# Patient Record
Sex: MALE | Race: WHITE | NOT HISPANIC OR LATINO | Employment: OTHER | URBAN - METROPOLITAN AREA
[De-identification: names, ages, dates, MRNs, and addresses within clinical notes are randomized per-mention and may not be internally consistent; named-entity substitution may affect disease eponyms.]

---

## 2023-06-15 ENCOUNTER — MEDICAL CORRESPONDENCE (OUTPATIENT)
Dept: HEALTH INFORMATION MANAGEMENT | Facility: CLINIC | Age: 53
End: 2023-06-15

## 2023-06-15 ENCOUNTER — TRANSFERRED RECORDS (OUTPATIENT)
Dept: HEALTH INFORMATION MANAGEMENT | Facility: CLINIC | Age: 53
End: 2023-06-15

## 2023-06-19 ENCOUNTER — TRANSCRIBE ORDERS (OUTPATIENT)
Dept: OTHER | Age: 53
End: 2023-06-19

## 2023-06-19 DIAGNOSIS — I42.8 NON-ISCHEMIC CARDIOMYOPATHY (H): Primary | ICD-10-CM

## 2023-06-29 ENCOUNTER — CARE COORDINATION (OUTPATIENT)
Dept: CARDIOLOGY | Facility: CLINIC | Age: 53
End: 2023-06-29
Payer: COMMERCIAL

## 2023-06-29 NOTE — PROGRESS NOTES
New Heart Failure Referral     Situation:   Referral sent via epic by Referred by: Rhianna Pratt MD - [facility] Ph: [phone] Fx: [phone]. Patient lives in [place].     Background   Referral originally sent to transplant and they forwarded it to us. Per transplant referring team sounded nervous about this patient. Would like patient to be seen urgently.     Assessment/Recommendation:   Sending to Heart Failure RN to Review.    Plan:   Under review. Double check Ins

## 2023-07-03 NOTE — PROGRESS NOTES
Pt is referred by Rhianna Partt NP at [facility] for nonischemic cardiomyopathy with EF 13%. Has had echo, cMRI, coronary angiogram and RHC.  He lives in [facility] which is almost 7 hours from the [place].  LVM for him to call so we can discuss possible of video appt if provider is agreeable and pt has video capability.    7/5/23 - Spoke with patient.  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 He reported he needs to come to town in about 2 weeks so he will schedule around an appointment with us at the [facility].  Offered appt in 2 weeks. Records available in care everywhere. Will have  obtain images.

## 2023-07-05 PROBLEM — I50.23 ACUTE ON CHRONIC SYSTOLIC CHF (CONGESTIVE HEART FAILURE) (H): Status: ACTIVE | Noted: 2023-06-12

## 2023-07-05 PROBLEM — I42.8 NON-ISCHEMIC CARDIOMYOPATHY (H): Status: ACTIVE | Noted: 2018-06-21

## 2023-07-05 PROBLEM — R91.8 MULTIPLE LUNG NODULES ON CT: Status: ACTIVE | Noted: 2018-06-21

## 2023-07-05 PROBLEM — Z95.810 ICD (IMPLANTABLE CARDIOVERTER-DEFIBRILLATOR) IN PLACE: Status: ACTIVE | Noted: 2023-06-21

## 2023-07-05 PROBLEM — R07.9 CHEST PAIN: Status: ACTIVE | Noted: 2023-06-12

## 2023-07-05 PROBLEM — E78.00 PURE HYPERCHOLESTEROLEMIA: Status: ACTIVE | Noted: 2019-04-22

## 2023-07-05 PROBLEM — J45.20 RAD (REACTIVE AIRWAY DISEASE) WITH WHEEZING, MILD INTERMITTENT, UNCOMPLICATED: Status: ACTIVE | Noted: 2023-06-21

## 2023-07-05 RX ORDER — FUROSEMIDE 20 MG
TABLET ORAL
Status: ON HOLD | COMMUNITY
Start: 2022-10-28 | End: 2023-08-27

## 2023-07-05 RX ORDER — CARVEDILOL 25 MG/1
25 TABLET ORAL 2 TIMES DAILY WITH MEALS
COMMUNITY
Start: 2022-08-01 | End: 2023-07-19

## 2023-07-05 RX ORDER — NITROGLYCERIN 0.4 MG/1
0.4 TABLET SUBLINGUAL EVERY 5 MIN PRN
Status: ON HOLD | COMMUNITY
Start: 2023-06-16 | End: 2024-07-04

## 2023-07-05 RX ORDER — SACUBITRIL AND VALSARTAN 97; 103 MG/1; MG/1
0.5 TABLET, FILM COATED ORAL 2 TIMES DAILY
Status: ON HOLD | COMMUNITY
Start: 2022-08-01 | End: 2023-08-28

## 2023-07-05 RX ORDER — SPIRONOLACTONE 25 MG/1
25 TABLET ORAL DAILY
Status: ON HOLD | COMMUNITY
Start: 2022-08-01 | End: 2023-11-20

## 2023-07-18 DIAGNOSIS — I50.23 ACUTE ON CHRONIC SYSTOLIC CHF (CONGESTIVE HEART FAILURE) (H): Primary | ICD-10-CM

## 2023-07-19 ENCOUNTER — LAB (OUTPATIENT)
Dept: LAB | Facility: CLINIC | Age: 53
End: 2023-07-19
Payer: COMMERCIAL

## 2023-07-19 ENCOUNTER — OFFICE VISIT (OUTPATIENT)
Dept: CARDIOLOGY | Facility: CLINIC | Age: 53
End: 2023-07-19
Attending: STUDENT IN AN ORGANIZED HEALTH CARE EDUCATION/TRAINING PROGRAM
Payer: COMMERCIAL

## 2023-07-19 VITALS
HEART RATE: 63 BPM | BODY MASS INDEX: 27.98 KG/M2 | DIASTOLIC BLOOD PRESSURE: 69 MMHG | WEIGHT: 206.6 LBS | OXYGEN SATURATION: 94 % | HEIGHT: 72 IN | SYSTOLIC BLOOD PRESSURE: 101 MMHG

## 2023-07-19 DIAGNOSIS — I50.23 ACUTE ON CHRONIC SYSTOLIC CHF (CONGESTIVE HEART FAILURE) (H): ICD-10-CM

## 2023-07-19 DIAGNOSIS — Q24.5 MYOCARDIAL BRIDGE: ICD-10-CM

## 2023-07-19 DIAGNOSIS — I42.8 NONISCHEMIC CARDIOMYOPATHY (H): ICD-10-CM

## 2023-07-19 DIAGNOSIS — I50.23 ACUTE ON CHRONIC SYSTOLIC CHF (CONGESTIVE HEART FAILURE) (H): Primary | ICD-10-CM

## 2023-07-19 DIAGNOSIS — E78.5 HYPERLIPIDEMIA LDL GOAL <100: ICD-10-CM

## 2023-07-19 LAB
ALBUMIN SERPL BCG-MCNC: 4.2 G/DL (ref 3.5–5.2)
ALP SERPL-CCNC: 89 U/L (ref 40–129)
ALT SERPL W P-5'-P-CCNC: 29 U/L (ref 0–70)
ANION GAP SERPL CALCULATED.3IONS-SCNC: 7 MMOL/L (ref 7–15)
AST SERPL W P-5'-P-CCNC: 22 U/L (ref 0–45)
BILIRUB SERPL-MCNC: 0.5 MG/DL
BUN SERPL-MCNC: 16.3 MG/DL (ref 6–20)
CALCIUM SERPL-MCNC: 9.5 MG/DL (ref 8.6–10)
CHLORIDE SERPL-SCNC: 104 MMOL/L (ref 98–107)
CREAT SERPL-MCNC: 1.29 MG/DL (ref 0.67–1.17)
DEPRECATED HCO3 PLAS-SCNC: 26 MMOL/L (ref 22–29)
GFR SERPL CREATININE-BSD FRML MDRD: 67 ML/MIN/1.73M2
GLUCOSE SERPL-MCNC: 98 MG/DL (ref 70–99)
POTASSIUM SERPL-SCNC: 4.8 MMOL/L (ref 3.4–5.3)
PROT SERPL-MCNC: 6.7 G/DL (ref 6.4–8.3)
SODIUM SERPL-SCNC: 137 MMOL/L (ref 136–145)

## 2023-07-19 PROCEDURE — 80053 COMPREHEN METABOLIC PANEL: CPT | Performed by: PATHOLOGY

## 2023-07-19 PROCEDURE — 99205 OFFICE O/P NEW HI 60 MIN: CPT | Performed by: STUDENT IN AN ORGANIZED HEALTH CARE EDUCATION/TRAINING PROGRAM

## 2023-07-19 PROCEDURE — 99213 OFFICE O/P EST LOW 20 MIN: CPT | Performed by: STUDENT IN AN ORGANIZED HEALTH CARE EDUCATION/TRAINING PROGRAM

## 2023-07-19 PROCEDURE — 36415 COLL VENOUS BLD VENIPUNCTURE: CPT | Performed by: PATHOLOGY

## 2023-07-19 RX ORDER — CARVEDILOL 12.5 MG/1
12.5 TABLET ORAL 2 TIMES DAILY WITH MEALS
Qty: 180 TABLET | Refills: 1 | Status: ON HOLD | OUTPATIENT
Start: 2023-07-19 | End: 2023-08-27

## 2023-07-19 ASSESSMENT — PAIN SCALES - GENERAL: PAINLEVEL: NO PAIN (0)

## 2023-07-19 NOTE — NURSING NOTE
Chief Complaint   Patient presents with     New Patient     NEW HF: 52 year old male presents with NICM, Ef 13% to establish care with labs prior       Vitals were taken, medications reconciled.    Dana Alarcon, EMT   1:42 PM

## 2023-07-19 NOTE — PATIENT INSTRUCTIONS
Cardiology Providers you saw during your visit:  Dr. Snowden     Medication changes:  1- DECREASE coreg to 12.5mg two times daily        Follow up:  1- Referral for transplant and LVAD evaluation  2 - Follow up with Dr Snowden after evaluation completed       Please call if you have:  1. Weight gain of more than 2 pounds in a day or 5 pounds in a week  2. Increased shortness of breath, swelling or bloating  3. Dizziness, lightheadedness   4. Any questions or concerns.      Heart Failure Support Group  Virtual meetings will continue in 2023 Please reach out if you would like to attend and we can get you the information you need to log in.     2023 dates for Support Group    Monday, August 7th, 1-2pm  Monday, September 11th, 1-2pm  Monday, October 2nd, 1-2pm  Monday, November 6th, 1-2pm  Monday, December 4th, 1-2pm    Follow the American Heart Association Diet and Lifestyle recommendations:  Limit saturated fat, trans fat, sodium, red meat, sweets and sugar-sweetened beverages. If you choose to eat red meat, compare labels and select the leanest cuts available.  Aim for at least 150 minutes of moderate physical activity or 75 minutes of vigorous physical activity - or an equal combination of both - each week.     During business hours: [phone], press option # 1 to schedule an appointment or send a message to your care team     After hours, weekends or holidays: On Call Cardiologist- [phone]   option #4 and ask to speak to the on-call Cardiologist. Inform them you are a CORE/heart failure patient at the [place].     Shannon Dixon, RN BSN CHFN  Cardiology Nurse Care Coordinator (Heart Failure / C.O.R.E.)

## 2023-07-19 NOTE — NURSING NOTE
Diet: Patient instructed regarding a heart failure healthy diet, including discussion of reduced fat and 2000 mg daily sodium restriction, daily weights, medication purpose and compliance, fluid restrictions and resources for patient and family to access for assistance with heart failure management.       Labs: Patient was given results of the laboratory testing obtained today and patient was instructed about when to return for the next laboratory testing.     Med Reconcile: Reviewed and verified all current medications with the patient. The updated medication list was printed and given to the patient. DECREASE coreg to 12.5mg BID    Return Appointment: Patient given instructions regarding scheduling next clinic visit. Start advanced therapy eval. Follow up with dr morris after eval    Patient stated he understood all health information given and agreed to call with further questions or concerns.     Shannon Dixon RN

## 2023-07-19 NOTE — PROGRESS NOTES
Advanced Heart Failure/Transplant Clinic Note    HPI  Dear colleagues,     I had the pleasure of seeing Mr. Navin Lutz in the Cardiology clinic. As you know, Mr. Navin Lutz is a pleasant 52 year old male with a past medical history of chronic HFrEF 2/2 NICM s/p ICD, HLD, and CKD Stage II who presents as new referral for HFrEF.    Patient reports he was first having symptoms of CHF in 2017 and was diagnosed with HFrEF shortly after that. He has been on various GDMT and overall did well with no hospital admissions until June '23. Patient began having progressive SALEH and chest pain. Angiogram showed myocardial bridge of distal LAD but otherwise no CAD. RHC showed normal filling pressures with CI 2.1 by Teressa and 1.0 by TD. cMRI showed LVEF 13% and normal RV. 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 Patient reports compliance with all his medications.    ROS:  A complete 12-point ROS was negative except as above.    PAST MEDICAL HISTORY:  Patient Active Problem List   Diagnosis     Acute on chronic systolic CHF (congestive heart failure) (H)     Chest pain     ICD (implantable cardioverter-defibrillator) in place     Inguinal hernia     Multiple lung nodules on CT     Non-ischemic cardiomyopathy (H)     Pure hypercholesterolemia     RAD (reactive airway disease) with wheezing, mild intermittent, uncomplicated   Chronic biventricular systolic and LV diastolic dysfunction (HFrEF)  ACC/AHA stage C, NYHA class III  EF 18% (cardiac MRI) on 6/14/2023  EF 13% with grade I diastolic dysfunction on 6/13/2023  Reduced RVSF  EF 20-25% on 9/22/2020  EF 25-30% on 9/20/2019  EF 20% on 3/26/2019  EF 20-25% on 1/22/2019  EF 15-20% on 9/24/2018  EF 15-20% with grade 5 diastolic dysfunction on 12/11/2017   Nonischemic dilated cardiomyopathy  LIVDd 7.2 cm  Viral mediated?  Evaluation at [facility] in 2019  Cardiac MRI on 6/14/2023 = severe left ventricular  dilation with severe, diffuse hypokinesis and mid wall late gadolinium enhancement stripe throughout the septum consistent with nonischemic dilated cardiomyopathy; mild patchy myocardial edema along the left ventricular anterior, anterolateral and inferolateral walls in the basal and mid cavity segments could represent an element of myocarditis; trace aortic regurgitation, trace mitral valve regurgitation, mild tricuspid valve regurgitation  LHC and RHC on 6/13/2023 = intramyocardial bridging of the dLAD otherwise normal coronary arteries; LVEDP 3 mmHg; normal pressures with no pulmonary hypertension; RA 4 mmHg, RV 22/4 mmHg, PA 23/11 mmHg with mean PAP of 16 mmHg, PCWP 7 mmHg, CO 4.32 L/min and CI 2.1 L/min*m2 by Teressa, CO 2.05 L/min and CI 1.0 L/min*m2 by thermodilution  LHC and RHC on 1/9/2018 = arterial pressure 82/59 (67); normal pulmonary capillary wedge, and mean pulmonary artery pressures; cardiac output by thermodilution was 3.07 L/min, index at 1.5; no angiographic evidence of coronary artery disease present  History of NSVT  Moderate to severe eccentric LVH  Valvular heart disease  Mild MR  Dyslipidemia  Possible obstructive sleep apnea       FAMILY HISTORY:  No known family history of heart disease except possible his father had an enlarged heart, but no formal diagnosis    SOCIAL HISTORY:  , owns a [employer] company. No prior tobacco, ETOH, or illicit substance use.  Social History     Tobacco Use     Smoking status: Never     Smokeless tobacco: Never        ALLERGIES:  No Known Allergies    CURRENT MEDICATIONS:  Current Outpatient Medications   Medication Sig Dispense Refill     carvedilol (COREG) 25 MG tablet Take 25 mg by mouth 2 times daily (with meals)       empagliflozin (JARDIANCE) 10 MG TABS tablet Take 10 mg by mouth daily       furosemide (LASIX) 20 MG tablet TAKE 1/2 TABLET BY MOUTH ONCE DAILY AS NEEDED FOR LEG SWELLING       nitroGLYcerin (NITROSTAT) 0.4 MG sublingual tablet Place 0.4  "mg under the tongue every 5 minutes as needed (Patient not taking: Reported on 7/19/2023)       sacubitril-valsartan (ENTRESTO)  MG per tablet Take 0.5 tablets by mouth 2 times daily       spironolactone (ALDACTONE) 25 MG tablet Take 25 mg by mouth daily         EXAM:  /69 (BP Location: Right arm, Patient Position: Sitting, Cuff Size: Adult Regular)   Pulse 63   Ht 1.84 m (6' 0.44\")   Wt 93.7 kg (206 lb 9.6 oz)   SpO2 94%   BMI 27.68 kg/m      General: appears comfortable, alert and interactive, in no acute distress  Head: normocephalic, atraumatic  Eyes: anicteric sclera, EOMI  Mouth: MMM  Neck: supple, no cervical adenopathy  CV: regular rate and rhythm, no murmur, gallop, rub, estimated JVP ~6 cm  Resp: clear, no rales or wheezing  GI: soft, nontender, nondistended  Extremities: warm, no peripheral edema, 2+ bilateral radial pulses  Neurological: alert and oriented, no focal deficits  Psych: normal mood and affect  Derm: no rashes on exposed surfaces    Weight  Wt Readings from Last 10 Encounters:   07/19/23 93.7 kg (206 lb 9.6 oz)       I personally reviewed recent labs and data as below and discussed the results with the patient in clinic today.  Labs:  CBC RESULTS:  No results found for: WBC, RBC, HGB, HCT, MCV, MCH, MCHC, RDW, PLT    CMP RESULTS:  Lab Results   Component Value Date     07/19/2023    POTASSIUM 4.8 07/19/2023    CHLORIDE 104 07/19/2023    CO2 26 07/19/2023    ANIONGAP 7 07/19/2023    GLC 98 07/19/2023    BUN 16.3 07/19/2023    CR 1.29 (H) 07/19/2023    GFRESTIMATED 67 07/19/2023    NAM 9.5 07/19/2023    BILITOTAL 0.5 07/19/2023    ALBUMIN 4.2 07/19/2023    ALKPHOS 89 07/19/2023    ALT 29 07/19/2023    AST 22 07/19/2023        No results for input(s): CHOL, HDL, LDL, TRIG, CHOLHDLRATIO in the last [id number] hours.     Testing/Procedures:  I personally visualized and interpreted:  Echocardiogram 6/13/23  Narrative       Left Ventricle: The left ventricle is severely dilated. " There is   moderate-to-severe eccentric left ventricular hypertrophy. Severely   reduced left ventricular systolic function. The ejection fraction,   measured by biplane, is 13%. Severe hypokinesis of apex, remainder wall   segments are mostly akinetic. Grade I diastolic dysfunction.         Left Ventricle   The left ventricle is severely dilated. There is moderate-to-severe eccentric left ventricular hypertrophy. Severely reduced left ventricular systolic function. The ejection fraction, measured by biplane, is 13%. Severe hypokinesis of apex, remainder wall segments are mostly akinetic. Grade I diastolic dysfunction.     Right Ventricle   The right ventricle appears normal in size. Systolic function is reduced. Normal TAPSE, measuring 1.8 cm. Normal systolic excursion velocity by TDI. S' measures 9.5 cm/sec. Wall thickness is normal. The RVSP measures 13 mmHg. There is no evidence of pulmonary hypertension.     Left Atrium   The left atrium is normal in size. The pulmonary veins have normal venous flow.     Right Atrium   The right atrium is normal in size.     IVC/SVC   Normal IVC size with normal respirophasic changes.Normal hepatic vein blood flow.     Mitral Valve   Mitral valve structure is normal. There is trace regurgitation. There is no evidence of mitral valve stenosis.     Tricuspid Valve   Tricuspid valve structure is normal. There is mild regurgitation. There is no evidence of tricuspid valve stenosis.     Aortic Valve   The aortic valve is tricuspid. There is trace regurgitation. There is no evidence of aortic valve stenosis.     Pulmonic Valve   The pulmonic valve was not well visualized. There is trace regurgitation. There is no evidence of pulmonic valve stenosis.     Pericardium   There is no pericardial effusion.     Aorta   The sinus of Valsalva is normal. The ascending aorta is normal.     Interatrial Septum   No evidence of an atrial shunt by color Doppler.     Coronary Angiogram/RHC  6/13/23  Conclusions:   Coronary angiogram: Intramyocardial bridging of the distal LAD, otherwise   normal coronaries.   Left heart catheterization: No significant gradient across aortic valve.     LVEDP 3 mmHg.   Right heart catheterization: Normal pressures.  No pulmonary hypertension.    Low cardiac output.  No shunt.   -RA 4 mmHg   -RV 22/4 mmHg   -PA 23/11 mmHg, Mean PAP 16 mmHg   -PCWP 7 mmHg   -Teressa: CO 4.32 L/min, CI 2.1 L/min*m2   -Thermodilution: CO 2.05 L/min, CI 1.0 L/min*m2     cMRI 6/14/23  FINDINGS:   AORTA: The ascending aorta and aortic root are normal caliber. The sinotubular junction is maintained. Normal aortic wall thickness.     ARCH VESSELS: Arch vessels are grossly patent and partially visualized.     RIGHT ATRIUM: Normal size.     TRICUSPID VALVE: Unrestricted leaflet excursion without stenosis. Mild regurgitation.     RIGHT VENTRICLE: Normal morphology, size, and wall thickness. No wall motion abnormalities identified.     PULMONIC VALVE: Unrestricted leaflet excursion without stenosis or regurgitation.     PULMONARY VEINS: Widely patent.     LEFT ATRIUM: Mild dilation. No left atrial appendage thrombus.     MITRAL VALVE: Unrestrictive leaflet excursion without stenosis. Trace regurgitation.     LEFT VENTRICLE: Normal morphology and thickness with marked dilation. End-diastolic basal septal thickness of 9 mm.     Severe, diffuse hypokinesis. Normal first pass perfusion.     Mid wall late gadolinium enhancement throughout the septum. Patchy myocardial edema along the anterior, anterolateral and inferolateral walls in the basal and mid cavity segments.       EKG 6/13/23 shows sinus rhythm, nonspecific t wave abnormalities    Outside results of note:  Outside records from [facility] were obtained, and relevant results/notes have been incorporated into HPI.    Assessment and Plan:     In summary, 52 year old male with a past medical history of chronic HFrEF 2/2 NICM s/p ICD, HLD, and CKD  Stage II who presents as new referral for HFrEF.    Chronic systolic heart failure/HFrEF (EF 13%) secondary to nonischemic cardiomyopathy  NYHA Symptom Class IV  Stage D  Primary Cardiologist: Beatriz Wu APRN-CNP  ACE-I/ARB/ARNi: Continue Entresto 49-51 mg BID  BB: Decrease coreg to 12.5 mg BID  Aldosterone antagonist: Continue beck 25 mg daily  SGLT2i: Continue empagliflozin 10 mg daily  SCD prophylaxis: s/p ICD  %BiV pacing: N/A  Fluid status: euvoelmic on lasix 10-20 mg daily PRN  Cardiac Rehab: previously referred  - Given patient has recently had symptoms at rest with low cardiac index/output recently, will start advanced therapy evaluation. Discussed overview of what the outpatient evaluation process entails and if patient were to decompensate before this is completed, could be converted to more urgent inpatient evaluation  - Discussed importance of daily standing weights, 2L fluid, and 2g Na restriction    Myocardial Bridge Over Coronary Artery  HLD  Myocardial bridge over distal LAD.  - Continue to monitor    CKD Stage II  Cr ~1.2-1.3.  - Will continue to monitor    To Do:  - Decrease coreg to 12.5 mg BID  - Will start outpatient advanced therapy evaluation with follow-up once this is completed    The patient states understanding and is agreeable with plan.   Feel free to contact myself regarding questions or concerns. It was a pleasure to see this patient today.    I spent 60 minutes in care of the patient today including obtaining recent medical history, personally reviewing recent cardiac testing and/or lab results, today's examination, discussion of testing results and care recommendations with patient.    Micaela Silvestre MD   of Medicine, [facility]  Advanced Heart Failure and Transplant Cardiology     GALO GATES NP

## 2023-07-19 NOTE — LETTER
7/19/2023      RE: Navin Lutz  [address] [place] [address]       Dear Colleague,    Thank you for the opportunity to participate in the care of your patient, Navin Lutz, at the [facility] HEART CLINIC [place] at [facility]. Please see a copy of my visit note below.    Advanced Heart Failure/Transplant Clinic Note    HPI  Dear colleagues,     I had the pleasure of seeing Mr. Navin Lutz in the Cardiology clinic. As you know, Mr. Navin Lutz is a pleasant 52 year old male with a past medical history of chronic HFrEF 2/2 NICM s/p ICD, HLD, and CKD Stage II who presents as new referral for HFrEF.    Patient reports he was first having symptoms of CHF in 2017 and was diagnosed with HFrEF shortly after that. He has been on various GDMT and overall did well with no hospital admissions until June '23. Patient began having progressive SALEH and chest pain. Angiogram showed myocardial bridge of distal LAD but otherwise no CAD. RHC showed normal filling pressures with CI 2.1 by Teressa and 1.0 by TD. cMRI showed LVEF 13% and normal RV. 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 Patient reports compliance with all his medications.    ROS:  A complete 12-point ROS was negative except as above.    PAST MEDICAL HISTORY:  Patient Active Problem List   Diagnosis    Acute on chronic systolic CHF (congestive heart failure) (H)    Chest pain    ICD (implantable cardioverter-defibrillator) in place    Inguinal hernia    Multiple lung nodules on CT    Non-ischemic cardiomyopathy (H)    Pure hypercholesterolemia    RAD (reactive airway disease) with wheezing, mild intermittent, uncomplicated   Chronic biventricular systolic and LV diastolic dysfunction (HFrEF)  ACC/AHA stage C, NYHA class III  EF 18% (cardiac MRI) on 6/14/2023  EF 13% with grade I diastolic dysfunction on 6/13/2023  Reduced RVSF  EF 20-25%  on 9/22/2020  EF 25-30% on 9/20/2019  EF 20% on 3/26/2019  EF 20-25% on 1/22/2019  EF 15-20% on 9/24/2018  EF 15-20% with grade 5 diastolic dysfunction on 12/11/2017   Nonischemic dilated cardiomyopathy  LIVDd 7.2 cm  Viral mediated?  Evaluation at [facility] in 2019  Cardiac MRI on 6/14/2023 = severe left ventricular dilation with severe, diffuse hypokinesis and mid wall late gadolinium enhancement stripe throughout the septum consistent with nonischemic dilated cardiomyopathy; mild patchy myocardial edema along the left ventricular anterior, anterolateral and inferolateral walls in the basal and mid cavity segments could represent an element of myocarditis; trace aortic regurgitation, trace mitral valve regurgitation, mild tricuspid valve regurgitation  LHC and RHC on 6/13/2023 = intramyocardial bridging of the dLAD otherwise normal coronary arteries; LVEDP 3 mmHg; normal pressures with no pulmonary hypertension; RA 4 mmHg, RV 22/4 mmHg, PA 23/11 mmHg with mean PAP of 16 mmHg, PCWP 7 mmHg, CO 4.32 L/min and CI 2.1 L/min*m2 by Teressa, CO 2.05 L/min and CI 1.0 L/min*m2 by thermodilution  LHC and RHC on 1/9/2018 = arterial pressure 82/59 (67); normal pulmonary capillary wedge, and mean pulmonary artery pressures; cardiac output by thermodilution was 3.07 L/min, index at 1.5; no angiographic evidence of coronary artery disease present  History of NSVT  Moderate to severe eccentric LVH  Valvular heart disease  Mild MR  Dyslipidemia  Possible obstructive sleep apnea       FAMILY HISTORY:  No known family history of heart disease except possible his father had an enlarged heart, but no formal diagnosis    SOCIAL HISTORY:  , owns a [employer] company. No prior tobacco, ETOH, or illicit substance use.  Social History     Tobacco Use    Smoking status: Never    Smokeless tobacco: Never        ALLERGIES:  No Known Allergies    CURRENT MEDICATIONS:  Current Outpatient Medications   Medication Sig Dispense Refill     "carvedilol (COREG) 25 MG tablet Take 25 mg by mouth 2 times daily (with meals)      empagliflozin (JARDIANCE) 10 MG TABS tablet Take 10 mg by mouth daily      furosemide (LASIX) 20 MG tablet TAKE 1/2 TABLET BY MOUTH ONCE DAILY AS NEEDED FOR LEG SWELLING      nitroGLYcerin (NITROSTAT) 0.4 MG sublingual tablet Place 0.4 mg under the tongue every 5 minutes as needed (Patient not taking: Reported on 7/19/2023)      sacubitril-valsartan (ENTRESTO)  MG per tablet Take 0.5 tablets by mouth 2 times daily      spironolactone (ALDACTONE) 25 MG tablet Take 25 mg by mouth daily         EXAM:  /69 (BP Location: Right arm, Patient Position: Sitting, Cuff Size: Adult Regular)   Pulse 63   Ht 1.84 m (6' 0.44\")   Wt 93.7 kg (206 lb 9.6 oz)   SpO2 94%   BMI 27.68 kg/m      General: appears comfortable, alert and interactive, in no acute distress  Head: normocephalic, atraumatic  Eyes: anicteric sclera, EOMI  Mouth: MMM  Neck: supple, no cervical adenopathy  CV: regular rate and rhythm, no murmur, gallop, rub, estimated JVP ~6 cm  Resp: clear, no rales or wheezing  GI: soft, nontender, nondistended  Extremities: warm, no peripheral edema, 2+ bilateral radial pulses  Neurological: alert and oriented, no focal deficits  Psych: normal mood and affect  Derm: no rashes on exposed surfaces    Weight  Wt Readings from Last 10 Encounters:   07/19/23 93.7 kg (206 lb 9.6 oz)       I personally reviewed recent labs and data as below and discussed the results with the patient in clinic today.  Labs:  CBC RESULTS:  No results found for: WBC, RBC, HGB, HCT, MCV, MCH, MCHC, RDW, PLT    CMP RESULTS:  Lab Results   Component Value Date     07/19/2023    POTASSIUM 4.8 07/19/2023    CHLORIDE 104 07/19/2023    CO2 26 07/19/2023    ANIONGAP 7 07/19/2023    GLC 98 07/19/2023    BUN 16.3 07/19/2023    CR 1.29 (H) 07/19/2023    GFRESTIMATED 67 07/19/2023    NAM 9.5 07/19/2023    BILITOTAL 0.5 07/19/2023    ALBUMIN 4.2 07/19/2023    " ALKPHOS 89 07/19/2023    ALT 29 07/19/2023    AST 22 07/19/2023        No results for input(s): CHOL, HDL, LDL, TRIG, CHOLHDLRATIO in the last 46400 hours.     Testing/Procedures:  I personally visualized and interpreted:  Echocardiogram 6/13/23  Narrative      Left Ventricle: The left ventricle is severely dilated. There is   moderate-to-severe eccentric left ventricular hypertrophy. Severely   reduced left ventricular systolic function. The ejection fraction,   measured by biplane, is 13%. Severe hypokinesis of apex, remainder wall   segments are mostly akinetic. Grade I diastolic dysfunction.         Left Ventricle   The left ventricle is severely dilated. There is moderate-to-severe eccentric left ventricular hypertrophy. Severely reduced left ventricular systolic function. The ejection fraction, measured by biplane, is 13%. Severe hypokinesis of apex, remainder wall segments are mostly akinetic. Grade I diastolic dysfunction.     Right Ventricle   The right ventricle appears normal in size. Systolic function is reduced. Normal TAPSE, measuring 1.8 cm. Normal systolic excursion velocity by TDI. S' measures 9.5 cm/sec. Wall thickness is normal. The RVSP measures 13 mmHg. There is no evidence of pulmonary hypertension.     Left Atrium   The left atrium is normal in size. The pulmonary veins have normal venous flow.     Right Atrium   The right atrium is normal in size.     IVC/SVC   Normal IVC size with normal respirophasic changes.Normal hepatic vein blood flow.     Mitral Valve   Mitral valve structure is normal. There is trace regurgitation. There is no evidence of mitral valve stenosis.     Tricuspid Valve   Tricuspid valve structure is normal. There is mild regurgitation. There is no evidence of tricuspid valve stenosis.     Aortic Valve   The aortic valve is tricuspid. There is trace regurgitation. There is no evidence of aortic valve stenosis.     Pulmonic Valve   The pulmonic valve was not well visualized.  There is trace regurgitation. There is no evidence of pulmonic valve stenosis.     Pericardium   There is no pericardial effusion.     Aorta   The sinus of Valsalva is normal. The ascending aorta is normal.     Interatrial Septum   No evidence of an atrial shunt by color Doppler.     Coronary Angiogram/RHC 6/13/23  Conclusions:   Coronary angiogram: Intramyocardial bridging of the distal LAD, otherwise   normal coronaries.   Left heart catheterization: No significant gradient across aortic valve.     LVEDP 3 mmHg.   Right heart catheterization: Normal pressures.  No pulmonary hypertension.    Low cardiac output.  No shunt.   -RA 4 mmHg   -RV 22/4 mmHg   -PA 23/11 mmHg, Mean PAP 16 mmHg   -PCWP 7 mmHg   -Teressa: CO 4.32 L/min, CI 2.1 L/min*m2   -Thermodilution: CO 2.05 L/min, CI 1.0 L/min*m2     cMRI 6/14/23  FINDINGS:   AORTA: The ascending aorta and aortic root are normal caliber. The sinotubular junction is maintained. Normal aortic wall thickness.     ARCH VESSELS: Arch vessels are grossly patent and partially visualized.     RIGHT ATRIUM: Normal size.     TRICUSPID VALVE: Unrestricted leaflet excursion without stenosis. Mild regurgitation.     RIGHT VENTRICLE: Normal morphology, size, and wall thickness. No wall motion abnormalities identified.     PULMONIC VALVE: Unrestricted leaflet excursion without stenosis or regurgitation.     PULMONARY VEINS: Widely patent.     LEFT ATRIUM: Mild dilation. No left atrial appendage thrombus.     MITRAL VALVE: Unrestrictive leaflet excursion without stenosis. Trace regurgitation.     LEFT VENTRICLE: Normal morphology and thickness with marked dilation. End-diastolic basal septal thickness of 9 mm.     Severe, diffuse hypokinesis. Normal first pass perfusion.     Mid wall late gadolinium enhancement throughout the septum. Patchy myocardial edema along the anterior, anterolateral and inferolateral walls in the basal and mid cavity segments.       EKG 6/13/23 shows sinus rhythm,  nonspecific t wave abnormalities    Outside results of note:  Outside records from [facility] were obtained, and relevant results/notes have been incorporated into HPI.    Assessment and Plan:     In summary, 52 year old male with a past medical history of chronic HFrEF 2/2 NICM s/p ICD, HLD, and CKD Stage II who presents as new referral for HFrEF.    Chronic systolic heart failure/HFrEF (EF 13%) secondary to nonischemic cardiomyopathy  NYHA Symptom Class IV  Stage D  Primary Cardiologist: Beatriz Wu APRN-CNP  ACE-I/ARB/ARNi: Continue Entresto 49-51 mg BID  BB: Decrease coreg to 12.5 mg BID  Aldosterone antagonist: Continue beck 25 mg daily  SGLT2i: Continue empagliflozin 10 mg daily  SCD prophylaxis: s/p ICD  %BiV pacing: N/A  Fluid status: euvoelmic on lasix 10-20 mg daily PRN  Cardiac Rehab: previously referred  - Given patient has recently had symptoms at rest with low cardiac index/output recently, will start advanced therapy evaluation. Discussed overview of what the outpatient evaluation process entails and if patient were to decompensate before this is completed, could be converted to more urgent inpatient evaluation  - Discussed importance of daily standing weights, 2L fluid, and 2g Na restriction    Myocardial Bridge Over Coronary Artery  HLD  Myocardial bridge over distal LAD.  - Continue to monitor    CKD Stage II  Cr ~1.2-1.3.  - Will continue to monitor    To Do:  - Decrease coreg to 12.5 mg BID  - Will start outpatient advanced therapy evaluation with follow-up once this is completed    The patient states understanding and is agreeable with plan.   Feel free to contact myself regarding questions or concerns. It was a pleasure to see this patient today.    I spent 60 minutes in care of the patient today including obtaining recent medical history, personally reviewing recent cardiac testing and/or lab results, today's examination, discussion of testing results and care recommendations with  patient.    Micaela Silvestre MD   of Medicine, [facility]  Advanced Heart Failure and Transplant Cardiology     CC  GALO HERNANDEZ NP

## 2023-07-21 ENCOUNTER — REFERRAL (OUTPATIENT)
Dept: TRANSPLANT | Facility: CLINIC | Age: 53
End: 2023-07-21
Payer: COMMERCIAL

## 2023-07-21 DIAGNOSIS — I50.23 ACUTE ON CHRONIC SYSTOLIC CHF (CONGESTIVE HEART FAILURE) (H): Primary | ICD-10-CM

## 2023-07-21 DIAGNOSIS — I42.8 NON-ISCHEMIC CARDIOMYOPATHY (H): ICD-10-CM

## 2023-07-21 NOTE — LETTER
Navin Lutz  [address] [place] [address]                2023    Re:  Navin Lutz  : 1970  ICD10:  I50    Attn:  Libertad Briceno NP  Subject: Vaccination Update Request      To Whom It May Concern:    Navin Lutz is a potential heart transplant recipient currently being followed by the [facility].  We would like to request your assistance in obtaining a vaccine update in the care of our mutual patient. We recommend all transplant candidates have updated non-live vaccines, as listed below. Currently, the only vaccine required for heart transplant is the COVID vaccine. If the patient is not already updated on these vaccines, please assist in administering the following:    Inactivated Influenza Vaccine (IIV)-yearly    Tetanus, diphtheria, Pertussis (Dtap) if none in 10 years and Tdap booster every 5-10 years    Hepatitis B vaccine series (if HBsAb negative, HBcAB and ABsAg negative)-see results below    Pneumococcal vaccine:  -if naive to any pneumonia vaccine:  PCV-13, PPSV-23 vaccine > or = 8 weeks later, subsequent PPSV-23 vaccine > or = 5 years from the prior PPSV-23    -if with prior PPSV-23 x1 in the past:  PCV-13 > or = 1 year from prior PPSV-23  PPSV -23 > or = 5 years from the prior PPSV-23 and > or = 8 weeks from the prior PCV-13    -if with prior PPSV-23 x2 in the past:  PCV-13 > or = 1 year from the last PPSV-23    Shinrix    Please fax results as soon as they are available to:   Thoracic Transplant Department  Fax Number:  [phone]    Thank you  for your continued support and the opportunity to collaborate in the care of this patient.  If you have any questions, please call Bernadine Cote RN, Transplant Coordinator,  at [phone] (option 2) or [phone].           Micaela Silvestre MD   of Medicine  [facility], Cardiovascular Division    Bernadine Cote RN, BSN  Heart Transplant Coordinator  [facility]  Health    CC:  Navin Lutz

## 2023-07-21 NOTE — TELEPHONE ENCOUNTER
Heart Transplant Referral  Date:  7/21/2023    Type of Evaluation:  Hybrid Eval   Lead Coordinator for Evaluation:  Bernadine Cote    Patient referred for Advanced Therapies by Dr. Silvestre , as patient meets criteria for heart transplant evaluation.  Cardiac Diagnosis: Dilated myopathy: Nonischemic   Age: 52 year old  ABO: Unknown     Chart reviewed and the following barriers for transplant were noted in the chart:   BMI: 27   Substance Use History: None identified  Other Known Barriers to Transplant: None identified  COVID Vaccination Status: Unknown    Plan: Pending hybrid evaluation financial clearance will initiate transplant evaluation and coordinate testing including further education on heart transplant.

## 2023-07-25 RX ORDER — LIDOCAINE 40 MG/G
CREAM TOPICAL
Status: CANCELLED | OUTPATIENT
Start: 2023-07-25

## 2023-07-25 NOTE — TELEPHONE ENCOUNTER
Heart Transplant Intake Call  Date:  7/25/2023    Pt referred for Heart Transplant evaluation. Called patient and introduced the Heart Transplant Program.  Spoke with patient to discuss the evaluation process for transplant and to make plan for further follow up and education.     Discussed need for caregiver support during evaluation process, and post transplant and/or VAD implant. Pt's 30-day caregiver will be wife Corie. (VAD ONLY) Pt's long term caregiver support will be Wife Corie     Discussed importance of routine health maintenance while on the transplant list.   Last Dental Visit: Over a year, pt will make appt Discussed importance of seeing dentist at least once every 12 months in order to maintain transplant eligibility. Pt encouraged to ask their dental clinic to send a letter of dental clearance to the transplant office.   Last Colonoscopy: Not done. Records to be obtained from: NA  Last PSA:  Unknown  Discussed importance of vaccines prior to transplant and will send letter to pt's PCP regarding vaccine recommendations. Pt is aware that the COVID-19 vaccine is currently a requirement for transplant listing      Discussed importance of avoiding nicotine, illegal drugs - including cannabis -, and using alcohol only minimally. Discussed age and BMI requirements for transplant listing.    Heart Transplant Packet and information for [facility] was sent to the patient via email. Patient was encouraged to review and sign this information further prior to the Heart Transplant Education Class.  They were encouraged to bring a caregiver to this class.    Plan: Patient verbalized understanding and in agreement with the plan for evaluation. Was engaged and forthcoming with information. Asked appropriate questions in regard to this process. Patient denied any further transplant related questions and/or concerned.  Patient given the contact information to the transplant office and understands to contact  coordinator with any further questions or concerns.

## 2023-08-01 ENCOUNTER — CARE COORDINATION (OUTPATIENT)
Dept: CARDIOLOGY | Facility: CLINIC | Age: 53
End: 2023-08-01
Payer: COMMERCIAL

## 2023-08-13 ENCOUNTER — HEALTH MAINTENANCE LETTER (OUTPATIENT)
Age: 53
End: 2023-08-13

## 2023-08-17 ENCOUNTER — CARE COORDINATION (OUTPATIENT)
Dept: TRANSPLANT | Facility: CLINIC | Age: 53
End: 2023-08-17
Payer: COMMERCIAL

## 2023-08-17 NOTE — PROGRESS NOTES
Pre-procedure instructions - Right heart catheterization  Patient Education    Your arrival time is 7am on Tuesday 8/22.  Location is [address] Waiting Room  Please plan on being at the hospital all day.  At any time, emergencies and/or urgent cases may come up which could delay the start of your procedure.    Pre-procedure instructions - Right heart catheterization  No solid food for 8 hours prior  Nothing to drink 2 hours prior to arrival time  You can take your morning medications with sips of water  We recommend you arrange for a ride to drop you off and pick you up, in the instance, you are unable to drive home, however you should be able to function as you normally would after the procedure    Diabetic Medication Instructions  Typical instructions for insulin diabetic medication holding are below. However, please reach out to your Primary Care Provider or Endocrinologist for specific instructions  DO NOT take any oral diabetic medication, short-acting diabetes medications/insulin, humalog or regular insulin the morning of your test  Take   dose of long-acting insulin (Lantus, Levemir) the day of your test  Remember to  bring your glucometer and insulin with you to take after your test if needed  DO NOT take injectable GLP-1 agonists semaglutide (Ozempic, Wegovy), dulaglutide (Trulicity), exenatide ER (Bydureon), tirzepatide (Mounjaro), or oral semaglutide (Rybelsus) for 7 days prior your procedure  Hold once daily injectable GLP-1 agonists exenatide (Byetta), liraglutide (Saxenda, Victoza) the day before and day of your procedure                Anticoagulation Medication Instructions   NA

## 2023-08-22 ENCOUNTER — APPOINTMENT (OUTPATIENT)
Dept: CT IMAGING | Facility: CLINIC | Age: 53
End: 2023-08-22
Attending: STUDENT IN AN ORGANIZED HEALTH CARE EDUCATION/TRAINING PROGRAM
Payer: COMMERCIAL

## 2023-08-22 ENCOUNTER — APPOINTMENT (OUTPATIENT)
Dept: ULTRASOUND IMAGING | Facility: CLINIC | Age: 53
End: 2023-08-22
Attending: STUDENT IN AN ORGANIZED HEALTH CARE EDUCATION/TRAINING PROGRAM
Payer: COMMERCIAL

## 2023-08-22 ENCOUNTER — TELEPHONE (OUTPATIENT)
Dept: PALLIATIVE CARE | Facility: CLINIC | Age: 53
End: 2023-08-22

## 2023-08-22 ENCOUNTER — LAB (OUTPATIENT)
Dept: LAB | Facility: CLINIC | Age: 53
End: 2023-08-22
Attending: INTERNAL MEDICINE
Payer: COMMERCIAL

## 2023-08-22 ENCOUNTER — APPOINTMENT (OUTPATIENT)
Dept: GENERAL RADIOLOGY | Facility: CLINIC | Age: 53
End: 2023-08-22
Attending: STUDENT IN AN ORGANIZED HEALTH CARE EDUCATION/TRAINING PROGRAM
Payer: COMMERCIAL

## 2023-08-22 ENCOUNTER — APPOINTMENT (OUTPATIENT)
Dept: MEDSURG UNIT | Facility: CLINIC | Age: 53
End: 2023-08-22
Attending: STUDENT IN AN ORGANIZED HEALTH CARE EDUCATION/TRAINING PROGRAM
Payer: COMMERCIAL

## 2023-08-22 ENCOUNTER — HOSPITAL ENCOUNTER (INPATIENT)
Facility: CLINIC | Age: 53
LOS: 6 days | Discharge: HOME HEALTH | End: 2023-08-28
Attending: INTERNAL MEDICINE | Admitting: STUDENT IN AN ORGANIZED HEALTH CARE EDUCATION/TRAINING PROGRAM
Payer: COMMERCIAL

## 2023-08-22 DIAGNOSIS — I82.622 ACUTE DEEP VEIN THROMBOSIS (DVT) OF OTHER VEIN OF LEFT UPPER EXTREMITY (H): ICD-10-CM

## 2023-08-22 DIAGNOSIS — I49.3 PVC'S (PREMATURE VENTRICULAR CONTRACTIONS): ICD-10-CM

## 2023-08-22 DIAGNOSIS — E78.00 PURE HYPERCHOLESTEROLEMIA: ICD-10-CM

## 2023-08-22 DIAGNOSIS — I42.8 NON-ISCHEMIC CARDIOMYOPATHY (H): Primary | ICD-10-CM

## 2023-08-22 DIAGNOSIS — I50.23 ACUTE ON CHRONIC SYSTOLIC CHF (CONGESTIVE HEART FAILURE) (H): ICD-10-CM

## 2023-08-22 LAB
ALBUMIN SERPL BCG-MCNC: 4.2 G/DL (ref 3.5–5.2)
ALBUMIN UR-MCNC: NEGATIVE MG/DL
ALP SERPL-CCNC: 90 U/L (ref 40–129)
ALT SERPL W P-5'-P-CCNC: 38 U/L (ref 0–70)
ANION GAP SERPL CALCULATED.3IONS-SCNC: 9 MMOL/L (ref 7–15)
APPEARANCE UR: CLEAR
AST SERPL W P-5'-P-CCNC: 29 U/L (ref 0–45)
BASE EXCESS BLDV CALC-SCNC: 1.1 MMOL/L (ref -7.7–1.9)
BASE EXCESS BLDV CALC-SCNC: 1.6 MMOL/L (ref -7.7–1.9)
BASOPHILS # BLD AUTO: 0 10E3/UL (ref 0–0.2)
BASOPHILS # BLD AUTO: 0 10E3/UL (ref 0–0.2)
BASOPHILS NFR BLD AUTO: 0 %
BASOPHILS NFR BLD AUTO: 1 %
BILIRUB SERPL-MCNC: 0.6 MG/DL
BILIRUB UR QL STRIP: NEGATIVE
BUN SERPL-MCNC: 19 MG/DL (ref 6–20)
CALCIUM SERPL-MCNC: 9.8 MG/DL (ref 8.6–10)
CHLORIDE SERPL-SCNC: 104 MMOL/L (ref 98–107)
CHOLEST SERPL-MCNC: 245 MG/DL
COLOR UR AUTO: ABNORMAL
CREAT SERPL-MCNC: 1.25 MG/DL (ref 0.67–1.17)
CYSTATIN C (ROCHE): 0.9 MG/L (ref 0.6–1)
DEPRECATED HCO3 PLAS-SCNC: 26 MMOL/L (ref 22–29)
EOSINOPHIL # BLD AUTO: 0.1 10E3/UL (ref 0–0.7)
EOSINOPHIL # BLD AUTO: 0.1 10E3/UL (ref 0–0.7)
EOSINOPHIL NFR BLD AUTO: 2 %
EOSINOPHIL NFR BLD AUTO: 2 %
ERYTHROCYTE [DISTWIDTH] IN BLOOD BY AUTOMATED COUNT: 13.3 % (ref 10–15)
ERYTHROCYTE [DISTWIDTH] IN BLOOD BY AUTOMATED COUNT: 13.5 % (ref 10–15)
GFR SERPL CREATININE-BSD FRML MDRD: 69 ML/MIN/1.73M2
GFR SERPL CREATININE-BSD FRML MDRD: >90 ML/MIN/1.73M2
GLUCOSE SERPL-MCNC: 122 MG/DL (ref 70–99)
GLUCOSE UR STRIP-MCNC: >=1000 MG/DL
HCO3 BLDV-SCNC: 26 MMOL/L (ref 21–28)
HCO3 BLDV-SCNC: 28 MMOL/L (ref 21–28)
HCT VFR BLD AUTO: 54.4 % (ref 40–53)
HCT VFR BLD AUTO: 56.3 % (ref 40–53)
HDLC SERPL-MCNC: 42 MG/DL
HGB BLD-MCNC: 18.4 G/DL (ref 13.3–17.7)
HGB BLD-MCNC: 18.8 G/DL (ref 13.3–17.7)
HGB BLD-MCNC: 19 G/DL (ref 13.3–17.7)
HGB UR QL STRIP: NEGATIVE
HOLD SPECIMEN: NORMAL
IMM GRANULOCYTES # BLD: 0 10E3/UL
IMM GRANULOCYTES # BLD: 0 10E3/UL
IMM GRANULOCYTES NFR BLD: 0 %
IMM GRANULOCYTES NFR BLD: 0 %
INR PPP: 1.02 (ref 0.85–1.15)
INR PPP: 1.05 (ref 0.85–1.15)
KETONES UR STRIP-MCNC: NEGATIVE MG/DL
LDLC SERPL CALC-MCNC: 180 MG/DL
LEUKOCYTE ESTERASE UR QL STRIP: NEGATIVE
LYMPHOCYTES # BLD AUTO: 1.9 10E3/UL (ref 0.8–5.3)
LYMPHOCYTES # BLD AUTO: 2.3 10E3/UL (ref 0.8–5.3)
LYMPHOCYTES NFR BLD AUTO: 31 %
LYMPHOCYTES NFR BLD AUTO: 34 %
MAGNESIUM SERPL-MCNC: 2.3 MG/DL (ref 1.7–2.3)
MCH RBC QN AUTO: 30.4 PG (ref 26.5–33)
MCH RBC QN AUTO: 31 PG (ref 26.5–33)
MCHC RBC AUTO-ENTMCNC: 33.7 G/DL (ref 31.5–36.5)
MCHC RBC AUTO-ENTMCNC: 33.8 G/DL (ref 31.5–36.5)
MCV RBC AUTO: 90 FL (ref 78–100)
MCV RBC AUTO: 92 FL (ref 78–100)
MONOCYTES # BLD AUTO: 0.5 10E3/UL (ref 0–1.3)
MONOCYTES # BLD AUTO: 0.7 10E3/UL (ref 0–1.3)
MONOCYTES NFR BLD AUTO: 8 %
MONOCYTES NFR BLD AUTO: 9 %
NEUTROPHILS # BLD AUTO: 3.1 10E3/UL (ref 1.6–8.3)
NEUTROPHILS # BLD AUTO: 4.3 10E3/UL (ref 1.6–8.3)
NEUTROPHILS NFR BLD AUTO: 55 %
NEUTROPHILS NFR BLD AUTO: 58 %
NITRATE UR QL: NEGATIVE
NONHDLC SERPL-MCNC: 203 MG/DL
NRBC # BLD AUTO: 0 10E3/UL
NRBC # BLD AUTO: 0 10E3/UL
NRBC BLD AUTO-RTO: 0 /100
NRBC BLD AUTO-RTO: 0 /100
NT-PROBNP SERPL-MCNC: 345 PG/ML (ref 0–900)
O2/TOTAL GAS SETTING VFR VENT: 22 %
O2/TOTAL GAS SETTING VFR VENT: 97 %
OXYHGB MFR BLDV: 67 % (ref 92–100)
OXYHGB MFR BLDV: 69 % (ref 70–75)
OXYHGB MFR BLDV: 97 % (ref 70–75)
PCO2 BLDV: 41 MM HG (ref 40–50)
PCO2 BLDV: 46 MM HG (ref 40–50)
PH BLDV: 7.39 [PH] (ref 7.32–7.43)
PH BLDV: 7.41 [PH] (ref 7.32–7.43)
PH UR STRIP: 5.5 [PH] (ref 5–7)
PLATELET # BLD AUTO: 215 10E3/UL (ref 150–450)
PLATELET # BLD AUTO: 220 10E3/UL (ref 150–450)
PO2 BLDV: 37 MM HG (ref 25–47)
PO2 BLDV: 65 MM HG (ref 25–47)
POTASSIUM SERPL-SCNC: 5 MMOL/L (ref 3.4–5.3)
PROT SERPL-MCNC: 7 G/DL (ref 6.4–8.3)
RBC # BLD AUTO: 6.05 10E6/UL (ref 4.4–5.9)
RBC # BLD AUTO: 6.12 10E6/UL (ref 4.4–5.9)
RBC URINE: 0 /HPF
SARS-COV-2 RNA RESP QL NAA+PROBE: NEGATIVE
SODIUM SERPL-SCNC: 139 MMOL/L (ref 136–145)
SP GR UR STRIP: 1.03 (ref 1–1.03)
TRIGL SERPL-MCNC: 114 MG/DL
TSH SERPL DL<=0.005 MIU/L-ACNC: 2.46 UIU/ML (ref 0.3–4.2)
UROBILINOGEN UR STRIP-MCNC: NORMAL MG/DL
WBC # BLD AUTO: 5.9 10E3/UL (ref 4–11)
WBC # BLD AUTO: 7.5 10E3/UL (ref 4–11)
WBC URINE: <1 /HPF

## 2023-08-22 PROCEDURE — 999N000065 XR CHEST PORT 1 VIEW

## 2023-08-22 PROCEDURE — 81001 URINALYSIS AUTO W/SCOPE: CPT | Performed by: STUDENT IN AN ORGANIZED HEALTH CARE EDUCATION/TRAINING PROGRAM

## 2023-08-22 PROCEDURE — 272N000001 HC OR GENERAL SUPPLY STERILE: Performed by: INTERNAL MEDICINE

## 2023-08-22 PROCEDURE — 93451 RIGHT HEART CATH: CPT | Performed by: INTERNAL MEDICINE

## 2023-08-22 PROCEDURE — 83550 IRON BINDING TEST: CPT | Performed by: STUDENT IN AN ORGANIZED HEALTH CARE EDUCATION/TRAINING PROGRAM

## 2023-08-22 PROCEDURE — 71250 CT THORAX DX C-: CPT

## 2023-08-22 PROCEDURE — 86825 HLA X-MATH NON-CYTOTOXIC: CPT | Performed by: STUDENT IN AN ORGANIZED HEALTH CARE EDUCATION/TRAINING PROGRAM

## 2023-08-22 PROCEDURE — 250N000009 HC RX 250: Performed by: INTERNAL MEDICINE

## 2023-08-22 PROCEDURE — 70486 CT MAXILLOFACIAL W/O DYE: CPT | Mod: 26 | Performed by: RADIOLOGY

## 2023-08-22 PROCEDURE — 99222 1ST HOSP IP/OBS MODERATE 55: CPT | Mod: 25 | Performed by: STUDENT IN AN ORGANIZED HEALTH CARE EDUCATION/TRAINING PROGRAM

## 2023-08-22 PROCEDURE — 83735 ASSAY OF MAGNESIUM: CPT | Performed by: STUDENT IN AN ORGANIZED HEALTH CARE EDUCATION/TRAINING PROGRAM

## 2023-08-22 PROCEDURE — 93930 UPPER EXTREMITY STUDY: CPT | Mod: 26 | Performed by: RADIOLOGY

## 2023-08-22 PROCEDURE — 81379 HLA I TYPING COMPLETE HR: CPT | Performed by: STUDENT IN AN ORGANIZED HEALTH CARE EDUCATION/TRAINING PROGRAM

## 2023-08-22 PROCEDURE — 93010 ELECTROCARDIOGRAM REPORT: CPT | Performed by: INTERNAL MEDICINE

## 2023-08-22 PROCEDURE — 93930 UPPER EXTREMITY STUDY: CPT

## 2023-08-22 PROCEDURE — 80061 LIPID PANEL: CPT | Performed by: STUDENT IN AN ORGANIZED HEALTH CARE EDUCATION/TRAINING PROGRAM

## 2023-08-22 PROCEDURE — 36415 COLL VENOUS BLD VENIPUNCTURE: CPT | Performed by: STUDENT IN AN ORGANIZED HEALTH CARE EDUCATION/TRAINING PROGRAM

## 2023-08-22 PROCEDURE — 80053 COMPREHEN METABOLIC PANEL: CPT | Performed by: STUDENT IN AN ORGANIZED HEALTH CARE EDUCATION/TRAINING PROGRAM

## 2023-08-22 PROCEDURE — 86850 RBC ANTIBODY SCREEN: CPT | Performed by: STUDENT IN AN ORGANIZED HEALTH CARE EDUCATION/TRAINING PROGRAM

## 2023-08-22 PROCEDURE — 82810 BLOOD GASES O2 SAT ONLY: CPT

## 2023-08-22 PROCEDURE — 250N000011 HC RX IP 250 OP 636: Mod: JZ | Performed by: STUDENT IN AN ORGANIZED HEALTH CARE EDUCATION/TRAINING PROGRAM

## 2023-08-22 PROCEDURE — 70486 CT MAXILLOFACIAL W/O DYE: CPT

## 2023-08-22 PROCEDURE — 93970 EXTREMITY STUDY: CPT | Mod: 26 | Performed by: RADIOLOGY

## 2023-08-22 PROCEDURE — 82610 CYSTATIN C: CPT | Performed by: STUDENT IN AN ORGANIZED HEALTH CARE EDUCATION/TRAINING PROGRAM

## 2023-08-22 PROCEDURE — 120N000003 HC R&B IMCU UMMC

## 2023-08-22 PROCEDURE — 76700 US EXAM ABDOM COMPLETE: CPT

## 2023-08-22 PROCEDURE — 87635 SARS-COV-2 COVID-19 AMP PRB: CPT | Performed by: STUDENT IN AN ORGANIZED HEALTH CARE EDUCATION/TRAINING PROGRAM

## 2023-08-22 PROCEDURE — 76700 US EXAM ABDOM COMPLETE: CPT | Mod: 26 | Performed by: RADIOLOGY

## 2023-08-22 PROCEDURE — 93970 EXTREMITY STUDY: CPT

## 2023-08-22 PROCEDURE — 250N000009 HC RX 250: Performed by: STUDENT IN AN ORGANIZED HEALTH CARE EDUCATION/TRAINING PROGRAM

## 2023-08-22 PROCEDURE — 86832 HLA CLASS I HIGH DEFIN QUAL: CPT | Performed by: STUDENT IN AN ORGANIZED HEALTH CARE EDUCATION/TRAINING PROGRAM

## 2023-08-22 PROCEDURE — 999N000044 HC STATISTIC CVC DRESSING CHANGE

## 2023-08-22 PROCEDURE — 71250 CT THORAX DX C-: CPT | Mod: 26 | Performed by: RADIOLOGY

## 2023-08-22 PROCEDURE — 71045 X-RAY EXAM CHEST 1 VIEW: CPT | Mod: 26 | Performed by: RADIOLOGY

## 2023-08-22 PROCEDURE — 85610 PROTHROMBIN TIME: CPT | Performed by: STUDENT IN AN ORGANIZED HEALTH CARE EDUCATION/TRAINING PROGRAM

## 2023-08-22 PROCEDURE — 93925 LOWER EXTREMITY STUDY: CPT

## 2023-08-22 PROCEDURE — 85018 HEMOGLOBIN: CPT | Performed by: STUDENT IN AN ORGANIZED HEALTH CARE EDUCATION/TRAINING PROGRAM

## 2023-08-22 PROCEDURE — 70450 CT HEAD/BRAIN W/O DYE: CPT | Mod: 26 | Performed by: RADIOLOGY

## 2023-08-22 PROCEDURE — 85018 HEMOGLOBIN: CPT

## 2023-08-22 PROCEDURE — 83036 HEMOGLOBIN GLYCOSYLATED A1C: CPT | Performed by: STUDENT IN AN ORGANIZED HEALTH CARE EDUCATION/TRAINING PROGRAM

## 2023-08-22 PROCEDURE — 93005 ELECTROCARDIOGRAM TRACING: CPT

## 2023-08-22 PROCEDURE — 86901 BLOOD TYPING SEROLOGIC RH(D): CPT | Performed by: STUDENT IN AN ORGANIZED HEALTH CARE EDUCATION/TRAINING PROGRAM

## 2023-08-22 PROCEDURE — 74176 CT ABD & PELVIS W/O CONTRAST: CPT | Mod: 26 | Performed by: RADIOLOGY

## 2023-08-22 PROCEDURE — 84443 ASSAY THYROID STIM HORMONE: CPT | Performed by: STUDENT IN AN ORGANIZED HEALTH CARE EDUCATION/TRAINING PROGRAM

## 2023-08-22 PROCEDURE — 4A023N6 MEASUREMENT OF CARDIAC SAMPLING AND PRESSURE, RIGHT HEART, PERCUTANEOUS APPROACH: ICD-10-PCS | Performed by: INTERNAL MEDICINE

## 2023-08-22 PROCEDURE — 93925 LOWER EXTREMITY STUDY: CPT | Mod: 26 | Performed by: RADIOLOGY

## 2023-08-22 PROCEDURE — C1751 CATH, INF, PER/CENT/MIDLINE: HCPCS | Performed by: INTERNAL MEDICINE

## 2023-08-22 PROCEDURE — 999N000142 HC STATISTIC PROCEDURE PREP ONLY

## 2023-08-22 PROCEDURE — 70450 CT HEAD/BRAIN W/O DYE: CPT

## 2023-08-22 PROCEDURE — 250N000011 HC RX IP 250 OP 636: Mod: JZ

## 2023-08-22 PROCEDURE — 86481 TB AG RESPONSE T-CELL SUSP: CPT | Performed by: STUDENT IN AN ORGANIZED HEALTH CARE EDUCATION/TRAINING PROGRAM

## 2023-08-22 PROCEDURE — 999N000132 HC STATISTIC PP CARE STAGE 1

## 2023-08-22 PROCEDURE — 250N000013 HC RX MED GY IP 250 OP 250 PS 637: Performed by: STUDENT IN AN ORGANIZED HEALTH CARE EDUCATION/TRAINING PROGRAM

## 2023-08-22 PROCEDURE — 81378 HLA I & II TYPING HR: CPT | Performed by: STUDENT IN AN ORGANIZED HEALTH CARE EDUCATION/TRAINING PROGRAM

## 2023-08-22 PROCEDURE — 250N000011 HC RX IP 250 OP 636: Performed by: STUDENT IN AN ORGANIZED HEALTH CARE EDUCATION/TRAINING PROGRAM

## 2023-08-22 PROCEDURE — 36569 INSJ PICC 5 YR+ W/O IMAGING: CPT

## 2023-08-22 PROCEDURE — 272N000451 HC KIT SHRLOCK 5FR POWER PICC DOUBLE LUMEN

## 2023-08-22 PROCEDURE — 82805 BLOOD GASES W/O2 SATURATION: CPT | Performed by: STUDENT IN AN ORGANIZED HEALTH CARE EDUCATION/TRAINING PROGRAM

## 2023-08-22 PROCEDURE — 86833 HLA CLASS II HIGH DEFIN QUAL: CPT | Performed by: STUDENT IN AN ORGANIZED HEALTH CARE EDUCATION/TRAINING PROGRAM

## 2023-08-22 PROCEDURE — 93451 RIGHT HEART CATH: CPT | Mod: 26 | Performed by: INTERNAL MEDICINE

## 2023-08-22 PROCEDURE — 71046 X-RAY EXAM CHEST 2 VIEWS: CPT

## 2023-08-22 PROCEDURE — 83880 ASSAY OF NATRIURETIC PEPTIDE: CPT | Performed by: STUDENT IN AN ORGANIZED HEALTH CARE EDUCATION/TRAINING PROGRAM

## 2023-08-22 PROCEDURE — 71046 X-RAY EXAM CHEST 2 VIEWS: CPT | Mod: 26 | Performed by: RADIOLOGY

## 2023-08-22 RX ORDER — ENOXAPARIN SODIUM 100 MG/ML
40 INJECTION SUBCUTANEOUS EVERY 24 HOURS
Status: COMPLETED | OUTPATIENT
Start: 2023-08-22 | End: 2023-08-23

## 2023-08-22 RX ORDER — LIDOCAINE 40 MG/G
CREAM TOPICAL
Status: COMPLETED | OUTPATIENT
Start: 2023-08-22 | End: 2023-08-22

## 2023-08-22 RX ORDER — LIDOCAINE 40 MG/G
CREAM TOPICAL
Status: ACTIVE | OUTPATIENT
Start: 2023-08-22 | End: 2023-08-25

## 2023-08-22 RX ORDER — HEPARIN SODIUM,PORCINE 10 UNIT/ML
5-20 VIAL (ML) INTRAVENOUS
Status: DISCONTINUED | OUTPATIENT
Start: 2023-08-22 | End: 2023-08-28 | Stop reason: HOSPADM

## 2023-08-22 RX ORDER — SPIRONOLACTONE 25 MG/1
25 TABLET ORAL DAILY
Status: DISCONTINUED | OUTPATIENT
Start: 2023-08-23 | End: 2023-08-28 | Stop reason: HOSPADM

## 2023-08-22 RX ORDER — HEPARIN SODIUM,PORCINE 10 UNIT/ML
5-20 VIAL (ML) INTRAVENOUS EVERY 24 HOURS
Status: DISCONTINUED | OUTPATIENT
Start: 2023-08-22 | End: 2023-08-28 | Stop reason: HOSPADM

## 2023-08-22 RX ORDER — ATORVASTATIN CALCIUM 20 MG/1
20 TABLET, FILM COATED ORAL EVERY EVENING
Status: DISCONTINUED | OUTPATIENT
Start: 2023-08-22 | End: 2023-08-28 | Stop reason: HOSPADM

## 2023-08-22 RX ORDER — DOBUTAMINE HYDROCHLORIDE 200 MG/100ML
5 INJECTION INTRAVENOUS CONTINUOUS
Status: DISCONTINUED | OUTPATIENT
Start: 2023-08-22 | End: 2023-08-25

## 2023-08-22 RX ADMIN — LIDOCAINE: 40 CREAM TOPICAL at 08:01

## 2023-08-22 RX ADMIN — ALTEPLASE 2 MG: 2.2 INJECTION, POWDER, LYOPHILIZED, FOR SOLUTION INTRAVENOUS at 21:43

## 2023-08-22 RX ADMIN — LIDOCAINE HYDROCHLORIDE 3 ML: 10 INJECTION, SOLUTION EPIDURAL; INFILTRATION; INTRACAUDAL; PERINEURAL at 18:10

## 2023-08-22 RX ADMIN — DOBUTAMINE IN DEXTROSE 2.5 MCG/KG/MIN: 200 INJECTION, SOLUTION INTRAVENOUS at 18:41

## 2023-08-22 RX ADMIN — ATORVASTATIN CALCIUM 20 MG: 20 TABLET, FILM COATED ORAL at 20:32

## 2023-08-22 RX ADMIN — SACUBITRIL AND VALSARTAN 1 TABLET: 49; 51 TABLET, FILM COATED ORAL at 20:32

## 2023-08-22 RX ADMIN — ENOXAPARIN SODIUM 40 MG: 40 INJECTION SUBCUTANEOUS at 16:07

## 2023-08-22 ASSESSMENT — ACTIVITIES OF DAILY LIVING (ADL)
ADLS_ACUITY_SCORE: 18
WEAR_GLASSES_OR_BLIND: NO
ADLS_ACUITY_SCORE: 18
DRESSING/BATHING_DIFFICULTY: NO
ADLS_ACUITY_SCORE: 35
DIFFICULTY_EATING/SWALLOWING: NO
CONCENTRATING,_REMEMBERING_OR_MAKING_DECISIONS_DIFFICULTY: NO
DOING_ERRANDS_INDEPENDENTLY_DIFFICULTY: NO
ADLS_ACUITY_SCORE: 18
FALL_HISTORY_WITHIN_LAST_SIX_MONTHS: NO
ADLS_ACUITY_SCORE: 35
ADLS_ACUITY_SCORE: 18
WALKING_OR_CLIMBING_STAIRS_DIFFICULTY: NO
ADLS_ACUITY_SCORE: 35
ADLS_ACUITY_SCORE: 35
ADLS_ACUITY_SCORE: 18
DEPENDENT_IADLS:: INDEPENDENT
TOILETING_ISSUES: NO
CHANGE_IN_FUNCTIONAL_STATUS_SINCE_ONSET_OF_CURRENT_ILLNESS/INJURY: NO

## 2023-08-22 NOTE — LETTER
Inga Rosenberg RN  [facility] Clinical Coordinator  [phone]    [facility] Care Referral  Home Care RN for IV Dobutamine/ PICC Line Cares & Labs  Potential discharge this weekend or Monday

## 2023-08-22 NOTE — PROGRESS NOTES
Vascular Access Services Notes:    Pt was unavailable for PICC placement. Pt having a 2 hour procedure in the room per RN.        CONSTANTINO Cordero, RN [facility]

## 2023-08-22 NOTE — PROCEDURES
[facility]    Double Lumen PICC Placement    Date/Time: 8/22/2023 6:07 PM    Performed by: Stephy Turner RN  Authorized by: Vanessa Santamaria MD  Indications: vascular access      UNIVERSAL PROTOCOL   Site Marked: Yes  Prior Images Obtained and Reviewed:  Yes  Required items: Required blood products, implants, devices and special equipment available    Patient identity confirmed:  Verbally with patient, arm band and anonymous protocol, patient vented/unresponsive  NA - No sedation, light sedation, or local anesthesia (lidocaine was given-local anesthesia)  Confirmation Checklist:  Patient's identity using two indicators, relevant allergies, procedure was appropriate and matched the consent or emergent situation and correct equipment/implants were available  Time out: Immediately prior to the procedure a time out was called    Universal Protocol: the Joint Commission Universal Protocol was followed    Preparation: Patient was prepped and draped in usual sterile fashion       ANESTHESIA    Anesthesia:  Local infiltration  Local Anesthetic:  Lidocaine 1% without epinephrine  Anesthetic Total (mL):  3      SEDATION    Patient Sedated: No        Preparation: skin prepped with ChloraPrep  Skin prep agent: skin prep agent completely dried prior to procedure  Sterile barriers: maximum sterile barriers were used: cap, mask, sterile gown, sterile gloves, and large sterile sheet  Hand hygiene: hand hygiene performed prior to central venous catheter insertion  Type of line used: PICC  Catheter type: double lumen  Lumen type: non-valved and power PICC  Lumen Identification: Purple and Red  Catheter size: 5 Fr  Brand: Bard  Lot number: [id number]  Placement method: venipuncture, MST, ultrasound and tip navigation system  Number of attempts: 1  Difficulty threading catheter: no  Successful placement: yes  Orientation: right    Location: brachial vein (medial)  Tip Location:  SVC  Arm circumference: adults 10 cm  Extremity circumference: 34  Visible catheter length: 2  Total catheter length: 45  Dressing and securement: adhesive securement device, alcohol impregnated caps, blood cleaned with CHG, chlorhexidine disc applied, dressing applied, glue, site cleansed, statlock, sterile dressing applied and transparent dressing  Post procedure assessment: blood return through all ports, free fluid flow and placement verified by x-ray  PROCEDURE   Patient Tolerance:  Patient tolerated the procedure well with no immediate complicationsDescribe Procedure: PICC placement verified by Chest Xray. PICC okay to use.   Disposal: sharps and needle count correct at the end of procedure, needles and guidewire disposed in sharps container         (2) well flexed

## 2023-08-22 NOTE — PROGRESS NOTES
Admission          8/22/2023  6:54 AM  -----------------------------------------------------------  Reason for admission:  Primary team notified of pt arrival.  Admitted from:  for RHC procedure   Via: stretcher  Accompanied by: family  Belongings: Placed in closet; valuables sent home with family  Admission Profile: in progress  Teaching: orientation to unit and call light- call light within reach, call don't fall, use of console, meal times, when to call for the RN, and enforced importance of safety   Access: PIV  Telemetry:Placed on pt  Med Rec completed: yes  Suction/Ambu bag/Flowmeter at bedside: yes    Pt status:      Temp:  [97.5  F (36.4  C)-98  F (36.7  C)] 97.5  F (36.4  C)  Pulse:  [65-77] 65  Resp:  [16-20] 20  BP: ()/(65-80) 108/80  SpO2:  [96 %-98 %] 98 %

## 2023-08-22 NOTE — LETTER
Navin Lutz  [address] [place] [address]                August 25, 2023    Navin Quiros-    It was so nice meeting you during your transplant evaluation.  Please review any follow up items required to complete your evaluation.  In addition, please review the guidelines for patients on the heart transplant list.         GUIDELINES FOR BEING ON THE HEART TRANSPLANT LIST    Review your transplant information monthly ([facility] Website, Heart Transplant Booklet, Transplant Handbook).  Become familiar with the medications you will be taking after your transplant.  Share this information with your support system so they also are familiar with the transplant process.      Pre-transplant patients are encouraged to have regular visits with their Primary Care Providers (PCP). We will collaborate with your Primary Care Provider for assistance with testing, labs, vaccinations and health care maintenance that is needed prior to transplant.    The provider that we have on file for you is:  Libertad Briceno              If your provider has changed or you need assistance in finding a primary care provider, please contact your transplant coordinator.     The patient is responsible to coordinate follow-up appointments with their provider on the following items:  Vaccinations (as recommended by the transplant team)  Female Patients:  PAP smear and Mammogram screenings according to the LATRICE (American Cancer Association)  Male Patients:  Prostate Exams according to the LATRICE (American Cancer Association)  Patient's over the age of 50 years old (or as recommended by your Primary Care Provider):  Colonoscopy screenings  For Diabetic Patients:  Ophthalmology screenings as recommended by your Primary Care Provider     Here is a list of recommended vaccinations for Pre Transplant patients.  The transplant team will review your vaccination history and collaborate with your PCP to assist in updating the vaccinations that are  recommended by your transplant team.      COVID-19 Vaccination Series (REQUIRED prior to listing for transplant)    Inactivated Influenza Vaccine (IIV)-yearly    Tetanus, diphtheria, Pertussis (Dtap) if none in 10 years and Tdap booster every 5-10 years    Hepatitis B vaccine series     Pneumococcal vaccine    Shinrix      A letter of dental clearance is required prior to listing on the Heart Transplant Waitlist.  In addition, patients are required to have a dental exam and cleaning every 6-12 months while on the transplant list.        The provider that we have on file for you is:  Likely will complete as inpatient   If your provider has changed or you need assistance in finding a dental provider, please contact your transplant coordinator.        Please fax a letter of dental clearance to the transplant office at [phone] if this has not already been done prior to your evaluation    Patients must refrain from the use of all Nicotine products (cigarettes, electronic cigarettes, cigars, pipes, chewing tobacco, snuff, nicotine patches and nicotine gum).  You must also refrain from any type of vaping or illegal drug use.  Periodic screening may occur to assure your compliance with this.    Changes in weight that result in BMI greater than 35 or less than 18 may impact your status on the waitlist or extend the time of your evaluation.    Your current weight:    Wt Readings from Last 2 Encounters:   08/25/23 90.2 kg (198 lb 13.7 oz)   07/19/23 93.7 kg (206 lb 9.6 oz)      Your current BMI:  Body mass index is 26.64 kg/m .   Goal weight (if BMI is >35) NA  Referral to Transplant Dietician if BMI >35  Referral to Weight Management Clinic if continued support is needed after meeting with Transplant Dietician    Patients are required to have a support person who can be with you at the time of transplant and for the first thirty days after transplant (24 hours per day).  Caregiver(s) are expected to attend classes with  the patient prior to hospital discharge.      Patients are encouraged to attend the Heart Support Group (online instructions provided when your evaluation was scheduled.  Please inform your coordinator if you need further information).     Patients are encouraged to create an account at [facility] and utilize this online resource for ongoing education for pre-transplant and post-transplant information.  Website:  [email or web address]     Patients are encouraged to sign up for a [facility] account to communicate with their transplant team.  Please notify your coordinator if you have further questions about [employer].    10.  Pre-transplant patients are required to see a transplant provider (Cardiologist or Advanced Practice Provider) in clinic every 3 months.  Call your coordinator for        assistance in scheduling an appointment and the appropriate tests for that visit.  We will schedule any follow up needed once discharge plans are finalized.      It was a pleasure to see you during your evaluation. Please do not hesitate to call me if you have any questions or concerns!      Thank You,    Bernadine Cote, RN, BSN  Heart Transplant Coordinator  [facility]    Contact Info:  Phone:    [phone] (transplant office main number)

## 2023-08-22 NOTE — LETTER
Inga Rosenberg RN  [facility] Clinical Coordinator  [phone]    New  Referral  HC RN- IV Dobutamine, PICC Line Care & labs  Anticipate discharge Monday 8/28

## 2023-08-22 NOTE — PROGRESS NOTES
Neuro: A&Ox4.   Cardiac: SR. VSS.   Respiratory: Sating > 93% on RA.  GI/: Adequate urine output.   Diet/appetite: Tolerating 2g Na diet and 2L FR. Eating well.  Activity:   up independently to chair and in halls.  Pain: At acceptable level on current regimen.   Skin: No new deficits noted.  LDA's: left piv saline locked    Plan: Continue with POC. Notify primary team with changes.

## 2023-08-22 NOTE — H&P
Cardiology Inpatient H&P  August 22, 2023    Assessment and Plan:       53yo M with PMH of chronic HFrEF 2/2 NICM s/p ICD, HLD, CKD stage II. Here for low output heart failure after scheduled RHC showed low cardiac index of 1.5. We are evaluating for advanced therapy vs. Heart transplant eval.     Today:  - Will receive PICC line, check VBG venous oxyhemoglobin levels, then start dobutamine. 1 hour post-infusion, check oxyHgb again. Titrate dobutamine as needed.   - Start PTA entresto, spirinolactone  - Hold PTA lasix, carvedilol, jardiance  - Start atorvastatin 20mg  - started enoxaparin for DVT prophylaxis  As part of transplant checklist:   - Ordered Abdominal U/S, extremity U/S, TTE, CT   - Ordered CVTS, social work, nutrition, neuropsych, PT/OT, dental consults        #Low output acute on chronic systolic heart failure/HFrEF (EF 13%) 2/2 NICM  NYHA Symptom Class IV, Stage D. Rosario HF Class III. RHC (8/22/23) notable for IESHA 3.2/1.5, decreased CI from last IESHA on 6/2023 of 4.32/2.1. PVR 1.25, SBP 88/65 on admission. 7cm JVD. Given patient has recently had symptoms at rest with low cardiac index, will start advanced therapy and heart transplant evaluations.   - ACE-I/ARB/ARNi: Continue Entresto 49-51 mg BID  - BB: hold PTA Carvedilol 25mg   - Aldosterone antagonist: Continue PTA beck 25 mg daily. Hold PTA lasix due to low output HF.   - SGLT2i: Hold PTA empagliflozin 10 mg daily due to possibility of procedure (infection risk).  - Ionotropes: start dobutamine 2mg/kg/min. Titrating up pending venous oxyhemoglobin from PICC line.   - SCD prophylaxis: s/p ICD  - %BiV pacing: N/A  - Fluid status: mildly hypovolemic on physical exam. Hold PTA lasix 10-20 mg daily PRN - pt was taking daily post-ICD placement in June.   - Daily standing weights, 2L fluid, and 2g Na restriction    LVAD/Transplant Evaluation Checklist  [] Labs (CBC, CMP, PT/INR, cystatin C, prealbumin, UA + micro)  [] Infectious (Hep A/B/C,  HIV, treponema, HSV 1/2 IgG, CMV IgG, Toxo IgG, EBV IgG, varicella IgG, Quant gold, COVID vaccine/PCR)  [] Utox/nicotine and cotinine/PeTH   [] Immunocompatibility (last transfusion, ABO, HLA tissue typing, PRA)  [] ECG, Echo  [] CPX - not indicated at this time.   [] 6MWT   [x] RHC  [x] CVTS consult  [] Social work consult  [] Palliative care consult  [] Neuropsych consult  [] Nutrition consult  [] CT Dental   [x] Dental consult  [x] Abd US + doppler  [x] Extremity US and ABIs  [x] Carotid US (if DM or ICM or >49yo)  [] PFTs  [] Dexa  [] CT head non-contrast  [] CT CAP non-contrast  [] colonoscopy (>49 yo)  [] PSA        #Inferior and possible anterior infarct, unspecified timing   #Myocardial Bridge Over Coronary Artery  Myocardial bridge over distal LAD.  - Continue to monitor     #CKD Stage II  Cr ~1.2-1.3.  - Will continue to monitor    #HLD  - start atorvastatin 20mg daily     FEN: 2g sodium diet. 2L fluid restriction. Repeat electrolytes as needed.   DVT PPx: enoxaparin   Code: FULL    Disposition: Inpatient admission. Expected discharge in >2 days      Staffed by Dr. Micaela Craig MD  [facility]  Department of Internal Medicine   Resident Physician  PGY-1  Pager: [phone]     Vanessa Santamaria MD  Cardiology Fellow      CC:   Cardiogenic shock      HPI:   53yo M with PMH of chronic HFrEF 2/2 NICM s/p ICD, HLD, CKD stage II. Here for low output heart failure after scheduled RHC showed low cardiac index of 1.5. We are evaluating for advanced therapy vs. Heart transplant eval.     He was last seen in June by Dr. Micaela Silvestre for evaluation of the HFrEF 2/2 NICM s/p ICD placement in 6/2023, for which he was referred to by an OSH. Since then, he has been feeling well. Has been taking lasix 10mg (half pill) PRN daily to keep the LE edema down. He was scheduled for RHC for advanced therapy eval today, which he underwent, and was found to have IESHA 3.2/1.5; TD 2.9/1.36,  prompting admission to inpatient. Patient endorses feeling well; denies SOB, chest pain, palpitations, lower extremity swelling.     Review of Systems:    Complete review of systems was performed and negative except per HPI.        Past Medical History:   History reviewed. No pertinent past medical history.  Acute on chronic systolic CHF (congestive heart failure) (H)     Chest pain    ICD (implantable cardioverter-defibrillator) in place    Inguinal hernia    Multiple lung nodules on CT    Non-ischemic cardiomyopathy (H)    Pure hypercholesterolemia    RAD (reactive airway disease) with wheezing, mild intermittent, uncomplicated   Chronic biventricular systolic and LV diastolic dysfunction (HFrEF)  ACC/AHA stage C, NYHA class III  EF 18% (cardiac MRI) on 6/14/2023  EF 13% with grade I diastolic dysfunction on 6/13/2023  Reduced RVSF  EF 20-25% on 9/22/2020  EF 25-30% on 9/20/2019  EF 20% on 3/26/2019  EF 20-25% on 1/22/2019  EF 15-20% on 9/24/2018  EF 15-20% with grade 5 diastolic dysfunction on 12/11/2017   Nonischemic dilated cardiomyopathy  LIVDd 7.2 cm  Viral mediated?  Evaluation at [facility] in 2019  Cardiac MRI on 6/14/2023 = severe left ventricular dilation with severe, diffuse hypokinesis and mid wall late gadolinium enhancement stripe throughout the septum consistent with nonischemic dilated cardiomyopathy; mild patchy myocardial edema along the left ventricular anterior, anterolateral and inferolateral walls in the basal and mid cavity segments could represent an element of myocarditis; trace aortic regurgitation, trace mitral valve regurgitation, mild tricuspid valve regurgitation  LHC and RHC on 6/13/2023 = intramyocardial bridging of the dLAD otherwise normal coronary arteries; LVEDP 3 mmHg; normal pressures with no pulmonary hypertension; RA 4 mmHg, RV 22/4 mmHg, PA 23/11 mmHg with mean PAP of 16 mmHg, PCWP 7 mmHg, CO 4.32 L/min and CI 2.1 L/min*m2 by Teressa, CO 2.05 L/min and CI 1.0 L/min*m2 by  thermodilution  LHC and RHC on 1/9/2018 = arterial pressure 82/59 (67); normal pulmonary capillary wedge, and mean pulmonary artery pressures; cardiac output by thermodilution was 3.07 L/min, index at 1.5; no angiographic evidence of coronary artery disease present  History of NSVT  Moderate to severe eccentric LVH  Valvular heart disease  Mild MR  Dyslipidemia  Possible obstructive sleep apnea         Past Surgical History:     Past Surgical History:   Procedure Laterality Date    CARDIAC SURGERY          Social History:     Social History     Tobacco Use    Smoking status: Never    Smokeless tobacco: Never   Substance Use Topics    Alcohol use: Never         Family History:   History reviewed. No pertinent family history.     Medications:     Medications Prior to Admission   Medication Sig Dispense Refill Last Dose    carvedilol (COREG) 12.5 MG tablet Take 1 tablet (12.5 mg) by mouth 2 times daily (with meals) 180 tablet 1 8/22/2023    empagliflozin (JARDIANCE) 10 MG TABS tablet Take 10 mg by mouth daily   8/22/2023    furosemide (LASIX) 20 MG tablet TAKE 1/2 TABLET BY MOUTH ONCE DAILY AS NEEDED FOR LEG SWELLING   8/22/2023    sacubitril-valsartan (ENTRESTO)  MG per tablet Take 0.5 tablets by mouth 2 times daily   8/22/2023    spironolactone (ALDACTONE) 25 MG tablet Take 25 mg by mouth daily   8/22/2023    nitroGLYcerin (NITROSTAT) 0.4 MG sublingual tablet Place 0.4 mg under the tongue every 5 minutes as needed (Patient not taking: Reported on 7/19/2023)             Allergies:   No Known Allergies       Physical Exam:   Temp:  [98  F (36.7  C)] 98  F (36.7  C)  Pulse:  [66-77] 66  Resp:  [16-20] 20  BP: ()/(65-77) 113/77  SpO2:  [96 %-98 %] 98 %  GEN: No acute distress, lying comfortably in hospital bed.   HEENT: EOMI, no icterus, moist mucous membranes   CV: RRR, normal s1/s2, no murmurs. JVP 7cm.   CHEST: Normal work of breathing. Lungs CTAB, no wheezes or crackles.   ABD: Soft, NT/ND   EXT: Warm and  well-perfused, no LE edema  NEURO: Awake, alert, answering questions appropriately, motor grossly nonfocal  SKIN: No worrisome lesions  PSYCH: cooperative, affect appropriate, cheerful.      Data:   CBC  Recent Labs   Lab 08/22/23  1037 08/22/23  0859   WBC 5.9  --    RBC 6.12*  --    HGB 19.0* 18.8*   HCT 56.3*  --    MCV 92  --    MCH 31.0  --    MCHC 33.7  --    RDW 13.3  --      --      CMPNo lab results found in last 7 days.  TroponinsNo results found for: TROPI, TROPONIN, TROPR, TROPN  INR  Recent Labs   Lab 08/22/23  0716   INR 1.02     8/22/2023 EKG:  Sinus rhythm with occasional Premature ventricular complexes   Left axis deviation   Inferior infarct , age undetermined   Possible Anterior infarct , age undetermined   Abnormal ECG   No previous ECGs available     08/22/2023 RHC  RA --/--/8  RV 20/8  PA 20/12/16  PCWP --/--/12  IESHA 3.2/1.5  TD 2.93/1.36  MAP 96  PVR 1.25 (IESHA)  SVR 2200 (IESHA)   Right sided filling pressures are normal. Left sided filling pressures are mildly elevated. Normal PA pressures. Reduced cardiac output level. Hemodynamic data has been modified in Epic per physician review.    06/14/2023 Cardiac MRI  Left Ventricle   EF: 18 %   EDV: 289 mL   ESV: 237 mL   SV: 53 mL   Myocardial mass: 151 g   Myocardial mass/BSA: 70 g/m2     Right Ventricle   EF: 30 %   EDV: 92 mL   ESV: 64 mL   SV: 28 mL   SV/BSA: 13 ml/m2     IMPRESSION:   1. Severe left ventricular dilation with severe, diffuse hypokinesis and mid wall late gadolinium enhancement stripe throughout the septum consistent with nonischemic dilated cardiomyopathy.   2. Mild patchy myocardial edema along the left ventricular anterior, anterolateral and inferolateral walls in the basal and mid cavity segments could represent an element of myocarditis.   3. Trace aortic regurgitation, trace mitral valve regurgitation, mild tricuspid valve regurgitation.     06/14/2023 RHC and coronary angiogram  Conclusions:   Coronary angiogram:  Intramyocardial bridging of the distal LAD, otherwise   normal coronaries.   Left heart catheterization: No significant gradient across aortic valve.    LVEDP 3 mmHg.   Right heart catheterization: Normal pressures.  No pulmonary hypertension.    Low cardiac output.  No shunt.   -RA 4 mmHg   -RV 22/4 mmHg   -PA 23/11 mmHg, Mean PAP 16 mmHg   -PCWP 7 mmHg   -Teressa: CO 4.32 L/min, CI 2.1 L/min*m2   -Thermodilution: CO 2.05 L/min, CI 1.0 L/min*m2     06/13/2023 TTE  Left Ventricle: The left ventricle is severely dilated. There is   moderate-to-severe eccentric left ventricular hypertrophy. Severely   reduced left ventricular systolic function. The ejection fraction,   measured by biplane, is 13%. Severe hypokinesis of apex, remainder wall   segments are mostly akinetic. Grade I diastolic dysfunction.

## 2023-08-22 NOTE — PROGRESS NOTES
Pt transferred to , room 16. Report given to Jyotsna SMITH. Wife at bedside. [facility] site c/d/I with no bleeding or hematoma.

## 2023-08-22 NOTE — Clinical Note
dry, intact and no bleeding. 7fr sheath pulled from RIJ, venous. Hemostatic with manual pressure. Band aid applied.

## 2023-08-22 NOTE — PROGRESS NOTES
Bedside PFT Results:      Patient YOB: 1970    Patient age: 52    Patient gender: Male    Patient height: 184 cm          Predicted values:     FEV1: 4.40    FVC: 5.58     PEF: 79.2     FEV1/FVC:    Patient values:    FEV1: 2.88    FVC: 4.49    PEF: 3.77    FEV1/FVC: 78.0            Patient effort:  good effort     Miguel Chavez, RRT

## 2023-08-22 NOTE — TELEPHONE ENCOUNTER
Patient needs to be rescheduled for their virtual visit due to Reason for Reschedule: In-Patient    Appointment mode: Video  Provider: Janice Abraham

## 2023-08-22 NOTE — PROGRESS NOTES
2a Prep for RHC. Pt alert and oriented. Hypotension 88/65 MAP 70. Pt reports light headedness through out the day most days. ICD. Lidocaine to right neck. Spouse Deepa at bedside.

## 2023-08-22 NOTE — CONSULTS
Patient of Dr. Silvestre seen inpatient for psychosocial assessment.   90 minutes spent with patient. 100% of visit consisted of counseling related to issues surrounding heart transplant and LVAD.    Psychosocial Assessment   Name: Navin Lutz     MRN:  [id number]        Patient Name / Age / Race: Navin Lutz 52 year old    Source of Information: Patient and wife, Corie   : Eloise St[facility]   Interview Date: August 22, 2023   Reason for Referral: Heart Transplant and Mechanical Circulatory Support     Current Living Situation   Location (City/State): [address] [place] [address]   With Whom: Living arrangements - the patient lives with their spouse.   Type of Home: single family-3 stairs into home, all needs met on first floor   Working Phone? Yes    Three Pronged Outlet? Yes    Distance from Hospital: 475 miles/7hrs    Need to Relocate Post Surgery? Yes    Discussed? Yes -discussed [address] and local hotels as options for temporary relocation. No financial concerns with temporary relocation. Advised wife to contact [facility] to see if they can get reimbursed for relocation costs.      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    Education: High School    [place]? Yes: [employer]  Pt has meeting with [facility] on August 28th to assess whether he qualifies for VA benefits   Income: salary/wages and spouse's income   Insurance: Private Insurance   Name of Private Insurance: [facility]   Medication Coverage: Yes     In current situation, pt. can afford medication and supply costs:  Yes    Other Financial Concerns/Issues: Pt and wife express no financial concerns.      Family/Social Support   Marital Status: -this is pt's third marriage  "   Partner Name: Corie   Length of Time : 12yrs   Partner s Employment: Wife works for pt's company    Relationship: stable/supportive   Children: Blended Family-4 adult dtrs age range 18-26   Parents: Mother is alive, father     Siblings: 3 Brothers    Other Family or Friends Close by: Friends    Support System: available, helpful   Primary Support Person: Pt''s wife will be the primary caregiver    Issues: Provided caregiver education including need to temporary relocate with pt and need to attend transplant and LVAD education prior to pt discharging from hospital post-transplant or LVAD. Wife has no concerns with caregiver duties.      Activities/Functional Ability   Current Level: ambulatory   Daily Activities: Working 10hrs/wk in the office. Able to perform daily self-care and household tasks. Pt is feeling \"slowed down\" by his heart failure symptoms. Wife reports she has observed pt's exercise tolerance and endurance has declined over the past two years.     Frailty Score: 4   Transportation: Pt and wife drive.      Medical Status   Patient s understanding of need for surgical intervention: Good Understanding    Advanced Directive? No    Details: Provided education and pt interested in completing. Writer will provide copy of HCD.    Adherence History: Good    Ability to Adhere to Complex Medical Regime: Good      Behavioral   Chemical Dependency Issues: No: Pt reports \"rare\" alcohol use about \"1 drink every couple of months\". Pt denies any hx of drug use.   Pt has no legal consequences due to drugs or alcohol. Pt has never been evaluated for CD concerns nor been to CD treatment. Drug Screen and PEth pending.    Smoker? No: Pt denies hx of smoking or vaping.    Psychiatric impairment: No: Pt denies hx of mental health diagnoses, inpatient mental health treatment or suicide attempts.    Coping Style/Strategies: Has a new grandson, he is motivated to live for   Uses humor    Recent Legal History: " No   Teaching Completed During Assessment Related To Transplant and Mechanical Circulatory Support: Housing and relocation needs post surgery.  Caregiver needs post surgery.  Financial issues related to surgery.  Risks of alcohol use post surgery.  Common psychosocial stressors pre/post surgery.  Support group availability.   Psychosocial Risks Reviewed Related To Transplant and Mechanical Circulatory Support: Increased stress related to your emotional, family, social, employment, or financial situation.  Affect on work and/or disability benefits.  Affect on future health and life insurance.  Outcome expectations may not be met.  Mental Health risks: anxiety, depression, PTSD, guilt, grief and chronic fatigue.     Observed Behavior   Well informed? Yes  Angry? No   Process info well? Yes  Hostile? No   Evasive? No Oriented X3? Yes    Cautious/Suspicious? No Motivated? Yes    Appropriate Behavior? Yes  Depressed? No   Appropriate Affect? Yes  Anxious? Yes    Interview Observations: Pt and wife pleasant and engaged in assessment. They asked good questions.      Selection Criteria Met   Plan for Support Yes    Chemical Dependence Yes    Smoking Yes    Mental Health Yes    Adequate Finances Yes      Risk Issues:    No risk issues identified.     Final Evaluation/Assessment:     Social Work evaluation as part of dual heart transplant and LVAD evaluation. Pt lives in [place], [place], with his wife, Corie. 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 Due to ongoing heart failure symptoms has been only able to spend 10hrs/wk doing office tasks, since June. Pt has been independent w/ ADLs, IADLs, ambulation and driving. Pt has no community supports at home.     Pt has a confirmed caregiver plan that will be his wife. She has no concerns being able to temporarily relocate with pt. Wife appears very supportive and involved. Pt and wife indicate a lot of support at home in helping them  manage home tasks and their business.     Social Work has no concerns in areas of chemical dependency, smoking, mental health or finances/insurance.     From a psychosocial perspective, pt is an excellent candidate for heart transplant or LVAD.     Patient understands risks and benefits of Heart Transplant and Mechanical Circulatory Support: Yes     Recommendation:    excellent    JESSICA Goetz, [facility]   Advanced Heart Failure   Heart Transplant/LVAD  Phone: [phone]  Pager: [phone]              Interview Date: August 22, 2023

## 2023-08-22 NOTE — PHARMACY-ADMISSION MEDICATION HISTORY
Pharmacist Admission Medication History    Admission medication history is complete. The information provided in this note is only as accurate as the sources available at the time of the update.    Medication reconciliation/reorder completed by provider prior to medication history? No    Information Source(s): Patient, Hospital records, and CareEverywhere/SureScripts via in-person    Pertinent Information: Pt has no outstanding medication questions    Changes made to PTA medication list:  Added: None  Deleted: None  Changed: None    Medication Affordability:  Not including over the counter (OTC) medications, was there a time in the past 3 months when you did not take your medications as prescribed because of cost?: No    Allergies reviewed with patient and updates made in EHR: yes    Medication History Completed By: Megn Baxter [facility] 8/22/2023 1:41 PM    Prior to Admission medications    Medication Sig Last Dose Taking? Auth Provider Long Term End Date   carvedilol (COREG) 12.5 MG tablet Take 1 tablet (12.5 mg) by mouth 2 times daily (with meals) 8/22/2023 at AM Yes Micaela Silvestre MD Yes    empagliflozin (JARDIANCE) 10 MG TABS tablet Take 10 mg by mouth daily 8/22/2023 at AM Yes Reported, Patient     furosemide (LASIX) 20 MG tablet TAKE 1/2 TABLET BY MOUTH ONCE DAILY AS NEEDED FOR LEG SWELLING 8/22/2023 at AM Yes Reported, Patient Yes    nitroGLYcerin (NITROSTAT) 0.4 MG sublingual tablet Place 0.4 mg under the tongue every 5 minutes as needed  Yes Reported, Patient Yes 6/20/24   sacubitril-valsartan (ENTRESTO)  MG per tablet Take 0.5 tablets by mouth 2 times daily 8/22/2023 at AM Yes Reported, Patient     spironolactone (ALDACTONE) 25 MG tablet Take 25 mg by mouth daily 8/22/2023 at AM Yes Reported, Patient Yes

## 2023-08-22 NOTE — CONSULTS
CARDIOTHORACIC SURGERY CONSULT NOTE  8/22/2023      Reason for Consult: Heart transplant evaluation      ASSESSMENT/PLAN: Patient is a 52 year old male with a history of chronic HFrEF 2/2 NICM s/p ICD, HLD, and CKD stage 2 presented with low output heart failure after a scheduled RHC on 08/22/2023 revealed a cardiac index of 1.5. CVTS was consulted as part of the heart transplant evaluation.     - Continue with heart transplant work-up, will discuss case at weekly transplant conference  - Other cares per primary team  - Thank you for the opportunity to participate in the care of this patient.    Patient and plan discussed with attending, Dr. Rausch.      Galilea Chaudhry PA-C   Cardiothoracic Surgery  p: [phone]      ________________________________________________________________________________________________    HPI: Patient is a 52 year old male with a history of chronic systolic HFrEF (13%) 2/2 NICM (NYHA Class IV) s/p ICD, HLD, and CKD Stage 2. Patient first had symptoms of CHF in 2017, and was diagnosed with HFrEF shortly after. Beginning in June 2023 patient had first hospitalization for progressive SALEH and chest pain. cMRI 13% and normal RV at this time, so patient underwent ICD during this hospitalization. Patient presented for RHC 08/22/23, which showed a CI of 1.5. Patient admitted for evaluation of advanced therapy vs heart transplant. Patient currently admitted under the heart failure service and CVTS was consulted for consideration of heart transplant.        PMH:  History reviewed. No pertinent past medical history.    PSH:  Past Surgical History:   Procedure Laterality Date    CARDIAC SURGERY      CV RIGHT HEART CATH MEASUREMENTS RECORDED N/A 8/22/2023    Procedure: Heart Cath Right Heart Cath;  Surgeon: Elfego Cruz MD;  Location:  HEART CARDIAC CATH LAB       FH:  History reviewed. No pertinent family history.    SH:  Social History     Socioeconomic History    Marital  status:      Spouse name: None    Number of children: None    Years of education: None    Highest education level: None   Tobacco Use    Smoking status: Never    Smokeless tobacco: Never   Substance and Sexual Activity    Alcohol use: Never    Drug use: Never    Sexual activity: Yes     Partners: Female       Home Meds:  Medications Prior to Admission   Medication Sig Dispense Refill Last Dose    carvedilol (COREG) 12.5 MG tablet Take 1 tablet (12.5 mg) by mouth 2 times daily (with meals) 180 tablet 1 8/22/2023 at AM    empagliflozin (JARDIANCE) 10 MG TABS tablet Take 10 mg by mouth daily   8/22/2023 at AM    furosemide (LASIX) 20 MG tablet TAKE 1/2 TABLET BY MOUTH ONCE DAILY AS NEEDED FOR LEG SWELLING   8/22/2023 at AM    nitroGLYcerin (NITROSTAT) 0.4 MG sublingual tablet Place 0.4 mg under the tongue every 5 minutes as needed       sacubitril-valsartan (ENTRESTO)  MG per tablet Take 0.5 tablets by mouth 2 times daily   8/22/2023 at AM    spironolactone (ALDACTONE) 25 MG tablet Take 25 mg by mouth daily   8/22/2023 at AM     Allergies:  No Known Allergies  ROS:  ROS: 10 point ROS neg other than the symptoms noted above in the HPI.    Physical Exam:  Temp:  [97.5  F (36.4  C)-98  F (36.7  C)] 97.5  F (36.4  C)  Pulse:  [65-77] 65  Resp:  [16-20] 20  BP: ()/(65-80) 108/80  SpO2:  [96 %-98 %] 98 %  Gen: NAD, resting comfortably in bed, conversational  HEENT: normocephalic, atraumatic cranium, EOMI, sclerae anicteric. Oral mucosa pink and moist, no tonsillar edema or erythema, midline trachea, nonpalpable thyroid  Lungs: CTA in all fields, no wheezing or rhonchi  CV: RRR, S1S2 normal, no murmur. Radial pulses and DP pulses symmetric. No dependent edema.   Abd: Positive normal pitched bowel sounds, overall soft and non distended, nontender, no hepatosplenomegaly, no masses/guarding/rebound tenderness.   Musculoskeletal: grossly intact, strength 5/5 upper and lower extremities  Neuro: AOx3, CN II-VII  grossly intact, sensation/motor intact in upper and lower extremities  Mental: normal mood and affect, regular rate of speech    Labs:  ABG No lab results found in last 7 days.  CBC  Recent Labs   Lab 08/22/23  1037 08/22/23  0859   WBC 5.9  --    HGB 19.0* 18.8*     --      BMP  Recent Labs   Lab 08/22/23  1037      POTASSIUM 5.0   CHLORIDE 104   CO2 26   BUN 19.0   CR 1.25*   *     LFT  Recent Labs   Lab 08/22/23  1229 08/22/23  1037 08/22/23  0716   AST  --  29  --    ALT  --  38  --    ALKPHOS  --  90  --    BILITOTAL  --  0.6  --    ALBUMIN  --  4.2  --    INR 1.05  --  1.02     PancreasNo lab results found in last 7 days.    Imaging:  Recent Results (from the past 24 hour(s))   Cardiac Catheterization    Narrative      Right sided filling pressures are normal.    Left sided filling pressures are normal.    Normal PA pressures.    Reduced cardiac output level.    Hemodynamic data has been modified in Epic per physician review.

## 2023-08-22 NOTE — LETTER
Transition Communication Hand-off for Care Transitions to Next Level of Care Provider    Name: Navin Lutz  : 1970  MRN #: [id number]  Primary Care Provider: Libertad Briceno     Primary Clinic: [address] [facility] [address]     Reason for Hospitalization:  Acute on chronic systolic CHF (congestive heart failure) (H) [I50.23]  Admit Date/Time: 2023  6:54 AM  Discharge Date: 2023  Payor Source: Payor: BCBS / Plan: BCBS OUT OF STATE / Product Type: Indemnity /     Reason for Communication Hand-off Referral: Other care transition    Discharge Plan: Home with outpatient infusion      Concern for non-adherence with plan of care:   no  Discharge Needs Assessment:  Needs      Flowsheet Row Most Recent Value   Equipment Currently Used at Home none            Already enrolled in Tele-monitoring program and name of program:    Follow-up specialty is recommended: Yes    Follow-up plan:    Future Appointments   Date Time Provider Department Center   2023  1:15 PM  LAB [facility]   2023  1:45 PM Micaela Silvestre MD [facility]       Any outstanding tests or procedures:        Referrals       Future Labs/Procedures    Home Infusion Referral               Key Recommendations:   Unable to secure home care for patient due to patient not being home bound. Referral sent out to Outpatient infusion center [facility]   Cancer treatment center  [address] [phone]  ( declined referral wanting a direct order from [facility] and [facility] [phone] (still pending) . Patient is able self administer the med but will need weekly PICC line dressing change. Please follow up with patient .    Triny Ruggiero RN    AVS/Discharge Summary is the source of truth; this is a helpful guide for improved communication of patient story

## 2023-08-22 NOTE — CONSULTS
"Dental Hygiene Consult Service      Navin Lutz MRN# [id number]   YOB: 1970 Age: 52 year old     Date of Admission: 8/22/2023  PCP is Libertad Briceno  Date of Service: 8/22/2023           Reason for Consult:   Referring MD & Reason for Visit: I was asked by Micaela Silvestre MD, to see Navin Lutz for heart transplant and/or VAD evaluation.           History of Present Illness:   This patient is a 52 year old male with a history of chronic HFrEF 2/2 NICM s/p ICD, HLD, and CKD Stage III,  inguinal hernia and possible NATHALIE. Seen last by Dr. Micaela Rios on 7/19/2023.  Scheduled RHC today showed low CI of 1.5, leading to hospitalization for advanced therapy vs. Heart transplant eval.  Dental and oropharyngeal history is pertinent for established dental home, last dental cleaning was \"a couple of days ago\", pt also has restorative dental appointment scheduled for 9/5/23 for \"a few fillings\".  The patient reports no pain or dental concerns at this time.         General Observations   Level of support Patient is fully independent   Patient currently in pain No   Patient wears dentures No   Current oral care routine Pt typically    Patient has oral care products Patient does not have oral care products   Extraoral assessment   General appearance Symmetrical and Normal TMJ function   Lymph nodes Symmetrical, no abnormalities, non-tender   Oral Health Assessment Tool (OHAT)   Lips 0=Healthy: smooth, pink, moist   Tongue 0=Healthy: normal, moist, roughness, pink   Gums and Tissues 0=Healthy: pink, moist, smooth, no bleeding   Saliva 0=Healthy: moist tissues, watery and free flowing saliva   Natural Teeth Yes/No 1=Changes: (ie. 1-3 decayed or broken teeth/roots or very worn down teeth) - clinically visible caries noted on right maxillary molar (#2-B), left mandibular premolar tooth (#21-OB) pt unsure where third tooth is that is planned for dental filling   Dentures Yes/No Patient does not wear " dentures   Oral Cleanliness 0=Healthy: clean and no food particles or tartar in mouth or dentures   Dental Pain 0=Healthy: no behavioural, verbal, or physical signs of dental pain   OHAT Total Score (0-16) 1   Other Dental Concerns None   Care provided during assessment Oral Assessment, Connect with other provider, and Patient education   Follow up  assessments required? No   Connect with [facility] or [place] care? Yes: Dental fellow will review dental CT and DH note to determine dental clearance status   For Dental Team Service Requesting Consult: CARDS 2 - HF  Clearance/Reason: heart txp/VAD eval  Dental CT status: completed 8/22  Upcoming Sx date(s), if known: unknown at this time  Expected discharge date, if known: unknown at this time, depends on results of current workup   Ability to transfer to [facility]: yes, currently  Pain reported, by pt: none  Swelling present: none  Current dental Rx regimen: none   Oral Care Plan Discussed importance of maintaining good oral health through diligent oral cares, including toothbrushing using a soft-bristled manual toothbrush ideally BID, interdental cleaning (floss, softpiks, etc.). Informed pt how oral health can impact systemic (and particularly cardiovascular health).     Encouraged pt to continue dental care with established provider to optimize oral and systemic health. Explained that depending on planned cardio treatment, there may be a waiting period before pt is able to resume routine dental care.            Assessment and Plan:   Assessment:  This patient is a 52 year old male with a history of chronic HFrEF 2/2 NICM s/p ICD, HLD, and CKD Stage III,  inguinal hernia and possible NATHALIE. Seen last by Dr. Micaela Rios on 7/19/2023.  Scheduled RHC today showed low CI of 1.5, leading to hospitalization for advanced therapy vs. Heart transplant eval, oral exam concerning for clinically visible caries noted on right maxillary molar (#2-B), left mandibular premolar tooth (#21-OB);  otherwise no dental pain reported by pt, no swelling, adequate biofilm control, dentition, and salivary function.     Plan:    Dr. Sole Alva DMD will review DH note and dental CT results to determine patient's dental needs and dental clearance status.    Pt to follow-up with established dental home to complete proposed treatment plan, as long as medically cleared to do so. Pt can also be seen in the [facility] dental clinic if not discharged before surgical procedure.    Implement oral care plan as described above. Pt is currently independent in oral cares.    MANUELITO Horowitz  Pager: [phone]      Past Medical History   I have reviewed this patient's medical history and updated it with pertinent information if needed.  History reviewed. No pertinent past medical history.    Past Surgical History   I have reviewed this patient's surgical history and updated it with pertinent information if needed.  Past Surgical History:   Procedure Laterality Date     CARDIAC SURGERY       CV RIGHT HEART CATH MEASUREMENTS RECORDED N/A 8/22/2023    Procedure: Heart Cath Right Heart Cath;  Surgeon: Elfego Cruz MD;  Location:  HEART CARDIAC CATH LAB       Social History   I have reviewed this patient's social history and updated it with pertinent information if needed.  Social History     Tobacco Use     Smoking status: Never     Smokeless tobacco: Never   Substance Use Topics     Alcohol use: Never     Drug use: Never     Prior to Admission Medications   Prior to Admission Medications   Prescriptions Last Dose Informant Patient Reported? Taking?   carvedilol (COREG) 12.5 MG tablet 8/22/2023 at AM  No Yes   Sig: Take 1 tablet (12.5 mg) by mouth 2 times daily (with meals)   empagliflozin (JARDIANCE) 10 MG TABS tablet 8/22/2023 at AM  Yes Yes   Sig: Take 10 mg by mouth daily   furosemide (LASIX) 20 MG tablet 8/22/2023 at AM  Yes Yes   Sig: TAKE 1/2 TABLET BY MOUTH ONCE DAILY AS NEEDED FOR LEG SWELLING    nitroGLYcerin (NITROSTAT) 0.4 MG sublingual tablet   Yes Yes   Sig: Place 0.4 mg under the tongue every 5 minutes as needed   sacubitril-valsartan (ENTRESTO)  MG per tablet 8/22/2023 at AM  Yes Yes   Sig: Take 0.5 tablets by mouth 2 times daily   spironolactone (ALDACTONE) 25 MG tablet 8/22/2023 at AM  Yes Yes   Sig: Take 25 mg by mouth daily      Facility-Administered Medications: None     Allergies   No Known Allergies  Physical Exam

## 2023-08-22 NOTE — LETTER
[facility] [address] [address]  Phone: [phone]    August 28, 2023        Navin Lutz  [address] [place] [id number]          To whom it may concern:    RE: Navin JESSICA Dowdaimee    Mr. Lutz was seen at the [facility]. He was treated for his medical condition and requires intravenous medication for his heart therapy, which he will need to continue taking indefinitely. Please allow him to have the medication during this flight.     Please contact me for questions or concerns: [phone]      Sincerely,        Eddie Craig MD

## 2023-08-22 NOTE — PROGRESS NOTES
S/p RHC. Right internal jugular site intact. Denies pain. VSS. Pt requires admission for further treatment.

## 2023-08-23 ENCOUNTER — APPOINTMENT (OUTPATIENT)
Dept: OCCUPATIONAL THERAPY | Facility: CLINIC | Age: 53
End: 2023-08-23
Attending: STUDENT IN AN ORGANIZED HEALTH CARE EDUCATION/TRAINING PROGRAM
Payer: COMMERCIAL

## 2023-08-23 ENCOUNTER — APPOINTMENT (OUTPATIENT)
Dept: ULTRASOUND IMAGING | Facility: CLINIC | Age: 53
End: 2023-08-23
Attending: INTERNAL MEDICINE
Payer: COMMERCIAL

## 2023-08-23 ENCOUNTER — APPOINTMENT (OUTPATIENT)
Dept: ULTRASOUND IMAGING | Facility: CLINIC | Age: 53
End: 2023-08-23
Attending: STUDENT IN AN ORGANIZED HEALTH CARE EDUCATION/TRAINING PROGRAM
Payer: COMMERCIAL

## 2023-08-23 ENCOUNTER — APPOINTMENT (OUTPATIENT)
Dept: CARDIOLOGY | Facility: CLINIC | Age: 53
End: 2023-08-23
Attending: STUDENT IN AN ORGANIZED HEALTH CARE EDUCATION/TRAINING PROGRAM
Payer: COMMERCIAL

## 2023-08-23 ENCOUNTER — TELEPHONE (OUTPATIENT)
Dept: TRANSPLANT | Facility: CLINIC | Age: 53
End: 2023-08-23

## 2023-08-23 LAB
ABO/RH(D): NORMAL
ABO/RH(D): NORMAL
ALBUMIN SERPL BCG-MCNC: 3.8 G/DL (ref 3.5–5.2)
ALP SERPL-CCNC: 82 U/L (ref 40–129)
ALT SERPL W P-5'-P-CCNC: 33 U/L (ref 0–70)
ANION GAP SERPL CALCULATED.3IONS-SCNC: 12 MMOL/L (ref 7–15)
ANTIBODY SCREEN: NEGATIVE
ANTIBODY SCREEN: NEGATIVE
AST SERPL W P-5'-P-CCNC: 38 U/L (ref 0–45)
ATRIAL RATE - MUSE: 67 BPM
BASE EXCESS BLDV CALC-SCNC: 1.2 MMOL/L (ref -7.7–1.9)
BASE EXCESS BLDV CALC-SCNC: 2.9 MMOL/L (ref -7.7–1.9)
BASE EXCESS BLDV CALC-SCNC: 3.1 MMOL/L (ref -7.7–1.9)
BILIRUB SERPL-MCNC: 0.7 MG/DL
BUN SERPL-MCNC: 18.8 MG/DL (ref 6–20)
CALCIUM SERPL-MCNC: 9.4 MG/DL (ref 8.6–10)
CHLORIDE SERPL-SCNC: 105 MMOL/L (ref 98–107)
CMV IGG SERPL IA-ACNC: <0.2 U/ML
CMV IGG SERPL IA-ACNC: NORMAL
CREAT SERPL-MCNC: 1.14 MG/DL (ref 0.67–1.17)
DEPRECATED HCO3 PLAS-SCNC: 20 MMOL/L (ref 22–29)
DIASTOLIC BLOOD PRESSURE - MUSE: NORMAL MMHG
EBV VCA IGG SER IA-ACNC: 33 U/ML
EBV VCA IGG SER IA-ACNC: POSITIVE
FERRITIN SERPL-MCNC: 439 NG/ML (ref 31–409)
GFR SERPL CREATININE-BSD FRML MDRD: 77 ML/MIN/1.73M2
GLUCOSE SERPL-MCNC: 97 MG/DL (ref 70–99)
HBA1C MFR BLD: 5.6 %
HBV CORE AB SERPL QL IA: NONREACTIVE
HBV SURFACE AB SERPL IA-ACNC: 1.01 M[IU]/ML
HBV SURFACE AB SERPL IA-ACNC: NONREACTIVE M[IU]/ML
HBV SURFACE AG SERPL QL IA: NONREACTIVE
HCO3 BLDV-SCNC: 26 MMOL/L (ref 21–28)
HCO3 BLDV-SCNC: 28 MMOL/L (ref 21–28)
HCO3 BLDV-SCNC: 29 MMOL/L (ref 21–28)
HCV AB SERPL QL IA: NONREACTIVE
HGB FREE PLAS-MCNC: 80 MG/DL
HIV 1+2 AB+HIV1 P24 AG SERPL QL IA: NONREACTIVE
HSV1 IGG SERPL QL IA: 43.4 INDEX
HSV1 IGG SERPL QL IA: ABNORMAL
HSV2 IGG SERPL QL IA: 0.52 INDEX
HSV2 IGG SERPL QL IA: ABNORMAL
INTERPRETATION ECG - MUSE: NORMAL
IRON BINDING CAPACITY (ROCHE): 283 UG/DL (ref 240–430)
IRON SATN MFR SERPL: 46 % (ref 15–46)
IRON SERPL-MCNC: 130 UG/DL (ref 61–157)
LVEF ECHO: NORMAL
MAGNESIUM SERPL-MCNC: 2.3 MG/DL (ref 1.7–2.3)
O2/TOTAL GAS SETTING VFR VENT: 0 %
O2/TOTAL GAS SETTING VFR VENT: 21 %
O2/TOTAL GAS SETTING VFR VENT: 23 %
OXYHGB MFR BLDV: 62 % (ref 70–75)
OXYHGB MFR BLDV: 71 % (ref 70–75)
OXYHGB MFR BLDV: 88 % (ref 70–75)
P AXIS - MUSE: 66 DEGREES
PCO2 BLDV: 40 MM HG (ref 40–50)
PCO2 BLDV: 45 MM HG (ref 40–50)
PCO2 BLDV: 47 MM HG (ref 40–50)
PH BLDV: 7.4 [PH] (ref 7.32–7.43)
PH BLDV: 7.41 [PH] (ref 7.32–7.43)
PH BLDV: 7.42 [PH] (ref 7.32–7.43)
PHOSPHATE SERPL-MCNC: 2.7 MG/DL (ref 2.5–4.5)
PO2 BLDV: 33 MM HG (ref 25–47)
PO2 BLDV: 38 MM HG (ref 25–47)
PO2 BLDV: 54 MM HG (ref 25–47)
POTASSIUM SERPL-SCNC: 4.7 MMOL/L (ref 3.4–5.3)
PR INTERVAL - MUSE: 174 MS
PREALB SERPL IA-MCNC: 30 MG/DL (ref 15–45)
PROT SERPL-MCNC: 6.5 G/DL (ref 6.4–8.3)
PSA SERPL DL<=0.01 NG/ML-MCNC: 2.64 NG/ML (ref 0–3.5)
QRS DURATION - MUSE: 122 MS
QT - MUSE: 452 MS
QTC - MUSE: 477 MS
R AXIS - MUSE: -55 DEGREES
RADIOLOGIST FLAGS: ABNORMAL
SODIUM SERPL-SCNC: 137 MMOL/L (ref 136–145)
SPECIMEN EXPIRATION DATE: NORMAL
SPECIMEN EXPIRATION DATE: NORMAL
SYSTOLIC BLOOD PRESSURE - MUSE: NORMAL MMHG
T AXIS - MUSE: 44 DEGREES
T GONDII IGG SER QL: <3 IU/ML (ref 0–7.1)
T PALLIDUM AB SER QL: NONREACTIVE
TRANSFERRIN SERPL-MCNC: 236 MG/DL (ref 200–360)
VENTRICULAR RATE- MUSE: 67 BPM
VZV IGG SER QL IA: 579.3 INDEX
VZV IGG SER QL IA: POSITIVE

## 2023-08-23 PROCEDURE — 86696 HERPES SIMPLEX TYPE 2 TEST: CPT | Performed by: STUDENT IN AN ORGANIZED HEALTH CARE EDUCATION/TRAINING PROGRAM

## 2023-08-23 PROCEDURE — 94618 PULMONARY STRESS TESTING: CPT | Performed by: OCCUPATIONAL THERAPIST

## 2023-08-23 PROCEDURE — 85390 FIBRINOLYSINS SCREEN I&R: CPT | Mod: 26 | Performed by: PATHOLOGY

## 2023-08-23 PROCEDURE — 86665 EPSTEIN-BARR CAPSID VCA: CPT | Performed by: STUDENT IN AN ORGANIZED HEALTH CARE EDUCATION/TRAINING PROGRAM

## 2023-08-23 PROCEDURE — 84100 ASSAY OF PHOSPHORUS: CPT | Performed by: STUDENT IN AN ORGANIZED HEALTH CARE EDUCATION/TRAINING PROGRAM

## 2023-08-23 PROCEDURE — 86787 VARICELLA-ZOSTER ANTIBODY: CPT | Performed by: STUDENT IN AN ORGANIZED HEALTH CARE EDUCATION/TRAINING PROGRAM

## 2023-08-23 PROCEDURE — 85730 THROMBOPLASTIN TIME PARTIAL: CPT | Performed by: STUDENT IN AN ORGANIZED HEALTH CARE EDUCATION/TRAINING PROGRAM

## 2023-08-23 PROCEDURE — 99221 1ST HOSP IP/OBS SF/LOW 40: CPT | Mod: FS | Performed by: INTERNAL MEDICINE

## 2023-08-23 PROCEDURE — 255N000002 HC RX 255 OP 636: Performed by: STUDENT IN AN ORGANIZED HEALTH CARE EDUCATION/TRAINING PROGRAM

## 2023-08-23 PROCEDURE — 120N000003 HC R&B IMCU UMMC

## 2023-08-23 PROCEDURE — 250N000013 HC RX MED GY IP 250 OP 250 PS 637: Performed by: STUDENT IN AN ORGANIZED HEALTH CARE EDUCATION/TRAINING PROGRAM

## 2023-08-23 PROCEDURE — G0103 PSA SCREENING: HCPCS | Performed by: STUDENT IN AN ORGANIZED HEALTH CARE EDUCATION/TRAINING PROGRAM

## 2023-08-23 PROCEDURE — 86803 HEPATITIS C AB TEST: CPT | Performed by: STUDENT IN AN ORGANIZED HEALTH CARE EDUCATION/TRAINING PROGRAM

## 2023-08-23 PROCEDURE — 82728 ASSAY OF FERRITIN: CPT | Performed by: STUDENT IN AN ORGANIZED HEALTH CARE EDUCATION/TRAINING PROGRAM

## 2023-08-23 PROCEDURE — 93971 EXTREMITY STUDY: CPT | Mod: 26 | Performed by: RADIOLOGY

## 2023-08-23 PROCEDURE — 80307 DRUG TEST PRSMV CHEM ANLYZR: CPT | Performed by: STUDENT IN AN ORGANIZED HEALTH CARE EDUCATION/TRAINING PROGRAM

## 2023-08-23 PROCEDURE — 80053 COMPREHEN METABOLIC PANEL: CPT | Performed by: STUDENT IN AN ORGANIZED HEALTH CARE EDUCATION/TRAINING PROGRAM

## 2023-08-23 PROCEDURE — 84134 ASSAY OF PREALBUMIN: CPT | Performed by: STUDENT IN AN ORGANIZED HEALTH CARE EDUCATION/TRAINING PROGRAM

## 2023-08-23 PROCEDURE — 36592 COLLECT BLOOD FROM PICC: CPT | Performed by: STUDENT IN AN ORGANIZED HEALTH CARE EDUCATION/TRAINING PROGRAM

## 2023-08-23 PROCEDURE — 93880 EXTRACRANIAL BILAT STUDY: CPT

## 2023-08-23 PROCEDURE — 93922 UPR/L XTREMITY ART 2 LEVELS: CPT | Mod: 26 | Performed by: RADIOLOGY

## 2023-08-23 PROCEDURE — 96130 PSYCL TST EVAL PHYS/QHP 1ST: CPT | Mod: 95 | Performed by: CLINICAL NEUROPSYCHOLOGIST

## 2023-08-23 PROCEDURE — 93306 TTE W/DOPPLER COMPLETE: CPT | Mod: 26 | Performed by: INTERNAL MEDICINE

## 2023-08-23 PROCEDURE — 36415 COLL VENOUS BLD VENIPUNCTURE: CPT | Performed by: STUDENT IN AN ORGANIZED HEALTH CARE EDUCATION/TRAINING PROGRAM

## 2023-08-23 PROCEDURE — 99252 IP/OBS CONSLTJ NEW/EST SF 35: CPT | Mod: 25 | Performed by: NURSE PRACTITIONER

## 2023-08-23 PROCEDURE — 86708 HEPATITIS A ANTIBODY: CPT | Performed by: STUDENT IN AN ORGANIZED HEALTH CARE EDUCATION/TRAINING PROGRAM

## 2023-08-23 PROCEDURE — 250N000011 HC RX IP 250 OP 636: Performed by: STUDENT IN AN ORGANIZED HEALTH CARE EDUCATION/TRAINING PROGRAM

## 2023-08-23 PROCEDURE — 82805 BLOOD GASES W/O2 SATURATION: CPT | Performed by: STUDENT IN AN ORGANIZED HEALTH CARE EDUCATION/TRAINING PROGRAM

## 2023-08-23 PROCEDURE — 80323 ALKALOIDS NOS: CPT | Performed by: STUDENT IN AN ORGANIZED HEALTH CARE EDUCATION/TRAINING PROGRAM

## 2023-08-23 PROCEDURE — 93922 UPR/L XTREMITY ART 2 LEVELS: CPT

## 2023-08-23 PROCEDURE — 86644 CMV ANTIBODY: CPT | Performed by: STUDENT IN AN ORGANIZED HEALTH CARE EDUCATION/TRAINING PROGRAM

## 2023-08-23 PROCEDURE — 83735 ASSAY OF MAGNESIUM: CPT | Performed by: STUDENT IN AN ORGANIZED HEALTH CARE EDUCATION/TRAINING PROGRAM

## 2023-08-23 PROCEDURE — 86777 TOXOPLASMA ANTIBODY: CPT | Performed by: STUDENT IN AN ORGANIZED HEALTH CARE EDUCATION/TRAINING PROGRAM

## 2023-08-23 PROCEDURE — 80321 ALCOHOLS BIOMARKERS 1OR 2: CPT | Performed by: STUDENT IN AN ORGANIZED HEALTH CARE EDUCATION/TRAINING PROGRAM

## 2023-08-23 PROCEDURE — 250N000011 HC RX IP 250 OP 636: Mod: JZ | Performed by: STUDENT IN AN ORGANIZED HEALTH CARE EDUCATION/TRAINING PROGRAM

## 2023-08-23 PROCEDURE — 999N000208 ECHOCARDIOGRAM COMPLETE

## 2023-08-23 PROCEDURE — 82947 ASSAY GLUCOSE BLOOD QUANT: CPT | Performed by: STUDENT IN AN ORGANIZED HEALTH CARE EDUCATION/TRAINING PROGRAM

## 2023-08-23 PROCEDURE — 86706 HEP B SURFACE ANTIBODY: CPT | Performed by: STUDENT IN AN ORGANIZED HEALTH CARE EDUCATION/TRAINING PROGRAM

## 2023-08-23 PROCEDURE — 99498 ADVNCD CARE PLAN ADDL 30 MIN: CPT | Performed by: NURSE PRACTITIONER

## 2023-08-23 PROCEDURE — 90791 PSYCH DIAGNOSTIC EVALUATION: CPT | Mod: 95 | Performed by: CLINICAL NEUROPSYCHOLOGIST

## 2023-08-23 PROCEDURE — 93971 EXTREMITY STUDY: CPT

## 2023-08-23 PROCEDURE — 86704 HEP B CORE ANTIBODY TOTAL: CPT | Performed by: STUDENT IN AN ORGANIZED HEALTH CARE EDUCATION/TRAINING PROGRAM

## 2023-08-23 PROCEDURE — 86780 TREPONEMA PALLIDUM: CPT | Performed by: STUDENT IN AN ORGANIZED HEALTH CARE EDUCATION/TRAINING PROGRAM

## 2023-08-23 PROCEDURE — 99497 ADVNCD CARE PLAN 30 MIN: CPT | Performed by: NURSE PRACTITIONER

## 2023-08-23 PROCEDURE — 84466 ASSAY OF TRANSFERRIN: CPT | Performed by: STUDENT IN AN ORGANIZED HEALTH CARE EDUCATION/TRAINING PROGRAM

## 2023-08-23 PROCEDURE — 99232 SBSQ HOSP IP/OBS MODERATE 35: CPT | Mod: 25 | Performed by: STUDENT IN AN ORGANIZED HEALTH CARE EDUCATION/TRAINING PROGRAM

## 2023-08-23 PROCEDURE — 83051 HEMOGLOBIN PLASMA: CPT | Performed by: STUDENT IN AN ORGANIZED HEALTH CARE EDUCATION/TRAINING PROGRAM

## 2023-08-23 PROCEDURE — 93880 EXTRACRANIAL BILAT STUDY: CPT | Mod: 26 | Performed by: RADIOLOGY

## 2023-08-23 PROCEDURE — 84155 ASSAY OF PROTEIN SERUM: CPT | Performed by: STUDENT IN AN ORGANIZED HEALTH CARE EDUCATION/TRAINING PROGRAM

## 2023-08-23 PROCEDURE — 87340 HEPATITIS B SURFACE AG IA: CPT | Performed by: STUDENT IN AN ORGANIZED HEALTH CARE EDUCATION/TRAINING PROGRAM

## 2023-08-23 RX ADMIN — ATORVASTATIN CALCIUM 20 MG: 20 TABLET, FILM COATED ORAL at 19:46

## 2023-08-23 RX ADMIN — HUMAN ALBUMIN MICROSPHERES AND PERFLUTREN 6 ML: 10; .22 INJECTION, SOLUTION INTRAVENOUS at 09:30

## 2023-08-23 RX ADMIN — ENOXAPARIN SODIUM 40 MG: 40 INJECTION SUBCUTANEOUS at 17:00

## 2023-08-23 RX ADMIN — SACUBITRIL AND VALSARTAN 1 TABLET: 49; 51 TABLET, FILM COATED ORAL at 19:46

## 2023-08-23 RX ADMIN — SODIUM CHLORIDE, PRESERVATIVE FREE 5 ML: 5 INJECTION INTRAVENOUS at 18:07

## 2023-08-23 RX ADMIN — SACUBITRIL AND VALSARTAN 1 TABLET: 49; 51 TABLET, FILM COATED ORAL at 07:58

## 2023-08-23 RX ADMIN — SPIRONOLACTONE 25 MG: 25 TABLET ORAL at 07:58

## 2023-08-23 ASSESSMENT — ACTIVITIES OF DAILY LIVING (ADL)
ADLS_ACUITY_SCORE: 18

## 2023-08-23 NOTE — CONSULTS
Palliative Care Consultation Note  [facility]      Patient: Navin Lutz  Date of Admission:  8/22/2023    Requesting Clinician / Team: Vanessa Santamaria MD / Cardiology  Reason for consult: Heart Transplant / LVAD Preparedness        Recommendations & Counseling   Navin Lutz and spouse Corie appear to understand the risks and benefits of transplant and LVAD therapy, Navin has a strong support system, and is engaged in the process.  In addition, I believe he has the family support and emotional capability to cope with complications, if they occur.   See Heart Transplant Preparedness plan below.    GOALS OF CARE:   Restorative without limits     ADVANCE CARE PLANNING:  Advanced Care Planning Facilitation Documentation started earlier today. Patient has Advance Directive to complete.  Patient requests Spouse Deepa Lutz be named as Health Care Agent.  There is no POLST form on file,  recommend continued medical treatment and FULL CODE   Code status: Full Code    MEDICAL MANAGEMENT:   We are not actively managing symptoms at this time.    PSYCHOSOCIAL/SPIRITUAL SUPPORT:  String support from spouse Corie, adult children, youngest just started college.  Navin declines spiritual support at this time.     Palliative Care will sign off. Thank you for the consult and allowing us to aid in the care of Navin Lutz.    ANNE Freitas CNP  Securely message with [employer] (more info)  Text page via [employer] Paging/Directory       Assessment      Navin Lutz is a 52 year old male with a past medical history of chronic HFrEF 2/2 NICM s/p ICD, HLD, CKD stage II  who presented on 8/22 with low output heart failure and evaluation for advanced therapy vs. Heart transplant.      Today, the patient was seen for:  Heart Transplan and LVAD preparedness.    Palliative Care Summary:   I met with Navin and his spouse Corie today to introduce the Palliative Care team and services  "we provide; such as symptom management, assistance navigating chronic illness and goals for treatment, counseling, psychosocial and spiritual support. I explained that at this time our role was to assist with Heart transplant and LVAD workup, and encouraged them to reach out if other needs arose in the future.     Heart transplantation preparedness plan    Patient s legally designated health care agent: planning to be spouse Corie.    Patient s description of how they make decisions (by themselves, in consultation with certain close loved ones, based on advice from medical professionals, etc):  Nathalie is independent in decisions.      Patient s personal goals/hopes for receiving the transplant: Nathalie has a natural affinity to keeping active.  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      Patient s worries/concerns when considering receiving the transplant: going through the process only to find a larger burden of chronic illness or health care needs.  How does the patient envision or anticipate his/her future with the transplant? He is rewalistic there will still be a lot to manage, but he is hopeful to improve activity level and endurance.  Navin and Deepa are looking forward to shelter.  Patient s thoughts about what constitutes a  good  quality of life: Keeping active, working on his trucks, he knows he has \"overdone it\" in the past and needs to take better care of him self with sleep and rest but remaining active brings him geri.    Patient s thoughts about what health conditions would be unacceptable (situations the patient would want her/his doctors and loved ones to know that she/he would not want life prolonged)? Navin does not want to be kept alive in a vegetative state, if he cannot think or interact with his surroundings.  He thinks he would be able to tolerate a high level burden of care even if his quality of life was impacted " and he had to live with some disability.      Heart transplantation carries a small risk of perioperative stroke/brain injury, which however can be devastating. After a stroke, what sort of functional outcomes would be acceptable vs unacceptable to the patient? Dinesh would accept a high level of disability, he would accept needing a feeding tube, needing assistance with basic cares and difficulty communicating.      Chronic mechanical ventilation via a tracheostomy tube is a risk. What are the circumstances the patient would want her/his physicians and family to keep them alive with long-term mechanical ventilation vs allow them to die?  Dinesh worries we might give up to soon, he would tolerate a high burden of care and and would even consider long term life support dependance.     Nathalie is understanding that he would need to relocate if he ended up with LVAD therapy and required dialysis.  He understands he may then be considered for a kidney transplant as well.     Prognosis, Goals, & Planning:    Functional Status just prior to this current hospitalization:  ECOG1 (Restricted in physically strenuous activity but ambulatory and able to carry out work of a light or sedentary nature)    Prognosis, Goals, and/or Advance Care Planning:  See above    Code Status was addressed today:   No .    Patient's decision making preferences: independently        Patient has decision-making capacity today for complex decisions:Intact            Coping, Meaning, & Spirituality:   Mood, coping, and/or meaning in the context of serious illness were addressed today: Yes  Nathalie and modest[place] are able to find the humor even in this diffiuclt situation.  In general Dinesh has been able to recover quickly and has strong lance in his body.    Social:   Living situation:lives with significant other/spouse  Important relationships/caregivers:spouse Corie  Occupation: owns [employer] business  Areas of fulfillment/geri: work, remaining  active fishing    Medications:  I have reviewed this patient's medication profile and medications from this hospitalization. Notable medications:none         Physical Exam   Vital Signs with Ranges  Temp:  [97.8  F (36.6  C)-98.1  F (36.7  C)] 98  F (36.7  C)  Pulse:  [70-77] 76  Resp:  [16-18] 16  BP: (100-122)/(68-79) 102/68  SpO2:  [94 %-97 %] 95 %  203 lbs .7 oz    PHYSICAL EXAM:  GEN:  Alert, oriented x 3, appears comfortable, NAD.  HEENT:  Normocephalic/atraumatic, no scleral icterus, no nasal discharge, mouth moist.  LUNGS:  non labored breathing  Symmetric chest rise on inhalation noted.  EXT:  No edema or cyanosis.    SKIN:  Dry to touch, no exanthems noted in the visualized areas.      Data reviewed:  none    Advance Care Planning Discussion 8/23/2023. ITeresita APRN CNP met with Patient and their spouse today at the hospital to discuss Advance Care Planning. Navin Lutz does have decisional capacity and was present for this discussion.  Those present were informed of the voluntary nature of this discussion and wished to proceed.  The discussion included:  see above . This discussion began at 325 and ended at 415 for a total of 50 minutes.

## 2023-08-23 NOTE — PLAN OF CARE
Goal Outcome Evaluation:      Plan of Care Reviewed With: patient, spouse    Overall Patient Progress:  (initial assessment)Overall Patient Progress:  (initial assessment)    Outcome Evaluation: Continue current diet as ordered. If oral intake decreases, then consider liberalizing diet to a 3 g sodium diet. Encourage intake of oral supplements.

## 2023-08-23 NOTE — PLAN OF CARE
D: Admitted 8/22 work up for advanced heart therapy vs heart transplant  I/A: Pt is A/Ox4. Slept through the night. Afebrile. SR f/pvc/bigem. HR 60-70. BP normal. LS clear. On room air. No BM this shift. Good urine output. R PICC infusing dobutamine at 2.5mcg/kg/min in purple lumen. Red lumen draws back but very slowly. Pain at PICC insertion site with some swelling - Vascular troubleshoot with no concerns. Heat applied to PICC. Pt should get US of UE as part of work up today. 2g Na+; 2L FR. Up SBA.   P: Continue work up.      Goal Outcome Evaluation:      Plan of Care Reviewed With: patient    Overall Patient Progress: no changeOverall Patient Progress: no change

## 2023-08-23 NOTE — PROGRESS NOTES
/70   Pulse 77   Temp 97.8  F (36.6  C) (Oral)   Resp 18   Wt 92.1 kg (203 lb 0.7 oz)   SpO2 98%   BMI 27.20 kg/m    Neuro: A&Ox4.   Cardiac: Afebrile, VSS.   Respiratory: RA   GI/: Voiding spontaneously. No BM this shift.   Diet/appetite: Tolerating diet. 2Gm, 2L FR.Denies nausea   Activity:independent  Pain: Denies   Skin: No new deficits noted.  Lines:PICC x2, PIV x1 Dobutamine gtt started tonight.  Drains:None    No new complaints.Will continue to monitor and follow plan of care.

## 2023-08-23 NOTE — PROGRESS NOTES
CLINICAL NUTRITION SERVICES - ASSESSMENT NOTE     Nutrition Prescription    Current BMI: Body mass index is 27.2 kg/m .   BMI is within the recommendation of <35 for potential heart transplant    RECOMMENDATIONS FOR MDs/PROVIDERS TO ORDER:  Order a multivitamin with minerals to ensure micronutrient needs are met with heart failure.    Malnutrition Status:    Patient does not meet two of the established criteria necessary for diagnosing malnutrition but is at risk for malnutrition    Recommendations already ordered by Registered Dietitian (RD):  Oral supplements    Future/Additional Recommendations:  1. Continue current diet as ordered. If oral intake decreases, then consider liberalizing diet to a 3 g sodium diet. Encourage intake of oral supplements.  2. Continue fluid restriction as per team.   3. Consider checking folic acid, methylmalonic acid.  4. Consider checking iron studies and potential need for supplementation, if appropriate.   5. Monitor need for bowel regimen.   6. Consider biotene if dry mouth.  7. Monitor BG control. BG of 122 on 8/22.   8. If pt has an LVAD placed or a receives a Tx and if pt needs TFs, would rec place feeding tube (FT) in radiology, order XR Feeding Tube Placement, and initiate TFs, Osmolite 1.5 (concentrated TF formula, or the equivalent) and order Prosource TF 20 modulars, 1 pkt daily. Initiate TFs at 15 mL/hr, advancing rate by 10 mL Q 8 hrs (or per provider discretion, pending hemodynamic stability) to goal rate of 70 mL/hr. Osmolite 1.5 Dre (or equivalent) @ goal of 70ml/hr  (1680ml/day) + Prosource TF 20 modular, 1 pkt daily, provides: 2600 kcals, 125 g PRO, 1280 ml free H20, 342 g CHO, and 0 g fiber daily.                           If begin tube feeds:                         - Flush FT with 30 mL water Q 4 hrs for patency. Rec provider adjust free water flushes as needed, pending fluid status.                        - Ensure K+/Mg++/Phos labs are ordered daily until TFs  "advance to goal infusion to evaluate for sx of refeeding with nutrition received. If lytes trend low, aggressively replace lytes.                            - If not already ordered, order a multivitamin/mineral (certavite 15 mL/day via FT) to help ensure micronutrient needs being met with suspected hypermetabolic demands and potential interruptions to TF infusions.                        - Monitor TF and possible need to adjust nutrition support regimen if necessary, pending medical course and nutrition status.         REASON FOR ASSESSMENT  Navin Lutz is a/an 52 year old male assessed by the dietitian for Provider Order - \"Assess/Educate potential heart transplant recipient. Nutrition Education. Frailty assessment for transplant patients who are being evaluated for Ventricular Assist Device (VAD) Placements.\" Per nutrition risk screen on admit, \"Have you recently lost weight without trying?: no. Have you been eating poorly because of a decreased appetite?: no.\"    NUTRITION/ADDITIONAL HISTORY  Pt not known to this service PTA.   Per H & P, \"PMH of chronic HFrEF 2/2 NICM s/p ICD, HLD, CKD stage II. Here for low output heart failure after scheduled RHC showed low cardiac index of 1.5. We are evaluating for advanced therapy vs. Heart transplant eval.\" Patient lives with their spouse. Pt was ordered to take, noting, jardiance and lasix PTA.   Per discussion with pt/wife Corie, his appetite has been decreased since his ICD was placed in June 2023. States he has been eating less due to taste changes. Admits he has been forcing himself to eat. Pt's wife estimates he is eating 75% of his usual oral intake. They are aware of rec to limit sodium. Eating less canned foods. Per Corie, [place] so they do most of the cooking themselves. 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     CURRENT NUTRITION ORDERS  Diet: 2 g Sodium and 2000 mL Fluid Restriction  Intake/Tolerance: Tolerating " "diet. No nursing flowsheets (% intake) data available so far. Pt was just recently admitted. Pt's wife mentioned he has been eating less while in this hospital this admission due to NPO often for tests/procedures. Eating well per RN 8/22.    LABS  Labs reviewed    MEDICATIONS  Medications reviewed    ANTHROPOMETRICS  Height: 6' 0.5\" per pt/wife (184 cm)  Most Recent Weight: 92.1 kg (203 lb 0.7 oz)    IBW: 82.3 kg   BMI: Overweight BMI 25-29.9  Weight History: 93.9 kg (1/2/2022), 92.5 kg (8/1/2022), 91.6 kg (6/12/23), 93.7 kg (7/19/23), 92.1 kg (8/22/23, admit) - Wt stable. Per H & P, \"mildly hypovolemic on physical exam.\"  Dosing Weight: 92 kg (based on lowest wt so far this admission of 92.1 kg on 8/22)    ASSESSED NUTRITION NEEDS (for inpatient hospital stay)  Estimated Energy Needs: 4146-4898 kcals/day (25 - 30 kcals/kg)  Justification: Maintenance needs, although rec high end of this range. Estimated needs of 30-35 kcals/kg if/when post-op Tx  Estimated Protein Needs: 101-129 grams protein/day (1.1 - 1.4 grams of pro/kg)  Justification: Increased needs with cardiac status. Estimated needs would increase to 1.5-2 g protein/kg if/when LVAD is placed or receives Tx   Estimated Fluid Needs: 2000 mL/day    Justification: On a fluid restriction      PHYSICAL FINDINGS/OTHER FINDINGS  See malnutrition section below.  Regarding frailty, see malnutrition section below.   Resp: No O2  GI: Having zero stools daily on average. Last Bowel Movement: 08/21/23 (08/22/23 1211)  WOC: Not following at this time    MALNUTRITION  % Intake: < 75% for > 7 days (moderate)  % Weight Loss: None noted, although difficult to assess with fluid status changes.   Subcutaneous Fat Loss: None observed  Muscle Loss: None observed  Fluid Accumulation/Edema: None noted  Malnutrition Diagnosis: Patient does not meet two of the established criteria necessary for diagnosing malnutrition but is at risk for malnutrition    NUTRITION " DIAGNOSIS  Inadequate oral intake related to taste changes as evidenced by pt eating 75% of his usual oral intake over the past approximate two months.       INTERVENTIONS  Implementation  Nutrition education for nutrition relationship to health/disease (pt/wife): Discussed nutrition education for potential upcoming surgery (pt in the LVAD/Tx workup process). Mentioned potential need for feeding tube and TFs for nutrition support post-op. Discussed the importance of adequate oral intake post-op. Emphasized protein intake. Discussed and encouraged oral supplements as needed. Rec continue low-sodium diet and fluid restriction at this time. Briefly explained post-op nutrition transplant education (need for food safety and explained basic elements). Gave Guide to your Diet after Transplant handout. Pt with no questions on current diet order. Pt/wife seemed agreeable to nutrition plan if LVAD is placed or if receives Tx in the future.    Medical food supplement therapy: Discussed oral supplements. Ordered strawberry Ensure Enlive at 10:00 and 14:00.   Modify composition of meals/snacks: Gave room service sodium menu. Explained fluid restriction.     Goals  Patient to consume % of nutritionally adequate meal trays TID, or the equivalent with supplements/snacks.     Monitoring/Evaluation  Progress toward goals will be monitored and evaluated per protocol.       Nutrition will continue to follow.      Teri Watson, MS, RD, LD, [facility]   6[facility] (beds [id number] and [id number]) RD pgr: [phone]  Saturday/Sunday/Holiday RD pgr: [phone]

## 2023-08-23 NOTE — PLAN OF CARE
SIX MINUTE WALK TEST  Cardiac Rehab  8/23/2023    Navin Lutz MRN# [id number]   YOB: 1970 Age: 52 year old     Height: 6 ft, 0 in Weight (lbs): 203 lbs .7 oz Weight (kg): 92.1 kg (actual weight)    Supplemental oxygen during the test: No,     Oxygen Appliance: None    Oximetry: Finger Probe    Gait Aid: None     Pre-test Post-test   Time 4:28pm 4:34pm   Blood Pressure (mm Hg) 110/67 103/78   Heart rate (bpm) 101 100   Rated Perceived Dyspnea (Nikko Scale) 0 -- Nothing at all  1 -- Very mild shortness of breath    Rated Perceived Exertion (Nikko Scale) 1 -- Very weak  2 -- Weak (light)    SpO2 (%) 96% 95%     Total distance walked in 6 minutes: 1230 feet, 375 meters    Paused during test?No    Stopped test before 6 minutes? No    Did the patient experience any pain or discomfort during the test? No    Oxygen Titration Required: No    Performing Staff: Zander Garcia, OT

## 2023-08-23 NOTE — PROGRESS NOTES
"Met with patient and wifeto present the HM3 as a possible treatment option.     Discussed patient and caregiver responsibilities before and after VAD implant. Clarified that, r/t COVID-19 visitor restrictions, only 2 caregivers may be trained. Clarified the need for a caregiver to be present for education and to assist patient for at least first 30 days after a patient returns home. Patient's designated caregiver is wife Deepa.     Discussed basic overview of VAD equipment, on going management, need for anticoagulation, regular dressing change, grounded three-pronged outlet and functioning telephone. Also discussed frequency of follow-up clinic appointments and the need to stay locally for at least 2-4 weeks.  Reviewed restrictions of having a VAD such as no swimming, bathing, boats, MRI's, or arc welding.     Provided and discussed the VAD educational brochure, information regarding the VAD and transplant support groups, and \"VAD What You Should Know\" with additional details. Patient and wife signed \"VAD What You Should Know\" document (or agreed to have VAD Coordinator sign on their behalf). VAD coordinator contact information provided.  Encouraged patient and wife to call with questions. Learners verbalized understanding of the above information.    "

## 2023-08-23 NOTE — CONSULTS
Gastroenterology Consultation  GI Luminal Service    Date of Admission:  8/22/2023  Reason for Admission: HFrEF  Date of Consult  8/23/2023   Requesting Physician:  Micaela Silvestre, *           ASSESSMENT AND RECOMMENDATIONS:   Assessment:  Navin Lutz is a 52 year old male with a history of chronic HFrEF 2/2 NICM s/p ICD, hyperlipidemia, CKD stage II who presented to the ED 8/22/2023 for low output heart failure after scheduled RHC showing low cardiac index of 1.5. The GI Luminal Service was consulted regarding screening colonoscopy as part of heart transplant work-up.     # Colon Cancer Screening  No previous colonoscopy. No family history of GI cancers. Previous abdominal surgical history includes history of appendectomy in ~1977 (age 7 years old) and history of inguinal hernia repair. Patient is amenable to proceeding with colonoscopy as part of advanced heart therapy work-up.  - Hemodynamically stable.   - CT Chest/Abdomen/Pelvis without contrast 8/22/2023 with normal caliber large and small bowel, no abnormal bowel wall thickening or enhancement, mild colonic diverticulosis without evidence of acute diverticulitis.      Recommendations:  -- Appreciate continued cardiopulmonary optimization by primary team.   -- Hold Lovenox doses 8/24 and 8/25/2023.   -- Plan for Colonoscopy Friday (Date: 8/25/2023).   To prepare please ensure the following:   - TOMORROW 8/24/2023:  - Clear liquid diet (no red, purple or blue) today until midnight.  - At 16:00: 4 L Golytely, drink 12 ounces every 15 minutes until 4 L is gone.  - FRIDAY 8/25/2023:   - At 00:15 NPO other than additional prep and small sip of water with morning medications.  -- STAT AM Labs at 0400: CBC with Platelets with Diff, CMP, Magnesium, Phosphorus, INR.   - At 04:00, another 2 L Golytely: drink 12 ounces every 15 minutes until 2 L is gone (finished by 0600).  - If stools not clear (mountain dew, tea or broth in color), please page GI Luminal  service.   - Please ensure multiple antiemetic options are available during prep.  - Anticipate fluid and electrolyte shifts during this process, monitor closely.  -- Maintain 2 large bore IVs, 18G or larger.  -- Continue Supportive Cares  - Adequate volume resuscitation with IVF, pRBCs.  - Monitor Hemoglobin closely. Transfuse to keep Hgb > 7 g/dL from GI standpoint.   - Monitor Platelets closely. Keep PLT > 50 10e3/uL from GI standpoint.  - Maintain hemodynamics: MAP >65 mmHg and HR <100.  - Monitor and optimize electrolytes.  - Reposition every 30 minutes while awake.  - Encourage Ambulation as able: 4-6 walks per day.   -- Continue to hold heparin/anticoagulation/antiplatelets.    - Monitor INR. Goal INR ~ 1.5 for GI endoscopy.  -- Avoid NSAIDs.  -- Analgesics/Antiemetics per primary team.  -- If the patient experiences overt GI bleeding with hemodynamic instability, please page the GI Luminal Service (listed on [facility]).     Discussed with Dr. Anthony Duque - Cards 2.     COVID status: negative 8/22/2023.     Thank you for involving us in this patient's care. Please do not hesitate to contact the GI service with any questions or concerns.     The patient was discussed and plan agreed upon with Dr. Jaswant Rodriguez, GI Luminal Service staff physician.    Overall time spent on the date of this encounter preparing to see the patient (including chart review of available notes, clinical status events, imaging and labs); obtaining and/or reviewing separately obtained history from patient's wife Corie present in the room; discussing, ordering, coordinating recommended medications, tests or procedures; communicating with other health care professionals; and documenting the above clinical information in the electronic medical record was 50 minutes.    Lorena Hernandez PA-C  Inpatient Gastroenterology Service  [facility]  Text Page         History of Present Illness:   Patient seen and examined at  11:50. History is obtained from patient and chart review.    Navin Lutz is a 52 year old male with a history of chronic HFrEF 2/2 NICM s/p ICD, hyperlipidemia, CKD stage II who presented to the ED 8/22/2023 for low output heart failure after scheduled RHC showing low cardiac index of 1.5. The GI Luminal Service was consulted regarding screening colonoscopy as part of heart transplant work-up.     Denies nausea, vomiting, acid reflux, heartburn, abdominal pain.    Reports daily soft, brown bowel movements. Denies hematochezia, melena, BRBPR.     Reports negative Cologuard 3-4 months ago.     Previous Procedures:  -- No previous GI Endoscopy.             Past Medical History:   Reviewed and edited as appropriate  History reviewed. No pertinent past medical history.         Past Surgical History:   Reviewed and edited as appropriate   Past Surgical History:   Procedure Laterality Date    CARDIAC SURGERY      CV RIGHT HEART CATH MEASUREMENTS RECORDED N/A 08/22/2023    Procedure: Heart Cath Right Heart Cath;  Surgeon: Elfego Cruz MD;  Location:  HEART CARDIAC CATH LAB    PICC DOUBLE LUMEN PLACEMENT Right 08/22/2023    Brachial Vein Medial 5F DL 45 cm, 2 cm out              Social History:   The patient lives in [place], [place].     Alcohol: Denies.  Tobacco: Denies.  Illicit drugs: Denies.            Family History:   Patient's family history is reviewed today and is non-contributory  - Denies family history of GI cancers, IBD and celiac disease.  History reviewed. No pertinent family history.          Allergies:   Reviewed and edited as appropriate   No Known Allergies         Medications:     Current Facility-Administered Medications   Medication    alteplase (CATHFLO ACTIVASE) injection 2 mg    atorvastatin (LIPITOR) tablet 20 mg    Continuing ACE inhibitor/ARB/ARNI from home medication list OR ACE inhibitor/ARB/ARNI order already placed during this visit    DOBUTamine (DOBUTREX) 500 mg in D5W 250 mL  infusion (adult std conc)    enoxaparin ANTICOAGULANT (LOVENOX) injection 40 mg    heparin lock flush 10 UNIT/ML injection 5-20 mL    heparin lock flush 10 UNIT/ML injection 5-20 mL    lidocaine (LMX4) cream    lidocaine 1 % 0.1-5 mL    Reason beta blocker not prescribed    sacubitril-valsartan (ENTRESTO) 49-51 MG per tablet 1 tablet    sodium chloride (PF) 0.9% PF flush 10-20 mL    sodium chloride (PF) 0.9% PF flush 10-20 mL    sodium chloride (PF) 0.9% PF flush 10-40 mL    spironolactone (ALDACTONE) tablet 25 mg             Review of Systems:     A complete review of systems was performed and is negative except as noted in the HPI           Physical Exam:   Temp: 98  F (36.7  C) Temp src: Oral BP: 102/68 Pulse: 76   Resp: 16 SpO2: 95 % O2 Device: None (Room air)    Wt:   Wt Readings from Last 2 Encounters:   08/22/23 92.1 kg (203 lb 0.7 oz)   07/19/23 93.7 kg (206 lb 9.6 oz)        General: 52 year male lying in bed in NAD. Appears comfortable.  Answers appropriately.    HEENT: Head is AT/NC. Sclera anicteric.   Lungs: No increased work of breathing, speaking in full sentences, equal chest rise. On Room Air.   Heart: Regular rate. Peripheral perfusion intact.  Abdomen: Soft, non-tender, non-distended. No peritoneal signs. +RLQ well-healed vertical incisional scar.   Extremities: WWP.  Musculoskeletal: No gross deformity.  Skin: No jaundice or rash on exposed.   Neurologic: Grossly non-focal.  CN 2-12 grossly intact.   Mental status/Psych: A&O. Asks/answers questions appropriately. Pleasant, interactive.             Data:   LAB WORK:    BMP  Recent Labs   Lab 08/23/23  0702 08/22/23  1037    139   POTASSIUM 4.7 5.0   CHLORIDE 105 104   NAM 9.4 9.8   CO2 20* 26   BUN 18.8 19.0   CR 1.14 1.25*   GLC 97 122*     CBC  Recent Labs   Lab 08/22/23  2200 08/22/23  1037   WBC 7.5 5.9   RBC 6.05* 6.12*   HGB 18.4* 19.0*   HCT 54.4* 56.3*   MCV 90 92   MCH 30.4 31.0   MCHC 33.8 33.7   RDW 13.5 13.3    215      INR  Recent Labs   Lab 08/22/23  1229 08/22/23  0716   INR 1.05 1.02     LFTs  Recent Labs   Lab 08/23/23  0702 08/22/23  1037   ALKPHOS 82 90   AST 38 29   ALT 33 38   BILITOTAL 0.7 0.6   PROTTOTAL 6.5 7.0   ALBUMIN 3.8 4.2      PANCNo lab results found in last 7 days.    IMAGING:    XR CHEST PORT 1 VIEW 8/22/2023 5:48 PM   FINDINGS:   Frontal view of the chest. Left costophrenic angle is collimated out  of view. Right arm PICC tip projects over mid SVC. Left chest wall  implantable cardiac defibrillator.     Trachea is midline. Stable cardiac silhouette. No focal consolidation,  right pleural effusion or pneumothorax.                                                                      IMPRESSION: Right arm PICC tip projects over mid SVC.       CT DENTAL WO CONTRAST, CT HEAD W/O CONTRAST, 8/22/2023 4:12 PM   Findings:   CT head: There is no intracranial hemorrhage, mass effect, or midline  shift. Gray/white matter differentiation in both cerebral hemispheres  is preserved. Ventricles are proportionate to the cerebral sulci. The  basal cisterns are clear.     The bony calvaria and the bones of the skull base are normal. Mucosal  thickening of the maxillary sinuses. Mastoid air cells are clear.      CT dental:  Radiolucency at the lateral aspect of left mandibular canine (series 4  image 47), concerning for caries.     Radiolucency surrounding the right second mandibular premolar, left  first and second mandibular molar.     Questionable lucency surrounding the right first mandibular molar.     No significant soft tissue swelling or mass. Normal facial bone  alignment. No bony erosion.                                                                    Impression:  1.  No acute intracranial pathology.   2.  Periapical abscess and left mandibular canine caries.      CT CHEST ABDOMEN PELVIS W/O CONTRAST, 8/22/2023 4:12 PM   FINDINGS:  CHEST:  Left chest wall ICD with single lead terminating in the  right  ventricle.     LUNGS: Central tracheobronchial tree is patent. No pneumothorax or  pleural effusion. Mild bibasilar atelectasis. No focal consolidation.  There is a solid pulmonary nodules in the right lower lobe measuring  1.6 x 1.0 cm with associated central calcification (series 3, image  256). Tiny scattered calcified granulomas in the lungs.      MEDIASTINUM: Heart size is within normal limits. The left ventricle is  enlarged. No pericardial effusion. Ascending aorta and main pulmonary  artery diameters are within normal limits. Normal appearance and  configuration of the great vessels off of the aortic arch. No  suspicious mediastinal, hilar, or axillary lymph nodes.      Visualized thyroid is unremarkable. Mild thickening of the distal  esophagus, as can be seen with chronic reflux.     ABDOMEN/PELVIS:  LIVER: No suspicious focal hepatic lesion.     BILIARY: No calcified intraluminal gallstone. No intrahepatic or  extrahepatic biliary ductal dilation.     PANCREAS: No focal pancreatic lesion. Small amount interdigitating fat  demonstrated in the pancreatic tail and pancreatic head. The main  pancreatic duct is not dilated.     SPLEEN: Within normal limits.     ADRENAL GLANDS: No focal adrenal nodule.     URINARY TRACT: No suspicious renal lesion. No hydronephrosis or  hydroureter. No renal stone. Urinary bladder is unremarkable.     REPRODUCTIVE ORGANS: Mild prostatomegaly.     STOMACH: Within normal limits.     BOWEL: Normal caliber large and small bowel. No abnormal bowel wall  thickening or enhancement. Mild colonic diverticulosis without  evidence of acute diverticulitis. The appendix is not definitely  visualized, however no secondary findings to suggest acute  appendicitis.      PERITONEUM/FLUID: No ascites or pelvic free fluid.     VESSELS: No aneurysmal dilatation of the abdominal aorta.     LYMPH NODES: No lymphadenopathy.     BONES/SOFT TISSUES: No aggressive osseous lesions.  Multilevel  degenerative changes throughout the visualized spine. Tiny  fat-containing umbilical hernia.                                                                      IMPRESSION:   1. No acute pathology in the chest, abdomen, or pelvis.  2. Solid pulmonary nodule in the right lower lobe measuring 1.6 cm  with central calcifications may be suggestive of chronic granuloma.  However this has not previously been visualized. Consider follow-up in  3 months to assess for stability.  3. Mild colonic diverticulosis without evidence of acute  diverticulitis.  4. Prostatomegaly.  =======================================================================

## 2023-08-23 NOTE — PROGRESS NOTES
Cardiology Inpatient Progress Note   August 23, 2023    Assessment and Plan:       53yo M with PMH of chronic HFrEF 2/2 NICM s/p ICD, HLD, CKD stage II. Here for low output heart failure after scheduled RHC showed low cardiac index of 1.5. We are evaluating for advanced therapy vs. heart transplant eval.     Today:  - Continue dobutamine 2.5 mcg/kg/min  - GI consulted for colonoscopy  - Heart transplant evaluation on-going       #Low output acute on chronic systolic heart failure/HFrEF (EF 13%) 2/2 NICM  NYHA Symptom Class IV, Stage D. Rosario HF Class III. RHC (8/22/23) notable for IESHA 3.2/1.5, decreased CI from last IESHA on 6/2023 of 4.32/2.1. PVR 1.25, SBP 88/65 on admission. 7cm JVD. Given patient has recently had symptoms at rest with low cardiac index, will start advanced therapy and heart transplant evaluations.   - ACE-I/ARB/ARNi: Continue Entresto 49-51 mg BID  - BB: hold PTA Carvedilol 25mg   - Aldosterone antagonist: Continue PTA beck 25 mg daily. Hold PTA lasix due to low output HF.   - SGLT2i: Hold PTA empagliflozin 10 mg daily due to possibility of procedure (infection risk).  - Ionotropes: start dobutamine 2mg/kg/min. Titrating up pending venous oxyhemoglobin from PICC line.   - SCD prophylaxis: s/p ICD  - %BiV pacing: N/A  - Fluid status: mildly hypovolemic on physical exam. Hold PTA lasix 10-20 mg daily PRN - pt was taking daily post-ICD placement in June.   - Daily standing weights, 2L fluid, and 2g Na restriction    LVAD/Transplant Evaluation Checklist  [x] Labs (CBC, CMP, PT/INR, cystatin C, prealbumin, UA + micro)  [x] Infectious (Hep A/B/C, HIV, treponema, HSV 1/2 IgG, CMV IgG, Toxo IgG, EBV IgG, varicella IgG, Quant gold, COVID vaccine/PCR)  [] Utox/nicotine and cotinine/PeTH   [] Immunocompatibility (last transfusion, ABO, HLA tissue typing, PRA)  [x] ECG, Echo  [] CPX - not indicated at this time.   [] 6MWT   [x] RHC  [x] CVTS consult  [] Social work consult  [] Palliative care  consult  [] Neuropsych consult  [x] Nutrition consult  [x] CT Dental - periapical abscess and left mandibular canine caries.   [] Dental consult  [x] Abd US + doppler  [x] Extremity US and ABIs  [x] Carotid US (if DM or ICM or >49yo)  [] PFTs  [] Dexa  [x] CT head non-contrast  [x] CT CAP non-contrast -    There is a solid pulmonary nodules in the right lower lobe measuring 1.6 x 1.0 cm with associated central calcification (series 3, image 256). Tiny scattered calcified granulomas in the lungs., consider follow-up.   [] colonoscopy (>49 yo)  [] PSA    #Inferior and possible anterior infarct, unspecified timing   #Myocardial Bridge Over Coronary Artery  Myocardial bridge over distal LAD.  - Continue to monitor     #CKD Stage II  -CTM     #HLD  - Atorvastatin 20mg daily     FEN: 2g sodium diet. 2L fluid restriction. Repeat electrolytes as needed.   DVT PPx: enoxaparin   Code: FULL    Disposition: Inpatient admission. Expected discharge in >2 days      Staffed by Dr. Micaela Duque MD  Internal Medicine-Pediatrics, PGY-3   ___________________________________________________________________________________  Interval Events  No acute events overnight. Discussed transplant evaluation. Questions answered   ____________________________________________________________________________________  Physical Exam  /68 (BP Location: Left arm)   Pulse 76   Temp 98  F (36.7  C) (Oral)   Resp 16   Wt 92.1 kg (203 lb 0.7 oz)   SpO2 95%   BMI 27.20 kg/m    GEN: NAD   HEENT: EOMI, no icterus, moist mucous membranes   CV: RRR, normal S1/S2, no murmur appreciated   CHEST: Normal work of breathing. Lungs CTAB, no wheezes or crackles.   ABD: Soft, NT/ND   EXT: Warm and well-perfused, no LE edema  NEURO: Awake, alert, answering questions appropriately, motor grossly nonfocal  SKIN: No rash visualized   PSYCH: cooperative, affect appropriate, cheerful.      Data:   Reviewed

## 2023-08-23 NOTE — TELEPHONE ENCOUNTER
----- Message from Bernadine Cote RN sent at 8/23/2023  9:52 AM CDT -----  Regarding: RE: Heart Eval  You can cancel all OP heart transplant eval appointments so visits on 8/24 and 8/25.  ----- Message -----  From: Haley Wooten  Sent: 8/23/2023   8:57 AM CDT  To: Bernadine Cote RN  Subject: RE: Heart Eval                                   So just to clarify you want ALL appointments cancelled and orders cancelled for this WHOLE week that are not listed as IP orders correct?      Haley    ----- Message -----  From: Bernadine Cote RN  Sent: 8/23/2023   8:39 AM CDT  To: [facility]  Subject: RE: Heart Eval                                   Hi Schedulers,  His eval will be completed while he's admitted so all outpatient eval appts can be cancelled.  ThanksBernadine  ----- Message -----  From: Haley Wooten  Sent: 8/23/2023   8:31 AM CDT  To: Bernadine Cote RN  Subject: Heart Eval                                       Morning Bernadine,   Patient is currently inpatient and it looks like his US were cancelled but they did do the echo, do you know if they will complete these US when he is inpatient or do we need to reschedule these?    Thank you,  Haley

## 2023-08-24 LAB
ALBUMIN SERPL BCG-MCNC: 3.7 G/DL (ref 3.5–5.2)
ALP SERPL-CCNC: 82 U/L (ref 40–129)
ALT SERPL W P-5'-P-CCNC: 29 U/L (ref 0–70)
AMPHETAMINES SERPL QL SCN: NEGATIVE NG/ML
ANION GAP SERPL CALCULATED.3IONS-SCNC: 9 MMOL/L (ref 7–15)
ANNOTATION COMMENT IMP: NORMAL
AST SERPL W P-5'-P-CCNC: 22 U/L (ref 0–45)
BARBITURATES SERPL QL SCN: NEGATIVE NG/ML
BASE EXCESS BLDV CALC-SCNC: 3 MMOL/L (ref -7.7–1.9)
BASOPHILS # BLD AUTO: 0 10E3/UL (ref 0–0.2)
BASOPHILS NFR BLD AUTO: 0 %
BENZODIAZ SERPL QL SCN: NEGATIVE NG/ML
BILIRUB SERPL-MCNC: 0.7 MG/DL
BUN SERPL-MCNC: 16.2 MG/DL (ref 6–20)
BUPRENORPHINE SERPL-MCNC: NEGATIVE NG/ML
CALCIUM SERPL-MCNC: 9.1 MG/DL (ref 8.6–10)
CANNABINOIDS SERPL QL SCN: NEGATIVE NG/ML
CELL TYPE AUTO: NORMAL
CHANNELSHIFTAUTOB1: -48
CHANNELSHIFTAUTOT1: -35
CHLORIDE SERPL-SCNC: 103 MMOL/L (ref 98–107)
COCAINE SERPL QL SCN: NEGATIVE NG/ML
CREAT SERPL-MCNC: 1.11 MG/DL (ref 0.67–1.17)
CROSSMATCHDATEAUTO: NORMAL
DEPRECATED HCO3 PLAS-SCNC: 24 MMOL/L (ref 22–29)
DONOR AUTO: NORMAL
DONORCELLDATE AUTO: NORMAL
DRVVT SCREEN RATIO: 0.86
EOSINOPHIL # BLD AUTO: 0.1 10E3/UL (ref 0–0.7)
EOSINOPHIL NFR BLD AUTO: 2 %
ERYTHROCYTE [DISTWIDTH] IN BLOOD BY AUTOMATED COUNT: 13.2 % (ref 10–15)
FOLATE SERPL-MCNC: 9.1 NG/ML (ref 4.6–34.8)
GAMMA INTERFERON BACKGROUND BLD IA-ACNC: 0.03 IU/ML
GFR SERPL CREATININE-BSD FRML MDRD: 80 ML/MIN/1.73M2
GLUCOSE SERPL-MCNC: 103 MG/DL (ref 70–99)
HAV IGG SER QL IA: REACTIVE
HCO3 BLDV-SCNC: 29 MMOL/L (ref 21–28)
HCT VFR BLD AUTO: 51.4 % (ref 40–53)
HGB BLD-MCNC: 17.3 G/DL (ref 13.3–17.7)
IMM GRANULOCYTES # BLD: 0 10E3/UL
IMM GRANULOCYTES NFR BLD: 0 %
INR PPP: 1.02 (ref 0.85–1.15)
LA PPP-IMP: NEGATIVE
LUPUS INTERPRETATION: ABNORMAL
LYMPHOCYTES # BLD AUTO: 2.1 10E3/UL (ref 0.8–5.3)
LYMPHOCYTES NFR BLD AUTO: 28 %
M TB IFN-G BLD-IMP: NEGATIVE
M TB IFN-G CD4+ BCKGRND COR BLD-ACNC: 9.97 IU/ML
MAGNESIUM SERPL-MCNC: 2.2 MG/DL (ref 1.7–2.3)
MCH RBC QN AUTO: 30.8 PG (ref 26.5–33)
MCHC RBC AUTO-ENTMCNC: 33.7 G/DL (ref 31.5–36.5)
MCV RBC AUTO: 92 FL (ref 78–100)
METHADONE SERPL QL SCN: NEGATIVE NG/ML
METHAMPHET SERPL QL: NEGATIVE NG/ML
MITOGEN IGNF BCKGRD COR BLD-ACNC: 0.01 IU/ML
MITOGEN IGNF BCKGRD COR BLD-ACNC: 0.07 IU/ML
MONOCYTES # BLD AUTO: 0.7 10E3/UL (ref 0–1.3)
MONOCYTES NFR BLD AUTO: 10 %
NEUTROPHILS # BLD AUTO: 4.5 10E3/UL (ref 1.6–8.3)
NEUTROPHILS NFR BLD AUTO: 60 %
NRBC # BLD AUTO: 0 10E3/UL
NRBC BLD AUTO-RTO: 0 /100
O2/TOTAL GAS SETTING VFR VENT: 21 %
OPIATES SERPL QL SCN: NEGATIVE NG/ML
OXYCODONE SERPL QL: NEGATIVE NG/ML
OXYHGB MFR BLDV: 70 % (ref 70–75)
PCO2 BLDV: 47 MM HG (ref 40–50)
PCP SERPL QL SCN: NEGATIVE NG/ML
PH BLDV: 7.4 [PH] (ref 7.32–7.43)
PLATELET # BLD AUTO: 195 10E3/UL (ref 150–450)
PO2 BLDV: 38 MM HG (ref 25–47)
POS CUT OFF AUTO B: >69
POS CUT OFF AUTO T: >53
POTASSIUM SERPL-SCNC: 4.1 MMOL/L (ref 3.4–5.3)
PROT SERPL-MCNC: 6.2 G/DL (ref 6.4–8.3)
PTT RATIO: 1.08
QUANTIFERON MITOGEN: 10 IU/ML
QUANTIFERON NIL TUBE: 0.03 IU/ML
QUANTIFERON TB1 TUBE: 0.1 IU/ML
QUANTIFERON TB2 TUBE: 0.04
RBC # BLD AUTO: 5.61 10E6/UL (ref 4.4–5.9)
RESULT AUTO B1: NORMAL
RESULT AUTO T1: NORMAL
SA 1 CELL: NORMAL
SA 1 TEST METHOD: NORMAL
SA 2 CELL: NORMAL
SA 2 TEST METHOD: NORMAL
SA1 HI RISK ABY: NORMAL
SA1 MOD RISK ABY: NORMAL
SA2 HI RISK ABY: NORMAL
SA2 MOD RISK ABY: NORMAL
SERUM DATE AUTO B1: NORMAL
SERUM DATE AUTO T1: NORMAL
SODIUM SERPL-SCNC: 136 MMOL/L (ref 136–145)
TESTMETHODAUTO: NORMAL
THROMBIN TIME: 19.2 SECONDS (ref 13–19)
TREATMENT AUTO B1: NORMAL
TREATMENT AUTO T1: NORMAL
UNACCEPTABLE ANTIGENS: NORMAL
UNOS CPRA: 0
WBC # BLD AUTO: 7.5 10E3/UL (ref 4–11)
ZZZSA 1  COMMENTS: NORMAL
ZZZSA 2 COMMENTS: NORMAL

## 2023-08-24 PROCEDURE — 82746 ASSAY OF FOLIC ACID SERUM: CPT | Performed by: STUDENT IN AN ORGANIZED HEALTH CARE EDUCATION/TRAINING PROGRAM

## 2023-08-24 PROCEDURE — 83921 ORGANIC ACID SINGLE QUANT: CPT | Performed by: STUDENT IN AN ORGANIZED HEALTH CARE EDUCATION/TRAINING PROGRAM

## 2023-08-24 PROCEDURE — 999N000007 HC SITE CHECK

## 2023-08-24 PROCEDURE — 36592 COLLECT BLOOD FROM PICC: CPT | Performed by: STUDENT IN AN ORGANIZED HEALTH CARE EDUCATION/TRAINING PROGRAM

## 2023-08-24 PROCEDURE — 250N000013 HC RX MED GY IP 250 OP 250 PS 637: Performed by: STUDENT IN AN ORGANIZED HEALTH CARE EDUCATION/TRAINING PROGRAM

## 2023-08-24 PROCEDURE — 250N000011 HC RX IP 250 OP 636: Performed by: STUDENT IN AN ORGANIZED HEALTH CARE EDUCATION/TRAINING PROGRAM

## 2023-08-24 PROCEDURE — 85025 COMPLETE CBC W/AUTO DIFF WBC: CPT | Performed by: STUDENT IN AN ORGANIZED HEALTH CARE EDUCATION/TRAINING PROGRAM

## 2023-08-24 PROCEDURE — 120N000003 HC R&B IMCU UMMC

## 2023-08-24 PROCEDURE — 250N000011 HC RX IP 250 OP 636: Mod: JZ

## 2023-08-24 PROCEDURE — 99232 SBSQ HOSP IP/OBS MODERATE 35: CPT | Mod: GC | Performed by: STUDENT IN AN ORGANIZED HEALTH CARE EDUCATION/TRAINING PROGRAM

## 2023-08-24 PROCEDURE — 83735 ASSAY OF MAGNESIUM: CPT | Performed by: STUDENT IN AN ORGANIZED HEALTH CARE EDUCATION/TRAINING PROGRAM

## 2023-08-24 PROCEDURE — 250N000013 HC RX MED GY IP 250 OP 250 PS 637

## 2023-08-24 PROCEDURE — 99253 IP/OBS CNSLTJ NEW/EST LOW 45: CPT | Mod: GC | Performed by: INTERNAL MEDICINE

## 2023-08-24 PROCEDURE — 82805 BLOOD GASES W/O2 SATURATION: CPT | Performed by: STUDENT IN AN ORGANIZED HEALTH CARE EDUCATION/TRAINING PROGRAM

## 2023-08-24 PROCEDURE — 80053 COMPREHEN METABOLIC PANEL: CPT | Performed by: STUDENT IN AN ORGANIZED HEALTH CARE EDUCATION/TRAINING PROGRAM

## 2023-08-24 RX ADMIN — SPIRONOLACTONE 25 MG: 25 TABLET ORAL at 08:26

## 2023-08-24 RX ADMIN — SODIUM CHLORIDE, PRESERVATIVE FREE 5 ML: 5 INJECTION INTRAVENOUS at 17:12

## 2023-08-24 RX ADMIN — ATORVASTATIN CALCIUM 20 MG: 20 TABLET, FILM COATED ORAL at 19:22

## 2023-08-24 RX ADMIN — DOBUTAMINE IN DEXTROSE 5 MCG/KG/MIN: 200 INJECTION, SOLUTION INTRAVENOUS at 18:19

## 2023-08-24 RX ADMIN — POLYETHYLENE GLYCOL 3350, SODIUM SULFATE ANHYDROUS, SODIUM BICARBONATE, SODIUM CHLORIDE, POTASSIUM CHLORIDE 4000 ML: 236; 22.74; 6.74; 5.86; 2.97 POWDER, FOR SOLUTION ORAL at 15:48

## 2023-08-24 RX ADMIN — Medication 5 ML: at 21:07

## 2023-08-24 RX ADMIN — ALTEPLASE 2 MG: 2.2 INJECTION, POWDER, LYOPHILIZED, FOR SOLUTION INTRAVENOUS at 05:36

## 2023-08-24 RX ADMIN — SACUBITRIL AND VALSARTAN 1 TABLET: 49; 51 TABLET, FILM COATED ORAL at 19:23

## 2023-08-24 RX ADMIN — SACUBITRIL AND VALSARTAN 1 TABLET: 49; 51 TABLET, FILM COATED ORAL at 08:26

## 2023-08-24 RX ADMIN — DOBUTAMINE IN DEXTROSE 5 MCG/KG/MIN: 200 INJECTION, SOLUTION INTRAVENOUS at 01:40

## 2023-08-24 ASSESSMENT — ACTIVITIES OF DAILY LIVING (ADL)
ADLS_ACUITY_SCORE: 18

## 2023-08-24 NOTE — PROGRESS NOTES
Cardiology Inpatient Progress Note   August 23, 2023    Assessment and Plan:       53yo M with PMH of chronic HFrEF 2/2 NICM s/p ICD, HLD, CKD stage II. Here for low output heart failure after scheduled RHC showed low cardiac index of 1.5. We are evaluating for advanced therapy vs. heart transplant eval.     Today:  - Continue dobutamine 5 mcg/kg/min  - Hematology consult ordered   - Liquid diet today and Golytely prep at 4pm for colonoscopy scheduled tmr   - Heart transplant evaluation on-going       #Low output acute on chronic systolic heart failure/HFrEF (EF 13%) 2/2 NICM  NYHA Symptom Class IV, Stage D. Rosario HF Class III. RHC (8/22/23) notable for IESHA 3.2/1.5, decreased CI from last IESHA on 6/2023 of 4.32/2.1. PVR 1.25, SBP 88/65 on admission. 7cm JVD. Given patient has recently had symptoms at rest with low cardiac index, will start advanced therapy and heart transplant evaluations. LVEF 15-20%; Severe left ventricular dilation (LVIDd 6.8cm).  Severe diffuse hypokinesis  - ACE-I/ARB/ARNi: Continue Entresto 49-51 mg BID  - BB: hold PTA Carvedilol 25mg   - Aldosterone antagonist: Continue PTA beck 25 mg daily. Hold PTA lasix due to low output HF.   - SGLT2i: Hold PTA empagliflozin 10 mg daily due to possibility of procedure (infection risk).  - Ionotropes: start dobutamine 2mg/kg/min. Titrating up pending venous oxyhemoglobin from PICC line. Titrated to 5 mg/kg/min.    - CI: 125 / (13.6)(Hgb) (SaO2-SvO2) - if CI about 1.5, can leave dobutamine at current 5 mcg/kg/min  - SCD prophylaxis: s/p ICD  - %BiV pacing: N/A  - Fluid status: mildly hypovolemic on physical exam. Hold PTA lasix 10-20 mg daily PRN - pt was taking daily post-ICD placement in June.   - Daily standing weights, 2L fluid, and 2g Na restriction    LVAD/Transplant Evaluation Checklist  [x] Labs (CBC, CMP, PT/INR, cystatin C, prealbumin, UA + micro)  [x] Infectious (Hep A/B/C, HIV, treponema, HSV 1/2 IgG, CMV IgG, Toxo IgG, EBV IgG,  varicella IgG, Quant gold, COVID vaccine/PCR)  [] Utox/nicotine and cotinine/PeTH   [] Immunocompatibility (last transfusion, ABO, HLA tissue typing, PRA)  [x] ECG, Echo  [] CPX - not indicated at this time.   [x] 6MWT - Walked w/o pause without needing oxygen.   [x] RHC  [x] CVTS consult  [x] Social work consult - no risks identified. Excellent candidate for procedures. No recs at this time.   [x] Palliative care consult - saw pt on 8/23 with no concerns.  [] Neuropsych consult  [x] Nutrition consult  [x] CT Dental - periapical abscess and left mandibular canine caries.   [x] Dental consult - clinically visible caries on R. Maxillary molar, L mandibular premolar. Dr. Sole Alva, DMD will review DH note and dental CT results to determine patient's dental needs and dental clearance status.  - Pt to follow-up with established dental home to complete proposed treatment plan, as long as medically cleared to do so. Pt can also be seen in the [facility] dental clinic if not discharged before surgical procedure.  [x] Abd US + doppler  [x] Extremity US and ABIs  [x] Carotid US (if DM or ICM or >49yo)  [] PFTs  [] Dexa  [x] CT head non-contrast  [x] CT CAP non-contrast -    There is a solid pulmonary nodules in the right lower lobe measuring 1.6 x 1.0 cm with associated central calcification (series 3, image 256). Tiny scattered calcified granulomas in the lungs., consider follow-up.   [] colonoscopy (>51 yo) - Colonoscopy scheduled for 8/25/23. 8/24 - clear liquid diet. At 4pm, 4L Golytely, drink 12 ounces every 15 minutes until 4 L is gone.   FRIDAY 8/25/2023:   - At 00:15 NPO other than additional prep and small sip of water with morning medications.  -- STAT AM Labs at 0400: CBC with Platelets with Diff, CMP, Magnesium, Phosphorus, INR.   - At 04:00, another 2 L Golytely: drink 12 ounces every 15 minutes until 2 L is gone (finished by 0600).  - If stools not clear (mountain dew, tea or broth in color), please page GI Luminal  service.   - Please ensure multiple antiemetic options are available during prep.  - Anticipate fluid and electrolyte shifts during this process, monitor closely.  -- Maintain 2 large bore IVs, 18G or larger.  -- Continue Supportive Cares  - Adequate volume resuscitation with IVF, pRBCs.  - Monitor Hemoglobin closely. Transfuse to keep Hgb > 7 g/dL from GI standpoint.   - Monitor Platelets closely. Keep PLT > 50 10e3/uL from GI standpoint.  - Maintain hemodynamics: MAP >65 mmHg and HR <100.  - Monitor and optimize electrolytes.  - Reposition every 30 minutes while awake.  - Encourage Ambulation as able: 4-6 walks per day.   -- Continue to hold heparin/anticoagulation/antiplatelets.               - Monitor INR. Goal INR ~ 1.5 for GI endoscopy.  -- Avoid NSAIDs.  [] PSA  [] Hematology consult ordered - Hgb 17-19 during admission.       #Non occlusive DVT in Right jugular vein   - CTM   - start low dose heparin drip AFTER colonoscopy performed on 8/25.     #Inferior and possible anterior infarct, unspecified timing   #Myocardial Bridge Over Coronary Artery  Myocardial bridge over distal LAD.  - Continue to monitor     #CKD Stage II  -CTM     #HLD  - Atorvastatin 20mg daily     FEN: 2g sodium diet. 2L fluid restriction. Repeat electrolytes as needed.   DVT PPx: enoxaparin   Code: FULL    Disposition: Inpatient admission. Expected discharge in >2 days      Staffed by Dr. Micaela Craig MD  [facility]  Department of Internal Medicine   Resident Physician  PGY-1  Pager: [phone]   ___________________________________________________________________________________  Interval Events  NAEON. Patient was lying comfortably in bed. Denies any SOB, chest pain, palpitations. Did not sleep well due to many tests being done. Wife will arrive to bedside later today.     Overnight, 2100 VBG oxyhemoglobin, after dobutamine at 5 mcg/kg/min for 1 hour. Oxyhgb 71% with CI 1.9    ____________________________________________________________________________________  Physical Exam  BP 98/76   Pulse 89   Temp 98.1  F (36.7  C) (Oral)   Resp 18   Wt 92.1 kg (203 lb 0.7 oz)   SpO2 97%   BMI 27.20 kg/m    GEN: NAD   HEENT: EOMI, no icterus, moist mucous membranes   CV: RRR, normal S1/S2, no murmur appreciated   CHEST: Normal work of breathing. Lungs CTAB, no wheezes or crackles.   ABD: Soft, NT/ND   EXT: Warm and well-perfused, no LE edema  NEURO: Awake, alert, answering questions appropriately, motor grossly nonfocal  SKIN: No rash visualized. No LE edema.   PSYCH: cooperative, affect appropriate, cheerful.      Data:      Latest Reference Range & Units 08/24/23 07:02   Sodium 136 - 145 mmol/L 136   Potassium 3.4 - 5.3 mmol/L 4.1   Chloride 98 - 107 mmol/L 103   Carbon Dioxide (CO2) 22 - 29 mmol/L 24   Urea Nitrogen 6.0 - 20.0 mg/dL 16.2   Creatinine 0.67 - 1.17 mg/dL 1.11   GFR Estimate >60 mL/min/1.73m2 80   Calcium 8.6 - 10.0 mg/dL 9.1   Anion Gap 7 - 15 mmol/L 9   Magnesium 1.7 - 2.3 mg/dL 2.2   Albumin 3.5 - 5.2 g/dL 3.7   Protein Total 6.4 - 8.3 g/dL 6.2 (L)   Alkaline Phosphatase 40 - 129 U/L 82   ALT 0 - 70 U/L 29   AST 0 - 45 U/L 22   Bilirubin Total <=1.2 mg/dL 0.7   Glucose 70 - 99 mg/dL 103 (H)      Latest Reference Range & Units 08/23/23 20:45 08/24/23 07:02   FIO2  21 21   Ph Venous 7.32 - 7.43  7.41 7.40   PCO2 Venous 40 - 50 mm Hg 45 47   PO2 Venous 25 - 47 mm Hg 38 38   Bicarbonate Venous 21 - 28 mmol/L 28 29 (H)   Base Excess Venous -7.7 - 1.9 mmol/L 2.9 (H) 3.0 (H)   Oxyhemoglobin Venous 70 - 75 % 71 70      Latest Reference Range & Units 08/24/23 07:02   WBC 4.0 - 11.0 10e3/uL 7.5   Hemoglobin 13.3 - 17.7 g/dL 17.3   Hematocrit 40.0 - 53.0 % 51.4   Platelet Count 150 - 450 10e3/uL 195   RBC Count 4.40 - 5.90 10e6/uL 5.61   MCV 78 - 100 fL 92   MCH 26.5 - 33.0 pg 30.8   MCHC 31.5 - 36.5 g/dL 33.7   RDW 10.0 - 15.0 % 13.2   % Neutrophils % 60   % Lymphocytes % 28   %  Monocytes % 10   % Eosinophils % 2   % Basophils % 0   Absolute Basophils 0.0 - 0.2 10e3/uL 0.0   Absolute Eosinophils 0.0 - 0.7 10e3/uL 0.1   Absolute Immature Granulocytes <=0.4 10e3/uL 0.0   Absolute Lymphocytes 0.8 - 5.3 10e3/uL 2.1   Absolute Monocytes 0.0 - 1.3 10e3/uL 0.7   % Immature Granulocytes % 0   Absolute Neutrophils 1.6 - 8.3 10e3/uL 4.5   Absolute NRBCs 10e3/uL 0.0       Upper Ultrasound 8/23  1. Nonocclusive deep venous thrombosis of the right internal jugular  vein.  2. No evidence of deep venous thrombosis in the left upper extremity.       Echo 8/23/23:  Interpretation Summary  Severe left ventricular dilation (LVIDd 6.8cm). Left ventricular function is  decreased. The ejection fraction is 15-20% (severely reduced). Severe diffuse  hypokinesis.  Global right ventricular function is normal.  No significant valve abnormalities.  The inferior vena cava is normal.  No pericardial effusion.     There is no prior study for direct comparison, but LV function is similar to  echo report from [place] in [facility] from 6/13/23.

## 2023-08-24 NOTE — PROGRESS NOTES
Care Coordinator  D: 53yo M with PMH of chronic HFrEF 2/2 NICM s/p ICD, HLD, CKD stage II. Here for low output heart failure after scheduled RHC showed low cardiac index of 1.5. We are evaluating for advanced therapy vs. heart transplant eval.      Today:  - Continue dobutamine 5 mcg/kg/min  - Hematology consult ordered   - Liquid diet today and Golytely prep at 4pm for colonoscopy scheduled tmr   - Heart transplant evaluation on-going --per Dr Eddie Craig's note today.  I: Per team rounds MD asked me to check home Inotrope coverage  A: Possible discharge early next week: to have colonoscopy 8/25  P: I have sent referral to [facility] to check home IV Dobutamine coverage____Pt has a R PICC..  I have called [facility] : Marcia [phone]  They have a teach for 8/28 @ 3pm I have booked it.  Wait for benefits____.
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
## 2023-08-24 NOTE — PROGRESS NOTES
Brief GI Luminal Service Note:    The patient was not seen or examined today. This note is a product of chart review.     Navin Lutz is a 52 year old male with a history of chronic HFrEF 2/2 NICM s/p ICD, hyperlipidemia, CKD stage II who presented to the ED 8/22/2023 for low output heart failure after scheduled RHC showing low cardiac index of 1.5. The GI Luminal Service was consulted regarding screening colonoscopy as part of heart transplant work-up.      # Colon Cancer Screening  No previous colonoscopy. No family history of GI cancers. Previous abdominal surgical history includes history of appendectomy in ~1977 (age 7 years old) and history of inguinal hernia repair. Patient is amenable to proceeding with colonoscopy as part of advanced heart therapy work-up.  - Hemodynamically stable.   - CT Chest/Abdomen/Pelvis without contrast 8/22/2023 with normal caliber large and small bowel, no abnormal bowel wall thickening or enhancement, mild colonic diverticulosis without evidence of acute diverticulitis.      Recommendations:  -- Appreciate continued cardiopulmonary optimization by primary team.   -- Hold Lovenox doses 8/24 and 8/25/2023.   -- Plan for Colonoscopy Tomorrow (Date: 8/25/2023) at 0900.   To prepare please ensure the following:              - TODAY 8/24/2023:  - Clear liquid diet (no red, purple or blue) today until midnight.  - At 16:00: 4 L Golytely, drink 12 ounces every 15 minutes until 4 L is gone (finished by 20:00).  - FRIDAY 8/25/2023:   - At 00:15 NPO other than additional prep and small sip of water with morning medications.  -- STAT AM Labs at 0400: CBC with Platelets with Diff, CMP, Magnesium, Phosphorus, INR.   - At 04:00, another 2 L Golytely: drink 12 ounces every 15 minutes until 2 L is gone (finished by 0600).  - At 06:00, if stools not clear (mountain dew, tea or broth in color), administer PRN 2 L Golytely: drink 12 ounces every 15 minutes until 2 L is gone (finished by 0800) and  please FYI page GI Luminal service.   - Please ensure multiple antiemetic options are available during prep.  - Anticipate fluid and electrolyte shifts during this process, monitor closely.  -- Maintain 2 large bore IVs, 18G or larger.  -- Continue Supportive Cares  - Adequate volume resuscitation with IVF, pRBCs.  - Monitor Hemoglobin closely. Transfuse to keep Hgb > 7 g/dL from GI standpoint.   - Monitor Platelets closely. Keep PLT > 50 10e3/uL from GI standpoint.  - Maintain hemodynamics: MAP >65 mmHg and HR <100.  - Monitor and optimize electrolytes.  - Reposition every 30 minutes while awake.  - Encourage Ambulation as able: 4-6 walks per day.   -- Continue to hold heparin/anticoagulation/antiplatelets.               - Monitor INR. Goal INR ~ 1.5 for GI endoscopy.  -- Avoid NSAIDs.  -- Analgesics/Antiemetics per primary team.  -- If the patient experiences overt GI bleeding with hemodynamic instability, please page the GI Luminal Service (listed on [facility]).     Thank you for allowing us to participate in the care of this patient. Please do not hesitate to page the GI service with any questions or concerns.     Lorena Hernandez PA-C  Inpatient Gastroenterology Service  [facility]  Pager: *[phone]  Text Page

## 2023-08-24 NOTE — TELEPHONE ENCOUNTER
Spoke with patient to schedule virtual transplant teaching session for Friday at 1pm. Encouraged patient to review heart transplant packet via docusign and sign needed forms. Patient verbalized understanding and in agreement with plan.

## 2023-08-24 NOTE — CONSULTS
Dental Consult,  [facility]     Patient:   Navin Lutz    Date of birth 1970   MRN:  [id number]   Date of Visit:   08/24/2023   Date of Admission 8/22/2023   Consult Requested by Micaela Silvestre, *     I did not see the patient in person, I reviewed Shayy Kim RDH's consult note (08/22/2023) and Dental CT.                                       Assessment and Recommendations:   ASSESSMENT:  Navin Lutz is a 52 year old male with a past medical history pertinent for chronic HFrEF 2/2 NICM s/p ICD, HLD, and CKD Stage III,  inguinal hernia and possible NATHALIE. Seen last by Dr. Micaela Rios on 7/19/2023.  Scheduled RHC today showed low CI of 1.5, leading to hospitalization for advanced therapy vs. Heart transplant eval.   Diagnosis upon admission Acute on chronic systolic CHF (congestive heart failure) (H) [I50.23].   Dental exam pertinent for: clinical dental caries on #2B and #21 OB.    Dental CT reveals periapical radiolucency on #18 lower left first molar and #19 lower left second molar and #29 lower right second premolar. #18, #19 and #29 are endo treated with pt report no dental concern, possibly endo scar.         RECOMMENDATION:  Due to clinical and radiographic findings, further assessment is indicated.   Pt can follow up with his dental provider ( scheduled for fillings on 09/05/2023 per patient) if he is medically cleared and stable by med team.   If Navin Lutz is not cleared to be discharged soon before his heart surgery, a second examination with further imaging and any definitive treatment will occur at the [facility] dental clinic in the [facility] for Navin Lutz. The medical team is responsible for establishing transportation for the patient and sending any personnel they deem necessary to monitor the patient.  Please call [phone], [phone] to schedule an  "appointment.  _____________________________________________________________________  Thank you for allowing us to participate in the care of this patient,  Direct any further questions to:     Sole Alva MD DDS,  PGY1  General Practice Residency  Pager: [phone]    Patient discussed with:   Sadiq Carmen DDS  , [facility]     Clinic information:   [facility]  [facility]  [address] [place] [address] [place] [address]  Phone:[phone]  Sarah, Executive Office & Administrative Support phone: [phone]                                             Reason for Consult:   Referring MD & Reason for Visit: I was asked by Micaela Silvestre MD, to see Navin Lutz for a surgical clearance, cardio, dental consultation.                                               History of Present Illness:   This patient is a 52 year old male with a history of chronic HFrEF 2/2 NICM s/p ICD, HLD, and CKD Stage III,  inguinal hernia and possible NATHALIE. Seen last by Dr. Micaela Rios on 7/19/2023.  Scheduled RHC today showed low CI of 1.5, leading to hospitalization for advanced therapy vs. Heart transplant eval.  Dental and oropharyngeal history is pertinent for established dental home, last dental cleaning was \"a couple of days ago\", pt also has restorative dental appointment scheduled for 9/5/23 for \"a few fillings\".  The patient reports no pain or dental concerns at this time.                                                     Clinical Examination     Vitals were reviewed  Temp: 97.7  F (36.5  C) Temp src: Oral BP: 95/82 Pulse: 81   Resp: 16 SpO2: 96 % O2 Device: None (Room air)      See H&P for complete ROS.     Please see complete exam from Shayy Kim RDH's consult note on 08/22.                                                             Imaging     Dental CT taken on 08/22/2023  Potential periradicular " and/or periapical findings on: #18-lower left 2nd molar, #19-lower left 1st molar, and #29-lower right 2nd premolar  Retained root tips/fractured teeth none  Condyles seated in fossa bilaterally, maxillary sinuses clear bilaterally.  No osseous pathology seen.   Recent Results (from the past 48 hour(s))   X-ray Chest 2 vws*    Narrative    Chest 2 views    INDICATION: Heart transplant candidate evaluation    COMPARISON: None    FINDINGS: Heart size and shape appear normal. A subclavian transvenous  approach implantable cardiac defibrillator noted. Lungs and pulmonary  vascularity appear normal. Minimal degenerative spurring in the  thoracic spine.      Impression    IMPRESSION: Implantable cardiac defibrillator.    JHOAN SOTELO MD         SYSTEM ID:  [id number]   US Lower Extremity Arterial Duplex Bilateral    Narrative    ULTRASOUND LOWER EXTREMITY ARTERIAL DUPLEX BILATERAL 8/22/2023 2:35 PM    CLINICAL HISTORY: Heart transplant candidate.     COMPARISONS: None available.    REFERRING PROVIDER: TAMERA CHARLES ABDULAI    TECHNIQUE: Bilateral leg arteries evaluated with color Doppler and  spectral pulsed wave Doppler ultrasound.    FINDINGS:   RIGHT:  Common femoral artery: 94/0 cm/s, triphasic  Profundus femoral artery: 48/0 cm/s, triphasic    Superficial femoral artery, origin: 63/0 cm/s, triphasic  Superficial femoral artery, mid thigh: 53/0 cm/s, triphasic  Superficial femoral artery, distal thigh: 37/0 cm/s, triphasic    Popliteal artery: 30/0 cm/s, triphasic    Posterior tibial artery, ankle: 40/0 cm/s, triphasic  Anterior tibial artery, ankle: 28/0 cm/s, triphasic    LEFT:  Common femoral artery: 87/0 cm/s, triphasic  Profundus femoral artery: 63/0 cm/s, triphasic    Superficial femoral artery, origin: 88/0 cm/s, triphasic  Superficial femoral artery, mid thigh: 58/0 cm/s, triphasic  Superficial femoral artery, distal thigh: 49/0 cm/s, triphasic    Popliteal artery: 41/0 cm/s, triphasic    Posterior  tibial artery, ankle: 33/0 cm/s, triphasic  Anterior tibial artery, ankle: 45/0 cm/s, triphasic      Impression    IMPRESSION: Negative study.    I have personally reviewed the examination and initial interpretation  and I agree with the findings.    FRANK SEVILLA MD         SYSTEM ID:  [id number]   US Upper Ext Arterial Duplex Bilateral    Narrative    ULTRASOUND UPPER EXTREMITY ARTERIAL DUPLEX BILATERAL 8/22/2023 2:21 PM    CLINICAL HISTORY: Possible balloon pump access.     COMPARISONS: None available.    REFERRING PROVIDER: TAMERA CHARLES ABDULAI    TECHNIQUE: Bilateral subclavian and axillary arteries evaluated with  grayscale, color Doppler, and spectral pulsed wave Doppler ultrasound.    FINDINGS:   RIGHT:  Subclavian artery, medial: 66/0 cm/s, triphasic, 6.1 mm  Subclavian artery, mid: 70/0 cm/s, triphasic, 7.0 mm    Axillary artery: 101/0 cm/s, triphasic, 5.9 mm    LEFT:  Subclavian artery, medial: 84/0 cm/s, triphasic, 6.1 mm  Subclavian artery, mid: 71/0 cm/s, triphasic, 7.3 mm    Axillary artery: 87/0 cm/s, triphasic, 6.7 mm      Impression    IMPRESSION: Patent bilateral subclavian and axillary arteries with  measurements as in the report.    I have personally reviewed the examination and initial interpretation  and I agree with the findings.    FRANK SEVILLA MD         SYSTEM ID:  [id number]   US Abdomen Complete    Narrative    EXAMINATION: US ABDOMEN COMPLETE 8/22/2023 3:51 PM     COMPARISON: None.    HISTORY: Heart transplant evaluation and/or ventricular assist device  planning.    TECHNIQUE: The abdomen was scanned in standard fashion with  specialized ultrasound transducer(s) using both gray-scale and limited  color Doppler techniques.    FINDINGS:  Liver: The liver demonstrates normal homogeneous echotexture and  measures 14.4 cm craniocaudally. No evidence of a focal hepatic mass.  The main portal vein is patent with antegrade flow.    Gallbladder: There is no wall thickening, pericholecystic  fluid,  positive sonographic Chaudhry's sign or evidence for cholelithiasis.    Bile Ducts: Both the intra- and extrahepatic biliary system are of  normal caliber.  The common bile duct measures 2.7 mm in diameter.    Pancreas: Visualized portions of the head and body of the pancreas are  unremarkable.    Kidneys: Both kidneys are of normal echotexture, without mass or  hydronephrosis. The craniocaudal dimensions are: right- 10.3 cm, left-  10.3 cm.    Spleen: The spleen is normal in size, measuring 10.0 cm in sagittal  dimension.    Aorta and IVC: The visualized portions of the aorta and IVC are  unremarkable. The proximal aorta measures 1.8 cm in diameter.    Fluid: No evidence of ascites or pleural effusions.        Impression    IMPRESSION:   Normal abdominal ultrasound.    I have personally reviewed the examination and initial interpretation  and I agree with the findings.    YRN FORD MD         SYSTEM ID:  [id number]   US Lower Extremity Venous Duplex Bilateral    Narrative    ULTRASOUND LOWER EXTREMITY VENOUS DUPLEX BILATERAL 8/22/2023 2:46 PM    CLINICAL HISTORY: Heart transplant candidate.  Preoperative  evaluation.    COMPARISONS: None available.    REFERRING PROVIDER: TAMERA CHARLES ABDULAI    TECHNIQUE: Grayscale, color Doppler, Doppler waveform ultrasound  evaluation was performed through the bilateral common femoral,  femoral, and popliteal veins. Bilateral posterior tibial and peroneal  veins were evaluated with grayscale imaging and compression.    FINDINGS: Right common femoral, femoral, and popliteal veins are fully  compressible, patent, and demonstrate normal phasic Doppler waveforms.    Right posterior tibial veins are fully compressible to the ankle.    Right peroneal veins were not visualized.    Left common femoral, femoral, and popliteal veins are fully  compressible, patent, and demonstrate normal phasic Doppler waveforms.    Left posterior tibial veins are fully compressible to the  "ankle.    Left peroneal veins are fully compressible to the distal calf.      Impression    IMPRESSION: Right peroneal veins were not visualized. No deep venous  thrombosis demonstrated in either leg.    Reference: \"Duplex Ultrasound in the Diagnosis of Lower-Extremity Deep  Venous Thrombosis\"- Christianne Auguste MD, S; Britton Jackson MD  (Circulation. 2014;129:917-921. http://circ.ahajournals.org)    I have personally reviewed the examination and initial interpretation  and I agree with the findings.    FRANK SEVILLA MD         SYSTEM ID:  [id number]   CT Dental wo Contrast    Narrative    Exam: CT DENTAL WO CONTRAST, CT HEAD W/O CONTRAST, 8/22/2023 4:12 PM    History: Preheart transplant evaluation.   ICD-10: Preheart transplant evaluation.    Comparison: None.    Technique:   CT head: Using multidetector thin collimation helical acquisition  technique, axial, coronal and sagittal CT images from the skull base  to the vertex were obtained without intravenous contrast.   (topogram) image(s) also obtained and reviewed.  U CT Dental : 3-D reconstruction by the technologists, with curved  multiplanar reformat of thin section imaging through the mandible and  maxilla obtained without intravenous contrast.    Findings:   CT head: There is no intracranial hemorrhage, mass effect, or midline  shift. Gray/white matter differentiation in both cerebral hemispheres  is preserved. Ventricles are proportionate to the cerebral sulci. The  basal cisterns are clear.    The bony calvaria and the bones of the skull base are normal. Mucosal  thickening of the maxillary sinuses. Mastoid air cells are clear.     CT dental:  Radiolucency at the lateral aspect of left mandibular canine (series 4  image 47), concerning for caries.    Radiolucency surrounding the right second mandibular premolar, left  first and second mandibular molar.    Questionable lucency surrounding the right first mandibular molar.    No significant soft tissue " swelling or mass. Normal facial bone  alignment. No bony erosion.       Impression    Impression:  1.  No acute intracranial pathology.   2.  Periapical abscess and left mandibular canine caries.    I have personally reviewed the examination and initial interpretation  and I agree with the findings.    SILVANA TORRES MD         SYSTEM ID:  [id number]   CT Chest Abdomen Pelvis w/o Contrast    Narrative    EXAMINATION: CT CHEST ABDOMEN PELVIS W/O CONTRAST, 8/22/2023 4:12 PM    TECHNIQUE:  Helical CT images from the thoracic inlet through the  symphysis pubis were obtained without IV contrast.     COMPARISON: Radiograph from today, ultrasound same day    HISTORY: Pre-heart transplant evaluation    FINDINGS:  CHEST:  Left chest wall ICD with single lead terminating in the right  ventricle.    LUNGS: Central tracheobronchial tree is patent. No pneumothorax or  pleural effusion. Mild bibasilar atelectasis. No focal consolidation.  There is a solid pulmonary nodules in the right lower lobe measuring  1.6 x 1.0 cm with associated central calcification (series 3, image  256). Tiny scattered calcified granulomas in the lungs.     MEDIASTINUM: Heart size is within normal limits. The left ventricle is  enlarged. No pericardial effusion. Ascending aorta and main pulmonary  artery diameters are within normal limits. Normal appearance and  configuration of the great vessels off of the aortic arch. No  suspicious mediastinal, hilar, or axillary lymph nodes.     Visualized thyroid is unremarkable. Mild thickening of the distal  esophagus, as can be seen with chronic reflux.    ABDOMEN/PELVIS:  LIVER: No suspicious focal hepatic lesion.    BILIARY: No calcified intraluminal gallstone. No intrahepatic or  extrahepatic biliary ductal dilation.    PANCREAS: No focal pancreatic lesion. Small amount interdigitating fat  demonstrated in the pancreatic tail and pancreatic head. The main  pancreatic duct is not dilated.    SPLEEN: Within  normal limits.    ADRENAL GLANDS: No focal adrenal nodule.    URINARY TRACT: No suspicious renal lesion. No hydronephrosis or  hydroureter. No renal stone. Urinary bladder is unremarkable.    REPRODUCTIVE ORGANS: Mild prostatomegaly.    STOMACH: Within normal limits.    BOWEL: Normal caliber large and small bowel. No abnormal bowel wall  thickening or enhancement. Mild colonic diverticulosis without  evidence of acute diverticulitis. The appendix is not definitely  visualized, however no secondary findings to suggest acute  appendicitis.     PERITONEUM/FLUID: No ascites or pelvic free fluid.    VESSELS: No aneurysmal dilatation of the abdominal aorta.    LYMPH NODES: No lymphadenopathy.    BONES/SOFT TISSUES: No aggressive osseous lesions. Multilevel  degenerative changes throughout the visualized spine. Tiny  fat-containing umbilical hernia.      Impression    IMPRESSION:   1. No acute pathology in the chest, abdomen, or pelvis.  2. Solid pulmonary nodule in the right lower lobe measuring 1.6 cm  with central calcifications may be suggestive of chronic granuloma.  However this has not previously been visualized. Consider follow-up in  3 months to assess for stability.  3. Mild colonic diverticulosis without evidence of acute  diverticulitis.  4. Prostatomegaly.    I have personally reviewed the examination and initial interpretation  and I agree with the findings.    JACOB OLIVARES MD         SYSTEM ID:  [id number]   CT Head w/o Contrast    Narrative    Exam: CT DENTAL WO CONTRAST, CT HEAD W/O CONTRAST, 8/22/2023 4:12 PM    History: Preheart transplant evaluation.   ICD-10: Preheart transplant evaluation.    Comparison: None.    Technique:   CT head: Using multidetector thin collimation helical acquisition  technique, axial, coronal and sagittal CT images from the skull base  to the vertex were obtained without intravenous contrast.   (topogram) image(s) also obtained and reviewed.  U CT Dental : 3-D  reconstruction by the technologists, with curved  multiplanar reformat of thin section imaging through the mandible and  maxilla obtained without intravenous contrast.    Findings:   CT head: There is no intracranial hemorrhage, mass effect, or midline  shift. Gray/white matter differentiation in both cerebral hemispheres  is preserved. Ventricles are proportionate to the cerebral sulci. The  basal cisterns are clear.    The bony calvaria and the bones of the skull base are normal. Mucosal  thickening of the maxillary sinuses. Mastoid air cells are clear.     CT dental:  Radiolucency at the lateral aspect of left mandibular canine (series 4  image 47), concerning for caries.    Radiolucency surrounding the right second mandibular premolar, left  first and second mandibular molar.    Questionable lucency surrounding the right first mandibular molar.    No significant soft tissue swelling or mass. Normal facial bone  alignment. No bony erosion.       Impression    Impression:  1.  No acute intracranial pathology.   2.  Periapical abscess and left mandibular canine caries.    I have personally reviewed the examination and initial interpretation  and I agree with the findings.    SILVANA TORRES MD         SYSTEM ID:  [id number]   XR Chest Port 1 View    Narrative    XR CHEST PORT 1 VIEW  8/22/2023 5:48 PM     HISTORY:  PICC placement       COMPARISON:  8/22/2023    FINDINGS:   Frontal view of the chest. Left costophrenic angle is collimated out  of view. Right arm PICC tip projects over mid SVC. Left chest wall  implantable cardiac defibrillator.    Trachea is midline. Stable cardiac silhouette. No focal consolidation,  right pleural effusion or pneumothorax.      Impression    IMPRESSION: Right arm PICC tip projects over mid SVC.     I have personally reviewed the examination and initial interpretation  and I agree with the findings.    JACOB OLIVARES MD         SYSTEM ID:  [id number]   Echocardiogram Complete   Result  Value    LVEF  15-20% (severely reduced)    [facility]    [id number]  [id number]  [id number]  [id number]^ARACELI^TAMERA^MASSIEL     [facility],[place]  Echocardiography Laboratory  [address] [place] [id number]     Name: ALVARADO OCHOA  MRN: [id number]  : 1970  Study Date: 2023 08:01 AM  Age: 52 yrs  Gender: Male  Patient Location: [facility] [place]  Reason For Study: Heart Failure  Ordering Physician: TAMERA CHARLES  Referring Physician: BRINA PAGAN  Performed By: Antonia Mejia     BSA: 2.1 m2  Height: 72 in  Weight: 203 lb  HR: 69  BP: 109/79 mmHg  ______________________________________________________________________________  Procedure  Complete Portable Echo Adult. Contrast Optison. Optison (NDC #3427-1555-72)  given intravenously. Patient was given 6 ml mixture of 3 ml Optison and 6 ml  saline. 3 ml wasted.  ______________________________________________________________________________  Interpretation Summary  Severe left ventricular dilation (LVIDd 6.8cm). Left ventricular function is  decreased. The ejection fraction is 15-20% (severely reduced). Severe diffuse  hypokinesis.  Global right ventricular function is normal.  No significant valve abnormalities.  The inferior vena cava is normal.  No pericardial effusion.     There is no prior study for direct comparison, but LV function is similar to  echo report from [place] [facility] from 23.  ______________________________________________________________________________  Left Ventricle  Severe left ventricular dilation is present. Left ventricular function is  decreased. The ejection fraction is 15-20% (severely reduced). Left  ventricular diastolic function is abnormal. Grade II or moderate diastolic  dysfunction. Severe diffuse hypokinesis is present. There is no thrombus seen  in the left ventricle.     Right Ventricle  The right ventricle is normal size. Global right ventricular function  is  normal.     Atria  Both atria appear normal.     Mitral Valve  The mitral valve is normal.     Aortic Valve  Aortic valve is normal in structure and function. The aortic valve is  tricuspid.     Tricuspid Valve  The tricuspid valve is normal. The peak velocity of the tricuspid regurgitant  jet is not obtainable. Pulmonary artery systolic pressure cannot be assessed.     Pulmonic Valve  The pulmonic valve is normal.     Vessels  Normal diameter aortic root and proximal ascending aorta. The inferior vena  cava is normal.     Pericardium  No pericardial effusion is present.     Compared to Previous Study  There is no prior study for direct comparison.  ______________________________________________________________________________  MMode/2D Measurements & Calculations  IVSd: 0.71 cm     LVIDd: 6.8 cm  LVIDs: 5.7 cm  LVPWd: 0.67 cm  FS: 17.3 %  LV mass(C)d: 195.8 grams  LV mass(C)dI: 91.3 grams/m2  Ao root diam: 3.5 cm  asc Aorta Diam: 3.4 cm  LVOT diam: 2.3 cm  LVOT area: 4.2 cm2  RWT: 0.20  TAPSE: 1.9 cm     Doppler Measurements & Calculations  MV E max jimmy: 23.3 cm/sec  MV A max jimmy: 45.6 cm/sec  MV E/A: 0.51  MV dec slope: 97.1 cm/sec2  MV dec time: 0.24 sec  PA acc time: 0.15 sec  E/E' av.3  Lateral E/e': 4.3  Medial E/e': 6.2     RV S Jimmy: 10.9 cm/sec     ______________________________________________________________________________  Report approved by: Cait Bhakta 2023 10:01 AM         US carotid bilateral    Narrative    BILATERAL CAROTID DUPLEX DOPPLER ULTRASOUND 2023 3:31 PM    CLINICAL HISTORY: Heart Transplant Candidate Evaluation. Assess  vasculopathy, Heart Transplant Candidate Evaluation. Assess  vasculopathy    COMPARISON: None available.    REFERRING PROVIDER: TAMERA CHARLES    TECHNIQUE: Grayscale (B-mode) and duplex and spectral Doppler  ultrasound of the common carotid, extracranial internal carotid,  external carotid, and vertebral artery origins. Velocity  measurements  obtained with angle correction at or less than 60 degrees.    FINDINGS:    RIGHT SIDE:     Plaque Morphology: Minimal plaque       Proximal CCA: 105/22 cm/s     Mid CCA: 88/17 cm/s     Distal CCA: 93/22 cm/s           External CA: 100/15 cm/s       Proximal ICA: 60/22 cm/s     Mid ICA: 73/27 cm/s     Distal ICA: 70/18 cm/s       Vertebral artery: Antegrade: 45/18 cm/s     ICA/CCA ratio: 0.78     LEFT SIDE:     Plaque Morphology: Minimal plaque        Proximal CCA: 118/25 cm/s     Mid CCA: 101/23 cm/s     Distal CCA: 75/19 cm/s           External CA: 78/5 cm/s       Proximal ICA: 97/25 cm/s     Mid ICA: 88/32 cm/s     Distal ICA: 80/25 cm/s       Vertebral artery: Antegrade: 39/10 cm/s     ICA/CCA ratio: 1.29       Impression    IMPRESSION:    1. RIGHT ICA: Less than 50% diameter narrowing by grayscale imaging  and sonographic velocity criteria.    2. LEFT ICA:  Less than 50% diameter narrowing by grayscale imaging  and sonographic velocity criteria.    [employer]tersoci[employer] Accreditation Commission Updated Recommendations for  Carotid Stenosis Interpretation Criteria - October 2021.  https://intersocietal.org/wp-content/uploads/2021/10/IAC-Vascular-Test  vl-Fpoujprbldszz_Hqiuwpv-Medgcsekqcdrhln-for-Carotid-Stenosis-Interpre  ation-Criteria.pdf    Greater than 70% diameter stenosis criteria per - Journal of Vascular  Surgery Vol. 75 Issue 1 Supplement [id number] Published online: June 18, 2021          Normal            ICA PSV: < 180 cm/s            Plaque Estimate: None            ICA/CCA PSV Ratio: < 2.0            ICA EDV: < 40 cm/s         < 50%            ICA PSV: < 180 cm/s            Plaque Estimate: < 50%            ICA/CCA PSV Ratio: < 2.0            ICA EDV: < 40 cm/s         50 - 69%            ICA PSV: 180 - 230 cm/s            Plaque Estimate: > 50%            ICA/CCA PSV Ratio: 2.0 - 4.0            ICA EDV: 40 - 100 cm/s         > 70% but less than near occlusion            ICA PSV: > 230 cm/s             Plaque Estimate: > 50%            AND either            ICA EDV: > 100 cm/s            or            ICA/CCA PSV Ratio: > 4.0         Near occlusion            ICA PSV: > 230 cm/s            Plaque Estimate: Visible            ICA/CCA PSV Ratio: Variable            ICA EDV: Variable         Total occlusion            ICA PSV: Undetectable            Plaque Estimate: Visible, no detectable lumen            ICA/CCA PSV Ratio: N/A            ICA EDV: N/A                                          Additional criteria from vascular surgery     > 80%       EDV > 120 cm/s    FRANK SEVILLA MD         SYSTEM ID:  [id number]   US AMARILYS Doppler No Exercise    Narrative    AMARILYS 8/23/2023 3:31 PM    CLINICAL HISTORY: Heart transplant evaluation and/or Ventricular  Assist Device (VAD) planning.     COMPARISONS: None available.    REFERRING PROVIDER: BRINA PAGAN    TECHNIQUE: Bilateral AMARILYS obtained.    FINDINGS:  RIGHT:       Brachial: not taken due to PICC       Ankle (PT): 109 mmHg       Ankle (DP): 93 mmHg         AMARILYS: 1.09    LEFT:       Brachial: 100 mmHg       Ankle (PT): 103 mmHg       Ankle (DP): 109 mmHg         AMARILYS: 1.09      Impression    IMPRESSION:  1. RIGHT:       A. Resting AMARILYS is normal, 1.09    2. LEFT:       A. Resting AMARILYS is normal, 1.09    Guidelines:    AMARILYS Diagnostic Criteria (Based on criteria published in Circulation  2011; 124: 5551-8241):    > 1.4: Non compressible    1.00 - 1.40: Normal    0.91 - 0.99: Borderline    At or below 0.90: Abnormal    AMARILYS Diagnostic Criteria (Based on ACC/AHA guideline 2008):    >/=1.3 - non compressible vessels    1.00  -1.29 - Normal    0.91 - 0.99 - Borderline    0.41 - 0.90 - Mild to moderate PAD    0.00 - 0.40 - Severe PAD    FRANK SEVILLA MD         SYSTEM ID:  [id number]   US Upper Ext Venous Duplex Limited Bilat   Result Value    Radiologist flags Nonocclusive deep venous anastomosis of the right (Urgent)    Narrative    EXAMINATION: DOPPLER VENOUS ULTRASOUND OF  BILATERAL UPPER EXTREMITIES,  8/23/2023 3:33 PM     COMPARISON: None.    HISTORY:  Heart transplant evaluation and/or ventricular assist device  planning. Assess vascular access.    TECHNIQUE:  Gray-scale evaluation with compression, spectral flow, and  color Doppler assessment of the deep venous system of both upper  extremities.    FINDINGS:  Right: Partial compressibility of the right internal jugular vein with  blood flow on color Doppler. Normal blood flow and waveforms are  demonstrated in the innominate, subclavian, and axillary veins  bilaterally. There is normal compressibility of the brachial, basilic  and cephalic veins bilaterally.    Left: Normal blood flow and waveforms are demonstrated in the internal  jugular, innominate, subclavian, and axillary veins bilaterally. There  is normal compressibility of the brachial, basilic and cephalic veins  bilaterally.      Impression    IMPRESSION:  1. Nonocclusive deep venous thrombosis of the right internal jugular  vein.  2. No evidence of deep venous thrombosis in the left upper extremity.      [Urgent Result: Nonocclusive deep venous anastomosis of the right  internal jugular vein.]    Finding was identified on 8/23/2023 3:33 PM.     Dr. Vanessa Santamaria was contacted by Dr. Olson at 8/23/2023 3:47 PM  and verbalized understanding of the urgent finding.     I have personally reviewed the examination and initial interpretation  and I agree with the findings.    VIOLETA DANIELLE MD         SYSTEM ID:  [id number]                                          Past Medical History      History reviewed. No pertinent past medical history.      There is no immunization history on file for this patient.    Past Surgical History   Past Surgical History:   Procedure Laterality Date    CARDIAC SURGERY      CV RIGHT HEART CATH MEASUREMENTS RECORDED N/A 08/22/2023    Procedure: Heart Cath Right Heart Cath;  Surgeon: Elfego Cruz MD;  [facility]: [facility] CARDIAC  CATH LAB    PICC DOUBLE LUMEN PLACEMENT Right 08/22/2023    Brachial Vein Medial 5F DL 45 cm, 2 cm out                                           Social History      History reviewed. No pertinent family history.                                       Allergies      No Known Allergies                                     Medications        Medications Prior to Admission   Medication Sig Dispense Refill Last Dose    carvedilol (COREG) 12.5 MG tablet Take 1 tablet (12.5 mg) by mouth 2 times daily (with meals) 180 tablet 1 8/22/2023 at AM    empagliflozin (JARDIANCE) 10 MG TABS tablet Take 10 mg by mouth daily   8/22/2023 at AM    furosemide (LASIX) 20 MG tablet TAKE 1/2 TABLET BY MOUTH ONCE DAILY AS NEEDED FOR LEG SWELLING   8/22/2023 at AM    nitroGLYcerin (NITROSTAT) 0.4 MG sublingual tablet Place 0.4 mg under the tongue every 5 minutes as needed       sacubitril-valsartan (ENTRESTO)  MG per tablet Take 0.5 tablets by mouth 2 times daily   8/22/2023 at AM    spironolactone (ALDACTONE) 25 MG tablet Take 25 mg by mouth daily   8/22/2023 at AM         Current Facility-Administered Medications Ordered in Epic   Medication Dose Route Frequency Last Rate Last Admin    atorvastatin (LIPITOR) tablet 20 mg  20 mg Oral QPM   20 mg at 08/23/23 1946    Continuing ACE inhibitor/ARB/ARNI from home medication list OR ACE inhibitor/ARB/ARNI order already placed during this visit   Does not apply DOES NOT GO TO MAR        DOBUTamine (DOBUTREX) 500 mg in D5W 250 mL infusion (adult std conc)  5 mcg/kg/min Intravenous Continuous 13.8 mL/hr at 08/24/23 0739 5 mcg/kg/min at 08/24/23 0739    heparin lock flush 10 UNIT/ML injection 5-20 mL  5-20 mL Intracatheter Q24H   5 mL at 08/23/23 1807    heparin lock flush 10 UNIT/ML injection 5-20 mL  5-20 mL Intracatheter Q1H PRN        lidocaine (LMX4) cream   Topical Q1H PRN        lidocaine 1 % 0.1-5 mL  0.1-5 mL Other Q1H PRN   3 mL at 08/22/23 1810    [START ON 8/25/2023] polyethylene  glycol (GoLYTELY) suspension 2,000 mL  2,000 mL Oral Once        [START ON 8/25/2023] polyethylene glycol (GoLYTELY) suspension 2,000 mL  2,000 mL Oral Once PRN        polyethylene glycol (GoLYTELY) suspension 4,000 mL  4,000 mL Oral Once        Reason beta blocker not prescribed   Does not apply DOES NOT GO TO MAR        sacubitril-valsartan (ENTRESTO) 49-51 MG per tablet 1 tablet  1 tablet Oral BID   1 tablet at 08/24/23 0826    sodium chloride (PF) 0.9% PF flush 10-20 mL  10-20 mL Intracatheter q1 min prn        sodium chloride (PF) 0.9% PF flush 10-20 mL  10-20 mL Intracatheter q1 min prn   20 mL at 08/24/23 0855    sodium chloride (PF) 0.9% PF flush 10-40 mL  10-40 mL Intracatheter Q8H   10 mL at 08/24/23 0826    spironolactone (ALDACTONE) tablet 25 mg  25 mg Oral Daily   25 mg at 08/24/23 0826     No current [facility]-ordered outpatient medications on file.

## 2023-08-24 NOTE — PLAN OF CARE
Goal Outcome Evaluation:      Plan of Care Reviewed With: patient    Overall Patient Progress: improvingOverall Patient Progress: improving     .    Changes:       History: 53yo M with PMH of chronic HFrEF (13%) 2/2 NICM s/p ICD, HLD, CKD stage II. Here for low output heart failure after scheduled RHC showed low cardiac index of 1.5. We are evaluating for advanced therapy vs. heart transplant eval.        Neuro: A/Ox4. Calls appropriately. Pleasant and cooperative.   Cardiac/Tele:  SR w/ multiform PVCs and VSS. Afib. Soft BP. Afebrile. Denies chest pain   Respiratory: Room air. Tolerating well  GI/: Continent. Ambulates to the bathroom. Begins colon prep for colonoscopy on 8/23  Diet/Appetite: 2 gram Na+ diet, 2LFR. Clear liquid diet starting at midnight, no reds  Skin: No new deficits noted.   LDAs: L PIV- SL. R DL PICC- Red infusing dobutamine at 5mcg/kg/min, Purple-SL. Both lines can be flushed but no blood return was noted at 0530 for the morning labs. Alteplase was given at 0536 and rechecked at about 0600 and blood return was not noted. Rechecked at   Activity: Up ad amaury  Pain: Slight headache that patient attributes to lack of caffeine.      Plan: Continue to monitor and notify team with changes.

## 2023-08-24 NOTE — CONSULTS
Hematology Consult / Initial Consult Progress Note    Date of Service 08/24/2023  Admit Date 8/22/2023   Initial Consult 08/24/23   Hospital Day: 3     Reason for consult: chronic erythyrocytosis  Consult requested by: Dr. Konrad MD    History of Present Illness  Navin Lutz is a 52 year old man with a history of HFrEF 2/2 non-ischemic cardiomyopathy, s/p ICD placement, HDL, CKD stage 3, inguinal hernia, possible NATHALIE, who was admitted 8/22/2023 for low output heart failure after his scheduled RHC showed low cardiac index of 1.5, admitted for advanced therapy vs heart transplant evaluation.     The pt was admitted after he was found to have a low cardiac index of 1.5. He states he has been dealing with his HF since 2017. At baseline, he states that he can walk for 3 city block or so before he needs to stop to take a break. His wife states that he can get winded when he goes up the stairs. His wife has also noticed that when he sleeps, he stops breathing for 10 sec or so and that she feels like he is going to choke sometimes. No symptoms at rest, he denies any SOB, CP, abd pain, n/v/d, LE edemas, fever at rest. He is a non smoker, he does not use any testosterone.     Per review of his labs, he had a Hb 14-16.9 in 2015, the was noted to have Hb 19.1 in 2018, then his Hb has ranged in 17s during 8233-0385 (most recent Hb prior to this admission was 17.1 on 6/12/2023). He had a CT from 8/22 which did not show any splenomegaly or any suspicious LNs. Of note, per cards and also per chart review, his got his dx of HF around late 2017.     After admission, he was noted to have a R internal jugular non-occlusive DVT. He has had lines placed at that site. This US was obtained as part of the pre-transplantation w/up. He has not had any h/o PE/DVT before.     PMH: HFrEF 2/2 non-ischemic cardiomyopathy, s/p ICD placement, HDL, CKD stage 3, inguinal hernia  FamHx: no Fhx of thrombosis  SocHx: never smoker, social Etoh, no  illcits  Meds reviewed in Epic.  Allergies reviewed in Epic  Comprehensive review of systems performed and otherwise negative unless mentioned above.    Physical Exam  BP 95/82 (BP Location: Left arm)   Pulse 81   Temp 97.7  F (36.5  C) (Oral)   Resp 16   Wt 90.4 kg (199 lb 4.8 oz)   SpO2 96%   BMI 26.70 kg/m       Constitutional: Awake, alert, cooperative, in NAD.  Eyes: PERRL  ENT: Normocephalic    Mild swelling in R neck  Respiratory: Non-labored breathing, good air exchange, clear to auscultation bilaterally, no crackles or wheezing.  Cardiovascular: RRR, no murmur noted.  GI: + bowel sounds, soft, non-distended, non-tender, no splenomegaly  Skin: No concerning lesions or rash on exposed areas.  Musculoskeletal: No edema porfirio LEs.  Neurologic: Awake, alert & oriented   Psych: appropriate affect        Recent Labs   Lab 08/24/23  0702   WBC 7.5   HGB 17.3      MCV 92   RDW 13.2     Recent Labs   Lab 08/24/23  0702 08/23/23  0702    137   POTASSIUM 4.1 4.7   CHLORIDE 103 105   CO2 24 20*   BUN 16.2 18.8   CR 1.11 1.14   NAM 9.1 9.4   MAG 2.2 2.3   PHOS  --  2.7     Recent Labs   Lab 08/24/23  0702 08/23/23  0702 08/22/23  1037   AST 22 38 29   ALT 29 33 38   ALKPHOS 82 82 90   ALBUMIN 3.7 3.8 4.2   PROTTOTAL 6.2* 6.5 7.0   BILITOTAL 0.7 0.7 0.6      No results for input(s): IGA, IGM, IGE, ELPM, ELPINT, IEP, KAPPAFREELT, LAMBDAFREELT, KLR in the last 168 hours.    Invalid input(s): LGG   Recent Labs   Lab 08/24/23  0908 08/23/23  0702 08/22/23  1229   IRON  --   --  130   IRONSAT  --   --  46   DAE  --  439*  --    FEB  --   --  283   FOLIC 9.1  --   --      No results for input(s): MORPH in the last 168 hours.  Recent Labs   Lab 08/23/23  0702   HCVAB Nonreactive   HBCAB Nonreactive   AUSAB 1.01     Recent Labs   Lab 08/22/23  1229   INR 1.05        Impression:    #Chronic erythrocytosis  #R internal jugular non-occlusive DVT - line associated     1. The pt has had Hb level as high as 19.1 since  2018 and this time he was noted again to have Hb of 19.0, which later come down to 17.3 today (he states he was dehydration on the day of admission due to NPO for procedure). At least he has a baseline Hb in 17s. He has had HF since 2017 and it is also suspicious that he may have sleep apnea based on wife's report, which could contribute to erythrocytosis. He is a never smoker and he does not use testosterone. CT did not show any splenomegaly, and he does not have any WBC or plt elevation besides his erythrocytosis, making myeloproliferative d/o less likely. We suspect the cause of his erythrocytosis is most likely from his HF and possible sleep apnea.    2. He was noted to have line associated DVT in his R internal jugular vein. This is a provoked event due to line placement/procedure. He would need 3 months of anticoagulation with DOAC (apixaban or rivaroxaban) or warfarin. Can use hep gtt or lovenox sc BID while the pt is getting procedures for his heart.     Recommendations:  -his chronic erythrocytosis is most likely from his chronic HF and possible sleep apnea. We have a low suspicion for any myeloproliferative disorders and therefore we do not recommend any additional testings.  -Consider 3 month of anticoagulation for provoked DVT of his R internal jugular DVT. Can use any agent like apixaban, rivaroxaban, or lovenox/warfarin, etc (ask the pharmacist for the standard dosings).   -consider sleep apnea testing    We will sign off. Please feel free to contact for any questions.     The above plan was discussed with Dr. Sotelo, who agrees with the assessment and plan.     Thank you for allowing me to participate in the care of this patient. Please do not hesitate to contact me if there are any concerns or questions.     Go MD Sunitha  Hem/onc fellow  Division of Hematology, Oncology, and Transplantation  [facility]  Pager: [phone]

## 2023-08-25 ENCOUNTER — HOME INFUSION (PRE-WILLOW HOME INFUSION) (OUTPATIENT)
Dept: PHARMACY | Facility: CLINIC | Age: 53
End: 2023-08-25
Payer: COMMERCIAL

## 2023-08-25 ENCOUNTER — COMMITTEE REVIEW (OUTPATIENT)
Dept: TRANSPLANT | Facility: CLINIC | Age: 53
End: 2023-08-25
Payer: COMMERCIAL

## 2023-08-25 LAB
ALBUMIN SERPL BCG-MCNC: 4.1 G/DL (ref 3.5–5.2)
ALP SERPL-CCNC: 90 U/L (ref 40–129)
ALT SERPL W P-5'-P-CCNC: 34 U/L (ref 0–70)
ANION GAP SERPL CALCULATED.3IONS-SCNC: 11 MMOL/L (ref 7–15)
AST SERPL W P-5'-P-CCNC: 29 U/L (ref 0–45)
BASE EXCESS BLDV CALC-SCNC: 4 MMOL/L (ref -7.7–1.9)
BASOPHILS # BLD AUTO: 0 10E3/UL (ref 0–0.2)
BASOPHILS NFR BLD AUTO: 0 %
BILIRUB SERPL-MCNC: 1 MG/DL
BUN SERPL-MCNC: 12.7 MG/DL (ref 6–20)
CALCIUM SERPL-MCNC: 9.6 MG/DL (ref 8.6–10)
CHLORIDE SERPL-SCNC: 104 MMOL/L (ref 98–107)
COLONOSCOPY: NORMAL
CREAT SERPL-MCNC: 1.24 MG/DL (ref 0.67–1.17)
DEPRECATED HCO3 PLAS-SCNC: 26 MMOL/L (ref 22–29)
EOSINOPHIL # BLD AUTO: 0.1 10E3/UL (ref 0–0.7)
EOSINOPHIL NFR BLD AUTO: 1 %
ERYTHROCYTE [DISTWIDTH] IN BLOOD BY AUTOMATED COUNT: 13.1 % (ref 10–15)
GFR SERPL CREATININE-BSD FRML MDRD: 70 ML/MIN/1.73M2
GLUCOSE BLDC GLUCOMTR-MCNC: 96 MG/DL (ref 70–99)
GLUCOSE SERPL-MCNC: 97 MG/DL (ref 70–99)
HCO3 BLDV-SCNC: 30 MMOL/L (ref 21–28)
HCT VFR BLD AUTO: 53.3 % (ref 40–53)
HGB BLD-MCNC: 17.8 G/DL (ref 13.3–17.7)
IMM GRANULOCYTES # BLD: 0 10E3/UL
IMM GRANULOCYTES NFR BLD: 0 %
LABORATORY REPORT: NORMAL
LYMPHOCYTES # BLD AUTO: 1.5 10E3/UL (ref 0.8–5.3)
LYMPHOCYTES NFR BLD AUTO: 22 %
MAGNESIUM SERPL-MCNC: 2.2 MG/DL (ref 1.7–2.3)
MCH RBC QN AUTO: 30.6 PG (ref 26.5–33)
MCHC RBC AUTO-ENTMCNC: 33.4 G/DL (ref 31.5–36.5)
MCV RBC AUTO: 92 FL (ref 78–100)
MONOCYTES # BLD AUTO: 0.8 10E3/UL (ref 0–1.3)
MONOCYTES NFR BLD AUTO: 11 %
NEUTROPHILS # BLD AUTO: 4.4 10E3/UL (ref 1.6–8.3)
NEUTROPHILS NFR BLD AUTO: 66 %
NRBC # BLD AUTO: 0 10E3/UL
NRBC BLD AUTO-RTO: 0 /100
O2/TOTAL GAS SETTING VFR VENT: 21 %
OXYHGB MFR BLDV: 55 % (ref 70–75)
OXYHGB MFR BLDV: 70 % (ref 70–75)
PCO2 BLDV: 50 MM HG (ref 40–50)
PH BLDV: 7.4 [PH] (ref 7.32–7.43)
PHOSPHATE SERPL-MCNC: 2.8 MG/DL (ref 2.5–4.5)
PLATELET # BLD AUTO: 183 10E3/UL (ref 150–450)
PLPETH BLD-MCNC: <10 NG/ML
PO2 BLDV: 34 MM HG (ref 25–47)
POPETH BLD-MCNC: <10 NG/ML
POTASSIUM SERPL-SCNC: 4.5 MMOL/L (ref 3.4–5.3)
PROT SERPL-MCNC: 6.9 G/DL (ref 6.4–8.3)
RBC # BLD AUTO: 5.82 10E6/UL (ref 4.4–5.9)
SODIUM SERPL-SCNC: 141 MMOL/L (ref 136–145)
WBC # BLD AUTO: 6.8 10E3/UL (ref 4–11)

## 2023-08-25 PROCEDURE — 250N000013 HC RX MED GY IP 250 OP 250 PS 637: Performed by: STUDENT IN AN ORGANIZED HEALTH CARE EDUCATION/TRAINING PROGRAM

## 2023-08-25 PROCEDURE — 99153 MOD SED SAME PHYS/QHP EA: CPT | Performed by: INTERNAL MEDICINE

## 2023-08-25 PROCEDURE — 120N000003 HC R&B IMCU UMMC

## 2023-08-25 PROCEDURE — 88305 TISSUE EXAM BY PATHOLOGIST: CPT | Mod: 26 | Performed by: PATHOLOGY

## 2023-08-25 PROCEDURE — 85025 COMPLETE CBC W/AUTO DIFF WBC: CPT

## 2023-08-25 PROCEDURE — 250N000011 HC RX IP 250 OP 636: Performed by: INTERNAL MEDICINE

## 2023-08-25 PROCEDURE — 0DBH8ZZ EXCISION OF CECUM, VIA NATURAL OR ARTIFICIAL OPENING ENDOSCOPIC: ICD-10-PCS | Performed by: INTERNAL MEDICINE

## 2023-08-25 PROCEDURE — 82805 BLOOD GASES W/O2 SATURATION: CPT | Performed by: STUDENT IN AN ORGANIZED HEALTH CARE EDUCATION/TRAINING PROGRAM

## 2023-08-25 PROCEDURE — G0500 MOD SEDAT ENDO SERVICE >5YRS: HCPCS | Performed by: INTERNAL MEDICINE

## 2023-08-25 PROCEDURE — 84100 ASSAY OF PHOSPHORUS: CPT

## 2023-08-25 PROCEDURE — 82810 BLOOD GASES O2 SAT ONLY: CPT

## 2023-08-25 PROCEDURE — XW023U6 INTRODUCTION OF COVID-19 VACCINE INTO MUSCLE, PERCUTANEOUS APPROACH, NEW TECHNOLOGY GROUP 6: ICD-10-PCS | Performed by: INTERNAL MEDICINE

## 2023-08-25 PROCEDURE — 250N000011 HC RX IP 250 OP 636: Mod: JZ | Performed by: INTERNAL MEDICINE

## 2023-08-25 PROCEDURE — 45380 COLONOSCOPY AND BIOPSY: CPT | Performed by: INTERNAL MEDICINE

## 2023-08-25 PROCEDURE — 0121A HC ADMIN COVID VAC PFIZER BIVAL, SINGLE DOSE: CPT | Performed by: INTERNAL MEDICINE

## 2023-08-25 PROCEDURE — 250N000013 HC RX MED GY IP 250 OP 250 PS 637

## 2023-08-25 PROCEDURE — 83735 ASSAY OF MAGNESIUM: CPT

## 2023-08-25 PROCEDURE — 91312 HC RX IP 250 OP 636: CPT | Performed by: INTERNAL MEDICINE

## 2023-08-25 PROCEDURE — 88305 TISSUE EXAM BY PATHOLOGIST: CPT | Mod: TC | Performed by: INTERNAL MEDICINE

## 2023-08-25 PROCEDURE — 80053 COMPREHEN METABOLIC PANEL: CPT

## 2023-08-25 PROCEDURE — 250N000011 HC RX IP 250 OP 636: Performed by: STUDENT IN AN ORGANIZED HEALTH CARE EDUCATION/TRAINING PROGRAM

## 2023-08-25 PROCEDURE — 99233 SBSQ HOSP IP/OBS HIGH 50: CPT | Mod: GC | Performed by: INTERNAL MEDICINE

## 2023-08-25 RX ORDER — MILRINONE LACTATE 0.2 MG/ML
0.12 INJECTION, SOLUTION INTRAVENOUS CONTINUOUS
Status: DISCONTINUED | OUTPATIENT
Start: 2023-08-25 | End: 2023-08-28 | Stop reason: HOSPADM

## 2023-08-25 RX ORDER — FENTANYL CITRATE 50 UG/ML
INJECTION, SOLUTION INTRAMUSCULAR; INTRAVENOUS PRN
Status: DISCONTINUED | OUTPATIENT
Start: 2023-08-25 | End: 2023-08-25

## 2023-08-25 RX ORDER — MILRINONE LACTATE 0.2 MG/ML
0.12 INJECTION, SOLUTION INTRAVENOUS CONTINUOUS
Status: DISCONTINUED | OUTPATIENT
Start: 2023-08-25 | End: 2023-08-25

## 2023-08-25 RX ORDER — GUAIFENESIN 200 MG/10ML
200 LIQUID ORAL EVERY 4 HOURS PRN
Status: DISCONTINUED | OUTPATIENT
Start: 2023-08-25 | End: 2023-08-28 | Stop reason: HOSPADM

## 2023-08-25 RX ADMIN — SACUBITRIL AND VALSARTAN 1 TABLET: 49; 51 TABLET, FILM COATED ORAL at 20:25

## 2023-08-25 RX ADMIN — SODIUM CHLORIDE, PRESERVATIVE FREE 5 ML: 5 INJECTION INTRAVENOUS at 18:31

## 2023-08-25 RX ADMIN — SACUBITRIL AND VALSARTAN 1 TABLET: 49; 51 TABLET, FILM COATED ORAL at 07:42

## 2023-08-25 RX ADMIN — MILRINONE LACTATE IN DEXTROSE 0.12 MCG/KG/MIN: 200 INJECTION, SOLUTION INTRAVENOUS at 13:20

## 2023-08-25 RX ADMIN — SPIRONOLACTONE 25 MG: 25 TABLET ORAL at 07:41

## 2023-08-25 RX ADMIN — BNT162B2 ORIGINAL AND OMICRON BA.4/BA.5 30 MCG: .024; .024 INJECTION, SUSPENSION INTRAMUSCULAR at 18:21

## 2023-08-25 RX ADMIN — POLYETHYLENE GLYCOL 3350, SODIUM SULFATE ANHYDROUS, SODIUM BICARBONATE, SODIUM CHLORIDE, POTASSIUM CHLORIDE 2000 ML: 236; 22.74; 6.74; 5.86; 2.97 POWDER, FOR SOLUTION ORAL at 03:36

## 2023-08-25 RX ADMIN — ATORVASTATIN CALCIUM 20 MG: 20 TABLET, FILM COATED ORAL at 20:18

## 2023-08-25 ASSESSMENT — ACTIVITIES OF DAILY LIVING (ADL)
ADLS_ACUITY_SCORE: 18

## 2023-08-25 NOTE — PROGRESS NOTES
Therapy: IV Dobutamine  [place]urance: [employer] [place]   Ded: $1800.00  Met: $1800.00    Co-Insurance: 20%  Max Out of Pocket: $6000.00  Met: $6000.00    In reference to [facility] for admission date 08/22/2023 to check IV Dobutamine coverage.     Please contact Intake with any questions, [phone] or In Basket pool,  Home Infusion ([phone]).

## 2023-08-25 NOTE — PROGRESS NOTES
Brief GI Luminal Service Sign-Off Note:    S/p Colonoscopy for screening as part of advanced heart therapy work-up:    Impression:            - Screening colonoscopy performed for patient                          currently evaluated for heart transplantation. He                          reports no colorectal symptoms and no FH of colon                          cancer.                          - One small cecal polyp was removed.   Recommendation:        - If polyp is adenomatous repeat colonoscopy in 7-10                          years, if not then re-address screening for colorectal                          cancer in 10 years.     The inpatient gastroenterology service will sign off at this time. Thank you for allowing us to participate in the care of this patient. Please do not hesitate to page the GI service with any questions or concerns.     Plan discussed and agreed upon with Dr. Jaswant Rodriguez, GI Luminal Staff Physician.     Lorena Hernandez PA-C  Inpatient Gastroenterology Service  [facility]  Pager: *[phone]  Text Page

## 2023-08-25 NOTE — PROGRESS NOTES
Care Coordinator  D/: I sent benefit check for home Inotrope yesterday and per [facility]:  This Patient has 100% coverage for IV Dobutamine through is [employer] [place] Plan. Pt has fully Met Ded of $1800.00 and OOP of $6000.00. Coinsurance will Not apply. Please let me know if you need anything else!    Thanks!    Bay Sameera  Intake       [facility]   [facility]   [address] [place] [id number]    [email or web address]    Office: [phone]  Fax: [phone]     P: PLC bookd for 8/28 @ 3pm. Will follow.    2:56pm Per staff MD DR KAMRAN Saldana--pt is changed to IV Milrinone for home,today.   Per [facility]: yes Milrinone will covered as well.    Bay Sameera  Intake       [facility]   [facility]   [address] [place] [id number]    [email or web address]    Office: [phone]  Fax: [phone]           He may discharge this weekend IF we can get teaching arranged (PLC 8/28 @ 3pm)---I have asxked [facility] liaison Zhao Franco if he can OR if the [facility] weekend office RN who will do home hook up can___and when____as per MD the pt/wife need to book flights to [place] and need to know the plan.  Ii asked Zhao to call them: [2:21 PM] Zhao Cortes    talked to spouse, I have the address.  We'll look for home care agency.  I don't know if we'll have one set up for him to be able to go tomorrow or not yet.  Spouse said they have a local hotel they can stay at for 1-2 nights if needed to give them time to arrange flight to [place].    I am putting him on the weekend list____.  Need a confirmed Home Care Agency BEFORE he can discharge_______.  He needs a teach before he can discharge__________  Zhao got his address.

## 2023-08-25 NOTE — PROGRESS NOTES
Cardiology Inpatient Progress Note   August 23, 2023    Assessment and Plan:       51yo M with PMH of chronic HFrEF 2/2 NICM s/p ICD, HLD, CKD stage II. Here for low output heart failure after scheduled RHC showed low cardiac index of 1.5. We are evaluating for advanced therapy vs. heart transplant eval.     8/25:  - Switched from dobutamine to milrinone   - Colonoscopy today  - Covid shot   - Start guaifenesin for chronic cough   - Social work - Education scheduled for 9/28 3pm, but will try to move it up. Milrinone most likely 100% covered by insurance.   - Start DOAC (apixaban) after possible RHC on Monday   - Dental - follow up as outpatient at home.   - Heart transplant evaluation on-going   - Heart transplant vs. LVAD meeting this morning -- better candidate for transplant rather than LVAD. RHC on Monday to determine milrinone effectivity.     #Low output acute on chronic systolic heart failure/HFrEF (EF 13%) 2/2 NICM  NYHA Symptom Class IV, Stage D. Rosario HF Class III. RHC (8/22/23) notable for IESHA 3.2/1.5, decreased CI from last IESHA on 6/2023 of 4.32/2.1. PVR 1.25, SBP 88/65 on admission. 7cm JVD. Given patient has recently had symptoms at rest with low cardiac index, will start advanced therapy and heart transplant evaluations. LVEF 15-20%; Severe left ventricular dilation (LVIDd 6.8cm).  Severe diffuse hypokinesis.  Transplant vs. LVAD meeting (8/25)- Determine that patient is a b scott candidate for transplant. Will need to continue working on the transplant evaluation checklist. RHC on Monday to determine milironone effectivity.   - ACE-I/ARB/ARNi: Continue Entresto 49-51 mg BID  - BB: hold PTA Carvedilol 25mg   - Aldosterone antagonist: Continue PTA beck 25 mg daily. Hold PTA lasix due to low output HF.   - SGLT2i: Hold PTA empagliflozin 10 mg daily due to possibility of procedure (infection risk).  - Ionotropes: start dobutamine 2mg/kg/min. Titrating up pending venous oxyhemoglobin from  PICC line. Titrated to 5 mg/kg/min. Switched to milrinone 8/25/23.    - CI: 125 / (13.6)(Hgb) (SaO2-SvO2) - goal CI about 1.5  - SCD prophylaxis: s/p ICD  - %BiV pacing: N/A  - Fluid status: mildly hypovolemic on physical exam. Hold PTA lasix 10-20 mg daily PRN - pt was taking daily post-ICD placement in June.   - Daily standing weights, 2L fluid, and 2g Na restriction    LVAD/Transplant Evaluation Checklist  [x] Labs (CBC, CMP, PT/INR, cystatin C, prealbumin, UA + micro)  [x] Infectious (Hep A/B/C, HIV, treponema, HSV 1/2 IgG, CMV IgG, Toxo IgG, EBV IgG, varicella IgG, Quant gold, COVID vaccine/PCR)  [] Utox/nicotine and cotinine/PeTH   [] Immunocompatibility (last transfusion, ABO, HLA tissue typing, PRA)  [x] ECG, Echo  [] CPX - not indicated at this time.   [x] 6MWT - Walked w/o pause without needing oxygen.   [x] RHC  [x] CVTS consult  [x] Social work consult - no risks identified. Excellent candidate for procedures. No recs at this time.   [x] Palliative care consult - saw pt on 8/23 with no concerns.  [] Neuropsych consult  [x] Nutrition consult  [x] CT Dental - periapical abscess and left mandibular canine caries.   [x] Dental consult - clinically visible caries on R. Maxillary molar, L mandibular premolar. Pt can follow up with his dental provider (scheduled for fillings on 09/05/2023 per patient) if he is medically cleared and stable by med team.  [x] Abd US + doppler  [x] Extremity US and ABIs  [x] Carotid US (if DM or ICM or >49yo)  [] PFTs  [] Dexa  [x] CT head non-contrast  [x] CT CAP non-contrast -    There is a solid pulmonary nodules in the right lower lobe measuring 1.6 x 1.0 cm with associated central calcification (series 3, image 256). Tiny scattered calcified granulomas in the lungs., consider follow-up.   [x] colonoscopy (>51 yo) -  -- Continue to hold heparin/anticoagulation/antiplatelets.               - Monitor INR. Goal INR ~ 1.5 for GI endoscopy.  -- Avoid NSAIDs.  - Performed 8/25/23  found 3 cm sessile polyp which was biopsied. Next colonoscopy in 7-10years.   [] PSA  [x] Hematology consult ordered - Hgb 17-19 during admission likely due to combination of heart failure and possible NATHALIE. Start 3 months of anticoagulation for R. IJV DVT.       #Non occlusive DVT in Right jugular vein   - CTM   - Heme consult - start a DOAC (apixiban) after possible RHC on Monday.      #Inferior and possible anterior infarct, unspecified timing   #Myocardial Bridge Over Coronary Artery  Myocardial bridge over distal LAD.  - Continue to monitor     #CKD Stage II  -CTM     #HLD  - Atorvastatin 20mg daily     FEN: 2g sodium diet. 2L fluid restriction. Repeat electrolytes as needed.   DVT PPx: enoxaparin   Code: FULL    Disposition: Inpatient admission. Expected discharge in >2 days      Staffed by Dr. Ashley.    Eddie Craig MD  [facility]  Department of Internal Medicine   Resident Physician  PGY-1  Pager: [phone]   ___________________________________________________________________________________  Interval Events  NAEON. Denies any chest pain, LE edema, or SOB. No concerns overnight. He had questions regarding exercising tolerance after discharge as well as how to obtain a sleep study to work-up his possible NATHALIE.     Telemetry - Non-sustained VT q10min overnight.   ____________________________________________________________________________________  Physical Exam  /80 (BP Location: Left arm)   Pulse 95   Temp 98  F (36.7  C) (Oral)   Resp 18   Wt 90.2 kg (198 lb 13.7 oz)   SpO2 96%   BMI 26.64 kg/m    GEN: NAD   HEENT: EOMI, no icterus, moist mucous membranes   CV: RRR, normal S1/S2, no murmur appreciated   CHEST: Normal work of breathing. Lungs CTAB, no wheezes or crackles.   ABD: Soft, NT/ND   EXT: Warm and well-perfused, no LE edema  NEURO: Awake, alert, answering questions appropriately, motor grossly nonfocal  SKIN: No rash visualized. No LE edema.   PSYCH: cooperative,  affect appropriate, cheerful.      Data:      Latest Reference Range & Units 08/25/23 06:49   Sodium 136 - 145 mmol/L 141   Potassium 3.4 - 5.3 mmol/L 4.5   Chloride 98 - 107 mmol/L 104   Carbon Dioxide (CO2) 22 - 29 mmol/L 26   Urea Nitrogen 6.0 - 20.0 mg/dL 12.7   Creatinine 0.67 - 1.17 mg/dL 1.24 (H)   GFR Estimate >60 mL/min/1.73m2 70   Calcium 8.6 - 10.0 mg/dL 9.6   Anion Gap 7 - 15 mmol/L 11   Magnesium 1.7 - 2.3 mg/dL 2.2   Phosphorus 2.5 - 4.5 mg/dL 2.8   Albumin 3.5 - 5.2 g/dL 4.1   Protein Total 6.4 - 8.3 g/dL 6.9   Alkaline Phosphatase 40 - 129 U/L 90   ALT 0 - 70 U/L 34   AST 0 - 45 U/L 29   Bilirubin Total <=1.2 mg/dL 1.0   Glucose 70 - 99 mg/dL 97      Latest Reference Range & Units 08/25/23 06:49   FIO2  21   Ph Venous 7.32 - 7.43  7.40   PCO2 Venous 40 - 50 mm Hg 50   PO2 Venous 25 - 47 mm Hg 34   Bicarbonate Venous 21 - 28 mmol/L 30 (H)   Base Excess Venous -7.7 - 1.9 mmol/L 4.0 (H)   Oxyhemoglobin Venous 70 - 75 % 70   WBC 4.0 - 11.0 10e3/uL 6.8   Hemoglobin 13.3 - 17.7 g/dL 17.8 (H)   Hematocrit 40.0 - 53.0 % 53.3 (H)   Platelet Count 150 - 450 10e3/uL 183   RBC Count 4.40 - 5.90 10e6/uL 5.82   MCV 78 - 100 fL 92   MCH 26.5 - 33.0 pg 30.6   MCHC 31.5 - 36.5 g/dL 33.4   RDW 10.0 - 15.0 % 13.1   % Neutrophils % 66   % Lymphocytes % 22   % Monocytes % 11   % Eosinophils % 1   % Basophils % 0   Absolute Basophils 0.0 - 0.2 10e3/uL 0.0   Absolute Eosinophils 0.0 - 0.7 10e3/uL 0.1   Absolute Immature Granulocytes <=0.4 10e3/uL 0.0   Absolute Lymphocytes 0.8 - 5.3 10e3/uL 1.5   Absolute Monocytes 0.0 - 1.3 10e3/uL 0.8   % Immature Granulocytes % 0   Absolute Neutrophils 1.6 - 8.3 10e3/uL 4.4   Absolute NRBCs 10e3/uL 0.0     Colonoscopy 8/25/23:        The terminal ileum appeared normal.        The perianal and digital rectal examinations were normal.        A 3 mm polyp was found in the cecum. The polyp was sessile. The polyp        was removed with a cold snare. Resection and retrieval were complete.         The exam was otherwise without abnormality on direct and retroflexion        views.     Upper Ultrasound 8/23  1. Nonocclusive deep venous thrombosis of the right internal jugular  vein.  2. No evidence of deep venous thrombosis in the left upper extremity.       Echo 8/23/23:  Interpretation Summary  Severe left ventricular dilation (LVIDd 6.8cm). Left ventricular function is  decreased. The ejection fraction is 15-20% (severely reduced). Severe diffuse  hypokinesis.  Global right ventricular function is normal.  No significant valve abnormalities.  The inferior vena cava is normal.  No pericardial effusion.     There is no prior study for direct comparison, but LV function is similar to  [facility] [place] [facility].

## 2023-08-25 NOTE — PLAN OF CARE
Goal Outcome Evaluation:      Plan of Care Reviewed With: patient    Overall Patient Progress: improvingOverall Patient Progress: improving     .    Changes:       History: 53yo M with PMH of chronic HFrEF (13%) 2/2 NICM s/p ICD, HLD, CKD stage II. Here for low output heart failure after scheduled RHC showed low cardiac index of 1.5. We are evaluating for advanced therapy vs. heart transplant eval.        Neuro: A/Ox4. Calls appropriately. Pleasant and cooperative.   Cardiac/Tele:  SR w/ multiform PVCs and VSS. Afib. Soft BP. Afebrile. Denies chest pain   Respiratory: Room air. Tolerating well  GI/: Continent. Ambulates to the bathroom. Drinking the Golytely for colonoscopy scheduled on 8/25. Patient has finished the final 2L of Golytely and BMs are clear and mountain dew colored.   Diet/Appetite: 2 gram Na+ diet, 2LFR. NPO at midnight.   Skin: No new deficits noted.   LDAs: L PIV- SL. R DL PICC- Red Heparin locked, Purple-Infusing Dobutamine at 5 mcg/kg/min. Both lines can be flushed but no blood return was noted from the purple line, it was heparin locked.   Activity: Up ad amaury  Pain: Small bit of pain at PIV site.       Plan: Continue to monitor and notify team with changes.

## 2023-08-25 NOTE — PROGRESS NOTES
Home Infusion    Received referral from [facility]ly [facility] for IV inotropes.  Benefits verified.  Patient has [employer] [place] Plan, deductible $1800.00(met) and max OOP $6000.00(met) coverage will be at 100%.  Spoke with patient's spouse Corie to review home infusion services, review benefits and offer choice of providers.  Patient would like to use [facility] for home infusion.  Confirmed discharge address, phone, and emergency contact information.  Home address is: [address] , [place] [address].  [facility] is expected to dc soon and will be going home on IV milrinone.  He has not done home IV therapy before and will need initial teaching.  Provided spouse with information about [facility] services.  Informed them that [facility] RN will assist with hook up of patient's home medication here at the hospital on day of discharge and he will have a follow up nurse visit at home the following day to continue teaching the daily bag changes.      [facility] is working to secure a local home care agency in [place] to provide home care services for patient.   Spouse verbalized understanding of all information given.   They are willing to learn and manage home IV therapy.  Questions answered.  Per spouse she will need to book flight home to [place] once discharge plans finalized.  She anticipates this being Monday or Tuesday.  She is currently staying at a local hotel and states she and patient could stay there for 1-2 nights after discharge if needed.  RN[facility] updated.  Will continue to follow until discharge and update pt once final orders are determined.    Thank you for the referral      LUIS Reid  [facility] Nurse Liaison   [email or web address]  Cell: [phone] M-F  [facility] Main: [phone]

## 2023-08-25 NOTE — PLAN OF CARE
Pt admitted for low output HF after scheduled RHC showed low cardiac index of 1.5. Pt is now being evaluated for advanced therapies i.e Heart transplant    Code status: Full Code  Team: Cards 2     Neuro: AOx4, calm and cooperative, pleasant, no neuro deficits  Cardiac/Tele/VSS: SR with PVC's, HR in the 70's-100's, Brief episodes of VTACH/SVT when pt was in colonoscopy. Denies CP or palpitations. Other VSS, no other remarkable cardiac events   Respiratory: Room air, no SOB noted. Pt reports feeling better since being on Dobutamine (now switched to Milrinone)  GI/: Voids spontaneously, BM regular, denies any abdominal discomfort. BS audible. No BM this shift. Uses the restroom independently  Diet/Appetite: 2g Na diet with 2L FR. Good appetite  Skin: No skin deficits  Endocrine/Electrolytes/Labs: No BG checks. No electrolyte replacements indicated this shift  LDAs: PIV saline locked and DL PICC running Milrinone at 0.125 mcg/kg/min  Activity: Up ad amaury  Pain: Denies pain     Plan: Continue POC per team. Notify team of any concerns
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
## 2023-08-25 NOTE — OR NURSING
Pt had colonoscopy with polypectomy under moderate sedation. Pt tolerated procedure well. Pt taken back to 6C in stable condition on RA on cardiac monitor with RN present. Procedural report to anahi Jhaveri RN.

## 2023-08-25 NOTE — PROGRESS NOTES
Transplant Teaching 8/25/2023    Writer met with patient and his wife Deepa via phone,  to complete heart transplant teaching. We reviewed the candidate selection process, insurance prior authorization, UNOS priority statuses, the waiting period, donor issues, and the pre-, cathleen-, and post-op periods. We also reviewed the major risks of transplantation including rejection, infection and transplant vasculopathy. We  discussed patient and caregiver responsibilities before and after transplant including the need for patient to identify a caregiver to be present for education and to assist patient post discharge. The patient was informed of the weight and chemical cessation policy and agrees to these requirements.     Discussed DCD/OCS with patient and his wife, including risks and benefits of receiving DCD donor organs, and option to decline such offers. Will send DCD information sheet with post teaching packet. Pt and his wife verbalized understanding. Pt has decided to accept DCD donor organ offers.      Discussed the possibility of accepting a donor heart that has known Hepatitis C infection (past or current).  The risks of receiving a donor organ which may be infected with hepatitis C have been discussed with patient and his wife.  The risk in accepting this organ (including the possibility of tai an infection with hepatitis C) have been discussed. Treatment options have also been discussed with this patient and he/she has been informed that there is no guarantee for a cure with these treatments. The option of declining this organ has also been discussed. Patient was given the opportunity to ask questions and understands the risks and/or benefits associated with accepting this type of organ.  Patient was encouraged to discussed the option for these donors with their cardiologist.     Throughout the session, the patient and his wife were attentive, eager to learn, and asked appropriate questions.  Patient   was given a copy of the UNOS brochure on Multiple Waitlisting, the  Heart Transplant brochure and current program statistics.  Patient was also encouraged to visit the informational transplant education website for further information and video classes ([email or web address]).      Gave the patient the transplant office number and encouraged to call with future questions or concerns.    In follow up, the patient was instructed on completing the following items for his/her transplant evaluation:    Dental work  Covid vaccine

## 2023-08-26 LAB
ALBUMIN SERPL BCG-MCNC: 3.8 G/DL (ref 3.5–5.2)
ALP SERPL-CCNC: 78 U/L (ref 40–129)
ALT SERPL W P-5'-P-CCNC: 35 U/L (ref 0–70)
ANION GAP SERPL CALCULATED.3IONS-SCNC: 8 MMOL/L (ref 7–15)
ANION GAP SERPL CALCULATED.3IONS-SCNC: 9 MMOL/L (ref 7–15)
AST SERPL W P-5'-P-CCNC: 32 U/L (ref 0–45)
BASE EXCESS BLDV CALC-SCNC: 2.1 MMOL/L (ref -7.7–1.9)
BASOPHILS # BLD AUTO: 0 10E3/UL (ref 0–0.2)
BASOPHILS NFR BLD AUTO: 1 %
BILIRUB SERPL-MCNC: 0.9 MG/DL
BUN SERPL-MCNC: 13.4 MG/DL (ref 6–20)
BUN SERPL-MCNC: 16.5 MG/DL (ref 6–20)
CALCIUM SERPL-MCNC: 8.9 MG/DL (ref 8.6–10)
CALCIUM SERPL-MCNC: 9 MG/DL (ref 8.6–10)
CHLORIDE SERPL-SCNC: 102 MMOL/L (ref 98–107)
CHLORIDE SERPL-SCNC: 102 MMOL/L (ref 98–107)
COTININE SERPL-MCNC: <5 NG/ML
CREAT SERPL-MCNC: 1.15 MG/DL (ref 0.67–1.17)
CREAT SERPL-MCNC: 1.18 MG/DL (ref 0.67–1.17)
DEPRECATED HCO3 PLAS-SCNC: 25 MMOL/L (ref 22–29)
DEPRECATED HCO3 PLAS-SCNC: 26 MMOL/L (ref 22–29)
EOSINOPHIL # BLD AUTO: 0.1 10E3/UL (ref 0–0.7)
EOSINOPHIL NFR BLD AUTO: 1 %
ERYTHROCYTE [DISTWIDTH] IN BLOOD BY AUTOMATED COUNT: 13.1 % (ref 10–15)
GFR SERPL CREATININE-BSD FRML MDRD: 74 ML/MIN/1.73M2
GFR SERPL CREATININE-BSD FRML MDRD: 77 ML/MIN/1.73M2
GLUCOSE SERPL-MCNC: 109 MG/DL (ref 70–99)
GLUCOSE SERPL-MCNC: 88 MG/DL (ref 70–99)
HCO3 BLDV-SCNC: 28 MMOL/L (ref 21–28)
HCT VFR BLD AUTO: 49.8 % (ref 40–53)
HGB BLD-MCNC: 16.2 G/DL (ref 13.3–17.7)
IMM GRANULOCYTES # BLD: 0 10E3/UL
IMM GRANULOCYTES NFR BLD: 0 %
LYMPHOCYTES # BLD AUTO: 2.2 10E3/UL (ref 0.8–5.3)
LYMPHOCYTES NFR BLD AUTO: 27 %
MAGNESIUM SERPL-MCNC: 2.1 MG/DL (ref 1.7–2.3)
MCH RBC QN AUTO: 30.1 PG (ref 26.5–33)
MCHC RBC AUTO-ENTMCNC: 32.5 G/DL (ref 31.5–36.5)
MCV RBC AUTO: 93 FL (ref 78–100)
MONOCYTES # BLD AUTO: 0.8 10E3/UL (ref 0–1.3)
MONOCYTES NFR BLD AUTO: 10 %
NEUTROPHILS # BLD AUTO: 4.8 10E3/UL (ref 1.6–8.3)
NEUTROPHILS NFR BLD AUTO: 61 %
NICOTINE SERPL-MCNC: <5 NG/ML
NRBC # BLD AUTO: 0 10E3/UL
NRBC BLD AUTO-RTO: 0 /100
O2/TOTAL GAS SETTING VFR VENT: 21 %
OXYHGB MFR BLDV: 70 % (ref 70–75)
PCO2 BLDV: 47 MM HG (ref 40–50)
PH BLDV: 7.38 [PH] (ref 7.32–7.43)
PLATELET # BLD AUTO: 185 10E3/UL (ref 150–450)
PO2 BLDV: 38 MM HG (ref 25–47)
POTASSIUM SERPL-SCNC: 3.9 MMOL/L (ref 3.4–5.3)
POTASSIUM SERPL-SCNC: 4 MMOL/L (ref 3.4–5.3)
PROT SERPL-MCNC: 6.2 G/DL (ref 6.4–8.3)
RBC # BLD AUTO: 5.38 10E6/UL (ref 4.4–5.9)
SODIUM SERPL-SCNC: 136 MMOL/L (ref 136–145)
SODIUM SERPL-SCNC: 136 MMOL/L (ref 136–145)
WBC # BLD AUTO: 8 10E3/UL (ref 4–11)

## 2023-08-26 PROCEDURE — 120N000003 HC R&B IMCU UMMC

## 2023-08-26 PROCEDURE — 83735 ASSAY OF MAGNESIUM: CPT | Performed by: STUDENT IN AN ORGANIZED HEALTH CARE EDUCATION/TRAINING PROGRAM

## 2023-08-26 PROCEDURE — 36415 COLL VENOUS BLD VENIPUNCTURE: CPT | Performed by: PHYSICIAN ASSISTANT

## 2023-08-26 PROCEDURE — 80053 COMPREHEN METABOLIC PANEL: CPT | Performed by: STUDENT IN AN ORGANIZED HEALTH CARE EDUCATION/TRAINING PROGRAM

## 2023-08-26 PROCEDURE — 250N000011 HC RX IP 250 OP 636: Mod: JZ | Performed by: STUDENT IN AN ORGANIZED HEALTH CARE EDUCATION/TRAINING PROGRAM

## 2023-08-26 PROCEDURE — 99233 SBSQ HOSP IP/OBS HIGH 50: CPT | Mod: GC | Performed by: INTERNAL MEDICINE

## 2023-08-26 PROCEDURE — 250N000013 HC RX MED GY IP 250 OP 250 PS 637

## 2023-08-26 PROCEDURE — 250N000011 HC RX IP 250 OP 636: Mod: JZ | Performed by: INTERNAL MEDICINE

## 2023-08-26 PROCEDURE — 82805 BLOOD GASES W/O2 SATURATION: CPT | Performed by: STUDENT IN AN ORGANIZED HEALTH CARE EDUCATION/TRAINING PROGRAM

## 2023-08-26 PROCEDURE — 85014 HEMATOCRIT: CPT | Performed by: STUDENT IN AN ORGANIZED HEALTH CARE EDUCATION/TRAINING PROGRAM

## 2023-08-26 PROCEDURE — 250N000013 HC RX MED GY IP 250 OP 250 PS 637: Performed by: STUDENT IN AN ORGANIZED HEALTH CARE EDUCATION/TRAINING PROGRAM

## 2023-08-26 RX ORDER — POTASSIUM CHLORIDE 750 MG/1
20 TABLET, EXTENDED RELEASE ORAL ONCE
Status: COMPLETED | OUTPATIENT
Start: 2023-08-26 | End: 2023-08-26

## 2023-08-26 RX ADMIN — Medication 5 ML: at 23:09

## 2023-08-26 RX ADMIN — GUAIFENESIN 200 MG: 200 SOLUTION ORAL at 05:00

## 2023-08-26 RX ADMIN — POTASSIUM CHLORIDE 20 MEQ: 750 TABLET, EXTENDED RELEASE ORAL at 04:59

## 2023-08-26 RX ADMIN — SPIRONOLACTONE 25 MG: 25 TABLET ORAL at 08:14

## 2023-08-26 RX ADMIN — ATORVASTATIN CALCIUM 20 MG: 20 TABLET, FILM COATED ORAL at 20:41

## 2023-08-26 RX ADMIN — SACUBITRIL AND VALSARTAN 1 TABLET: 49; 51 TABLET, FILM COATED ORAL at 20:41

## 2023-08-26 RX ADMIN — SACUBITRIL AND VALSARTAN 1 TABLET: 49; 51 TABLET, FILM COATED ORAL at 08:14

## 2023-08-26 RX ADMIN — MILRINONE LACTATE IN DEXTROSE 0.12 MCG/KG/MIN: 200 INJECTION, SOLUTION INTRAVENOUS at 11:48

## 2023-08-26 ASSESSMENT — ACTIVITIES OF DAILY LIVING (ADL)
ADLS_ACUITY_SCORE: 18

## 2023-08-26 NOTE — PLAN OF CARE
Temp: 97.9  F (36.6  C) Temp src: Oral BP: 99/60 Pulse: 76   Resp: 16 SpO2: 96 % O2 Device: None (Room air) Oxygen Delivery: 2 LPM       D: Admitted for low output heart failure on 8/22 after scheduled RHC showed low CI of 1.5. Now being evaluated for heart Tx.     PMHx: Chronic HFrEF 2/2 NICM s/p ICD, HLD, CKD stage II    I: Monitored vitals and assessed pt status. Encouraged activity.      Changed: At 0053 pt had a possible run of 10 beats of SVT or aberrant afib. VSS. No complaints of chest pain, SOB, lightheadedness, or dizziness. Team notified and asked for morning labs to be drawn. K replaced per order. Next labs scheduled for tomorrow morning  IV Access: R DL PICC. Red infusing, purple SL  Running:  Milrinone at 0.125 mcg/kg/min  Cardiac: SR w/occasional multifocal PVC's. HR: 70's. SBP: 90s, MAP:70s  O2: Room air, sating>95%  Mobility: Up ad amaury  GI/: Voids spontaneously using urinal. No output overnight. LBM: 8/25  Diet: Cardiac diet, 2L FR  PRN: Guaifenesin given for frequent nonproductive cough   Pain:  Denies pain, SOB, lightheadeness, dizziness, numbness, and tingling     A: A&Ox4. VSS. Afebrile. Appeared to have slept well overnight.     P: Continue to monitor pt status. Notify Cards 2 with changes. RHC on Monday scheduled to assess milrinone efficacy.

## 2023-08-26 NOTE — PROGRESS NOTES
Cardiology Inpatient Progress Note   August 23, 2023    Assessment and Plan:       53yo M with PMH of chronic HFrEF 2/2 NICM s/p ICD, HLD, CKD stage II. Here for low output heart failure after scheduled RHC showed low cardiac index of 1.5. We are evaluating for advanced therapy vs. heart transplant eval.     8/26:  - CTM his telemetry. start low dose carvedilol if continued Ventricular rhythms   - c/w milrinone   - Social work - Education scheduled for 9/28 3pm, but will try to move it up. Milrinone most likely 100% covered by insurance.   - possible RHC on Monday to determine milrinone effectivity.   - Start DOAC (apixaban) after possible RHC on Monday    #Low output acute on chronic systolic heart failure/HFrEF (EF 13%) 2/2 NICM  NYHA Symptom Class IV, Stage D. Rosario HF Class III. RHC (8/22/23) notable for IESHA 3.2/1.5, decreased CI from last IESHA on 6/2023 of 4.32/2.1. PVR 1.25, SBP 88/65 on admission. 7cm JVD. Given patient has recently had symptoms at rest with low cardiac index, will start advanced therapy and heart transplant evaluations. LVEF 15-20%; Severe left ventricular dilation (LVIDd 6.8cm).  Severe diffuse hypokinesis.  Transplant vs. LVAD meeting (8/25)- Determine that patient is a b scott candidate for transplant. Will need to continue working on the transplant evaluation checklist. RHC on Monday to determine milironone effectivity.   - ACE-I/ARB/ARNi: Continue Entresto 49-51 mg BID  - BB: hold PTA Carvedilol 25mg. Start IF ventricular rhythms continue.   - Aldosterone antagonist: Continue PTA beck 25 mg daily. Hold PTA lasix due to low output HF.   - SGLT2i: Hold PTA empagliflozin 10 mg daily due to possibility of procedure (infection risk).  - Ionotropes: start dobutamine 2mg/kg/min. Titrating up pending venous oxyhemoglobin from PICC line. Titrated to 5 mg/kg/min. Switched to milrinone 8/25/23.    - CI: 125 / (13.6)(Hgb) (SaO2-SvO2) - goal CI about 1.5  - SCD prophylaxis: s/p ICD  -  %BiV pacing: N/A  - Fluid status: mildly hypovolemic on physical exam. Hold PTA lasix 10-20 mg daily PRN - pt was taking daily post-ICD placement in June.   - Daily standing weights, 2L fluid, and 2g Na restriction    LVAD/Transplant Evaluation Checklist  [x] Labs (CBC, CMP, PT/INR, cystatin C, prealbumin, UA + micro)  [x] Infectious (Hep A/B/C, HIV, treponema, HSV 1/2 IgG, CMV IgG, Toxo IgG, EBV IgG, varicella IgG, Quant gold, COVID vaccine/PCR)  [] Utox/nicotine and cotinine/PeTH   [x] Immunocompatibility (last transfusion, ABO, HLA tissue typing, PRA)  [x] ECG, Echo  [] CPX - not indicated at this time.   [x] 6MWT - Walked w/o pause without needing oxygen.   [x] RHC  [x] CVTS consult  [x] Social work consult - no risks identified. Excellent candidate for procedures. No recs at this time.   [x] Palliative care consult - saw pt on 8/23 with no concerns.  [x] Neuropsych consult - cleared. Recommend reducing pepsi intake.   [x] Nutrition consult  [x] CT Dental - periapical abscess and left mandibular canine caries.   [x] Dental consult - clinically visible caries on R. Maxillary molar, L mandibular premolar. Pt can follow up with his dental provider (scheduled for fillings on 09/05/2023 per patient) if he is medically cleared and stable by med team.  [x] Abd US + doppler  [x] Extremity US and ABIs  [x] Carotid US (if DM or ICM or >51yo)  [] PFTs  [] Dexa  [x] CT head non-contrast  [x] CT CAP non-contrast -    There is a solid pulmonary nodules in the right lower lobe measuring 1.6 x 1.0 cm with associated central calcification (series 3, image 256). Tiny scattered calcified granulomas in the lungs., consider follow-up.   [x] colonoscopy (>49 yo) -  -- Continue to hold heparin/anticoagulation/antiplatelets.               - Monitor INR. Goal INR ~ 1.5 for GI endoscopy.  -- Avoid NSAIDs.  - Performed 8/25/23 found 3 cm sessile polyp which was biopsied. Next colonoscopy in 7-10years.   [] PSA  [x] Hematology consult  "ordered - Hgb 17-19 during admission likely due to combination of heart failure and possible NATHALIE. Start 3 months of anticoagulation for R. IJV DVT.       #Non occlusive DVT in Right jugular vein   - CTM   - Heme consult - start a DOAC (apixiban) after possible RHC on Monday.      #Inferior and possible anterior infarct, unspecified timing   #Myocardial Bridge Over Coronary Artery  Myocardial bridge over distal LAD.  - Continue to monitor     #CKD Stage II  -CTM     #HLD  - Atorvastatin 20mg daily     FEN: 2g sodium diet. 2L fluid restriction. Repeat electrolytes as needed.   DVT PPx: enoxaparin   Code: FULL    Disposition: Inpatient admission. Expected discharge in >2 days      Staffed by Dr. Ashley.    Eddie Craig MD  [facility]  Department of Internal Medicine   Resident Physician  PGY-1  Pager: [phone]   ___________________________________________________________________________________  Interval Events  Overnight  Pt had 10 beat run of \"SVT or possibly aberrant afib\" at 0058. Alerted by tele 2 hours after it happened. BP stable, asymptomatic. AM labs drawn a bit early, electrolytes showed K 3.9 and mag 2.1. Gave 20 meq potassium.    No concerns today. Endorsing less with guaifenesin. Denies chest pain, palpitations, or any symptoms during the 10 beat run of V tach. ICD did not discharge.   ____________________________________________________________________________________  Physical Exam  /69 (BP Location: Left arm)   Pulse 72   Temp 97.9  F (36.6  C) (Oral)   Resp 18   Wt 89.7 kg (197 lb 11.2 oz)   SpO2 94%   BMI 26.49 kg/m    GEN: NAD   HEENT: EOMI, no icterus, moist mucous membranes   CV: RRR, normal S1/S2, no murmur appreciated   CHEST: Normal work of breathing. Lungs CTAB, no wheezes or crackles.   ABD: Soft, NT/ND   EXT: Warm and well-perfused, no LE edema  NEURO: Awake, alert, answering questions appropriately, motor grossly nonfocal  SKIN: No rash visualized. No LE " edema.   PSYCH: cooperative, affect appropriate, cheerful.      Data:      Latest Reference Range & Units 08/26/23 03:42   Sodium 136 - 145 mmol/L 136   Potassium 3.4 - 5.3 mmol/L 3.9   Chloride 98 - 107 mmol/L 102   Carbon Dioxide (CO2) 22 - 29 mmol/L 26   Urea Nitrogen 6.0 - 20.0 mg/dL 13.4   Creatinine 0.67 - 1.17 mg/dL 1.15   GFR Estimate >60 mL/min/1.73m2 77   Calcium 8.6 - 10.0 mg/dL 8.9   Anion Gap 7 - 15 mmol/L 8   Magnesium 1.7 - 2.3 mg/dL 2.1   Albumin 3.5 - 5.2 g/dL 3.8   Protein Total 6.4 - 8.3 g/dL 6.2 (L)   Alkaline Phosphatase 40 - 129 U/L 78   ALT 0 - 70 U/L 35   AST 0 - 45 U/L 32   Bilirubin Total <=1.2 mg/dL 0.9   Glucose 70 - 99 mg/dL 88   FIO2  21   Ph Venous 7.32 - 7.43  7.38   PCO2 Venous 40 - 50 mm Hg 47   PO2 Venous 25 - 47 mm Hg 38   Bicarbonate Venous 21 - 28 mmol/L 28   Base Excess Venous -7.7 - 1.9 mmol/L 2.1 (H)   Oxyhemoglobin Venous 70 - 75 % 70     Colonoscopy 8/25/23:        The terminal ileum appeared normal.        The perianal and digital rectal examinations were normal.        A 3 mm polyp was found in the cecum. The polyp was sessile. The polyp        was removed with a cold snare. Resection and retrieval were complete.        The exam was otherwise without abnormality on direct and retroflexion        views.     Upper Ultrasound 8/23  1. Nonocclusive deep venous thrombosis of the right internal jugular  vein.  2. No evidence of deep venous thrombosis in the left upper extremity.       Echo 8/23/23:  Interpretation Summary  Severe left ventricular dilation (LVIDd 6.8cm). Left ventricular function is  decreased. The ejection fraction is 15-20% (severely reduced). Severe diffuse  hypokinesis.  Global right ventricular function is normal.  No significant valve abnormalities.  The inferior vena cava is normal.  No pericardial effusion.     There is no prior study for direct comparison, but LV function is similar to  echo report from [place] [facility] from 6/13/23.

## 2023-08-26 NOTE — PROGRESS NOTES
Care Management Follow Up    Length of Stay (days): 4    Expected Discharge Date: 08/28/2023     Concerns to be Addressed: adjustment to diagnosis/illness, care coordination/care conferences, discharge planning, other (see comments) (starting advanced heart failure eval)     Patient plan of care discussed at interdisciplinary rounds: Yes    Anticipated Discharge Disposition:  home with spouse      Anticipated Discharge Services:  [facility]  Anticipated Discharge DME:  TBD    Patient/family educated on Medicare website which has current facility and service quality ratings:  n/a   Education Provided on the Discharge Plan:  yes   Patient/Family in Agreement with the Plan:  Yes     Referrals Placed by CM/SW:  x 2   Private pay costs discussed: Not applicable    Additional Information:  Per Cards note, possible RHC on Monday to determine milrinone effectivity.     Pt does have benefits for home milrinone infusion. Teaching not able to be done over weekend, will plan for Monday at 1500, as previously scheduled.    Home care has not yet been found for Pt. Pending referrals being managed by [facility]. [facility] called intake office for update, 2 referrals pending, [facility] will follow up on Monday Per weekend Jane SMITH.     Pending DC Needs  - medication and infusion education, supplies and hook up  - home care  - update Pt and wife, airplane transportation anticipated, not booked until DC date/time confirmed     Care management will continue to follow and support safe discharge planning as needed.     Lay Wright RN  [facility]

## 2023-08-26 NOTE — PLAN OF CARE
Pt admitted for low output HF after scheduled RHC showed low cardiac index of 1.5. Pt is now being evaluated for advanced therapies i.e Heart transplant     Code status: Full Code  Team: Cards 2     Neuro: AOx4, calm and cooperative, pleasant, no neuro deficits  Cardiac/Tele/VSS: SR/ST with PVC's, HR in the 70's-100's, Brief episodes of SVT or aberrant afib per report from night of 8/26 none this shift. Denies CP or palpitations. Other VSS, no other remarkable cardiac events   Respiratory: Room air, no SOB noted. Pt reports feeling better since being started on inotropes.  GI/: Voids spontaneously, BM regular, denies any abdominal discomfort. BS audible. No BM this shift. Uses the restroom independently  Diet/Appetite: 2g Na diet with 2L FR. Good appetite.   Skin: No skin deficits  Endocrine/Electrolytes/Labs: No BG checks. No electrolyte replacements indicated this shift  LDAs: DL PICC running Milrinone at 0.125 mcg/kg/min  Activity: Up ad amaury  Pain: Denies pain    Plan: Continue POC per team. Pt to discharge home on Milrinone per team. PICC line and home infusion teaching set for Monday. Notify team of any concerns

## 2023-08-27 LAB
ALBUMIN SERPL BCG-MCNC: 3.7 G/DL (ref 3.5–5.2)
ALP SERPL-CCNC: 82 U/L (ref 40–129)
ALT SERPL W P-5'-P-CCNC: 36 U/L (ref 0–70)
ANION GAP SERPL CALCULATED.3IONS-SCNC: 9 MMOL/L (ref 7–15)
AST SERPL W P-5'-P-CCNC: 29 U/L (ref 0–45)
BASE EXCESS BLDV CALC-SCNC: 2.4 MMOL/L (ref -7.7–1.9)
BASOPHILS # BLD AUTO: 0 10E3/UL (ref 0–0.2)
BASOPHILS NFR BLD AUTO: 1 %
BILIRUB SERPL-MCNC: 0.6 MG/DL
BUN SERPL-MCNC: 14 MG/DL (ref 6–20)
CALCIUM SERPL-MCNC: 9.1 MG/DL (ref 8.6–10)
CHLORIDE SERPL-SCNC: 104 MMOL/L (ref 98–107)
CREAT SERPL-MCNC: 1.23 MG/DL (ref 0.67–1.17)
DEPRECATED HCO3 PLAS-SCNC: 24 MMOL/L (ref 22–29)
EOSINOPHIL # BLD AUTO: 0.2 10E3/UL (ref 0–0.7)
EOSINOPHIL NFR BLD AUTO: 3 %
ERYTHROCYTE [DISTWIDTH] IN BLOOD BY AUTOMATED COUNT: 12.9 % (ref 10–15)
GFR SERPL CREATININE-BSD FRML MDRD: 71 ML/MIN/1.73M2
GLUCOSE SERPL-MCNC: 92 MG/DL (ref 70–99)
HCO3 BLDV-SCNC: 28 MMOL/L (ref 21–28)
HCT VFR BLD AUTO: 50.6 % (ref 40–53)
HGB BLD-MCNC: 16.7 G/DL (ref 13.3–17.7)
IMM GRANULOCYTES # BLD: 0 10E3/UL
IMM GRANULOCYTES NFR BLD: 0 %
LYMPHOCYTES # BLD AUTO: 1.6 10E3/UL (ref 0.8–5.3)
LYMPHOCYTES NFR BLD AUTO: 30 %
MAGNESIUM SERPL-MCNC: 2.1 MG/DL (ref 1.7–2.3)
MAGNESIUM SERPL-MCNC: 2.2 MG/DL (ref 1.7–2.3)
MCH RBC QN AUTO: 30.4 PG (ref 26.5–33)
MCHC RBC AUTO-ENTMCNC: 33 G/DL (ref 31.5–36.5)
MCV RBC AUTO: 92 FL (ref 78–100)
MONOCYTES # BLD AUTO: 0.8 10E3/UL (ref 0–1.3)
MONOCYTES NFR BLD AUTO: 15 %
NEUTROPHILS # BLD AUTO: 2.7 10E3/UL (ref 1.6–8.3)
NEUTROPHILS NFR BLD AUTO: 51 %
NRBC # BLD AUTO: 0 10E3/UL
NRBC BLD AUTO-RTO: 0 /100
O2/TOTAL GAS SETTING VFR VENT: 21 %
OXYHGB MFR BLDV: 72 % (ref 70–75)
PCO2 BLDV: 46 MM HG (ref 40–50)
PH BLDV: 7.4 [PH] (ref 7.32–7.43)
PLATELET # BLD AUTO: 191 10E3/UL (ref 150–450)
PO2 BLDV: 39 MM HG (ref 25–47)
POTASSIUM SERPL-SCNC: 4 MMOL/L (ref 3.4–5.3)
PROT SERPL-MCNC: 6.2 G/DL (ref 6.4–8.3)
RBC # BLD AUTO: 5.49 10E6/UL (ref 4.4–5.9)
SODIUM SERPL-SCNC: 137 MMOL/L (ref 136–145)
WBC # BLD AUTO: 5.3 10E3/UL (ref 4–11)

## 2023-08-27 PROCEDURE — 36415 COLL VENOUS BLD VENIPUNCTURE: CPT | Performed by: PHYSICIAN ASSISTANT

## 2023-08-27 PROCEDURE — 80053 COMPREHEN METABOLIC PANEL: CPT | Performed by: STUDENT IN AN ORGANIZED HEALTH CARE EDUCATION/TRAINING PROGRAM

## 2023-08-27 PROCEDURE — 99233 SBSQ HOSP IP/OBS HIGH 50: CPT | Mod: GC | Performed by: INTERNAL MEDICINE

## 2023-08-27 PROCEDURE — 250N000013 HC RX MED GY IP 250 OP 250 PS 637

## 2023-08-27 PROCEDURE — 250N000013 HC RX MED GY IP 250 OP 250 PS 637: Performed by: STUDENT IN AN ORGANIZED HEALTH CARE EDUCATION/TRAINING PROGRAM

## 2023-08-27 PROCEDURE — 36415 COLL VENOUS BLD VENIPUNCTURE: CPT | Performed by: STUDENT IN AN ORGANIZED HEALTH CARE EDUCATION/TRAINING PROGRAM

## 2023-08-27 PROCEDURE — 82805 BLOOD GASES W/O2 SATURATION: CPT | Performed by: STUDENT IN AN ORGANIZED HEALTH CARE EDUCATION/TRAINING PROGRAM

## 2023-08-27 PROCEDURE — 85004 AUTOMATED DIFF WBC COUNT: CPT | Performed by: STUDENT IN AN ORGANIZED HEALTH CARE EDUCATION/TRAINING PROGRAM

## 2023-08-27 PROCEDURE — 83735 ASSAY OF MAGNESIUM: CPT | Performed by: PHYSICIAN ASSISTANT

## 2023-08-27 PROCEDURE — 120N000003 HC R&B IMCU UMMC

## 2023-08-27 PROCEDURE — 250N000011 HC RX IP 250 OP 636: Mod: JZ | Performed by: INTERNAL MEDICINE

## 2023-08-27 PROCEDURE — 83735 ASSAY OF MAGNESIUM: CPT | Performed by: INTERNAL MEDICINE

## 2023-08-27 RX ORDER — MILRINONE LACTATE 0.2 MG/ML
0.12 INJECTION, SOLUTION INTRAVENOUS CONTINUOUS
Qty: 100 ML | Refills: 0 | Status: SHIPPED | OUTPATIENT
Start: 2023-08-27 | End: 2023-09-20

## 2023-08-27 RX ORDER — ATORVASTATIN CALCIUM 20 MG/1
20 TABLET, FILM COATED ORAL EVERY EVENING
Qty: 30 TABLET | Refills: 0 | Status: ON HOLD | OUTPATIENT
Start: 2023-08-27 | End: 2023-11-20

## 2023-08-27 RX ORDER — CARVEDILOL 3.12 MG/1
3.12 TABLET ORAL 2 TIMES DAILY WITH MEALS
Qty: 60 TABLET | Refills: 0 | Status: ON HOLD | OUTPATIENT
Start: 2023-08-27 | End: 2023-11-20

## 2023-08-27 RX ORDER — CARVEDILOL 3.12 MG/1
3.12 TABLET ORAL 2 TIMES DAILY WITH MEALS
Status: DISCONTINUED | OUTPATIENT
Start: 2023-08-27 | End: 2023-08-28 | Stop reason: HOSPADM

## 2023-08-27 RX ADMIN — CARVEDILOL 3.12 MG: 3.12 TABLET, FILM COATED ORAL at 13:09

## 2023-08-27 RX ADMIN — MILRINONE LACTATE IN DEXTROSE 0.12 MCG/KG/MIN: 200 INJECTION, SOLUTION INTRAVENOUS at 17:09

## 2023-08-27 RX ADMIN — SACUBITRIL AND VALSARTAN 1 TABLET: 49; 51 TABLET, FILM COATED ORAL at 20:05

## 2023-08-27 RX ADMIN — ATORVASTATIN CALCIUM 20 MG: 20 TABLET, FILM COATED ORAL at 20:05

## 2023-08-27 RX ADMIN — SACUBITRIL AND VALSARTAN 1 TABLET: 49; 51 TABLET, FILM COATED ORAL at 08:41

## 2023-08-27 RX ADMIN — MILRINONE LACTATE IN DEXTROSE 0.12 MCG/KG/MIN: 200 INJECTION, SOLUTION INTRAVENOUS at 20:12

## 2023-08-27 RX ADMIN — APIXABAN 10 MG: 5 TABLET, FILM COATED ORAL at 13:09

## 2023-08-27 RX ADMIN — APIXABAN 10 MG: 5 TABLET, FILM COATED ORAL at 20:05

## 2023-08-27 RX ADMIN — SPIRONOLACTONE 25 MG: 25 TABLET ORAL at 08:41

## 2023-08-27 RX ADMIN — CARVEDILOL 3.12 MG: 3.12 TABLET, FILM COATED ORAL at 17:11

## 2023-08-27 ASSESSMENT — ACTIVITIES OF DAILY LIVING (ADL)
ADLS_ACUITY_SCORE: 18

## 2023-08-27 NOTE — PROGRESS NOTES
Cardiology Inpatient Progress Note   August 23, 2023    Assessment and Plan:       51yo M with PMH of chronic HFrEF 2/2 NICM s/p ICD, HLD, CKD stage II. Here for low output heart failure after scheduled RHC showed low cardiac index of 1.5. We are evaluating for advanced therapy vs. heart transplant eval.     8/27:  - CTM his telemetry. Started carvedilol 3.125 mg BID  - c/w milrinone   - Started apixaban for DVT   - Social work - Education scheduled for 8/28 3pm.   - No RHC this admission (scheduled w Dr. Micaela Silvestre on 9/19)  - discharge tomorrow     #Low output acute on chronic systolic heart failure/HFrEF (EF 13%) 2/2 NICM  NYHA Symptom Class IV, Stage D. Rosario HF Class III. RHC (8/22/23) notable for IESHA 3.2/1.5, decreased CI from last IESHA on 6/2023 of 4.32/2.1. PVR 1.25, SBP 88/65 on admission. 7cm JVD. Given patient has recently had symptoms at rest with low cardiac index, will start advanced therapy and heart transplant evaluations. LVEF 15-20%; Severe left ventricular dilation (LVIDd 6.8cm).  Severe diffuse hypokinesis.  Transplant vs. LVAD meeting (8/25)- Determine that patient is a better candidate for transplant. Will need to continue working on the transplant evaluation checklist. RHC on 9/19 with Dr. Micaela Silvestre. PICC education on Monday, then discharge ready.   - ACE-I/ARB/ARNi: Continue Entresto 49-51 mg BID  - BB: hold PTA Carvedilol 25mg. Many PVCs on 8/26, 8/27 - started carvedilol 3.125mg BID.   - Aldosterone antagonist: Continue PTA beck 25 mg daily. Hold PTA lasix due to low output HF.   - SGLT2i: Hold PTA empagliflozin 10 mg daily due to possibility of procedure (infection risk).  - Ionotropes: start dobutamine 2mg/kg/min. Titrating up pending venous oxyhemoglobin from PICC line. Titrated to 5 mg/kg/min. Switched to milrinone 8/25/23.    - CI: 125 / (13.6)(Hgb) (SaO2-SvO2) - goal CI about 1.5  - SCD prophylaxis: s/p ICD  - %BiV pacing: N/A  - Fluid status: mildly hypovolemic  on physical exam. Hold PTA lasix 10-20 mg daily PRN - pt was taking daily post-ICD placement in June.   - Daily standing weights, 2L fluid, and 2g Na restriction    #PVCs  #Non-sustained Vtach  - CTM telemetry   - Start carvedilol 3.125mg BID       LVAD/Transplant Evaluation Checklist  [x] Labs (CBC, CMP, PT/INR, cystatin C, prealbumin, UA + micro)  [x] Infectious (Hep A/B/C, HIV, treponema, HSV 1/2 IgG, CMV IgG, Toxo IgG, EBV IgG, varicella IgG, Quant gold, COVID vaccine/PCR)  [] Utox/nicotine and cotinine/PeTH   [x] Immunocompatibility (last transfusion, ABO, HLA tissue typing, PRA)  [x] ECG, Echo  [] CPX - not indicated at this time.   [x] 6MWT - Walked w/o pause without needing oxygen.   [x] RHC  [x] CVTS consult  [x] Social work consult - no risks identified. Excellent candidate for procedures. No recs at this time.   [x] Palliative care consult - saw pt on 8/23 with no concerns.  [x] Neuropsych consult - cleared. Recommend reducing pepsi intake.   [x] Nutrition consult  [x] CT Dental - periapical abscess and left mandibular canine caries.   [x] Dental consult - clinically visible caries on R. Maxillary molar, L mandibular premolar. Pt can follow up with his dental provider (scheduled for fillings on 09/05/2023 per patient) if he is medically cleared and stable by med team.  [x] Abd US + doppler  [x] Extremity US and ABIs  [x] Carotid US (if DM or ICM or >49yo)  [] PFTs  [] Dexa  [x] CT head non-contrast  [x] CT CAP non-contrast -    There is a solid pulmonary nodules in the right lower lobe measuring 1.6 x 1.0 cm with associated central calcification (series 3, image 256). Tiny scattered calcified granulomas in the lungs., consider follow-up.   [x] colonoscopy (>49 yo) -  -- Continue to hold heparin/anticoagulation/antiplatelets.               - Monitor INR. Goal INR ~ 1.5 for GI endoscopy.  -- Avoid NSAIDs.  - Performed 8/25/23 found 3 cm sessile polyp which was biopsied. Next colonoscopy in 7-10years.   []  PSA  [x] Hematology consult ordered - Hgb 17-19 during admission likely due to combination of heart failure and possible NATHALIE. Start 3 months of anticoagulation for R. IJV DVT.       #Non occlusive DVT in Right jugular vein   - CTM   - Start apixiban 5mg BID    #Inferior and possible anterior infarct, unspecified timing   #Myocardial Bridge Over Coronary Artery  Myocardial bridge over distal LAD.  - Continue to monitor     #CKD Stage II  -CTM     #HLD  - Atorvastatin 20mg daily     FEN: 2g sodium diet. 2L fluid restriction. Repeat electrolytes as needed.   DVT PPx: enoxaparin   Code: FULL    Disposition: Inpatient admission. Expected discharge in >2 days      Staffed by Dr. Ashley.    Eddie Craig MD  [facility]  Department of Internal Medicine   Resident Physician  PGY-1  Pager: [phone]   ___________________________________________________________________________________  Interval Events  NAEON.   No concerns today. Denies chest pain, palpitations, or any symptoms during the atypical rhythms.     Telemetry: PVCs every hour from 1-7am.   ____________________________________________________________________________________  Physical Exam  /68 (BP Location: Left arm)   Pulse 74   Temp 97.8  F (36.6  C) (Oral)   Resp 18   Wt 89.6 kg (197 lb 9.6 oz)   SpO2 96%   BMI 26.47 kg/m    GEN: NAD   HEENT: EOMI, no icterus, moist mucous membranes   CV: RRR, normal S1/S2, no murmur appreciated   CHEST: Normal work of breathing. Lungs CTAB, no wheezes or crackles.   ABD: Soft, NT/ND   EXT: Warm and well-perfused, no LE edema  NEURO: Awake, alert, answering questions appropriately, motor grossly nonfocal  SKIN: No rash visualized. No LE edema.   PSYCH: cooperative, affect appropriate, cheerful.      Data:      Latest Reference Range & Units 08/27/23 07:25   Sodium 136 - 145 mmol/L 137   Potassium 3.4 - 5.3 mmol/L 4.0   Chloride 98 - 107 mmol/L 104   Carbon Dioxide (CO2) 22 - 29 mmol/L 24   Urea  Nitrogen 6.0 - 20.0 mg/dL 14.0   Creatinine 0.67 - 1.17 mg/dL 1.23 (H)   GFR Estimate >60 mL/min/1.73m2 71   Calcium 8.6 - 10.0 mg/dL 9.1   Anion Gap 7 - 15 mmol/L 9   Magnesium 1.7 - 2.3 mg/dL 2.1   Albumin 3.5 - 5.2 g/dL 3.7   Protein Total 6.4 - 8.3 g/dL 6.2 (L)   Alkaline Phosphatase 40 - 129 U/L 82   ALT 0 - 70 U/L 36   AST 0 - 45 U/L 29   Bilirubin Total <=1.2 mg/dL 0.6   Glucose 70 - 99 mg/dL 92      Latest Reference Range & Units 08/27/23 07:25   FIO2  21   Ph Venous 7.32 - 7.43  7.40   PCO2 Venous 40 - 50 mm Hg 46   PO2 Venous 25 - 47 mm Hg 39   Bicarbonate Venous 21 - 28 mmol/L 28   Base Excess Venous -7.7 - 1.9 mmol/L 2.4 (H)   Oxyhemoglobin Venous 70 - 75 % 72      Latest Reference Range & Units 08/27/23 07:25   WBC 4.0 - 11.0 10e3/uL 5.3   Hemoglobin 13.3 - 17.7 g/dL 16.7   Hematocrit 40.0 - 53.0 % 50.6   Platelet Count 150 - 450 10e3/uL 191   RBC Count 4.40 - 5.90 10e6/uL 5.49   MCV 78 - 100 fL 92   MCH 26.5 - 33.0 pg 30.4   MCHC 31.5 - 36.5 g/dL 33.0     Colonoscopy 8/25/23:        The terminal ileum appeared normal.        The perianal and digital rectal examinations were normal.        A 3 mm polyp was found in the cecum. The polyp was sessile. The polyp        was removed with a cold snare. Resection and retrieval were complete.        The exam was otherwise without abnormality on direct and retroflexion        views.     Upper Ultrasound 8/23  1. Nonocclusive deep venous thrombosis of the right internal jugular  vein.  2. No evidence of deep venous thrombosis in the left upper extremity.       Echo 8/23/23:  Interpretation Summary  Severe left ventricular dilation (LVIDd 6.8cm). Left ventricular function is  decreased. The ejection fraction is 15-20% (severely reduced). Severe diffuse  hypokinesis.  Global right ventricular function is normal.  No significant valve abnormalities.  The inferior vena cava is normal.  No pericardial effusion.     There is no prior study for direct comparison, but LV  function is similar to  echo report from [facility] from 6/13/23.

## 2023-08-27 NOTE — PLAN OF CARE
Temp: 97.9  F (36.6  C) Temp src: Oral BP: 94/69 Pulse: 85   Resp: 18 SpO2: 96 % O2 Device: None (Room air)         D: Admitted for low output heart failure on 8/22 after scheduled RHC showed low CI of 1.5. Now being evaluated for heart Tx.      PMHx: Chronic HFrEF 2/2 NICM s/p ICD, HLD, CKD stage II    I: Monitored vitals and assessed pt status. Encouraged activity.      Changed: At 21:42 pt had a run of 3 beats, then 5 beats of V-tach. VSS. Denies chest pain, SOB, lightheadedness, dizziness, n/v. Team notified and requested BMP to be drawn.   IV Access: R DL PICC; red infusing, purple HL  Running:  Milrinone at 0.125 mcg/kg/min  Cardiac: SR w/multifocal PVC's. HR: 80's-90's. MAP: 70-90's  O2: Room air, sating>95%  Mobility: Up ad amaury  GI/: Voids spontaneously using urinal. LBM: 8/26  Diet: Cardiac diet, 2L FR  Pain:  Denies pain, SOB, lightheadeness, dizziness, numbness, and tingling     A: A&Ox4. VSS. Afebrile. Slept well overnight     P: Continue to monitor pt status. Notify Cards 2 with changes.

## 2023-08-28 VITALS
DIASTOLIC BLOOD PRESSURE: 66 MMHG | RESPIRATION RATE: 18 BRPM | BODY MASS INDEX: 26.55 KG/M2 | WEIGHT: 198.2 LBS | TEMPERATURE: 98.1 F | OXYGEN SATURATION: 96 % | SYSTOLIC BLOOD PRESSURE: 100 MMHG | HEART RATE: 83 BPM

## 2023-08-28 LAB
A*LOCUS SEROLOGIC EQUIVALENT: 2
A*LOCUS: NORMAL
ABTEST METHOD: NORMAL
ALBUMIN SERPL BCG-MCNC: 4 G/DL (ref 3.5–5.2)
ALP SERPL-CCNC: 89 U/L (ref 40–129)
ALT SERPL W P-5'-P-CCNC: 35 U/L (ref 0–70)
ANION GAP SERPL CALCULATED.3IONS-SCNC: 10 MMOL/L (ref 7–15)
AST SERPL W P-5'-P-CCNC: 32 U/L (ref 0–45)
B*: NORMAL
B*LOCUS SEROLOGIC EQUIVALENT: 62
B*LOCUS: NORMAL
B*SEROLOGIC EQUIVALENT: 18
BASE EXCESS BLDV CALC-SCNC: 3.9 MMOL/L (ref -7.7–1.9)
BASOPHILS # BLD AUTO: 0 10E3/UL (ref 0–0.2)
BASOPHILS NFR BLD AUTO: 1 %
BILIRUB SERPL-MCNC: 0.4 MG/DL
BUN SERPL-MCNC: 14.7 MG/DL (ref 6–20)
BW-1: NORMAL
C*: NORMAL
C*LOCUS SEROLOGIC EQUIVALENT: 4
C*LOCUS: NORMAL
C*SEROLOGIC EQUIVALENT: 12
CALCIUM SERPL-MCNC: 9.5 MG/DL (ref 8.6–10)
CHLORIDE SERPL-SCNC: 103 MMOL/L (ref 98–107)
CREAT SERPL-MCNC: 1.19 MG/DL (ref 0.67–1.17)
DEPRECATED HCO3 PLAS-SCNC: 26 MMOL/L (ref 22–29)
DPA1*: NORMAL
DPB1*: NORMAL
DPB1*LOCUS NMDP: NORMAL
DPB1*LOCUS: NORMAL
DPB1*NMDP: NORMAL
DQA1*: NORMAL
DQA1*LOCUS: NORMAL
DQB1*: NORMAL
DQB1*LOCUS SEROLOGIC EQUIVALENT: 5
DQB1*LOCUS: NORMAL
DQB1*SEROLOGIC EQUIVALENT: 6
DRB1*: NORMAL
DRB1*LOCUS SEROLOGIC EQUIVALENT: 1
DRB1*LOCUS: NORMAL
DRB1*SEROLOGIC EQUIVALENT: 15
DRB5*LOCUS SEROLOGIC EQUIVALENT: 51
DRB5*LOCUS: NORMAL
DRSSO TEST METHOD: NORMAL
EOSINOPHIL # BLD AUTO: 0.2 10E3/UL (ref 0–0.7)
EOSINOPHIL NFR BLD AUTO: 4 %
ERYTHROCYTE [DISTWIDTH] IN BLOOD BY AUTOMATED COUNT: 12.7 % (ref 10–15)
GFR SERPL CREATININE-BSD FRML MDRD: 73 ML/MIN/1.73M2
GLUCOSE SERPL-MCNC: 103 MG/DL (ref 70–99)
HCO3 BLDV-SCNC: 32 MMOL/L (ref 21–28)
HCT VFR BLD AUTO: 51.3 % (ref 40–53)
HGB BLD-MCNC: 17.3 G/DL (ref 13.3–17.7)
IMM GRANULOCYTES # BLD: 0 10E3/UL
IMM GRANULOCYTES NFR BLD: 1 %
LYMPHOCYTES # BLD AUTO: 1.8 10E3/UL (ref 0.8–5.3)
LYMPHOCYTES NFR BLD AUTO: 35 %
MAGNESIUM SERPL-MCNC: 2.2 MG/DL (ref 1.7–2.3)
MCH RBC QN AUTO: 31 PG (ref 26.5–33)
MCHC RBC AUTO-ENTMCNC: 33.7 G/DL (ref 31.5–36.5)
MCV RBC AUTO: 92 FL (ref 78–100)
MONOCYTES # BLD AUTO: 0.8 10E3/UL (ref 0–1.3)
MONOCYTES NFR BLD AUTO: 15 %
NEUTROPHILS # BLD AUTO: 2.4 10E3/UL (ref 1.6–8.3)
NEUTROPHILS NFR BLD AUTO: 44 %
NRBC # BLD AUTO: 0 10E3/UL
NRBC BLD AUTO-RTO: 0 /100
O2/TOTAL GAS SETTING VFR VENT: 21 %
OXYHGB MFR BLDV: 27 % (ref 70–75)
PATH REPORT.COMMENTS IMP SPEC: NORMAL
PATH REPORT.COMMENTS IMP SPEC: NORMAL
PATH REPORT.FINAL DX SPEC: NORMAL
PATH REPORT.GROSS SPEC: NORMAL
PATH REPORT.MICROSCOPIC SPEC OTHER STN: NORMAL
PATH REPORT.RELEVANT HX SPEC: NORMAL
PCO2 BLDV: 58 MM HG (ref 40–50)
PH BLDV: 7.35 [PH] (ref 7.32–7.43)
PHOTO IMAGE: NORMAL
PLATELET # BLD AUTO: 191 10E3/UL (ref 150–450)
PO2 BLDV: 20 MM HG (ref 25–47)
POTASSIUM SERPL-SCNC: 4.4 MMOL/L (ref 3.4–5.3)
PROT SERPL-MCNC: 6.8 G/DL (ref 6.4–8.3)
RBC # BLD AUTO: 5.58 10E6/UL (ref 4.4–5.9)
SODIUM SERPL-SCNC: 139 MMOL/L (ref 136–145)
WBC # BLD AUTO: 5.3 10E3/UL (ref 4–11)

## 2023-08-28 PROCEDURE — 83735 ASSAY OF MAGNESIUM: CPT | Performed by: INTERNAL MEDICINE

## 2023-08-28 PROCEDURE — 99239 HOSP IP/OBS DSCHRG MGMT >30: CPT | Mod: GC | Performed by: INTERNAL MEDICINE

## 2023-08-28 PROCEDURE — 85025 COMPLETE CBC W/AUTO DIFF WBC: CPT | Performed by: STUDENT IN AN ORGANIZED HEALTH CARE EDUCATION/TRAINING PROGRAM

## 2023-08-28 PROCEDURE — 96132 NRPSYC TST EVAL PHYS/QHP 1ST: CPT | Performed by: CLINICAL NEUROPSYCHOLOGIST

## 2023-08-28 PROCEDURE — 999N000044 HC STATISTIC CVC DRESSING CHANGE

## 2023-08-28 PROCEDURE — 80053 COMPREHEN METABOLIC PANEL: CPT | Performed by: STUDENT IN AN ORGANIZED HEALTH CARE EDUCATION/TRAINING PROGRAM

## 2023-08-28 PROCEDURE — 82805 BLOOD GASES W/O2 SATURATION: CPT | Performed by: STUDENT IN AN ORGANIZED HEALTH CARE EDUCATION/TRAINING PROGRAM

## 2023-08-28 PROCEDURE — 250N000011 HC RX IP 250 OP 636: Mod: JZ | Performed by: STUDENT IN AN ORGANIZED HEALTH CARE EDUCATION/TRAINING PROGRAM

## 2023-08-28 PROCEDURE — 250N000013 HC RX MED GY IP 250 OP 250 PS 637

## 2023-08-28 PROCEDURE — 250N000013 HC RX MED GY IP 250 OP 250 PS 637: Performed by: STUDENT IN AN ORGANIZED HEALTH CARE EDUCATION/TRAINING PROGRAM

## 2023-08-28 RX ADMIN — APIXABAN 10 MG: 5 TABLET, FILM COATED ORAL at 08:47

## 2023-08-28 RX ADMIN — SACUBITRIL AND VALSARTAN 1 TABLET: 49; 51 TABLET, FILM COATED ORAL at 08:47

## 2023-08-28 RX ADMIN — CARVEDILOL 3.12 MG: 3.12 TABLET, FILM COATED ORAL at 08:47

## 2023-08-28 RX ADMIN — SODIUM CHLORIDE, PRESERVATIVE FREE 5 ML: 5 INJECTION INTRAVENOUS at 08:51

## 2023-08-28 RX ADMIN — SPIRONOLACTONE 25 MG: 25 TABLET ORAL at 08:47

## 2023-08-28 ASSESSMENT — ACTIVITIES OF DAILY LIVING (ADL)
ADLS_ACUITY_SCORE: 18

## 2023-08-28 NOTE — PROGRESS NOTES
Brief GI Luminal Service Sign-Off Note:    S/p Colonoscopy for screening as part of advanced heart therapy work-up:    Impression:            - Screening colonoscopy performed for patient                          currently evaluated for heart transplantation. He                          reports no colorectal symptoms and no FH of colon                          cancer.                          - One small cecal polyp was removed.   Recommendation:        - If polyp is adenomatous repeat colonoscopy in 7-10                          years, if not then re-address screening for colorectal                          cancer in 10 years.   Final Diagnosis   A.  COLON, CECUM, POLYP:  - Tubular adenoma  - No high-grade dysplasia or malignancy identified     Recommendations:   -- Per ASGE guidelines, repeat colonoscopy in 7-10 years.     The inpatient gastroenterology service will sign off at this time. Thank you for allowing us to participate in the care of this patient. Please do not hesitate to page the GI service with any questions or concerns.     Plan discussed and agreed upon with Dr. Jaswant Rodriguez, GI Luminal Staff Physician.     Lorena Hernandez PA-C  Inpatient Gastroenterology Service  [facility]  Pager: [phone]  Text Page

## 2023-08-28 NOTE — CARE PLAN
Neuro: A&O x 4. Afebrile. Pleasant affect. Calls appropriately.   Cardiac: Patient had an unsustained run of v-tach at around 1.35 pm for about 13 seconds. Patient reports to have feeling a pressure in the right side of the head, right eye, radiating to the shoulder and reports this to be the first time feeling symptoms with a VTach run. Symptoms subsided shortly   Respiratory: Sating well on RA. LS clear.   GI/: Good UOP. Denies nausea.   Diet/Appetite: Patient tolerates a Regular diet   LDA: Double lumen PICC line infusing milrinone  Activity: Patient is independent      Plan: Continue to monitor and alert team with any changes or concerns.

## 2023-08-28 NOTE — PROGRESS NOTES
The patient and his wife were given discharge education and verbalized understanding of the teaching. Questions were encouraged and answered. They received additional education with home infusion services regarding continuous milrinone infusion. The patient's PICC line dressing was changed prior to discharge. Belongings were packed and sent with the patient. The patient discharged in stable condition at 5:05 pm.

## 2023-08-28 NOTE — PROGRESS NOTES
Pt was seen for neuropsychological evaluation at the request of Dr. Micaela Silvestre for the purposes of diagnostic clarification and treatment planning. 141 minutes of test administration and scoring were provided by this writer. Please see Dr. Blayne Fernandez's report for a full interpretation of the findings.    Yolanda Barber  Psychometrist

## 2023-08-28 NOTE — RESULT ENCOUNTER NOTE
"Mr. Lutz -     I am writing to let you know the results of the small polyp that was removed at the time of your colonoscopy.  The polyp returned as an \"adenomatous polyp\".  An adenoma is a type of benign polyp. If left in place for years, adenomas have a small risk of developing cancer.  There was no cancer in your polyp.  Your polyp was completely removed, but you are at risk to grow more adenomatous polyps in the future.      Based on your findings, current gastroenterology societies recommend a colonoscopy to look for new polyps in 7 to 10 years.  I suggest that you discuss this with your primary care provider(s) at that time.    If you have any questions, please don't hesitate to contact the Gastroenterology nurse at [phone].       Alejandro Rodriguez MD  Professor of Medicine  Division of Gastroenterology, Hepatology, and Nutrition  [facility] "

## 2023-08-28 NOTE — CONSULTS
NAME: Navin Lutz  MRN: [id number]  : 1970  SALEH: 2023  [facility] [place] [address]      NEUROPSYCHOLOGICAL EVALUATION    RELEVANT HISTORY AND REASON FOR REFERRAL    This is a report of neuropsychological consultation regarding Navin Lutz, a 52-year-old man currently receiving inpatient care for heart failure after scheduled RHC showing low cardiac index of 1.5. This is in the setting of nonischemic cardiomyopathy, s/I ICD placement, stage-2 CKD, and hyperlipidemia. He is being considered for advanced therapies including LVAD and transplant. Dr. Micaela Silvestre ordered this neuropsychological evaluation of brain functioning as part of the standard pre-procedure protocol, to better understand cognitive and psychosocial factors that affect LVAD/transplant candidacy.    I saw him for an interview and psychometric questionnaires on 2023. Please see that chart note for details. Today's visit was to obtain cognitive test data. My psychometrist saw him at bedside today. I did not interact with him today.     BEHAVIORAL OBSERVATIONS    He was pleasant and cooperative throughout testing. He was seated in his hospital bed upright for the duration of testing, and he used a clipboard for all drawing tasks. He was mildly impulsive/careless with some tasks. He noticed and self-corrected his errors at times. He made a comment or two about not having very good short term memory. There were no dramatic or otherwise untoward reactions to cognitive challenge. His effort and persistence were good. The data are seen as valid estimates of his cognitive abilities.    MEASURES ADMINISTERED    The following measures were administered by a trained psychometrist, under my supervision:    Orientation: Time, Place, Basic Personal Information, Recent [place] Presidents; Wide Range Achievement Test 5: Word Reading; Wechsler Abbreviated Scales of Intelligence-2: Vocabulary, Matrix  Reasoning; Laura Visual Acuity Screen; Shannon Making Test; ILS Health and Safety Questionnaire; Repeatable Battery for the Assessment of Neuropsychological Status.     RESULTS AND INTERPRETATION    Orientation: Orientation was XXX for time, place, basic personal information, and basic cultural information.    Language & Related Skills: Basic reading and pronunciation skills were below average. Demonstrating vocabulary knowledge was average. Confrontation naming was low average. Category-based verbal fluency was average.    Visual Perceptual & Constructional Skills: Binocular, corrected, near-point visual acuity was 20/25 on Laura screening. Visual perceptual matching and discrimination of variably oriented lines was high average. Visual reasoning through pattern identification was high average. Copying a complex geometric figure was average.     Mental Speed & Executive Functioning: As noted above, speeded verbal fluency performance was average. Cognitive processing speed was high average on a timed transcription task. Visual scanning and graphomotor sequencing under simple conditions was above average. Scanning and sequencing under greater executive demands to control divided attention was average. Practical reasoning for a variety of health and safety scenarios was high average.    Attention & Working Memory: Immediate auditory attention and working memory were low average for repeating digit strings.     Learning & Anterograde Memory: Immediate verbal memory for short stories was average, and delayed story recall was average. Learning a word list over repeated readings was high average. Delayed free recall of the list was high average, and delayed recognition of the list was normal. Delayed free recall of the complex figure was average.     IMPRESSIONS AND RECOMMENDATIONS    The cognitive test results are minimally abnormal, with trouble in reading and pronunciation skills reflecting his developmental  reading problems that had him repeat a grade and receive remedial supports in elementary school. There are no issues with intellect/reasoning, attention, working memory, cognitive speed, executive functioning, or learning and memory.     Cognitively, Mr. Lutz is an appropriate candidate for LVAD and transplant. We now have strong presurgical baseline data.    Santiago Fernandez, PhD, LP, ABPP  Board Certified in Clinical Neuropsychology  Licensed Psychologist [id number]      Time spent: 37 minutes neuropsychological testing evaluation by licensed and board-certified neuropsychologist, including integration of patient data, interpretation of standardized test results and clinical data, clinical decision-making, treatment planning, report, and interactive feedback to the patient (CPT 76785, 64538). 141 minutes of psychological and neuropsychological test administration and scoring by technician (CPT 11746, 02324). Diagnoses: F81.0, I50.23, F54, Z01.818

## 2023-08-28 NOTE — PLAN OF CARE
Temp: 97.1  F (36.2  C) Temp src: Oral BP: 97/75 Pulse: 74   Resp: 18 SpO2: 96 % O2 Device: None (Room air)         D: Admitted for low output heart failure on 8/22 after scheduled RHC showed low CI of 1.5. Now being evaluated for heart Tx.      PMHx: Chronic HFrEF 2/2 NICM s/p ICD, HLD, CKD stage II    I: Monitored vitals and assessed pt status. Encouraged activity.      IV Access: R DL PICC; red infusing, purple HL  Running:  Milrinone at 0.125 mcg/kg/min  Cardiac: SR w/multifocal PVC's. HR: 70's-90's. MAP: 70-80's. High Mg and K protocol  O2: Room air, sating>95%  Mobility: Up ad amaury  GI/: Voids spontaneously using urinal. LBM: 8/26  Diet: Cardiac diet, 2L FR  Pain:  Denies pain, SOB, lightheadeness, dizziness, numbness, and tingling     A: A&Ox4. VSS. Afebrile. Pt slept well overnight     P: Continue to monitor pt status. Notify Cards 2 with changes. Possible discharge today.

## 2023-08-28 NOTE — PROGRESS NOTES
NAME  Navin Lutz    MRN  [id number]      [birth year]     AGE  52     SEX  Male     HANDEDNESS Left     EDUCATION 12     SALEH  23     PROVIDER  [facility]  SW     STATION  IP            ORIENTATION      Time  0     Place  1.5     Personal Info. 3 /4    Presidents                           WRAT   5     SS %ile GE   Word Reading 76 5 5.3          WASI-II       FSIQ: 107        Raw T    Vocabulary  38 49    Matrix Reasoning 23 59           TRAIL MAKING TEST       Time Errors ScS T   A 18 0 13 66   B 59 0 11 55          ILS HEALTH & SAFETY      Raw 38      T 58              RBANS   A     Raw ScS/%ile    List Learning 33 13    Story Memory 20 11    Figure Copy 18 10    Line Orientation 19 >75    Picture Naming 9 17-25    Semantic Fluency 18 8    Digit Span  8 6    Coding  54 13    List Recall  10 >75    List Recognition 20 51-75    Story Recall 8 8    Figure Recall 12 8             Index     Immediate Mem. 112     Visuospat./Constr. 109     Language  94     Attention  97     Delayed Mem. 101     Total Scale 102            BDI-II       Raw 4      Interp. Minimal             MMPI-3       RCd 36 CRIN 39    RC1 36 VRIN 39    RC2 36 ALAYNA 54 T    RC4 35 F 41    RC6 40 Fp 41    RC7 34 Fs 53    RC8 44 FBS 45    RC9 54 RBS 43      L 65      K 62

## 2023-08-28 NOTE — PROGRESS NOTES
Home Infusion  Navin is discharging today and will be going home on continuous Milrinone therapy.  He requires teaching and a hook up of home medication and pump prior to dc.    Met with patient and spouse Corie at bedside once supplies arrived.  Educated on teaching materials, home supplies, not to flush medication lumen, and CADD pump and PICC line troubleshooting.  Instructed on Milrinone administration via CADD pump and daily heparin flushing of unused PICC lumen using teaching materials.  Had patient and spouse demonstrate syringe preparation and air removal for heparin flushing.  Had spouse demonstrate operating CADD pump and attaching tubing cassette.  Patient observed.  Added extension tubing to Red lumen of PICC line.  Assisted with hook up of Milrinone after reviewing pump settings and verifying with orders.  Assisted spouse with heparin flush of Purple lumen.  Provided information on supplies and ongoing supply deliveries, storage of medication, back up pump, reviewing medication labels, 24/7 availability of [facility] staff and how to contact as needed while on home IV therapy.    Corie and Navin verbalized understanding of information given.  Spouse demonstrated very good technique, and both patient and spouse verbalized good understanding of process.  They feel comfortable discharging and managing the IV therapy at home.  Questions answered.  [facility] unable to secure local home care agency to provide home care visits as patient is not homebound.  RNCC working on scheduling clinic appointments for weekly PICC line dressing changes.  PICC dressing to be changed today prior to discharge.  Instructed patient and spouse to carry medication bags and back up pump onto plane with them when flying home tomorrow.    Patient's home Milrinone infusion is running and he is ready for discharge from [facility] perspective.      LUIS Reid  [facility] Nurse Liaison   [email or web address]  Cell: [phone]  M-F  [facility] Main: [phone]

## 2023-08-28 NOTE — DISCHARGE SUMMARY
[facility]    Discharge Summary  Cardiology     Date of Admission:  8/22/2023  Date of Discharge:  8/28/2023    Discharging Provider: Dion Ashley MD   Date of Service (when I saw the patient): 08/28/2023    Discharge Diagnoses   Low-Output Acute on Chronic Systolic Heart Failure (EF 15-20%) 2/2 NICM  PVC  Non-sustained VTach  Non-occlusive DVT in right internal jugular vein   Inferior and possible anterior infarct, unspecified timing   Myocardial Bridge Over Coronary Artery  CKD Stage II  HLD    History of Present Illness   Navin Lutz is a 52-year-old M with PMH of chronic HFrEF 2/2 NICM s/p ICD, HLD, CKD stage II. He was admitted for low-output heart failure after scheduled RHC showed low cardiac index of 1.5. He was discharged home on milrinone with on-going heart transplant evaluation. Plan for outpatient follow-up.      Hospital Course   #Low output acute on chronic systolic heart failure/HFrEF (EF 13%) 2/2 NICM  NYHA Symptom Class IV, Stage D. Rosario HF Class III. RHC (8/22/23) notable for IESHA 3.2/1.5, decreased CI from last IESHA on 6/2023 of 4.32/2.1. PVR 1.25, SBP 88/65 on admission. 7 cm JVD. LVEF 15-20%; Severe left ventricular dilation. Severe diffuse hypokinesis. Transplant vs. LVAD meeting (8/25) determined that patient a better candidate for transplant. Will need to continue working on the transplant evaluation checklist. RHC on 9/20 with Dr. Micaela Silvestre.   - ACE-I/ARB/ARNi: Entresto 49-51 mg BID  - BB: Carvedilol 3.125 mg BID   - Aldosterone antagonist: Spironolactone 25 mg daily. Hold PTA lasix due to low output HF.   - SGLT2i: Empagliflozin 10 mg daily   - Inotrope: Milrinone  - SCD prophylaxis: s/p ICD  - %BiV pacing: N/A  - Fluid status: Stopped PTA Lasix 10-20 mg daily PRN - pt was taking daily post-ICD placement in June.   - Daily standing weights, 2L fluid, and 2g Na restriction     #PVCs  #Non-sustained VTach  -Carvedilol 3.125 mg  BID      LVAD/Transplant Evaluation Checklist  [x] Labs (CBC, CMP, PT/INR, cystatin C, prealbumin, UA + micro)  [x] Infectious (Hep A/B/C, HIV, treponema, HSV 1/2 IgG, CMV IgG, Toxo IgG, EBV IgG, varicella IgG, Quant gold, COVID vaccine/PCR)  [x] Utox/nicotine and cotinine/PeTH   [x] Immunocompatibility (last transfusion, ABO, HLA tissue typing, PRA)  [x] ECG, Echo  [] CPX - not indicated at this time.   [x] 6MWT - Walked w/o pause without needing oxygen.   [x] RHC  [x] CVTS consult  [x] Social work consult - no risks identified. Excellent candidate for procedures. No recs at this time.   [x] Palliative care consult - saw pt on 8/23 with no concerns.  [x] Neuropsych consult - cleared. Recommend reducing pepsi intake.   [x] Nutrition consult  [x] CT Dental - periapical abscess and left mandibular canine caries.   [x] Dental consult - clinically visible caries on R. Maxillary molar, L mandibular premolar. Pt can follow up with his dental provider (scheduled for fillings on 09/05/2023 per patient) if he is medically cleared and stable by med team.  [x] Abd US + doppler  [x] Extremity US and ABIs  [x] Carotid US (if DM or ICM or >49yo)  [] PFTs  [] Dexa  [x] CT head non-contrast  [x] CT CAP non-contrast -  there is a solid pulmonary nodules in the right lower lobe measuring 1.6 x 1.0 cm with associated central calcification (series 3, image 256). Tiny scattered calcified granulomas in the lungs, consider follow-up.   [x] colonoscopy (>49 yo) - tubular adenoma, no high grade dysplasia or malignancy identified, repeat colonoscopy in 7-10 yeas   -- Continue to hold heparin/anticoagulation/antiplatelets.               - Monitor INR. Goal INR ~ 1.5 for GI endoscopy.  -- Avoid NSAIDs.  - Performed 8/25/23 found 3 cm sessile polyp which was biopsied. Next colonoscopy in 7-10years.   [x] PSA  [x] Hematology consult ordered - Hgb 17-19 during admission likely due to combination of heart failure and possible NATHALIE. Start 3 months  of anticoagulation for R. IJV DVT.      #Non-occlusive DVT in Right Internal Jugular Vein   - Discharged on Apixaban   Non-Occlusive DVT of right IJV related to previous central line placements      #Inferior and possible anterior infarct, unspecified timing   #Myocardial Bridge Over Coronary Artery  Myocardial bridge over distal LAD.    #CKD Stage II  -CTM      #HLD  - Atorvastatin 20mg daily     Significant Results and Procedures   RHC (08/22/2023)    Hemodynamics:   RA --/--/5  RV 20/5  PA 20/12/16  PCWP --/--/12  IESHA 3.2/1.5  TD 2.93/1.36  MAP 96  PVR 1.25 (IESHA)  SVR 2275 (IESHA)   Right sided filling pressures are normal. Left sided filling pressures are normal. Normal PA pressures. Reduced cardiac output level     Conclusion    Right sided filling pressures are normal.    Left sided filling pressures are normal.    Normal PA pressures.    Reduced cardiac output level.    Code Status   Full Code       Primary Care Physician   Libertad Briceno    GEN: NAD   HEENT: EOMI, no icterus, moist mucous membranes   CV: RRR, normal S1/S2, no murmur appreciated   CHEST: normal work of breathing. Lungs CTAB, no wheezes or crackles.   ABD: Soft, NT/ND   EXT: Warm and well-perfused, no LE edema  NEURO: Awake, alert, answering questions appropriately, motor grossly nonfocal  SKIN: No rash visualized. No LE edema.   PSYCH: cooperative, affect appropriate    Discharge Disposition   Discharged to home  Condition at discharge: stable    Consultations This Hospital Stay   VASCULAR ACCESS FOR PICC PLACEMENT ADULT IP CONSULT  CARDIOTHORACIC SURGERY ADULT IP CONSULT  SOCIAL WORK IP CONSULT  NEUROPSYCHOLOGY IP CONSULT  NUTRITION SERVICES ADULT IP CONSULT  PALLIATIVE CARE ADULT IP CONSULT  DENTISTRY ADULT IP CONSULT  OCCUPATIONAL THERAPY ADULT IP CONSULT  CARE MANAGEMENT / SOCIAL WORK IP CONSULT  NURSING TO CONSULT FOR VASCULAR ACCESS CARE IP CONSULT  GI LUMINAL ADULT IP CONSULT  DENTAL HYGIENE IP ADULT CONSULT - UU  HEMATOLOGY ADULT  IP CONSULT  NURSING TO CONSULT FOR VASCULAR ACCESS CARE IP CONSULT    Time Spent on this Encounter   I, Dion Ashley MD, personally saw the patient today and spent greater than 30 minutes discharging this patient.    Discharge Orders      Home Infusion Referral      Reason for your hospital stay    You were hospitalized for your heart failure and treated with medications. We also evaluated you for advanced therapies (LVAD versus heart transplant).     Activity    Your activity upon discharge: activity as tolerated     Adult [facility] Follow-up and recommended labs and tests    Please follow-up with Dr. Micaela Silvestre (heart failure cardiology) on 09/20/2023 1:45 PM     Appointments on [facility] and/or [facility] (with [facility] or [facility] provider or service). Call [phone] if you haven't heard regarding these appointments within 7 days of discharge.     Diet    Follow this diet upon discharge: Orders Placed This Encounter      Fluid restriction 2000 ML FLUID      Snacks/Supplements Adult: Ensure Enlive; Between Meals      2 Gram Sodium Diet     Discharge Medications   Discharge Medication List as of 8/28/2023  5:08 PM        START taking these medications    Details   !! apixaban ANTICOAGULANT (ELIQUIS) 5 MG tablet Take 2 tablets (10 mg) by mouth 2 times daily for 6 days, Disp-24 tablet, R-0, E-Prescribe      !! apixaban ANTICOAGULANT (ELIQUIS) 5 MG tablet Take 1 tablet (5 mg) by mouth 2 times daily, Disp-168 tablet, R-0, E-Prescribe      atorvastatin (LIPITOR) 20 MG tablet Take 1 tablet (20 mg) by mouth every evening, Disp-30 tablet, R-0, E-Prescribe      milrinone (PRIMACOR) infusion 200 mcg/mL PREMIX Inject 11.5125 mcg/min into the vein continuous for 30 days, Disp-100 mL, R-0, E-Prescribe      sacubitril-valsartan (ENTRESTO) 49-51 MG per tablet Take 1 tablet by mouth 2 times daily, Disp-60 tablet, R-0, E-Prescribe       !! - Potential duplicate medications found. Please discuss with provider.         CONTINUE these medications which have CHANGED    Details   carvedilol (COREG) 3.125 MG tablet Take 1 tablet (3.125 mg) by mouth 2 times daily (with meals), Disp-60 tablet, R-0, E-Prescribe           CONTINUE these medications which have NOT CHANGED    Details   empagliflozin (JARDIANCE) 10 MG TABS tablet Take 10 mg by mouth daily, Historical      nitroGLYcerin (NITROSTAT) 0.4 MG sublingual tablet Place 0.4 mg under the tongue every 5 minutes as needed, Historical      spironolactone (ALDACTONE) 25 MG tablet Take 25 mg by mouth daily, Historical           STOP taking these medications       furosemide (LASIX) 20 MG tablet Comments:   Reason for Stopping:         sacubitril-valsartan (ENTRESTO)  MG per tablet Comments:   Reason for Stopping:

## 2023-08-28 NOTE — PROGRESS NOTES
"At around 1450 writer was told by telemetry tech that patient had 13 beats v-tach. Patient felt symptomatic, said he \"had a throbbing feeling\" right side of head. Vitals were stable. Provider and bedside nurse were notified  "

## 2023-08-28 NOTE — PROGRESS NOTES
Care Management Discharge Note    Discharge Date: 08/28/2023       Discharge Disposition:  Home    Discharge Services:  [facility]     RNCC working on outpatient PICC care near pt's home    Pre-transplant coordinator assisted in getting pt scheduled at his local cardiology clinic on 9/13/2023 at 11:15. Dr. Silvestre is aware.     Discharge DME:  None     Discharge Transportation: Pt staying locally tonight and then he and wife flying back to [place] tomorrow.     Private pay costs discussed: Not applicable    Does the patient's insurance plan have a 3 day qualifying hospital stay waiver?  N/A     PAS Confirmation Code:  none   Patient/family educated on Medicare website which has current facility and service quality ratings:  n/a     Education Provided on the Discharge Plan:  Yes  Persons Notified of Discharge Plans: Pt, wife, pre-transplant coordinator and RNCC  Patient/Family in Agreement with the Plan: yes    Handoff Referral Completed: No    Additional Information:  Pt and wife eager to discharge today and fly back home tomorrow. Provided more transplant/LVAD teaching. Pt and wife are aware that writer is available for ongoing support in outpatient setting.     Eloise Starr, GENASW

## 2023-08-28 NOTE — PROGRESS NOTES
Care Management Follow Up    Length of Stay (days): 6    Expected Discharge Date: 08/28/2023     Concerns to be Addressed: adjustment to diagnosis/illness, care coordination/care conferences, discharge planning, other (see comments) (starting advanced heart failure eval)     Patient plan of care discussed at interdisciplinary rounds: Yes    Anticipated Discharge Disposition:  Home with outpatient infusion center     Anticipated Discharge Services:  Home with outpatient infusion  Anticipated Discharge DME:  TBD    Patient/family educated on Medicare website which has current facility and service quality ratings:  NA  Education Provided on the Discharge Plan:  yes  Patient/Family in Agreement with the Plan: yes    Referrals Placed by CM/SW:  I, Outpatient infusion center  Private pay costs discussed: Not applicable    Additional Information:     [facility] requested to confirm EED. Paged the attending MD and  Kiara Morgan on DIANA.    PLC pending for 3pm     Dion Ashley MD called back and confirmed that patient is medically ready to discharge after the PLC.    Called [facility] at [phone] to follow up on referral and left a voicemail.    [facility] to follow up on referral but the number is out of service.     [facility] is securing home care for patient.   Will continue to follow up.     Called primary [phone] to help set up a follow up appointment no answer.   Went to meet patient at [facility] with Eloise MONAHAN. Patient said his cardiologist is Bryce Ortega. Called [phone] to book the appointment. They said they will check with the nurse and call me back.  Call back received and appointment booked for     New recommendation is home with outpatient cardiac rehab. No more HC referral needed at this time. [facility] will follow up. Provided Tomasz Dave [phone] phone number [facility]. Also called Gelacio Dave and left a voicemail    1510: [facility] said they are unable  "to secure home care for patient .  1529 : New referral sent to [facility]   [address] [place] [address]         Phone: [phone]       ---[facility]  Mo Gonzalez   [phone]  Was asked to leave a voicemail and someone will call back.  1539 got a call back from the [facility] infusion center, they are not ready to book patient unless they receive an order from they received a direct order from a local provider in [facility] not form [facility]. So they declined booking patient.    Talk to Herminia Cote as well, she suggested to connect with PCP.  PCP number is not working.    Talked to Iliana ROTHMAN who states \"If he gets his PICC dressing changes done today then you or whoever is on 6C tomorrow can f/u and will have a week to get it set up until he needs his PICC dressing changed.\"  Updated bedside nurse and she said they will get his picc line dressing done today.    1609 Updated patient as well.   His wife provided me with a local clinic number at [facility] [phone]  I was told that the nurse is gone for that day and they will give us a call tomorrow.  Patient updated.    Care Management Discharge Note    Discharge Date: 08/28/2023       Discharge Disposition:  home with outpatient infusion    Discharge Services:  infusion services    Discharge DME:  infusion supplies    Discharge Transportation: family or friend will provide (Pt and [place] flew [place] [facility] [place])    Private pay costs discussed: Not applicable    Does the patient's insurance plan have a 3 day qualifying hospital stay waiver?  No    PAS Confirmation Code:    Patient/family educated on Medicare website which has current facility and service quality ratings:      Education Provided on the Discharge Plan:    Persons Notified of Discharge Plans: patient , wife, nurse and care team  Patient/Family in Agreement with the Plan: yes    Handoff Referral Completed: Yes    Additional " Information:  Hand off referral sent with instruction to follow up with patient on PICC line dressing change and detail information of the two outpatient infusion center contacted.      Family was present at bedside . Bedside Nurse was present in the room and will take care of PICC line dressing change before patient discharges today. All updated on infusion care provided to them . Patient is discharging to the hotel today and flying out to [place] tomorrow morning at 10 am. Writer offers to follow up tomorrow and will provide them with updates. Patient and family are in agreement with discharge plan.     Triny Ruggiero RN, PHN, BSN   Float Nurse Care Coordinator  Covering for [address]  Phone [phone]

## 2023-08-29 NOTE — PROGRESS NOTES
Care Management Follow Up      Additional Information:       [facility] [phone] left  voicemail.   Called [facility] back and they are open to take patient .   Haley provided with fax number.  Faxed all the information to [phone]  Patient preferred [facility] since it is 5 minutes from his home as opposed to 45 min drive to [place].    1232: called [facility] for update.  Haley Nurse said patient will need to come in to establish care so their provider can follow up with the order for PICC line dressing change and she agreed to book patient in for Tuesday September 5th at 11 am since Monday is a holiday.    1242: Called and updated patient him on the information and he was appreciative. He landed in [facility] and is on his way home.    Triny Ruggiero RN, PHN, BSN   [facility] Care Coordinator  Covering for [address]  Phone [phone]

## 2023-08-30 ENCOUNTER — TELEPHONE (OUTPATIENT)
Dept: CARDIOLOGY | Facility: CLINIC | Age: 53
End: 2023-08-30
Payer: COMMERCIAL

## 2023-08-30 LAB — METHYLMALONATE SERPL-SCNC: 0.19 UMOL/L (ref 0–0.4)

## 2023-08-30 NOTE — TELEPHONE ENCOUNTER
[facility]: Heart Failure Care Coordination   Heart Failure Education    Situation/Background:      RN CC provided heart failure education to Navin  during  admissions and on 48 hour discharge call .    Assessment:      Living situation: home, independent    Barriers to Heart Failure follow-up: Pt discharged back home to [place]. Pt has Follow-up appt with Dr. Silvestre on 9/20. At discharge, Follow-up with pt local Cardiologist Dr. Shannon Alvarado was supposed to be arranged prior to discharge; it was not.     Pt will call local Cardiologist to try and be seen this week with labs.     Pt has PCP appt Tuesday 9/5 for PICC Dressing change.     Medication management: independent    Confirmed pt has Milrinone infusing; [facility] managing medication and infusion.     Intervention/Plan:      CM/HF education topics reviewed:  Low sodium: 2000 mg or less daily, meal choices and label reading   Daily weight monitoring and logging   Home blood pressure monitoring and logging     Patient to call/121 [employer] message with updates.  Confirmed patient has clinic and scheduling phone numbers.     Patient expressed understanding of above education/instructions and denied further questions at this time.    [facility]: Heart Failure Care Coordination   Post Discharge Outreach     Situation/Background:      Discharge:  Hospital Course  #Low output acute on chronic systolic heart failure/HFrEF (EF 13%) 2/2 NICM  NYHA Symptom Class IV, Stage D. Rosario HF Class III. RHC (8/22/23) notable for IESHA 3.2/1.5, decreased CI from last IESHA on 6/2023 of 4.32/2.1. PVR 1.25, SBP 88/65 on admission. 7 cm JVD. LVEF 15-20%; Severe left ventricular dilation. Severe diffuse hypokinesis. Transplant vs. LVAD meeting (8/25) determined that patient a better candidate for transplant. Will need to continue working on the transplant evaluation checklist. RHC on 9/20 with Dr. Micaela Silvestre.        Background:      Assessment:         Pt states he is feeling very well at home; his weights have been stable since discharge, today's weight 198-199#. Pt denies any SOB, swelling or fluid retention symptoms.     Pt has a BP cuff but has not started using it. Writer asked that pt please check BP daily about 90 min after taking AM medications and record. If BP is consistently less than 90/50 or has persistent lightheadedness/dizziness please call clinic. Confirmed that pt has clinic phone number and access to [employer].             Intervention/Plan:      CM/HF education topics reviewed:         Outpatient Plan:   -Pt will call local cardiologist Dr. Alvarado to be seen this week.   -PICC drsg change 9/5/23.      Future Appointments   Date Time Provider Department Center   9/20/2023  1:15 PM  LAB [facility]   9/20/2023  1:45 PM Micaela Silvestre MD [facility]         Patient instructed to contact our office at ph: [phone] for any urgent concerns that cannot wait until the next business day.     Sue Schuster RN

## 2023-08-30 NOTE — TELEPHONE ENCOUNTER
----- Message from Sue Schuster RN sent at 8/28/2023  3:17 PM CDT -----  8/28 48 hour discharge call. Pt discharged on milrinone through [facility]; make sure pt has lab appt and local Follow-up appt.

## 2023-09-01 ENCOUNTER — CARE COORDINATION (OUTPATIENT)
Dept: CARDIOLOGY | Facility: CLINIC | Age: 53
End: 2023-09-01
Payer: COMMERCIAL

## 2023-09-01 DIAGNOSIS — I50.23 ACUTE ON CHRONIC SYSTOLIC CHF (CONGESTIVE HEART FAILURE) (H): Primary | ICD-10-CM

## 2023-09-01 RX ORDER — LIDOCAINE 40 MG/G
CREAM TOPICAL
Status: CANCELLED | OUTPATIENT
Start: 2023-09-01

## 2023-09-01 NOTE — PROGRESS NOTES
Called Navin. He reports feeling very well, no complaints. HE does have a local cardio appointment for 9/8. He has completed dental evaluation.    Reviewed with Dr Silvestre. Patient should have right heart cath with next appointment on 9/20.

## 2023-09-06 ENCOUNTER — CARE COORDINATION (OUTPATIENT)
Dept: TRANSPLANT | Facility: CLINIC | Age: 53
End: 2023-09-06
Payer: COMMERCIAL

## 2023-09-06 DIAGNOSIS — I50.23 ACUTE ON CHRONIC SYSTOLIC CHF (CONGESTIVE HEART FAILURE) (H): Primary | ICD-10-CM

## 2023-09-06 NOTE — PROGRESS NOTES
Spoke with pt regarding heart transplant eval. Pt reports dental work is complete, will email author letter of dental clearance. Only other pending item is dexa scan, will attempt to get appt on 9/20 when RTC to see Dr. Silvestre. Pt reports feeling well at home on milrinone.

## 2023-09-18 ENCOUNTER — CARE COORDINATION (OUTPATIENT)
Dept: CARDIOLOGY | Facility: CLINIC | Age: 53
End: 2023-09-18
Payer: COMMERCIAL

## 2023-09-18 NOTE — PROGRESS NOTES
Called Navin to review pre procedure information.no answer. Left detailed message and phone number for him to call back. Will also send via [employer].     Pre-procedure instructions - Right heart catheterization  Patient Education    Your arrival time is 0730am on 9/20.  Location is [id number] [place] [address] Waiting Room  Please plan on being at the hospital all day.  At any time, emergencies and/or urgent cases may come up which could delay the start of your procedure.    Pre-procedure instructions - Right heart catheterization  No solid food for 8 hours prior  Nothing to drink 2 hours prior to arrival time  You can take your morning medications (except diabetic and blood thinners) with sips of water  We recommend you arrange for a ride to drop you off and pick you up, in the instance, you are unable to drive home, however you should be able to function as you normally would after the procedure    Diabetic Medication Instructions  Typical instructions for insulin diabetic medication holding are below. However, please reach out to your Primary Care Provider or Endocrinologist for specific instructions  DO NOT take any oral diabetic medication, short-acting diabetes medications/insulin, humalog or regular insulin the morning of your test  Take   dose of long-acting insulin (Lantus, Levemir) the day of your test  Remember to  bring your glucometer and insulin with you to take after your test if needed  DO NOT take injectable GLP-1 agonists semaglutide (Ozempic, Wegovy), dulaglutide (Trulicity), exenatide ER (Bydureon), tirzepatide (Mounjaro), or oral semaglutide (Rybelsus) for 7 days prior your procedure  Hold once daily injectable GLP-1 agonists exenatide (Byetta), liraglutide (Saxenda, Victoza) the day before and day of your procedure                Anticoagulation Medication Instructions   apixaban (ELIQUIS) - Hold 48 hours prior to  procedure    Please let me know if you have any questions!

## 2023-09-19 ASSESSMENT — ENCOUNTER SYMPTOMS
HYPOTENSION: 0
DIFFICULTY URINATING: 0
SYNCOPE: 0
ORTHOPNEA: 0
LEG PAIN: 0
DYSURIA: 0
HEMOPTYSIS: 0
LIGHT-HEADEDNESS: 1
DYSPNEA ON EXERTION: 0
WHEEZING: 0
FLANK PAIN: 0
SLEEP DISTURBANCES DUE TO BREATHING: 0
SPUTUM PRODUCTION: 0
POSTURAL DYSPNEA: 0
PALPITATIONS: 0
HYPERTENSION: 0
COUGH DISTURBING SLEEP: 1
COUGH: 1
HEMATURIA: 0
EXERCISE INTOLERANCE: 0
SHORTNESS OF BREATH: 1
SNORES LOUDLY: 0

## 2023-09-19 NOTE — PROGRESS NOTES
Called dewayne again. He states he hasn't seen mycharts or gotten my voicemail. Reviewed prep information and hold for eliquis. He hasn't taken meds yet today - confirmed he will hold eliquis until procedure done. Confirmed appointments for labs, RHC and dr morris to follow.

## 2023-09-20 ENCOUNTER — HOSPITAL ENCOUNTER (OUTPATIENT)
Facility: CLINIC | Age: 53
Discharge: HOME | End: 2023-09-20
Attending: INTERNAL MEDICINE | Admitting: INTERNAL MEDICINE
Payer: COMMERCIAL

## 2023-09-20 ENCOUNTER — OFFICE VISIT (OUTPATIENT)
Dept: CARDIOLOGY | Facility: CLINIC | Age: 53
End: 2023-09-20
Attending: STUDENT IN AN ORGANIZED HEALTH CARE EDUCATION/TRAINING PROGRAM
Payer: COMMERCIAL

## 2023-09-20 ENCOUNTER — TELEPHONE (OUTPATIENT)
Dept: ANTICOAGULATION | Facility: CLINIC | Age: 53
End: 2023-09-20

## 2023-09-20 ENCOUNTER — INFUSION THERAPY VISIT (OUTPATIENT)
Dept: INFUSION THERAPY | Facility: CLINIC | Age: 53
End: 2023-09-20
Attending: INTERNAL MEDICINE
Payer: COMMERCIAL

## 2023-09-20 ENCOUNTER — APPOINTMENT (OUTPATIENT)
Dept: LAB | Facility: CLINIC | Age: 53
End: 2023-09-20
Attending: INTERNAL MEDICINE
Payer: COMMERCIAL

## 2023-09-20 ENCOUNTER — APPOINTMENT (OUTPATIENT)
Dept: MEDSURG UNIT | Facility: CLINIC | Age: 53
End: 2023-09-20
Attending: INTERNAL MEDICINE
Payer: COMMERCIAL

## 2023-09-20 VITALS
OXYGEN SATURATION: 95 % | HEART RATE: 88 BPM | BODY MASS INDEX: 28.21 KG/M2 | WEIGHT: 208 LBS | DIASTOLIC BLOOD PRESSURE: 73 MMHG | SYSTOLIC BLOOD PRESSURE: 109 MMHG

## 2023-09-20 VITALS
HEIGHT: 72 IN | BODY MASS INDEX: 27.79 KG/M2 | RESPIRATION RATE: 18 BRPM | OXYGEN SATURATION: 99 % | DIASTOLIC BLOOD PRESSURE: 78 MMHG | WEIGHT: 205.2 LBS | HEART RATE: 90 BPM | TEMPERATURE: 97.7 F | SYSTOLIC BLOOD PRESSURE: 122 MMHG

## 2023-09-20 DIAGNOSIS — I42.8 NON-ISCHEMIC CARDIOMYOPATHY (H): Primary | ICD-10-CM

## 2023-09-20 DIAGNOSIS — I82.622 ACUTE DEEP VEIN THROMBOSIS (DVT) OF OTHER VEIN OF LEFT UPPER EXTREMITY (H): ICD-10-CM

## 2023-09-20 DIAGNOSIS — I50.23 ACUTE ON CHRONIC SYSTOLIC CHF (CONGESTIVE HEART FAILURE) (H): Primary | ICD-10-CM

## 2023-09-20 DIAGNOSIS — I50.23 ACUTE ON CHRONIC SYSTOLIC CHF (CONGESTIVE HEART FAILURE) (H): ICD-10-CM

## 2023-09-20 DIAGNOSIS — N18.30 STAGE 3 CHRONIC KIDNEY DISEASE, UNSPECIFIED WHETHER STAGE 3A OR 3B CKD (H): ICD-10-CM

## 2023-09-20 DIAGNOSIS — I42.8 NON-ISCHEMIC CARDIOMYOPATHY (H): ICD-10-CM

## 2023-09-20 LAB
ALBUMIN SERPL BCG-MCNC: 4 G/DL (ref 3.5–5.2)
ALP SERPL-CCNC: 99 U/L (ref 40–129)
ALT SERPL W P-5'-P-CCNC: 42 U/L (ref 0–70)
ANION GAP SERPL CALCULATED.3IONS-SCNC: 9 MMOL/L (ref 7–15)
AST SERPL W P-5'-P-CCNC: 28 U/L (ref 0–45)
BASOPHILS # BLD AUTO: 0 10E3/UL (ref 0–0.2)
BASOPHILS NFR BLD AUTO: 1 %
BILIRUB SERPL-MCNC: 0.4 MG/DL
BUN SERPL-MCNC: 15.6 MG/DL (ref 6–20)
CALCIUM SERPL-MCNC: 9.2 MG/DL (ref 8.6–10)
CHLORIDE SERPL-SCNC: 105 MMOL/L (ref 98–107)
CREAT SERPL-MCNC: 1.24 MG/DL (ref 0.67–1.17)
DEPRECATED HCO3 PLAS-SCNC: 23 MMOL/L (ref 22–29)
EGFRCR SERPLBLD CKD-EPI 2021: 70 ML/MIN/1.73M2
EOSINOPHIL # BLD AUTO: 0.2 10E3/UL (ref 0–0.7)
EOSINOPHIL NFR BLD AUTO: 3 %
ERYTHROCYTE [DISTWIDTH] IN BLOOD BY AUTOMATED COUNT: 13.5 % (ref 10–15)
GLUCOSE SERPL-MCNC: 107 MG/DL (ref 70–99)
HCT VFR BLD AUTO: 51.6 % (ref 40–53)
HGB BLD-MCNC: 17.9 G/DL (ref 13.3–17.7)
IMM GRANULOCYTES # BLD: 0 10E3/UL
IMM GRANULOCYTES NFR BLD: 0 %
LYMPHOCYTES # BLD AUTO: 2.1 10E3/UL (ref 0.8–5.3)
LYMPHOCYTES NFR BLD AUTO: 34 %
MCH RBC QN AUTO: 31 PG (ref 26.5–33)
MCHC RBC AUTO-ENTMCNC: 34.7 G/DL (ref 31.5–36.5)
MCV RBC AUTO: 89 FL (ref 78–100)
MONOCYTES # BLD AUTO: 0.7 10E3/UL (ref 0–1.3)
MONOCYTES NFR BLD AUTO: 11 %
NEUTROPHILS # BLD AUTO: 3.2 10E3/UL (ref 1.6–8.3)
NEUTROPHILS NFR BLD AUTO: 51 %
NRBC # BLD AUTO: 0 10E3/UL
NRBC BLD AUTO-RTO: 0 /100
PLATELET # BLD AUTO: 181 10E3/UL (ref 150–450)
POTASSIUM SERPL-SCNC: 4.2 MMOL/L (ref 3.4–5.3)
PROT SERPL-MCNC: 6.7 G/DL (ref 6.4–8.3)
RBC # BLD AUTO: 5.78 10E6/UL (ref 4.4–5.9)
SODIUM SERPL-SCNC: 137 MMOL/L (ref 136–145)
WBC # BLD AUTO: 6.2 10E3/UL (ref 4–11)

## 2023-09-20 PROCEDURE — 99215 OFFICE O/P EST HI 40 MIN: CPT | Performed by: STUDENT IN AN ORGANIZED HEALTH CARE EDUCATION/TRAINING PROGRAM

## 2023-09-20 PROCEDURE — 80053 COMPREHEN METABOLIC PANEL: CPT | Performed by: STUDENT IN AN ORGANIZED HEALTH CARE EDUCATION/TRAINING PROGRAM

## 2023-09-20 PROCEDURE — 250N000009 HC RX 250: Performed by: STUDENT IN AN ORGANIZED HEALTH CARE EDUCATION/TRAINING PROGRAM

## 2023-09-20 PROCEDURE — 999N000132 HC STATISTIC PP CARE STAGE 1

## 2023-09-20 PROCEDURE — 85025 COMPLETE CBC W/AUTO DIFF WBC: CPT | Performed by: STUDENT IN AN ORGANIZED HEALTH CARE EDUCATION/TRAINING PROGRAM

## 2023-09-20 PROCEDURE — 250N000009 HC RX 250: Performed by: INTERNAL MEDICINE

## 2023-09-20 PROCEDURE — C1751 CATH, INF, PER/CENT/MIDLINE: HCPCS | Performed by: INTERNAL MEDICINE

## 2023-09-20 PROCEDURE — 36415 COLL VENOUS BLD VENIPUNCTURE: CPT | Performed by: STUDENT IN AN ORGANIZED HEALTH CARE EDUCATION/TRAINING PROGRAM

## 2023-09-20 PROCEDURE — 99213 OFFICE O/P EST LOW 20 MIN: CPT | Performed by: STUDENT IN AN ORGANIZED HEALTH CARE EDUCATION/TRAINING PROGRAM

## 2023-09-20 PROCEDURE — 93451 RIGHT HEART CATH: CPT | Performed by: INTERNAL MEDICINE

## 2023-09-20 PROCEDURE — 999N000142 HC STATISTIC PROCEDURE PREP ONLY

## 2023-09-20 PROCEDURE — 93451 RIGHT HEART CATH: CPT | Mod: 26 | Performed by: INTERNAL MEDICINE

## 2023-09-20 PROCEDURE — 272N000001 HC OR GENERAL SUPPLY STERILE: Performed by: INTERNAL MEDICINE

## 2023-09-20 RX ORDER — HEPARIN SODIUM,PORCINE 10 UNIT/ML
5-20 VIAL (ML) INTRAVENOUS DAILY PRN
Status: CANCELLED | OUTPATIENT
Start: 2023-09-27

## 2023-09-20 RX ORDER — HEPARIN SODIUM,PORCINE 10 UNIT/ML
5-20 VIAL (ML) INTRAVENOUS DAILY PRN
Status: CANCELLED | OUTPATIENT
Start: 2023-09-20

## 2023-09-20 RX ORDER — MILRINONE LACTATE 0.2 MG/ML
0.25 INJECTION, SOLUTION INTRAVENOUS CONTINUOUS
Qty: 100 ML | Refills: 0 | Status: ON HOLD | COMMUNITY
Start: 2023-09-20 | End: 2023-11-20

## 2023-09-20 RX ORDER — LIDOCAINE HYDROCHLORIDE 20 MG/ML
INJECTION, SOLUTION INFILTRATION; PERINEURAL
Status: DISCONTINUED | OUTPATIENT
Start: 2023-09-20 | End: 2023-09-20 | Stop reason: HOSPADM

## 2023-09-20 RX ORDER — HEPARIN SODIUM (PORCINE) LOCK FLUSH IV SOLN 100 UNIT/ML 100 UNIT/ML
5 SOLUTION INTRAVENOUS
Status: CANCELLED | OUTPATIENT
Start: 2023-09-27

## 2023-09-20 RX ORDER — HEPARIN SODIUM (PORCINE) LOCK FLUSH IV SOLN 100 UNIT/ML 100 UNIT/ML
5 SOLUTION INTRAVENOUS
Status: CANCELLED | OUTPATIENT
Start: 2023-09-20

## 2023-09-20 RX ORDER — LIDOCAINE 40 MG/G
CREAM TOPICAL
Status: COMPLETED | OUTPATIENT
Start: 2023-09-20 | End: 2023-09-20

## 2023-09-20 RX ORDER — WARFARIN SODIUM 1 MG/1
3 TABLET ORAL DAILY
Qty: 90 TABLET | Refills: 1 | Status: ON HOLD | OUTPATIENT
Start: 2023-09-20 | End: 2023-11-20

## 2023-09-20 RX ORDER — HEPARIN SODIUM,PORCINE 10 UNIT/ML
5-20 VIAL (ML) INTRAVENOUS DAILY PRN
Status: DISCONTINUED | OUTPATIENT
Start: 2023-09-20 | End: 2023-09-20 | Stop reason: HOSPADM

## 2023-09-20 RX ADMIN — LIDOCAINE: 40 CREAM TOPICAL at 08:42

## 2023-09-20 ASSESSMENT — ACTIVITIES OF DAILY LIVING (ADL): ADLS_ACUITY_SCORE: 35

## 2023-09-20 ASSESSMENT — PAIN SCALES - GENERAL: PAINLEVEL: NO PAIN (0)

## 2023-09-20 NOTE — NURSING NOTE
Diet: Patient instructed regarding a heart failure healthy diet, including discussion of reduced fat and 2000 mg daily sodium restriction, daily weights, medication purpose and compliance, fluid restrictions and resources for patient and family to access for assistance with heart failure management.       Labs: Patient was given results of the laboratory testing obtained today and patient was instructed about when to return for the next laboratory testing. Repeat CMP in 1 month. Order faxed.     Med Reconcile: Reviewed and verified all current medications with the patient. The updated medication list was printed and given to the patient. INCREASE milrinone dose. Called home infusion, gave orders via phone. They will send new pumps with updated dosing, set to arrive tomorrow. STOP eliquis. START warfarin, 3mg once daily. New referral sent to [facility].     Return Appointment: Patient given instructions regarding scheduling next clinic visit. Increase fluid restriction to 3L. Keep follow up locally as scheduled. Virtual follow up with dr morris in 6 weeks. Upper bilateral ultrasound in 1 month, locally. Order faxed.     *mobility impaired parking permit signed and given to patient.     Patient stated he understood all health information given and agreed to call with further questions or concerns.     Shannon Dixon RN

## 2023-09-20 NOTE — PROGRESS NOTES
Advanced Heart Failure/Transplant Clinic Note    HPI  Dear colleagues,     I had the pleasure of seeing Mr. Navin Lutz in the Cardiology clinic. As you know, Mr. Navin Lutz is a pleasant 52 year old male with a past medical history of chronic HFrEF 2/2 NICM s/p ICD, HLD, and CKD Stage II who presents for follow up for HFrEF.    Patient reports he was first having symptoms of CHF in 2017 and was diagnosed with HFrEF shortly after that. He has been on various GDMT and overall did well with no hospital admissions until June '23.     Patient admitted to [facility] from 8/22-8/28/23 for cardiogenic shock. He was initially started on dobutamine, but then switched to milrinone prior to discharge. On this, he was having a significant amount of ectopy, so low-dose carvedilol was prior to discharge. 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    Patient reports since his hospital discharge, he notes having some good days and other days where he is completely wiped out and can't do much of anything. He overall feels better on milrinone than prior to the admission, but still no where as good as he was 6 months ago. Patient reports on good days, he can do ADLs around his home, but on bad stays, has symptoms with walking around his home and doing any chores. He completed his dental work and has been compliant with his medications, diet, fluid restriction, and monitoring his weights and BPs. He reports most days feeling presyncope, but again, this sometimes get worse than other days. He denies orthopnea, PND, chest pain, palpitations, syncope, abdominal pain, nausea, emesis, LE edema, or weight gain.    ROS:  A complete 12-point ROS was negative except as above.    PAST MEDICAL HISTORY:  Patient Active Problem List   Diagnosis    Acute on chronic systolic CHF (congestive heart failure) (H)    Chest pain    ICD (implantable cardioverter-defibrillator) in place     Inguinal hernia    Multiple lung nodules on CT    Non-ischemic cardiomyopathy (H)    Pure hypercholesterolemia    RAD (reactive airway disease) with wheezing, mild intermittent, uncomplicated    Acute deep vein thrombosis (DVT) of other vein of left upper extremity (H)   Chronic biventricular systolic and LV diastolic dysfunction (HFrEF)  ACC/AHA stage C, NYHA class III  EF 18% (cardiac MRI) on 6/14/2023  EF 13% with grade I diastolic dysfunction on 6/13/2023  Reduced RVSF  EF 20-25% on 9/22/2020  EF 25-30% on 9/20/2019  EF 20% on 3/26/2019  EF 20-25% on 1/22/2019  EF 15-20% on 9/24/2018  EF 15-20% with grade 5 diastolic dysfunction on 12/11/2017   Nonischemic dilated cardiomyopathy  LIVDd 7.2 cm  Viral mediated?  Evaluation at [facility] in 2019  Cardiac MRI on 6/14/2023 = severe left ventricular dilation with severe, diffuse hypokinesis and mid wall late gadolinium enhancement stripe throughout the septum consistent with nonischemic dilated cardiomyopathy; mild patchy myocardial edema along the left ventricular anterior, anterolateral and inferolateral walls in the basal and mid cavity segments could represent an element of myocarditis; trace aortic regurgitation, trace mitral valve regurgitation, mild tricuspid valve regurgitation  LHC and RHC on 6/13/2023 = intramyocardial bridging of the dLAD otherwise normal coronary arteries; LVEDP 3 mmHg; normal pressures with no pulmonary hypertension; RA 4 mmHg, RV 22/4 mmHg, PA 23/11 mmHg with mean PAP of 16 mmHg, PCWP 7 mmHg, CO 4.32 L/min and CI 2.1 L/min*m2 by Teressa, CO 2.05 L/min and CI 1.0 L/min*m2 by thermodilution  LHC and RHC on 1/9/2018 = arterial pressure 82/59 (67); normal pulmonary capillary wedge, and mean pulmonary artery pressures; cardiac output by thermodilution was 3.07 L/min, index at 1.5; no angiographic evidence of coronary artery disease present  History of NSVT  Moderate to severe eccentric LVH  Valvular heart disease  Mild    Dyslipidemia  Possible obstructive sleep apnea       FAMILY HISTORY:  No known family history of heart disease except possible his father had an enlarged heart, but no formal diagnosis    SOCIAL HISTORY:  , owns a [employer] company. No prior tobacco, ETOH, or illicit substance use.  Social History     Tobacco Use    Smoking status: Never    Smokeless tobacco: Never   Substance Use Topics    Alcohol use: Never    Drug use: Never        ALLERGIES:  No Known Allergies    CURRENT MEDICATIONS:  Current Outpatient Medications   Medication Sig Dispense Refill    atorvastatin (LIPITOR) 20 MG tablet Take 1 tablet (20 mg) by mouth every evening 30 tablet 0    carvedilol (COREG) 3.125 MG tablet Take 1 tablet (3.125 mg) by mouth 2 times daily (with meals) 60 tablet 0    empagliflozin (JARDIANCE) 10 MG TABS tablet Take 10 mg by mouth daily      milrinone (PRIMACOR) infusion 200 mcg/mL PREMIX Inject 23.025 mcg/min into the vein continuous 100 mL 0    nitroGLYcerin (NITROSTAT) 0.4 MG sublingual tablet Place 0.4 mg under the tongue every 5 minutes as needed      sacubitril-valsartan (ENTRESTO) 49-51 MG per tablet Take 1 tablet by mouth 2 times daily 60 tablet 0    spironolactone (ALDACTONE) 25 MG tablet Take 25 mg by mouth daily      warfarin ANTICOAGULANT (COUMADIN) 1 MG tablet Take 3 tablets (3 mg) by mouth daily With dosing changes as directed by anticoagulation clinic 90 tablet 1       EXAM:  /73 (BP Location: Left arm, Patient Position: Chair, Cuff Size: Adult Large)   Pulse 88   Wt 94.3 kg (208 lb)   SpO2 95%   BMI 28.21 kg/m      General: appears comfortable, alert and interactive, in no acute distress  Head: normocephalic, atraumatic  Eyes: anicteric sclera, EOMI  Mouth: MMM  Neck: supple, no cervical adenopathy  CV: regular rate and rhythm, no murmur, gallop, rub, estimated JVP <6 cm  Resp: clear, no rales or wheezing  GI: soft, nontender, nondistended  Extremities: warm, no peripheral edema, 2+  bilateral radial pulses  Neurological: alert and oriented, no focal deficits  Psych: normal mood and affect  Derm: no rashes on exposed surfaces    Weight  Wt Readings from Last 10 Encounters:   09/20/23 94.3 kg (208 lb)   09/20/23 93.1 kg (205 lb 3.2 oz)   08/28/23 89.9 kg (198 lb 3.2 oz)   07/19/23 93.7 kg (206 lb 9.6 oz)       I personally reviewed recent labs and data as below and discussed the results with the patient in clinic today.  Labs:  CBC RESULTS:  Lab Results   Component Value Date    WBC 6.2 09/20/2023    RBC 5.78 09/20/2023    HGB 17.9 (H) 09/20/2023    HCT 51.6 09/20/2023    MCV 89 09/20/2023    MCH 31.0 09/20/2023    MCHC 34.7 09/20/2023    RDW 13.5 09/20/2023     09/20/2023       CMP RESULTS:  Lab Results   Component Value Date     09/20/2023    POTASSIUM 4.2 09/20/2023    CHLORIDE 105 09/20/2023    CO2 23 09/20/2023    ANIONGAP 9 09/20/2023     (H) 09/20/2023    GLC 96 08/25/2023    BUN 15.6 09/20/2023    CR 1.24 (H) 09/20/2023    GFRESTIMATED 70 09/20/2023    NAM 9.2 09/20/2023    BILITOTAL 0.4 09/20/2023    ALBUMIN 4.0 09/20/2023    ALKPHOS 99 09/20/2023    ALT 42 09/20/2023    AST 28 09/20/2023        No results for input(s): CHOL, HDL, LDL, TRIG, CHOLHDLRATIO in the last [id number] hours.     Testing/Procedures:  I personally visualized and interpreted:  Echocardiogram 6/13/23  Narrative      Left Ventricle: The left ventricle is severely dilated. There is   moderate-to-severe eccentric left ventricular hypertrophy. Severely   reduced left ventricular systolic function. The ejection fraction,   measured by biplane, is 13%. Severe hypokinesis of apex, remainder wall   segments are mostly akinetic. Grade I diastolic dysfunction.         Left Ventricle   The left ventricle is severely dilated. There is moderate-to-severe eccentric left ventricular hypertrophy. Severely reduced left ventricular systolic function. The ejection fraction, measured by biplane, is 13%. Severe  hypokinesis of apex, remainder wall segments are mostly akinetic. Grade I diastolic dysfunction.     Right Ventricle   The right ventricle appears normal in size. Systolic function is reduced. Normal TAPSE, measuring 1.8 cm. Normal systolic excursion velocity by TDI. S' measures 9.5 cm/sec. Wall thickness is normal. The RVSP measures 13 mmHg. There is no evidence of pulmonary hypertension.     Left Atrium   The left atrium is normal in size. The pulmonary veins have normal venous flow.     Right Atrium   The right atrium is normal in size.     IVC/SVC   Normal IVC size with normal respirophasic changes.Normal hepatic vein blood flow.     Mitral Valve   Mitral valve structure is normal. There is trace regurgitation. There is no evidence of mitral valve stenosis.     Tricuspid Valve   Tricuspid valve structure is normal. There is mild regurgitation. There is no evidence of tricuspid valve stenosis.     Aortic Valve   The aortic valve is tricuspid. There is trace regurgitation. There is no evidence of aortic valve stenosis.     Pulmonic Valve   The pulmonic valve was not well visualized. There is trace regurgitation. There is no evidence of pulmonic valve stenosis.     Pericardium   There is no pericardial effusion.     Aorta   The sinus of Valsalva is normal. The ascending aorta is normal.     Interatrial Septum   No evidence of an atrial shunt by color Doppler.     Coronary Angiogram/RHC 6/13/23  Conclusions:   Coronary angiogram: Intramyocardial bridging of the distal LAD, otherwise   normal coronaries.   Left heart catheterization: No significant gradient across aortic valve.     LVEDP 3 mmHg.   Right heart catheterization: Normal pressures.  No pulmonary hypertension.    Low cardiac output.  No shunt.   -RA 4 mmHg   -RV 22/4 mmHg   -PA 23/11 mmHg, Mean PAP 16 mmHg   -PCWP 7 mmHg   -Teressa: CO 4.32 L/min, CI 2.1 L/min*m2   -Thermodilution: CO 2.05 L/min, CI 1.0 L/min*m2     cMRI 6/14/23  FINDINGS:   AORTA: The  ascending aorta and aortic root are normal caliber. The sinotubular junction is maintained. Normal aortic wall thickness.     ARCH VESSELS: Arch vessels are grossly patent and partially visualized.     RIGHT ATRIUM: Normal size.     TRICUSPID VALVE: Unrestricted leaflet excursion without stenosis. Mild regurgitation.     RIGHT VENTRICLE: Normal morphology, size, and wall thickness. No wall motion abnormalities identified.     PULMONIC VALVE: Unrestricted leaflet excursion without stenosis or regurgitation.     PULMONARY VEINS: Widely patent.     LEFT ATRIUM: Mild dilation. No left atrial appendage thrombus.     MITRAL VALVE: Unrestrictive leaflet excursion without stenosis. Trace regurgitation.     LEFT VENTRICLE: Normal morphology and thickness with marked dilation. End-diastolic basal septal thickness of 9 mm.     Severe, diffuse hypokinesis. Normal first pass perfusion.     Mid wall late gadolinium enhancement throughout the septum. Patchy myocardial edema along the anterior, anterolateral and inferolateral walls in the basal and mid cavity segments.       EKG 6/13/23 shows sinus rhythm, nonspecific t wave abnormalities    RHC 8/22/23  RA --/--/5  RV 20/5  PA 20/12/16  PCWP --/--/12  IESHA 3.2/1.5  TD 2.93/1.36  MAP 96  PVR 1.25 (IESHA)  SVR 2275 (IESHA)     TTE 8/23/23  Interpretation Summary  Severe left ventricular dilation (LVIDd 6.8cm). Left ventricular function is  decreased. The ejection fraction is 15-20% (severely reduced). Severe diffuse  hypokinesis.  Global right ventricular function is normal.  No significant valve abnormalities.  The inferior vena cava is normal.  No pericardial effusion.    RHC 9/20/23  RA: 3/4/3 mmHg  RV: 23/2 mmHg  PA: 23/14/18 mmHg  CWP: --/--/8 mmHg  CO/CI (Iesha): 3.63/1.69 L/min/m2  CO/CI (TD): 4.1/1.9 L/min/m2  PA sat: 68.7%  MAP: 84 mmHg   PVR: 2.4 (TD) BYRD        Outside results of note:  Outside records from [facility] were obtained, and relevant results/notes have been  incorporated into HPI.    Assessment and Plan:     In summary, 52 year old male with a past medical history of chronic HFrEF 2/2 NICM s/p ICD, HLD, and CKD Stage II who presents as new referral for HFrEF.    Chronic systolic heart failure/HFrEF (EF 10-15%) secondary to nonischemic cardiomyopathy  NYHA Symptom Class IV  Stage D  Inotrope: Increase milrinone to 0.25 given persistently low CI on RHC today  ACE-I/ARB/ARNi: Continue Entresto 49-51 mg BID, unable to increase given frequent presyncope  BB: Continue coreg 3.125 mg BID given frequent ectopy on inotrope therapy  Aldosterone antagonist: Continue beck 25 mg daily  SGLT2i: Continue empagliflozin 10 mg daily  SCD prophylaxis: s/p ICD  %BiV pacing: N/A  Fluid status: euvoelmic on lasix 10 mg daily PRN  Cardiac Rehab: previously referred  - Given patient has recently had symptoms at rest with low cardiac index/output recently, completing advanced therapy eval and will present at upcoming multidisciplinary meeting about possible heart transplant listing  - Discussed importance of daily standing weights, 2-3L fluid, and 2g Na restriction    Myocardial Bridge Over Coronary Artery  HLD  Myocardial bridge over distal LAD.  - Continue to monitor    CKD Stage II  Cr ~1.2-1.3.  - Will continue to monitor    RUE DVT  - Stopped apixaban and start warfarin with INR goal 2-3  - Repeat RUE US in one month, if resolution, will stop    To Do:  - Increase milrinone to 0.25  - Follow up with local Cardiology as scheduled  - Continue to monitor labs monthly for now  - Switch apixaban to warfarin with INR goal 2-3  - Will present at upcoming multidisciplinary meeting  - Follow up with me virtually in 6 weeks unless acute issues, then will see back in person sooner    The patient states understanding and is agreeable with plan.   Feel free to contact myself regarding questions or concerns. It was a pleasure to see this patient today.    I spent 40 minutes in care of the patient today  including obtaining recent medical history, personally reviewing recent cardiac testing and/or lab results, today's examination, discussion of testing results and care recommendations with patient.    Micaela Silvestre MD   of Medicine, [facility]  Advanced Heart Failure and Transplant Cardiology     CC  GALO HERNANDEZ NP

## 2023-09-20 NOTE — LETTER
9/20/2023      RE: Navin Lutz  [address] [place] [address]       Dear Colleague,    Thank you for the opportunity to participate in the care of your patient, Navin Lutz, at the [facility] HEART [facility] [place] [facility]. Please see a copy of my visit note below.    Advanced Heart Failure/Transplant Clinic Note    HPI  Dear colleagues,     I had the pleasure of seeing Mr. Navin Lutz in the Cardiology clinic. As you know, Mr. Navin Lutz is a pleasant 52 year old male with a past medical history of chronic HFrEF 2/2 NICM s/p ICD, HLD, and CKD Stage II who presents for follow up for HFrEF.    Patient reports he was first having symptoms of CHF in 2017 and was diagnosed with HFrEF shortly after that. He has been on various GDMT and overall did well with no hospital admissions until June '23.     Patient admitted to [facility] from 8/22-8/28/23 for cardiogenic shock. He was initially started on dobutamine, but then switched to milrinone prior to discharge. On this, he was having a significant amount of ectopy, so low-dose carvedilol was prior to discharge. 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    Patient reports since his hospital discharge, he notes having some good days and other days where he is completely wiped out and can't do much of anything. He overall feels better on milrinone than prior to the admission, but still no where as good as he was 6 months ago. Patient reports on good days, he can do ADLs around his home, but on bad stays, has symptoms with walking around his home and doing any chores. He completed his dental work and has been compliant with his medications, diet, fluid restriction, and monitoring his weights and BPs. He reports most days feeling presyncope, but again, this sometimes get worse than other days. He denies orthopnea, PND, chest pain, palpitations, syncope,  abdominal pain, nausea, emesis, LE edema, or weight gain.    ROS:  A complete 12-point ROS was negative except as above.    PAST MEDICAL HISTORY:  Patient Active Problem List   Diagnosis    Acute on chronic systolic CHF (congestive heart failure) (H)    Chest pain    ICD (implantable cardioverter-defibrillator) in place    Inguinal hernia    Multiple lung nodules on CT    Non-ischemic cardiomyopathy (H)    Pure hypercholesterolemia    RAD (reactive airway disease) with wheezing, mild intermittent, uncomplicated    Acute deep vein thrombosis (DVT) of other vein of left upper extremity (H)   Chronic biventricular systolic and LV diastolic dysfunction (HFrEF)  ACC/AHA stage C, NYHA class III  EF 18% (cardiac MRI) on 6/14/2023  EF 13% with grade I diastolic dysfunction on 6/13/2023  Reduced RVSF  EF 20-25% on 9/22/2020  EF 25-30% on 9/20/2019  EF 20% on 3/26/2019  EF 20-25% on 1/22/2019  EF 15-20% on 9/24/2018  EF 15-20% with grade 5 diastolic dysfunction on 12/11/2017   Nonischemic dilated cardiomyopathy  LIVDd 7.2 cm  Viral mediated?  Evaluation at [facility] in 2019  Cardiac MRI on 6/14/2023 = severe left ventricular dilation with severe, diffuse hypokinesis and mid wall late gadolinium enhancement stripe throughout the septum consistent with nonischemic dilated cardiomyopathy; mild patchy myocardial edema along the left ventricular anterior, anterolateral and inferolateral walls in the basal and mid cavity segments could represent an element of myocarditis; trace aortic regurgitation, trace mitral valve regurgitation, mild tricuspid valve regurgitation  LHC and RHC on 6/13/2023 = intramyocardial bridging of the dLAD otherwise normal coronary arteries; LVEDP 3 mmHg; normal pressures with no pulmonary hypertension; RA 4 mmHg, RV 22/4 mmHg, PA 23/11 mmHg with mean PAP of 16 mmHg, PCWP 7 mmHg, CO 4.32 L/min and CI 2.1 L/min*m2 by Teressa, CO 2.05 L/min and CI 1.0 L/min*m2 by thermodilution  LHC and RHC on 1/9/2018 =  arterial pressure 82/59 (67); normal pulmonary capillary wedge, and mean pulmonary artery pressures; cardiac output by thermodilution was 3.07 L/min, index at 1.5; no angiographic evidence of coronary artery disease present  History of NSVT  Moderate to severe eccentric LVH  Valvular heart disease  Mild MR  Dyslipidemia  Possible obstructive sleep apnea       FAMILY HISTORY:  No known family history of heart disease except possible his father had an enlarged heart, but no formal diagnosis    SOCIAL HISTORY:  , owns a [employer] company. No prior tobacco, ETOH, or illicit substance use.  Social History     Tobacco Use    Smoking status: Never    Smokeless tobacco: Never   Substance Use Topics    Alcohol use: Never    Drug use: Never        ALLERGIES:  No Known Allergies    CURRENT MEDICATIONS:  Current Outpatient Medications   Medication Sig Dispense Refill    atorvastatin (LIPITOR) 20 MG tablet Take 1 tablet (20 mg) by mouth every evening 30 tablet 0    carvedilol (COREG) 3.125 MG tablet Take 1 tablet (3.125 mg) by mouth 2 times daily (with meals) 60 tablet 0    empagliflozin (JARDIANCE) 10 MG TABS tablet Take 10 mg by mouth daily      milrinone (PRIMACOR) infusion 200 mcg/mL PREMIX Inject 23.025 mcg/min into the vein continuous 100 mL 0    nitroGLYcerin (NITROSTAT) 0.4 MG sublingual tablet Place 0.4 mg under the tongue every 5 minutes as needed      sacubitril-valsartan (ENTRESTO) 49-51 MG per tablet Take 1 tablet by mouth 2 times daily 60 tablet 0    spironolactone (ALDACTONE) 25 MG tablet Take 25 mg by mouth daily      warfarin ANTICOAGULANT (COUMADIN) 1 MG tablet Take 3 tablets (3 mg) by mouth daily With dosing changes as directed by anticoagulation clinic 90 tablet 1       EXAM:  /73 (BP Location: Left arm, Patient Position: Chair, Cuff Size: Adult Large)   Pulse 88   Wt 94.3 kg (208 lb)   SpO2 95%   BMI 28.21 kg/m      General: appears comfortable, alert and interactive, in no acute  distress  Head: normocephalic, atraumatic  Eyes: anicteric sclera, EOMI  Mouth: MMM  Neck: supple, no cervical adenopathy  CV: regular rate and rhythm, no murmur, gallop, rub, estimated JVP <6 cm  Resp: clear, no rales or wheezing  GI: soft, nontender, nondistended  Extremities: warm, no peripheral edema, 2+ bilateral radial pulses  Neurological: alert and oriented, no focal deficits  Psych: normal mood and affect  Derm: no rashes on exposed surfaces    Weight  Wt Readings from Last 10 Encounters:   09/20/23 94.3 kg (208 lb)   09/20/23 93.1 kg (205 lb 3.2 oz)   08/28/23 89.9 kg (198 lb 3.2 oz)   07/19/23 93.7 kg (206 lb 9.6 oz)       I personally reviewed recent labs and data as below and discussed the results with the patient in clinic today.  Labs:  CBC RESULTS:  Lab Results   Component Value Date    WBC 6.2 09/20/2023    RBC 5.78 09/20/2023    HGB 17.9 (H) 09/20/2023    HCT 51.6 09/20/2023    MCV 89 09/20/2023    MCH 31.0 09/20/2023    MCHC 34.7 09/20/2023    RDW 13.5 09/20/2023     09/20/2023       CMP RESULTS:  Lab Results   Component Value Date     09/20/2023    POTASSIUM 4.2 09/20/2023    CHLORIDE 105 09/20/2023    CO2 23 09/20/2023    ANIONGAP 9 09/20/2023     (H) 09/20/2023    GLC 96 08/25/2023    BUN 15.6 09/20/2023    CR 1.24 (H) 09/20/2023    GFRESTIMATED 70 09/20/2023    NAM 9.2 09/20/2023    BILITOTAL 0.4 09/20/2023    ALBUMIN 4.0 09/20/2023    ALKPHOS 99 09/20/2023    ALT 42 09/20/2023    AST 28 09/20/2023        No results for input(s): CHOL, HDL, LDL, TRIG, CHOLHDLRATIO in the last 10583 hours.     Testing/Procedures:  I personally visualized and interpreted:  Echocardiogram 6/13/23  Narrative      Left Ventricle: The left ventricle is severely dilated. There is   moderate-to-severe eccentric left ventricular hypertrophy. Severely   reduced left ventricular systolic function. The ejection fraction,   measured by biplane, is 13%. Severe hypokinesis of apex, remainder wall    segments are mostly akinetic. Grade I diastolic dysfunction.         Left Ventricle   The left ventricle is severely dilated. There is moderate-to-severe eccentric left ventricular hypertrophy. Severely reduced left ventricular systolic function. The ejection fraction, measured by biplane, is 13%. Severe hypokinesis of apex, remainder wall segments are mostly akinetic. Grade I diastolic dysfunction.     Right Ventricle   The right ventricle appears normal in size. Systolic function is reduced. Normal TAPSE, measuring 1.8 cm. Normal systolic excursion velocity by TDI. S' measures 9.5 cm/sec. Wall thickness is normal. The RVSP measures 13 mmHg. There is no evidence of pulmonary hypertension.     Left Atrium   The left atrium is normal in size. The pulmonary veins have normal venous flow.     Right Atrium   The right atrium is normal in size.     IVC/SVC   Normal IVC size with normal respirophasic changes.Normal hepatic vein blood flow.     Mitral Valve   Mitral valve structure is normal. There is trace regurgitation. There is no evidence of mitral valve stenosis.     Tricuspid Valve   Tricuspid valve structure is normal. There is mild regurgitation. There is no evidence of tricuspid valve stenosis.     Aortic Valve   The aortic valve is tricuspid. There is trace regurgitation. There is no evidence of aortic valve stenosis.     Pulmonic Valve   The pulmonic valve was not well visualized. There is trace regurgitation. There is no evidence of pulmonic valve stenosis.     Pericardium   There is no pericardial effusion.     Aorta   The sinus of Valsalva is normal. The ascending aorta is normal.     Interatrial Septum   No evidence of an atrial shunt by color Doppler.     Coronary Angiogram/RHC 6/13/23  Conclusions:   Coronary angiogram: Intramyocardial bridging of the distal LAD, otherwise   normal coronaries.   Left heart catheterization: No significant gradient across aortic valve.     LVEDP 3 mmHg.   Right heart  catheterization: Normal pressures.  No pulmonary hypertension.    Low cardiac output.  No shunt.   -RA 4 mmHg   -RV 22/4 mmHg   -PA 23/11 mmHg, Mean PAP 16 mmHg   -PCWP 7 mmHg   -Iesha: CO 4.32 L/min, CI 2.1 L/min*m2   -Thermodilution: CO 2.05 L/min, CI 1.0 L/min*m2     cMRI 6/14/23  FINDINGS:   AORTA: The ascending aorta and aortic root are normal caliber. The sinotubular junction is maintained. Normal aortic wall thickness.     ARCH VESSELS: Arch vessels are grossly patent and partially visualized.     RIGHT ATRIUM: Normal size.     TRICUSPID VALVE: Unrestricted leaflet excursion without stenosis. Mild regurgitation.     RIGHT VENTRICLE: Normal morphology, size, and wall thickness. No wall motion abnormalities identified.     PULMONIC VALVE: Unrestricted leaflet excursion without stenosis or regurgitation.     PULMONARY VEINS: Widely patent.     LEFT ATRIUM: Mild dilation. No left atrial appendage thrombus.     MITRAL VALVE: Unrestrictive leaflet excursion without stenosis. Trace regurgitation.     LEFT VENTRICLE: Normal morphology and thickness with marked dilation. End-diastolic basal septal thickness of 9 mm.     Severe, diffuse hypokinesis. Normal first pass perfusion.     Mid wall late gadolinium enhancement throughout the septum. Patchy myocardial edema along the anterior, anterolateral and inferolateral walls in the basal and mid cavity segments.       EKG 6/13/23 shows sinus rhythm, nonspecific t wave abnormalities    RHC 8/22/23  RA --/--/5  RV 20/5  PA 20/12/16  PCWP --/--/12  IESHA 3.2/1.5  TD 2.93/1.36  MAP 96  PVR 1.25 (IESHA)  SVR 2275 (IESHA)     TTE 8/23/23  Interpretation Summary  Severe left ventricular dilation (LVIDd 6.8cm). Left ventricular function is  decreased. The ejection fraction is 15-20% (severely reduced). Severe diffuse  hypokinesis.  Global right ventricular function is normal.  No significant valve abnormalities.  The inferior vena cava is normal.  No pericardial effusion.    RHC  9/20/23  RA: 3/4/3 mmHg  RV: 23/2 mmHg  PA: 23/14/18 mmHg  CWP: --/--/8 mmHg  CO/CI (Teressa): 3.63/1.69 L/min/m2  CO/CI (TD): 4.1/1.9 L/min/m2  PA sat: 68.7%  MAP: 84 mmHg   PVR: 2.4 (TD) BYRD        Outside results of note:  Outside records from  were obtained, and relevant results/notes have been incorporated into HPI.    Assessment and Plan:     In summary, 52 year old male with a past medical history of chronic HFrEF 2/2 NICM s/p ICD, HLD, and CKD Stage II who presents as new referral for HFrEF.    Chronic systolic heart failure/HFrEF (EF 10-15%) secondary to nonischemic cardiomyopathy  NYHA Symptom Class IV  Stage D  Inotrope: Increase milrinone to 0.25 given persistently low CI on RHC today  ACE-I/ARB/ARNi: Continue Entresto 49-51 mg BID, unable to increase given frequent presyncope  BB: Continue coreg 3.125 mg BID given frequent ectopy on inotrope therapy  Aldosterone antagonist: Continue beck 25 mg daily  SGLT2i: Continue empagliflozin 10 mg daily  SCD prophylaxis: s/p ICD  %BiV pacing: N/A  Fluid status: euvoelmic on lasix 10 mg daily PRN  Cardiac Rehab: previously referred  - Given patient has recently had symptoms at rest with low cardiac index/output recently, completing advanced therapy eval and will present at upcoming multidisciplinary meeting about possible heart transplant listing  - Discussed importance of daily standing weights, 2-3L fluid, and 2g Na restriction    Myocardial Bridge Over Coronary Artery  HLD  Myocardial bridge over distal LAD.  - Continue to monitor    CKD Stage II  Cr ~1.2-1.3.  - Will continue to monitor    RUE DVT  - Stopped apixaban and start warfarin with INR goal 2-3  - Repeat RUE US in one month, if resolution, will stop    To Do:  - Increase milrinone to 0.25  - Follow up with local Cardiology as scheduled  - Continue to monitor labs monthly for now  - Switch apixaban to warfarin with INR goal 2-3  - Will present at upcoming multidisciplinary meeting  - Follow  up with me virtually in 6 weeks unless acute issues, then will see back in person sooner    The patient states understanding and is agreeable with plan.   Feel free to contact myself regarding questions or concerns. It was a pleasure to see this patient today.    I spent 40 minutes in care of the patient today including obtaining recent medical history, personally reviewing recent cardiac testing and/or lab results, today's examination, discussion of testing results and care recommendations with patient.    Micaela Silvestre MD   of Medicine, [facility]  Advanced Heart Failure and Transplant Cardiology     CC  GALO HERNANDEZ NP

## 2023-09-20 NOTE — PROGRESS NOTES
Rt arm double-lumen PICC line dressing changed per Micaela Silvestre MD. Old dressing removed without difficulty. No exudate noted around catheter exit site. Patient denied any tenderness. Area cleaned with chloraprep using sterile technique. Biopatch, catheter stabilization device, skin prep, and Tegaderm applied. Double-lumen prep flushed with normal saline. Aspirated 5 mL of blood from both lumens without difficulty. Flushed with 10mL normal saline. Changed IV extension line and INT caps.    Samantha Greer RN

## 2023-09-20 NOTE — PROGRESS NOTES
Patient arrived to floor with RN from CCL s/p C. RIJ site CDI, no hematoma. VSS. Discharge instructions reviewed with pt and his wife, escorted to lobby for transport home.

## 2023-09-20 NOTE — TELEPHONE ENCOUNTER
"Received new referral for enrollment, however upon chart review of Cardiology note 9/20/23, \"New referral sent to [facility]\".     Message routed to RN, Shannon Dixon for clarification.     JESSICA CALDERON RN-BC  Anticoagulation Clinic  [phone]        "

## 2023-09-20 NOTE — DISCHARGE INSTRUCTIONS
[facility]                        Interventional Cardiology  Discharge Instructions   Post Right Heart Cath and/or Heart Biopsy      AFTER YOU GO HOME:  DO drink plenty of fluids  DO resume your regular diet and medications unless otherwise instructed by your Primary Physician  Do Not scrub the procedure site vigorously  No lotion or powder to the puncture site for 3 days    CALL YOUR PRIMARY PHYSICIAN IF: You may resume all normal activity.  Monitor neck site for bleeding, swelling, or voice changes. If you notice bleeding or swelling immediately apply pressure to the site and call number below to speak with Cardiology Fellow.  If you experience any changes in your breathing you should call your doctor immediately or come to the closest Emergency Department.  Do not drive yourself.    ADDITIONAL INSTRUCTIONS: Medications: You are to resume all home medications including anticoagulation therapy unless otherwise advised by your primary cardiologist or nurse coordinator.    Follow Up: Per your primary cardiology team    If you have any questions or concerns regarding your procedure site please call [phone] at anytime and ask for Cardiology Fellow on call.  They are available 24 hours a day.  You may also contact the Cardiology Clinic after hours number at [phone].                                                       Telephone Numbers [phone] Monday-Friday 8:00 am to 4:30 pm    [phone] After 4:30 pm Monday-Friday, Weekends & Holidays  Ask for Interventional Cardiologist on call. Someone is on call 24 hours/day   [facility] toll free number [phone] Monday-Friday 8:00 am to 4:30 pm   [facility] Emergency Dept [phone]

## 2023-09-20 NOTE — LETTER
September 20, 2023      TO: Navin Lutz   [address] [place] [id number]         To Whom it May Concern,    Mr. Lutz has a heart condition that does not allow him to be traveling indefinitely. He will not be able to go on his previously scheduled trip for medical reasons.    Please do not hesitate to call me if you have any questions or concerns.    Sincerely,      Micaela Silvestre MD

## 2023-09-20 NOTE — NURSING NOTE
Chief Complaint   Patient presents with    Follow Up     September 20, 2023: reason for visit: RTN HF: 52 year old male presents with NICM, Ef 13% to follow up with labs and RHC prior     Vitals were taken and medications reconciled.    Jose Garnica, EMT  11:14 AM

## 2023-09-20 NOTE — PATIENT INSTRUCTIONS
Cardiology Providers you saw during your visit:  Dr. Silvestre     Medication changes:  1- Will work on switching you from eliquis to warfarin.     Increase fluids to 3 liters/day.        Follow up:  1- Labs in 1 month  2 - Upper extremity ultrasounds in 1 month  3 - keep follow up locally as scheduled  4 - Follow up with Dr Silvestre virtually in 6 weeks       Please call if you have:  1. Weight gain of more than 2 pounds in a day or 5 pounds in a week  2. Increased shortness of breath, swelling or bloating  3. Dizziness, lightheadedness   4. Any questions or concerns.      Heart Failure Support Group  Virtual meetings will continue in 2023 Please reach out if you would like to attend and we can get you the information you need to log in.     2023 dates for Support Group    Monday, October 2nd, 1-2pm  Monday, November 6th, 1-2pm  Monday, December 4th, 1-2pm    Follow the American Heart Association Diet and Lifestyle recommendations:  Limit saturated fat, trans fat, sodium, red meat, sweets and sugar-sweetened beverages. If you choose to eat red meat, compare labels and select the leanest cuts available.  Aim for at least 150 minutes of moderate physical activity or 75 minutes of vigorous physical activity - or an equal combination of both - each week.     During business hours: [phone], press option # 1 to schedule an appointment or send a message to your care team     After hours, weekends or holidays: On Call Cardiologist- [phone]   option #4 and ask to speak to the on-call Cardiologist. Inform them you are a CORE/heart failure patient at the [facility].     Shannon Dixon RN BSN CHFN  Cardiology Nurse Care Coordinator (Heart Failure / C.O.R.E.)

## 2023-09-22 ENCOUNTER — COMMITTEE REVIEW (OUTPATIENT)
Dept: TRANSPLANT | Facility: CLINIC | Age: 53
End: 2023-09-22
Payer: COMMERCIAL

## 2023-09-22 NOTE — TELEPHONE ENCOUNTER
Addendum: Clarification received    Shannon Dixon RN  P [facility] will be following him. I faxed the order to them and connected with them via phone on Wednesday.    Patient will be discharged from [facility].    JESSICA CALDERON RN-BC  Anticoagulation Clinic  [phone]    ANTICOAGULATION  MANAGEMENT    Navin LOPEZ Nileaimee is being discharged from the [facility] Anticoagulation Management Program ([facility]).    Reason for discharge: care has been transferred to [facility].     Anticoagulation episode resolved, ACC referral closed, and Standing order discontinued    If patient needs warfarin management in the future, please send a new referral    JESSICA CALDERON RN

## 2023-09-22 NOTE — COMMITTEE REVIEW
Thoracic Committee Review Note     Evaluation Date: 7/25/2023  Committee Review Date: 9/22/2023    Organ being evaluated for: Heart    Transplant Phase: Evaluation  Transplant Status: Active    Transplant Coordinator: Bernadine Cote  Transplant Physician: Danya Silvestre       Referring Physician: Micaela Silvestre    Primary Diagnosis: Dilated Myopathy: Other, Specify - Nonischemic    Committee Review Members:  Cardiology Micaela Silvestre MD, Junaid Minaya MD, Roverto Rivera MD, Jennifer Silverman, APRN CNP, Lorena Youssef MD, Bairon Carpenter MD, Cassandra Rizzo MD   Cardiovascular Disease Faisal Cage, RN, Adeola Garcias, RN, Kristen Noel, RN, MAY MEYER, LUIS, Kim Trinidad, RN, Ada Guevara, RN, Jennifer Juarez, RN   Neuropsychology Blayne Fernandez, PhD   Nutrition Michelle Tai, SANDIE    - Clinical Marivel Sales, [facility], Eloise Starr, [facility]   Transplant Annabella Brennan, LUIS, Bernadine Cote, LUIS, Arabella Clayton, LUIS, Sanjuanita Veras, APRN CNP, Gracie Machuca, RN, Lori Grossman, LUIS   Transplant Surgery Rodolfo Miller MD, Michael Mulvihill, MD, Brian Jhaveri MD       Transplant Eligibility: Disabling heart disease on maximal medical therapy    Committee Review Decision: Approved    Relative Contraindications: None    Absolute Contraindications: None    Committee Chair Lorena Youssef MD verbally attested to the committee's decision.    Committee Discussion Details: NICM for 5 years. Did outpatient eval but the CI was low and the BP low. Admitted and did the workup. Repeat RHC this week. CI 1.69. Increased milrinone to 0.25 mcg/kg/min. Blood O 205 lbs. List as status 4E. End organ OK. Keep as a 4E. Bring him in once the backlog of inhouse patients are transplanted. VAD would work for him.

## 2023-09-25 ENCOUNTER — CARE COORDINATION (OUTPATIENT)
Dept: TRANSPLANT | Facility: CLINIC | Age: 53
End: 2023-09-25
Payer: COMMERCIAL

## 2023-09-25 ENCOUNTER — TELEPHONE (OUTPATIENT)
Dept: TRANSPLANT | Facility: CLINIC | Age: 53
End: 2023-09-25
Payer: COMMERCIAL

## 2023-09-25 ENCOUNTER — TEAM CONFERENCE (OUTPATIENT)
Dept: CARDIOLOGY | Facility: CLINIC | Age: 53
End: 2023-09-25
Payer: COMMERCIAL

## 2023-09-25 ASSESSMENT — PULMONARY FUNCTION TESTS: FEV1 (%PREDICTED): 2

## 2023-09-25 ASSESSMENT — NEW YORK HEART ASSOCIATION (NYHA) CLASSIFICATION: NYHA FUNCTIONAL CLASS: IV

## 2023-09-25 NOTE — LETTER
2023    Navin LOPEZ Bolivar  [address] [place] [address]      Dear Mr. Lutz,    This letter is sent to confirm that you have completed your transplant work-up and you are a candidate in the heart transplant program at the [facility].  You were placed on the heart active waitlist on 2023.      When you are active on the waitlist and an organ becomes available, a coordinator will need to speak to you immediately.  You could be contacted at any time during the day or night as an organ could become available at any time.  Please make certain our office always has your current telephone numbers and address.      Items we will need from you:    We have received approval from your insurance company for the transplant procedure.  It is critical that you notify us if there is any change in your insurance.  It is also important that you familiarize yourself with the details of your specific insurance policy.  Our patient  is available to assist you if you should have any questions regarding your coverage.    An ALA or PRA blood sample may need to be sent here every 3 to 6 months to match you with  donors or any potential living donors.  If you need this testing, special mailing boxes (called mailers) will be sent to you directly from the Outreach Department.  You should take the physician order form and the  to your home laboratory when it is time to for this testing to be done.  Additional mailers can be obtained by calling the Transplant Office and asking to speak to a heart .    During this waiting period, we may request additional periodic laboratory tests with your primary physician.  It will be your responsibility to remind your physician to forward your results to the Transplant Office.    We need to be kept informed of any changes in your medical condition such as:    changes in your medications,    significant changes in your health  significant infections (such as pneumonia or abscesses)  blood transfusions  any condition which requires hospitalization  any surgery    Remember to complete any routine cancer screening tests required before your transplant.  This includes colonoscopy; prostate screening for men, and mammogram and gynecologic testing for women, as well as dental work.  Your primary care clinic can assist you with arranging for these exams.  Remind your caregivers to forward copies of the records and final reports.      We want you to know that our program has physician and surgeon coverage 24 hours a day, 365 days a year. In addition, our transplant surgeons and physicians will not be on call for two or more transplant programs more than 30 miles apart unless the circumstances have been reviewed and approved by the [employer]aring (UNOS) Membership and Professional Standards Committee (MPSC). Finally, our primary physician and primary surgeons are not designated as the primary surgeon or primary physician at more than 1 transplant hospital. If this coverage changes or there are substantial program changes, you will be notified in writing by letter.     Attached is a letter from UNOS that describes the services and information offered to patients by UNOS and the Organ Procurement and Transplantation Network (OPTN).    We appreciate having had the opportunity to participate in your care.  If you have questions, please feel free to call the Transplant Office at [phone] or [phone].      Sincerely,      Solid Organ Transplant  [facility]      Enclosures: OS Letter  cc: Care Team                  The Organ Procurement and Transplantation Network Toll-free patient services line:  [phone]  Your resource for organ transplant information    Staffed 8:30 am - 5:00 pm  ET Monday - Friday Leave a message 24/7 to receive a call back    The Organ Procurement and Transplantation Network (OPTN) is the national transplant system. It makes the policies that decide how donated organs are matched to patients waiting for a transplant. The OPTN:    Makes sure donated organs get matched to people on the transplant waiting list  Tells people about the donation and transplant processes  Makes sure that the public knows about the need for more organ and tissue donations    The OPTN has a free patient services line that you can call to:  Get more information about:  Organ donation and organ transplants  Donation and transplant policies  Get an information kit with:  A list of transplant hospitals  Waiting list information  Talk about any questions you may have about your transplant hospital or organ procurement organization. The staff will do their best to help you or point you to others who may help.  Find out how you can volunteer with the OPTN and help shape transplant policy     The patient services line number is: [phone]    Patient services line staff CANNOT answer questions about your own medical care, including:  Waiting list status  Test results  Medical records  You will need to call your transplant hospital for this information.    The following websites have more information about transplantation and donation:    OPTN: https://optn.transplant.hrsa.gov/    For potential living donors and transplant recipients:    Living with transplant: https://www.transplantliving.org/    Living donation process: https://optn.transplant.hrsa.gov/living-donation/    Financial assistance: https://www.livingdonorassistance.org/    Transplantation data: https://www.srtr.org/    Organ donation: https://www.organdonor.gov/    Volunteer with the OPTN: https://optn.transplant.hrsa.gov/get-involved/                                   yes/pt already on antibiotic pt already on antibiotic/yes

## 2023-09-25 NOTE — LETTER
Navin Lutz   [address] [place] [address] [place] [address]                2023        Attn:  [email or web address]  Fax:  [phone]  Phone:  [phone]        [facility]  Heart Transplant Program  [facility] Form    The following patient is a potential Heart Transplant recipient at the [facility].  This form will serve as a request to set up a flight plan for this patient.  Please contact the patient with a plan and please forward the flight plan information to:    [facility] Transplant Office  Heart Transplant Coordinator (Bernadine)  Fax:  [phone]  Phone:  [phone]    Demographic Information on Navin Lutz:    Navin Lutz  Gender: male  : 1970  [address]   [place] [address]  [phone] (home)   Medical Record: [id number]  Insurance: Payor: BCBS / Plan: BCBS OUT OF STATE / Product Type: Indemnity /     Diagnosis:  Dilated myopathy: Nonischemic   Organ:  Heart  ABO:  O POS  Number of Sternotomies:  0    Feel free to call the Transplant Office at anytime with any questions or concerns.    Thank You,    The Heart Transplant Team  Heart Transplant Coordinator:  [facility]  [phone] opt 2

## 2023-09-25 NOTE — PROGRESS NOTES
Status 4 Heart Listing 9/25/2023  Listing Criteria:  Exception    Navin Lutz was approved for activation on the heart transplant waitlist by the Heart Transplant Committee.      Dr. Silvestre approved the following statement prior to the submission of Status 4 Exception form in [employer]OS:    Our patient is a 52 year-old man, 94 kg, blood group O, non-sensitized with end-stage, nonischemic cardiomyopathy s/p ICD and CKD Stage II that is now inotrope-dependent. He has severe left ventricular dilatation, normal biventricular filling pressures off diuretics, and reduced CI despite milrinone therapy. He was recently admitted on 8/22/23 following an outpatient RHC with SBP 88 that showed RA 8, mPAP 16, PCWP 12, Teressa CI 1.5, TD CI 1.36, PVR 1.25. He was started initially on dobutamine then transitioned to milrinone given frequent ectopy while on this, so low-dose coreg was added to aid with this. Repeat RHC on 9/20/23 showed RA 3, PCWP 8, Teressa CI 1.69 on milrinone @ 0.125mg/kg/min, so it was increased to 0.25mg/kg/min. Further afterload reduction was hindered by significant presyncopal symptoms. Despite addition of inotropes and being optimized from a fluid standpoint, he continues to have frequent rest symptoms of low-output heart failure. Given his end-stage NICM with persistently low CI/CO despite inotrope therapy we are respectfully requesting a status 4 listing by exception.    Insurance Approval:  YES (verify prior to listing with PFR)  ABO verification complete:  YES  2nd ABO verified by:  Arabella Clayton RN    On-call transplant coordinators, transplant LPN, immunology lab and PFR notified of listing.  Patient's primary cardiologist and/or attending physician is in agreement with this plan.      Status 4 Extension due 12/24/2023

## 2023-09-26 NOTE — TELEPHONE ENCOUNTER
VAD Multidisciplinary Review  Multidisciplinary review date: 9/22/23  Inclusion Criteria  Discussed VAD with patient and/or decision makers. Reviewed anticipated survival benefit, anticipated functional improvement, risks, and benefits. Patient and/or decision maker verbalize understanding and agree to VAD implant?: Yes  VAD is elective procedure?: Yes  NYHA class: IV  INTERMACS score: 3  Patient has: failed to respond to optimal medical management for at least 45 of the last 60 days, been IV inotrope-dependent for at least 14 days  Cardiopulmonary stress testing completed?: None related to acute illness  LVEF is: < 25%  Exclusion Criteria  Has condition, other than heart failure, which would limit survival to < 2 years?: No  Cardiomyopathy with restrictive physiology, unless severe LV systolic failure and LV dilation present?: No  Technical obstacles that pose and inordinately high surgical risk in the judgment of the MCS surgeon?: No  Active systemic infection? (unless ALL of following criteria met: negative blood cultures x 2 days, antibiotic treatment x 7 days and Infectious Disease clearance pre-operatively): No  Presence of active malignancy AND life expectancy < 2 years?: No  Stroke within the last six weeks, unless cleared by neurology team: No  Diagnosed with severe, irreversible pulmonary disease (supplemental O2 usage, FEV1 < 50% predicted): No  Pulmonary hypertension as a primary diagnosis: No  Chronic dialysis: No  Evidence of significant peripheral vascular disease accompanied by resting leg pain or ulceration unless treatment plan is in place: No  Carotid artery disease (>80% extracranial stenosis on Doppler) that could result in an adverse neurological event if left untreated: No  Evidence of an untreated abdominal aortic aneurysm >5 cm as measured by abdominal ultrasounds within the last 180 days unless treatment plan in place: No  Severe or irreversible hepatic disease, evidence of shock liver,  and/o biopsy-proven cirrhosis: No  Active contraindication to anticoagulation, INR > 2.5 in absence of concurrent anticoagulation therapy, platelet < 50,000, history of intolerance to anticoagulation, or other coagulopathy unless Hematology consulted and plan is in place: No  Acute valvular infective endocarditis with bacteremia: No  Neuromuscular disease, psychiatric diagnosis, dementia or other process that severely compromises ability to use and care for external system components or to ambulate/exercise: No  Inability to understand the procedure, risk involved, or to comply with follow-up evaluation by VAD team: No  For menstruating females: Current pregnancy or unable/unwilling to follow contraception plan: Not applicable  Relative Contraindications  Alcohol and/or recreational drug abuse within past six months: No  Significant history of depression or other mental illness, refractory to therapy or thought to be a risk to successful outcome with MCS, as per assessment of trained professional: No  Demonstrated on-going non-compliance with demonstrated inability to comply with medical recommendations on multiple occasions: No  Unsafe living environment or lack of adequate support system: No  Lack of adequate insurance coverage or waiver by hospital administration: No  Evidence of other ongoing physical, financial, or psychosocial issues that will preclude the intended goal of safety and improvements in quality and duration of life, unless a plan for resolution and support is in process: No  Multidisciplinary Decision  Decision: Approved Comment: Will list status 4E for heart transplant, if patient deteriorates prior to transplant, would do LVAD as BTT   Implant as: Bridge to Transplant

## 2023-10-20 ENCOUNTER — CARE COORDINATION (OUTPATIENT)
Dept: CARDIOLOGY | Facility: CLINIC | Age: 53
End: 2023-10-20
Payer: COMMERCIAL

## 2023-10-20 RX ORDER — FUROSEMIDE 20 MG
20 TABLET ORAL PRN
Status: ON HOLD | COMMUNITY
Start: 2023-10-20 | End: 2023-11-20

## 2023-10-20 NOTE — PROGRESS NOTES
Called Navin to check in. He reports feeling well, no concerns. Reviewed medication list.   Confirmed we are due for CMP lab draw and UE ultrasound. [facility] - will fax lab order to them to be done at same time.  Will fax ultrasound order to [facility] [place] to get this completed.

## 2023-11-01 ENCOUNTER — VIRTUAL VISIT (OUTPATIENT)
Dept: CARDIOLOGY | Facility: CLINIC | Age: 53
End: 2023-11-01
Attending: STUDENT IN AN ORGANIZED HEALTH CARE EDUCATION/TRAINING PROGRAM
Payer: COMMERCIAL

## 2023-11-01 VITALS
DIASTOLIC BLOOD PRESSURE: 70 MMHG | SYSTOLIC BLOOD PRESSURE: 101 MMHG | BODY MASS INDEX: 27.4 KG/M2 | HEART RATE: 86 BPM | WEIGHT: 202 LBS

## 2023-11-01 DIAGNOSIS — I42.8 NONISCHEMIC CARDIOMYOPATHY (H): ICD-10-CM

## 2023-11-01 DIAGNOSIS — N18.30 STAGE 3 CHRONIC KIDNEY DISEASE, UNSPECIFIED WHETHER STAGE 3A OR 3B CKD (H): ICD-10-CM

## 2023-11-01 DIAGNOSIS — I82.629 DEEP VEIN THROMBOSIS (DVT) OF UPPER EXTREMITY, UNSPECIFIED CHRONICITY, UNSPECIFIED LATERALITY, UNSPECIFIED VEIN (H): ICD-10-CM

## 2023-11-01 DIAGNOSIS — I50.23 ACUTE ON CHRONIC SYSTOLIC CHF (CONGESTIVE HEART FAILURE) (H): Primary | ICD-10-CM

## 2023-11-01 PROCEDURE — 99214 OFFICE O/P EST MOD 30 MIN: CPT | Mod: VID | Performed by: STUDENT IN AN ORGANIZED HEALTH CARE EDUCATION/TRAINING PROGRAM

## 2023-11-01 ASSESSMENT — PAIN SCALES - GENERAL: PAINLEVEL: NO PAIN (0)

## 2023-11-01 NOTE — NURSING NOTE
Diet: Patient instructed regarding a heart failure healthy diet, including discussion of reduced fat and 2000 mg daily sodium restriction, daily weights, medication purpose and compliance, fluid restrictions and resources for patient and family to access for assistance with heart failure management.       Labs: Patient was given results of the laboratory testing obtained today and patient was instructed about when to return for the next laboratory testing. Repeat CMP with next INR    Med Reconcile: Reviewed and verified all current medications with the patient. The updated medication list was printed and given to the patient. No changes    Return Appointment: Patient given instructions regarding scheduling next clinic visit. UE ultrasound at [place]. Timing of RHC pending results.     Patient stated he understood all health information given and agreed to call with further questions or concerns.       hSannon Dixon RN

## 2023-11-01 NOTE — PATIENT INSTRUCTIONS
Cardiology Providers you saw during your visit:  Dr. Silvestre     Medication changes:  1- no changes.         Follow up:  1- Labs (comprehensive metabolic panel) with next INR draw. [facility] should have this lab order on file-  please ensure they draw it for you.  2 - Ultrasound - order has been faxed to [place] for you. Please call [phone].        Please call if you have:  1. Weight gain of more than 2 pounds in a day or 5 pounds in a week  2. Increased shortness of breath, swelling or bloating  3. Dizziness, lightheadedness   4. Any questions or concerns.      Heart Failure Support Group  Virtual meetings will continue in 2023 Please reach out if you would like to attend and we can get you the information you need to log in.     2023 dates for Support Group      Monday, November 6th, 1-2pm  Monday, December 4th, 1-2pm    Follow the American Heart Association Diet and Lifestyle recommendations:  Limit saturated fat, trans fat, sodium, red meat, sweets and sugar-sweetened beverages. If you choose to eat red meat, compare labels and select the leanest cuts available.  Aim for at least 150 minutes of moderate physical activity or 75 minutes of vigorous physical activity - or an equal combination of both - each week.     During business hours: [phone], press option # 1 to schedule an appointment or send a message to your care team     After hours, weekends or holidays: On Call Cardiologist- [phone]   option #4 and ask to speak to the on-call Cardiologist. Inform them you are a CORE/heart failure patient at the [facility].     Shannon Dixon RN BSN CHFN  Cardiology Nurse Care Coordinator (Heart Failure / C.O.R.E.)

## 2023-11-01 NOTE — LETTER
11/1/2023      RE: Navin Lutz  [address] [place] [address]       Dear Colleague,    Thank you for the opportunity to participate in the care of your patient, Navin Lutz, at the [facility] [place] [facility]. Please see a copy of my visit note below.    Virtual Visit Details    Type of service:  Video Visit     Originating Location (pt. Location): Home  Distant Location (provider location):  On-site  Platform used for Video Visit: [facility]    Advanced Heart Failure/Transplant Clinic Note    HPI  Dear colleagues,     I had the pleasure of seeing Mr. Navin Lutz in the Cardiology clinic. As you know, Mr. Navin Lutz is a pleasant 52 year old male with a past medical history of chronic HFrEF 2/2 NICM s/p ICD, HLD, and CKD Stage II who presents for follow up for HFrEF.    Patient reports he was first having symptoms of CHF in 2017 and was diagnosed with HFrEF shortly after that. He has been on various GDMT and overall did well with no hospital admissions until June '23.     Patient admitted to [facility] from 8/22-8/28/23 for cardiogenic shock. He was initially started on dobutamine, but then switched to milrinone prior to discharge. On this, he was having a significant amount of ectopy, so low-dose carvedilol was prior to discharge. 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    Patient reports since his last visit, still has not been back to his baseline. He overall feels better on milrinone than prior to the admission, but still no where as good as he was 6 months ago. Patient reports on good days, he can do ADLs around his home, but on bad stays, has symptoms with walking around his home and doing any chores. He completed his dental work and has been compliant with his medications, diet, fluid restriction, and monitoring his weights and BPs. He reports most days feeling presyncope,  but again, this sometimes get worse than other days. He denies orthopnea, PND, chest pain, palpitations, syncope, abdominal pain, nausea, emesis, LE edema, or weight gain.    ROS:  A complete 12-point ROS was negative except as above.    PAST MEDICAL HISTORY:  Patient Active Problem List   Diagnosis    Acute on chronic systolic CHF (congestive heart failure) (H)    Chest pain    ICD (implantable cardioverter-defibrillator) in place    Inguinal hernia    Multiple lung nodules on CT    Non-ischemic cardiomyopathy (H)    Pure hypercholesterolemia    RAD (reactive airway disease) with wheezing, mild intermittent, uncomplicated    Acute deep vein thrombosis (DVT) of other vein of left upper extremity (H)   Chronic biventricular systolic and LV diastolic dysfunction (HFrEF)  ACC/AHA stage C, NYHA class III  EF 18% (cardiac MRI) on 6/14/2023  EF 13% with grade I diastolic dysfunction on 6/13/2023  Reduced RVSF  EF 20-25% on 9/22/2020  EF 25-30% on 9/20/2019  EF 20% on 3/26/2019  EF 20-25% on 1/22/2019  EF 15-20% on 9/24/2018  EF 15-20% with grade 5 diastolic dysfunction on 12/11/2017   Nonischemic dilated cardiomyopathy  LIVDd 7.2 cm  Viral mediated?  Evaluation at [facility] in 2019  Cardiac MRI on 6/14/2023 = severe left ventricular dilation with severe, diffuse hypokinesis and mid wall late gadolinium enhancement stripe throughout the septum consistent with nonischemic dilated cardiomyopathy; mild patchy myocardial edema along the left ventricular anterior, anterolateral and inferolateral walls in the basal and mid cavity segments could represent an element of myocarditis; trace aortic regurgitation, trace mitral valve regurgitation, mild tricuspid valve regurgitation  LHC and RHC on 6/13/2023 = intramyocardial bridging of the dLAD otherwise normal coronary arteries; LVEDP 3 mmHg; normal pressures with no pulmonary hypertension; RA 4 mmHg, RV 22/4 mmHg, PA 23/11 mmHg with mean PAP of 16 mmHg, PCWP 7 mmHg, CO 4.32 L/min  and CI 2.1 L/min*m2 by Teressa, CO 2.05 L/min and CI 1.0 L/min*m2 by thermodilution  LHC and RHC on 1/9/2018 = arterial pressure 82/59 (67); normal pulmonary capillary wedge, and mean pulmonary artery pressures; cardiac output by thermodilution was 3.07 L/min, index at 1.5; no angiographic evidence of coronary artery disease present  History of NSVT  Moderate to severe eccentric LVH  Valvular heart disease  Mild MR  Dyslipidemia  Possible obstructive sleep apnea       FAMILY HISTORY:  No known family history of heart disease except possible his father had an enlarged heart, but no formal diagnosis    SOCIAL HISTORY:  , owns a [employer] company. No prior tobacco, ETOH, or illicit substance use.  Social History     Tobacco Use    Smoking status: Never    Smokeless tobacco: Never   Substance Use Topics    Alcohol use: Never    Drug use: Never        ALLERGIES:  No Known Allergies    CURRENT MEDICATIONS:  Current Outpatient Medications   Medication Sig Dispense Refill    atorvastatin (LIPITOR) 20 MG tablet Take 1 tablet (20 mg) by mouth every evening 30 tablet 0    carvedilol (COREG) 3.125 MG tablet Take 1 tablet (3.125 mg) by mouth 2 times daily (with meals) 60 tablet 0    empagliflozin (JARDIANCE) 10 MG TABS tablet Take 10 mg by mouth daily      furosemide (LASIX) 20 MG tablet Take 1 tablet (20 mg) by mouth as needed (for weight gain, swelling)      milrinone (PRIMACOR) infusion 200 mcg/mL PREMIX Inject 23.025 mcg/min into the vein continuous 100 mL 0    nitroGLYcerin (NITROSTAT) 0.4 MG sublingual tablet Place 0.4 mg under the tongue every 5 minutes as needed      sacubitril-valsartan (ENTRESTO) 49-51 MG per tablet Take 1 tablet by mouth 2 times daily 60 tablet 0    spironolactone (ALDACTONE) 25 MG tablet Take 25 mg by mouth daily      warfarin ANTICOAGULANT (COUMADIN) 1 MG tablet Take 3 tablets (3 mg) by mouth daily With dosing changes as directed by anticoagulation clinic 90 tablet 1       EXAM:  There were no  vitals taken for this visit. (Virtual visit)    General: appears comfortable, alert and interactive, in no acute distress  Head: normocephalic, atraumatic  Eyes: anicteric sclera, EOMI  Mouth: MMM  Resp: non-labored respirations  Neurological: alert and oriented, no focal deficits  Psych: normal mood and affect  Derm: no rashes on exposed surfaces    Weight  Wt Readings from Last 10 Encounters:   09/20/23 94.3 kg (208 lb)   09/20/23 93.1 kg (205 lb 3.2 oz)   08/28/23 89.9 kg (198 lb 3.2 oz)   07/19/23 93.7 kg (206 lb 9.6 oz)       I personally reviewed recent labs and data as below and discussed the results with the patient in clinic today.  Labs:  CBC RESULTS:  Lab Results   Component Value Date    WBC 6.2 09/20/2023    RBC 5.78 09/20/2023    HGB 17.9 (H) 09/20/2023    HCT 51.6 09/20/2023    MCV 89 09/20/2023    MCH 31.0 09/20/2023    MCHC 34.7 09/20/2023    RDW 13.5 09/20/2023     09/20/2023       CMP RESULTS:  Lab Results   Component Value Date     09/20/2023    POTASSIUM 4.2 09/20/2023    CHLORIDE 105 09/20/2023    CO2 23 09/20/2023    ANIONGAP 9 09/20/2023     (H) 09/20/2023    GLC 96 08/25/2023    BUN 15.6 09/20/2023    CR 1.24 (H) 09/20/2023    GFRESTIMATED 70 09/20/2023    NAM 9.2 09/20/2023    BILITOTAL 0.4 09/20/2023    ALBUMIN 4.0 09/20/2023    ALKPHOS 99 09/20/2023    ALT 42 09/20/2023    AST 28 09/20/2023          No results for input(s): CHOL, HDL, LDL, TRIG, CHOLHDLRATIO in the last [id number] hours.     Testing/Procedures:  I personally visualized and interpreted:  Echocardiogram 6/13/23  Narrative      Left Ventricle: The left ventricle is severely dilated. There is   moderate-to-severe eccentric left ventricular hypertrophy. Severely   reduced left ventricular systolic function. The ejection fraction,   measured by biplane, is 13%. Severe hypokinesis of apex, remainder wall   segments are mostly akinetic. Grade I diastolic dysfunction.         Left Ventricle   The left ventricle is  severely dilated. There is moderate-to-severe eccentric left ventricular hypertrophy. Severely reduced left ventricular systolic function. The ejection fraction, measured by biplane, is 13%. Severe hypokinesis of apex, remainder wall segments are mostly akinetic. Grade I diastolic dysfunction.     Right Ventricle   The right ventricle appears normal in size. Systolic function is reduced. Normal TAPSE, measuring 1.8 cm. Normal systolic excursion velocity by TDI. S' measures 9.5 cm/sec. Wall thickness is normal. The RVSP measures 13 mmHg. There is no evidence of pulmonary hypertension.     Left Atrium   The left atrium is normal in size. The pulmonary veins have normal venous flow.     Right Atrium   The right atrium is normal in size.     IVC/SVC   Normal IVC size with normal respirophasic changes.Normal hepatic vein blood flow.     Mitral Valve   Mitral valve structure is normal. There is trace regurgitation. There is no evidence of mitral valve stenosis.     Tricuspid Valve   Tricuspid valve structure is normal. There is mild regurgitation. There is no evidence of tricuspid valve stenosis.     Aortic Valve   The aortic valve is tricuspid. There is trace regurgitation. There is no evidence of aortic valve stenosis.     Pulmonic Valve   The pulmonic valve was not well visualized. There is trace regurgitation. There is no evidence of pulmonic valve stenosis.     Pericardium   There is no pericardial effusion.     Aorta   The sinus of Valsalva is normal. The ascending aorta is normal.     Interatrial Septum   No evidence of an atrial shunt by color Doppler.     Coronary Angiogram/RHC 6/13/23  Conclusions:   Coronary angiogram: Intramyocardial bridging of the distal LAD, otherwise   normal coronaries.   Left heart catheterization: No significant gradient across aortic valve.     LVEDP 3 mmHg.   Right heart catheterization: Normal pressures.  No pulmonary hypertension.    Low cardiac output.  No shunt.   -RA 4 mmHg   -RV  22/4 mmHg   -PA 23/11 mmHg, Mean PAP 16 mmHg   -PCWP 7 mmHg   -Iesha: CO 4.32 L/min, CI 2.1 L/min*m2   -Thermodilution: CO 2.05 L/min, CI 1.0 L/min*m2     cMRI 6/14/23  FINDINGS:   AORTA: The ascending aorta and aortic root are normal caliber. The sinotubular junction is maintained. Normal aortic wall thickness.     ARCH VESSELS: Arch vessels are grossly patent and partially visualized.     RIGHT ATRIUM: Normal size.     TRICUSPID VALVE: Unrestricted leaflet excursion without stenosis. Mild regurgitation.     RIGHT VENTRICLE: Normal morphology, size, and wall thickness. No wall motion abnormalities identified.     PULMONIC VALVE: Unrestricted leaflet excursion without stenosis or regurgitation.     PULMONARY VEINS: Widely patent.     LEFT ATRIUM: Mild dilation. No left atrial appendage thrombus.     MITRAL VALVE: Unrestrictive leaflet excursion without stenosis. Trace regurgitation.     LEFT VENTRICLE: Normal morphology and thickness with marked dilation. End-diastolic basal septal thickness of 9 mm.     Severe, diffuse hypokinesis. Normal first pass perfusion.     Mid wall late gadolinium enhancement throughout the septum. Patchy myocardial edema along the anterior, anterolateral and inferolateral walls in the basal and mid cavity segments.       EKG 6/13/23 shows sinus rhythm, nonspecific t wave abnormalities    RHC 8/22/23  RA --/--/5  RV 20/5  PA 20/12/16  PCWP --/--/12  IESHA 3.2/1.5  TD 2.93/1.36  MAP 96  PVR 1.25 (IESHA)  SVR 2275 (IESHA)     TTE 8/23/23  Interpretation Summary  Severe left ventricular dilation (LVIDd 6.8cm). Left ventricular function is  decreased. The ejection fraction is 15-20% (severely reduced). Severe diffuse  hypokinesis.  Global right ventricular function is normal.  No significant valve abnormalities.  The inferior vena cava is normal.  No pericardial effusion.    RHC 9/20/23  RA: 3/4/3 mmHg  RV: 23/2 mmHg  PA: 23/14/18 mmHg  CWP: --/--/8 mmHg  CO/CI (Iesha): 3.63/1.69 L/min/m2  CO/CI  (TD): 4.1/1.9 L/min/m2  PA sat: 68.7%  MAP: 84 mmHg   PVR: 2.4 (TD) BYRD        Outside results of note:  Outside records from [facility] were obtained, and relevant results/notes have been incorporated into HPI.    Assessment and Plan:     In summary, 52 year old male with a past medical history of chronic HFrEF 2/2 NICM s/p ICD, HLD, and CKD Stage II who presents as follow up for HFrEF.    Chronic systolic heart failure/HFrEF (EF 10-15%) secondary to nonischemic cardiomyopathy  NYHA Symptom Class IIIb  Stage D  Inotrope: Continue milrinone 0.25 given persistently low CI   ACE-I/ARB/ARNi: Continue Entresto 49-51 mg BID, unable to increase given frequent presyncope  BB: Continue coreg 3.125 mg BID given frequent ectopy on inotrope therapy  Aldosterone antagonist: Continue beck 25 mg daily  SGLT2i: Continue empagliflozin 10 mg daily  SCD prophylaxis: s/p ICD  %BiV pacing: N/A  Fluid status: euvoelmic on lasix 10 mg daily PRN  Cardiac Rehab: previously referred  - Currently listed status 4 for heart transplant  - Discussed importance of daily standing weights, 2-3L fluid, and 2g Na restriction    Myocardial Bridge Over Coronary Artery  HLD  Myocardial bridge over distal LAD.  - Continue to monitor    CKD Stage III  Cr ~1.2-1.4.  - Will continue to monitor    RUE DVT  - Continue warfarin with INR goal 2-3  - Repeat RUE US, if resolution, will stop warfarin    To Do:  - No medication changes  - Follow up with local Cardiology as scheduled  - Repeat CMP soon  - Repeat RUE venous US, if no further thrombi, stop warfarin  - Follow up with me pending repeat US, if resolution of thrombi, will have patient come down for RHC and visit    The patient states understanding and is agreeable with plan.   Feel free to contact myself regarding questions or concerns. It was a pleasure to see this patient today.    I spent 30 minutes in care of the patient today including obtaining recent medical history, personally reviewing recent  cardiac testing and/or lab results, today's examination, discussion of testing results and care recommendations with patient.    Micaela Silvestre MD   of Medicine, [facility]  Advanced Heart Failure and Transplant Cardiology     CC  GALO HERNANDEZ NP      Please do not hesitate to contact me if you have any questions/concerns.     Sincerely,     Micaela Silvestre MD

## 2023-11-01 NOTE — PROGRESS NOTES
Virtual Visit Details    Type of service:  Video Visit     Originating Location (pt. Location): Home  Distant Location (provider location):  On-site  Platform used for Video Visit: Mariano    Advanced Heart Failure/Transplant Clinic Note    HPI  Dear colleagues,     I had the pleasure of seeing Mr. Navin Lutz in the Cardiology clinic. As you know, Mr. Navin Lutz is a pleasant 52 year old male with a past medical history of chronic HFrEF 2/2 NICM s/p ICD, HLD, and CKD Stage II who presents for follow up for HFrEF.    Patient reports he was first having symptoms of CHF in 2017 and was diagnosed with HFrEF shortly after that. He has been on various GDMT and overall did well with no hospital admissions until June '23.     Patient admitted to [facility] from 8/22-8/28/23 for cardiogenic shock. He was initially started on dobutamine, but then switched to milrinone prior to discharge. On this, he was having a significant amount of ectopy, so low-dose carvedilol was prior to discharge. 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    Patient reports since his last visit, still has not been back to his baseline. He overall feels better on milrinone than prior to the admission, but still no where as good as he was 6 months ago. Patient reports on good days, he can do ADLs around his home, but on bad stays, has symptoms with walking around his home and doing any chores. He completed his dental work and has been compliant with his medications, diet, fluid restriction, and monitoring his weights and BPs. He reports most days feeling presyncope, but again, this sometimes get worse than other days. He denies orthopnea, PND, chest pain, palpitations, syncope, abdominal pain, nausea, emesis, LE edema, or weight gain.    ROS:  A complete 12-point ROS was negative except as above.    PAST MEDICAL HISTORY:  Patient Active Problem List   Diagnosis    Acute on chronic systolic  CHF (congestive heart failure) (H)    Chest pain    ICD (implantable cardioverter-defibrillator) in place    Inguinal hernia    Multiple lung nodules on CT    Non-ischemic cardiomyopathy (H)    Pure hypercholesterolemia    RAD (reactive airway disease) with wheezing, mild intermittent, uncomplicated    Acute deep vein thrombosis (DVT) of other vein of left upper extremity (H)   Chronic biventricular systolic and LV diastolic dysfunction (HFrEF)  ACC/AHA stage C, NYHA class III  EF 18% (cardiac MRI) on 6/14/2023  EF 13% with grade I diastolic dysfunction on 6/13/2023  Reduced RVSF  EF 20-25% on 9/22/2020  EF 25-30% on 9/20/2019  EF 20% on 3/26/2019  EF 20-25% on 1/22/2019  EF 15-20% on 9/24/2018  EF 15-20% with grade 5 diastolic dysfunction on 12/11/2017   Nonischemic dilated cardiomyopathy  LIVDd 7.2 cm  Viral mediated?  Evaluation at [facility] in 2019  Cardiac MRI on 6/14/2023 = severe left ventricular dilation with severe, diffuse hypokinesis and mid wall late gadolinium enhancement stripe throughout the septum consistent with nonischemic dilated cardiomyopathy; mild patchy myocardial edema along the left ventricular anterior, anterolateral and inferolateral walls in the basal and mid cavity segments could represent an element of myocarditis; trace aortic regurgitation, trace mitral valve regurgitation, mild tricuspid valve regurgitation  LHC and RHC on 6/13/2023 = intramyocardial bridging of the dLAD otherwise normal coronary arteries; LVEDP 3 mmHg; normal pressures with no pulmonary hypertension; RA 4 mmHg, RV 22/4 mmHg, PA 23/11 mmHg with mean PAP of 16 mmHg, PCWP 7 mmHg, CO 4.32 L/min and CI 2.1 L/min*m2 by Teressa, CO 2.05 L/min and CI 1.0 L/min*m2 by thermodilution  LHC and RHC on 1/9/2018 = arterial pressure 82/59 (67); normal pulmonary capillary wedge, and mean pulmonary artery pressures; cardiac output by thermodilution was 3.07 L/min, index at 1.5; no angiographic evidence of coronary artery disease  present  History of NSVT  Moderate to severe eccentric LVH  Valvular heart disease  Mild MR  Dyslipidemia  Possible obstructive sleep apnea       FAMILY HISTORY:  No known family history of heart disease except possible his father had an enlarged heart, but no formal diagnosis    SOCIAL HISTORY:  , owns a [employer] company. No prior tobacco, ETOH, or illicit substance use.  Social History     Tobacco Use    Smoking status: Never    Smokeless tobacco: Never   Substance Use Topics    Alcohol use: Never    Drug use: Never        ALLERGIES:  No Known Allergies    CURRENT MEDICATIONS:  Current Outpatient Medications   Medication Sig Dispense Refill    atorvastatin (LIPITOR) 20 MG tablet Take 1 tablet (20 mg) by mouth every evening 30 tablet 0    carvedilol (COREG) 3.125 MG tablet Take 1 tablet (3.125 mg) by mouth 2 times daily (with meals) 60 tablet 0    empagliflozin (JARDIANCE) 10 MG TABS tablet Take 10 mg by mouth daily      furosemide (LASIX) 20 MG tablet Take 1 tablet (20 mg) by mouth as needed (for weight gain, swelling)      milrinone (PRIMACOR) infusion 200 mcg/mL PREMIX Inject 23.025 mcg/min into the vein continuous 100 mL 0    nitroGLYcerin (NITROSTAT) 0.4 MG sublingual tablet Place 0.4 mg under the tongue every 5 minutes as needed      sacubitril-valsartan (ENTRESTO) 49-51 MG per tablet Take 1 tablet by mouth 2 times daily 60 tablet 0    spironolactone (ALDACTONE) 25 MG tablet Take 25 mg by mouth daily      warfarin ANTICOAGULANT (COUMADIN) 1 MG tablet Take 3 tablets (3 mg) by mouth daily With dosing changes as directed by anticoagulation clinic 90 tablet 1       EXAM:  There were no vitals taken for this visit. (Virtual visit)    General: appears comfortable, alert and interactive, in no acute distress  Head: normocephalic, atraumatic  Eyes: anicteric sclera, EOMI  Mouth: MMM  Resp: non-labored respirations  Neurological: alert and oriented, no focal deficits  Psych: normal mood and affect  Derm: no  rashes on exposed surfaces    Weight  Wt Readings from Last 10 Encounters:   09/20/23 94.3 kg (208 lb)   09/20/23 93.1 kg (205 lb 3.2 oz)   08/28/23 89.9 kg (198 lb 3.2 oz)   07/19/23 93.7 kg (206 lb 9.6 oz)       I personally reviewed recent labs and data as below and discussed the results with the patient in clinic today.  Labs:  CBC RESULTS:  Lab Results   Component Value Date    WBC 6.2 09/20/2023    RBC 5.78 09/20/2023    HGB 17.9 (H) 09/20/2023    HCT 51.6 09/20/2023    MCV 89 09/20/2023    MCH 31.0 09/20/2023    MCHC 34.7 09/20/2023    RDW 13.5 09/20/2023     09/20/2023       CMP RESULTS:  Lab Results   Component Value Date     09/20/2023    POTASSIUM 4.2 09/20/2023    CHLORIDE 105 09/20/2023    CO2 23 09/20/2023    ANIONGAP 9 09/20/2023     (H) 09/20/2023    GLC 96 08/25/2023    BUN 15.6 09/20/2023    CR 1.24 (H) 09/20/2023    GFRESTIMATED 70 09/20/2023    NAM 9.2 09/20/2023    BILITOTAL 0.4 09/20/2023    ALBUMIN 4.0 09/20/2023    ALKPHOS 99 09/20/2023    ALT 42 09/20/2023    AST 28 09/20/2023          No results for input(s): CHOL, HDL, LDL, TRIG, CHOLHDLRATIO in the last 24040 hours.     Testing/Procedures:  I personally visualized and interpreted:  Echocardiogram 6/13/23  Narrative      Left Ventricle: The left ventricle is severely dilated. There is   moderate-to-severe eccentric left ventricular hypertrophy. Severely   reduced left ventricular systolic function. The ejection fraction,   measured by biplane, is 13%. Severe hypokinesis of apex, remainder wall   segments are mostly akinetic. Grade I diastolic dysfunction.         Left Ventricle   The left ventricle is severely dilated. There is moderate-to-severe eccentric left ventricular hypertrophy. Severely reduced left ventricular systolic function. The ejection fraction, measured by biplane, is 13%. Severe hypokinesis of apex, remainder wall segments are mostly akinetic. Grade I diastolic dysfunction.     Right Ventricle   The  right ventricle appears normal in size. Systolic function is reduced. Normal TAPSE, measuring 1.8 cm. Normal systolic excursion velocity by TDI. S' measures 9.5 cm/sec. Wall thickness is normal. The RVSP measures 13 mmHg. There is no evidence of pulmonary hypertension.     Left Atrium   The left atrium is normal in size. The pulmonary veins have normal venous flow.     Right Atrium   The right atrium is normal in size.     IVC/SVC   Normal IVC size with normal respirophasic changes.Normal hepatic vein blood flow.     Mitral Valve   Mitral valve structure is normal. There is trace regurgitation. There is no evidence of mitral valve stenosis.     Tricuspid Valve   Tricuspid valve structure is normal. There is mild regurgitation. There is no evidence of tricuspid valve stenosis.     Aortic Valve   The aortic valve is tricuspid. There is trace regurgitation. There is no evidence of aortic valve stenosis.     Pulmonic Valve   The pulmonic valve was not well visualized. There is trace regurgitation. There is no evidence of pulmonic valve stenosis.     Pericardium   There is no pericardial effusion.     Aorta   The sinus of Valsalva is normal. The ascending aorta is normal.     Interatrial Septum   No evidence of an atrial shunt by color Doppler.     Coronary Angiogram/RHC 6/13/23  Conclusions:   Coronary angiogram: Intramyocardial bridging of the distal LAD, otherwise   normal coronaries.   Left heart catheterization: No significant gradient across aortic valve.     LVEDP 3 mmHg.   Right heart catheterization: Normal pressures.  No pulmonary hypertension.    Low cardiac output.  No shunt.   -RA 4 mmHg   -RV 22/4 mmHg   -PA 23/11 mmHg, Mean PAP 16 mmHg   -PCWP 7 mmHg   -Teressa: CO 4.32 L/min, CI 2.1 L/min*m2   -Thermodilution: CO 2.05 L/min, CI 1.0 L/min*m2     cMRI 6/14/23  FINDINGS:   AORTA: The ascending aorta and aortic root are normal caliber. The sinotubular junction is maintained. Normal aortic wall thickness.      ARCH VESSELS: Arch vessels are grossly patent and partially visualized.     RIGHT ATRIUM: Normal size.     TRICUSPID VALVE: Unrestricted leaflet excursion without stenosis. Mild regurgitation.     RIGHT VENTRICLE: Normal morphology, size, and wall thickness. No wall motion abnormalities identified.     PULMONIC VALVE: Unrestricted leaflet excursion without stenosis or regurgitation.     PULMONARY VEINS: Widely patent.     LEFT ATRIUM: Mild dilation. No left atrial appendage thrombus.     MITRAL VALVE: Unrestrictive leaflet excursion without stenosis. Trace regurgitation.     LEFT VENTRICLE: Normal morphology and thickness with marked dilation. End-diastolic basal septal thickness of 9 mm.     Severe, diffuse hypokinesis. Normal first pass perfusion.     Mid wall late gadolinium enhancement throughout the septum. Patchy myocardial edema along the anterior, anterolateral and inferolateral walls in the basal and mid cavity segments.       EKG 6/13/23 shows sinus rhythm, nonspecific t wave abnormalities    RHC 8/22/23  RA --/--/5  RV 20/5  PA 20/12/16  PCWP --/--/12  IESHA 3.2/1.5  TD 2.93/1.36  MAP 96  PVR 1.25 (IESHA)  SVR 2275 (IESHA)     TTE 8/23/23  Interpretation Summary  Severe left ventricular dilation (LVIDd 6.8cm). Left ventricular function is  decreased. The ejection fraction is 15-20% (severely reduced). Severe diffuse  hypokinesis.  Global right ventricular function is normal.  No significant valve abnormalities.  The inferior vena cava is normal.  No pericardial effusion.    [facility] 9/20/23  RA: 3/4/3 mmHg  RV: 23/2 mmHg  PA: 23/14/18 mmHg  CWP: --/--/8 mmHg  CO/CI (Iesha): 3.63/1.69 L/min/m2  CO/CI (TD): 4.1/1.9 L/min/m2  PA sat: 68.7%  MAP: 84 mmHg   PVR: 2.4 (TD) BYRD        Outside results of note:  Outside records from [facility] were obtained, and relevant results/notes have been incorporated into HPI.    Assessment and Plan:     In summary, 52 year old male with a past medical history of chronic HFrEF  2/2 NICM s/p ICD, HLD, and CKD Stage II who presents as follow up for HFrEF.    Chronic systolic heart failure/HFrEF (EF 10-15%) secondary to nonischemic cardiomyopathy  NYHA Symptom Class IIIb  Stage D  Inotrope: Continue milrinone 0.25 given persistently low CI   ACE-I/ARB/ARNi: Continue Entresto 49-51 mg BID, unable to increase given frequent presyncope  BB: Continue coreg 3.125 mg BID given frequent ectopy on inotrope therapy  Aldosterone antagonist: Continue beck 25 mg daily  SGLT2i: Continue empagliflozin 10 mg daily  SCD prophylaxis: s/p ICD  %BiV pacing: N/A  Fluid status: euvoelmic on lasix 10 mg daily PRN  Cardiac Rehab: previously referred  - Currently listed status 4 for heart transplant  - Discussed importance of daily standing weights, 2-3L fluid, and 2g Na restriction    Myocardial Bridge Over Coronary Artery  HLD  Myocardial bridge over distal LAD.  - Continue to monitor    CKD Stage III  Cr ~1.2-1.4.  - Will continue to monitor    RUE DVT  - Continue warfarin with INR goal 2-3  - Repeat RUE US, if resolution, will stop warfarin    To Do:  - No medication changes  - Follow up with local Cardiology as scheduled  - Repeat CMP soon  - Repeat RUE venous US, if no further thrombi, stop warfarin  - Follow up with me pending repeat US, if resolution of thrombi, will have patient come down for RHC and visit    The patient states understanding and is agreeable with plan.   Feel free to contact myself regarding questions or concerns. It was a pleasure to see this patient today.    I spent 30 minutes in care of the patient today including obtaining recent medical history, personally reviewing recent cardiac testing and/or lab results, today's examination, discussion of testing results and care recommendations with patient.    Micaela Silvestre MD   of Medicine, [facility]  Advanced Heart Failure and Transplant Cardiology     CC  GALO HERNANDEZ NP

## 2023-11-01 NOTE — NURSING NOTE
Unable to find pts preferred pharmacy via [facility] [address] [facility] [place] ([facility])     Is the patient currently in the state of [place]? NO    Visit mode:VIDEO    If the visit is dropped, the patient can be reconnected by: VIDEO VISIT: Send to e-mail at: [email or web address]    Will anyone else be joining the visit? Pts wife   (If patient encounters technical issues they should call [phone])    How would you like to obtain your AVS? MyChart    Are changes needed to the allergy or medication list? Yes Pt states currently taking Warfarin 1mg BID and MWF 1 in morning and 1.5 in evening     Patient declined individual allergy and medication review by support staff because patient denies any changes since echeck-in completion and states all information entered during echeck-in remains accurate.    Reason for visit: LEONEL Pierce MA VVF

## 2023-11-03 ENCOUNTER — CARE COORDINATION (OUTPATIENT)
Dept: TRANSPLANT | Facility: CLINIC | Age: 53
End: 2023-11-03
Payer: COMMERCIAL

## 2023-11-03 DIAGNOSIS — I42.8 NON-ISCHEMIC CARDIOMYOPATHY (H): Primary | ICD-10-CM

## 2023-11-03 DIAGNOSIS — I50.22 CHRONIC SYSTOLIC HEART FAILURE (H): Primary | ICD-10-CM

## 2023-11-03 RX ORDER — LIDOCAINE 40 MG/G
CREAM TOPICAL
Status: CANCELLED | OUTPATIENT
Start: 2023-11-03

## 2023-11-03 RX ORDER — POTASSIUM CHLORIDE 1500 MG/1
20 TABLET, EXTENDED RELEASE ORAL DAILY
Qty: 30 TABLET | Refills: 1 | Status: SHIPPED | OUTPATIENT
Start: 2023-11-03 | End: 2024-05-14

## 2023-11-03 NOTE — PROGRESS NOTES
D: Pt sent [employer] message today reporting increased fatigue and SALEH. He states he has some SOB while sitting. He is not making as much urine as he usually does. He has gained about 2lbs over the last 5 days. He states he does not take his diuretic regularly.   I: Reported changed to Dr. Silvestre. Per Dr. Silvestre, pt instructed to take 20mg Lasix, plus 20mEq KCl today, tomorrow, and Sunday. He is to get BMP on Monday. Pt states he is seeing his cardiologist in [place] on Monday and will make sure to get labs. He was instructed to call with any changes or concerns, especially if he is not urinating the way he expects to. He was instructed to go to his local ED with any worsening of symptoms. Discussed plan for repeat RHC in about 6weeks, with possible admission for heart transplant status upgrade if he meets hemodynamic criteria.  A: Pt verbalized understanding.   P: Will plan to check in on Monday, with BMP results. Pt should go to ED with any worsening symptoms.

## 2023-11-17 ENCOUNTER — HOSPITAL ENCOUNTER (INPATIENT)
Facility: CLINIC | Age: 53
LOS: 2 days | Discharge: HOME | End: 2023-11-20
Attending: INTERNAL MEDICINE | Admitting: INTERNAL MEDICINE
Payer: COMMERCIAL

## 2023-11-17 ENCOUNTER — CARE COORDINATION (OUTPATIENT)
Dept: TRANSPLANT | Facility: CLINIC | Age: 53
End: 2023-11-17
Payer: COMMERCIAL

## 2023-11-17 DIAGNOSIS — E78.00 PURE HYPERCHOLESTEROLEMIA: ICD-10-CM

## 2023-11-17 DIAGNOSIS — I82.622 ACUTE DEEP VEIN THROMBOSIS (DVT) OF OTHER VEIN OF LEFT UPPER EXTREMITY (H): ICD-10-CM

## 2023-11-17 DIAGNOSIS — I42.8 NON-ISCHEMIC CARDIOMYOPATHY (H): Primary | ICD-10-CM

## 2023-11-17 DIAGNOSIS — R57.0 CARDIOGENIC SHOCK (H): Primary | ICD-10-CM

## 2023-11-17 DIAGNOSIS — I50.23 ACUTE ON CHRONIC SYSTOLIC CHF (CONGESTIVE HEART FAILURE) (H): ICD-10-CM

## 2023-11-17 LAB — GLUCOSE BLDC GLUCOMTR-MCNC: 104 MG/DL (ref 70–99)

## 2023-11-17 PROCEDURE — 82962 GLUCOSE BLOOD TEST: CPT

## 2023-11-17 NOTE — LETTER
Transition Communication Hand-off for Care Transitions to Next Level of Care Provider    Name: Navin Lutz  : 1970  MRN #: [id number]  Primary Care Provider: Libertad Briceno     Primary Clinic: [address] [place] [id number]     Reason for Hospitalization:  Cardiogenic shock (H) [R57.0]  Admit Date/Time: 2023 11:32 PM  Discharge Date: 23  Payor Source: Payor: BCBS / Plan: BCBS OUT OF STATE / Product Type: Indemnity /         Reason for Communication Hand-off Referral: Continuity of care    Discharge Plan: Patient discharging home with Milrinone infusion     Discharge Needs Assessment:  Needs      Flowsheet Row Most Recent Value   Equipment Currently Used at Home none          Follow-up plan:    Future Appointments   Date Time Provider Department Center   2023  2:30 PM UU OT WAITLIST [facility]   2023  8:00 AM UU LAB GOLD WAITING [facility] O   2023  8:30 AM U2A ROOM 5 [facility] O       Any outstanding tests or procedures:        Referrals       Future Labs/Procedures    Home infusion referral     Comments:    Home Milrinone infusion, established patient, on 0.25 mcg/kg/min. Discharging home to [place].              Rodriguez Recommendations:  See attached AVS  Mirian Howe RNCC

## 2023-11-18 ENCOUNTER — APPOINTMENT (OUTPATIENT)
Dept: ULTRASOUND IMAGING | Facility: CLINIC | Age: 53
End: 2023-11-18
Attending: INTERNAL MEDICINE
Payer: COMMERCIAL

## 2023-11-18 ENCOUNTER — APPOINTMENT (OUTPATIENT)
Dept: OCCUPATIONAL THERAPY | Facility: CLINIC | Age: 53
End: 2023-11-18
Attending: INTERNAL MEDICINE
Payer: COMMERCIAL

## 2023-11-18 ENCOUNTER — APPOINTMENT (OUTPATIENT)
Dept: CARDIOLOGY | Facility: CLINIC | Age: 53
End: 2023-11-18
Attending: INTERNAL MEDICINE
Payer: COMMERCIAL

## 2023-11-18 ENCOUNTER — APPOINTMENT (OUTPATIENT)
Dept: GENERAL RADIOLOGY | Facility: CLINIC | Age: 53
End: 2023-11-18
Attending: STUDENT IN AN ORGANIZED HEALTH CARE EDUCATION/TRAINING PROGRAM
Payer: COMMERCIAL

## 2023-11-18 ENCOUNTER — APPOINTMENT (OUTPATIENT)
Dept: GENERAL RADIOLOGY | Facility: CLINIC | Age: 53
End: 2023-11-18
Attending: INTERNAL MEDICINE
Payer: COMMERCIAL

## 2023-11-18 PROBLEM — R57.0 CARDIOGENIC SHOCK (H): Status: ACTIVE | Noted: 2023-11-18

## 2023-11-18 LAB
ALBUMIN SERPL BCG-MCNC: 4 G/DL (ref 3.5–5.2)
ALP SERPL-CCNC: 92 U/L (ref 40–150)
ALT SERPL W P-5'-P-CCNC: 46 U/L (ref 0–70)
ANION GAP SERPL CALCULATED.3IONS-SCNC: 9 MMOL/L (ref 7–15)
AST SERPL W P-5'-P-CCNC: 25 U/L (ref 0–45)
BASE EXCESS BLDV CALC-SCNC: 2.7 MMOL/L (ref -7.7–1.9)
BASE EXCESS BLDV CALC-SCNC: 3.6 MMOL/L (ref -7.7–1.9)
BASOPHILS # BLD AUTO: 0 10E3/UL (ref 0–0.2)
BASOPHILS NFR BLD AUTO: 1 %
BI-PLANE LVEF ECHO: NORMAL
BILIRUB SERPL-MCNC: 0.6 MG/DL
BUN SERPL-MCNC: 20.4 MG/DL (ref 6–20)
CALCIUM SERPL-MCNC: 9.2 MG/DL (ref 8.6–10)
CHLORIDE SERPL-SCNC: 102 MMOL/L (ref 98–107)
CHOLEST SERPL-MCNC: 161 MG/DL
CREAT SERPL-MCNC: 1.26 MG/DL (ref 0.67–1.17)
DEPRECATED HCO3 PLAS-SCNC: 26 MMOL/L (ref 22–29)
EGFRCR SERPLBLD CKD-EPI 2021: 68 ML/MIN/1.73M2
EOSINOPHIL # BLD AUTO: 0.2 10E3/UL (ref 0–0.7)
EOSINOPHIL NFR BLD AUTO: 2 %
ERYTHROCYTE [DISTWIDTH] IN BLOOD BY AUTOMATED COUNT: 13.2 % (ref 10–15)
GLUCOSE BLDC GLUCOMTR-MCNC: 188 MG/DL (ref 70–99)
GLUCOSE SERPL-MCNC: 170 MG/DL (ref 70–99)
HCO3 BLDV-SCNC: 28 MMOL/L (ref 21–28)
HCO3 BLDV-SCNC: 30 MMOL/L (ref 21–28)
HCT VFR BLD AUTO: 54.6 % (ref 40–53)
HDLC SERPL-MCNC: 48 MG/DL
HGB BLD-MCNC: 18.7 G/DL (ref 13.3–17.7)
HOLD SPECIMEN: NORMAL
HOLD SPECIMEN: NORMAL
IMM GRANULOCYTES # BLD: 0 10E3/UL
IMM GRANULOCYTES NFR BLD: 0 %
INR PPP: 1.47 (ref 0.85–1.15)
LACTATE SERPL-SCNC: 1 MMOL/L (ref 0.7–2)
LDLC SERPL CALC-MCNC: 96 MG/DL
LVEF ECHO: NORMAL
LYMPHOCYTES # BLD AUTO: 3 10E3/UL (ref 0.8–5.3)
LYMPHOCYTES NFR BLD AUTO: 36 %
MAGNESIUM SERPL-MCNC: 2.2 MG/DL (ref 1.7–2.3)
MCH RBC QN AUTO: 30.2 PG (ref 26.5–33)
MCHC RBC AUTO-ENTMCNC: 34.2 G/DL (ref 31.5–36.5)
MCV RBC AUTO: 88 FL (ref 78–100)
MONOCYTES # BLD AUTO: 0.8 10E3/UL (ref 0–1.3)
MONOCYTES NFR BLD AUTO: 10 %
NEUTROPHILS # BLD AUTO: 4.3 10E3/UL (ref 1.6–8.3)
NEUTROPHILS NFR BLD AUTO: 51 %
NONHDLC SERPL-MCNC: 113 MG/DL
NRBC # BLD AUTO: 0 10E3/UL
NRBC BLD AUTO-RTO: 0 /100
NT-PROBNP SERPL-MCNC: 208 PG/ML (ref 0–900)
O2/TOTAL GAS SETTING VFR VENT: 0 %
O2/TOTAL GAS SETTING VFR VENT: 0 %
OXYHGB MFR BLDV: 69 % (ref 70–75)
OXYHGB MFR BLDV: 72 % (ref 70–75)
PCO2 BLDV: 44 MM HG (ref 40–50)
PCO2 BLDV: 47 MM HG (ref 40–50)
PH BLDV: 7.41 [PH] (ref 7.32–7.43)
PH BLDV: 7.42 [PH] (ref 7.32–7.43)
PLATELET # BLD AUTO: 194 10E3/UL (ref 150–450)
PO2 BLDV: 37 MM HG (ref 25–47)
PO2 BLDV: 38 MM HG (ref 25–47)
POTASSIUM SERPL-SCNC: 3.7 MMOL/L (ref 3.4–5.3)
POTASSIUM SERPL-SCNC: 4.2 MMOL/L (ref 3.4–5.3)
PROT SERPL-MCNC: 6.7 G/DL (ref 6.4–8.3)
RBC # BLD AUTO: 6.2 10E6/UL (ref 4.4–5.9)
SARS-COV-2 RNA RESP QL NAA+PROBE: NEGATIVE
SODIUM SERPL-SCNC: 137 MMOL/L (ref 135–145)
TRIGL SERPL-MCNC: 85 MG/DL
UFH PPP CHRO-ACNC: 0.25 IU/ML
UFH PPP CHRO-ACNC: 0.32 IU/ML
UFH PPP CHRO-ACNC: 0.57 IU/ML
WBC # BLD AUTO: 8.3 10E3/UL (ref 4–11)

## 2023-11-18 PROCEDURE — 4A1239Z MONITORING OF CARDIAC OUTPUT, PERCUTANEOUS APPROACH: ICD-10-PCS | Performed by: INTERNAL MEDICINE

## 2023-11-18 PROCEDURE — 83735 ASSAY OF MAGNESIUM: CPT | Performed by: INTERNAL MEDICINE

## 2023-11-18 PROCEDURE — 250N000011 HC RX IP 250 OP 636: Mod: JZ | Performed by: INTERNAL MEDICINE

## 2023-11-18 PROCEDURE — 97165 OT EVAL LOW COMPLEX 30 MIN: CPT | Mod: GO

## 2023-11-18 PROCEDURE — 93010 ELECTROCARDIOGRAM REPORT: CPT | Performed by: INTERNAL MEDICINE

## 2023-11-18 PROCEDURE — 250N000011 HC RX IP 250 OP 636: Mod: JZ | Performed by: STUDENT IN AN ORGANIZED HEALTH CARE EDUCATION/TRAINING PROGRAM

## 2023-11-18 PROCEDURE — 999N000065 XR CHEST PORT 1 VIEW

## 2023-11-18 PROCEDURE — 82805 BLOOD GASES W/O2 SATURATION: CPT | Performed by: INTERNAL MEDICINE

## 2023-11-18 PROCEDURE — 999N000128 HC STATISTIC PERIPHERAL IV START W/O US GUIDANCE

## 2023-11-18 PROCEDURE — 93321 DOPPLER ECHO F-UP/LMTD STD: CPT | Mod: 26 | Performed by: INTERNAL MEDICINE

## 2023-11-18 PROCEDURE — 93971 EXTREMITY STUDY: CPT | Mod: RT

## 2023-11-18 PROCEDURE — 85025 COMPLETE CBC W/AUTO DIFF WBC: CPT | Performed by: INTERNAL MEDICINE

## 2023-11-18 PROCEDURE — 83880 ASSAY OF NATRIURETIC PEPTIDE: CPT | Performed by: INTERNAL MEDICINE

## 2023-11-18 PROCEDURE — 93325 DOPPLER ECHO COLOR FLOW MAPG: CPT | Mod: 26 | Performed by: INTERNAL MEDICINE

## 2023-11-18 PROCEDURE — 93005 ELECTROCARDIOGRAM TRACING: CPT

## 2023-11-18 PROCEDURE — 71045 X-RAY EXAM CHEST 1 VIEW: CPT | Mod: 26 | Performed by: RADIOLOGY

## 2023-11-18 PROCEDURE — 250N000013 HC RX MED GY IP 250 OP 250 PS 637: Performed by: INTERNAL MEDICINE

## 2023-11-18 PROCEDURE — 36415 COLL VENOUS BLD VENIPUNCTURE: CPT | Performed by: INTERNAL MEDICINE

## 2023-11-18 PROCEDURE — 83605 ASSAY OF LACTIC ACID: CPT | Performed by: INTERNAL MEDICINE

## 2023-11-18 PROCEDURE — 80061 LIPID PANEL: CPT | Performed by: INTERNAL MEDICINE

## 2023-11-18 PROCEDURE — 87635 SARS-COV-2 COVID-19 AMP PRB: CPT | Performed by: INTERNAL MEDICINE

## 2023-11-18 PROCEDURE — 85520 HEPARIN ASSAY: CPT | Performed by: INTERNAL MEDICINE

## 2023-11-18 PROCEDURE — 93325 DOPPLER ECHO COLOR FLOW MAPG: CPT

## 2023-11-18 PROCEDURE — 4A133B3 MONITORING OF ARTERIAL PRESSURE, PULMONARY, PERCUTANEOUS APPROACH: ICD-10-PCS | Performed by: INTERNAL MEDICINE

## 2023-11-18 PROCEDURE — 200N000002 HC R&B ICU UMMC

## 2023-11-18 PROCEDURE — 80053 COMPREHEN METABOLIC PANEL: CPT | Performed by: INTERNAL MEDICINE

## 2023-11-18 PROCEDURE — 97535 SELF CARE MNGMENT TRAINING: CPT | Mod: GO

## 2023-11-18 PROCEDURE — 84132 ASSAY OF SERUM POTASSIUM: CPT | Performed by: INTERNAL MEDICINE

## 2023-11-18 PROCEDURE — 97530 THERAPEUTIC ACTIVITIES: CPT | Mod: GO

## 2023-11-18 PROCEDURE — 250N000013 HC RX MED GY IP 250 OP 250 PS 637: Performed by: STUDENT IN AN ORGANIZED HEALTH CARE EDUCATION/TRAINING PROGRAM

## 2023-11-18 PROCEDURE — 71045 X-RAY EXAM CHEST 1 VIEW: CPT

## 2023-11-18 PROCEDURE — 93308 TTE F-UP OR LMTD: CPT | Mod: 26 | Performed by: INTERNAL MEDICINE

## 2023-11-18 PROCEDURE — 93503 INSERT/PLACE HEART CATHETER: CPT | Performed by: INTERNAL MEDICINE

## 2023-11-18 PROCEDURE — 02HP32Z INSERTION OF MONITORING DEVICE INTO PULMONARY TRUNK, PERCUTANEOUS APPROACH: ICD-10-PCS | Performed by: INTERNAL MEDICINE

## 2023-11-18 PROCEDURE — 85610 PROTHROMBIN TIME: CPT | Performed by: INTERNAL MEDICINE

## 2023-11-18 PROCEDURE — 71045 X-RAY EXAM CHEST 1 VIEW: CPT | Mod: 26 | Performed by: STUDENT IN AN ORGANIZED HEALTH CARE EDUCATION/TRAINING PROGRAM

## 2023-11-18 PROCEDURE — 93971 EXTREMITY STUDY: CPT | Mod: 26 | Performed by: STUDENT IN AN ORGANIZED HEALTH CARE EDUCATION/TRAINING PROGRAM

## 2023-11-18 PROCEDURE — 82805 BLOOD GASES W/O2 SATURATION: CPT | Performed by: STUDENT IN AN ORGANIZED HEALTH CARE EDUCATION/TRAINING PROGRAM

## 2023-11-18 PROCEDURE — 99291 CRITICAL CARE FIRST HOUR: CPT | Mod: 25 | Performed by: INTERNAL MEDICINE

## 2023-11-18 RX ORDER — ATORVASTATIN CALCIUM 20 MG/1
20 TABLET, FILM COATED ORAL EVERY EVENING
Status: DISCONTINUED | OUTPATIENT
Start: 2023-11-18 | End: 2023-11-20 | Stop reason: HOSPADM

## 2023-11-18 RX ORDER — CARVEDILOL 3.12 MG/1
3.12 TABLET ORAL 2 TIMES DAILY WITH MEALS
Status: DISCONTINUED | OUTPATIENT
Start: 2023-11-18 | End: 2023-11-20 | Stop reason: HOSPADM

## 2023-11-18 RX ORDER — MILRINONE LACTATE 0.2 MG/ML
0.25 INJECTION, SOLUTION INTRAVENOUS CONTINUOUS
Status: DISCONTINUED | OUTPATIENT
Start: 2023-11-18 | End: 2023-11-20 | Stop reason: HOSPADM

## 2023-11-18 RX ORDER — NITROGLYCERIN 0.4 MG/1
0.4 TABLET SUBLINGUAL EVERY 5 MIN PRN
Status: DISCONTINUED | OUTPATIENT
Start: 2023-11-18 | End: 2023-11-20 | Stop reason: HOSPADM

## 2023-11-18 RX ORDER — POLYETHYLENE GLYCOL 3350 17 G/17G
17 POWDER, FOR SOLUTION ORAL DAILY PRN
Status: DISCONTINUED | OUTPATIENT
Start: 2023-11-18 | End: 2023-11-20 | Stop reason: HOSPADM

## 2023-11-18 RX ORDER — SPIRONOLACTONE 25 MG/1
25 TABLET ORAL DAILY
Status: DISCONTINUED | OUTPATIENT
Start: 2023-11-18 | End: 2023-11-20 | Stop reason: HOSPADM

## 2023-11-18 RX ORDER — POTASSIUM CHLORIDE 750 MG/1
20 TABLET, EXTENDED RELEASE ORAL DAILY
Status: DISCONTINUED | OUTPATIENT
Start: 2023-11-18 | End: 2023-11-18

## 2023-11-18 RX ORDER — ACETAMINOPHEN 325 MG/1
650 TABLET ORAL EVERY 6 HOURS PRN
Status: DISCONTINUED | OUTPATIENT
Start: 2023-11-18 | End: 2023-11-20 | Stop reason: HOSPADM

## 2023-11-18 RX ORDER — POTASSIUM CHLORIDE 750 MG/1
20 TABLET, EXTENDED RELEASE ORAL ONCE
Status: COMPLETED | OUTPATIENT
Start: 2023-11-18 | End: 2023-11-18

## 2023-11-18 RX ORDER — HEPARIN SODIUM 10000 [USP'U]/100ML
0-5000 INJECTION, SOLUTION INTRAVENOUS CONTINUOUS
Status: DISCONTINUED | OUTPATIENT
Start: 2023-11-18 | End: 2023-11-20 | Stop reason: HOSPADM

## 2023-11-18 RX ORDER — FUROSEMIDE 20 MG
20 TABLET ORAL DAILY
Status: DISCONTINUED | OUTPATIENT
Start: 2023-11-18 | End: 2023-11-18

## 2023-11-18 RX ADMIN — MILRINONE LACTATE IN DEXTROSE 0.38 MCG/KG/MIN: 200 INJECTION, SOLUTION INTRAVENOUS at 09:04

## 2023-11-18 RX ADMIN — SPIRONOLACTONE 25 MG: 25 TABLET, FILM COATED ORAL at 09:07

## 2023-11-18 RX ADMIN — ACETAMINOPHEN 650 MG: 325 TABLET, FILM COATED ORAL at 19:42

## 2023-11-18 RX ADMIN — HEPARIN SODIUM 950 UNITS/HR: 10000 INJECTION, SOLUTION INTRAVENOUS at 09:04

## 2023-11-18 RX ADMIN — CARVEDILOL 3.12 MG: 3.12 TABLET, FILM COATED ORAL at 19:42

## 2023-11-18 RX ADMIN — POTASSIUM CHLORIDE 20 MEQ: 750 TABLET, EXTENDED RELEASE ORAL at 12:43

## 2023-11-18 RX ADMIN — SACUBITRIL AND VALSARTAN 1 TABLET: 24; 26 TABLET, FILM COATED ORAL at 19:42

## 2023-11-18 RX ADMIN — ATORVASTATIN CALCIUM 20 MG: 20 TABLET, FILM COATED ORAL at 19:42

## 2023-11-18 RX ADMIN — SACUBITRIL AND VALSARTAN 1 TABLET: 24; 26 TABLET, FILM COATED ORAL at 09:07

## 2023-11-18 RX ADMIN — MILRINONE LACTATE IN DEXTROSE 0.25 MCG/KG/MIN: 200 INJECTION, SOLUTION INTRAVENOUS at 20:41

## 2023-11-18 RX ADMIN — EMPAGLIFLOZIN 10 MG: 10 TABLET, FILM COATED ORAL at 09:07

## 2023-11-18 RX ADMIN — MILRINONE LACTATE IN DEXTROSE 0.38 MCG/KG/MIN: 200 INJECTION, SOLUTION INTRAVENOUS at 01:14

## 2023-11-18 RX ADMIN — HEPARIN SODIUM 1150 UNITS/HR: 10000 INJECTION, SOLUTION INTRAVENOUS at 00:52

## 2023-11-18 ASSESSMENT — ACTIVITIES OF DAILY LIVING (ADL)
ADLS_ACUITY_SCORE: 18
PREVIOUS_RESPONSIBILITIES: MEAL PREP;HOUSEKEEPING;LAUNDRY;YARDWORK;SHOPPING;MEDICATION MANAGEMENT;FINANCES;DRIVING;WORK
ADLS_ACUITY_SCORE: 18
ADLS_ACUITY_SCORE: 35
ADLS_ACUITY_SCORE: 18
ADLS_ACUITY_SCORE: 35

## 2023-11-18 NOTE — PROGRESS NOTES
Admitted/transferred from: [facility]   Reason for admission/transfer: HF workup  Patient status upon admission/transfer: Stable, EMS noted some junctional escape beats and previous RN noted marisa episodes to 40s  Interventions: none  Plan: monitor, advanced workup in AM  2 RN skin assessment: completed by Caron Soria  Sexual Orientation and Gender Identity (SOGI) smartfom completed: Done/Not Done  Result of skin assessment and interventions/actions: Skin WDL  Height, weight, drug calc weight: Done  Patient belongings (see Flowsheet - Adult Profile for details): Suitcase, apple watch, phone  MDRO education (if applicable):        ICU End of Shift Summary. See flowsheets for vital signs and detailed assessment.    Major Changes: Frequent PVCs    Neuro: WDL, TAVERA, PERRLA, denies pain, stand-by assist, steady gait  Cardiac: SR with frequent PVCs  Respiratory: RA, clear lungs  GI/: voids spontaneously, BM 11/17 at OSH  Skin: WDL  Drips: milrinone .375, heparin decreased to 950 and paused for 1 hr  Labs: K 3.7 not replaced due to spironolactone medication interaction per provider, Hbg high    Plan: Workup    LUIS Reardon  November 18, 2023

## 2023-11-18 NOTE — PROGRESS NOTES
CLINICAL NUTRITION SERVICES - BRIEF NOTE    Received provider consult for nutrition education with comments Congestive Heart Failure (CHF) - Dietitian to instruct patient on 2 gram sodium diet. Per chart review, pt received education in August 2023. RD available for education as needed.     RD will follow per LOS protocol or if re-consulted.      Adriana Lai, MS, RD, LD, [facility]  CVICU RD: [id number]  Pgr: [phone]

## 2023-11-18 NOTE — PLAN OF CARE
Goal Outcome Evaluation:       Pt denies c/p, dizziness or lightheadedness. Pt ambulated halls w/ OT and tolerated well, vs WDL. VBG drawn, SVO2 69, Milrinone drip decreased to .25 mcg/kg/min from .375 mcg/kg/min after ABG drawn as ordered per Cards 2 MD. Bedside ECHO and RUE U/S done, results pending. Pt NPO for Heart Cath today.

## 2023-11-18 NOTE — PROGRESS NOTES
"   11/18/23 1116   Appointment Info   Signing Clinician's Name / Credentials (OT) Alicia Ruiz, OTR/L   Living Environment   People in Home spouse   Current Living Arrangements house   Home Accessibility stairs to enter home;stairs within home   Number of Stairs, Main Entrance 2   Stair Railings, Main Entrance none   Number of Stairs, Within Home, Primary greater than 10 stairs  (13 each flight)   Stair Railings, Within Home, Primary railing on right side (ascending)   Transportation Anticipated family or friend will provide   Living Environment Comments Pt lives with spouse in 2 story home w/ basement. 2 SIGIFREDO, 13 steps on each flight of stairs, R handrail. Has walk in shower, built in chair, no grab bars.   Self-Care   Usual Activity Tolerance good   Current Activity Tolerance good   Regular Exercise No   Equipment Currently Used at Home none   Fall history within last six months no   Activity/Exercise/Self-Care Comment Pt reports IND in ADLs, inc shortness of breath and dec activity tolerance prior to admission   Instrumental Activities of Daily Living (IADL)   Previous Responsibilities meal prep;housekeeping;laundry;yardwork;shopping;medication management;finances;driving;work   IADL Comments IND in IADLs prior to admission, owns business repairing semis, no heavy lifting   General Information   Onset of Illness/Injury or Date of Surgery 11/17/23   Referring Physician Junaid Minaya MD   Patient/Family Therapy Goal Statement (OT) Return home   Additional Occupational Profile Info/Pertinent History of Current Problem Per EMR, \"53 year old male with a past medical history of chronic HFrEF 2/2 NICM s/p ICD, HLD, and CKD Stage II who was admitted as a transfer from [facility] in [place] on 11/18/23 with worsening heart failure\"   Existing Precautions/Restrictions fall;cardiac   General Observations and Info Activity: up ad amaury   Cognitive Status Examination   Orientation Status orientation to person, " place and time   Cognitive Status Comments no concerns   Visual Perception   Visual Impairment/Limitations WNL   Sensory   Sensory Quick Adds sensation intact   Pain Assessment   Patient Currently in Pain No   Posture   Posture not impaired   Range of Motion Comprehensive   General Range of Motion no range of motion deficits identified   Strength Comprehensive (MMT)   General Manual Muscle Testing (MMT) Assessment no strength deficits identified   Coordination   Upper Extremity Coordination No deficits were identified   Bed Mobility   Bed Mobility supine-sit   Supine-Sit [place] (Bed Mobility) independent   Transfers   Transfers sit-stand transfer   Sit-Stand Transfer   Sit-Stand [place] (Transfers) independent   Balance   Balance Assessment standing dynamic balance;sitting dynamic balance   Sitting Balance: Dynamic independent   Position, Sitting Balance unsupported;sitting edge of bed   Standing Balance: Dynamic modified independence   Position/Device Used, Standing Balance unsupported   Balance Comments therapist managing IV pole   Activities of Daily Living   BADL Assessment/Intervention bathing;lower body dressing;grooming;toileting   Bathing Assessment/Intervention   [place] Level (Bathing) independent   Comment, (Bathing) per clinical judgement   Lower Body Dressing Assessment/Training   Comment, (Lower Body Dressing) figure four donning socks and shoes   [place] Level (Lower Body Dressing) independent   Grooming Assessment/Training   [place] Level (Grooming) independent   Comment, (Grooming) per clinical judgement   Toileting   Comment, (Toileting) per clinical judgement   [place] Level (Toileting) independent   Clinical Impression   Criteria for Skilled Therapeutic Interventions Met (OT) Yes, treatment indicated   OT Diagnosis Dec activity tolerance for I/ADLs   OT Problem List-Impairments impacting ADL problems related to;activity tolerance impaired   Assessment of  Occupational Performance 1-3 Performance Deficits   Identified Performance Deficits activity tolerance for I/ADLs   Planned Therapy Interventions (OT) ADL retraining;IADL retraining;home program guidelines;progressive activity/exercise   Clinical Decision Making Complexity (OT) problem focused assessment/low complexity   Risk & Benefits of therapy have been explained evaluation/treatment results reviewed;care plan/treatment goals reviewed;risks/benefits reviewed;current/potential barriers reviewed;participants voiced agreement with care plan;participants included;patient   Clinical Impression Comments Pt appears near functional baseline. Would benefit from continued therapy for edu on EC techniques to incorporate in to ADL routine and promote activity tolerance to keep pt safe and IND in daily tasks   OT Total Evaluation Time   OT Eval, Low Complexity Minutes (82731) 8   OT Goals   Therapy Frequency (OT) Other (see comments)  (see once more for cardiac edu/stairs)   OT Predicted Duration/Target Date for Goal Attainment 11/25/23   OT Goals Aerobic Activity;OT Goal 1;OT Goal 2   OT: Perform aerobic activity with stable cardiovascular response continuous activity;20 minutes;ambulation;NuStep   OT: Goal 1 Pt will demonstrate incorporation of EC techniques independently into functional mobility and ADLs (rest breaks, pacing, and positioning) to promote safety and IND in daily tasks   OT: Goal 2 Pt will complete 2 flights of stairs (13 steps each) independently to promote activity tolerance for ADLs and ensure safety for safe dc home   OT: Goal 3 Pt will demonstrate/verbalize ability to complete HEP walking program to promote activity tolerance for ADLs and mobility   OT Discharge Planning   OT Plan HEP walking program, functional mobility in welch, nustep, EC handouts and education, stairs   OT Discharge Recommendation (DC Rec) home   OT Rationale for DC Rec Pt appears near functional baseline. Limited by decreased  activity tolerance however completing ADLs and mobility/transfers IND, 1 person assisting with lines. Anticipate pt will be safe for dc to home at this time. Will continue to assess safety for return home pending LOS and plans for heart transplant. Will continue to monitor and update recs as appropriate   OT Brief overview of current status IND, therapist managing IV pole   Total Session Time   Timed Code Treatment Minutes 27   Total Session Time (sum of timed and untimed services) 35

## 2023-11-18 NOTE — H&P
Cardiology Inpatient H&P  November 18, 2023      HPI:   Mr. Navin Lutz is a pleasant 53 year old male with a past medical history of chronic HFrEF 2/2 NICM s/p ICD, HLD, and CKD Stage II who was admitted as a transfer from [facility] in [facility] on 11/18/23 with worsening heart failure.    Patient reports he was first having symptoms of HF in 2017 and was diagnosed with HFrEF shortly after that. He has been on various GDMT and overall did well with no hospital admissions until June '23.      Patient admitted to [facility] from 8/22-8/28/23 for cardiogenic shock. He was initially started on dobutamine, but then switched to milrinone prior to discharge. On this, he was having a significant amount of ectopy, so low-dose carvedilol was prior to discharge. 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    He last saw Dr. Silvestre in clinic on 11/1/23. At that time he was doing ok, better on milrinone but not back to his baseline. NYHA IIIb symptoms. He is currently listed status 4 for heart transplant, blood group O, 94 kg man. He went back to his home in [place] and follows with a cardiologist in [place]. He was feeling poorly during his office visit on 11/17 with less energy, early satiety, and weakness and was subsequently hospitalized there. His blood pressure in clinic was low. His renal and liver function were at baseline. His milrinone was increased from 0.25 mcg/kg/min to 0.375 mcg/kg/min at that time. He was accepted for transfer to [facility].     At the time of interview, the patient denies chest pain, orthopnea, PND, palpitations, lightheadedness, or syncope. He says he feels winded with walking around but thinks he could go up a flight of stairs.     Review of Systems:    Complete review of systems was performed and negative except per HPI.    PMH:      Patient Active Problem List   Diagnosis    Acute on chronic systolic CHF (congestive  heart failure) (H)    Chest pain    ICD (implantable cardioverter-defibrillator) in place    Inguinal hernia    Multiple lung nodules on CT    Non-ischemic cardiomyopathy (H)    Pure hypercholesterolemia    RAD (reactive airway disease) with wheezing, mild intermittent, uncomplicated    Acute deep vein thrombosis (DVT) of other vein of left upper extremity (H)   Chronic biventricular systolic and LV diastolic dysfunction (HFrEF)  ACC/AHA stage C, NYHA class III  EF 18% (cardiac MRI) on 6/14/2023  EF 13% with grade I diastolic dysfunction on 6/13/2023  Reduced RVSF  EF 20-25% on 9/22/2020  EF 25-30% on 9/20/2019  EF 20% on 3/26/2019  EF 20-25% on 1/22/2019  EF 15-20% on 9/24/2018  EF 15-20% with grade 5 diastolic dysfunction on 12/11/2017   Nonischemic dilated cardiomyopathy  LIVDd 7.2 cm  Viral mediated?  Evaluation at [facility] in 2019  Cardiac MRI on 6/14/2023 = severe left ventricular dilation with severe, diffuse hypokinesis and mid wall late gadolinium enhancement stripe throughout the septum consistent with nonischemic dilated cardiomyopathy; mild patchy myocardial edema along the left ventricular anterior, anterolateral and inferolateral walls in the basal and mid cavity segments could represent an element of myocarditis; trace aortic regurgitation, trace mitral valve regurgitation, mild tricuspid valve regurgitation  LHC and RHC on 6/13/2023 = intramyocardial bridging of the dLAD otherwise normal coronary arteries; LVEDP 3 mmHg; normal pressures with no pulmonary hypertension; RA 4 mmHg, RV 22/4 mmHg, PA 23/11 mmHg with mean PAP of 16 mmHg, PCWP 7 mmHg, CO 4.32 L/min and CI 2.1 L/min*m2 by Teressa, CO 2.05 L/min and CI 1.0 L/min*m2 by thermodilution  LHC and RHC on 1/9/2018 = arterial pressure 82/59 (67); normal pulmonary capillary wedge, and mean pulmonary artery pressures; cardiac output by thermodilution was 3.07 L/min, index at 1.5; no angiographic evidence of coronary artery disease present  History of  NSVT  Moderate to severe eccentric LVH  Valvular heart disease  Mild MR  Dyslipidemia  Possible obstructive sleep apnea    Social History:  Social History     Tobacco Use    Smoking status: Never    Smokeless tobacco: Never   Substance Use Topics    Alcohol use: Never    Drug use: Never     Family History:  No known family history of heart disease except possible his father had an enlarged heart, but no formal diagnosis     Medications:  Current Outpatient Medications   Medication Instructions    atorvastatin (LIPITOR) 20 mg, Oral, EVERY EVENING    carvedilol (COREG) 3.125 mg, Oral, 2 TIMES DAILY WITH MEALS    empagliflozin (JARDIANCE) 10 mg, Oral, DAILY    furosemide (LASIX) 20 mg, Oral, PRN    milrinone (PRIMACOR) infusion 200 mcg/mL PREMIX 0.25 mcg/kg/min, Intravenous, CONTINUOUS    nitroGLYcerin (NITROSTAT) 0.4 mg, Sublingual, EVERY 5 MIN PRN    potassium chloride ER (KLOR-CON M) 20 MEQ CR tablet 20 mEq, Oral, DAILY    sacubitril-valsartan (ENTRESTO) 49-51 MG per tablet 1 tablet, Oral, 2 TIMES DAILY    spironolactone (ALDACTONE) 25 mg, Oral, DAILY    warfarin ANTICOAGULANT (COUMADIN) 3 mg, Oral, DAILY, With dosing changes as directed by anticoagulation clinic       Physical Exam:  Temp:  [98.5  F (36.9  C)] 98.5  F (36.9  C)  GEN: pleasant, no acute distress  HEENT: no icterus  CV: normal rate, regular rhythm, normal s1/s2, no murmurs/rubs/s3/s4. JVP not visible at 30 degrees .   CHEST: clear to ausculation bilaterally, no rales or wheezing  ABD: soft, non-tender, non-distended  EXTR: pulses 2+. No clubbing, cyanosis or edema. Warm and well perfused.   NEURO: alert oriented, speech fluent/appropriate, motor grossly nonfocal    Diagnostics:  All labs and imaging were reviewed    EKG:  Sinus rhythm, LAFB, normal rate, Qtc 486 ms     Coronary Angiogram/RHC 6/13/23  Conclusions:   Coronary angiogram: Intramyocardial bridging of the distal LAD, otherwise   normal coronaries.   Left heart catheterization: No  significant gradient across aortic valve.     LVEDP 3 mmHg.   Right heart catheterization: Normal pressures.  No pulmonary hypertension.    Low cardiac output.  No shunt.   -RA 4 mmHg   -RV 22/4 mmHg   -PA 23/11 mmHg, Mean PAP 16 mmHg   -PCWP 7 mmHg   -Iesha: CO 4.32 L/min, CI 2.1 L/min*m2   -Thermodilution: CO 2.05 L/min, CI 1.0 L/min*m2      cMRI 6/14/23  FINDINGS:   AORTA: The ascending aorta and aortic root are normal caliber. The sinotubular junction is maintained. Normal aortic wall thickness.     ARCH VESSELS: Arch vessels are grossly patent and partially visualized.     RIGHT ATRIUM: Normal size.     TRICUSPID VALVE: Unrestricted leaflet excursion without stenosis. Mild regurgitation.     RIGHT VENTRICLE: Normal morphology, size, and wall thickness. No wall motion abnormalities identified.     PULMONIC VALVE: Unrestricted leaflet excursion without stenosis or regurgitation.     PULMONARY VEINS: Widely patent.     LEFT ATRIUM: Mild dilation. No left atrial appendage thrombus.     MITRAL VALVE: Unrestrictive leaflet excursion without stenosis. Trace regurgitation.     LEFT VENTRICLE: Normal morphology and thickness with marked dilation. End-diastolic basal septal thickness of 9 mm.     Severe, diffuse hypokinesis. Normal first pass perfusion.     Mid wall late gadolinium enhancement throughout the septum. Patchy myocardial edema along the anterior, anterolateral and inferolateral walls in the basal and mid cavity segments.       RHC 8/22/23  RA --/--/5  RV 20/5  PA 20/12/16  PCWP --/--/12  IESHA 3.2/1.5  TD 2.93/1.36  MAP 96  PVR 1.25 (IESHA)  SVR 2275 (IESHA)     TTE 8/23/23  Severe left ventricular dilation (LVIDd 6.8cm). Left ventricular function is  decreased. The ejection fraction is 15-20% (severely reduced). Severe diffuse  hypokinesis.  Global right ventricular function is normal.  No significant valve abnormalities.  The inferior vena cava is normal.  No pericardial effusion.  There is no prior study for  direct comparison, but LV function is similar to  echo report from [facility] from 6/13/23.    RHC 9/20/23  RA: 3/4/3 mmHg  RV: 23/2 mmHg  PA: 23/14/18 mmHg  CWP: --/--/8 mmHg  CO/CI (Teressa): 3.63/1.69 L/min/m2  CO/CI (TD): 4.1/1.9 L/min/m2  PA sat: 68.7%  MAP: 84 mmHg   PVR: 2.4 (TD) BYRD         Assessment and Plan:  In summary, 53 year old male with a past medical history of chronic HFrEF 2/2 NICM s/p ICD, HLD, and CKD Stage II who was admitted on 11/18 with worsening HF symptoms, currently listed as status 4 for heart transplant.      Chronic systolic heart failure/HFrEF (EF 10-15%) secondary to nonischemic cardiomyopathy  NYHA Symptom Class IIIb  Stage D  Inotrope: Continue milrinone 0.375 mcg/kg/min given persistently low CI   ACE-I/ARB/ARNi: hold entresto for tonight given borderline blood pressures, reassess in am (home dose is Entresto 49-51 mg BID, unable to increase given frequent presyncope)  BB: hold home coreg 3.125 mg BID for now (on this given frequent ectopy on inotrope therapy)  Aldosterone antagonist: Continue beck 25 mg daily  SGLT2i: Continue empagliflozin 10 mg daily  SCD prophylaxis: s/p ICD  %BiV pacing: N/A  Fluid status: appears euvolemic on admission, hold home lasix 20 mg daily PRN  Cardiac Rehab: previously referred  - Currently listed status 4 for heart transplant  - Discussed importance of daily standing weights, 2-3L fluid, and 2g Na restriction  - consider RHC during admission      Myocardial Bridge Over Coronary Artery  HLD  Myocardial bridge over distal LAD.  - Continue to monitor  - continue statin      CKD Stage III  Cr ~1.2-1.4.  - Will continue to monitor     RUE DVT  - heparin gtt while inpatient   - Repeat RUE US, if resolution, will stop warfarin     FEN: low Na   PPx: heparin   Code: full    To be formally staffed in am.     Junaid Minaya MD  Cardiology Fellow  Pager: [phone]

## 2023-11-19 ENCOUNTER — APPOINTMENT (OUTPATIENT)
Dept: GENERAL RADIOLOGY | Facility: CLINIC | Age: 53
End: 2023-11-19
Attending: STUDENT IN AN ORGANIZED HEALTH CARE EDUCATION/TRAINING PROGRAM
Payer: COMMERCIAL

## 2023-11-19 LAB
ANION GAP SERPL CALCULATED.3IONS-SCNC: 10 MMOL/L (ref 7–15)
ANION GAP SERPL CALCULATED.3IONS-SCNC: 9 MMOL/L (ref 7–15)
BASE EXCESS BLDV CALC-SCNC: -0.1 MMOL/L (ref -7.7–1.9)
BASE EXCESS BLDV CALC-SCNC: -0.9 MMOL/L (ref -7.7–1.9)
BASE EXCESS BLDV CALC-SCNC: 1.5 MMOL/L (ref -7.7–1.9)
BASE EXCESS BLDV CALC-SCNC: 2.3 MMOL/L (ref -7.7–1.9)
BASOPHILS # BLD AUTO: 0.1 10E3/UL (ref 0–0.2)
BASOPHILS NFR BLD AUTO: 1 %
BUN SERPL-MCNC: 17.1 MG/DL (ref 6–20)
BUN SERPL-MCNC: 20.8 MG/DL (ref 6–20)
CALCIUM SERPL-MCNC: 9.1 MG/DL (ref 8.6–10)
CALCIUM SERPL-MCNC: 9.2 MG/DL (ref 8.6–10)
CHLORIDE SERPL-SCNC: 103 MMOL/L (ref 98–107)
CHLORIDE SERPL-SCNC: 103 MMOL/L (ref 98–107)
CREAT SERPL-MCNC: 1.05 MG/DL (ref 0.67–1.17)
CREAT SERPL-MCNC: 1.11 MG/DL (ref 0.67–1.17)
DEPRECATED HCO3 PLAS-SCNC: 24 MMOL/L (ref 22–29)
DEPRECATED HCO3 PLAS-SCNC: 24 MMOL/L (ref 22–29)
EGFRCR SERPLBLD CKD-EPI 2021: 79 ML/MIN/1.73M2
EGFRCR SERPLBLD CKD-EPI 2021: 85 ML/MIN/1.73M2
EOSINOPHIL # BLD AUTO: 0.2 10E3/UL (ref 0–0.7)
EOSINOPHIL NFR BLD AUTO: 2 %
ERYTHROCYTE [DISTWIDTH] IN BLOOD BY AUTOMATED COUNT: 12.9 % (ref 10–15)
GLUCOSE SERPL-MCNC: 149 MG/DL (ref 70–99)
GLUCOSE SERPL-MCNC: 164 MG/DL (ref 70–99)
HCO3 BLDV-SCNC: 23 MMOL/L (ref 21–28)
HCO3 BLDV-SCNC: 24 MMOL/L (ref 21–28)
HCO3 BLDV-SCNC: 27 MMOL/L (ref 21–28)
HCO3 BLDV-SCNC: 28 MMOL/L (ref 21–28)
HCT VFR BLD AUTO: 54.1 % (ref 40–53)
HGB BLD-MCNC: 18.5 G/DL (ref 13.3–17.7)
IMM GRANULOCYTES # BLD: 0 10E3/UL
IMM GRANULOCYTES NFR BLD: 0 %
INR PPP: 1.29 (ref 0.85–1.15)
LYMPHOCYTES # BLD AUTO: 2.3 10E3/UL (ref 0.8–5.3)
LYMPHOCYTES NFR BLD AUTO: 21 %
MAGNESIUM SERPL-MCNC: 2.1 MG/DL (ref 1.7–2.3)
MAGNESIUM SERPL-MCNC: 2.2 MG/DL (ref 1.7–2.3)
MCH RBC QN AUTO: 30.2 PG (ref 26.5–33)
MCHC RBC AUTO-ENTMCNC: 34.2 G/DL (ref 31.5–36.5)
MCV RBC AUTO: 88 FL (ref 78–100)
MONOCYTES # BLD AUTO: 0.9 10E3/UL (ref 0–1.3)
MONOCYTES NFR BLD AUTO: 9 %
NEUTROPHILS # BLD AUTO: 7.1 10E3/UL (ref 1.6–8.3)
NEUTROPHILS NFR BLD AUTO: 67 %
NRBC # BLD AUTO: 0 10E3/UL
NRBC BLD AUTO-RTO: 0 /100
O2/TOTAL GAS SETTING VFR VENT: 0 %
O2/TOTAL GAS SETTING VFR VENT: 0 %
O2/TOTAL GAS SETTING VFR VENT: 21 %
O2/TOTAL GAS SETTING VFR VENT: 21 %
OXYHGB MFR BLDV: 68 % (ref 70–75)
OXYHGB MFR BLDV: 73 % (ref 70–75)
OXYHGB MFR BLDV: 77 % (ref 70–75)
OXYHGB MFR BLDV: 78 % (ref 70–75)
PCO2 BLDV: 37 MM HG (ref 40–50)
PCO2 BLDV: 38 MM HG (ref 40–50)
PCO2 BLDV: 42 MM HG (ref 40–50)
PCO2 BLDV: 45 MM HG (ref 40–50)
PH BLDV: 7.4 [PH] (ref 7.32–7.43)
PH BLDV: 7.41 [PH] (ref 7.32–7.43)
PLATELET # BLD AUTO: 193 10E3/UL (ref 150–450)
PO2 BLDV: 36 MM HG (ref 25–47)
PO2 BLDV: 40 MM HG (ref 25–47)
PO2 BLDV: 42 MM HG (ref 25–47)
PO2 BLDV: 43 MM HG (ref 25–47)
POTASSIUM SERPL-SCNC: 3.9 MMOL/L (ref 3.4–5.3)
POTASSIUM SERPL-SCNC: 4.1 MMOL/L (ref 3.4–5.3)
POTASSIUM SERPL-SCNC: 4.1 MMOL/L (ref 3.4–5.3)
RBC # BLD AUTO: 6.13 10E6/UL (ref 4.4–5.9)
SODIUM SERPL-SCNC: 136 MMOL/L (ref 135–145)
SODIUM SERPL-SCNC: 137 MMOL/L (ref 135–145)
UFH PPP CHRO-ACNC: 0.18 IU/ML
UFH PPP CHRO-ACNC: 0.41 IU/ML
UFH PPP CHRO-ACNC: 0.41 IU/ML
WBC # BLD AUTO: 10.4 10E3/UL (ref 4–11)

## 2023-11-19 PROCEDURE — 83735 ASSAY OF MAGNESIUM: CPT | Performed by: INTERNAL MEDICINE

## 2023-11-19 PROCEDURE — 250N000013 HC RX MED GY IP 250 OP 250 PS 637: Performed by: INTERNAL MEDICINE

## 2023-11-19 PROCEDURE — 250N000011 HC RX IP 250 OP 636: Mod: JZ | Performed by: INTERNAL MEDICINE

## 2023-11-19 PROCEDURE — 80048 BASIC METABOLIC PNL TOTAL CA: CPT | Performed by: STUDENT IN AN ORGANIZED HEALTH CARE EDUCATION/TRAINING PROGRAM

## 2023-11-19 PROCEDURE — 85520 HEPARIN ASSAY: CPT | Performed by: INTERNAL MEDICINE

## 2023-11-19 PROCEDURE — 82805 BLOOD GASES W/O2 SATURATION: CPT | Performed by: STUDENT IN AN ORGANIZED HEALTH CARE EDUCATION/TRAINING PROGRAM

## 2023-11-19 PROCEDURE — 250N000011 HC RX IP 250 OP 636: Mod: JZ | Performed by: STUDENT IN AN ORGANIZED HEALTH CARE EDUCATION/TRAINING PROGRAM

## 2023-11-19 PROCEDURE — 250N000013 HC RX MED GY IP 250 OP 250 PS 637: Performed by: STUDENT IN AN ORGANIZED HEALTH CARE EDUCATION/TRAINING PROGRAM

## 2023-11-19 PROCEDURE — 85610 PROTHROMBIN TIME: CPT | Performed by: STUDENT IN AN ORGANIZED HEALTH CARE EDUCATION/TRAINING PROGRAM

## 2023-11-19 PROCEDURE — 200N000002 HC R&B ICU UMMC

## 2023-11-19 PROCEDURE — 999N000065 XR CHEST PORT 1 VIEW

## 2023-11-19 PROCEDURE — 85014 HEMATOCRIT: CPT | Performed by: INTERNAL MEDICINE

## 2023-11-19 PROCEDURE — 84132 ASSAY OF SERUM POTASSIUM: CPT | Performed by: INTERNAL MEDICINE

## 2023-11-19 PROCEDURE — 99233 SBSQ HOSP IP/OBS HIGH 50: CPT | Mod: GC | Performed by: INTERNAL MEDICINE

## 2023-11-19 PROCEDURE — 71045 X-RAY EXAM CHEST 1 VIEW: CPT | Mod: 26 | Performed by: RADIOLOGY

## 2023-11-19 RX ORDER — LIDOCAINE 4 G/G
1 PATCH TOPICAL
Status: DISCONTINUED | OUTPATIENT
Start: 2023-11-19 | End: 2023-11-19

## 2023-11-19 RX ORDER — LIDOCAINE 4 G/G
1 PATCH TOPICAL EVERY 24 HOURS
Status: DISCONTINUED | OUTPATIENT
Start: 2023-11-19 | End: 2023-11-20 | Stop reason: HOSPADM

## 2023-11-19 RX ORDER — NALOXONE HYDROCHLORIDE 0.4 MG/ML
0.2 INJECTION, SOLUTION INTRAMUSCULAR; INTRAVENOUS; SUBCUTANEOUS
Status: DISCONTINUED | OUTPATIENT
Start: 2023-11-19 | End: 2023-11-20 | Stop reason: HOSPADM

## 2023-11-19 RX ORDER — HYDROCODONE BITARTRATE AND ACETAMINOPHEN 5; 325 MG/1; MG/1
1 TABLET ORAL EVERY 4 HOURS PRN
Status: DISCONTINUED | OUTPATIENT
Start: 2023-11-19 | End: 2023-11-20 | Stop reason: HOSPADM

## 2023-11-19 RX ORDER — HYDRALAZINE HYDROCHLORIDE 20 MG/ML
10 INJECTION INTRAMUSCULAR; INTRAVENOUS ONCE
Status: COMPLETED | OUTPATIENT
Start: 2023-11-19 | End: 2023-11-19

## 2023-11-19 RX ORDER — NALOXONE HYDROCHLORIDE 0.4 MG/ML
0.4 INJECTION, SOLUTION INTRAMUSCULAR; INTRAVENOUS; SUBCUTANEOUS
Status: DISCONTINUED | OUTPATIENT
Start: 2023-11-19 | End: 2023-11-20 | Stop reason: HOSPADM

## 2023-11-19 RX ORDER — WARFARIN SODIUM 5 MG/1
5 TABLET ORAL
Status: COMPLETED | OUTPATIENT
Start: 2023-11-19 | End: 2023-11-19

## 2023-11-19 RX ADMIN — MILRINONE LACTATE IN DEXTROSE 0.25 MCG/KG/MIN: 200 INJECTION, SOLUTION INTRAVENOUS at 22:24

## 2023-11-19 RX ADMIN — SACUBITRIL AND VALSARTAN 1 TABLET: 24; 26 TABLET, FILM COATED ORAL at 20:01

## 2023-11-19 RX ADMIN — CARVEDILOL 3.12 MG: 3.12 TABLET, FILM COATED ORAL at 07:59

## 2023-11-19 RX ADMIN — HYDRALAZINE HYDROCHLORIDE 10 MG: 20 INJECTION INTRAMUSCULAR; INTRAVENOUS at 01:30

## 2023-11-19 RX ADMIN — SPIRONOLACTONE 25 MG: 25 TABLET, FILM COATED ORAL at 07:59

## 2023-11-19 RX ADMIN — LIDOCAINE 1 PATCH: 4 PATCH TOPICAL at 01:51

## 2023-11-19 RX ADMIN — HEPARIN SODIUM 1100 UNITS/HR: 10000 INJECTION, SOLUTION INTRAVENOUS at 08:00

## 2023-11-19 RX ADMIN — ACETAMINOPHEN 650 MG: 325 TABLET, FILM COATED ORAL at 20:07

## 2023-11-19 RX ADMIN — EMPAGLIFLOZIN 10 MG: 10 TABLET, FILM COATED ORAL at 07:59

## 2023-11-19 RX ADMIN — WARFARIN SODIUM 5 MG: 5 TABLET ORAL at 18:28

## 2023-11-19 RX ADMIN — SACUBITRIL AND VALSARTAN 1 TABLET: 24; 26 TABLET, FILM COATED ORAL at 10:05

## 2023-11-19 RX ADMIN — HYDROCODONE BITARTRATE AND ACETAMINOPHEN 1 TABLET: 5; 325 TABLET ORAL at 00:35

## 2023-11-19 RX ADMIN — CARVEDILOL 3.12 MG: 3.12 TABLET, FILM COATED ORAL at 17:11

## 2023-11-19 RX ADMIN — ACETAMINOPHEN 650 MG: 325 TABLET, FILM COATED ORAL at 06:01

## 2023-11-19 RX ADMIN — ATORVASTATIN CALCIUM 20 MG: 20 TABLET, FILM COATED ORAL at 20:01

## 2023-11-19 ASSESSMENT — ACTIVITIES OF DAILY LIVING (ADL)
ADLS_ACUITY_SCORE: 18

## 2023-11-19 NOTE — PROCEDURES
Inpatient Procedure Note    Procedure: Ultrasound guided central access, PA catheter placement, central line placement  Indication: Hemodynamic monitoring, central access  Diagnosis: Cardiogenic shock, End Stage Heart Failure    After informing the benefits and risks, an informed consent was obtained. A time out and site verification were done prior to initiation of the procedure. The neck was prepped and draped in the usual sterile fashion and infiltrated with lidocaine under ultrasound guidance. A 7.5 Fr sheath was placed in the left internal jugular vein using modified Seldinger technique ultrasound guidance. A balloon-tipped Onia-Hugh catheter was advanced into the right atrium, right ventricle, pulmonary artery and pulmonary capillary wedge position with guidance of pressure waveform. The balloon was deflated and the Onia-Hugh catheter was left in the pulmonary artery. RA 2 ; PA 25/10 ; PCW 8 pressures were obtained. Onia locked at 47 cm. At time of procedure completion, all the ports aspirated and flushed properly. The patient tolerated the procedure well without any immediate complications.     Post-procedure x-ray shows the tip of the catheter within the PA.    Complications: None   Blood loss: Minimal blood loss    This procedure was supervised by Dr Brennon Ma, attending cardiologist, who was present for the key portions of the procedure.     Meng Ga MD [facility], PGY-5  Fellow, Cardiovascular Disease

## 2023-11-19 NOTE — PLAN OF CARE
ICU End of Shift Summary. See flowsheets for vital signs and detailed assessment.    Major Changes: High diastolic/MAPs overnight, 10 hydralazine given, MAP decreased by 10-15 mmHg, severe pain at [place] insertion site, norco given and lidocaine patch applied, pt did not get much sleep due to pain    Neuro: intact  Cardiac: SR w/ frequent PVCs, BP high, afebrile  Teressa CVP 2 Pa 21/9 SvO2 73 CI 2.7 SVR 1300  6am CVP 7 Pa 32/12 SvO2 77 CI 2.8 SVR 1200  Respiratory: RA-2L NC  GI/: voids spontaneously   Skin: WDL  Drips: milrinone .25, heparin 1100  Labs: no replacements    Plan: Continue plan of care    LUIS Reardon  November 19, 2023        Goal Outcome Evaluation:      Plan of Care Reviewed With: patient    Overall Patient Progress: no changeOverall Patient Progress: no change    Outcome Evaluation: Stable on .25 milrinone

## 2023-11-19 NOTE — PLAN OF CARE
Goal Outcome Evaluation:       Pt up OOB w/ SBA for line management, ambulated entire unit 6 times so far, no c/o SOB, vs WDL, and states neck pain w/ SWAN site feels better w/ mvmt.Cards 2 aware of MAPS 80-90's. Pacer Interrogation ordered for pt's pacer, Device nurse june be in Mon-Fri at [phone], will contact Monday 11/20/23. Pt and pt's wife updated through out the day by writer and Cards 2 Team, plan for possible discharge 11/20/23.

## 2023-11-19 NOTE — PHARMACY-ANTICOAGULATION SERVICE
Clinical Pharmacy - Warfarin Dosing Consult     Pharmacy has been consulted to manage this patient s warfarin therapy.  Indication: DVT/ PE Treatment  Therapy Goal: INR 2-3  Provider/Team: SARAI ROMAN Anticoag Clinic: [facility]  Facsimile to [phone]  Phone [phone]  [phone]  Warfarin Prior to Admission: Yes  Warfarin PTA Regimen: MWF 5 mg, other days 7.5 mg  Significant drug interactions: spironolactone (may redult in decreased anticoagulant effectiveness) Heparin infusion (as bridge to warfarin therapy, may increase risk of bleeding)  Recent documented change in oral intake/nutrition: No    INR   Date Value Ref Range Status   11/19/2023 1.29 (H) 0.85 - 1.15 Final   11/18/2023 1.47 (H) 0.85 - 1.15 Final       Recommend warfarin 5 mg today.  Pharmacy will monitor Navin Lutz daily and order warfarin doses to achieve specified goal.      Please contact pharmacy as soon as possible if the warfarin needs to be held for a procedure or if the warfarin goals change.      Vanessa Reeves, MarenD

## 2023-11-19 NOTE — PROGRESS NOTES
Brief Cardiology Progress Note:  Asked to reposition SGC after reviewing post procedure CXR. PA cath in the main PA at 49 cm. With balloon up, advanced to 52 cm. Post CXR with PA cath in the R PA. Locked at 52 cm.     David Boyd, PGY-5  Cardiovascular Disease Fellow

## 2023-11-19 NOTE — PROGRESS NOTES
[facility]    Cardiology Progress Note- Cardiology        Date of Admission:  11/17/2023     Assessment & Plan: HVSL   Mr Navin Lutz is a 53 year old gentleman with a history of chronic heart failure with reduced ejection fraction due to non-ischemic cardiomyopathy who is inotrope dependent on home milrinone and s/p ICD placement, and CKD who is admitted with worsening heart failure symptoms. He is listed status 4 for heart transplant. He arrives warm and mildly hypervolemic, in no distress.    ===Neuro===  #Pain/Agitation/Sedation  - APAP and Oxy PRN    ===Cardiovascular===  #Acute on Chronic Heart Failure with Reduced Ejection Fraction  #End-Stage Non-Ischemic Cardiomyopathy with Inotrope Dependence  Diagnosis: NIC  Baseline: ACC/AHA Stage D, NYHA FC IIIb  Estimated Dry Weight: 205 pounds  Admission Weight: 207 pounds  Etiology: uncertain etiology of decompensation, likely some mild hypervolemia and possible flash pulmonary edema as improved substantially with diuresis, possible unintentional flush bolus of milrinone to explain hypotension.   Most Recent Diagnostics  Ischemic workup: angiogram 06/13/23 ([facility]), myocardial bridging of distal LAD, no obstructive CAD  Hemodynamics: 09/20/23 RA 3, PA 22/14/18, PCWP 8, CO/CI 4.1/1.9 by TD, PVR 2.4   Echo: 11/18/23 LVEF 10-15%, severe dilation LVIDd 6.8 cm, severe diffuse hypokinesis, normal RV function  EKG: NSR, NIVCD, LAD 2/2 LAFB  Outpatient Regimen  - Milrinone 0.25 mcg/kg/min  - Sacubitril Valsartan 49-51mg BID  - Carvedilol 3.125 mg BID  - Spironolactone 25 mg daily  - Furosemide 20 mg daily  Current Presentation  Date CVP PA PCWP MVO2 CO/CI PVR SVR Support   11/18 2 25/10/15 8 72 4.0/1.8 2 2020 Mil 0.25   11/19 4 26/12/16 12 78 5.2/2.5 1 1353 Mil 0.25                           NT-proBNP: 208; baseline 200-350  Presents clinically euvolemic, warm and dry, no distress   Volume status: Weight  today 199, I&O -350 ml since admission   - Hold home diuretic in setting of low CVP  GDMT:   > Beta Blocker: Carvedilol 3.125 mg BID   > ACE/ARB/ARNI: Sacubitril Valsartan 24-26 mg BID   > MRA: Spironolactone 25 mg daily   > SGLT2i: Empagliflozin 10 mg daily  Additional Afterload: None  Therapies:    > Inotrope: Milrinone 0.25 mcg/kg/min    > ICD: Medtronic Phoenix single lead ICD, device interrogation ordered   > CRT: Not indicated at this time   > Mechanical Support: Not indicated at this time  Other Management:   Uncertain etiology of his decompensation as weight is stable from baseline and he is compliant with home monitoring and home medications. He improved significantly with diuretics. Reassuring that his RHC numbers are stable to even improved from prior however this does not explain the dyspnea and chest discomfort that prompted his presentation. It is possible that he had flash pulmonary edema or an unintentional bolus of milrinone from flushing or otherwise which caused his hypotension. Overall, there is no indication to change his current therapy, we have resumed his home 0.25 mcg/kg/min milrinone (increased to 0.375 at OSH with subsequent vasoplegia and hypotension). He will remain a status 4 for heart transplant given his current hemodynamics and support. Plan to discharge on 11/20 if hemodynamics are stable and home milrinone is coordinated.     #Non-Obstructive CAD  #Myocardial Bridging, distal LAD  #Hyperlipidemia   Home statin    #Hypotension- Resolved  Likely in setting of increased milrinone dose. Resolved with return to 0.25 mcg/kg/min    ===Pulmonary===  #Reactive Airways  Albuterol PRN, no active concerns       ===Renal===  #CKD  Baseline creatinine 1.1 to 1.2, stable here  - Trend      ===Gastroenterology===  #Overweight  Counseled on diet      ===Infectious Disease===  No acute concerns    ===Hematology===  #Drug-Induced Coagulation Defect  #Non-Occlusive RIJ DVT  Home warfarin for known DVT,  re-demonstrated on ultrasound this admission, on heparin infusion here  - Restart warfarin today, goal INR 2-3  - Continue heparin infusion for now    #Elevated Hemoglobin  Chronically 17-19, stable at baseline this admission. No traditional risk factors such as NATHALIE, OHS, testosterone supplementation, or chronic opioid use. No clear significance.  - Trend    ===Endocrine===   No acute concerns      FEN: Regular diet  Analgesia: APAP and Oxy PRN  Sedation: NA  Anticoagulants: Heparin infusion, restart warfarin goal INR 2-3  Ulcer PPX: NA  Glucose: WNL   SBT/SAT: NA  Bowel Regimen: PRN  Tubes/Lines/Drains: PICC, LIJ PA catheter, PIV    This patient was seen and discussed with Dr Brennon Ma, attending cardiologist, who agrees with the assessment and plan unless otherwise indicated.    Meng Ga MD Health system, PGY-5  Fellow, Cardiovascular Disease               Clinically Significant Risk Factors               # Coagulation Defect: INR = 1.47 (Ref range: 0.85 - 1.15) and/or PTT = N/A, will monitor for bleeding     # End stage heart failure: home medication list includes inotropes       # Overweight: Estimated body mass index is 27.09 kg/m  as calculated from the following:    Height as of this encounter: 1.829 m (6').    Weight as of this encounter: 90.6 kg (199 lb 11.8 oz)., PRESENT ON ADMISSION     # Asthma: noted on problem list        Disposition Plan   Expected discharge: Tomorrow, recommended to prior living arrangement once fluid volume status optimized on oral medication.    Entered: Meng Ga MD 11/19/2023, 7:21 AM   ______________________________________________________________________    Interval History   No acute events overnight. Pain at the PA catheter insertion site, managed with medications.     Physical Exam   Vital Signs: Temp: 98.4  F (36.9  C) Temp src: Oral BP: 121/86 Pulse: 94   Resp: 15 SpO2: 95 % O2 Device: None (Room air) Oxygen Delivery: 2 LPM  Weight: 199 lbs 11.79  oz  Exam:  Constitutional: healthy, alert, and no distress  Head: Normocephalic. No masses, lesions, or abnormalities  Neck: Normal appearance, JVD not appreciated at 45 degrees  ENT: EOMI, no scleral icterus or injection, external nose and ears are normal  Cardiovascular: Tachycardic and regular, no murmur appreciated  Respiratory: Clear to auscultation bilaterally, normal work of breathing, no wheeze, no rales  Gastrointestinal: Abdomen soft, non-tender, non-distended. No masses.   : Deferred  Musculoskeletal: extremities normal with no gross deformities noted, normal muscle tone  Skin: no suspicious lesions or rashes  Neurologic: Alert and responsive, grossly non-focal, moves all extremities, sensation grossly intact.   Psychiatric: mentation appears normal and affect normal/bright        Medical Decision Making             Data   I personally reviewed all laboratory and imaging data from the last 24 hours in addition to all available cardiology data.

## 2023-11-19 NOTE — PHARMACY-ADMISSION MEDICATION HISTORY
"Pharmacy Intern Admission Medication History    Admission medication history is complete. The information provided in this note is only as accurate as the sources available at the time of the update.    Information Source(s): Patient, Family member, and CareEverywhere/SureScripts via phone    Pertinent Information:   Spoke to patient over the phone. Patient also had his spouse present during the interview.  Per patient, he has nitroglycerin at home but has never had to use it  Patient goes to [facility]  Current warfarin regimen: 5 mg on Monday, Wednesday, Friday and 7.5 mg on all other days of the week  Goal INR: 2.0-3.0 --> deep vein thrombosis  Patient last seen at the anticoagulation clinic on 11/16 and was supposed to take warfarin 12.5 mg that day. However, he did not as he was admitted to the hospital.  Therefore, his last PTA warfarin dose was on Wednesday 11/15 evening and he took warfarin 5 mg  Patient gets milrinone continuous infusion [facility] from  [facility]. His pump setting is at 6.9 mL/hr.    Changes made to PTA medication list:  Added:   Heparin 100 unit/ml flush  Deleted: None  Changed:   Nitroglycerin 0.4 mg sublingual tablet: added \"May repeat every 5 minutes for a total of 3 doses\"    Allergies reviewed with patient and updates made in EHR: yes    Medication History Completed By: Christiane Frankel 11/19/2023 2:25 PM    PTA Med List   Medication Sig Note Last Dose    atorvastatin (LIPITOR) 20 MG tablet Take 1 tablet (20 mg) by mouth every evening  11/15/2023 at pm    carvedilol (COREG) 3.125 MG tablet Take 1 tablet (3.125 mg) by mouth 2 times daily (with meals)  11/16/2023 at am    empagliflozin (JARDIANCE) 10 MG TABS tablet Take 10 mg by mouth daily  11/16/2023 at am    furosemide (LASIX) 20 MG tablet Take 1 tablet (20 mg) by mouth as needed (for weight gain, swelling)  11/12/2023 at unknown    heparin 100 UNIT/ML SOLN flush 500 Units by Intravenous (Continuous " Infusion) route as needed  11/15/2023 at am    milrinone (PRIMACOR) infusion 200 mcg/mL PREMIX Inject 23.025 mcg/min into the vein continuous  11/15/2023 at am    nitroGLYcerin (NITROSTAT) 0.4 MG sublingual tablet Place 0.4 mg under the tongue every 5 minutes as needed for chest pain May repeat every 5 minutes for a total of 3 doses.      potassium chloride ER (KLOR-CON M) 20 MEQ CR tablet Take 1 tablet (20 mEq) by mouth daily  11/16/2023 at am    sacubitril-valsartan (ENTRESTO) 49-51 MG per tablet Take 1 tablet by mouth 2 times daily  11/16/2023 at am    spironolactone (ALDACTONE) 25 MG tablet Take 25 mg by mouth daily  11/15/2023 at pm    warfarin ANTICOAGULANT (COUMADIN) 1 MG tablet Take 3 tablets (3 mg) by mouth daily With dosing changes as directed by anticoagulation clinic 11/19/2023: Current regimen: MWF 5 mg, other days 7.5 mg 11/15/2023 at pm

## 2023-11-20 ENCOUNTER — APPOINTMENT (OUTPATIENT)
Dept: GENERAL RADIOLOGY | Facility: CLINIC | Age: 53
End: 2023-11-20
Attending: STUDENT IN AN ORGANIZED HEALTH CARE EDUCATION/TRAINING PROGRAM
Payer: COMMERCIAL

## 2023-11-20 ENCOUNTER — ANCILLARY PROCEDURE (OUTPATIENT)
Dept: CARDIOLOGY | Facility: CLINIC | Age: 53
End: 2023-11-20
Attending: STUDENT IN AN ORGANIZED HEALTH CARE EDUCATION/TRAINING PROGRAM
Payer: COMMERCIAL

## 2023-11-20 VITALS
RESPIRATION RATE: 16 BRPM | DIASTOLIC BLOOD PRESSURE: 80 MMHG | SYSTOLIC BLOOD PRESSURE: 109 MMHG | HEART RATE: 94 BPM | WEIGHT: 207.01 LBS | OXYGEN SATURATION: 94 % | TEMPERATURE: 98.5 F | HEIGHT: 72 IN | BODY MASS INDEX: 28.04 KG/M2

## 2023-11-20 LAB
ANION GAP SERPL CALCULATED.3IONS-SCNC: 10 MMOL/L (ref 7–15)
BASE EXCESS BLDV CALC-SCNC: -0.2 MMOL/L (ref -7.7–1.9)
BASE EXCESS BLDV CALC-SCNC: 0.5 MMOL/L (ref -7.7–1.9)
BASOPHILS # BLD AUTO: 0 10E3/UL (ref 0–0.2)
BASOPHILS NFR BLD AUTO: 0 %
BUN SERPL-MCNC: 15.2 MG/DL (ref 6–20)
CALCIUM SERPL-MCNC: 9 MG/DL (ref 8.6–10)
CHLORIDE SERPL-SCNC: 102 MMOL/L (ref 98–107)
CREAT SERPL-MCNC: 1.06 MG/DL (ref 0.67–1.17)
DEPRECATED HCO3 PLAS-SCNC: 23 MMOL/L (ref 22–29)
EGFRCR SERPLBLD CKD-EPI 2021: 84 ML/MIN/1.73M2
EOSINOPHIL # BLD AUTO: 0.3 10E3/UL (ref 0–0.7)
EOSINOPHIL NFR BLD AUTO: 3 %
ERYTHROCYTE [DISTWIDTH] IN BLOOD BY AUTOMATED COUNT: 12.9 % (ref 10–15)
GLUCOSE SERPL-MCNC: 153 MG/DL (ref 70–99)
HCO3 BLDV-SCNC: 24 MMOL/L (ref 21–28)
HCO3 BLDV-SCNC: 25 MMOL/L (ref 21–28)
HCT VFR BLD AUTO: 52.3 % (ref 40–53)
HGB BLD-MCNC: 17.9 G/DL (ref 13.3–17.7)
IMM GRANULOCYTES # BLD: 0 10E3/UL
IMM GRANULOCYTES NFR BLD: 0 %
INR PPP: 1.18 (ref 0.85–1.15)
LYMPHOCYTES # BLD AUTO: 2.5 10E3/UL (ref 0.8–5.3)
LYMPHOCYTES NFR BLD AUTO: 27 %
MAGNESIUM SERPL-MCNC: 2 MG/DL (ref 1.7–2.3)
MCH RBC QN AUTO: 30.6 PG (ref 26.5–33)
MCHC RBC AUTO-ENTMCNC: 34.2 G/DL (ref 31.5–36.5)
MCV RBC AUTO: 89 FL (ref 78–100)
MONOCYTES # BLD AUTO: 1 10E3/UL (ref 0–1.3)
MONOCYTES NFR BLD AUTO: 11 %
NEUTROPHILS # BLD AUTO: 5.3 10E3/UL (ref 1.6–8.3)
NEUTROPHILS NFR BLD AUTO: 59 %
NRBC # BLD AUTO: 0 10E3/UL
NRBC BLD AUTO-RTO: 0 /100
O2/TOTAL GAS SETTING VFR VENT: 0 %
O2/TOTAL GAS SETTING VFR VENT: 21 %
OXYHGB MFR BLDV: 74 % (ref 70–75)
OXYHGB MFR BLDV: 78 % (ref 70–75)
PCO2 BLDV: 38 MM HG (ref 40–50)
PCO2 BLDV: 39 MM HG (ref 40–50)
PH BLDV: 7.41 [PH] (ref 7.32–7.43)
PH BLDV: 7.42 [PH] (ref 7.32–7.43)
PLATELET # BLD AUTO: 147 10E3/UL (ref 150–450)
PO2 BLDV: 40 MM HG (ref 25–47)
PO2 BLDV: 43 MM HG (ref 25–47)
POTASSIUM SERPL-SCNC: 4 MMOL/L (ref 3.4–5.3)
RBC # BLD AUTO: 5.85 10E6/UL (ref 4.4–5.9)
SODIUM SERPL-SCNC: 135 MMOL/L (ref 135–145)
UFH PPP CHRO-ACNC: 0.41 IU/ML
WBC # BLD AUTO: 9.2 10E3/UL (ref 4–11)

## 2023-11-20 PROCEDURE — 250N000011 HC RX IP 250 OP 636: Mod: JZ | Performed by: INTERNAL MEDICINE

## 2023-11-20 PROCEDURE — 250N000013 HC RX MED GY IP 250 OP 250 PS 637: Performed by: INTERNAL MEDICINE

## 2023-11-20 PROCEDURE — 99239 HOSP IP/OBS DSCHRG MGMT >30: CPT | Mod: 25 | Performed by: INTERNAL MEDICINE

## 2023-11-20 PROCEDURE — 93282 PRGRMG EVAL IMPLANTABLE DFB: CPT

## 2023-11-20 PROCEDURE — 85610 PROTHROMBIN TIME: CPT | Performed by: STUDENT IN AN ORGANIZED HEALTH CARE EDUCATION/TRAINING PROGRAM

## 2023-11-20 PROCEDURE — 250N000013 HC RX MED GY IP 250 OP 250 PS 637: Performed by: STUDENT IN AN ORGANIZED HEALTH CARE EDUCATION/TRAINING PROGRAM

## 2023-11-20 PROCEDURE — 82805 BLOOD GASES W/O2 SATURATION: CPT | Performed by: STUDENT IN AN ORGANIZED HEALTH CARE EDUCATION/TRAINING PROGRAM

## 2023-11-20 PROCEDURE — 71045 X-RAY EXAM CHEST 1 VIEW: CPT | Mod: 26 | Performed by: RADIOLOGY

## 2023-11-20 PROCEDURE — 93005 ELECTROCARDIOGRAM TRACING: CPT

## 2023-11-20 PROCEDURE — 93282 PRGRMG EVAL IMPLANTABLE DFB: CPT | Mod: 26 | Performed by: INTERNAL MEDICINE

## 2023-11-20 PROCEDURE — 85520 HEPARIN ASSAY: CPT | Performed by: INTERNAL MEDICINE

## 2023-11-20 PROCEDURE — 250N000011 HC RX IP 250 OP 636: Mod: JZ | Performed by: STUDENT IN AN ORGANIZED HEALTH CARE EDUCATION/TRAINING PROGRAM

## 2023-11-20 PROCEDURE — 71045 X-RAY EXAM CHEST 1 VIEW: CPT

## 2023-11-20 PROCEDURE — 83735 ASSAY OF MAGNESIUM: CPT | Performed by: INTERNAL MEDICINE

## 2023-11-20 PROCEDURE — 85025 COMPLETE CBC W/AUTO DIFF WBC: CPT | Performed by: INTERNAL MEDICINE

## 2023-11-20 PROCEDURE — 80048 BASIC METABOLIC PNL TOTAL CA: CPT | Performed by: STUDENT IN AN ORGANIZED HEALTH CARE EDUCATION/TRAINING PROGRAM

## 2023-11-20 PROCEDURE — 93010 ELECTROCARDIOGRAM REPORT: CPT | Performed by: INTERNAL MEDICINE

## 2023-11-20 RX ORDER — MILRINONE LACTATE 0.2 MG/ML
0.25 INJECTION, SOLUTION INTRAVENOUS CONTINUOUS
Qty: 100 ML | Refills: 0 | Status: ON HOLD | OUTPATIENT
Start: 2023-11-20 | End: 2024-06-07

## 2023-11-20 RX ORDER — ATORVASTATIN CALCIUM 20 MG/1
20 TABLET, FILM COATED ORAL EVERY EVENING
Qty: 3 TABLET | Refills: 0 | Status: ON HOLD | OUTPATIENT
Start: 2023-11-20 | End: 2024-07-04

## 2023-11-20 RX ORDER — WARFARIN SODIUM 7.5 MG/1
7.5 TABLET ORAL
Status: DISCONTINUED | OUTPATIENT
Start: 2023-11-20 | End: 2023-11-20 | Stop reason: HOSPADM

## 2023-11-20 RX ORDER — SPIRONOLACTONE 25 MG/1
25 TABLET ORAL DAILY
Qty: 3 TABLET | Refills: 0 | Status: ON HOLD | OUTPATIENT
Start: 2023-11-20 | End: 2024-01-19

## 2023-11-20 RX ORDER — CARVEDILOL 3.12 MG/1
3.12 TABLET ORAL 2 TIMES DAILY WITH MEALS
Qty: 3 TABLET | Refills: 0 | Status: ON HOLD | OUTPATIENT
Start: 2023-11-20 | End: 2024-06-07

## 2023-11-20 RX ORDER — MAGNESIUM OXIDE 400 MG/1
400 TABLET ORAL EVERY 4 HOURS
Status: COMPLETED | OUTPATIENT
Start: 2023-11-20 | End: 2023-11-20

## 2023-11-20 RX ORDER — WARFARIN SODIUM 1 MG/1
3 TABLET ORAL DAILY
Qty: 9 TABLET | Refills: 0 | Status: SHIPPED | OUTPATIENT
Start: 2023-11-20 | End: 2023-11-23

## 2023-11-20 RX ORDER — FUROSEMIDE 20 MG
20 TABLET ORAL PRN
Qty: 3 TABLET | Refills: 0 | Status: ON HOLD | OUTPATIENT
Start: 2023-11-20 | End: 2024-07-04

## 2023-11-20 RX ADMIN — MAGNESIUM OXIDE TAB 400 MG (241.3 MG ELEMENTAL MG) 400 MG: 400 (241.3 MG) TAB at 06:13

## 2023-11-20 RX ADMIN — EMPAGLIFLOZIN 10 MG: 10 TABLET, FILM COATED ORAL at 08:36

## 2023-11-20 RX ADMIN — CARVEDILOL 3.12 MG: 3.12 TABLET, FILM COATED ORAL at 08:36

## 2023-11-20 RX ADMIN — MILRINONE LACTATE IN DEXTROSE 0.25 MCG/KG/MIN: 200 INJECTION, SOLUTION INTRAVENOUS at 13:45

## 2023-11-20 RX ADMIN — SACUBITRIL AND VALSARTAN 1 TABLET: 24; 26 TABLET, FILM COATED ORAL at 08:37

## 2023-11-20 RX ADMIN — SPIRONOLACTONE 25 MG: 25 TABLET, FILM COATED ORAL at 08:36

## 2023-11-20 RX ADMIN — HEPARIN SODIUM 1100 UNITS/HR: 10000 INJECTION, SOLUTION INTRAVENOUS at 07:08

## 2023-11-20 RX ADMIN — MAGNESIUM OXIDE TAB 400 MG (241.3 MG ELEMENTAL MG) 400 MG: 400 (241.3 MG) TAB at 08:36

## 2023-11-20 ASSESSMENT — ACTIVITIES OF DAILY LIVING (ADL)
DEPENDENT_IADLS:: INDEPENDENT
ADLS_ACUITY_SCORE: 18

## 2023-11-20 NOTE — PLAN OF CARE
ICU End of Shift Summary. See flowsheets for vital signs and detailed assessment.    Major Changes: 8 beat run of VT, some medial chest pain with inhalation, team notified, EKG performed, no acute changes, VSS    Neuro: intact, pain from Furlong insertion site controlled with tylenol   Cardiac: SR with frequent PVCs, afebrile, BP WDL MAP 80-90  Teressa CVP 5 Pa 29/17 SvO2 78 CI 3.1 SVR 1006  Respiratory: RA, clear lungs  GI/: voids spontaneously   Skin: WDL  Drips: milrinone .25 heparin 1100  Labs: Mg replaced    Plan: Discharge      LUIS Reardon  November 20, 2023        Goal Outcome Evaluation:      Plan of Care Reviewed With: patient    Overall Patient Progress: improvingOverall Patient Progress: improving    Outcome Evaluation: Stable on .25 milrinone

## 2023-11-20 NOTE — DISCHARGE SUMMARY
Physician Discharge Summary     Patient ID:  Navin Lutz  [id number]  53 year old  1970    Admit date: 11/17/2023    Discharge date and time: 11/20/2023     Admitting Physician: Brennon Ma MD     Discharge Physician: Brennon Ma MD    Admission Diagnoses: Cardiogenic shock (H) [R57.0]    Discharge Diagnoses:   End-Stage Heart Failure with Inotrope Dependence   Acute on Chronic Heart Failure with Reduced Ejection Fraction  Non-Obstructive Coronary Artery Disease  Myocardial Bridging   Drug Induced Coagulation Defect   Non-Occlusive Deep Vein Thrombosis  Hyperlipidemia   Reactive Airways Disease   Chronic Kidney Disease   Overweight    Admission Condition: fair    Discharged Condition: good    Indication for Admission:   Mr Navin Lutz is a 53 year old gentleman with a history of chronic heart failure with reduced ejection fraction due to non-ischemic cardiomyopathy who is inotrope dependent on home milrinone and s/p ICD placement, and CKD who was admitted as transfer from outside hospital with worsening heart failure symptoms.     Hospital Course:   #Acute on Chronic Heart Failure with Reduced Ejection Fraction  #End-Stage Non-Ischemic Cardiomyopathy with Inotrope Dependence  Diagnosis: NICM  Baseline: ACC/AHA Stage D, NYHA FC IIIb  Estimated Dry Weight: 205 pounds  Admission Weight: 207 pounds  Etiology: uncertain etiology of decompensation, likely some mild hypervolemia and possible flash pulmonary edema as improved substantially with diuresis, possible unintentional flush bolus of milrinone to explain initial hypotension at OSH with persistence due to increased dose of milrinone.   Most Recent Diagnostics  Ischemic workup: angiogram 06/13/23 ([facility]), myocardial bridging of distal LAD, no obstructive CAD  Hemodynamics: 09/20/23 RA 3, PA 22/14/18, PCWP 8, CO/CI 4.1/1.9 by TD, PVR 2.4   Echo: 11/18/23 LVEF 10-15%, severe dilation LVIDd 6.8 cm, severe diffuse hypokinesis, normal RV  function  EKG: NSR, NIVCD, LAD 2/2 LAFB  Outpatient Regimen  - Milrinone 0.25 mcg/kg/min  - Sacubitril Valsartan 49-51mg BID  - Carvedilol 3.125 mg BID  - Spironolactone 25 mg daily  - Furosemide 20 mg daily  Current Presentation  Date CVP PA PCWP MVO2 CO/CI PVR SVR Support   11/18 2 25/10/15 8 72 4.0/1.8 2 2020 Mil 0.25   11/19 4 26/12/16 12 78 5.2/2.5 1 1353 Mil 0.25   NT-proBNP: 208; baseline 200-350  Presents clinically euvolemic, warm and dry, no distress   Volume status: Weight today 207  - Hold home diuretic in setting of low CVP; continue PRN dosing for increased weight or CHF symptoms  GDMT:              > Beta Blocker: Carvedilol 3.125 mg BID              > ACE/ARB/ARNI: Sacubitril Valsartan 24-26 mg BID              > MRA: Spironolactone 25 mg daily              > SGLT2i: Empagliflozin 10 mg daily  Additional Afterload: None  Therapies:               > Inotrope: Milrinone 0.25 mcg/kg/min               > ICD: Medtronic [place] single lead ICD, device interrogation ordered   > CRT: Not indicated at this time              > Mechanical Support: Not indicated at this time  Other Management:   Uncertain etiology of his decompensation as his admission weight was stable from baseline and he is compliant with home monitoring and home medications. He improved significantly with diuretics. Pulmonary artery catheter placed at bedside with frequent hemodynamic monitoring throughout his stay. Reassuring that his RHC numbers are stable to even improved from prior RHC on 09/20/23 however this does not explain the dyspnea and chest discomfort that prompted his presentation to [facility]. It is possible that he had flash pulmonary edema or an unintentional bolus of milrinone which caused his hypotension. Overall, there is no indication to change his current therapy, we have resumed his home 0.25 mcg/kg/min milrinone (increased to 0.375 at OSH with subsequent vasoplegia and hypotension). He will remain a status  4 for heart transplant given his current hemodynamics and support.     #Hypotension  Noted at OSH and upon arrival to [facility]. Asymptomatic with normal perfusion markers. Likely in setting of increased milrinone dose. Resolved with return to 0.25 mcg/kg/min     #Drug-Induced Coagulation Defect  #Non-Occlusive RIJ DVT  Home warfarin for known non-occlusive RIJ DVT, re-demonstrated on ultrasound this admission. He was maintained on heparin infusion with resumption of warfarin prior to discharge. He follows with the anticoagulation clinic through [facility] in [place]. INR goal 2-3.     #Elevated Hemoglobin  Chronically 17-19, stable at baseline this admission. No traditional risk factors such as NATHALIE, OHS, testosterone supplementation, or chronic opioid use. No clear significance.    #Non-Obstructive CAD  #Myocardial Bridging, distal LAD  #Hyperlipidemia   Continued home statin    #CKD: Baseline creatinine 1.1 to 1.2, stable throughout admission.   #Reactive Airways: Albuterol PRN  #Overweight: Counseled on diet    Consults: none    Significant Diagnostic Studies:   Pulmonary Artery Catheter   Date CVP PA PCWP MVO2 CO/CI PVR SVR Support   11/18 2 25/10/15 8 72 4.0/1.8 2 2020 Mil 0.25   11/19 4 26/12/16 12 78 5.2/2.5 1 1353 Mil 0.25       Treatments: IV diuretics and inotrope infusion     Discharge Exam:  Constitutional: healthy, alert, and no distress  Head: Normocephalic. No masses, lesions, or abnormalities  Neck: Normal appearance, JVD not appreciated at 45 degrees  ENT: EOMI, no scleral icterus or injection, external nose and ears are normal  Cardiovascular: Tachycardic and regular, no murmur appreciated  Respiratory: Clear to auscultation bilaterally, normal work of breathing, no wheeze, no rales  Gastrointestinal: Abdomen soft, non-tender, non-distended. No masses.   : Deferred  Musculoskeletal: extremities normal with no gross deformities noted, normal muscle tone  Skin: no suspicious lesions or  rashes  Neurologic: Alert and responsive, grossly non-focal, moves all extremities, sensation grossly intact.   Psychiatric: mentation appears normal and affect normal/bright    Disposition: home    Patient Instructions:   Current Discharge Medication List        CONTINUE these medications which have NOT CHANGED    Details   atorvastatin (LIPITOR) 20 MG tablet Take 1 tablet (20 mg) by mouth every evening  Qty: 30 tablet, Refills: 0    Associated Diagnoses: Pure hypercholesterolemia      carvedilol (COREG) 3.125 MG tablet Take 1 tablet (3.125 mg) by mouth 2 times daily (with meals)  Qty: 60 tablet, Refills: 0    Associated Diagnoses: Acute on chronic systolic CHF (congestive heart failure) (H)      empagliflozin (JARDIANCE) 10 MG TABS tablet Take 10 mg by mouth daily      furosemide (LASIX) 20 MG tablet Take 1 tablet (20 mg) by mouth as needed (for weight gain, swelling)      heparin 100 UNIT/ML SOLN flush 500 Units by Intravenous (Continuous Infusion) route as needed      milrinone (PRIMACOR) infusion 200 mcg/mL PREMIX Inject 23.025 mcg/min into the vein continuous  Qty: 100 mL, Refills: 0    Associated Diagnoses: Acute on chronic systolic CHF (congestive heart failure) (H)      nitroGLYcerin (NITROSTAT) 0.4 MG sublingual tablet Place 0.4 mg under the tongue every 5 minutes as needed for chest pain May repeat every 5 minutes for a total of 3 doses.      potassium chloride ER (KLOR-CON M) 20 MEQ CR tablet Take 1 tablet (20 mEq) by mouth daily  Qty: 30 tablet, Refills: 1    Associated Diagnoses: Non-ischemic cardiomyopathy (H)      sacubitril-valsartan (ENTRESTO) 49-51 MG per tablet Take 1 tablet by mouth 2 times daily  Qty: 60 tablet, Refills: 0    Associated Diagnoses: Acute on chronic systolic CHF (congestive heart failure) (H)      spironolactone (ALDACTONE) 25 MG tablet Take 25 mg by mouth daily      warfarin ANTICOAGULANT (COUMADIN) 1 MG tablet Take 3 tablets (3 mg) by mouth daily With dosing changes as directed  by anticoagulation clinic  Qty: 90 tablet, Refills: 1    Associated Diagnoses: Acute on chronic systolic CHF (congestive heart failure) (H); Acute deep vein thrombosis (DVT) of other vein of left upper extremity (H)           Activity: activity as tolerated  Diet: regular diet  Wound Care: none needed    Follow up scheduled with Dr Silvestre on 12/12/23.   Recommend follow up with Dr Pinto (Cardiologist in [place]) the week of 11/27.   Follow up with  anticoagulation clinic.     Meng Ga MD [facility], PGY-5  Fellow, Cardiovascular Disease

## 2023-11-20 NOTE — PLAN OF CARE
Problem: Adult Inpatient Plan of Care  Goal: Plan of Care Review  Description: The Plan of Care Review/Shift note should be completed every shift.  The Outcome Evaluation is a brief statement about your assessment that the patient is improving, declining, or no change.  This information will be displayed automatically on your shift  note.  Flowsheets (Taken 11/20/2023 1116)  Outcome Evaluation: Anticipate discharge today w/ resumption of home milrinone  Plan of Care Reviewed With:   patient   spouse  Overall Patient Progress: improving

## 2023-11-20 NOTE — PLAN OF CARE
Discharged to: Home at 1600   Belongings: Left with patient. Home infusion nurse came to bedside and medication delivered to bedside. Patient left with wife.   AVS (After Visit Summary) discussed with: patient

## 2023-11-20 NOTE — PLAN OF CARE
Goal Outcome Evaluation:       Pt w/ elevated HR to 130's w/ ambulation, Cards 2 notified and at bedside. Pacer Device Nurse notes read by team and orders to discharge SWAN catheter performed. Pt's HR 90's-100 now. Care Coordinator to see pt about discharge and stated the Infusion Nurse will be in soon to transition pt to his own pump for Milrinone. Pharmacy notified about pt's discharge for meds to be filled. Report on patient given to Griselda from writer.

## 2023-11-20 NOTE — PROGRESS NOTES
[facility]  Navin is discharging today and will be going home on continuous Milrinone therapy which he was on prior to admission.   Met with patient and spouse at bedside to discuss discharge plans for today.  Added extension tubing to Red lumen of PICC line.  Patient and spouse are aware not to flush medication lumen with NS, but flush unused PICC lumen with heparin daily for patency.  Plan for delivery of medication and new CADD pumps to hospital room prior to discharge today.  Patient and spouse are independent with bag changes and will hookup own infusion once supplies arrive.  Informed patient hospitals will send pump return boxes to home so he can send back old CADD pumps.      Patient verbalized understanding of information given.   He feels comfortable discharging and managing the IV therapy at home with help of his spouse.  Plans to continue going to local clinic for weekly PICC line dressing changes.  Patient has hospitals 24/7 triage line to call with any concerns.  Navin is ready for discharge from hospitals perspective once his medication delivery arrives.  Bedside RN updated.      LUIS Reid  hospitals Nurse Liaison   [email or web address]  Cell: [phone] M-F  hospitals Main: [phone]

## 2023-11-20 NOTE — PROGRESS NOTES
Care Management Discharge Note    Discharge Date: 11/20/2023     Discharge Disposition: Home    Discharge Services:  [facility]    Discharge DME: None    Discharge Transportation: Family or friend will provide    Private pay costs discussed: Not applicable    Does the patient's insurance plan have a 3 day qualifying hospital stay waiver?  No    PAS Confirmation Code:  N/A  Patient/family educated on Medicare website which has current facility and service quality ratings: N/A     Education Provided on the Discharge Plan: Yes  Persons Notified of Discharge Plans: Patient, his wife, and [facility]  Patient/Family in Agreement with the Plan:  Yes    Handoff Referral Completed: Yes    Additional Information:  [facility] liaison provided patient with medication and supplies for patient to continue Milrinone infusion at home. No further discharge needs identified.     Mirian Howe RNCC  Covering for 4A    SEARCHABLE in [facility] - search CARE COORDINATOR   [facility] ([id number]) Saturday & Sunday; ([phone])  Recognized Holidays  Units: 5A & 5C  Pager: [phone]   Units: 6B, 6C & 6D    Pager: [phone]  Units: 7A, 7B, & 7C   Pager: [phone]  Units: 6A & ICU   Pager: [phone]  Units: 5 Ortho, 5MS & WB ED Pager: [phone]   Units: 6MS, 8A & 10 ICU  Pager [phone]

## 2023-11-20 NOTE — CONSULTS
Care Management Initial Consult    General Information  Assessment completed with: Patient, Spouse or significant other, Deepa  Type of CM/SW Visit: Initial Assessment    Primary Care Provider verified and updated as needed: Yes   Readmission within the last 30 days: no previous admission in last 30 days         Advance Care Planning:            Communication Assessment  Patient's communication style: spoken language (English or Bilingual)    Hearing Difficulty or Deaf: no   Wear Glasses or Blind: no    Cognitive  Cognitive/Neuro/Behavioral: WDL                      Living Environment:   People in home: spouse  Deepa  Current living Arrangements: house      Able to return to prior arrangements: yes       Family/Social Support:  Care provided by: self, spouse/significant other  Provides care for: no one  Marital Status:   Wife  Deepa       Description of Support System: Supportive, Involved    Support Assessment: Adequate family and caregiver support, Adequate social supports    Current Resources:   Patient receiving home care services:       Community Resources: Home Infusion  Equipment currently used at home: none  Supplies currently used at home: Wound Care Supplies ([facility])    Employment/Financial:  Employment Status: employed part-time        Financial Concerns: none   Referral to Financial Worker: No       Does the patient's insurance plan have a 3 day qualifying hospital stay waiver?  N/a     Lifestyle & Psychosocial Needs:  Social Determinants of Health     Food Insecurity: Not on file   Depression: Not at risk (11/1/2023)    PHQ-2     PHQ-2 Score: 0   Housing Stability: Not on file   Tobacco Use: Low Risk  (11/1/2023)    Patient History     Smoking Tobacco Use: Never     Smokeless Tobacco Use: Never     Passive Exposure: Not on file   Financial Resource Strain: Not on file   Alcohol Use: Not on file   Transportation Needs: Not on file   Physical Activity: Not on file   Interpersonal Safety: Not on  file   Stress: Not on file   Social Connections: Not on file       Functional Status:  Prior to admission patient needed assistance:   Dependent ADLs:: Independent  Dependent IADLs:: Independent       Mental Health Status:  No current concerns           Chemical Dependency Status:  No current concerns          Values/Beliefs:  Spiritual, Cultural Beliefs, Restorationist Practices, Values that affect care:                 Additional Information:  Pt well known to writer from heart transplant/LVAD evaluation in August (2023). At that time, pt was discharged home with Milrinone through [facility]. Pt listed as a status 4 for heart transplant. Pt admitted 11/18, from [facility], due to worsening heart failure symptoms. Pt has been evaluated and does not meet criteria for heart transplant status upgrade and will go home today.     Writer and RNCC met w/ pt and his wife, Deepa, who was at the bedside, to discuss discharge planning. RNCC will work with [facility] to resume the Milrinone. Pt and wife have question as to how much they will need and when [facility] can send another shipment of medication to their home; RNCC working on this. Pt and wife plan to stay local today and head back to [facility] tomorrow. Pt goes to his local clinic for PICC care--being done today as pt's outpatient clinic is closed starting Wed for the remainder of the week.     Pt is independent at home and has excellent support system. Provided ongoing transplant/LVAD education around caregiver support plan and temporary relocation. Helped manage expectations and provided supportive listening.     RNCC is working on discharge with [facility] regarding the milrinone.     JESSICA Goetz, [facility]   Advanced Heart Failure   Heart Transplant/LVAD  Phone: [phone]  Pager: [phone]

## 2023-11-21 LAB
ATRIAL RATE - MUSE: 87 BPM
ATRIAL RATE - MUSE: 94 BPM
DIASTOLIC BLOOD PRESSURE - MUSE: NORMAL MMHG
DIASTOLIC BLOOD PRESSURE - MUSE: NORMAL MMHG
INTERPRETATION ECG - MUSE: NORMAL
INTERPRETATION ECG - MUSE: NORMAL
MDC_IDC_EPISODE_DTM: NORMAL
MDC_IDC_EPISODE_DURATION: 1 S
MDC_IDC_EPISODE_DURATION: 10 S
MDC_IDC_EPISODE_DURATION: 2 S
MDC_IDC_EPISODE_DURATION: 2 S
MDC_IDC_EPISODE_DURATION: 29 S
MDC_IDC_EPISODE_DURATION: 3 S
MDC_IDC_EPISODE_DURATION: 34 S
MDC_IDC_EPISODE_DURATION: 38 S
MDC_IDC_EPISODE_DURATION: 38 S
MDC_IDC_EPISODE_DURATION: 4 S
MDC_IDC_EPISODE_DURATION: 50 S
MDC_IDC_EPISODE_DURATION: 6 S
MDC_IDC_EPISODE_DURATION: 8 S
MDC_IDC_EPISODE_DURATION: 85 S
MDC_IDC_EPISODE_ID: 10
MDC_IDC_EPISODE_ID: 11
MDC_IDC_EPISODE_ID: 12
MDC_IDC_EPISODE_ID: 13
MDC_IDC_EPISODE_ID: 14
MDC_IDC_EPISODE_ID: 15
MDC_IDC_EPISODE_ID: 16
MDC_IDC_EPISODE_ID: 17
MDC_IDC_EPISODE_ID: 18
MDC_IDC_EPISODE_ID: 19
MDC_IDC_EPISODE_ID: 20
MDC_IDC_EPISODE_ID: 21
MDC_IDC_EPISODE_ID: 22
MDC_IDC_EPISODE_ID: 23
MDC_IDC_EPISODE_ID: 24
MDC_IDC_EPISODE_ID: 25
MDC_IDC_EPISODE_ID: 26
MDC_IDC_EPISODE_ID: 27
MDC_IDC_EPISODE_ID: 28
MDC_IDC_EPISODE_ID: 29
MDC_IDC_EPISODE_ID: 3
MDC_IDC_EPISODE_ID: 30
MDC_IDC_EPISODE_ID: 5
MDC_IDC_EPISODE_ID: 9
MDC_IDC_EPISODE_TYPE: NORMAL
MDC_IDC_LEAD_CONNECTION_STATUS: NORMAL
MDC_IDC_LEAD_IMPLANT_DT: NORMAL
MDC_IDC_LEAD_LOCATION: NORMAL
MDC_IDC_LEAD_LOCATION_DETAIL_1: NORMAL
MDC_IDC_LEAD_MFG: NORMAL
MDC_IDC_LEAD_MODEL: NORMAL
MDC_IDC_LEAD_POLARITY_TYPE: NORMAL
MDC_IDC_LEAD_SERIAL: NORMAL
MDC_IDC_MSMT_BATTERY_DTM: NORMAL
MDC_IDC_MSMT_BATTERY_REMAINING_LONGEVITY: 165 MO
MDC_IDC_MSMT_BATTERY_RRT_TRIGGER: NORMAL
MDC_IDC_MSMT_BATTERY_STATUS: NORMAL
MDC_IDC_MSMT_BATTERY_VOLTAGE: 3.08 V
MDC_IDC_MSMT_CAP_CHARGE_DTM: NORMAL
MDC_IDC_MSMT_CAP_CHARGE_ENERGY: 18 J
MDC_IDC_MSMT_CAP_CHARGE_TIME: 3.8 S
MDC_IDC_MSMT_CAP_CHARGE_TYPE: NORMAL
MDC_IDC_MSMT_LEADCHNL_RV_IMPEDANCE_VALUE: 532 OHM
MDC_IDC_MSMT_LEADCHNL_RV_PACING_THRESHOLD_AMPLITUDE: 1 V
MDC_IDC_MSMT_LEADCHNL_RV_PACING_THRESHOLD_PULSEWIDTH: 0.4 MS
MDC_IDC_MSMT_LEADCHNL_RV_SENSING_INTR_AMPL: 18.9 MV
MDC_IDC_PG_IMPLANT_DTM: NORMAL
MDC_IDC_PG_MFG: NORMAL
MDC_IDC_PG_MODEL: NORMAL
MDC_IDC_PG_SERIAL: NORMAL
MDC_IDC_PG_TYPE: NORMAL
MDC_IDC_SESS_CLINIC_NAME: NORMAL
MDC_IDC_SESS_DTM: NORMAL
MDC_IDC_SESS_TYPE: NORMAL
MDC_IDC_SET_BRADY_LOWRATE: 40 {BEATS}/MIN
MDC_IDC_SET_BRADY_MAX_SENSOR_RATE: 130 {BEATS}/MIN
MDC_IDC_SET_BRADY_MODE: NORMAL
MDC_IDC_SET_LEADCHNL_RV_PACING_AMPLITUDE: 2 V
MDC_IDC_SET_LEADCHNL_RV_PACING_ANODE_ELECTRODE_1: NORMAL
MDC_IDC_SET_LEADCHNL_RV_PACING_ANODE_LOCATION_1: NORMAL
MDC_IDC_SET_LEADCHNL_RV_PACING_CAPTURE_MODE: NORMAL
MDC_IDC_SET_LEADCHNL_RV_PACING_CATHODE_ELECTRODE_1: NORMAL
MDC_IDC_SET_LEADCHNL_RV_PACING_CATHODE_LOCATION_1: NORMAL
MDC_IDC_SET_LEADCHNL_RV_PACING_POLARITY: NORMAL
MDC_IDC_SET_LEADCHNL_RV_PACING_PULSEWIDTH: 0.4 MS
MDC_IDC_SET_LEADCHNL_RV_SENSING_ANODE_ELECTRODE_1: NORMAL
MDC_IDC_SET_LEADCHNL_RV_SENSING_ANODE_LOCATION_1: NORMAL
MDC_IDC_SET_LEADCHNL_RV_SENSING_CATHODE_ELECTRODE_1: NORMAL
MDC_IDC_SET_LEADCHNL_RV_SENSING_CATHODE_LOCATION_1: NORMAL
MDC_IDC_SET_LEADCHNL_RV_SENSING_POLARITY: NORMAL
MDC_IDC_SET_LEADCHNL_RV_SENSING_SENSITIVITY: 0.3 MV
MDC_IDC_SET_ZONE_DETECTION_BEATS_DENOMINATOR: 16 {BEATS}
MDC_IDC_SET_ZONE_DETECTION_BEATS_DENOMINATOR: 32 {BEATS}
MDC_IDC_SET_ZONE_DETECTION_BEATS_DENOMINATOR: 40 {BEATS}
MDC_IDC_SET_ZONE_DETECTION_BEATS_NUMERATOR: 16 {BEATS}
MDC_IDC_SET_ZONE_DETECTION_BEATS_NUMERATOR: 30 {BEATS}
MDC_IDC_SET_ZONE_DETECTION_BEATS_NUMERATOR: 32 {BEATS}
MDC_IDC_SET_ZONE_DETECTION_INTERVAL: 320 MS
MDC_IDC_SET_ZONE_DETECTION_INTERVAL: 360 MS
MDC_IDC_SET_ZONE_DETECTION_INTERVAL: 400 MS
MDC_IDC_SET_ZONE_STATUS: NORMAL
MDC_IDC_SET_ZONE_TYPE: NORMAL
MDC_IDC_SET_ZONE_VENDOR_TYPE: NORMAL
MDC_IDC_STAT_AT_BURDEN_PERCENT: 0 %
MDC_IDC_STAT_AT_DTM_END: NORMAL
MDC_IDC_STAT_AT_DTM_START: NORMAL
MDC_IDC_STAT_BRADY_DTM_END: NORMAL
MDC_IDC_STAT_BRADY_DTM_START: NORMAL
MDC_IDC_STAT_BRADY_RA_PERCENT_PACED: NORMAL
MDC_IDC_STAT_BRADY_RV_PERCENT_PACED: 0.61 %
MDC_IDC_STAT_CRT_DTM_END: NORMAL
MDC_IDC_STAT_CRT_DTM_START: NORMAL
MDC_IDC_STAT_EPISODE_RECENT_COUNT: 0
MDC_IDC_STAT_EPISODE_RECENT_COUNT_DTM_END: NORMAL
MDC_IDC_STAT_EPISODE_RECENT_COUNT_DTM_START: NORMAL
MDC_IDC_STAT_EPISODE_TOTAL_COUNT: 0
MDC_IDC_STAT_EPISODE_TOTAL_COUNT: 21
MDC_IDC_STAT_EPISODE_TOTAL_COUNT: 9
MDC_IDC_STAT_EPISODE_TOTAL_COUNT_DTM_END: NORMAL
MDC_IDC_STAT_EPISODE_TOTAL_COUNT_DTM_START: NORMAL
MDC_IDC_STAT_EPISODE_TYPE: NORMAL
MDC_IDC_STAT_TACHYTHERAPY_ATP_DELIVERED_RECENT: 0
MDC_IDC_STAT_TACHYTHERAPY_ATP_DELIVERED_TOTAL: 0
MDC_IDC_STAT_TACHYTHERAPY_RECENT_DTM_END: NORMAL
MDC_IDC_STAT_TACHYTHERAPY_RECENT_DTM_START: NORMAL
MDC_IDC_STAT_TACHYTHERAPY_SHOCKS_ABORTED_RECENT: 0
MDC_IDC_STAT_TACHYTHERAPY_SHOCKS_ABORTED_TOTAL: 0
MDC_IDC_STAT_TACHYTHERAPY_SHOCKS_DELIVERED_RECENT: 0
MDC_IDC_STAT_TACHYTHERAPY_SHOCKS_DELIVERED_TOTAL: 0
MDC_IDC_STAT_TACHYTHERAPY_TOTAL_DTM_END: NORMAL
MDC_IDC_STAT_TACHYTHERAPY_TOTAL_DTM_START: NORMAL
P AXIS - MUSE: 61 DEGREES
P AXIS - MUSE: 65 DEGREES
PR INTERVAL - MUSE: 150 MS
PR INTERVAL - MUSE: 154 MS
QRS DURATION - MUSE: 122 MS
QRS DURATION - MUSE: 122 MS
QT - MUSE: 390 MS
QT - MUSE: 404 MS
QTC - MUSE: 486 MS
QTC - MUSE: 487 MS
R AXIS - MUSE: -56 DEGREES
R AXIS - MUSE: -60 DEGREES
SYSTOLIC BLOOD PRESSURE - MUSE: NORMAL MMHG
SYSTOLIC BLOOD PRESSURE - MUSE: NORMAL MMHG
T AXIS - MUSE: 41 DEGREES
T AXIS - MUSE: 50 DEGREES
VENTRICULAR RATE- MUSE: 87 BPM
VENTRICULAR RATE- MUSE: 94 BPM

## 2023-11-21 NOTE — PLAN OF CARE
Occupational Therapy Discharge Summary    Reason for therapy discharge:    Discharged to home.    Progress towards therapy goal(s). See goals on Care Plan in [facility] electronic health record for goal details.  Goals partially met.  Barriers to achieving goals:   discharge from facility.    Therapy recommendation(s):    Continued therapy is recommended.  Rationale/Recommendations:  Per last OT who worked with pt, recommending home with assist.

## 2023-11-29 ENCOUNTER — CARE COORDINATION (OUTPATIENT)
Dept: TRANSPLANT | Facility: CLINIC | Age: 53
End: 2023-11-29
Payer: COMMERCIAL

## 2023-11-29 DIAGNOSIS — I42.8 NON-ISCHEMIC CARDIOMYOPATHY (H): Primary | ICD-10-CM

## 2023-12-06 DIAGNOSIS — I50.23 ACUTE ON CHRONIC SYSTOLIC CHF (CONGESTIVE HEART FAILURE) (H): Primary | ICD-10-CM

## 2023-12-07 NOTE — PROGRESS NOTES
Date: 12/6/2023    Time of Call: 6:43 PM     Diagnosis:  heart failure     [ VORB ] Ordering provider: Micaela Silvestre MD    Order: CMp locally tomorrow 12/7. Video visit on Friday 12/8.      Order received by: Shannon Dixon RN      Follow-up/additional notes: orders placed. Called Navin. Left voicemail asking if he could do video visit on 12/8 and to call or mychart back if that works for him. Will hold spot for now.   CMP order faxed to center clinic to be drawn 12/7.

## 2023-12-08 ENCOUNTER — VIRTUAL VISIT (OUTPATIENT)
Dept: CARDIOLOGY | Facility: CLINIC | Age: 53
End: 2023-12-08
Attending: STUDENT IN AN ORGANIZED HEALTH CARE EDUCATION/TRAINING PROGRAM
Payer: COMMERCIAL

## 2023-12-08 VITALS
HEIGHT: 72 IN | BODY MASS INDEX: 27.28 KG/M2 | WEIGHT: 201.4 LBS | SYSTOLIC BLOOD PRESSURE: 110 MMHG | DIASTOLIC BLOOD PRESSURE: 82 MMHG | HEART RATE: 91 BPM

## 2023-12-08 DIAGNOSIS — N18.30 STAGE 3 CHRONIC KIDNEY DISEASE, UNSPECIFIED WHETHER STAGE 3A OR 3B CKD (H): ICD-10-CM

## 2023-12-08 DIAGNOSIS — I42.8 NONISCHEMIC CARDIOMYOPATHY (H): ICD-10-CM

## 2023-12-08 DIAGNOSIS — I82.721 CHRONIC DEEP VEIN THROMBOSIS (DVT) OF RIGHT UPPER EXTREMITY, UNSPECIFIED VEIN (H): ICD-10-CM

## 2023-12-08 DIAGNOSIS — I50.22 CHRONIC SYSTOLIC HEART FAILURE (H): Primary | ICD-10-CM

## 2023-12-08 PROCEDURE — 99214 OFFICE O/P EST MOD 30 MIN: CPT | Mod: VID | Performed by: STUDENT IN AN ORGANIZED HEALTH CARE EDUCATION/TRAINING PROGRAM

## 2023-12-08 RX ORDER — WARFARIN SODIUM 5 MG/1
TABLET ORAL
Status: ON HOLD | COMMUNITY
Start: 2023-09-21 | End: 2024-07-04

## 2023-12-08 ASSESSMENT — PAIN SCALES - GENERAL: PAINLEVEL: NO PAIN (0)

## 2023-12-08 NOTE — PROGRESS NOTES
"Virtual Visit Details    Type of service:  Video Visit     Originating Location (pt. Location): {video visit patient location:175423::\"Home\"}  {PROVIDER LOCATION On-site should be selected for visits conducted from your clinic location or adjoining [facility] hospital, academic office, or other nearby [facility] building. Off-site should be selected for all other provider locations, including home:518012}  Distant Location (provider location):  {virtual location provider:681679}  Platform used for Video Visit: {Virtual Visit Platforms:880862::\"[employer]"}    "

## 2023-12-08 NOTE — PROCEDURES
Return Appointment:  1- Keep apt with Dr. Silvestre on 1/19/24 @ 9am with Right Heart Cath to follow.    Patient given instructions regarding scheduling next clinic visit. Patient demonstrated understanding of this information and agreed to call with further questions or concerns.    Right Heart Catheterization:   -Previously ordered; 1/19/23   Patient was instructed regarding right heart catheterization, including discussion of the procedure, preparation, intra-procedural steps, and recovery at home. Patient demonstrated understanding of this information and agreed to call with further questions or concerns.    Patient stated he understood all health information given and agreed to call with further questions or concerns.     Sue Schuster RN

## 2023-12-08 NOTE — NURSING NOTE
Is the patient currently in the state of [place]? NO    Visit mode:VIDEO    If the visit is dropped, the patient can be reconnected by: VIDEO VISIT: Send to e-mail at: [email or web address]    Will anyone else be joining the visit? Pts wife  (If patient encounters technical issues they should call [phone])    How would you like to obtain your AVS? MyChart    Are changes needed to the allergy or medication list?  Pt states taking Atorvastatin 20mg every night, Entresto BID, Spironolactone 25 mg daily, and Carvedilol 3.125 BID (all  on medication list)    Reason for visit: LEONEL Pierce MA VVF

## 2023-12-08 NOTE — PROGRESS NOTES
Virtual Visit Details     Type of service:  Video Visit     Originating Location (pt. Location): Home  Distant Location (provider location):  On-site  Platform used for Video Visit: Mariano    Advanced Heart Failure/Transplant Clinic Note    HPI  Dear colleagues,     I had the pleasure of seeing Mr. Navin Lutz in the Cardiology clinic. As you know, Mr. Navin Lutz is a pleasant 53 year old male with a past medical history of chronic HFrEF 2/2 NICM s/p ICD, HLD, and CKD Stage II who presents for follow up for HFrEF.    Patient reports he was first having symptoms of CHF in 2017 and was diagnosed with HFrEF shortly after that. He has been on various GDMT and overall did well with no hospital admissions until June '23.     Patient admitted to [facility] from 8/22-8/28/23 for cardiogenic shock. He was initially started on dobutamine, but then switched to milrinone prior to discharge. On this, he was having a significant amount of ectopy, so low-dose carvedilol was prior to discharge. 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    Patient recently was transferred from [facility] with hypotension, he was diuresed and his milrinone was adjusted back to his prior home dose and discharged home.    Patient reports since his hospital discharge, he feels stable. Patient reports on good days, he can do ADLs around his home, but on bad days, has symptoms with walking around his home and doing any chores. He has been compliant with his medications, diet, fluid restriction, and monitoring his weights and BPs. He reports most days feeling presyncope, but again, this sometimes gets worse than other days. He denies orthopnea, PND, chest pain, palpitations, syncope, abdominal pain, nausea, emesis, LE edema, or weight gain.    ROS:  A complete 12-point ROS was negative except as above.    PAST MEDICAL HISTORY:  Patient Active Problem List   Diagnosis    Acute on chronic systolic CHF  (congestive heart failure) (H)    Chest pain    ICD (implantable cardioverter-defibrillator) in place    Inguinal hernia    Multiple lung nodules on CT    Non-ischemic cardiomyopathy (H)    Pure hypercholesterolemia    RAD (reactive airway disease) with wheezing, mild intermittent, uncomplicated    Acute deep vein thrombosis (DVT) of other vein of left upper extremity (H)    Cardiogenic shock (H)   Chronic biventricular systolic and LV diastolic dysfunction (HFrEF)  ACC/AHA stage C, NYHA class III  EF 18% (cardiac MRI) on 6/14/2023  EF 13% with grade I diastolic dysfunction on 6/13/2023  Reduced RVSF  EF 20-25% on 9/22/2020  EF 25-30% on 9/20/2019  EF 20% on 3/26/2019  EF 20-25% on 1/22/2019  EF 15-20% on 9/24/2018  EF 15-20% with grade 5 diastolic dysfunction on 12/11/2017   Nonischemic dilated cardiomyopathy  LIVDd 7.2 cm  Viral mediated?  Evaluation at [facility] in 2019  Cardiac MRI on 6/14/2023 = severe left ventricular dilation with severe, diffuse hypokinesis and mid wall late gadolinium enhancement stripe throughout the septum consistent with nonischemic dilated cardiomyopathy; mild patchy myocardial edema along the left ventricular anterior, anterolateral and inferolateral walls in the basal and mid cavity segments could represent an element of myocarditis; trace aortic regurgitation, trace mitral valve regurgitation, mild tricuspid valve regurgitation  LHC and RHC on 6/13/2023 = intramyocardial bridging of the dLAD otherwise normal coronary arteries; LVEDP 3 mmHg; normal pressures with no pulmonary hypertension; RA 4 mmHg, RV 22/4 mmHg, PA 23/11 mmHg with mean PAP of 16 mmHg, PCWP 7 mmHg, CO 4.32 L/min and CI 2.1 L/min*m2 by Teressa, CO 2.05 L/min and CI 1.0 L/min*m2 by thermodilution  LHC and RHC on 1/9/2018 = arterial pressure 82/59 (67); normal pulmonary capillary wedge, and mean pulmonary artery pressures; cardiac output by thermodilution was 3.07 L/min, index at 1.5; no angiographic evidence of coronary  artery disease present  History of NSVT  Moderate to severe eccentric LVH  Valvular heart disease  Mild MR  Dyslipidemia  Possible obstructive sleep apnea       FAMILY HISTORY:  No known family history of heart disease except possible his father had an enlarged heart, but no formal diagnosis    SOCIAL HISTORY:  , owns a [employer] company. No prior tobacco, ETOH, or illicit substance use.  Social History     Tobacco Use    Smoking status: Never    Smokeless tobacco: Never   Substance Use Topics    Alcohol use: Never    Drug use: Never        ALLERGIES:  No Known Allergies    CURRENT MEDICATIONS:  Current Outpatient Medications   Medication Sig Dispense Refill    atorvastatin (LIPITOR) 20 MG tablet Take 1 tablet (20 mg) by mouth every evening for 3 days 3 tablet 0    carvedilol (COREG) 3.125 MG tablet Take 1 tablet (3.125 mg) by mouth 2 times daily (with meals) for 3 doses 3 tablet 0    furosemide (LASIX) 20 MG tablet Take 1 tablet (20 mg) by mouth as needed (for weight gain, swelling) 3 tablet 0    heparin 100 UNIT/ML SOLN flush 500 Units by Intravenous (Continuous Infusion) route as needed      milrinone (PRIMACOR) infusion 200 mcg/mL PREMIX Inject 23.475 mcg/min into the vein continuous 100 mL 0    nitroGLYcerin (NITROSTAT) 0.4 MG sublingual tablet Place 0.4 mg under the tongue every 5 minutes as needed for chest pain May repeat every 5 minutes for a total of 3 doses.      potassium chloride ER (KLOR-CON M) 20 MEQ CR tablet Take 1 tablet (20 mEq) by mouth daily 30 tablet 1    sacubitril-valsartan (ENTRESTO) 49-51 MG per tablet Take 1 tablet by mouth 2 times daily for 3 doses 3 tablet 0    spironolactone (ALDACTONE) 25 MG tablet Take 1 tablet (25 mg) by mouth daily for 3 doses 3 tablet 0       EXAM:  There were no vitals taken for this visit. (Virtual visit)    General: appears comfortable, alert and interactive, in no acute distress  Head: normocephalic, atraumatic  Eyes: anicteric sclera, EOMI  Mouth:  MMM  Resp: non-labored respirations  Neurological: alert and oriented, no focal deficits  Psych: normal mood and affect  Derm: no rashes on exposed surfaces    Weight  Wt Readings from Last 10 Encounters:   11/20/23 93.9 kg (207 lb 0.2 oz)   11/01/23 91.6 kg (202 lb)   09/20/23 94.3 kg (208 lb)   09/20/23 93.1 kg (205 lb 3.2 oz)   08/28/23 89.9 kg (198 lb 3.2 oz)   07/19/23 93.7 kg (206 lb 9.6 oz)       I personally reviewed recent labs and data as below and discussed the results with the patient in clinic today.  Labs:  CBC RESULTS:  Lab Results   Component Value Date    WBC 9.2 11/20/2023    RBC 5.85 11/20/2023    HGB 17.9 (H) 11/20/2023    HCT 52.3 11/20/2023    MCV 89 11/20/2023    MCH 30.6 11/20/2023    MCHC 34.2 11/20/2023    RDW 12.9 11/20/2023     (L) 11/20/2023       CMP RESULTS:  Lab Results   Component Value Date     11/20/2023    POTASSIUM 4.0 11/20/2023    CHLORIDE 102 11/20/2023    CHLORIDE 102 11/09/2023    CO2 23 11/20/2023    ANIONGAP 10 11/20/2023     (H) 11/20/2023     (H) 11/18/2023    BUN 15.2 11/20/2023    CR 1.06 11/20/2023    GFRESTIMATED 84 11/20/2023    NAM 9.0 11/20/2023    BILITOTAL 0.6 11/18/2023    ALBUMIN 4.0 11/18/2023    ALKPHOS 92 11/18/2023    ALT 46 11/18/2023    AST 25 11/18/2023          No results for input(s): CHOL, HDL, LDL, TRIG, CHOLHDLRATIO in the last 82243 hours.     Testing/Procedures:  I personally visualized and interpreted:  Echocardiogram 6/13/23  Narrative      Left Ventricle: The left ventricle is severely dilated. There is   moderate-to-severe eccentric left ventricular hypertrophy. Severely   reduced left ventricular systolic function. The ejection fraction,   measured by biplane, is 13%. Severe hypokinesis of apex, remainder wall   segments are mostly akinetic. Grade I diastolic dysfunction.         Left Ventricle   The left ventricle is severely dilated. There is moderate-to-severe eccentric left ventricular hypertrophy. Severely  reduced left ventricular systolic function. The ejection fraction, measured by biplane, is 13%. Severe hypokinesis of apex, remainder wall segments are mostly akinetic. Grade I diastolic dysfunction.     Right Ventricle   The right ventricle appears normal in size. Systolic function is reduced. Normal TAPSE, measuring 1.8 cm. Normal systolic excursion velocity by TDI. S' measures 9.5 cm/sec. Wall thickness is normal. The RVSP measures 13 mmHg. There is no evidence of pulmonary hypertension.     Left Atrium   The left atrium is normal in size. The pulmonary veins have normal venous flow.     Right Atrium   The right atrium is normal in size.     IVC/SVC   Normal IVC size with normal respirophasic changes.Normal hepatic vein blood flow.     Mitral Valve   Mitral valve structure is normal. There is trace regurgitation. There is no evidence of mitral valve stenosis.     Tricuspid Valve   Tricuspid valve structure is normal. There is mild regurgitation. There is no evidence of tricuspid valve stenosis.     Aortic Valve   The aortic valve is tricuspid. There is trace regurgitation. There is no evidence of aortic valve stenosis.     Pulmonic Valve   The pulmonic valve was not well visualized. There is trace regurgitation. There is no evidence of pulmonic valve stenosis.     Pericardium   There is no pericardial effusion.     Aorta   The sinus of Valsalva is normal. The ascending aorta is normal.     Interatrial Septum   No evidence of an atrial shunt by color Doppler.     Coronary Angiogram/RHC 6/13/23  Conclusions:   Coronary angiogram: Intramyocardial bridging of the distal LAD, otherwise   normal coronaries.   Left heart catheterization: No significant gradient across aortic valve.     LVEDP 3 mmHg.   Right heart catheterization: Normal pressures.  No pulmonary hypertension.    Low cardiac output.  No shunt.   -RA 4 mmHg   -RV 22/4 mmHg   -PA 23/11 mmHg, Mean PAP 16 mmHg   -PCWP 7 mmHg   -Teressa: CO 4.32 L/min, CI 2.1  L/min*m2   -Thermodilution: CO 2.05 L/min, CI 1.0 L/min*m2     cMRI 6/14/23  FINDINGS:   AORTA: The ascending aorta and aortic root are normal caliber. The sinotubular junction is maintained. Normal aortic wall thickness.     ARCH VESSELS: Arch vessels are grossly patent and partially visualized.     RIGHT ATRIUM: Normal size.     TRICUSPID VALVE: Unrestricted leaflet excursion without stenosis. Mild regurgitation.     RIGHT VENTRICLE: Normal morphology, size, and wall thickness. No wall motion abnormalities identified.     PULMONIC VALVE: Unrestricted leaflet excursion without stenosis or regurgitation.     PULMONARY VEINS: Widely patent.     LEFT ATRIUM: Mild dilation. No left atrial appendage thrombus.     MITRAL VALVE: Unrestrictive leaflet excursion without stenosis. Trace regurgitation.     LEFT VENTRICLE: Normal morphology and thickness with marked dilation. End-diastolic basal septal thickness of 9 mm.     Severe, diffuse hypokinesis. Normal first pass perfusion.     Mid wall late gadolinium enhancement throughout the septum. Patchy myocardial edema along the anterior, anterolateral and inferolateral walls in the basal and mid cavity segments.       EKG 6/13/23 shows sinus rhythm, nonspecific t wave abnormalities    RHC 8/22/23  RA --/--/5  RV 20/5  PA 20/12/16  PCWP --/--/12  IESHA 3.2/1.5  TD 2.93/1.36  MAP 96  PVR 1.25 (IESHA)  SVR 2275 (IESHA)     TTE 8/23/23  Interpretation Summary  Severe left ventricular dilation (LVIDd 6.8cm). Left ventricular function is  decreased. The ejection fraction is 15-20% (severely reduced). Severe diffuse  hypokinesis.  Global right ventricular function is normal.  No significant valve abnormalities.  The inferior vena cava is normal.  No pericardial effusion.    RHC 9/20/23  RA: 3/4/3 mmHg  RV: 23/2 mmHg  PA: 23/14/18 mmHg  CWP: --/--/8 mmHg  CO/CI (Iesha): 3.63/1.69 L/min/m2  CO/CI (TD): 4.1/1.9 L/min/m2  PA sat: 68.7%  MAP: 84 mmHg   PVR: 2.4 (TD) BYRD      Hemodynamics  11/19/23  CVP 4, PA 26/12/16, PCWP 12, SVO2 78, Teressa CI 2.5, PVR 1, SVR 1353 on milrinone 0.25    Outside results of note:  Outside records from [facility] and [facility] admission were obtained, and relevant results/notes have been incorporated into HPI.    Assessment and Plan:     In summary, 53 year old male with a past medical history of chronic HFrEF 2/2 NICM s/p ICD, HLD, and CKD Stage II who presents as follow up for HFrEF.    Chronic systolic heart failure/HFrEF (EF 10-15%) secondary to nonischemic cardiomyopathy  NYHA Symptom Class IIIb  Stage D  Inotrope: Continue milrinone 0.25  ACE-I/ARB/ARNi: Continue Entresto 49-51 mg BID, unable to increase given frequent presyncope  BB: Continue coreg 3.125 mg BID given frequent ectopy on inotrope therapy  Aldosterone antagonist: Continue beck 25 mg daily  SGLT2i: Continue empagliflozin 10 mg daily  SCD prophylaxis: s/p ICD  %BiV pacing: N/A  Fluid status: euvoelmic on lasix 20 mg daily PRN (uses 2-3 days per week)  Cardiac Rehab: previously referred  - Currently listed status 4 for heart transplant  - Discussed importance of daily standing weights, 2-3L fluid, and 2g Na restriction    Myocardial Bridge Over Coronary Artery  HLD  Myocardial bridge over distal LAD.  - Continue to monitor    CKD Stage III  Cr ~1.2-1.4.  - Will continue to monitor    RUE DVT  - Continue warfarin with INR goal 2-3    To Do:  - No medication changes  - Follow up with local Cardiology as scheduled  - Follow up with me 1/19/24 with labs and RHC    The patient states understanding and is agreeable with plan.   Feel free to contact myself regarding questions or concerns. It was a pleasure to see this patient today.    I spent 30 minutes in care of the patient today including obtaining recent medical history, personally reviewing recent cardiac testing and/or lab results, today's examination, discussion of testing results and care recommendations with patient.    Micaela Silvestre MD  Assistant  Professor of Medicine, [facility]  Advanced Heart Failure and Transplant Cardiology     CC  GALO HERNANDEZ NP

## 2023-12-08 NOTE — PATIENT INSTRUCTIONS
Cardiology Providers you saw during your visit:  Dr. Silvestre     Medication changes:  1- NO changes.         Follow up:  1- Keep apt with Dr. Silvestre on 1/19/24 @ 9am with Right Heart Cath to follow.        Please call if you have:  1. Weight gain of more than 2 pounds in a day or 5 pounds in a week  2. Increased shortness of breath, swelling or bloating  3. Dizziness, lightheadedness   4. Any questions or concerns.      Heart Failure Support Group  Virtual meetings will continue in 2023 Please reach out if you would like to attend and we can get you the information you need to log in.   2024 dates for support group meetings:  Monday, January 8th, 1-2pm     Monday, February 5th , 1-2pm     Monday, March 4th , 1-2pm     Monday, April 1st, 1-2pm     Monday, May 6th, 1-2pm     Monday, June 3rd, 1-2pm     Monday, July 1st, 1-2pm     Monday, August 5th, 1-2pm     Monday, September 9th, 1-2pm     Monday, October 7th, 1-2pm     Monday, November 4th, 1-2pm     Monday, December 2nd, 1-2pm     Follow the American Heart Association Diet and Lifestyle recommendations:  Limit saturated fat, trans fat, sodium, red meat, sweets and sugar-sweetened beverages. If you choose to eat red meat, compare labels and select the leanest cuts available.  Aim for at least 150 minutes of moderate physical activity or 75 minutes of vigorous physical activity - or an equal combination of both - each week.     During business hours: [phone], press option # 1 to schedule an appointment or send a message to your care team     After hours, weekends or holidays: On Call Cardiologist- [phone]   option #4 and ask to speak to the on-call Cardiologist. Inform them you are a CORE/heart failure patient at the [facility].     Shannon Dixon RN BSN  Cardiology Nurse Care Coordinator (Heart Failure / C.O.R.E.)

## 2023-12-08 NOTE — LETTER
12/8/2023      RE: Navin Lutz  [address] [place] [address]       Dear Colleague,    Thank you for the opportunity to participate in the care of your patient, Navin Lutz, at the [facility] [place] [facility]. Please see a copy of my visit note below.    Virtual Visit Details     Type of service:  Video Visit     Originating Location (pt. Location): Home  Distant Location (provider location):  On-site  Platform used for Video Visit: [facility]    Advanced Heart Failure/Transplant Clinic Note    HPI  Dear colleagues,     I had the pleasure of seeing Mr. Navin Lutz in the Cardiology clinic. As you know, Mr. Navin Lutz is a pleasant 53 year old male with a past medical history of chronic HFrEF 2/2 NICM s/p ICD, HLD, and CKD Stage II who presents for follow up for HFrEF.    Patient reports he was first having symptoms of CHF in 2017 and was diagnosed with HFrEF shortly after that. He has been on various GDMT and overall did well with no hospital admissions until June '23.     Patient admitted to [facility] from 8/22-8/28/23 for cardiogenic shock. He was initially started on dobutamine, but then switched to milrinone prior to discharge. On this, he was having a significant amount of ectopy, so low-dose carvedilol was prior to discharge. 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    Patient recently was transferred from H with hypotension, he was diuresed and his milrinone was adjusted back to his prior home dose and discharged home.    Patient reports since his hospital discharge, he feels stable. Patient reports on good days, he can do ADLs around his home, but on bad days, has symptoms with walking around his home and doing any chores. He has been compliant with his medications, diet, fluid restriction, and monitoring his weights and BPs. He reports most days feeling presyncope, but  again, this sometimes gets worse than other days. He denies orthopnea, PND, chest pain, palpitations, syncope, abdominal pain, nausea, emesis, LE edema, or weight gain.    ROS:  A complete 12-point ROS was negative except as above.    PAST MEDICAL HISTORY:  Patient Active Problem List   Diagnosis    Acute on chronic systolic CHF (congestive heart failure) (H)    Chest pain    ICD (implantable cardioverter-defibrillator) in place    Inguinal hernia    Multiple lung nodules on CT    Non-ischemic cardiomyopathy (H)    Pure hypercholesterolemia    RAD (reactive airway disease) with wheezing, mild intermittent, uncomplicated    Acute deep vein thrombosis (DVT) of other vein of left upper extremity (H)    Cardiogenic shock (H)   Chronic biventricular systolic and LV diastolic dysfunction (HFrEF)  ACC/AHA stage C, NYHA class III  EF 18% (cardiac MRI) on 6/14/2023  EF 13% with grade I diastolic dysfunction on 6/13/2023  Reduced RVSF  EF 20-25% on 9/22/2020  EF 25-30% on 9/20/2019  EF 20% on 3/26/2019  EF 20-25% on 1/22/2019  EF 15-20% on 9/24/2018  EF 15-20% with grade 5 diastolic dysfunction on 12/11/2017   Nonischemic dilated cardiomyopathy  LIVDd 7.2 cm  Viral mediated?  Evaluation at [facility] in 2019  Cardiac MRI on 6/14/2023 = severe left ventricular dilation with severe, diffuse hypokinesis and mid wall late gadolinium enhancement stripe throughout the septum consistent with nonischemic dilated cardiomyopathy; mild patchy myocardial edema along the left ventricular anterior, anterolateral and inferolateral walls in the basal and mid cavity segments could represent an element of myocarditis; trace aortic regurgitation, trace mitral valve regurgitation, mild tricuspid valve regurgitation  LHC and RHC on 6/13/2023 = intramyocardial bridging of the dLAD otherwise normal coronary arteries; LVEDP 3 mmHg; normal pressures with no pulmonary hypertension; RA 4 mmHg, RV 22/4 mmHg, PA 23/11 mmHg with mean PAP of 16 mmHg, PCWP  7 mmHg, CO 4.32 L/min and CI 2.1 L/min*m2 by Teressa, CO 2.05 L/min and CI 1.0 L/min*m2 by thermodilution  LHC and RHC on 1/9/2018 = arterial pressure 82/59 (67); normal pulmonary capillary wedge, and mean pulmonary artery pressures; cardiac output by thermodilution was 3.07 L/min, index at 1.5; no angiographic evidence of coronary artery disease present  History of NSVT  Moderate to severe eccentric LVH  Valvular heart disease  Mild MR  Dyslipidemia  Possible obstructive sleep apnea       FAMILY HISTORY:  No known family history of heart disease except possible his father had an enlarged heart, but no formal diagnosis    SOCIAL HISTORY:  , owns a [employer] company. No prior tobacco, ETOH, or illicit substance use.  Social History     Tobacco Use    Smoking status: Never    Smokeless tobacco: Never   Substance Use Topics    Alcohol use: Never    Drug use: Never        ALLERGIES:  No Known Allergies    CURRENT MEDICATIONS:  Current Outpatient Medications   Medication Sig Dispense Refill    atorvastatin (LIPITOR) 20 MG tablet Take 1 tablet (20 mg) by mouth every evening for 3 days 3 tablet 0    carvedilol (COREG) 3.125 MG tablet Take 1 tablet (3.125 mg) by mouth 2 times daily (with meals) for 3 doses 3 tablet 0    furosemide (LASIX) 20 MG tablet Take 1 tablet (20 mg) by mouth as needed (for weight gain, swelling) 3 tablet 0    heparin 100 UNIT/ML SOLN flush 500 Units by Intravenous (Continuous Infusion) route as needed      milrinone (PRIMACOR) infusion 200 mcg/mL PREMIX Inject 23.475 mcg/min into the vein continuous 100 mL 0    nitroGLYcerin (NITROSTAT) 0.4 MG sublingual tablet Place 0.4 mg under the tongue every 5 minutes as needed for chest pain May repeat every 5 minutes for a total of 3 doses.      potassium chloride ER (KLOR-CON M) 20 MEQ CR tablet Take 1 tablet (20 mEq) by mouth daily 30 tablet 1    sacubitril-valsartan (ENTRESTO) 49-51 MG per tablet Take 1 tablet by mouth 2 times daily for 3 doses 3 tablet  0    spironolactone (ALDACTONE) 25 MG tablet Take 1 tablet (25 mg) by mouth daily for 3 doses 3 tablet 0       EXAM:  There were no vitals taken for this visit. (Virtual visit)    General: appears comfortable, alert and interactive, in no acute distress  Head: normocephalic, atraumatic  Eyes: anicteric sclera, EOMI  Mouth: MMM  Resp: non-labored respirations  Neurological: alert and oriented, no focal deficits  Psych: normal mood and affect  Derm: no rashes on exposed surfaces    Weight  Wt Readings from Last 10 Encounters:   11/20/23 93.9 kg (207 lb 0.2 oz)   11/01/23 91.6 kg (202 lb)   09/20/23 94.3 kg (208 lb)   09/20/23 93.1 kg (205 lb 3.2 oz)   08/28/23 89.9 kg (198 lb 3.2 oz)   07/19/23 93.7 kg (206 lb 9.6 oz)       I personally reviewed recent labs and data as below and discussed the results with the patient in clinic today.  Labs:  CBC RESULTS:  Lab Results   Component Value Date    WBC 9.2 11/20/2023    RBC 5.85 11/20/2023    HGB 17.9 (H) 11/20/2023    HCT 52.3 11/20/2023    MCV 89 11/20/2023    MCH 30.6 11/20/2023    MCHC 34.2 11/20/2023    RDW 12.9 11/20/2023     (L) 11/20/2023       CMP RESULTS:  Lab Results   Component Value Date     11/20/2023    POTASSIUM 4.0 11/20/2023    CHLORIDE 102 11/20/2023    CHLORIDE 102 11/09/2023    CO2 23 11/20/2023    ANIONGAP 10 11/20/2023     (H) 11/20/2023     (H) 11/18/2023    BUN 15.2 11/20/2023    CR 1.06 11/20/2023    GFRESTIMATED 84 11/20/2023    NAM 9.0 11/20/2023    BILITOTAL 0.6 11/18/2023    ALBUMIN 4.0 11/18/2023    ALKPHOS 92 11/18/2023    ALT 46 11/18/2023    AST 25 11/18/2023          No results for input(s): CHOL, HDL, LDL, TRIG, CHOLHDLRATIO in the last 81589 hours.     Testing/Procedures:  I personally visualized and interpreted:  Echocardiogram 6/13/23  Narrative      Left Ventricle: The left ventricle is severely dilated. There is   moderate-to-severe eccentric left ventricular hypertrophy. Severely   reduced left ventricular  systolic function. The ejection fraction,   measured by biplane, is 13%. Severe hypokinesis of apex, remainder wall   segments are mostly akinetic. Grade I diastolic dysfunction.         Left Ventricle   The left ventricle is severely dilated. There is moderate-to-severe eccentric left ventricular hypertrophy. Severely reduced left ventricular systolic function. The ejection fraction, measured by biplane, is 13%. Severe hypokinesis of apex, remainder wall segments are mostly akinetic. Grade I diastolic dysfunction.     Right Ventricle   The right ventricle appears normal in size. Systolic function is reduced. Normal TAPSE, measuring 1.8 cm. Normal systolic excursion velocity by TDI. S' measures 9.5 cm/sec. Wall thickness is normal. The RVSP measures 13 mmHg. There is no evidence of pulmonary hypertension.     Left Atrium   The left atrium is normal in size. The pulmonary veins have normal venous flow.     Right Atrium   The right atrium is normal in size.     IVC/SVC   Normal IVC size with normal respirophasic changes.Normal hepatic vein blood flow.     Mitral Valve   Mitral valve structure is normal. There is trace regurgitation. There is no evidence of mitral valve stenosis.     Tricuspid Valve   Tricuspid valve structure is normal. There is mild regurgitation. There is no evidence of tricuspid valve stenosis.     Aortic Valve   The aortic valve is tricuspid. There is trace regurgitation. There is no evidence of aortic valve stenosis.     Pulmonic Valve   The pulmonic valve was not well visualized. There is trace regurgitation. There is no evidence of pulmonic valve stenosis.     Pericardium   There is no pericardial effusion.     Aorta   The sinus of Valsalva is normal. The ascending aorta is normal.     Interatrial Septum   No evidence of an atrial shunt by color Doppler.     Coronary Angiogram/RHC 6/13/23  Conclusions:   Coronary angiogram: Intramyocardial bridging of the distal LAD, otherwise   normal  coronaries.   Left heart catheterization: No significant gradient across aortic valve.     LVEDP 3 mmHg.   Right heart catheterization: Normal pressures.  No pulmonary hypertension.    Low cardiac output.  No shunt.   -RA 4 mmHg   -RV 22/4 mmHg   -PA 23/11 mmHg, Mean PAP 16 mmHg   -PCWP 7 mmHg   -Iesha: CO 4.32 L/min, CI 2.1 L/min*m2   -Thermodilution: CO 2.05 L/min, CI 1.0 L/min*m2     cMRI 6/14/23  FINDINGS:   AORTA: The ascending aorta and aortic root are normal caliber. The sinotubular junction is maintained. Normal aortic wall thickness.     ARCH VESSELS: Arch vessels are grossly patent and partially visualized.     RIGHT ATRIUM: Normal size.     TRICUSPID VALVE: Unrestricted leaflet excursion without stenosis. Mild regurgitation.     RIGHT VENTRICLE: Normal morphology, size, and wall thickness. No wall motion abnormalities identified.     PULMONIC VALVE: Unrestricted leaflet excursion without stenosis or regurgitation.     PULMONARY VEINS: Widely patent.     LEFT ATRIUM: Mild dilation. No left atrial appendage thrombus.     MITRAL VALVE: Unrestrictive leaflet excursion without stenosis. Trace regurgitation.     LEFT VENTRICLE: Normal morphology and thickness with marked dilation. End-diastolic basal septal thickness of 9 mm.     Severe, diffuse hypokinesis. Normal first pass perfusion.     Mid wall late gadolinium enhancement throughout the septum. Patchy myocardial edema along the anterior, anterolateral and inferolateral walls in the basal and mid cavity segments.       EKG 6/13/23 shows sinus rhythm, nonspecific t wave abnormalities    RHC 8/22/23  RA --/--/5  RV 20/5  PA 20/12/16  PCWP --/--/12  IESHA 3.2/1.5  TD 2.93/1.36  MAP 96  PVR 1.25 (IESHA)  SVR 2275 (IESHA)     TTE 8/23/23  Interpretation Summary  Severe left ventricular dilation (LVIDd 6.8cm). Left ventricular function is  decreased. The ejection fraction is 15-20% (severely reduced). Severe diffuse  hypokinesis.  Global right ventricular function is  normal.  No significant valve abnormalities.  The inferior vena cava is normal.  No pericardial effusion.    RHC 9/20/23  RA: 3/4/3 mmHg  RV: 23/2 mmHg  PA: 23/14/18 mmHg  CWP: --/--/8 mmHg  CO/CI (Teressa): 3.63/1.69 L/min/m2  CO/CI (TD): 4.1/1.9 L/min/m2  PA sat: 68.7%  MAP: 84 mmHg   PVR: 2.4 (TD) BYRD      Hemodynamics 11/19/23  CVP 4, PA 26/12/16, PCWP 12, SVO2 78, Teressa CI 2.5, PVR 1, SVR 1353 on milrinone 0.25    Outside results of note:  Outside records from [facility] and [facility] admission were obtained, and relevant results/notes have been incorporated into HPI.    Assessment and Plan:     In summary, 53 year old male with a past medical history of chronic HFrEF 2/2 NICM s/p ICD, HLD, and CKD Stage II who presents as follow up for HFrEF.    Chronic systolic heart failure/HFrEF (EF 10-15%) secondary to nonischemic cardiomyopathy  NYHA Symptom Class IIIb  Stage D  Inotrope: Continue milrinone 0.25  ACE-I/ARB/ARNi: Continue Entresto 49-51 mg BID, unable to increase given frequent presyncope  BB: Continue coreg 3.125 mg BID given frequent ectopy on inotrope therapy  Aldosterone antagonist: Continue beck 25 mg daily  SGLT2i: Continue empagliflozin 10 mg daily  SCD prophylaxis: s/p ICD  %BiV pacing: N/A  Fluid status: euvoelmic on lasix 20 mg daily PRN (uses 2-3 days per week)  Cardiac Rehab: previously referred  - Currently listed status 4 for heart transplant  - Discussed importance of daily standing weights, 2-3L fluid, and 2g Na restriction    Myocardial Bridge Over Coronary Artery  HLD  Myocardial bridge over distal LAD.  - Continue to monitor    CKD Stage III  Cr ~1.2-1.4.  - Will continue to monitor    RUE DVT  - Continue warfarin with INR goal 2-3    To Do:  - No medication changes  - Follow up with local Cardiology as scheduled  - Follow up with me 1/19/24 with labs and RHC    The patient states understanding and is agreeable with plan.   Feel free to contact myself regarding questions or concerns. It was a  pleasure to see this patient today.    I spent 30 minutes in care of the patient today including obtaining recent medical history, personally reviewing recent cardiac testing and/or lab results, today's examination, discussion of testing results and care recommendations with patient.    Micaela Silvestre MD   of Medicine, [facility]  Advanced Heart Failure and Transplant Cardiology     CC  GALO HERNANDEZ NP

## 2024-01-02 DIAGNOSIS — Z94.1 HEART REPLACED BY TRANSPLANT (H): Primary | ICD-10-CM

## 2024-01-05 ENCOUNTER — TELEPHONE (OUTPATIENT)
Dept: TRANSPLANT | Facility: CLINIC | Age: 54
End: 2024-01-05
Payer: COMMERCIAL

## 2024-01-05 ENCOUNTER — TELEPHONE (OUTPATIENT)
Dept: TRANSPLANT | Facility: CLINIC | Age: 54
End: 2024-01-05

## 2024-01-05 NOTE — TELEPHONE ENCOUNTER
Patient Call: General  Route to LPN    Reason for call: Navin stated he received order, and some tubes through the mail, patient is wondering do he take the order to his appointment today with Dr. Pinto in [facility] at 145, or bring the tubes to his appointment on 1/19/24, patient would like a call back today before his appointment.    Call back needed? Yes    Return Call Needed  Same as documented in contacts section  When to return call?: Same day: Route High Priority

## 2024-01-05 NOTE — TELEPHONE ENCOUNTER
I spoke with Navin, and he received the  kits. He will take them into the lab today. He has a doctors appointment today.

## 2024-01-11 ENCOUNTER — TELEPHONE (OUTPATIENT)
Dept: TRANSPLANT | Facility: CLINIC | Age: 54
End: 2024-01-11
Payer: COMMERCIAL

## 2024-01-11 NOTE — TELEPHONE ENCOUNTER
Called Navin, called went to voice mail. Below instruction left on VM and [place].     Pre-procedure instructions - Right heart catheterization  Patient Education    Your arrival time is 9:00 AM on 1/19/24.  Location is [address] Waiting Room  Please plan on being at the hospital all day.  At any time, emergencies and/or urgent cases may come up which could delay the start of your procedure.    Pre-procedure instructions - Right heart catheterization  No solid food for 8 hours prior  Nothing to drink 2 hours prior to arrival time  You can take your morning medications (except diabetic and blood thinners) with sips of water  We recommend you arrange for a ride to drop you off and pick you up, in the instance, you are unable to drive home, however you should be able to function as you normally would after the procedure    Diabetic Medication Instructions  Typical instructions for insulin diabetic medication holding are below. However, please reach out to your Primary Care Provider or Endocrinologist for specific instructions  DO NOT take any oral diabetic medication, short-acting diabetes medications/insulin, humalog or regular insulin the morning of your test  Take   dose of long-acting insulin (Lantus, Levemir) the day of your test  Remember to  bring your glucometer and insulin with you to take after your test if needed  DO NOT take injectable GLP-1 agonists semaglutide (Ozempic, Wegovy), dulaglutide (Trulicity), exenatide ER (Bydureon), tirzepatide (Mounjaro), or oral semaglutide (Rybelsus) for 7 days prior your procedure  Hold once daily injectable GLP-1 agonists exenatide (Byetta), liraglutide (Saxenda, Victoza), lixisenatide (Soliqua) the day before and day of your procedure                Anticoagulation Medication Instructions   NA    You will need to follow up with one of our cardiology APPs 1-2 weeks after your  procedure. If you need help scheduling or rescheduling your appointment, please call [phone]

## 2024-01-17 ENCOUNTER — TELEPHONE (OUTPATIENT)
Dept: CARDIOLOGY | Facility: CLINIC | Age: 54
End: 2024-01-17
Payer: COMMERCIAL

## 2024-01-17 DIAGNOSIS — I50.23 ACUTE ON CHRONIC SYSTOLIC CHF (CONGESTIVE HEART FAILURE) (H): Primary | ICD-10-CM

## 2024-01-17 NOTE — TELEPHONE ENCOUNTER
[facility] Call Center    Phone Message    May a detailed message be left on voicemail: yes     Reason for Call: Other: The patient would like to know if he will be having the PICC line changed while he is in town tomorrow and Friday. He would like to know if he could get this done before or after his appointment ? Please call to discuss.      Action Taken: Other: Cardiology    Travel Screening: Not Applicable    Thank you!  [facility]

## 2024-01-18 NOTE — ADDENDUM NOTE
Addended by: ALEX JON on: 1/18/2024 11:19 AM     Modules accepted: Orders     Refill Request: Yes    Requesting 90 day supply? Yes    Pharmacy loaded? Yes    Informed patient  that pharmacy will notify when refill is ready for pick-up?  Yes    Okay to leave a detailed voice mail if any issues?  Yes    Comments: clonazePAM (KlonoPIN) 0.5 MG tablet    Patient is aware it will take 24-48 hours to process refill request.  Yes

## 2024-01-18 NOTE — TELEPHONE ENCOUNTER
Arranged for dressing change for PICC line tomorrow at 8am, prior to other cardiac appts.   Called Navin to confirm.  He states he is actually wondering if we need to change the PICC line itself. States it has been in for 6 months now. He reports no issues with the functioning of the line itself, but does report that the skin around the line continues to be irritated.  He states he doesn't know if it needs to be changed or not but wanted to bring it up since it has been in for so long. WIll review with provider and call him back.     Date: 1/18/2024    Time of Call: 11:00 AM     Diagnosis:  continuous infusion, heart failure     [ VORB ] Ordering provider: Micaela Silvestre MD    Order: picc exchange through IR tomorrow while patient in town for other appts.      Order received by: Shannon Dixon RN      Follow-up/additional notes: orders placed. Called IR. They will monitor referral and call patient to schedule when referral approved.   Called Navin. Reviewed plan. He will await call from IR to confirm timing.

## 2024-01-19 ENCOUNTER — ANESTHESIA EVENT (OUTPATIENT)
Dept: SURGERY | Facility: AMBULATORY SURGERY CENTER | Age: 54
End: 2024-01-19
Payer: COMMERCIAL

## 2024-01-19 ENCOUNTER — OFFICE VISIT (OUTPATIENT)
Dept: CARDIOLOGY | Facility: CLINIC | Age: 54
End: 2024-01-19
Attending: STUDENT IN AN ORGANIZED HEALTH CARE EDUCATION/TRAINING PROGRAM
Payer: COMMERCIAL

## 2024-01-19 ENCOUNTER — LAB (OUTPATIENT)
Dept: LAB | Facility: CLINIC | Age: 54
End: 2024-01-19
Attending: STUDENT IN AN ORGANIZED HEALTH CARE EDUCATION/TRAINING PROGRAM
Payer: COMMERCIAL

## 2024-01-19 ENCOUNTER — HOSPITAL ENCOUNTER (OUTPATIENT)
Facility: AMBULATORY SURGERY CENTER | Age: 54
Discharge: HOME | End: 2024-01-19
Attending: PHYSICIAN ASSISTANT
Payer: COMMERCIAL

## 2024-01-19 ENCOUNTER — HOSPITAL ENCOUNTER (OUTPATIENT)
Facility: CLINIC | Age: 54
Discharge: HOME | End: 2024-01-19
Attending: INTERNAL MEDICINE | Admitting: INTERNAL MEDICINE
Payer: COMMERCIAL

## 2024-01-19 ENCOUNTER — ANESTHESIA (OUTPATIENT)
Dept: SURGERY | Facility: AMBULATORY SURGERY CENTER | Age: 54
End: 2024-01-19
Payer: COMMERCIAL

## 2024-01-19 ENCOUNTER — APPOINTMENT (OUTPATIENT)
Dept: MEDSURG UNIT | Facility: CLINIC | Age: 54
End: 2024-01-19
Attending: INTERNAL MEDICINE
Payer: COMMERCIAL

## 2024-01-19 VITALS
SYSTOLIC BLOOD PRESSURE: 105 MMHG | HEART RATE: 85 BPM | BODY MASS INDEX: 28.78 KG/M2 | WEIGHT: 212.2 LBS | DIASTOLIC BLOOD PRESSURE: 74 MMHG | OXYGEN SATURATION: 96 %

## 2024-01-19 VITALS
RESPIRATION RATE: 16 BRPM | WEIGHT: 210.4 LBS | SYSTOLIC BLOOD PRESSURE: 101 MMHG | DIASTOLIC BLOOD PRESSURE: 77 MMHG | OXYGEN SATURATION: 97 % | BODY MASS INDEX: 28.5 KG/M2 | HEIGHT: 72 IN | HEART RATE: 74 BPM

## 2024-01-19 VITALS
RESPIRATION RATE: 16 BRPM | TEMPERATURE: 97.2 F | HEART RATE: 86 BPM | SYSTOLIC BLOOD PRESSURE: 103 MMHG | WEIGHT: 201 LBS | BODY MASS INDEX: 27.22 KG/M2 | HEIGHT: 72 IN | DIASTOLIC BLOOD PRESSURE: 69 MMHG | OXYGEN SATURATION: 94 %

## 2024-01-19 DIAGNOSIS — I50.23 ACUTE ON CHRONIC SYSTOLIC HEART FAILURE (H): ICD-10-CM

## 2024-01-19 DIAGNOSIS — N18.30 STAGE 3 CHRONIC KIDNEY DISEASE, UNSPECIFIED WHETHER STAGE 3A OR 3B CKD (H): ICD-10-CM

## 2024-01-19 DIAGNOSIS — I50.23 ACUTE ON CHRONIC SYSTOLIC CHF (CONGESTIVE HEART FAILURE) (H): ICD-10-CM

## 2024-01-19 DIAGNOSIS — Z94.1 HEART REPLACED BY TRANSPLANT (H): ICD-10-CM

## 2024-01-19 DIAGNOSIS — I42.8 NON-ISCHEMIC CARDIOMYOPATHY (H): ICD-10-CM

## 2024-01-19 DIAGNOSIS — I50.22 CHRONIC SYSTOLIC HEART FAILURE (H): Primary | ICD-10-CM

## 2024-01-19 DIAGNOSIS — Z79.899 RECEIVING INOTROPIC MEDICATION: ICD-10-CM

## 2024-01-19 DIAGNOSIS — I50.22 CHRONIC SYSTOLIC HEART FAILURE (H): ICD-10-CM

## 2024-01-19 DIAGNOSIS — I50.23 ACUTE ON CHRONIC SYSTOLIC CHF (CONGESTIVE HEART FAILURE) (H): Primary | ICD-10-CM

## 2024-01-19 LAB
ALBUMIN SERPL BCG-MCNC: 4.2 G/DL (ref 3.5–5.2)
ALP SERPL-CCNC: 98 U/L (ref 40–150)
ALT SERPL W P-5'-P-CCNC: 47 U/L (ref 0–70)
ANION GAP SERPL CALCULATED.3IONS-SCNC: 6 MMOL/L (ref 7–15)
AST SERPL W P-5'-P-CCNC: 27 U/L (ref 0–45)
BASOPHILS # BLD AUTO: 0 10E3/UL (ref 0–0.2)
BASOPHILS NFR BLD AUTO: 1 %
BILIRUB SERPL-MCNC: 0.6 MG/DL
BUN SERPL-MCNC: 13 MG/DL (ref 6–20)
CALCIUM SERPL-MCNC: 9.4 MG/DL (ref 8.6–10)
CHLORIDE SERPL-SCNC: 109 MMOL/L (ref 98–107)
CREAT SERPL-MCNC: 1.34 MG/DL (ref 0.67–1.17)
DEPRECATED HCO3 PLAS-SCNC: 28 MMOL/L (ref 22–29)
EGFRCR SERPLBLD CKD-EPI 2021: 63 ML/MIN/1.73M2
EOSINOPHIL # BLD AUTO: 0.2 10E3/UL (ref 0–0.7)
EOSINOPHIL NFR BLD AUTO: 3 %
ERYTHROCYTE [DISTWIDTH] IN BLOOD BY AUTOMATED COUNT: 13.7 % (ref 10–15)
GLUCOSE SERPL-MCNC: 104 MG/DL (ref 70–99)
HCT VFR BLD AUTO: 53.3 % (ref 40–53)
HGB BLD-MCNC: 18.1 G/DL (ref 13.3–17.7)
IMM GRANULOCYTES # BLD: 0 10E3/UL
IMM GRANULOCYTES NFR BLD: 1 %
INR PPP: 2.22 (ref 0.85–1.15)
LYMPHOCYTES # BLD AUTO: 2 10E3/UL (ref 0.8–5.3)
LYMPHOCYTES NFR BLD AUTO: 32 %
MCH RBC QN AUTO: 30.4 PG (ref 26.5–33)
MCHC RBC AUTO-ENTMCNC: 34 G/DL (ref 31.5–36.5)
MCV RBC AUTO: 90 FL (ref 78–100)
MONOCYTES # BLD AUTO: 0.5 10E3/UL (ref 0–1.3)
MONOCYTES NFR BLD AUTO: 8 %
NEUTROPHILS # BLD AUTO: 3.6 10E3/UL (ref 1.6–8.3)
NEUTROPHILS NFR BLD AUTO: 55 %
NRBC # BLD AUTO: 0 10E3/UL
NRBC BLD AUTO-RTO: 0 /100
PLATELET # BLD AUTO: 147 10E3/UL (ref 150–450)
POTASSIUM SERPL-SCNC: 4.7 MMOL/L (ref 3.4–5.3)
PROT SERPL-MCNC: 6.8 G/DL (ref 6.4–8.3)
RBC # BLD AUTO: 5.95 10E6/UL (ref 4.4–5.9)
SODIUM SERPL-SCNC: 143 MMOL/L (ref 135–145)
WBC # BLD AUTO: 6.3 10E3/UL (ref 4–11)

## 2024-01-19 PROCEDURE — 272N000001 HC OR GENERAL SUPPLY STERILE: Performed by: INTERNAL MEDICINE

## 2024-01-19 PROCEDURE — 85004 AUTOMATED DIFF WBC COUNT: CPT | Performed by: STUDENT IN AN ORGANIZED HEALTH CARE EDUCATION/TRAINING PROGRAM

## 2024-01-19 PROCEDURE — 93451 RIGHT HEART CATH: CPT | Performed by: INTERNAL MEDICINE

## 2024-01-19 PROCEDURE — 250N000009 HC RX 250: Performed by: INTERNAL MEDICINE

## 2024-01-19 PROCEDURE — 99213 OFFICE O/P EST LOW 20 MIN: CPT | Performed by: STUDENT IN AN ORGANIZED HEALTH CARE EDUCATION/TRAINING PROGRAM

## 2024-01-19 PROCEDURE — 250N000009 HC RX 250: Performed by: STUDENT IN AN ORGANIZED HEALTH CARE EDUCATION/TRAINING PROGRAM

## 2024-01-19 PROCEDURE — 85610 PROTHROMBIN TIME: CPT | Performed by: PATHOLOGY

## 2024-01-19 PROCEDURE — 99000 SPECIMEN HANDLING OFFICE-LAB: CPT | Performed by: PATHOLOGY

## 2024-01-19 PROCEDURE — 93451 RIGHT HEART CATH: CPT | Mod: 26 | Performed by: INTERNAL MEDICINE

## 2024-01-19 PROCEDURE — 86833 HLA CLASS II HIGH DEFIN QUAL: CPT | Performed by: STUDENT IN AN ORGANIZED HEALTH CARE EDUCATION/TRAINING PROGRAM

## 2024-01-19 PROCEDURE — 999N000132 HC STATISTIC PP CARE STAGE 1

## 2024-01-19 PROCEDURE — C1894 INTRO/SHEATH, NON-LASER: HCPCS | Performed by: INTERNAL MEDICINE

## 2024-01-19 PROCEDURE — 36415 COLL VENOUS BLD VENIPUNCTURE: CPT | Performed by: PATHOLOGY

## 2024-01-19 PROCEDURE — 999N000142 HC STATISTIC PROCEDURE PREP ONLY

## 2024-01-19 PROCEDURE — 85018 HEMOGLOBIN: CPT

## 2024-01-19 PROCEDURE — 80053 COMPREHEN METABOLIC PANEL: CPT | Performed by: PATHOLOGY

## 2024-01-19 PROCEDURE — C1751 CATH, INF, PER/CENT/MIDLINE: HCPCS | Performed by: INTERNAL MEDICINE

## 2024-01-19 PROCEDURE — 99215 OFFICE O/P EST HI 40 MIN: CPT | Performed by: STUDENT IN AN ORGANIZED HEALTH CARE EDUCATION/TRAINING PROGRAM

## 2024-01-19 PROCEDURE — 86832 HLA CLASS I HIGH DEFIN QUAL: CPT | Performed by: STUDENT IN AN ORGANIZED HEALTH CARE EDUCATION/TRAINING PROGRAM

## 2024-01-19 PROCEDURE — 36415 COLL VENOUS BLD VENIPUNCTURE: CPT | Performed by: STUDENT IN AN ORGANIZED HEALTH CARE EDUCATION/TRAINING PROGRAM

## 2024-01-19 RX ORDER — LIDOCAINE 40 MG/G
CREAM TOPICAL
Status: COMPLETED | OUTPATIENT
Start: 2024-01-19 | End: 2024-01-19

## 2024-01-19 RX ADMIN — LIDOCAINE: 40 CREAM TOPICAL at 10:41

## 2024-01-19 ASSESSMENT — ACTIVITIES OF DAILY LIVING (ADL)
ADLS_ACUITY_SCORE: 35
ADLS_ACUITY_SCORE: 35

## 2024-01-19 ASSESSMENT — PAIN SCALES - GENERAL: PAINLEVEL: NO PAIN (0)

## 2024-01-19 NOTE — NURSING NOTE
Chief Complaint   Patient presents with    Follow Up     53 year old male presents with NICM, Ef 13% to follow up with RHC after     Vitals were taken and medications reconciled.    Leti Rogers CNA  9:14 AM

## 2024-01-19 NOTE — PATIENT INSTRUCTIONS
Cardiology Providers you saw during your visit:  Dr. Silvestre     Medication changes:  1- No changes    Follow up:  1- Follow up with Dr Silvestre in 6 weeks virtually       Please call if you have:  1. Weight gain of more than 2 pounds in a day or 5 pounds in a week  2. Increased shortness of breath, swelling or bloating  3. Dizziness, lightheadedness   4. Any questions or concerns.      Heart Failure Support Group  Support group is held virtually. Please reach out if you would like to attend and we can get you the information you need to log in.   2024 dates for support group meetings:    Monday, February 5th , 1-2pm     Monday, March 4th , 1-2pm     Monday, April 1st, 1-2pm     Monday, May 6th, 1-2pm     Monday, June 3rd, 1-2pm     Monday, July 1st, 1-2pm     Monday, August 5th, 1-2pm     Monday, September 9th, 1-2pm     Monday, October 7th, 1-2pm     Monday, November 4th, 1-2pm     Monday, December 2nd, 1-2pm     Follow the American Heart Association Diet and Lifestyle recommendations:  Limit saturated fat, trans fat, sodium, red meat, sweets and sugar-sweetened beverages. If you choose to eat red meat, compare labels and select the leanest cuts available.  Aim for at least 150 minutes of moderate physical activity or 75 minutes of vigorous physical activity - or an equal combination of both - each week.     During business hours: [phone], press option # 1 to schedule an appointment or send a message to your care team     After hours, weekends or holidays: On Call Cardiologist- [phone]   option #4 and ask to speak to the on-call Cardiologist. Inform them you are a CORE/heart failure patient at the [facility].     Shannon Dixon, RN BSN  Cardiology Nurse Care Coordinator (Heart Failure / C.O.R.E.)

## 2024-01-19 NOTE — DISCHARGE INSTRUCTIONS
[facility]                        Interventional Cardiology  Discharge Instructions   Post Right Heart Cath       AFTER YOU GO HOME:  DO drink plenty of fluids  DO resume your regular diet and medications unless otherwise instructed by your Primary Physician  Do Not scrub the procedure site vigorously  No lotion or powder to the puncture site for 3 days    CALL YOUR PRIMARY PHYSICIAN IF: You may resume all normal activity.  Monitor neck site for bleeding, swelling, or voice changes. If you notice bleeding or swelling immediately apply pressure to the site and call number below to speak with Cardiology Fellow.  If you experience any changes in your breathing you should call your doctor immediately or come to the closest Emergency Department.  Do not drive yourself.    ADDITIONAL INSTRUCTIONS: Medications: You are to resume all home medications including anticoagulation therapy unless otherwise advised by your primary cardiologist or nurse coordinator. Dr. Silvestre will call you with any medication changes that they recommend.     Follow Up: Per your primary cardiology team    If you have any questions or concerns regarding your procedure site please call [phone] at anytime and ask for Cardiology Fellow on call.  They are available 24 hours a day.  You may also contact the Cardiology Clinic after hours number at [phone].                                                       Telephone Numbers [phone] Monday-Friday 8:00 am to 4:30 pm    [phone] After 4:30 pm Monday-Friday, Weekends & Holidays  Ask for Interventional Cardiologist on call. Someone is on call 24 hours/day   [facility] toll free number [phone] Monday-Friday 8:00 am to 4:30 pm   [facility] Emergency Dept [phone]

## 2024-01-19 NOTE — PROGRESS NOTES
Advanced Heart Failure/Transplant Clinic Note    HPI  Dear colleagues,     I had the pleasure of seeing Mr. Navin Lutz in the Cardiology clinic. As you know, Mr. Navin Lutz is a pleasant 53 year old male with a past medical history of chronic HFrEF 2/2 NICM s/p ICD, HLD, and CKD Stage II who presents for follow up for HFrEF.    Patient reports he was first having symptoms of CHF in 2017 and was diagnosed with HFrEF shortly after that. He has been on various GDMT and overall did well with no hospital admissions until June '23.     Patient admitted to [facility] from 8/22-8/28/23 for cardiogenic shock. He was initially started on dobutamine, but then switched to milrinone prior to discharge. On this, he was having a significant amount of ectopy, so low-dose carvedilol was prior to discharge. 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    Patient recently was transferred from [facility] with hypotension, he was diuresed and his milrinone was adjusted back to his prior home dose and discharged home.    Patient reports since his hospital discharge, he feels stable. Patient reports recently his local cardiologist increased his carvedilol in the last couple weeks for more ectopy. He feels like he can walk and do stairs about the same as last week. He has been compliant with his medications, diet, fluid restriction, and monitoring his weights and BPs. He reports most days feeling presyncope, but again, this sometimes gets worse than other days. He denies orthopnea, PND, chest pain, palpitations, syncope, abdominal pain, nausea, emesis, LE edema, or weight gain.    ROS:  A complete 12-point ROS was negative except as above.    PAST MEDICAL HISTORY:  Patient Active Problem List   Diagnosis    Acute on chronic systolic CHF (congestive heart failure) (H)    Chest pain    ICD (implantable cardioverter-defibrillator) in place    Inguinal hernia    Multiple lung nodules on CT  Assessment and Plan:   Fatmaat De La Rosa is a 68 y o   female who presents as follow up of rheumatoid arthritis (positive anti-CCP and positive CRISTHIAN)  SSZ seems to be causing side effects, will switch to leflunomide  Do labs in a month  Stop sulfasalazine  Start leflunomide 10mg daily  Continue prednisone 1mg daily  Try nicotine gum    Return to clinic in 4 months    Plan:  Diagnoses and all orders for this visit:    Rheumatoid arthritis, involving unspecified site, unspecified whether rheumatoid factor present ([facility] 75 )  -     C-reactive protein  -     Sedimentation rate, automated  -     leflunomide (ARAVA) 10 MG tablet; Take 1 tablet (10 mg total) by mouth daily    Positive CRISTHIAN (antinuclear antibody)    High risk medication use  High risk medication use   Benefits and risks of leflunomide use, including but not limited to gastrointestinal disturbances such as nausea, diarrhea, stomatitis, hair loss, fatigue, leukopenia, hepatotoxicity were discussed with the patient  CBC, CMP will be regularly monitored  Follow-up plan: RTC in 4 months        Rheumatic Disease Summary  9/16/22: Complains of muscle weakness, joint pain, rash, and fatigue  She at least meets criteria for undifferentiated connective tissue disease  Also has positive anti-CCP, so could have underlying Rheumatoid arthritis  Either way, starting HCQ DMARD therapy would be helpful  Do labs  Schedule chest CT scan  Start hydroxychloroquine one and a half tabs daily  Return to clinic in 7 months    1/24/23: Prednisone helped her symptoms  Did not do well on hydroxychloroquine  Gabapentin caused shortness of breath  We will try her on sulfasalazine for DMARD therapy of her rheumatoid arthritis  Do labs  Schedule chest CT scan  Start sulfasalazine 1 tab daily for a week, then 1 tab twice a day for a week, then 2 tabs twice a day  Continue prednisone 2 mg daily    HPI  Fatmata De La Rosa is a 68 y o   female who presents as follow up   Last clinic visit    Non-ischemic cardiomyopathy (H)    Pure hypercholesterolemia    RAD (reactive airway disease) with wheezing, mild intermittent, uncomplicated    Acute deep vein thrombosis (DVT) of other vein of left upper extremity (H)    Cardiogenic shock (H)   Chronic biventricular systolic and LV diastolic dysfunction (HFrEF)  ACC/AHA stage C, NYHA class III  EF 18% (cardiac MRI) on 6/14/2023  EF 13% with grade I diastolic dysfunction on 6/13/2023  Reduced RVSF  EF 20-25% on 9/22/2020  EF 25-30% on 9/20/2019  EF 20% on 3/26/2019  EF 20-25% on 1/22/2019  EF 15-20% on 9/24/2018  EF 15-20% with grade 5 diastolic dysfunction on 12/11/2017   Nonischemic dilated cardiomyopathy  LIVDd 7.2 cm  Viral mediated?  Evaluation at [facility] in 2019  Cardiac MRI on 6/14/2023 = severe left ventricular dilation with severe, diffuse hypokinesis and mid wall late gadolinium enhancement stripe throughout the septum consistent with nonischemic dilated cardiomyopathy; mild patchy myocardial edema along the left ventricular anterior, anterolateral and inferolateral walls in the basal and mid cavity segments could represent an element of myocarditis; trace aortic regurgitation, trace mitral valve regurgitation, mild tricuspid valve regurgitation  LHC and RHC on 6/13/2023 = intramyocardial bridging of the dLAD otherwise normal coronary arteries; LVEDP 3 mmHg; normal pressures with no pulmonary hypertension; RA 4 mmHg, RV 22/4 mmHg, PA 23/11 mmHg with mean PAP of 16 mmHg, PCWP 7 mmHg, CO 4.32 L/min and CI 2.1 L/min*m2 by Teressa, CO 2.05 L/min and CI 1.0 L/min*m2 by thermodilution  LHC and RHC on 1/9/2018 = arterial pressure 82/59 (67); normal pulmonary capillary wedge, and mean pulmonary artery pressures; cardiac output by thermodilution was 3.07 L/min, index at 1.5; no angiographic evidence of coronary artery disease present  History of NSVT  Moderate to severe eccentric LVH  Valvular heart disease  Mild MR  Dyslipidemia  Possible obstructive sleep  was 1/24/23  The following portions of the patient's history were reviewed and updated as appropriate: allergies, current medications, past family history, past medical history, past social history, past surgical history and problem list     Review of Systems:   Review of Systems   Constitutional: Positive for fatigue  Negative for chills and fever  HENT: Negative for ear pain and sore throat  Eyes: Positive for discharge  Negative for pain and visual disturbance  Respiratory: Positive for shortness of breath and wheezing  Negative for cough  Cardiovascular: Negative for chest pain and palpitations  Gastrointestinal: Positive for nausea  Negative for abdominal pain and vomiting  Genitourinary: Negative for dysuria and hematuria  Musculoskeletal: Positive for arthralgias, back pain and myalgias  Negative for joint swelling  Skin: Negative for color change and rash  Neurological: Positive for weakness and headaches  Negative for seizures and syncope  All other systems reviewed and are negative  Reviewed and agree      Home Medications:    Current Outpatient Medications:     acetaminophen (TYLENOL) 325 mg tablet, Take 2 tablets (650 mg total) by mouth every 6 (six) hours as needed for mild pain, Disp: 30 tablet, Rfl: 0    albuterol (PROVENTIL HFA,VENTOLIN HFA) 90 mcg/act inhaler, INHALE 1-2 PUFFS AS NEEDED FOR WHEEZING, Disp: 8 g, Rfl: 3    budesonide-formoterol (Symbicort) 160-4 5 mcg/act inhaler, Inhale 2 puffs 2 (two) times a day, Disp: 30 6 g, Rfl: 3    cholecalciferol (VITAMIN D3) 1,000 units tablet, Take 4000u daily, Disp: 100 tablet, Rfl: 3    Digestive Enzyme CAPS, Take by mouth, Disp: , Rfl:     lansoprazole (PREVACID) 30 mg capsule, Take 1 capsule (30 mg total) by mouth daily 1 tab po BID, Disp: 90 capsule, Rfl: 3    leflunomide (ARAVA) 10 MG tablet, Take 1 tablet (10 mg total) by mouth daily, Disp: 30 tablet, Rfl: 5    metFORMIN (GLUCOPHAGE) 500 mg tablet, Take 1 tablet "(500 mg total) by mouth daily with breakfast, Disp: 90 tablet, Rfl: 2    montelukast (SINGULAIR) 10 mg tablet, TAKE 1 TABLET BY MOUTH EVERY DAY, Disp: 90 tablet, Rfl: 3    polyethylene glycol (GLYCOLAX) 17 GM/SCOOP powder, Take 17 g by mouth daily, Disp: , Rfl:     predniSONE 1 mg tablet, Take 1 tablet (1 mg total) by mouth 2 (two) times a day with meals, Disp: 60 tablet, Rfl: 3    Probiotic Product (PROBIOTIC-10) CAPS, Take by mouth, Disp: , Rfl:     Spiriva Respimat 2 5 MCG/ACT AERS inhaler, Inhale 2 puffs daily 3 inhalers, Disp: 16 g, Rfl: 3    Current Facility-Administered Medications:     cyanocobalamin injection 1,000 mcg, 1,000 mcg, Intramuscular, Q30 Days, Juan Srinivasan MD, 1,000 mcg at 04/17/23 1506    cyanocobalamin injection 1,000 mcg, 1,000 mcg, Intramuscular, Q30 Days, Juan Srinivasan MD, 1,000 mcg at 05/10/22 1328    cyanocobalamin injection 1,000 mcg, 1,000 mcg, Intramuscular, Q30 Days, Juan Srinivasan MD, 1,000 mcg at 11/30/21 1425    cyanocobalamin injection 1,000 mcg, 1,000 mcg, Intramuscular, Q30 Days, Juan Srinivasan MD, 1,000 mcg at 09/27/22 1347    cyanocobalamin injection 1,000 mcg, 1,000 mcg, Intramuscular, Q30 Days, Juan Srinivasan MD, 1,000 mcg at 03/15/23 1133    Objective:    Vitals:    04/17/23 1530   BP: 126/68   Pulse: 79   Weight: 76 8 kg (169 lb 6 4 oz)   Height: 5' 1\" (1 549 m)       Physical Exam  Constitutional:       General: She is not in acute distress  HENT:      Head: Normocephalic and atraumatic  Eyes:      Conjunctiva/sclera: Conjunctivae normal    Cardiovascular:      Rate and Rhythm: Normal rate and regular rhythm  Heart sounds: S1 normal and S2 normal      No friction rub  Pulmonary:      Effort: Pulmonary effort is normal  No respiratory distress  Breath sounds: Normal breath sounds  No wheezing, rhonchi or rales  Musculoskeletal:         General: Swelling and tenderness present  Cervical back: Neck supple  Comments:  B L ankle swelling  Left sternal " apnea       FAMILY HISTORY:  No known family history of heart disease except possible his father had an enlarged heart, but no formal diagnosis    SOCIAL HISTORY:  , owns a [employer] company. No prior tobacco, ETOH, or illicit substance use.  Social History     Tobacco Use    Smoking status: Never    Smokeless tobacco: Never   Substance Use Topics    Alcohol use: Never    Drug use: Never        ALLERGIES:  No Known Allergies    CURRENT MEDICATIONS:  Current Outpatient Medications   Medication Sig Dispense Refill    empagliflozin (JARDIANCE) 10 MG TABS tablet Take 10 mg by mouth      furosemide (LASIX) 20 MG tablet Take 1 tablet (20 mg) by mouth as needed (for weight gain, swelling) 3 tablet 0    heparin 100 UNIT/ML SOLN flush 500 Units by Intravenous (Continuous Infusion) route as needed      milrinone (PRIMACOR) infusion 200 mcg/mL PREMIX Inject 23.475 mcg/min into the vein continuous 100 mL 0    nitroGLYcerin (NITROSTAT) 0.4 MG sublingual tablet Place 0.4 mg under the tongue every 5 minutes as needed for chest pain May repeat every 5 minutes for a total of 3 doses.      potassium chloride ER (KLOR-CON M) 20 MEQ CR tablet Take 1 tablet (20 mEq) by mouth daily 30 tablet 1    warfarin ANTICOAGULANT (COUMADIN) 5 MG tablet Take 5 mg by mouth      atorvastatin (LIPITOR) 20 MG tablet Take 1 tablet (20 mg) by mouth every evening for 3 days 3 tablet 0    carvedilol (COREG) 3.125 MG tablet Take 1 tablet (3.125 mg) by mouth 2 times daily (with meals) for 3 doses 3 tablet 0    sacubitril-valsartan (ENTRESTO) 49-51 MG per tablet Take 1 tablet by mouth 2 times daily for 3 doses 3 tablet 0    spironolactone (ALDACTONE) 25 MG tablet Take 1 tablet (25 mg) by mouth daily for 3 doses 3 tablet 0       EXAM:  /74 (BP Location: Left arm, Patient Position: Sitting, Cuff Size: Adult Large)   Pulse 85   Wt 96.3 kg (212 lb 3.2 oz)   SpO2 96%   BMI 28.78 kg/m    General: appears comfortable, alert and interactive, in no  acute distress  Head: normocephalic, atraumatic  Eyes: anicteric sclera, EOMI  Mouth: MMM  Neck: supple, no cervical adenopathy  CV: regular rate and rhythm, no murmur, gallop, rub, estimated JVP <6 cm  Resp: clear, no rales or wheezing  GI: soft, nontender, nondistended  Extremities: warm, no peripheral edema, 2+ bilateral radial pulses, PICC dressing site is c/d/i  Neurological: alert and oriented, no focal deficits  Psych: normal mood and affect  Derm: no rashes on exposed surfaces    Weight  Wt Readings from Last 10 Encounters:   01/19/24 96.3 kg (212 lb 3.2 oz)   01/19/24 91.2 kg (201 lb)   12/08/23 91.4 kg (201 lb 6.4 oz)   11/20/23 93.9 kg (207 lb 0.2 oz)   11/01/23 91.6 kg (202 lb)   09/20/23 94.3 kg (208 lb)   09/20/23 93.1 kg (205 lb 3.2 oz)   08/28/23 89.9 kg (198 lb 3.2 oz)   07/19/23 93.7 kg (206 lb 9.6 oz)       I personally reviewed recent labs and data as below and discussed the results with the patient in clinic today.  Labs:  CBC RESULTS:  Lab Results   Component Value Date    WBC 9.2 11/20/2023    RBC 5.85 11/20/2023    HGB 17.9 (H) 11/20/2023    HCT 52.3 11/20/2023    MCV 89 11/20/2023    MCH 30.6 11/20/2023    MCHC 34.2 11/20/2023    RDW 12.9 11/20/2023     (L) 11/20/2023       CMP RESULTS:  Lab Results   Component Value Date     01/19/2024    POTASSIUM 4.7 01/19/2024    CHLORIDE 109 (H) 01/19/2024    CHLORIDE 101 12/07/2023    CO2 28 01/19/2024    ANIONGAP 6 (L) 01/19/2024     (H) 01/19/2024     (H) 11/18/2023    BUN 13.0 01/19/2024    CR 1.34 (H) 01/19/2024    GFRESTIMATED 63 01/19/2024    NAM 9.4 01/19/2024    BILITOTAL 0.6 01/19/2024    ALBUMIN 4.2 01/19/2024    ALKPHOS 98 01/19/2024    ALT 47 01/19/2024    AST 27 01/19/2024          No results for input(s): CHOL, HDL, LDL, TRIG, CHOLHDLRATIO in the last 18517 hours.     Testing/Procedures:  I personally visualized and interpreted:  Echocardiogram 6/13/23  Narrative      Left Ventricle: The left ventricle is  tenderness  Ambulates with cane   Skin:     Coloration: Skin is not pale  Findings: Erythema present  Comments: Telangiectasias on cheeks   Neurological:      Mental Status: She is alert  Mental status is at baseline  Psychiatric:         Mood and Affect: Mood normal          Behavior: Behavior normal        Reviewed labs and imaging  Imaging:   XR lumbar spine 8/2/21  Mild facet arthropathy is present L4-5 and L5-S1  XR R knee 6/25/21  Unremarkable     XR R knee 6/21/21  Unremarkable     Labs:   Orders Only on 03/09/2023   Component Date Value Ref Range Status    Right Eye Diabetic Retinopathy 03/09/2023 None   Final    Left Eye Diabetic Retinopathy 03/09/2023 None   Final   Orders Only on 02/22/2023   Component Date Value Ref Range Status    Glucose, Random 02/22/2023 112 (H)  65 - 99 mg/dL Final    Comment:               Fasting reference interval     For someone without known diabetes, a glucose value  between 100 and 125 mg/dL is consistent with  prediabetes and should be confirmed with a  follow-up test          BUN 02/22/2023 21  7 - 25 mg/dL Final    Creatinine 02/22/2023 0 70  0 60 - 1 00 mg/dL Final    eGFR 02/22/2023 89  > OR = 60 mL/min/1 73m2 Final    Comment: The eGFR is based on the CKD-EPI 2021 equation  To calculate   the new eGFR from a previous Creatinine or Cystatin C  result, go to [employer] at  [email or web address]      SL AMB BUN/CREATININE RATIO 11/57/4510 NOT APPLICABLE  6 - 22 (calc) Final    Sodium 02/22/2023 139  135 - 146 mmol/L Final    Potassium 02/22/2023 4 1  3 5 - 5 3 mmol/L Final    Chloride 02/22/2023 103  98 - 110 mmol/L Final    CO2 02/22/2023 28  20 - 32 mmol/L Final    Calcium 02/22/2023 9 5  8 6 - 10 4 mg/dL Final    Protein, Total 02/22/2023 7 2  6 1 - 8 1 g/dL Final    Albumin 02/22/2023 4 3  3 6 - 5 1 g/dL Final    Globulin 02/22/2023 2 9  1 9 - 3 7 g/dL (calc) Final    Albumin/Globulin Ratio 02/22/2023 1 5  1 0 - 2 5 (calc) Final    TOTAL BILIRUBIN 02/22/2023 0 4  0 2 - 1 2 mg/dL Final    Alkaline Phosphatase 02/22/2023 138  37 - 153 U/L Final    AST 02/22/2023 10  10 - 35 U/L Final    ALT 02/22/2023 8  6 - 29 U/L Final    Sedimentation Rate 02/22/2023 31 (H)  < OR = 30 mm/h Final    White Blood Cell Count 02/22/2023 6 4  3 8 - 10 8 Thousand/uL Final    Red Blood Cell Count 02/22/2023 4 69  3 80 - 5 10 Million/uL Final    Hemoglobin 02/22/2023 13 4  11 7 - 15 5 g/dL Final    HCT 02/22/2023 38 8  35 0 - 45 0 % Final    MCV 02/22/2023 82 7  80 0 - 100 0 fL Final    MCH 02/22/2023 28 6  27 0 - 33 0 pg Final    MCHC 02/22/2023 34 5  32 0 - 36 0 g/dL Final    RDW 02/22/2023 14 0  11 0 - 15 0 % Final    Platelet Count 51/43/1762 216  140 - 400 Thousand/uL Final    SL AMB MPV 02/22/2023 11 2  7 5 - 12 5 fL Final    Neutrophils (Absolute) 02/22/2023 4,691  1,500 - 7,800 cells/uL Final    Lymphocytes (Absolute) 02/22/2023 1,280  850 - 3,900 cells/uL Final    Monocytes (Absolute) 02/22/2023 339  200 - 950 cells/uL Final    Eosinophils (Absolute) 02/22/2023 58  15 - 500 cells/uL Final    Basophils ABS 02/22/2023 32  0 - 200 cells/uL Final    Neutrophils 02/22/2023 73 3  % Final    Lymphocytes 02/22/2023 20 0  % Final    Monocytes 02/22/2023 5 3  % Final    Eosinophils 02/22/2023 0 9  % Final    Basophils PCT 02/22/2023 0 5  % Final    C-Reactive Protein, Quant 02/22/2023 4 7  <8 0 mg/L Final   Office Visit on 01/04/2023   Component Date Value Ref Range Status    Hemoglobin A1C 01/04/2023 5 9  6 5 Final severely dilated. There is   moderate-to-severe eccentric left ventricular hypertrophy. Severely   reduced left ventricular systolic function. The ejection fraction,   measured by biplane, is 13%. Severe hypokinesis of apex, remainder wall   segments are mostly akinetic. Grade I diastolic dysfunction.         Left Ventricle   The left ventricle is severely dilated. There is moderate-to-severe eccentric left ventricular hypertrophy. Severely reduced left ventricular systolic function. The ejection fraction, measured by biplane, is 13%. Severe hypokinesis of apex, remainder wall segments are mostly akinetic. Grade I diastolic dysfunction.     Right Ventricle   The right ventricle appears normal in size. Systolic function is reduced. Normal TAPSE, measuring 1.8 cm. Normal systolic excursion velocity by TDI. S' measures 9.5 cm/sec. Wall thickness is normal. The RVSP measures 13 mmHg. There is no evidence of pulmonary hypertension.     Left Atrium   The left atrium is normal in size. The pulmonary veins have normal venous flow.     Right Atrium   The right atrium is normal in size.     IVC/SVC   Normal IVC size with normal respirophasic changes.Normal hepatic vein blood flow.     Mitral Valve   Mitral valve structure is normal. There is trace regurgitation. There is no evidence of mitral valve stenosis.     Tricuspid Valve   Tricuspid valve structure is normal. There is mild regurgitation. There is no evidence of tricuspid valve stenosis.     Aortic Valve   The aortic valve is tricuspid. There is trace regurgitation. There is no evidence of aortic valve stenosis.     Pulmonic Valve   The pulmonic valve was not well visualized. There is trace regurgitation. There is no evidence of pulmonic valve stenosis.     Pericardium   There is no pericardial effusion.     Aorta   The sinus of Valsalva is normal. The ascending aorta is normal.     Interatrial Septum   No evidence of an atrial shunt by color Doppler.     Coronary  Angiogram/RHC 6/13/23  Conclusions:   Coronary angiogram: Intramyocardial bridging of the distal LAD, otherwise   normal coronaries.   Left heart catheterization: No significant gradient across aortic valve.     LVEDP 3 mmHg.   Right heart catheterization: Normal pressures.  No pulmonary hypertension.    Low cardiac output.  No shunt.   -RA 4 mmHg   -RV 22/4 mmHg   -PA 23/11 mmHg, Mean PAP 16 mmHg   -PCWP 7 mmHg   -Iesha: CO 4.32 L/min, CI 2.1 L/min*m2   -Thermodilution: CO 2.05 L/min, CI 1.0 L/min*m2     cMRI 6/14/23  FINDINGS:   AORTA: The ascending aorta and aortic root are normal caliber. The sinotubular junction is maintained. Normal aortic wall thickness.     ARCH VESSELS: Arch vessels are grossly patent and partially visualized.     RIGHT ATRIUM: Normal size.     TRICUSPID VALVE: Unrestricted leaflet excursion without stenosis. Mild regurgitation.     RIGHT VENTRICLE: Normal morphology, size, and wall thickness. No wall motion abnormalities identified.     PULMONIC VALVE: Unrestricted leaflet excursion without stenosis or regurgitation.     PULMONARY VEINS: Widely patent.     LEFT ATRIUM: Mild dilation. No left atrial appendage thrombus.     MITRAL VALVE: Unrestrictive leaflet excursion without stenosis. Trace regurgitation.     LEFT VENTRICLE: Normal morphology and thickness with marked dilation. End-diastolic basal septal thickness of 9 mm.     Severe, diffuse hypokinesis. Normal first pass perfusion.     Mid wall late gadolinium enhancement throughout the septum. Patchy myocardial edema along the anterior, anterolateral and inferolateral walls in the basal and mid cavity segments.       EKG 6/13/23 shows sinus rhythm, nonspecific t wave abnormalities    RHC 8/22/23  RA --/--/5  RV 20/5  PA 20/12/16  PCWP --/--/12  IESHA 3.2/1.5  TD 2.93/1.36  MAP 96  PVR 1.25 (IESHA)  SVR 2275 (IESHA)     TTE 8/23/23  Interpretation Summary  Severe left ventricular dilation (LVIDd 6.8cm). Left ventricular function  is  decreased. The ejection fraction is 15-20% (severely reduced). Severe diffuse  hypokinesis.  Global right ventricular function is normal.  No significant valve abnormalities.  The inferior vena cava is normal.  No pericardial effusion.    RHC 9/20/23  RA: 3/4/3 mmHg  RV: 23/2 mmHg  PA: 23/14/18 mmHg  CWP: --/--/8 mmHg  CO/CI (Teressa): 3.63/1.69 L/min/m2  CO/CI (TD): 4.1/1.9 L/min/m2  PA sat: 68.7%  MAP: 84 mmHg   PVR: 2.4 (TD) BYRD      Hemodynamics 11/19/23  CVP 4, PA 26/12/16, PCWP 12, SVO2 78, Teressa CI 2.5, PVR 1, SVR 1353 on milrinone 0.25    Outside results of note:  Outside records from [facility] and [facility] admission were obtained, and relevant results/notes have been incorporated into HPI.    Assessment and Plan:     In summary, 53 year old male with a past medical history of chronic HFrEF 2/2 NICM s/p ICD, HLD, and CKD Stage II who presents as follow up for HFrEF.    Chronic systolic heart failure/HFrEF (EF 10-15%) secondary to nonischemic cardiomyopathy  NYHA Symptom Class IIIb  Stage D  Inotrope: Continue milrinone 0.25  ACE-I/ARB/ARNi: Continue Entresto 49-51 mg BID, unable to increase given frequent presyncope  BB: Continue coreg 6.25 mg BID given frequent ectopy on inotrope therapy  Aldosterone antagonist: Continue beck 25 mg daily  SGLT2i: Continue empagliflozin 10 mg daily  SCD prophylaxis: s/p ICD  %BiV pacing: N/A  Fluid status: euvoelmic on lasix 20 mg daily PRN (uses intermittently, but not on regular basis)  Cardiac Rehab: previously referred  - Currently listed status 4 for heart transplant  - Discussed importance of daily standing weights, 2-3L fluid, and 2g Na restriction    Myocardial Bridge Over Coronary Artery  HLD  Myocardial bridge over distal LAD.  - Continue to monitor    CKD Stage III  Cr ~1.2-1.4.  - Will continue to monitor    RUE DVT  - Continue warfarin with INR goal 2-3    PICC Skin Irritation  - PICC dressing changed 1/19/24, new tape given for skin irritation, if issues, will  need to change PICC to [place] (this could be done locally in patient's hemodynamics are stable on RHC today)    To Do:  - No medication changes  - Pending RHC later today, may need admission for upgrade on [facility] waitlist, otherwise will go home and have virtual follow up in 6 weeks  - Follow up with local Cardiology as scheduled    The patient states understanding and is agreeable with plan.   Feel free to contact myself regarding questions or concerns. It was a pleasure to see this patient today.    I spent 40 minutes in care of the patient today including obtaining recent medical history, personally reviewing recent cardiac testing and/or lab results, today's examination, discussion of testing results and care recommendations with patient.    Micaela Silvestre MD   of Medicine, [facility]  Advanced Heart Failure and Transplant Cardiology     GALO GATES NP

## 2024-01-19 NOTE — OR NURSING
Pt cancelled due to not able to change picc line site due to ICD on left side of chest. Vaughn Scott will do dressing change at bedside and send home.

## 2024-01-19 NOTE — PRE-PROCEDURE
[facility]   Interventional Cardiology        Consenting/Education for Right Heart Catheterization/Endomyocardial Biopsy/possible IABP insertion     Patient understands as a part of routine surveillance after heart transplant, we would like to perform a right heart catheterization/endomyocardial biopsy.  This procedure will be performed by the interventional cardiologist.    Patient understands during the portion for right heart catheterization a fine tube (catheter) is put into the vein of the groin/neck.  It is carefully passed along until it reaches the heart and then goes up into the blood vessels of the lungs. This is done to measure a variety of pressures in your heart and can tell us how well the heart is filling and emptying, as well as monitor fluid status. While the catheter is in your neck/groin vein, a  Biopsy forceps  or pincers at the end of the catheter are used to take the tissue samples. This is may be repeated to get enough samples. The biopsy pieces are very small (one to two millimeters).     Patient also understands risks and complications of the procedure which include, but are not limited to bruising/swelling around the incision site, infection, bleeding, allergic reaction to local anesthetic.      Also discussed potential for IABP placement, depending on [facility] numbers.     Patient verbalized understanding of risks and benefits of the right heart catheterization and endomyocardial biopsy and has elected to proceed with the procedure.       Rosanna RODRÍGUEZ, TRE  [facility] Interventional Cardiology

## 2024-01-19 NOTE — LETTER
1/19/2024      RE: Navin Lutz  [address] [place] [address]       Dear Colleague,    Thank you for the opportunity to participate in the care of your patient, Navin Lutz, at the [facility] HEART CLINIC [place] [facility]. Please see a copy of my visit note below.    Advanced Heart Failure/Transplant Clinic Note    HPI  Dear colleagues,     I had the pleasure of seeing Mr. Navin Lutz in the Cardiology clinic. As you know, Mr. Navin Lutz is a pleasant 53 year old male with a past medical history of chronic HFrEF 2/2 NICM s/p ICD, HLD, and CKD Stage II who presents for follow up for HFrEF.    Patient reports he was first having symptoms of CHF in 2017 and was diagnosed with HFrEF shortly after that. He has been on various GDMT and overall did well with no hospital admissions until June '23.     Patient admitted to [facility] from 8/22-8/28/23 for cardiogenic shock. He was initially started on dobutamine, but then switched to milrinone prior to discharge. On this, he was having a significant amount of ectopy, so low-dose carvedilol was prior to discharge. 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    Patient recently was transferred from H with hypotension, he was diuresed and his milrinone was adjusted back to his prior home dose and discharged home.    Patient reports since his hospital discharge, he feels stable. Patient reports recently his local cardiologist increased his carvedilol in the last couple weeks for more ectopy. He feels like he can walk and do stairs about the same as last week. He has been compliant with his medications, diet, fluid restriction, and monitoring his weights and BPs. He reports most days feeling presyncope, but again, this sometimes gets worse than other days. He denies orthopnea, PND, chest pain, palpitations, syncope, abdominal pain, nausea, emesis, LE edema,  or weight gain.    ROS:  A complete 12-point ROS was negative except as above.    PAST MEDICAL HISTORY:  Patient Active Problem List   Diagnosis    Acute on chronic systolic CHF (congestive heart failure) (H)    Chest pain    ICD (implantable cardioverter-defibrillator) in place    Inguinal hernia    Multiple lung nodules on CT    Non-ischemic cardiomyopathy (H)    Pure hypercholesterolemia    RAD (reactive airway disease) with wheezing, mild intermittent, uncomplicated    Acute deep vein thrombosis (DVT) of other vein of left upper extremity (H)    Cardiogenic shock (H)   Chronic biventricular systolic and LV diastolic dysfunction (HFrEF)  ACC/AHA stage C, NYHA class III  EF 18% (cardiac MRI) on 6/14/2023  EF 13% with grade I diastolic dysfunction on 6/13/2023  Reduced RVSF  EF 20-25% on 9/22/2020  EF 25-30% on 9/20/2019  EF 20% on 3/26/2019  EF 20-25% on 1/22/2019  EF 15-20% on 9/24/2018  EF 15-20% with grade 5 diastolic dysfunction on 12/11/2017   Nonischemic dilated cardiomyopathy  LIVDd 7.2 cm  Viral mediated?  Evaluation at [facility] in 2019  Cardiac MRI on 6/14/2023 = severe left ventricular dilation with severe, diffuse hypokinesis and mid wall late gadolinium enhancement stripe throughout the septum consistent with nonischemic dilated cardiomyopathy; mild patchy myocardial edema along the left ventricular anterior, anterolateral and inferolateral walls in the basal and mid cavity segments could represent an element of myocarditis; trace aortic regurgitation, trace mitral valve regurgitation, mild tricuspid valve regurgitation  LHC and RHC on 6/13/2023 = intramyocardial bridging of the dLAD otherwise normal coronary arteries; LVEDP 3 mmHg; normal pressures with no pulmonary hypertension; RA 4 mmHg, RV 22/4 mmHg, PA 23/11 mmHg with mean PAP of 16 mmHg, PCWP 7 mmHg, CO 4.32 L/min and CI 2.1 L/min*m2 by Teressa, CO 2.05 L/min and CI 1.0 L/min*m2 by thermodilution  LHC and RHC on 1/9/2018 = arterial pressure  82/59 (67); normal pulmonary capillary wedge, and mean pulmonary artery pressures; cardiac output by thermodilution was 3.07 L/min, index at 1.5; no angiographic evidence of coronary artery disease present  History of NSVT  Moderate to severe eccentric LVH  Valvular heart disease  Mild MR  Dyslipidemia  Possible obstructive sleep apnea       FAMILY HISTORY:  No known family history of heart disease except possible his father had an enlarged heart, but no formal diagnosis    SOCIAL HISTORY:  , owns a [employer] company. No prior tobacco, ETOH, or illicit substance use.  Social History     Tobacco Use    Smoking status: Never    Smokeless tobacco: Never   Substance Use Topics    Alcohol use: Never    Drug use: Never        ALLERGIES:  No Known Allergies    CURRENT MEDICATIONS:  Current Outpatient Medications   Medication Sig Dispense Refill    empagliflozin (JARDIANCE) 10 MG TABS tablet Take 10 mg by mouth      furosemide (LASIX) 20 MG tablet Take 1 tablet (20 mg) by mouth as needed (for weight gain, swelling) 3 tablet 0    heparin 100 UNIT/ML SOLN flush 500 Units by Intravenous (Continuous Infusion) route as needed      milrinone (PRIMACOR) infusion 200 mcg/mL PREMIX Inject 23.475 mcg/min into the vein continuous 100 mL 0    nitroGLYcerin (NITROSTAT) 0.4 MG sublingual tablet Place 0.4 mg under the tongue every 5 minutes as needed for chest pain May repeat every 5 minutes for a total of 3 doses.      potassium chloride ER (KLOR-CON M) 20 MEQ CR tablet Take 1 tablet (20 mEq) by mouth daily 30 tablet 1    warfarin ANTICOAGULANT (COUMADIN) 5 MG tablet Take 5 mg by mouth      atorvastatin (LIPITOR) 20 MG tablet Take 1 tablet (20 mg) by mouth every evening for 3 days 3 tablet 0    carvedilol (COREG) 3.125 MG tablet Take 1 tablet (3.125 mg) by mouth 2 times daily (with meals) for 3 doses 3 tablet 0    sacubitril-valsartan (ENTRESTO) 49-51 MG per tablet Take 1 tablet by mouth 2 times daily for 3 doses 3 tablet 0     spironolactone (ALDACTONE) 25 MG tablet Take 1 tablet (25 mg) by mouth daily for 3 doses 3 tablet 0       EXAM:  /74 (BP Location: Left arm, Patient Position: Sitting, Cuff Size: Adult Large)   Pulse 85   Wt 96.3 kg (212 lb 3.2 oz)   SpO2 96%   BMI 28.78 kg/m    General: appears comfortable, alert and interactive, in no acute distress  Head: normocephalic, atraumatic  Eyes: anicteric sclera, EOMI  Mouth: MMM  Neck: supple, no cervical adenopathy  CV: regular rate and rhythm, no murmur, gallop, rub, estimated JVP <6 cm  Resp: clear, no rales or wheezing  GI: soft, nontender, nondistended  Extremities: warm, no peripheral edema, 2+ bilateral radial pulses, PICC dressing site is c/d/i  Neurological: alert and oriented, no focal deficits  Psych: normal mood and affect  Derm: no rashes on exposed surfaces    Weight  Wt Readings from Last 10 Encounters:   01/19/24 96.3 kg (212 lb 3.2 oz)   01/19/24 91.2 kg (201 lb)   12/08/23 91.4 kg (201 lb 6.4 oz)   11/20/23 93.9 kg (207 lb 0.2 oz)   11/01/23 91.6 kg (202 lb)   09/20/23 94.3 kg (208 lb)   09/20/23 93.1 kg (205 lb 3.2 oz)   08/28/23 89.9 kg (198 lb 3.2 oz)   07/19/23 93.7 kg (206 lb 9.6 oz)       I personally reviewed recent labs and data as below and discussed the results with the patient in clinic today.  Labs:  CBC RESULTS:  Lab Results   Component Value Date    WBC 9.2 11/20/2023    RBC 5.85 11/20/2023    HGB 17.9 (H) 11/20/2023    HCT 52.3 11/20/2023    MCV 89 11/20/2023    MCH 30.6 11/20/2023    MCHC 34.2 11/20/2023    RDW 12.9 11/20/2023     (L) 11/20/2023       CMP RESULTS:  Lab Results   Component Value Date     01/19/2024    POTASSIUM 4.7 01/19/2024    CHLORIDE 109 (H) 01/19/2024    CHLORIDE 101 12/07/2023    CO2 28 01/19/2024    ANIONGAP 6 (L) 01/19/2024     (H) 01/19/2024     (H) 11/18/2023    BUN 13.0 01/19/2024    CR 1.34 (H) 01/19/2024    GFRESTIMATED 63 01/19/2024    NAM 9.4 01/19/2024    BILITOTAL 0.6 01/19/2024     ALBUMIN 4.2 01/19/2024    ALKPHOS 98 01/19/2024    ALT 47 01/19/2024    AST 27 01/19/2024          No results for input(s): CHOL, HDL, LDL, TRIG, CHOLHDLRATIO in the last 12189 hours.     Testing/Procedures:  I personally visualized and interpreted:  Echocardiogram 6/13/23  Narrative      Left Ventricle: The left ventricle is severely dilated. There is   moderate-to-severe eccentric left ventricular hypertrophy. Severely   reduced left ventricular systolic function. The ejection fraction,   measured by biplane, is 13%. Severe hypokinesis of apex, remainder wall   segments are mostly akinetic. Grade I diastolic dysfunction.         Left Ventricle   The left ventricle is severely dilated. There is moderate-to-severe eccentric left ventricular hypertrophy. Severely reduced left ventricular systolic function. The ejection fraction, measured by biplane, is 13%. Severe hypokinesis of apex, remainder wall segments are mostly akinetic. Grade I diastolic dysfunction.     Right Ventricle   The right ventricle appears normal in size. Systolic function is reduced. Normal TAPSE, measuring 1.8 cm. Normal systolic excursion velocity by TDI. S' measures 9.5 cm/sec. Wall thickness is normal. The RVSP measures 13 mmHg. There is no evidence of pulmonary hypertension.     Left Atrium   The left atrium is normal in size. The pulmonary veins have normal venous flow.     Right Atrium   The right atrium is normal in size.     IVC/SVC   Normal IVC size with normal respirophasic changes.Normal hepatic vein blood flow.     Mitral Valve   Mitral valve structure is normal. There is trace regurgitation. There is no evidence of mitral valve stenosis.     Tricuspid Valve   Tricuspid valve structure is normal. There is mild regurgitation. There is no evidence of tricuspid valve stenosis.     Aortic Valve   The aortic valve is tricuspid. There is trace regurgitation. There is no evidence of aortic valve stenosis.     Pulmonic Valve   The pulmonic  valve was not well visualized. There is trace regurgitation. There is no evidence of pulmonic valve stenosis.     Pericardium   There is no pericardial effusion.     Aorta   The sinus of Valsalva is normal. The ascending aorta is normal.     Interatrial Septum   No evidence of an atrial shunt by color Doppler.     Coronary Angiogram/RHC 6/13/23  Conclusions:   Coronary angiogram: Intramyocardial bridging of the distal LAD, otherwise   normal coronaries.   Left heart catheterization: No significant gradient across aortic valve.     LVEDP 3 mmHg.   Right heart catheterization: Normal pressures.  No pulmonary hypertension.    Low cardiac output.  No shunt.   -RA 4 mmHg   -RV 22/4 mmHg   -PA 23/11 mmHg, Mean PAP 16 mmHg   -PCWP 7 mmHg   -Teressa: CO 4.32 L/min, CI 2.1 L/min*m2   -Thermodilution: CO 2.05 L/min, CI 1.0 L/min*m2     cMRI 6/14/23  FINDINGS:   AORTA: The ascending aorta and aortic root are normal caliber. The sinotubular junction is maintained. Normal aortic wall thickness.     ARCH VESSELS: Arch vessels are grossly patent and partially visualized.     RIGHT ATRIUM: Normal size.     TRICUSPID VALVE: Unrestricted leaflet excursion without stenosis. Mild regurgitation.     RIGHT VENTRICLE: Normal morphology, size, and wall thickness. No wall motion abnormalities identified.     PULMONIC VALVE: Unrestricted leaflet excursion without stenosis or regurgitation.     PULMONARY VEINS: Widely patent.     LEFT ATRIUM: Mild dilation. No left atrial appendage thrombus.     MITRAL VALVE: Unrestrictive leaflet excursion without stenosis. Trace regurgitation.     LEFT VENTRICLE: Normal morphology and thickness with marked dilation. End-diastolic basal septal thickness of 9 mm.     Severe, diffuse hypokinesis. Normal first pass perfusion.     Mid wall late gadolinium enhancement throughout the septum. Patchy myocardial edema along the anterior, anterolateral and inferolateral walls in the basal and mid cavity segments.        EKG 6/13/23 shows sinus rhythm, nonspecific t wave abnormalities    RHC 8/22/23  RA --/--/5  RV 20/5  PA 20/12/16  PCWP --/--/12  IESHA 3.2/1.5  TD 2.93/1.36  MAP 96  PVR 1.25 (IESHA)  SVR 2275 (IESHA)     TTE 8/23/23  Interpretation Summary  Severe left ventricular dilation (LVIDd 6.8cm). Left ventricular function is  decreased. The ejection fraction is 15-20% (severely reduced). Severe diffuse  hypokinesis.  Global right ventricular function is normal.  No significant valve abnormalities.  The inferior vena cava is normal.  No pericardial effusion.    RHC 9/20/23  RA: 3/4/3 mmHg  RV: 23/2 mmHg  PA: 23/14/18 mmHg  CWP: --/--/8 mmHg  CO/CI (Iesha): 3.63/1.69 L/min/m2  CO/CI (TD): 4.1/1.9 L/min/m2  PA sat: 68.7%  MAP: 84 mmHg   PVR: 2.4 (TD) BYRD      Hemodynamics 11/19/23  CVP 4, PA 26/12/16, PCWP 12, SVO2 78, Iesha CI 2.5, PVR 1, SVR 1353 on milrinone 0.25    Outside results of note:  Outside records from [facility] and [facility] admission were obtained, and relevant results/notes have been incorporated into HPI.    Assessment and Plan:     In summary, 53 year old male with a past medical history of chronic HFrEF 2/2 NICM s/p ICD, HLD, and CKD Stage II who presents as follow up for HFrEF.    Chronic systolic heart failure/HFrEF (EF 10-15%) secondary to nonischemic cardiomyopathy  NYHA Symptom Class IIIb  Stage D  Inotrope: Continue milrinone 0.25  ACE-I/ARB/ARNi: Continue Entresto 49-51 mg BID, unable to increase given frequent presyncope  BB: Continue coreg 6.25 mg BID given frequent ectopy on inotrope therapy  Aldosterone antagonist: Continue beck 25 mg daily  SGLT2i: Continue empagliflozin 10 mg daily  SCD prophylaxis: s/p ICD  %BiV pacing: N/A  Fluid status: euvoelmic on lasix 20 mg daily PRN (uses intermittently, but not on regular basis)  Cardiac Rehab: previously referred  - Currently listed status 4 for heart transplant  - Discussed importance of daily standing weights, 2-3L fluid, and 2g Na  restriction    Myocardial Bridge Over Coronary Artery  HLD  Myocardial bridge over distal LAD.  - Continue to monitor    CKD Stage III  Cr ~1.2-1.4.  - Will continue to monitor    RUE DVT  - Continue warfarin with INR goal 2-3    PICC Skin Irritation  - PICC dressing changed 1/19/24, new tape given for skin irritation, if issues, will need to change PICC to [place] (this could be done locally in patient's hemodynamics are stable on RHC today)    To Do:  - No medication changes  - Pending RHC later today, may need admission for upgrade on [facility] waitlist, otherwise will go home and have virtual follow up in 6 weeks  - Follow up with local Cardiology as scheduled    The patient states understanding and is agreeable with plan.   Feel free to contact myself regarding questions or concerns. It was a pleasure to see this patient today.    I spent 40 minutes in care of the patient today including obtaining recent medical history, personally reviewing recent cardiac testing and/or lab results, today's examination, discussion of testing results and care recommendations with patient.    Micaela Silvestre MD   of Medicine, [facility]  Advanced Heart Failure and Transplant Cardiology     CC  GALO HERNANDEZ NP

## 2024-01-19 NOTE — PROGRESS NOTES
Interventional Radiology Progress Note    When patient arrived he was unclear what the plan was for PICC line management.  Patient was under the impression that a new PICC line is going to be placed in an alternate location due to some skin irritation.  Unfortunately, with patient's ICD on the left moving the PICC line to that location is not an option due to high risk for vessel occlusion and stenosis.  Moving the PICC line to a different vessel in his right arm would not change the position of the central line dressing which is the main source of his skin issues.    Patient noted that his dressing got wet from exercise and the next day when he changed the dressing and the skin was irritated and there was moisture noted under the dressing which is likely how this irritation started.  We talked about changing the dressing as soon as any moisture is noted underneath the dressing.    I performed a dressing change today with a new dressing designed for sensitive skin.  And gave the patient a supply of these to go home with.  No procedure was performed today as patient's PICC line is functional and patient does not have other options for alternative PICC line locations.    If patient continues to have skin issues on the right arm and will continue to need ongoing milrinone therapy IR would recommend tunneled central venous catheter placement on the right chest.  This is typically what IR offers for long-term milrinone therapy due to lower risk of infection associated with the line.  This procedure would need to be performed at the [facility] in interventional radiology.      Vaughn Scott PA-C  Interventional Radiology  Pager: [phone]

## 2024-01-19 NOTE — PROGRESS NOTES
Patient returned s/p RHC. RIJ site intact, no bleeding or hematoma. VSS. .  Discharge instructions printed and gone over with patient. Okay to discharge per MD.

## 2024-01-19 NOTE — NURSING NOTE
Labs: Patient was given results of the laboratory testing obtained today and patient was instructed about when to return for the next laboratory testing.     Med Reconcile: Reviewed and verified all current medications with the patient. The updated medication list was printed and given to the patient. No changes    Return Appointment: Patient given instructions regarding scheduling next clinic visit. Proceed with RHC as scheduled. Follow up virtually in 6 weeks.     Patient stated he understood all health information given and agreed to call with further questions or concerns.       Shannon Dixon RN

## 2024-01-22 LAB
HGB BLD-MCNC: 18.8 G/DL (ref 13.3–17.7)
HGB BLD-MCNC: 19 G/DL (ref 13.3–17.7)

## 2024-01-22 NOTE — PROGRESS NOTES
Called Navin to review testing results and plan of care. Reviewed RHC results. He was to take a few doses of lasix over the weekend due to elevated left pressures. He stated understanding, plan for labs 1 week after.

## 2024-01-26 LAB
SA 1 CELL: NORMAL
SA 1 TEST METHOD: NORMAL
SA 2 CELL: NORMAL
SA 2 TEST METHOD: NORMAL
SA1 HI RISK ABY: NORMAL
SA1 MOD RISK ABY: NORMAL
SA2 HI RISK ABY: NORMAL
SA2 MOD RISK ABY: NORMAL
UNACCEPTABLE ANTIGENS: NORMAL
UNOS CPRA: 0
ZZZSA 1  COMMENTS: NORMAL
ZZZSA 2 COMMENTS: NORMAL

## 2024-02-01 RX ORDER — WARFARIN SODIUM 5 MG/1
5 TABLET ORAL
OUTPATIENT
Start: 2024-02-01

## 2024-02-01 NOTE — TELEPHONE ENCOUNTER
Patient discharged from  anticoagulation clinic on 9/22/23. Stating patient will be followed by [facility] anticoagulation clinic. RX refill request denied. Patient notified he will need to contact his [facility] clinic for refill.     Brandee Gaines RN

## 2024-02-16 ENCOUNTER — TELEPHONE (OUTPATIENT)
Dept: TRANSPLANT | Facility: CLINIC | Age: 54
End: 2024-02-16
Payer: COMMERCIAL

## 2024-02-16 NOTE — TELEPHONE ENCOUNTER
"Spoke with Navin to follow up on recommendations from his last clinic appt. He is no longer having issues with his PICC site that was related to the dressings. He states the skin sensitivities dressing product is working well and the site has \"healed nicely\".  He also reports that he is back to his goal weight of 200 lb after additional diuretic.    "

## 2024-03-13 ENCOUNTER — VIRTUAL VISIT (OUTPATIENT)
Dept: CARDIOLOGY | Facility: CLINIC | Age: 54
End: 2024-03-13
Attending: STUDENT IN AN ORGANIZED HEALTH CARE EDUCATION/TRAINING PROGRAM
Payer: COMMERCIAL

## 2024-03-13 VITALS
BODY MASS INDEX: 27.41 KG/M2 | HEIGHT: 72 IN | HEART RATE: 94 BPM | WEIGHT: 202.4 LBS | DIASTOLIC BLOOD PRESSURE: 76 MMHG | SYSTOLIC BLOOD PRESSURE: 94 MMHG

## 2024-03-13 DIAGNOSIS — I82.622 ACUTE DEEP VEIN THROMBOSIS (DVT) OF OTHER VEIN OF LEFT UPPER EXTREMITY (H): ICD-10-CM

## 2024-03-13 DIAGNOSIS — Z79.899 RECEIVING INOTROPIC MEDICATION: ICD-10-CM

## 2024-03-13 DIAGNOSIS — N18.30 STAGE 3 CHRONIC KIDNEY DISEASE, UNSPECIFIED WHETHER STAGE 3A OR 3B CKD (H): ICD-10-CM

## 2024-03-13 DIAGNOSIS — I50.22 CHRONIC SYSTOLIC HEART FAILURE (H): Primary | ICD-10-CM

## 2024-03-13 DIAGNOSIS — I42.8 NON-ISCHEMIC CARDIOMYOPATHY (H): ICD-10-CM

## 2024-03-13 PROCEDURE — 99215 OFFICE O/P EST HI 40 MIN: CPT | Mod: 95 | Performed by: STUDENT IN AN ORGANIZED HEALTH CARE EDUCATION/TRAINING PROGRAM

## 2024-03-13 PROCEDURE — G2211 COMPLEX E/M VISIT ADD ON: HCPCS | Mod: 95 | Performed by: STUDENT IN AN ORGANIZED HEALTH CARE EDUCATION/TRAINING PROGRAM

## 2024-03-13 RX ORDER — LIDOCAINE 40 MG/G
CREAM TOPICAL
Status: CANCELLED | OUTPATIENT
Start: 2024-03-13

## 2024-03-13 RX ORDER — SPIRONOLACTONE 25 MG/1
25 TABLET ORAL AT BEDTIME
Status: ON HOLD | COMMUNITY
Start: 2024-03-12 | End: 2024-07-04

## 2024-03-13 ASSESSMENT — PAIN SCALES - GENERAL: PAINLEVEL: NO PAIN (0)

## 2024-03-13 NOTE — LETTER
3/13/2024      RE: Navin Lutz  [address] [place] [address]       Dear Colleague,    Thank you for the opportunity to participate in the care of your patient, Navin Lutz, at the [facility]. Please see a copy of my visit note below.    Virtual Visit Details    Type of service:  Video Visit     Originating Location (pt. Location): Home  Distant Location (provider location):  On-site  Platform used for Video Visit: [facility]      Advanced Heart Failure/Transplant Clinic Note    HPI  Dear colleagues,     I had the pleasure of seeing Mr. Navin Lutz in the Cardiology clinic. As you know, Mr. Navin Lutz is a pleasant 53 year old male with a past medical history of chronic HFrEF 2/2 NICM s/p ICD, HLD, and CKD Stage II who presents for follow up for HFrEF.    Patient reports he was first having symptoms of CHF in 2017 and was diagnosed with HFrEF shortly after that. He has been on various GDMT and overall did well with no hospital admissions until June '23.     Patient admitted to [facility] from 8/22-8/28/23 for cardiogenic shock. He was initially started on dobutamine, but then switched to milrinone prior to discharge. On this, he was having a significant amount of ectopy, so low-dose carvedilol was prior to discharge. 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    Patient was transferred from H in November '23 with hypotension, he was diuresed and his milrinone was adjusted back to his prior home dose and discharged home.    Patient reports he overall feels stable. Patient reports recently his local cardiologist increased his carvedilol in the last couple weeks for more ectopy, but this made him more hypotensive, so he went back to his prior dose. He does feel like he cannot do the same level of activity he could a couple months ago. He is now using lasix every other  day. He has been compliant with his medications, diet, fluid restriction, and monitoring his weights and BPs. He reports most days feeling presyncope, but again, this sometimes gets worse than other days. He denies orthopnea, PND, chest pain, palpitations, syncope, abdominal pain, nausea, emesis, LE edema, or weight gain.    ROS:  A complete 12-point ROS was negative except as above.    PAST MEDICAL HISTORY:  Patient Active Problem List   Diagnosis     Acute on chronic systolic CHF (congestive heart failure) (H)     Chest pain     ICD (implantable cardioverter-defibrillator) in place     Inguinal hernia     Multiple lung nodules on CT     Non-ischemic cardiomyopathy (H)     Pure hypercholesterolemia     RAD (reactive airway disease) with wheezing, mild intermittent, uncomplicated     Acute deep vein thrombosis (DVT) of other vein of left upper extremity (H)     Cardiogenic shock (H)   Chronic biventricular systolic and LV diastolic dysfunction (HFrEF)  ACC/AHA stage C, NYHA class III  EF 18% (cardiac MRI) on 6/14/2023  EF 13% with grade I diastolic dysfunction on 6/13/2023  Reduced RVSF  EF 20-25% on 9/22/2020  EF 25-30% on 9/20/2019  EF 20% on 3/26/2019  EF 20-25% on 1/22/2019  EF 15-20% on 9/24/2018  EF 15-20% with grade 5 diastolic dysfunction on 12/11/2017   Nonischemic dilated cardiomyopathy  LIVDd 7.2 cm  Viral mediated?  Evaluation at [facility] in 2019  Cardiac MRI on 6/14/2023 = severe left ventricular dilation with severe, diffuse hypokinesis and mid wall late gadolinium enhancement stripe throughout the septum consistent with nonischemic dilated cardiomyopathy; mild patchy myocardial edema along the left ventricular anterior, anterolateral and inferolateral walls in the basal and mid cavity segments could represent an element of myocarditis; trace aortic regurgitation, trace mitral valve regurgitation, mild tricuspid valve regurgitation  LHC and RHC on 6/13/2023 = intramyocardial bridging of the dLAD  otherwise normal coronary arteries; LVEDP 3 mmHg; normal pressures with no pulmonary hypertension; RA 4 mmHg, RV 22/4 mmHg, PA 23/11 mmHg with mean PAP of 16 mmHg, PCWP 7 mmHg, CO 4.32 L/min and CI 2.1 L/min*m2 by Teressa, CO 2.05 L/min and CI 1.0 L/min*m2 by thermodilution  LHC and RHC on 1/9/2018 = arterial pressure 82/59 (67); normal pulmonary capillary wedge, and mean pulmonary artery pressures; cardiac output by thermodilution was 3.07 L/min, index at 1.5; no angiographic evidence of coronary artery disease present  History of NSVT  Moderate to severe eccentric LVH  Valvular heart disease  Mild MR  Dyslipidemia  Possible obstructive sleep apnea       FAMILY HISTORY:  No known family history of heart disease except possible his father had an enlarged heart, but no formal diagnosis    SOCIAL HISTORY:  , owns a [employer] company. No prior tobacco, ETOH, or illicit substance use.  Social History     Tobacco Use     Smoking status: Never     Smokeless tobacco: Never   Substance Use Topics     Alcohol use: Never     Drug use: Never        ALLERGIES:  Not on File    CURRENT MEDICATIONS:  Current Outpatient Medications   Medication Sig Dispense Refill     atorvastatin (LIPITOR) 20 MG tablet Take 1 tablet (20 mg) by mouth every evening for 3 days 3 tablet 0     carvedilol (COREG) 3.125 MG tablet Take 1 tablet (3.125 mg) by mouth 2 times daily (with meals) for 3 doses 3 tablet 0     empagliflozin (JARDIANCE) 10 MG TABS tablet Take 10 mg by mouth       furosemide (LASIX) 20 MG tablet Take 1 tablet (20 mg) by mouth as needed (for weight gain, swelling) 3 tablet 0     heparin 100 UNIT/ML SOLN flush 500 Units by Intravenous (Continuous Infusion) route as needed       milrinone (PRIMACOR) infusion 200 mcg/mL PREMIX Inject 23.475 mcg/min into the vein continuous 100 mL 0     nitroGLYcerin (NITROSTAT) 0.4 MG sublingual tablet Place 0.4 mg under the tongue every 5 minutes as needed for chest pain May repeat every 5 minutes  for a total of 3 doses.       potassium chloride ER (KLOR-CON M) 20 MEQ CR tablet Take 1 tablet (20 mEq) by mouth daily 30 tablet 1     sacubitril-valsartan (ENTRESTO) 49-51 MG per tablet Take 1 tablet by mouth 2 times daily for 3 doses 3 tablet 0     warfarin ANTICOAGULANT (COUMADIN) 5 MG tablet Take 5 mg by mouth         EXAM:  There were no vitals taken for this visit. (Virtual)  General: appears comfortable, alert and interactive, in no acute distress  Head: normocephalic, atraumatic  Eyes: anicteric sclera, EOMI  Mouth: MMM  Resp: nonlabored respirations  Neurological: alert and oriented, no focal deficits  Psych: normal mood and affect  Derm: no rashes on exposed surfaces    Weight  Wt Readings from Last 10 Encounters:   01/19/24 95.4 kg (210 lb 6.4 oz)   01/19/24 91.2 kg (201 lb)   01/19/24 96.3 kg (212 lb 3.2 oz)   12/08/23 91.4 kg (201 lb 6.4 oz)   11/20/23 93.9 kg (207 lb 0.2 oz)   11/01/23 91.6 kg (202 lb)   09/20/23 94.3 kg (208 lb)   09/20/23 93.1 kg (205 lb 3.2 oz)   08/28/23 89.9 kg (198 lb 3.2 oz)   07/19/23 93.7 kg (206 lb 9.6 oz)       I personally reviewed recent labs and data as below and discussed the results with the patient in clinic today.  Labs:  CBC RESULTS:  Lab Results   Component Value Date    WBC 6.3 01/19/2024    RBC 5.95 (H) 01/19/2024    HGB 18.8 (H) 01/19/2024    HGB 18.1 (H) 01/19/2024    HCT 53.3 (H) 01/19/2024    MCV 90 01/19/2024    MCH 30.4 01/19/2024    MCHC 34.0 01/19/2024    RDW 13.7 01/19/2024     (L) 01/19/2024       CMP RESULTS:  Lab Results   Component Value Date     01/19/2024    POTASSIUM 4.7 01/19/2024    CHLORIDE 107 01/30/2024    CO2 28 01/19/2024    ANIONGAP 6 (L) 01/19/2024     (H) 01/19/2024     (H) 11/18/2023    BUN 13.0 01/19/2024    CR 1.34 (H) 01/19/2024    GFRESTIMATED 63 01/19/2024    NAM 9.4 01/19/2024    BILITOTAL 0.6 01/19/2024    ALBUMIN 4.2 01/19/2024    ALKPHOS 98 01/19/2024    ALT 47 01/19/2024    AST 27 01/19/2024           No results for input(s): CHOL, HDL, LDL, TRIG, CHOLHDLRATIO in the last 48914 hours.     Testing/Procedures:  I personally visualized and interpreted:  Echocardiogram 6/13/23  Narrative       Left Ventricle: The left ventricle is severely dilated. There is   moderate-to-severe eccentric left ventricular hypertrophy. Severely   reduced left ventricular systolic function. The ejection fraction,   measured by biplane, is 13%. Severe hypokinesis of apex, remainder wall   segments are mostly akinetic. Grade I diastolic dysfunction.         Left Ventricle   The left ventricle is severely dilated. There is moderate-to-severe eccentric left ventricular hypertrophy. Severely reduced left ventricular systolic function. The ejection fraction, measured by biplane, is 13%. Severe hypokinesis of apex, remainder wall segments are mostly akinetic. Grade I diastolic dysfunction.     Right Ventricle   The right ventricle appears normal in size. Systolic function is reduced. Normal TAPSE, measuring 1.8 cm. Normal systolic excursion velocity by TDI. S' measures 9.5 cm/sec. Wall thickness is normal. The RVSP measures 13 mmHg. There is no evidence of pulmonary hypertension.     Left Atrium   The left atrium is normal in size. The pulmonary veins have normal venous flow.     Right Atrium   The right atrium is normal in size.     IVC/SVC   Normal IVC size with normal respirophasic changes.Normal hepatic vein blood flow.     Mitral Valve   Mitral valve structure is normal. There is trace regurgitation. There is no evidence of mitral valve stenosis.     Tricuspid Valve   Tricuspid valve structure is normal. There is mild regurgitation. There is no evidence of tricuspid valve stenosis.     Aortic Valve   The aortic valve is tricuspid. There is trace regurgitation. There is no evidence of aortic valve stenosis.     Pulmonic Valve   The pulmonic valve was not well visualized. There is trace regurgitation. There is no evidence of pulmonic  valve stenosis.     Pericardium   There is no pericardial effusion.     Aorta   The sinus of Valsalva is normal. The ascending aorta is normal.     Interatrial Septum   No evidence of an atrial shunt by color Doppler.     Coronary Angiogram/RHC 6/13/23  Conclusions:   Coronary angiogram: Intramyocardial bridging of the distal LAD, otherwise   normal coronaries.   Left heart catheterization: No significant gradient across aortic valve.     LVEDP 3 mmHg.   Right heart catheterization: Normal pressures.  No pulmonary hypertension.    Low cardiac output.  No shunt.   -RA 4 mmHg   -RV 22/4 mmHg   -PA 23/11 mmHg, Mean PAP 16 mmHg   -PCWP 7 mmHg   -Teressa: CO 4.32 L/min, CI 2.1 L/min*m2   -Thermodilution: CO 2.05 L/min, CI 1.0 L/min*m2     cMRI 6/14/23  FINDINGS:   AORTA: The ascending aorta and aortic root are normal caliber. The sinotubular junction is maintained. Normal aortic wall thickness.     ARCH VESSELS: Arch vessels are grossly patent and partially visualized.     RIGHT ATRIUM: Normal size.     TRICUSPID VALVE: Unrestricted leaflet excursion without stenosis. Mild regurgitation.     RIGHT VENTRICLE: Normal morphology, size, and wall thickness. No wall motion abnormalities identified.     PULMONIC VALVE: Unrestricted leaflet excursion without stenosis or regurgitation.     PULMONARY VEINS: Widely patent.     LEFT ATRIUM: Mild dilation. No left atrial appendage thrombus.     MITRAL VALVE: Unrestrictive leaflet excursion without stenosis. Trace regurgitation.     LEFT VENTRICLE: Normal morphology and thickness with marked dilation. End-diastolic basal septal thickness of 9 mm.     Severe, diffuse hypokinesis. Normal first pass perfusion.     Mid wall late gadolinium enhancement throughout the septum. Patchy myocardial edema along the anterior, anterolateral and inferolateral walls in the basal and mid cavity segments.       EKG 6/13/23 shows sinus rhythm, nonspecific t wave abnormalities    RHC 8/22/23  RA --/--/5  RV  20/5  PA 20/12/16  PCWP --/--/12  IESHA 3.2/1.5  TD 2.93/1.36  MAP 96  PVR 1.25 (IESHA)  SVR 2275 (IESHA)     TTE 8/23/23  Interpretation Summary  Severe left ventricular dilation (LVIDd 6.8cm). Left ventricular function is  decreased. The ejection fraction is 15-20% (severely reduced). Severe diffuse  hypokinesis.  Global right ventricular function is normal.  No significant valve abnormalities.  The inferior vena cava is normal.  No pericardial effusion.    RHC 9/20/23  RA: 3/4/3 mmHg  RV: 23/2 mmHg  PA: 23/14/18 mmHg  CWP: --/--/8 mmHg  CO/CI (Iesha): 3.63/1.69 L/min/m2  CO/CI (TD): 4.1/1.9 L/min/m2  PA sat: 68.7%  MAP: 84 mmHg   PVR: 2.4 (TD) BYRD      Hemodynamics 11/19/23  CVP 4, PA 26/12/16, PCWP 12, SVO2 78, Iesha CI 2.5, PVR 1, SVR 1353 on milrinone 0.25    Outside results of note:  Outside records were obtained and relevant results/notes have been incorporated into HPI.    Assessment and Plan:     In summary, 53 year old male with a past medical history of chronic HFrEF 2/2 NICM s/p ICD, HLD, and CKD Stage II who presents as follow up for HFrEF.    Chronic systolic heart failure/HFrEF (EF 10-15%) secondary to nonischemic cardiomyopathy  NYHA Symptom Class IIIb  Stage D  Inotrope: Continue milrinone 0.25  ACE-I/ARB/ARNi: Continue Entresto 49-51 mg BID, unable to increase given frequent presyncope  BB: Continue coreg 6.25 mg BID given frequent ectopy on inotrope therapy, did not tolerate higher doses  Aldosterone antagonist: Continue beck 25 mg daily  SGLT2i: Continue empagliflozin 10 mg daily  SCD prophylaxis: s/p ICD  %BiV pacing: N/A  Fluid status: euvoelmic on lasix 20 mg Q48H  Cardiac Rehab: previously referred  - Currently listed status 4 for heart transplant  - Discussed importance of daily standing weights, 2-3L fluid, and 2g Na restriction    Myocardial Bridge Over Coronary Artery  HLD  Myocardial bridge over distal LAD.  - Continue to monitor    CKD Stage III  Cr ~1.2-1.4.  - Will continue to  monitor    RUE DVT  - Continue warfarin with INR goal 2-3    PICC Skin Irritation  - PICC dressing changed 1/19/24, new tape given for skin irritation, if issues, will need to change PICC to [place] (this could be done locally in patient's hemodynamics are stable on RHC today)    Optimal Vascular Metrics    Blood Pressure   BP < 140/90 Yes    On Aspirin  No: Contraindicated due to: On warfarin already    On Statin  Yes    Tobacco use  No     To Do:  - No medication changes  - Follow up with local Cardiology as scheduled  - Repeat RHC in May '24 with follow up visit, may require admission at that time pending hemodynamics, patient will notify us if worsening in the meantime    The patient states understanding and is agreeable with plan.   Feel free to contact myself regarding questions or concerns. It was a pleasure to see this patient today.    A total of 41 minutes was spent on the day of the visit, which includes preparation for the visit (reviewing previous medical records, laboratories and investigations), in conjunction with the actual clinic visit with the patient, which includes obtaining a history and physical exam, creating and reviewing the care plan, patient education (and family if present), counseling, documenting clinical information in the electronic health record and care coordination.     The longitudinal plan of care for the diagnosis(es)/condition(s) as documented were addressed during this visit. Due to the added complexity in care, I will continue to support Navin in the subsequent management and with ongoing continuity of care.     Micaela Silvestre MD   of Medicine, [facility]  Advanced Heart Failure and Transplant Cardiology     CC  GALO HERNANDEZ NP      Please do not hesitate to contact me if you have any questions/concerns.     Sincerely,     Micaela Silvestre MD

## 2024-03-13 NOTE — PATIENT INSTRUCTIONS
Cardiology Providers you saw during your visit:  Dr. Silvester     Medication changes:  1- no changes     Follow up:  1- Right heart catheterization and Dr Silvestre in early May     Pre-procedure instructions - Right heart catheterization  Patient Education    Your arrival time is tbd.  Location is [id number] [place] [address] Waiting Room  Please plan on being at the hospital all day.  At any time, emergencies and/or urgent cases may come up which could delay the start of your procedure.    Pre-procedure instructions - Right heart catheterization  No solid food for 8 hours prior  Nothing to drink 2 hours prior to arrival time  You can take your morning medications (except diabetic and blood thinners) with sips of water  We recommend you arrange for a ride to drop you off and pick you up, in the instance, you are unable to drive home, however you should be able to function as you normally would after the procedure     Diabetic Medication Instructions  Hold oral diabetic medication in morning of your procedure and for 48 hours after IV contrast is given  Typical instructions for insulin diabetic medication holding are below. However, please reach out to your Primary Care Provider or Endocrinologist for specific instructions  DO NOT take any oral diabetic medication, short-acting diabetes medications/insulin, humalog or regular insulin the morning of your test  Take   dose of long-acting insulin (Lantus, Levemir) the day of your test  Remember to bring your glucometer and insulin with you to take after your test if needed  GLP-1 Agonists Instructions  DO NOT take injectable GLP-1 agonists semaglutide (Ozempic, Wegovy), dulaglutide (Trulicity), exenatide ER (Bydureon), tirzepatide (Mounjaro), or oral semaglutide (Rybelsus) for 7 days prior your procedure  Hold once daily injectable GLP-1 agonists exenatide (Byetta), liraglutide (Saxenda, Victoza),  lixisenatide (Soligua) the day before and day of your procedure                Anticoagulation Medication Instructions   warfarin (COUMADIN) - Hold 3 Days prior to procedure          Please call if you have:  1. Weight gain of more than 2 pounds in a day or 5 pounds in a week  2. Increased shortness of breath, swelling or bloating  3. Dizziness, lightheadedness   4. Any questions or concerns.      Heart Failure Support Group  Support group is held virtually. Please reach out if you would like to attend and we can get you the information you need to log in.   2024 dates for support group meetings:     Monday, April 1st, 1-2pm     Monday, May 6th, 1-2pm     Monday, June 3rd, 1-2pm     Monday, July 1st, 1-2pm     Monday, August 5th, 1-2pm     Monday, September 9th, 1-2pm     Monday, October 7th, 1-2pm     Monday, November 4th, 1-2pm     Monday, December 2nd, 1-2pm     Follow the American Heart Association Diet and Lifestyle recommendations:  Limit saturated fat, trans fat, sodium, red meat, sweets and sugar-sweetened beverages. If you choose to eat red meat, compare labels and select the leanest cuts available.  Aim for at least 150 minutes of moderate physical activity or 75 minutes of vigorous physical activity - or an equal combination of both - each week.     During business hours: [phone], press option # 1 to schedule an appointment or send a message to your care team     After hours, weekends or holidays: On Call Cardiologist- [phone]   option #4 and ask to speak to the on-call Cardiologist. Inform them you are a CORE/heart failure patient at the [facility].     Shannon Dixon RN BSN  Cardiology Nurse Care Coordinator (Heart Failure / C.O.R.E.)

## 2024-03-13 NOTE — NURSING NOTE
Is the patient currently in the state of [place]? NO    Visit mode:VIDEO    If the visit is dropped, the patient can be reconnected by: VIDEO VISIT: Send to e-mail at: [email or web address]    Will anyone else be joining the visit? Pts wife   (If patient encounters technical issues they should call [phone]    How would you like to obtain your AVS? MyChart    Are changes needed to the allergy or medication list?  Pt States still taking Lipitor, Carvedilol, and Entresto but they are  on medication list     Reason for visit: LEONEL Pierce MA VVF

## 2024-03-13 NOTE — PROGRESS NOTES
Virtual Visit Details    Type of service:  Video Visit     Originating Location (pt. Location): Home  Distant Location (provider location):  On-site  Platform used for Video Visit: [facility]      Advanced Heart Failure/Transplant Clinic Note    HPI  Dear colleagues,     I had the pleasure of seeing Mr. Navin Lutz in the Cardiology clinic. As you know, Mr. Navin Lutz is a pleasant 53 year old male with a past medical history of chronic HFrEF 2/2 NICM s/p ICD, HLD, and CKD Stage II who presents for follow up for HFrEF.    Patient reports he was first having symptoms of CHF in 2017 and was diagnosed with HFrEF shortly after that. He has been on various GDMT and overall did well with no hospital admissions until June '23.     Patient admitted to [facility] from 8/22-8/28/23 for cardiogenic shock. He was initially started on dobutamine, but then switched to milrinone prior to discharge. On this, he was having a significant amount of ectopy, so low-dose carvedilol was prior to discharge. 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    Patient was transferred from [facility] in November '23 with hypotension, he was diuresed and his milrinone was adjusted back to his prior home dose and discharged home.    Patient reports he overall feels stable. Patient reports recently his local cardiologist increased his carvedilol in the last couple weeks for more ectopy, but this made him more hypotensive, so he went back to his prior dose. He does feel like he cannot do the same level of activity he could a couple months ago. He is now using lasix every other day. He has been compliant with his medications, diet, fluid restriction, and monitoring his weights and BPs. He reports most days feeling presyncope, but again, this sometimes gets worse than other days. He denies orthopnea, PND, chest pain, palpitations, syncope, abdominal pain, nausea, emesis, LE edema, or weight  gain.    ROS:  A complete 12-point ROS was negative except as above.    PAST MEDICAL HISTORY:  Patient Active Problem List   Diagnosis    Acute on chronic systolic CHF (congestive heart failure) (H)    Chest pain    ICD (implantable cardioverter-defibrillator) in place    Inguinal hernia    Multiple lung nodules on CT    Non-ischemic cardiomyopathy (H)    Pure hypercholesterolemia    RAD (reactive airway disease) with wheezing, mild intermittent, uncomplicated    Acute deep vein thrombosis (DVT) of other vein of left upper extremity (H)    Cardiogenic shock (H)   Chronic biventricular systolic and LV diastolic dysfunction (HFrEF)  ACC/AHA stage C, NYHA class III  EF 18% (cardiac MRI) on 6/14/2023  EF 13% with grade I diastolic dysfunction on 6/13/2023  Reduced RVSF  EF 20-25% on 9/22/2020  EF 25-30% on 9/20/2019  EF 20% on 3/26/2019  EF 20-25% on 1/22/2019  EF 15-20% on 9/24/2018  EF 15-20% with grade 5 diastolic dysfunction on 12/11/2017   Nonischemic dilated cardiomyopathy  LIVDd 7.2 cm  Viral mediated?  Evaluation at [facility] in 2019  Cardiac MRI on 6/14/2023 = severe left ventricular dilation with severe, diffuse hypokinesis and mid wall late gadolinium enhancement stripe throughout the septum consistent with nonischemic dilated cardiomyopathy; mild patchy myocardial edema along the left ventricular anterior, anterolateral and inferolateral walls in the basal and mid cavity segments could represent an element of myocarditis; trace aortic regurgitation, trace mitral valve regurgitation, mild tricuspid valve regurgitation  LHC and RHC on 6/13/2023 = intramyocardial bridging of the dLAD otherwise normal coronary arteries; LVEDP 3 mmHg; normal pressures with no pulmonary hypertension; RA 4 mmHg, RV 22/4 mmHg, PA 23/11 mmHg with mean PAP of 16 mmHg, PCWP 7 mmHg, CO 4.32 L/min and CI 2.1 L/min*m2 by Teressa, CO 2.05 L/min and CI 1.0 L/min*m2 by thermodilution  LHC and RHC on 1/9/2018 = arterial pressure 82/59 (67);  normal pulmonary capillary wedge, and mean pulmonary artery pressures; cardiac output by thermodilution was 3.07 L/min, index at 1.5; no angiographic evidence of coronary artery disease present  History of NSVT  Moderate to severe eccentric LVH  Valvular heart disease  Mild MR  Dyslipidemia  Possible obstructive sleep apnea       FAMILY HISTORY:  No known family history of heart disease except possible his father had an enlarged heart, but no formal diagnosis    SOCIAL HISTORY:  , owns a [employer] company. No prior tobacco, ETOH, or illicit substance use.  Social History     Tobacco Use    Smoking status: Never    Smokeless tobacco: Never   Substance Use Topics    Alcohol use: Never    Drug use: Never        ALLERGIES:  Not on File    CURRENT MEDICATIONS:  Current Outpatient Medications   Medication Sig Dispense Refill    atorvastatin (LIPITOR) 20 MG tablet Take 1 tablet (20 mg) by mouth every evening for 3 days 3 tablet 0    carvedilol (COREG) 3.125 MG tablet Take 1 tablet (3.125 mg) by mouth 2 times daily (with meals) for 3 doses 3 tablet 0    empagliflozin (JARDIANCE) 10 MG TABS tablet Take 10 mg by mouth      furosemide (LASIX) 20 MG tablet Take 1 tablet (20 mg) by mouth as needed (for weight gain, swelling) 3 tablet 0    heparin 100 UNIT/ML SOLN flush 500 Units by Intravenous (Continuous Infusion) route as needed      milrinone (PRIMACOR) infusion 200 mcg/mL PREMIX Inject 23.475 mcg/min into the vein continuous 100 mL 0    nitroGLYcerin (NITROSTAT) 0.4 MG sublingual tablet Place 0.4 mg under the tongue every 5 minutes as needed for chest pain May repeat every 5 minutes for a total of 3 doses.      potassium chloride ER (KLOR-CON M) 20 MEQ CR tablet Take 1 tablet (20 mEq) by mouth daily 30 tablet 1    sacubitril-valsartan (ENTRESTO) 49-51 MG per tablet Take 1 tablet by mouth 2 times daily for 3 doses 3 tablet 0    warfarin ANTICOAGULANT (COUMADIN) 5 MG tablet Take 5 mg by mouth         EXAM:  There were no  vitals taken for this visit. (Virtual)  General: appears comfortable, alert and interactive, in no acute distress  Head: normocephalic, atraumatic  Eyes: anicteric sclera, EOMI  Mouth: MMM  Resp: nonlabored respirations  Neurological: alert and oriented, no focal deficits  Psych: normal mood and affect  Derm: no rashes on exposed surfaces    Weight  Wt Readings from Last 10 Encounters:   01/19/24 95.4 kg (210 lb 6.4 oz)   01/19/24 91.2 kg (201 lb)   01/19/24 96.3 kg (212 lb 3.2 oz)   12/08/23 91.4 kg (201 lb 6.4 oz)   11/20/23 93.9 kg (207 lb 0.2 oz)   11/01/23 91.6 kg (202 lb)   09/20/23 94.3 kg (208 lb)   09/20/23 93.1 kg (205 lb 3.2 oz)   08/28/23 89.9 kg (198 lb 3.2 oz)   07/19/23 93.7 kg (206 lb 9.6 oz)       I personally reviewed recent labs and data as below and discussed the results with the patient in clinic today.  Labs:  CBC RESULTS:  Lab Results   Component Value Date    WBC 6.3 01/19/2024    RBC 5.95 (H) 01/19/2024    HGB 18.8 (H) 01/19/2024    HGB 18.1 (H) 01/19/2024    HCT 53.3 (H) 01/19/2024    MCV 90 01/19/2024    MCH 30.4 01/19/2024    MCHC 34.0 01/19/2024    RDW 13.7 01/19/2024     (L) 01/19/2024       CMP RESULTS:  Lab Results   Component Value Date     01/19/2024    POTASSIUM 4.7 01/19/2024    CHLORIDE 107 01/30/2024    CO2 28 01/19/2024    ANIONGAP 6 (L) 01/19/2024     (H) 01/19/2024     (H) 11/18/2023    BUN 13.0 01/19/2024    CR 1.34 (H) 01/19/2024    GFRESTIMATED 63 01/19/2024    NAM 9.4 01/19/2024    BILITOTAL 0.6 01/19/2024    ALBUMIN 4.2 01/19/2024    ALKPHOS 98 01/19/2024    ALT 47 01/19/2024    AST 27 01/19/2024          No results for input(s): CHOL, HDL, LDL, TRIG, CHOLHDLRATIO in the last 99307 hours.     Testing/Procedures:  I personally visualized and interpreted:  Echocardiogram 6/13/23  Narrative      Left Ventricle: The left ventricle is severely dilated. There is   moderate-to-severe eccentric left ventricular hypertrophy. Severely   reduced left  ventricular systolic function. The ejection fraction,   measured by biplane, is 13%. Severe hypokinesis of apex, remainder wall   segments are mostly akinetic. Grade I diastolic dysfunction.         Left Ventricle   The left ventricle is severely dilated. There is moderate-to-severe eccentric left ventricular hypertrophy. Severely reduced left ventricular systolic function. The ejection fraction, measured by biplane, is 13%. Severe hypokinesis of apex, remainder wall segments are mostly akinetic. Grade I diastolic dysfunction.     Right Ventricle   The right ventricle appears normal in size. Systolic function is reduced. Normal TAPSE, measuring 1.8 cm. Normal systolic excursion velocity by TDI. S' measures 9.5 cm/sec. Wall thickness is normal. The RVSP measures 13 mmHg. There is no evidence of pulmonary hypertension.     Left Atrium   The left atrium is normal in size. The pulmonary veins have normal venous flow.     Right Atrium   The right atrium is normal in size.     IVC/SVC   Normal IVC size with normal respirophasic changes.Normal hepatic vein blood flow.     Mitral Valve   Mitral valve structure is normal. There is trace regurgitation. There is no evidence of mitral valve stenosis.     Tricuspid Valve   Tricuspid valve structure is normal. There is mild regurgitation. There is no evidence of tricuspid valve stenosis.     Aortic Valve   The aortic valve is tricuspid. There is trace regurgitation. There is no evidence of aortic valve stenosis.     Pulmonic Valve   The pulmonic valve was not well visualized. There is trace regurgitation. There is no evidence of pulmonic valve stenosis.     Pericardium   There is no pericardial effusion.     Aorta   The sinus of Valsalva is normal. The ascending aorta is normal.     Interatrial Septum   No evidence of an atrial shunt by color Doppler.     Coronary Angiogram/RHC 6/13/23  Conclusions:   Coronary angiogram: Intramyocardial bridging of the distal LAD, otherwise    normal coronaries.   Left heart catheterization: No significant gradient across aortic valve.     LVEDP 3 mmHg.   Right heart catheterization: Normal pressures.  No pulmonary hypertension.    Low cardiac output.  No shunt.   -RA 4 mmHg   -RV 22/4 mmHg   -PA 23/11 mmHg, Mean PAP 16 mmHg   -PCWP 7 mmHg   -Iesha: CO 4.32 L/min, CI 2.1 L/min*m2   -Thermodilution: CO 2.05 L/min, CI 1.0 L/min*m2     cMRI 6/14/23  FINDINGS:   AORTA: The ascending aorta and aortic root are normal caliber. The sinotubular junction is maintained. Normal aortic wall thickness.     ARCH VESSELS: Arch vessels are grossly patent and partially visualized.     RIGHT ATRIUM: Normal size.     TRICUSPID VALVE: Unrestricted leaflet excursion without stenosis. Mild regurgitation.     RIGHT VENTRICLE: Normal morphology, size, and wall thickness. No wall motion abnormalities identified.     PULMONIC VALVE: Unrestricted leaflet excursion without stenosis or regurgitation.     PULMONARY VEINS: Widely patent.     LEFT ATRIUM: Mild dilation. No left atrial appendage thrombus.     MITRAL VALVE: Unrestrictive leaflet excursion without stenosis. Trace regurgitation.     LEFT VENTRICLE: Normal morphology and thickness with marked dilation. End-diastolic basal septal thickness of 9 mm.     Severe, diffuse hypokinesis. Normal first pass perfusion.     Mid wall late gadolinium enhancement throughout the septum. Patchy myocardial edema along the anterior, anterolateral and inferolateral walls in the basal and mid cavity segments.       EKG 6/13/23 shows sinus rhythm, nonspecific t wave abnormalities    RHC 8/22/23  RA --/--/5  RV 20/5  PA 20/12/16  PCWP --/--/12  IESHA 3.2/1.5  TD 2.93/1.36  MAP 96  PVR 1.25 (IESHA)  SVR 2275 (IESHA)     TTE 8/23/23  Interpretation Summary  Severe left ventricular dilation (LVIDd 6.8cm). Left ventricular function is  decreased. The ejection fraction is 15-20% (severely reduced). Severe diffuse  hypokinesis.  Global right ventricular  function is normal.  No significant valve abnormalities.  The inferior vena cava is normal.  No pericardial effusion.    RHC 9/20/23  RA: 3/4/3 mmHg  RV: 23/2 mmHg  PA: 23/14/18 mmHg  CWP: --/--/8 mmHg  CO/CI (Teressa): 3.63/1.69 L/min/m2  CO/CI (TD): 4.1/1.9 L/min/m2  PA sat: 68.7%  MAP: 84 mmHg   PVR: 2.4 (TD) BYRD      Hemodynamics 11/19/23  CVP 4, PA 26/12/16, PCWP 12, SVO2 78, Teressa CI 2.5, PVR 1, SVR 1353 on milrinone 0.25    Outside results of note:  Outside records were obtained and relevant results/notes have been incorporated into HPI.    Assessment and Plan:     In summary, 53 year old male with a past medical history of chronic HFrEF 2/2 NICM s/p ICD, HLD, and CKD Stage II who presents as follow up for HFrEF.    Chronic systolic heart failure/HFrEF (EF 10-15%) secondary to nonischemic cardiomyopathy  NYHA Symptom Class IIIb  Stage D  Inotrope: Continue milrinone 0.25  ACE-I/ARB/ARNi: Continue Entresto 49-51 mg BID, unable to increase given frequent presyncope  BB: Continue coreg 6.25 mg BID given frequent ectopy on inotrope therapy, did not tolerate higher doses  Aldosterone antagonist: Continue beck 25 mg daily  SGLT2i: Continue empagliflozin 10 mg daily  SCD prophylaxis: s/p ICD  %BiV pacing: N/A  Fluid status: euvoelmic on lasix 20 mg Q48H  Cardiac Rehab: previously referred  - Currently listed status 4 for heart transplant  - Discussed importance of daily standing weights, 2-3L fluid, and 2g Na restriction    Myocardial Bridge Over Coronary Artery  HLD  Myocardial bridge over distal LAD.  - Continue to monitor    CKD Stage III  Cr ~1.2-1.4.  - Will continue to monitor    RUE DVT  - Continue warfarin with INR goal 2-3    PICC Skin Irritation  - PICC dressing changed 1/19/24, new tape given for skin irritation, if issues, will need to change PICC to [place] (this could be done locally in patient's hemodynamics are stable on RHC today)    Optimal Vascular Metrics    Blood Pressure   BP < 140/90 Yes    On  Aspirin  No: Contraindicated due to: On warfarin already    On Statin  Yes    Tobacco use  No     To Do:  - No medication changes  - Follow up with local Cardiology as scheduled  - Repeat RHC in May '24 with follow up visit, may require admission at that time pending hemodynamics, patient will notify us if worsening in the meantime    The patient states understanding and is agreeable with plan.   Feel free to contact myself regarding questions or concerns. It was a pleasure to see this patient today.    A total of 41 minutes was spent on the day of the visit, which includes preparation for the visit (reviewing previous medical records, laboratories and investigations), in conjunction with the actual clinic visit with the patient, which includes obtaining a history and physical exam, creating and reviewing the care plan, patient education (and family if present), counseling, documenting clinical information in the electronic health record and care coordination.     The longitudinal plan of care for the diagnosis(es)/condition(s) as documented were addressed during this visit. Due to the added complexity in care, I will continue to support Navin in the subsequent management and with ongoing continuity of care.     Micaela Silvestre MD   of Medicine, [facility]  Advanced Heart Failure and Transplant Cardiology     CC  GALO HERNANDEZ NP

## 2024-03-14 ENCOUNTER — CARE COORDINATION (OUTPATIENT)
Dept: CARDIOLOGY | Facility: CLINIC | Age: 54
End: 2024-03-14
Payer: COMMERCIAL

## 2024-03-14 NOTE — PROGRESS NOTES
Called Navin to review date for appointments.  Scheduled for 5/3 dr morris then [facility] to follow. He states understanding, states that date will work. HE knows to call us with any questions, concerns or changes in the interim.

## 2024-03-15 ENCOUNTER — CARE COORDINATION (OUTPATIENT)
Dept: TRANSPLANT | Facility: CLINIC | Age: 54
End: 2024-03-15
Payer: COMMERCIAL

## 2024-03-22 ENCOUNTER — CARE COORDINATION (OUTPATIENT)
Dept: TRANSPLANT | Facility: CLINIC | Age: 54
End: 2024-03-22
Payer: COMMERCIAL

## 2024-03-22 NOTE — LETTER
Navin Lutz   [address]  [facility] [address]                2024    Re:  Navin Lutz  : 1970      To Whom It May Concern:    Due to delays in shipping from pharmacy, Navin's medication are not arriving today. It is unadvisable for him to leave town without necessary and sufficient supply of medications.       If you have any questions, please call Bernadine Cote RN, Transplant Coordinator,  at [phone] (option 2) or [phone].           Micaela Silvestre MD   of Medicine  [facility], Cardiovascular Division      CC:  Navin Lutz

## 2024-03-22 NOTE — PROGRESS NOTES
Status 4E Heart Extension Complete  3/22/2024  Listing Criteria:  Exception    Dr. Silvestre and Dr. Carpenter approved the following statement prior to the submission of Status 4 Exception form in [facility]:    Our patient is a 53-year-old man, 94 kg, blood group O, non-sensitized, with end-stage, nonischemic cardiomyopathy s/p ICD and CKD Stage II that is now inotrope-dependent. He has severe left ventricular dilatation, normal biventricular filling pressures off diuretics, and reduced CI despite milrinone therapy. He was admitted on 8/22/23 following an outpatient RHC with SBP 88 that showed RA 8, mPAP 16, PCWP 12, Teressa CI 1.5, TD CI 1.36, PVR 1.25. He was started initially on dobutamine then transitioned to milrinone given frequent ectopy while on this, so low-dose coreg was added to aid with this. Repeat RHC on 9/20/23 showed RA 3, PCWP 8, Teressa CI 1.69 on milrinone @ 0.125mg/kg/min, so it was increased to 0.25mg/kg/min. Further afterload reduction was hindered by significant presyncopal symptoms. Despite addition of inotropes and being optimized from a fluid standpoint, he continues to have frequent rest symptoms of low-output heart failure. He required another admission on 11/16/23. Given his end-stage NICM with frequent admissions, persistently low CI/CO despite inotrope therapy we are respectfully requesting an extension of his status 4 listing by exception.      Status 4E Extension due 6/23/2024

## 2024-03-22 NOTE — PROGRESS NOTES
"Received telephone call from pt reporting that he missed his evening meds last night. Advised patient to take his normal morning medications and not to double dose any medications. Pt reported his BP was slightly more elevated at 120/84, baseline is 110/75. Pt will recheck later today. Regarding warfarin, author discussed with pt's local anticoagulation clinic in [facility] who will call patient with today's dosing information. Coumadin clinic reported INR on Tuesday of 2.9.     Pt reports feeling okay, more fatigued, requiring diuretic every other day, sometimes taking it two days in a row. Wife reports his coloring is \"off\". Pt denies increased SOB or swelling, denies nausea.     Discussed with patient the need for repeat US imaging of his DVT. Orders have been placed at [facility], pt will call to schedule.       "

## 2024-04-24 ENCOUNTER — CARE COORDINATION (OUTPATIENT)
Dept: TRANSPLANT | Facility: CLINIC | Age: 54
End: 2024-04-24
Payer: COMMERCIAL

## 2024-04-29 ENCOUNTER — TELEPHONE (OUTPATIENT)
Dept: TRANSPLANT | Facility: CLINIC | Age: 54
End: 2024-04-29
Payer: COMMERCIAL

## 2024-04-29 NOTE — TELEPHONE ENCOUNTER
Pre-procedure instructions - Right heart catheterization  Patient Education    Your arrival time is 9am on Friday 5/3.  Location is [address] Waiting Room  Please plan on being at the hospital all day.  At any time, emergencies and/or urgent cases may come up which could delay the start of your procedure.    Pre-procedure instructions - Right heart catheterization  No solid food for 8 hours prior  Nothing to drink 2 hours prior to arrival time  You can take your morning medications (except blood thinners) with sips of water  We recommend you arrange for a ride to drop you off and pick you up, in the instance, you are unable to drive home, however you should be able to function as you normally would after the procedure                Anticoagulation Medication Instructions   Warfarin instructions per your local anticoagulation clinic. Goal INR < 2.5

## 2024-05-01 ENCOUNTER — HOSPITAL ENCOUNTER (EMERGENCY)
Facility: CLINIC | Age: 54
Discharge: HOME | End: 2024-05-02
Attending: EMERGENCY MEDICINE | Admitting: EMERGENCY MEDICINE
Payer: COMMERCIAL

## 2024-05-01 VITALS
HEART RATE: 89 BPM | SYSTOLIC BLOOD PRESSURE: 116 MMHG | DIASTOLIC BLOOD PRESSURE: 77 MMHG | TEMPERATURE: 97.8 F | RESPIRATION RATE: 18 BRPM | OXYGEN SATURATION: 97 %

## 2024-05-01 DIAGNOSIS — Z76.0 ENCOUNTER FOR MEDICATION REFILL: ICD-10-CM

## 2024-05-01 DIAGNOSIS — I50.9 CONGESTIVE HEART FAILURE, UNSPECIFIED HF CHRONICITY, UNSPECIFIED HEART FAILURE TYPE (H): ICD-10-CM

## 2024-05-01 PROCEDURE — 99284 EMERGENCY DEPT VISIT MOD MDM: CPT | Mod: 25 | Performed by: EMERGENCY MEDICINE

## 2024-05-01 PROCEDURE — 99284 EMERGENCY DEPT VISIT MOD MDM: CPT | Performed by: EMERGENCY MEDICINE

## 2024-05-01 ASSESSMENT — COLUMBIA-SUICIDE SEVERITY RATING SCALE - C-SSRS
6. HAVE YOU EVER DONE ANYTHING, STARTED TO DO ANYTHING, OR PREPARED TO DO ANYTHING TO END YOUR LIFE?: NO
2. HAVE YOU ACTUALLY HAD ANY THOUGHTS OF KILLING YOURSELF IN THE PAST MONTH?: NO
1. IN THE PAST MONTH, HAVE YOU WISHED YOU WERE DEAD OR WISHED YOU COULD GO TO SLEEP AND NOT WAKE UP?: NO

## 2024-05-02 ENCOUNTER — TELEPHONE (OUTPATIENT)
Dept: CARDIOLOGY | Facility: CLINIC | Age: 54
End: 2024-05-02
Payer: COMMERCIAL

## 2024-05-02 PROCEDURE — 96374 THER/PROPH/DIAG INJ IV PUSH: CPT | Performed by: EMERGENCY MEDICINE

## 2024-05-02 PROCEDURE — 250N000011 HC RX IP 250 OP 636: Mod: JZ | Performed by: EMERGENCY MEDICINE

## 2024-05-02 RX ORDER — MILRINONE LACTATE 0.2 MG/ML
0.25 INJECTION, SOLUTION INTRAVENOUS CONTINUOUS
Status: DISCONTINUED | OUTPATIENT
Start: 2024-05-02 | End: 2024-05-02 | Stop reason: HOSPADM

## 2024-05-02 RX ADMIN — MILRINONE LACTATE IN DEXTROSE 0.25 MCG/KG/MIN: 200 INJECTION, SOLUTION INTRAVENOUS at 00:32

## 2024-05-02 NOTE — TELEPHONE ENCOUNTER
Called Kailey with [facility] back.  She wanted to keep us in the loop regarding the situation.     Navin lives in [place].  He left home yesterday to travel here for a Return HF visit w/[facility] and [facility] tomorrow 5/3/24.  He forgot his milrinone bags at home.  He presented to the ED last night and was dispensed 1 bag (lasts 12-24hrs?).  [facility] has been trying to get ahold of him to get him more bags delivered [place] but they have been unable to reach him. They are concerned that he might run out again tonight if they don't get ahold of him.  They plan to continue trying, but want us in the loop.    Will forward to Dr. Silvestre and Pre-Tx Team as well.     Mayelin Wolfe RN       Addendum:   1240 - Called patient, he answered and stated he was waiting for [facility] to call him back, but that he has about 2hrs left on current bag again.    I called Kailye back and she will call him right now to set up urgent delivery to his hotel.    Mayelin Wolfe, RN

## 2024-05-02 NOTE — ED TRIAGE NOTES
Pt presents to ED requesting medication for continuous infusion drip Milrinone. Pt states he left dose at home and came from out of town. Current infusion ends in 2 hours.       Triage Assessment (Adult)       Row Name 05/01/24 1641          Triage Assessment    Airway WDL WDL        Respiratory WDL    Respiratory WDL WDL        Skin Circulation/Temperature WDL    Skin Circulation/Temperature WDL WDL        Cardiac WDL    Cardiac WDL WDL     Cardiac Rhythm NSR        Peripheral/Neurovascular WDL    Peripheral Neurovascular WDL WDL        Cognitive/Neuro/Behavioral WDL    Cognitive/Neuro/Behavioral WDL WDL

## 2024-05-02 NOTE — TELEPHONE ENCOUNTER
[facility] Call Center    Phone Message    May a detailed message be left on voicemail: yes     Reason for Call: Other: Kailey from infusion id requesting a call back asap.  [place] came to the cities with milrinone bags.  Kailey is trying to get him some asap.       Action Taken: Other: cardio    Travel Screening: Not Applicable    Thank you!  [facility]

## 2024-05-02 NOTE — DISCHARGE INSTRUCTIONS
Follow-up with your cardiologist.  Also follow-up with your specialty pharmacy to get further refills of your milrinone.  Should they be unable to accommodate you on short notice please return to emergency department and we will get you additional milrinone.  Return to the emergency department as needed for any new or worsening symptoms.

## 2024-05-02 NOTE — ED PROVIDER NOTES
ED Provider Note  [facility]      History     Chief Complaint   Patient presents with    Medication Refill     HPI  Navin Lutz is a 53 year old male PMH of CHF, DVT, cardiogenic shock, ICD in place, anticoagulated on warfarin who presents to the ER for evaluation of a medication refill.     Patient is from [place] and is in town for an appointment with Dr. Silvestre on Friday. He left his Milrinone for his continuous infusion at home, he is on this for advanced heart failure. He has about an hour left in the current bag. He normally gets his bags from [facility], they are gone for the day so he was sent here for more medication. One bag normally lasts him 24 hours.  He otherwise feels fine today.  No chest pain shortness of breath or other complaints.  He simply needs a new bag of his milrinone for his infusion.  The pharmacy is normally supplies that can get him additional bags tomorrow.    Past Medical History  History reviewed. No pertinent past medical history.  Past Surgical History:   Procedure Laterality Date    CARDIAC SURGERY      COLONOSCOPY N/A 8/25/2023    Procedure: COLONOSCOPY, WITH POLYPECTOMY AND BIOPSY;  Surgeon: Alejandro Rodriguez MD;  Location:  GI    CV RIGHT HEART CATH MEASUREMENTS RECORDED N/A 08/22/2023    Procedure: Heart Cath Right Heart Cath;  Surgeon: Elfego Cruz MD;  Location:  HEART CARDIAC CATH LAB    CV RIGHT HEART CATH MEASUREMENTS RECORDED N/A 9/20/2023    Procedure: Heart Cath Right Heart Cath;  Surgeon: Elfego Cruz MD;  Location:  HEART CARDIAC CATH LAB    CV RIGHT HEART CATH MEASUREMENTS RECORDED N/A 1/19/2024    Procedure: Heart Cath Right Heart Cath;  Surgeon: Tobin Jaime MD;  Location:  HEART CARDIAC CATH LAB    PICC DOUBLE LUMEN PLACEMENT Right 08/22/2023    Brachial Vein Medial 5F DL 45 cm, 2 cm out     atorvastatin (LIPITOR) 20 MG tablet  carvedilol (COREG) 3.125 MG  tablet  empagliflozin (JARDIANCE) 10 MG TABS tablet  furosemide (LASIX) 20 MG tablet  heparin 100 UNIT/ML SOLN flush  milrinone (PRIMACOR) infusion 200 mcg/mL PREMIX  nitroGLYcerin (NITROSTAT) 0.4 MG sublingual tablet  potassium chloride ER (KLOR-CON M) 20 MEQ CR tablet  reason aspirin not prescribed, intentional,  sacubitril-valsartan (ENTRESTO) 49-51 MG per tablet  spironolactone (ALDACTONE) 25 MG tablet  warfarin ANTICOAGULANT (COUMADIN) 5 MG tablet      No Known Allergies  Family History  History reviewed. No pertinent family history.  Social History   Social History     Tobacco Use    Smoking status: Never    Smokeless tobacco: Never   Substance Use Topics    Alcohol use: Never    Drug use: Never         A medically appropriate review of systems was performed with pertinent positives and negatives noted in the HPI, and all other systems negative.    Physical Exam   BP: 116/77  Pulse: 89  Temp: 97.8  F (36.6  C)  Resp: 18  SpO2: 97 %  Physical Exam  Vitals and nursing note reviewed.   Constitutional:       General: He is not in acute distress.     Appearance: He is well-developed.   HENT:      Head: Normocephalic.      Right Ear: External ear normal.      Left Ear: External ear normal.      Nose: Nose normal.   Eyes:      Extraocular Movements: Extraocular movements intact.      Conjunctiva/sclera: Conjunctivae normal.   Pulmonary:      Effort: Pulmonary effort is normal. No respiratory distress.   Abdominal:      General: Abdomen is flat. There is no distension.   Musculoskeletal:         General: No deformity. Normal range of motion.      Cervical back: Normal range of motion and neck supple.   Skin:     General: Skin is warm and dry.   Neurological:      Mental Status: He is alert. Mental status is at baseline.      Comments: Oriented   Psychiatric:         Mood and Affect: Mood normal.         Behavior: Behavior normal.           ED Course, Procedures, & Data      Procedures            No results found for  any visits on 05/01/24.  Medications   milrinone (PRIMACOR) infusion 200 mcg/mL PREMIX (has no administration in time range)     Labs Ordered and Resulted from Time of ED Arrival to Time of ED Departure - No data to display  No orders to display          Medical Decision Making  The patient's presentation is strongly suggestive of high complexity (an acute health issue posing potential threat to life or bodily function).    The patient's evaluation involved:  review of external note(s) from 3+ sources (Most recent H&P in addition to clinic and ED note)  review of 2 test result(s) ordered prior to this encounter (Most recent BMP and CBC)    The patient's management involved moderate risk (prescription drug management including medications given in the ED).    Assessment & Plan    53-year-old male presents to us with a chief complaint of needing a refill of his milrinone infusion.  He had approximately 2 hours left on his current infusion when he arrived here.  We were able to get a bag for him that should last approximately 12 hours.  This will give him enough time to get the normal bags from his specialty pharmacy.  He has no other complaints and is comfortable with discharge.  He will follow-up tomorrow with his normal cardiology appointment which is why he traveled here from [place] in the first place.    I have reviewed the nursing notes. I have reviewed the findings, diagnosis, plan and need for follow up with the patient.    New Prescriptions    No medications on file       Final diagnoses:   Encounter for medication refill   Congestive heart failure, unspecified HF chronicity, unspecified heart failure type (H)     I, Inga Bland, am serving as a trained medical scribe to document services personally performed by Dav Huynh DO, based on the provider's statements to me.     IDav DO, was physically present and have reviewed and verified the accuracy of this note  documented by Inga Bland.    Dav Huynh DO  [facility] EMERGENCY DEPARTMENT  5/1/2024     Dav Huynh DO  05/02/24 0035

## 2024-05-03 ENCOUNTER — CARE COORDINATION (OUTPATIENT)
Dept: CARDIOLOGY | Facility: CLINIC | Age: 54
End: 2024-05-03

## 2024-05-03 ENCOUNTER — APPOINTMENT (OUTPATIENT)
Dept: MEDSURG UNIT | Facility: CLINIC | Age: 54
End: 2024-05-03
Attending: INTERNAL MEDICINE
Payer: COMMERCIAL

## 2024-05-03 ENCOUNTER — HOSPITAL ENCOUNTER (OUTPATIENT)
Facility: CLINIC | Age: 54
Discharge: HOME | End: 2024-05-03
Attending: INTERNAL MEDICINE | Admitting: INTERNAL MEDICINE
Payer: COMMERCIAL

## 2024-05-03 ENCOUNTER — OFFICE VISIT (OUTPATIENT)
Dept: CARDIOLOGY | Facility: CLINIC | Age: 54
End: 2024-05-03
Attending: STUDENT IN AN ORGANIZED HEALTH CARE EDUCATION/TRAINING PROGRAM
Payer: COMMERCIAL

## 2024-05-03 ENCOUNTER — APPOINTMENT (OUTPATIENT)
Dept: LAB | Facility: CLINIC | Age: 54
End: 2024-05-03
Attending: STUDENT IN AN ORGANIZED HEALTH CARE EDUCATION/TRAINING PROGRAM
Payer: COMMERCIAL

## 2024-05-03 VITALS
TEMPERATURE: 97.2 F | HEIGHT: 72 IN | DIASTOLIC BLOOD PRESSURE: 78 MMHG | RESPIRATION RATE: 16 BRPM | SYSTOLIC BLOOD PRESSURE: 103 MMHG | BODY MASS INDEX: 28.47 KG/M2 | OXYGEN SATURATION: 98 % | HEART RATE: 72 BPM | WEIGHT: 210.2 LBS

## 2024-05-03 VITALS
BODY MASS INDEX: 28.9 KG/M2 | DIASTOLIC BLOOD PRESSURE: 62 MMHG | WEIGHT: 213.1 LBS | HEART RATE: 84 BPM | OXYGEN SATURATION: 93 % | SYSTOLIC BLOOD PRESSURE: 87 MMHG

## 2024-05-03 DIAGNOSIS — I42.8 NON-ISCHEMIC CARDIOMYOPATHY (H): ICD-10-CM

## 2024-05-03 DIAGNOSIS — N18.30 STAGE 3 CHRONIC KIDNEY DISEASE, UNSPECIFIED WHETHER STAGE 3A OR 3B CKD (H): ICD-10-CM

## 2024-05-03 DIAGNOSIS — I50.22 CHRONIC SYSTOLIC HEART FAILURE (H): Primary | ICD-10-CM

## 2024-05-03 DIAGNOSIS — Q24.5 MYOCARDIAL BRIDGE: ICD-10-CM

## 2024-05-03 DIAGNOSIS — I50.23 ACUTE ON CHRONIC SYSTOLIC CHF (CONGESTIVE HEART FAILURE) (H): Primary | ICD-10-CM

## 2024-05-03 LAB
ALBUMIN SERPL BCG-MCNC: 4 G/DL (ref 3.5–5.2)
ALP SERPL-CCNC: 98 U/L (ref 40–150)
ALT SERPL W P-5'-P-CCNC: 50 U/L (ref 0–70)
ANION GAP SERPL CALCULATED.3IONS-SCNC: 9 MMOL/L (ref 7–15)
AST SERPL W P-5'-P-CCNC: 35 U/L (ref 0–45)
BASOPHILS # BLD AUTO: 0.1 10E3/UL (ref 0–0.2)
BASOPHILS NFR BLD AUTO: 1 %
BILIRUB SERPL-MCNC: 0.6 MG/DL
BUN SERPL-MCNC: 21.2 MG/DL (ref 6–20)
CALCIUM SERPL-MCNC: 9 MG/DL (ref 8.6–10)
CHLORIDE SERPL-SCNC: 104 MMOL/L (ref 98–107)
CREAT SERPL-MCNC: 1.31 MG/DL (ref 0.67–1.17)
DEPRECATED HCO3 PLAS-SCNC: 22 MMOL/L (ref 22–29)
EGFRCR SERPLBLD CKD-EPI 2021: 65 ML/MIN/1.73M2
EOSINOPHIL # BLD AUTO: 0.2 10E3/UL (ref 0–0.7)
EOSINOPHIL NFR BLD AUTO: 3 %
ERYTHROCYTE [DISTWIDTH] IN BLOOD BY AUTOMATED COUNT: 13.6 % (ref 10–15)
GLUCOSE SERPL-MCNC: 99 MG/DL (ref 70–99)
HCT VFR BLD AUTO: 55.3 % (ref 40–53)
HGB BLD-MCNC: 18.9 G/DL (ref 13.3–17.7)
HGB BLD-MCNC: 19.6 G/DL (ref 13.3–17.7)
IMM GRANULOCYTES # BLD: 0 10E3/UL
IMM GRANULOCYTES NFR BLD: 0 %
INR BLD: 1.3 (ref 2–3)
LYMPHOCYTES # BLD AUTO: 2.3 10E3/UL (ref 0.8–5.3)
LYMPHOCYTES NFR BLD AUTO: 32 %
MCH RBC QN AUTO: 30.6 PG (ref 26.5–33)
MCHC RBC AUTO-ENTMCNC: 34.2 G/DL (ref 31.5–36.5)
MCV RBC AUTO: 90 FL (ref 78–100)
MONOCYTES # BLD AUTO: 0.7 10E3/UL (ref 0–1.3)
MONOCYTES NFR BLD AUTO: 9 %
NEUTROPHILS # BLD AUTO: 3.9 10E3/UL (ref 1.6–8.3)
NEUTROPHILS NFR BLD AUTO: 55 %
NRBC # BLD AUTO: 0 10E3/UL
NRBC BLD AUTO-RTO: 0 /100
PLATELET # BLD AUTO: 186 10E3/UL (ref 150–450)
POTASSIUM SERPL-SCNC: 4.3 MMOL/L (ref 3.4–5.3)
PROT SERPL-MCNC: 6.7 G/DL (ref 6.4–8.3)
RBC # BLD AUTO: 6.17 10E6/UL (ref 4.4–5.9)
SODIUM SERPL-SCNC: 135 MMOL/L (ref 135–145)
WBC # BLD AUTO: 6.8 10E3/UL (ref 4–11)

## 2024-05-03 PROCEDURE — 80053 COMPREHEN METABOLIC PANEL: CPT | Performed by: STUDENT IN AN ORGANIZED HEALTH CARE EDUCATION/TRAINING PROGRAM

## 2024-05-03 PROCEDURE — C1751 CATH, INF, PER/CENT/MIDLINE: HCPCS | Performed by: INTERNAL MEDICINE

## 2024-05-03 PROCEDURE — G2211 COMPLEX E/M VISIT ADD ON: HCPCS | Performed by: STUDENT IN AN ORGANIZED HEALTH CARE EDUCATION/TRAINING PROGRAM

## 2024-05-03 PROCEDURE — 999N000132 HC STATISTIC PP CARE STAGE 1

## 2024-05-03 PROCEDURE — 272N000001 HC OR GENERAL SUPPLY STERILE: Performed by: INTERNAL MEDICINE

## 2024-05-03 PROCEDURE — 999N000142 HC STATISTIC PROCEDURE PREP ONLY

## 2024-05-03 PROCEDURE — 85610 PROTHROMBIN TIME: CPT

## 2024-05-03 PROCEDURE — 250N000009 HC RX 250: Performed by: INTERNAL MEDICINE

## 2024-05-03 PROCEDURE — 85025 COMPLETE CBC W/AUTO DIFF WBC: CPT | Performed by: STUDENT IN AN ORGANIZED HEALTH CARE EDUCATION/TRAINING PROGRAM

## 2024-05-03 PROCEDURE — C1894 INTRO/SHEATH, NON-LASER: HCPCS | Performed by: INTERNAL MEDICINE

## 2024-05-03 PROCEDURE — 85018 HEMOGLOBIN: CPT | Mod: 91

## 2024-05-03 PROCEDURE — 93451 RIGHT HEART CATH: CPT | Mod: 26 | Performed by: INTERNAL MEDICINE

## 2024-05-03 PROCEDURE — 250N000009 HC RX 250: Performed by: STUDENT IN AN ORGANIZED HEALTH CARE EDUCATION/TRAINING PROGRAM

## 2024-05-03 PROCEDURE — 99213 OFFICE O/P EST LOW 20 MIN: CPT | Performed by: STUDENT IN AN ORGANIZED HEALTH CARE EDUCATION/TRAINING PROGRAM

## 2024-05-03 PROCEDURE — 93451 RIGHT HEART CATH: CPT | Performed by: INTERNAL MEDICINE

## 2024-05-03 PROCEDURE — 99215 OFFICE O/P EST HI 40 MIN: CPT | Performed by: STUDENT IN AN ORGANIZED HEALTH CARE EDUCATION/TRAINING PROGRAM

## 2024-05-03 RX ORDER — LIDOCAINE 40 MG/G
CREAM TOPICAL
Status: COMPLETED | OUTPATIENT
Start: 2024-05-03 | End: 2024-05-03

## 2024-05-03 RX ADMIN — LIDOCAINE: 40 CREAM TOPICAL at 10:26

## 2024-05-03 ASSESSMENT — ACTIVITIES OF DAILY LIVING (ADL)
ADLS_ACUITY_SCORE: 36

## 2024-05-03 ASSESSMENT — PAIN SCALES - GENERAL: PAINLEVEL: NO PAIN (0)

## 2024-05-03 NOTE — Clinical Note
dry, intact, no bleeding and no hematoma. 7 Fr RIJV sheath removed, manual pressure held until hemostasis. No bleeding, oozing, or hematoma.

## 2024-05-03 NOTE — DISCHARGE SUMMARY
Pt discharged to home. VSS on RA. RIJ access site C/D/I, negative for a hematoma. Up ambulating w/o difficulty. PICC infusing home medications. Tolerating PO intake. Discharge instructions reviewed and all questions answered. Pt ambulated to the front entrance where they parked their car.

## 2024-05-03 NOTE — NURSING NOTE
Chief Complaint   Patient presents with    Follow Up     RTN HF: 53 year old male presents with NICM, Ef 13% for follow up with testing prior       Vitals were taken, medications reviewed.    Dana Alarcon, EMT   7:44 AM

## 2024-05-03 NOTE — PROGRESS NOTES
Date: 5/3/2024    Time of Call: 11:42 AM     Diagnosis:  heart failure     [ VORB ] Ordering provider: Micaela Silvestre MD    Order: increase milrinone to 0.3mcg/kg/min. Follow up in 1 month with virtual visit and local labs prior     Order received by: Shannon Dixon RN      Follow-up/additional notes: called [facility]. Gave verbal order for dosage increase. They will get new pumps to patient before he leaves town. Called  Navin. Reviewed results from today, plan for med changes and follow up. He states understanding. Will remain in town until he gets new milrinone pumps.

## 2024-05-03 NOTE — LETTER
5/3/2024      RE: Navin Lutz  [address] [place] [address]       Dear Colleague,    Thank you for the opportunity to participate in the care of your patient, Navin Lutz, at the [facility] [place] [facility]. Please see a copy of my visit note below.    Advanced Heart Failure/Transplant Clinic Note    HPI  Dear colleagues,     I had the pleasure of seeing Mr. Navin Lutz in the Cardiology clinic. As you know, Mr. Navin Lutz is a pleasant 53 year old male with a past medical history of chronic HFrEF 2/2 NICM s/p ICD, HLD, and CKD Stage II who presents for follow up for HFrEF.    Patient reports he was first having symptoms of CHF in 2017 and was diagnosed with HFrEF shortly after that. He has been on various GDMT and overall did well with no hospital admissions until June '23.     Patient admitted to [facility] from 8/22-8/28/23 for cardiogenic shock. He was initially started on dobutamine, but then switched to milrinone prior to discharge. On this, he was having a significant amount of ectopy, so low-dose carvedilol was prior to discharge. 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    Patient was transferred from OSH in November '23 with hypotension, he was diuresed and his milrinone was adjusted back to his prior home dose and discharged home.    Patient reports he overall feels stable, but knows his energy level has been declining over the last several months. He does feel like he cannot do the same level of activity he could a couple months ago. He is now using lasix consistently every other day and sometimes daily. He has been compliant with his medications, diet, fluid restriction, and monitoring his weights and BPs. He reports most days feeling presyncope, but again, this sometimes gets worse than other days. He denies orthopnea, PND, chest pain, palpitations, syncope,  abdominal pain, nausea, emesis, LE edema, or weight gain.    ROS:  A complete 12-point ROS was negative except as above.    PAST MEDICAL HISTORY:  Patient Active Problem List   Diagnosis     Acute on chronic systolic CHF (congestive heart failure) (H)     Chest pain     ICD (implantable cardioverter-defibrillator) in place     Inguinal hernia     Multiple lung nodules on CT     Non-ischemic cardiomyopathy (H)     Pure hypercholesterolemia     RAD (reactive airway disease) with wheezing, mild intermittent, uncomplicated     Acute deep vein thrombosis (DVT) of other vein of left upper extremity (H)     Cardiogenic shock (H)   Chronic biventricular systolic and LV diastolic dysfunction (HFrEF)  ACC/AHA stage C, NYHA class III  EF 18% (cardiac MRI) on 6/14/2023  EF 13% with grade I diastolic dysfunction on 6/13/2023  Reduced RVSF  EF 20-25% on 9/22/2020  EF 25-30% on 9/20/2019  EF 20% on 3/26/2019  EF 20-25% on 1/22/2019  EF 15-20% on 9/24/2018  EF 15-20% with grade 5 diastolic dysfunction on 12/11/2017   Nonischemic dilated cardiomyopathy  LIVDd 7.2 cm  Viral mediated?  Evaluation at [facility] in 2019  Cardiac MRI on 6/14/2023 = severe left ventricular dilation with severe, diffuse hypokinesis and mid wall late gadolinium enhancement stripe throughout the septum consistent with nonischemic dilated cardiomyopathy; mild patchy myocardial edema along the left ventricular anterior, anterolateral and inferolateral walls in the basal and mid cavity segments could represent an element of myocarditis; trace aortic regurgitation, trace mitral valve regurgitation, mild tricuspid valve regurgitation  LHC and RHC on 6/13/2023 = intramyocardial bridging of the dLAD otherwise normal coronary arteries; LVEDP 3 mmHg; normal pressures with no pulmonary hypertension; RA 4 mmHg, RV 22/4 mmHg, PA 23/11 mmHg with mean PAP of 16 mmHg, PCWP 7 mmHg, CO 4.32 L/min and CI 2.1 L/min*m2 by Teressa, CO 2.05 L/min and CI 1.0 L/min*m2 by  thermodilution  LHC and RHC on 1/9/2018 = arterial pressure 82/59 (67); normal pulmonary capillary wedge, and mean pulmonary artery pressures; cardiac output by thermodilution was 3.07 L/min, index at 1.5; no angiographic evidence of coronary artery disease present  History of NSVT  Moderate to severe eccentric LVH  Valvular heart disease  Mild MR  Dyslipidemia  Possible obstructive sleep apnea       FAMILY HISTORY:  No known family history of heart disease except possible his father had an enlarged heart, but no formal diagnosis    SOCIAL HISTORY:  , owns a [employer] company. No prior tobacco, ETOH, or illicit substance use.  Social History     Tobacco Use     Smoking status: Never     Smokeless tobacco: Never   Substance Use Topics     Alcohol use: Never     Drug use: Never        ALLERGIES:  No Known Allergies    CURRENT MEDICATIONS:  Current Outpatient Medications   Medication Sig Dispense Refill     atorvastatin (LIPITOR) 20 MG tablet Take 1 tablet (20 mg) by mouth every evening for 3 days 3 tablet 0     carvedilol (COREG) 3.125 MG tablet Take 1 tablet (3.125 mg) by mouth 2 times daily (with meals) for 3 doses 3 tablet 0     empagliflozin (JARDIANCE) 10 MG TABS tablet Take 10 mg by mouth       furosemide (LASIX) 20 MG tablet Take 1 tablet (20 mg) by mouth as needed (for weight gain, swelling) 3 tablet 0     milrinone (PRIMACOR) infusion 200 mcg/mL PREMIX Inject 23.475 mcg/min into the vein continuous 100 mL 0     sacubitril-valsartan (ENTRESTO) 49-51 MG per tablet Take 1 tablet by mouth 2 times daily for 3 doses 3 tablet 0     spironolactone (ALDACTONE) 25 MG tablet        warfarin ANTICOAGULANT (COUMADIN) 5 MG tablet Take 5 mg by mouth       heparin 100 UNIT/ML SOLN flush 500 Units by Intravenous (Continuous Infusion) route as needed       nitroGLYcerin (NITROSTAT) 0.4 MG sublingual tablet Place 0.4 mg under the tongue every 5 minutes as needed for chest pain May repeat every 5 minutes for a total of 3  doses.       potassium chloride ER (KLOR-CON M) 20 MEQ CR tablet Take 1 tablet (20 mEq) by mouth daily 30 tablet 1     reason aspirin not prescribed, intentional, Please choose reason not prescribed from choices below. (Patient not taking: Reported on 5/3/2024)         EXAM:  BP (!) 87/62 (BP Location: Left arm, Patient Position: Sitting, Cuff Size: Adult Large)   Pulse 84   Wt 96.7 kg (213 lb 1.6 oz)   SpO2 93%   BMI 28.90 kg/m    General: appears comfortable, alert and interactive, in no acute distress  Head: normocephalic, atraumatic  Eyes: anicteric sclera, EOMI  Mouth: MMM  Neck: supple, no cervical adenopathy  CV: regular rate and rhythm, no murmur, gallop, rub, estimated JVP <6 cm  Resp: clear, no rales or wheezing  GI: soft, nontender, nondistended  Extremities: warm, no peripheral edema, 2+ bilateral radial pulses, PICC dressing site is c/d/i  Neurological: alert and oriented, no focal deficits  Psych: normal mood and affect  Derm: no rashes on exposed surfaces    Weight  Wt Readings from Last 10 Encounters:   05/03/24 95.3 kg (210 lb 3.2 oz)   05/03/24 96.7 kg (213 lb 1.6 oz)   03/13/24 91.8 kg (202 lb 6.4 oz)   01/19/24 95.4 kg (210 lb 6.4 oz)   01/19/24 91.2 kg (201 lb)   01/19/24 96.3 kg (212 lb 3.2 oz)   12/08/23 91.4 kg (201 lb 6.4 oz)   11/20/23 93.9 kg (207 lb 0.2 oz)   11/01/23 91.6 kg (202 lb)   09/20/23 94.3 kg (208 lb)       I personally reviewed recent labs and data as below and discussed the results with the patient in clinic today.  Labs:  CBC RESULTS:  Lab Results   Component Value Date    WBC 6.8 05/03/2024    RBC 6.17 (H) 05/03/2024    HGB 19.6 (H) 05/03/2024    HGB 18.9 (H) 05/03/2024    HCT 55.3 (H) 05/03/2024    MCV 90 05/03/2024    MCH 30.6 05/03/2024    MCHC 34.2 05/03/2024    RDW 13.6 05/03/2024     05/03/2024       CMP RESULTS:  Lab Results   Component Value Date     05/03/2024    POTASSIUM 4.3 05/03/2024    CHLORIDE 104 05/03/2024    CHLORIDE 107 01/30/2024    CO2  22 05/03/2024    ANIONGAP 9 05/03/2024    GLC 99 05/03/2024     (H) 11/18/2023    BUN 21.2 (H) 05/03/2024    CR 1.31 (H) 05/03/2024    GFRESTIMATED 65 05/03/2024    NAM 9.0 05/03/2024    BILITOTAL 0.6 05/03/2024    ALBUMIN 4.0 05/03/2024    ALKPHOS 98 05/03/2024    ALT 50 05/03/2024    AST 35 05/03/2024          No results for input(s): CHOL, HDL, LDL, TRIG, CHOLHDLRATIO in the last [id number] hours.     Testing/Procedures:  I personally visualized and interpreted:  Echocardiogram 6/13/23  Narrative       Left Ventricle: The left ventricle is severely dilated. There is   moderate-to-severe eccentric left ventricular hypertrophy. Severely   reduced left ventricular systolic function. The ejection fraction,   measured by biplane, is 13%. Severe hypokinesis of apex, remainder wall   segments are mostly akinetic. Grade I diastolic dysfunction.         Left Ventricle   The left ventricle is severely dilated. There is moderate-to-severe eccentric left ventricular hypertrophy. Severely reduced left ventricular systolic function. The ejection fraction, measured by biplane, is 13%. Severe hypokinesis of apex, remainder wall segments are mostly akinetic. Grade I diastolic dysfunction.     Right Ventricle   The right ventricle appears normal in size. Systolic function is reduced. Normal TAPSE, measuring 1.8 cm. Normal systolic excursion velocity by TDI. S' measures 9.5 cm/sec. Wall thickness is normal. The RVSP measures 13 mmHg. There is no evidence of pulmonary hypertension.     Left Atrium   The left atrium is normal in size. The pulmonary veins have normal venous flow.     Right Atrium   The right atrium is normal in size.     IVC/SVC   Normal IVC size with normal respirophasic changes.Normal hepatic vein blood flow.     Mitral Valve   Mitral valve structure is normal. There is trace regurgitation. There is no evidence of mitral valve stenosis.     Tricuspid Valve   Tricuspid valve structure is normal. There is mild  regurgitation. There is no evidence of tricuspid valve stenosis.     Aortic Valve   The aortic valve is tricuspid. There is trace regurgitation. There is no evidence of aortic valve stenosis.     Pulmonic Valve   The pulmonic valve was not well visualized. There is trace regurgitation. There is no evidence of pulmonic valve stenosis.     Pericardium   There is no pericardial effusion.     Aorta   The sinus of Valsalva is normal. The ascending aorta is normal.     Interatrial Septum   No evidence of an atrial shunt by color Doppler.     Coronary Angiogram/RHC 6/13/23  Conclusions:   Coronary angiogram: Intramyocardial bridging of the distal LAD, otherwise   normal coronaries.   Left heart catheterization: No significant gradient across aortic valve.     LVEDP 3 mmHg.   Right heart catheterization: Normal pressures.  No pulmonary hypertension.    Low cardiac output.  No shunt.   -RA 4 mmHg   -RV 22/4 mmHg   -PA 23/11 mmHg, Mean PAP 16 mmHg   -PCWP 7 mmHg   -Teressa: CO 4.32 L/min, CI 2.1 L/min*m2   -Thermodilution: CO 2.05 L/min, CI 1.0 L/min*m2     cMRI 6/14/23  FINDINGS:   AORTA: The ascending aorta and aortic root are normal caliber. The sinotubular junction is maintained. Normal aortic wall thickness.     ARCH VESSELS: Arch vessels are grossly patent and partially visualized.     RIGHT ATRIUM: Normal size.     TRICUSPID VALVE: Unrestricted leaflet excursion without stenosis. Mild regurgitation.     RIGHT VENTRICLE: Normal morphology, size, and wall thickness. No wall motion abnormalities identified.     PULMONIC VALVE: Unrestricted leaflet excursion without stenosis or regurgitation.     PULMONARY VEINS: Widely patent.     LEFT ATRIUM: Mild dilation. No left atrial appendage thrombus.     MITRAL VALVE: Unrestrictive leaflet excursion without stenosis. Trace regurgitation.     LEFT VENTRICLE: Normal morphology and thickness with marked dilation. End-diastolic basal septal thickness of 9 mm.     Severe, diffuse  hypokinesis. Normal first pass perfusion.     Mid wall late gadolinium enhancement throughout the septum. Patchy myocardial edema along the anterior, anterolateral and inferolateral walls in the basal and mid cavity segments.       EKG 6/13/23 shows sinus rhythm, nonspecific t wave abnormalities    RHC 8/22/23  RA --/--/5  RV 20/5  PA 20/12/16  PCWP --/--/12  IESHA 3.2/1.5  TD 2.93/1.36  MAP 96  PVR 1.25 (IESHA)  SVR 2275 (IESHA)     TTE 8/23/23  Interpretation Summary  Severe left ventricular dilation (LVIDd 6.8cm). Left ventricular function is  decreased. The ejection fraction is 15-20% (severely reduced). Severe diffuse  hypokinesis.  Global right ventricular function is normal.  No significant valve abnormalities.  The inferior vena cava is normal.  No pericardial effusion.    RHC 9/20/23  RA: 3/4/3 mmHg  RV: 23/2 mmHg  PA: 23/14/18 mmHg  CWP: --/--/8 mmHg  CO/CI (Iesha): 3.63/1.69 L/min/m2  CO/CI (TD): 4.1/1.9 L/min/m2  PA sat: 68.7%  MAP: 84 mmHg   PVR: 2.4 (TD) BYRD      Hemodynamics 11/19/23  CVP 4, PA 26/12/16, PCWP 12, SVO2 78, Iesha CI 2.5, PVR 1, SVR 1353 on milrinone 0.25    Outside results of note:  Outside records were obtained and relevant results/notes have been incorporated into HPI.    Assessment and Plan:     In summary, 53 year old male with a past medical history of chronic HFrEF 2/2 NICM s/p ICD, HLD, and CKD Stage II who presents as follow up for HFrEF.    Chronic systolic heart failure/HFrEF (EF 10-15%) secondary to nonischemic cardiomyopathy  NYHA Symptom Class IIIb  Stage D  Inotrope: Continue milrinone 0.25  ACE-I/ARB/ARNi: Continue Entresto 49-51 mg BID, unable to increase given frequent presyncope  BB: Continue coreg 3.125 mg BID given frequent ectopy on inotrope therapy, did not tolerate higher doses  Aldosterone antagonist: Continue beck 25 mg daily  SGLT2i: Continue empagliflozin 10 mg daily  SCD prophylaxis: s/p ICD  %BiV pacing: N/A  Fluid status: euvoelmic on lasix 20 mg Q48H  Cardiac  Rehab: previously referred  - Currently listed status 4 for heart transplant  - Discussed importance of daily standing weights, 2-3L fluid, and 2g Na restriction  - Will continue to monitor closely with serial hemodynamic monitoring, if declining, will admit for upgrade to higher status    Myocardial Bridge Over Coronary Artery  HLD  Myocardial bridge over distal LAD.  - Continue to monitor    CKD Stage III  Cr ~1.2-1.4.  - Will continue to monitor    RUE DVT  - Continue warfarin with INR goal 2-3    Optimal Vascular Metrics    Blood Pressure   BP < 140/90 Yes    On Aspirin  No: Contraindicated due to: On warfarin already    On Statin  Yes    Tobacco use  No     To Do:  - No medication changes  - Follow up with local Cardiology as scheduled  - Repeat RHC later today, pending results may require admission vs close follow up    The patient states understanding and is agreeable with plan.   Feel free to contact myself regarding questions or concerns. It was a pleasure to see this patient today.    A total of 44 minutes was spent on the day of the visit, which includes preparation for the visit (reviewing previous medical records, laboratories and investigations), in conjunction with the actual clinic visit with the patient, which includes obtaining a history and physical exam, creating and reviewing the care plan, patient education (and family if present), counseling, documenting clinical information in the electronic health record and care coordination.     The longitudinal plan of care for the diagnosis(es)/condition(s) as documented were addressed during this visit. Due to the added complexity in care, I will continue to support Navin in the subsequent management and with ongoing continuity of care.     Micaela Silvestre MD   of Medicine, [facility]  Advanced Heart Failure and Transplant Cardiology     GALO GATES NP

## 2024-05-03 NOTE — DISCHARGE INSTRUCTIONS
[facility]                        Interventional Cardiology  Discharge Instructions   Post Right Heart Cath      AFTER YOU GO HOME:  DO drink plenty of fluids  DO resume your regular diet and medications unless otherwise instructed by your Primary Physician  Do Not scrub the procedure site vigorously  No lotion or powder to the puncture site for 3 days    CALL YOUR PRIMARY PHYSICIAN IF: You may resume all normal activity.  Monitor neck site for bleeding, swelling, or voice changes. If you notice bleeding or swelling immediately apply pressure to the site and call number below to speak with Cardiology Fellow.  If you experience any changes in your breathing you should call your doctor immediately or come to the closest Emergency Department.  Do not drive yourself.    ADDITIONAL INSTRUCTIONS: Medications: You are to resume all home medications including anticoagulation therapy unless otherwise advised by your primary cardiologist or nurse coordinator.    Follow Up: Per your primary cardiology team    If you have any questions or concerns regarding your procedure site please call [phone] at anytime and ask for Cardiology Fellow on call.  They are available 24 hours a day.  You may also contact the Cardiology Clinic after hours number at [phone].                                                       Telephone Numbers [phone] Monday-Friday 8:00 am to 4:30 pm    [phone] After 4:30 pm Monday-Friday, Weekends & Holidays  Ask for Interventional Cardiologist on call. Someone is on call 24 hours/day   [facility] toll free number [phone] Monday-Friday 8:00 am to 4:30 pm   [facility] Emergency Dept [phone]             [facility]                        Interventional Cardiology  Discharge Instructions   Post Right Heart Cath and/or Heart Biopsy      AFTER YOU GO HOME:  DO drink plenty of fluids  DO resume your regular diet and medications unless otherwise  instructed by your Primary Physician  Do Not scrub the procedure site vigorously  No lotion or powder to the puncture site for 3 days    CALL YOUR PRIMARY PHYSICIAN IF: You may resume all normal activity.  Monitor neck site for bleeding, swelling, or voice changes. If you notice bleeding or swelling immediately apply pressure to the site and call number below to speak with Cardiology Fellow.  If you experience any changes in your breathing you should call your doctor immediately or come to the closest Emergency Department.  Do not drive yourself.    ADDITIONAL INSTRUCTIONS: Medications: You are to resume all home medications including anticoagulation therapy unless otherwise advised by your primary cardiologist or nurse coordinator.    Follow Up: Per your primary cardiology team    If you have any questions or concerns regarding your procedure site please call [phone] at anytime and ask for Cardiology Fellow on call.  They are available 24 hours a day.  You may also contact the Cardiology Clinic after hours number at [phone].                                                       Telephone Numbers [phone] Monday-Friday 8:00 am to 4:30 pm    [phone] After 4:30 pm Monday-Friday, Weekends & Holidays  Ask for Interventional Cardiologist on call. Someone is on call 24 hours/day   [facility] toll free number [phone] Monday-Friday 8:00 am to 4:30 pm   [facility] Emergency Dept [phone]

## 2024-05-03 NOTE — PROGRESS NOTES
[facility]   Interventional Cardiology       Navin Lutz MRN# [id number]   YOB: 1970 Age: 53 year old       Assessment and plan:     Navin Lutz is a 53 year old  who has been referred for a right heart catheterization.     Anticoagulation: Yes--warfarin; did take but has been on reduced doses. Istat INR this morning  1.3  Aspirin/antiplatelet: None  Renal concerns: SCr 1.31 this AM  Pt NPO at least 6 hours: Yes    Pre procedure labs reviewed as below.     No contraindications identified, will move forward with planned procedure.   Patient plans to discharge home after post procedure orders have been met.       CONSENT:  We discussed recommendations for a right heart catheterization and reviewed risks and benefits. These include but not limited to hematoma/bleeding/vascular complications, infections, and cardiac arrhythmias. Discussed that a catheter is put into the vein of the groin/neck.  It is carefully passed along until it reaches the heart and then goes up into the blood vessels of the lungs. This is done to measure a variety of pressures in the heart and can tell us how well the heart is filling and emptying, as well as monitor fluid status.     The patient voices his/her understanding and wishes to proceed. Verbal and written consent obtained.      Physical Examination:  Vitals: BP (!) 89/78 (BP Location: Left arm, Cuff Size: Adult Large)   Pulse 87   Temp 97.2  F (36.2  C) (Oral)   Resp 16   Ht 1.829 m (6')   Wt 95.3 kg (210 lb 3.2 oz)   SpO2 97%   BMI 28.51 kg/m      GEN:  In no acute distress. Patient accompanied by spouse  C/V:  Reg rate and rhythm.   RESP: Respirations are unlabored.   NEURO: Alert and oriented, cooperative.     Recent Lab Results/Pertinent testing:   Latest Reference Range & Units 05/03/24 09:20   Sodium 135 - 145 mmol/L 135   Potassium 3.4 - 5.3 mmol/L 4.3   Chloride 98 - 107 mmol/L 104   Carbon Dioxide (CO2) 22 - 29  mmol/L 22   Urea Nitrogen 6.0 - 20.0 mg/dL 21.2 (H)   Creatinine 0.67 - 1.17 mg/dL 1.31 (H)   GFR Estimate >60 mL/min/1.73m2 65   Calcium 8.6 - 10.0 mg/dL 9.0   Anion Gap 7 - 15 mmol/L 9   Albumin 3.5 - 5.2 g/dL 4.0   Protein Total 6.4 - 8.3 g/dL 6.7   Alkaline Phosphatase 40 - 150 U/L 98   ALT 0 - 70 U/L 50   AST 0 - 45 U/L 35   Bilirubin Total <=1.2 mg/dL 0.6   Glucose 70 - 99 mg/dL 99   WBC 4.0 - 11.0 10e3/uL 6.8   Hemoglobin 13.3 - 17.7 g/dL 18.9 (H)   Hematocrit 40.0 - 53.0 % 55.3 (H)   Platelet Count 150 - 450 10e3/uL 186   RBC Count 4.40 - 5.90 10e6/uL 6.17 (H)   MCV 78 - 100 fL 90   MCH 26.5 - 33.0 pg 30.6   MCHC 31.5 - 36.5 g/dL 34.2   RDW 10.0 - 15.0 % 13.6   (H): Data is abnormally high    aYsmine Vásquez PA-C  [facility] Heart  Pager [phone]

## 2024-05-14 DIAGNOSIS — I42.8 NON-ISCHEMIC CARDIOMYOPATHY (H): ICD-10-CM

## 2024-05-15 RX ORDER — POTASSIUM CHLORIDE 1500 MG/1
20 TABLET, EXTENDED RELEASE ORAL DAILY
Qty: 30 TABLET | Refills: 1 | Status: ON HOLD | OUTPATIENT
Start: 2024-05-15 | End: 2024-07-04

## 2024-05-31 ENCOUNTER — VIRTUAL VISIT (OUTPATIENT)
Dept: CARDIOLOGY | Facility: CLINIC | Age: 54
End: 2024-05-31
Attending: STUDENT IN AN ORGANIZED HEALTH CARE EDUCATION/TRAINING PROGRAM
Payer: COMMERCIAL

## 2024-05-31 VITALS
BODY MASS INDEX: 26.56 KG/M2 | WEIGHT: 200.4 LBS | DIASTOLIC BLOOD PRESSURE: 69 MMHG | SYSTOLIC BLOOD PRESSURE: 98 MMHG | HEIGHT: 73 IN | HEART RATE: 92 BPM

## 2024-05-31 DIAGNOSIS — I50.23 ACUTE ON CHRONIC SYSTOLIC CHF (CONGESTIVE HEART FAILURE) (H): Primary | ICD-10-CM

## 2024-05-31 DIAGNOSIS — I42.8 NONISCHEMIC CARDIOMYOPATHY (H): ICD-10-CM

## 2024-05-31 DIAGNOSIS — E78.5 HYPERLIPIDEMIA LDL GOAL <100: ICD-10-CM

## 2024-05-31 DIAGNOSIS — N18.30 STAGE 3 CHRONIC KIDNEY DISEASE, UNSPECIFIED WHETHER STAGE 3A OR 3B CKD (H): ICD-10-CM

## 2024-05-31 PROCEDURE — G2211 COMPLEX E/M VISIT ADD ON: HCPCS | Mod: 95 | Performed by: STUDENT IN AN ORGANIZED HEALTH CARE EDUCATION/TRAINING PROGRAM

## 2024-05-31 PROCEDURE — 99215 OFFICE O/P EST HI 40 MIN: CPT | Mod: 95 | Performed by: STUDENT IN AN ORGANIZED HEALTH CARE EDUCATION/TRAINING PROGRAM

## 2024-05-31 ASSESSMENT — PAIN SCALES - GENERAL: PAINLEVEL: NO PAIN (0)

## 2024-05-31 NOTE — LETTER
5/31/2024      RE: Navin Lutz  [address] [place] [address]       Dear Colleague,    Thank you for the opportunity to participate in the care of your patient, Navin Lutz, at the [facility] HEART [facility] [place] at [facility]. Please see a copy of my visit note below.    Virtual Visit Details    Type of service:  Video Visit     Originating Location (pt. Location): Home  Distant Location (provider location):  On-site  Platform used for Video Visit: [facility]    Advanced Heart Failure/Transplant Clinic Note    HPI  Dear colleagues,     I had the pleasure of seeing Mr. Navin Lutz in the Cardiology clinic. As you know, Mr. Navin Lutz is a pleasant 53 year old male with a past medical history of chronic HFrEF 2/2 NICM s/p ICD, HLD, and CKD Stage II who presents for follow up for HFrEF.    Patient reports he was first having symptoms of CHF in 2017 and was diagnosed with HFrEF shortly after that. He has been on various GDMT and overall did well with no hospital admissions until June '23.     Patient admitted to [facility] from 8/22-8/28/23 for cardiogenic shock. He was initially started on dobutamine, but then switched to milrinone prior to discharge. On this, he was having a significant amount of ectopy, so low-dose carvedilol was prior to discharge. 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    Patient was transferred from H in November '23 with hypotension, he was diuresed and his milrinone was adjusted back to his prior home dose and discharged home.    Patient reports since our last visit, still having persistent cough, dyspnea on exertion and intermittent orthopnea. He does feel like he cannot do the same level of activity he could a couple months ago. He is now using lasix consistently every day and was recently treated with a course of abx. He has been compliant with his medications, diet, fluid  restriction, and monitoring his weights and BPs. He reports most days feeling presyncope, but again, this sometimes gets worse than other days. He denies orthopnea, PND, chest pain, palpitations, syncope, abdominal pain, nausea, emesis, LE edema, or weight gain.    ROS:  A complete 12-point ROS was negative except as above.    PAST MEDICAL HISTORY:  Patient Active Problem List   Diagnosis     Acute on chronic systolic CHF (congestive heart failure) (H)     Chest pain     ICD (implantable cardioverter-defibrillator) in place     Inguinal hernia     Multiple lung nodules on CT     Non-ischemic cardiomyopathy (H)     Pure hypercholesterolemia     RAD (reactive airway disease) with wheezing, mild intermittent, uncomplicated     Acute deep vein thrombosis (DVT) of other vein of left upper extremity (H)     Cardiogenic shock (H)   Chronic biventricular systolic and LV diastolic dysfunction (HFrEF)  ACC/AHA stage C, NYHA class III  EF 18% (cardiac MRI) on 6/14/2023  EF 13% with grade I diastolic dysfunction on 6/13/2023  Reduced RVSF  EF 20-25% on 9/22/2020  EF 25-30% on 9/20/2019  EF 20% on 3/26/2019  EF 20-25% on 1/22/2019  EF 15-20% on 9/24/2018  EF 15-20% with grade 5 diastolic dysfunction on 12/11/2017   Nonischemic dilated cardiomyopathy  LIVDd 7.2 cm  Viral mediated?  Evaluation at [facility] in 2019  Cardiac MRI on 6/14/2023 = severe left ventricular dilation with severe, diffuse hypokinesis and mid wall late gadolinium enhancement stripe throughout the septum consistent with nonischemic dilated cardiomyopathy; mild patchy myocardial edema along the left ventricular anterior, anterolateral and inferolateral walls in the basal and mid cavity segments could represent an element of myocarditis; trace aortic regurgitation, trace mitral valve regurgitation, mild tricuspid valve regurgitation  LHC and RHC on 6/13/2023 = intramyocardial bridging of the dLAD otherwise normal coronary arteries; LVEDP 3 mmHg; normal  pressures with no pulmonary hypertension; RA 4 mmHg, RV 22/4 mmHg, PA 23/11 mmHg with mean PAP of 16 mmHg, PCWP 7 mmHg, CO 4.32 L/min and CI 2.1 L/min*m2 by Teressa, CO 2.05 L/min and CI 1.0 L/min*m2 by thermodilution  LHC and RHC on 1/9/2018 = arterial pressure 82/59 (67); normal pulmonary capillary wedge, and mean pulmonary artery pressures; cardiac output by thermodilution was 3.07 L/min, index at 1.5; no angiographic evidence of coronary artery disease present  History of NSVT  Moderate to severe eccentric LVH  Valvular heart disease  Mild MR  Dyslipidemia  Possible obstructive sleep apnea       FAMILY HISTORY:  No known family history of heart disease except possible his father had an enlarged heart, but no formal diagnosis    SOCIAL HISTORY:  , owns a [employer] company. No prior tobacco, ETOH, or illicit substance use.  Social History     Tobacco Use     Smoking status: Never     Smokeless tobacco: Never   Substance Use Topics     Alcohol use: Never     Drug use: Never      ALLERGIES:  No Known Allergies    CURRENT MEDICATIONS:  Current Outpatient Medications   Medication Sig Dispense Refill     atorvastatin (LIPITOR) 20 MG tablet Take 1 tablet (20 mg) by mouth every evening for 3 days 3 tablet 0     carvedilol (COREG) 3.125 MG tablet Take 1 tablet (3.125 mg) by mouth 2 times daily (with meals) for 3 doses 3 tablet 0     empagliflozin (JARDIANCE) 10 MG TABS tablet Take 10 mg by mouth       furosemide (LASIX) 20 MG tablet Take 1 tablet (20 mg) by mouth as needed (for weight gain, swelling) 3 tablet 0     heparin 100 UNIT/ML SOLN flush 500 Units by Intravenous (Continuous Infusion) route as needed       milrinone (PRIMACOR) infusion 200 mcg/mL PREMIX Inject 23.475 mcg/min into the vein continuous 100 mL 0     nitroGLYcerin (NITROSTAT) 0.4 MG sublingual tablet Place 0.4 mg under the tongue every 5 minutes as needed for chest pain May repeat every 5 minutes for a total of 3 doses.       potassium chloride araceli  "ER (KLOR-CON M20) 20 MEQ CR tablet Take 1 tablet (20 mEq) by mouth daily 30 tablet 1     reason aspirin not prescribed, intentional, Please choose reason not prescribed from choices below. (Patient not taking: Reported on 5/3/2024)       sacubitril-valsartan (ENTRESTO) 49-51 MG per tablet Take 1 tablet by mouth 2 times daily for 3 doses 3 tablet 0     spironolactone (ALDACTONE) 25 MG tablet        warfarin ANTICOAGULANT (COUMADIN) 5 MG tablet Take 5 mg by mouth         EXAM:  BP 98/69   Pulse 92   Ht 1.842 m (6' 0.5\")   Wt 90.9 kg (200 lb 6.4 oz)   BMI 26.81 kg/m    General: appears comfortable, alert and interactive, in no acute distress  Head: normocephalic, atraumatic  Eyes: anicteric sclera, EOMI  Mouth: MMM  Neurological: alert and oriented, no focal deficits  Psych: normal mood and affect  Derm: no rashes on exposed surfaces    Weight  Wt Readings from Last 10 Encounters:   05/31/24 90.9 kg (200 lb 6.4 oz)   05/03/24 95.3 kg (210 lb 3.2 oz)   05/03/24 96.7 kg (213 lb 1.6 oz)   03/13/24 91.8 kg (202 lb 6.4 oz)   01/19/24 95.4 kg (210 lb 6.4 oz)   01/19/24 91.2 kg (201 lb)   01/19/24 96.3 kg (212 lb 3.2 oz)   12/08/23 91.4 kg (201 lb 6.4 oz)   11/20/23 93.9 kg (207 lb 0.2 oz)   11/01/23 91.6 kg (202 lb)       I personally reviewed recent labs and data as below and discussed the results with the patient in clinic today.  Labs:  CBC RESULTS:  Lab Results   Component Value Date    WBC 6.8 05/03/2024    RBC 6.17 (H) 05/03/2024    HGB 19.6 (H) 05/03/2024    HGB 18.9 (H) 05/03/2024    HCT 55.3 (H) 05/03/2024    MCV 90 05/03/2024    MCH 30.6 05/03/2024    MCHC 34.2 05/03/2024    RDW 13.6 05/03/2024     05/03/2024       CMP RESULTS:  Lab Results   Component Value Date     05/03/2024    POTASSIUM 4.3 05/03/2024    CHLORIDE 104 05/28/2024    CO2 22 05/03/2024    ANIONGAP 9 05/03/2024    GLC 99 05/03/2024     (H) 11/18/2023    BUN 21.2 (H) 05/03/2024    CR 1.31 (H) 05/03/2024    GFRESTIMATED 65 " 05/03/2024    NAM 9.0 05/03/2024    BILITOTAL 0.6 05/03/2024    ALBUMIN 4.0 05/03/2024    ALKPHOS 98 05/03/2024    ALT 50 05/03/2024    AST 35 05/03/2024 5/29/24  Na 136  K 4.5  Cl 104  CO2 24  BUN 17  Cr 1.3    Recent Labs   Lab Test 11/18/23  0018 08/22/23  1037   CHOL 161 245*   HDL 48 42   LDL 96 180*   TRIG 85 114      Testing/Procedures:  I personally visualized and interpreted:  Echocardiogram 6/13/23  Narrative       Left Ventricle: The left ventricle is severely dilated. There is   moderate-to-severe eccentric left ventricular hypertrophy. Severely   reduced left ventricular systolic function. The ejection fraction,   measured by biplane, is 13%. Severe hypokinesis of apex, remainder wall   segments are mostly akinetic. Grade I diastolic dysfunction.     Left Ventricle   The left ventricle is severely dilated. There is moderate-to-severe eccentric left ventricular hypertrophy. Severely reduced left ventricular systolic function. The ejection fraction, measured by biplane, is 13%. Severe hypokinesis of apex, remainder wall segments are mostly akinetic. Grade I diastolic dysfunction.     Right Ventricle   The right ventricle appears normal in size. Systolic function is reduced. Normal TAPSE, measuring 1.8 cm. Normal systolic excursion velocity by TDI. S' measures 9.5 cm/sec. Wall thickness is normal. The RVSP measures 13 mmHg. There is no evidence of pulmonary hypertension.     Left Atrium   The left atrium is normal in size. The pulmonary veins have normal venous flow.     Right Atrium   The right atrium is normal in size.     IVC/SVC   Normal IVC size with normal respirophasic changes.Normal hepatic vein blood flow.     Mitral Valve   Mitral valve structure is normal. There is trace regurgitation. There is no evidence of mitral valve stenosis.     Tricuspid Valve   Tricuspid valve structure is normal. There is mild regurgitation. There is no evidence of tricuspid valve stenosis.     Aortic Valve   The  aortic valve is tricuspid. There is trace regurgitation. There is no evidence of aortic valve stenosis.     Pulmonic Valve   The pulmonic valve was not well visualized. There is trace regurgitation. There is no evidence of pulmonic valve stenosis.     Pericardium   There is no pericardial effusion.     Aorta   The sinus of Valsalva is normal. The ascending aorta is normal.     Interatrial Septum   No evidence of an atrial shunt by color Doppler.     Coronary Angiogram/RHC 6/13/23  Conclusions:   Coronary angiogram: Intramyocardial bridging of the distal LAD, otherwise   normal coronaries.   Left heart catheterization: No significant gradient across aortic valve.     LVEDP 3 mmHg.   Right heart catheterization: Normal pressures.  No pulmonary hypertension.    Low cardiac output.  No shunt.   -RA 4 mmHg   -RV 22/4 mmHg   -PA 23/11 mmHg, Mean PAP 16 mmHg   -PCWP 7 mmHg   -Teressa: CO 4.32 L/min, CI 2.1 L/min*m2   -Thermodilution: CO 2.05 L/min, CI 1.0 L/min*m2     cMRI 6/14/23  FINDINGS:   AORTA: The ascending aorta and aortic root are normal caliber. The sinotubular junction is maintained. Normal aortic wall thickness.     ARCH VESSELS: Arch vessels are grossly patent and partially visualized.     RIGHT ATRIUM: Normal size.     TRICUSPID VALVE: Unrestricted leaflet excursion without stenosis. Mild regurgitation.     RIGHT VENTRICLE: Normal morphology, size, and wall thickness. No wall motion abnormalities identified.     PULMONIC VALVE: Unrestricted leaflet excursion without stenosis or regurgitation.     PULMONARY VEINS: Widely patent.     LEFT ATRIUM: Mild dilation. No left atrial appendage thrombus.     MITRAL VALVE: Unrestrictive leaflet excursion without stenosis. Trace regurgitation.     LEFT VENTRICLE: Normal morphology and thickness with marked dilation. End-diastolic basal septal thickness of 9 mm.     Severe, diffuse hypokinesis. Normal first pass perfusion.     Mid wall late gadolinium enhancement throughout  the septum. Patchy myocardial edema along the anterior, anterolateral and inferolateral walls in the basal and mid cavity segments.       EKG 6/13/23 shows sinus rhythm, nonspecific t wave abnormalities    RHC 8/22/23  RA --/--/5  RV 20/5  PA 20/12/16  PCWP --/--/12  IESHA 3.2/1.5  TD 2.93/1.36  MAP 96  PVR 1.25 (IESHA)  SVR 2275 (IESHA)     TTE 8/23/23  Interpretation Summary  Severe left ventricular dilation (LVIDd 6.8cm). Left ventricular function is  decreased. The ejection fraction is 15-20% (severely reduced). Severe diffuse  hypokinesis.  Global right ventricular function is normal.  No significant valve abnormalities.  The inferior vena cava is normal.  No pericardial effusion.    [facility] 9/20/23  RA: 3/4/3 mmHg  RV: 23/2 mmHg  PA: 23/14/18 mmHg  CWP: --/--/8 mmHg  CO/CI (Iesha): 3.63/1.69 L/min/m2  CO/CI (TD): 4.1/1.9 L/min/m2  PA sat: 68.7%  MAP: 84 mmHg   PVR: 2.4 (TD) BYRD      Hemodynamics 11/19/23  CVP 4, PA 26/12/16, PCWP 12, SVO2 78, Iesha CI 2.5, PVR 1, SVR 1353 on milrinone 0.25    [facility] 5/3/24    Pressures Phase: Baseline     Time Systolic (mmHg) Diastolic (mmHg) Mean (mmHg) A Wave (mmHg) V Wave (mmHg) EDP (mmHg) Max dp/dt (mmHg/sec) HR (bpm) Content (mL/dL) SAT (%)   RA Pressures 10:56 AM   1    1    8      68        RV Pressures 10:38 AM       336          10:56 AM 35        6     64        PA Pressures 10:58 AM 35    20    26        82        PCW Pressures 10:57 AM   16    16    20      80        AO Pressures 10:50     68    80        84          Blood Flow Results Phase: Baseline     Time Results Indexed Values (L/min/m2)   QP 10:38 AM 4.04 L/min    1.86      QS 10:38 AM 4.04 L/min    1.86        Blood Oximetry Phase: Baseline     Time Hb SAT(%) PO2 Content (mL/dL) PA Sat (%)   PA 10:38 AM  68.1 %      68.1      Art 10:38 AM  98 %     26.12         Cardiac Output Phase: Baseline     Time TDCO (L/min) TDCI (L/min/m2) Iesha C.O. (L/min) Iesha C.I. (L/min/m2) Iesha HR (bpm)   Cardiac Output Results 10:38  AM 3.33    1.54    4.04    1.86        11:01 AM 3.33            Resistance Results Phase: Baseline     Time PVR SVR TPR TVR PVR/SVR TPR/TVR   Resistance Results (Metric) 10:38 .97 dsc-5    1563.94 dsc-5    514.71 dsc-5    1583.73 dsc-5    0.13    0.32      Resistance Results (Wood) 10:38 AM 2.48 BYRD    19.55 BYRD    6.44 BYRD    19.8 BYRD    0.13    0.32          Outside results of note:  Outside records were obtained and relevant results/notes have been incorporated into HPI.    Assessment and Plan:     In summary, 53 year old male with a past medical history of chronic HFrEF 2/2 NICM s/p ICD, HLD, and CKD Stage II who presents as follow up for HFrEF.    Chronic systolic heart failure/HFrEF (EF 10-15%) secondary to nonischemic cardiomyopathy  NYHA Symptom Class IIIb  Stage D  Inotrope: Continue milrinone 0.3 (recently increased following RHC results 5/3/24)  ACE-I/ARB/ARNi: Continue Entresto 49-51 mg BID, unable to increase given frequent presyncope  BB: Continue coreg 3.125 mg BID given frequent ectopy on inotrope therapy  Aldosterone antagonist: Continue beck 25 mg daily  SGLT2i: Continue empagliflozin 10 mg daily  SCD prophylaxis: s/p ICD  %BiV pacing: N/A  Fluid status: euvoelmic on lasix 20 mg daily  Cardiac Rehab: previously referred  - Currently listed status 4 for heart transplant  - Discussed importance of daily standing weights, 2-3L fluid, and 2g Na restriction  - Will directly admit to [facility] soon given worsening symptoms despite increasing milrinone as outpatient recently    Myocardial Bridge Over Coronary Artery  HLD  Myocardial bridge over distal LAD.  - Continue to monitor    CKD Stage III  Cr ~1.2-1.4.  - Will continue to monitor    RUE DVT  - Continue warfarin with INR goal 2-3    Optimal Vascular Metrics    Blood Pressure   BP < 140/90 Yes    On Aspirin  No: Contraindicated due to: On warfarin already    On Statin  Yes    Tobacco use  No     To Do:  - No medication changes  - Will directly admit to  [facility] soon given worsening symptoms despite increasing milrinone as outpatient recently  - Follow up TBD clinical course from upcoming admission    The patient states understanding and is agreeable with plan.   Feel free to contact myself regarding questions or concerns. It was a pleasure to see this patient today.    A total of 41 minutes was spent on the day of the visit, which includes preparation for the visit (reviewing previous medical records, laboratories and investigations), in conjunction with the actual clinic visit with the patient, which includes obtaining a history and physical exam, creating and reviewing the care plan, patient education (and family if present), counseling, documenting clinical information in the electronic health record and care coordination.     The longitudinal plan of care for the diagnosis(es)/condition(s) as documented were addressed during this visit. Due to the added complexity in care, I will continue to support Navin in the subsequent management and with ongoing continuity of care.     Micaela Silvestre MD   of Medicine, [facility]  Advanced Heart Failure and Transplant Cardiology     CC  GALO HERNANDEZ NP      Please do not hesitate to contact me if you have any questions/concerns.     Sincerely,     Micaela Silvestre MD

## 2024-05-31 NOTE — PATIENT INSTRUCTIONS
Cardiology Providers you saw during your visit:  Dr. Silvestre     Medication changes:  1- no changes        Follow up:  1- Plan for admission on Monday 6/3       Please call if you have:  1. Weight gain of more than 2 pounds in a day or 5 pounds in a week  2. Increased shortness of breath, swelling or bloating  3. Dizziness, lightheadedness   4. Any questions or concerns.      Heart Failure Support Group  Support group is held virtually. Please reach out if you would like to attend and we can get you the information you need to log in.   2024 dates for support group meetings:    Monday, June 3rd, 1-2pm     Monday, July 1st, 1-2pm     Monday, August 5th, 1-2pm     Monday, September 9th, 1-2pm     Monday, October 7th, 1-2pm     Monday, November 4th, 1-2pm     Monday, December 2nd, 1-2pm     Follow the American Heart Association Diet and Lifestyle recommendations:  Limit saturated fat, trans fat, sodium, red meat, sweets and sugar-sweetened beverages. If you choose to eat red meat, compare labels and select the leanest cuts available.  Aim for at least 150 minutes of moderate physical activity or 75 minutes of vigorous physical activity - or an equal combination of both - each week.     During business hours: [phone], press option # 1 to schedule an appointment or send a message to your care team     After hours, weekends or holidays: On Call Cardiologist- [phone]   option #4 and ask to speak to the on-call Cardiologist. Inform them you are a CORE/heart failure patient at the [place].     Shannon Dixon RN BSN  Cardiology Nurse Care Coordinator (Heart Failure / C.O.R.E.)

## 2024-05-31 NOTE — NURSING NOTE
Labs: Patient was given results of the laboratory testing obtained today and patient was instructed about when to return for the next laboratory testing.     Med Reconcile: Reviewed and verified all current medications with the patient. The updated medication list was printed and given to the patient. No changes    Return Appointment: Patient given instructions regarding scheduling next clinic visit. Follow up hospital admission on Monday 6/3.     Patient stated he understood all health information given and agreed to call with further questions or concerns.     Shannon Dixon RN

## 2024-05-31 NOTE — NURSING NOTE
Is the patient currently in the state of [place]? NO    Visit mode:VIDEO    If the visit is dropped, the patient can be reconnected by: VIDEO VISIT: Text to cell phone:   Telephone Information:   Mobile [phone]    and VIDEO VISIT: Send to e-mail at: [email or web address]    Will anyone else be joining the visit? Pts wife  (If patient encounters technical issues they should call [phone]    How would you like to obtain your AVS? MyChart    Are changes needed to the allergy or medication list?  Pt flagged medication for removal     Patient denies any changes since echeck-in completion and states all information entered during echeck-in remains accurate.    Are refills needed on medications prescribed by this physician? NO    Reason for visit: DENISE BlueF

## 2024-05-31 NOTE — PROGRESS NOTES
Virtual Visit Details    Type of service:  Video Visit     Originating Location (pt. Location): Home  Distant Location (provider location):  On-site  Platform used for Video Visit: [facility]    Advanced Heart Failure/Transplant Clinic Note    HPI  Dear colleagues,     I had the pleasure of seeing Mr. Navin Lutz in the Cardiology clinic. As you know, Mr. Navin Lutz is a pleasant 53 year old male with a past medical history of chronic HFrEF 2/2 NICM s/p ICD, HLD, and CKD Stage II who presents for follow up for HFrEF.    Patient reports he was first having symptoms of CHF in 2017 and was diagnosed with HFrEF shortly after that. He has been on various GDMT and overall did well with no hospital admissions until June '23.     Patient admitted to [facility] from 8/22-8/28/23 for cardiogenic shock. He was initially started on dobutamine, but then switched to milrinone prior to discharge. On this, he was having a significant amount of ectopy, so low-dose carvedilol was prior to discharge. 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    Patient was transferred from [facility] in November '23 with hypotension, he was diuresed and his milrinone was adjusted back to his prior home dose and discharged home.    Patient reports since our last visit, still having persistent cough, dyspnea on exertion and intermittent orthopnea. He does feel like he cannot do the same level of activity he could a couple months ago. He is now using lasix consistently every day and was recently treated with a course of abx. He has been compliant with his medications, diet, fluid restriction, and monitoring his weights and BPs. He reports most days feeling presyncope, but again, this sometimes gets worse than other days. He denies orthopnea, PND, chest pain, palpitations, syncope, abdominal pain, nausea, emesis, LE edema, or weight gain.    ROS:  A complete 12-point ROS was negative except as  above.    PAST MEDICAL HISTORY:  Patient Active Problem List   Diagnosis    Acute on chronic systolic CHF (congestive heart failure) (H)    Chest pain    ICD (implantable cardioverter-defibrillator) in place    Inguinal hernia    Multiple lung nodules on CT    Non-ischemic cardiomyopathy (H)    Pure hypercholesterolemia    RAD (reactive airway disease) with wheezing, mild intermittent, uncomplicated    Acute deep vein thrombosis (DVT) of other vein of left upper extremity (H)    Cardiogenic shock (H)   Chronic biventricular systolic and LV diastolic dysfunction (HFrEF)  ACC/AHA stage C, NYHA class III  EF 18% (cardiac MRI) on 6/14/2023  EF 13% with grade I diastolic dysfunction on 6/13/2023  Reduced RVSF  EF 20-25% on 9/22/2020  EF 25-30% on 9/20/2019  EF 20% on 3/26/2019  EF 20-25% on 1/22/2019  EF 15-20% on 9/24/2018  EF 15-20% with grade 5 diastolic dysfunction on 12/11/2017   Nonischemic dilated cardiomyopathy  LIVDd 7.2 cm  Viral mediated?  Evaluation at [facility] in 2019  Cardiac MRI on 6/14/2023 = severe left ventricular dilation with severe, diffuse hypokinesis and mid wall late gadolinium enhancement stripe throughout the septum consistent with nonischemic dilated cardiomyopathy; mild patchy myocardial edema along the left ventricular anterior, anterolateral and inferolateral walls in the basal and mid cavity segments could represent an element of myocarditis; trace aortic regurgitation, trace mitral valve regurgitation, mild tricuspid valve regurgitation  LHC and RHC on 6/13/2023 = intramyocardial bridging of the dLAD otherwise normal coronary arteries; LVEDP 3 mmHg; normal pressures with no pulmonary hypertension; RA 4 mmHg, RV 22/4 mmHg, PA 23/11 mmHg with mean PAP of 16 mmHg, PCWP 7 mmHg, CO 4.32 L/min and CI 2.1 L/min*m2 by Teressa, CO 2.05 L/min and CI 1.0 L/min*m2 by thermodilution  LHC and RHC on 1/9/2018 = arterial pressure 82/59 (67); normal pulmonary capillary wedge, and mean pulmonary artery  pressures; cardiac output by thermodilution was 3.07 L/min, index at 1.5; no angiographic evidence of coronary artery disease present  History of NSVT  Moderate to severe eccentric LVH  Valvular heart disease  Mild MR  Dyslipidemia  Possible obstructive sleep apnea       FAMILY HISTORY:  No known family history of heart disease except possible his father had an enlarged heart, but no formal diagnosis    SOCIAL HISTORY:  , owns a [employer] company. No prior tobacco, ETOH, or illicit substance use.  Social History     Tobacco Use    Smoking status: Never    Smokeless tobacco: Never   Substance Use Topics    Alcohol use: Never    Drug use: Never      ALLERGIES:  No Known Allergies    CURRENT MEDICATIONS:  Current Outpatient Medications   Medication Sig Dispense Refill    atorvastatin (LIPITOR) 20 MG tablet Take 1 tablet (20 mg) by mouth every evening for 3 days 3 tablet 0    carvedilol (COREG) 3.125 MG tablet Take 1 tablet (3.125 mg) by mouth 2 times daily (with meals) for 3 doses 3 tablet 0    empagliflozin (JARDIANCE) 10 MG TABS tablet Take 10 mg by mouth      furosemide (LASIX) 20 MG tablet Take 1 tablet (20 mg) by mouth as needed (for weight gain, swelling) 3 tablet 0    heparin 100 UNIT/ML SOLN flush 500 Units by Intravenous (Continuous Infusion) route as needed      milrinone (PRIMACOR) infusion 200 mcg/mL PREMIX Inject 23.475 mcg/min into the vein continuous 100 mL 0    nitroGLYcerin (NITROSTAT) 0.4 MG sublingual tablet Place 0.4 mg under the tongue every 5 minutes as needed for chest pain May repeat every 5 minutes for a total of 3 doses.      potassium chloride araceli ER (KLOR-CON M20) 20 MEQ CR tablet Take 1 tablet (20 mEq) by mouth daily 30 tablet 1    reason aspirin not prescribed, intentional, Please choose reason not prescribed from choices below. (Patient not taking: Reported on 5/3/2024)      sacubitril-valsartan (ENTRESTO) 49-51 MG per tablet Take 1 tablet by mouth 2 times daily for 3 doses 3  "tablet 0    spironolactone (ALDACTONE) 25 MG tablet       warfarin ANTICOAGULANT (COUMADIN) 5 MG tablet Take 5 mg by mouth         EXAM:  BP 98/69   Pulse 92   Ht 1.842 m (6' 0.5\")   Wt 90.9 kg (200 lb 6.4 oz)   BMI 26.81 kg/m    General: appears comfortable, alert and interactive, in no acute distress  Head: normocephalic, atraumatic  Eyes: anicteric sclera, EOMI  Mouth: MMM  Neurological: alert and oriented, no focal deficits  Psych: normal mood and affect  Derm: no rashes on exposed surfaces    Weight  Wt Readings from Last 10 Encounters:   05/31/24 90.9 kg (200 lb 6.4 oz)   05/03/24 95.3 kg (210 lb 3.2 oz)   05/03/24 96.7 kg (213 lb 1.6 oz)   03/13/24 91.8 kg (202 lb 6.4 oz)   01/19/24 95.4 kg (210 lb 6.4 oz)   01/19/24 91.2 kg (201 lb)   01/19/24 96.3 kg (212 lb 3.2 oz)   12/08/23 91.4 kg (201 lb 6.4 oz)   11/20/23 93.9 kg (207 lb 0.2 oz)   11/01/23 91.6 kg (202 lb)       I personally reviewed recent labs and data as below and discussed the results with the patient in clinic today.  Labs:  CBC RESULTS:  Lab Results   Component Value Date    WBC 6.8 05/03/2024    RBC 6.17 (H) 05/03/2024    HGB 19.6 (H) 05/03/2024    HGB 18.9 (H) 05/03/2024    HCT 55.3 (H) 05/03/2024    MCV 90 05/03/2024    MCH 30.6 05/03/2024    MCHC 34.2 05/03/2024    RDW 13.6 05/03/2024     05/03/2024       CMP RESULTS:  Lab Results   Component Value Date     05/03/2024    POTASSIUM 4.3 05/03/2024    CHLORIDE 104 05/28/2024    CO2 22 05/03/2024    ANIONGAP 9 05/03/2024    GLC 99 05/03/2024     (H) 11/18/2023    BUN 21.2 (H) 05/03/2024    CR 1.31 (H) 05/03/2024    GFRESTIMATED 65 05/03/2024    NAM 9.0 05/03/2024    BILITOTAL 0.6 05/03/2024    ALBUMIN 4.0 05/03/2024    ALKPHOS 98 05/03/2024    ALT 50 05/03/2024    AST 35 05/03/2024 5/29/24  Na 136  K 4.5  Cl 104  CO2 24  BUN 17  Cr 1.3    Recent Labs   Lab Test 11/18/23  0018 08/22/23  1037   CHOL 161 245*   HDL 48 42   LDL 96 180*   TRIG 85 114    "   Testing/Procedures:  I personally visualized and interpreted:  Echocardiogram 6/13/23  Narrative      Left Ventricle: The left ventricle is severely dilated. There is   moderate-to-severe eccentric left ventricular hypertrophy. Severely   reduced left ventricular systolic function. The ejection fraction,   measured by biplane, is 13%. Severe hypokinesis of apex, remainder wall   segments are mostly akinetic. Grade I diastolic dysfunction.     Left Ventricle   The left ventricle is severely dilated. There is moderate-to-severe eccentric left ventricular hypertrophy. Severely reduced left ventricular systolic function. The ejection fraction, measured by biplane, is 13%. Severe hypokinesis of apex, remainder wall segments are mostly akinetic. Grade I diastolic dysfunction.     Right Ventricle   The right ventricle appears normal in size. Systolic function is reduced. Normal TAPSE, measuring 1.8 cm. Normal systolic excursion velocity by TDI. S' measures 9.5 cm/sec. Wall thickness is normal. The RVSP measures 13 mmHg. There is no evidence of pulmonary hypertension.     Left Atrium   The left atrium is normal in size. The pulmonary veins have normal venous flow.     Right Atrium   The right atrium is normal in size.     IVC/SVC   Normal IVC size with normal respirophasic changes.Normal hepatic vein blood flow.     Mitral Valve   Mitral valve structure is normal. There is trace regurgitation. There is no evidence of mitral valve stenosis.     Tricuspid Valve   Tricuspid valve structure is normal. There is mild regurgitation. There is no evidence of tricuspid valve stenosis.     Aortic Valve   The aortic valve is tricuspid. There is trace regurgitation. There is no evidence of aortic valve stenosis.     Pulmonic Valve   The pulmonic valve was not well visualized. There is trace regurgitation. There is no evidence of pulmonic valve stenosis.     Pericardium   There is no pericardial effusion.     Aorta   The sinus of  Valsalva is normal. The ascending aorta is normal.     Interatrial Septum   No evidence of an atrial shunt by color Doppler.     Coronary Angiogram/RHC 6/13/23  Conclusions:   Coronary angiogram: Intramyocardial bridging of the distal LAD, otherwise   normal coronaries.   Left heart catheterization: No significant gradient across aortic valve.     LVEDP 3 mmHg.   Right heart catheterization: Normal pressures.  No pulmonary hypertension.    Low cardiac output.  No shunt.   -RA 4 mmHg   -RV 22/4 mmHg   -PA 23/11 mmHg, Mean PAP 16 mmHg   -PCWP 7 mmHg   -Iesha: CO 4.32 L/min, CI 2.1 L/min*m2   -Thermodilution: CO 2.05 L/min, CI 1.0 L/min*m2     cMRI 6/14/23  FINDINGS:   AORTA: The ascending aorta and aortic root are normal caliber. The sinotubular junction is maintained. Normal aortic wall thickness.     ARCH VESSELS: Arch vessels are grossly patent and partially visualized.     RIGHT ATRIUM: Normal size.     TRICUSPID VALVE: Unrestricted leaflet excursion without stenosis. Mild regurgitation.     RIGHT VENTRICLE: Normal morphology, size, and wall thickness. No wall motion abnormalities identified.     PULMONIC VALVE: Unrestricted leaflet excursion without stenosis or regurgitation.     PULMONARY VEINS: Widely patent.     LEFT ATRIUM: Mild dilation. No left atrial appendage thrombus.     MITRAL VALVE: Unrestrictive leaflet excursion without stenosis. Trace regurgitation.     LEFT VENTRICLE: Normal morphology and thickness with marked dilation. End-diastolic basal septal thickness of 9 mm.     Severe, diffuse hypokinesis. Normal first pass perfusion.     Mid wall late gadolinium enhancement throughout the septum. Patchy myocardial edema along the anterior, anterolateral and inferolateral walls in the basal and mid cavity segments.       EKG 6/13/23 shows sinus rhythm, nonspecific t wave abnormalities    RHC 8/22/23  RA --/--/5  RV 20/5  PA 20/12/16  PCWP --/--/12  IESHA 3.2/1.5  TD 2.93/1.36  MAP 96  PVR 1.25 (IESHA)  SVR  2275 (IESHA)     TTE 8/23/23  Interpretation Summary  Severe left ventricular dilation (LVIDd 6.8cm). Left ventricular function is  decreased. The ejection fraction is 15-20% (severely reduced). Severe diffuse  hypokinesis.  Global right ventricular function is normal.  No significant valve abnormalities.  The inferior vena cava is normal.  No pericardial effusion.    RHC 9/20/23  RA: 3/4/3 mmHg  RV: 23/2 mmHg  PA: 23/14/18 mmHg  CWP: --/--/8 mmHg  CO/CI (Iesha): 3.63/1.69 L/min/m2  CO/CI (TD): 4.1/1.9 L/min/m2  PA sat: 68.7%  MAP: 84 mmHg   PVR: 2.4 (TD) BYRD      Hemodynamics 11/19/23  CVP 4, PA 26/12/16, PCWP 12, SVO2 78, Iesha CI 2.5, PVR 1, SVR 1353 on milrinone 0.25    RHC 5/3/24    Pressures Phase: Baseline     Time Systolic (mmHg) Diastolic (mmHg) Mean (mmHg) A Wave (mmHg) V Wave (mmHg) EDP (mmHg) Max dp/dt (mmHg/sec) HR (bpm) Content (mL/dL) SAT (%)   RA Pressures 10:56 AM   1    1    8      68        RV Pressures 10:38 AM       336          10:56 AM 35        6     64        PA Pressures 10:58 AM 35    20    26        82        PCW Pressures 10:57 AM   16    16    20      80        AO Pressures 10:50     68    80        84          Blood Flow Results Phase: Baseline     Time Results Indexed Values (L/min/m2)   QP 10:38 AM 4.04 L/min    1.86      QS 10:38 AM 4.04 L/min    1.86        Blood Oximetry Phase: Baseline     Time Hb SAT(%) PO2 Content (mL/dL) PA Sat (%)   PA 10:38 AM  68.1 %      68.1      Art 10:38 AM  98 %     26.12         Cardiac Output Phase: Baseline     Time TDCO (L/min) TDCI (L/min/m2) Iesha C.O. (L/min) Iesha C.I. (L/min/m2) Iesha HR (bpm)   Cardiac Output Results 10:38 AM 3.33    1.54    4.04    1.86        11:01 AM 3.33            Resistance Results Phase: Baseline     Time PVR SVR TPR TVR PVR/SVR TPR/TVR   Resistance Results (Metric) 10:38 .97 dsc-5    1563.94 dsc-5    514.71 dsc-5    1583.73 dsc-5    0.13    0.32      Resistance Results (Wood) 10:38 AM 2.48 BYRD    19.55 BYRD    6.44  BYRD    19.8 BYRD    0.13    0.32          Outside results of note:  Outside records were obtained and relevant results/notes have been incorporated into HPI.    Assessment and Plan:     In summary, 53 year old male with a past medical history of chronic HFrEF 2/2 NICM s/p ICD, HLD, and CKD Stage II who presents as follow up for HFrEF.    Chronic systolic heart failure/HFrEF (EF 10-15%) secondary to nonischemic cardiomyopathy  NYHA Symptom Class IIIb  Stage D  Inotrope: Continue milrinone 0.3 (recently increased following [facility] results 5/3/24)  ACE-I/ARB/ARNi: Continue Entresto 49-51 mg BID, unable to increase given frequent presyncope  BB: Continue coreg 3.125 mg BID given frequent ectopy on inotrope therapy  Aldosterone antagonist: Continue beck 25 mg daily  SGLT2i: Continue empagliflozin 10 mg daily  SCD prophylaxis: s/p ICD  %BiV pacing: N/A  Fluid status: euvoelmic on lasix 20 mg daily  Cardiac Rehab: previously referred  - Currently listed status 4 for heart transplant  - Discussed importance of daily standing weights, 2-3L fluid, and 2g Na restriction  - Will directly admit to [facility] soon given worsening symptoms despite increasing milrinone as outpatient recently    Myocardial Bridge Over Coronary Artery  HLD  Myocardial bridge over distal LAD.  - Continue to monitor    CKD Stage III  Cr ~1.2-1.4.  - Will continue to monitor    RUE DVT  - Continue warfarin with INR goal 2-3    Optimal Vascular Metrics    Blood Pressure   BP < 140/90 Yes    On Aspirin  No: Contraindicated due to: On warfarin already    On Statin  Yes    Tobacco use  No     To Do:  - No medication changes  - Will directly admit to [facility] soon given worsening symptoms despite increasing milrinone as outpatient recently  - Follow up TBD clinical course from upcoming admission    The patient states understanding and is agreeable with plan.   Feel free to contact myself regarding questions or concerns. It was a pleasure to see this patient today.    A  total of 41 minutes was spent on the day of the visit, which includes preparation for the visit (reviewing previous medical records, laboratories and investigations), in conjunction with the actual clinic visit with the patient, which includes obtaining a history and physical exam, creating and reviewing the care plan, patient education (and family if present), counseling, documenting clinical information in the electronic health record and care coordination.     The longitudinal plan of care for the diagnosis(es)/condition(s) as documented were addressed during this visit. Due to the added complexity in care, I will continue to support Navin in the subsequent management and with ongoing continuity of care.     Micaela Silvestre MD   of Medicine, [facility]  Advanced Heart Failure and Transplant Cardiology     CC  GALO HERNANDEZ NP

## 2024-06-03 ENCOUNTER — HOSPITAL ENCOUNTER (INPATIENT)
Facility: CLINIC | Age: 54
LOS: 31 days | Discharge: HOME | End: 2024-07-04
Attending: STUDENT IN AN ORGANIZED HEALTH CARE EDUCATION/TRAINING PROGRAM | Admitting: STUDENT IN AN ORGANIZED HEALTH CARE EDUCATION/TRAINING PROGRAM
Payer: COMMERCIAL

## 2024-06-03 DIAGNOSIS — Z95.811 LVAD (LEFT VENTRICULAR ASSIST DEVICE) PRESENT (H): Primary | ICD-10-CM

## 2024-06-03 DIAGNOSIS — I50.9 ACUTE DECOMPENSATED HEART FAILURE (H): ICD-10-CM

## 2024-06-03 DIAGNOSIS — I50.22 CHRONIC SYSTOLIC CONGESTIVE HEART FAILURE (H): ICD-10-CM

## 2024-06-03 DIAGNOSIS — Z79.01 ANTICOAGULATED ON WARFARIN: ICD-10-CM

## 2024-06-03 DIAGNOSIS — Z79.01 ANTICOAGULATED ON COUMADIN: ICD-10-CM

## 2024-06-03 DIAGNOSIS — I50.22 CHRONIC SYSTOLIC HEART FAILURE (H): ICD-10-CM

## 2024-06-03 LAB
ALBUMIN SERPL BCG-MCNC: 4.1 G/DL (ref 3.5–5.2)
ALP SERPL-CCNC: 91 U/L (ref 40–150)
ALT SERPL W P-5'-P-CCNC: 26 U/L (ref 0–70)
ANION GAP SERPL CALCULATED.3IONS-SCNC: 10 MMOL/L (ref 7–15)
AST SERPL W P-5'-P-CCNC: 23 U/L (ref 0–45)
BASE EXCESS BLDV CALC-SCNC: 0.1 MMOL/L (ref -3–3)
BILIRUB SERPL-MCNC: 0.7 MG/DL
BUN SERPL-MCNC: 15 MG/DL (ref 6–20)
CALCIUM SERPL-MCNC: 9.1 MG/DL (ref 8.6–10)
CHLORIDE SERPL-SCNC: 105 MMOL/L (ref 98–107)
CREAT SERPL-MCNC: 1.47 MG/DL (ref 0.67–1.17)
DEPRECATED HCO3 PLAS-SCNC: 22 MMOL/L (ref 22–29)
EGFRCR SERPLBLD CKD-EPI 2021: 57 ML/MIN/1.73M2
ERYTHROCYTE [DISTWIDTH] IN BLOOD BY AUTOMATED COUNT: 13.5 % (ref 10–15)
GLUCOSE SERPL-MCNC: 88 MG/DL (ref 70–99)
HCO3 BLDV-SCNC: 26 MMOL/L (ref 21–28)
HCT VFR BLD AUTO: 52.5 % (ref 40–53)
HGB BLD-MCNC: 18.1 G/DL (ref 13.3–17.7)
INR PPP: 2.48 (ref 0.85–1.15)
LACTATE SERPL-SCNC: 0.9 MMOL/L (ref 0.7–2)
MCH RBC QN AUTO: 31 PG (ref 26.5–33)
MCHC RBC AUTO-ENTMCNC: 34.5 G/DL (ref 31.5–36.5)
MCV RBC AUTO: 90 FL (ref 78–100)
O2/TOTAL GAS SETTING VFR VENT: 21 %
OXYHGB MFR BLDV: 61 % (ref 70–75)
PCO2 BLDV: 46 MM HG (ref 40–50)
PH BLDV: 7.37 [PH] (ref 7.32–7.43)
PLATELET # BLD AUTO: 146 10E3/UL (ref 150–450)
PO2 BLDV: 32 MM HG (ref 25–47)
POTASSIUM SERPL-SCNC: 4.2 MMOL/L (ref 3.4–5.3)
PROT SERPL-MCNC: 6.5 G/DL (ref 6.4–8.3)
RBC # BLD AUTO: 5.83 10E6/UL (ref 4.4–5.9)
SAO2 % BLDV: 62.2 % (ref 70–75)
SODIUM SERPL-SCNC: 137 MMOL/L (ref 135–145)
WBC # BLD AUTO: 7 10E3/UL (ref 4–11)

## 2024-06-03 PROCEDURE — 120N000005 HC R&B MS OVERFLOW UMMC

## 2024-06-03 PROCEDURE — 250N000011 HC RX IP 250 OP 636: Performed by: STUDENT IN AN ORGANIZED HEALTH CARE EDUCATION/TRAINING PROGRAM

## 2024-06-03 PROCEDURE — 85610 PROTHROMBIN TIME: CPT

## 2024-06-03 PROCEDURE — 250N000013 HC RX MED GY IP 250 OP 250 PS 637

## 2024-06-03 PROCEDURE — 250N000013 HC RX MED GY IP 250 OP 250 PS 637: Performed by: STUDENT IN AN ORGANIZED HEALTH CARE EDUCATION/TRAINING PROGRAM

## 2024-06-03 PROCEDURE — 250N000011 HC RX IP 250 OP 636: Mod: JZ

## 2024-06-03 PROCEDURE — 99222 1ST HOSP IP/OBS MODERATE 55: CPT | Mod: GC | Performed by: STUDENT IN AN ORGANIZED HEALTH CARE EDUCATION/TRAINING PROGRAM

## 2024-06-03 PROCEDURE — 82805 BLOOD GASES W/O2 SATURATION: CPT

## 2024-06-03 PROCEDURE — 80053 COMPREHEN METABOLIC PANEL: CPT

## 2024-06-03 PROCEDURE — 83605 ASSAY OF LACTIC ACID: CPT

## 2024-06-03 PROCEDURE — 85014 HEMATOCRIT: CPT

## 2024-06-03 RX ORDER — WARFARIN SODIUM 7.5 MG/1
7.5 TABLET ORAL ONCE
Status: COMPLETED | OUTPATIENT
Start: 2024-06-03 | End: 2024-06-03

## 2024-06-03 RX ORDER — MILRINONE LACTATE 0.2 MG/ML
0.25 INJECTION, SOLUTION INTRAVENOUS CONTINUOUS
Status: DISCONTINUED | OUTPATIENT
Start: 2024-06-03 | End: 2024-06-14

## 2024-06-03 RX ORDER — CARVEDILOL 3.12 MG/1
3.12 TABLET ORAL 2 TIMES DAILY WITH MEALS
Status: DISCONTINUED | OUTPATIENT
Start: 2024-06-03 | End: 2024-06-03

## 2024-06-03 RX ORDER — CARVEDILOL 3.12 MG/1
3.12 TABLET ORAL 2 TIMES DAILY WITH MEALS
Status: CANCELLED | OUTPATIENT
Start: 2024-06-03

## 2024-06-03 RX ORDER — FUROSEMIDE 20 MG
20 TABLET ORAL DAILY PRN
Status: CANCELLED | OUTPATIENT
Start: 2024-06-03

## 2024-06-03 RX ORDER — NITROGLYCERIN 0.4 MG/1
0.4 TABLET SUBLINGUAL EVERY 5 MIN PRN
Status: DISCONTINUED | OUTPATIENT
Start: 2024-06-03 | End: 2024-06-14

## 2024-06-03 RX ORDER — AMOXICILLIN 250 MG
2 CAPSULE ORAL 2 TIMES DAILY PRN
Status: DISCONTINUED | OUTPATIENT
Start: 2024-06-03 | End: 2024-06-14

## 2024-06-03 RX ORDER — WARFARIN SODIUM 7.5 MG/1
7.5 TABLET ORAL DAILY
Status: DISCONTINUED | OUTPATIENT
Start: 2024-06-03 | End: 2024-06-03

## 2024-06-03 RX ORDER — CALCIUM CARBONATE 500 MG/1
1000 TABLET, CHEWABLE ORAL 4 TIMES DAILY PRN
Status: DISCONTINUED | OUTPATIENT
Start: 2024-06-03 | End: 2024-06-14

## 2024-06-03 RX ORDER — LIDOCAINE 40 MG/G
CREAM TOPICAL
Status: DISCONTINUED | OUTPATIENT
Start: 2024-06-03 | End: 2024-06-14

## 2024-06-03 RX ORDER — CARVEDILOL 6.25 MG/1
6.25 TABLET ORAL 2 TIMES DAILY WITH MEALS
Status: DISCONTINUED | OUTPATIENT
Start: 2024-06-03 | End: 2024-06-10

## 2024-06-03 RX ORDER — SPIRONOLACTONE 25 MG/1
25 TABLET ORAL DAILY
Status: DISCONTINUED | OUTPATIENT
Start: 2024-06-04 | End: 2024-06-14

## 2024-06-03 RX ORDER — POTASSIUM CHLORIDE 750 MG/1
20 TABLET, EXTENDED RELEASE ORAL DAILY
Status: CANCELLED | OUTPATIENT
Start: 2024-06-03

## 2024-06-03 RX ORDER — POTASSIUM CHLORIDE 750 MG/1
20 TABLET, EXTENDED RELEASE ORAL DAILY
Status: DISCONTINUED | OUTPATIENT
Start: 2024-06-03 | End: 2024-06-04

## 2024-06-03 RX ORDER — AMOXICILLIN 250 MG
1 CAPSULE ORAL 2 TIMES DAILY PRN
Status: DISCONTINUED | OUTPATIENT
Start: 2024-06-03 | End: 2024-06-14

## 2024-06-03 RX ORDER — ATORVASTATIN CALCIUM 20 MG/1
20 TABLET, FILM COATED ORAL EVERY EVENING
Status: DISCONTINUED | OUTPATIENT
Start: 2024-06-03 | End: 2024-06-14

## 2024-06-03 RX ORDER — FUROSEMIDE 20 MG
20 TABLET ORAL DAILY
Status: DISCONTINUED | OUTPATIENT
Start: 2024-06-04 | End: 2024-06-04

## 2024-06-03 RX ADMIN — ATORVASTATIN CALCIUM 20 MG: 20 TABLET, FILM COATED ORAL at 20:06

## 2024-06-03 RX ADMIN — WARFARIN SODIUM 7.5 MG: 7.5 TABLET ORAL at 20:46

## 2024-06-03 RX ADMIN — SACUBITRIL AND VALSARTAN 1 TABLET: 49; 51 TABLET, FILM COATED ORAL at 20:06

## 2024-06-03 RX ADMIN — MILRINONE LACTATE IN DEXTROSE 0.3 MCG/KG/MIN: 200 INJECTION, SOLUTION INTRAVENOUS at 17:58

## 2024-06-03 RX ADMIN — CARVEDILOL 6.25 MG: 6.25 TABLET, FILM COATED ORAL at 19:13

## 2024-06-03 ASSESSMENT — ACTIVITIES OF DAILY LIVING (ADL)
ADLS_ACUITY_SCORE: 35
ADLS_ACUITY_SCORE: 25
ADLS_ACUITY_SCORE: 36
ADLS_ACUITY_SCORE: 25

## 2024-06-03 NOTE — Clinical Note
Report is called to 4A Neto SMITH, The procedure, outcome, and post-status is reviewed. The pt is removed to 4A per monitored stretcher in stable condition.

## 2024-06-03 NOTE — LETTER
"  Mechanical Circulatory Support Program  [address], [facility] [address] [place] [address]  [phone] Office Phone  [phone] Fax Number  Faxed to: SANTI   Fax Number:  [phone]    [facility]  DOPPLER / BP CUFF ORDER FORM    Date: 07/01/24  Facility Name: [facility]      Patient: Navin Lutz  YOB: 1970    VAD Team Member:  Kim Trinidad RN  Phone Number:  [phone]  Fax Number: [phone]    Check products desired:  __X__        Pito Sonotrax Vascular Doppler (w/ 8 MHz probe)  __X__        Clear Ultrasound Gel  __X__        PH944L Blood Pressure Cuff w/ lifetime calibration  Please measure to ensure correct size as equipment cannot be returned.   __X__        Adult circumference 9.8\" x 15.7\" (24.9 cm x 39.8 cm) overall length 20.9\" (53.9 cm)          ____    Pediatric Circumference 7.5\" X 9.8\" (19 cm x 24.9 cm) overall length 15\" (38 cm)          ____    Adult XL circumference 13.4\" X 19.7\" (34 cm x 50 cm) overall length 25.6\" (65 cm)      Signed,        Micaela Silvestre MD   of Medicine  [facility], Cardiovascular Division    "

## 2024-06-03 NOTE — Clinical Note
Secured with Catheter remains in place at 46cm.    Secured in normal fashion with sutureby ALEIDA Gonzalez, DAMARIS Mcdonald.

## 2024-06-03 NOTE — H&P
[facility]    Cardiology History and Physical - Cardiology         Date of Admission:  6/3/2024    Assessment & Plan: HVSL    Navin Lutz is a 53 year old male admitted on 6/3/2024. He has a past medical history of chronic HFrEF 2/2 NICM s/p ICD, HLD, and CKD Stage II who presents admitted for decompensated heart failure on milrinone, plan RHC.     Chronic systolic heart failure/HFrEF (EF 10-15%) secondary to NICM on home milrinone  NYHA Symptom Class IIIb  Stage D  Patient reported having had worsening SOB for the past 3 weeks. + dyspnea on exertion, + belly distention and orthopnea. He has also been having a lot of productive cough which does not respond to daily lasix. His dose of home milrinone was up-titrated, but symptoms have not improved. Vitals on admission notable for BP 88/61 on milrinone 0.3, otherwise WNL. Physical exam notable for elevated JVP and bilateral crackles. Plan RHC to assess volume status and cardiac function tomorrow.   - plan RHC tomorrow, NPO at midnight  - continue milrinone 0.3 mcg/kg/min  - continue PTA carvedilol, empagliflozin, entresto, spironolactone  - hold lasix and potassium   - SvO2 and lactate on admission    Hx of RUE DVT  - INR today  - continue warfarin  - consult pharm to dose, INR goal 2-3    CKD - baseline Cr ~1.2-1.4. monitor Cr  HLD - continue statin       Diet: Combination Diet 2 gm NA Diet; No Caffeine Diet (and additional linked orders)  Fluid restriction 1800 ML FLUID (and additional linked orders)  NPO for Medical/Clinical Reasons Except for: Meds    DVT Prophylaxis: Pneumatic Compression Devices  Menjivar Catheter: Not present  Cardiac Monitoring: ACTIVE order. Indication: Acute decompensated heart failure (48 hours)  Code Status: Full Code          Clinically Significant Risk Factors Present on Admission               # Drug Induced Coagulation Defect: home medication list includes an anticoagulant medication      # End stage heart failure: home medication list includes inotropes            # Overweight: Estimated body mass index is 28.36 kg/m  as calculated from the following:    Height as of this encounter: 1.829 m (6').    Weight as of this encounter: 94.8 kg (209 lb 1.6 oz).       # Financial/Environmental Concerns:    # Asthma: noted on problem list  # ICD device present      Disposition Plan   Expected discharge: 2 - 3 days, recommended to prior living arrangement once fluid volume status optimized on oral medication and advanced heart failure work-up completed.    Entered: Traci Nicole MD 06/03/2024, 5:22 PM   The patient's care was discussed with the Attending Physician, Dr. Silvestre .      Traci Nicole MD  [facility]    ______________________________________________________________________    Chief Complaint   Increased SOB    History is obtained from the patient    History of Present Illness   Navin Lutz is a 53 year old male who chronic HFrEF 2/2 NICM s/p ICD, HLD, and CKD Stage II who presents admitted for decompensated heart failure on milrinone.    Patient reported having had worsening SOB for the past 3 weeks. + dyspnea on exertion and orthopnea. He has also been having a lot of productive cough which does not respond to daily lasix. His dose of home milrinone was up-titrated, but symptoms have not improved. Feels winded with one flight of stairs, and could only perform 25% of physical activity he used to be able to do. Weight has been stable at ~200-205 lbs at home. Denied recent illness.     Past Medical History    No past medical history on file.    Past Surgical History   Past Surgical History:   Procedure Laterality Date    CARDIAC SURGERY      COLONOSCOPY N/A 8/25/2023    Procedure: COLONOSCOPY, WITH POLYPECTOMY AND BIOPSY;  Surgeon: Alejandro Rodriguez MD;  Location:  GI    CV RIGHT HEART CATH MEASUREMENTS RECORDED N/A  08/22/2023    Procedure: Heart Cath Right Heart Cath;  Surgeon: Elfego Cruz MD;  Location:  HEART CARDIAC CATH LAB    CV RIGHT HEART CATH MEASUREMENTS RECORDED N/A 9/20/2023    Procedure: Heart Cath Right Heart Cath;  Surgeon: Elfego Cruz MD;  Location: U HEART CARDIAC CATH LAB    CV RIGHT HEART CATH MEASUREMENTS RECORDED N/A 1/19/2024    Procedure: Heart Cath Right Heart Cath;  Surgeon: Tobin Jaime MD;  Location:  HEART CARDIAC CATH LAB    CV RIGHT HEART CATH MEASUREMENTS RECORDED N/A 5/3/2024    Procedure: Heart Cath Right Heart Cath;  Surgeon: Dion Ashley MD;  Location:  HEART CARDIAC CATH LAB    PICC DOUBLE LUMEN PLACEMENT Right 08/22/2023    Brachial Vein Medial 5F DL 45 cm, 2 cm out       Prior to Admission Medications   Prior to Admission Medications   Prescriptions Last Dose Informant Patient Reported? Taking?   atorvastatin (LIPITOR) 20 MG tablet   No No   Sig: Take 1 tablet (20 mg) by mouth every evening for 3 days   carvedilol (COREG) 3.125 MG tablet   No No   Sig: Take 1 tablet (3.125 mg) by mouth 2 times daily (with meals) for 3 doses   empagliflozin (JARDIANCE) 10 MG TABS tablet   Yes No   Sig: Take 10 mg by mouth   furosemide (LASIX) 20 MG tablet   No No   Sig: Take 1 tablet (20 mg) by mouth as needed (for weight gain, swelling)   milrinone (PRIMACOR) infusion 200 mcg/mL PREMIX   No No   Sig: Inject 23.475 mcg/min into the vein continuous   nitroGLYcerin (NITROSTAT) 0.4 MG sublingual tablet   Yes No   Sig: Place 0.4 mg under the tongue every 5 minutes as needed for chest pain May repeat every 5 minutes for a total of 3 doses.   potassium chloride araceli ER (KLOR-CON M20) 20 MEQ CR tablet   No No   Sig: Take 1 tablet (20 mEq) by mouth daily   reason aspirin not prescribed, intentional,   Yes No   Sig: Please choose reason not prescribed from choices below.   Patient not taking: Reported on 5/3/2024   sacubitril-valsartan (ENTRESTO) 49-51 MG  per tablet   No No   Sig: Take 1 tablet by mouth 2 times daily for 3 doses   spironolactone (ALDACTONE) 25 MG tablet   Yes No   warfarin ANTICOAGULANT (COUMADIN) 5 MG tablet   Yes No   Sig: Take 5 mg by mouth      Facility-Administered Medications: None           Physical Exam   Vital Signs: Temp: 98  F (36.7  C) Temp src: Oral BP: (!) 88/61 Pulse: 89   Resp: 16 SpO2: 99 % O2 Device: None (Room air)    Weight: 209 lbs 1.6 oz    General Appearance: Alert, oriented*3, looks comfortable on RA, cooperative   Respiratory: on RA, moderate crackles bilateral lungs  Cardiovascular: regular pulse, no murmur, CVP at 90 degree around mid neck  GI: distended abdomen, soft, not tender  Skin: no rash  Other: No pitting edema bilateral legs     Medical Decision Making             Data

## 2024-06-03 NOTE — LETTER
"Faxed to:  [facility]  Fax Number:  [phone]    [facility] VAD Program   (P) [phone] (F) [phone]  Driveline Management Prescription         Patient Information   Name:  Navin Lutz Pump Type:  HM3   : 1970 Gender: male   Patient Address:  [address] [address]   Patient Phone: [phone] Email: [email or web address]      DT   Latex/Adhesive/Relevant Allergy: N   Physician:  Micaela Silvestre MD NPI: [id number]   VAD Coordinator:  Kim Trinidad RN Address: [address] [place] [address], [address]   Phone:  [phone] Fax: [phone]   ICD-10-CM Diagnosis Codes:  Z95.811, Z48.01, I42.8, Z48.812 Implant Date: 24   Length of need for prescribed items: 12 months     ck Product Size/ Description Quantity    Kit Options     X Medline Driveline Management Tray [id number]/[id number] Daily Up to 30    Medline Driveline Management Tray PXQH0825/[id number] Sensitive Up to 30    Medline Driveline Management Tray MBXS4578/[id number] Weekly Up to 10          [place] Options     X [facility] [place] /  Menjivar [place] NUS50PL / XXI333FL Up to 30   X CathGrip / CathGrip Low Profile Securement [place] Sm                    Med               Large  ([id number]) ([id number])     ([address]) Up to 30     Ismael Cath Secure Dual Tab [place]  [id number] Up to 30    Abdominal Binder Sm (810)            Med (811)          Lg (820)   Up to 1          Adhesive Options     X Medipore Tape 1\"  (402516)  2  (977568) Up to 5 rolls    3M Micropore S Surgical Tape (Kind Removal Silicone Tape) 2  (2770-2) Up to 5 rolls    Safe N Simple Blue silicone tape 1     2  [id number] Up to 5 rolls    Abarca and Nephew IV 3000 4  x 5  (010671) Up to 30    Optimal Care Transparent Film 4  x 5  (MY72930) Up to 30          Alternate Cleansers      Povidone Iodine (Betadine) Swabsticks  3/pack (38031149) Up to 30          Individual Supplies      Aquaguard/Shower Shield 7 x 7               9  x 9           " "   10  x 12   (HK68658PIL)   (QS06664POV)    (RV04987TRB) Up to 35    Blenderm Surgical Tape / Transpore Tape  2\" 16-88733 / 2  08613 Up to 5 rolls   X FreeDerm Adhesive Remover / Uni-Solve Adhesive Remover  1 oz             3oz  bottles              30/box  (BY90820)       (XM82631)            ([id number]) Up to 8 bottles / 1 box    BioPlus Skin Barrier Wipes / 3M Cavilon No-sting Barrier Wands Box / Wands  6560 / 3344  Up to 3 boxes   X Sterile Vinyl PF gloves Sizes   6   7   8   9                Pair Up to 30    Non Sterile Gloves 100/box        Size: S    M    L   XL Up to 3    Biopatch 1  (4152)     Up to 30    Silverlon 1.5cm Square disc TTVQ06-38 Up to 30    Alcohol Prep Pads UW0559 /  Box Up to 1 box   X Doppler/Cuff/gel [id number] / [id number] / [id number] 1 each           I certify that the prescribed products and/or services are medically necessary and reasonable for this patient's well-being. I further certify that the patient's medical record contains supporting documentation to substantiate this medical need. r Signature      Date: 06/18/24                                  Signature must be hand written. No stamps allowed - Medicare & Medicaid permit prescriber signatures.    Printed Prescriber Name Micaela Konrad NPI # [id number]    "

## 2024-06-03 NOTE — LETTER
Mechanical Circulatory Support Program  [facility], [facility] [address]  [address] [place] [address]  [phone] Office Phone  [phone] Fax Number    NOTIFICATION OF SPECIAL MEDICAL EQUIPMENT  To Whom it May Concern,    I attest to the existence of life sustaining equipment at the home of the below individual. I recommend that power never be interrupted from the below residence and that special attention be given to this customer in the event of disruption of power service. Lack of power could result in patient injury or death. If additional information or paperwork is needed, please contact the Ventricular Assist Device (VAD) office: phone [phone], fax [phone].     Name of Patient:   Navin Lutz    Address:   [address] [place] [address]    Type of medical equipment:  Ventricular Assist Device  Length of time equipment will be necessary: Indefinitely   Is this equipment life supporting?   Yes   Physician Name:  Micaela Silvestre MD     Thank you for your consideration in this important matter,    Dr. Silvestre        For the purpose of the form and its possible verification, I hereby authorize release of medical information to [employer] or its representative.     Signature of patient: ______________________________________________Date:__________

## 2024-06-03 NOTE — LETTER
[facility] UN[address] [address]  Phone: [phone]    Marleen 10, 2024        Navin Lutz  [address]   [facility] [address]          To whom it may concern:    RE: Navin Lutz    Mr. Lutz will not be able to attend the event on June 15th d/t medical reasons.    Please contact me for questions or concerns.      Sincerely,      Eliz Choi, PAC

## 2024-06-03 NOTE — Clinical Note
Secured with Catheter remains in place at 52cm.    Secured in normal fashion with sutureby CV Fellow.

## 2024-06-03 NOTE — PROGRESS NOTES
Admission          6/3/2024  4:05 PM  -----------------------------------------------------------  Diagnosis: CHF.     Admitted from: Home  Via: Car.  Accompanied by: Wife.   Family Aware of Admission: Yes, with patient.   Belongings:   Admission Profile: Complete  Teaching: Orientation to unit, call don't fall, use of call light, meal times, visiting hours,  when to call for the RN (angina/sob/dizzyness, etc.), and enforced importance of safety.  Access: DL PICC on right arm.   Telemetry: Placed on pt  Ht./Wt.: Complete  2 RN skin assessment:     Temp:  [98  F (36.7  C)] 98  F (36.7  C)  Resp:  [16] 16  BP: (88)/(61) 88/61

## 2024-06-03 NOTE — LETTER
"Faxed to:  [facility]  Fax Number:  [phone]                                                                                                                                                                                           [email or web address]   Email Order Form to [email or web address]  Phone: [phone] Fax: [phone]  Patient Name: Navin Lutz Date: 06/17/24   Facility Name: [facility]  Fax Number: [phone]    VAD Coordinator/ :   Kim Trinidad RN Phone: [phone]    Email Address:         [email or web address]   Patient's Primary Insurance Upon Receiving the VAD unit:          Dressing Change Frequency: Daily X Every Other Day   Other (Specify)              Duration of Need:  DT (Lifetime) X BTT (until transplant)   Other (Specify)      NEW ORDERS ONLY: PLEASE SUBMIT PATIENT DEMOGRAPHICS, POST OP NOTES & NOTES FROM MOST RECENT CLINICAL VISIT WITH ORDER FORM.                  Fax: [phone]  Or Email Order Form to   [email or web address]     ck Product Size/ Description Quantity    Kit Options     x Medline Driveline Management Tray VBNA0947/[id number]    Daily Up to 30    Medline Driveline Management Tray TONM2130/[id number]  Sensitive Up to 30    Medline Driveline Management Tray FMNX4803/GWHH7782F  Weekly Up to 10          [place] Options     x [facility] [place] /  Menjivar [place] OOX71GE / QLQ945CL Up to 30   x CathGrip / CathGrip Low Profile Securement [place] Sm/1 strap  Med/2 Strap    Large/2 Strap Up to 30    MC Ismael Cath Secure Dual Tab [place] ZD94659 Up to 30    Abdominal Binder Sm              Med              Lg   Up to 1          Adhesive Options     x Medipore Tape 1\"     2             cloth Up to 5 rolls    3M Micropore S Surgical Tape (Kind Removal Silicone Tape) 1\"     2\" Up to 5 rolls    Safe N Simple Blue silicone tape 1\"     2\" Up to 5 rolls    Blenderm Surgical Tape / " "Transpore Tape 1\"     2\" Up to 5 rolls    Simpurity Silicone Transparent Film 4\" x 5\" Up to 30    Smith and Nephew IV 3000 Transparent Film 4\" x 5.5\" Up to 30          Alternate Cleansers      Betadine Swabsticks (Povidone Iodine) 3/pack Up to 30          Individual Supplies      Aquaguard/Shower Shield 7 x 7       9  x 9      10  x 12  Up to 35   x FreeDerm Adhesive Remover Wipes Wipes Up to 60    FreeDerm Adhesive Remover Spray 1 oz  3oz  Spray             Up to 4    Uni-Solve Adhesive Remover Wipes Box Up to 2    BioPlus Skin Barrier Wipes Box  Up to 2    3M Cavilon No-sting Barrier Swabstick Swabstick Up to 60   x Sterile Vinyl PF gloves Sizes   6   7   8   9                Pair Up to 30    Non Sterile Gloves 100/box        Size: S    M    L   XL Up to 3    Biopatch 1       1.5\" Up to 30    Silverlon 1.5cm Square disc ZWMD98-94 Up to 30    Alcohol Prep Pads Box Up to 1 box     My signature below certifies the medical necessity of the above approved products and I certify the patient has been trained in the use of all products. The patient is aware that [facility] will contact him/her regarding the shipment of ordered dressing supplies.     Provider Name: Micaela Silvestre NPI#: [id number]   Provider Signature:  Digitally signed by   Micaela Silvestre 06/17/24 3:03 PM Provider Number: [phone]     [facility] will confirm receipt for all initial orders by sending a fax to the provider listed above.      "

## 2024-06-03 NOTE — PROGRESS NOTES
Admission          6/3/2024  4:05 PM  -----------------------------------------------------------  Reason for admission: Heart failure workup  Primary team notified of pt arrival.  Admitted from: Home  Accompanied by: Spouse, Corie  Belongings: Phone, ipad, apple watch, chargers, medication pump, clothing- stored in closet. Wallet, home medications- sent home with spouse.  Admission Profile: complete  Teaching: orientation to unit and call light- call light within reach, call don't fall, use of console, meal times, when to call for the RN, and enforced importance of safety   Access: Right DL PICC, Red port infusing milrinone gtt @ 0.3 mcg/kg/min  Telemetry:Placed on pt  Ht./Wt.: complete  Code Status verified on armband: yes  2 RN Skin Assessment Completed By: Karin Tovar  Med Rec completed: yes  Suction/Ambu bag/Flowmeter at bedside: yes  Is patient having diarrhea upon admission- if YES fill out testing algorithm : no    Pt status:    Temp:  [98  F (36.7  C)] 98  F (36.7  C)  Pulse:  [89] 89  Resp:  [16] 16  BP: (88)/(61) 88/61  SpO2:  [99 %] 99 %

## 2024-06-03 NOTE — Clinical Note
Potential access sites were evaluated for patency using ultrasound.   The right femoral artery was selected. Access was obtained under with Sonosite guidance using a standard 18 guage needle with direct visualization of needle entry.

## 2024-06-03 NOTE — LETTER
"Faxed to:  [facility]  Fax Number:  [phone]    [facility] VAD Program   (P) [phone] (F) [phone]  Driveline Management Prescription         Patient Information   Name:  Navin Lutz Pump Type:  HM3   : 1970 Gender: male   Patient Address:  [address] [address]   Patient Phone: [phone] Email: [email or web address]      DT   Latex/Adhesive/Relevant Allergy: N   Physician:  Micaela Silvestre MD NPI: [id number]   VAD Coordinator:  Kim Trinidad RN Address: [address] [place] [address], [address]   Phone:  [phone] Fax: [phone]   ICD-10-CM Diagnosis Codes:  Z95.811, Z48.01, I42.8, Z48.812 Implant Date: 24   Length of need for prescribed items: 12 months     ck Product Size/ Description Quantity    Kit Options     X Medline Driveline Management Tray KZSS3856/[id number] Daily Up to 30    Medline Driveline Management Tray YEVT0192/[id number] Sensitive Up to 30    Medline Driveline Management Tray QHXP6625/[id number] Weekly Up to 10          [place] Options     X [facility] [place] /  Menjivar [place] KGN74TU / [id number] Up to 30   X CathGrip / CathGrip Low Profile Securement [place] Sm                    Med               Large  (76BGU29936LC) (EJ78209CA)     (09000) Up to 30     Ismael Cath Secure Dual Tab [place]  KB39334 Up to 30    Abdominal Binder Sm (810)            Med (811)          Lg (820)   Up to 1          Adhesive Options     X Medipore Tape 1\"  (903261)  2  (021537) Up to 5 rolls   X 3M Micropore S Surgical Tape (Kind Removal Silicone Tape) 2  (2770-2) Up to 7 rolls    Safe N Simple Blue silicone tape 1     2  UGJEA97146 Up to 5 rolls    Abarca and Nephew IV 3000 4  x 5  (233506) Up to 30    Optimal Care Transparent Film 4  x 5  (MD28724) Up to 30          Alternate Cleansers      Povidone Iodine (Betadine) Swabsticks  3/pack (02402873) Up to 30          Individual Supplies      Aquaguard/Shower Shield 7 x 7               9  x 9          " "    10  x 12   (ML94963CWJ)   (GU70995SXI)    (FA94902QDZ) Up to 35    Blenderm Surgical Tape / Transpore Tape  2\" 16-22341 / 2  71263 Up to 5 rolls   X FreeDerm Adhesive Remover / Uni-Solve Adhesive Remover  1 oz             3oz  bottles              30/box  (SG29813)       (CN01216)            (EEJ78793NKU) Up to 8 bottles / 1 box    BioPlus Skin Barrier Wipes / 3M Cavilon No-sting Barrier Wands Box / Wands  6560 / 3344  Up to 3 boxes   X Sterile Vinyl PF gloves Sizes   6   7   8   9                Pair Up to 30    Non Sterile Gloves 100/box        Size: S    M    L   XL Up to 3    Biopatch 1  (4152)     Up to 30    Silverlon 1.5cm Square disc OQTV47-09 Up to 30    Alcohol Prep Pads HK8170 /  Box Up to 1 box   X Doppler/Cuff/gel [id number] / [id number] / [id number] 1 each           I certify that the prescribed products and/or services are medically necessary and reasonable for this patient's well-being. I further certify that the patient's medical record contains supporting documentation to substantiate this medical need. r Signature      Date: 07/1/24                                  Signature must be hand written. No stamps allowed - Medicare & Medicaid permit prescriber signatures.    Printed Prescriber Name Micaela Konrad NPI # [id number]    "

## 2024-06-03 NOTE — Clinical Note
Potential access sites were evaluated for patency using ultrasound.   The internal jugular vein was selected. Access was obtained under with Sonosite guidance using a standard 18 guage needle with direct visualization of needle entry.

## 2024-06-03 NOTE — LETTER
"  Mechanical Circulatory Support Program  [facility] [address], [facility] [address]  [address] [place] [address]  [phone] Office Phone  [phone] Fax Number  VAD EDUCATION PLAN for Navin Lutz  Caregivers: Nataliya Xiong    Pre-Study  Please sign up for the TEXT videos. Text the word LIFE to [phone]. A new short video will be sent to your phone daily. These short videos are a good introduction and a good review.  Also, please go to the website to watch all of the HeartMate 3 education videos.  Go to: [email or web address]  Click:  I am a patient   Click:  Living with a left ventricular heart device   Scroll down to  Heartmate 3 LVAD Patient Education Program   Watch each of the HeartMate 3 videos. (Do not watch the HeartMate 2 videos.)  The VAD binder and the caregiver folder will be delivered to Navin Lutz's room once they are out of the ICU. Please review these materials   Please review the large patient manual. There are two of these manuals in Navin Lutz's room.   Please watch the dressing change video:          VAD Education Session  We will plan to do 5-7 in person, 2 hour education sessions once Navin Lutz is transferred to Unit 6C. Depending on how education is going we may need to add sessions to ensure everyone understands the information.  We will set up times for education during normal business hours (Monday- Friday, 8am-3:30pm).   In-Person, we will educate up to two people to be trained caregivers. If other people want to participate they can join virtually through the [employer] Video or another video phone rupert, but will not be \"trained caregivers\".     Who Will Learn What:  All of the VAD Material including the Emergency Controller Change Procedure: the patient and the two 30 day caregivers.  The Sterile Dressing Change: Nataliya Xiong  Who will take the test: the patient and the 30 day caregivers     Kim Trinidad RN  RN VAD Coordinator  [email or web address]   [phone]    24/7 " On-Call VAD Coordinator  [phone], option 4, ask to talk to the VAD Coordinator on-Call. The  will page the on-Call VAD Coordinator. The On-call VAD Coordinator will call you back within 5 or 10 minutes.

## 2024-06-03 NOTE — LETTER
[facility] UN[address] [address]  Phone: [phone]    Marleen 10, 2024        Navin Lutz  [address] [place] [address]          To whom it may concern:    RE: Navin Lutz    Mr. Lutz will not be able to attend the event on June 21st due to medical reasons.    Please contact me for questions or concerns.      Sincerely,      Eliz Choi PA-C

## 2024-06-03 NOTE — Clinical Note
IABP inserted in the right femoral artery. IABP inserted with 50 cc balloon volume Lot number is: [id number]

## 2024-06-03 NOTE — LETTER
Mechanical Circulatory Support Program  [place] [address], [facility] [address] [place] [address]  [phone] Office Phone  [phone] Fax Number    NOTIFICATION OF SPECIAL MEDICAL EQUIPMENT  To Whom it May Concern,    I attest to the existence of life sustaining equipment at the home of the below individual. I recommend that power never be interrupted from the below residence and that special attention be given to this customer in the event of disruption of power service. Lack of power could result in patient injury or death. If additional information or paperwork is needed, please contact the Ventricular Assist Device (VAD) office: phone [phone], fax [phone].     Name of Patient:   Navin Lutz    Address:   [address] [address]    Type of medical equipment:  Ventricular Assist Device  Length of time equipment will be necessary: Indefinitely   Is this equipment life supporting?   Yes   Physician Name:  Micaela Silvestre MD     Thank you for your consideration in this important matter,    Dr. Silvestre        For the purpose of the form and its possible verification, I hereby authorize release of medical information to [employer] or its representative.     Signature of patient: ______________________________________________Date:__________

## 2024-06-04 ENCOUNTER — APPOINTMENT (OUTPATIENT)
Dept: ULTRASOUND IMAGING | Facility: CLINIC | Age: 54
End: 2024-06-04
Attending: STUDENT IN AN ORGANIZED HEALTH CARE EDUCATION/TRAINING PROGRAM
Payer: COMMERCIAL

## 2024-06-04 ENCOUNTER — APPOINTMENT (OUTPATIENT)
Dept: OCCUPATIONAL THERAPY | Facility: CLINIC | Age: 54
End: 2024-06-04
Attending: STUDENT IN AN ORGANIZED HEALTH CARE EDUCATION/TRAINING PROGRAM
Payer: COMMERCIAL

## 2024-06-04 LAB
ANION GAP SERPL CALCULATED.3IONS-SCNC: 9 MMOL/L (ref 7–15)
BASE EXCESS BLDV CALC-SCNC: -2.6 MMOL/L (ref -3–3)
BASE EXCESS BLDV CALC-SCNC: 0.7 MMOL/L (ref -3–3)
BUN SERPL-MCNC: 15.9 MG/DL (ref 6–20)
CALCIUM SERPL-MCNC: 8.9 MG/DL (ref 8.6–10)
CHLORIDE SERPL-SCNC: 105 MMOL/L (ref 98–107)
CREAT SERPL-MCNC: 1.2 MG/DL (ref 0.67–1.17)
DEPRECATED HCO3 PLAS-SCNC: 22 MMOL/L (ref 22–29)
EGFRCR SERPLBLD CKD-EPI 2021: 72 ML/MIN/1.73M2
ERYTHROCYTE [DISTWIDTH] IN BLOOD BY AUTOMATED COUNT: 13.5 % (ref 10–15)
GLUCOSE SERPL-MCNC: 96 MG/DL (ref 70–99)
HCO3 BLDV-SCNC: 22 MMOL/L (ref 21–28)
HCO3 BLDV-SCNC: 26 MMOL/L (ref 21–28)
HCT VFR BLD AUTO: 52.2 % (ref 40–53)
HGB BLD-MCNC: 18.1 G/DL (ref 13.3–17.7)
HGB BLD-MCNC: 19 G/DL (ref 13.3–17.7)
INR PPP: 2.49 (ref 0.85–1.15)
MAGNESIUM SERPL-MCNC: 2.2 MG/DL (ref 1.7–2.3)
MCH RBC QN AUTO: 30.9 PG (ref 26.5–33)
MCHC RBC AUTO-ENTMCNC: 34.7 G/DL (ref 31.5–36.5)
MCV RBC AUTO: 89 FL (ref 78–100)
O2/TOTAL GAS SETTING VFR VENT: 21 %
O2/TOTAL GAS SETTING VFR VENT: 21 %
OXYHGB MFR BLDV: 69 % (ref 70–75)
OXYHGB MFR BLDV: 74 % (ref 70–75)
PCO2 BLDV: 37 MM HG (ref 40–50)
PCO2 BLDV: 44 MM HG (ref 40–50)
PH BLDV: 7.38 [PH] (ref 7.32–7.43)
PH BLDV: 7.39 [PH] (ref 7.32–7.43)
PLATELET # BLD AUTO: 147 10E3/UL (ref 150–450)
PO2 BLDV: 36 MM HG (ref 25–47)
PO2 BLDV: 39 MM HG (ref 25–47)
POTASSIUM SERPL-SCNC: 4.2 MMOL/L (ref 3.4–5.3)
RBC # BLD AUTO: 5.85 10E6/UL (ref 4.4–5.9)
SAO2 % BLDV: 70.5 % (ref 70–75)
SAO2 % BLDV: 75 % (ref 70–75)
SODIUM SERPL-SCNC: 136 MMOL/L (ref 135–145)
WBC # BLD AUTO: 7.4 10E3/UL (ref 4–11)

## 2024-06-04 PROCEDURE — 250N000011 HC RX IP 250 OP 636: Mod: JZ

## 2024-06-04 PROCEDURE — 4A023N6 MEASUREMENT OF CARDIAC SAMPLING AND PRESSURE, RIGHT HEART, PERCUTANEOUS APPROACH: ICD-10-PCS | Performed by: INTERNAL MEDICINE

## 2024-06-04 PROCEDURE — 85027 COMPLETE CBC AUTOMATED: CPT

## 2024-06-04 PROCEDURE — 97110 THERAPEUTIC EXERCISES: CPT | Mod: GO

## 2024-06-04 PROCEDURE — 93971 EXTREMITY STUDY: CPT | Mod: 26 | Performed by: STUDENT IN AN ORGANIZED HEALTH CARE EDUCATION/TRAINING PROGRAM

## 2024-06-04 PROCEDURE — 93451 RIGHT HEART CATH: CPT | Mod: 26 | Performed by: INTERNAL MEDICINE

## 2024-06-04 PROCEDURE — 85610 PROTHROMBIN TIME: CPT

## 2024-06-04 PROCEDURE — 93451 RIGHT HEART CATH: CPT | Performed by: INTERNAL MEDICINE

## 2024-06-04 PROCEDURE — 250N000013 HC RX MED GY IP 250 OP 250 PS 637

## 2024-06-04 PROCEDURE — 83735 ASSAY OF MAGNESIUM: CPT

## 2024-06-04 PROCEDURE — 93971 EXTREMITY STUDY: CPT | Mod: RT

## 2024-06-04 PROCEDURE — 85018 HEMOGLOBIN: CPT

## 2024-06-04 PROCEDURE — 272N000001 HC OR GENERAL SUPPLY STERILE: Performed by: INTERNAL MEDICINE

## 2024-06-04 PROCEDURE — 999N000044 HC STATISTIC CVC DRESSING CHANGE

## 2024-06-04 PROCEDURE — C1894 INTRO/SHEATH, NON-LASER: HCPCS | Performed by: INTERNAL MEDICINE

## 2024-06-04 PROCEDURE — 250N000013 HC RX MED GY IP 250 OP 250 PS 637: Performed by: STUDENT IN AN ORGANIZED HEALTH CARE EDUCATION/TRAINING PROGRAM

## 2024-06-04 PROCEDURE — 99291 CRITICAL CARE FIRST HOUR: CPT | Mod: 25 | Performed by: STUDENT IN AN ORGANIZED HEALTH CARE EDUCATION/TRAINING PROGRAM

## 2024-06-04 PROCEDURE — 82805 BLOOD GASES W/O2 SATURATION: CPT | Performed by: STUDENT IN AN ORGANIZED HEALTH CARE EDUCATION/TRAINING PROGRAM

## 2024-06-04 PROCEDURE — 97535 SELF CARE MNGMENT TRAINING: CPT | Mod: GO

## 2024-06-04 PROCEDURE — 80048 BASIC METABOLIC PNL TOTAL CA: CPT

## 2024-06-04 PROCEDURE — 120N000002 HC R&B MED SURG/OB UMMC

## 2024-06-04 PROCEDURE — 250N000009 HC RX 250: Performed by: INTERNAL MEDICINE

## 2024-06-04 PROCEDURE — 97165 OT EVAL LOW COMPLEX 30 MIN: CPT | Mod: GO

## 2024-06-04 PROCEDURE — C1751 CATH, INF, PER/CENT/MIDLINE: HCPCS | Performed by: INTERNAL MEDICINE

## 2024-06-04 PROCEDURE — 250N000011 HC RX IP 250 OP 636: Performed by: STUDENT IN AN ORGANIZED HEALTH CARE EDUCATION/TRAINING PROGRAM

## 2024-06-04 RX ORDER — WARFARIN SODIUM 5 MG/1
5 TABLET ORAL
Status: COMPLETED | OUTPATIENT
Start: 2024-06-04 | End: 2024-06-04

## 2024-06-04 RX ADMIN — EMPAGLIFLOZIN 10 MG: 10 TABLET, FILM COATED ORAL at 08:13

## 2024-06-04 RX ADMIN — CARVEDILOL 6.25 MG: 6.25 TABLET, FILM COATED ORAL at 17:28

## 2024-06-04 RX ADMIN — WARFARIN SODIUM 5 MG: 5 TABLET ORAL at 17:28

## 2024-06-04 RX ADMIN — ATORVASTATIN CALCIUM 20 MG: 20 TABLET, FILM COATED ORAL at 20:57

## 2024-06-04 RX ADMIN — SACUBITRIL AND VALSARTAN 1 TABLET: 49; 51 TABLET, FILM COATED ORAL at 21:51

## 2024-06-04 RX ADMIN — SACUBITRIL AND VALSARTAN 1 TABLET: 49; 51 TABLET, FILM COATED ORAL at 08:13

## 2024-06-04 RX ADMIN — SPIRONOLACTONE 25 MG: 25 TABLET ORAL at 08:13

## 2024-06-04 RX ADMIN — MILRINONE LACTATE IN DEXTROSE 0.3 MCG/KG/MIN: 200 INJECTION, SOLUTION INTRAVENOUS at 16:35

## 2024-06-04 RX ADMIN — MILRINONE LACTATE IN DEXTROSE 0.3 MCG/KG/MIN: 200 INJECTION, SOLUTION INTRAVENOUS at 04:52

## 2024-06-04 ASSESSMENT — ACTIVITIES OF DAILY LIVING (ADL)
ADLS_ACUITY_SCORE: 25
ADLS_ACUITY_SCORE: 26
ADLS_ACUITY_SCORE: 25
PREVIOUS_RESPONSIBILITIES: MEAL PREP;HOUSEKEEPING;LAUNDRY;YARDWORK;SHOPPING;MEDICATION MANAGEMENT;FINANCES;DRIVING
ADLS_ACUITY_SCORE: 25

## 2024-06-04 NOTE — PROGRESS NOTES
"   06/04/24 1054   Appointment Info   Signing Clinician's Name / Credentials (OT) Alicia Ruiz, OTR/L   Living Environment   People in Home spouse   Current Living Arrangements house   Home Accessibility stairs to enter home;stairs within home   Number of Stairs, Main Entrance 2   Stair Railings, Main Entrance none   Number of Stairs, Within Home, Primary greater than 10 stairs   Stair Railings, Within Home, Primary railing on right side (ascending)   Transportation Anticipated family or friend will provide   Living Environment Comments Pt lives with spouse in 2 story home w/ basement. 2 SIGIFREDO, 13 steps on each flight of stairs, R handrail. Has walk in shower, built in chair, no grab bars.   Self-Care   Usual Activity Tolerance fair   Current Activity Tolerance fair   Regular Exercise No   Equipment Currently Used at Home none   Fall history within last six months no   Activity/Exercise/Self-Care Comment IND in ADLs PTA. Has noted inc SOB with everyday ADLs, adan while taking stairs and heavier IADLs (i.e pressure cleaning garage, car, etc).   Instrumental Activities of Daily Living (IADL)   Previous Responsibilities meal prep;housekeeping;laundry;yardwork;shopping;medication management;finances;driving   IADL Comments IND in most IADLs, spouse assists with cleaning and laundry. Not currently working but works on trucks in garage and yardwork lives on [address] acres.   General Information   Onset of Illness/Injury or Date of Surgery 06/03/24   Referring Physician Traci Nicole MD   Patient/Family Therapy Goal Statement (OT) Return to PLOF and home   Additional Occupational Profile Info/Pertinent History of Current Problem Per EMR, \"53 year old male admitted on 6/3/2024. He has a past medical history of chronic HFrEF 2/2 NICM s/p ICD, HLD, and CKD Stage II who presents admitted for decompensated heart failure on milrinone, plan RHC.\"   Existing Precautions/Restrictions cardiac   General Observations and Info " Ordered for HF   Cognitive Status Examination   Orientation Status orientation to person, place and time   Cognitive Status Comments no concerns   Visual Perception   Visual Impairment/Limitations WFL   Sensory   Sensory Quick Adds sensation intact   Pain Assessment   Patient Currently in Pain No   Posture   Posture not impaired   Range of Motion Comprehensive   General Range of Motion no range of motion deficits identified   Strength Comprehensive (MMT)   General Manual Muscle Testing (MMT) Assessment no strength deficits identified   Coordination   Upper Extremity Coordination No deficits were identified   Bed Mobility   Bed Mobility supine-sit   Supine-Sit [place] (Bed Mobility) independent   Transfers   Transfers sit-stand transfer   Transfer Comments IND   Balance   Balance Assessment standing dynamic balance   Standing Balance: Dynamic modified independence   Balance Comments IV pole   Activities of Daily Living   BADL Assessment/Intervention bathing;lower body dressing;grooming;toileting   Bathing Assessment/Intervention   [place] Level (Bathing) independent   Comment, (Bathing) per clinical judgement   Lower Body Dressing Assessment/Training   [place] Level (Lower Body Dressing) independent;socks;shoes/slippers   Comment, (Lower Body Dressing) using fig four position   Grooming Assessment/Training   [place] Level (Grooming) independent   Comment, (Grooming) per clinical judgement   Toileting   [place] Level (Toileting) independent   Comment, (Toileting) per clinical judgement   Clinical Impression   Criteria for Skilled Therapeutic Interventions Met (OT) Yes, treatment indicated   OT Diagnosis Dec activity tolerance for I/ADLs   OT Problem List-Impairments impacting ADL problems related to;activity tolerance impaired   Assessment of Occupational Performance 1-3 Performance Deficits   Identified Performance Deficits activity tolerance for I/ADLs   Planned Therapy Interventions (OT)  ADL retraining;IADL retraining;home program guidelines;progressive activity/exercise   Clinical Decision Making Complexity (OT) problem focused assessment/low complexity   Risk & Benefits of therapy have been explained evaluation/treatment results reviewed;care plan/treatment goals reviewed;risks/benefits reviewed;current/potential barriers reviewed;participants voiced agreement with care plan;participants included;patient;spouse/significant other   Clinical Impression Comments Pt appears near functional baseline. Would benefit from continued CR to promote activity tolerance for I/ADLs.   OT Total Evaluation Time   OT Eval, Low Complexity Minutes (79262) 5   OT Goals   Therapy Frequency (OT) 3 times/week   OT Predicted Duration/Target Date for Goal Attainment 06/18/24   OT Goals Cardiac Phase 1;OT Goal 1   OT: Understanding of cardiac education to maximize quality of life, condition management, and health outcomes Patient;Verbalize   OT: Perform aerobic activity with stable cardiovascular response continuous;30 minutes;ambulation;NuStep   OT: Functional/aerobic ambulation tolerance with stable cardiovascular response in order to return to home and community environment Independent;Greater than 300 feet   OT: Navigation of stairs simulating home set up with stable cardiovascular response in order to return to home and community environment Independent;Greater than 10 stairs  (13)   OT: Goal 1 Pt will demonstrate incorporation of EC techniques independently into functional mobility and ADLs (rest breaks, pacing, and positioning) to promote safety and IND in daily tasks   OT Discharge Planning   OT Plan nustep, stairs, activity tolerance   OT Discharge Recommendation (DC Rec) home with outpatient cardiac rehab   OT Rationale for DC Rec Pt appears near functional baseline. Limited by decreased activity tolerance . Anticipate pt will be safe for dc to home with OP CR. Will continue to assess safety for return home  pending LOS and plans for heart transplant. Will continue to monitor and update recs as appropriate   OT Brief overview of current status Corky   Total Session Time   Timed Code Treatment Minutes 22   Total Session Time (sum of timed and untimed services) 27

## 2024-06-04 NOTE — PLAN OF CARE
In care of 0700 - 1435 (transferred to cath lab with intention of transfer to ICU post procedure)    Neuro: A&Ox4.   Cardiac: SR. Bps running soft, held AM coreg dose due to BP 77/57; VSS.   Respiratory: Sating 91-92% on RA.  GI/: Adequate urine output. Last BM 6/3/24  Diet/appetite: NPO since midnight prior to cardiac catheterization  Activity:  Independent up to chair and in halls.  Pain: Denies   Skin: No new deficits noted  Endo: No BS checks.  LDA's:  R Dbl lumen PICC, purple running milrinone at 0.3 mcg/kg/hr, red saline locked  RN Managed Medications: None ordered       Plan: Continue with POC. Notify primary team with changes.

## 2024-06-04 NOTE — PLAN OF CARE
ICU End of Shift Summary. See flowsheets for vital signs and detailed assessment.    Changes this shift: Patient received from cath lab circa 16:00 with SWAN in place; initial numbers obtained. Patient without acute event or change; maintaining sats on room air, remains afebrile and hemodynamically stable, A&Ox4.    Plan: continue to monitor patient status; notify provider of changes.

## 2024-06-04 NOTE — PHARMACY-ANTICOAGULATION SERVICE
Clinical Pharmacy - Warfarin Dosing Consult     Pharmacy has been consulted to manage this patient s warfarin therapy.  Indication: DVT/ PE Treatment  Therapy Goal: INR 2-3  Provider/Team: SARAI ROMAN Anticoag Clinic: [facility] Anticoagulation  Warfarin Prior to Admission: Yes  Warfarin PTA Regimen: 7.5 mg on Mon, Wed, Fri and 5 mg all other days of the week  Recent documented change in oral intake/nutrition: Unknown    INR   Date Value Ref Range Status   06/03/2024 2.48 (H) 0.85 - 1.15 Final     INR POCT   Date Value Ref Range Status   05/03/2024 1.3 (L) 2.0 - 3.0 Final       Recommend warfarin 7.5 mg today.  Pharmacy will monitor Navin Lutz daily and order warfarin doses to achieve specified goal.      Please contact pharmacy as soon as possible if the warfarin needs to be held for a procedure or if the warfarin goals change.

## 2024-06-04 NOTE — PROGRESS NOTES
[facility]    Cardiology Progress Note- Cardiology        Date of Admission:  6/3/2024     Assessment & Plan: HVSL   Navin Lutz is a 53 year old male admitted on 6/3/2024. He has a past medical history of chronic HFrEF 2/2 NICM s/p ICD, HLD, and CKD Stage II who presents admitted for decompensated heart failure on milrinone, plan RHC.     Today:  - RHC today  - transfer to ICU due to leave-in [place]  - RUE USG to follow-up on DVT    Chronic systolic heart failure/HFrEF (EF 10-15%) secondary to NICM on home milrinone  NYHA Symptom Class IIIb  Stage D  Patient reported having had worsening SOB for the past 3 weeks. + dyspnea on exertion, + belly distention and orthopnea. He has also been having a lot of productive cough which does not respond to daily lasix. His dose of home milrinone was up-titrated, but symptoms have not improved. Vitals on admission notable for BP 88/61 on milrinone 0.3, otherwise WNL. Physical exam notable for elevated JVP and bilateral crackles. Plan RHC to assess volume status and cardiac function tomorrow.   - plan RHC 6/4/24  - continue milrinone 0.3 mcg/kg/min  - continue PTA carvedilol, empagliflozin, entresto, spironolactone  - hold lasix and potassium   - SvO2 1.7 and lactate normal on admission    Hx of RUE DVT  - continue warfarin  - consult pharm to dose, INR goal 2-3  - RUE USG to follow-up on DVT    CKD - baseline Cr ~1.2-1.4. monitor Cr  HLD - continue statin     Diet: Fluid restriction 1800 ML FLUID (and additional linked orders)  2 Gram Sodium Diet    DVT Prophylaxis: Low Risk/Ambulatory with no VTE prophylaxis indicated  Menjivar Catheter: Not present  Cardiac Monitoring: ACTIVE order. Indication: Acute decompensated heart failure (48 hours)  Code Status: Full Code          Clinically Significant Risk Factors Present on Admission               # Drug Induced Coagulation Defect: home medication list includes an anticoagulant  medication     # End stage heart failure: home medication list includes inotropes            # Overweight: Estimated body mass index is 28.36 kg/m  as calculated from the following:    Height as of this encounter: 1.829 m (6').    Weight as of this encounter: 94.8 kg (209 lb 1.6 oz).         # Financial/Environmental Concerns:    # Asthma: noted on problem list  # ICD device present       Disposition Plan   Expected discharge: 2 - 3 days, recommended to prior living arrangement once advanced heart failure work-up completed.    Entered: Traci Nicole MD 06/04/2024, 4:17 PM   The patient's care was discussed with the Attending Physician, Dr. Silvestre .      Traci Nicole MD  [facility]  ______________________________________________________________________    Interval History   NAEO. Had a good UOP last night. Feels fine, no chest pain/SOB at rest. Less bloated than yesterday.     Physical Exam   Vital Signs: Temp: 97.6  F (36.4  C) Temp src: Oral BP: 94/73 Pulse: 104   Resp: 18 SpO2: 91 % O2 Device: None (Room air)    Weight: 209 lbs 1.6 oz    General Appearance:  Alert, oriented*3, looks comfortable on RA, cooperative   Respiratory: on RA, mild crackles bilateral lungs  Cardiovascular: regular pulse, no murmur, CVP at 90 degree around mid neck  GI: distended abdomen, soft, not tender  Skin: no rash  Other:  No pitting edema bilateral legs      Medical Decision Making             Data

## 2024-06-05 LAB
ANION GAP SERPL CALCULATED.3IONS-SCNC: 9 MMOL/L (ref 7–15)
BASE EXCESS BLDV CALC-SCNC: -0.6 MMOL/L (ref -3–3)
BASE EXCESS BLDV CALC-SCNC: 0.3 MMOL/L (ref -3–3)
BASE EXCESS BLDV CALC-SCNC: 1.5 MMOL/L (ref -3–3)
BASE EXCESS BLDV CALC-SCNC: 1.6 MMOL/L (ref -3–3)
BASE EXCESS BLDV CALC-SCNC: 2.1 MMOL/L (ref -3–3)
BUN SERPL-MCNC: 17.1 MG/DL (ref 6–20)
CALCIUM SERPL-MCNC: 8.9 MG/DL (ref 8.6–10)
CHLORIDE SERPL-SCNC: 105 MMOL/L (ref 98–107)
CREAT SERPL-MCNC: 1.22 MG/DL (ref 0.67–1.17)
DEPRECATED HCO3 PLAS-SCNC: 23 MMOL/L (ref 22–29)
EGFRCR SERPLBLD CKD-EPI 2021: 71 ML/MIN/1.73M2
ERYTHROCYTE [DISTWIDTH] IN BLOOD BY AUTOMATED COUNT: 13.4 % (ref 10–15)
GLUCOSE BLDC GLUCOMTR-MCNC: 91 MG/DL (ref 70–99)
GLUCOSE SERPL-MCNC: 94 MG/DL (ref 70–99)
HCO3 BLDV-SCNC: 25 MMOL/L (ref 21–28)
HCO3 BLDV-SCNC: 25 MMOL/L (ref 21–28)
HCO3 BLDV-SCNC: 27 MMOL/L (ref 21–28)
HCO3 BLDV-SCNC: 27 MMOL/L (ref 21–28)
HCO3 BLDV-SCNC: 28 MMOL/L (ref 21–28)
HCT VFR BLD AUTO: 52.7 % (ref 40–53)
HGB BLD-MCNC: 18.5 G/DL (ref 13.3–17.7)
INR PPP: 2.84 (ref 0.85–1.15)
MCH RBC QN AUTO: 31 PG (ref 26.5–33)
MCHC RBC AUTO-ENTMCNC: 35.1 G/DL (ref 31.5–36.5)
MCV RBC AUTO: 88 FL (ref 78–100)
O2/TOTAL GAS SETTING VFR VENT: 21 %
OXYHGB MFR BLDV: 66 % (ref 70–75)
OXYHGB MFR BLDV: 67 % (ref 70–75)
OXYHGB MFR BLDV: 70 % (ref 70–75)
OXYHGB MFR BLDV: 73 % (ref 70–75)
OXYHGB MFR BLDV: 74 % (ref 70–75)
PCO2 BLDV: 38 MM HG (ref 40–50)
PCO2 BLDV: 43 MM HG (ref 40–50)
PCO2 BLDV: 45 MM HG (ref 40–50)
PH BLDV: 7.37 [PH] (ref 7.32–7.43)
PH BLDV: 7.4 [PH] (ref 7.32–7.43)
PH BLDV: 7.42 [PH] (ref 7.32–7.43)
PLATELET # BLD AUTO: 172 10E3/UL (ref 150–450)
PO2 BLDV: 34 MM HG (ref 25–47)
PO2 BLDV: 35 MM HG (ref 25–47)
PO2 BLDV: 36 MM HG (ref 25–47)
PO2 BLDV: 37 MM HG (ref 25–47)
PO2 BLDV: 38 MM HG (ref 25–47)
POTASSIUM SERPL-SCNC: 4 MMOL/L (ref 3.4–5.3)
RBC # BLD AUTO: 5.96 10E6/UL (ref 4.4–5.9)
SAO2 % BLDV: 67.3 % (ref 70–75)
SAO2 % BLDV: 67.9 % (ref 70–75)
SAO2 % BLDV: 71.1 % (ref 70–75)
SAO2 % BLDV: 73.9 % (ref 70–75)
SAO2 % BLDV: 74.7 % (ref 70–75)
SODIUM SERPL-SCNC: 137 MMOL/L (ref 135–145)
WBC # BLD AUTO: 8.5 10E3/UL (ref 4–11)

## 2024-06-05 PROCEDURE — 80048 BASIC METABOLIC PNL TOTAL CA: CPT

## 2024-06-05 PROCEDURE — 250N000011 HC RX IP 250 OP 636: Performed by: STUDENT IN AN ORGANIZED HEALTH CARE EDUCATION/TRAINING PROGRAM

## 2024-06-05 PROCEDURE — 250N000011 HC RX IP 250 OP 636: Mod: JZ | Performed by: STUDENT IN AN ORGANIZED HEALTH CARE EDUCATION/TRAINING PROGRAM

## 2024-06-05 PROCEDURE — 82805 BLOOD GASES W/O2 SATURATION: CPT | Performed by: STUDENT IN AN ORGANIZED HEALTH CARE EDUCATION/TRAINING PROGRAM

## 2024-06-05 PROCEDURE — 250N000013 HC RX MED GY IP 250 OP 250 PS 637: Performed by: STUDENT IN AN ORGANIZED HEALTH CARE EDUCATION/TRAINING PROGRAM

## 2024-06-05 PROCEDURE — 85027 COMPLETE CBC AUTOMATED: CPT

## 2024-06-05 PROCEDURE — 99291 CRITICAL CARE FIRST HOUR: CPT | Mod: GC | Performed by: STUDENT IN AN ORGANIZED HEALTH CARE EDUCATION/TRAINING PROGRAM

## 2024-06-05 PROCEDURE — 250N000011 HC RX IP 250 OP 636: Mod: JZ

## 2024-06-05 PROCEDURE — 85610 PROTHROMBIN TIME: CPT

## 2024-06-05 PROCEDURE — 120N000002 HC R&B MED SURG/OB UMMC

## 2024-06-05 PROCEDURE — 250N000013 HC RX MED GY IP 250 OP 250 PS 637

## 2024-06-05 RX ORDER — WARFARIN SODIUM 3 MG/1
3 TABLET ORAL
Status: COMPLETED | OUTPATIENT
Start: 2024-06-05 | End: 2024-06-05

## 2024-06-05 RX ADMIN — ALTEPLASE 2 MG: 2.2 INJECTION, POWDER, LYOPHILIZED, FOR SOLUTION INTRAVENOUS at 18:57

## 2024-06-05 RX ADMIN — MILRINONE LACTATE IN DEXTROSE 0.25 MCG/KG/MIN: 200 INJECTION, SOLUTION INTRAVENOUS at 16:36

## 2024-06-05 RX ADMIN — EMPAGLIFLOZIN 10 MG: 10 TABLET, FILM COATED ORAL at 08:37

## 2024-06-05 RX ADMIN — MILRINONE LACTATE IN DEXTROSE 0.3 MCG/KG/MIN: 200 INJECTION, SOLUTION INTRAVENOUS at 04:03

## 2024-06-05 RX ADMIN — SPIRONOLACTONE 25 MG: 25 TABLET ORAL at 08:37

## 2024-06-05 RX ADMIN — CARVEDILOL 6.25 MG: 6.25 TABLET, FILM COATED ORAL at 08:37

## 2024-06-05 RX ADMIN — SACUBITRIL AND VALSARTAN 1 TABLET: 49; 51 TABLET, FILM COATED ORAL at 08:57

## 2024-06-05 RX ADMIN — ATORVASTATIN CALCIUM 20 MG: 20 TABLET, FILM COATED ORAL at 19:34

## 2024-06-05 RX ADMIN — SACUBITRIL AND VALSARTAN 1 TABLET: 49; 51 TABLET, FILM COATED ORAL at 19:34

## 2024-06-05 RX ADMIN — WARFARIN SODIUM 3 MG: 3 TABLET ORAL at 17:49

## 2024-06-05 RX ADMIN — CARVEDILOL 6.25 MG: 6.25 TABLET, FILM COATED ORAL at 17:49

## 2024-06-05 ASSESSMENT — ACTIVITIES OF DAILY LIVING (ADL)
ADLS_ACUITY_SCORE: 26
DEPENDENT_IADLS:: INDEPENDENT
ADLS_ACUITY_SCORE: 26

## 2024-06-05 NOTE — PLAN OF CARE
Major Shift Events:     NEURO: A/Ox4, intact. SBA. Ambulating halls.  PULM: Room air. LS clear. Dry cough.  CV: NSR 90's, MAP stable. +pulses. See flowsheets for Teressa's.  GI: 2g NA diet, low fat, no caffeine diet and 1.8L FR.   : Voiding via urinal.   SKIN: See flowsheets      Plan: Continue POC.      For vital signs and complete assessments, please see documentation flowsheets.      Patient is here for a physical for school and to talk about medication. She has her physical papers but I told patient she would need to get the rest of her shot records before we can sign the paperwork.

## 2024-06-05 NOTE — PLAN OF CARE
Major Shift Events: A&Ox4, intact - follows commands, TAVERA, PERRLA, SBA. Sinus 80-90s, afebrile, ICD - no pacing, MAP goal > 65 met. Milrinone @ 0.3. CVP 6, PA 26/12, SvO2 74, CI 2.4, CO 5.2, SVR 1037. RA, good cough. 2g Na and 1.8L FR, good appetite, 1 BM, voids spont in urinal. No acute skin changes overnight.    Plan: Continue q4h IESHA monitoring. Will continue plan of care and notify the CARDS 2 team w/ any concerns or changes.    For vital signs and complete assessments, please see documentation flowsheets.   Plan of Care Reviewed With: patient  Overall Patient Progress: improving

## 2024-06-05 NOTE — PLAN OF CARE
Goal Outcome Evaluation:      Plan of Care Reviewed With: patient, spouse    Overall Patient Progress: improvingOverall Patient Progress: improving    Outcome Evaluation: Patient lives at home with wife and independent with ADL's. Has been at home last 9 months with IV Milrinone provided by [facility]. Admitted for transplant/VAD this hospitalization

## 2024-06-05 NOTE — PROGRESS NOTES
[facility]    Cardiology Progress Note- Cardiology        Date of Admission:  6/3/2024     Assessment & Plan: HVSL   Navin Lutz is a 53 year old male admitted on 6/3/2024. He has a past medical history of chronic HFrEF 2/2 NICM s/p ICD, HLD, and CKD Stage II who presents admitted for decompensated heart failure on milrinone, plan RHC.     Today:  - Decreased milrinone to 0.25  - Plan for impella 5/5 later this week    Chronic systolic heart failure/HFrEF (EF 10-15%) secondary to NICM on home milrinone  NYHA Symptom Class IIIb  Stage D  - continue PTA carvedilol, empagliflozin, entresto, spironolactone  - Trend CVP, IESHA numbers   -  Decreased milrinone to 0.25  -  Plan for impella 5/5 later this week     Hx of RUE DVT  - continue warfarin  - consult pharm to dose, INR goal 2-3    CKD - baseline Cr ~1.2-1.4. monitor Cr  HLD - continue statin     Diet: Fluid restriction 1800 ML FLUID (and additional linked orders)  Regular Diet Adult    DVT Prophylaxis: Low Risk/Ambulatory with no VTE prophylaxis indicated  Menjivar Catheter: Not present  Cardiac Monitoring: ACTIVE order. Indication: Acute decompensated heart failure (48 hours)  Code Status: Full Code          Clinically Significant Risk Factors                   # End stage heart failure: home medication list includes inotropes               # Overweight: Estimated body mass index is 27.72 kg/m  as calculated from the following:    Height as of this encounter: 1.829 m (6').    Weight as of this encounter: 92.7 kg (204 lb 5.9 oz)., PRESENT ON ADMISSION       # Financial/Environmental Concerns: none  # Asthma: noted on problem list  # ICD device present       Disposition Plan   Expected discharge: 2 - 3 days, recommended to prior living arrangement once advanced heart failure work-up completed.    Entered: Aida Lamas MD 06/05/2024, 2:07 PM   The patient's care was discussed with the Attending Physician,   Konrad .      Aida Lamas MD  [facility]  ______________________________________________________________________    Interval History   NAEO.    Physical Exam   Vital Signs: Temp: 98  F (36.7  C) Temp src: Oral BP: (!) 81/68 Pulse: 93   Resp: 16 SpO2: 95 % O2 Device: None (Room air)    Weight: 204 lbs 5.86 oz    General Appearance:  Alert, oriented*3, looks comfortable on RA, cooperative   Respiratory: on RA, mild crackles bilateral lungs  Cardiovascular: regular pulse, no murmur, CVP at 90 degree around mid neck  GI: distended abdomen, soft, not tender  Skin: no rash  Other:  No pitting edema bilateral legs      Medical Decision Making             Data

## 2024-06-05 NOTE — CONSULTS
Transplant Admission Psychosocial Assessment    Patient Name: Navin Lutz  : 1970  Age: 53 year old  MRN: [id number]  Date of Initial Social Work Evaluation: 2023    Patient admitted status 4 for heart transplant, but will likely get upgraded to status 2 and will need to remain hospitalized until transplant. If unable to wait for transplant, will get VAD.  Met with Chris and Corie in order to update psychosocial assessment and provide education about SW role while inpatient, and to begin discussion of expectations/requirements, caregiver needs and follow up needs post-transplant or VAD implant.    Presenting Information   Living Situation: Patient lives with his wife in their own home in [place], [place] ([place] miles from the [facility]). It's a single family home with 3 steps to enter. All needs can be met on the first floor.  If not local, plans for short term stay: They do not live locally. Wife is currently here and will stay here in [place] during the time he is in the hospital. She is staying at the [facility] and using the shuttle to get back and forth.  Previous Functional Status: Reports he was independent with all ADLs and IADL's prior to admit, but felt tired with low energy, which restricted him sometimes and couldn't participate in activities.  Cultural/Language/Spiritual Considerations: None    Support System  Primary Support Person Wife, 4 adult children  Other support:  Friends, siblings. Wife's Brother in the [facility]  Plan for support in immediate post-transplant period: Wife will be here throughout the hospitalization while he waits inpatient for either transplant or VAD    Health Care Directive  Decision Maker: Patient able to make own decisions  Alternate Decision Maker: Wife would be the appropriate decision-maker per  [facility] policy  Health Care Directive: Patient considering completing-Education has been provided. Will work with Chris this hospital stay and encourage completion of one    Mental  "Health/Coping:   History of Mental Health: No-Pt denies hx of mental health diagnoses, inpatient mental health treatment or suicide attempts.   History of Chemical Health: No: Pt reports \"rare\" alcohol use about \"1 drink every couple of months\". Pt denies any hx of drug use.   Pt has no legal consequences due to drugs or alcohol. Pt has never been evaluated for CD concerns nor been to CD treatment.  Current status: N/A  Coping: He displays a positive affect and demonstrated good coping skills with no concerning behaviors.  Services Needed/Recommended: Encouragement toward participation in support groups    Financial   Income: Salary/wages and spouse's income. 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 Wife works    Impact of transplant on income: No impact on salary or income  Insurance and medication coverage: BCBS  Financial concerns: None. Wife reports ability to pay for extended stay at a hotel  Resources needed: No resources identified. Did talk about potential parking passes after transplant    Education provided by SW: Social Work role within the transplant/VAD program. Provided education on  availability of SW and transition of SW within the program. Education provided about support group availability, community resources, caregiver requirements and lodging options for temporary relocation.    Assessment and recommendations and plan:  Met with Chris and wife, Corie. Introduced SW role and explained transition change of SWers within the program. Inquired into questions/concerns and assessed coping with hospitalization. Talked about the potential long wait for transplant and offered to connect him to peer volunteers, and encouraged participation in support groups.    Discussed managing expectations while hospitalized and potential resources regarding discharge.    SW will continue to follow for ongoing psychosocial support pre " and post-Transplant or LVAD implant.    Marivel Sales, MSW, [facility]  Heart Transplant/MCS   ph. [phone]  Vocera/Teams

## 2024-06-05 NOTE — PROGRESS NOTES
"CLINICAL NUTRITION SERVICES    Reason for Assessment:  Received nutrition education consult for \"Heart Failure - Dietitian to instruct patient on 2 gram sodium diet    Diet History:  Per RD note 8/23/2023 during heart Tx/LVAD evaluation, \"They are aware of rec to limit sodium. Eating less canned foods. Per Corie, [place] so they do most of the cooking themselves. 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 Rec continue low-sodium diet and fluid restriction at this time.\"    Pt was resting at time of visit but pt's wife, Corie, was in his room. Per Corie, they have received nutrition education in the past. They have made changes, such as using Mrs Dash.     Nutrition Diagnosis:  No nutrition education dx    Nutrition Prescription/Recs:  Monitor need for low-sodium diet (currently on a regular diet order) Continue fluid restriction.      Interventions:  Nutrition Education: Offered nutrition education. Corie politely declined as pt/wife have received nutrition education in the past. Explained fluid restriction/diet order and gave sodium room service menu in the event diet order is modified in the future    Goals:    Pt will verbalize at least five high sodium foods and the importance of avoiding added salt to foods for cooking or seasoning foods.     Follow-up:   Patient to ask any further nutrition-related questions before discharge. In addition, pt may request outpatient RD appointment.     Teri Watson, MS, RD, LD, CNSC      No longer available via pager  6C (beds [phone] and [phone]): Vocera \"6C Clinical Dietitian\"   Weekend/Holiday: Vocera \"Weekend Clinical Dietitian\"      "

## 2024-06-06 LAB
ACANTHOCYTES BLD QL SMEAR: NORMAL
ANION GAP SERPL CALCULATED.3IONS-SCNC: 9 MMOL/L (ref 7–15)
AUER BODIES BLD QL SMEAR: NORMAL
BASE EXCESS BLDV CALC-SCNC: -4.3 MMOL/L (ref -3–3)
BASE EXCESS BLDV CALC-SCNC: 1.5 MMOL/L (ref -3–3)
BASO STIPL BLD QL SMEAR: NORMAL
BITE CELLS BLD QL SMEAR: NORMAL
BLISTER CELLS BLD QL SMEAR: NORMAL
BUN SERPL-MCNC: 15.9 MG/DL (ref 6–20)
BURR CELLS BLD QL SMEAR: NORMAL
CALCIUM SERPL-MCNC: 8.7 MG/DL (ref 8.6–10)
CHLORIDE SERPL-SCNC: 106 MMOL/L (ref 98–107)
CREAT SERPL-MCNC: 1.28 MG/DL (ref 0.67–1.17)
DACRYOCYTES BLD QL SMEAR: NORMAL
DEPRECATED HCO3 PLAS-SCNC: 22 MMOL/L (ref 22–29)
EGFRCR SERPLBLD CKD-EPI 2021: 67 ML/MIN/1.73M2
ELLIPTOCYTES BLD QL SMEAR: NORMAL
ERYTHROCYTE [DISTWIDTH] IN BLOOD BY AUTOMATED COUNT: 13.5 % (ref 10–15)
FRAGMENTS BLD QL SMEAR: NORMAL
GLUCOSE SERPL-MCNC: 89 MG/DL (ref 70–99)
HCO3 BLDV-SCNC: 19 MMOL/L (ref 21–28)
HCO3 BLDV-SCNC: 26 MMOL/L (ref 21–28)
HCT VFR BLD AUTO: 52.6 % (ref 40–53)
HGB BLD-MCNC: 18.4 G/DL (ref 13.3–17.7)
HGB C CRYSTALS: NORMAL
HOWELL-JOLLY BOD BLD QL SMEAR: NORMAL
INR PPP: 2.28 (ref 0.85–1.15)
MCH RBC QN AUTO: 30.9 PG (ref 26.5–33)
MCHC RBC AUTO-ENTMCNC: 35 G/DL (ref 31.5–36.5)
MCV RBC AUTO: 88 FL (ref 78–100)
NEUTS HYPERSEG BLD QL SMEAR: NORMAL
O2/TOTAL GAS SETTING VFR VENT: 21 %
O2/TOTAL GAS SETTING VFR VENT: 21 %
OXYHGB MFR BLDV: 68 % (ref 70–75)
OXYHGB MFR BLDV: 72 % (ref 70–75)
PCO2 BLDV: 32 MM HG (ref 40–50)
PCO2 BLDV: 40 MM HG (ref 40–50)
PH BLDV: 7.4 [PH] (ref 7.32–7.43)
PH BLDV: 7.42 [PH] (ref 7.32–7.43)
PLAT MORPH BLD: NORMAL
PLATELET # BLD AUTO: 80 10E3/UL (ref 150–450)
PO2 BLDV: 34 MM HG (ref 25–47)
PO2 BLDV: 37 MM HG (ref 25–47)
POLYCHROMASIA BLD QL SMEAR: NORMAL
POTASSIUM SERPL-SCNC: 3.9 MMOL/L (ref 3.4–5.3)
RBC # BLD AUTO: 5.96 10E6/UL (ref 4.4–5.9)
RBC AGGLUT BLD QL: NORMAL
RBC MORPH BLD: NORMAL
ROULEAUX BLD QL SMEAR: NORMAL
SAO2 % BLDV: 69.8 % (ref 70–75)
SAO2 % BLDV: 72.8 % (ref 70–75)
SICKLE CELLS BLD QL SMEAR: NORMAL
SMUDGE CELLS BLD QL SMEAR: NORMAL
SODIUM SERPL-SCNC: 137 MMOL/L (ref 135–145)
SPHEROCYTES BLD QL SMEAR: NORMAL
STOMATOCYTES BLD QL SMEAR: NORMAL
TARGETS BLD QL SMEAR: NORMAL
TOXIC GRANULES BLD QL SMEAR: NORMAL
VARIANT LYMPHS BLD QL SMEAR: NORMAL
WBC # BLD AUTO: 7.9 10E3/UL (ref 4–11)

## 2024-06-06 PROCEDURE — 250N000013 HC RX MED GY IP 250 OP 250 PS 637: Performed by: STUDENT IN AN ORGANIZED HEALTH CARE EDUCATION/TRAINING PROGRAM

## 2024-06-06 PROCEDURE — 120N000002 HC R&B MED SURG/OB UMMC

## 2024-06-06 PROCEDURE — 85027 COMPLETE CBC AUTOMATED: CPT

## 2024-06-06 PROCEDURE — 250N000013 HC RX MED GY IP 250 OP 250 PS 637

## 2024-06-06 PROCEDURE — 85610 PROTHROMBIN TIME: CPT

## 2024-06-06 PROCEDURE — 82805 BLOOD GASES W/O2 SATURATION: CPT | Performed by: STUDENT IN AN ORGANIZED HEALTH CARE EDUCATION/TRAINING PROGRAM

## 2024-06-06 PROCEDURE — 250N000011 HC RX IP 250 OP 636: Mod: JZ | Performed by: STUDENT IN AN ORGANIZED HEALTH CARE EDUCATION/TRAINING PROGRAM

## 2024-06-06 PROCEDURE — 80048 BASIC METABOLIC PNL TOTAL CA: CPT

## 2024-06-06 PROCEDURE — 99291 CRITICAL CARE FIRST HOUR: CPT | Mod: GC | Performed by: STUDENT IN AN ORGANIZED HEALTH CARE EDUCATION/TRAINING PROGRAM

## 2024-06-06 RX ORDER — WARFARIN SODIUM 5 MG/1
5 TABLET ORAL
Status: COMPLETED | OUTPATIENT
Start: 2024-06-06 | End: 2024-06-06

## 2024-06-06 RX ADMIN — SACUBITRIL AND VALSARTAN 1 TABLET: 49; 51 TABLET, FILM COATED ORAL at 08:15

## 2024-06-06 RX ADMIN — SACUBITRIL AND VALSARTAN 1 TABLET: 49; 51 TABLET, FILM COATED ORAL at 20:16

## 2024-06-06 RX ADMIN — CARVEDILOL 6.25 MG: 6.25 TABLET, FILM COATED ORAL at 08:15

## 2024-06-06 RX ADMIN — SPIRONOLACTONE 25 MG: 25 TABLET ORAL at 08:15

## 2024-06-06 RX ADMIN — WARFARIN SODIUM 5 MG: 5 TABLET ORAL at 18:44

## 2024-06-06 RX ADMIN — ATORVASTATIN CALCIUM 20 MG: 20 TABLET, FILM COATED ORAL at 20:16

## 2024-06-06 RX ADMIN — CARVEDILOL 6.25 MG: 6.25 TABLET, FILM COATED ORAL at 18:44

## 2024-06-06 RX ADMIN — EMPAGLIFLOZIN 10 MG: 10 TABLET, FILM COATED ORAL at 08:15

## 2024-06-06 RX ADMIN — MILRINONE LACTATE IN DEXTROSE 0.25 MCG/KG/MIN: 200 INJECTION, SOLUTION INTRAVENOUS at 20:58

## 2024-06-06 RX ADMIN — MILRINONE LACTATE IN DEXTROSE 0.25 MCG/KG/MIN: 200 INJECTION, SOLUTION INTRAVENOUS at 06:42

## 2024-06-06 ASSESSMENT — ACTIVITIES OF DAILY LIVING (ADL)
ADLS_ACUITY_SCORE: 26

## 2024-06-06 NOTE — PLAN OF CARE
Major Shift Events: A&Ox4, intact - follows commands, TAVERA, PERRLA, SBA. Sinus 80-90s, afebrile, ICD - no pacing, milrinone @ 0.3, MAP > 65 met. CVP 4, PA 29/16, SvO2 67, CI 2, CO 4.3, SVR 1466. RA, dry cough. 2g Na and 1.8L FR, good appetite, no BM - passing flatus, voids spont in urinal. No acute skin changes.    Plan: Discuss status 2 listing. Continue IESHA monitoring. Will continue plan of care and notify the CARDS 2 team w/ any concerns or changes.    For vital signs and complete assessments, please see documentation flowsheets.   Plan of Care Reviewed With: patient  Overall Patient Progress: improving

## 2024-06-06 NOTE — PROGRESS NOTES
[facility]    Cardiology Progress Note- Cardiology        Date of Admission:  6/3/2024     Assessment & Plan: HVSDAMARIS   Navin Lutz is a 53 year old male admitted on 6/3/2024. He has a past medical history of chronic HFrEF 2/2 NICM s/p ICD, HLD, and CKD Stage II who presents admitted for decompensated heart failure on milrinone, being worked up for advanced therapies.    Today:  - Discussed with patient that if he gets an IABP/impella and is listed for two weeks, the odds of him getting a donor offer would be low, and he would need to proceed with a VAD. Alternatively discussed the option of proceeding directly with LVAD. Patient said he wished to speak again to VAD coordinator before making a decision.    Chronic systolic heart failure/HFrEF (EF 10-15%) secondary to NICM on home milrinone  NYHA Symptom Class IIIb  Stage D  - continue PTA carvedilol, empagliflozin, entresto, spironolactone  - Trend CVP, IESHA numbers   -  Continue milrinone to 0.25  - Ongoing discussion about timing about VAD    Hx of RUE DVT  - continue warfarin  - consult pharm to dose, INR goal 2-3    CKD - baseline Cr ~1.2-1.4. monitor Cr  HLD - continue statin     Diet: Fluid restriction 1800 ML FLUID (and additional linked orders)  Regular Diet Adult    DVT Prophylaxis: Low Risk/Ambulatory with no VTE prophylaxis indicated  Menjivar Catheter: Not present  Cardiac Monitoring: ACTIVE order. Indication: Acute decompensated heart failure (48 hours)  Code Status: Full Code          Clinically Significant Risk Factors                # Thrombocytopenia: Lowest platelets = 80 in last 2 days, will monitor for bleeding    # End stage heart failure: home medication list includes inotropes               # Overweight: Estimated body mass index is 27.66 kg/m  as calculated from the following:    Height as of this encounter: 1.829 m (6').    Weight as of this encounter: 92.5 kg (203 lb 14.8 oz)., PRESENT ON  ADMISSION       # Financial/Environmental Concerns: none  # Asthma: noted on problem list  # ICD device present       Disposition Plan   Expected discharge: 1-2 weeks  Entered: Aida Lamas MD 06/06/2024, 3:33 PM   The patient's care was discussed with the Attending Physician, Dr. Silvestre .      Aida Lamas MD  [facility]  ______________________________________________________________________    Interval History   NAEO.    Physical Exam   Vital Signs: Temp: 97.1  F (36.2  C) Temp src: Axillary BP: 100/74 Pulse: 87   Resp: 16 SpO2: 94 % O2 Device: None (Room air)    Weight: 203 lbs 14.81 oz    General Appearance:  Alert, oriented*3, looks comfortable on RA, cooperative   Respiratory: on RA, mild crackles bilateral lungs  Cardiovascular: regular pulse, no murmur, CVP at 90 degree around mid neck  GI: distended abdomen, soft, not tender  Skin: no rash  Other:  No pitting edema bilateral legs      Medical Decision Making             Data

## 2024-06-06 NOTE — PROGRESS NOTES
Major Shift Events: Neuro intact, vitals stable, ambulated approx 1 mile in unit. Wife at bedside. Tentatively NPO at 0000 for IABP tomorrow.  Plan: IABP vs LVAD  For vital signs and complete assessments, please see documentation flowsheets.

## 2024-06-06 NOTE — PLAN OF CARE
Goal Outcome Evaluation:  Plan of Care Reviewed With: patient, spouse  Overall Patient Progress: no change    Major Shift Events: Sinus rhythm. Milrinone @ 0.25mcg/kg/min. Ambulated in hallway. Spouse, Corie, at bedside throughout shift.     Plan: LVAD vs heart transplant    For vital signs and complete assessments, please see documentation flowsheets.   Hours cared for: [id number]    Micaela Diallo RN CCRN

## 2024-06-06 NOTE — PROGRESS NOTES
Met with patient and wife to review the HM3 as a possible treatment option.      Discussed patient and caregiver responsibilities before and after VAD implant. Clarified the need for a caregiver to be present for education and to assist patient for at least first 30 days after a patient returns home.  Patient's designated caregiver is wife, Corie.     Discussed basic overview of VAD equipment, on going management, need for anticoagulation, regular dressing change, grounded three-pronged outlet and functioning telephone. Also discussed frequency of follow-up clinic appointments and the need to stay locally for at least 2-4 weeks.  Reviewed restrictions of having a VAD such as no swimming, bathing, boats, MRI's, or arc welding.     Discussed the 4/2024 EO[employer] recall, aprovided.  Encouraged patient and wife  to call with questions. Learners verbalized understanding of the above information.

## 2024-06-07 ENCOUNTER — TEAM CONFERENCE (OUTPATIENT)
Dept: CARDIOLOGY | Facility: CLINIC | Age: 54
End: 2024-06-07
Payer: COMMERCIAL

## 2024-06-07 ENCOUNTER — APPOINTMENT (OUTPATIENT)
Dept: OCCUPATIONAL THERAPY | Facility: CLINIC | Age: 54
End: 2024-06-07
Attending: STUDENT IN AN ORGANIZED HEALTH CARE EDUCATION/TRAINING PROGRAM
Payer: COMMERCIAL

## 2024-06-07 ENCOUNTER — PREP FOR PROCEDURE (OUTPATIENT)
Dept: CARDIOLOGY | Facility: CLINIC | Age: 54
End: 2024-06-07
Payer: COMMERCIAL

## 2024-06-07 DIAGNOSIS — E78.5 HYPERLIPIDEMIA LDL GOAL <100: ICD-10-CM

## 2024-06-07 DIAGNOSIS — N18.30 STAGE 3 CHRONIC KIDNEY DISEASE (H): ICD-10-CM

## 2024-06-07 DIAGNOSIS — I50.23 ACUTE ON CHRONIC SYSTOLIC CONGESTIVE HEART FAILURE (H): Primary | ICD-10-CM

## 2024-06-07 DIAGNOSIS — I42.8 NONISCHEMIC CARDIOMYOPATHY (H): ICD-10-CM

## 2024-06-07 LAB
ANION GAP SERPL CALCULATED.3IONS-SCNC: 9 MMOL/L (ref 7–15)
BASE EXCESS BLDV CALC-SCNC: -0.4 MMOL/L (ref -3–3)
BASE EXCESS BLDV CALC-SCNC: 0.6 MMOL/L (ref -3–3)
BUN SERPL-MCNC: 15.3 MG/DL (ref 6–20)
CALCIUM SERPL-MCNC: 9 MG/DL (ref 8.6–10)
CHLORIDE SERPL-SCNC: 107 MMOL/L (ref 98–107)
CREAT SERPL-MCNC: 1.15 MG/DL (ref 0.67–1.17)
DEPRECATED HCO3 PLAS-SCNC: 23 MMOL/L (ref 22–29)
EGFRCR SERPLBLD CKD-EPI 2021: 76 ML/MIN/1.73M2
ERYTHROCYTE [DISTWIDTH] IN BLOOD BY AUTOMATED COUNT: 13.4 % (ref 10–15)
GLUCOSE SERPL-MCNC: 92 MG/DL (ref 70–99)
HCO3 BLDV-SCNC: 25 MMOL/L (ref 21–28)
HCO3 BLDV-SCNC: 26 MMOL/L (ref 21–28)
HCT VFR BLD AUTO: 50.7 % (ref 40–53)
HGB BLD-MCNC: 17.7 G/DL (ref 13.3–17.7)
INR PPP: 2.53 (ref 0.85–1.15)
MCH RBC QN AUTO: 31.1 PG (ref 26.5–33)
MCHC RBC AUTO-ENTMCNC: 34.9 G/DL (ref 31.5–36.5)
MCV RBC AUTO: 89 FL (ref 78–100)
O2/TOTAL GAS SETTING VFR VENT: 2 %
O2/TOTAL GAS SETTING VFR VENT: 21 %
OXYHGB MFR BLDV: 70 % (ref 70–75)
OXYHGB MFR BLDV: 71 % (ref 70–75)
PCO2 BLDV: 42 MM HG (ref 40–50)
PCO2 BLDV: 45 MM HG (ref 40–50)
PH BLDV: 7.38 [PH] (ref 7.32–7.43)
PH BLDV: 7.38 [PH] (ref 7.32–7.43)
PLATELET # BLD AUTO: 150 10E3/UL (ref 150–450)
PO2 BLDV: 37 MM HG (ref 25–47)
PO2 BLDV: 37 MM HG (ref 25–47)
POTASSIUM SERPL-SCNC: 4 MMOL/L (ref 3.4–5.3)
RBC # BLD AUTO: 5.7 10E6/UL (ref 4.4–5.9)
SAO2 % BLDV: 71.8 % (ref 70–75)
SAO2 % BLDV: 72.8 % (ref 70–75)
SODIUM SERPL-SCNC: 139 MMOL/L (ref 135–145)
WBC # BLD AUTO: 7.8 10E3/UL (ref 4–11)

## 2024-06-07 PROCEDURE — 82805 BLOOD GASES W/O2 SATURATION: CPT | Performed by: STUDENT IN AN ORGANIZED HEALTH CARE EDUCATION/TRAINING PROGRAM

## 2024-06-07 PROCEDURE — 85610 PROTHROMBIN TIME: CPT

## 2024-06-07 PROCEDURE — 97110 THERAPEUTIC EXERCISES: CPT | Mod: GO

## 2024-06-07 PROCEDURE — 80048 BASIC METABOLIC PNL TOTAL CA: CPT

## 2024-06-07 PROCEDURE — 120N000002 HC R&B MED SURG/OB UMMC

## 2024-06-07 PROCEDURE — 85027 COMPLETE CBC AUTOMATED: CPT

## 2024-06-07 PROCEDURE — 250N000013 HC RX MED GY IP 250 OP 250 PS 637

## 2024-06-07 PROCEDURE — 99222 1ST HOSP IP/OBS MODERATE 55: CPT | Mod: FS | Performed by: NURSE PRACTITIONER

## 2024-06-07 PROCEDURE — 99291 CRITICAL CARE FIRST HOUR: CPT | Mod: GC | Performed by: STUDENT IN AN ORGANIZED HEALTH CARE EDUCATION/TRAINING PROGRAM

## 2024-06-07 PROCEDURE — 250N000011 HC RX IP 250 OP 636: Mod: JZ | Performed by: STUDENT IN AN ORGANIZED HEALTH CARE EDUCATION/TRAINING PROGRAM

## 2024-06-07 RX ORDER — CARVEDILOL 6.25 MG/1
12.5 TABLET ORAL 2 TIMES DAILY WITH MEALS
Status: ON HOLD | COMMUNITY
End: 2024-07-04

## 2024-06-07 RX ORDER — WARFARIN SODIUM 5 MG/1
5 TABLET ORAL
Status: DISCONTINUED | OUTPATIENT
Start: 2024-06-07 | End: 2024-06-07

## 2024-06-07 RX ADMIN — MILRINONE LACTATE IN DEXTROSE 0.25 MCG/KG/MIN: 200 INJECTION, SOLUTION INTRAVENOUS at 10:25

## 2024-06-07 RX ADMIN — SPIRONOLACTONE 25 MG: 25 TABLET ORAL at 08:06

## 2024-06-07 RX ADMIN — SACUBITRIL AND VALSARTAN 1 TABLET: 49; 51 TABLET, FILM COATED ORAL at 21:50

## 2024-06-07 RX ADMIN — EMPAGLIFLOZIN 10 MG: 10 TABLET, FILM COATED ORAL at 08:06

## 2024-06-07 RX ADMIN — ATORVASTATIN CALCIUM 20 MG: 20 TABLET, FILM COATED ORAL at 20:00

## 2024-06-07 RX ADMIN — CARVEDILOL 6.25 MG: 6.25 TABLET, FILM COATED ORAL at 17:22

## 2024-06-07 RX ADMIN — CARVEDILOL 6.25 MG: 6.25 TABLET, FILM COATED ORAL at 08:06

## 2024-06-07 RX ADMIN — SACUBITRIL AND VALSARTAN 1 TABLET: 49; 51 TABLET, FILM COATED ORAL at 08:06

## 2024-06-07 ASSESSMENT — ACTIVITIES OF DAILY LIVING (ADL)
ADLS_ACUITY_SCORE: 26
ADLS_ACUITY_SCORE: 31
ADLS_ACUITY_SCORE: 27
ADLS_ACUITY_SCORE: 26
ADLS_ACUITY_SCORE: 27
ADLS_ACUITY_SCORE: 26
ADLS_ACUITY_SCORE: 26
ADLS_ACUITY_SCORE: 27
ADLS_ACUITY_SCORE: 31
ADLS_ACUITY_SCORE: 27
ADLS_ACUITY_SCORE: 26
ADLS_ACUITY_SCORE: 31
ADLS_ACUITY_SCORE: 26
ADLS_ACUITY_SCORE: 27
ADLS_ACUITY_SCORE: 26
ADLS_ACUITY_SCORE: 27
ADLS_ACUITY_SCORE: 26

## 2024-06-07 ASSESSMENT — NEW YORK HEART ASSOCIATION (NYHA) CLASSIFICATION: NYHA FUNCTIONAL CLASS: IV

## 2024-06-07 ASSESSMENT — PULMONARY FUNCTION TESTS: FEV1 (%PREDICTED): 2

## 2024-06-07 NOTE — TELEPHONE ENCOUNTER
VAD Multidisciplinary Review  Multidisciplinary review date: 6/7/24  Inclusion Criteria  Discussed VAD with patient and/or decision makers. Reviewed anticipated survival benefit, anticipated functional improvement, risks, and benefits. Patient and/or decision maker verbalize understanding and agree to VAD implant?: Yes  VAD is elective procedure?: Yes  NYHA class: IV  INTERMACS score: 2  Patient has: failed to respond to optimal medical management for at least 45 of the last 60 days, been IV inotrope-dependent for at least 14 days  Cardiopulmonary stress testing completed?: None related to acute illness  LVEF is: < 25%  Exclusion Criteria  Has condition, other than heart failure, which would limit survival to < 2 years?: No  Cardiomyopathy with restrictive physiology, unless severe LV systolic failure and LV dilation present?: No  Technical obstacles that pose and inordinately high surgical risk in the judgment of the MCS surgeon?: No  Active systemic infection? (unless ALL of following criteria met: negative blood cultures x 2 days, antibiotic treatment x 7 days and Infectious Disease clearance pre-operatively): No  Presence of active malignancy AND life expectancy < 2 years?: No  Stroke within the last six weeks, unless cleared by neurology team: No  Diagnosed with severe, irreversible pulmonary disease (supplemental O2 usage, FEV1 < 50% predicted): No  Pulmonary hypertension as a primary diagnosis: No  Chronic dialysis: No  Evidence of significant peripheral vascular disease accompanied by resting leg pain or ulceration unless treatment plan is in place: No  Carotid artery disease (>80% extracranial stenosis on Doppler) that could result in an adverse neurological event if left untreated: No  Evidence of an untreated abdominal aortic aneurysm >5 cm as measured by abdominal ultrasounds within the last 180 days unless treatment plan in place: No  Severe or irreversible hepatic disease, evidence of shock liver,  and/o biopsy-proven cirrhosis: No  Active contraindication to anticoagulation, INR > 2.5 in absence of concurrent anticoagulation therapy, platelet < 50,000, history of intolerance to anticoagulation, or other coagulopathy unless Hematology consulted and plan is in place: No  Acute valvular infective endocarditis with bacteremia: No  Neuromuscular disease, psychiatric diagnosis, dementia or other process that severely compromises ability to use and care for external system components or to ambulate/exercise: No  Inability to understand the procedure, risk involved, or to comply with follow-up evaluation by VAD team: No  For menstruating females: Current pregnancy or unable/unwilling to follow contraception plan: Not applicable  Relative Contraindications  Alcohol and/or recreational drug abuse within past six months: No  Significant history of depression or other mental illness, refractory to therapy or thought to be a risk to successful outcome with MCS, as per assessment of trained professional: No  Demonstrated on-going non-compliance with demonstrated inability to comply with medical recommendations on multiple occasions: No  Unsafe living environment or lack of adequate support system: No  Lack of adequate insurance coverage or waiver by hospital administration: No  Evidence of other ongoing physical, financial, or psychosocial issues that will preclude the intended goal of safety and improvements in quality and duration of life, unless a plan for resolution and support is in process: No  Multidisciplinary Decision  Decision: Approved Comment: Currently listed as status 4 for heart transplant on milrinone   Implant as: Bridge to Transplant  Listing status: 4 (Adult)

## 2024-06-07 NOTE — PROGRESS NOTES
[facility]    Cardiology Progress Note- Cardiology        Date of Admission:  6/3/2024     Assessment & Plan: HVSL   Navin Lutz is a 53 year old male admitted on 6/3/2024. He has a past medical history of chronic HFrEF 2/2 NICM s/p ICD, HLD, and CKD Stage II who presents admitted for decompensated heart failure on milrinone, being worked up for advanced therapies.    Today:  -Plan for VAD (will discuss with surgery- likely next week)  - discontinue empa, warfarin  - start heparin when INR is ~2  - once we know surgery date will hold entresto     Chronic systolic heart failure/HFrEF (EF 10-15%) secondary to NICM on home milrinone  NYHA Symptom Class IIIb  Stage D  - hold jardiace, will hold entresto once we know surgery date. Continue aldactone  - Trend CVP, IESHA numbers   -  Continue milrinone to 0.25  - VAD likely next week    Hx of RUE DVT  - stop warfarin in anticipation of surgery, start heparin gtt when INR<2    CKD - baseline Cr ~1.2-1.4. monitor Cr  HLD - continue statin     Diet: Fluid restriction 1800 ML FLUID (and additional linked orders)  2 Gram Sodium Diet    DVT Prophylaxis: Heparin gtt  Menjivar Catheter: Not present  Cardiac Monitoring: ACTIVE order. Indication: Acute decompensated heart failure (48 hours)  Code Status: Full Code          Clinically Significant Risk Factors                # Thrombocytopenia: Lowest platelets = 80 in last 2 days, will monitor for bleeding    # End stage heart failure: home medication list includes inotropes               # Overweight: Estimated body mass index is 27.66 kg/m  as calculated from the following:    Height as of this encounter: 1.829 m (6').    Weight as of this encounter: 92.5 kg (203 lb 14.8 oz)., PRESENT ON ADMISSION       # Financial/Environmental Concerns: none  # Asthma: noted on problem list  # ICD device present       Disposition Plan   Expected discharge: 1-2 weeks  Entered: Aida Lamas MD  06/07/2024, 11:24 AM   The patient's care was discussed with the Attending Physician, Dr. Silvestre .      Aida Lamas MD  [facility]  ______________________________________________________________________    Interval History   NAEO.    Physical Exam   Vital Signs: Temp: 97.9  F (36.6  C) Temp src: Oral BP: 95/73 Pulse: 87   Resp: 18 SpO2: 97 % O2 Device: None (Room air) Oxygen Delivery: 2 LPM  Weight: 203 lbs 14.81 oz    General Appearance:  Alert, oriented*3, looks comfortable on RA, cooperative   Respiratory: on RA, mild crackles bilateral lungs  Cardiovascular: regular pulse, no murmur, CVP at 90 degree around mid neck  GI: distended abdomen, soft, not tender  Skin: no rash  Other:  No pitting edema bilateral legs      Medical Decision Making             Data

## 2024-06-07 NOTE — PROGRESS NOTES
Major Shift Events:  Neuro intact. Cards 2 spoke with patient about questions regarding LVAD vs IABP. NPO @ 0001 6/7 for IABP today.   Plan: IABP and potential LVAD.   For vital signs and complete assessments, please see documentation flowsheets.

## 2024-06-07 NOTE — CONSULTS
CARDIOTHORACIC SURGERY CONSULT NOTE  6/7/2024      Reason for Consult: LVAD/ Heart transplant evaluation      ASSESSMENT/PLAN: Patient is a 53 year old male with a history of chronic HFrEF 2/2 NICM and CKD stage II presented with decompensated heart failure on milrinone. CVTS was consulted as part of the LVAD/ heart transplant evaluation.     - Continue with work-up, will discuss case at weekly conference  - Other cares per primary team  - Thank you for the opportunity to participate in the care of this patient.    Patient and plan discussed with attending, Dr. Jhaveri.      ANNE Aldana, ACN-AG, CCRN  Nurse Practitioner  Cardiothoracic Surgery  Pager: [phone]    _________________________________________________________________________________________  HPI: Patient is a 53 year old male with a history of chronic HFrEF 2/2 NICM and CKD stage II presented with decompensated heart failure on milrinone after worsening shortness of breath for the past three weeks. Additionally, has had productive cough. Symptoms have not improved with increase in diuretics and milrinone. Patient currently admitted under the heart failure service and CVTS was consulted for consideration of LVAD/heart transplant. Today, Chris is feeling very well. Has decided that he would like to have LVAD placed. Lives in [place] and has good family support.    PMH:  No past medical history on file.    PSH:  Past Surgical History:   Procedure Laterality Date    CARDIAC SURGERY      COLONOSCOPY N/A 8/25/2023    Procedure: COLONOSCOPY, WITH POLYPECTOMY AND BIOPSY;  Surgeon: Alejandro Rodriguez MD;  Location:  GI    CV RIGHT HEART CATH MEASUREMENTS RECORDED N/A 08/22/2023    Procedure: Heart Cath Right Heart Cath;  Surgeon: Elfego Cruz MD;  Location:  HEART CARDIAC CATH LAB    CV RIGHT HEART CATH MEASUREMENTS RECORDED N/A 9/20/2023    Procedure: Heart Cath Right Heart Cath;  Surgeon: Elfego Cruz MD;   Location: [facility] C[facility]    CV RIGHT HEART CATH MEASUREMENTS RECORDED N/A 1/19/2024    Procedure: Heart Cath Right Heart Cath;  Surgeon: Tobin Jaime MD;  Location: [facility]    CV RIGHT HEART CATH MEASUREMENTS RECORDED N/A 5/3/2024    Procedure: Heart Cath Right Heart Cath;  Surgeon: Dion Ashley MD;  Location: [facility]    CV RIGHT HEART CATH MEASUREMENTS RECORDED N/A 6/4/2024    Procedure: Right Heart Catheterization;  Surgeon: Cassandra Rizzo MD;  Location:  HEART CARDIAC CATH LAB    PICC DOUBLE LUMEN PLACEMENT Right 08/22/2023    Brachial Vein Medial 5F DL 45 cm, 2 cm out       FH:  No family history on file.    SH:  Social History     Socioeconomic History    Marital status:    Tobacco Use    Smoking status: Never    Smokeless tobacco: Never   Substance and Sexual Activity    Alcohol use: Never    Drug use: Never    Sexual activity: Yes     Partners: Female     Social Determinants of Health     Financial Resource Strain: Low Risk  (11/17/2023)    Received [facility], [facility]    Overall Financial Resource Strain (CARDIA)     Difficulty of Paying Living Expenses: Not hard at all   Food Insecurity: No Food Insecurity (11/17/2023)    Received from [facility], [facility]    Hunger Vital Sign     Worried About Running Out of Food in the Last Year: Never true     Ran Out of Food in the Last Year: Never true   Transportation Needs: No Transportation Needs (11/17/2023)    Received from [facility], [facility]    PRAPARE - Transportation     Lack of Transportation (Medical): No     Lack of Transportation (Non-Medical): No   Housing Stability: Unknown (11/17/2023)    Received from [facility], [facility]    Housing Stability Vital Sign     Unable to Pay for Housing in the Last Year: No      Unstable Housing in the Last Year: No       Home Meds:  Medications Prior to Admission   Medication Sig Dispense Refill Last Dose    warfarin ANTICOAGULANT (COUMADIN) 5 MG tablet Take 7.5 mg by mouth every Mon, Wed, Friday; 5 mg all other days   6/2/2024    atorvastatin (LIPITOR) 20 MG tablet Take 1 tablet (20 mg) by mouth every evening for 3 days 3 tablet 0     carvedilol (COREG) 3.125 MG tablet Take 1 tablet (3.125 mg) by mouth 2 times daily (with meals) for 3 doses 3 tablet 0     empagliflozin (JARDIANCE) 10 MG TABS tablet Take 10 mg by mouth       furosemide (LASIX) 20 MG tablet Take 1 tablet (20 mg) by mouth as needed (for weight gain, swelling) 3 tablet 0     milrinone (PRIMACOR) infusion 200 mcg/mL PREMIX Inject 23.475 mcg/min into the vein continuous 100 mL 0     nitroGLYcerin (NITROSTAT) 0.4 MG sublingual tablet Place 0.4 mg under the tongue every 5 minutes as needed for chest pain May repeat every 5 minutes for a total of 3 doses.       potassium chloride araceli ER (KLOR-CON M20) 20 MEQ CR tablet Take 1 tablet (20 mEq) by mouth daily 30 tablet 1     reason aspirin not prescribed, intentional, Please choose reason not prescribed from choices below. (Patient not taking: Reported on 5/3/2024)       sacubitril-valsartan (ENTRESTO) 49-51 MG per tablet Take 1 tablet by mouth 2 times daily for 3 doses 3 tablet 0     spironolactone (ALDACTONE) 25 MG tablet         Allergies:  No Known Allergies  ROS:  ROS: 10 point ROS neg other than the symptoms noted above in the HPI.    Physical Exam:  Temp:  [97.9  F (36.6  C)-98.4  F (36.9  C)] 97.9  F (36.6  C)  Pulse:  [] 87  Resp:  [14-20] 18  BP: ()/(51-86) 95/73  SpO2:  [90 %-100 %] 97 %  Gen: NAD, resting comfortably in bed, conversational  HEENT: normocephalic, atraumatic cranium, EOMI, sclerae anicteric. Oral mucosa pink and moist, midline trachea  Lungs: CTA in all fields, no wheezing or rhonchi on room air  CV: RRR, S1S2 normal, no murmur. Radial pulses  and DP pulses symmetric. No dependent edema.   Abd: Positive normal pitched bowel sounds, overall soft and non distended, nontender, no hepatosplenomegaly, no masses/guarding/rebound tenderness.   Musculoskeletal: grossly intact, strength 5/5 upper and lower extremities  Neuro: AOx3, CN II-VII grossly intact, sensation/motor intact in upper and lower extremities  Mental: normal mood and affect, regular rate of speech    Labs:  ABG No lab results found in last 7 days.  CBC  Recent Labs   Lab 06/07/24  0502 06/06/24  0526 06/05/24  0845 06/04/24  1522 06/04/24  0439   WBC 7.8 7.9 8.5  --  7.4   HGB 17.7 18.4* 18.5* 19.0* 18.1*    80* 172  --  147*     BMP  Recent Labs   Lab 06/07/24  0502 06/06/24  0526 06/05/24  0853 06/05/24  0540 06/04/24  0439    137  --  137 136   POTASSIUM 4.0 3.9  --  4.0 4.2   CHLORIDE 107 106  --  105 105   CO2 23 22  --  23 22   BUN 15.3 15.9  --  17.1 15.9   CR 1.15 1.28*  --  1.22* 1.20*   GLC 92 89 91 94 96     LFT  Recent Labs   Lab 06/07/24  0502 06/06/24  0526 06/05/24  0540 06/04/24  0439 06/03/24  1858 06/03/24  1735   AST  --   --   --   --   --  23   ALT  --   --   --   --   --  26   ALKPHOS  --   --   --   --   --  91   BILITOTAL  --   --   --   --   --  0.7   ALBUMIN  --   --   --   --   --  4.1   INR 2.53* 2.28* 2.84* 2.49*   < >  --     < > = values in this interval not displayed.     PancreasNo lab results found in last 7 days.    Imaging:  No results found for this or any previous visit (from the past 24 hour(s)).

## 2024-06-08 ENCOUNTER — APPOINTMENT (OUTPATIENT)
Dept: CARDIOLOGY | Facility: CLINIC | Age: 54
End: 2024-06-08
Attending: STUDENT IN AN ORGANIZED HEALTH CARE EDUCATION/TRAINING PROGRAM
Payer: COMMERCIAL

## 2024-06-08 LAB
ANION GAP SERPL CALCULATED.3IONS-SCNC: 8 MMOL/L (ref 7–15)
BASE EXCESS BLDV CALC-SCNC: 0.1 MMOL/L (ref -3–3)
BUN SERPL-MCNC: 18.5 MG/DL (ref 6–20)
CA-I BLD-MCNC: 4.8 MG/DL (ref 4.4–5.2)
CALCIUM SERPL-MCNC: 8.8 MG/DL (ref 8.6–10)
CHLORIDE SERPL-SCNC: 109 MMOL/L (ref 98–107)
CREAT SERPL-MCNC: 1.27 MG/DL (ref 0.67–1.17)
DEPRECATED HCO3 PLAS-SCNC: 23 MMOL/L (ref 22–29)
EGFRCR SERPLBLD CKD-EPI 2021: 68 ML/MIN/1.73M2
ERYTHROCYTE [DISTWIDTH] IN BLOOD BY AUTOMATED COUNT: 13.5 % (ref 10–15)
GLUCOSE SERPL-MCNC: 127 MG/DL (ref 70–99)
HCO3 BLDV-SCNC: 25 MMOL/L (ref 21–28)
HCT VFR BLD AUTO: 50.3 % (ref 40–53)
HGB BLD-MCNC: 17.1 G/DL (ref 13.3–17.7)
INR PPP: 2.18 (ref 0.85–1.15)
LVEF ECHO: NORMAL
MAGNESIUM SERPL-MCNC: 2.2 MG/DL (ref 1.7–2.3)
MCH RBC QN AUTO: 30.5 PG (ref 26.5–33)
MCHC RBC AUTO-ENTMCNC: 34 G/DL (ref 31.5–36.5)
MCV RBC AUTO: 90 FL (ref 78–100)
O2/TOTAL GAS SETTING VFR VENT: 21 %
OXYHGB MFR BLDV: 70 % (ref 70–75)
PCO2 BLDV: 42 MM HG (ref 40–50)
PH BLDV: 7.39 [PH] (ref 7.32–7.43)
PLATELET # BLD AUTO: 136 10E3/UL (ref 150–450)
PO2 BLDV: 37 MM HG (ref 25–47)
POTASSIUM SERPL-SCNC: 3.9 MMOL/L (ref 3.4–5.3)
RBC # BLD AUTO: 5.61 10E6/UL (ref 4.4–5.9)
SAO2 % BLDV: 71.5 % (ref 70–75)
SODIUM SERPL-SCNC: 140 MMOL/L (ref 135–145)
WBC # BLD AUTO: 7.3 10E3/UL (ref 4–11)

## 2024-06-08 PROCEDURE — 82330 ASSAY OF CALCIUM: CPT | Performed by: STUDENT IN AN ORGANIZED HEALTH CARE EDUCATION/TRAINING PROGRAM

## 2024-06-08 PROCEDURE — 82805 BLOOD GASES W/O2 SATURATION: CPT | Performed by: STUDENT IN AN ORGANIZED HEALTH CARE EDUCATION/TRAINING PROGRAM

## 2024-06-08 PROCEDURE — 80048 BASIC METABOLIC PNL TOTAL CA: CPT

## 2024-06-08 PROCEDURE — 93005 ELECTROCARDIOGRAM TRACING: CPT

## 2024-06-08 PROCEDURE — 250N000013 HC RX MED GY IP 250 OP 250 PS 637: Performed by: STUDENT IN AN ORGANIZED HEALTH CARE EDUCATION/TRAINING PROGRAM

## 2024-06-08 PROCEDURE — 120N000003 HC R&B IMCU UMMC

## 2024-06-08 PROCEDURE — 85027 COMPLETE CBC AUTOMATED: CPT

## 2024-06-08 PROCEDURE — 999N000208 ECHOCARDIOGRAM COMPLETE

## 2024-06-08 PROCEDURE — 99232 SBSQ HOSP IP/OBS MODERATE 35: CPT | Mod: 25 | Performed by: STUDENT IN AN ORGANIZED HEALTH CARE EDUCATION/TRAINING PROGRAM

## 2024-06-08 PROCEDURE — 93010 ELECTROCARDIOGRAM REPORT: CPT | Performed by: INTERNAL MEDICINE

## 2024-06-08 PROCEDURE — 93306 TTE W/DOPPLER COMPLETE: CPT | Mod: 26 | Performed by: STUDENT IN AN ORGANIZED HEALTH CARE EDUCATION/TRAINING PROGRAM

## 2024-06-08 PROCEDURE — 85610 PROTHROMBIN TIME: CPT

## 2024-06-08 PROCEDURE — 250N000013 HC RX MED GY IP 250 OP 250 PS 637

## 2024-06-08 PROCEDURE — 250N000011 HC RX IP 250 OP 636: Mod: JZ | Performed by: STUDENT IN AN ORGANIZED HEALTH CARE EDUCATION/TRAINING PROGRAM

## 2024-06-08 PROCEDURE — 255N000002 HC RX 255 OP 636: Performed by: STUDENT IN AN ORGANIZED HEALTH CARE EDUCATION/TRAINING PROGRAM

## 2024-06-08 PROCEDURE — 83735 ASSAY OF MAGNESIUM: CPT | Performed by: STUDENT IN AN ORGANIZED HEALTH CARE EDUCATION/TRAINING PROGRAM

## 2024-06-08 RX ADMIN — CARVEDILOL 6.25 MG: 6.25 TABLET, FILM COATED ORAL at 09:38

## 2024-06-08 RX ADMIN — MILRINONE LACTATE IN DEXTROSE 0.25 MCG/KG/MIN: 200 INJECTION, SOLUTION INTRAVENOUS at 11:52

## 2024-06-08 RX ADMIN — SPIRONOLACTONE 25 MG: 25 TABLET ORAL at 09:38

## 2024-06-08 RX ADMIN — SACUBITRIL AND VALSARTAN 1 TABLET: 24; 26 TABLET, FILM COATED ORAL at 10:56

## 2024-06-08 RX ADMIN — MILRINONE LACTATE IN DEXTROSE 0.25 MCG/KG/MIN: 200 INJECTION, SOLUTION INTRAVENOUS at 00:16

## 2024-06-08 RX ADMIN — SACUBITRIL AND VALSARTAN 1 TABLET: 24; 26 TABLET, FILM COATED ORAL at 19:30

## 2024-06-08 RX ADMIN — CARVEDILOL 6.25 MG: 6.25 TABLET, FILM COATED ORAL at 17:33

## 2024-06-08 RX ADMIN — HUMAN ALBUMIN MICROSPHERES AND PERFLUTREN 6 ML: 10; .22 INJECTION, SOLUTION INTRAVENOUS at 07:41

## 2024-06-08 RX ADMIN — ATORVASTATIN CALCIUM 20 MG: 20 TABLET, FILM COATED ORAL at 19:30

## 2024-06-08 ASSESSMENT — ACTIVITIES OF DAILY LIVING (ADL)
ADLS_ACUITY_SCORE: 31
ADLS_ACUITY_SCORE: 30
ADLS_ACUITY_SCORE: 31
ADLS_ACUITY_SCORE: 31
ADLS_ACUITY_SCORE: 30
ADLS_ACUITY_SCORE: 31
ADLS_ACUITY_SCORE: 31
ADLS_ACUITY_SCORE: 30
ADLS_ACUITY_SCORE: 31
ADLS_ACUITY_SCORE: 30

## 2024-06-08 NOTE — PROGRESS NOTES
"Major Shift Events:  pt had a 22 beat run VT this am. Team aware. Lytes WNL. Qtc on EKG was 0.48. pt was symptomatic but self converted and \"felt fine\" after he was back in SR. Pt also had some soft pressures prior to waking up this morning. SBP in the 70's and map 67. Entresto decreased. Report given to the floor about this issue and pt transferred to  at noon. Pt also has decreased appetite and has not eated breakfast yet. Encouraged pt to order some food, especially protein.      Plan: transfer to floor and OR scheduled on Thursday for LVAD placement.  For vital signs and complete assessments, please see documentation flowsheets.     Transfer note:  Family education completed:Yes    Report given to: Holger SMITH    Time of transfer: 1200    Transferred to: , room 6506    Belongings sent:Yes    Family updated:Yes    Reviewed pertinent information from EPIC (EMAR/Clinical Summary/Flowsheets):Yes    Head-to-toe assessment with receiving RN: quick hand off with next RN at bedside.    Recommendations (e.g. Family needs/recent issues/things to watch for): soft pressures while sleeping and increasing caloric intake.    "

## 2024-06-08 NOTE — PLAN OF CARE
D: 53 yr old M with acute on chronic HFrEF, cardiogenic shock, and NICM. Already on milrinone as outpatient with progressive symptoms, pt admitted 6/3/2024.     I: Monitored and assessed patient status; nursing cares provided.    A:   Today's Highlights/Changes:  LVAD rep stopped by pt's room this am while he was in the ICU, per previous RN pt and rep had extensive conservation during visit.   Transferred to unit around 1200/noon, pt walked up to unit.     /83 (BP Location: Right arm)   Pulse 90   Temp 97.6  F (36.4  C) (Oral)   Resp 16   Ht 1.829 m (6')   Wt 91 kg (200 lb 9.9 oz)   SpO2 99%   BMI 27.21 kg/m      Neuro: A&Ox4. Calm, cooperative, and pleasant. Wife at bedside, supportive and involved in cares.   Cardiac: SR/ST, HR 's. Afebrile and VSS.   Respiratory: Sating in the high 90's on RA. Denies SOB and chest pain.   GI/: Up to bathroom with adequate urine output and no BM during shift.   Diet/appetite: Tolerating 2 g NA diet with 1800 ml fluid restriction, good appetite.   Activity: Up ad amaury, pt walked up to unit from ICU and ambulates in room.   Pain: Denies.   Skin: Old R ICD incision site, no pacing, clean dry intact.   LDA's: R PICC, purple lumen infusing milrinone gtt 0.25 mcg/kg/min.     Plan: Waiting for possible LVAD placement on Friday 6/14. Continue with POC. Notify primary team with changes.    Delmer Martin RN  Cardiology

## 2024-06-08 NOTE — PHARMACY-ADMISSION MEDICATION HISTORY
Pharmacy Intern Admission Medication History    Admission medication history is complete. The information provided in this note is only as accurate as the sources available at the time of the update.    Information Source(s): Patient and CareEverywhere/SureScripts via in-person    Pertinent Information:   Milrinone infusion:  Dosing weight: 93.9 kg (weight last recorded by cardiologist on 11/20/23)  Rate: 0.3 mcg/kg/min = 28.17 mcg/min  Warfarin:  Current regimen: 7/5 mg Monday, Wednesday, Friday; 5 mg all other days  Anticoagulation Clinic: [facility]  Goal INR: Per patient, he believes it is 2.4  5/31 Progress Notes from Cardiologist states goal INR is 2-3    Changes made to PTA medication list:  Added:   Carvedilol 6.25 mg tablet  Deleted:   Carvedilol 3.125 mg tablet  Changed:  Milrinone infusion 200 mcg/ml PREMIX: Inject 23.475 mcg/min into the vein continuous --> Inject 0.3 mcg/kg/min into the vein continuously   Spironolactone 25 mg tablet: [no sig] --> 25 mg PO at bedtime     Allergies reviewed with patient and updates made in EHR: yes    Medication History Completed By: Christiane Frankel 6/7/2024 8:04 PM    PTA Med List   Medication Sig Last Dose    atorvastatin (LIPITOR) 20 MG tablet Take 1 tablet (20 mg) by mouth every evening for 3 days 6/3/2024 at am    carvedilol (COREG) 6.25 MG tablet Take 12.5 mg by mouth 2 times daily (with meals) 6/3/2024 at am    empagliflozin (JARDIANCE) 10 MG TABS tablet Take 10 mg by mouth 6/3/2024 at am    furosemide (LASIX) 20 MG tablet Take 1 tablet (20 mg) by mouth as needed (for weight gain, swelling) 6/3/2024 at am    milrinone (PRIMACOR) in sodium chloride 100 mL infusion Inject 0.3 mcg/kg/min into the vein continuously Dosing weight: 93.9 kg (0.3 mcg/kg/min = 28.17 mcg/min) Past Week at unknown    nitroGLYcerin (NITROSTAT) 0.4 MG sublingual tablet Place 0.4 mg under the tongue every 5 minutes as needed for chest pain May repeat every 5 minutes for a total  of 3 doses. Unknown at unknown    potassium chloride araceli ER (KLOR-CON M20) 20 MEQ CR tablet Take 1 tablet (20 mEq) by mouth daily 6/3/2024 at am    sacubitril-valsartan (ENTRESTO) 49-51 MG per tablet Take 1 tablet by mouth 2 times daily for 3 doses 6/3/2024 at am    spironolactone (ALDACTONE) 25 MG tablet Take 25 mg by mouth at bedtime 6/3/2024 at pm    warfarin ANTICOAGULANT (COUMADIN) 5 MG tablet Take 7.5 mg by mouth every Mon, Wed, Friday; 5 mg all other days 6/2/2024

## 2024-06-08 NOTE — PROGRESS NOTES
[facility]    Cardiology Progress Note- Cardiology        Date of Admission:  6/3/2024     Assessment & Plan: HVSL   Navin Lutz is a 53 year old male admitted on 6/3/2024. He has a past medical history of chronic HFrEF 2/2 NICM s/p ICD, HLD, and CKD Stage II who presents admitted for decompensated heart failure on milrinone, admitted for consideration of advanced therapies, now being planned for LVAD 6/14.     Today:  - Plan for LVAD on 6/14  - hold entresto and coreg 24 hrs before surgery  - reduce entresto to 24-26 for borderline MAPs  - heparin tomorrow once inr<2  - pull swan, transfer to floor    Chronic systolic heart failure/HFrEF (EF 10-15%) secondary to NICM on home milrinone  NYHA Symptom Class IIIb  Stage D  - hold jardiace, hold entresto and coreg 24 hrs before surgery   - Trend CVP, IESHA numbers   - Continue milrinone to 0.25  - VAD on 6/14    Hx of RUE DVT  - stop warfarin in anticipation of surgery, start heparin gtt tomorrow if INR<=2    CKD - baseline Cr ~1.2-1.4. monitor Cr  HLD - continue statin     Diet: Fluid restriction 1800 ML FLUID (and additional linked orders)  2 Gram Sodium Diet    DVT Prophylaxis: Heparin gtt  Menjivar Catheter: Not present  Cardiac Monitoring: ACTIVE order. Indication: Acute decompensated heart failure (48 hours)  Code Status: Full Code          Clinically Significant Risk Factors                   # End stage heart failure: home medication list includes inotropes               # Overweight: Estimated body mass index is 27.21 kg/m  as calculated from the following:    Height as of this encounter: 1.829 m (6').    Weight as of this encounter: 91 kg (200 lb 9.9 oz).        # Financial/Environmental Concerns: none  # Asthma: noted on problem list  # ICD device present       Disposition Plan   Expected discharge: 1-2 weeks  Entered: Aida Lamas MD 06/08/2024, 1:04 PM   The patient's care was discussed with the Attending  Physician, Dr. Silvestre .      Aida Lamas MD  [facility]  ______________________________________________________________________    Interval History   NAEO.    Physical Exam   Vital Signs: Temp: 97.6  F (36.4  C) Temp src: Oral BP: 100/83 Pulse: 90   Resp: 16 SpO2: 99 % O2 Device: None (Room air)    Weight: 200 lbs 9.9 oz    General Appearance:  Alert, oriented*3, looks comfortable on RA, cooperative   Respiratory: on RA, mild crackles bilateral lungs  Cardiovascular: regular pulse, no murmur, CVP at 90 degree around mid neck  GI: distended abdomen, soft, not tender  Skin: no rash  Other:  No pitting edema bilateral legs      Medical Decision Making             Data

## 2024-06-08 NOTE — PROGRESS NOTES
Major Shift Events:    Neuro: A&Ox4, intact - follows commands, TAVERA, PERRLA, SBA  CV: Sinus 80-90s, afebrile, ICD - no pacing, MAP > 65 met  CVP 5, PA 24/14, SvO2 71, CI 2.3, CO 4.9, SVR 1200~. Cards 2 made aware of lactic 2.2. no interventions overnight.,  Resp: RA, dry cough. No oxygen overnight. Sats mid 90's  GI/: 2g Na and 1.8L FR, good appetite, BM 6/7 - passing flatus, voids spont in urinal.  Gtts: milrinone @ 0.25 fixed;  Plan: workup for LVAD next Friday.  For vital signs and complete assessments, please see documentation flowsheets.

## 2024-06-08 NOTE — PROGRESS NOTES
Pharmacy Intern Admission Medication History    Admission medication history is complete. The information provided in this note is only as accurate as the sources available at the time of the update.    Information Source(s): Patient and CareEverywhere/SureScripts via in-person    Pertinent Information:   Milrinone infusion:  Dosing weight: 93.9 kg (weight last recorded by cardiologist on 11/20/23)  Rate: 0.3 mcg/kg/min = 28.17 mcg/min  Warfarin:  Current regimen: 7/5 mg Monday, Wednesday, Friday; 5 mg all other days  Anticoagulation Clinic: [facility]  Goal INR: Per patient, he believes it is 2.4  5/31 Progress Notes from Cardiologist states goal INR is 2-3    Changes made to PTA medication list:  Added:   Carvedilol 6.25 mg tablet  Deleted:   Carvedilol 3.125 mg tablet  Changed:  Milrinone infusion 200 mcg/ml PREMIX: Inject 23.475 mcg/min into the vein continuous --> Inject 0.3 mcg/kg/min into the vein continuously   Spironolactone 25 mg tablet: [no sig] --> 25 mg PO at bedtime     Allergies reviewed with patient and updates made in EHR: yes    Medication History Completed By: Christiane Frankel 6/7/2024 8:04 PM    PTA Med List   Medication Sig Last Dose    atorvastatin (LIPITOR) 20 MG tablet Take 1 tablet (20 mg) by mouth every evening for 3 days 6/3/2024 at am    carvedilol (COREG) 6.25 MG tablet Take 12.5 mg by mouth 2 times daily (with meals) 6/3/2024 at am    empagliflozin (JARDIANCE) 10 MG TABS tablet Take 10 mg by mouth 6/3/2024 at am    furosemide (LASIX) 20 MG tablet Take 1 tablet (20 mg) by mouth as needed (for weight gain, swelling) 6/3/2024 at am    milrinone (PRIMACOR) in sodium chloride 100 mL infusion Inject 0.3 mcg/kg/min into the vein continuously Dosing weight: 93.9 kg (0.3 mcg/kg/min = 28.17 mcg/min) Past Week at unknown    nitroGLYcerin (NITROSTAT) 0.4 MG sublingual tablet Place 0.4 mg under the tongue every 5 minutes as needed for chest pain May repeat every 5 minutes for a total  of 3 doses. Unknown at unknown    potassium chloride araceli ER (KLOR-CON M20) 20 MEQ CR tablet Take 1 tablet (20 mEq) by mouth daily 6/3/2024 at am    sacubitril-valsartan (ENTRESTO) 49-51 MG per tablet Take 1 tablet by mouth 2 times daily for 3 doses 6/3/2024 at am    spironolactone (ALDACTONE) 25 MG tablet Take 25 mg by mouth at bedtime 6/3/2024 at pm    warfarin ANTICOAGULANT (COUMADIN) 5 MG tablet Take 7.5 mg by mouth every Mon, Wed, Friday; 5 mg all other days 6/2/2024

## 2024-06-09 LAB
ANION GAP SERPL CALCULATED.3IONS-SCNC: 10 MMOL/L (ref 7–15)
BASE EXCESS BLDV CALC-SCNC: 1.1 MMOL/L (ref -3–3)
BUN SERPL-MCNC: 18.1 MG/DL (ref 6–20)
CALCIUM SERPL-MCNC: 9 MG/DL (ref 8.6–10)
CHLORIDE SERPL-SCNC: 105 MMOL/L (ref 98–107)
CREAT SERPL-MCNC: 1.13 MG/DL (ref 0.67–1.17)
DEPRECATED HCO3 PLAS-SCNC: 21 MMOL/L (ref 22–29)
EGFRCR SERPLBLD CKD-EPI 2021: 78 ML/MIN/1.73M2
ERYTHROCYTE [DISTWIDTH] IN BLOOD BY AUTOMATED COUNT: 13.3 % (ref 10–15)
GLUCOSE SERPL-MCNC: 93 MG/DL (ref 70–99)
HCO3 BLDV-SCNC: 27 MMOL/L (ref 21–28)
HCT VFR BLD AUTO: 50.3 % (ref 40–53)
HGB BLD-MCNC: 17.3 G/DL (ref 13.3–17.7)
INR PPP: 1.43 (ref 0.85–1.15)
MCH RBC QN AUTO: 30.9 PG (ref 26.5–33)
MCHC RBC AUTO-ENTMCNC: 34.4 G/DL (ref 31.5–36.5)
MCV RBC AUTO: 90 FL (ref 78–100)
O2/TOTAL GAS SETTING VFR VENT: 21 %
OXYHGB MFR BLDV: 56 % (ref 70–75)
PCO2 BLDV: 48 MM HG (ref 40–50)
PH BLDV: 7.36 [PH] (ref 7.32–7.43)
PLATELET # BLD AUTO: 132 10E3/UL (ref 150–450)
PO2 BLDV: 30 MM HG (ref 25–47)
POTASSIUM SERPL-SCNC: 3.9 MMOL/L (ref 3.4–5.3)
RBC # BLD AUTO: 5.6 10E6/UL (ref 4.4–5.9)
SAO2 % BLDV: 57.5 % (ref 70–75)
SODIUM SERPL-SCNC: 136 MMOL/L (ref 135–145)
UFH PPP CHRO-ACNC: 0.7 IU/ML
WBC # BLD AUTO: 6.8 10E3/UL (ref 4–11)

## 2024-06-09 PROCEDURE — 85520 HEPARIN ASSAY: CPT | Performed by: STUDENT IN AN ORGANIZED HEALTH CARE EDUCATION/TRAINING PROGRAM

## 2024-06-09 PROCEDURE — 85027 COMPLETE CBC AUTOMATED: CPT

## 2024-06-09 PROCEDURE — 250N000013 HC RX MED GY IP 250 OP 250 PS 637: Performed by: STUDENT IN AN ORGANIZED HEALTH CARE EDUCATION/TRAINING PROGRAM

## 2024-06-09 PROCEDURE — 250N000013 HC RX MED GY IP 250 OP 250 PS 637

## 2024-06-09 PROCEDURE — 120N000003 HC R&B IMCU UMMC

## 2024-06-09 PROCEDURE — 250N000011 HC RX IP 250 OP 636: Performed by: STUDENT IN AN ORGANIZED HEALTH CARE EDUCATION/TRAINING PROGRAM

## 2024-06-09 PROCEDURE — 250N000011 HC RX IP 250 OP 636: Mod: JZ | Performed by: STUDENT IN AN ORGANIZED HEALTH CARE EDUCATION/TRAINING PROGRAM

## 2024-06-09 PROCEDURE — 250N000011 HC RX IP 250 OP 636: Mod: JZ

## 2024-06-09 PROCEDURE — 85610 PROTHROMBIN TIME: CPT

## 2024-06-09 PROCEDURE — 80048 BASIC METABOLIC PNL TOTAL CA: CPT

## 2024-06-09 PROCEDURE — 82805 BLOOD GASES W/O2 SATURATION: CPT

## 2024-06-09 PROCEDURE — 99232 SBSQ HOSP IP/OBS MODERATE 35: CPT | Mod: GC | Performed by: STUDENT IN AN ORGANIZED HEALTH CARE EDUCATION/TRAINING PROGRAM

## 2024-06-09 RX ORDER — FUROSEMIDE 20 MG/1
10 TABLET ORAL ONCE
Qty: 1 HALF-TAB | Refills: 0 | Status: COMPLETED | OUTPATIENT
Start: 2024-06-09 | End: 2024-06-09

## 2024-06-09 RX ORDER — HEPARIN SODIUM 10000 [USP'U]/100ML
0-5000 INJECTION, SOLUTION INTRAVENOUS CONTINUOUS
Status: DISCONTINUED | OUTPATIENT
Start: 2024-06-09 | End: 2024-06-14

## 2024-06-09 RX ADMIN — HEPARIN SODIUM 1100 UNITS/HR: 10000 INJECTION, SOLUTION INTRAVENOUS at 13:27

## 2024-06-09 RX ADMIN — CARVEDILOL 6.25 MG: 6.25 TABLET, FILM COATED ORAL at 08:31

## 2024-06-09 RX ADMIN — SPIRONOLACTONE 25 MG: 25 TABLET ORAL at 08:31

## 2024-06-09 RX ADMIN — MILRINONE LACTATE IN DEXTROSE 0.25 MCG/KG/MIN: 200 INJECTION, SOLUTION INTRAVENOUS at 00:20

## 2024-06-09 RX ADMIN — SACUBITRIL AND VALSARTAN 1 TABLET: 24; 26 TABLET, FILM COATED ORAL at 19:33

## 2024-06-09 RX ADMIN — CARVEDILOL 6.25 MG: 6.25 TABLET, FILM COATED ORAL at 18:08

## 2024-06-09 RX ADMIN — Medication 10 MG: at 12:22

## 2024-06-09 RX ADMIN — SACUBITRIL AND VALSARTAN 1 TABLET: 24; 26 TABLET, FILM COATED ORAL at 08:31

## 2024-06-09 RX ADMIN — MILRINONE LACTATE IN DEXTROSE 0.25 MCG/KG/MIN: 200 INJECTION, SOLUTION INTRAVENOUS at 13:35

## 2024-06-09 RX ADMIN — ATORVASTATIN CALCIUM 20 MG: 20 TABLET, FILM COATED ORAL at 19:33

## 2024-06-09 ASSESSMENT — ACTIVITIES OF DAILY LIVING (ADL)
ADLS_ACUITY_SCORE: 25
ADLS_ACUITY_SCORE: 23
ADLS_ACUITY_SCORE: 30
DRESSING/BATHING_DIFFICULTY: NO
ADLS_ACUITY_SCORE: 25
TOILETING_ISSUES: NO
ADLS_ACUITY_SCORE: 30
ADLS_ACUITY_SCORE: 25
ADLS_ACUITY_SCORE: 27
ADLS_ACUITY_SCORE: 23
ADLS_ACUITY_SCORE: 25
ADLS_ACUITY_SCORE: 25
CONCENTRATING,_REMEMBERING_OR_MAKING_DECISIONS_DIFFICULTY: NO
ADLS_ACUITY_SCORE: 30
ADLS_ACUITY_SCORE: 23
WALKING_OR_CLIMBING_STAIRS_DIFFICULTY: YES
ADLS_ACUITY_SCORE: 25
WEAR_GLASSES_OR_BLIND: OTHER (SEE COMMENTS)
ADLS_ACUITY_SCORE: 25
ADLS_ACUITY_SCORE: 23
DOING_ERRANDS_INDEPENDENTLY_DIFFICULTY: NO
WALKING_OR_CLIMBING_STAIRS: STAIR CLIMBING DIFFICULTY, ASSISTANCE 1 PERSON
ADLS_ACUITY_SCORE: 30
ADLS_ACUITY_SCORE: 30
HEARING_DIFFICULTY_OR_DEAF: NO
ADLS_ACUITY_SCORE: 27
CHANGE_IN_FUNCTIONAL_STATUS_SINCE_ONSET_OF_CURRENT_ILLNESS/INJURY: YES
ADLS_ACUITY_SCORE: 27
FALL_HISTORY_WITHIN_LAST_SIX_MONTHS: NO
ADLS_ACUITY_SCORE: 25
ADLS_ACUITY_SCORE: 30
ADLS_ACUITY_SCORE: 25
DIFFICULTY_EATING/SWALLOWING: NO
ADLS_ACUITY_SCORE: 25

## 2024-06-09 NOTE — PLAN OF CARE
Goal Outcome Evaluation:    Weight trending up slightly-given 10mg po lasix today. No LE edema noted-pt reports fluid usually pools in abdomen.   Continues on milrinone gtt @ 0.25mcg/kg/min.  INR 1.43-started on heparin infusion (was on warfarin) @ 1100u/hr with 10a check at 1930 in prep for LVAD surgery 6/14.   Pt having difficulty finding foods he can tolerate on menu-diet liberalized to 3g Na diet.   Up independent with cares; denies pain. Ambulating in halls with family.   K 3.9-no replacement needed. Cr downto 1.13.   Maintaining SR with PVCs. Continue to monitor.

## 2024-06-09 NOTE — PROGRESS NOTES
Full                           Navin Lutz                 cards 2  NKA                          [birth year]   53yr    Dx: LVAD scheduled for Thursday    Hx: chronic HFrEF 2/2 NICM and CKD stage II presented with decompensated heart failure on milrinone after worsening shortness of breath      Neuro: A/O x 4              Independent              Denies pain    Respiratory:  RA     Cardiac: SR/ST      GI/: Last BM 6/9              2 g Na.  1800 mL fluid restriction    Skin:  No discoloration, integrity intact    LDA:  R Double lumen PICC            hep 1100 units/hr  XA 1930            Milrinone 0.25 mcg/kg/mike

## 2024-06-09 NOTE — PLAN OF CARE
Goal Outcome Evaluation:    BP 95/71 (BP Location: Left arm)   Pulse 97   Temp 97.5  F (36.4  C) (Oral)   Resp 16   Ht 1.829 m (6')   Wt 91 kg (200 lb 9.9 oz)   SpO2 100%   BMI 27.21 kg/m      Plan of care reviewed with: patient and his wife  Overall patient progress: no change    Time: 4490-5710  Status: Presented on 06/03/2024 for decompensated heart failure on home Milrinone drip. Hx of chronic HFrED 2/2 NICM s/p ICD, HLD, CKD-2. Now admitted for advanced therapies LVAD on 06/14/2024.   Neuro/Pain: A&Ox4, neuro intact. Denied pain.   Activity: independent for activities, ambulated in the hallway multiple times with his wife.   Cardiac/vs: SR/ST HR 90-100s, denied chest pain or SALEH. Soft BP, last bp 95/71. Afebrile.   Respiratory: on room air sating >95%, LS clear all lobes, denied coughing or SOB.   GI/: last bm today per pt report. Active bowel sound. Voiding spontaneously without difficult.   Diet: 2g Na+ diet, 1800 ml FR. Complaint with fluid restriction.   Skin: intact.   LDAs: R PICC double lumen, purple infusing Milrinone drip and red lumen saline locked.   Labs/Imaging: reviewed.   Change this shift: none   Plan: continue on Milrinone drip. Monitor vs and contact cards -2 if needed.

## 2024-06-09 NOTE — PLAN OF CARE
Hours of Care:  1900 - 0700    Temp:  [97.5  F (36.4  C)-97.9  F (36.6  C)] 97.5  F (36.4  C)  Pulse:  [] 97  Resp:  [16] 16  BP: ()/(49-83) 95/71  SpO2:  [92 %-100 %] 100 %      I: Monitored vitals and assessed pt status.   Tele: SR with HR in the 80's-90's  O2: RA  Mobility: Up ad amaury    A: Neuro: A/O x 4.  Call light appropriate.  Able to make needs known.  Respiratory:  On room air.  Lung sounds clear.  Denies shortness of breath at rest.  Cardiac: VSS.    GI: Last BM 6/9.  No report of nausea or vomiting.  : Urinating adequate amounts of clear, yellow urine  Skin:  No discoloration, integrity intact  LDA:  R Double lumen PICC   Pain: Denies  Isolation:  Standard Precautions  Tests/procedures: None performed overnight.  Diet: 2 g Na.  1800 mL fluid restriction  Sleep:  No sleep disturbances noted or reported.  Social:      P: Continue to monitor Pt status and report changes to Cards 2.      Intake/Output Summary (Last 24 hours) at 6/9/2024 0326  Last data filed at 6/8/2024 1700  Gross per 24 hour   Intake 491 ml   Output 325 ml   Net 166 ml

## 2024-06-09 NOTE — PROGRESS NOTES
[facility]    Cardiology Progress Note- Cardiology        Date of Admission:  6/3/2024     Assessment & Plan: HVSL   Navin Lutz is a 53 year old male admitted on 6/3/2024. He has a past medical history of chronic HFrEF 2/2 NICM s/p ICD, HLD, and CKD Stage II who presents admitted for decompensated heart failure on milrinone, admitted for consideration of advanced therapies, now being planned for LVAD 6/14.     Today:  - continue heparin (low intensity)  - 10 of furosemide po  - last dose of entresto/coreg Wednesday night    Chronic systolic heart failure/HFrEF (EF 10-15%) secondary to NICM on home milrinone  NYHA Symptom Class IIIb  Stage D  Patient reported having had worsening SOB for the past 3 weeks. + dyspnea on exertion, + belly distention and orthopnea. He has also been having a lot of productive cough which does not respond to daily lasix. His dose of home milrinone was up-titrated, but symptoms have not improved.  Plan on LVAD (as bridge to transplant) on Fri 6/14.   - hold jardiance, hold entresto and coreg 24 hrs before surgery   - Continue milrinone to 0.25  - VAD on 6/14  - lasix 10 mg po once for dry weight    Hx of RUE DVT  - stop warfarin in anticipation of surgery, start heparin gtt tomorrow if INR<=2    CKD - baseline Cr ~1.2-1.4. monitor Cr  HLD - continue statin     Diet: Fluid restriction 1800 ML FLUID (and additional linked orders)  2 Gram Sodium Diet    DVT Prophylaxis: Heparin gtt  Menjivar Catheter: Not present  Cardiac Monitoring: ACTIVE order. Indication: Acute decompensated heart failure (48 hours)  Code Status: Full Code          Clinically Significant Risk Factors                   # End stage heart failure: home medication list includes inotropes               # Overweight: Estimated body mass index is 27.21 kg/m  as calculated from the following:    Height as of this encounter: 1.829 m (6').    Weight as of this encounter: 91 kg  (200 lb 9.9 oz).        # Financial/Environmental Concerns: none  # Asthma: noted on problem list  # ICD device present       Disposition Plan   Expected discharge: 1-2 weeks  Entered: Traci Nicole MD 06/09/2024, 7:52 AM   The patient's care was discussed with the Attending Physician, Dr. Silvestre .      Traci Nicole MD  [facility]  ______________________________________________________________________    Interval History   NAEO. Denied SOB/CP, has been taking walks around the unit.     Physical Exam   Vital Signs: Temp: 97.8  F (36.6  C) Temp src: Oral BP: 103/67 Pulse: 89   Resp: 16 SpO2: 97 % O2 Device: None (Room air)    Weight: 200 lbs 9.9 oz    General Appearance:  Alert, oriented*3, looks comfortable on RA, cooperative   Respiratory: on RA, clear bilateral lungs  Cardiovascular: regular pulse, no murmur, not seen CVP at 90 degree  GI: soft, not tender  Skin: no rash  Other:  No pitting edema bilateral legs      Medical Decision Making             Data

## 2024-06-10 LAB
ANION GAP SERPL CALCULATED.3IONS-SCNC: 10 MMOL/L (ref 7–15)
ATRIAL RATE - MUSE: 91 BPM
BASE EXCESS BLDV CALC-SCNC: 1.7 MMOL/L (ref -3–3)
BUN SERPL-MCNC: 16 MG/DL (ref 6–20)
CALCIUM SERPL-MCNC: 9 MG/DL (ref 8.6–10)
CHLORIDE SERPL-SCNC: 105 MMOL/L (ref 98–107)
CREAT SERPL-MCNC: 1.03 MG/DL (ref 0.67–1.17)
DEPRECATED HCO3 PLAS-SCNC: 23 MMOL/L (ref 22–29)
DIASTOLIC BLOOD PRESSURE - MUSE: NORMAL MMHG
EGFRCR SERPLBLD CKD-EPI 2021: 87 ML/MIN/1.73M2
ERYTHROCYTE [DISTWIDTH] IN BLOOD BY AUTOMATED COUNT: 13.2 % (ref 10–15)
GLUCOSE SERPL-MCNC: 97 MG/DL (ref 70–99)
HCO3 BLDV-SCNC: 27 MMOL/L (ref 21–28)
HCT VFR BLD AUTO: 50.4 % (ref 40–53)
HGB BLD-MCNC: 17 G/DL (ref 13.3–17.7)
INTERPRETATION ECG - MUSE: NORMAL
MCH RBC QN AUTO: 30.6 PG (ref 26.5–33)
MCHC RBC AUTO-ENTMCNC: 33.7 G/DL (ref 31.5–36.5)
MCV RBC AUTO: 91 FL (ref 78–100)
O2/TOTAL GAS SETTING VFR VENT: 21 %
OXYHGB MFR BLDV: 69 % (ref 70–75)
P AXIS - MUSE: 75 DEGREES
PCO2 BLDV: 43 MM HG (ref 40–50)
PH BLDV: 7.4 [PH] (ref 7.32–7.43)
PLATELET # BLD AUTO: 146 10E3/UL (ref 150–450)
PO2 BLDV: 36 MM HG (ref 25–47)
POTASSIUM SERPL-SCNC: 3.8 MMOL/L (ref 3.4–5.3)
PR INTERVAL - MUSE: 144 MS
QRS DURATION - MUSE: 90 MS
QT - MUSE: 394 MS
QTC - MUSE: 484 MS
R AXIS - MUSE: -56 DEGREES
RBC # BLD AUTO: 5.55 10E6/UL (ref 4.4–5.9)
SAO2 % BLDV: 70 % (ref 70–75)
SODIUM SERPL-SCNC: 138 MMOL/L (ref 135–145)
SYSTOLIC BLOOD PRESSURE - MUSE: NORMAL MMHG
T AXIS - MUSE: 72 DEGREES
UFH PPP CHRO-ACNC: 0.36 IU/ML
UFH PPP CHRO-ACNC: 0.44 IU/ML
UFH PPP CHRO-ACNC: <0.1 IU/ML
VENTRICULAR RATE- MUSE: 91 BPM
WBC # BLD AUTO: 5.9 10E3/UL (ref 4–11)

## 2024-06-10 PROCEDURE — 82805 BLOOD GASES W/O2 SATURATION: CPT

## 2024-06-10 PROCEDURE — 250N000011 HC RX IP 250 OP 636: Mod: JZ | Performed by: STUDENT IN AN ORGANIZED HEALTH CARE EDUCATION/TRAINING PROGRAM

## 2024-06-10 PROCEDURE — 80048 BASIC METABOLIC PNL TOTAL CA: CPT

## 2024-06-10 PROCEDURE — 250N000011 HC RX IP 250 OP 636: Performed by: STUDENT IN AN ORGANIZED HEALTH CARE EDUCATION/TRAINING PROGRAM

## 2024-06-10 PROCEDURE — 85027 COMPLETE CBC AUTOMATED: CPT

## 2024-06-10 PROCEDURE — 250N000013 HC RX MED GY IP 250 OP 250 PS 637

## 2024-06-10 PROCEDURE — 250N000013 HC RX MED GY IP 250 OP 250 PS 637: Performed by: PHYSICIAN ASSISTANT

## 2024-06-10 PROCEDURE — 250N000013 HC RX MED GY IP 250 OP 250 PS 637: Performed by: STUDENT IN AN ORGANIZED HEALTH CARE EDUCATION/TRAINING PROGRAM

## 2024-06-10 PROCEDURE — 85520 HEPARIN ASSAY: CPT | Performed by: STUDENT IN AN ORGANIZED HEALTH CARE EDUCATION/TRAINING PROGRAM

## 2024-06-10 PROCEDURE — 120N000003 HC R&B IMCU UMMC

## 2024-06-10 PROCEDURE — 99233 SBSQ HOSP IP/OBS HIGH 50: CPT | Performed by: PHYSICIAN ASSISTANT

## 2024-06-10 PROCEDURE — 250N000011 HC RX IP 250 OP 636: Mod: JZ

## 2024-06-10 RX ORDER — CARVEDILOL 3.12 MG/1
3.12 TABLET ORAL 2 TIMES DAILY WITH MEALS
Status: DISCONTINUED | OUTPATIENT
Start: 2024-06-10 | End: 2024-06-14

## 2024-06-10 RX ADMIN — SPIRONOLACTONE 25 MG: 25 TABLET ORAL at 08:56

## 2024-06-10 RX ADMIN — SACUBITRIL AND VALSARTAN 1 TABLET: 24; 26 TABLET, FILM COATED ORAL at 08:56

## 2024-06-10 RX ADMIN — HEPARIN SODIUM 1100 UNITS/HR: 10000 INJECTION, SOLUTION INTRAVENOUS at 06:22

## 2024-06-10 RX ADMIN — CARVEDILOL 6.25 MG: 6.25 TABLET, FILM COATED ORAL at 08:56

## 2024-06-10 RX ADMIN — CARVEDILOL 3.12 MG: 3.12 TABLET, FILM COATED ORAL at 18:02

## 2024-06-10 RX ADMIN — MILRINONE LACTATE IN DEXTROSE 0.25 MCG/KG/MIN: 200 INJECTION, SOLUTION INTRAVENOUS at 03:15

## 2024-06-10 RX ADMIN — ATORVASTATIN CALCIUM 20 MG: 20 TABLET, FILM COATED ORAL at 19:27

## 2024-06-10 RX ADMIN — MILRINONE LACTATE IN DEXTROSE 0.25 MCG/KG/MIN: 200 INJECTION, SOLUTION INTRAVENOUS at 17:18

## 2024-06-10 ASSESSMENT — ACTIVITIES OF DAILY LIVING (ADL)
ADLS_ACUITY_SCORE: 22
ADLS_ACUITY_SCORE: 23
ADLS_ACUITY_SCORE: 22
ADLS_ACUITY_SCORE: 23
ADLS_ACUITY_SCORE: 22
ADLS_ACUITY_SCORE: 22
ADLS_ACUITY_SCORE: 23
ADLS_ACUITY_SCORE: 22
ADLS_ACUITY_SCORE: 23
ADLS_ACUITY_SCORE: 23
ADLS_ACUITY_SCORE: 22

## 2024-06-10 NOTE — PROGRESS NOTES
[facility]   Cardiology II Service / Advanced Heart Failure  Daily Progress Note      Patient: Navin Lutz  MRN: [id number]  Admission Date: 6/3/2024  Hospital Day # 7    Assessment and Plan: Navin Lutz is a 53 year old male admitted on 6/3/2024. He has a past medical history of chronic HFrEF 2/2 NICM s/p ICD, HLD, and CKD Stage II who presents admitted for decompensated heart failure on milrinone listed status 4, admitted for consideration of advanced therapies, now being planned for LVAD 6/14.     Today's Plan:  - Decrease coreg to 3.125 mg BID  - HOLD entresto  - Per Dr. Carpenter, will need RHC Wednesday, will need to discuss with patient and family tomorrow  - Will write letters re: medical need for missing events on Marleen 15 and 21.  - Holding off on diuretics today, if weight up further tomorrow will need to given  - Per 6c, okay for daughter and wife to come up pre-surgery even though this will be before visiting outs. If there additional family members, they will discuss with charge RN    # Acute on chronic systolic heart failure/HFrEF secondary to NICM with EF of 10-15% who was on home milrinone prior to admission    Stage D. NYHA Class III.   TTE 6/8/24 with EF of 15-20%, LVIDd 7.0 cm, RV normal size and function, mild TR, mild MI, no pericardial effusion. RHC 6/4/24: RA 3, PA 25/12/17, PCW 9, Shiv CO/CI 4.63/2.13.   -Fluid status: euvolemic, deferring loop diuretic today  -Inotrope:milrinone at 0.25 mcg/kg/min  -ACEi/ARB/ARNI/afterload reduction: HOLD entresto, given upcoming surgery and soft BPs  -BB: decreased coreg to 3.125 given soft bps   -Aldosterone antagonist: continue aldactone 25 mg daily  -SGLT2i: HELD d/t upcoming surgery, PTA was on jardiance  -SCD prophylaxis: ICD  - Planned for LVAD 6/14    # Right subclavian and axillary vein thrombus, nonocclusive -  - Was on Coumadin PTA  - Continue heparin gtt for now, will need to touch base with surgery for when to hold prior to  surgery    # History of nsvt  - Decreasing coreg to 3.125 given hypotension and low output    # HLD  - Continue atorvastatin 20 mg daily    # CKD stage 2. B/l cr has been listed at 1.2-1-4  - Improved from baseline ~1.0 currently      Diet: Fluid restriction 1800 ML FLUID (and additional linked orders)  2 Gram Sodium Diet    DVT Prophylaxis: Heparin gtt  Menjivar Catheter: Not present  Cardiac Monitoring: ACTIVE order. Indication: Acute decompensated heart failure (48 hours)  Code Status: Full Code      Eliz Choi PA-C  Advanced Heart Failure/Cardiology II Service  Vocera preferred, or pager [phone]      Patient discussed with Dr. Carpenter.        70 minutes spent on the date of the encounter doing chart review, history and exam, documentation and further activities per the note    ================================================================    Subjective/24-Hr Events:   Last 24 hr care team notes reviewed. Feeling well. [place] lots on the floor every day. No lightheadedness or dizziness. No pre-syncope or syncope. He describes his abd edema as mild. No LE edema, doesn't normally get it there. No nausea. No orthopnea. No PND. No fevers or chills or other infectius symptoms.    ROS:  4 point ROS including respiratory, CV, GI and  (other than that noted in the HPI) is negative.     Medications: Reviewed in EPIC.     Physical Exam:   BP (!) 88/66 (BP Location: Left arm)   Pulse 78   Temp 97.7  F (36.5  C) (Oral)   Resp 18   Ht 1.829 m (6')   Wt 92.1 kg (203 lb)   SpO2 96%   BMI 27.53 kg/m      GENERAL: Appears comfortable, in no distress .  HEENT: Eye symmetrical, no discharge or icterus bilaterally.   NECK: Supple, JVD <6.   CV: RRR, +S1S2, no murmur, rub, or gallop.   RESPIRATORY: Respirations regular, even, and unlabored. Lungs CTA throughout.    GI: Soft and non distended   EXTREMITIES: No peripheral edema. All extremities are warm and well perfused  NEUROLOGIC: Alert and interacting  appropriatly. No focal deficits.   MUSCULOSKELETAL: No joint swelling or tenderness.   SKIN: No jaundice. No rashes or lesions.     Labs:  CMP  Recent Labs   Lab 06/10/24  0503 06/09/24  0451 06/08/24  1053 06/08/24  0426 06/07/24  0502 06/05/24  0540 06/04/24  0439 06/03/24  1735    136  --  140 139   < > 136 137   POTASSIUM 3.8 3.9  --  3.9 4.0   < > 4.2 4.2   CHLORIDE 105 105  --  109* 107   < > 105 105   CO2 23 21*  --  23 23   < > 22 22   ANIONGAP 10 10  --  8 9   < > 9 10   GLC 97 93  --  127* 92   < > 96 88   BUN 16.0 18.1  --  18.5 15.3   < > 15.9 15.0   CR 1.03 1.13  --  1.27* 1.15   < > 1.20* 1.47*   GFRESTIMATED 87 78  --  68 76   < > 72 57*   NAM 9.0 9.0  --  8.8 9.0   < > 8.9 9.1   MAG  --   --  2.2  --   --   --  2.2  --    PROTTOTAL  --   --   --   --   --   --   --  6.5   ALBUMIN  --   --   --   --   --   --   --  4.1   BILITOTAL  --   --   --   --   --   --   --  0.7   ALKPHOS  --   --   --   --   --   --   --  91   AST  --   --   --   --   --   --   --  23   ALT  --   --   --   --   --   --   --  26    < > = values in this interval not displayed.       CBC  Recent Labs   Lab 06/10/24  0503 06/09/24  0451 06/08/24  0426 06/07/24  0502   WBC 5.9 6.8 7.3 7.8   RBC 5.55 5.60 5.61 5.70   HGB 17.0 17.3 17.1 17.7   HCT 50.4 50.3 50.3 50.7   MCV 91 90 90 89   MCH 30.6 30.9 30.5 31.1   MCHC 33.7 34.4 34.0 34.9   RDW 13.2 13.3 13.5 13.4   * 132* 136* 150       INR  Recent Labs   Lab 06/09/24  0451 06/08/24  0426 06/07/24  0502 06/06/24  0526   INR 1.43* 2.18* 2.53* 2.28*

## 2024-06-10 NOTE — PLAN OF CARE
BP 93/71 (BP Location: Left arm)   Pulse 95   Temp 98.6  F (37  C) (Oral)   Resp 16   Ht 1.829 m (6')   Wt 92.2 kg (203 lb 4.8 oz)   SpO2 95%   BMI 27.57 kg/m      Goal Outcome Evaluation:    Plan of care reviewed with: patient and his wife  Overall patient progress: no change    Time: 4220-0745  Status: Presented on 06/03/2024 for decompensated heart failure on home Milrinone drip. Hx of chronic HFrED 2/2 NICM s/p ICD, HLD, CKD-2. Now admitted for advanced therapies LVAD on 06/14/2024.   Neuro/Pain: A&Ox4, neuro intact. Denied pain.   Activity: independent for activities, ambulated in the hallway multiple times with his wife.   Cardiac/vs: SR/ST HR 90-100s, denied chest pain or SALEH. Soft BP, last bp 93/71. Afebrile.   Respiratory: on room air sating >95%, LS clear all lobes, denied coughing or SOB.   GI/: last bm today per pt report. Active bowel sound. Voiding spontaneously without difficult.   Diet: 2g Na+ diet, 1800 ml FR. Complaint with fluid restriction.   Skin: intact.   LDAs: R PICC double lumen, purple infusing Milrinone drip and red lumen infusing Hep drip.   Labs/Imaging: reviewed.   Change this shift: Hep drip rate decreased by 300 units, 10A at 0430.   Plan: continue on Milrinone drip. Monitor vs and contact cards -2 if needed.

## 2024-06-10 NOTE — PLAN OF CARE
Major Shift Events  Overnight pt remained in stable condition w/ cardiac rhythm irregularities. Pt continues to exhibit SR w/ occasional PVC's, and x1 episode of Vtach for 5 beats overnight @ 0448. Additional complications included subtherapeutic heparin Xa (Xa=<0.1) level requiring 5500 unit(s) bolus & 300 unit(s)/hr increase (800->1100 unit(s)/hr).    Neuro: A&Ox4, following commands, PERRLA, sensation intact, MSK: 5/5, up ad amaury, denies pain  Cv: SR w/ occasional PCV, BP WDL, pulses +2, afebrile  Resp: RA, LS:clear bilaterally, SpO2 >92%, independent cough  GI/: 3g NA diet w/ 1800 mL FR, voids independently w/o issue, BM-, BS+, Flatus +  Skin: WDL     Drips: Milrinone 0.25 mcg/kg/min, Heparin 1100 unit(s)/hr  Sedation: N/A     Plan: Follow POC. Monitor closely, notify MD of acute changes.  For vital signs and complete assessments, please see documentation flowsheets.

## 2024-06-10 NOTE — PLAN OF CARE
I: Monitored vitals and assessed pt status.   Changes during this shift: Hypotensive episodes, asymptomatic SBP 80s, provider updated, medications adjusted. 1 episode x Vtach for 5 beats @ 1644.    Running: Heparin 1100 unit(s)/hr and Milrinone 0.25 mcg/kg/min     Vitals: /72 (BP Location: Left arm)   Pulse 79   Temp 98.2  F (36.8  C) (Oral)   Resp 18   Ht 1.829 m (6')   Wt 92.1 kg (203 lb)   SpO2 98%   BMI 27.53 kg/m      A:   Neuro: Ax4 denies headache, dizziness, lightheadedness, calls appropriately   Cardiac: SR frequent PVCs, afebrile, VSS. Denies chest pain.   Respiratory: sating >95 on room air. denies SOB. LS clear bilaterally.   Diet/appetite: Regular Diet. Good appetite.   GI/:  Moderate BM this shift. Denies abdominal pain. Adequate urine output.   Activity: Moves independently, ambulated in hallways with wife  Skin: no new deficits  Line:  2 Lumen PICC R arm: red lumen-heparin drip, purple lumen-milrinone drip  Goal Outcome Evaluation:      Plan of Care Reviewed With: patient    Overall Patient Progress: no changeOverall Patient Progress: no change

## 2024-06-10 NOTE — PROGRESS NOTES
Clinical Nutrition Services- Brief Note    Reviewed nutrition risk factors due to LOS. Pt is tolerating a 3 g Sodium and 1800 mL Fluid Restriction diet, eating well per nursing documentation and [employer] review. % intake of meals. Weight history shows no significant weight loss. No nutrition issues identified at this time. 2 gm Na educ addressed 6/5. LBM 6/7.  Meds reviewed. Labs reviewed.  Weight History:   Wt Readings from Last 15 Encounters:   06/10/24 92.1 kg (203 lb)   05/31/24 90.9 kg (200 lb 6.4 oz)   05/03/24 95.3 kg (210 lb 3.2 oz)   05/03/24 96.7 kg (213 lb 1.6 oz)   03/13/24 91.8 kg (202 lb 6.4 oz)   01/19/24 95.4 kg (210 lb 6.4 oz)   01/19/24 91.2 kg (201 lb)   01/19/24 96.3 kg (212 lb 3.2 oz)   12/08/23 91.4 kg (201 lb 6.4 oz)   11/20/23 93.9 kg (207 lb 0.2 oz)   11/01/23 91.6 kg (202 lb)   09/20/23 94.3 kg (208 lb)   09/20/23 93.1 kg (205 lb 3.2 oz)   08/28/23 89.9 kg (198 lb 3.2 oz)   07/19/23 93.7 kg (206 lb 9.6 oz)       RD will continue to follow per nutrition protocol.  Lyndsey Johnson MS, RD, LD, [facility]    6C (beds 9813-4942) + 7C (beds 5391-6692) + ED   Available in [facility] by name or unit dietitian

## 2024-06-11 ENCOUNTER — ANESTHESIA EVENT (OUTPATIENT)
Dept: SURGERY | Facility: CLINIC | Age: 54
End: 2024-06-11
Payer: COMMERCIAL

## 2024-06-11 ENCOUNTER — APPOINTMENT (OUTPATIENT)
Dept: OCCUPATIONAL THERAPY | Facility: CLINIC | Age: 54
End: 2024-06-11
Attending: STUDENT IN AN ORGANIZED HEALTH CARE EDUCATION/TRAINING PROGRAM
Payer: COMMERCIAL

## 2024-06-11 LAB
ANION GAP SERPL CALCULATED.3IONS-SCNC: 9 MMOL/L (ref 7–15)
BASE EXCESS BLDV CALC-SCNC: 1.5 MMOL/L (ref -3–3)
BUN SERPL-MCNC: 14.5 MG/DL (ref 6–20)
CALCIUM SERPL-MCNC: 9.2 MG/DL (ref 8.6–10)
CHLORIDE SERPL-SCNC: 105 MMOL/L (ref 98–107)
CREAT SERPL-MCNC: 1.11 MG/DL (ref 0.67–1.17)
DEPRECATED HCO3 PLAS-SCNC: 25 MMOL/L (ref 22–29)
EGFRCR SERPLBLD CKD-EPI 2021: 79 ML/MIN/1.73M2
ERYTHROCYTE [DISTWIDTH] IN BLOOD BY AUTOMATED COUNT: 13.2 % (ref 10–15)
GLUCOSE SERPL-MCNC: 93 MG/DL (ref 70–99)
HCO3 BLDV-SCNC: 27 MMOL/L (ref 21–28)
HCT VFR BLD AUTO: 50.8 % (ref 40–53)
HGB BLD-MCNC: 17.2 G/DL (ref 13.3–17.7)
MAGNESIUM SERPL-MCNC: 2 MG/DL (ref 1.7–2.3)
MCH RBC QN AUTO: 30.3 PG (ref 26.5–33)
MCHC RBC AUTO-ENTMCNC: 33.9 G/DL (ref 31.5–36.5)
MCV RBC AUTO: 89 FL (ref 78–100)
O2/TOTAL GAS SETTING VFR VENT: 21 %
OXYHGB MFR BLDV: 66 % (ref 70–75)
PCO2 BLDV: 46 MM HG (ref 40–50)
PH BLDV: 7.38 [PH] (ref 7.32–7.43)
PLATELET # BLD AUTO: 165 10E3/UL (ref 150–450)
PO2 BLDV: 35 MM HG (ref 25–47)
POTASSIUM SERPL-SCNC: 3.9 MMOL/L (ref 3.4–5.3)
RBC # BLD AUTO: 5.68 10E6/UL (ref 4.4–5.9)
SAO2 % BLDV: 67.4 % (ref 70–75)
SODIUM SERPL-SCNC: 139 MMOL/L (ref 135–145)
UFH PPP CHRO-ACNC: 0.25 IU/ML
WBC # BLD AUTO: 6.9 10E3/UL (ref 4–11)

## 2024-06-11 PROCEDURE — 250N000011 HC RX IP 250 OP 636: Mod: JZ

## 2024-06-11 PROCEDURE — 120N000003 HC R&B IMCU UMMC

## 2024-06-11 PROCEDURE — 250N000011 HC RX IP 250 OP 636: Performed by: STUDENT IN AN ORGANIZED HEALTH CARE EDUCATION/TRAINING PROGRAM

## 2024-06-11 PROCEDURE — 250N000013 HC RX MED GY IP 250 OP 250 PS 637

## 2024-06-11 PROCEDURE — 80048 BASIC METABOLIC PNL TOTAL CA: CPT

## 2024-06-11 PROCEDURE — 999N000044 HC STATISTIC CVC DRESSING CHANGE

## 2024-06-11 PROCEDURE — 97110 THERAPEUTIC EXERCISES: CPT | Mod: GO

## 2024-06-11 PROCEDURE — 85027 COMPLETE CBC AUTOMATED: CPT

## 2024-06-11 PROCEDURE — 99232 SBSQ HOSP IP/OBS MODERATE 35: CPT | Performed by: NURSE PRACTITIONER

## 2024-06-11 PROCEDURE — 82805 BLOOD GASES W/O2 SATURATION: CPT

## 2024-06-11 PROCEDURE — 85520 HEPARIN ASSAY: CPT | Performed by: STUDENT IN AN ORGANIZED HEALTH CARE EDUCATION/TRAINING PROGRAM

## 2024-06-11 PROCEDURE — 250N000013 HC RX MED GY IP 250 OP 250 PS 637: Performed by: PHYSICIAN ASSISTANT

## 2024-06-11 PROCEDURE — 83735 ASSAY OF MAGNESIUM: CPT

## 2024-06-11 RX ORDER — POTASSIUM CHLORIDE 750 MG/1
20 TABLET, EXTENDED RELEASE ORAL ONCE
Status: DISCONTINUED | OUTPATIENT
Start: 2024-06-11 | End: 2024-06-11

## 2024-06-11 RX ADMIN — MILRINONE LACTATE IN DEXTROSE 0.25 MCG/KG/MIN: 200 INJECTION, SOLUTION INTRAVENOUS at 22:17

## 2024-06-11 RX ADMIN — HEPARIN SODIUM 1100 UNITS/HR: 10000 INJECTION, SOLUTION INTRAVENOUS at 04:22

## 2024-06-11 RX ADMIN — CARVEDILOL 3.12 MG: 3.12 TABLET, FILM COATED ORAL at 17:23

## 2024-06-11 RX ADMIN — ATORVASTATIN CALCIUM 20 MG: 20 TABLET, FILM COATED ORAL at 20:25

## 2024-06-11 RX ADMIN — CARVEDILOL 3.12 MG: 3.12 TABLET, FILM COATED ORAL at 08:01

## 2024-06-11 RX ADMIN — SPIRONOLACTONE 25 MG: 25 TABLET ORAL at 08:01

## 2024-06-11 RX ADMIN — MILRINONE LACTATE IN DEXTROSE 0.25 MCG/KG/MIN: 200 INJECTION, SOLUTION INTRAVENOUS at 07:59

## 2024-06-11 ASSESSMENT — ACTIVITIES OF DAILY LIVING (ADL)
ADLS_ACUITY_SCORE: 22

## 2024-06-11 NOTE — PROGRESS NOTES
Cardiothoracic Surgery Brief Progress Note:  June 11, 2024    Navin Lutz  is a 53 year old male with a history of chronic HFrEF 2/2 NICM and CKD stage II presented with decompensated heart failure on milrinone after worsening shortness of breath for the past three weeks. Additionally, has had productive cough. Symptoms have not improved with increase in diuretics and milrinone. Patient currently admitted under the heart failure service with plan for LVAD insertion on 6/14/2024.    - Pre-operative orders signed and held 6/11 for RN to release. Heparin gtt to be held on call to OR.  - Plan for pre-operative placement of iABP. Appreciate cardiology's assistance with arranging this.  - Discussed with patient plan for LVAD and answered questions regarding cathleen-operative period.   - Remainder of cares per primary team, appreciate excellent care. Please call or page with any questions.     Betzaida Baker PA-C  Cardiothoracic Surgery  Pager [phone]    8:09 AM June 11, 2024

## 2024-06-11 NOTE — PLAN OF CARE
Neuro: A/Ox4.  Cardiac: SR with HR 80's. VSS. Had 5 beats VT at 0230. Had 10 seconds of aberrant a fib this am. MD updated. Magnesium added to AM labs.   Respiratory: O2 sats stable on RA. Faint crackles in lower lobes.  GI/: Voiding in BR, BM yesterday.  Diet/appetite: Good po, 3gm NA diet, 1.8 FR.  Activity:  UAL  Pain: Denies pain.  Skin: Intact, no new deficits noted.  LDA's: DL PICC- Milrinone 0.25 mcg/kg/min.  Heparin gtt 1100 units/hr.    Plan: RHC Wednesday, LVAD 6/14. Continue with POC. Notify primary team with changes.

## 2024-06-11 NOTE — PROGRESS NOTES
[facility]   Cardiology II Service / Advanced Heart Failure  Daily Progress Note      Patient: Navin Lutz  MRN: [id number]  Admission Date: 6/3/2024  Hospital Day # 8    Assessment and Plan: Navin Lutz is a 53 year old male admitted on 6/3/2024. He has a past medical history of chronic HFrEF 2/2 NICM s/p ICD, HLD, and CKD Stage II who presents admitted for decompensated heart failure on milrinone listed status 4, admitted for consideration of advanced therapies, now being planned for LVAD 6/14.     Today's Plan:  - plan for RHC, swan leave in and IABP Wednesday - discussed at length with patient and wife today  - per 6c, okay for daughter and wife to come up pre-surgery even though this will be before visiting outs. If there additional family members, they will discuss with charge RN    # Acute on chronic systolic heart failure/HFrEF secondary to NICM with EF of 10-15% who was on home milrinone prior to admission    Stage D. NYHA Class III. TTE 6/8/24 EF 15-20%, LVIDd 7.0 cm, RV normal size and function, mild TR, mild MI, no pericardial effusion. RHC 6/4/24: RA 3, PA 25/12/17, PCW 9, Teressa CO/CI 4.63/2.13.     -Fluid status: euvolemic, defer diuretic  -Inotrope: milrinone at 0.25 mcg/kg/min  -ACEi/ARB/ARNI/afterload reduction: HOLD entresto given upcoming surgery for vasoplegia risk  -BB: decreased coreg to 3.125 6/10  -Aldosterone antagonist: aldactone 25 mg daily  -SGLT2i: HELD d/t upcoming surgery, PTA was on jardiance  -SCD prophylaxis: ICD  -Planned for LVAD 6/14, IABP prior    # Right subclavian and axillary vein thrombus, nonocclusive   - on warfarin PTA  - heparin gtt for now, hold prior to surgery per CVTS    # History of nsvt  - decreased coreg to 3.125 given hypotension and low output  - monitor tele    # HLD  - atorvastatin 20 mg daily    # CKD stage II. B/l cr 1.2-1-4  - Cr stable ~1/1    Diet: Fluid restriction 1800 ML FLUID (and additional linked orders)  2 Gram Sodium Diet     DVT Prophylaxis: Heparin gtt  Menjivar Catheter: Not present  Cardiac Monitoring: ACTIVE order. Indication: Acute decompensated heart failure (48 hours)  Code Status: Full Code      Patient discussed with Dr. Carpenter.      Desire Silverman, JS, NP-C  Nurse Practitioner - Advanced Heart Failure/Cardiology II Service  Jany preferred or Pager [phone]      40 minutes spent on the date of the encounter doing chart review, history and exam, documentation and further activities per the note    ================================================================    Subjective/24-Hr Events:   Last 24 hr care team notes reviewed. Feeling fine. Ambulating unit without sig symptoms. Multiple discussions re: VAD, recovery, transplant, etc with patient and wife today. Discussed plan for IABP and swan.     ROS:  4 point ROS including respiratory, CV, GI and  (other than that noted in the HPI) is negative.     Medications: Reviewed in EPIC.     Physical Exam:   BP (!) 89/73 (BP Location: Left arm)   Pulse 86   Temp 98  F (36.7  C) (Oral)   Resp 16   Ht 1.829 m (6')   Wt 92.1 kg (203 lb)   SpO2 97%   BMI 27.53 kg/m      GENERAL: Appears comfortable, in no distress .  HEENT: Eye symmetrical, no discharge or icterus bilaterally.   NECK: Supple, JVD <6.   CV: RRR, +S1S2, no murmur, rub, or gallop.   RESPIRATORY: Respirations regular, even, and unlabored. Lungs CTA throughout.    GI: Soft and non distended   EXTREMITIES: No peripheral edema. All extremities are warm and well perfused  NEUROLOGIC: Alert and interacting appropriatly. No focal deficits.   MUSCULOSKELETAL: No joint swelling or tenderness.   SKIN: No jaundice. No rashes or lesions.     Labs:  CMP  Recent Labs   Lab 06/11/24  0445 06/10/24  0503 06/09/24  0451 06/08/24  1053 06/08/24  0426    138 136  --  140   POTASSIUM 3.9 3.8 3.9  --  3.9   CHLORIDE 105 105 105  --  109*   CO2 25 23 21*  --  23   ANIONGAP 9 10 10  --  8   GLC 93 97 93  --  127*   BUN 14.5 16.0  18.1  --  18.5   CR 1.11 1.03 1.13  --  1.27*   GFRESTIMATED 79 87 78  --  68   ANM 9.2 9.0 9.0  --  8.8   MAG 2.0  --   --  2.2  --        CBC  Recent Labs   Lab 06/11/24  0445 06/10/24  0503 06/09/24  0451 06/08/24  0426   WBC 6.9 5.9 6.8 7.3   RBC 5.68 5.55 5.60 5.61   HGB 17.2 17.0 17.3 17.1   HCT 50.8 50.4 50.3 50.3   MCV 89 91 90 90   MCH 30.3 30.6 30.9 30.5   MCHC 33.9 33.7 34.4 34.0   RDW 13.2 13.2 13.3 13.5    146* 132* 136*       INR  Recent Labs   Lab 06/09/24  0451 06/08/24  0426 06/07/24  0502 06/06/24  0526   INR 1.43* 2.18* 2.53* 2.28*

## 2024-06-11 NOTE — H&P
CV ICU H&P    ASSESSMENT: Navin Lutz is a 53 year old male with PMH of CKD2, HLD, R Subclavian and axillary vein non-occlusive thrombus on Warfarin, hx NSVT, HFrEF 2/2 NICM s/p ICD who presents admitted 6/3/24 for decompensated heart failure on milrinone listed status 4. Now s/p LVAD by Dr. Jhaveri on 6/14.    Arrives from the CVICU intubated and sedated on propofol. On 0.08 epi gtt, 0.05 norepi gtt and 1 unit vaso gtt for pressor and inotropic support. S/p LVAD procedure, x4 CT placed (3 mediastinal [ two 32 Fr straight, one 24 Fr sherley], one left pleural). Bypass time 117 minutes, UOP 425cc. No issues coming off bypass, tolerated well. With chest closure requiring up titration and addition of pressors. On inhaled nitric at 20 PPM. Required 1 plts, 380cc cell saver, 1.7 L crystalloid. Access is  RIJ MAC and PA catheter, right radial a-line, right arm PICC. Reversed.     CO-MORBIDITIES:   Patient Active Problem List   Diagnosis    Acute on chronic systolic CHF (congestive heart failure) (H)    Chest pain    ICD (implantable cardioverter-defibrillator) in place    Inguinal hernia    Multiple lung nodules on CT    Non-ischemic cardiomyopathy (H)    Pure hypercholesterolemia    RAD (reactive airway disease) with wheezing, mild intermittent, uncomplicated    Acute deep vein thrombosis (DVT) of other vein of left upper extremity (H)    Cardiogenic shock (H)    Acute decompensated heart failure (H)       PLAN:  Neuro/ pain/ sedation:  - Monitor neurological status. Notify the MD for any acute changes in exam.  #Acute postoperative pain  - Gtt: Fentanyl   - Scheduled: Tylenol  - PRN: Tylenol, oxycodone, Dilaudid  #Sedation  - Gtt:Propofol    Pulmonary care:   #Mechanical ventilation  Vent Mode: CMV/AC  (Continuous Mandatory Ventilation/ Assist Control)  Resp Rate (Set): 18 breaths/min  Tidal Volume (Set, mL): 400 mL  PEEP (cm H2O): 5 cmH2O  Resp: 16  - Goal SpO2 > 92%  - Continue Laisha at 20 PPM  - Encourage IS q15-30  minutes when awake.    Cardiovascular:    #Cardiogenic shock  #S/p LVAD  #S/p IABP 6/12  #Hx NSVT  #Acute on chronic HFrEF 2/2 to NICM (EF 10-15%), home milrinone PTA, s/p ICD   #HLD  - Epi, NE, Vaso gtts for inotropic and pressor support   - Keep Epi on for RV support. Wean Vaso and levo as able  - Goal MAP >65, SBP <140, monitor hemodynamics  - IABP - discontinued per CVTS  - PRN hydralazine for SBP > 140  - Hold PTA Coreg, Entresto, Aldactone, Jardiance, atorvastatin  - Hold BB   - ASA: start tomorrow, 6/15    GI care / Nutrition:   - NPO, bedside swallow eval once extubated  - PPI  - Bowel regimen: MiraLAX, senna    Renal / Fluids / Electrolytes:   # CKD Stage 2  # Elevated lactate  Received 1.7L crystalloid intra-op  BL creat appears to be ~ 1.2-1.4  - Strict I/O, daily weights, avoid/limit nephrotoxins  - CMP q4 hours  - Lactate q4 hours   - Most recent was 3.1. 1L LR given  - Continue post-op resuscitation   - Replete lytes PRN per protocol    Endocrine:    #Stress hyperglycemia  Preop A1c 5.6  - Insulin gtt  - Goal BG <180 for optimal healing    ID / Antibiotics:  #Stress induced leukocytosis  - To complete perioperative regimen  - Monitor fever curve, WBC, and inflammatory markers as appropriate    Heme:     #Acute blood loss anemia  #Acute blood loss thrombocytopenia  #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  No s/sx active bleeding.  - Monitor CT output  - CBC q4 hours   - Hgb goal > 7.0  - Hemoglobin 13.1 from 12.7    MSK / Skin:  #Sternotomy  #Surgical Incision  - Sternal precautions, postop incision management protocol  - PT/OT/CR    Prophylaxis:     - Mechanical DVT ppx  - Chemical DVT ppx: start SQH tomorrow, 6/15  - PPI    Lines / Tubes / Drains:  - ETT  - RIJ CVC, PA catheter  - Arterial Line  - CTs x4  - Menjivar  - OG    Disposition:  - CVICU      Patient seen, findings and plan discussed with CVICU  staff and cardiothoracic surgeons and fellow.    Critical Care time: 40 minutes    Sarah JackieMelaniaTRE ochoa    ====================================    HPI:   Presents to CVICU intubated and sedated.      PAST MEDICAL HISTORY: No past medical history on file.    PAST SURGICAL HISTORY:   Past Surgical History:   Procedure Laterality Date    CARDIAC SURGERY      COLONOSCOPY N/A 8/25/2023    Procedure: COLONOSCOPY, WITH POLYPECTOMY AND BIOPSY;  Surgeon: Alejandro Rodriguez MD;  Location:  GI    CV RIGHT HEART CATH MEASUREMENTS RECORDED N/A 08/22/2023    Procedure: Heart Cath Right Heart Cath;  Surgeon: Elfego Cruz MD;  [facility]    CV RIGHT HEART CATH MEASUREMENTS RECORDED N/A 9/20/2023    Procedure: Heart Cath Right Heart Cath;  Surgeon: Elfego Cruz MD;  [facility]    CV RIGHT HEART CATH MEASUREMENTS RECORDED N/A 1/19/2024    Procedure: Heart Cath Right Heart Cath;  Surgeon: Tobin Jaime MD;  [facility]    CV RIGHT HEART CATH MEASUREMENTS RECORDED N/A 5/3/2024    Procedure: Heart Cath Right Heart Cath;  Surgeon: Dion Ashley MD;  Location:  HEART CARDIAC CATH LAB    CV RIGHT HEART CATH MEASUREMENTS RECORDED N/A 6/4/2024    Procedure: Right Heart Catheterization;  Surgeon: Cassandra Rizzo MD;  Location:  HEART CARDIAC CATH LAB    PICC DOUBLE LUMEN PLACEMENT Right 08/22/2023    Brachial Vein Medial 5F DL 45 cm, 2 cm out       FAMILY HISTORY: No family history on file.    SOCIAL HISTORY:   Social History     Tobacco Use    Smoking status: Never    Smokeless tobacco: Never   Substance Use Topics    Alcohol use: Never         OBJECTIVE:  1. VITAL SIGNS:   Temp:  [36.7  C (98  F)-37.2  C (98.9  F)] 36.7  C (98  F)  Pulse:  [77-86] 86  Resp:  [16-18] 16  BP: ()/(65-73) 89/73  Cuff Mean (mmHg):  [74-79] 74  SpO2:  [94 %-98 %] 95 %    Vent Mode: CMV/AC  (Continuous Mandatory  Ventilation/ Assist Control)  Resp Rate (Set): 18 breaths/min  Tidal Volume (Set, mL): 400 mL  PEEP (cm H2O): 5 cmH2O  Resp: 16    2. INTAKE/ OUTPUT:   I/O last 3 completed shifts:  In: 1255.25 [P.O.:1120; I.V.:135.25]  Out: 1400 [Urine:1400]      3. PHYSICAL EXAMINATION:   General: sedated  Neuro: sedated  Resp: intubated, ventilated  CV: S1, S2, RRR, no m/r/g   Abdomen: Soft, non-distended, non-tender  Incisions: c/d/i  Extremities: warm and well perfused  CT: x4 to suction, serosang output, no airleak or crepitus      4. INVESTIGATIONS:   Arterial Blood Gases   No lab results found in last 7 days.  Complete Blood Count   Recent Labs   Lab 06/11/24  0445 06/10/24  0503 06/09/24  0451 06/08/24  0426   WBC 6.9 5.9 6.8 7.3   HGB 17.2 17.0 17.3 17.1    146* 132* 136*     Basic Metabolic Panel  Recent Labs   Lab 06/11/24  0445 06/10/24  0503 06/09/24  0451 06/08/24  0426    138 136 140   POTASSIUM 3.9 3.8 3.9 3.9   CHLORIDE 105 105 105 109*   CO2 25 23 21* 23   BUN 14.5 16.0 18.1 18.5   CR 1.11 1.03 1.13 1.27*   GLC 93 97 93 127*     Liver Function Tests  Recent Labs   Lab 06/09/24  0451 06/08/24  0426 06/07/24  0502 06/06/24  0526   INR 1.43* 2.18* 2.53* 2.28*     Pancreatic Enzymes  No lab results found in last 7 days.  Coagulation Profile  Recent Labs   Lab 06/09/24  0451 06/08/24  0426 06/07/24  0502 06/06/24  0526   INR 1.43* 2.18* 2.53* 2.28*         5. RADIOLOGY:   No results found for this or any previous visit (from the past 24 hour(s)).    =========================================

## 2024-06-11 NOTE — PROGRESS NOTES
[id number]    BP (!) 89/74 (BP Location: Left arm)   Pulse 84   Temp 98.1  F (36.7  C) (Oral)   Resp 16   Ht 1.829 m (6')   Wt 92.1 kg (203 lb)   SpO2 94%   BMI 27.53 kg/m        Neuro: A&Ox4.   Cardiac: Afebrile, VSS. SR w. Multiform PVCs   Respiratory: RA   GI/: Voiding spontaneously. No BM this shift.   Diet/appetite: Tolerating 2g diet and 1.8L Fluid Restriction. Denies nausea   Activity: Up Independently  Pain: Denies   Skin: No new deficits noted.  Lines: Duo Lumen PICC line  Drains: None  Replacement: None    Worked up for LVAD placement on 6/14/2024  NPO at midnight for RHC, swan and IABP.  Heparin drip at 1100 units/hr AntiXa therapeautic, redraw tomorrow morning  Milrinone running at 7mL/hr

## 2024-06-12 ENCOUNTER — APPOINTMENT (OUTPATIENT)
Dept: GENERAL RADIOLOGY | Facility: CLINIC | Age: 54
End: 2024-06-12
Attending: STUDENT IN AN ORGANIZED HEALTH CARE EDUCATION/TRAINING PROGRAM
Payer: COMMERCIAL

## 2024-06-12 LAB
ALBUMIN SERPL BCG-MCNC: 3.7 G/DL (ref 3.5–5.2)
ALBUMIN UR-MCNC: 10 MG/DL
ALP SERPL-CCNC: 82 U/L (ref 40–150)
ALT SERPL W P-5'-P-CCNC: 37 U/L (ref 0–70)
ANION GAP SERPL CALCULATED.3IONS-SCNC: 7 MMOL/L (ref 7–15)
ANION GAP SERPL CALCULATED.3IONS-SCNC: 7 MMOL/L (ref 7–15)
APPEARANCE UR: CLEAR
AST SERPL W P-5'-P-CCNC: 36 U/L (ref 0–45)
BASE EXCESS BLDV CALC-SCNC: 0.4 MMOL/L (ref -3–3)
BASE EXCESS BLDV CALC-SCNC: 0.7 MMOL/L (ref -3–3)
BASE EXCESS BLDV CALC-SCNC: 0.7 MMOL/L (ref -3–3)
BASE EXCESS BLDV CALC-SCNC: 1.1 MMOL/L (ref -3–3)
BASOPHILS # BLD AUTO: 0 10E3/UL (ref 0–0.2)
BASOPHILS NFR BLD AUTO: 0 %
BILIRUB DIRECT SERPL-MCNC: 0.23 MG/DL (ref 0–0.3)
BILIRUB SERPL-MCNC: 0.7 MG/DL
BILIRUB UR QL STRIP: NEGATIVE
BUN SERPL-MCNC: 12.1 MG/DL (ref 6–20)
BUN SERPL-MCNC: 12.3 MG/DL (ref 6–20)
CALCIUM SERPL-MCNC: 8.7 MG/DL (ref 8.6–10)
CALCIUM SERPL-MCNC: 8.9 MG/DL (ref 8.6–10)
CHLORIDE SERPL-SCNC: 106 MMOL/L (ref 98–107)
CHLORIDE SERPL-SCNC: 107 MMOL/L (ref 98–107)
COLOR UR AUTO: YELLOW
CREAT SERPL-MCNC: 1.07 MG/DL (ref 0.67–1.17)
CREAT SERPL-MCNC: 1.08 MG/DL (ref 0.67–1.17)
DEPRECATED HCO3 PLAS-SCNC: 24 MMOL/L (ref 22–29)
DEPRECATED HCO3 PLAS-SCNC: 24 MMOL/L (ref 22–29)
EGFRCR SERPLBLD CKD-EPI 2021: 82 ML/MIN/1.73M2
EGFRCR SERPLBLD CKD-EPI 2021: 83 ML/MIN/1.73M2
EOSINOPHIL # BLD AUTO: 0.2 10E3/UL (ref 0–0.7)
EOSINOPHIL NFR BLD AUTO: 2 %
ERYTHROCYTE [DISTWIDTH] IN BLOOD BY AUTOMATED COUNT: 13.3 % (ref 10–15)
ERYTHROCYTE [DISTWIDTH] IN BLOOD BY AUTOMATED COUNT: 13.4 % (ref 10–15)
ERYTHROCYTE [DISTWIDTH] IN BLOOD BY AUTOMATED COUNT: 13.5 % (ref 10–15)
GLUCOSE BLDC GLUCOMTR-MCNC: 134 MG/DL (ref 70–99)
GLUCOSE SERPL-MCNC: 102 MG/DL (ref 70–99)
GLUCOSE SERPL-MCNC: 128 MG/DL (ref 70–99)
GLUCOSE UR STRIP-MCNC: NEGATIVE MG/DL
HCO3 BLDV-SCNC: 26 MMOL/L (ref 21–28)
HCO3 BLDV-SCNC: 27 MMOL/L (ref 21–28)
HCO3 BLDV-SCNC: 27 MMOL/L (ref 21–28)
HCO3 BLDV-SCNC: 28 MMOL/L (ref 21–28)
HCT VFR BLD AUTO: 47.1 % (ref 40–53)
HCT VFR BLD AUTO: 47.9 % (ref 40–53)
HCT VFR BLD AUTO: 48.5 % (ref 40–53)
HGB BLD-MCNC: 16.5 G/DL (ref 13.3–17.7)
HGB BLD-MCNC: 17.3 G/DL (ref 13.3–17.7)
HGB UR QL STRIP: ABNORMAL
HYALINE CASTS: 1 /LPF
IMM GRANULOCYTES # BLD: 0 10E3/UL
IMM GRANULOCYTES NFR BLD: 0 %
KETONES UR STRIP-MCNC: NEGATIVE MG/DL
LEUKOCYTE ESTERASE UR QL STRIP: NEGATIVE
LYMPHOCYTES # BLD AUTO: 1.7 10E3/UL (ref 0.8–5.3)
LYMPHOCYTES NFR BLD AUTO: 18 %
MAGNESIUM SERPL-MCNC: 2 MG/DL (ref 1.7–2.3)
MCH RBC QN AUTO: 30.3 PG (ref 26.5–33)
MCH RBC QN AUTO: 31.3 PG (ref 26.5–33)
MCH RBC QN AUTO: 31.3 PG (ref 26.5–33)
MCHC RBC AUTO-ENTMCNC: 34 G/DL (ref 31.5–36.5)
MCHC RBC AUTO-ENTMCNC: 34.4 G/DL (ref 31.5–36.5)
MCHC RBC AUTO-ENTMCNC: 35 G/DL (ref 31.5–36.5)
MCV RBC AUTO: 89 FL (ref 78–100)
MCV RBC AUTO: 89 FL (ref 78–100)
MCV RBC AUTO: 91 FL (ref 78–100)
MONOCYTES # BLD AUTO: 0.7 10E3/UL (ref 0–1.3)
MONOCYTES NFR BLD AUTO: 7 %
MUCOUS THREADS #/AREA URNS LPF: PRESENT /LPF
NEUTROPHILS # BLD AUTO: 6.9 10E3/UL (ref 1.6–8.3)
NEUTROPHILS NFR BLD AUTO: 73 %
NITRATE UR QL: NEGATIVE
NRBC # BLD AUTO: 0 10E3/UL
NRBC BLD AUTO-RTO: 0 /100
O2/TOTAL GAS SETTING VFR VENT: 21 %
O2/TOTAL GAS SETTING VFR VENT: 32 %
OXYHGB MFR BLDV: 68 % (ref 70–75)
OXYHGB MFR BLDV: 69 % (ref 70–75)
OXYHGB MFR BLDV: 72 % (ref 70–75)
OXYHGB MFR BLDV: 73 % (ref 70–75)
PCO2 BLDV: 45 MM HG (ref 40–50)
PCO2 BLDV: 46 MM HG (ref 40–50)
PCO2 BLDV: 48 MM HG (ref 40–50)
PCO2 BLDV: 50 MM HG (ref 40–50)
PH BLDV: 7.35 [PH] (ref 7.32–7.43)
PH BLDV: 7.36 [PH] (ref 7.32–7.43)
PH BLDV: 7.37 [PH] (ref 7.32–7.43)
PH BLDV: 7.38 [PH] (ref 7.32–7.43)
PH UR STRIP: 5.5 [PH] (ref 5–7)
PHOSPHATE SERPL-MCNC: 2.4 MG/DL (ref 2.5–4.5)
PLATELET # BLD AUTO: 150 10E3/UL (ref 150–450)
PLATELET # BLD AUTO: 155 10E3/UL (ref 150–450)
PLATELET # BLD AUTO: 158 10E3/UL (ref 150–450)
PO2 BLDV: 36 MM HG (ref 25–47)
PO2 BLDV: 37 MM HG (ref 25–47)
PO2 BLDV: 37 MM HG (ref 25–47)
PO2 BLDV: 40 MM HG (ref 25–47)
POTASSIUM SERPL-SCNC: 3.7 MMOL/L (ref 3.4–5.3)
POTASSIUM SERPL-SCNC: 4.6 MMOL/L (ref 3.4–5.3)
PROT SERPL-MCNC: 5.8 G/DL (ref 6.4–8.3)
RBC # BLD AUTO: 5.27 10E6/UL (ref 4.4–5.9)
RBC # BLD AUTO: 5.28 10E6/UL (ref 4.4–5.9)
RBC # BLD AUTO: 5.45 10E6/UL (ref 4.4–5.9)
RBC URINE: <1 /HPF
SAO2 % BLDV: 69.3 % (ref 70–75)
SAO2 % BLDV: 70 % (ref 70–75)
SAO2 % BLDV: 73.4 % (ref 70–75)
SAO2 % BLDV: 74.3 % (ref 70–75)
SODIUM SERPL-SCNC: 137 MMOL/L (ref 135–145)
SODIUM SERPL-SCNC: 138 MMOL/L (ref 135–145)
SP GR UR STRIP: 1.02 (ref 1–1.03)
UFH PPP CHRO-ACNC: 0.14 IU/ML
UFH PPP CHRO-ACNC: 0.19 IU/ML
UROBILINOGEN UR STRIP-MCNC: NORMAL MG/DL
WBC # BLD AUTO: 6.2 10E3/UL (ref 4–11)
WBC # BLD AUTO: 8.8 10E3/UL (ref 4–11)
WBC # BLD AUTO: 9.6 10E3/UL (ref 4–11)
WBC URINE: 1 /HPF

## 2024-06-12 PROCEDURE — 02HQ32Z INSERTION OF MONITORING DEVICE INTO RIGHT PULMONARY ARTERY, PERCUTANEOUS APPROACH: ICD-10-PCS | Performed by: INTERNAL MEDICINE

## 2024-06-12 PROCEDURE — 258N000003 HC RX IP 258 OP 636: Mod: JZ | Performed by: INTERNAL MEDICINE

## 2024-06-12 PROCEDURE — 250N000013 HC RX MED GY IP 250 OP 250 PS 637: Performed by: PHYSICIAN ASSISTANT

## 2024-06-12 PROCEDURE — 999N000077 HC STATISTIC INSERT IABP

## 2024-06-12 PROCEDURE — 80048 BASIC METABOLIC PNL TOTAL CA: CPT | Performed by: STUDENT IN AN ORGANIZED HEALTH CARE EDUCATION/TRAINING PROGRAM

## 2024-06-12 PROCEDURE — 33967 INSERT I-AORT PERCUT DEVICE: CPT | Mod: GC | Performed by: INTERNAL MEDICINE

## 2024-06-12 PROCEDURE — 250N000011 HC RX IP 250 OP 636: Mod: JZ | Performed by: STUDENT IN AN ORGANIZED HEALTH CARE EDUCATION/TRAINING PROGRAM

## 2024-06-12 PROCEDURE — 250N000011 HC RX IP 250 OP 636: Performed by: INTERNAL MEDICINE

## 2024-06-12 PROCEDURE — 85027 COMPLETE CBC AUTOMATED: CPT

## 2024-06-12 PROCEDURE — 71045 X-RAY EXAM CHEST 1 VIEW: CPT

## 2024-06-12 PROCEDURE — 999N000075 HC STATISTIC IABP MONITORING

## 2024-06-12 PROCEDURE — 82805 BLOOD GASES W/O2 SATURATION: CPT

## 2024-06-12 PROCEDURE — 84100 ASSAY OF PHOSPHORUS: CPT | Performed by: STUDENT IN AN ORGANIZED HEALTH CARE EDUCATION/TRAINING PROGRAM

## 2024-06-12 PROCEDURE — 85025 COMPLETE CBC W/AUTO DIFF WBC: CPT

## 2024-06-12 PROCEDURE — 99152 MOD SED SAME PHYS/QHP 5/>YRS: CPT | Performed by: INTERNAL MEDICINE

## 2024-06-12 PROCEDURE — 85520 HEPARIN ASSAY: CPT | Performed by: INTERNAL MEDICINE

## 2024-06-12 PROCEDURE — 250N000013 HC RX MED GY IP 250 OP 250 PS 637: Performed by: STUDENT IN AN ORGANIZED HEALTH CARE EDUCATION/TRAINING PROGRAM

## 2024-06-12 PROCEDURE — 272N000057 HC CATH BALLOON IABP

## 2024-06-12 PROCEDURE — 250N000013 HC RX MED GY IP 250 OP 250 PS 637

## 2024-06-12 PROCEDURE — 85018 HEMOGLOBIN: CPT | Performed by: STUDENT IN AN ORGANIZED HEALTH CARE EDUCATION/TRAINING PROGRAM

## 2024-06-12 PROCEDURE — 85049 AUTOMATED PLATELET COUNT: CPT | Performed by: INTERNAL MEDICINE

## 2024-06-12 PROCEDURE — 5A02210 ASSISTANCE WITH CARDIAC OUTPUT USING BALLOON PUMP, CONTINUOUS: ICD-10-PCS | Performed by: INTERNAL MEDICINE

## 2024-06-12 PROCEDURE — 120N000003 HC R&B IMCU UMMC

## 2024-06-12 PROCEDURE — 80048 BASIC METABOLIC PNL TOTAL CA: CPT

## 2024-06-12 PROCEDURE — 99153 MOD SED SAME PHYS/QHP EA: CPT | Performed by: INTERNAL MEDICINE

## 2024-06-12 PROCEDURE — 33967 INSERT I-AORT PERCUT DEVICE: CPT | Performed by: INTERNAL MEDICINE

## 2024-06-12 PROCEDURE — 93451 RIGHT HEART CATH: CPT | Mod: 26 | Performed by: INTERNAL MEDICINE

## 2024-06-12 PROCEDURE — 83735 ASSAY OF MAGNESIUM: CPT | Performed by: STUDENT IN AN ORGANIZED HEALTH CARE EDUCATION/TRAINING PROGRAM

## 2024-06-12 PROCEDURE — 82248 BILIRUBIN DIRECT: CPT | Performed by: STUDENT IN AN ORGANIZED HEALTH CARE EDUCATION/TRAINING PROGRAM

## 2024-06-12 PROCEDURE — 71045 X-RAY EXAM CHEST 1 VIEW: CPT | Mod: 26 | Performed by: RADIOLOGY

## 2024-06-12 PROCEDURE — 4A1239Z MONITORING OF CARDIAC OUTPUT, PERCUTANEOUS APPROACH: ICD-10-PCS | Performed by: INTERNAL MEDICINE

## 2024-06-12 PROCEDURE — 82805 BLOOD GASES W/O2 SATURATION: CPT | Performed by: STUDENT IN AN ORGANIZED HEALTH CARE EDUCATION/TRAINING PROGRAM

## 2024-06-12 PROCEDURE — 250N000009 HC RX 250: Performed by: INTERNAL MEDICINE

## 2024-06-12 PROCEDURE — 250N000011 HC RX IP 250 OP 636: Mod: JZ

## 2024-06-12 PROCEDURE — 250N000013 HC RX MED GY IP 250 OP 250 PS 637: Performed by: INTERNAL MEDICINE

## 2024-06-12 PROCEDURE — 81001 URINALYSIS AUTO W/SCOPE: CPT | Performed by: STUDENT IN AN ORGANIZED HEALTH CARE EDUCATION/TRAINING PROGRAM

## 2024-06-12 PROCEDURE — 85520 HEPARIN ASSAY: CPT | Performed by: STUDENT IN AN ORGANIZED HEALTH CARE EDUCATION/TRAINING PROGRAM

## 2024-06-12 PROCEDURE — 99291 CRITICAL CARE FIRST HOUR: CPT | Mod: 25 | Performed by: INTERNAL MEDICINE

## 2024-06-12 PROCEDURE — 93451 RIGHT HEART CATH: CPT | Performed by: INTERNAL MEDICINE

## 2024-06-12 PROCEDURE — C1894 INTRO/SHEATH, NON-LASER: HCPCS | Performed by: INTERNAL MEDICINE

## 2024-06-12 PROCEDURE — 272N000001 HC OR GENERAL SUPPLY STERILE: Performed by: INTERNAL MEDICINE

## 2024-06-12 RX ORDER — ACETAMINOPHEN 325 MG/1
975 TABLET ORAL EVERY 6 HOURS PRN
Status: DISCONTINUED | OUTPATIENT
Start: 2024-06-12 | End: 2024-06-14

## 2024-06-12 RX ORDER — HYDRALAZINE HYDROCHLORIDE 25 MG/1
25 TABLET, FILM COATED ORAL EVERY 8 HOURS SCHEDULED
Status: DISCONTINUED | OUTPATIENT
Start: 2024-06-12 | End: 2024-06-14

## 2024-06-12 RX ORDER — OXYCODONE HYDROCHLORIDE 5 MG/1
5 TABLET ORAL EVERY 6 HOURS PRN
Status: DISCONTINUED | OUTPATIENT
Start: 2024-06-12 | End: 2024-06-14

## 2024-06-12 RX ORDER — NALOXONE HYDROCHLORIDE 0.4 MG/ML
0.2 INJECTION, SOLUTION INTRAMUSCULAR; INTRAVENOUS; SUBCUTANEOUS
Status: DISCONTINUED | OUTPATIENT
Start: 2024-06-12 | End: 2024-06-14

## 2024-06-12 RX ORDER — FENTANYL CITRATE 50 UG/ML
INJECTION, SOLUTION INTRAMUSCULAR; INTRAVENOUS
Status: DISCONTINUED | OUTPATIENT
Start: 2024-06-12 | End: 2024-06-12 | Stop reason: HOSPADM

## 2024-06-12 RX ORDER — ONDANSETRON 2 MG/ML
4 INJECTION INTRAMUSCULAR; INTRAVENOUS ONCE
Status: COMPLETED | OUTPATIENT
Start: 2024-06-12 | End: 2024-06-12

## 2024-06-12 RX ORDER — POTASSIUM CHLORIDE 750 MG/1
30 TABLET, EXTENDED RELEASE ORAL ONCE
Status: COMPLETED | OUTPATIENT
Start: 2024-06-12 | End: 2024-06-12

## 2024-06-12 RX ORDER — SODIUM CHLORIDE 9 MG/ML
INJECTION, SOLUTION INTRAVENOUS CONTINUOUS
Status: DISCONTINUED | OUTPATIENT
Start: 2024-06-12 | End: 2024-06-13

## 2024-06-12 RX ORDER — NALOXONE HYDROCHLORIDE 0.4 MG/ML
0.4 INJECTION, SOLUTION INTRAMUSCULAR; INTRAVENOUS; SUBCUTANEOUS
Status: DISCONTINUED | OUTPATIENT
Start: 2024-06-12 | End: 2024-06-14

## 2024-06-12 RX ORDER — FENTANYL CITRATE 50 UG/ML
INJECTION, SOLUTION INTRAMUSCULAR; INTRAVENOUS
Status: DISCONTINUED | OUTPATIENT
Start: 2024-06-12 | End: 2024-06-14 | Stop reason: HOSPADM

## 2024-06-12 RX ORDER — ONDANSETRON 2 MG/ML
INJECTION INTRAMUSCULAR; INTRAVENOUS
Status: DISCONTINUED
Start: 2024-06-12 | End: 2024-06-12 | Stop reason: HOSPADM

## 2024-06-12 RX ORDER — MAGNESIUM OXIDE 400 MG/1
400 TABLET ORAL EVERY 4 HOURS
Status: COMPLETED | OUTPATIENT
Start: 2024-06-12 | End: 2024-06-12

## 2024-06-12 RX ADMIN — HEPARIN SODIUM 1100 UNITS/HR: 10000 INJECTION, SOLUTION INTRAVENOUS at 02:29

## 2024-06-12 RX ADMIN — SODIUM PHOSPHATE, MONOBASIC, MONOHYDRATE AND SODIUM PHOSPHATE, DIBASIC, ANHYDROUS 15 MMOL: 142; 276 INJECTION, SOLUTION INTRAVENOUS at 21:23

## 2024-06-12 RX ADMIN — POTASSIUM CHLORIDE 30 MEQ: 750 TABLET, EXTENDED RELEASE ORAL at 13:45

## 2024-06-12 RX ADMIN — MAGNESIUM OXIDE TAB 400 MG (241.3 MG ELEMENTAL MG) 400 MG: 400 (241.3 MG) TAB at 23:31

## 2024-06-12 RX ADMIN — ACETAMINOPHEN 975 MG: 325 TABLET, FILM COATED ORAL at 20:13

## 2024-06-12 RX ADMIN — MILRINONE LACTATE IN DEXTROSE 0.25 MCG/KG/MIN: 200 INJECTION, SOLUTION INTRAVENOUS at 20:13

## 2024-06-12 RX ADMIN — SPIRONOLACTONE 25 MG: 25 TABLET ORAL at 08:06

## 2024-06-12 RX ADMIN — CARVEDILOL 3.12 MG: 3.12 TABLET, FILM COATED ORAL at 17:53

## 2024-06-12 RX ADMIN — HEPARIN SODIUM 1250 UNITS/HR: 10000 INJECTION, SOLUTION INTRAVENOUS at 23:31

## 2024-06-12 RX ADMIN — OXYCODONE HYDROCHLORIDE 5 MG: 5 TABLET ORAL at 11:46

## 2024-06-12 RX ADMIN — CARVEDILOL 3.12 MG: 3.12 TABLET, FILM COATED ORAL at 08:06

## 2024-06-12 RX ADMIN — HYDRALAZINE HYDROCHLORIDE 25 MG: 25 TABLET ORAL at 21:32

## 2024-06-12 RX ADMIN — ATORVASTATIN CALCIUM 20 MG: 20 TABLET, FILM COATED ORAL at 20:13

## 2024-06-12 RX ADMIN — HYDRALAZINE HYDROCHLORIDE 25 MG: 25 TABLET ORAL at 13:45

## 2024-06-12 RX ADMIN — ONDANSETRON 4 MG: 2 INJECTION INTRAMUSCULAR; INTRAVENOUS at 10:14

## 2024-06-12 RX ADMIN — MILRINONE LACTATE IN DEXTROSE 0.25 MCG/KG/MIN: 200 INJECTION, SOLUTION INTRAVENOUS at 10:41

## 2024-06-12 RX ADMIN — MAGNESIUM OXIDE TAB 400 MG (241.3 MG ELEMENTAL MG) 400 MG: 400 (241.3 MG) TAB at 20:13

## 2024-06-12 ASSESSMENT — ACTIVITIES OF DAILY LIVING (ADL)
ADLS_ACUITY_SCORE: 22
ADLS_ACUITY_SCORE: 30
ADLS_ACUITY_SCORE: 30
ADLS_ACUITY_SCORE: 22
ADLS_ACUITY_SCORE: 22
ADLS_ACUITY_SCORE: 31
ADLS_ACUITY_SCORE: 22
ADLS_ACUITY_SCORE: 30
ADLS_ACUITY_SCORE: 30
ADLS_ACUITY_SCORE: 22
ADLS_ACUITY_SCORE: 31
ADLS_ACUITY_SCORE: 30
ADLS_ACUITY_SCORE: 31
ADLS_ACUITY_SCORE: 22
ADLS_ACUITY_SCORE: 30
ADLS_ACUITY_SCORE: 22
ADLS_ACUITY_SCORE: 29
ADLS_ACUITY_SCORE: 24
ADLS_ACUITY_SCORE: 31
ADLS_ACUITY_SCORE: 22

## 2024-06-12 NOTE — PRE-PROCEDURE
GENERAL PRE-PROCEDURE:   Procedure:  Right heart catheterization with leave in PA catheter, intraaortic balloon pump insertion (IABP)    Written consent obtained?: Yes    Risks and benefits: Risks, benefits and alternatives were discussed    Consent given by:  Patient  Patient states understanding of procedure being performed: Yes    Patient's understanding of procedure matches consent: Yes    Procedure consent matches procedure scheduled: Yes    Appropriately NPO:  Yes  ASA Class:  3  Mallampati  :  Grade 2- soft palate, base of uvula, tonsillar pillars, and portion of posterior pharyngeal wall visible  Lungs:  Lungs clear with good breath sounds bilaterally  Heart:  Normal heart sounds and rate  History & Physical reviewed:  History and physical reviewed and no updates needed  Statement of review:  I have reviewed the lab findings, diagnostic data, medications, and the plan for sedation

## 2024-06-12 NOTE — PROGRESS NOTES
[facility]   Cardiology II Service ICU/ Advanced Heart Failure  Daily Progress Note      Patient: Navin Lutz  MRN: [id number]  Admission Date: 6/3/2024  Hospital Day # 9    Assessment and Plan: Navin Lutz is a 53 year old male admitted on 6/3/2024. He has a past medical history of chronic HFrEF 2/2 NICM s/p ICD, HLD, and CKD Stage II who presents admitted for decompensated heart failure on milrinone listed status 4, admitted for consideration of advanced therapies, now being planned for LVAD 6/14.     Today's Plan:  - RHC cath with IABP today for optimization prior to LVAD planned Friday 6/14.   CVP wnl, wedge elevated-Will start hydralazine 25mg q8h   Plan for net even today   - per 6c, okay for daughter and wife to come up pre-surgery even though this will be before visiting hours. If there additional family members, they will discuss with charge RN    # Acute on chronic systolic heart failure/HFrEF secondary to NICM with EF of 10-15% who was on home milrinone prior to admission    Stage D. NYHA Class III. TTE 6/8/24 EF 15-20%, LVIDd 7.0 cm, RV normal size and function, mild TR, mild MI, no pericardial effusion. RHC 6/4/24: RA 3, PA 25/12/17, PCW 9, Teressa CO/CI 4.63/2.13.     RHC 6/12   RA: 9   PA: 52/39 (42)   PCWP: 28   CO/CI: 3.9/1.8   PVR: 3.9   Afterload-Start Hydralazine 25mg q8h  -Fluid status: euvolemic, defer diuretic  -Inotrope: milrinone at 0.25 mcg/kg/min  -ACEi/ARB/ARNI/afterload reduction: HOLD entresto given upcoming surgery for vasoplegia risk  -BB: decreased coreg to 3.125 6/10  -Aldosterone antagonist: aldactone 25 mg daily  -SGLT2i: HELD d/t upcoming surgery, PTA was on jardiance  -SCD prophylaxis: ICD  -Planned for LVAD 6/14, IABP in place 1:1 6/12      # Right subclavian and axillary vein thrombus, nonocclusive   - on warfarin PTA  - heparin gtt for now, hold prior to surgery per CVTS    # History of nsvt  - decreased coreg to 3.125 given hypotension and low output  -  monitor tele    # HLD  - atorvastatin 20 mg daily    # CKD stage II. B/l cr 1.2-1-4  - Cr stable ~1/1    Diet: Fluid restriction 1800 ML FLUID (and additional linked orders)  2 Gram Sodium Diet    DVT Prophylaxis: Heparin gtt  Menjivar Catheter: Not present  Cardiac Monitoring: ACTIVE order. Indication: Acute decompensated heart failure (48 hours)  Code Status: Full Code      Patient discussed with Dr. Carpenter.      Misha Nagy MD  Cardiology Fellow    ================================================================    Subjective/24-Hr Events:   Patient felt well in the morning, after the procedure patient had some nausea and dry heaving that resolved with IV zofran x1    Medications: Reviewed in EPIC.     Physical Exam:   /88 (BP Location: Left arm)   Pulse 84   Temp 97.6  F (36.4  C)   Resp 18   Ht 1.829 m (6')   Wt 92.5 kg (204 lb)   SpO2 96%   BMI 27.67 kg/m      GENERAL: Appears comfortable, in no distress .  HEENT: Eye symmetrical, no discharge or icterus bilaterally.   NECK: Supple, JVD <6.   CV: RRR, +S1S2, no murmur, rub, or gallop.   RESPIRATORY: Respirations regular, even, and unlabored. Lungs CTA throughout.    GI: Soft and non distended   EXTREMITIES: No peripheral edema. All extremities are warm and well perfused, IABP in R groin without any warmth, erythema, or hematoma formation  NEUROLOGIC: Alert and interacting appropriatly. No focal deficits.   MUSCULOSKELETAL: No joint swelling or tenderness.   SKIN: No jaundice. No rashes or lesions.     Labs:  CMP  Recent Labs   Lab 06/12/24  1015 06/12/24  0511 06/11/24  0445 06/10/24  0503 06/09/24  0451 06/08/24  1053   NA  --  138 139 138 136  --    POTASSIUM  --  3.7 3.9 3.8 3.9  --    CHLORIDE  --  107 105 105 105  --    CO2  --  24 25 23 21*  --    ANIONGAP  --  7 9 10 10  --    * 128* 93 97 93  --    BUN  --  12.1 14.5 16.0 18.1  --    CR  --  1.07 1.11 1.03 1.13  --    GFRESTIMATED  --  83 79 87 78  --    NAM  --  8.7 9.2 9.0 9.0   --    MAG  --   --  2.0  --   --  2.2       CBC  Recent Labs   Lab 06/12/24  0912 06/12/24  0511 06/11/24  0445 06/10/24  0503 06/09/24  0451   WBC  --  6.2 6.9 5.9 6.8   RBC  --  5.28 5.68 5.55 5.60   HGB 17.3 16.5 17.2 17.0 17.3   HCT  --  47.9 50.8 50.4 50.3   MCV  --  91 89 91 90   MCH  --  31.3 30.3 30.6 30.9   MCHC  --  34.4 33.9 33.7 34.4   RDW  --  13.3 13.2 13.2 13.3   PLT  --  155 165 146* 132*       INR  Recent Labs   Lab 06/09/24  0451 06/08/24  0426 06/07/24  0502 06/06/24  0526   INR 1.43* 2.18* 2.53* 2.28*

## 2024-06-12 NOTE — PROGRESS NOTES
[id number]    Pt NPO at Midnight last night. VS this am. Morning meds administered. Heparin paused at 0808. Pt sent down for RHC and Leave-in [place] Catheter and IABP. Pt to transfer to  after procedure.

## 2024-06-12 NOTE — PLAN OF CARE
Neuro: A/Ox4.  Cardiac: SR with HR 80's. VSS. Had 5 beats VT at 2215.   Respiratory: O2 sats stable on RA. Faint crackles in lower lobes.  GI/: Voiding in BR, BM 6-11  Diet/appetite: Good po, 3gm NA diet, 1.8 FR. NPO after MN  Activity:  UAL  Pain: Denies pain.  Skin: Intact, no new deficits noted.  LDA's: DL PICC- Milrinone 0.25 mcg/kg/min.  Heparin gtt 1100 units/hr.     Plan: RHC today with possible swan and IABP, LVAD 6/14. Continue with POC. Notify primary team with changes.

## 2024-06-12 NOTE — PROGRESS NOTES
[facility]         Intra-Aortic Balloon Pump Insertion:       Arrival Date: 6/12/2024  Arrival Time: 0900  Inserted at: 0932    Insertion Date: 6/12/2024  Insertion Time: 0932  Insertion Locations: [facility]    Insertion Details:  Physician: Haja    Site: Right Femoral Artery  Catheter Size: 8 Fr.  Balloon Volume: 50 cc  Fiberoptic: YES  Sheath: YES  Position verified with: fluoroscopy    Initial Settings:  Mode: Auto        Machine #: [id number]      Alejandro Hidalgo RT  ECMO Specialist  6/12/2024 10:09 AM

## 2024-06-12 NOTE — PROGRESS NOTES
Neuro: A&Ox4. Intact. Afebrile. PRN oxycodone for pain.  CV: IABP 1:1 100% augmentation. SR R-O/PVC. MAP goal >65  CO 4.6, CI 2.1, SVR 11426.3  Pulm: RA. LS: clear/ diminished.  GI/: 2g Na diet, 2L FR. Last BM 6/12 in AM. Voids spontaneously.   Skin: no skin issues  Drips: heparin@ 85694 units/hr, Milrinone@ 0.25 mcg/kg/min  Labs: hepXa recheck @ 0000.  Lines: R internal jugular MAC w/ SWAN. R PICC. R femoral sheath and IABP.  Vinicio Brunson RN on 6/12/2024 at 6:30 PM

## 2024-06-12 NOTE — PROGRESS NOTES
Cardiothoracic Surgery Brief Progress Note:    Navin Lutz  is a 53 year old male with a history of chronic HFrEF 2/2 NICM and CKD stage II presented with decompensated heart failure on milrinone after worsening shortness of breath for the past three weeks. Additionally, has had productive cough. Symptoms have not improved with increase in diuretics and milrinone. Patient currently admitted under the heart failure service with plan for surgery.    - Plan for LVAD insertion with Dr. Jhaveri on 6/14 in AM.  - Pre-operative orders signed and held 6/11 for RN to release. Heparin gtt to be held on call to OR.  - IABP placed this morning 6/12. Appreciate cardiology's assistance with arranging this.  - Pre-operative teaching completed 6/11, all questions answered.  - Remainder of cares per primary team, appreciate excellent care. Please call or page with any questions.     Discussed with Dr. Emilio Guzman PA-C  Cardiothoracic Surgery  Pager [phone]  10:34 AM on 6/12/2024

## 2024-06-13 ENCOUNTER — APPOINTMENT (OUTPATIENT)
Dept: GENERAL RADIOLOGY | Facility: CLINIC | Age: 54
End: 2024-06-13
Attending: STUDENT IN AN ORGANIZED HEALTH CARE EDUCATION/TRAINING PROGRAM
Payer: COMMERCIAL

## 2024-06-13 LAB
ALBUMIN SERPL BCG-MCNC: 3.3 G/DL (ref 3.5–5.2)
ALBUMIN SERPL BCG-MCNC: 3.4 G/DL (ref 3.5–5.2)
ALP SERPL-CCNC: 77 U/L (ref 40–150)
ALP SERPL-CCNC: 86 U/L (ref 40–150)
ALT SERPL W P-5'-P-CCNC: 43 U/L (ref 0–70)
ALT SERPL W P-5'-P-CCNC: 49 U/L (ref 0–70)
ANION GAP SERPL CALCULATED.3IONS-SCNC: 8 MMOL/L (ref 7–15)
ANION GAP SERPL CALCULATED.3IONS-SCNC: 9 MMOL/L (ref 7–15)
AST SERPL W P-5'-P-CCNC: 36 U/L (ref 0–45)
AST SERPL W P-5'-P-CCNC: 41 U/L (ref 0–45)
BASE EXCESS BLDV CALC-SCNC: 1.2 MMOL/L (ref -3–3)
BASE EXCESS BLDV CALC-SCNC: 1.8 MMOL/L (ref -3–3)
BASE EXCESS BLDV CALC-SCNC: 2.4 MMOL/L (ref -3–3)
BASOPHILS # BLD AUTO: 0 10E3/UL (ref 0–0.2)
BASOPHILS # BLD AUTO: 0 10E3/UL (ref 0–0.2)
BASOPHILS NFR BLD AUTO: 0 %
BASOPHILS NFR BLD AUTO: 0 %
BILIRUB DIRECT SERPL-MCNC: 0.23 MG/DL (ref 0–0.3)
BILIRUB DIRECT SERPL-MCNC: <0.2 MG/DL (ref 0–0.3)
BILIRUB SERPL-MCNC: 0.5 MG/DL
BILIRUB SERPL-MCNC: 0.8 MG/DL
BUN SERPL-MCNC: 11.6 MG/DL (ref 6–20)
BUN SERPL-MCNC: 11.8 MG/DL (ref 6–20)
CALCIUM SERPL-MCNC: 8.5 MG/DL (ref 8.6–10)
CALCIUM SERPL-MCNC: 8.8 MG/DL (ref 8.6–10)
CHLORIDE SERPL-SCNC: 106 MMOL/L (ref 98–107)
CHLORIDE SERPL-SCNC: 106 MMOL/L (ref 98–107)
CREAT SERPL-MCNC: 1.13 MG/DL (ref 0.67–1.17)
CREAT SERPL-MCNC: 1.17 MG/DL (ref 0.67–1.17)
DEPRECATED HCO3 PLAS-SCNC: 23 MMOL/L (ref 22–29)
DEPRECATED HCO3 PLAS-SCNC: 23 MMOL/L (ref 22–29)
EGFRCR SERPLBLD CKD-EPI 2021: 75 ML/MIN/1.73M2
EGFRCR SERPLBLD CKD-EPI 2021: 78 ML/MIN/1.73M2
EOSINOPHIL # BLD AUTO: 0.3 10E3/UL (ref 0–0.7)
EOSINOPHIL # BLD AUTO: 0.3 10E3/UL (ref 0–0.7)
EOSINOPHIL NFR BLD AUTO: 3 %
EOSINOPHIL NFR BLD AUTO: 4 %
ERYTHROCYTE [DISTWIDTH] IN BLOOD BY AUTOMATED COUNT: 13.4 % (ref 10–15)
ERYTHROCYTE [DISTWIDTH] IN BLOOD BY AUTOMATED COUNT: 13.5 % (ref 10–15)
GLUCOSE BLDC GLUCOMTR-MCNC: 112 MG/DL (ref 70–99)
GLUCOSE BLDC GLUCOMTR-MCNC: 95 MG/DL (ref 70–99)
GLUCOSE SERPL-MCNC: 104 MG/DL (ref 70–99)
GLUCOSE SERPL-MCNC: 91 MG/DL (ref 70–99)
HCO3 BLDV-SCNC: 26 MMOL/L (ref 21–28)
HCO3 BLDV-SCNC: 27 MMOL/L (ref 21–28)
HCO3 BLDV-SCNC: 27 MMOL/L (ref 21–28)
HCT VFR BLD AUTO: 44.7 % (ref 40–53)
HCT VFR BLD AUTO: 45 % (ref 40–53)
HGB BLD-MCNC: 15.4 G/DL (ref 13.3–17.7)
HGB BLD-MCNC: 15.7 G/DL (ref 13.3–17.7)
IMM GRANULOCYTES # BLD: 0 10E3/UL
IMM GRANULOCYTES # BLD: 0.1 10E3/UL
IMM GRANULOCYTES NFR BLD: 1 %
IMM GRANULOCYTES NFR BLD: 1 %
INR PPP: 1.09 (ref 0.85–1.15)
LYMPHOCYTES # BLD AUTO: 1.9 10E3/UL (ref 0.8–5.3)
LYMPHOCYTES # BLD AUTO: 2.3 10E3/UL (ref 0.8–5.3)
LYMPHOCYTES NFR BLD AUTO: 22 %
LYMPHOCYTES NFR BLD AUTO: 26 %
MAGNESIUM SERPL-MCNC: 1.9 MG/DL (ref 1.7–2.3)
MAGNESIUM SERPL-MCNC: 2.4 MG/DL (ref 1.7–2.3)
MCH RBC QN AUTO: 31 PG (ref 26.5–33)
MCH RBC QN AUTO: 31.6 PG (ref 26.5–33)
MCHC RBC AUTO-ENTMCNC: 34.2 G/DL (ref 31.5–36.5)
MCHC RBC AUTO-ENTMCNC: 35.1 G/DL (ref 31.5–36.5)
MCV RBC AUTO: 90 FL (ref 78–100)
MCV RBC AUTO: 91 FL (ref 78–100)
MONOCYTES # BLD AUTO: 0.8 10E3/UL (ref 0–1.3)
MONOCYTES # BLD AUTO: 0.9 10E3/UL (ref 0–1.3)
MONOCYTES NFR BLD AUTO: 10 %
MONOCYTES NFR BLD AUTO: 11 %
NEUTROPHILS # BLD AUTO: 5.2 10E3/UL (ref 1.6–8.3)
NEUTROPHILS # BLD AUTO: 5.5 10E3/UL (ref 1.6–8.3)
NEUTROPHILS NFR BLD AUTO: 59 %
NEUTROPHILS NFR BLD AUTO: 63 %
NRBC # BLD AUTO: 0 10E3/UL
NRBC # BLD AUTO: 0 10E3/UL
NRBC BLD AUTO-RTO: 0 /100
NRBC BLD AUTO-RTO: 0 /100
O2/TOTAL GAS SETTING VFR VENT: 21 %
OXYHGB MFR BLDV: 71 % (ref 70–75)
OXYHGB MFR BLDV: 74 % (ref 70–75)
OXYHGB MFR BLDV: 75 % (ref 70–75)
PCO2 BLDV: 38 MM HG (ref 40–50)
PCO2 BLDV: 42 MM HG (ref 40–50)
PCO2 BLDV: 45 MM HG (ref 40–50)
PH BLDV: 7.39 [PH] (ref 7.32–7.43)
PH BLDV: 7.41 [PH] (ref 7.32–7.43)
PH BLDV: 7.45 [PH] (ref 7.32–7.43)
PHOSPHATE SERPL-MCNC: 2.3 MG/DL (ref 2.5–4.5)
PHOSPHATE SERPL-MCNC: 2.8 MG/DL (ref 2.5–4.5)
PLATELET # BLD AUTO: 130 10E3/UL (ref 150–450)
PLATELET # BLD AUTO: 139 10E3/UL (ref 150–450)
PO2 BLDV: 37 MM HG (ref 25–47)
PO2 BLDV: 37 MM HG (ref 25–47)
PO2 BLDV: 38 MM HG (ref 25–47)
POTASSIUM SERPL-SCNC: 4.1 MMOL/L (ref 3.4–5.3)
POTASSIUM SERPL-SCNC: 4.2 MMOL/L (ref 3.4–5.3)
PROT SERPL-MCNC: 5.4 G/DL (ref 6.4–8.3)
PROT SERPL-MCNC: 5.6 G/DL (ref 6.4–8.3)
RBC # BLD AUTO: 4.97 10E6/UL (ref 4.4–5.9)
RBC # BLD AUTO: 4.97 10E6/UL (ref 4.4–5.9)
SAO2 % BLDV: 72.5 % (ref 70–75)
SAO2 % BLDV: 74.9 % (ref 70–75)
SAO2 % BLDV: 76.2 % (ref 70–75)
SODIUM SERPL-SCNC: 137 MMOL/L (ref 135–145)
SODIUM SERPL-SCNC: 138 MMOL/L (ref 135–145)
UFH PPP CHRO-ACNC: 0.31 IU/ML
UFH PPP CHRO-ACNC: 0.39 IU/ML
WBC # BLD AUTO: 8.6 10E3/UL (ref 4–11)
WBC # BLD AUTO: 8.7 10E3/UL (ref 4–11)

## 2024-06-13 PROCEDURE — 80048 BASIC METABOLIC PNL TOTAL CA: CPT | Performed by: STUDENT IN AN ORGANIZED HEALTH CARE EDUCATION/TRAINING PROGRAM

## 2024-06-13 PROCEDURE — 250N000011 HC RX IP 250 OP 636: Mod: JZ | Performed by: STUDENT IN AN ORGANIZED HEALTH CARE EDUCATION/TRAINING PROGRAM

## 2024-06-13 PROCEDURE — 83735 ASSAY OF MAGNESIUM: CPT | Performed by: STUDENT IN AN ORGANIZED HEALTH CARE EDUCATION/TRAINING PROGRAM

## 2024-06-13 PROCEDURE — 71045 X-RAY EXAM CHEST 1 VIEW: CPT | Mod: 26 | Performed by: RADIOLOGY

## 2024-06-13 PROCEDURE — 84100 ASSAY OF PHOSPHORUS: CPT | Performed by: STUDENT IN AN ORGANIZED HEALTH CARE EDUCATION/TRAINING PROGRAM

## 2024-06-13 PROCEDURE — 250N000013 HC RX MED GY IP 250 OP 250 PS 637: Performed by: INTERNAL MEDICINE

## 2024-06-13 PROCEDURE — 82805 BLOOD GASES W/O2 SATURATION: CPT | Performed by: STUDENT IN AN ORGANIZED HEALTH CARE EDUCATION/TRAINING PROGRAM

## 2024-06-13 PROCEDURE — 99291 CRITICAL CARE FIRST HOUR: CPT | Mod: GC | Performed by: INTERNAL MEDICINE

## 2024-06-13 PROCEDURE — 250N000013 HC RX MED GY IP 250 OP 250 PS 637

## 2024-06-13 PROCEDURE — 71045 X-RAY EXAM CHEST 1 VIEW: CPT

## 2024-06-13 PROCEDURE — 85025 COMPLETE CBC W/AUTO DIFF WBC: CPT | Performed by: STUDENT IN AN ORGANIZED HEALTH CARE EDUCATION/TRAINING PROGRAM

## 2024-06-13 PROCEDURE — 85520 HEPARIN ASSAY: CPT | Performed by: INTERNAL MEDICINE

## 2024-06-13 PROCEDURE — 82248 BILIRUBIN DIRECT: CPT | Performed by: STUDENT IN AN ORGANIZED HEALTH CARE EDUCATION/TRAINING PROGRAM

## 2024-06-13 PROCEDURE — 85610 PROTHROMBIN TIME: CPT | Performed by: STUDENT IN AN ORGANIZED HEALTH CARE EDUCATION/TRAINING PROGRAM

## 2024-06-13 PROCEDURE — 80053 COMPREHEN METABOLIC PANEL: CPT | Performed by: STUDENT IN AN ORGANIZED HEALTH CARE EDUCATION/TRAINING PROGRAM

## 2024-06-13 PROCEDURE — 999N000075 HC STATISTIC IABP MONITORING

## 2024-06-13 PROCEDURE — 82805 BLOOD GASES W/O2 SATURATION: CPT

## 2024-06-13 PROCEDURE — 250N000013 HC RX MED GY IP 250 OP 250 PS 637: Performed by: STUDENT IN AN ORGANIZED HEALTH CARE EDUCATION/TRAINING PROGRAM

## 2024-06-13 PROCEDURE — 120N000003 HC R&B IMCU UMMC

## 2024-06-13 PROCEDURE — 250N000013 HC RX MED GY IP 250 OP 250 PS 637: Performed by: PHYSICIAN ASSISTANT

## 2024-06-13 RX ORDER — MAGNESIUM SULFATE HEPTAHYDRATE 40 MG/ML
2 INJECTION, SOLUTION INTRAVENOUS ONCE
Status: COMPLETED | OUTPATIENT
Start: 2024-06-13 | End: 2024-06-13

## 2024-06-13 RX ORDER — MAGNESIUM OXIDE 400 MG/1
400 TABLET ORAL EVERY 4 HOURS
Status: COMPLETED | OUTPATIENT
Start: 2024-06-13 | End: 2024-06-13

## 2024-06-13 RX ORDER — CARVEDILOL 3.12 MG/1
3.12 TABLET ORAL ONCE
Status: COMPLETED | OUTPATIENT
Start: 2024-06-13 | End: 2024-06-13

## 2024-06-13 RX ADMIN — HYDRALAZINE HYDROCHLORIDE 25 MG: 25 TABLET ORAL at 06:05

## 2024-06-13 RX ADMIN — POTASSIUM & SODIUM PHOSPHATES POWDER PACK 280-160-250 MG 1 PACKET: 280-160-250 PACK at 21:57

## 2024-06-13 RX ADMIN — ATORVASTATIN CALCIUM 20 MG: 20 TABLET, FILM COATED ORAL at 19:45

## 2024-06-13 RX ADMIN — MAGNESIUM OXIDE TAB 400 MG (241.3 MG ELEMENTAL MG) 400 MG: 400 (241.3 MG) TAB at 07:52

## 2024-06-13 RX ADMIN — ACETAMINOPHEN 975 MG: 325 TABLET, FILM COATED ORAL at 08:10

## 2024-06-13 RX ADMIN — MILRINONE LACTATE IN DEXTROSE 0.25 MCG/KG/MIN: 200 INJECTION, SOLUTION INTRAVENOUS at 09:21

## 2024-06-13 RX ADMIN — HYDRALAZINE HYDROCHLORIDE 25 MG: 25 TABLET ORAL at 21:57

## 2024-06-13 RX ADMIN — HYDRALAZINE HYDROCHLORIDE 25 MG: 25 TABLET ORAL at 14:33

## 2024-06-13 RX ADMIN — MILRINONE LACTATE IN DEXTROSE 0.25 MCG/KG/MIN: 200 INJECTION, SOLUTION INTRAVENOUS at 21:59

## 2024-06-13 RX ADMIN — ACETAMINOPHEN 975 MG: 325 TABLET, FILM COATED ORAL at 01:19

## 2024-06-13 RX ADMIN — SPIRONOLACTONE 25 MG: 25 TABLET ORAL at 07:52

## 2024-06-13 RX ADMIN — POTASSIUM & SODIUM PHOSPHATES POWDER PACK 280-160-250 MG 1 PACKET: 280-160-250 PACK at 18:02

## 2024-06-13 RX ADMIN — ACETAMINOPHEN 975 MG: 325 TABLET, FILM COATED ORAL at 19:45

## 2024-06-13 RX ADMIN — MAGNESIUM OXIDE TAB 400 MG (241.3 MG ELEMENTAL MG) 400 MG: 400 (241.3 MG) TAB at 12:14

## 2024-06-13 RX ADMIN — CARVEDILOL 3.12 MG: 3.12 TABLET, FILM COATED ORAL at 18:02

## 2024-06-13 RX ADMIN — HEPARIN SODIUM 1250 UNITS/HR: 10000 INJECTION, SOLUTION INTRAVENOUS at 20:19

## 2024-06-13 RX ADMIN — CARVEDILOL 3.12 MG: 3.12 TABLET, FILM COATED ORAL at 07:52

## 2024-06-13 RX ADMIN — MAGNESIUM SULFATE HEPTAHYDRATE 2 G: 2 INJECTION, SOLUTION INTRAVENOUS at 12:32

## 2024-06-13 ASSESSMENT — ACTIVITIES OF DAILY LIVING (ADL)
ADLS_ACUITY_SCORE: 30

## 2024-06-13 NOTE — PLAN OF CARE
Neuro: A&Ox4. Continues to have some R neck discomfort from swan; reports improvement, APAP given.   Cardiac: SR  with occasional PVCs. VSS. CVP 6 PA 37/23 PCWP 19. CI 3.0    Respiratory: Sating >92% on RA.  GI/: Adequate urine output. Small BM yesterday; passing flatus.  Diet/appetite: Tolerating 2g Na diet with 1.8L FR. Fair appetite. Ate three meals.  Activity:  Assist of 1 for bed mobility; on bedrest due to femoral balloon pump.  Skin: No new deficits noted.  LDA's: swan, balloon pump    Plan: OR time of 0730 for LVAD. Dr. Jhaveri at bedside for written consent (in chart).  Device nurse contacted with OR time.  Continue with POC. Notify primary team with changes.

## 2024-06-13 NOTE — PROGRESS NOTES
PreTx/VAD Social Work Services Progress Note      Date of Initial Social Work Evaluation: 8/22/2023 with admission assessment completed on 6/5/2024  Collaborated with: Chris and his wife, along with Cards II    Data: Patient admitted with worsening heart failure and has been listed status 4 outpatient for heart transplant. Due to worsening medical condition, patient got upgraded to status 2. However, now scheduled for VAD on Friday after several discussions with the cardiology team. Had balloon pump placed Wednesday. Wife continues to remain in town and staying at the [facility].  Intervention: Met with patient and wife for supportive check in. Inquired into questions/concerns related to upcoming surgery. Assessed coping and temporary relocation plan.  Assessment: Patient was a bit groggy, but verbalized understanding of the plan with no concerns. Wife present and supportive. Confirmed her lodging plan and voiced no concerns.  Education provided by SW: Ongoing SW availability  Plan:    Discharge Plans in Progress: TBD    Barriers to d/c plan: Medical condition-surgery for VAD on Friday    Follow up Plan: SW will continue to follow for ongoing psychosocial support and assistance with community resources/discharge planning.

## 2024-06-13 NOTE — PLAN OF CARE
Major Shift Events:    Pt A&O x4. Resting well between cares with tylenol for pain at swan insertion site. IABP 1:1 100% augmentation. Heparin therapeutic at 1250units/hr. Milrinone at 0.25mcg/kg/min. Minimal urine output overnight. MD aware. Pt asking pertinent questions about post-op plan of care and what to expect.     See flowsheets for detailed hemodynamic numbers and further details. Continue with plan of care and keep family updated with changes.

## 2024-06-13 NOTE — PROGRESS NOTES
VAD coordinator assessed patinet's abdomen for potential VAD driveline exit site.   Discussed with patient activities that may impact where driveline exits and side that they wear the controller.   Patient reports he participates in shoveling and and is left handed. He indicate a preference for the DL to exit on the right side.   Assessed patient laying down in reverse trendelenburg and observed abdomen for any folds. There is a appendix scar on the lower right side.  Assessed where patient's waist band rests.   Used landmarks of 4 finger widths below rib cage at nipple line and 3 finger widths medially to accommodate modular cable anchoring externally.   Marked potential exit sites on right and left. Actual exit site to be determined by surgeon.   The preference is to have the driveline exit oriented medially.  Site marking communicated to surgeon.     RIGHT    LEFT

## 2024-06-13 NOTE — ANESTHESIA PREPROCEDURE EVALUATION
Anesthesia Pre-Procedure Evaluation    Patient: Navin Lutz   MRN: [id number] : 1970        Procedure : Procedure(s):  INSERTION, LEFT VENTRICULAR ASSIST DEVICE (HEARTMATE III) AND ANY ASSOCIATED PROCEDURES          No past medical history on file.   Past Surgical History:   Procedure Laterality Date    CARDIAC SURGERY      COLONOSCOPY N/A 2023    Procedure: COLONOSCOPY, WITH POLYPECTOMY AND BIOPSY;  Surgeon: Alejandro Rodriguez MD;  Location: [facility]    CV INTRA AORTIC BALLOON N/A 2024    Procedure: Intra aortic Balloon Pump Insertion;  Surgeon: Elfego Cruz MD;  Location:  HE[facility] CARDIAC CATH LAB    CV RIGHT HEART CATH MEASUREMENTS RECORDED N/A 2023    Procedure: Heart Cath Right Heart Cath;  Surgeon: Elfego Cruz MD;  Location:  HE[facility] CARDIAC CATH LAB    CV RIGHT HEART CATH MEASUREMENTS RECORDED N/A 2023    Procedure: Heart Cath Right Heart Cath;  Surgeon: Elfego Cruz MD;  Location:  HEART CARDIAC CATH LAB    CV RIGHT HEART CATH MEASUREMENTS RECORDED N/A 2024    Procedure: Heart Cath Right Heart Cath;  Surgeon: Tobin Jaime MD;  [facility]    CV RIGHT HEART CATH MEASUREMENTS RECORDED N/A 5/3/2024    Procedure: Heart Cath Right Heart Cath;  Surgeon: Dion Ashley MD;  Location:  HEART CARDIAC CATH LAB    CV RIGHT HEART CATH MEASUREMENTS RECORDED N/A 2024    Procedure: Right Heart Catheterization;  Surgeon: Cassandra Rizzo MD;  Location:  HEART CARDIAC CATH LAB    CV RIGHT HEART CATH MEASUREMENTS RECORDED N/A 2024    Procedure: Right Heart Catheterization;  Surgeon: Elfego Cruz MD;  Location:  HEART CARDIAC CATH LAB    PICC DOUBLE LUMEN PLACEMENT Right 2023    Brachial Vein Medial 5F DL 45 cm, 2 cm out      No Known Allergies   Social History     Tobacco Use    Smoking status: Never    Smokeless tobacco: Never   Substance Use Topics     Alcohol use: Never      Wt Readings from Last 1 Encounters:   06/13/24 95.3 kg (210 lb 1.6 oz)        Anesthesia Evaluation   Pt has had prior anesthetic.     No history of anesthetic complications       ROS/MED HX  ENT/Pulmonary:     (+)                      asthma                  Neurologic:       Cardiovascular: Comment: S/p IABP placement on 6/12    (+)  - -   -  - -      CHF                           Previous cardiac testing (6/8/24)   Echo: Date: 6/8/24 Results:  Left ventricular function is decreased. The ejection fraction is 15-20%  (severely reduced). Severe diffuse hypokinesis is present.  The right ventricle is normal size. Global right ventricular function is  normal.  No significant valvular abnormalities.  IVC diameter <2.1 cm collapsing >50% with sniff suggests a normal RA pressure  of 3 mmHg.    Stress Test:  Date: Results:    ECG Reviewed:  Date: Results:    Cath:  Date: 6/12 Results:     Right sided filling pressures are moderately elevated.     Left sided filling pressures are moderately elevated.     Severely elevated pulmonary artery hypertension.     Reduced cardiac output level.      METS/Exercise Tolerance: 1 - Eating, dressing    Hematologic: Comments: Known right subclavian and axillary vein thrombus for which he takes warfarin which is being held and was bridged with a heparin infusion.    (+) History of blood clots,               Musculoskeletal:       GI/Hepatic:       Renal/Genitourinary:     (+) renal disease, type: CRI, Pt does not require dialysis,           Endo:       Psychiatric/Substance Use:       Infectious Disease:       Malignancy:       Other:            Physical Exam    Airway        Mallampati: III   TM distance: > 3 FB   Neck ROM: full   Mouth opening: > 3 cm    Respiratory Devices and Support         Dental       (+) Minor Abnormalities - some fillings, tiny chips      Cardiovascular          Rhythm and rate: regular and tachycardia     Pulmonary          "          OUTSIDE LABS:  CBC:   Lab Results   Component Value Date    WBC 8.7 06/13/2024    WBC 9.6 06/12/2024    HGB 15.4 06/13/2024    HGB 16.5 06/12/2024    HCT 45.0 06/13/2024    HCT 48.5 06/12/2024     (L) 06/13/2024     06/12/2024     BMP:   Lab Results   Component Value Date     06/13/2024     06/12/2024    POTASSIUM 4.2 06/13/2024    POTASSIUM 4.6 06/12/2024    CHLORIDE 106 06/13/2024    CHLORIDE 106 06/12/2024    CO2 23 06/13/2024    CO2 24 06/12/2024    BUN 11.6 06/13/2024    BUN 12.3 06/12/2024    CR 1.13 06/13/2024    CR 1.08 06/12/2024     (H) 06/13/2024    GLC 91 06/13/2024     COAGS:   Lab Results   Component Value Date    INR 1.09 06/13/2024     POC: No results found for: \"BGM\", \"HCG\", \"HCGS\"  HEPATIC:   Lab Results   Component Value Date    ALBUMIN 3.3 (L) 06/13/2024    PROTTOTAL 5.4 (L) 06/13/2024    ALT 43 06/13/2024    AST 36 06/13/2024    ALKPHOS 77 06/13/2024    BILITOTAL 0.8 06/13/2024     OTHER:   Lab Results   Component Value Date    LACT 0.9 06/03/2024    A1C 5.6 08/22/2023    NAM 8.5 (L) 06/13/2024    PHOS 2.8 06/13/2024    MAG 1.9 06/13/2024    TSH 2.46 08/22/2023       Anesthesia Plan    ASA Status:  4    NPO Status:  NPO Appropriate    Anesthesia Type: General.     - Airway: ETT   Induction: Intravenous.   Maintenance: Inhalation.   Techniques and Equipment:     - Lines/Monitors: Arterial Line, Central Line, CVP, PAC, BIS, NIRS, RAJ, PICC in situ            RAJ Absolute Contra-indication: NONE            RAJ Relative Contra-indication: NONE     Consents    Anesthesia Plan(s) and associated risks, benefits, and realistic alternatives discussed. Questions answered and patient/representative(s) expressed understanding.     - Discussed:     - Discussed with:  Patient      - Extended Intubation/Ventilatory Support Discussed: Yes.      - Patient is DNR/DNI Status: No     Use of blood products discussed: Yes.     - Discussed with: Patient.     - Consented: " consented to blood products            Reason for refusal: other.     Postoperative Care    Pain management: IV analgesics, Oral pain medications, Multi-modal analgesia.   PONV prophylaxis: Ondansetron (or other 5HT-3), Background Propofol Infusion     Comments:               Faraz Mcqueen MD    I have reviewed the pertinent notes and labs in the chart from the past 30 days and (re)examined the patient.  Any updates or changes from those notes are reflected in this note.

## 2024-06-13 NOTE — PROGRESS NOTES
Cardiothoracic Surgery Brief Progress Note:    Navin Lutz  is a 53 year old male with a history of chronic HFrEF 2/2 NICM and CKD stage II presented with decompensated heart failure on milrinone after worsening shortness of breath for the past three weeks. Additionally, has had productive cough. Symptoms have not improved with increase in diuretics and milrinone. Patient currently admitted under the heart failure service with plan for surgery.    - Plan for LVAD insertion with Dr. Jhaveri on 6/14 in AM.  - Pre-operative orders signed and held 6/11 for RN to release. Heparin gtt to be held on call to OR.  - IABP placed 6/12  - Pre-operative teaching completed 6/11, all questions answered. NPO at midnight.  - Remainder of cares per primary team, appreciate excellent care. Please call or page with any questions.     Discussed with Dr. Jhaveri.    Divina Guzman PA-C  Cardiothoracic Surgery  Pager [phone]  9:03 AM on 6/13/2024

## 2024-06-13 NOTE — PROGRESS NOTES
[facility]   Cardiology II Service ICU/ Advanced Heart Failure  Daily Progress Note      Patient: Navin Lutz  MRN: [id number]  Admission Date: 6/3/2024  Hospital Day # 10    Assessment and Plan: Navin Lutz is a 53 year old male admitted on 6/3/2024. He has a past medical history of chronic HFrEF 2/2 NICM s/p ICD, HLD, and CKD Stage II who presents admitted for decompensated heart failure on milrinone listed status 4, admitted for consideration of advanced therapies, now being planned for LVAD 6/14.     Today's Plan:  -Hemodynamics look good today, wedge improved. Will keep euvolemic planning for LVAD tomorrow, NPO midnight  -Hold coreg 3.125 tomorrow morning  -stop aldactone tomorrow    # Acute on chronic systolic heart failure/HFrEF secondary to NICM with EF of 10-15% who was on home milrinone prior to admission    Stage D. NYHA Class III. TTE 6/8/24 EF 15-20%, LVIDd 7.0 cm, RV normal size and function, mild TR, mild MI, no pericardial effusion. RHC 6/4/24: RA 3, PA 25/12/17, PCW 9, Teressa CO/CI 4.63/2.13.     [place] 6/13   RA: 6   PA:  37/27 (30)   PCWP: 19   CO/CI: 6.6/3   PVR: 1.7 [place]   SVR: 960   Afterload-Continue Hydralazine 25mg q8h  -Fluid status: euvolemic, defer diuretic  -Inotrope: milrinone at 0.25 mcg/kg/min  -ACEi/ARB/ARNI/afterload reduction: HOLD entresto given upcoming surgery for vasoplegia risk  -BB: coreg 3.125mg on hold for tomorrow  -Aldosterone antagonist: aldactone 25 mg daily on hold for tomorrow  -SGLT2i: HELD d/t upcoming surgery, PTA was on jardiance  -SCD prophylaxis: ICD  -Planned for LVAD 6/14, IABP in place 1:1 6/12      # Right subclavian and axillary vein thrombus, nonocclusive   - on warfarin PTA  - heparin gtt for now, hold prior to surgery per CVTS    # History of nsvt  - coreg as above  - monitor tele    # HLD  - atorvastatin 20 mg daily    # CKD stage II. B/l cr 1.2-1-4  - Cr stable ~1/1    Diet: Fluid restriction 1800 ML FLUID (and additional linked  orders)  2 Gram Sodium Diet    DVT Prophylaxis: Heparin gtt  Menjivar Catheter: Not present  Cardiac Monitoring: ACTIVE order. Indication: Acute decompensated heart failure (48 hours)  Code Status: Full Code      Patient discussed with Dr. Carpenter.      Misha Nagy MD  Cardiology Fellow    ================================================================    Subjective/24-Hr Events:   Patient felt well in the morning, neck pain improved with oxycodone, pressures improved with swan, going for LVAD tomorrow    Medications: Reviewed in EPIC.     Physical Exam:   BP 99/75   Pulse 94   Temp 98  F (36.7  C) (Oral)   Resp 10   Ht 1.829 m (6')   Wt 95.3 kg (210 lb 1.6 oz)   SpO2 93%   BMI 28.49 kg/m      GENERAL: Appears comfortable, in no distress .  HEENT: Eye symmetrical, no discharge or icterus bilaterally.   NECK: Supple. No JVD   CV: RRR, +S1S2, no murmur, rub, or gallop.   RESPIRATORY: Respirations regular, even, and unlabored. Lungs CTA throughout.    GI: Soft and non distended   EXTREMITIES: No peripheral edema. All extremities are warm and well perfused, IABP in R groin without any warmth, erythema, or hematoma formation  NEUROLOGIC: Alert and interacting appropriatly. No focal deficits.   MUSCULOSKELETAL: No joint swelling or tenderness.   SKIN: No jaundice. No rashes or lesions.     Labs:  CMP  Recent Labs   Lab 06/13/24  0504 06/12/24  1542 06/12/24  1015 06/12/24  0511 06/11/24  0445 06/09/24  0451 06/08/24  1053    137  --  138 139   < >  --    POTASSIUM 4.2 4.6  --  3.7 3.9   < >  --    CHLORIDE 106 106  --  107 105   < >  --    CO2 23 24  --  24 25   < >  --    ANIONGAP 9 7  --  7 9   < >  --    GLC 91 102* 134* 128* 93   < >  --    BUN 11.6 12.3  --  12.1 14.5   < >  --    CR 1.13 1.08  --  1.07 1.11   < >  --    GFRESTIMATED 78 82  --  83 79   < >  --    NAM 8.5* 8.9  --  8.7 9.2   < >  --    MAG 1.9 2.0  --   --  2.0  --  2.2   PHOS 2.8 2.4*  --   --   --   --   --    PROTTOTAL 5.4* 5.8*  --    --   --   --   --    ALBUMIN 3.3* 3.7  --   --   --   --   --    BILITOTAL 0.8 0.7  --   --   --   --   --    ALKPHOS 77 82  --   --   --   --   --    AST 36 36  --   --   --   --   --    ALT 43 37  --   --   --   --   --     < > = values in this interval not displayed.       CBC  Recent Labs   Lab 06/13/24  0504 06/12/24  1542 06/12/24  1208 06/12/24  0912 06/12/24  0511   WBC 8.7 9.6 8.8  --  6.2   RBC 4.97 5.45 5.27  --  5.28   HGB 15.4 16.5 16.5 17.3 16.5   HCT 45.0 48.5 47.1  --  47.9   MCV 91 89 89  --  91   MCH 31.0 30.3 31.3  --  31.3   MCHC 34.2 34.0 35.0  --  34.4   RDW 13.5 13.5 13.4  --  13.3   * 158 150  --  155       INR  Recent Labs   Lab 06/13/24  0504 06/09/24  0451 06/08/24  0426 06/07/24  0502   INR 1.09 1.43* 2.18* 2.53*

## 2024-06-14 ENCOUNTER — APPOINTMENT (OUTPATIENT)
Dept: GENERAL RADIOLOGY | Facility: CLINIC | Age: 54
End: 2024-06-14
Payer: COMMERCIAL

## 2024-06-14 ENCOUNTER — ANCILLARY PROCEDURE (OUTPATIENT)
Dept: CARDIOLOGY | Facility: CLINIC | Age: 54
End: 2024-06-14
Payer: COMMERCIAL

## 2024-06-14 ENCOUNTER — ANCILLARY PROCEDURE (OUTPATIENT)
Dept: CARDIOLOGY | Facility: CLINIC | Age: 54
End: 2024-06-14
Attending: INTERNAL MEDICINE
Payer: COMMERCIAL

## 2024-06-14 ENCOUNTER — ANESTHESIA (OUTPATIENT)
Dept: SURGERY | Facility: CLINIC | Age: 54
End: 2024-06-14
Payer: COMMERCIAL

## 2024-06-14 ENCOUNTER — APPOINTMENT (OUTPATIENT)
Dept: GENERAL RADIOLOGY | Facility: CLINIC | Age: 54
End: 2024-06-14
Attending: STUDENT IN AN ORGANIZED HEALTH CARE EDUCATION/TRAINING PROGRAM
Payer: COMMERCIAL

## 2024-06-14 DIAGNOSIS — Z45.02 FITTING AND ADJUSTMENT OF AUTOMATIC IMPLANTABLE CARDIOVERTER-DEFIBRILLATOR: ICD-10-CM

## 2024-06-14 DIAGNOSIS — Z45.02 FITTING AND ADJUSTMENT OF AUTOMATIC IMPLANTABLE CARDIOVERTER-DEFIBRILLATOR: Primary | ICD-10-CM

## 2024-06-14 LAB
ABO/RH(D): NORMAL
ACANTHOCYTES BLD QL SMEAR: NORMAL
ALBUMIN SERPL BCG-MCNC: 3.4 G/DL (ref 3.5–5.2)
ALBUMIN SERPL BCG-MCNC: 3.4 G/DL (ref 3.5–5.2)
ALBUMIN SERPL BCG-MCNC: 3.5 G/DL (ref 3.5–5.2)
ALBUMIN SERPL BCG-MCNC: 3.5 G/DL (ref 3.5–5.2)
ALLEN'S TEST: ABNORMAL
ALP SERPL-CCNC: 62 U/L (ref 40–150)
ALP SERPL-CCNC: 64 U/L (ref 40–150)
ALP SERPL-CCNC: 65 U/L (ref 40–150)
ALP SERPL-CCNC: 82 U/L (ref 40–150)
ALT SERPL W P-5'-P-CCNC: 42 U/L (ref 0–70)
ALT SERPL W P-5'-P-CCNC: 42 U/L (ref 0–70)
ALT SERPL W P-5'-P-CCNC: 44 U/L (ref 0–70)
ALT SERPL W P-5'-P-CCNC: 49 U/L (ref 0–70)
ANION GAP SERPL CALCULATED.3IONS-SCNC: 10 MMOL/L (ref 7–15)
ANION GAP SERPL CALCULATED.3IONS-SCNC: 11 MMOL/L (ref 7–15)
ANION GAP SERPL CALCULATED.3IONS-SCNC: 11 MMOL/L (ref 7–15)
ANION GAP SERPL CALCULATED.3IONS-SCNC: 14 MMOL/L (ref 7–15)
ANTIBODY SCREEN: NEGATIVE
APTT PPP: 31 SECONDS (ref 22–38)
APTT PPP: 48 SECONDS (ref 22–38)
AST SERPL W P-5'-P-CCNC: 39 U/L (ref 0–45)
AST SERPL W P-5'-P-CCNC: 56 U/L (ref 0–45)
AST SERPL W P-5'-P-CCNC: 86 U/L (ref 0–45)
AST SERPL W P-5'-P-CCNC: 95 U/L (ref 0–45)
ATRIAL RATE - MUSE: 106 BPM
ATRIAL RATE - MUSE: 93 BPM
AUER BODIES BLD QL SMEAR: NORMAL
BASE EXCESS BLDA CALC-SCNC: -0.9 MMOL/L (ref -3–3)
BASE EXCESS BLDA CALC-SCNC: -1.2 MMOL/L (ref -3–3)
BASE EXCESS BLDA CALC-SCNC: -1.6 MMOL/L (ref -3–3)
BASE EXCESS BLDA CALC-SCNC: -2.4 MMOL/L (ref -3–3)
BASE EXCESS BLDA CALC-SCNC: -2.8 MMOL/L (ref -3–3)
BASE EXCESS BLDA CALC-SCNC: -3.1 MMOL/L (ref -3–3)
BASE EXCESS BLDA CALC-SCNC: -3.2 MMOL/L (ref -3–3)
BASE EXCESS BLDA CALC-SCNC: -3.5 MMOL/L (ref -3–3)
BASE EXCESS BLDA CALC-SCNC: -3.5 MMOL/L (ref -3–3)
BASE EXCESS BLDV CALC-SCNC: -0.1 MMOL/L (ref -3–3)
BASE EXCESS BLDV CALC-SCNC: -0.9 MMOL/L (ref -3–3)
BASE EXCESS BLDV CALC-SCNC: -1.2 MMOL/L (ref -3–3)
BASE EXCESS BLDV CALC-SCNC: -2.7 MMOL/L (ref -3–3)
BASE EXCESS BLDV CALC-SCNC: -2.7 MMOL/L (ref -3–3)
BASE EXCESS BLDV CALC-SCNC: 1.9 MMOL/L (ref -3–3)
BASO STIPL BLD QL SMEAR: NORMAL
BASOPHILS # BLD AUTO: 0 10E3/UL (ref 0–0.2)
BASOPHILS NFR BLD AUTO: 0 %
BILIRUB DIRECT SERPL-MCNC: 0.3 MG/DL (ref 0–0.3)
BILIRUB SERPL-MCNC: 0.9 MG/DL
BILIRUB SERPL-MCNC: 2.3 MG/DL
BILIRUB SERPL-MCNC: 2.8 MG/DL
BILIRUB SERPL-MCNC: 3.7 MG/DL
BITE CELLS BLD QL SMEAR: NORMAL
BLD PROD TYP BPU: NORMAL
BLISTER CELLS BLD QL SMEAR: NORMAL
BLOOD COMPONENT TYPE: NORMAL
BUN SERPL-MCNC: 10.8 MG/DL (ref 6–20)
BUN SERPL-MCNC: 12.9 MG/DL (ref 6–20)
BUN SERPL-MCNC: 13.2 MG/DL (ref 6–20)
BUN SERPL-MCNC: 13.7 MG/DL (ref 6–20)
BURR CELLS BLD QL SMEAR: NORMAL
CA-I BLD-MCNC: 4.2 MG/DL (ref 4.4–5.2)
CA-I BLD-MCNC: 4.5 MG/DL (ref 4.4–5.2)
CA-I BLD-MCNC: 4.6 MG/DL (ref 4.4–5.2)
CA-I BLD-MCNC: 4.7 MG/DL (ref 4.4–5.2)
CA-I BLD-MCNC: 4.7 MG/DL (ref 4.4–5.2)
CA-I BLD-MCNC: 4.9 MG/DL (ref 4.4–5.2)
CALCIUM SERPL-MCNC: 8.4 MG/DL (ref 8.6–10)
CALCIUM SERPL-MCNC: 8.4 MG/DL (ref 8.6–10)
CALCIUM SERPL-MCNC: 8.5 MG/DL (ref 8.6–10)
CALCIUM SERPL-MCNC: 9 MG/DL (ref 8.6–10)
CHLORIDE SERPL-SCNC: 105 MMOL/L (ref 98–107)
CHLORIDE SERPL-SCNC: 106 MMOL/L (ref 98–107)
CHLORIDE SERPL-SCNC: 107 MMOL/L (ref 98–107)
CHLORIDE SERPL-SCNC: 107 MMOL/L (ref 98–107)
CLOT INIT KAOL IND TO POST HEP NEUT TRTO: 1 {RATIO}
CLOT INIT KAOL IND TO POST HEP NEUT TRTO: 1.1 {RATIO}
CLOT INIT KAOL IND TO POST HEP NEUT TRTO: 1.4 {RATIO}
CLOT INIT KAOLIN IND BLD US: 119 SEC (ref 113–166)
CLOT INIT KAOLIN IND BLD US: 142 SEC (ref 113–166)
CLOT INIT KAOLIN IND BLD US: 182 SEC (ref 113–166)
CLOT INIT KAOLIN IND P HEP NEUT BLD US: 116 SEC (ref 103–153)
CLOT INIT KAOLIN IND P HEP NEUT BLD US: 127 SEC (ref 103–153)
CLOT INIT KAOLIN IND P HEP NEUT BLD US: 131 SEC (ref 103–153)
CLOT STIFF PLT CONT BLD CALC: 14.9 HPA (ref 11.9–29.8)
CLOT STIFF PLT CONT BLD CALC: 16.3 HPA (ref 11.9–29.8)
CLOT STIFF PLT CONT BLD CALC: 9.7 HPA (ref 11.9–29.8)
CLOT STIFF TF IND P HEP NEUT BLD US: 11.5 HPA (ref 13–33.2)
CLOT STIFF TF IND P HEP NEUT BLD US: 17.1 HPA (ref 13–33.2)
CLOT STIFF TF IND P HEP NEUT BLD US: 19.2 HPA (ref 13–33.2)
CLOT STIFF TF IND+IIB-IIIA INH P HEP NEU: 1.8 HPA (ref 1–3.7)
CLOT STIFF TF IND+IIB-IIIA INH P HEP NEU: 2.2 HPA (ref 1–3.7)
CLOT STIFF TF IND+IIB-IIIA INH P HEP NEU: 2.9 HPA (ref 1–3.7)
CODING SYSTEM: NORMAL
COHGB MFR BLD: 98.8 % (ref 96–97)
COHGB MFR BLD: 99.3 % (ref 96–97)
COHGB MFR BLD: >100 % (ref 96–97)
CREAT SERPL-MCNC: 1 MG/DL (ref 0.67–1.17)
CREAT SERPL-MCNC: 1.05 MG/DL (ref 0.67–1.17)
CREAT SERPL-MCNC: 1.2 MG/DL (ref 0.67–1.17)
CREAT SERPL-MCNC: 1.23 MG/DL (ref 0.67–1.17)
CROSSMATCH: NORMAL
DACRYOCYTES BLD QL SMEAR: NORMAL
DEPRECATED HCO3 PLAS-SCNC: 19 MMOL/L (ref 22–29)
DEPRECATED HCO3 PLAS-SCNC: 20 MMOL/L (ref 22–29)
DEPRECATED HCO3 PLAS-SCNC: 20 MMOL/L (ref 22–29)
DEPRECATED HCO3 PLAS-SCNC: 23 MMOL/L (ref 22–29)
DIASTOLIC BLOOD PRESSURE - MUSE: NORMAL MMHG
DIASTOLIC BLOOD PRESSURE - MUSE: NORMAL MMHG
EGFRCR SERPLBLD CKD-EPI 2021: 70 ML/MIN/1.73M2
EGFRCR SERPLBLD CKD-EPI 2021: 72 ML/MIN/1.73M2
EGFRCR SERPLBLD CKD-EPI 2021: 85 ML/MIN/1.73M2
EGFRCR SERPLBLD CKD-EPI 2021: 90 ML/MIN/1.73M2
ELLIPTOCYTES BLD QL SMEAR: NORMAL
EOSINOPHIL # BLD AUTO: 0.3 10E3/UL (ref 0–0.7)
EOSINOPHIL NFR BLD AUTO: 4 %
ERYTHROCYTE [DISTWIDTH] IN BLOOD BY AUTOMATED COUNT: 13.5 % (ref 10–15)
ERYTHROCYTE [DISTWIDTH] IN BLOOD BY AUTOMATED COUNT: 13.8 % (ref 10–15)
ERYTHROCYTE [DISTWIDTH] IN BLOOD BY AUTOMATED COUNT: 13.8 % (ref 10–15)
ERYTHROCYTE [DISTWIDTH] IN BLOOD BY AUTOMATED COUNT: 13.9 % (ref 10–15)
FIBRINOGEN PPP-MCNC: 299 MG/DL (ref 170–490)
FRAGMENTS BLD QL SMEAR: NORMAL
GLUCOSE BLD-MCNC: 128 MG/DL (ref 70–99)
GLUCOSE BLD-MCNC: 134 MG/DL (ref 70–99)
GLUCOSE BLD-MCNC: 138 MG/DL (ref 70–99)
GLUCOSE BLD-MCNC: 141 MG/DL (ref 70–99)
GLUCOSE BLD-MCNC: 142 MG/DL (ref 70–99)
GLUCOSE BLD-MCNC: 151 MG/DL (ref 70–99)
GLUCOSE BLD-MCNC: 161 MG/DL (ref 70–99)
GLUCOSE BLD-MCNC: 176 MG/DL (ref 70–99)
GLUCOSE BLDC GLUCOMTR-MCNC: 153 MG/DL (ref 70–99)
GLUCOSE BLDC GLUCOMTR-MCNC: 166 MG/DL (ref 70–99)
GLUCOSE BLDC GLUCOMTR-MCNC: 169 MG/DL (ref 70–99)
GLUCOSE BLDC GLUCOMTR-MCNC: 185 MG/DL (ref 70–99)
GLUCOSE BLDC GLUCOMTR-MCNC: 194 MG/DL (ref 70–99)
GLUCOSE SERPL-MCNC: 166 MG/DL (ref 70–99)
GLUCOSE SERPL-MCNC: 182 MG/DL (ref 70–99)
GLUCOSE SERPL-MCNC: 206 MG/DL (ref 70–99)
GLUCOSE SERPL-MCNC: 94 MG/DL (ref 70–99)
HCO3 BLD-SCNC: 21 MMOL/L (ref 21–28)
HCO3 BLD-SCNC: 21 MMOL/L (ref 21–28)
HCO3 BLD-SCNC: 22 MMOL/L (ref 21–28)
HCO3 BLDA-SCNC: 22 MMOL/L (ref 21–28)
HCO3 BLDA-SCNC: 23 MMOL/L (ref 21–28)
HCO3 BLDA-SCNC: 23 MMOL/L (ref 21–28)
HCO3 BLDA-SCNC: 24 MMOL/L (ref 21–28)
HCO3 BLDA-SCNC: 24 MMOL/L (ref 21–28)
HCO3 BLDA-SCNC: 25 MMOL/L (ref 21–28)
HCO3 BLDV-SCNC: 23 MMOL/L (ref 21–28)
HCO3 BLDV-SCNC: 23 MMOL/L (ref 21–28)
HCO3 BLDV-SCNC: 25 MMOL/L (ref 21–28)
HCO3 BLDV-SCNC: 25 MMOL/L (ref 21–28)
HCO3 BLDV-SCNC: 27 MMOL/L (ref 21–28)
HCO3 BLDV-SCNC: 27 MMOL/L (ref 21–28)
HCT VFR BLD AUTO: 38.1 % (ref 40–53)
HCT VFR BLD AUTO: 38.4 % (ref 40–53)
HCT VFR BLD AUTO: 38.9 % (ref 40–53)
HCT VFR BLD AUTO: 44.7 % (ref 40–53)
HGB BLD-MCNC: 12.7 G/DL (ref 13.3–17.7)
HGB BLD-MCNC: 12.9 G/DL (ref 13.3–17.7)
HGB BLD-MCNC: 13.1 G/DL (ref 13.3–17.7)
HGB BLD-MCNC: 13.1 G/DL (ref 13.3–17.7)
HGB BLD-MCNC: 13.2 G/DL (ref 13.3–17.7)
HGB BLD-MCNC: 13.3 G/DL (ref 13.3–17.7)
HGB BLD-MCNC: 13.5 G/DL (ref 13.3–17.7)
HGB BLD-MCNC: 13.6 G/DL (ref 13.3–17.7)
HGB BLD-MCNC: 15.6 G/DL (ref 13.3–17.7)
HGB BLD-MCNC: 15.7 G/DL (ref 13.3–17.7)
HGB BLD-MCNC: 15.8 G/DL (ref 13.3–17.7)
HGB BLD-MCNC: 16.4 G/DL (ref 13.3–17.7)
HGB C CRYSTALS: NORMAL
HOWELL-JOLLY BOD BLD QL SMEAR: NORMAL
IMM GRANULOCYTES # BLD: 0 10E3/UL
IMM GRANULOCYTES NFR BLD: 0 %
INR PPP: 1.43 (ref 0.85–1.15)
INR PPP: 1.61 (ref 0.85–1.15)
INTERPRETATION ECG - MUSE: NORMAL
INTERPRETATION ECG - MUSE: NORMAL
ISSUE DATE AND TIME: NORMAL
LACTATE BLD-SCNC: 0.8 MMOL/L (ref 0.7–2)
LACTATE BLD-SCNC: 0.9 MMOL/L (ref 0.7–2)
LACTATE BLD-SCNC: 1 MMOL/L (ref 0.7–2)
LACTATE BLD-SCNC: 1.1 MMOL/L (ref 0.7–2)
LACTATE BLD-SCNC: 1.1 MMOL/L (ref 0.7–2)
LACTATE BLD-SCNC: 1.2 MMOL/L (ref 0.7–2)
LACTATE BLD-SCNC: 2 MMOL/L (ref 0.7–2)
LACTATE BLD-SCNC: 2.3 MMOL/L (ref 0.7–2)
LACTATE SERPL-SCNC: 3.1 MMOL/L (ref 0.7–2)
LACTATE SERPL-SCNC: 3.5 MMOL/L (ref 0.7–2)
LACTATE SERPL-SCNC: 4.2 MMOL/L (ref 0.7–2)
LYMPHOCYTES # BLD AUTO: 1.8 10E3/UL (ref 0.8–5.3)
LYMPHOCYTES NFR BLD AUTO: 23 %
MAGNESIUM SERPL-MCNC: 2.1 MG/DL (ref 1.7–2.3)
MAGNESIUM SERPL-MCNC: 2.1 MG/DL (ref 1.7–2.3)
MAGNESIUM SERPL-MCNC: 2.2 MG/DL (ref 1.7–2.3)
MAGNESIUM SERPL-MCNC: 2.4 MG/DL (ref 1.7–2.3)
MCH RBC QN AUTO: 31.1 PG (ref 26.5–33)
MCH RBC QN AUTO: 31.1 PG (ref 26.5–33)
MCH RBC QN AUTO: 31.2 PG (ref 26.5–33)
MCH RBC QN AUTO: 31.3 PG (ref 26.5–33)
MCHC RBC AUTO-ENTMCNC: 34.1 G/DL (ref 31.5–36.5)
MCHC RBC AUTO-ENTMCNC: 34.2 G/DL (ref 31.5–36.5)
MCHC RBC AUTO-ENTMCNC: 34.4 G/DL (ref 31.5–36.5)
MCHC RBC AUTO-ENTMCNC: 35.1 G/DL (ref 31.5–36.5)
MCV RBC AUTO: 89 FL (ref 78–100)
MCV RBC AUTO: 91 FL (ref 78–100)
MDC_IDC_EPISODE_DTM: NORMAL
MDC_IDC_EPISODE_DURATION: 1 S
MDC_IDC_EPISODE_DURATION: 2 S
MDC_IDC_EPISODE_DURATION: 28 S
MDC_IDC_EPISODE_DURATION: 3 S
MDC_IDC_EPISODE_DURATION: 49 S
MDC_IDC_EPISODE_DURATION: 5 S
MDC_IDC_EPISODE_DURATION: 50 S
MDC_IDC_EPISODE_DURATION: 59 S
MDC_IDC_EPISODE_DURATION: 6 S
MDC_IDC_EPISODE_ID: 100
MDC_IDC_EPISODE_ID: 101
MDC_IDC_EPISODE_ID: 102
MDC_IDC_EPISODE_ID: 103
MDC_IDC_EPISODE_ID: 104
MDC_IDC_EPISODE_ID: 105
MDC_IDC_EPISODE_ID: 106
MDC_IDC_EPISODE_ID: 107
MDC_IDC_EPISODE_ID: 108
MDC_IDC_EPISODE_ID: 73
MDC_IDC_EPISODE_ID: 74
MDC_IDC_EPISODE_ID: 80
MDC_IDC_EPISODE_ID: 92
MDC_IDC_EPISODE_ID: 93
MDC_IDC_EPISODE_ID: 94
MDC_IDC_EPISODE_ID: 95
MDC_IDC_EPISODE_ID: 96
MDC_IDC_EPISODE_ID: 97
MDC_IDC_EPISODE_ID: 98
MDC_IDC_EPISODE_ID: 99
MDC_IDC_EPISODE_TYPE: NORMAL
MDC_IDC_LEAD_CONNECTION_STATUS: NORMAL
MDC_IDC_LEAD_CONNECTION_STATUS: NORMAL
MDC_IDC_LEAD_IMPLANT_DT: NORMAL
MDC_IDC_LEAD_IMPLANT_DT: NORMAL
MDC_IDC_LEAD_LOCATION: NORMAL
MDC_IDC_LEAD_LOCATION: NORMAL
MDC_IDC_LEAD_LOCATION_DETAIL_1: NORMAL
MDC_IDC_LEAD_LOCATION_DETAIL_1: NORMAL
MDC_IDC_LEAD_MFG: NORMAL
MDC_IDC_LEAD_MFG: NORMAL
MDC_IDC_LEAD_MODEL: NORMAL
MDC_IDC_LEAD_MODEL: NORMAL
MDC_IDC_LEAD_POLARITY_TYPE: NORMAL
MDC_IDC_LEAD_POLARITY_TYPE: NORMAL
MDC_IDC_LEAD_SERIAL: NORMAL
MDC_IDC_LEAD_SERIAL: NORMAL
MDC_IDC_MSMT_BATTERY_DTM: NORMAL
MDC_IDC_MSMT_BATTERY_DTM: NORMAL
MDC_IDC_MSMT_BATTERY_REMAINING_LONGEVITY: 134 MO
MDC_IDC_MSMT_BATTERY_REMAINING_LONGEVITY: 159 MO
MDC_IDC_MSMT_BATTERY_RRT_TRIGGER: NORMAL
MDC_IDC_MSMT_BATTERY_RRT_TRIGGER: NORMAL
MDC_IDC_MSMT_BATTERY_STATUS: NORMAL
MDC_IDC_MSMT_BATTERY_VOLTAGE: 3.03 V
MDC_IDC_MSMT_BATTERY_VOLTAGE: 3.03 V
MDC_IDC_MSMT_CAP_CHARGE_DTM: NORMAL
MDC_IDC_MSMT_CAP_CHARGE_DTM: NORMAL
MDC_IDC_MSMT_CAP_CHARGE_ENERGY: 18 J
MDC_IDC_MSMT_CAP_CHARGE_ENERGY: 18 J
MDC_IDC_MSMT_CAP_CHARGE_TIME: 3.9 S
MDC_IDC_MSMT_CAP_CHARGE_TIME: 3.9 S
MDC_IDC_MSMT_CAP_CHARGE_TYPE: NORMAL
MDC_IDC_MSMT_CAP_CHARGE_TYPE: NORMAL
MDC_IDC_MSMT_LEADCHNL_RV_IMPEDANCE_VALUE: 323 OHM
MDC_IDC_MSMT_LEADCHNL_RV_IMPEDANCE_VALUE: 342 OHM
MDC_IDC_MSMT_LEADCHNL_RV_IMPEDANCE_VALUE: 437 OHM
MDC_IDC_MSMT_LEADCHNL_RV_IMPEDANCE_VALUE: 456 OHM
MDC_IDC_MSMT_LEADCHNL_RV_PACING_THRESHOLD_AMPLITUDE: 0.5 V
MDC_IDC_MSMT_LEADCHNL_RV_PACING_THRESHOLD_AMPLITUDE: 0.5 V
MDC_IDC_MSMT_LEADCHNL_RV_PACING_THRESHOLD_PULSEWIDTH: 0.4 MS
MDC_IDC_MSMT_LEADCHNL_RV_PACING_THRESHOLD_PULSEWIDTH: 0.4 MS
MDC_IDC_MSMT_LEADCHNL_RV_SENSING_INTR_AMPL: 14 MV
MDC_IDC_MSMT_LEADCHNL_RV_SENSING_INTR_AMPL: 5.8 MV
MDC_IDC_PG_IMPLANT_DTM: NORMAL
MDC_IDC_PG_IMPLANT_DTM: NORMAL
MDC_IDC_PG_MFG: NORMAL
MDC_IDC_PG_MFG: NORMAL
MDC_IDC_PG_MODEL: NORMAL
MDC_IDC_PG_MODEL: NORMAL
MDC_IDC_PG_SERIAL: NORMAL
MDC_IDC_PG_SERIAL: NORMAL
MDC_IDC_PG_TYPE: NORMAL
MDC_IDC_PG_TYPE: NORMAL
MDC_IDC_SESS_CLINIC_NAME: NORMAL
MDC_IDC_SESS_CLINIC_NAME: NORMAL
MDC_IDC_SESS_DTM: NORMAL
MDC_IDC_SESS_DTM: NORMAL
MDC_IDC_SESS_TYPE: NORMAL
MDC_IDC_SESS_TYPE: NORMAL
MDC_IDC_SET_BRADY_LOWRATE: 60 {BEATS}/MIN
MDC_IDC_SET_BRADY_LOWRATE: 60 {BEATS}/MIN
MDC_IDC_SET_BRADY_MAX_SENSOR_RATE: 130 {BEATS}/MIN
MDC_IDC_SET_BRADY_MAX_SENSOR_RATE: 130 {BEATS}/MIN
MDC_IDC_SET_BRADY_MODE: NORMAL
MDC_IDC_SET_BRADY_MODE: NORMAL
MDC_IDC_SET_LEADCHNL_RV_PACING_AMPLITUDE: 2 V
MDC_IDC_SET_LEADCHNL_RV_PACING_AMPLITUDE: 2 V
MDC_IDC_SET_LEADCHNL_RV_PACING_ANODE_ELECTRODE_1: NORMAL
MDC_IDC_SET_LEADCHNL_RV_PACING_ANODE_ELECTRODE_1: NORMAL
MDC_IDC_SET_LEADCHNL_RV_PACING_ANODE_LOCATION_1: NORMAL
MDC_IDC_SET_LEADCHNL_RV_PACING_ANODE_LOCATION_1: NORMAL
MDC_IDC_SET_LEADCHNL_RV_PACING_CAPTURE_MODE: NORMAL
MDC_IDC_SET_LEADCHNL_RV_PACING_CAPTURE_MODE: NORMAL
MDC_IDC_SET_LEADCHNL_RV_PACING_CATHODE_ELECTRODE_1: NORMAL
MDC_IDC_SET_LEADCHNL_RV_PACING_CATHODE_ELECTRODE_1: NORMAL
MDC_IDC_SET_LEADCHNL_RV_PACING_CATHODE_LOCATION_1: NORMAL
MDC_IDC_SET_LEADCHNL_RV_PACING_CATHODE_LOCATION_1: NORMAL
MDC_IDC_SET_LEADCHNL_RV_PACING_POLARITY: NORMAL
MDC_IDC_SET_LEADCHNL_RV_PACING_POLARITY: NORMAL
MDC_IDC_SET_LEADCHNL_RV_PACING_PULSEWIDTH: 0.4 MS
MDC_IDC_SET_LEADCHNL_RV_PACING_PULSEWIDTH: 0.4 MS
MDC_IDC_SET_LEADCHNL_RV_SENSING_ANODE_ELECTRODE_1: NORMAL
MDC_IDC_SET_LEADCHNL_RV_SENSING_ANODE_ELECTRODE_1: NORMAL
MDC_IDC_SET_LEADCHNL_RV_SENSING_ANODE_LOCATION_1: NORMAL
MDC_IDC_SET_LEADCHNL_RV_SENSING_ANODE_LOCATION_1: NORMAL
MDC_IDC_SET_LEADCHNL_RV_SENSING_CATHODE_ELECTRODE_1: NORMAL
MDC_IDC_SET_LEADCHNL_RV_SENSING_CATHODE_ELECTRODE_1: NORMAL
MDC_IDC_SET_LEADCHNL_RV_SENSING_CATHODE_LOCATION_1: NORMAL
MDC_IDC_SET_LEADCHNL_RV_SENSING_CATHODE_LOCATION_1: NORMAL
MDC_IDC_SET_LEADCHNL_RV_SENSING_POLARITY: NORMAL
MDC_IDC_SET_LEADCHNL_RV_SENSING_POLARITY: NORMAL
MDC_IDC_SET_LEADCHNL_RV_SENSING_SENSITIVITY: 0.3 MV
MDC_IDC_SET_LEADCHNL_RV_SENSING_SENSITIVITY: 0.3 MV
MDC_IDC_SET_ZONE_DETECTION_BEATS_DENOMINATOR: 16 {BEATS}
MDC_IDC_SET_ZONE_DETECTION_BEATS_DENOMINATOR: 16 {BEATS}
MDC_IDC_SET_ZONE_DETECTION_BEATS_DENOMINATOR: 32 {BEATS}
MDC_IDC_SET_ZONE_DETECTION_BEATS_DENOMINATOR: 32 {BEATS}
MDC_IDC_SET_ZONE_DETECTION_BEATS_DENOMINATOR: 40 {BEATS}
MDC_IDC_SET_ZONE_DETECTION_BEATS_DENOMINATOR: 40 {BEATS}
MDC_IDC_SET_ZONE_DETECTION_BEATS_NUMERATOR: 16 {BEATS}
MDC_IDC_SET_ZONE_DETECTION_BEATS_NUMERATOR: 16 {BEATS}
MDC_IDC_SET_ZONE_DETECTION_BEATS_NUMERATOR: 30 {BEATS}
MDC_IDC_SET_ZONE_DETECTION_BEATS_NUMERATOR: 30 {BEATS}
MDC_IDC_SET_ZONE_DETECTION_BEATS_NUMERATOR: 32 {BEATS}
MDC_IDC_SET_ZONE_DETECTION_BEATS_NUMERATOR: 32 {BEATS}
MDC_IDC_SET_ZONE_DETECTION_INTERVAL: 320 MS
MDC_IDC_SET_ZONE_DETECTION_INTERVAL: 320 MS
MDC_IDC_SET_ZONE_DETECTION_INTERVAL: 360 MS
MDC_IDC_SET_ZONE_DETECTION_INTERVAL: 360 MS
MDC_IDC_SET_ZONE_DETECTION_INTERVAL: 400 MS
MDC_IDC_SET_ZONE_DETECTION_INTERVAL: 400 MS
MDC_IDC_SET_ZONE_STATUS: NORMAL
MDC_IDC_SET_ZONE_TYPE: NORMAL
MDC_IDC_SET_ZONE_VENDOR_TYPE: NORMAL
MDC_IDC_STAT_AT_BURDEN_PERCENT: 0 %
MDC_IDC_STAT_AT_BURDEN_PERCENT: 0 %
MDC_IDC_STAT_AT_DTM_END: NORMAL
MDC_IDC_STAT_AT_DTM_END: NORMAL
MDC_IDC_STAT_AT_DTM_START: NORMAL
MDC_IDC_STAT_AT_DTM_START: NORMAL
MDC_IDC_STAT_BRADY_DTM_END: NORMAL
MDC_IDC_STAT_BRADY_DTM_END: NORMAL
MDC_IDC_STAT_BRADY_DTM_START: NORMAL
MDC_IDC_STAT_BRADY_DTM_START: NORMAL
MDC_IDC_STAT_BRADY_RA_PERCENT_PACED: NORMAL
MDC_IDC_STAT_BRADY_RV_PERCENT_PACED: 1.53 %
MDC_IDC_STAT_BRADY_RV_PERCENT_PACED: 1.54 %
MDC_IDC_STAT_CRT_DTM_END: NORMAL
MDC_IDC_STAT_CRT_DTM_END: NORMAL
MDC_IDC_STAT_CRT_DTM_START: NORMAL
MDC_IDC_STAT_CRT_DTM_START: NORMAL
MDC_IDC_STAT_EPISODE_RECENT_COUNT: 0
MDC_IDC_STAT_EPISODE_RECENT_COUNT: 32
MDC_IDC_STAT_EPISODE_RECENT_COUNT: 32
MDC_IDC_STAT_EPISODE_RECENT_COUNT: 5
MDC_IDC_STAT_EPISODE_RECENT_COUNT: 5
MDC_IDC_STAT_EPISODE_RECENT_COUNT_DTM_END: NORMAL
MDC_IDC_STAT_EPISODE_RECENT_COUNT_DTM_START: NORMAL
MDC_IDC_STAT_EPISODE_TOTAL_COUNT: 0
MDC_IDC_STAT_EPISODE_TOTAL_COUNT: 20
MDC_IDC_STAT_EPISODE_TOTAL_COUNT: 20
MDC_IDC_STAT_EPISODE_TOTAL_COUNT: 88
MDC_IDC_STAT_EPISODE_TOTAL_COUNT: 88
MDC_IDC_STAT_EPISODE_TOTAL_COUNT_DTM_END: NORMAL
MDC_IDC_STAT_EPISODE_TOTAL_COUNT_DTM_START: NORMAL
MDC_IDC_STAT_EPISODE_TYPE: NORMAL
MDC_IDC_STAT_TACHYTHERAPY_ATP_DELIVERED_RECENT: 0
MDC_IDC_STAT_TACHYTHERAPY_ATP_DELIVERED_RECENT: 0
MDC_IDC_STAT_TACHYTHERAPY_ATP_DELIVERED_TOTAL: 0
MDC_IDC_STAT_TACHYTHERAPY_ATP_DELIVERED_TOTAL: 0
MDC_IDC_STAT_TACHYTHERAPY_RECENT_DTM_END: NORMAL
MDC_IDC_STAT_TACHYTHERAPY_RECENT_DTM_END: NORMAL
MDC_IDC_STAT_TACHYTHERAPY_RECENT_DTM_START: NORMAL
MDC_IDC_STAT_TACHYTHERAPY_RECENT_DTM_START: NORMAL
MDC_IDC_STAT_TACHYTHERAPY_SHOCKS_ABORTED_RECENT: 0
MDC_IDC_STAT_TACHYTHERAPY_SHOCKS_ABORTED_RECENT: 0
MDC_IDC_STAT_TACHYTHERAPY_SHOCKS_ABORTED_TOTAL: 0
MDC_IDC_STAT_TACHYTHERAPY_SHOCKS_ABORTED_TOTAL: 0
MDC_IDC_STAT_TACHYTHERAPY_SHOCKS_DELIVERED_RECENT: 0
MDC_IDC_STAT_TACHYTHERAPY_SHOCKS_DELIVERED_RECENT: 0
MDC_IDC_STAT_TACHYTHERAPY_SHOCKS_DELIVERED_TOTAL: 0
MDC_IDC_STAT_TACHYTHERAPY_SHOCKS_DELIVERED_TOTAL: 0
MDC_IDC_STAT_TACHYTHERAPY_TOTAL_DTM_END: NORMAL
MDC_IDC_STAT_TACHYTHERAPY_TOTAL_DTM_END: NORMAL
MDC_IDC_STAT_TACHYTHERAPY_TOTAL_DTM_START: NORMAL
MDC_IDC_STAT_TACHYTHERAPY_TOTAL_DTM_START: NORMAL
MONOCYTES # BLD AUTO: 0.8 10E3/UL (ref 0–1.3)
MONOCYTES NFR BLD AUTO: 10 %
NEUTROPHILS # BLD AUTO: 5.1 10E3/UL (ref 1.6–8.3)
NEUTROPHILS NFR BLD AUTO: 63 %
NEUTS HYPERSEG BLD QL SMEAR: NORMAL
NRBC # BLD AUTO: 0 10E3/UL
NRBC BLD AUTO-RTO: 0 /100
O2/TOTAL GAS SETTING VFR VENT: 0 %
O2/TOTAL GAS SETTING VFR VENT: 21 %
O2/TOTAL GAS SETTING VFR VENT: 40 %
O2/TOTAL GAS SETTING VFR VENT: 40 %
O2/TOTAL GAS SETTING VFR VENT: 60 %
O2/TOTAL GAS SETTING VFR VENT: 68 %
O2/TOTAL GAS SETTING VFR VENT: 69 %
O2/TOTAL GAS SETTING VFR VENT: 69 %
O2/TOTAL GAS SETTING VFR VENT: 75 %
O2/TOTAL GAS SETTING VFR VENT: 80 %
O2/TOTAL GAS SETTING VFR VENT: 80 %
OXYHGB MFR BLDA: 95 % (ref 92–100)
OXYHGB MFR BLDA: 97 % (ref 92–100)
OXYHGB MFR BLDA: 98 % (ref 92–100)
OXYHGB MFR BLDV: 68 % (ref 70–75)
OXYHGB MFR BLDV: 72 % (ref 70–75)
OXYHGB MFR BLDV: 74 % (ref 70–75)
OXYHGB MFR BLDV: 77 % (ref 70–75)
OXYHGB MFR BLDV: 79 % (ref 70–75)
OXYHGB MFR BLDV: 81 % (ref 70–75)
P AXIS - MUSE: 62 DEGREES
P AXIS - MUSE: 90 DEGREES
PCO2 BLD: 35 MM HG (ref 35–45)
PCO2 BLD: 36 MM HG (ref 35–45)
PCO2 BLD: 37 MM HG (ref 35–45)
PCO2 BLDA: 38 MM HG (ref 35–45)
PCO2 BLDA: 42 MM HG (ref 35–45)
PCO2 BLDA: 42 MM HG (ref 35–45)
PCO2 BLDA: 44 MM HG (ref 35–45)
PCO2 BLDA: 45 MM HG (ref 35–45)
PCO2 BLDA: 46 MM HG (ref 35–45)
PCO2 BLDV: 40 MM HG (ref 40–50)
PCO2 BLDV: 40 MM HG (ref 40–50)
PCO2 BLDV: 42 MM HG (ref 40–50)
PCO2 BLDV: 42 MM HG (ref 40–50)
PCO2 BLDV: 47 MM HG (ref 40–50)
PCO2 BLDV: 52 MM HG (ref 40–50)
PH BLD: 7.38 [PH] (ref 7.35–7.45)
PH BLD: 7.39 [PH] (ref 7.35–7.45)
PH BLD: 7.4 [PH] (ref 7.35–7.45)
PH BLDA: 7.32 [PH] (ref 7.35–7.45)
PH BLDA: 7.34 [PH] (ref 7.35–7.45)
PH BLDA: 7.35 [PH] (ref 7.35–7.45)
PH BLDA: 7.35 [PH] (ref 7.35–7.45)
PH BLDA: 7.36 [PH] (ref 7.35–7.45)
PH BLDA: 7.37 [PH] (ref 7.35–7.45)
PH BLDV: 7.32 [PH] (ref 7.32–7.43)
PH BLDV: 7.34 [PH] (ref 7.32–7.43)
PH BLDV: 7.35 [PH] (ref 7.32–7.43)
PH BLDV: 7.36 [PH] (ref 7.32–7.43)
PH BLDV: 7.37 [PH] (ref 7.32–7.43)
PH BLDV: 7.43 [PH] (ref 7.32–7.43)
PHOSPHATE SERPL-MCNC: 2.1 MG/DL (ref 2.5–4.5)
PHOSPHATE SERPL-MCNC: 2.5 MG/DL (ref 2.5–4.5)
PHOSPHATE SERPL-MCNC: 2.6 MG/DL (ref 2.5–4.5)
PLAT MORPH BLD: NORMAL
PLATELET # BLD AUTO: 116 10E3/UL (ref 150–450)
PLATELET # BLD AUTO: 127 10E3/UL (ref 150–450)
PLATELET # BLD AUTO: 129 10E3/UL (ref 150–450)
PLATELET # BLD AUTO: 139 10E3/UL (ref 150–450)
PLATELET # BLD AUTO: 81 10E3/UL (ref 150–450)
PO2 BLD: 107 MM HG (ref 80–105)
PO2 BLD: 119 MM HG (ref 80–105)
PO2 BLD: 175 MM HG (ref 80–105)
PO2 BLDA: 116 MM HG (ref 80–105)
PO2 BLDA: 148 MM HG (ref 80–105)
PO2 BLDA: 170 MM HG (ref 80–105)
PO2 BLDA: 280 MM HG (ref 80–105)
PO2 BLDA: 320 MM HG (ref 80–105)
PO2 BLDA: 90 MM HG (ref 80–105)
PO2 BLDV: 36 MM HG (ref 25–47)
PO2 BLDV: 38 MM HG (ref 25–47)
PO2 BLDV: 39 MM HG (ref 25–47)
PO2 BLDV: 42 MM HG (ref 25–47)
PO2 BLDV: 47 MM HG (ref 25–47)
PO2 BLDV: 48 MM HG (ref 25–47)
POLYCHROMASIA BLD QL SMEAR: NORMAL
POTASSIUM BLD-SCNC: 3.9 MMOL/L (ref 3.4–5.3)
POTASSIUM BLD-SCNC: 4 MMOL/L (ref 3.4–5.3)
POTASSIUM BLD-SCNC: 4 MMOL/L (ref 3.4–5.3)
POTASSIUM BLD-SCNC: 4.2 MMOL/L (ref 3.4–5.3)
POTASSIUM BLD-SCNC: 4.2 MMOL/L (ref 3.4–5.3)
POTASSIUM BLD-SCNC: 4.5 MMOL/L (ref 3.4–5.3)
POTASSIUM BLD-SCNC: 4.6 MMOL/L (ref 3.4–5.3)
POTASSIUM BLD-SCNC: 4.8 MMOL/L (ref 3.4–5.3)
POTASSIUM SERPL-SCNC: 4.1 MMOL/L (ref 3.4–5.3)
POTASSIUM SERPL-SCNC: 4.2 MMOL/L (ref 3.4–5.3)
POTASSIUM SERPL-SCNC: 4.2 MMOL/L (ref 3.4–5.3)
POTASSIUM SERPL-SCNC: 4.4 MMOL/L (ref 3.4–5.3)
PR INTERVAL - MUSE: 170 MS
PR INTERVAL - MUSE: 94 MS
PROT SERPL-MCNC: 5.2 G/DL (ref 6.4–8.3)
PROT SERPL-MCNC: 5.3 G/DL (ref 6.4–8.3)
PROT SERPL-MCNC: 5.4 G/DL (ref 6.4–8.3)
PROT SERPL-MCNC: 5.5 G/DL (ref 6.4–8.3)
QRS DURATION - MUSE: 106 MS
QRS DURATION - MUSE: 86 MS
QT - MUSE: 394 MS
QT - MUSE: 402 MS
QTC - MUSE: 489 MS
QTC - MUSE: 533 MS
R AXIS - MUSE: -55 DEGREES
R AXIS - MUSE: 268 DEGREES
RBC # BLD AUTO: 4.2 10E6/UL (ref 4.4–5.9)
RBC # BLD AUTO: 4.21 10E6/UL (ref 4.4–5.9)
RBC # BLD AUTO: 4.28 10E6/UL (ref 4.4–5.9)
RBC # BLD AUTO: 5.01 10E6/UL (ref 4.4–5.9)
RBC AGGLUT BLD QL: NORMAL
RBC MORPH BLD: NORMAL
ROULEAUX BLD QL SMEAR: NORMAL
SAO2 % BLDA: 100 % (ref 96–97)
SAO2 % BLDA: 96 % (ref 92–100)
SAO2 % BLDA: 97 % (ref 92–100)
SAO2 % BLDA: 97 % (ref 96–97)
SAO2 % BLDA: 98 % (ref 92–100)
SAO2 % BLDA: 99 % (ref 96–97)
SAO2 % BLDA: 99 % (ref 96–97)
SAO2 % BLDV: 70.1 % (ref 70–75)
SAO2 % BLDV: 73.6 % (ref 70–75)
SAO2 % BLDV: 75.6 % (ref 70–75)
SAO2 % BLDV: 78.3 % (ref 70–75)
SAO2 % BLDV: 81 % (ref 70–75)
SAO2 % BLDV: 83 % (ref 70–75)
SICKLE CELLS BLD QL SMEAR: NORMAL
SMUDGE CELLS BLD QL SMEAR: NORMAL
SODIUM BLD-SCNC: 137 MMOL/L (ref 135–145)
SODIUM BLD-SCNC: 138 MMOL/L (ref 135–145)
SODIUM BLD-SCNC: 139 MMOL/L (ref 135–145)
SODIUM BLD-SCNC: 139 MMOL/L (ref 135–145)
SODIUM BLD-SCNC: 140 MMOL/L (ref 135–145)
SODIUM SERPL-SCNC: 137 MMOL/L (ref 135–145)
SODIUM SERPL-SCNC: 138 MMOL/L (ref 135–145)
SODIUM SERPL-SCNC: 138 MMOL/L (ref 135–145)
SODIUM SERPL-SCNC: 140 MMOL/L (ref 135–145)
SPECIMEN EXPIRATION DATE: NORMAL
SPHEROCYTES BLD QL SMEAR: NORMAL
STOMATOCYTES BLD QL SMEAR: NORMAL
SYSTOLIC BLOOD PRESSURE - MUSE: NORMAL MMHG
SYSTOLIC BLOOD PRESSURE - MUSE: NORMAL MMHG
T AXIS - MUSE: 102 DEGREES
T AXIS - MUSE: 48 DEGREES
TARGETS BLD QL SMEAR: NORMAL
TOXIC GRANULES BLD QL SMEAR: NORMAL
UFH PPP CHRO-ACNC: 0.35 IU/ML
UNIT ABO/RH: NORMAL
UNIT NUMBER: NORMAL
UNIT STATUS: NORMAL
UNIT TYPE ISBT: 5100
UNIT TYPE ISBT: 9500
UNIT TYPE ISBT: 9500
VARIANT LYMPHS BLD QL SMEAR: NORMAL
VENTRICULAR RATE- MUSE: 106 BPM
VENTRICULAR RATE- MUSE: 93 BPM
WBC # BLD AUTO: 17.3 10E3/UL (ref 4–11)
WBC # BLD AUTO: 18.9 10E3/UL (ref 4–11)
WBC # BLD AUTO: 19.3 10E3/UL (ref 4–11)
WBC # BLD AUTO: 8.1 10E3/UL (ref 4–11)

## 2024-06-14 PROCEDURE — 94799 UNLISTED PULMONARY SVC/PX: CPT

## 2024-06-14 PROCEDURE — 258N000003 HC RX IP 258 OP 636: Performed by: PHYSICIAN ASSISTANT

## 2024-06-14 PROCEDURE — 200N000002 HC R&B ICU UMMC

## 2024-06-14 PROCEDURE — 83735 ASSAY OF MAGNESIUM: CPT

## 2024-06-14 PROCEDURE — 85049 AUTOMATED PLATELET COUNT: CPT | Performed by: PHYSICIAN ASSISTANT

## 2024-06-14 PROCEDURE — 85025 COMPLETE CBC W/AUTO DIFF WBC: CPT | Performed by: STUDENT IN AN ORGANIZED HEALTH CARE EDUCATION/TRAINING PROGRAM

## 2024-06-14 PROCEDURE — 250N000011 HC RX IP 250 OP 636

## 2024-06-14 PROCEDURE — 93005 ELECTROCARDIOGRAM TRACING: CPT

## 2024-06-14 PROCEDURE — 82330 ASSAY OF CALCIUM: CPT

## 2024-06-14 PROCEDURE — 82805 BLOOD GASES W/O2 SATURATION: CPT | Performed by: SURGERY

## 2024-06-14 PROCEDURE — 71045 X-RAY EXAM CHEST 1 VIEW: CPT | Mod: 26 | Performed by: RADIOLOGY

## 2024-06-14 PROCEDURE — 94002 VENT MGMT INPAT INIT DAY: CPT

## 2024-06-14 PROCEDURE — 33979 INSERT INTRACORPOREAL DEVICE: CPT | Mod: GC | Performed by: SURGERY

## 2024-06-14 PROCEDURE — 85384 FIBRINOGEN ACTIVITY: CPT | Performed by: INTERNAL MEDICINE

## 2024-06-14 PROCEDURE — 85396 CLOTTING ASSAY WHOLE BLOOD: CPT

## 2024-06-14 PROCEDURE — 82947 ASSAY GLUCOSE BLOOD QUANT: CPT | Performed by: PHYSICIAN ASSISTANT

## 2024-06-14 PROCEDURE — 258N000003 HC RX IP 258 OP 636

## 2024-06-14 PROCEDURE — 258N000003 HC RX IP 258 OP 636: Mod: JZ | Performed by: SURGERY

## 2024-06-14 PROCEDURE — 274N000003 HC DEVICE HM 14-V LITHIUM BATTERY, INITIAL ONLY, EACH

## 2024-06-14 PROCEDURE — 85027 COMPLETE CBC AUTOMATED: CPT

## 2024-06-14 PROCEDURE — 83605 ASSAY OF LACTIC ACID: CPT | Performed by: PHYSICIAN ASSISTANT

## 2024-06-14 PROCEDURE — 82330 ASSAY OF CALCIUM: CPT | Performed by: PHYSICIAN ASSISTANT

## 2024-06-14 PROCEDURE — 85730 THROMBOPLASTIN TIME PARTIAL: CPT | Performed by: INTERNAL MEDICINE

## 2024-06-14 PROCEDURE — 250N000009 HC RX 250: Performed by: PHYSICIAN ASSISTANT

## 2024-06-14 PROCEDURE — 272N000001 HC OR GENERAL SUPPLY STERILE: Performed by: SURGERY

## 2024-06-14 PROCEDURE — 99291 CRITICAL CARE FIRST HOUR: CPT | Mod: 25

## 2024-06-14 PROCEDURE — 85520 HEPARIN ASSAY: CPT | Performed by: INTERNAL MEDICINE

## 2024-06-14 PROCEDURE — 250N000009 HC RX 250: Performed by: INTERNAL MEDICINE

## 2024-06-14 PROCEDURE — 83735 ASSAY OF MAGNESIUM: CPT | Performed by: STUDENT IN AN ORGANIZED HEALTH CARE EDUCATION/TRAINING PROGRAM

## 2024-06-14 PROCEDURE — 33979 INSERT INTRACORPOREAL DEVICE: CPT | Performed by: ANESTHESIOLOGY

## 2024-06-14 PROCEDURE — P9047 ALBUMIN (HUMAN), 25%, 50ML: HCPCS

## 2024-06-14 PROCEDURE — P9016 RBC LEUKOCYTES REDUCED: HCPCS

## 2024-06-14 PROCEDURE — 84132 ASSAY OF SERUM POTASSIUM: CPT

## 2024-06-14 PROCEDURE — 99291 CRITICAL CARE FIRST HOUR: CPT | Mod: 25 | Performed by: INTERNAL MEDICINE

## 2024-06-14 PROCEDURE — 274N000005 HC DEVICE HM POCKET BATTERY HOLSTER, INITIAL ONLY

## 2024-06-14 PROCEDURE — 86900 BLOOD TYPING SEROLOGIC ABO: CPT | Performed by: INTERNAL MEDICINE

## 2024-06-14 PROCEDURE — 999N000065 XR ABDOMEN PORT 1 VIEW

## 2024-06-14 PROCEDURE — 82805 BLOOD GASES W/O2 SATURATION: CPT

## 2024-06-14 PROCEDURE — 999N000075 HC STATISTIC IABP MONITORING

## 2024-06-14 PROCEDURE — 258N000003 HC RX IP 258 OP 636: Mod: JZ

## 2024-06-14 PROCEDURE — 250N000009 HC RX 250

## 2024-06-14 PROCEDURE — 88307 TISSUE EXAM BY PATHOLOGIST: CPT | Mod: TC | Performed by: SURGERY

## 2024-06-14 PROCEDURE — 84100 ASSAY OF PHOSPHORUS: CPT | Performed by: PHYSICIAN ASSISTANT

## 2024-06-14 PROCEDURE — 85049 AUTOMATED PLATELET COUNT: CPT | Performed by: INTERNAL MEDICINE

## 2024-06-14 PROCEDURE — 272N000085 HC PACK CELL SAVER CSP: Performed by: SURGERY

## 2024-06-14 PROCEDURE — 84100 ASSAY OF PHOSPHORUS: CPT | Performed by: STUDENT IN AN ORGANIZED HEALTH CARE EDUCATION/TRAINING PROGRAM

## 2024-06-14 PROCEDURE — 274N000002 HC DEVICE HM 14-V LITH BATTERY CLIP, INITIAL ONLY

## 2024-06-14 PROCEDURE — 02HA0QZ INSERTION OF IMPLANTABLE HEART ASSIST SYSTEM INTO HEART, OPEN APPROACH: ICD-10-PCS | Performed by: SURGERY

## 2024-06-14 PROCEDURE — 83605 ASSAY OF LACTIC ACID: CPT

## 2024-06-14 PROCEDURE — 250N000011 HC RX IP 250 OP 636: Performed by: PHYSICIAN ASSISTANT

## 2024-06-14 PROCEDURE — 93287 PERI-PX DEVICE EVAL & PRGR: CPT

## 2024-06-14 PROCEDURE — 93287 PERI-PX DEVICE EVAL & PRGR: CPT | Mod: 26 | Performed by: INTERNAL MEDICINE

## 2024-06-14 PROCEDURE — 74018 RADEX ABDOMEN 1 VIEW: CPT | Mod: 26 | Performed by: RADIOLOGY

## 2024-06-14 PROCEDURE — 250N000009 HC RX 250: Performed by: SURGERY

## 2024-06-14 PROCEDURE — 85049 AUTOMATED PLATELET COUNT: CPT

## 2024-06-14 PROCEDURE — 82310 ASSAY OF CALCIUM: CPT | Performed by: PHYSICIAN ASSISTANT

## 2024-06-14 PROCEDURE — 86923 COMPATIBILITY TEST ELECTRIC: CPT

## 2024-06-14 PROCEDURE — 250N000011 HC RX IP 250 OP 636: Performed by: SURGERY

## 2024-06-14 PROCEDURE — 250N000013 HC RX MED GY IP 250 OP 250 PS 637: Performed by: PHYSICIAN ASSISTANT

## 2024-06-14 PROCEDURE — 82805 BLOOD GASES W/O2 SATURATION: CPT | Performed by: PHYSICIAN ASSISTANT

## 2024-06-14 PROCEDURE — 258N000003 HC RX IP 258 OP 636: Mod: JZ | Performed by: INTERNAL MEDICINE

## 2024-06-14 PROCEDURE — 272N000088 HC PUMP APP ADULT PERFUSION: Performed by: SURGERY

## 2024-06-14 PROCEDURE — 85730 THROMBOPLASTIN TIME PARTIAL: CPT | Performed by: PHYSICIAN ASSISTANT

## 2024-06-14 PROCEDURE — 258N000003 HC RX IP 258 OP 636: Mod: JZ | Performed by: PHYSICIAN ASSISTANT

## 2024-06-14 PROCEDURE — 999N000065 XR CHEST PORT 1 VIEW

## 2024-06-14 PROCEDURE — 250N000024 HC ISOFLURANE, PER MIN: Performed by: SURGERY

## 2024-06-14 PROCEDURE — 274N000011 HC DEVICE HM III IMPLANT KIT

## 2024-06-14 PROCEDURE — P9037 PLATE PHERES LEUKOREDU IRRAD: HCPCS

## 2024-06-14 PROCEDURE — 85610 PROTHROMBIN TIME: CPT | Performed by: INTERNAL MEDICINE

## 2024-06-14 PROCEDURE — 83735 ASSAY OF MAGNESIUM: CPT | Performed by: PHYSICIAN ASSISTANT

## 2024-06-14 PROCEDURE — 80048 BASIC METABOLIC PNL TOTAL CA: CPT | Performed by: STUDENT IN AN ORGANIZED HEALTH CARE EDUCATION/TRAINING PROGRAM

## 2024-06-14 PROCEDURE — 410N000003 HC PER-PERFUSION 1ST 30 MIN: Performed by: SURGERY

## 2024-06-14 PROCEDURE — 274N000010 HC DEVICE HM III CONTROLLER, INITIAL ONLY, EACH

## 2024-06-14 PROCEDURE — 84100 ASSAY OF PHOSPHORUS: CPT

## 2024-06-14 PROCEDURE — 360N000079 HC SURGERY LEVEL 6, PER MIN: Performed by: SURGERY

## 2024-06-14 PROCEDURE — 85610 PROTHROMBIN TIME: CPT | Performed by: PHYSICIAN ASSISTANT

## 2024-06-14 PROCEDURE — 250N000013 HC RX MED GY IP 250 OP 250 PS 637

## 2024-06-14 PROCEDURE — 5A1221Z PERFORMANCE OF CARDIAC OUTPUT, CONTINUOUS: ICD-10-PCS | Performed by: SURGERY

## 2024-06-14 PROCEDURE — 370N000017 HC ANESTHESIA TECHNICAL FEE, PER MIN: Performed by: SURGERY

## 2024-06-14 PROCEDURE — 999N000157 HC STATISTIC RCP TIME EA 10 MIN

## 2024-06-14 PROCEDURE — 88307 TISSUE EXAM BY PATHOLOGIST: CPT | Mod: 26 | Performed by: PATHOLOGY

## 2024-06-14 PROCEDURE — 74018 RADEX ABDOMEN 1 VIEW: CPT | Mod: 26 | Performed by: STUDENT IN AN ORGANIZED HEALTH CARE EDUCATION/TRAINING PROGRAM

## 2024-06-14 PROCEDURE — 410N000004: Performed by: SURGERY

## 2024-06-14 PROCEDURE — 250N000013 HC RX MED GY IP 250 OP 250 PS 637: Performed by: STUDENT IN AN ORGANIZED HEALTH CARE EDUCATION/TRAINING PROGRAM

## 2024-06-14 PROCEDURE — 80053 COMPREHEN METABOLIC PANEL: CPT | Performed by: STUDENT IN AN ORGANIZED HEALTH CARE EDUCATION/TRAINING PROGRAM

## 2024-06-14 PROCEDURE — 71045 X-RAY EXAM CHEST 1 VIEW: CPT

## 2024-06-14 RX ORDER — FIBRINOGEN (HUMAN) 700-1300MG
1350 KIT INTRAVENOUS
Status: DISCONTINUED | OUTPATIENT
Start: 2024-06-14 | End: 2024-06-14

## 2024-06-14 RX ORDER — LEVOFLOXACIN 5 MG/ML
500 INJECTION, SOLUTION INTRAVENOUS
Status: COMPLETED | OUTPATIENT
Start: 2024-06-14 | End: 2024-06-14

## 2024-06-14 RX ORDER — NALOXONE HYDROCHLORIDE 0.4 MG/ML
0.4 INJECTION, SOLUTION INTRAMUSCULAR; INTRAVENOUS; SUBCUTANEOUS
Status: DISCONTINUED | OUTPATIENT
Start: 2024-06-14 | End: 2024-06-19

## 2024-06-14 RX ORDER — CHLORHEXIDINE GLUCONATE ORAL RINSE 1.2 MG/ML
15 SOLUTION DENTAL EVERY 12 HOURS
Status: DISCONTINUED | OUTPATIENT
Start: 2024-06-14 | End: 2024-06-16

## 2024-06-14 RX ORDER — FIBRINOGEN (HUMAN) 700-1300MG
1350 KIT INTRAVENOUS
Status: COMPLETED | OUTPATIENT
Start: 2024-06-14 | End: 2024-06-14

## 2024-06-14 RX ORDER — ACETAMINOPHEN 325 MG/1
975 TABLET ORAL EVERY 8 HOURS
Status: COMPLETED | OUTPATIENT
Start: 2024-06-14 | End: 2024-06-17

## 2024-06-14 RX ORDER — DEXMEDETOMIDINE HYDROCHLORIDE 4 UG/ML
.2-.7 INJECTION, SOLUTION INTRAVENOUS CONTINUOUS
Status: DISCONTINUED | OUTPATIENT
Start: 2024-06-14 | End: 2024-06-14

## 2024-06-14 RX ORDER — LIDOCAINE HYDROCHLORIDE 20 MG/ML
INJECTION, SOLUTION INFILTRATION; PERINEURAL PRN
Status: DISCONTINUED | OUTPATIENT
Start: 2024-06-14 | End: 2024-06-14

## 2024-06-14 RX ORDER — NOREPINEPHRINE BITARTRATE 0.06 MG/ML
.01-.1 INJECTION, SOLUTION INTRAVENOUS CONTINUOUS
Status: DISCONTINUED | OUTPATIENT
Start: 2024-06-14 | End: 2024-06-16

## 2024-06-14 RX ORDER — DEXTROSE MONOHYDRATE 25 G/50ML
25-50 INJECTION, SOLUTION INTRAVENOUS
Status: DISCONTINUED | OUTPATIENT
Start: 2024-06-14 | End: 2024-06-14

## 2024-06-14 RX ORDER — HYDROMORPHONE HCL IN WATER/PF 6 MG/30 ML
0.4 PATIENT CONTROLLED ANALGESIA SYRINGE INTRAVENOUS
Status: DISCONTINUED | OUTPATIENT
Start: 2024-06-14 | End: 2024-06-19

## 2024-06-14 RX ORDER — ONDANSETRON 2 MG/ML
4 INJECTION INTRAMUSCULAR; INTRAVENOUS EVERY 6 HOURS PRN
Status: DISCONTINUED | OUTPATIENT
Start: 2024-06-14 | End: 2024-07-04 | Stop reason: HOSPADM

## 2024-06-14 RX ORDER — PANTOPRAZOLE SODIUM 40 MG/1
40 TABLET, DELAYED RELEASE ORAL DAILY
Status: DISCONTINUED | OUTPATIENT
Start: 2024-06-15 | End: 2024-06-15

## 2024-06-14 RX ORDER — NICOTINE POLACRILEX 4 MG
15-30 LOZENGE BUCCAL
Status: DISCONTINUED | OUTPATIENT
Start: 2024-06-14 | End: 2024-06-14

## 2024-06-14 RX ORDER — BISACODYL 10 MG
10 SUPPOSITORY, RECTAL RECTAL DAILY PRN
Status: DISCONTINUED | OUTPATIENT
Start: 2024-06-17 | End: 2024-07-04 | Stop reason: HOSPADM

## 2024-06-14 RX ORDER — FLUCONAZOLE 2 MG/ML
200 INJECTION, SOLUTION INTRAVENOUS
Status: COMPLETED | OUTPATIENT
Start: 2024-06-14 | End: 2024-06-14

## 2024-06-14 RX ORDER — PROCHLORPERAZINE MALEATE 5 MG
10 TABLET ORAL EVERY 6 HOURS PRN
Status: DISCONTINUED | OUTPATIENT
Start: 2024-06-14 | End: 2024-07-04 | Stop reason: HOSPADM

## 2024-06-14 RX ORDER — ETOMIDATE 2 MG/ML
INJECTION INTRAVENOUS PRN
Status: DISCONTINUED | OUTPATIENT
Start: 2024-06-14 | End: 2024-06-14

## 2024-06-14 RX ORDER — ACETAMINOPHEN 325 MG/1
975 TABLET ORAL ONCE
Status: DISCONTINUED | OUTPATIENT
Start: 2024-06-14 | End: 2024-06-14 | Stop reason: HOSPADM

## 2024-06-14 RX ORDER — NICOTINE POLACRILEX 4 MG
15-30 LOZENGE BUCCAL
Status: DISCONTINUED | OUTPATIENT
Start: 2024-06-14 | End: 2024-06-16

## 2024-06-14 RX ORDER — HEPARIN SODIUM 5000 [USP'U]/.5ML
5000 INJECTION, SOLUTION INTRAVENOUS; SUBCUTANEOUS EVERY 8 HOURS
Status: DISCONTINUED | OUTPATIENT
Start: 2024-06-15 | End: 2024-06-16

## 2024-06-14 RX ORDER — DEXTROSE MONOHYDRATE 25 G/50ML
25-50 INJECTION, SOLUTION INTRAVENOUS
Status: DISCONTINUED | OUTPATIENT
Start: 2024-06-14 | End: 2024-06-16

## 2024-06-14 RX ORDER — POLYETHYLENE GLYCOL 3350 17 G/17G
17 POWDER, FOR SOLUTION ORAL DAILY
Status: DISCONTINUED | OUTPATIENT
Start: 2024-06-15 | End: 2024-06-17

## 2024-06-14 RX ORDER — LIDOCAINE 40 MG/G
CREAM TOPICAL
Status: DISCONTINUED | OUTPATIENT
Start: 2024-06-14 | End: 2024-07-04 | Stop reason: HOSPADM

## 2024-06-14 RX ORDER — PROPOFOL 10 MG/ML
INJECTION, EMULSION INTRAVENOUS CONTINUOUS PRN
Status: DISCONTINUED | OUTPATIENT
Start: 2024-06-14 | End: 2024-06-14

## 2024-06-14 RX ORDER — PROTAMINE SULFATE 10 MG/ML
INJECTION, SOLUTION INTRAVENOUS PRN
Status: DISCONTINUED | OUTPATIENT
Start: 2024-06-14 | End: 2024-06-14

## 2024-06-14 RX ORDER — FENTANYL CITRATE 50 UG/ML
INJECTION, SOLUTION INTRAMUSCULAR; INTRAVENOUS PRN
Status: DISCONTINUED | OUTPATIENT
Start: 2024-06-14 | End: 2024-06-14

## 2024-06-14 RX ORDER — OXYCODONE HYDROCHLORIDE 10 MG/1
10 TABLET ORAL EVERY 4 HOURS PRN
Status: DISCONTINUED | OUTPATIENT
Start: 2024-06-14 | End: 2024-07-04 | Stop reason: HOSPADM

## 2024-06-14 RX ORDER — AMOXICILLIN 250 MG
1 CAPSULE ORAL 2 TIMES DAILY
Status: DISCONTINUED | OUTPATIENT
Start: 2024-06-14 | End: 2024-06-17

## 2024-06-14 RX ORDER — SODIUM CHLORIDE, SODIUM GLUCONATE, SODIUM ACETATE, POTASSIUM CHLORIDE AND MAGNESIUM CHLORIDE 526; 502; 368; 37; 30 MG/100ML; MG/100ML; MG/100ML; MG/100ML; MG/100ML
INJECTION, SOLUTION INTRAVENOUS CONTINUOUS PRN
Status: DISCONTINUED | OUTPATIENT
Start: 2024-06-14 | End: 2024-06-14

## 2024-06-14 RX ORDER — NALOXONE HYDROCHLORIDE 0.4 MG/ML
0.2 INJECTION, SOLUTION INTRAMUSCULAR; INTRAVENOUS; SUBCUTANEOUS
Status: DISCONTINUED | OUTPATIENT
Start: 2024-06-14 | End: 2024-06-19

## 2024-06-14 RX ORDER — ONDANSETRON 4 MG/1
4 TABLET, ORALLY DISINTEGRATING ORAL EVERY 6 HOURS PRN
Status: DISCONTINUED | OUTPATIENT
Start: 2024-06-14 | End: 2024-07-04 | Stop reason: HOSPADM

## 2024-06-14 RX ORDER — PROPOFOL 10 MG/ML
5-75 INJECTION, EMULSION INTRAVENOUS CONTINUOUS
Status: DISCONTINUED | OUTPATIENT
Start: 2024-06-14 | End: 2024-06-16

## 2024-06-14 RX ORDER — HYDROMORPHONE HCL IN WATER/PF 6 MG/30 ML
0.2 PATIENT CONTROLLED ANALGESIA SYRINGE INTRAVENOUS
Status: DISCONTINUED | OUTPATIENT
Start: 2024-06-14 | End: 2024-06-19

## 2024-06-14 RX ORDER — MILRINONE LACTATE 0.2 MG/ML
0.25 INJECTION, SOLUTION INTRAVENOUS CONTINUOUS
Status: DISCONTINUED | OUTPATIENT
Start: 2024-06-14 | End: 2024-06-14

## 2024-06-14 RX ORDER — HYDRALAZINE HYDROCHLORIDE 20 MG/ML
10 INJECTION INTRAMUSCULAR; INTRAVENOUS EVERY 30 MIN PRN
Status: DISCONTINUED | OUTPATIENT
Start: 2024-06-14 | End: 2024-06-15

## 2024-06-14 RX ORDER — ACETAMINOPHEN 325 MG/1
650 TABLET ORAL EVERY 4 HOURS PRN
Status: DISCONTINUED | OUTPATIENT
Start: 2024-06-17 | End: 2024-07-04 | Stop reason: HOSPADM

## 2024-06-14 RX ORDER — HEPARIN SODIUM 1000 [USP'U]/ML
INJECTION, SOLUTION INTRAVENOUS; SUBCUTANEOUS PRN
Status: DISCONTINUED | OUTPATIENT
Start: 2024-06-14 | End: 2024-06-14

## 2024-06-14 RX ORDER — CALCIUM GLUCONATE 20 MG/ML
2 INJECTION, SOLUTION INTRAVENOUS EVERY 6 HOURS PRN
Status: DISCONTINUED | OUTPATIENT
Start: 2024-06-14 | End: 2024-06-20

## 2024-06-14 RX ORDER — POTASSIUM PHOS IN 0.9 % NACL 15MMOL/250
15 PLASTIC BAG, INJECTION (ML) INTRAVENOUS ONCE
Status: COMPLETED | OUTPATIENT
Start: 2024-06-14 | End: 2024-06-15

## 2024-06-14 RX ORDER — ASPIRIN 81 MG/1
81 TABLET, CHEWABLE ORAL DAILY
Status: DISCONTINUED | OUTPATIENT
Start: 2024-06-15 | End: 2024-06-15

## 2024-06-14 RX ORDER — VASOPRESSIN IN 0.9 % NACL 2 UNIT/2ML
SYRINGE (ML) INTRAVENOUS PRN
Status: DISCONTINUED | OUTPATIENT
Start: 2024-06-14 | End: 2024-06-14

## 2024-06-14 RX ORDER — LEVOFLOXACIN 5 MG/ML
500 INJECTION, SOLUTION INTRAVENOUS EVERY 24 HOURS
Status: COMPLETED | OUTPATIENT
Start: 2024-06-15 | End: 2024-06-16

## 2024-06-14 RX ORDER — MUPIROCIN 20 MG/G
OINTMENT TOPICAL 2 TIMES DAILY
Status: DISPENSED | OUTPATIENT
Start: 2024-06-14 | End: 2024-06-15

## 2024-06-14 RX ORDER — FLUCONAZOLE 2 MG/ML
200 INJECTION, SOLUTION INTRAVENOUS EVERY 24 HOURS
Status: COMPLETED | OUTPATIENT
Start: 2024-06-15 | End: 2024-06-16

## 2024-06-14 RX ORDER — OXYCODONE HYDROCHLORIDE 5 MG/1
5 TABLET ORAL EVERY 4 HOURS PRN
Status: DISCONTINUED | OUTPATIENT
Start: 2024-06-14 | End: 2024-07-04 | Stop reason: HOSPADM

## 2024-06-14 RX ORDER — SODIUM CHLORIDE, SODIUM LACTATE, POTASSIUM CHLORIDE, CALCIUM CHLORIDE 600; 310; 30; 20 MG/100ML; MG/100ML; MG/100ML; MG/100ML
INJECTION, SOLUTION INTRAVENOUS CONTINUOUS PRN
Status: DISCONTINUED | OUTPATIENT
Start: 2024-06-14 | End: 2024-06-14

## 2024-06-14 RX ORDER — LEVOFLOXACIN 5 MG/ML
500 INJECTION, SOLUTION INTRAVENOUS SEE ADMIN INSTRUCTIONS
Status: DISCONTINUED | OUTPATIENT
Start: 2024-06-14 | End: 2024-06-14

## 2024-06-14 RX ORDER — DEXMEDETOMIDINE HYDROCHLORIDE 4 UG/ML
.1-1.2 INJECTION, SOLUTION INTRAVENOUS CONTINUOUS
Status: DISCONTINUED | OUTPATIENT
Start: 2024-06-14 | End: 2024-06-14

## 2024-06-14 RX ORDER — CALCIUM GLUCONATE 20 MG/ML
1 INJECTION, SOLUTION INTRAVENOUS EVERY 6 HOURS PRN
Status: DISCONTINUED | OUTPATIENT
Start: 2024-06-14 | End: 2024-06-20

## 2024-06-14 RX ORDER — PHENYLEPHRINE HCL IN 0.9% NACL 50MG/250ML
.1-6 PLASTIC BAG, INJECTION (ML) INTRAVENOUS CONTINUOUS
Status: DISCONTINUED | OUTPATIENT
Start: 2024-06-14 | End: 2024-06-14

## 2024-06-14 RX ADMIN — FENTANYL CITRATE 150 MCG: 50 INJECTION INTRAMUSCULAR; INTRAVENOUS at 07:59

## 2024-06-14 RX ADMIN — VANCOMYCIN HYDROCHLORIDE 1500 MG: 10 INJECTION, POWDER, LYOPHILIZED, FOR SOLUTION INTRAVENOUS at 20:23

## 2024-06-14 RX ADMIN — Medication 200 MG: at 13:21

## 2024-06-14 RX ADMIN — VASOPRESSIN 2 UNITS/HR: 20 INJECTION, SOLUTION INTRAMUSCULAR; SUBCUTANEOUS at 10:43

## 2024-06-14 RX ADMIN — Medication 1 UNITS: at 10:41

## 2024-06-14 RX ADMIN — HEPARIN SODIUM 35000 UNITS: 1000 INJECTION INTRAVENOUS; SUBCUTANEOUS at 09:05

## 2024-06-14 RX ADMIN — PROPOFOL 75 MCG/KG/MIN: 10 INJECTION, EMULSION INTRAVENOUS at 14:50

## 2024-06-14 RX ADMIN — MUPIROCIN: 20 OINTMENT TOPICAL at 20:41

## 2024-06-14 RX ADMIN — EPINEPHRINE 10 MCG: 1 INJECTION INTRAMUSCULAR; INTRAVENOUS; SUBCUTANEOUS at 10:44

## 2024-06-14 RX ADMIN — PROTAMINE SULFATE 210 MG: 10 INJECTION, SOLUTION INTRAVENOUS at 10:56

## 2024-06-14 RX ADMIN — NOREPINEPHRINE BITARTRATE 0.05 MCG/KG/MIN: 0.06 INJECTION, SOLUTION INTRAVENOUS at 10:38

## 2024-06-14 RX ADMIN — INSULIN HUMAN 1 UNITS/HR: 1 INJECTION, SOLUTION INTRAVENOUS at 20:25

## 2024-06-14 RX ADMIN — VANCOMYCIN HYDROCHLORIDE 1500 MG: 10 INJECTION, POWDER, LYOPHILIZED, FOR SOLUTION INTRAVENOUS at 07:33

## 2024-06-14 RX ADMIN — SODIUM CHLORIDE, SODIUM GLUCONATE, SODIUM ACETATE, POTASSIUM CHLORIDE AND MAGNESIUM CHLORIDE: 526; 502; 368; 37; 30 INJECTION, SOLUTION INTRAVENOUS at 07:30

## 2024-06-14 RX ADMIN — PROPOFOL 50 MCG/KG/MIN: 10 INJECTION, EMULSION INTRAVENOUS at 12:21

## 2024-06-14 RX ADMIN — FIBRINOGEN (HUMAN) 1350 MG: KIT INTRAVENOUS at 11:42

## 2024-06-14 RX ADMIN — ETOMIDATE 20 MG: 2 INJECTION, SOLUTION INTRAVENOUS at 08:04

## 2024-06-14 RX ADMIN — FENTANYL CITRATE 100 MCG: 50 INJECTION INTRAMUSCULAR; INTRAVENOUS at 08:20

## 2024-06-14 RX ADMIN — DOCUSATE SODIUM 50 MG AND SENNOSIDES 8.6 MG 1 TABLET: 8.6; 5 TABLET, FILM COATED ORAL at 20:41

## 2024-06-14 RX ADMIN — PROPOFOL 50 MCG/KG/MIN: 10 INJECTION, EMULSION INTRAVENOUS at 17:37

## 2024-06-14 RX ADMIN — LEVOFLOXACIN 500 MG: 5 INJECTION, SOLUTION INTRAVENOUS at 07:33

## 2024-06-14 RX ADMIN — EPINEPHRINE 0.08 MCG/KG/MIN: 1 INJECTION INTRAMUSCULAR; INTRAVENOUS; SUBCUTANEOUS at 16:04

## 2024-06-14 RX ADMIN — Medication 1 UNITS/HR: at 14:50

## 2024-06-14 RX ADMIN — Medication 50 MCG/HR: at 15:03

## 2024-06-14 RX ADMIN — CHLORHEXIDINE GLUCONATE 0.12% ORAL RINSE 15 ML: 1.2 LIQUID ORAL at 20:41

## 2024-06-14 RX ADMIN — Medication 30 MG: at 08:47

## 2024-06-14 RX ADMIN — POTASSIUM PHOSPHATE, MONOBASIC AND POTASSIUM PHOSPHATE, DIBASIC 9 MMOL: 224; 236 INJECTION, SOLUTION, CONCENTRATE INTRAVENOUS at 06:06

## 2024-06-14 RX ADMIN — FLUCONAZOLE IN SODIUM CHLORIDE 200 MG: 2 INJECTION, SOLUTION INTRAVENOUS at 07:33

## 2024-06-14 RX ADMIN — RIFAMPIN 600 MG: 600 INJECTION, POWDER, LYOPHILIZED, FOR SOLUTION INTRAVENOUS at 07:33

## 2024-06-14 RX ADMIN — HYDRALAZINE HYDROCHLORIDE 25 MG: 25 TABLET ORAL at 05:59

## 2024-06-14 RX ADMIN — NOREPINEPHRINE BITARTRATE 6.4 MCG: 1 INJECTION, SOLUTION, CONCENTRATE INTRAVENOUS at 10:43

## 2024-06-14 RX ADMIN — SODIUM CHLORIDE, POTASSIUM CHLORIDE, SODIUM LACTATE AND CALCIUM CHLORIDE: 600; 310; 30; 20 INJECTION, SOLUTION INTRAVENOUS at 08:00

## 2024-06-14 RX ADMIN — PROTAMINE SULFATE 10 MG: 10 INJECTION, SOLUTION INTRAVENOUS at 10:53

## 2024-06-14 RX ADMIN — POTASSIUM PHOSPHATE, MONOBASIC POTASSIUM PHOSPHATE, DIBASIC 15 MMOL: 224; 236 INJECTION, SOLUTION, CONCENTRATE INTRAVENOUS at 22:04

## 2024-06-14 RX ADMIN — PROPOFOL 75 MCG/KG/MIN: 10 INJECTION, EMULSION INTRAVENOUS at 14:55

## 2024-06-14 RX ADMIN — FENTANYL CITRATE 150 MCG: 50 INJECTION INTRAMUSCULAR; INTRAVENOUS at 10:59

## 2024-06-14 RX ADMIN — PROPOFOL 50 MCG/KG/MIN: 10 INJECTION, EMULSION INTRAVENOUS at 20:41

## 2024-06-14 RX ADMIN — ACETAMINOPHEN 975 MG: 325 TABLET, FILM COATED ORAL at 17:28

## 2024-06-14 RX ADMIN — LIDOCAINE HYDROCHLORIDE 60 MG: 20 INJECTION, SOLUTION INFILTRATION; PERINEURAL at 08:04

## 2024-06-14 RX ADMIN — MIDAZOLAM 2 MG: 1 INJECTION INTRAMUSCULAR; INTRAVENOUS at 07:59

## 2024-06-14 RX ADMIN — FENTANYL CITRATE 150 MCG: 50 INJECTION INTRAMUSCULAR; INTRAVENOUS at 08:47

## 2024-06-14 RX ADMIN — Medication 70 MG: at 08:05

## 2024-06-14 RX ADMIN — FENTANYL CITRATE 250 MCG: 50 INJECTION INTRAMUSCULAR; INTRAVENOUS at 12:53

## 2024-06-14 RX ADMIN — EPINEPHRINE 0.03 MCG/KG/MIN: 1 INJECTION INTRAMUSCULAR; INTRAVENOUS; SUBCUTANEOUS at 08:00

## 2024-06-14 RX ADMIN — SODIUM CHLORIDE, POTASSIUM CHLORIDE, SODIUM LACTATE AND CALCIUM CHLORIDE 1000 ML: 600; 310; 30; 20 INJECTION, SOLUTION INTRAVENOUS at 15:32

## 2024-06-14 RX ADMIN — FENTANYL CITRATE 200 MCG: 50 INJECTION INTRAMUSCULAR; INTRAVENOUS at 09:04

## 2024-06-14 RX ADMIN — ACETAMINOPHEN 975 MG: 325 TABLET, FILM COATED ORAL at 23:38

## 2024-06-14 ASSESSMENT — ACTIVITIES OF DAILY LIVING (ADL)
ADLS_ACUITY_SCORE: 41
ADLS_ACUITY_SCORE: 30
ADLS_ACUITY_SCORE: 41
ADLS_ACUITY_SCORE: 30
ADLS_ACUITY_SCORE: 41
ADLS_ACUITY_SCORE: 30
ADLS_ACUITY_SCORE: 30
ADLS_ACUITY_SCORE: 37
ADLS_ACUITY_SCORE: 30
ADLS_ACUITY_SCORE: 41
ADLS_ACUITY_SCORE: 30
ADLS_ACUITY_SCORE: 37
ADLS_ACUITY_SCORE: 30

## 2024-06-14 NOTE — ANESTHESIA CARE TRANSFER NOTE
Patient: Navin Lutz    Procedure: Procedure(s):  Median Sternotomy, INSERTION, LEFT VENTRICULAR ASSIST DEVICE (HEARTMATE III), on Cardiopulmonary Bypass, Transesophageal Echocardiogram by Anesthesia       Diagnosis: Acute on chronic systolic congestive heart failure (H) [I50.23]  Nonischemic cardiomyopathy (H) [I42.8]  Stage 3 chronic kidney disease (H) [N18.30]  Hyperlipidemia LDL goal <100 [E78.5]  Diagnosis Additional Information: No value filed.    Anesthesia Type:   General     Note:    Oropharynx: endotracheal tube in place and ventilatory support  Level of Consciousness: iatrogenic sedation      Independent Airway: airway patency not satisfactory and stable  Dentition: dentition unchanged  Vital Signs Stable: post-procedure vital signs reviewed and stable  Report to RN Given: handoff report given  Patient transferred to: ICU    ICU Handoff: Call for PAUSE to initiate/utilize ICU HANDOFF, Identified Patient, Identified Responsible Provider, Reviewed the Pertinent Medical History, Discussed Surgical Course, Reviewed Intra-OP Anesthesia Management and Issues during Anesthesia, Set Expectations for Post Procedure Period and Allowed Opportunity for Questions and Acknowledgement of Understanding      Vitals:  Vitals Value Taken Time   BP     Temp     Pulse 0 06/14/24 1408   Resp     SpO2 100 % 06/14/24 1411   Vitals shown include unfiled device data.    Electronically Signed By: Faraz Mcqueen MD  June 14, 2024  2:14 PM

## 2024-06-14 NOTE — PROGRESS NOTES
Patient in OR for Heartmate 3 LVAD implant. Pump prepped by Misha Ricks, VAD Coordinator and started by Kristen Noel, VAD Coordinator. VAD speed titrated up in collaboration with surgeon, anesthesia, and perfusion. Report was given to 4A RN upon arrival to the unit.     Parameters when leaving OR:  Speed: 5100 rpm  Flow: 4.2 lpm  Power: 3.4 bassett  PI: 6.1  Flow range at set speed: 3.9-4.4 lpm                                             PI range at set speed: 2.6-7.4  Factors noted to cause a significant variation in VAD parameters: Expected increase in PI s/t IABP augmentation, baseline PI approximately 2.8-3.0 when IABP was paused.   Other significant events: None    Driveline exits on the right side of the abdomen  Driveline is oriented/anchored toward the midline  Suture is knotted on the proximal end of driveline    Please page the VAD Coordinator on-call with any VAD related questions (* * * [phone], job code [phone] from an internal line).     Kristen Noel, RN

## 2024-06-14 NOTE — ANESTHESIA PROCEDURE NOTES
Airway       Patient location during procedure: OR       Procedure Start/Stop Times: 6/14/2024 8:08 AM  Staff -        Anesthesiologist:  Jomar Liu MD       Resident/Fellow: Faraz Mcqueen MD       Performed By: resident  Consent for Airway        Urgency: elective  Indications and Patient Condition       Indications for airway management: cathleen-procedural       Induction type:intravenous       Mask difficulty assessment: 1 - vent by mask    Final Airway Details       Final airway type: endotracheal airway       Successful airway: ETT - single  Endotracheal Airway Details        ETT size (mm): 8.0       Cuffed: yes       Successful intubation technique: video laryngoscopy       VL Blade Size: MAC 4       Grade View of Cords: 1       Adjucts: stylet       Position: Center       Measured from: gums/teeth       Secured at (cm): 24       Bite block used: None    Post intubation assessment        Number of attempts at approach: 1       Number of other approaches attempted: 0       Secured with: pink tape       Ease of procedure: easy       Dentition: Intact and Unchanged    Medication(s) Administered   Medication Administration Time: 6/14/2024 8:08 AM

## 2024-06-14 NOTE — PROCEDURES
CVTS:     ECMO specialist in room and placed IABP to stand-by.   Cleaned wound and removed right femoral IABP without immediate complication.   Had immediate flush bleed with pressure removed as expected.  Held manual pressure for 20 minutes.   Minimal pulses found during manual pressure.  No hematoma or masses visible at end of 20 minutes.  Dressing applied.    Discussed with Dr. Emilio Mallory PA-C   Cardiothoracic Surgery   Pager #[phone]  June 14, 2024 at 4:53 PM

## 2024-06-14 NOTE — PROGRESS NOTES
[facility]         Intra-Aortic Balloon Pump Discontinuation:     IABP support discontinued 6/14/2024 at 1523.    Jane Fischer RT  ECMO Specialist  6/14/2024 3:34 PM

## 2024-06-14 NOTE — BRIEF OP NOTE
[facility]    Brief Operative Note    Pre-operative diagnosis: Acute on chronic systolic congestive heart failure (H) [I50.23]  Nonischemic cardiomyopathy (H) [I42.8]  Stage 3 chronic kidney disease (H) [N18.30]  Hyperlipidemia LDL goal <100 [E78.5]  Post-operative diagnosis Same as pre-operative diagnosis    Procedure: Median Sternotomy, INSERTION, LEFT VENTRICULAR ASSIST DEVICE (HEARTMATE III), on Cardiopulmonary Bypass, Transesophageal Echocardiogram by Anesthesia, N/A - Heart    Surgeon: Surgeons and Role:     * Brian Jhaveri MD - Primary     * Cira Mallory PA-C - Assisting     * Charan Argueta MD - Resident - Assisting     * Angelo Tsai MD - Fellow - Assisting  Anesthesia: General   Estimated Blood Loss: 1000 ml    Drains:  Three mediastinal tubes (two 32 Fr straight, one 24 Fr Mark), one left pleural chest tube  Specimens:   ID Type Source Tests Collected by Time Destination   1 : Core Left Ventricle Tissue Heart SURGICAL PATHOLOGY EXAM Brian Jhaveri MD 6/14/2024 10:07 AM      Findings:   HM3 imlantation .  Complications: None.  Implants:   Implant Name Type Inv. Item Serial No.  Lot No. LRB No. Used Action   [employer]oratec heartmate 3 blood pump LVAD  [id number]  NA N/A 1 Implanted   [employer] heartmate 3 apical cuff LVAD  NA  [id number] N/A 1 Implanted   [employer] heartmate 3 outflow graft with bend LVAD  NA  [id number] N/A 1 Implanted

## 2024-06-14 NOTE — ANESTHESIA PROCEDURE NOTES
Perioperative RAJ Procedure Note    Staff -        Anesthesiologist:  Jomar Liu MD       Resident/Fellow: Rodolfo Kessler MD       Performed By: fellow  Preanesthesia Checklist:  Patient identified, IV assessed, risks and benefits discussed, monitors and equipment assessed, procedure being performed at surgeon's request and anesthesia consent obtained.    RAJ Probe Insertion    Probe Status PRE Insertion: NO obvious damage  Probe type:  Adult 3D  Bite block used:   Soft  Insertion Technique: Easy, no oropharyngeal manipulation  Insertion complications: None obvious  Billing Report:RAJ report by Anesthesiologist (See Separate Report note)  Probe Status POST Removal: NO obvious damage    RAJ Report  General Procedure Information  Images for this study have been archived.  Modalities: 2D, 3D, CW Doppler, PW Doppler and Color flow mapping  Echocardiographic and Doppler Measurements  Right Ventricle:  Cavity size dilated.    Hypertrophy not present.   Thrombus not present.    Global function normal.     Left Ventricle:  Cavity size dilated.    Hypertrophy not present.   Thrombus not present.   Global Function severely impaired.       Ventricular Regional Function:  1- Basal Anteroseptal:  hypokinetic  2- Basal Anterior:  hypokinetic  3- Basal Anterolateral:  hypokinetic  4- Basal Inferolateral:  hypokinetic  5- Basal Inferior:  hypokinetic  6- Basal Inferoseptal:  hypokinetic  7- Mid Anteroseptal:  hypokinetic  8- Mid Anterior:  hypokinetic  9- Mid Anterolateral:  hypokinetic  10- Mid Inferolateral:  hypokinetic  11- Mid Inferior:  hypokinetic  12- Mid Inferoseptal:  hypokinetic  13- Apical Anterior:  hypokinetic  14- Apical Lateral:  hypokinetic  15- Apical Inferior:  hypokinetic  16- Apical Septal:  hypokinetic  17- Marty:  hypokinetic    Valves  Aortic Valve: Annulus normal.  Stenosis not present.  Regurgitation absent.  Leaflets normal.  Leaflet motions normal.    Mitral Valve: Annulus dilated.  Stenosis not  present.  Regurgitation +2.  Leaflets normal.  Leaflet motions normal.    Tricuspid Valve: Annulus normal.  Stenosis not present.  Regurgitation +2.  Leaflets normal.  Leaflet motions normal.    Pulmonic Valve: Annulus normal.  Stenosis not present.  Regurgitation absent.      Aorta: Ascending Aorta: Size normal.  Dissection not present.  Plaque thickness less than 3 mm.  Mobile plaque not present.    Aortic Arch: Size normal.    Dissection not present.   Plaque thickness less than 3 mm.   Mobile plaque not present.    Descending Aorta: Size normal.    Dissection not present.   Plaque thickness less than 3 mm.   Mobile plaque not present.    Other Aortic Findings:  IABP visualized in descending aorta, below subclavian  Right Atrium:  Size normal.   Spontaneous echo contrast not present.   Thrombus not present.   Tumor not present.   Device not present.     Left Atrium: Size dilated.  Spontaneous echo contrast not present.  Thrombus not present.  Tumor not present.  Device not present.    Left atrial appendage normal.     Atrial Septum: Intra-atrial septal morphology normal.     Other atrial septal defect findings:  Colorflow suspicious for PFO. Negative bubble study. Difficult to perform due to high CVP.  Ventricular Septum: Intra-ventricular septum morphology normal.      Diastolic Function Measurements:  Diastolic Dysfunction Grade= III.  E=  ms.  A=  ms.  E/A Ratio= .  DT=  ms.  S/D= .  IVRT= ms.  Other Findings:   Pericardium:  normal. Pleural Effusion:  none. Pulmonary Arteries:  normal. Pulmonary Venous Flow:  normal.     Post Intervention Findings  Procedure(s) performed:  LVAD Placement. Global function:  Unchanged. Regional wall motion: Unchanged. Surgeon(s) notified of all postintervention findings: Yes (Notified in real time).         LVAD Placement:  LV decompressed. PFO not present. Aortic valve opening.    Post Intervention comments: Post bypass LVAD heartmate III placement: RV function is unchanged.  LV function is unchanged. LV is 6.5cm in diameter (pre-bypass 7cm). Inflow cannula at apex of LV is directed towards the mitral valve. Outflow cannula seen in ascending aorta. Both cannulas have appropriate velocities. PFO evaluation was negative via colorflow and bubble study post bypass. The aortic valve is unchanged and opening on average every 3rd beat. The mitral valve shows moderate regurgitation (previously mild). The tricuspid and pulmonic valves are unchanged. IABP seen in the descending aorta. No echo evidence of aortic dissection. .    Echocardiogram Comments

## 2024-06-14 NOTE — PROGRESS NOTES
Status Change-[facility] [[email or web address]] 6/14/2024    Changed patient's listing status in [facility] from status 4E to status 7 for the following reasons:  LVAD implant     Patient aware of change, primary cardiology team aware.

## 2024-06-14 NOTE — ANESTHESIA PROCEDURE NOTES
Arterial Line Procedure Note    Pre-Procedure   Staff -        Anesthesiologist:  Jomar Liu MD       Resident/Fellow: Faraz Mcqueen MD       Performed By: resident       Location: OR       Pre-Anesthestic Checklist: patient identified, IV checked, risks and benefits discussed, informed consent, monitors and equipment checked, pre-op evaluation and at physician/surgeon's request  Timeout:       Correct Patient: Yes        Correct Procedure: Yes        Correct Site: Yes        Correct Position: Yes   Line Placement:   This line was placed Pre Induction starting at 6/14/2024 7:45 AM and ending at 6/14/2024 8:00 AM  Procedure   Procedure: arterial line       Laterality: right       Insertion Site: radial.  Sterile Prep        Standard elements of sterile barrier followed       Skin prep: Chloraprep  Insertion/Injection        Technique: Seldinger Technique and ultrasound guided        1. Ultrasound was used to evaluate the access site.       2. Artery evaluated via ultrasound for patency/adequacy.       3. Using real-time ultrasound the needle/catheter was observed entering the artery/vein.       Catheter Type/Size: 20 G, 12 cm  Narrative        Tegaderm dressing used.       Complications: None apparent,        Arterial waveform: Yes        IBP within 10% of NIBP: Yes

## 2024-06-14 NOTE — PROGRESS NOTES
CVTS:     ECMO specialist in room and placed IABP to stand-by.   Cleaned wound and removed right femoral IABP without immediate complication.   Had immediate flush bleed with pressure removed as expected.  Held manual pressure for 20 minutes.  No hematoma or masses visible after 20 minutes of pressure. Dressing placed.    Discussed with Dr. Emilio Mallory PA-C   Cardiothoracic Surgery   Pager #[phone]  June 14, 2024 at 4:49 PM

## 2024-06-14 NOTE — PLAN OF CARE
Major Shift Events:  A&O x4, able to make his needs known. PRN tylenol given for minimal pain in R neck from SWAN. On RA, O2 sats stable. NS w/ occasional PAC's and PVC's, HR 80's-100's. CVP 5-6. Most recent PA 38/25, CI 2.5, CO 5.6. IABP 1:1 100% augmentation. Afebrile. Pulses dopplerable. Adequate UO using bedside urinal. No BM. NPO since midnight for surgery. Heparin therapeutic, no changes made. Phos replaced.    Plan: Plan for LVAD placement this morning. Notify team of any acute changes.   For vital signs and complete assessments, please see documentation flowsheets.   Problem: Adult Inpatient Plan of Care  Goal: Optimal Comfort and Wellbeing  Outcome: Progressing  Problem: Adult Inpatient Plan of Care  Goal: Readiness for Transition of Care  Outcome: Progressing

## 2024-06-14 NOTE — PLAN OF CARE
Admitted/transferred from: OR w/ Dr. Jhaveri  Reason for admission/transfer: LVAD placement  2 RN skin assessment: completed by Angela CORREIA RN  Result of skin assessment and interventions/actions: Preventative sacral mepilex in place   Height, weight, drug calc weight: Done  Patient belongings (see Flowsheet)  MDRO education added to care planN/A    Major Shift Events:  Pt returned from or @ 1400. Sedation held, pt TAVERA, wiggled toes to command. 1L LR given for volume support s/p IABP removal by surgical PA @ 1600. Pt now febrile to 39.1C Ice packs and fan in place, arctic sun ordered. LVAD WNL. IABP site stable.    Plan: Titrate Laisha to 10 ppb @ 2000. Do not titrate inotropes until AM per Dr. Jhaveri. Pt   For vital signs and complete assessments, please see documentation flowsheets.    ?

## 2024-06-14 NOTE — PROGRESS NOTES
[facility]   Cardiology II Service ICU/ Advanced Heart Failure  Daily Progress Note      Patient: Navin Lutz  MRN: [id number]  Admission Date: 6/3/2024  Hospital Day # 11    Assessment and Plan: Navin Lutz is a 53 year old male admitted on 6/3/2024. He has a past medical history of chronic HFrEF 2/2 NICM s/p ICD, HLD, and CKD Stage II who presents admitted for decompensated heart failure on milrinone listed status 4, admitted for consideration of advanced therapies, now being planned for LVAD 6/14.     Today's Plan:  -S/P HM3 LVAD today, patient transferred to CVICU with Cards 2 consult following for LVAD recommendations    # Acute on chronic systolic heart failure/HFrEF secondary to NICM with EF of 10-15% who was on home milrinone prior to admission    Stage D. NYHA Class III. TTE 6/8/24 EF 15-20%, LVIDd 7.0 cm, RV normal size and function, mild TR, mild MI, no pericardial effusion. RHC 6/4/24: RA 3, PA 25/12/17, PCW 9, Teressa CO/CI 4.63/2.13.   -S/P HM3 LVAD 6/14    [place] 6/14 (post VAD)   RA: 6 (Goal 10-12)   PA:  37/27 (30)   PCWP: 19   CO/CI: 6.6/3   PVR: 1.7 fulton   SVR: 960 (8218-4745)   Afterload-Continue Hydralazine 25mg q8h  -Fluid status: euvolemic  -Inotrope: Epi 0.07, Levo 0.05, Vaso 1  -IABP removed shortly after OR  -RV support: Nitric at 20  -ACEi/ARB/ARNI/afterload reduction: HOLD entresto given upcoming surgery for vasoplegia risk  -BB: coreg 3.125mg on hold  -Aldosterone antagonist: aldactone 25 mg daily on hold   -SGLT2i: HELD d/t upcoming surgery, PTA was on jardiance  -SCD prophylaxis: ICD      Recommendations  -Continue epi, levo, vaso, nitric for now. If CVP still at goal can wean nitric to 10 tonight and park for the night (discussed with primary surgeon)  -continue to hold GDMT for now    Patient was discussed with attending Dr. Dion Nagy MD  Cardiology Fellow    ================================================================    Subjective/24-Hr Events:    S/p OR for LVAD today    Medications: Reviewed in EPIC.     Physical Exam:   /85   Pulse 100   Temp 98.5  F (36.9  C) (Oral)   Resp 16   Ht 1.829 m (6')   Wt 95.2 kg (209 lb 14.1 oz)   SpO2 96%   BMI 28.46 kg/m      GENERAL: intubated and sedated  HEENT: no scleral icterus  NECK: Supple. No JVD   CV: RRR, LVAD hum noted  RESPIRATORY: mechanical breath sounds    GI: Soft and non distended   EXTREMITIES: No peripheral edema  NEUROLOGIC: intubated and sedated    Labs:  CMP  Recent Labs   Lab 06/14/24  1422 06/14/24  1236 06/14/24  1101 06/14/24  1014 06/14/24  0944 06/14/24  0823 06/14/24  0418 06/13/24  1612 06/13/24  1608 06/13/24  0838 06/13/24  0504 06/12/24  1542   NA  --  138 137 138 139   < > 138  --  137  --  138 137   POTASSIUM  --  4.5 4.6 4.8 4.2   < > 4.1  --  4.1  --  4.2 4.6   CHLORIDE  --   --   --   --   --   --  105  --  106  --  106 106   CO2  --   --   --   --   --   --  23  --  23  --  23 24   ANIONGAP  --   --   --   --   --   --  10  --  8  --  9 7   * 161* 176* 151* 141*   < > 94   < > 104*   < > 91 102*   BUN  --   --   --   --   --   --  10.8  --  11.8  --  11.6 12.3   CR  --   --   --   --   --   --  1.05  --  1.17  --  1.13 1.08   GFRESTIMATED  --   --   --   --   --   --  85  --  75  --  78 82   NAM  --   --   --   --   --   --  9.0  --  8.8  --  8.5* 8.9   MAG  --   --   --   --   --   --  2.1  --  2.4*  --  1.9 2.0   PHOS  --   --   --   --   --   --  2.6  --  2.3*  --  2.8 2.4*   PROTTOTAL  --   --   --   --   --   --  5.5*  --  5.6*  --  5.4* 5.8*   ALBUMIN  --   --   --   --   --   --  3.4*  --  3.4*  --  3.3* 3.7   BILITOTAL  --   --   --   --   --   --  0.9  --  0.5  --  0.8 0.7   ALKPHOS  --   --   --   --   --   --  82  --  86  --  77 82   AST  --   --   --   --   --   --  39  --  41  --  36 36   ALT  --   --   --   --   --   --  49  --  49  --  43 37    < > = values in this interval not displayed.       CBC  Recent Labs   Lab 06/14/24  1415 06/14/24  6255  06/14/24  1101 06/14/24  1100 06/14/24  1014 06/14/24  0823 06/14/24  0418 06/13/24  1608 06/13/24  0504   WBC 18.9*  --   --   --   --   --  8.1 8.6 8.7   RBC 4.20*  --   --   --   --   --  5.01 4.97 4.97   HGB 13.1* 13.2* 12.7*  --  12.9*   < > 15.7 15.7 15.4   HCT 38.1*  --   --   --   --   --  44.7 44.7 45.0   MCV 91  --   --   --   --   --  89 90 91   MCH 31.2  --   --   --   --   --  31.3 31.6 31.0   MCHC 34.4  --   --   --   --   --  35.1 35.1 34.2   RDW 13.8  --   --   --   --   --  13.5 13.4 13.5   *  --   --  81*  --   --  116* 130* 139*    < > = values in this interval not displayed.       INR  Recent Labs   Lab 06/14/24  1415 06/14/24  1100 06/13/24  0504 06/09/24  0451   INR 1.43* 1.61* 1.09 1.43*

## 2024-06-14 NOTE — PROGRESS NOTES
"CLINICAL NUTRITION SERVICES - BRIEF NOTE    Received provider consult for nutrition education with comments post op cardiovascular surgery (automatic consult on post-op order set). S/p LVAD on 6/14. Nutrition education to be completed as able/appropriate (as pt s/p CABG and/or initial VAD).    RD will follow per LOS protocol or if re-consulted.     Vandana Robles, MS, RD, LD  Available on [facility] - \"4A Clinical Dietitian\"  Weekend/Holiday RD - \"Weekend Clinical Dietitian\"  "

## 2024-06-15 ENCOUNTER — APPOINTMENT (OUTPATIENT)
Dept: GENERAL RADIOLOGY | Facility: CLINIC | Age: 54
End: 2024-06-15
Attending: PHYSICIAN ASSISTANT
Payer: COMMERCIAL

## 2024-06-15 LAB
ALBUMIN SERPL BCG-MCNC: 3.5 G/DL (ref 3.5–5.2)
ALBUMIN UR-MCNC: NEGATIVE MG/DL
ALLEN'S TEST: ABNORMAL
ALP SERPL-CCNC: 66 U/L (ref 40–150)
ALT SERPL W P-5'-P-CCNC: 45 U/L (ref 0–70)
ANION GAP SERPL CALCULATED.3IONS-SCNC: 10 MMOL/L (ref 7–15)
ANION GAP SERPL CALCULATED.3IONS-SCNC: 10 MMOL/L (ref 7–15)
APPEARANCE UR: CLEAR
APTT PPP: 31 SECONDS (ref 22–38)
AST SERPL W P-5'-P-CCNC: 112 U/L (ref 0–45)
BASE EXCESS BLDA CALC-SCNC: -0.2 MMOL/L (ref -3–3)
BASE EXCESS BLDA CALC-SCNC: -2.2 MMOL/L (ref -3–3)
BASE EXCESS BLDA CALC-SCNC: 0.5 MMOL/L (ref -3–3)
BASE EXCESS BLDA CALC-SCNC: 0.8 MMOL/L (ref -3–3)
BASE EXCESS BLDA CALC-SCNC: 1.3 MMOL/L (ref -3–3)
BASE EXCESS BLDV CALC-SCNC: -0.4 MMOL/L (ref -3–3)
BASE EXCESS BLDV CALC-SCNC: -0.8 MMOL/L (ref -3–3)
BASE EXCESS BLDV CALC-SCNC: -2 MMOL/L (ref -3–3)
BASE EXCESS BLDV CALC-SCNC: 0.7 MMOL/L (ref -3–3)
BASE EXCESS BLDV CALC-SCNC: 1.7 MMOL/L (ref -3–3)
BASE EXCESS BLDV CALC-SCNC: 1.9 MMOL/L (ref -3–3)
BASE EXCESS BLDV CALC-SCNC: 2.1 MMOL/L (ref -3–3)
BASE EXCESS BLDV CALC-SCNC: 2.5 MMOL/L (ref -3–3)
BILIRUB DIRECT SERPL-MCNC: 0.89 MG/DL (ref 0–0.3)
BILIRUB SERPL-MCNC: 2 MG/DL
BILIRUB UR QL STRIP: NEGATIVE
BUN SERPL-MCNC: 12.2 MG/DL (ref 6–20)
BUN SERPL-MCNC: 12.5 MG/DL (ref 6–20)
CA-I BLD-MCNC: 4.7 MG/DL (ref 4.4–5.2)
CA-I BLD-MCNC: 4.8 MG/DL (ref 4.4–5.2)
CALCIUM SERPL-MCNC: 8.5 MG/DL (ref 8.6–10)
CALCIUM SERPL-MCNC: 8.6 MG/DL (ref 8.6–10)
CHLORIDE SERPL-SCNC: 104 MMOL/L (ref 98–107)
CHLORIDE SERPL-SCNC: 107 MMOL/L (ref 98–107)
COHGB MFR BLD: 98.1 % (ref 96–97)
COHGB MFR BLD: 98.3 % (ref 96–97)
COHGB MFR BLD: 98.3 % (ref 96–97)
COHGB MFR BLD: 99.6 % (ref 96–97)
COHGB MFR BLD: 99.9 % (ref 96–97)
COLOR UR AUTO: ABNORMAL
CREAT SERPL-MCNC: 0.95 MG/DL (ref 0.67–1.17)
CREAT SERPL-MCNC: 1.01 MG/DL (ref 0.67–1.17)
DEPRECATED HCO3 PLAS-SCNC: 21 MMOL/L (ref 22–29)
DEPRECATED HCO3 PLAS-SCNC: 21 MMOL/L (ref 22–29)
EGFRCR SERPLBLD CKD-EPI 2021: 89 ML/MIN/1.73M2
EGFRCR SERPLBLD CKD-EPI 2021: >90 ML/MIN/1.73M2
ERYTHROCYTE [DISTWIDTH] IN BLOOD BY AUTOMATED COUNT: 14 % (ref 10–15)
ERYTHROCYTE [DISTWIDTH] IN BLOOD BY AUTOMATED COUNT: 14.1 % (ref 10–15)
FIBRINOGEN PPP-MCNC: 461 MG/DL (ref 170–490)
GLUCOSE BLDC GLUCOMTR-MCNC: 111 MG/DL (ref 70–99)
GLUCOSE BLDC GLUCOMTR-MCNC: 112 MG/DL (ref 70–99)
GLUCOSE BLDC GLUCOMTR-MCNC: 122 MG/DL (ref 70–99)
GLUCOSE BLDC GLUCOMTR-MCNC: 124 MG/DL (ref 70–99)
GLUCOSE BLDC GLUCOMTR-MCNC: 124 MG/DL (ref 70–99)
GLUCOSE BLDC GLUCOMTR-MCNC: 125 MG/DL (ref 70–99)
GLUCOSE BLDC GLUCOMTR-MCNC: 128 MG/DL (ref 70–99)
GLUCOSE BLDC GLUCOMTR-MCNC: 130 MG/DL (ref 70–99)
GLUCOSE BLDC GLUCOMTR-MCNC: 132 MG/DL (ref 70–99)
GLUCOSE BLDC GLUCOMTR-MCNC: 146 MG/DL (ref 70–99)
GLUCOSE BLDC GLUCOMTR-MCNC: 147 MG/DL (ref 70–99)
GLUCOSE BLDC GLUCOMTR-MCNC: 148 MG/DL (ref 70–99)
GLUCOSE BLDC GLUCOMTR-MCNC: 154 MG/DL (ref 70–99)
GLUCOSE SERPL-MCNC: 126 MG/DL (ref 70–99)
GLUCOSE SERPL-MCNC: 140 MG/DL (ref 70–99)
GLUCOSE UR STRIP-MCNC: NEGATIVE MG/DL
HCO3 BLD-SCNC: 23 MMOL/L (ref 21–28)
HCO3 BLD-SCNC: 24 MMOL/L (ref 21–28)
HCO3 BLD-SCNC: 24 MMOL/L (ref 21–28)
HCO3 BLD-SCNC: 25 MMOL/L (ref 21–28)
HCO3 BLD-SCNC: 25 MMOL/L (ref 21–28)
HCO3 BLDV-SCNC: 25 MMOL/L (ref 21–28)
HCO3 BLDV-SCNC: 26 MMOL/L (ref 21–28)
HCO3 BLDV-SCNC: 26 MMOL/L (ref 21–28)
HCO3 BLDV-SCNC: 27 MMOL/L (ref 21–28)
HCT VFR BLD AUTO: 36.1 % (ref 40–53)
HCT VFR BLD AUTO: 38.9 % (ref 40–53)
HCT VFR BLD AUTO: 40.1 % (ref 40–53)
HGB BLD-MCNC: 12.2 G/DL (ref 13.3–17.7)
HGB BLD-MCNC: 13.3 G/DL (ref 13.3–17.7)
HGB BLD-MCNC: 13.5 G/DL (ref 13.3–17.7)
HGB UR QL STRIP: ABNORMAL
INR PPP: 1.28 (ref 0.85–1.15)
KETONES UR STRIP-MCNC: NEGATIVE MG/DL
LACTATE SERPL-SCNC: 1.8 MMOL/L (ref 0.7–2)
LACTATE SERPL-SCNC: 1.9 MMOL/L (ref 0.7–2)
LACTATE SERPL-SCNC: 2 MMOL/L (ref 0.7–2)
LACTATE SERPL-SCNC: 2.2 MMOL/L (ref 0.7–2)
LACTATE SERPL-SCNC: 2.2 MMOL/L (ref 0.7–2)
LACTATE SERPL-SCNC: 2.4 MMOL/L (ref 0.7–2)
LEUKOCYTE ESTERASE UR QL STRIP: NEGATIVE
MAGNESIUM SERPL-MCNC: 1.9 MG/DL (ref 1.7–2.3)
MAGNESIUM SERPL-MCNC: 2.2 MG/DL (ref 1.7–2.3)
MCH RBC QN AUTO: 30.6 PG (ref 26.5–33)
MCH RBC QN AUTO: 31 PG (ref 26.5–33)
MCHC RBC AUTO-ENTMCNC: 33.7 G/DL (ref 31.5–36.5)
MCHC RBC AUTO-ENTMCNC: 33.8 G/DL (ref 31.5–36.5)
MCV RBC AUTO: 91 FL (ref 78–100)
MCV RBC AUTO: 92 FL (ref 78–100)
NITRATE UR QL: NEGATIVE
O2/TOTAL GAS SETTING VFR VENT: 40 %
OXYHGB MFR BLDV: 57 % (ref 70–75)
OXYHGB MFR BLDV: 63 % (ref 70–75)
OXYHGB MFR BLDV: 63 % (ref 70–75)
OXYHGB MFR BLDV: 64 % (ref 70–75)
OXYHGB MFR BLDV: 65 % (ref 70–75)
OXYHGB MFR BLDV: 66 % (ref 70–75)
OXYHGB MFR BLDV: 69 % (ref 70–75)
OXYHGB MFR BLDV: 71 % (ref 70–75)
PCO2 BLD: 35 MM HG (ref 35–45)
PCO2 BLD: 36 MM HG (ref 35–45)
PCO2 BLD: 36 MM HG (ref 35–45)
PCO2 BLD: 39 MM HG (ref 35–45)
PCO2 BLD: 39 MM HG (ref 35–45)
PCO2 BLDV: 40 MM HG (ref 40–50)
PCO2 BLDV: 42 MM HG (ref 40–50)
PCO2 BLDV: 46 MM HG (ref 40–50)
PCO2 BLDV: 48 MM HG (ref 40–50)
PCO2 BLDV: 50 MM HG (ref 40–50)
PCO2 BLDV: 50 MM HG (ref 40–50)
PEEP: 5 CM H2O
PEEP: 5 CM H2O
PH BLD: 7.38 [PH] (ref 7.35–7.45)
PH BLD: 7.42 [PH] (ref 7.35–7.45)
PH BLD: 7.43 [PH] (ref 7.35–7.45)
PH BLD: 7.45 [PH] (ref 7.35–7.45)
PH BLD: 7.46 [PH] (ref 7.35–7.45)
PH BLDV: 7.31 [PH] (ref 7.32–7.43)
PH BLDV: 7.33 [PH] (ref 7.32–7.43)
PH BLDV: 7.34 [PH] (ref 7.32–7.43)
PH BLDV: 7.37 [PH] (ref 7.32–7.43)
PH BLDV: 7.41 [PH] (ref 7.32–7.43)
PH BLDV: 7.44 [PH] (ref 7.32–7.43)
PH UR STRIP: 5 [PH] (ref 5–7)
PHOSPHATE SERPL-MCNC: 2.4 MG/DL (ref 2.5–4.5)
PHOSPHATE SERPL-MCNC: 3.3 MG/DL (ref 2.5–4.5)
PLATELET # BLD AUTO: 111 10E3/UL (ref 150–450)
PLATELET # BLD AUTO: 140 10E3/UL (ref 150–450)
PO2 BLD: 129 MM HG (ref 80–105)
PO2 BLD: 132 MM HG (ref 80–105)
PO2 BLD: 82 MM HG (ref 80–105)
PO2 BLD: 86 MM HG (ref 80–105)
PO2 BLD: 91 MM HG (ref 80–105)
PO2 BLDV: 32 MM HG (ref 25–47)
PO2 BLDV: 33 MM HG (ref 25–47)
PO2 BLDV: 34 MM HG (ref 25–47)
PO2 BLDV: 35 MM HG (ref 25–47)
PO2 BLDV: 36 MM HG (ref 25–47)
PO2 BLDV: 42 MM HG (ref 25–47)
POTASSIUM SERPL-SCNC: 4.4 MMOL/L (ref 3.4–5.3)
POTASSIUM SERPL-SCNC: 4.4 MMOL/L (ref 3.4–5.3)
PROT SERPL-MCNC: 5.6 G/DL (ref 6.4–8.3)
RBC # BLD AUTO: 3.94 10E6/UL (ref 4.4–5.9)
RBC # BLD AUTO: 4.41 10E6/UL (ref 4.4–5.9)
RBC URINE: 14 /HPF
SAO2 % BLDA: 96 % (ref 92–100)
SAO2 % BLDA: 97 % (ref 92–100)
SAO2 % BLDA: 98 % (ref 92–100)
SAO2 % BLDV: 58.3 % (ref 70–75)
SAO2 % BLDV: 64 % (ref 70–75)
SAO2 % BLDV: 64.5 % (ref 70–75)
SAO2 % BLDV: 65.5 % (ref 70–75)
SAO2 % BLDV: 66.5 % (ref 70–75)
SAO2 % BLDV: 67.6 % (ref 70–75)
SAO2 % BLDV: 70 % (ref 70–75)
SAO2 % BLDV: 72.4 % (ref 70–75)
SODIUM SERPL-SCNC: 135 MMOL/L (ref 135–145)
SODIUM SERPL-SCNC: 138 MMOL/L (ref 135–145)
SP GR UR STRIP: 1.01 (ref 1–1.03)
TRANSITIONAL EPI: <1 /HPF
UROBILINOGEN UR STRIP-MCNC: NORMAL MG/DL
WBC # BLD AUTO: 20.9 10E3/UL (ref 4–11)
WBC # BLD AUTO: 21.6 10E3/UL (ref 4–11)
WBC URINE: 1 /HPF

## 2024-06-15 PROCEDURE — 83605 ASSAY OF LACTIC ACID: CPT

## 2024-06-15 PROCEDURE — 85027 COMPLETE CBC AUTOMATED: CPT

## 2024-06-15 PROCEDURE — 80048 BASIC METABOLIC PNL TOTAL CA: CPT

## 2024-06-15 PROCEDURE — 71045 X-RAY EXAM CHEST 1 VIEW: CPT | Mod: 26 | Performed by: RADIOLOGY

## 2024-06-15 PROCEDURE — 99291 CRITICAL CARE FIRST HOUR: CPT | Mod: GC | Performed by: INTERNAL MEDICINE

## 2024-06-15 PROCEDURE — 258N000003 HC RX IP 258 OP 636: Mod: JZ | Performed by: SURGERY

## 2024-06-15 PROCEDURE — 250N000013 HC RX MED GY IP 250 OP 250 PS 637

## 2024-06-15 PROCEDURE — 250N000011 HC RX IP 250 OP 636

## 2024-06-15 PROCEDURE — 99291 CRITICAL CARE FIRST HOUR: CPT

## 2024-06-15 PROCEDURE — 85610 PROTHROMBIN TIME: CPT

## 2024-06-15 PROCEDURE — 258N000003 HC RX IP 258 OP 636: Performed by: PHYSICIAN ASSISTANT

## 2024-06-15 PROCEDURE — 82330 ASSAY OF CALCIUM: CPT | Performed by: SURGERY

## 2024-06-15 PROCEDURE — 250N000011 HC RX IP 250 OP 636: Mod: JZ | Performed by: SURGERY

## 2024-06-15 PROCEDURE — 87205 SMEAR GRAM STAIN: CPT

## 2024-06-15 PROCEDURE — 85018 HEMOGLOBIN: CPT | Performed by: SURGERY

## 2024-06-15 PROCEDURE — 36415 COLL VENOUS BLD VENIPUNCTURE: CPT

## 2024-06-15 PROCEDURE — 82248 BILIRUBIN DIRECT: CPT

## 2024-06-15 PROCEDURE — 200N000002 HC R&B ICU UMMC

## 2024-06-15 PROCEDURE — 93005 ELECTROCARDIOGRAM TRACING: CPT

## 2024-06-15 PROCEDURE — 250N000011 HC RX IP 250 OP 636: Mod: JZ | Performed by: STUDENT IN AN ORGANIZED HEALTH CARE EDUCATION/TRAINING PROGRAM

## 2024-06-15 PROCEDURE — 87040 BLOOD CULTURE FOR BACTERIA: CPT

## 2024-06-15 PROCEDURE — 82805 BLOOD GASES W/O2 SATURATION: CPT | Performed by: PHYSICIAN ASSISTANT

## 2024-06-15 PROCEDURE — 250N000009 HC RX 250: Performed by: SURGERY

## 2024-06-15 PROCEDURE — 250N000011 HC RX IP 250 OP 636: Mod: JZ

## 2024-06-15 PROCEDURE — C9113 INJ PANTOPRAZOLE SODIUM, VIA: HCPCS | Mod: JZ

## 2024-06-15 PROCEDURE — 82805 BLOOD GASES W/O2 SATURATION: CPT

## 2024-06-15 PROCEDURE — 83605 ASSAY OF LACTIC ACID: CPT | Performed by: SURGERY

## 2024-06-15 PROCEDURE — 94003 VENT MGMT INPAT SUBQ DAY: CPT

## 2024-06-15 PROCEDURE — 83735 ASSAY OF MAGNESIUM: CPT | Performed by: SURGERY

## 2024-06-15 PROCEDURE — 258N000003 HC RX IP 258 OP 636: Mod: JZ

## 2024-06-15 PROCEDURE — 85384 FIBRINOGEN ACTIVITY: CPT

## 2024-06-15 PROCEDURE — 71045 X-RAY EXAM CHEST 1 VIEW: CPT

## 2024-06-15 PROCEDURE — 94799 UNLISTED PULMONARY SVC/PX: CPT

## 2024-06-15 PROCEDURE — 250N000011 HC RX IP 250 OP 636: Mod: JZ | Performed by: PHYSICIAN ASSISTANT

## 2024-06-15 PROCEDURE — 84100 ASSAY OF PHOSPHORUS: CPT | Performed by: SURGERY

## 2024-06-15 PROCEDURE — 81001 URINALYSIS AUTO W/SCOPE: CPT

## 2024-06-15 PROCEDURE — 85730 THROMBOPLASTIN TIME PARTIAL: CPT

## 2024-06-15 PROCEDURE — 83735 ASSAY OF MAGNESIUM: CPT

## 2024-06-15 PROCEDURE — 93010 ELECTROCARDIOGRAM REPORT: CPT | Performed by: INTERNAL MEDICINE

## 2024-06-15 PROCEDURE — 82330 ASSAY OF CALCIUM: CPT | Performed by: PHYSICIAN ASSISTANT

## 2024-06-15 PROCEDURE — 250N000013 HC RX MED GY IP 250 OP 250 PS 637: Performed by: PHYSICIAN ASSISTANT

## 2024-06-15 PROCEDURE — 82805 BLOOD GASES W/O2 SATURATION: CPT | Performed by: SURGERY

## 2024-06-15 PROCEDURE — 999N000157 HC STATISTIC RCP TIME EA 10 MIN

## 2024-06-15 PROCEDURE — 258N000003 HC RX IP 258 OP 636: Performed by: SURGERY

## 2024-06-15 RX ORDER — FUROSEMIDE 10 MG/ML
20 INJECTION INTRAMUSCULAR; INTRAVENOUS ONCE
Status: COMPLETED | OUTPATIENT
Start: 2024-06-15 | End: 2024-06-15

## 2024-06-15 RX ORDER — MAGNESIUM SULFATE HEPTAHYDRATE 40 MG/ML
2 INJECTION, SOLUTION INTRAVENOUS ONCE
Status: COMPLETED | OUTPATIENT
Start: 2024-06-15 | End: 2024-06-15

## 2024-06-15 RX ORDER — FUROSEMIDE 10 MG/ML
40 INJECTION INTRAMUSCULAR; INTRAVENOUS ONCE
Status: DISCONTINUED | OUTPATIENT
Start: 2024-06-15 | End: 2024-06-15

## 2024-06-15 RX ORDER — ATORVASTATIN CALCIUM 10 MG/1
20 TABLET, FILM COATED ORAL EVERY EVENING
Status: DISCONTINUED | OUTPATIENT
Start: 2024-06-15 | End: 2024-07-04 | Stop reason: HOSPADM

## 2024-06-15 RX ORDER — HYDRALAZINE HYDROCHLORIDE 20 MG/ML
10 INJECTION INTRAMUSCULAR; INTRAVENOUS EVERY 30 MIN PRN
Status: DISCONTINUED | OUTPATIENT
Start: 2024-06-15 | End: 2024-06-19

## 2024-06-15 RX ORDER — PIPERACILLIN SODIUM, TAZOBACTAM SODIUM 4; .5 G/20ML; G/20ML
4.5 INJECTION, POWDER, LYOPHILIZED, FOR SOLUTION INTRAVENOUS EVERY 6 HOURS
Status: DISCONTINUED | OUTPATIENT
Start: 2024-06-15 | End: 2024-06-17

## 2024-06-15 RX ADMIN — VANCOMYCIN HYDROCHLORIDE 1500 MG: 10 INJECTION, POWDER, LYOPHILIZED, FOR SOLUTION INTRAVENOUS at 20:37

## 2024-06-15 RX ADMIN — FLUCONAZOLE IN SODIUM CHLORIDE 200 MG: 2 INJECTION, SOLUTION INTRAVENOUS at 07:31

## 2024-06-15 RX ADMIN — VANCOMYCIN HYDROCHLORIDE 1500 MG: 10 INJECTION, POWDER, LYOPHILIZED, FOR SOLUTION INTRAVENOUS at 08:13

## 2024-06-15 RX ADMIN — ACETAMINOPHEN 975 MG: 325 TABLET, FILM COATED ORAL at 16:15

## 2024-06-15 RX ADMIN — RIFAMPIN 600 MG: 600 INJECTION, POWDER, LYOPHILIZED, FOR SOLUTION INTRAVENOUS at 08:11

## 2024-06-15 RX ADMIN — PROPOFOL 50 MCG/KG/MIN: 10 INJECTION, EMULSION INTRAVENOUS at 20:06

## 2024-06-15 RX ADMIN — PANTOPRAZOLE SODIUM 40 MG: 40 INJECTION, POWDER, FOR SOLUTION INTRAVENOUS at 20:00

## 2024-06-15 RX ADMIN — HEPARIN SODIUM 5000 UNITS: 5000 INJECTION, SOLUTION INTRAVENOUS; SUBCUTANEOUS at 11:57

## 2024-06-15 RX ADMIN — SODIUM CHLORIDE, POTASSIUM CHLORIDE, SODIUM LACTATE AND CALCIUM CHLORIDE 500 ML: 600; 310; 30; 20 INJECTION, SOLUTION INTRAVENOUS at 05:45

## 2024-06-15 RX ADMIN — PROPOFOL 30 MCG/KG/MIN: 10 INJECTION, EMULSION INTRAVENOUS at 00:42

## 2024-06-15 RX ADMIN — PIPERACILLIN AND TAZOBACTAM 4.5 G: 4; .5 INJECTION, POWDER, FOR SOLUTION INTRAVENOUS at 14:52

## 2024-06-15 RX ADMIN — PROPOFOL 50 MCG/KG/MIN: 10 INJECTION, EMULSION INTRAVENOUS at 17:23

## 2024-06-15 RX ADMIN — SODIUM CHLORIDE, POTASSIUM CHLORIDE, SODIUM LACTATE AND CALCIUM CHLORIDE 500 ML: 600; 310; 30; 20 INJECTION, SOLUTION INTRAVENOUS at 22:14

## 2024-06-15 RX ADMIN — PIPERACILLIN AND TAZOBACTAM 4.5 G: 4; .5 INJECTION, POWDER, FOR SOLUTION INTRAVENOUS at 19:58

## 2024-06-15 RX ADMIN — PROPOFOL 40 MCG/KG/MIN: 10 INJECTION, EMULSION INTRAVENOUS at 23:52

## 2024-06-15 RX ADMIN — Medication 100 MCG/HR: at 22:14

## 2024-06-15 RX ADMIN — MAGNESIUM SULFATE HEPTAHYDRATE 2 G: 2 INJECTION, SOLUTION INTRAVENOUS at 21:39

## 2024-06-15 RX ADMIN — HEPARIN SODIUM 5000 UNITS: 5000 INJECTION, SOLUTION INTRAVENOUS; SUBCUTANEOUS at 20:00

## 2024-06-15 RX ADMIN — POLYETHYLENE GLYCOL 3350 17 G: 17 POWDER, FOR SOLUTION ORAL at 08:39

## 2024-06-15 RX ADMIN — SODIUM CHLORIDE, POTASSIUM CHLORIDE, SODIUM LACTATE AND CALCIUM CHLORIDE 500 ML: 600; 310; 30; 20 INJECTION, SOLUTION INTRAVENOUS at 20:30

## 2024-06-15 RX ADMIN — ATORVASTATIN CALCIUM 20 MG: 20 TABLET, FILM COATED ORAL at 20:00

## 2024-06-15 RX ADMIN — ASPIRIN 81 MG CHEWABLE TABLET 81 MG: 81 TABLET CHEWABLE at 08:39

## 2024-06-15 RX ADMIN — SODIUM PHOSPHATE, MONOBASIC, MONOHYDRATE AND SODIUM PHOSPHATE, DIBASIC, ANHYDROUS 15 MMOL: 142; 276 INJECTION, SOLUTION INTRAVENOUS at 22:13

## 2024-06-15 RX ADMIN — DOCUSATE SODIUM 50 MG AND SENNOSIDES 8.6 MG 1 TABLET: 8.6; 5 TABLET, FILM COATED ORAL at 08:39

## 2024-06-15 RX ADMIN — PROPOFOL 30 MCG/KG/MIN: 10 INJECTION, EMULSION INTRAVENOUS at 05:30

## 2024-06-15 RX ADMIN — PROPOFOL 40 MCG/KG/MIN: 10 INJECTION, EMULSION INTRAVENOUS at 11:57

## 2024-06-15 RX ADMIN — FUROSEMIDE 20 MG: 10 INJECTION, SOLUTION INTRAMUSCULAR; INTRAVENOUS at 09:13

## 2024-06-15 RX ADMIN — PROPOFOL 75 MCG/KG/MIN: 10 INJECTION, EMULSION INTRAVENOUS at 14:56

## 2024-06-15 RX ADMIN — CHLORHEXIDINE GLUCONATE 0.12% ORAL RINSE 15 ML: 1.2 LIQUID ORAL at 08:38

## 2024-06-15 RX ADMIN — Medication 40 MG: at 08:38

## 2024-06-15 RX ADMIN — DOCUSATE SODIUM 50 MG AND SENNOSIDES 8.6 MG 1 TABLET: 8.6; 5 TABLET, FILM COATED ORAL at 20:00

## 2024-06-15 RX ADMIN — PANTOPRAZOLE SODIUM 40 MG: 40 INJECTION, POWDER, FOR SOLUTION INTRAVENOUS at 10:32

## 2024-06-15 RX ADMIN — LEVOFLOXACIN 500 MG: 5 INJECTION, SOLUTION INTRAVENOUS at 07:32

## 2024-06-15 RX ADMIN — CHLORHEXIDINE GLUCONATE 0.12% ORAL RINSE 15 ML: 1.2 LIQUID ORAL at 19:47

## 2024-06-15 RX ADMIN — ACETAMINOPHEN 975 MG: 325 TABLET, FILM COATED ORAL at 08:39

## 2024-06-15 ASSESSMENT — ACTIVITIES OF DAILY LIVING (ADL)
ADLS_ACUITY_SCORE: 37

## 2024-06-15 NOTE — PLAN OF CARE
Major Shift Events:    Neuro:Pt TAVERA, FC off sedation. Increased restlessness, indicators of pain throughout shift. Sedation increased now resting comfortably 50 prop 100 fent;   CV: LVAD WNL HM3: 4.1-4.5 flow, 2.9-3.2 PI, 5100 RPM, 3.5 power. Intermittent irregular HR. 7 beat run vtach @ 1800. Last IESHA CVP 8 SvO2 65 PA 30/15 CI 2.2 SVR 1128  Resp: CMV setting RR 18  40% FiO2 PEEP 5. Laisha waened throughout day now @ 2PPM  GI/: OG w/o feeds. Coffe ground emesis now becoming more bright red. Team aware. UOP stable 40 ml/hr.   Plan: Continue to wean Laisha. PS in AM work toward extubation.  For vital signs and complete assessments, please see documentation flowsheets.

## 2024-06-15 NOTE — PROGRESS NOTES
[facility]   Cardiology II Service ICU/ Advanced Heart Failure  Daily Progress Note      Patient: Navin Lutz  MRN: [id number]  Admission Date: 6/3/2024  Hospital Day # 12    Assessment and Plan: Navin Lutz is a 53 year old male admitted on 6/3/2024. He has a past medical history of chronic HFrEF 2/2 NICM s/p ICD, HLD, and CKD Stage II who presents admitted for decompensated heart failure on milrinone listed status 4, admitted for consideration of advanced therapies, now being planned for LVAD 6/14.     Today's Plan:  -S/P HM3 LVAD today, patient transferred to CVICU with Cards 2 consult following for LVAD recommendations    # Acute on chronic systolic heart failure/HFrEF secondary to NICM with EF of 10-15% who was on home milrinone prior to admission    Stage D. NYHA Class III. TTE 6/8/24 EF 15-20%, LVIDd 7.0 cm, RV normal size and function, mild TR, mild MI, no pericardial effusion. RHC 6/4/24: RA 3, PA 25/12/17, PCW 9, Teressa CO/CI 4.63/2.13.   -S/P HM3 LVAD 6/14    [place] 6/15 (post VAD)   RA: 15 (Goal 10-12)   PA:  45/26 (32)   PCWP: 20   CO/CI: 3.8/1.7   PVR: 3.1 fulton   SVR: 1600 (6755-4006)   MAP: Goal 70-80    -Fluid status: euvolemic  -Inotrope: Epi 0.04, Levo 0.02, Vaso weaned off 6/15  -IABP removed shortly after OR  -RV support: Nitric at 20  -ACEi/ARB/ARNI/afterload reduction: HOLD entresto given upcoming surgery for vasoplegia risk  -BB: coreg 3.125mg on hold  -Aldosterone antagonist: aldactone 25 mg daily on hold   -SGLT2i: HELD d/t upcoming surgery, PTA was on jardiance  -SCD prophylaxis: ICD    The patient's HeartMate LVAD was interrogated 6/15/2024  * Speed 5100 rpm   * Pulsatility index 3.4   * Power 3.4 [place]   * Flow 4.0 L/minute   Fluid status: euvolemic   Alarms were reviewed, high PI shortly after OR, but no alarms since.   The driveline exit site was inspected, c/d/i.   All external components were inspected and showed no evidence of damage or malfunction, none replaced.   No  changes to VAD settings made        Recommendations  -Continue epi, would wean nitric as able as CVP allows  -Would wean levo as onelia to help us achieve MAP goal of <80. If weaned off an still above goal, would give hydralazine PRN  -Would give some diuresis to help achieve our CVP goal of 10-12  -heparin and initiation of warfarin as soon as safely possible at discretion of primary team  -continue to hold GDMT for now    Patient was discussed with attending Dr. Dion Nagy MD  Cardiology Fellow    ================================================================    Subjective/24-Hr Events:   S/p OR for LVAD yesterday, tolerated procedure well. Low PI's a few hours after procedure, s/p fluid boluses with improvements. CVP now slightly high    Medications: Reviewed in EPIC.     Physical Exam:   /85   Pulse 90   Temp 98.6  F (37  C)   Resp 18   Ht 1.829 m (6')   Wt 94.7 kg (208 lb 12.4 oz)   SpO2 100%   BMI 28.32 kg/m      GENERAL: intubated and sedated  HEENT: no scleral icterus  NECK: Supple. No JVD   CV: RRR, LVAD hum noted  RESPIRATORY: mechanical breath sounds    GI: Soft and non distended   EXTREMITIES: No peripheral edema  NEUROLOGIC: intubated and sedated    Labs:  CMP  Recent Labs   Lab 06/15/24  0529 06/15/24  0325 06/15/24  0321 06/15/24  0158 06/15/24  0031 06/14/24  2309 06/14/24  2125 06/14/24  2002 06/14/24  1422 06/14/24  1415 06/14/24  0823 06/14/24  0418   NA  --  138  --   --   --  137  --  138  --  140   < > 138   POTASSIUM  --  4.4  --   --   --  4.4  --  4.2  --  4.2   < > 4.1   CHLORIDE  --  107  --   --   --  107  --  107  --  106  --  105   CO2  --  21*  --   --   --  19*  --  20*  --  20*  --  23   ANIONGAP  --  10  --   --   --  11  --  11  --  14  --  10   * 126* 122* 124*   < > 182*   < > 194*  206*   < > 166*   < > 94   BUN  --  12.2  --   --   --  13.2  --  13.7  --  12.9  --  10.8   CR  --  0.95  --   --   --  1.00  --  1.20*  --  1.23*  --  1.05    GFRESTIMATED  --  >90  --   --   --  90  --  72  --  70  --  85   NAM  --  8.5*  --   --   --  8.5*  --  8.4*  --  8.4*  --  9.0   MAG  --  2.2  --   --   --  2.1  --  2.2  --  2.4*  --  2.1   PHOS  --  3.3  --   --   --   --   --  2.1*  --  2.5  --  2.6   PROTTOTAL  --  5.6*  --   --   --  5.4*  --  5.3*  --  5.2*  --  5.5*   ALBUMIN  --  3.5  --   --   --  3.5  --  3.4*  --  3.5  --  3.4*   BILITOTAL  --  2.0*  --   --   --  2.3*  --  2.8*  --  3.7*  --  0.9   ALKPHOS  --  66  --   --   --  65  --  62  --  64  --  82   AST  --  112*  --   --   --  95*  --  86*  --  56*  --  39   ALT  --  45  --   --   --  44  --  42  --  42  --  49    < > = values in this interval not displayed.       CBC  Recent Labs   Lab 06/15/24  0325 06/14/24  2309 06/14/24  2002 06/14/24  1415   WBC 20.9* 19.3* 17.3* 18.9*   RBC 4.41 4.28* 4.21* 4.20*   HGB 13.5 13.3 13.1* 13.1*   HCT 40.1 38.9* 38.4* 38.1*   MCV 91 91 91 91   MCH 30.6 31.1 31.1 31.2   MCHC 33.7 34.2 34.1 34.4   RDW 14.0 13.9 13.8 13.8   * 127* 129* 139*       INR  Recent Labs   Lab 06/15/24  0325 06/14/24  1415 06/14/24  1100 06/13/24  0504   INR 1.28* 1.43* 1.61* 1.09

## 2024-06-15 NOTE — PROGRESS NOTES
Brief Progress Note     Interval summary note to document updates and changes to care plan/s. Please see daily progress note for full assessment and plan/s.     Interval history: Patient continues to be febrile with Temp at 102.6F. Given patient continues to be febrile with leukocytosis will start Zosyn empirically while cultures are pending.       ANNE Montero CNP   06/15/2024  2:06 PM

## 2024-06-15 NOTE — PLAN OF CARE
Major Shift Events:  remains sedated on prop and fent, when lightened FAC/TAVERA. PERRLA. Pressors titrated down minimally per team. Arctic sun continued for fever, shivering in the morning, arctic sun turned off and removed. Temperatures stable since. FICKs stable until 0400- svo2 down to 63 with CI 1.8 and SVR 1469, VBG checked x2 (after shivering resolved), no interventions per team. LVAD w/o issues or alarms, see flowsheets for complete charting. 500 LR given. NO turned to 10 per orders, vent CMV 40/18/460/5. Borderline low UOP, now pink, team aware. CT with minimal/moderate output. LA peaked at 4.2, CBC stable, electrolytes replaced per protocol.  Plan: wean support as able  For vital signs and complete assessments, please see documentation flowsheets.     Problem: Heart Failure  Goal: Effective Oxygenation and Ventilation  Outcome: Progressing  Intervention: Promote Airway Secretion Clearance  Recent Flowsheet Documentation  Taken 6/15/2024 0400 by Patsy Horton RN  Cough And Deep Breathing: unable to perform  Activity Management: bedrest  Taken 6/15/2024 0000 by Patsy Horton RN  Cough And Deep Breathing: unable to perform  Activity Management: bedrest  Taken 6/14/2024 2000 by Patsy Horton RN  Cough And Deep Breathing: unable to perform  Activity Management: bedrest  Intervention: Optimize Oxygenation and Ventilation  Recent Flowsheet Documentation  Taken 6/15/2024 0600 by Patsy Horton RN  Head of Bed (HOB) Positioning: HOB at 20-30 degrees  Taken 6/15/2024 0400 by Patsy Horton RN  Head of Bed (HOB) Positioning: HOB at 30 degrees  Taken 6/15/2024 0200 by Patsy Horton RN  Head of Bed (HOB) Positioning: HOB at 20-30 degrees  Taken 6/15/2024 0000 by Patsy Horton RN  Head of Bed (HOB) Positioning: HOB at 20-30 degrees  Taken 6/14/2024 2200 by Patsy Horton RN  Head of Bed (HOB) Positioning: HOB not elevated due to medical condition  Taken 6/14/2024 2000 by Patsy Horton  RN  Head of Bed (HOB) Positioning: HOB not elevated due to medical condition   Goal Outcome Evaluation:                         You can access the FollowMyHealth Patient Portal offered by [facility] by registering at the following website: [email or web address]. By joining [employer]’s FollowMyHealth portal, you will also be able to view your health information using other applications (apps) compatible with our system.

## 2024-06-15 NOTE — PROGRESS NOTES
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 Now s/p LVAD by Dr. Jhaveri on 6/14.     Arrives from the CVICU intubated and sedated on propofol. On 0.08 epi gtt, 0.05 norepi gtt and 1 unit vaso gtt for pressor and inotropic support. S/p LVAD procedure, x4 CT placed (3 mediastinal [ two 32 Fr straight, one 24 Fr sherley], one left pleural). Bypass time 117 minutes, UOP 425cc. No issues coming off bypass, tolerated well. With chest closure requiring up titration and addition of pressors. On inhaled nitric at 20 PPM. Required 1 plts, 380cc cell saver, 1.7 L crystalloid. Access is  RIJ MAC and PA catheter, right radial a-line, right arm PICC. Reversed.     CO-MORBIDITIES:   Acute decompensated heart failure (H)  (primary encounter diagnosis)    Changes/updates today:  - Wean Laisha  - Serial LA, VBG, and ABG  - Coffee ground bilious secretions; H&H recheck 1200  - IV protonix BID  - MAP goal 65-80  - CVP goal 10-12  - FVG: net even; 20 IV furosemide given    PLAN:  Neuro/ pain/ sedation:  - Monitor neurological status. Notify the MD for any acute changes in exam.  #Acute postoperative pain  - Gtt: Fentanyl   - Scheduled: Tylenol  - PRN: Tylenol, oxycodone, Dilaudid  #Sedation  - Gtt:Propofol     Pulmonary care:   # Mechanical ventilation  - Goal SpO2 > 92%  - Wean Laisha at 10 --> 5 PPM; okay to wean to off  - Serial VBG/FICC with Laisha wean  - Encourage IS q15-30 minutes when awake.    Vent Mode: -- (vc)  FiO2 (%): 40 %  Resp Rate (Set): 18 breaths/min  Tidal Volume (Set, mL): 460 mL  PEEP (cm H2O): 5 cmH2O  Resp: 18     Cardiovascular:    #Cardiogenic shock  #S/p LVAD  #S/p IABP 6/12  #Hx NSVT  #Acute on chronic HFrEF 2/2 to  NICM (EF 10-15%), home milrinone PTA, s/p ICD   #HLD  - Keep Epi on for RV support. Wean Vaso and levo as able  - Goal MAP 65-80, SBP <140, monitor hemodynamics  - PRN hydralazine for SBP > 140 or MAP > 80  - Hold PTA Coreg, Entresto, Aldactone, Jardiance, atorvastatin  - Hold BB   - Restart PTA atorvastatin  - CTs: Meds x 3 & L pleural x 1; -20 suction     GI care / Nutrition:   # Coffee ground gastric secretions  - NPO, bedside swallow eval once extubated  - IV PPI BID; H&H stable  - Bowel regimen: MiraLAX, senna     Renal / Fluids / Electrolytes:   # CKD Stage 2  # Lactic acidosis - resolved  Received 1.7L crystalloid intra-op & 500 mL overnight  BL creat appears to be ~ 1.2-1.4  - Strict I/O, daily weights, avoid/limit nephrotoxins  - CMP daily  - Lactate q4 hours  - Replete lytes PRN per protocol  - FVG: net even; 20 mg IV furosemide given     Endocrine:    #Stress hyperglycemia  Preop A1c 5.6  - Insulin gtt  - Goal BG <180 for optimal healing     ID / Antibiotics:  #Stress induced leukocytosis  #Febrile  - To complete perioperative regimen  - PAN cultures ordered  - Monitor fever curve, WBC, and inflammatory markers as appropriate     Heme:     #Acute blood loss anemia  #Acute blood loss thrombocytopenia  #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  No s/sx active bleeding.  - Monitor CT output  - See GI above; H&H stable  - Hgb goal > 7.0  - SQH  - No AC 2/2 surgery and coffee ground bilious output     MSK / Skin:  #Sternotomy  #Surgical Incision  - Sternal precautions, postop incision management protocol  - PT/OT/CR     Prophylaxis:     - Mechanical DVT ppx  - Chemical DVT ppx: start SQH   - PPI BID     Lines / Tubes / Drains:  - ETT  - RIJ CVC, PA catheter  - Arterial Line  - CTs x4  - Menjivar  - OG     Disposition:  - CVICU      ICU Checklist  F - Feeding:   A - Analgesia:   S - Sedation:   T - Thromboembolic  prophylaxis:      H - Head of bed elevated  U - Ulcer prophylaxis:  G - Glycemic control  S - SBT     B - Bowel regimen  I - Indwelling catheter  D - De-escalation of antibiotics    Patient seen, findings and plan discussed with CVICU staff and cardiothoracic surgeons.  Time spent with patient 40  This does not include time spent on procedures or teaching.     ANNE Montero CNP,   6/15/2024 at 6:41 AM      ====================================    TODAY'S PROGRESS  SUBJECTIVE  500 mL crystalloid overnight; lactic acidosis no resolved. 20 mg IV furosemide given this AM in setting of elevated MAP.     OBJECTIVE  1. VITAL SIGNS  Temp:  [97.3  F (36.3  C)-102  F (38.9  C)] 98.6  F (37  C)  Pulse:  [0-112] 89  Resp:  [16-20] 18  BP: (122)/(85) 122/85  MAP:  [71 mmHg-91 mmHg] 85 mmHg  Arterial Line BP: ()/(53-87) 91/79  FiO2 (%):  [60 %] 60 %  SpO2:  [94 %-100 %] 100 %  Vent Mode: -- (vc)  FiO2 (%): 60 %  Resp Rate (Set): 18 breaths/min  Tidal Volume (Set, mL): 460 mL  PEEP (cm H2O): 5 cmH2O  Resp: 18      2. INTAKE/ OUTPUT  I/O last 3 completed shifts:  In: 4138.17 [I.V.:2758.17; Other:380; IV Piggyback:1000]  Out: 1960 [Urine:1490; Chest Tube:470]    3. PHYSICAL EXAMINATION      EYES: PERRL, Anicteric sclera.   HEENT:  Normocephalic, atraumatic, trachea midline, ETT secure, Pupils PERRLA  CV: RRR, no gallops, rubs, or murmurs  PULM/CHEST: Clear breath sounds bilaterally without rhonchi, crackles or wheeze, symmetric chest rise  GI: normal bowel sounds, soft, soft, obese,   : kuo catheter in place, urine yellow and clear  EXTREMITIES: +1 peripheral edema, moving all extremities, peripheral pulses intact  NEURO: Arouses to stimulus and following commands  SKIN: Sternal incision well approximated, natural skin color, and no edema      4. INVESTIGATIONS  Arterial Blood Gases   Recent Labs   Lab 06/15/24  0325 06/14/24  2308 06/14/24 2003 06/14/24  1417   PH 7.38 7.38 7.40 7.39   PCO2 39 36 35 37   PO2 132*  "119* 175* 107*   HCO3 23 21 21 22     Complete Blood Count   Recent Labs   Lab 06/15/24  0325 06/14/24  2309 06/14/24  2002 06/14/24  1415   WBC 20.9* 19.3* 17.3* 18.9*   HGB 13.5 13.3 13.1* 13.1*   * 127* 129* 139*     Basic Metabolic Panel  Recent Labs   Lab 06/15/24  0529 06/15/24  0325 06/15/24  0321 06/15/24  0158 06/15/24  0031 06/14/24 2309 06/14/24 2125 06/14/24 2002 06/14/24  1422 06/14/24  1415   NA  --  138  --   --   --  137  --  138  --  140   POTASSIUM  --  4.4  --   --   --  4.4  --  4.2  --  4.2   CHLORIDE  --  107  --   --   --  107  --  107  --  106   CO2  --  21*  --   --   --  19*  --  20*  --  20*   BUN  --  12.2  --   --   --  13.2  --  13.7  --  12.9   CR  --  0.95  --   --   --  1.00  --  1.20*  --  1.23*   * 126* 122* 124*   < > 182*   < > 194*  206*   < > 166*    < > = values in this interval not displayed.     Liver Function Tests  Recent Labs   Lab 06/15/24  0325 06/14/24  2309 06/14/24  2002 06/14/24  1415 06/14/24  1100 06/14/24  0418 06/13/24  1608 06/13/24  0504   AST  --  95* 86* 56*  --  39   < > 36   ALT  --  44 42 42  --  49   < > 43   ALKPHOS  --  65 62 64  --  82   < > 77   BILITOTAL  --  2.3* 2.8* 3.7*  --  0.9   < > 0.8   ALBUMIN  --  3.5 3.4* 3.5  --  3.4*   < > 3.3*   INR 1.28*  --   --  1.43* 1.61*  --   --  1.09    < > = values in this interval not displayed.     Pancreatic Enzymes  No lab results found in last 7 days.  Coagulation Profile  Recent Labs   Lab 06/15/24  0325 06/14/24  1415 06/14/24  1100 06/13/24  0504   INR 1.28* 1.43* 1.61* 1.09   PTT 31 48* 31  --      Lactate  Invalid input(s): \"LACTATE\"    5. RADIOLOGY  Recent Results (from the past 24 hour(s))   RAJ with Report    Narrative    Rodolfo Kessler MD     6/14/2024 11:25 AM  Perioperative RAJ Procedure Note    Staff -        Anesthesiologist:  Jomar Liu MD       Resident/Fellow: Rodolfo Kessler MD       Performed By: fellow  Preanesthesia Checklist:  Patient identified, IV assessed, " risks and   benefits discussed, monitors and equipment assessed, procedure being   performed at surgeon's request and anesthesia consent obtained.    RAJ Probe Insertion    Probe Status PRE Insertion: NO obvious damage  Probe type:  Adult 3D  Bite block used:   Soft  Insertion Technique: Easy, no oropharyngeal manipulation  Insertion complications: None obvious  Billing Report:RAJ report by Anesthesiologist (See Separate Report note)  Probe Status POST Removal: NO obvious damage    RAJ Report  General Procedure Information  Images for this study have been archived.  Modalities: 2D, 3D, CW Doppler, PW Doppler and Color flow mapping  Echocardiographic and Doppler Measurements  Right Ventricle:  Cavity size dilated.    Hypertrophy not present.     Thrombus not present.    Global function normal.     Left Ventricle:  Cavity size dilated.    Hypertrophy not present.     Thrombus not present.   Global Function severely impaired.       Ventricular Regional Function:  1- Basal Anteroseptal:  hypokinetic  2- Basal Anterior:  hypokinetic  3- Basal Anterolateral:  hypokinetic  4- Basal Inferolateral:  hypokinetic  5- Basal Inferior:  hypokinetic  6- Basal Inferoseptal:  hypokinetic  7- Mid Anteroseptal:  hypokinetic  8- Mid Anterior:  hypokinetic  9- Mid Anterolateral:  hypokinetic  10- Mid Inferolateral:  hypokinetic  11- Mid Inferior:  hypokinetic  12- Mid Inferoseptal:  hypokinetic  13- Apical Anterior:  hypokinetic  14- Apical Lateral:  hypokinetic  15- Apical Inferior:  hypokinetic  16- Apical Septal:  hypokinetic  17- [place]:  hypokinetic    Valves  Aortic Valve: Annulus normal.  Stenosis not present.  Regurgitation   absent.  Leaflets normal.  Leaflet motions normal.    Mitral Valve: Annulus dilated.  Stenosis not present.  Regurgitation +2.    Leaflets normal.  Leaflet motions normal.    Tricuspid Valve: Annulus normal.  Stenosis not present.  Regurgitation +2.    Leaflets normal.  Leaflet motions normal.    Pulmonic  Valve: Annulus normal.  Stenosis not present.  Regurgitation   absent.      Aorta: Ascending Aorta: Size normal.  Dissection not present.  Plaque   thickness less than 3 mm.  Mobile plaque not present.    Aortic Arch: Size normal.    Dissection not present.   Plaque thickness   less than 3 mm.   Mobile plaque not present.    Descending Aorta: Size normal.    Dissection not present.   Plaque   thickness less than 3 mm.   Mobile plaque not present.    Other Aortic Findings:  IABP visualized in descending aorta, below   subclavian  Right Atrium:  Size normal.   Spontaneous echo contrast not present.     Thrombus not present.   Tumor not present.   Device not present.     Left Atrium: Size dilated.  Spontaneous echo contrast not present.    Thrombus not present.  Tumor not present.  Device not present.    Left atrial appendage normal.     Atrial Septum: Intra-atrial septal morphology normal.     Other atrial   septal defect findings:  Colorflow suspicious for PFO. Negative bubble   study. Difficult to perform due to high CVP.  Ventricular Septum: Intra-ventricular septum morphology normal.      Diastolic Function Measurements:  Diastolic Dysfunction Grade= III.  E=  ms.  A=  ms.  E/A Ratio= .  DT=    ms.  S/D= .  IVRT= ms.  Other Findings:   Pericardium:  normal. Pleural Effusion:  none. Pulmonary Arteries:    normal. Pulmonary Venous Flow:  normal.     Post Intervention Findings  Procedure(s) performed:  LVAD Placement. Global function:  Unchanged.   Regional wall motion: Unchanged. Surgeon(s) notified of all   postintervention findings: Yes (Notified in real time).         LVAD   Placement:  LV decompressed. PFO not present. Aortic valve opening.    Post Intervention comments: Post bypass LVAD heartmate III placement: RV   function is unchanged. LV function is unchanged. LV is 6.5cm in diameter   (pre-bypass 7cm). Inflow cannula at apex of LV is directed towards the   mitral valve. Outflow cannula seen in ascending  aorta. Both cannulas have   appropriate velocities. PFO evaluation was negative via colorflow and   bubble study post bypass. The aortic valve is unchanged and opening on   average every 3rd beat. The mitral valve shows moderate regurgitation   (previously mild). The tricuspid and pulmonic valves are unchanged. IABP   seen in the descending aorta. No echo evidence of aortic dissection. .    Echocardiogram Comments   Cardiac Device Check - Inpatient   Result Value    Date Time Interrogation Session [id number]    Implantable Pulse Generator  Medtronic    Implantable Pulse Generator Model [id number] Cobalt XT VR MRI    Implantable Pulse Generator Serial Number [id number]    Type Interrogation Session In Clinic    Clinic Name [facility]    Implantable Pulse Generator Type Defibrillator    Implantable Pulse Generator Implant Date 20230615    Implantable Lead  Medtronic    Implantable Lead Model 6935M Sprint Quattro Secure S MRI SureScan    Implantable Lead Serial Number [id number]    Implantable Lead Implant Date 20230615    Implantable Lead Polarity Type Tripolar Lead    Implantable Lead Location Detail 1 UNKNOWN    Implantable Lead Location Right Ventricle    Implantable Lead Connection Status Connected    Andrés Setting Mode (NBG Code) VVIR    Andrés Setting Lower Rate Limit 60    Andrés Setting Maximum Sensor Rate 130    Lead Channel Setting Sensing Polarity Bipolar    Lead Channel Setting Sensing Anode Location Right Ventricle    Lead Channel Setting Sensing Anode Terminal Ring    Lead Channel Setting Sensing Cathode Location Right Ventricle    Lead Channel Setting Sensing Cathode Terminal Tip    Lead Channel Setting Sensing Sensitivity 0.3    Lead Channel Setting Pacing Polarity Bipolar    Lead Channel Setting Pacing Anode Location Right Ventricle    Lead Channel Setting Pacing Anode Terminal Ring    Lead Channel Setting Sensing Cathode Location Right Ventricle    Lead  Channel Setting Sensing Cathode Terminal Tip    Lead Channel Setting Pacing Pulse Width 0.4    Lead Channel Setting Pacing Amplitude 2.0    Lead Channel Setting Pacing Capture Mode Adaptive    Zone Setting Type Category VF    Zone Setting Vendor Type Category VF    Zone Setting Status Inactive    Zone Setting Detection Interval 320    Zone Setting Detection Beats Numerator 30    Zone Setting Detection Beats Denominator 40    Zone Setting Type Category VT    Zone Setting Vendor Type Category FastVT    Zone Setting Status Inactive    Zone Setting Type Category VT    Zone Setting Vendor Type Category VT    Zone Setting Status Inactive    Zone Setting Detection Interval 360    Zone Setting Detection Beats Numerator 16    Zone Setting Detection Beats Denominator 16    Zone Setting Type Category VT    Zone Setting Vendor Type Category MonVT    Zone Setting Status Inactive    Zone Setting Detection Interval 400    Zone Setting Detection Beats Numerator 32    Zone Setting Detection Beats Denominator 32    Lead Channel Impedance Value 323    Lead Channel Impedance Value 456    Lead Channel Sensing Intrinsic Amplitude 14.0    Lead Channel Pacing Threshold Amplitude 0.5    Lead Channel Pacing Threshold Pulse Width 0.4    Battery Date Time of Measurements 75342752096896    Battery Status Middle of Service    Battery RRT Trigger 2.8 V    Battery Remaining Longevity 134    Battery Voltage 3.03    Capacitor Charge Type Shock    Capacitor Last Charge Date Time 63345903662397    Capacitor Charge Time 3.9    Capacitor Charge Energy 18.0    Andrés Statistic Date Time Start 68385264110287    Andrés Statistic Date Time End 98665637714970    Andrés Statistic RV Percent Paced 1.54    CRT Statistic Date Time Start 92525774689388    CRT Statistic Date Time End 46645422569439    Atrial Tachy Statistic Date Time Start 25319281704681    Atrial Tachy Statistic Date Time End 25626991354360    Atrial Tachy Statistic AT/AF [place] Percent 0    Therapy  Statistic Recent Shocks Delivered 0    Therapy Statistic Recent Shocks Aborted 0    Therapy Statistic Recent ATP Delivered 0    Therapy Statistic Recent Date Time Start 93583982917661    Therapy Statistic Recent Date Time End 56611337380591    Therapy Statistic Total Shocks Delivered 0    Therapy Statistic Total Shocks Aborted 0    Therapy Statistic Total ATP Delivered 0    Therapy Statistic Total  Date Time Start 34797976543357    Therapy Statistic Total  Date Time End 00717495510197    Episode Statistic Recent Count 0    Episode Statistic Type Category Patient Activated    Episode Statistic Recent Count 5    Episode Statistic Type Category SVT    Episode Statistic Recent Count 32    Episode Statistic Type Category VT    Episode Statistic Recent Count 0    Episode Statistic Type Category VF    Episode Statistic Recent Count 0    Episode Statistic Type Category VT    Episode Statistic Recent Count 0    Episode Statistic Type Category VT    Episode Statistic Recent Count 0    Episode Statistic Type Category VT    Episode Statistic Recent Date Time Start 04381464026421    Episode Statistic Recent Date Time End 40329295678228    Episode Statistic Recent Date Time Start 94748437292118    Episode Statistic Recent Date Time End 04196439930731    Episode Statistic Recent Date Time Start 62345321630964    Episode Statistic Recent Date Time End 79465051457333    Episode Statistic Recent Date Time Start 47068882217193    Episode Statistic Recent Date Time End 59106339508391    Episode Statistic Recent Date Time Start 50287850978396    Episode Statistic Recent Date Time End 02006161564233    Episode Statistic Recent Date Time Start 77002004643606    Episode Statistic Recent Date Time End 36662250434000    Episode Statistic Recent Date Time Start 89081512733207    Episode Statistic Recent Date Time End 29194786977319    Episode Statistic Total Count 0    Episode Statistic Type Category Patient Activated    Episode Statistic  Total Count 20    Episode Statistic Type Category SVT    Episode Statistic Total Count 88    Episode Statistic Type Category VT    Episode Statistic Total Count 0    Episode Statistic Type Category VF    Episode Statistic Total Count 0    Episode Statistic Type Category VT    Episode Statistic Total Count 0    Episode Statistic Type Category VT    Episode Statistic Total Count 0    Episode Statistic Type Category VT    Episode Statistic Total Date Time Start 04556964186492    Episode Statistic Total Date Time End 97124767907158    Episode Statistic Total Date Time Start 87110715211816    Episode Statistic Total Date Time End 18537395714054    Episode Statistic Total Date Time Start 83668883644789    Episode Statistic Total Date Time End 74483786199385    Episode Statistic Total Date Time Start 31836199519322    Episode Statistic Total Date Time End 84204526588725    Episode Statistic Total Date Time Start 08479553152195    Episode Statistic Total Date Time End 10733843921866    Episode Statistic Total Date Time Start 20920681872892    Episode Statistic Total Date Time End 67620431666914    Episode Statistic Total Date Time Start 44547666143911    Episode Statistic Total Date Time End 67510795819750    Episode Identifier 98    Episode Type Category SVT    Episode Date Time 74421656209846    Episode Duration 59    Episode Identifier 97    Episode Type Category SVT    Episode Date Time 83849362596958    Episode Duration 5    Episode Identifier 80    Episode Type Category SVT    Episode Date Time 35860025372770    Episode Duration 28    Episode Identifier 74    Episode Type Category SVT    Episode Date Time 47629056761542    Episode Duration 49    Episode Identifier 73    Episode Type Category SVT    Episode Date Time 66364930628657    Episode Duration 50    Episode Identifier 108    Episode Type Category VT    Episode Date Time 77920644388267    Episode Duration 1    Episode Identifier 107    Episode Type Category VT     Episode Date Time 81102955066385    Episode Duration 1    Episode Identifier 106    Episode Type Category VT    Episode Date Time 47226892521721    Episode Duration 1    Episode Identifier 105    Episode Type Category VT    Episode Date Time 10009878717628    Episode Duration 1    Episode Identifier 104    Episode Type Category VT    Episode Date Time 72245174304975    Episode Duration 3    Episode Identifier 103    Episode Type Category VT    Episode Date Time 60769143122937    Episode Duration 1    Episode Identifier 102    Episode Type Category VT    Episode Date Time [id number]    Episode Duration 1    Episode Identifier 101    Episode Type Category VT    Episode Date Time 43189096121870    Episode Duration 2    Episode Identifier 100    Episode Type Category VT    Episode Date Time 81616636149627    Episode Duration 1    Episode Identifier 99    Episode Type Category VT    Episode Date Time 42152285640120    Episode Duration 2    Episode Identifier 96    Episode Type Category VT    Episode Date Time 87058983600813    Episode Duration 1    Episode Identifier 95    Episode Type Category VT    Episode Date Time 30206818675816    Episode Duration 1    Episode Identifier 94    Episode Type Category VT    Episode Date Time [id number]    Episode Duration 1    Episode Identifier 93    Episode Type Category VT    Episode Date Time [id number]    Episode Duration 6    Episode Identifier 92    Episode Type Category VT    Episode Date Time [id number]    Episode Duration 2    Narrative    Patient seen in OR 26 for evaluation and iterative programming of their   ICD per MD orders pre LVAD implant.    Device: Medtronic [id number] [place] XT VR MRI  Normal device function.  Mode: VVIR  bpm  : 1.5%  Intrinsic rhythm:  bpm  Thoracic Impedance:Below reference line. Suggests possible intrathoracic   fluid accumulation.  Short V-V intervals: 0  Lead Trends Appear Stable.  Estimated battery longevity to  RRT = 13.2 years. Battery voltage = 3.03 V.     Anticoagulant: Warafrin  Ventricular Arrhythmia: 15 NSVT episodes lasting 1-6 sec @ 188-214 bpm.  Setting Changes: ICD Therapies turned OFF. LRL increased to 60 bpm.  Plan: Page Device RN post LVAD implant to turn ON ICD Therapies.    JOEL Vo RN    Single lead ICD    I have reviewed and interpreted the device interrogation, settings,   programming and nurse's summary. The device is functioning within normal   device parameters. I agree with the current findings, assessment and plan.     XR Chest Port 1 View    Narrative    Exam: XR CHEST PORT 1 VIEW, 6/14/2024 2:53 PM    Comparison: Same day 2:31 AM    History: Endotracheal tube positioning    Findings:  Portable AP view of the chest. Endotracheal tube terminates in the  distal trachea 1.0 cm from the judd. Right IJ [place] catheter  terminates in the right pulmonary artery. Left chest wall implantable  cardiac defibrillator. Status post LVAD placement. Median sternotomy  wires. Mediastinal drains and left chest tube. Right PICC terminates  in the SVC.    Enlarged cardiac silhouette. No pneumothorax or pleural effusion.  Perihilar and bibasilar patchy opacities.      Impression    Impression:   1. Endotracheal tube terminates in the distal thoracic trachea 1.0 cm  from the judd. Status post LVAD placement with median sternotomy  wires and multiple chest drains. Stable right PICC.  2. Cardiomegaly with perihilar and bibasilar opacities, likely  atelectasis and edema.    I have personally reviewed the examination and initial interpretation  and I agree with the findings.    VIOLETA DANIELLE MD         SYSTEM ID:  [id number]   XR Abdomen Port 1 View    Narrative    Exam: XR ABDOMEN PORT 1 VIEW, 6/14/2024 2:53 PM    Indication: OG placement    Comparison: 8/22/2023 CT    Findings:     Partially visualized enteric tube tip and sidehole projected over the  stomach. LVAD, partially visualized basilar chest tube  and mediastinal  drain.    Suture from small bowel anastomosis seen within the right lower  quadrant. No obstructive bowel gas pattern. No pneumatosis or portal  venous gas. Small colonic stool burden. Metallic inferior IABP pump  marker projecting over L2.      Impression    Impression:     Enteric tube tip project over the stomach with sidehole projecting  near the gastroesophageal junction, consider advancement.    I have personally reviewed the examination and initial interpretation  and I agree with the findings.    ETHAN LION MD         SYSTEM ID:  [id number]   Cardiac Device Check - Inpatient   Result Value    Date Time Interrogation Session 20,240,614,152,919    Implantable Pulse Generator  Medtronic    Implantable Pulse Generator Model [id number] Cobalt XT VR MRI    Implantable Pulse Generator Serial Number [id number]    Type Interrogation Session In Clinic    Clinic Name [facility]    Implantable Pulse Generator Type Defibrillator    Implantable Pulse Generator Implant Date 20,230,615    Implantable Lead  Medtronic    Implantable Lead Model 6935M [employer] Quattro Secure S MRI SureScan    Implantable Lead Serial Number [id number]    Implantable Lead Implant Date 20,230,615    Implantable Lead Polarity Type Tripolar Lead    Implantable Lead Location Detail 1 UNKNOWN    Implantable Lead Location Right Ventricle    Implantable Lead Connection Status Connected    Andrés Setting Mode (NBG Code) VVIR    Andrés Setting Lower Rate Limit 60    Andrés Setting Maximum Sensor Rate 130    Lead Channel Setting Sensing Polarity Bipolar    Lead Channel Setting Sensing Anode Location Right Ventricle    Lead Channel Setting Sensing Anode Terminal Ring    Lead Channel Setting Sensing Cathode Location Right Ventricle    Lead Channel Setting Sensing Cathode Terminal Tip    Lead Channel Setting Sensing Sensitivity 0.3    Lead Channel Setting Pacing Polarity Bipolar    Lead Channel  Setting Pacing Anode Location Right Ventricle    Lead Channel Setting Pacing Anode Terminal Ring    Lead Channel Setting Sensing Cathode Location Right Ventricle    Lead Channel Setting Sensing Cathode Terminal Tip    Lead Channel Setting Pacing Pulse Width 0.4    Lead Channel Setting Pacing Amplitude 2.0    Lead Channel Setting Pacing Capture Mode Adaptive    Zone Setting Type Category VF    Zone Setting Vendor Type Category VF    Zone Setting Status Active    Zone Setting Detection Interval 320    Zone Setting Detection Beats Numerator 30    Zone Setting Detection Beats Denominator 40    Zone Setting Type Category VT    Zone Setting Vendor Type Category FastVT    Zone Setting Status Inactive    Zone Setting Type Category VT    Zone Setting Vendor Type Category VT    Zone Setting Status Inactive    Zone Setting Detection Interval 360    Zone Setting Detection Beats Numerator 16    Zone Setting Detection Beats Denominator 16    Zone Setting Type Category VT    Zone Setting Vendor Type Category MonVT    Zone Setting Status Monitor    Zone Setting Detection Interval 400    Zone Setting Detection Beats Numerator 32    Zone Setting Detection Beats Denominator 32    Lead Channel Impedance Value 342    Lead Channel Impedance Value 437    Lead Channel Sensing Intrinsic Amplitude 5.8    Lead Channel Pacing Threshold Amplitude 0.5    Lead Channel Pacing Threshold Pulse Width 0.4    Battery Date Time of Measurements 20,240,614,141,502    Battery RRT Trigger 2.8 V    Battery Remaining Longevity 159    Battery Voltage 3.03    Capacitor Charge Type Shock    Capacitor Last Charge Date Time 20,240,419,090,015    Capacitor Charge Time 3.9    Capacitor Charge Energy 18.0    Andrés Statistic Date Time Start 20,240,403,112,652    Andrés Statistic Date Time End 20,240,614,152,919    Andrés Statistic RA Percent Paced Invalid Value    Andrés Statistic RV Percent Paced 1.53    CRT Statistic Date Time Start 20,240,403,112,652    CRT Statistic  Date Time End 20,240,614,152,919    Atrial Tachy Statistic Date Time Start 20,240,403,112,652    Atrial Tachy Statistic Date Time End 20,240,614,152,919    Atrial Tachy Statistic AT/AF [place] Percent 0    Therapy Statistic Recent Shocks Delivered 0    Therapy Statistic Recent Shocks Aborted 0    Therapy Statistic Recent ATP Delivered 0    Therapy Statistic Recent Date Time Start 20,240,403,112,652    Therapy Statistic Recent Date Time End 20,240,614,152,919    Therapy Statistic Total Shocks Delivered 0    Therapy Statistic Total Shocks Aborted 0    Therapy Statistic Total ATP Delivered 0    Therapy Statistic Total  Date Time Start 20,230,615,114,709    Therapy Statistic Total  Date Time End 20,240,614,152,919    Episode Statistic Recent Count 0    Episode Statistic Type Category Patient Activated    Episode Statistic Recent Count 5    Episode Statistic Type Category SVT    Episode Statistic Recent Count 32    Episode Statistic Type Category VT    Episode Statistic Recent Count 0    Episode Statistic Type Category VF    Episode Statistic Recent Count 0    Episode Statistic Type Category VT    Episode Statistic Recent Count 0    Episode Statistic Type Category VT    Episode Statistic Recent Count 0    Episode Statistic Type Category VT    Episode Statistic Recent Date Time Start 20,240,403,112,652    Episode Statistic Recent Date Time End 20,240,614,152,919    Episode Statistic Recent Date Time Start [id number]    Episode Statistic Recent Date Time End 07005027284309    Episode Statistic Recent Date Time Start 93842058012927    Episode Statistic Recent Date Time End 29369281890572    Episode Statistic Recent Date Time Start 06642731933666    Episode Statistic Recent Date Time End 72496339683251    Episode Statistic Recent Date Time Start 36460383420945    Episode Statistic Recent Date Time End 28623592441654    Episode Statistic Recent Date Time Start 33503889410310    Episode Statistic Recent Date Time End  74104119332026    Episode Statistic Recent Date Time Start 86821575005956    Episode Statistic Recent Date Time End 98856704774428    Episode Statistic Total Count 0    Episode Statistic Type Category Patient Activated    Episode Statistic Total Count 20    Episode Statistic Type Category SVT    Episode Statistic Total Count 88    Episode Statistic Type Category VT    Episode Statistic Total Count 0    Episode Statistic Type Category VF    Episode Statistic Total Count 0    Episode Statistic Type Category VT    Episode Statistic Total Count 0    Episode Statistic Type Category VT    Episode Statistic Total Count 0    Episode Statistic Type Category VT    Episode Statistic Total Date Time Start 20,230,615,114,709    Episode Statistic Total Date Time End 20,240,614,152,919    Episode Statistic Total Date Time Start 95165333831986    Episode Statistic Total Date Time End 93713432823736    Episode Statistic Total Date Time Start 49973632918609    Episode Statistic Total Date Time End 58917119102396    Episode Statistic Total Date Time Start 39740643282467    Episode Statistic Total Date Time End 05590309948390    Episode Statistic Total Date Time Start 68793621010378    Episode Statistic Total Date Time End 18731831692954    Episode Statistic Total Date Time Start 41580688785500    Episode Statistic Total Date Time End 38349930649242    Episode Statistic Total Date Time Start 98898245894939    Episode Statistic Total Date Time End 63977040841174    Narrative    Patient seen in [facility] for evaluation and iterative programming of their   ICD per MD orders s/p LVAD implant.    Device: Medtronic [id number] [place] XT VR MRI  Normal device function.  Mode: VVIR  bpm  : 1.5%  Intrinsic rhythm:  bpm  Thoracic Impedance: Below reference line. Suggests possible intrathoracic   fluid accumulation.  Short V-V intervals: 2  Lead Trends Appear Stable.  Estimated battery longevity to RRT = 13 years. Battery voltage = 3.03  V.   Ventricular Arrhythmia: None.  Setting Changes: ICD Therapies turned ON.     JOEL Vo RN    Single lead ICD    I have reviewed and interpreted the device interrogation, settings,   programming and nurse's summary. The device is functioning within normal   device parameters. I agree with the current findings, assessment and plan.     XR Chest Port 1 View    Narrative    Exam: XR CHEST PORT 1 VIEW, 6/14/2024 4:16 PM    Comparison: Same day chest x-ray    History: eval after ETT repositioned    Findings:  Portable AP view of the chest. Endotracheal tube tip in the  midthoracic trachea, slightly retracted compared to previous. Gastric  tube with tip out of field of view and sidehole projecting over the  stomach. Right IJ Hollins-Hugh catheter terminates in the right pulmonary  artery. Left chest wall implantable cardiac defibrillator. LVAD.  Median sternotomy wires. Mediastinal drains and left chest tube. Right  PICC terminates in the SVC.    Enlarged cardiac silhouette, unchanged. No pneumothorax or pleural  effusion. Stable perihilar and bibasilar patchy opacities.      Impression    Impression:   1. Endotracheal tube terminates in the midthoracic trachea slightly  retracted compared to previous. Stable additional support devices.  2. Stable cardiomegaly with unchanged perihilar and bibasilar  opacities, likely atelectasis and edema.    I have personally reviewed the examination and initial interpretation  and I agree with the findings.    VIOLETA DANIELLE MD         SYSTEM ID:  [id number]   XR Abdomen Port 1 View    Narrative    EXAMINATION:  XR ABDOMEN PORT 1 VIEW 6/14/2024     COMPARISON: Same day radiograph    HISTORY: OG advanced.    TECHNIQUE: One frontal supine view of the abdomen.    FINDINGS: Gastric tube tip and sidehole project over the stomach.  Partially visualized LVAD and surgical drains. No abnormally dilated  air-filled loops of bowel in the partially visualized abdomen.       Impression     IMPRESSION:   Gastric tube tip and sidehole are within the stomach.    I have personally reviewed the examination and initial interpretation  and I agree with the findings.    FITZ MIRELES MD         SYSTEM ID:  [id number]   XR Chest Port 1 View    Impression    RESIDENT PRELIMINARY INTERPRETATION  IMPRESSION:  1. Question tiny right apical pneumothorax. Attention on follow-up.  2. Interval placement of esophageal temperature probe. Otherwise,  stable support devices.  3. Decreased right and unchanged left sided opacities, likely  atelectasis.

## 2024-06-16 ENCOUNTER — APPOINTMENT (OUTPATIENT)
Dept: GENERAL RADIOLOGY | Facility: CLINIC | Age: 54
End: 2024-06-16
Attending: SURGERY
Payer: COMMERCIAL

## 2024-06-16 LAB
ALBUMIN SERPL BCG-MCNC: 2.9 G/DL (ref 3.5–5.2)
ALBUMIN SERPL BCG-MCNC: 3.1 G/DL (ref 3.5–5.2)
ALLEN'S TEST: ABNORMAL
ALP SERPL-CCNC: 66 U/L (ref 40–150)
ALP SERPL-CCNC: 73 U/L (ref 40–150)
ALT SERPL W P-5'-P-CCNC: 33 U/L (ref 0–70)
ALT SERPL W P-5'-P-CCNC: 37 U/L (ref 0–70)
ANION GAP SERPL CALCULATED.3IONS-SCNC: 10 MMOL/L (ref 7–15)
ANION GAP SERPL CALCULATED.3IONS-SCNC: 11 MMOL/L (ref 7–15)
AST SERPL W P-5'-P-CCNC: 67 U/L (ref 0–45)
AST SERPL W P-5'-P-CCNC: 87 U/L (ref 0–45)
BASE EXCESS BLDA CALC-SCNC: -0.8 MMOL/L (ref -3–3)
BASE EXCESS BLDA CALC-SCNC: -1.8 MMOL/L (ref -3–3)
BASE EXCESS BLDA CALC-SCNC: 0.1 MMOL/L (ref -3–3)
BASE EXCESS BLDA CALC-SCNC: 0.1 MMOL/L (ref -3–3)
BASE EXCESS BLDA CALC-SCNC: 0.3 MMOL/L (ref -3–3)
BASE EXCESS BLDA CALC-SCNC: 1.1 MMOL/L (ref -3–3)
BASE EXCESS BLDV CALC-SCNC: -0.6 MMOL/L (ref -3–3)
BASE EXCESS BLDV CALC-SCNC: 0.3 MMOL/L (ref -3–3)
BASE EXCESS BLDV CALC-SCNC: 0.7 MMOL/L (ref -3–3)
BASE EXCESS BLDV CALC-SCNC: 1 MMOL/L (ref -3–3)
BASE EXCESS BLDV CALC-SCNC: 1.2 MMOL/L (ref -3–3)
BASE EXCESS BLDV CALC-SCNC: 1.7 MMOL/L (ref -3–3)
BASE EXCESS BLDV CALC-SCNC: 1.7 MMOL/L (ref -3–3)
BASOPHILS # BLD AUTO: 0.1 10E3/UL (ref 0–0.2)
BASOPHILS NFR BLD AUTO: 0 %
BILIRUB SERPL-MCNC: 2.3 MG/DL
BILIRUB SERPL-MCNC: 2.6 MG/DL
BUN SERPL-MCNC: 12.3 MG/DL (ref 6–20)
BUN SERPL-MCNC: 15.8 MG/DL (ref 6–20)
CA-I BLD-MCNC: 4.6 MG/DL (ref 4.4–5.2)
CA-I BLD-MCNC: 4.6 MG/DL (ref 4.4–5.2)
CALCIUM SERPL-MCNC: 8.3 MG/DL (ref 8.6–10)
CALCIUM SERPL-MCNC: 8.5 MG/DL (ref 8.6–10)
CHLORIDE SERPL-SCNC: 104 MMOL/L (ref 98–107)
CHLORIDE SERPL-SCNC: 105 MMOL/L (ref 98–107)
COHGB MFR BLD: 100 % (ref 96–97)
COHGB MFR BLD: 97.6 % (ref 96–97)
COHGB MFR BLD: 98.5 % (ref 96–97)
COHGB MFR BLD: 98.9 % (ref 96–97)
COHGB MFR BLD: 99.3 % (ref 96–97)
COHGB MFR BLD: 99.7 % (ref 96–97)
CREAT SERPL-MCNC: 0.9 MG/DL (ref 0.67–1.17)
CREAT SERPL-MCNC: 0.97 MG/DL (ref 0.67–1.17)
DEPRECATED HCO3 PLAS-SCNC: 20 MMOL/L (ref 22–29)
DEPRECATED HCO3 PLAS-SCNC: 22 MMOL/L (ref 22–29)
EGFRCR SERPLBLD CKD-EPI 2021: >90 ML/MIN/1.73M2
EGFRCR SERPLBLD CKD-EPI 2021: >90 ML/MIN/1.73M2
EOSINOPHIL # BLD AUTO: 0.1 10E3/UL (ref 0–0.7)
EOSINOPHIL NFR BLD AUTO: 0 %
ERYTHROCYTE [DISTWIDTH] IN BLOOD BY AUTOMATED COUNT: 14.3 % (ref 10–15)
ERYTHROCYTE [DISTWIDTH] IN BLOOD BY AUTOMATED COUNT: 14.4 % (ref 10–15)
GLUCOSE BLDC GLUCOMTR-MCNC: 117 MG/DL (ref 70–99)
GLUCOSE BLDC GLUCOMTR-MCNC: 122 MG/DL (ref 70–99)
GLUCOSE BLDC GLUCOMTR-MCNC: 130 MG/DL (ref 70–99)
GLUCOSE BLDC GLUCOMTR-MCNC: 143 MG/DL (ref 70–99)
GLUCOSE BLDC GLUCOMTR-MCNC: 145 MG/DL (ref 70–99)
GLUCOSE BLDC GLUCOMTR-MCNC: 150 MG/DL (ref 70–99)
GLUCOSE BLDC GLUCOMTR-MCNC: 153 MG/DL (ref 70–99)
GLUCOSE BLDC GLUCOMTR-MCNC: 155 MG/DL (ref 70–99)
GLUCOSE SERPL-MCNC: 160 MG/DL (ref 70–99)
GLUCOSE SERPL-MCNC: 161 MG/DL (ref 70–99)
HCO3 BLD-SCNC: 23 MMOL/L (ref 21–28)
HCO3 BLD-SCNC: 24 MMOL/L (ref 21–28)
HCO3 BLD-SCNC: 25 MMOL/L (ref 21–28)
HCO3 BLD-SCNC: 26 MMOL/L (ref 21–28)
HCO3 BLDV-SCNC: 25 MMOL/L (ref 21–28)
HCO3 BLDV-SCNC: 26 MMOL/L (ref 21–28)
HCO3 BLDV-SCNC: 27 MMOL/L (ref 21–28)
HCT VFR BLD AUTO: 35.4 % (ref 40–53)
HCT VFR BLD AUTO: 35.7 % (ref 40–53)
HGB BLD-MCNC: 11.6 G/DL (ref 13.3–17.7)
HGB BLD-MCNC: 11.9 G/DL (ref 13.3–17.7)
IMM GRANULOCYTES # BLD: 0.2 10E3/UL
IMM GRANULOCYTES NFR BLD: 1 %
INR PPP: 1.41 (ref 0.85–1.15)
LACTATE SERPL-SCNC: 1.4 MMOL/L (ref 0.7–2)
LACTATE SERPL-SCNC: 1.6 MMOL/L (ref 0.7–2)
LACTATE SERPL-SCNC: 1.8 MMOL/L (ref 0.7–2)
LACTATE SERPL-SCNC: 2 MMOL/L (ref 0.7–2)
LYMPHOCYTES # BLD AUTO: 1.4 10E3/UL (ref 0.8–5.3)
LYMPHOCYTES NFR BLD AUTO: 7 %
MAGNESIUM SERPL-MCNC: 2 MG/DL (ref 1.7–2.3)
MAGNESIUM SERPL-MCNC: 2.3 MG/DL (ref 1.7–2.3)
MCH RBC QN AUTO: 30.7 PG (ref 26.5–33)
MCH RBC QN AUTO: 30.9 PG (ref 26.5–33)
MCHC RBC AUTO-ENTMCNC: 32.8 G/DL (ref 31.5–36.5)
MCHC RBC AUTO-ENTMCNC: 33.3 G/DL (ref 31.5–36.5)
MCV RBC AUTO: 93 FL (ref 78–100)
MCV RBC AUTO: 94 FL (ref 78–100)
MONOCYTES # BLD AUTO: 1.7 10E3/UL (ref 0–1.3)
MONOCYTES NFR BLD AUTO: 8 %
NEUTROPHILS # BLD AUTO: 17.6 10E3/UL (ref 1.6–8.3)
NEUTROPHILS NFR BLD AUTO: 84 %
NRBC # BLD AUTO: 0 10E3/UL
NRBC BLD AUTO-RTO: 0 /100
O2/TOTAL GAS SETTING VFR VENT: 4 %
O2/TOTAL GAS SETTING VFR VENT: 40 %
O2/TOTAL GAS SETTING VFR VENT: 5 %
OXYHGB MFR BLDV: 59 % (ref 70–75)
OXYHGB MFR BLDV: 61 % (ref 70–75)
OXYHGB MFR BLDV: 62 % (ref 70–75)
OXYHGB MFR BLDV: 62 % (ref 70–75)
OXYHGB MFR BLDV: 69 % (ref 70–75)
PCO2 BLD: 34 MM HG (ref 35–45)
PCO2 BLD: 36 MM HG (ref 35–45)
PCO2 BLD: 40 MM HG (ref 35–45)
PCO2 BLD: 40 MM HG (ref 35–45)
PCO2 BLD: 41 MM HG (ref 35–45)
PCO2 BLD: 41 MM HG (ref 35–45)
PCO2 BLDV: 40 MM HG (ref 40–50)
PCO2 BLDV: 41 MM HG (ref 40–50)
PCO2 BLDV: 46 MM HG (ref 40–50)
PCO2 BLDV: 48 MM HG (ref 40–50)
PEEP: 5 CM H2O
PEEP: 5 CM H2O
PH BLD: 7.38 [PH] (ref 7.35–7.45)
PH BLD: 7.38 [PH] (ref 7.35–7.45)
PH BLD: 7.39 [PH] (ref 7.35–7.45)
PH BLD: 7.41 [PH] (ref 7.35–7.45)
PH BLD: 7.43 [PH] (ref 7.35–7.45)
PH BLD: 7.45 [PH] (ref 7.35–7.45)
PH BLDV: 7.35 [PH] (ref 7.32–7.43)
PH BLDV: 7.35 [PH] (ref 7.32–7.43)
PH BLDV: 7.37 [PH] (ref 7.32–7.43)
PH BLDV: 7.39 [PH] (ref 7.32–7.43)
PH BLDV: 7.39 [PH] (ref 7.32–7.43)
PH BLDV: 7.4 [PH] (ref 7.32–7.43)
PH BLDV: 7.42 [PH] (ref 7.32–7.43)
PHOSPHATE SERPL-MCNC: 2.5 MG/DL (ref 2.5–4.5)
PHOSPHATE SERPL-MCNC: 2.6 MG/DL (ref 2.5–4.5)
PLATELET # BLD AUTO: 105 10E3/UL (ref 150–450)
PLATELET # BLD AUTO: 106 10E3/UL (ref 150–450)
PO2 BLD: 100 MM HG (ref 80–105)
PO2 BLD: 106 MM HG (ref 80–105)
PO2 BLD: 114 MM HG (ref 80–105)
PO2 BLD: 136 MM HG (ref 80–105)
PO2 BLD: 146 MM HG (ref 80–105)
PO2 BLD: 83 MM HG (ref 80–105)
PO2 BLDV: 32 MM HG (ref 25–47)
PO2 BLDV: 33 MM HG (ref 25–47)
PO2 BLDV: 34 MM HG (ref 25–47)
PO2 BLDV: 34 MM HG (ref 25–47)
PO2 BLDV: 36 MM HG (ref 25–47)
PO2 BLDV: 36 MM HG (ref 25–47)
PO2 BLDV: 37 MM HG (ref 25–47)
POTASSIUM SERPL-SCNC: 4.1 MMOL/L (ref 3.4–5.3)
POTASSIUM SERPL-SCNC: 4.4 MMOL/L (ref 3.4–5.3)
PROT SERPL-MCNC: 5.2 G/DL (ref 6.4–8.3)
PROT SERPL-MCNC: 5.9 G/DL (ref 6.4–8.3)
RBC # BLD AUTO: 3.78 10E6/UL (ref 4.4–5.9)
RBC # BLD AUTO: 3.85 10E6/UL (ref 4.4–5.9)
SAO2 % BLDA: 96 % (ref 92–100)
SAO2 % BLDA: 96 % (ref 92–100)
SAO2 % BLDA: 97 % (ref 92–100)
SAO2 % BLDA: 97 % (ref 92–100)
SAO2 % BLDA: 98 % (ref 92–100)
SAO2 % BLDA: 99 % (ref 92–100)
SAO2 % BLDV: 59.9 % (ref 70–75)
SAO2 % BLDV: 61.8 % (ref 70–75)
SAO2 % BLDV: 62.9 % (ref 70–75)
SAO2 % BLDV: 63.7 % (ref 70–75)
SAO2 % BLDV: 70.1 % (ref 70–75)
SAO2 % BLDV: 70.4 % (ref 70–75)
SAO2 % BLDV: 70.5 % (ref 70–75)
SODIUM SERPL-SCNC: 135 MMOL/L (ref 135–145)
SODIUM SERPL-SCNC: 137 MMOL/L (ref 135–145)
WBC # BLD AUTO: 19.4 10E3/UL (ref 4–11)
WBC # BLD AUTO: 20.9 10E3/UL (ref 4–11)

## 2024-06-16 PROCEDURE — 99291 CRITICAL CARE FIRST HOUR: CPT | Mod: GC | Performed by: INTERNAL MEDICINE

## 2024-06-16 PROCEDURE — 250N000012 HC RX MED GY IP 250 OP 636 PS 637: Performed by: STUDENT IN AN ORGANIZED HEALTH CARE EDUCATION/TRAINING PROGRAM

## 2024-06-16 PROCEDURE — 83605 ASSAY OF LACTIC ACID: CPT

## 2024-06-16 PROCEDURE — 84100 ASSAY OF PHOSPHORUS: CPT

## 2024-06-16 PROCEDURE — 93010 ELECTROCARDIOGRAM REPORT: CPT | Performed by: INTERNAL MEDICINE

## 2024-06-16 PROCEDURE — 84100 ASSAY OF PHOSPHORUS: CPT | Performed by: SURGERY

## 2024-06-16 PROCEDURE — 71045 X-RAY EXAM CHEST 1 VIEW: CPT

## 2024-06-16 PROCEDURE — C9113 INJ PANTOPRAZOLE SODIUM, VIA: HCPCS | Mod: JZ

## 2024-06-16 PROCEDURE — 82247 BILIRUBIN TOTAL: CPT | Performed by: SURGERY

## 2024-06-16 PROCEDURE — 250N000011 HC RX IP 250 OP 636: Performed by: SURGERY

## 2024-06-16 PROCEDURE — 82330 ASSAY OF CALCIUM: CPT | Performed by: SURGERY

## 2024-06-16 PROCEDURE — 94799 UNLISTED PULMONARY SVC/PX: CPT

## 2024-06-16 PROCEDURE — 83605 ASSAY OF LACTIC ACID: CPT | Performed by: SURGERY

## 2024-06-16 PROCEDURE — 85027 COMPLETE CBC AUTOMATED: CPT | Performed by: SURGERY

## 2024-06-16 PROCEDURE — 99291 CRITICAL CARE FIRST HOUR: CPT

## 2024-06-16 PROCEDURE — 85610 PROTHROMBIN TIME: CPT | Performed by: STUDENT IN AN ORGANIZED HEALTH CARE EDUCATION/TRAINING PROGRAM

## 2024-06-16 PROCEDURE — 82805 BLOOD GASES W/O2 SATURATION: CPT | Performed by: SURGERY

## 2024-06-16 PROCEDURE — 999N000253 HC STATISTIC WEANING TRIALS

## 2024-06-16 PROCEDURE — 82805 BLOOD GASES W/O2 SATURATION: CPT | Performed by: PHYSICIAN ASSISTANT

## 2024-06-16 PROCEDURE — 200N000002 HC R&B ICU UMMC

## 2024-06-16 PROCEDURE — 83735 ASSAY OF MAGNESIUM: CPT

## 2024-06-16 PROCEDURE — 250N000011 HC RX IP 250 OP 636

## 2024-06-16 PROCEDURE — 82330 ASSAY OF CALCIUM: CPT | Performed by: PHYSICIAN ASSISTANT

## 2024-06-16 PROCEDURE — 250N000011 HC RX IP 250 OP 636: Performed by: PHYSICIAN ASSISTANT

## 2024-06-16 PROCEDURE — 258N000003 HC RX IP 258 OP 636: Mod: JZ | Performed by: PHYSICIAN ASSISTANT

## 2024-06-16 PROCEDURE — 84155 ASSAY OF PROTEIN SERUM: CPT | Performed by: SURGERY

## 2024-06-16 PROCEDURE — 250N000011 HC RX IP 250 OP 636: Mod: JZ | Performed by: STUDENT IN AN ORGANIZED HEALTH CARE EDUCATION/TRAINING PROGRAM

## 2024-06-16 PROCEDURE — 93005 ELECTROCARDIOGRAM TRACING: CPT

## 2024-06-16 PROCEDURE — 250N000013 HC RX MED GY IP 250 OP 250 PS 637: Performed by: PHYSICIAN ASSISTANT

## 2024-06-16 PROCEDURE — 82805 BLOOD GASES W/O2 SATURATION: CPT

## 2024-06-16 PROCEDURE — 99207 PR NO BILLABLE SERVICE THIS VISIT: CPT | Performed by: SURGERY

## 2024-06-16 PROCEDURE — 999N000157 HC STATISTIC RCP TIME EA 10 MIN

## 2024-06-16 PROCEDURE — 80053 COMPREHEN METABOLIC PANEL: CPT

## 2024-06-16 PROCEDURE — 71045 X-RAY EXAM CHEST 1 VIEW: CPT | Mod: 26 | Performed by: RADIOLOGY

## 2024-06-16 PROCEDURE — 999N000259 HC STATISTIC EXTUBATION

## 2024-06-16 PROCEDURE — 83735 ASSAY OF MAGNESIUM: CPT | Performed by: SURGERY

## 2024-06-16 PROCEDURE — 250N000011 HC RX IP 250 OP 636: Mod: JZ | Performed by: SURGERY

## 2024-06-16 PROCEDURE — 250N000013 HC RX MED GY IP 250 OP 250 PS 637

## 2024-06-16 PROCEDURE — 250N000011 HC RX IP 250 OP 636: Mod: JZ

## 2024-06-16 PROCEDURE — 85025 COMPLETE CBC W/AUTO DIFF WBC: CPT

## 2024-06-16 PROCEDURE — 250N000009 HC RX 250: Performed by: SURGERY

## 2024-06-16 PROCEDURE — 258N000003 HC RX IP 258 OP 636: Performed by: SURGERY

## 2024-06-16 PROCEDURE — 250N000013 HC RX MED GY IP 250 OP 250 PS 637: Performed by: SURGERY

## 2024-06-16 RX ORDER — DEXTROSE MONOHYDRATE 25 G/50ML
25-50 INJECTION, SOLUTION INTRAVENOUS
Status: DISCONTINUED | OUTPATIENT
Start: 2024-06-16 | End: 2024-07-04 | Stop reason: HOSPADM

## 2024-06-16 RX ORDER — HEPARIN SODIUM 10000 [USP'U]/100ML
500 INJECTION, SOLUTION INTRAVENOUS CONTINUOUS
Status: DISCONTINUED | OUTPATIENT
Start: 2024-06-16 | End: 2024-06-17

## 2024-06-16 RX ORDER — FUROSEMIDE 10 MG/ML
20 INJECTION INTRAMUSCULAR; INTRAVENOUS ONCE
Status: COMPLETED | OUTPATIENT
Start: 2024-06-16 | End: 2024-06-16

## 2024-06-16 RX ORDER — FUROSEMIDE 10 MG/ML
20 INJECTION INTRAMUSCULAR; INTRAVENOUS ONCE
Status: DISCONTINUED | OUTPATIENT
Start: 2024-06-16 | End: 2024-06-17

## 2024-06-16 RX ORDER — HALOPERIDOL 5 MG/ML
2 INJECTION INTRAMUSCULAR EVERY 6 HOURS PRN
Status: DISCONTINUED | OUTPATIENT
Start: 2024-06-16 | End: 2024-06-17

## 2024-06-16 RX ORDER — MAGNESIUM SULFATE HEPTAHYDRATE 40 MG/ML
2 INJECTION, SOLUTION INTRAVENOUS ONCE
Status: COMPLETED | OUTPATIENT
Start: 2024-06-16 | End: 2024-06-16

## 2024-06-16 RX ORDER — FENTANYL CITRATE 50 UG/ML
25-50 INJECTION, SOLUTION INTRAMUSCULAR; INTRAVENOUS
Status: DISCONTINUED | OUTPATIENT
Start: 2024-06-16 | End: 2024-06-19

## 2024-06-16 RX ORDER — HYDROMORPHONE HCL IN WATER/PF 6 MG/30 ML
0.4 PATIENT CONTROLLED ANALGESIA SYRINGE INTRAVENOUS ONCE
Status: COMPLETED | OUTPATIENT
Start: 2024-06-16 | End: 2024-06-16

## 2024-06-16 RX ORDER — NICOTINE POLACRILEX 4 MG
15-30 LOZENGE BUCCAL
Status: DISCONTINUED | OUTPATIENT
Start: 2024-06-16 | End: 2024-07-04 | Stop reason: HOSPADM

## 2024-06-16 RX ADMIN — FLUCONAZOLE IN SODIUM CHLORIDE 200 MG: 2 INJECTION, SOLUTION INTRAVENOUS at 07:58

## 2024-06-16 RX ADMIN — MAGNESIUM SULFATE HEPTAHYDRATE 2 G: 2 INJECTION, SOLUTION INTRAVENOUS at 20:57

## 2024-06-16 RX ADMIN — HEPARIN SODIUM 500 UNITS/HR: 10000 INJECTION, SOLUTION INTRAVENOUS at 10:48

## 2024-06-16 RX ADMIN — OXYCODONE HYDROCHLORIDE 10 MG: 10 TABLET ORAL at 11:04

## 2024-06-16 RX ADMIN — HYDROMORPHONE HYDROCHLORIDE 0.4 MG: 0.2 INJECTION, SOLUTION INTRAMUSCULAR; INTRAVENOUS; SUBCUTANEOUS at 13:40

## 2024-06-16 RX ADMIN — INSULIN ASPART 1 UNITS: 100 INJECTION, SOLUTION INTRAVENOUS; SUBCUTANEOUS at 20:18

## 2024-06-16 RX ADMIN — HYDROMORPHONE HYDROCHLORIDE 0.2 MG: 0.2 INJECTION, SOLUTION INTRAMUSCULAR; INTRAVENOUS; SUBCUTANEOUS at 20:18

## 2024-06-16 RX ADMIN — WARFARIN SODIUM 3.5 MG: 3 TABLET ORAL at 20:54

## 2024-06-16 RX ADMIN — PIPERACILLIN AND TAZOBACTAM 4.5 G: 4; .5 INJECTION, POWDER, FOR SOLUTION INTRAVENOUS at 01:40

## 2024-06-16 RX ADMIN — LEVOFLOXACIN 500 MG: 5 INJECTION, SOLUTION INTRAVENOUS at 07:53

## 2024-06-16 RX ADMIN — HYDROMORPHONE HYDROCHLORIDE 0.4 MG: 0.2 INJECTION, SOLUTION INTRAMUSCULAR; INTRAVENOUS; SUBCUTANEOUS at 15:01

## 2024-06-16 RX ADMIN — PIPERACILLIN AND TAZOBACTAM 4.5 G: 4; .5 INJECTION, POWDER, FOR SOLUTION INTRAVENOUS at 09:03

## 2024-06-16 RX ADMIN — PIPERACILLIN AND TAZOBACTAM 4.5 G: 4; .5 INJECTION, POWDER, FOR SOLUTION INTRAVENOUS at 20:23

## 2024-06-16 RX ADMIN — HYDROMORPHONE HYDROCHLORIDE 0.2 MG: 0.2 INJECTION, SOLUTION INTRAMUSCULAR; INTRAVENOUS; SUBCUTANEOUS at 23:36

## 2024-06-16 RX ADMIN — PROPOFOL 40 MCG/KG/MIN: 10 INJECTION, EMULSION INTRAVENOUS at 03:56

## 2024-06-16 RX ADMIN — OXYCODONE HYDROCHLORIDE 5 MG: 5 TABLET ORAL at 20:53

## 2024-06-16 RX ADMIN — ONDANSETRON 4 MG: 2 INJECTION INTRAMUSCULAR; INTRAVENOUS at 19:30

## 2024-06-16 RX ADMIN — SODIUM PHOSPHATE, MONOBASIC, MONOHYDRATE AND SODIUM PHOSPHATE, DIBASIC, ANHYDROUS 9 MMOL: 142; 276 INJECTION, SOLUTION INTRAVENOUS at 20:35

## 2024-06-16 RX ADMIN — PANTOPRAZOLE SODIUM 40 MG: 40 INJECTION, POWDER, FOR SOLUTION INTRAVENOUS at 20:18

## 2024-06-16 RX ADMIN — FUROSEMIDE 20 MG: 10 INJECTION, SOLUTION INTRAMUSCULAR; INTRAVENOUS at 21:05

## 2024-06-16 RX ADMIN — HYDROMORPHONE HYDROCHLORIDE 0.2 MG: 0.2 INJECTION, SOLUTION INTRAMUSCULAR; INTRAVENOUS; SUBCUTANEOUS at 18:10

## 2024-06-16 RX ADMIN — FENTANYL CITRATE 50 MCG: 50 INJECTION, SOLUTION INTRAMUSCULAR; INTRAVENOUS at 23:50

## 2024-06-16 RX ADMIN — PROPOFOL 40 MCG/KG/MIN: 10 INJECTION, EMULSION INTRAVENOUS at 08:20

## 2024-06-16 RX ADMIN — RIFAMPIN 600 MG: 600 INJECTION, POWDER, LYOPHILIZED, FOR SOLUTION INTRAVENOUS at 08:04

## 2024-06-16 RX ADMIN — PROCHLORPERAZINE EDISYLATE 10 MG: 5 INJECTION INTRAMUSCULAR; INTRAVENOUS at 19:58

## 2024-06-16 RX ADMIN — PANTOPRAZOLE SODIUM 40 MG: 40 INJECTION, POWDER, FOR SOLUTION INTRAVENOUS at 08:32

## 2024-06-16 RX ADMIN — VANCOMYCIN HYDROCHLORIDE 1500 MG: 10 INJECTION, POWDER, LYOPHILIZED, FOR SOLUTION INTRAVENOUS at 08:04

## 2024-06-16 RX ADMIN — POLYETHYLENE GLYCOL 3350 17 G: 17 POWDER, FOR SOLUTION ORAL at 08:31

## 2024-06-16 RX ADMIN — CHLORHEXIDINE GLUCONATE 0.12% ORAL RINSE 15 ML: 1.2 LIQUID ORAL at 08:31

## 2024-06-16 RX ADMIN — HEPARIN SODIUM 5000 UNITS: 5000 INJECTION, SOLUTION INTRAVENOUS; SUBCUTANEOUS at 03:56

## 2024-06-16 RX ADMIN — DOCUSATE SODIUM 50 MG AND SENNOSIDES 8.6 MG 1 TABLET: 8.6; 5 TABLET, FILM COATED ORAL at 08:31

## 2024-06-16 RX ADMIN — FUROSEMIDE 20 MG: 10 INJECTION, SOLUTION INTRAMUSCULAR; INTRAVENOUS at 16:48

## 2024-06-16 RX ADMIN — HYDROMORPHONE HYDROCHLORIDE 0.2 MG: 0.2 INJECTION, SOLUTION INTRAMUSCULAR; INTRAVENOUS; SUBCUTANEOUS at 22:39

## 2024-06-16 RX ADMIN — PIPERACILLIN AND TAZOBACTAM 4.5 G: 4; .5 INJECTION, POWDER, FOR SOLUTION INTRAVENOUS at 14:20

## 2024-06-16 RX ADMIN — POTASSIUM PHOSPHATE, MONOBASIC POTASSIUM PHOSPHATE, DIBASIC 9 MMOL: 224; 236 INJECTION, SOLUTION, CONCENTRATE INTRAVENOUS at 05:47

## 2024-06-16 RX ADMIN — HYDROMORPHONE HYDROCHLORIDE 0.2 MG: 0.2 INJECTION, SOLUTION INTRAMUSCULAR; INTRAVENOUS; SUBCUTANEOUS at 16:17

## 2024-06-16 RX ADMIN — ONDANSETRON 4 MG: 2 INJECTION INTRAMUSCULAR; INTRAVENOUS at 13:53

## 2024-06-16 RX ADMIN — ACETAMINOPHEN 975 MG: 325 TABLET, FILM COATED ORAL at 00:25

## 2024-06-16 RX ADMIN — ACETAMINOPHEN 975 MG: 325 TABLET, FILM COATED ORAL at 08:31

## 2024-06-16 ASSESSMENT — ACTIVITIES OF DAILY LIVING (ADL)
ADLS_ACUITY_SCORE: 37
ADLS_ACUITY_SCORE: 38
ADLS_ACUITY_SCORE: 37

## 2024-06-16 NOTE — PROGRESS NOTES
Patient suctioned and electively extubated per physician order. Placed on LFNC @ 3 LPM, breath sounds were clear, diminished. Patient tolerated procedure well without any immediate complications.    Antoinette Hurley, RRT, RT  6/16/2024 1:22 PM

## 2024-06-16 NOTE — PROGRESS NOTES
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 Now s/p LVAD by Dr. Jhaveri on 6/14.     Arrives from the CVICU intubated and sedated on propofol. On 0.08 epi gtt, 0.05 norepi gtt and 1 unit vaso gtt for pressor and inotropic support. S/p LVAD procedure, x4 CT placed (3 mediastinal [ two 32 Fr straight, one 24 Fr sherley], one left pleural). Bypass time 117 minutes, UOP 425cc. No issues coming off bypass, tolerated well. With chest closure requiring up titration and addition of pressors. On inhaled nitric at 20 PPM. Required 1 plts, 380cc cell saver, 1.7 L crystalloid. Access is  RIJ MAC and PA catheter, right radial a-line, right arm PICC. Reversed.     CO-MORBIDITIES:   Acute decompensated heart failure (H)  (primary encounter diagnosis)    Changes/updates today:  - Wean Laisha off  - SBT today  - Strait rate heparin 500 units  - Start warfarin this evening  - Remove PICC line  - Stop insulin gtt --> HSSI    PLAN:  Neuro/ pain/ sedation:  - Monitor neurological status. Notify the MD for any acute changes in exam.  #Acute postoperative pain  - Gtt: Fentanyl   - Scheduled: Tylenol  - PRN: Tylenol, oxycodone, Dilaudid  #Sedation  - Gtt:Propofol  - RASS goal 0 to -1     Pulmonary care:   # Mechanical ventilation  - Goal SpO2 > 92%  - Wean Laisha to off this AM tolerated well;  - SBT today and eval extubation readiness  - Encourage IS q15-30 minutes when awake.    Vent Mode: CMV/AC  (Continuous Mandatory Ventilation/ Assist Control)  FiO2 (%): 40 %  Resp Rate (Set): 18 breaths/min  Tidal Volume (Set, mL): 460 mL  PEEP (cm H2O): 5 cmH2O  Resp: 15      Cardiovascular:    #Undifferentiated shock; septic vs cardiogenic  #S/p LVAD on  6/14 by Dr. Jhaveri  #S/p IABP (6/12-6/14)  #Hx NSVT  #Acute on chronic HFrEF 2/2 to NICM (EF 10-15%), home milrinone PTA, s/p ICD   #HLD  - Keep Epi on for RV support.   - Goal MAP 65-80, CVP 10-12, and SBP <140, monitor hemodynamics  - PRN hydralazine for SBP > 140 or MAP > 80  - Hold PTA Coreg, Entresto, Aldactone, Jardiance, atorvastatin  - Hold BB   - Continue PTA atorvastatin  - Start straight rate heparin 500 units and warfarin  - CTs: Meds x 3 & L pleural x 1; -20 suction  - 6/15: PI 5.1-->2.1, CVP 15-->8, SVR 1632-->851, and epinephrine gtt 0.03-->0.06 in setting of 20mg furosemide given and Laisha wean. Unclear etiology hypovolemia, septic shock, vs cardiogenic component. Total 1.5L LR given and CVP now 12 with PI 2.4.   - FVG: net even; no plans for diuresis today     GI care / Nutrition:   # Coffee ground gastric secretions  - NPO, bedside swallow eval once extubated  - IV PPI BID; H&H stable  - OG to LIS  - Bowel regimen: MiraLAX, senna     Renal / Fluids / Electrolytes:   # CKD Stage 2  # Lactic acidosis - resolved  Received 1.7L crystalloid intra-op & 500 mL overnight  BL creat appears to be ~ 1.2-1.4  - Strict I/O, daily weights, avoid/limit nephrotoxins  - CMP daily  - Lactate q4 hours  - Replete lytes PRN per protocol  - FVG: net even; hold on diuresis     Endocrine:    #Stress hyperglycemia  Preop A1c 5.6  - Insulin gtt stopped; HSSI ordered  - Goal BG <180 for optimal healing  - Hypoglycemia order set in place     ID / Antibiotics:  #Stress induced leukocytosis  #Febrile  - Continue LVAD post-op ppx dosing levaquin, rifampin, fluconazole, and vancomyocin  - PAN cultures Unremarkable thus far  - Continue Zosyn  - Monitor fever curve, WBC, and inflammatory markers as appropriate  - 6/15 patient febrile TMAX 102.6 with hemodynamic instability and persistent leukocytosis. Given patient admitted 6/3 and at higher risk of infectious concerns Zosyn was started.   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  No s/sx active bleeding.  - Monitor CT output  - See GI above; H&H stable  - Hgb goal > 7.0  - SQH  - Coffee ground bilious output improved; now with less output and mixed consistency dark red/coffee ground/bilious.  - Start heparin fixed rate 500 and warfarin     MSK / Skin:  #Sternotomy  #Surgical Incision  - Sternal precautions, postop incision management protocol  - PT/OT/CR     Prophylaxis:     - Mechanical DVT ppx  - Chemical DVT ppx: start SQH   - PPI BID     Lines / Tubes / Drains:  - ETT  - RIJ CVC, PA catheter  - Arterial Line  - CTs x4  - Menjivar  - OG     Disposition:  - CVICU      ICU Checklist  F - Feeding:   A - Analgesia:   S - Sedation:   T - Thromboembolic prophylaxis:      H - Head of bed elevated  U - Ulcer prophylaxis:  G - Glycemic control  S - SBT     B - Bowel regimen  I - Indwelling catheter  D - De-escalation of antibiotics    Patient seen, findings and plan discussed with CVICU staff and cardiothoracic surgeons.  Time spent with patient 40  This does not include time spent on procedures or teaching.     ANNE Montero CNP on 6/16/2024 at 11:45 AM       ====================================    TODAY'S PROGRESS  SUBJECTIVE  NAEON    OBJECTIVE  1. VITAL SIGNS  Temp:  [99.5  F (37.5  C)-102.6  F (39.2  C)] 100.2  F (37.9  C)  Pulse:  [] 98  Resp:  [14-19] 15  MAP:  [63 mmHg-230 mmHg] 69 mmHg  Arterial Line BP: (68-95)/(60-83) 73/61  FiO2 (%):  [40 %] 40 %  SpO2:  [90 %-99 %] 95 %  Vent Mode: CMV/AC  (Continuous Mandatory Ventilation/ Assist Control)  FiO2 (%): 40 %  Resp Rate (Set): 18 breaths/min  Tidal Volume (Set, mL): 460 mL  PEEP (cm H2O): 5 cmH2O  Resp: 15      2. INTAKE/ OUTPUT  I/O last 3 completed shifts:  In: 4098.3 [I.V.:2708.3; NG/GT:390; IV Piggyback:1000]  Out: 2645 [Urine:1625;  Emesis/NG output:700; Chest Tube:320]    3. PHYSICAL EXAMINATION      EYES: PERRL, Anicteric sclera.   HEENT:  Normocephalic, atraumatic, trachea midline, ETT secure, Pupils PERRLA  CV: RRR, no gallops, rubs, or murmurs  PULM/CHEST: Clear breath sounds bilaterally without rhonchi, crackles or wheeze, symmetric chest rise  GI: normal bowel sounds, soft, soft, obese,   : kuo catheter in place, urine yellow and clear  EXTREMITIES: +1 peripheral edema, moving all extremities, peripheral pulses intact  NEURO: Arouses to stimulus and following commands  SKIN: Sternal incision well approximated, natural skin color, and no edema      4. INVESTIGATIONS  Arterial Blood Gases   Recent Labs   Lab 06/16/24  0726 06/16/24  0332 06/15/24  2223 06/15/24  1935   PH 7.41 7.45 7.46* 7.45   PCO2 40 34* 35 36   PO2 114* 146* 86 82   HCO3 26 24 24 25     Complete Blood Count   Recent Labs   Lab 06/16/24  0332 06/15/24  1934 06/15/24  1201 06/15/24  0325 06/14/24  2309   WBC 20.9* 21.6*  --  20.9* 19.3*   HGB 11.9* 12.2* 13.3 13.5 13.3   * 111*  --  140* 127*     Basic Metabolic Panel  Recent Labs   Lab 06/16/24  1011 06/16/24  0731 06/16/24  0555 06/16/24  0332 06/15/24  2110 06/15/24  1934 06/15/24  0529 06/15/24  0325 06/15/24  0031 06/14/24  2309   NA  --   --   --  135  --  135  --  138  --  137   POTASSIUM  --   --   --  4.1  --  4.4  --  4.4  --  4.4   CHLORIDE  --   --   --  104  --  104  --  107  --  107   CO2  --   --   --  20*  --  21*  --  21*  --  19*   BUN  --   --   --  12.3  --  12.5  --  12.2  --  13.2   CR  --   --   --  0.90  --  1.01  --  0.95  --  1.00   * 122* 130* 160*   < > 130*  140*   < > 126*   < > 182*    < > = values in this interval not displayed.     Liver Function Tests  Recent Labs   Lab 06/16/24  1001 06/16/24  0332 06/15/24  0325 06/14/24  2309 06/14/24  2002 06/14/24  1415 06/14/24  1100   AST  --  87* 112* 95* 86* 56*  --    ALT  --  37 45 44 42 42  --    ALKPHOS  --  66 66 65 62 64   "--    BILITOTAL  --  2.3* 2.0* 2.3* 2.8* 3.7*  --    ALBUMIN  --  2.9* 3.5 3.5 3.4* 3.5  --    INR 1.41*  --  1.28*  --   --  1.43* 1.61*     Pancreatic Enzymes  No lab results found in last 7 days.  Coagulation Profile  Recent Labs   Lab 06/16/24  1001 06/15/24  0325 06/14/24  1415 06/14/24  1100   INR 1.41* 1.28* 1.43* 1.61*   PTT  --  31 48* 31     Lactate  Invalid input(s): \"LACTATE\"    5. RADIOLOGY  Recent Results (from the past 24 hour(s))   RAJ with Report    Narrative    Rodolfo Kessler MD     6/14/2024 11:25 AM  Perioperative RAJ Procedure Note    Staff -        Anesthesiologist:  Jomar Liu MD       Resident/Fellow: Rodolfo Kessler MD       Performed By: fellow  Preanesthesia Checklist:  Patient identified, IV assessed, risks and   benefits discussed, monitors and equipment assessed, procedure being   performed at surgeon's request and anesthesia consent obtained.    RAJ Probe Insertion    Probe Status PRE Insertion: NO obvious damage  Probe type:  Adult 3D  Bite block used:   Soft  Insertion Technique: Easy, no oropharyngeal manipulation  Insertion complications: None obvious  Billing Report:RAJ report by Anesthesiologist (See Separate Report note)  Probe Status POST Removal: NO obvious damage    RAJ Report  General Procedure Information  Images for this study have been archived.  Modalities: 2D, 3D, CW Doppler, PW Doppler and Color flow mapping  Echocardiographic and Doppler Measurements  Right Ventricle:  Cavity size dilated.    Hypertrophy not present.     Thrombus not present.    Global function normal.     Left Ventricle:  Cavity size dilated.    Hypertrophy not present.     Thrombus not present.   Global Function severely impaired.       Ventricular Regional Function:  1- Basal Anteroseptal:  hypokinetic  2- Basal Anterior:  hypokinetic  3- Basal Anterolateral:  hypokinetic  4- Basal Inferolateral:  hypokinetic  5- Basal Inferior:  hypokinetic  6- Basal Inferoseptal:  hypokinetic  7- Mid " Anteroseptal:  hypokinetic  8- Mid Anterior:  hypokinetic  9- Mid Anterolateral:  hypokinetic  10- Mid Inferolateral:  hypokinetic  11- Mid Inferior:  hypokinetic  12- Mid Inferoseptal:  hypokinetic  13- Apical Anterior:  hypokinetic  14- Apical Lateral:  hypokinetic  15- Apical Inferior:  hypokinetic  16- Apical Septal:  hypokinetic  17- Oil Trough:  hypokinetic    Valves  Aortic Valve: Annulus normal.  Stenosis not present.  Regurgitation   absent.  Leaflets normal.  Leaflet motions normal.    Mitral Valve: Annulus dilated.  Stenosis not present.  Regurgitation +2.    Leaflets normal.  Leaflet motions normal.    Tricuspid Valve: Annulus normal.  Stenosis not present.  Regurgitation +2.    Leaflets normal.  Leaflet motions normal.    Pulmonic Valve: Annulus normal.  Stenosis not present.  Regurgitation   absent.      Aorta: Ascending Aorta: Size normal.  Dissection not present.  Plaque   thickness less than 3 mm.  Mobile plaque not present.    Aortic Arch: Size normal.    Dissection not present.   Plaque thickness   less than 3 mm.   Mobile plaque not present.    Descending Aorta: Size normal.    Dissection not present.   Plaque   thickness less than 3 mm.   Mobile plaque not present.    Other Aortic Findings:  IABP visualized in descending aorta, below   subclavian  Right Atrium:  Size normal.   Spontaneous echo contrast not present.     Thrombus not present.   Tumor not present.   Device not present.     Left Atrium: Size dilated.  Spontaneous echo contrast not present.    Thrombus not present.  Tumor not present.  Device not present.    Left atrial appendage normal.     Atrial Septum: Intra-atrial septal morphology normal.     Other atrial   septal defect findings:  Colorflow suspicious for PFO. Negative bubble   study. Difficult to perform due to high CVP.  Ventricular Septum: Intra-ventricular septum morphology normal.      Diastolic Function Measurements:  Diastolic Dysfunction Grade= III.  E=  ms.  A=  ms.  E/A  Ratio= .  DT=    ms.  S/D= .  IVRT= ms.  Other Findings:   Pericardium:  normal. Pleural Effusion:  none. Pulmonary Arteries:    normal. Pulmonary Venous Flow:  normal.     Post Intervention Findings  Procedure(s) performed:  LVAD Placement. Global function:  Unchanged.   Regional wall motion: Unchanged. Surgeon(s) notified of all   postintervention findings: Yes (Notified in real time).         LVAD   Placement:  LV decompressed. PFO not present. Aortic valve opening.    Post Intervention comments: Post bypass LVAD heartmate III placement: RV   function is unchanged. LV function is unchanged. LV is 6.5cm in diameter   (pre-bypass 7cm). Inflow cannula at apex of LV is directed towards the   mitral valve. Outflow cannula seen in ascending aorta. Both cannulas have   appropriate velocities. PFO evaluation was negative via colorflow and   bubble study post bypass. The aortic valve is unchanged and opening on   average every 3rd beat. The mitral valve shows moderate regurgitation   (previously mild). The tricuspid and pulmonic valves are unchanged. IABP   seen in the descending aorta. No echo evidence of aortic dissection. .    Echocardiogram Comments   Cardiac Device Check - Inpatient   Result Value    Date Time Interrogation Session [id number]    Implantable Pulse Generator  Medtronic    Implantable Pulse Generator Model [id number] Cobalt XT VR MRI    Implantable Pulse Generator Serial Number [id number]    Type Interrogation Session In Clinic    Clinic Name [facility]    Implantable Pulse Generator Type Defibrillator    Implantable Pulse Generator Implant Date 20230615    Implantable Lead  Medtronic    Implantable Lead Model 6935M Sprint Quattro Secure S MRI SureScan    Implantable Lead Serial Number [id number]    Implantable Lead Implant Date 20230615    Implantable Lead Polarity Type Tripolar Lead    Implantable Lead Location Detail 1 UNKNOWN    Implantable Lead  Location Right Ventricle    Implantable Lead Connection Status Connected    Andrés Setting Mode (NBG Code) VVIR    Andrés Setting Lower Rate Limit 60    Andrés Setting Maximum Sensor Rate 130    Lead Channel Setting Sensing Polarity Bipolar    Lead Channel Setting Sensing Anode Location Right Ventricle    Lead Channel Setting Sensing Anode Terminal Ring    Lead Channel Setting Sensing Cathode Location Right Ventricle    Lead Channel Setting Sensing Cathode Terminal Tip    Lead Channel Setting Sensing Sensitivity 0.3    Lead Channel Setting Pacing Polarity Bipolar    Lead Channel Setting Pacing Anode Location Right Ventricle    Lead Channel Setting Pacing Anode Terminal Ring    Lead Channel Setting Sensing Cathode Location Right Ventricle    Lead Channel Setting Sensing Cathode Terminal Tip    Lead Channel Setting Pacing Pulse Width 0.4    Lead Channel Setting Pacing Amplitude 2.0    Lead Channel Setting Pacing Capture Mode Adaptive    Zone Setting Type Category VF    Zone Setting Vendor Type Category VF    Zone Setting Status Inactive    Zone Setting Detection Interval 320    Zone Setting Detection Beats Numerator 30    Zone Setting Detection Beats Denominator 40    Zone Setting Type Category VT    Zone Setting Vendor Type Category FastVT    Zone Setting Status Inactive    Zone Setting Type Category VT    Zone Setting Vendor Type Category VT    Zone Setting Status Inactive    Zone Setting Detection Interval 360    Zone Setting Detection Beats Numerator 16    Zone Setting Detection Beats Denominator 16    Zone Setting Type Category VT    Zone Setting Vendor Type Category MonVT    Zone Setting Status Inactive    Zone Setting Detection Interval 400    Zone Setting Detection Beats Numerator 32    Zone Setting Detection Beats Denominator 32    Lead Channel Impedance Value 323    Lead Channel Impedance Value 456    Lead Channel Sensing Intrinsic Amplitude 14.0    Lead Channel Pacing Threshold Amplitude 0.5    Lead Channel  Pacing Threshold Pulse Width 0.4    Battery Date Time of Measurements 02722395524917    Battery Status Middle of Service    Battery RRT Trigger 2.8 V    Battery Remaining Longevity 134    Battery Voltage 3.03    Capacitor Charge Type Shock    Capacitor Last Charge Date Time 13563372311298    Capacitor Charge Time 3.9    Capacitor Charge Energy 18.0    Andrés Statistic Date Time Start 76654881152794    Andrés Statistic Date Time End 11745866639180    Andrés Statistic RV Percent Paced 1.54    CRT Statistic Date Time Start 29344483733732    CRT Statistic Date Time End 61770384831918    Atrial Tachy Statistic Date Time Start 55197298737450    Atrial Tachy Statistic Date Time End 38501413961654    Atrial Tachy Statistic AT/AF [place] Percent 0    Therapy Statistic Recent Shocks Delivered 0    Therapy Statistic Recent Shocks Aborted 0    Therapy Statistic Recent ATP Delivered 0    Therapy Statistic Recent Date Time Start 18723180154447    Therapy Statistic Recent Date Time End 34005307465343    Therapy Statistic Total Shocks Delivered 0    Therapy Statistic Total Shocks Aborted 0    Therapy Statistic Total ATP Delivered 0    Therapy Statistic Total  Date Time Start 33480250129920    Therapy Statistic Total  Date Time End 38334522989228    Episode Statistic Recent Count 0    Episode Statistic Type Category Patient Activated    Episode Statistic Recent Count 5    Episode Statistic Type Category SVT    Episode Statistic Recent Count 32    Episode Statistic Type Category VT    Episode Statistic Recent Count 0    Episode Statistic Type Category VF    Episode Statistic Recent Count 0    Episode Statistic Type Category VT    Episode Statistic Recent Count 0    Episode Statistic Type Category VT    Episode Statistic Recent Count 0    Episode Statistic Type Category VT    Episode Statistic Recent Date Time Start 09187173501284    Episode Statistic Recent Date Time End 10219966005849    Episode Statistic Recent Date Time Start  22247103350354    Episode Statistic Recent Date Time End 19786809915590    Episode Statistic Recent Date Time Start 54371963567859    Episode Statistic Recent Date Time End 46201599748292    Episode Statistic Recent Date Time Start 09036605906137    Episode Statistic Recent Date Time End 42719280050951    Episode Statistic Recent Date Time Start 87515813650066    Episode Statistic Recent Date Time End 77338544778498    Episode Statistic Recent Date Time Start 78546389711140    Episode Statistic Recent Date Time End 12396295478761    Episode Statistic Recent Date Time Start 48895068171917    Episode Statistic Recent Date Time End 41859979986926    Episode Statistic Total Count 0    Episode Statistic Type Category Patient Activated    Episode Statistic Total Count 20    Episode Statistic Type Category SVT    Episode Statistic Total Count 88    Episode Statistic Type Category VT    Episode Statistic Total Count 0    Episode Statistic Type Category VF    Episode Statistic Total Count 0    Episode Statistic Type Category VT    Episode Statistic Total Count 0    Episode Statistic Type Category VT    Episode Statistic Total Count 0    Episode Statistic Type Category VT    Episode Statistic Total Date Time Start 90299541299547    Episode Statistic Total Date Time End 57235228235565    Episode Statistic Total Date Time Start 74233668999721    Episode Statistic Total Date Time End 26128620356085    Episode Statistic Total Date Time Start 24865965757977    Episode Statistic Total Date Time End 14573156510711    Episode Statistic Total Date Time Start 58942812406611    Episode Statistic Total Date Time End 99659307945482    Episode Statistic Total Date Time Start 75144300293190    Episode Statistic Total Date Time End 03609547562645    Episode Statistic Total Date Time Start 42156228093415    Episode Statistic Total Date Time End 67822242202755    Episode Statistic Total Date Time Start 76263564900521    Episode Statistic Total  Date Time End 72486948217668    Episode Identifier 98    Episode Type Category SVT    Episode Date Time 44861157945912    Episode Duration 59    Episode Identifier 97    Episode Type Category SVT    Episode Date Time [id number]    Episode Duration 5    Episode Identifier 80    Episode Type Category SVT    Episode Date Time 53497664123647    Episode Duration 28    Episode Identifier 74    Episode Type Category SVT    Episode Date Time 51615310189253    Episode Duration 49    Episode Identifier 73    Episode Type Category SVT    Episode Date Time 06983723738844    Episode Duration 50    Episode Identifier 108    Episode Type Category VT    Episode Date Time 71656105555389    Episode Duration 1    Episode Identifier 107    Episode Type Category VT    Episode Date Time 63623292502544    Episode Duration 1    Episode Identifier 106    Episode Type Category VT    Episode Date Time 60725402609493    Episode Duration 1    Episode Identifier 105    Episode Type Category VT    Episode Date Time 06156791241402    Episode Duration 1    Episode Identifier 104    Episode Type Category VT    Episode Date Time 49626625497088    Episode Duration 3    Episode Identifier 103    Episode Type Category VT    Episode Date Time [id number]    Episode Duration 1    Episode Identifier 102    Episode Type Category VT    Episode Date Time 52479012903855    Episode Duration 1    Episode Identifier 101    Episode Type Category VT    Episode Date Time 46309908237942    Episode Duration 2    Episode Identifier 100    Episode Type Category VT    Episode Date Time [id number]    Episode Duration 1    Episode Identifier 99    Episode Type Category VT    Episode Date Time [id number]    Episode Duration 2    Episode Identifier 96    Episode Type Category VT    Episode Date Time 81316132921585    Episode Duration 1    Episode Identifier 95    Episode Type Category VT    Episode Date Time 64755241242196    Episode Duration 1    Episode  Identifier 94    Episode Type Category VT    Episode Date Time 04513696231666    Episode Duration 1    Episode Identifier 93    Episode Type Category VT    Episode Date Time [id number]    Episode Duration 6    Episode Identifier 92    Episode Type Category VT    Episode Date Time [id number]    Episode Duration 2    Narrative    Patient seen in OR 26 for evaluation and iterative programming of their   ICD per MD orders pre LVAD implant.    Device: Medtronic [id number] [place] XT VR MRI  Normal device function.  Mode: VVIR  bpm  : 1.5%  Intrinsic rhythm:  bpm  Thoracic Impedance:Below reference line. Suggests possible intrathoracic   fluid accumulation.  Short V-V intervals: 0  Lead Trends Appear Stable.  Estimated battery longevity to RRT = 13.2 years. Battery voltage = 3.03 V.     Anticoagulant: Warafrin  Ventricular Arrhythmia: 15 NSVT episodes lasting 1-6 sec @ 188-214 bpm.  Setting Changes: ICD Therapies turned OFF. LRL increased to 60 bpm.  Plan: Page Device RN post LVAD implant to turn ON ICD Therapies.    JOEL Vo RN    Single lead ICD    I have reviewed and interpreted the device interrogation, settings,   programming and nurse's summary. The device is functioning within normal   device parameters. I agree with the current findings, assessment and plan.     XR Chest Port 1 View    Narrative    Exam: XR CHEST PORT 1 VIEW, 6/14/2024 2:53 PM    Comparison: Same day 2:31 AM    History: Endotracheal tube positioning    Findings:  Portable AP view of the chest. Endotracheal tube terminates in the  distal trachea 1.0 cm from the judd. Right IJ Adams-Hugh catheter  terminates in the right pulmonary artery. Left chest wall implantable  cardiac defibrillator. Status post LVAD placement. Median sternotomy  wires. Mediastinal drains and left chest tube. Right PICC terminates  in the SVC.    Enlarged cardiac silhouette. No pneumothorax or pleural effusion.  Perihilar and bibasilar patchy  opacities.      Impression    Impression:   1. Endotracheal tube terminates in the distal thoracic trachea 1.0 cm  from the judd. Status post LVAD placement with median sternotomy  wires and multiple chest drains. Stable right PICC.  2. Cardiomegaly with perihilar and bibasilar opacities, likely  atelectasis and edema.    I have personally reviewed the examination and initial interpretation  and I agree with the findings.    VIOLETA DANIELLE MD         SYSTEM ID:  [id number]   XR Abdomen Port 1 View    Narrative    Exam: XR ABDOMEN PORT 1 VIEW, 6/14/2024 2:53 PM    Indication: OG placement    Comparison: 8/22/2023 CT    Findings:     Partially visualized enteric tube tip and sidehole projected over the  stomach. LVAD, partially visualized basilar chest tube and mediastinal  drain.    Suture from small bowel anastomosis seen within the right lower  quadrant. No obstructive bowel gas pattern. No pneumatosis or portal  venous gas. Small colonic stool burden. Metallic inferior IABP pump  marker projecting over L2.      Impression    Impression:     Enteric tube tip project over the stomach with sidehole projecting  near the gastroesophageal junction, consider advancement.    I have personally reviewed the examination and initial interpretation  and I agree with the findings.    ETHAN LION MD         SYSTEM ID:  [id number]   Cardiac Device Check - Inpatient   Result Value    Date Time Interrogation Session 20,240,614,152,919    Implantable Pulse Generator  Medtronic    Implantable Pulse Generator Model [id number] Cobalt XT VR MRI    Implantable Pulse Generator Serial Number [id number]    Type Interrogation Session In Clinic    Clinic Name [facility]    Implantable Pulse Generator Type Defibrillator    Implantable Pulse Generator Implant Date 20,230,615    Implantable Lead  Medtronic    Implantable Lead Model 6935M Sprint Quattro Secure S MRI SureScan    Implantable Lead  Serial Number [id number]    Implantable Lead Implant Date 20,230,615    Implantable Lead Polarity Type Tripolar Lead    Implantable Lead Location Detail 1 UNKNOWN    Implantable Lead Location Right Ventricle    Implantable Lead Connection Status Connected    Adnrés Setting Mode (NBG Code) VVIR    Andrés Setting Lower Rate Limit 60    Andrés Setting Maximum Sensor Rate 130    Lead Channel Setting Sensing Polarity Bipolar    Lead Channel Setting Sensing Anode Location Right Ventricle    Lead Channel Setting Sensing Anode Terminal Ring    Lead Channel Setting Sensing Cathode Location Right Ventricle    Lead Channel Setting Sensing Cathode Terminal Tip    Lead Channel Setting Sensing Sensitivity 0.3    Lead Channel Setting Pacing Polarity Bipolar    Lead Channel Setting Pacing Anode Location Right Ventricle    Lead Channel Setting Pacing Anode Terminal Ring    Lead Channel Setting Sensing Cathode Location Right Ventricle    Lead Channel Setting Sensing Cathode Terminal Tip    Lead Channel Setting Pacing Pulse Width 0.4    Lead Channel Setting Pacing Amplitude 2.0    Lead Channel Setting Pacing Capture Mode Adaptive    Zone Setting Type Category VF    Zone Setting Vendor Type Category VF    Zone Setting Status Active    Zone Setting Detection Interval 320    Zone Setting Detection Beats Numerator 30    Zone Setting Detection Beats Denominator 40    Zone Setting Type Category VT    Zone Setting Vendor Type Category FastVT    Zone Setting Status Inactive    Zone Setting Type Category VT    Zone Setting Vendor Type Category VT    Zone Setting Status Inactive    Zone Setting Detection Interval 360    Zone Setting Detection Beats Numerator 16    Zone Setting Detection Beats Denominator 16    Zone Setting Type Category VT    Zone Setting Vendor Type Category MonVT    Zone Setting Status Monitor    Zone Setting Detection Interval 400    Zone Setting Detection Beats Numerator 32    Zone Setting Detection Beats Denominator 32     Lead Channel Impedance Value 342    Lead Channel Impedance Value 437    Lead Channel Sensing Intrinsic Amplitude 5.8    Lead Channel Pacing Threshold Amplitude 0.5    Lead Channel Pacing Threshold Pulse Width 0.4    Battery Date Time of Measurements 20,240,614,141,502    Battery RRT Trigger 2.8 V    Battery Remaining Longevity 159    Battery Voltage 3.03    Capacitor Charge Type Shock    Capacitor Last Charge Date Time 20,240,419,090,015    Capacitor Charge Time 3.9    Capacitor Charge Energy 18.0    Andrés Statistic Date Time Start 20,240,403,112,652    Andrés Statistic Date Time End 20,240,614,152,919    Andrés Statistic RA Percent Paced Invalid Value    Andrés Statistic RV Percent Paced 1.53    CRT Statistic Date Time Start 20,240,403,112,652    CRT Statistic Date Time End 20,240,614,152,919    Atrial Tachy Statistic Date Time Start 20,240,403,112,652    Atrial Tachy Statistic Date Time End 20,240,614,152,919    Atrial Tachy Statistic AT/AF [place] Percent 0    Therapy Statistic Recent Shocks Delivered 0    Therapy Statistic Recent Shocks Aborted 0    Therapy Statistic Recent ATP Delivered 0    Therapy Statistic Recent Date Time Start 20,240,403,112,652    Therapy Statistic Recent Date Time End 20,240,614,152,919    Therapy Statistic Total Shocks Delivered 0    Therapy Statistic Total Shocks Aborted 0    Therapy Statistic Total ATP Delivered 0    Therapy Statistic Total  Date Time Start 20,230,615,114,709    Therapy Statistic Total  Date Time End 20,240,614,152,919    Episode Statistic Recent Count 0    Episode Statistic Type Category Patient Activated    Episode Statistic Recent Count 5    Episode Statistic Type Category SVT    Episode Statistic Recent Count 32    Episode Statistic Type Category VT    Episode Statistic Recent Count 0    Episode Statistic Type Category VF    Episode Statistic Recent Count 0    Episode Statistic Type Category VT    Episode Statistic Recent Count 0    Episode Statistic Type Category VT     Episode Statistic Recent Count 0    Episode Statistic Type Category VT    Episode Statistic Recent Date Time Start 20,240,403,112,652    Episode Statistic Recent Date Time End 20,240,614,152,919    Episode Statistic Recent Date Time Start 77046339550970    Episode Statistic Recent Date Time End 59208613388903    Episode Statistic Recent Date Time Start 78484504905481    Episode Statistic Recent Date Time End 72984683032168    Episode Statistic Recent Date Time Start 72533815923608    Episode Statistic Recent Date Time End 46394445122276    Episode Statistic Recent Date Time Start 27286954705589    Episode Statistic Recent Date Time End 62078223885359    Episode Statistic Recent Date Time Start 13450662155907    Episode Statistic Recent Date Time End 19625599392755    Episode Statistic Recent Date Time Start 50934172149120    Episode Statistic Recent Date Time End 11650584448722    Episode Statistic Total Count 0    Episode Statistic Type Category Patient Activated    Episode Statistic Total Count 20    Episode Statistic Type Category SVT    Episode Statistic Total Count 88    Episode Statistic Type Category VT    Episode Statistic Total Count 0    Episode Statistic Type Category VF    Episode Statistic Total Count 0    Episode Statistic Type Category VT    Episode Statistic Total Count 0    Episode Statistic Type Category VT    Episode Statistic Total Count 0    Episode Statistic Type Category VT    Episode Statistic Total Date Time Start 20,230,615,114,709    Episode Statistic Total Date Time End 20,240,614,152,919    Episode Statistic Total Date Time Start 66530398294854    Episode Statistic Total Date Time End 48914435776999    Episode Statistic Total Date Time Start 75685224577216    Episode Statistic Total Date Time End 36797633459600    Episode Statistic Total Date Time Start 19430972418526    Episode Statistic Total Date Time End 92129587956112    Episode Statistic Total Date Time Start 72778708346729     Episode Statistic Total Date Time End 34011912409946    Episode Statistic Total Date Time Start 46218589279392    Episode Statistic Total Date Time End 72717814222748    Episode Statistic Total Date Time Start 16333886740816    Episode Statistic Total Date Time End 01890209098301    Narrative    Patient seen in [facility] 4A for evaluation and iterative programming of their   ICD per MD orders s/p LVAD implant.    Device: Medtronic [id number] [place] XT VR MRI  Normal device function.  Mode: VVIR  bpm  : 1.5%  Intrinsic rhythm:  bpm  Thoracic Impedance: Below reference line. Suggests possible intrathoracic   fluid accumulation.  Short V-V intervals: 2  Lead Trends Appear Stable.  Estimated battery longevity to RRT = 13 years. Battery voltage = 3.03 V.   Ventricular Arrhythmia: None.  Setting Changes: ICD Therapies turned ON.     JOEL Vo RN    Single lead ICD    I have reviewed and interpreted the device interrogation, settings,   programming and nurse's summary. The device is functioning within normal   device parameters. I agree with the current findings, assessment and plan.     XR Chest Port 1 View    Narrative    Exam: XR CHEST PORT 1 VIEW, 6/14/2024 4:16 PM    Comparison: Same day chest x-ray    History: eval after ETT repositioned    Findings:  Portable AP view of the chest. Endotracheal tube tip in the  midthoracic trachea, slightly retracted compared to previous. Gastric  tube with tip out of field of view and sidehole projecting over the  stomach. Right IJ Hallie-Hugh catheter terminates in the right pulmonary  artery. Left chest wall implantable cardiac defibrillator. LVAD.  Median sternotomy wires. Mediastinal drains and left chest tube. Right  PICC terminates in the SVC.    Enlarged cardiac silhouette, unchanged. No pneumothorax or pleural  effusion. Stable perihilar and bibasilar patchy opacities.      Impression    Impression:   1. Endotracheal tube terminates in the midthoracic trachea  slightly  retracted compared to previous. Stable additional support devices.  2. Stable cardiomegaly with unchanged perihilar and bibasilar  opacities, likely atelectasis and edema.    I have personally reviewed the examination and initial interpretation  and I agree with the findings.    VIOLETA DANIELLE MD         SYSTEM ID:  [id number]   XR Abdomen Port 1 View    Narrative    EXAMINATION:  XR ABDOMEN PORT 1 VIEW 6/14/2024     COMPARISON: Same day radiograph    HISTORY: OG advanced.    TECHNIQUE: One frontal supine view of the abdomen.    FINDINGS: Gastric tube tip and sidehole project over the stomach.  Partially visualized LVAD and surgical drains. No abnormally dilated  air-filled loops of bowel in the partially visualized abdomen.       Impression    IMPRESSION:   Gastric tube tip and sidehole are within the stomach.    I have personally reviewed the examination and initial interpretation  and I agree with the findings.    FITZ MIRELES MD         SYSTEM ID:  [id number]   XR Chest Port 1 View    Impression    RESIDENT PRELIMINARY INTERPRETATION  IMPRESSION:  1. Question tiny right apical pneumothorax. Attention on follow-up.  2. Interval placement of esophageal temperature probe. Otherwise,  stable support devices.  3. Decreased right and unchanged left sided opacities, likely  atelectasis.

## 2024-06-16 NOTE — PLAN OF CARE
Major Shift Events:  Laisha weaned to off. Sedation off. Pt extubated to 5L Oxymask @ 1300. PICC d/c'd per order. Pt w/uncontrolled pain improved s/p PRn dilaudid 0.4 x2 0.2 x1. LVAD WNL PIs improved off sedation s/p extubation now 3.0-3.5. Remains on low-dose Epi for inotropy. Decreased UOP 20mg Lasix x1 w/good response.     Neuro: Off sedation. Lethargic, Disoriented to time. FC, TAVERA, PERRLA, Temp improving 37.7  CV: Sinus tach 110's w/PVC. MAP goal 65-80. Leave epi on and use hydralazine pushes if needed  HM3: 4.1-4.5 flow, PI improved off sedation now 3.0-3.5 PI, 5100 RPM, 3.5 power  CVP 17, PA 57/22s, Svo2 62, CI 2.2, .1. 20mg Lasix x1 2/2 elevated CVP low UOP  Pulm: Oxymask 5L  GI: sliding scale insulin. NPO swallow study pending  : ayaan, decreasing UOP now 10 ml/hr. 20 mg Lasix x1  Skin: no skin issues  Drips:   Epi 0.03  Hep 500 straight rate  Lines: R internal jugular MAC w/ SWAN. . R radial art.     Plan: Complete swallow study, continue to monitor for adequate pain control.   For vital signs and complete assessments, please see documentation flowsheets.

## 2024-06-16 NOTE — PROGRESS NOTES
Called by bedside nurse RE pt PIs down to the low 2s.  Had been high 3s earlier.  UOP also down.  Reviewing chart, pt febrile with elevated WBC.     A/P: Fluid avid, possible sepsis.    Resuscitation. Recheck lactate. Follow-up cultures. On broad spectrum ABX.  Close monitor.  Discussed with the CT fellow and Dr. Jhaveri.    Critical Care 30 mins.  Elsa Tompkins MD [facility]  Professor of Surgery  Pager [phone]

## 2024-06-16 NOTE — PROGRESS NOTES
[facility]   Cardiology II Service ICU/ Advanced Heart Failure  Daily Progress Note      Patient: Navin Lutz  MRN: [id number]  Admission Date: 6/3/2024  Hospital Day # 13    Assessment and Plan: Navin Lutz is a 53 year old male admitted on 6/3/2024. He has a past medical history of chronic HFrEF 2/2 NICM s/p ICD, HLD, and CKD Stage II who presents admitted for decompensated heart failure on milrinone listed status 4, admitted for consideration of advanced therapies, now s/p LVAD .     Today's Plan:  - hold diuretics today due to low PI overnight needing fluids  - plan to extubate per primary team  - start straight rate heparin  - keep epi 0.04  - repeat gigi no after Laisha wean  - continue Abx for suspected sepsis  - remove picc line    # Acute on chronic systolic heart failure/HFrEF secondary to NICM with EF of 10-15% who was on home milrinone prior to admission    Stage D. NYHA Class III. TTE 24 EF 15-20%, LVIDd 7.0 cm, RV normal size and function, mild TR, mild MI, no pericardial effusion. RHC 24: RA 3, PA 25/12/17, PCW 9, Gigi CO/CI 4.63/2.13.   -S/P HM3 LVAD     Coleman  (post VAD)   RA: 14 (Goal 10-12)   PA:  33/18    PCWP: 14   CO/CI: 4.8/2.1   PVR: 3.1 fulton   SVR: 911   MAP: Goal 70-80    -Fluid status: euvolemic  -Inotrope: Epi 0.04  -IABP removed shortly after OR  -RV support: off Laisha  -ACEi/ARB/ARNI/afterload reduction: HOLD entresto given upcoming surgery for vasoplegia risk  -BB: coreg 3.125mg on hold  -Aldosterone antagonist: aldactone 25 mg daily on hold   -SGLT2i: HELD d/t upcoming surgery, PTA was on jardiance  -SCD prophylaxis: ICD    The patient's HeartMate LVAD was interrogated 2024  Heartmate 3 LEFT VS  Flow (Lpm): 4 Lpm  Pulse Index (PI): 3.9 PI  Speed (rpm): 5100 rpm  Power (bassett): 3.4 bassett  Current Hct settin     Fluid status: euvolemic   Alarms were reviewed, high PI shortly after OR, but no alarms since.   The driveline exit site was  inspected, c/d/i.   All external components were inspected and showed no evidence of damage or malfunction, none replaced.   No changes to VAD settings made    ================================================================    Subjective/24-Hr Events:   Low PI overnight, given 1.5 L fluid.    Medications: Reviewed in EPIC.     Physical Exam:   /85   Pulse 108   Temp 100.4  F (38  C)   Resp (!) 34   Ht 1.829 m (6')   Wt 98.2 kg (216 lb 7.9 oz)   SpO2 99%   BMI 29.36 kg/m      GENERAL: intubated and sedated  HEENT: no scleral icterus  NECK: Supple. No JVD   CV: RRR, LVAD hum noted  RESPIRATORY: mechanical breath sounds    GI: Soft and non distended   EXTREMITIES: No peripheral edema  NEUROLOGIC: intubated and sedated    Labs:  CMP  Recent Labs   Lab 06/16/24  1011 06/16/24  0731 06/16/24  0555 06/16/24  0332 06/15/24  2110 06/15/24  1934 06/15/24  0529 06/15/24  0325 06/15/24  0031 06/14/24  2309 06/14/24  2125 06/14/24 2002   NA  --   --   --  135  --  135  --  138  --  137  --  138   POTASSIUM  --   --   --  4.1  --  4.4  --  4.4  --  4.4  --  4.2   CHLORIDE  --   --   --  104  --  104  --  107  --  107  --  107   CO2  --   --   --  20*  --  21*  --  21*  --  19*  --  20*   ANIONGAP  --   --   --  11  --  10  --  10  --  11  --  11   * 122* 130* 160*   < > 130*  140*   < > 126*   < > 182*   < > 194*  206*   BUN  --   --   --  12.3  --  12.5  --  12.2  --  13.2  --  13.7   CR  --   --   --  0.90  --  1.01  --  0.95  --  1.00  --  1.20*   GFRESTIMATED  --   --   --  >90  --  89  --  >90  --  90  --  72   NAM  --   --   --  8.3*  --  8.6  --  8.5*  --  8.5*  --  8.4*   MAG  --   --   --  2.3  --  1.9  --  2.2  --  2.1  --  2.2   PHOS  --   --   --  2.5  --  2.4*  --  3.3  --   --   --  2.1*   PROTTOTAL  --   --   --  5.2*  --   --   --  5.6*  --  5.4*  --  5.3*   ALBUMIN  --   --   --  2.9*  --   --   --  3.5  --  3.5  --  3.4*   BILITOTAL  --   --   --  2.3*  --   --   --  2.0*  --  2.3*  --   2.8*   ALKPHOS  --   --   --  66  --   --   --  66  --  65  --  62   AST  --   --   --  87*  --   --   --  112*  --  95*  --  86*   ALT  --   --   --  37  --   --   --  45  --  44  --  42    < > = values in this interval not displayed.       CBC  Recent Labs   Lab 06/16/24  0332 06/15/24  1934 06/15/24  1201 06/15/24  0325 06/14/24  2309   WBC 20.9* 21.6*  --  20.9* 19.3*   RBC 3.85* 3.94*  --  4.41 4.28*   HGB 11.9* 12.2* 13.3 13.5 13.3   HCT 35.7* 36.1* 38.9* 40.1 38.9*   MCV 93 92  --  91 91   MCH 30.9 31.0  --  30.6 31.1   MCHC 33.3 33.8  --  33.7 34.2   RDW 14.3 14.1  --  14.0 13.9   * 111*  --  140* 127*       INR  Recent Labs   Lab 06/16/24  1001 06/15/24  0325 06/14/24  1415 06/14/24  1100   INR 1.41* 1.28* 1.43* 1.61*

## 2024-06-16 NOTE — PLAN OF CARE
Major Shift Events:  Neuro intact, on prop and fent, FAC/TAVERA with decreased sedation, PERRLA. Epi left on per team, MAP goals maintained. FICKs per chart, 1L LR given for low CVP and low Pis on VAD. VAD otherwise w/o issue. CT w/ minimal output. Fever improved with lowered room temp, cool bath given, tylenol, fan, and ice packs. NO weaned to 1 overnight, left at 1 per team. Borderline low UOP. Insulin gtts remains on. Electrolytes replaced per protocol.   Plan: wean support as able, trend and treat labs  For vital signs and complete assessments, please see documentation flowsheets.     Problem: Adult Inpatient Plan of Care  Goal: Optimal Comfort and Wellbeing  Outcome: Progressing  Intervention: Monitor Pain and Promote Comfort  Recent Flowsheet Documentation  Taken 6/16/2024 0400 by Patsy Horton RN  Pain Management Interventions:   around-the-clock dosing utilized   care clustered   emotional support   repositioned   quiet environment facilitated  Taken 6/16/2024 0000 by Patsy Horton RN  Pain Management Interventions:   around-the-clock dosing utilized   care clustered   emotional support   repositioned   quiet environment facilitated  Taken 6/15/2024 2000 by Patsy Horton RN  Pain Management Interventions:   around-the-clock dosing utilized   care clustered   emotional support   repositioned   quiet environment facilitated  Intervention: Provide Person-Centered Care  Recent Flowsheet Documentation  Taken 6/16/2024 0400 by Patsy Horton RN  Trust Relationship/Rapport:   reassurance provided   thoughts/feelings acknowledged   care explained  Taken 6/16/2024 0000 by Patsy Horton RN  Trust Relationship/Rapport:   reassurance provided   thoughts/feelings acknowledged   care explained  Taken 6/15/2024 2000 by Patsy Horton RN  Trust Relationship/Rapport:   reassurance provided   thoughts/feelings acknowledged   care explained     Problem: Heart Failure  Goal: Stable Heart Rate and  Rhythm  Outcome: Progressing  Goal: Effective Oxygenation and Ventilation  Outcome: Progressing  Intervention: Promote Airway Secretion Clearance  Recent Flowsheet Documentation  Taken 6/16/2024 0400 by Patsy Horton RN  Cough And Deep Breathing: unable to perform  Activity Management: bedrest  Taken 6/16/2024 0000 by Patsy Horton RN  Cough And Deep Breathing: unable to perform  Activity Management: bedrest  Taken 6/15/2024 2000 by Patsy Horton RN  Cough And Deep Breathing: unable to perform  Activity Management: bedrest  Intervention: Optimize Oxygenation and Ventilation  Recent Flowsheet Documentation  Taken 6/16/2024 0400 by Patsy Horton RN  Airway/Ventilation Management:   airway patency maintained   humidification applied   pulmonary hygiene promoted  Head of Bed (HOB) Positioning: HOB at 30 degrees  Taken 6/16/2024 0200 by aPtsy Horton RN  Head of Bed (HOB) Positioning: HOB at 30 degrees  Taken 6/16/2024 0000 by Patsy Horton RN  Airway/Ventilation Management:   airway patency maintained   humidification applied   pulmonary hygiene promoted  Head of Bed (HOB) Positioning: HOB at 30 degrees  Taken 6/15/2024 2200 by Patsy Horton RN  Head of Bed (HOB) Positioning: HOB at 30 degrees  Taken 6/15/2024 2000 by Patsy Horton RN  Airway/Ventilation Management:   airway patency maintained   humidification applied   pulmonary hygiene promoted  Head of Bed (HOB) Positioning: HOB at 30 degrees   Goal Outcome Evaluation:

## 2024-06-16 NOTE — PHARMACY-ANTICOAGULATION SERVICE
Clinical Pharmacy - Warfarin Dosing Consult     Pharmacy has been consulted to manage this patient s warfarin therapy.  Indication: LVAD/RVAD  Therapy Goal: INR 2-3  Provider/Team: Moisés Glass [facility]: [facility]  Warfarin Prior to Admission: Yes  Warfarin PTA Regimen: 7.5 mg Monday, Wednesday, Friday; 5 mg all other days  Significant drug interactions: IV heparin (increased risk of bleeding); zosyn (increased INR)  Recent documented change in oral intake/nutrition: Yes (NPO for surgery)    INR   Date Value Ref Range Status   06/15/2024 1.28 (H) 0.85 - 1.15 Final   06/14/2024 1.43 (H) 0.85 - 1.15 Final       Recommend warfarin 3.5 mg today.  Pharmacy will monitor Navin Lutz daily and order warfarin doses to achieve specified goal.      Please contact pharmacy as soon as possible if the warfarin needs to be held for a procedure or if the warfarin goals change.

## 2024-06-16 NOTE — PROGRESS NOTES
Brief Progress Note     Interval summary note to document updates and changes to care plan/s. Please see daily progress note for full assessment and plan/s.     Interval history: Patient extubated this afternoon tolerated well.      ANNE Montero CNP   06/16/2024  1:05 PM

## 2024-06-17 ENCOUNTER — APPOINTMENT (OUTPATIENT)
Dept: GENERAL RADIOLOGY | Facility: CLINIC | Age: 54
End: 2024-06-17
Attending: STUDENT IN AN ORGANIZED HEALTH CARE EDUCATION/TRAINING PROGRAM
Payer: COMMERCIAL

## 2024-06-17 ENCOUNTER — APPOINTMENT (OUTPATIENT)
Dept: OCCUPATIONAL THERAPY | Facility: CLINIC | Age: 54
End: 2024-06-17
Attending: PHYSICIAN ASSISTANT
Payer: COMMERCIAL

## 2024-06-17 ENCOUNTER — APPOINTMENT (OUTPATIENT)
Dept: GENERAL RADIOLOGY | Facility: CLINIC | Age: 54
End: 2024-06-17
Payer: COMMERCIAL

## 2024-06-17 LAB
ABO/RH(D): NORMAL
ALBUMIN SERPL BCG-MCNC: 3 G/DL (ref 3.5–5.2)
ALLEN'S TEST: ABNORMAL
ALP SERPL-CCNC: 68 U/L (ref 40–150)
ALT SERPL W P-5'-P-CCNC: 28 U/L (ref 0–70)
ANION GAP SERPL CALCULATED.3IONS-SCNC: 11 MMOL/L (ref 7–15)
ANION GAP SERPL CALCULATED.3IONS-SCNC: 12 MMOL/L (ref 7–15)
ANION GAP SERPL CALCULATED.3IONS-SCNC: 14 MMOL/L (ref 7–15)
ANTIBODY SCREEN: NEGATIVE
APTT PPP: 44 SECONDS (ref 22–38)
AST SERPL W P-5'-P-CCNC: 58 U/L (ref 0–45)
ATRIAL RATE - MUSE: 91 BPM
ATRIAL RATE - MUSE: NORMAL BPM
ATRIAL RATE - MUSE: NORMAL BPM
BACTERIA SPT CULT: NORMAL
BASE EXCESS BLDA CALC-SCNC: 0.4 MMOL/L (ref -3–3)
BASE EXCESS BLDV CALC-SCNC: 2.1 MMOL/L (ref -3–3)
BASE EXCESS BLDV CALC-SCNC: 2.2 MMOL/L (ref -3–3)
BASE EXCESS BLDV CALC-SCNC: 2.7 MMOL/L (ref -3–3)
BASE EXCESS BLDV CALC-SCNC: 2.9 MMOL/L (ref -3–3)
BASE EXCESS BLDV CALC-SCNC: 3.1 MMOL/L (ref -3–3)
BASE EXCESS BLDV CALC-SCNC: 5.3 MMOL/L (ref -3–3)
BASOPHILS # BLD AUTO: 0 10E3/UL (ref 0–0.2)
BASOPHILS NFR BLD AUTO: 0 %
BILIRUB SERPL-MCNC: 1.9 MG/DL
BUN SERPL-MCNC: 17.5 MG/DL (ref 6–20)
BUN SERPL-MCNC: 20.2 MG/DL (ref 6–20)
BUN SERPL-MCNC: 20.4 MG/DL (ref 6–20)
CA-I BLD-MCNC: 4.6 MG/DL (ref 4.4–5.2)
CALCIUM SERPL-MCNC: 8.5 MG/DL (ref 8.6–10)
CALCIUM SERPL-MCNC: 8.7 MG/DL (ref 8.6–10)
CALCIUM SERPL-MCNC: 8.7 MG/DL (ref 8.6–10)
CHLORIDE SERPL-SCNC: 102 MMOL/L (ref 98–107)
CHLORIDE SERPL-SCNC: 103 MMOL/L (ref 98–107)
CHLORIDE SERPL-SCNC: 104 MMOL/L (ref 98–107)
COHGB MFR BLD: 97.2 % (ref 96–97)
CREAT SERPL-MCNC: 1.08 MG/DL (ref 0.67–1.17)
CREAT SERPL-MCNC: 1.09 MG/DL (ref 0.67–1.17)
CREAT SERPL-MCNC: 1.13 MG/DL (ref 0.67–1.17)
DEPRECATED HCO3 PLAS-SCNC: 21 MMOL/L (ref 22–29)
DEPRECATED HCO3 PLAS-SCNC: 24 MMOL/L (ref 22–29)
DEPRECATED HCO3 PLAS-SCNC: 25 MMOL/L (ref 22–29)
DIASTOLIC BLOOD PRESSURE - MUSE: NORMAL MMHG
EGFRCR SERPLBLD CKD-EPI 2021: 78 ML/MIN/1.73M2
EGFRCR SERPLBLD CKD-EPI 2021: 81 ML/MIN/1.73M2
EGFRCR SERPLBLD CKD-EPI 2021: 82 ML/MIN/1.73M2
EOSINOPHIL # BLD AUTO: 0 10E3/UL (ref 0–0.7)
EOSINOPHIL NFR BLD AUTO: 0 %
ERYTHROCYTE [DISTWIDTH] IN BLOOD BY AUTOMATED COUNT: 14.3 % (ref 10–15)
GLUCOSE BLDC GLUCOMTR-MCNC: 117 MG/DL (ref 70–99)
GLUCOSE BLDC GLUCOMTR-MCNC: 120 MG/DL (ref 70–99)
GLUCOSE BLDC GLUCOMTR-MCNC: 139 MG/DL (ref 70–99)
GLUCOSE BLDC GLUCOMTR-MCNC: 151 MG/DL (ref 70–99)
GLUCOSE BLDC GLUCOMTR-MCNC: 157 MG/DL (ref 70–99)
GLUCOSE SERPL-MCNC: 116 MG/DL (ref 70–99)
GLUCOSE SERPL-MCNC: 142 MG/DL (ref 70–99)
GLUCOSE SERPL-MCNC: 150 MG/DL (ref 70–99)
GRAM STAIN RESULT: NORMAL
GRAM STAIN RESULT: NORMAL
HCO3 BLD-SCNC: 25 MMOL/L (ref 21–28)
HCO3 BLDV-SCNC: 27 MMOL/L (ref 21–28)
HCO3 BLDV-SCNC: 28 MMOL/L (ref 21–28)
HCO3 BLDV-SCNC: 30 MMOL/L (ref 21–28)
HCT VFR BLD AUTO: 33.7 % (ref 40–53)
HGB BLD-MCNC: 11.3 G/DL (ref 13.3–17.7)
IMM GRANULOCYTES # BLD: 0.1 10E3/UL
IMM GRANULOCYTES NFR BLD: 1 %
INR PPP: 1.37 (ref 0.85–1.15)
INTERPRETATION ECG - MUSE: NORMAL
LACTATE SERPL-SCNC: 1.4 MMOL/L (ref 0.7–2)
LYMPHOCYTES # BLD AUTO: 0.9 10E3/UL (ref 0.8–5.3)
LYMPHOCYTES NFR BLD AUTO: 5 %
MAGNESIUM SERPL-MCNC: 2.2 MG/DL (ref 1.7–2.3)
MAGNESIUM SERPL-MCNC: 2.3 MG/DL (ref 1.7–2.3)
MAGNESIUM SERPL-MCNC: 2.4 MG/DL (ref 1.7–2.3)
MCH RBC QN AUTO: 31.4 PG (ref 26.5–33)
MCHC RBC AUTO-ENTMCNC: 33.5 G/DL (ref 31.5–36.5)
MCV RBC AUTO: 94 FL (ref 78–100)
MONOCYTES # BLD AUTO: 1.5 10E3/UL (ref 0–1.3)
MONOCYTES NFR BLD AUTO: 9 %
NEUTROPHILS # BLD AUTO: 14.5 10E3/UL (ref 1.6–8.3)
NEUTROPHILS NFR BLD AUTO: 85 %
NRBC # BLD AUTO: 0 10E3/UL
NRBC BLD AUTO-RTO: 0 /100
O2/TOTAL GAS SETTING VFR VENT: 2 %
O2/TOTAL GAS SETTING VFR VENT: 2 %
O2/TOTAL GAS SETTING VFR VENT: 21 %
O2/TOTAL GAS SETTING VFR VENT: 21 %
O2/TOTAL GAS SETTING VFR VENT: 24 %
O2/TOTAL GAS SETTING VFR VENT: 28 %
O2/TOTAL GAS SETTING VFR VENT: 28 %
OXYHGB MFR BLDV: 49 % (ref 70–75)
OXYHGB MFR BLDV: 51 % (ref 70–75)
OXYHGB MFR BLDV: 55 % (ref 70–75)
OXYHGB MFR BLDV: 55 % (ref 70–75)
OXYHGB MFR BLDV: 60 % (ref 70–75)
OXYHGB MFR BLDV: 64 % (ref 70–75)
P AXIS - MUSE: NORMAL DEGREES
PCO2 BLD: 41 MM HG (ref 35–45)
PCO2 BLDV: 42 MM HG (ref 40–50)
PCO2 BLDV: 42 MM HG (ref 40–50)
PCO2 BLDV: 43 MM HG (ref 40–50)
PCO2 BLDV: 46 MM HG (ref 40–50)
PCO2 BLDV: 47 MM HG (ref 40–50)
PCO2 BLDV: 47 MM HG (ref 40–50)
PH BLD: 7.4 [PH] (ref 7.35–7.45)
PH BLDV: 7.38 [PH] (ref 7.32–7.43)
PH BLDV: 7.38 [PH] (ref 7.32–7.43)
PH BLDV: 7.4 [PH] (ref 7.32–7.43)
PH BLDV: 7.42 [PH] (ref 7.32–7.43)
PH BLDV: 7.43 [PH] (ref 7.32–7.43)
PH BLDV: 7.45 [PH] (ref 7.32–7.43)
PHOSPHATE SERPL-MCNC: 1.8 MG/DL (ref 2.5–4.5)
PHOSPHATE SERPL-MCNC: 1.9 MG/DL (ref 2.5–4.5)
PHOSPHATE SERPL-MCNC: 2.5 MG/DL (ref 2.5–4.5)
PLATELET # BLD AUTO: 94 10E3/UL (ref 150–450)
PO2 BLD: 80 MM HG (ref 80–105)
PO2 BLDV: 27 MM HG (ref 25–47)
PO2 BLDV: 28 MM HG (ref 25–47)
PO2 BLDV: 30 MM HG (ref 25–47)
PO2 BLDV: 30 MM HG (ref 25–47)
PO2 BLDV: 33 MM HG (ref 25–47)
PO2 BLDV: 34 MM HG (ref 25–47)
POTASSIUM SERPL-SCNC: 3.7 MMOL/L (ref 3.4–5.3)
POTASSIUM SERPL-SCNC: 3.8 MMOL/L (ref 3.4–5.3)
POTASSIUM SERPL-SCNC: 3.9 MMOL/L (ref 3.4–5.3)
PR INTERVAL - MUSE: 504 MS
PR INTERVAL - MUSE: NORMAL MS
PR INTERVAL - MUSE: NORMAL MS
PROT SERPL-MCNC: 5.7 G/DL (ref 6.4–8.3)
QRS DURATION - MUSE: 180 MS
QRS DURATION - MUSE: 180 MS
QRS DURATION - MUSE: 188 MS
QT - MUSE: 512 MS
QT - MUSE: 530 MS
QT - MUSE: 532 MS
QTC - MUSE: 653 MS
QTC - MUSE: 654 MS
QTC - MUSE: 669 MS
R AXIS - MUSE: -51 DEGREES
R AXIS - MUSE: -63 DEGREES
R AXIS - MUSE: 83 DEGREES
RBC # BLD AUTO: 3.6 10E6/UL (ref 4.4–5.9)
SAO2 % BLDA: 96 % (ref 92–100)
SAO2 % BLDV: 50.1 % (ref 70–75)
SAO2 % BLDV: 52.1 % (ref 70–75)
SAO2 % BLDV: 56.2 % (ref 70–75)
SAO2 % BLDV: 56.6 % (ref 70–75)
SAO2 % BLDV: 61.3 % (ref 70–75)
SAO2 % BLDV: 65.4 % (ref 70–75)
SODIUM SERPL-SCNC: 137 MMOL/L (ref 135–145)
SODIUM SERPL-SCNC: 139 MMOL/L (ref 135–145)
SODIUM SERPL-SCNC: 140 MMOL/L (ref 135–145)
SPECIMEN EXPIRATION DATE: NORMAL
SYSTOLIC BLOOD PRESSURE - MUSE: NORMAL MMHG
T AXIS - MUSE: -35 DEGREES
T AXIS - MUSE: -51 DEGREES
T AXIS - MUSE: 63 DEGREES
UFH PPP CHRO-ACNC: <0.1 IU/ML
VENTRICULAR RATE- MUSE: 91 BPM
VENTRICULAR RATE- MUSE: 96 BPM
VENTRICULAR RATE- MUSE: 98 BPM
WBC # BLD AUTO: 17.1 10E3/UL (ref 4–11)

## 2024-06-17 PROCEDURE — 71045 X-RAY EXAM CHEST 1 VIEW: CPT | Mod: 77

## 2024-06-17 PROCEDURE — 258N000003 HC RX IP 258 OP 636: Mod: JZ | Performed by: SURGERY

## 2024-06-17 PROCEDURE — 83605 ASSAY OF LACTIC ACID: CPT

## 2024-06-17 PROCEDURE — 83735 ASSAY OF MAGNESIUM: CPT

## 2024-06-17 PROCEDURE — 80053 COMPREHEN METABOLIC PANEL: CPT

## 2024-06-17 PROCEDURE — 84100 ASSAY OF PHOSPHORUS: CPT

## 2024-06-17 PROCEDURE — 71045 X-RAY EXAM CHEST 1 VIEW: CPT | Mod: 26 | Performed by: RADIOLOGY

## 2024-06-17 PROCEDURE — 74018 RADEX ABDOMEN 1 VIEW: CPT | Mod: 26 | Performed by: RADIOLOGY

## 2024-06-17 PROCEDURE — 250N000009 HC RX 250: Performed by: SURGERY

## 2024-06-17 PROCEDURE — 82330 ASSAY OF CALCIUM: CPT | Performed by: PHYSICIAN ASSISTANT

## 2024-06-17 PROCEDURE — 250N000013 HC RX MED GY IP 250 OP 250 PS 637

## 2024-06-17 PROCEDURE — 200N000002 HC R&B ICU UMMC

## 2024-06-17 PROCEDURE — 250N000011 HC RX IP 250 OP 636: Mod: JZ | Performed by: STUDENT IN AN ORGANIZED HEALTH CARE EDUCATION/TRAINING PROGRAM

## 2024-06-17 PROCEDURE — 250N000011 HC RX IP 250 OP 636: Performed by: PHYSICIAN ASSISTANT

## 2024-06-17 PROCEDURE — 99291 CRITICAL CARE FIRST HOUR: CPT | Mod: FS | Performed by: ANESTHESIOLOGY

## 2024-06-17 PROCEDURE — 74018 RADEX ABDOMEN 1 VIEW: CPT

## 2024-06-17 PROCEDURE — 250N000013 HC RX MED GY IP 250 OP 250 PS 637: Performed by: STUDENT IN AN ORGANIZED HEALTH CARE EDUCATION/TRAINING PROGRAM

## 2024-06-17 PROCEDURE — 250N000011 HC RX IP 250 OP 636

## 2024-06-17 PROCEDURE — 97168 OT RE-EVAL EST PLAN CARE: CPT | Mod: GO

## 2024-06-17 PROCEDURE — 71045 X-RAY EXAM CHEST 1 VIEW: CPT

## 2024-06-17 PROCEDURE — 82805 BLOOD GASES W/O2 SATURATION: CPT

## 2024-06-17 PROCEDURE — 85520 HEPARIN ASSAY: CPT

## 2024-06-17 PROCEDURE — 71045 X-RAY EXAM CHEST 1 VIEW: CPT | Mod: 26 | Performed by: STUDENT IN AN ORGANIZED HEALTH CARE EDUCATION/TRAINING PROGRAM

## 2024-06-17 PROCEDURE — 85004 AUTOMATED DIFF WBC COUNT: CPT

## 2024-06-17 PROCEDURE — 85730 THROMBOPLASTIN TIME PARTIAL: CPT | Performed by: STUDENT IN AN ORGANIZED HEALTH CARE EDUCATION/TRAINING PROGRAM

## 2024-06-17 PROCEDURE — C9113 INJ PANTOPRAZOLE SODIUM, VIA: HCPCS | Mod: JZ

## 2024-06-17 PROCEDURE — 85610 PROTHROMBIN TIME: CPT | Performed by: STUDENT IN AN ORGANIZED HEALTH CARE EDUCATION/TRAINING PROGRAM

## 2024-06-17 PROCEDURE — 250N000013 HC RX MED GY IP 250 OP 250 PS 637: Performed by: PHYSICIAN ASSISTANT

## 2024-06-17 PROCEDURE — 250N000013 HC RX MED GY IP 250 OP 250 PS 637: Performed by: SURGERY

## 2024-06-17 PROCEDURE — 250N000009 HC RX 250: Performed by: STUDENT IN AN ORGANIZED HEALTH CARE EDUCATION/TRAINING PROGRAM

## 2024-06-17 PROCEDURE — 99291 CRITICAL CARE FIRST HOUR: CPT | Mod: GC | Performed by: INTERNAL MEDICINE

## 2024-06-17 PROCEDURE — 86900 BLOOD TYPING SEROLOGIC ABO: CPT | Performed by: SURGERY

## 2024-06-17 PROCEDURE — 97530 THERAPEUTIC ACTIVITIES: CPT | Mod: GO

## 2024-06-17 PROCEDURE — 82805 BLOOD GASES W/O2 SATURATION: CPT | Performed by: STUDENT IN AN ORGANIZED HEALTH CARE EDUCATION/TRAINING PROGRAM

## 2024-06-17 PROCEDURE — 250N000011 HC RX IP 250 OP 636: Mod: JZ

## 2024-06-17 PROCEDURE — 80048 BASIC METABOLIC PNL TOTAL CA: CPT

## 2024-06-17 RX ORDER — HYDROMORPHONE HCL IN WATER/PF 6 MG/30 ML
0.4 PATIENT CONTROLLED ANALGESIA SYRINGE INTRAVENOUS
Status: DISCONTINUED | OUTPATIENT
Start: 2024-06-17 | End: 2024-06-17

## 2024-06-17 RX ORDER — AMOXICILLIN 250 MG
2 CAPSULE ORAL 2 TIMES DAILY
Status: DISCONTINUED | OUTPATIENT
Start: 2024-06-17 | End: 2024-06-25

## 2024-06-17 RX ORDER — ACETAMINOPHEN 325 MG/1
975 TABLET ORAL EVERY 8 HOURS
Qty: 27 TABLET | Refills: 0 | Status: COMPLETED | OUTPATIENT
Start: 2024-06-17 | End: 2024-06-20

## 2024-06-17 RX ORDER — POTASSIUM PHOS IN 0.9 % NACL 15MMOL/250
15 PLASTIC BAG, INJECTION (ML) INTRAVENOUS
Status: COMPLETED | OUTPATIENT
Start: 2024-06-18 | End: 2024-06-18

## 2024-06-17 RX ORDER — SCOLOPAMINE TRANSDERMAL SYSTEM 1 MG/1
1 PATCH, EXTENDED RELEASE TRANSDERMAL
Status: DISCONTINUED | OUTPATIENT
Start: 2024-06-17 | End: 2024-06-20

## 2024-06-17 RX ORDER — HYDROXYZINE HYDROCHLORIDE 25 MG/1
50 TABLET, FILM COATED ORAL EVERY 6 HOURS PRN
Status: DISCONTINUED | OUTPATIENT
Start: 2024-06-17 | End: 2024-07-04 | Stop reason: HOSPADM

## 2024-06-17 RX ORDER — LIDOCAINE 4 G/G
1-2 PATCH TOPICAL
Status: DISCONTINUED | OUTPATIENT
Start: 2024-06-17 | End: 2024-07-04 | Stop reason: HOSPADM

## 2024-06-17 RX ORDER — FUROSEMIDE 10 MG/ML
60 INJECTION INTRAMUSCULAR; INTRAVENOUS ONCE
Status: COMPLETED | OUTPATIENT
Start: 2024-06-18 | End: 2024-06-18

## 2024-06-17 RX ORDER — QUETIAPINE FUMARATE 25 MG/1
25 TABLET, FILM COATED ORAL
Status: DISCONTINUED | OUTPATIENT
Start: 2024-06-17 | End: 2024-06-21

## 2024-06-17 RX ORDER — HALOPERIDOL 5 MG/ML
2 INJECTION INTRAMUSCULAR EVERY 6 HOURS PRN
Status: DISCONTINUED | OUTPATIENT
Start: 2024-06-17 | End: 2024-06-20

## 2024-06-17 RX ORDER — PROMETHAZINE HYDROCHLORIDE 12.5 MG/1
12.5 TABLET ORAL ONCE
Qty: 1 TABLET | Refills: 0 | Status: COMPLETED | OUTPATIENT
Start: 2024-06-17 | End: 2024-06-17

## 2024-06-17 RX ORDER — POTASSIUM CHLORIDE 29.8 MG/ML
20 INJECTION INTRAVENOUS ONCE
Status: COMPLETED | OUTPATIENT
Start: 2024-06-18 | End: 2024-06-18

## 2024-06-17 RX ORDER — HEPARIN SODIUM 10000 [USP'U]/100ML
0-5000 INJECTION, SOLUTION INTRAVENOUS CONTINUOUS
Status: DISCONTINUED | OUTPATIENT
Start: 2024-06-17 | End: 2024-06-19

## 2024-06-17 RX ORDER — DOBUTAMINE HYDROCHLORIDE 200 MG/100ML
2.5 INJECTION INTRAVENOUS CONTINUOUS
Status: DISCONTINUED | OUTPATIENT
Start: 2024-06-17 | End: 2024-06-19

## 2024-06-17 RX ORDER — METHOCARBAMOL 500 MG/1
500 TABLET, FILM COATED ORAL 4 TIMES DAILY PRN
Status: DISCONTINUED | OUTPATIENT
Start: 2024-06-17 | End: 2024-06-23

## 2024-06-17 RX ORDER — WARFARIN SODIUM 5 MG/1
5 TABLET ORAL
Status: COMPLETED | OUTPATIENT
Start: 2024-06-17 | End: 2024-06-17

## 2024-06-17 RX ORDER — FUROSEMIDE 10 MG/ML
40 INJECTION INTRAMUSCULAR; INTRAVENOUS ONCE
Status: COMPLETED | OUTPATIENT
Start: 2024-06-17 | End: 2024-06-17

## 2024-06-17 RX ORDER — HYDROXYZINE HYDROCHLORIDE 25 MG/1
25 TABLET, FILM COATED ORAL EVERY 6 HOURS PRN
Status: DISCONTINUED | OUTPATIENT
Start: 2024-06-17 | End: 2024-07-04 | Stop reason: HOSPADM

## 2024-06-17 RX ORDER — BISACODYL 10 MG
10 SUPPOSITORY, RECTAL RECTAL ONCE
Status: COMPLETED | OUTPATIENT
Start: 2024-06-17 | End: 2024-06-17

## 2024-06-17 RX ORDER — POTASSIUM CHLORIDE 29.8 MG/ML
20 INJECTION INTRAVENOUS ONCE
Status: COMPLETED | OUTPATIENT
Start: 2024-06-17 | End: 2024-06-17

## 2024-06-17 RX ORDER — POLYETHYLENE GLYCOL 3350 17 G/17G
17 POWDER, FOR SOLUTION ORAL 2 TIMES DAILY
Status: DISCONTINUED | OUTPATIENT
Start: 2024-06-17 | End: 2024-06-20

## 2024-06-17 RX ADMIN — FUROSEMIDE 40 MG: 10 INJECTION, SOLUTION INTRAVENOUS at 06:26

## 2024-06-17 RX ADMIN — LIDOCAINE 1 PATCH: 4 PATCH TOPICAL at 08:40

## 2024-06-17 RX ADMIN — HALOPERIDOL LACTATE 2 MG: 5 INJECTION, SOLUTION INTRAMUSCULAR at 00:04

## 2024-06-17 RX ADMIN — INSULIN ASPART 1 UNITS: 100 INJECTION, SOLUTION INTRAVENOUS; SUBCUTANEOUS at 00:22

## 2024-06-17 RX ADMIN — HYDROXYZINE HYDROCHLORIDE 50 MG: 25 TABLET ORAL at 20:35

## 2024-06-17 RX ADMIN — PIPERACILLIN AND TAZOBACTAM 4.5 G: 4; .5 INJECTION, POWDER, FOR SOLUTION INTRAVENOUS at 01:33

## 2024-06-17 RX ADMIN — POTASSIUM PHOSPHATE, MONOBASIC AND POTASSIUM PHOSPHATE, DIBASIC 9 MMOL: 224; 236 INJECTION, SOLUTION, CONCENTRATE INTRAVENOUS at 10:19

## 2024-06-17 RX ADMIN — ATORVASTATIN CALCIUM 20 MG: 20 TABLET, FILM COATED ORAL at 20:35

## 2024-06-17 RX ADMIN — SENNOSIDES AND DOCUSATE SODIUM 2 TABLET: 8.6; 5 TABLET ORAL at 20:35

## 2024-06-17 RX ADMIN — HYDROMORPHONE HYDROCHLORIDE 0.2 MG: 0.2 INJECTION, SOLUTION INTRAMUSCULAR; INTRAVENOUS; SUBCUTANEOUS at 05:54

## 2024-06-17 RX ADMIN — HYDRALAZINE HYDROCHLORIDE 10 MG: 20 INJECTION INTRAMUSCULAR; INTRAVENOUS at 18:05

## 2024-06-17 RX ADMIN — Medication 5 MG: at 20:35

## 2024-06-17 RX ADMIN — ACETAMINOPHEN 975 MG: 325 TABLET, FILM COATED ORAL at 08:26

## 2024-06-17 RX ADMIN — HYDROMORPHONE HYDROCHLORIDE 0.2 MG: 0.2 INJECTION, SOLUTION INTRAMUSCULAR; INTRAVENOUS; SUBCUTANEOUS at 03:57

## 2024-06-17 RX ADMIN — PANTOPRAZOLE SODIUM 40 MG: 40 INJECTION, POWDER, FOR SOLUTION INTRAVENOUS at 20:28

## 2024-06-17 RX ADMIN — PIPERACILLIN AND TAZOBACTAM 4.5 G: 4; .5 INJECTION, POWDER, FOR SOLUTION INTRAVENOUS at 08:33

## 2024-06-17 RX ADMIN — DOBUTAMINE HYDROCHLORIDE 2.5 MCG/KG/MIN: 200 INJECTION INTRAVENOUS at 14:01

## 2024-06-17 RX ADMIN — FUROSEMIDE 40 MG: 10 INJECTION, SOLUTION INTRAMUSCULAR; INTRAVENOUS at 17:47

## 2024-06-17 RX ADMIN — POTASSIUM CHLORIDE 20 MEQ: 29.8 INJECTION, SOLUTION INTRAVENOUS at 20:25

## 2024-06-17 RX ADMIN — ACETAMINOPHEN 650 MG: 325 TABLET, FILM COATED ORAL at 17:55

## 2024-06-17 RX ADMIN — OXYCODONE HYDROCHLORIDE 10 MG: 10 TABLET ORAL at 10:26

## 2024-06-17 RX ADMIN — INSULIN ASPART 1 UNITS: 100 INJECTION, SOLUTION INTRAVENOUS; SUBCUTANEOUS at 09:29

## 2024-06-17 RX ADMIN — WARFARIN SODIUM 5 MG: 5 TABLET ORAL at 18:33

## 2024-06-17 RX ADMIN — ONDANSETRON 4 MG: 2 INJECTION INTRAMUSCULAR; INTRAVENOUS at 01:33

## 2024-06-17 RX ADMIN — METHOCARBAMOL 500 MG: 500 TABLET ORAL at 20:35

## 2024-06-17 RX ADMIN — ONDANSETRON 4 MG: 2 INJECTION INTRAMUSCULAR; INTRAVENOUS at 20:23

## 2024-06-17 RX ADMIN — HYDRALAZINE HYDROCHLORIDE 10 MG: 20 INJECTION INTRAMUSCULAR; INTRAVENOUS at 11:46

## 2024-06-17 RX ADMIN — ONDANSETRON 4 MG: 2 INJECTION INTRAMUSCULAR; INTRAVENOUS at 11:39

## 2024-06-17 RX ADMIN — PROCHLORPERAZINE EDISYLATE 10 MG: 5 INJECTION INTRAMUSCULAR; INTRAVENOUS at 15:43

## 2024-06-17 RX ADMIN — SCOPOLAMINE 1 PATCH: 1 PATCH TRANSDERMAL at 02:14

## 2024-06-17 RX ADMIN — OXYCODONE HYDROCHLORIDE 10 MG: 10 TABLET ORAL at 20:35

## 2024-06-17 RX ADMIN — PANTOPRAZOLE SODIUM 40 MG: 40 INJECTION, POWDER, FOR SOLUTION INTRAVENOUS at 08:35

## 2024-06-17 RX ADMIN — SENNOSIDES AND DOCUSATE SODIUM 2 TABLET: 8.6; 5 TABLET ORAL at 08:26

## 2024-06-17 RX ADMIN — BISACODYL 10 MG: 10 SUPPOSITORY RECTAL at 10:26

## 2024-06-17 RX ADMIN — POLYETHYLENE GLYCOL 3350 17 G: 17 POWDER, FOR SOLUTION ORAL at 08:43

## 2024-06-17 RX ADMIN — POLYETHYLENE GLYCOL 3350 17 G: 17 POWDER, FOR SOLUTION ORAL at 20:35

## 2024-06-17 ASSESSMENT — ACTIVITIES OF DAILY LIVING (ADL)
ADLS_ACUITY_SCORE: 38
ADLS_ACUITY_SCORE: 40
ADLS_ACUITY_SCORE: 38
ADLS_ACUITY_SCORE: 40
ADLS_ACUITY_SCORE: 40
PREVIOUS_RESPONSIBILITIES: MEAL PREP;HOUSEKEEPING;LAUNDRY;SHOPPING;YARDWORK;MEDICATION MANAGEMENT;FINANCES;DRIVING;WORK
ADLS_ACUITY_SCORE: 38
ADLS_ACUITY_SCORE: 40
ADLS_ACUITY_SCORE: 38

## 2024-06-17 NOTE — PROGRESS NOTES
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 Now s/p LVAD by Dr. Jhaveri on 6/14.       Changes/updates today:  - UOP goal net negative 500 -1L  - Transition to low intensity heparin  - Increase bowel regimen, add suppository  - Discontinue zosyn  - Remove a-line, kuo catheter  - Discontinue x2 chest tubes  - Consider nutrition consult for [place] placement    PLAN:  Neuro/ pain/ sedation:  - Monitor neurological status. Notify the MD for any acute changes in exam.  #Acute postoperative pain  - Scheduled: Tylenol  - PRN: Tylenol, oxycodone, dilaudid, robaxin, volteran gel, atarax, lidocaine patches    Pulmonary care:   # No acute issues  - Goal SpO2 > 92%  - Encourage pulmonary hygiene.  - Use IS q15-30 minutes when awake.    Cardiovascular:    #S/p LVAD on 6/14 by Dr. Jhaveri  #S/p IABP (6/12-6/14)  #Undifferentiated shock; septic vs cardiogenic  #Hx NSVT  #Acute on chronic HFrEF 2/2 to NICM (EF 10-15%), home milrinone PTA, s/p ICD   #HLD  - Wean Epi as able  - Goal MAP 65-80, CVP 10-12, and SBP <140, monitor hemodynamics  - Continue PTA atorvastatin  - Transition to low intensity heparin  - Continue warfarin  - PRN hydralazine for MAP > 80  - CTs: Meds x 3 & L pleural x 1; -20 suction   - Remove pleural and sherley CTs  - UOP goal: plan below  - Hold PTA Coreg, Entresto, Aldactone, Jardiance, atorvastatin  - Hold BB      GI care / Nutrition:   # Constipation  # Nausea  # Risk for protein calorie malnutrition  # Hx Coffee ground gastric secretions  - Clear liquid diet, ADAT  - IV PPI BID  - Last BM 6/11  - Bowel regimen: Increase miraLAX, senna doses  - Suppository scheduled today  - Scopolamine patch  - PRN zofran/compazine/haldol  - PRN milk of mag  - Consider need for small  "bore feeding tube today if pt unable to tolerate/take PO       Renal / Fluids / Electrolytes:   # CKD Stage 2  BL creat appears to be ~ 1.2-1.4.   Cr 1.09 this morning.  - Strict I/O, daily weights, avoid/limit nephrotoxins  - CMP daily  - Replete lytes PRN per protocol  - UOP goal net negative 500 -1L   - 40mg lasix given this morning. Readdress additional needs this afternoon     Endocrine:    #Stress hyperglycemia  Preop A1c 5.6  - Goal BG <180 for optimal healing  - Continue HSSI  - Hypoglycemia order set in place     ID / Antibiotics:  #Stress induced leukocytosis  WBC improving to 17.1 from 19.4, lactate WNL, Tmax 100.2  - Completed LVAD post-op prophylaxis  - Blood cultures: NGTD  - Discontinue Zosyn  - Monitor fever curve, WBC, and inflammatory markers as appropriate  - Consider re-culturing and adding on ABX if febrile    Heme:     #Acute blood loss anemia  #Acute blood loss thrombocytopenia  #R Subclavian and axillary vein non-occlusive thrombus on Warfarin  - Monitor CT output  - See GI above  - Hgb goal > 7.0  - Transition to low intensity heparin  - Continue warfarin     MSK / Skin:  #Sternotomy  #Surgical Incision  - Sternal precautions, postop incision management protocol  - PT/OT/CR     Prophylaxis:     - Mechanical DVT ppx  - Chemical DVT ppx: low intensity heparin, warfarin  - PPI BID     Lines / Tubes / Drains:  - RIJ CVC  - PA catheter  - Arterial Line - remove  - CTs x4  - Menjivar - remove     Disposition:  - CVICU    Patient seen, findings and plan discussed with CVICU and cardiothoracic surgeons.  Time spent with patient 35 minutes. This does not include time spent on procedures or teaching.     Sarah Guerin, ANNE CNP    ====================================    TODAY'S PROGRESS  SUBJECTIVE: Pt states he did not sleep well overnight. States has constant incisional pain, rating it at a \"6\" this morning which is the \"same\". Also states having nausea that has \"stayed the same\" overnight. "     OBJECTIVE  1. VITAL SIGNS  Temp: 99.5  F (37.5  C) Temp src: Bladder   Pulse: 109   Resp: (!) 35 SpO2: 91 % O2 Device: Oxymask Oxygen Delivery: 2 LPM   Weight: 97.7 kg (215 lb 6.2 oz)  Estimated body mass index is 29.21 kg/m  as calculated from the following:    Height as of this encounter: 1.829 m (6').    Weight as of this encounter: 97.7 kg (215 lb 6.2 oz).      2. INTAKE/ OUTPUT  Intake/Output Summary (Last 24 hours) at 6/17/2024 0625  Last data filed at 6/17/2024 0600  Gross per 24 hour   Intake 2112.11 ml   Output 2184 ml   Net -71.89 ml       3. PHYSICAL EXAMINATION  EYES: PERRL, Anicteric sclera.   NEURO: Arouses to stimulus and following commands, weakly TAVERA (especially right hand/arm). States numbness to right arm/hand (a-line placement).  HEENT:  Normocephalic, atraumatic, trachea midline. Pupils PERRLA  CV: LVAD hum  PULM/CHEST: LS mildly course bilaterally, symmetric chest rise. CT x4 with thin, serosanguinous drainage. No air leaks/crepitus.  GI: Hypoactive bowel sounds. Mild distention, soft, mildy tender.  : kuo catheter in place, urine yellow and clear  EXTREMITIES: +1 peripheral edema, moving all extremities, peripheral pulses intact  SKIN: Sternal incision well approximated with surgi-glue. Mild bruising noted around site, no edema      4. INVESTIGATIONS  Arterial Blood Gases   Recent Labs   Lab 06/17/24 0433 06/16/24 1937 06/16/24  1813 06/16/24  1428   PH 7.40 7.39 7.38 7.38   PCO2 41 41 41 40   PO2 80 106* 100 136*   HCO3 25 25 24 23     Complete Blood Count   Recent Labs   Lab 06/17/24 0432 06/16/24 1937 06/16/24  0332 06/15/24  1934   WBC 17.1* 19.4* 20.9* 21.6*   HGB 11.3* 11.6* 11.9* 12.2*   PLT 94* 106* 105* 111*     Basic Metabolic Panel  Recent Labs   Lab 06/17/24 0432 06/17/24  0429 06/16/24  2348 06/16/24  1937 06/16/24  0555 06/16/24  0332 06/15/24  2110 06/15/24  1934     --   --  137  --  135  --  135   POTASSIUM 3.9  --   --  4.4  --  4.1  --  4.4   CHLORIDE 102   "--   --  105  --  104  --  104   CO2 21*  --   --  22  --  20*  --  21*   BUN 17.5  --   --  15.8  --  12.3  --  12.5   CR 1.09  --   --  0.97  --  0.90  --  1.01   * 139* 145* 155*  161*   < > 160*   < > 130*  140*    < > = values in this interval not displayed.     Liver Function Tests  Recent Labs   Lab 06/17/24  0432 06/16/24  1937 06/16/24  1001 06/16/24  0332 06/15/24  0325 06/14/24  2002 06/14/24  1415   AST 58* 67*  --  87* 112*   < > 56*   ALT 28 33  --  37 45   < > 42   ALKPHOS 68 73  --  66 66   < > 64   BILITOTAL 1.9* 2.6*  --  2.3* 2.0*   < > 3.7*   ALBUMIN 3.0* 3.1*  --  2.9* 3.5   < > 3.5   INR 1.37*  --  1.41*  --  1.28*  --  1.43*    < > = values in this interval not displayed.     Pancreatic Enzymes  No lab results found in last 7 days.  Coagulation Profile  Recent Labs   Lab 06/17/24  0432 06/16/24  1001 06/15/24  0325 06/14/24  1415 06/14/24  1100   INR 1.37* 1.41* 1.28* 1.43* 1.61*   PTT 44*  --  31 48* 31     Lactate  Invalid input(s): \"LACTATE\"    5. RADIOLOGY  Recent Results (from the past 24 hour(s))   RAJ with Report    Narrative    Rodolfo Kessler MD     6/14/2024 11:25 AM  Perioperative RAJ Procedure Note    Staff -        Anesthesiologist:  Jomar Liu MD       Resident/Fellow: Rodolfo Kessler MD       Performed By: fellow  Preanesthesia Checklist:  Patient identified, IV assessed, risks and   benefits discussed, monitors and equipment assessed, procedure being   performed at surgeon's request and anesthesia consent obtained.    RAJ Probe Insertion    Probe Status PRE Insertion: NO obvious damage  Probe type:  Adult 3D  Bite block used:   Soft  Insertion Technique: Easy, no oropharyngeal manipulation  Insertion complications: None obvious  Billing Report:RAJ report by Anesthesiologist (See Separate Report note)  Probe Status POST Removal: NO obvious damage    RAJ Report  General Procedure Information  Images for this study have been archived.  Modalities: 2D, 3D, CW Doppler, " PW Doppler and Color flow mapping  Echocardiographic and Doppler Measurements  Right Ventricle:  Cavity size dilated.    Hypertrophy not present.     Thrombus not present.    Global function normal.     Left Ventricle:  Cavity size dilated.    Hypertrophy not present.     Thrombus not present.   Global Function severely impaired.       Ventricular Regional Function:  1- Basal Anteroseptal:  hypokinetic  2- Basal Anterior:  hypokinetic  3- Basal Anterolateral:  hypokinetic  4- Basal Inferolateral:  hypokinetic  5- Basal Inferior:  hypokinetic  6- Basal Inferoseptal:  hypokinetic  7- Mid Anteroseptal:  hypokinetic  8- Mid Anterior:  hypokinetic  9- Mid Anterolateral:  hypokinetic  10- Mid Inferolateral:  hypokinetic  11- Mid Inferior:  hypokinetic  12- Mid Inferoseptal:  hypokinetic  13- Apical Anterior:  hypokinetic  14- Apical Lateral:  hypokinetic  15- Apical Inferior:  hypokinetic  16- Apical Septal:  hypokinetic  17- [place]:  hypokinetic    Valves  Aortic Valve: Annulus normal.  Stenosis not present.  Regurgitation   absent.  Leaflets normal.  Leaflet motions normal.    Mitral Valve: Annulus dilated.  Stenosis not present.  Regurgitation +2.    Leaflets normal.  Leaflet motions normal.    Tricuspid Valve: Annulus normal.  Stenosis not present.  Regurgitation +2.    Leaflets normal.  Leaflet motions normal.    Pulmonic Valve: Annulus normal.  Stenosis not present.  Regurgitation   absent.      Aorta: Ascending Aorta: Size normal.  Dissection not present.  Plaque   thickness less than 3 mm.  Mobile plaque not present.    Aortic Arch: Size normal.    Dissection not present.   Plaque thickness   less than 3 mm.   Mobile plaque not present.    Descending Aorta: Size normal.    Dissection not present.   Plaque   thickness less than 3 mm.   Mobile plaque not present.    Other Aortic Findings:  IABP visualized in descending aorta, below   subclavian  Right Atrium:  Size normal.   Spontaneous echo contrast not present.      Thrombus not present.   Tumor not present.   Device not present.     Left Atrium: Size dilated.  Spontaneous echo contrast not present.    Thrombus not present.  Tumor not present.  Device not present.    Left atrial appendage normal.     Atrial Septum: Intra-atrial septal morphology normal.     Other atrial   septal defect findings:  Colorflow suspicious for PFO. Negative bubble   study. Difficult to perform due to high CVP.  Ventricular Septum: Intra-ventricular septum morphology normal.      Diastolic Function Measurements:  Diastolic Dysfunction Grade= III.  E=  ms.  A=  ms.  E/A Ratio= .  DT=    ms.  S/D= .  IVRT= ms.  Other Findings:   Pericardium:  normal. Pleural Effusion:  none. Pulmonary Arteries:    normal. Pulmonary Venous Flow:  normal.     Post Intervention Findings  Procedure(s) performed:  LVAD Placement. Global function:  Unchanged.   Regional wall motion: Unchanged. Surgeon(s) notified of all   postintervention findings: Yes (Notified in real time).         LVAD   Placement:  LV decompressed. PFO not present. Aortic valve opening.    Post Intervention comments: Post bypass LVAD heartmate III placement: RV   function is unchanged. LV function is unchanged. LV is 6.5cm in diameter   (pre-bypass 7cm). Inflow cannula at apex of LV is directed towards the   mitral valve. Outflow cannula seen in ascending aorta. Both cannulas have   appropriate velocities. PFO evaluation was negative via colorflow and   bubble study post bypass. The aortic valve is unchanged and opening on   average every 3rd beat. The mitral valve shows moderate regurgitation   (previously mild). The tricuspid and pulmonic valves are unchanged. IABP   seen in the descending aorta. No echo evidence of aortic dissection. .    Echocardiogram Comments   Cardiac Device Check - Inpatient   Result Value    Date Time Interrogation Session [id number]    Implantable Pulse Generator  [employer]    Implantable Pulse Generator Model  [id number] Cobalt XT VR MRI    Implantable Pulse Generator Serial Number [id number]    Type Interrogation Session In Clinic    Clinic Name [facility]    Implantable Pulse Generator Type Defibrillator    Implantable Pulse Generator Implant Date 20230615    Implantable Lead  Medtr[employer]    Implantable Lead Model 6935M Sprint Quattro Secure S MRI SureScan    Implantable Lead Serial Number [id number]    Implantable Lead Implant Date 20230615    Implantable Lead Polarity Type Tripolar Lead    Implantable Lead Location Detail 1 UNKNOWN    Implantable Lead Location Right Ventricle    Implantable Lead Connection Status Connected    Andrés Setting Mode (NBG Code) VVIR    Andrés Setting Lower Rate Limit 60    Andrés Setting Maximum Sensor Rate 130    Lead Channel Setting Sensing Polarity Bipolar    Lead Channel Setting Sensing Anode Location Right Ventricle    Lead Channel Setting Sensing Anode Terminal Ring    Lead Channel Setting Sensing Cathode Location Right Ventricle    Lead Channel Setting Sensing Cathode Terminal Tip    Lead Channel Setting Sensing Sensitivity 0.3    Lead Channel Setting Pacing Polarity Bipolar    Lead Channel Setting Pacing Anode Location Right Ventricle    Lead Channel Setting Pacing Anode Terminal Ring    Lead Channel Setting Sensing Cathode Location Right Ventricle    Lead Channel Setting Sensing Cathode Terminal Tip    Lead Channel Setting Pacing Pulse Width 0.4    Lead Channel Setting Pacing Amplitude 2.0    Lead Channel Setting Pacing Capture Mode Adaptive    Zone Setting Type Category VF    Zone Setting Vendor Type Category VF    Zone Setting Status Inactive    Zone Setting Detection Interval 320    Zone Setting Detection Beats Numerator 30    Zone Setting Detection Beats Denominator 40    Zone Setting Type Category VT    Zone Setting Vendor Type Category FastVT    Zone Setting Status Inactive    Zone Setting Type Category VT    Zone Setting Vendor Type Category VT     Zone Setting Status Inactive    Zone Setting Detection Interval 360    Zone Setting Detection Beats Numerator 16    Zone Setting Detection Beats Denominator 16    Zone Setting Type Category VT    Zone Setting Vendor Type Category MonVT    Zone Setting Status Inactive    Zone Setting Detection Interval 400    Zone Setting Detection Beats Numerator 32    Zone Setting Detection Beats Denominator 32    Lead Channel Impedance Value 323    Lead Channel Impedance Value 456    Lead Channel Sensing Intrinsic Amplitude 14.0    Lead Channel Pacing Threshold Amplitude 0.5    Lead Channel Pacing Threshold Pulse Width 0.4    Battery Date Time of Measurements 57537579660904    Battery Status Middle of Service    Battery RRT Trigger 2.8 V    Battery Remaining Longevity 134    Battery Voltage 3.03    Capacitor Charge Type Shock    Capacitor Last Charge Date Time 85521430487333    Capacitor Charge Time 3.9    Capacitor Charge Energy 18.0    Andrés Statistic Date Time Start 27760543757295    Andrés Statistic Date Time End 76471224916576    Andrés Statistic RV Percent Paced 1.54    CRT Statistic Date Time Start 74051301925059    CRT Statistic Date Time End 70407297585475    Atrial Tachy Statistic Date Time Start 67212789995750    Atrial Tachy Statistic Date Time End 22634852149312    Atrial Tachy Statistic AT/AF [place] Percent 0    Therapy Statistic Recent Shocks Delivered 0    Therapy Statistic Recent Shocks Aborted 0    Therapy Statistic Recent ATP Delivered 0    Therapy Statistic Recent Date Time Start 90654644417002    Therapy Statistic Recent Date Time End 68438890693146    Therapy Statistic Total Shocks Delivered 0    Therapy Statistic Total Shocks Aborted 0    Therapy Statistic Total ATP Delivered 0    Therapy Statistic Total  Date Time Start 34612250324219    Therapy Statistic Total  Date Time End 40776309164754    Episode Statistic Recent Count 0    Episode Statistic Type Category Patient Activated    Episode Statistic Recent  Count 5    Episode Statistic Type Category SVT    Episode Statistic Recent Count 32    Episode Statistic Type Category VT    Episode Statistic Recent Count 0    Episode Statistic Type Category VF    Episode Statistic Recent Count 0    Episode Statistic Type Category VT    Episode Statistic Recent Count 0    Episode Statistic Type Category VT    Episode Statistic Recent Count 0    Episode Statistic Type Category VT    Episode Statistic Recent Date Time Start 44870199336200    Episode Statistic Recent Date Time End 19076616537931    Episode Statistic Recent Date Time Start 80995957162704    Episode Statistic Recent Date Time End 99390373899253    Episode Statistic Recent Date Time Start 22860031822345    Episode Statistic Recent Date Time End 52072060356026    Episode Statistic Recent Date Time Start 31460427811558    Episode Statistic Recent Date Time End 44615510398320    Episode Statistic Recent Date Time Start 15305221624939    Episode Statistic Recent Date Time End 40270675165472    Episode Statistic Recent Date Time Start 65385584207325    Episode Statistic Recent Date Time End 16826116866721    Episode Statistic Recent Date Time Start 75355427685264    Episode Statistic Recent Date Time End 90280233005661    Episode Statistic Total Count 0    Episode Statistic Type Category Patient Activated    Episode Statistic Total Count 20    Episode Statistic Type Category SVT    Episode Statistic Total Count 88    Episode Statistic Type Category VT    Episode Statistic Total Count 0    Episode Statistic Type Category VF    Episode Statistic Total Count 0    Episode Statistic Type Category VT    Episode Statistic Total Count 0    Episode Statistic Type Category VT    Episode Statistic Total Count 0    Episode Statistic Type Category VT    Episode Statistic Total Date Time Start 63507236820889    Episode Statistic Total Date Time End 10599194018607    Episode Statistic Total Date Time Start 53020924020510    Episode  Statistic Total Date Time End 78394708651838    Episode Statistic Total Date Time Start 16583359911815    Episode Statistic Total Date Time End 53755740129081    Episode Statistic Total Date Time Start 04300560749806    Episode Statistic Total Date Time End 85602115155042    Episode Statistic Total Date Time Start 93073450330988    Episode Statistic Total Date Time End 43245080321768    Episode Statistic Total Date Time Start 12946678810059    Episode Statistic Total Date Time End 02038132722595    Episode Statistic Total Date Time Start 63730290015108    Episode Statistic Total Date Time End 82808221894378    Episode Identifier 98    Episode Type Category SVT    Episode Date Time 53532417080154    Episode Duration 59    Episode Identifier 97    Episode Type Category SVT    Episode Date Time 83905553836158    Episode Duration 5    Episode Identifier 80    Episode Type Category SVT    Episode Date Time [id number]    Episode Duration 28    Episode Identifier 74    Episode Type Category SVT    Episode Date Time 24643041531568    Episode Duration 49    Episode Identifier 73    Episode Type Category SVT    Episode Date Time 96837576977293    Episode Duration 50    Episode Identifier 108    Episode Type Category VT    Episode Date Time 33381059383814    Episode Duration 1    Episode Identifier 107    Episode Type Category VT    Episode Date Time [id number]    Episode Duration 1    Episode Identifier 106    Episode Type Category VT    Episode Date Time 98564588339098    Episode Duration 1    Episode Identifier 105    Episode Type Category VT    Episode Date Time [id number]    Episode Duration 1    Episode Identifier 104    Episode Type Category VT    Episode Date Time 41630714746947    Episode Duration 3    Episode Identifier 103    Episode Type Category VT    Episode Date Time 65910334827941    Episode Duration 1    Episode Identifier 102    Episode Type Category VT    Episode Date Time [id number]     Episode Duration 1    Episode Identifier 101    Episode Type Category VT    Episode Date Time 17052108227859    Episode Duration 2    Episode Identifier 100    Episode Type Category VT    Episode Date Time [id number]    Episode Duration 1    Episode Identifier 99    Episode Type Category VT    Episode Date Time [id number]    Episode Duration 2    Episode Identifier 96    Episode Type Category VT    Episode Date Time 58058380502202    Episode Duration 1    Episode Identifier 95    Episode Type Category VT    Episode Date Time [id number]    Episode Duration 1    Episode Identifier 94    Episode Type Category VT    Episode Date Time 73545565908894    Episode Duration 1    Episode Identifier 93    Episode Type Category VT    Episode Date Time 12377534421487    Episode Duration 6    Episode Identifier 92    Episode Type Category VT    Episode Date Time [id number]    Episode Duration 2    Narrative    Patient seen in OR 26 for evaluation and iterative programming of their   ICD per MD orders pre LVAD implant.    Device: [employer] [id number] [place] XT VR MRI  Normal device function.  Mode: VVIR  bpm  : 1.5%  Intrinsic rhythm:  bpm  Thoracic Impedance:Below reference line. Suggests possible intrathoracic   fluid accumulation.  Short V-V intervals: 0  Lead Trends Appear Stable.  Estimated battery longevity to RRT = 13.2 years. Battery voltage = 3.03 V.     Anticoagulant: Warafrin  Ventricular Arrhythmia: 15 NSVT episodes lasting 1-6 sec @ 188-214 bpm.  Setting Changes: ICD Therapies turned OFF. LRL increased to 60 bpm.  Plan: Page Device RN post LVAD implant to turn ON ICD Therapies.    JOEL Vo RN    Single lead ICD    I have reviewed and interpreted the device interrogation, settings,   programming and nurse's summary. The device is functioning within normal   device parameters. I agree with the current findings, assessment and plan.     XR Chest Port 1 View    Narrative    Exam: XR CHEST  PORT 1 VIEW, 6/14/2024 2:53 PM    Comparison: Same day 2:31 AM    History: Endotracheal tube positioning    Findings:  Portable AP view of the chest. Endotracheal tube terminates in the  distal trachea 1.0 cm from the judd. Right IJ Hemet-Hugh catheter  terminates in the right pulmonary artery. Left chest wall implantable  cardiac defibrillator. Status post LVAD placement. Median sternotomy  wires. Mediastinal drains and left chest tube. Right PICC terminates  in the SVC.    Enlarged cardiac silhouette. No pneumothorax or pleural effusion.  Perihilar and bibasilar patchy opacities.      Impression    Impression:   1. Endotracheal tube terminates in the distal thoracic trachea 1.0 cm  from the judd. Status post LVAD placement with median sternotomy  wires and multiple chest drains. Stable right PICC.  2. Cardiomegaly with perihilar and bibasilar opacities, likely  atelectasis and edema.    I have personally reviewed the examination and initial interpretation  and I agree with the findings.    VIOLETA DANIELLE MD         SYSTEM ID:  [id number]   XR Abdomen Port 1 View    Narrative    Exam: XR ABDOMEN PORT 1 VIEW, 6/14/2024 2:53 PM    Indication: OG placement    Comparison: 8/22/2023 CT    Findings:     Partially visualized enteric tube tip and sidehole projected over the  stomach. LVAD, partially visualized basilar chest tube and mediastinal  drain.    Suture from small bowel anastomosis seen within the right lower  quadrant. No obstructive bowel gas pattern. No pneumatosis or portal  venous gas. Small colonic stool burden. Metallic inferior IABP pump  marker projecting over L2.      Impression    Impression:     Enteric tube tip project over the stomach with sidehole projecting  near the gastroesophageal junction, consider advancement.    I have personally reviewed the examination and initial interpretation  and I agree with the findings.    ETHAN LION MD         SYSTEM ID:  [id number]   Cardiac Device Check -  Inpatient   Result Value    Date Time Interrogation Session 20,240,[id number],919    Implantable Pulse Generator  [employer]    Implantable Pulse Generator Model [id number] Cobalt XT VR MRI    Implantable Pulse Generator Serial Number [id number]    Type Interrogation Session In Clinic    Clinic Name [facility]    Implantable Pulse Generator Type Defibrillator    Implantable Pulse Generator Implant Date 20,230,615    Implantable Lead  [employer]    Implantable Lead Model 6935M Sprint Quattro Secure S MRI SureScan    Implantable Lead Serial Number [id number]    Implantable Lead Implant Date 20,230,615    Implantable Lead Polarity Type Tripolar Lead    Implantable Lead Location Detail 1 UNKNOWN    Implantable Lead Location Right Ventricle    Implantable Lead Connection Status Connected    Andrés Setting Mode (NBG Code) VVIR    Andrés Setting Lower Rate Limit 60    Andrés Setting Maximum Sensor Rate 130    Lead Channel Setting Sensing Polarity Bipolar    Lead Channel Setting Sensing Anode Location Right Ventricle    Lead Channel Setting Sensing Anode Terminal Ring    Lead Channel Setting Sensing Cathode Location Right Ventricle    Lead Channel Setting Sensing Cathode Terminal Tip    Lead Channel Setting Sensing Sensitivity 0.3    Lead Channel Setting Pacing Polarity Bipolar    Lead Channel Setting Pacing Anode Location Right Ventricle    Lead Channel Setting Pacing Anode Terminal Ring    Lead Channel Setting Sensing Cathode Location Right Ventricle    Lead Channel Setting Sensing Cathode Terminal Tip    Lead Channel Setting Pacing Pulse Width 0.4    Lead Channel Setting Pacing Amplitude 2.0    Lead Channel Setting Pacing Capture Mode Adaptive    Zone Setting Type Category VF    Zone Setting Vendor Type Category VF    Zone Setting Status Active    Zone Setting Detection Interval 320    Zone Setting Detection Beats Numerator 30    Zone Setting Detection Beats Denominator 40    Zone  Setting Type Category VT    Zone Setting Vendor Type Category FastVT    Zone Setting Status Inactive    Zone Setting Type Category VT    Zone Setting Vendor Type Category VT    Zone Setting Status Inactive    Zone Setting Detection Interval 360    Zone Setting Detection Beats Numerator 16    Zone Setting Detection Beats Denominator 16    Zone Setting Type Category VT    Zone Setting Vendor Type Category MonVT    Zone Setting Status Monitor    Zone Setting Detection Interval 400    Zone Setting Detection Beats Numerator 32    Zone Setting Detection Beats Denominator 32    Lead Channel Impedance Value 342    Lead Channel Impedance Value 437    Lead Channel Sensing Intrinsic Amplitude 5.8    Lead Channel Pacing Threshold Amplitude 0.5    Lead Channel Pacing Threshold Pulse Width 0.4    Battery Date Time of Measurements 20,240,614,141,502    Battery RRT Trigger 2.8 V    Battery Remaining Longevity 159    Battery Voltage 3.03    Capacitor Charge Type Shock    Capacitor Last Charge Date Time 20,240,419,090,015    Capacitor Charge Time 3.9    Capacitor Charge Energy 18.0    Andrés Statistic Date Time Start 20,240,403,112,652    Andrés Statistic Date Time End 20,240,614,152,919    Andrés Statistic RA Percent Paced Invalid Value    Andrés Statistic RV Percent Paced 1.53    CRT Statistic Date Time Start 20,240,403,112,652    CRT Statistic Date Time End 20,240,614,152,919    Atrial Tachy Statistic Date Time Start 20,240,403,112,652    Atrial Tachy Statistic Date Time End 20,240,614,152,919    Atrial Tachy Statistic AT/AF [place] Percent 0    Therapy Statistic Recent Shocks Delivered 0    Therapy Statistic Recent Shocks Aborted 0    Therapy Statistic Recent ATP Delivered 0    Therapy Statistic Recent Date Time Start 20,240,403,112,652    Therapy Statistic Recent Date Time End 20,240,614,152,919    Therapy Statistic Total Shocks Delivered 0    Therapy Statistic Total Shocks Aborted 0    Therapy Statistic Total ATP Delivered 0     Therapy Statistic Total  Date Time Start 20,230,615,114,709    Therapy Statistic Total  Date Time End 20,240,614,152,919    Episode Statistic Recent Count 0    Episode Statistic Type Category Patient Activated    Episode Statistic Recent Count 5    Episode Statistic Type Category SVT    Episode Statistic Recent Count 32    Episode Statistic Type Category VT    Episode Statistic Recent Count 0    Episode Statistic Type Category VF    Episode Statistic Recent Count 0    Episode Statistic Type Category VT    Episode Statistic Recent Count 0    Episode Statistic Type Category VT    Episode Statistic Recent Count 0    Episode Statistic Type Category VT    Episode Statistic Recent Date Time Start 20,240,403,112,652    Episode Statistic Recent Date Time End 20,240,614,152,919    Episode Statistic Recent Date Time Start [id number]    Episode Statistic Recent Date Time End [id number]    Episode Statistic Recent Date Time Start 27321348541347    Episode Statistic Recent Date Time End [id number]    Episode Statistic Recent Date Time Start 95752817235436    Episode Statistic Recent Date Time End 98106136995141    Episode Statistic Recent Date Time Start 76184271875415    Episode Statistic Recent Date Time End 99026841174973    Episode Statistic Recent Date Time Start 08228147363878    Episode Statistic Recent Date Time End 63490415386387    Episode Statistic Recent Date Time Start 54551370018091    Episode Statistic Recent Date Time End 57685107626154    Episode Statistic Total Count 0    Episode Statistic Type Category Patient Activated    Episode Statistic Total Count 20    Episode Statistic Type Category SVT    Episode Statistic Total Count 88    Episode Statistic Type Category VT    Episode Statistic Total Count 0    Episode Statistic Type Category VF    Episode Statistic Total Count 0    Episode Statistic Type Category VT    Episode Statistic Total Count 0    Episode Statistic Type Category VT    Episode  Statistic Total Count 0    Episode Statistic Type Category VT    Episode Statistic Total Date Time Start 20,230,615,114,709    Episode Statistic Total Date Time End 20,240,614,152,919    Episode Statistic Total Date Time Start 20230615114709    Episode Statistic Total Date Time End 54187878361401    Episode Statistic Total Date Time Start 20230615114709    Episode Statistic Total Date Time End 42246705221077    Episode Statistic Total Date Time Start 20230615114709    Episode Statistic Total Date Time End 30427242661866    Episode Statistic Total Date Time Start 20230615114709    Episode Statistic Total Date Time End 76367439465452    Episode Statistic Total Date Time Start 20230615114709    Episode Statistic Total Date Time End 39491865232565    Episode Statistic Total Date Time Start 20230615114709    Episode Statistic Total Date Time End 35217221954060    Narrative    Patient seen in [address] for evaluation and iterative programming of their   ICD per MD orders s/p LVAD implant.    Device: Medtronic [id number] Russell XT VR MRI  Normal device function.  Mode: VVIR  bpm  : 1.5%  Intrinsic rhythm:  bpm  Thoracic Impedance: Below reference line. Suggests possible intrathoracic   fluid accumulation.  Short V-V intervals: 2  Lead Trends Appear Stable.  Estimated battery longevity to RRT = 13 years. Battery voltage = 3.03 V.   Ventricular Arrhythmia: None.  Setting Changes: ICD Therapies turned ON.     JOEL Vo RN    Single lead ICD    I have reviewed and interpreted the device interrogation, settings,   programming and nurse's summary. The device is functioning within normal   device parameters. I agree with the current findings, assessment and plan.     XR Chest Port 1 View    Narrative    Exam: XR CHEST PORT 1 VIEW, 6/14/2024 4:16 PM    Comparison: Same day chest x-ray    History: eval after ETT repositioned    Findings:  Portable AP view of the chest. Endotracheal tube tip in the  midthoracic trachea,  slightly retracted compared to previous. Gastric  tube with tip out of field of view and sidehole projecting over the  stomach. Right IJ Pryor-Hugh catheter terminates in the right pulmonary  artery. Left chest wall implantable cardiac defibrillator. LVAD.  Median sternotomy wires. Mediastinal drains and left chest tube. Right  PICC terminates in the SVC.    Enlarged cardiac silhouette, unchanged. No pneumothorax or pleural  effusion. Stable perihilar and bibasilar patchy opacities.      Impression    Impression:   1. Endotracheal tube terminates in the midthoracic trachea slightly  retracted compared to previous. Stable additional support devices.  2. Stable cardiomegaly with unchanged perihilar and bibasilar  opacities, likely atelectasis and edema.    I have personally reviewed the examination and initial interpretation  and I agree with the findings.    VIOLETA DANIELLE MD         SYSTEM ID:  [id number]   XR Abdomen Port 1 View    Narrative    EXAMINATION:  XR ABDOMEN PORT 1 VIEW 6/14/2024     COMPARISON: Same day radiograph    HISTORY: OG advanced.    TECHNIQUE: One frontal supine view of the abdomen.    FINDINGS: Gastric tube tip and sidehole project over the stomach.  Partially visualized LVAD and surgical drains. No abnormally dilated  air-filled loops of bowel in the partially visualized abdomen.       Impression    IMPRESSION:   Gastric tube tip and sidehole are within the stomach.    I have personally reviewed the examination and initial interpretation  and I agree with the findings.    FITZ MIRELES MD         SYSTEM ID:  [id number]   XR Chest Port 1 View    Impression    RESIDENT PRELIMINARY INTERPRETATION  IMPRESSION:  1. Question tiny right apical pneumothorax. Attention on follow-up.  2. Interval placement of esophageal temperature probe. Otherwise,  stable support devices.  3. Decreased right and unchanged left sided opacities, likely  atelectasis.

## 2024-06-17 NOTE — PROGRESS NOTES
[facility]   Cardiology II Service ICU/ Advanced Heart Failure  Daily Progress Note      Patient: Navin Lutz  MRN: [id number]  Admission Date: 6/3/2024  Hospital Day # 14    Assessment and Plan: Navin Lutz is a 53 year old male admitted on 6/3/2024. He has a past medical history of chronic HFrEF 2/2 NICM s/p ICD, HLD, and CKD Stage II who presents admitted for decompensated heart failure on milrinone listed status 4, admitted for consideration of advanced therapies, now s/p LVAD .     Today's Plan:  -lasix 40 given at 6 am, redose around noon after repeat CVP check  -Increased speed to 5200 rpm  -Off epi, start dobutamine 2.5    # Acute on chronic systolic heart failure/HFrEF secondary to NICM with EF of 10-15% who was on home milrinone prior to admission    Stage D. NYHA Class III. TTE 24 EF 15-20%, LVIDd 7.0 cm, RV normal size and function, mild TR, mild MI, no pericardial effusion. RHC 24: RA 3, PA 17, PCW 9, [place] CO/CI 4.63/2.13.   -S/P HM3 LVAD     [place]  (post VAD)   RA: 15 (Goal 10-12)   PA:  47/20   PCWP: 17   CO/CI: 4.4/2.0   SVR: 1300   MAP: Goal 70-80    -Fluid status: euvolemic  -Inotrope: Off epi, start dobutamine 2.5  -IABP removed shortly after OR  -RV support: off Laisha  -ACEi/ARB/ARNI/afterload reduction: Holding for now  -BB: coreg 3.125mg on hold  -Aldosterone antagonist: aldactone 25 mg daily on hold   -SGLT2i: Holding  -SCD prophylaxis: ICD    The patient's HeartMate LVAD was interrogated 2024  Heartmate 3 LEFT VS  Flow (Lpm): 4.2 Lpm  Pulse Index (PI): 3.2 PI  Speed (rpm): (S) 5200 rpm  Power (bassett): 3.6 bassett  Current Hct settin     Fluid status: euvolemic   Alarms were reviewed, high PI shortly after OR, but no alarms since.   The driveline exit site was inspected, c/d/i.   All external components were inspected and showed no evidence of damage or malfunction, none replaced.   No changes to VAD settings  made    ================================================================    Subjective/24-Hr Events:   No acute overnight events.    Medications: Reviewed in EPIC.     Physical Exam:   /81   Pulse 103   Temp 99.5  F (37.5  C)   Resp (!) 34   Ht 1.829 m (6')   Wt 97.7 kg (215 lb 6.2 oz)   SpO2 90%   BMI 29.21 kg/m      GENERAL: NAD  HEENT: no scleral icterus  NECK: Supple. No JVD   CV: RRR, LVAD hum noted  RESPIRATORY: decreased breath sounds bilaterally  GI: Soft and non distended   EXTREMITIES: No peripheral edema  NEUROLOGIC: AO x 3    Labs:  CMP  Recent Labs   Lab 06/17/24  0917 06/17/24  0432 06/17/24  0429 06/16/24  2348 06/16/24  1937 06/16/24  0555 06/16/24  0332 06/15/24  2110 06/15/24  1934 06/15/24  0529 06/15/24  0325   NA  --  137  --   --  137  --  135  --  135  --  138   POTASSIUM  --  3.9  --   --  4.4  --  4.1  --  4.4  --  4.4   CHLORIDE  --  102  --   --  105  --  104  --  104  --  107   CO2  --  21*  --   --  22  --  20*  --  21*  --  21*   ANIONGAP  --  14  --   --  10  --  11  --  10  --  10   * 142* 139* 145* 155*  161*   < > 160*   < > 130*  140*   < > 126*   BUN  --  17.5  --   --  15.8  --  12.3  --  12.5  --  12.2   CR  --  1.09  --   --  0.97  --  0.90  --  1.01  --  0.95   GFRESTIMATED  --  81  --   --  >90  --  >90  --  89  --  >90   NAM  --  8.7  --   --  8.5*  --  8.3*  --  8.6  --  8.5*   MAG  --  2.4*  --   --  2.0  --  2.3  --  1.9  --  2.2   PHOS  --  2.5  --   --  2.6  --  2.5  --  2.4*  --  3.3   PROTTOTAL  --  5.7*  --   --  5.9*  --  5.2*  --   --   --  5.6*   ALBUMIN  --  3.0*  --   --  3.1*  --  2.9*  --   --   --  3.5   BILITOTAL  --  1.9*  --   --  2.6*  --  2.3*  --   --   --  2.0*   ALKPHOS  --  68  --   --  73  --  66  --   --   --  66   AST  --  58*  --   --  67*  --  87*  --   --   --  112*   ALT  --  28  --   --  33  --  37  --   --   --  45    < > = values in this interval not displayed.       CBC  Recent Labs   Lab 06/17/24  0432 06/16/24  7329  06/16/24  0332 06/15/24  1934   WBC 17.1* 19.4* 20.9* 21.6*   RBC 3.60* 3.78* 3.85* 3.94*   HGB 11.3* 11.6* 11.9* 12.2*   HCT 33.7* 35.4* 35.7* 36.1*   MCV 94 94 93 92   MCH 31.4 30.7 30.9 31.0   MCHC 33.5 32.8 33.3 33.8   RDW 14.3 14.4 14.3 14.1   PLT 94* 106* 105* 111*       INR  Recent Labs   Lab 06/17/24  0432 06/16/24  1001 06/15/24  0325 06/14/24  1415   INR 1.37* 1.41* 1.28* 1.43*

## 2024-06-17 NOTE — PLAN OF CARE
Major Shift Events:  A&Ox4 with considerable pain despite PRNs, MD contacted for more pain control. Also endorses significant nausea, attempted to control with zofran, compazine, scopolamine, haldol, and aromatherapy, phenergan ordered as well. NG attempted for stomach decompression, unsuccessful, now with small amounts of BRB oral secretions. LVAD w/o issue, low dose epi on per team. FICKs per chart, diuresed x2. Remains on 2L oxymask. Low UOP, responds well to lasix challenge. Up to chair in AM with lift.  Plan: diurese as able, rehab  For vital signs and complete assessments, please see documentation flowsheets.     Problem: Cardiovascular Surgery  Goal: Acceptable Pain Control  Outcome: Not Progressing  Intervention: Prevent or Manage Pain  Recent Flowsheet Documentation  Taken 6/17/2024 0400 by Patsy Horton RN  Pain Management Interventions:   medication (see MAR)   diversional activity provided   emotional support   quiet environment facilitated   repositioned   rest  Taken 6/17/2024 0000 by Patsy Horton RN  Pain Management Interventions:   medication (see MAR)   diversional activity provided   emotional support   quiet environment facilitated   repositioned   rest  Taken 6/16/2024 2000 by Patsy Horton RN  Pain Management Interventions:   medication (see MAR)   diversional activity provided   emotional support   quiet environment facilitated   repositioned   rest     Problem: Heart Failure  Goal: Optimal Functional Ability  Outcome: Progressing  Intervention: Optimize Functional Ability  Recent Flowsheet Documentation  Taken 6/17/2024 0400 by Patsy Horton RN  Activity Management: bedrest  Taken 6/17/2024 0000 by Patsy Horton RN  Activity Management: bedrest  Taken 6/16/2024 2000 by Patsy Horton RN  Activity Management: bedrest  Goal: Effective Oxygenation and Ventilation  Outcome: Progressing  Intervention: Promote Airway Secretion Clearance  Recent Flowsheet Documentation  Taken  6/17/2024 0400 by Patsy Horton RN  Breathing Techniques/Airway Clearance:   deep/controlled cough encouraged   pursed-lip breathing encouraged  Cough And Deep Breathing: unable to perform  Activity Management: bedrest  Taken 6/17/2024 0000 by Patsy Horton RN  Breathing Techniques/Airway Clearance:   deep/controlled cough encouraged   pursed-lip breathing encouraged  Cough And Deep Breathing: unable to perform  Activity Management: bedrest  Taken 6/16/2024 2000 by Patsy Horton RN  Breathing Techniques/Airway Clearance:   deep/controlled cough encouraged   pursed-lip breathing encouraged  Cough And Deep Breathing: unable to perform  Activity Management: bedrest  Intervention: Optimize Oxygenation and Ventilation  Recent Flowsheet Documentation  Taken 6/17/2024 0400 by Patsy Horton RN  Airway/Ventilation Management:   airway patency maintained   humidification applied   pulmonary hygiene promoted  Head of Bed (HOB) Positioning: HOB at 90 degrees  Taken 6/17/2024 0200 by Patsy Horton RN  Head of Bed (HOB) Positioning: HOB at 90 degrees  Taken 6/17/2024 0000 by Patsy Horton RN  Airway/Ventilation Management:   airway patency maintained   humidification applied   pulmonary hygiene promoted  Head of Bed (HOB) Positioning: HOB at 90 degrees  Taken 6/16/2024 2000 by Patsy Horton RN  Airway/Ventilation Management:   airway patency maintained   humidification applied   pulmonary hygiene promoted  Head of Bed (HOB) Positioning: HOB at 90 degrees     Problem: Cardiovascular Surgery  Goal: Absence of Bleeding  Outcome: Progressing  Intervention: Monitor and Manage Bleeding  Recent Flowsheet Documentation  Taken 6/17/2024 0400 by Patsy Horton RN  Bleeding Management: dressing monitored  Taken 6/17/2024 0000 by Patsy Horton RN  Bleeding Management: dressing monitored  Taken 6/16/2024 2000 by Patsy Horton RN  Bleeding Management: dressing monitored  Goal: Effective Cardiac  Function  Outcome: Progressing   Goal Outcome Evaluation:

## 2024-06-17 NOTE — PROGRESS NOTES
"CLINICAL NUTRITION SERVICES - ASSESSMENT NOTE     Nutrition Prescription    RECOMMENDATIONS FOR MDs/PROVIDERS TO ORDER:  None at this time    Malnutrition Status:    Patient does not meet two of the established criteria necessary for diagnosing malnutrition    Recommendations already ordered by Registered Dietitian (RD):  None at this time     Future/Additional Recommendations:  --Monitor PO intake/adequacy   --If pt requires EN, recommendations as follows:  Osmolite 1.5 Dre (or equivalent) @ goal of 65ml/hr (1560ml/day) provides: 2500 kcals (30 kcal/kg), 137 g PRO (1.7 g/kg), 1188 ml free H20, 317 g CHO, and 0 g fiber daily.  - Initiate @ 20 ml/hr and advance by 15 ml q8hr as tolerated  - Do not start or advance until lytes (Mg++,K+) WNL and phos>2.0  - Recommend 30 ml q4hr fluid flushes for tube patency. Additional fluids and/or adjustments per MD.    - Order multivitamin/mineral (15 ml/day via FT) to help ensure micronutrient needs being met with suspected hypermetabolic demands and potential interruptions to TF infusions.  - Elevated HOB with gastric feeds      REASON FOR ASSESSMENT  Navin Lutz is a/an 53 year old male assessed by the dietitian for LOS    NUTRITION HISTORY  Pt asleep at time of visit. Pt follows a heart healthy diet at home. Has received low sodium/heart healthy diet many times.     Yesterday pt was struggling with nausea; improving this AM per RN.     LBM 6/11; giving suppository today    CURRENT NUTRITION ORDERS  Diet: ADAT; clear liquids   Intake/Tolerance: No documented intake since extubation yesterday; however prior to surgery was eating % meals per RN documentation.     LABS  Labs reviewed - hypermagnesia     MEDICATIONS  Medications reviewed - epi @ 0.02    ANTHROPOMETRICS  Height: 182.9 cm (6' 0\")  Most Recent Weight: 97.7 kg (215 lb 6.2 oz)    IBW: 80.9 kg  BMI: Overweight BMI 25-29.9  Weight History:   Wt Readings from Last 40 Encounters:   06/17/24 97.7 kg (215 lb 6.2 oz) " "  05/31/24 90.9 kg (200 lb 6.4 oz)   05/03/24 95.3 kg (210 lb 3.2 oz)   05/03/24 96.7 kg (213 lb 1.6 oz)   03/13/24 91.8 kg (202 lb 6.4 oz)   01/19/24 95.4 kg (210 lb 6.4 oz)   01/19/24 91.2 kg (201 lb)   01/19/24 96.3 kg (212 lb 3.2 oz)   12/08/23 91.4 kg (201 lb 6.4 oz)   11/20/23 93.9 kg (207 lb 0.2 oz)   11/01/23 91.6 kg (202 lb)   09/20/23 94.3 kg (208 lb)   09/20/23 93.1 kg (205 lb 3.2 oz)   08/28/23 89.9 kg (198 lb 3.2 oz)   07/19/23 93.7 kg (206 lb 9.6 oz)     Dosing Weight: 83 kg (adjusted BW based on IBW and actual lowest BW of 91 kg)    ASSESSED NUTRITION NEEDS  Estimated Energy Needs: 6147-4957 kcals/day (25 - 30 kcals/kg)  Justification: Maintenance  Estimated Protein Needs: 125-165 grams protein/day (1.5 - 2 grams of pro/kg)  Justification: Hypercatabolism with acute illness  Estimated Fluid Needs: 2500 mL/day (1 mL/kcal)   Justification: Maintenance    PHYSICAL FINDINGS  See malnutrition section below.  No abnormal nutrition-related physical findings observed.     MALNUTRITION  % Intake: Decreased intake does not meet criteria  % Weight Loss: Weight loss does not meet criteria  Subcutaneous Fat Loss: None observed  Muscle Loss: None observed  Fluid Accumulation/Edema: None noted  Malnutrition Diagnosis: Patient does not meet two of the established criteria necessary for diagnosing malnutrition    NUTRITION DIAGNOSIS  Increased nutrient needs related to hypercatabolism as evidenced by recent LVAD placement.       INTERVENTIONS  Implementation  --Collaboration with other providers (rounds)  --Encourage PO intake    Goals  Patient to consume % of nutritionally adequate meal trays TID, or the equivalent with supplements/snacks.     Monitoring/Evaluation  Progress toward goals will be monitored and evaluated per protocol.  Vandana Robles, MS, RD, LD  Available on [facility] - \"4A Clinical Dietitian\"  Weekend/Holiday RD - \"Weekend Clinical Dietitian\"  "

## 2024-06-17 NOTE — CONSULTS
CORE consult not completed due to VAD implantation on 6/14.  Post VAD surgery and recovery patient will follow up with the HF Cardiologists and the VAD coordinators, no requiring CORE clinic.  Thank you for the consideration of a CORE clinic consult

## 2024-06-17 NOTE — PROGRESS NOTES
Brief Progress Note     Interval summary note to document updates and changes to care plan/s. Please see daily progress note for full assessment and plan/s.     Interval history: Per Cards II, starting low dose dobutamine at 2.5 and increaseLVAD speed to 5200       Plan:   - Recheck SvO2 post above changes    Dispo: CVICU     ANNE Garcia CNP   06/17/2024  11:16 AM

## 2024-06-17 NOTE — PROGRESS NOTES
Brief CT surgery note    Chest tube output declining over the past days and suitable for removal.     Patient's mediastinal x2 Chest tube(s) was/were removed at the bedside without issue. Patient tolerated this well without obvious complications. Post-pull CXR ordered.     Change occlusive dressing in 2 days.     Please page if questions,    Charan Argueta MD PGY3  General Surgery Resident

## 2024-06-17 NOTE — PROGRESS NOTES
Critical Care Attending Progress Note  I, Sue Gibbs MD, saw this patient with the ARIEL and agree with the findings and plan of care as documented in the note. Please see separate note from today for further documentation.     I personally reviewed vital signs, medications, labs and imaging.     Assessment: Mr. 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 Today, Mr. Lutz is progressing as expected with respiratory insufficiency, acute post-operative pain.     Active problems and current treatments include:    Respiratory insufficiency-Continue supplemental oxygen, titrate to SpO2>92%, wean as tolerated.  Acute post-operative pain-Continue multimodal analgesia.   S/p Hm3 LVAD-Increase to low intensity heparin infusion today, continue warfarin, heart failure team following, appreciate recommendations. Diuresis to optimize cardiac function. Discontinued epinephrine this morning-follow up repeat mixed venous and CI to ensure adequate cardiac function.  ID-Leukocytosis improving, discontinue pip-tazo today.  Nutrition-Advance as tolerated to regular diet.  Prophylaxis-PPI, heparin infusion as above    I agree with the assessment and plan. I spent 25 minutes exclusive of procedures evaluating and managing this patient, discussing with the consultants, and updating the patient and family.    Seu Gibbs M.D.

## 2024-06-17 NOTE — PROGRESS NOTES
"   06/17/24 1400   Appointment Info   Signing Clinician's Name / Credentials (OT) Danya Adames OTMAGED, OTR/L   Rehab Comments (OT) itzel, OT/CR, re-eval   Living Environment   People in Home spouse   Current Living Arrangements house   Home Accessibility stairs to enter home;stairs within home   Number of Stairs, Main Entrance 2   Stair Railings, Main Entrance none   Number of Stairs, Within Home, Primary greater than 10 stairs   Stair Railings, Within Home, Primary railing on right side (ascending)   Transportation Anticipated family or friend will provide   Living Environment Comments Pt lives with spouse in 2 story home w/ basement. 2 SIGIFREDO, 13 steps on each flight of stairs, R handrail. Has walk in shower, built in chair, no grab bars.   Self-Care   Usual Activity Tolerance good   Current Activity Tolerance fair   Equipment Currently Used at Home none   Fall history within last six months no   Activity/Exercise/Self-Care Comment IND in ADLs PTA.    Instrumental Activities of Daily Living (IADL)   Previous Responsibilities meal prep;housekeeping;laundry;shopping;yardwork;medication management;finances;driving;work   IADL Comments IND in most IADLs, spouse assists with cleaning and laundry.    General Information   Onset of Illness/Injury or Date of Surgery 06/03/24   Referring Physician Cira Mallory PA-C   Additional Occupational Profile Info/Pertinent History of Current Problem \"Navin Lutz is a 53 year old male admitted on 6/3/2024. He has a past medical history of chronic HFrEF 2/2 NICM s/p ICD, HLD, and CKD Stage II who presents admitted for decompensated heart failure on milrinone listed status 4, admitted for consideration of advanced therapies, now s/p LVAD 6/14. \"   Existing Precautions/Restrictions cardiac;fall;sternal   Left Upper Extremity (Weight-bearing Status)   (<10lbs)   Right Upper Extremity (Weight-bearing Status)   (<10lbs)   Left Lower Extremity (Weight-bearing Status) full " weight-bearing (FWB)   Right Lower Extremity (Weight-bearing Status) full weight-bearing (FWB)   Cognitive Status Examination   Orientation Status orientation to person, place and time   Cognitive Status Comments no concerns   Visual Perception   Visual Impairment/Limitations WFL   Sensory   Sensory Comments endorsing no change   Range of Motion Comprehensive   Comment, General Range of Motion WFL, limited by postop precs   Strength Comprehensive (MMT)   Comment, General Manual Muscle Testing (MMT) Assessment generally weak, NFT 2/2 postop precs   Coordination   Upper Extremity Coordination No deficits were identified   Transfers   Transfers sit-stand transfer   Transfer Comments mod Ax2 from recliner   Activities of Daily Living   BADL Assessment/Intervention upper body dressing;lower body dressing;toileting   Upper Body Dressing Assessment/Training   Comment, (Upper Body Dressing) anticipate min A per clinical judgement   Lower Body Dressing Assessment/Training   Comment, (Lower Body Dressing) anticipate max A per clinical judgement   Toileting   Comment, (Toileting) anticipate max A per clinical judgement   Clinical Impression   Criteria for Skilled Therapeutic Interventions Met (OT) Yes, treatment indicated   OT Diagnosis dec IND, CR, postop precs   OT Problem List-Impairments impacting ADL problems related to;activity tolerance impaired;balance;mobility;strength;pain;post-surgical precautions   Assessment of Occupational Performance 3-5 Performance Deficits   Identified Performance Deficits bathing, toileting, dressing, g/h, func mobility   Planned Therapy Interventions (OT) ADL retraining;IADL retraining;bed mobility training;strengthening;transfer training;home program guidelines;progressive activity/exercise;risk factor education   Clinical Decision Making Complexity (OT) detailed assessment/moderate complexity   Risk & Benefits of therapy have been explained evaluation/treatment results reviewed;care  plan/treatment goals reviewed;risks/benefits reviewed;current/potential barriers reviewed;participants voiced agreement with care plan;participants included;patient   OT Total Evaluation Time   OT Re-Eval Minutes (55198) 7   OT Goals   Therapy Frequency (OT) 6 times/week   OT Predicted Duration/Target Date for Goal Attainment 07/17/24   OT Goals Hygiene/Grooming;Upper Body Dressing;Lower Body Dressing;Toilet Transfer/Toileting;Cardiac Phase 1   OT: Hygiene/Grooming modified independent;within precautions   OT: Upper Body Dressing Modified independent;within precautions   OT: Lower Body Dressing Modified independent;within precautions   OT: Toilet Transfer/Toileting Modified independent;within precautions   OT: Understanding of cardiac education to maximize quality of life, condition management, and health outcomes Patient;Verbalize   OT: Perform aerobic activity with stable cardiovascular response continuous;10 minutes   OT: Functional/aerobic ambulation tolerance with stable cardiovascular response in order to return to home and community environment Modified independent;Greater than 300 feet   OT: Goal 1 pt will demo sternal precs IND during ADL session   OT Discharge Planning   OT Plan PT orders released, sup>sit, EOB sitting bal, SPT's   OT Discharge Recommendation (DC Rec) [facility]-Motivated patient will benefit from intensive, interdisciplinary therapy.  Anticipate will be able to tolerate 3 hours of therapy per day   OT Rationale for DC Rec pt below functional baseline on this date, anticipate will benefit from ARU to progress IND.   OT Brief overview of current status mod Ax2 STS   Total Session Time   Timed Code Treatment Minutes 23   Total Session Time (sum of timed and untimed services) 30

## 2024-06-17 NOTE — PLAN OF CARE
Major Shift Events:  A&O, TAVERA's.  Intermittent nausea/gagging, unrelieved with antiemetics.  Was able to get morning meds down with pudding.  Epi weaned off, dobutamine to be initiated. Hydralazine given x1 for map >80.  Tmax 99.6.  LVAD speed turned up to 5200 without issue.  Lungs clear/diminished.  Shallow breaths 2/2 pain.  Oxy and tylenol given.  Suppository given this morning, awaiting results.  Mejnivar with adequate uop.  Up to the chair x2.   Plan: Continue plan of care.  For vital signs and complete assessments, please see documentation flowsheets.

## 2024-06-18 ENCOUNTER — APPOINTMENT (OUTPATIENT)
Dept: GENERAL RADIOLOGY | Facility: CLINIC | Age: 54
End: 2024-06-18
Attending: SURGERY
Payer: COMMERCIAL

## 2024-06-18 ENCOUNTER — APPOINTMENT (OUTPATIENT)
Dept: ULTRASOUND IMAGING | Facility: CLINIC | Age: 54
End: 2024-06-18
Payer: COMMERCIAL

## 2024-06-18 ENCOUNTER — APPOINTMENT (OUTPATIENT)
Dept: OCCUPATIONAL THERAPY | Facility: CLINIC | Age: 54
End: 2024-06-18
Attending: STUDENT IN AN ORGANIZED HEALTH CARE EDUCATION/TRAINING PROGRAM
Payer: COMMERCIAL

## 2024-06-18 LAB
ALBUMIN SERPL BCG-MCNC: 3.1 G/DL (ref 3.5–5.2)
ALP SERPL-CCNC: 63 U/L (ref 40–150)
ALT SERPL W P-5'-P-CCNC: 28 U/L (ref 0–70)
ANION GAP SERPL CALCULATED.3IONS-SCNC: 11 MMOL/L (ref 7–15)
ANION GAP SERPL CALCULATED.3IONS-SCNC: 7 MMOL/L (ref 7–15)
AST SERPL W P-5'-P-CCNC: 38 U/L (ref 0–45)
BASE EXCESS BLDV CALC-SCNC: 4.1 MMOL/L (ref -3–3)
BASE EXCESS BLDV CALC-SCNC: 6.3 MMOL/L (ref -3–3)
BASOPHILS # BLD AUTO: 0 10E3/UL (ref 0–0.2)
BASOPHILS NFR BLD AUTO: 0 %
BILIRUB SERPL-MCNC: 0.7 MG/DL
BUN SERPL-MCNC: 20.6 MG/DL (ref 6–20)
BUN SERPL-MCNC: 23.1 MG/DL (ref 6–20)
BUN SERPL-MCNC: 24.2 MG/DL (ref 6–20)
BUN SERPL-MCNC: 26 MG/DL (ref 6–20)
CA-I BLD-MCNC: 4.7 MG/DL (ref 4.4–5.2)
CALCIUM SERPL-MCNC: 8.5 MG/DL (ref 8.6–10)
CALCIUM SERPL-MCNC: 8.8 MG/DL (ref 8.6–10)
CALCIUM SERPL-MCNC: 8.8 MG/DL (ref 8.6–10)
CALCIUM SERPL-MCNC: 8.9 MG/DL (ref 8.6–10)
CHLORIDE SERPL-SCNC: 101 MMOL/L (ref 98–107)
CHLORIDE SERPL-SCNC: 103 MMOL/L (ref 98–107)
CHLORIDE SERPL-SCNC: 104 MMOL/L (ref 98–107)
CHLORIDE SERPL-SCNC: 105 MMOL/L (ref 98–107)
CREAT SERPL-MCNC: 1.11 MG/DL (ref 0.67–1.17)
CREAT SERPL-MCNC: 1.18 MG/DL (ref 0.67–1.17)
CREAT SERPL-MCNC: 1.19 MG/DL (ref 0.67–1.17)
CREAT SERPL-MCNC: 1.26 MG/DL (ref 0.67–1.17)
DEPRECATED HCO3 PLAS-SCNC: 26 MMOL/L (ref 22–29)
DEPRECATED HCO3 PLAS-SCNC: 26 MMOL/L (ref 22–29)
DEPRECATED HCO3 PLAS-SCNC: 28 MMOL/L (ref 22–29)
DEPRECATED HCO3 PLAS-SCNC: 29 MMOL/L (ref 22–29)
EGFRCR SERPLBLD CKD-EPI 2021: 68 ML/MIN/1.73M2
EGFRCR SERPLBLD CKD-EPI 2021: 73 ML/MIN/1.73M2
EGFRCR SERPLBLD CKD-EPI 2021: 74 ML/MIN/1.73M2
EGFRCR SERPLBLD CKD-EPI 2021: 79 ML/MIN/1.73M2
EOSINOPHIL # BLD AUTO: 0 10E3/UL (ref 0–0.7)
EOSINOPHIL NFR BLD AUTO: 0 %
ERYTHROCYTE [DISTWIDTH] IN BLOOD BY AUTOMATED COUNT: 14.3 % (ref 10–15)
GLUCOSE BLDC GLUCOMTR-MCNC: 107 MG/DL (ref 70–99)
GLUCOSE BLDC GLUCOMTR-MCNC: 110 MG/DL (ref 70–99)
GLUCOSE BLDC GLUCOMTR-MCNC: 116 MG/DL (ref 70–99)
GLUCOSE BLDC GLUCOMTR-MCNC: 118 MG/DL (ref 70–99)
GLUCOSE BLDC GLUCOMTR-MCNC: 123 MG/DL (ref 70–99)
GLUCOSE BLDC GLUCOMTR-MCNC: 133 MG/DL (ref 70–99)
GLUCOSE SERPL-MCNC: 117 MG/DL (ref 70–99)
GLUCOSE SERPL-MCNC: 123 MG/DL (ref 70–99)
GLUCOSE SERPL-MCNC: 127 MG/DL (ref 70–99)
GLUCOSE SERPL-MCNC: 130 MG/DL (ref 70–99)
HCO3 BLDV-SCNC: 29 MMOL/L (ref 21–28)
HCO3 BLDV-SCNC: 31 MMOL/L (ref 21–28)
HCT VFR BLD AUTO: 31.3 % (ref 40–53)
HGB BLD-MCNC: 10.4 G/DL (ref 13.3–17.7)
IMM GRANULOCYTES # BLD: 0.1 10E3/UL
IMM GRANULOCYTES NFR BLD: 1 %
INR PPP: 1.5 (ref 0.85–1.15)
LACTATE SERPL-SCNC: 1.1 MMOL/L (ref 0.7–2)
LYMPHOCYTES # BLD AUTO: 1.1 10E3/UL (ref 0.8–5.3)
LYMPHOCYTES NFR BLD AUTO: 10 %
MAGNESIUM SERPL-MCNC: 2.2 MG/DL (ref 1.7–2.3)
MAGNESIUM SERPL-MCNC: 2.3 MG/DL (ref 1.7–2.3)
MCH RBC QN AUTO: 31.1 PG (ref 26.5–33)
MCHC RBC AUTO-ENTMCNC: 33.2 G/DL (ref 31.5–36.5)
MCV RBC AUTO: 94 FL (ref 78–100)
MONOCYTES # BLD AUTO: 1.2 10E3/UL (ref 0–1.3)
MONOCYTES NFR BLD AUTO: 11 %
NEUTROPHILS # BLD AUTO: 9 10E3/UL (ref 1.6–8.3)
NEUTROPHILS NFR BLD AUTO: 78 %
NRBC # BLD AUTO: 0 10E3/UL
NRBC BLD AUTO-RTO: 0 /100
O2/TOTAL GAS SETTING VFR VENT: 2 %
O2/TOTAL GAS SETTING VFR VENT: 28 %
OXYHGB MFR BLDV: 57 % (ref 70–75)
OXYHGB MFR BLDV: 62 % (ref 70–75)
PCO2 BLDV: 42 MM HG (ref 40–50)
PCO2 BLDV: 43 MM HG (ref 40–50)
PH BLDV: 7.44 [PH] (ref 7.32–7.43)
PH BLDV: 7.47 [PH] (ref 7.32–7.43)
PHOSPHATE SERPL-MCNC: 1.8 MG/DL (ref 2.5–4.5)
PHOSPHATE SERPL-MCNC: 2.3 MG/DL (ref 2.5–4.5)
PHOSPHATE SERPL-MCNC: 2.4 MG/DL (ref 2.5–4.5)
PHOSPHATE SERPL-MCNC: 3.7 MG/DL (ref 2.5–4.5)
PLATELET # BLD AUTO: 112 10E3/UL (ref 150–450)
PO2 BLDV: 30 MM HG (ref 25–47)
PO2 BLDV: 33 MM HG (ref 25–47)
POTASSIUM SERPL-SCNC: 3.8 MMOL/L (ref 3.4–5.3)
POTASSIUM SERPL-SCNC: 3.9 MMOL/L (ref 3.4–5.3)
POTASSIUM SERPL-SCNC: 4 MMOL/L (ref 3.4–5.3)
POTASSIUM SERPL-SCNC: 4.3 MMOL/L (ref 3.4–5.3)
PROT SERPL-MCNC: 5.8 G/DL (ref 6.4–8.3)
RBC # BLD AUTO: 3.34 10E6/UL (ref 4.4–5.9)
SAO2 % BLDV: 57.8 % (ref 70–75)
SAO2 % BLDV: 63.3 % (ref 70–75)
SODIUM SERPL-SCNC: 140 MMOL/L (ref 135–145)
SODIUM SERPL-SCNC: 142 MMOL/L (ref 135–145)
UFH PPP CHRO-ACNC: 0.16 IU/ML
UFH PPP CHRO-ACNC: 0.21 IU/ML
UFH PPP CHRO-ACNC: 0.25 IU/ML
UFH PPP CHRO-ACNC: 0.26 IU/ML
WBC # BLD AUTO: 11.5 10E3/UL (ref 4–11)

## 2024-06-18 PROCEDURE — 250N000013 HC RX MED GY IP 250 OP 250 PS 637: Performed by: STUDENT IN AN ORGANIZED HEALTH CARE EDUCATION/TRAINING PROGRAM

## 2024-06-18 PROCEDURE — 258N000003 HC RX IP 258 OP 636: Performed by: STUDENT IN AN ORGANIZED HEALTH CARE EDUCATION/TRAINING PROGRAM

## 2024-06-18 PROCEDURE — 250N000013 HC RX MED GY IP 250 OP 250 PS 637

## 2024-06-18 PROCEDURE — 85520 HEPARIN ASSAY: CPT | Performed by: STUDENT IN AN ORGANIZED HEALTH CARE EDUCATION/TRAINING PROGRAM

## 2024-06-18 PROCEDURE — 250N000011 HC RX IP 250 OP 636: Performed by: STUDENT IN AN ORGANIZED HEALTH CARE EDUCATION/TRAINING PROGRAM

## 2024-06-18 PROCEDURE — 93971 EXTREMITY STUDY: CPT | Mod: RT

## 2024-06-18 PROCEDURE — 93971 EXTREMITY STUDY: CPT | Mod: 26 | Performed by: RADIOLOGY

## 2024-06-18 PROCEDURE — C9113 INJ PANTOPRAZOLE SODIUM, VIA: HCPCS | Mod: JZ

## 2024-06-18 PROCEDURE — 250N000009 HC RX 250: Performed by: STUDENT IN AN ORGANIZED HEALTH CARE EDUCATION/TRAINING PROGRAM

## 2024-06-18 PROCEDURE — 250N000013 HC RX MED GY IP 250 OP 250 PS 637: Performed by: SURGERY

## 2024-06-18 PROCEDURE — 80053 COMPREHEN METABOLIC PANEL: CPT

## 2024-06-18 PROCEDURE — 85610 PROTHROMBIN TIME: CPT | Performed by: STUDENT IN AN ORGANIZED HEALTH CARE EDUCATION/TRAINING PROGRAM

## 2024-06-18 PROCEDURE — 85025 COMPLETE CBC W/AUTO DIFF WBC: CPT

## 2024-06-18 PROCEDURE — 120N000002 HC R&B MED SURG/OB UMMC

## 2024-06-18 PROCEDURE — 82330 ASSAY OF CALCIUM: CPT | Performed by: PHYSICIAN ASSISTANT

## 2024-06-18 PROCEDURE — 83605 ASSAY OF LACTIC ACID: CPT

## 2024-06-18 PROCEDURE — 97530 THERAPEUTIC ACTIVITIES: CPT | Mod: GO

## 2024-06-18 PROCEDURE — 83735 ASSAY OF MAGNESIUM: CPT

## 2024-06-18 PROCEDURE — 85520 HEPARIN ASSAY: CPT | Performed by: SURGERY

## 2024-06-18 PROCEDURE — 250N000011 HC RX IP 250 OP 636: Mod: JZ

## 2024-06-18 PROCEDURE — 84100 ASSAY OF PHOSPHORUS: CPT

## 2024-06-18 PROCEDURE — 999N000128 HC STATISTIC PERIPHERAL IV START W/O US GUIDANCE

## 2024-06-18 PROCEDURE — 99291 CRITICAL CARE FIRST HOUR: CPT | Mod: FS | Performed by: ANESTHESIOLOGY

## 2024-06-18 PROCEDURE — 71045 X-RAY EXAM CHEST 1 VIEW: CPT | Mod: 26 | Performed by: RADIOLOGY

## 2024-06-18 PROCEDURE — 99291 CRITICAL CARE FIRST HOUR: CPT | Mod: GC | Performed by: INTERNAL MEDICINE

## 2024-06-18 PROCEDURE — 250N000011 HC RX IP 250 OP 636: Performed by: PHYSICIAN ASSISTANT

## 2024-06-18 PROCEDURE — 71045 X-RAY EXAM CHEST 1 VIEW: CPT

## 2024-06-18 PROCEDURE — 97535 SELF CARE MNGMENT TRAINING: CPT | Mod: GO

## 2024-06-18 PROCEDURE — 82805 BLOOD GASES W/O2 SATURATION: CPT

## 2024-06-18 PROCEDURE — 250N000013 HC RX MED GY IP 250 OP 250 PS 637: Performed by: PHYSICIAN ASSISTANT

## 2024-06-18 PROCEDURE — 250N000011 HC RX IP 250 OP 636: Mod: JZ | Performed by: STUDENT IN AN ORGANIZED HEALTH CARE EDUCATION/TRAINING PROGRAM

## 2024-06-18 PROCEDURE — 82374 ASSAY BLOOD CARBON DIOXIDE: CPT | Performed by: STUDENT IN AN ORGANIZED HEALTH CARE EDUCATION/TRAINING PROGRAM

## 2024-06-18 PROCEDURE — 84100 ASSAY OF PHOSPHORUS: CPT | Performed by: STUDENT IN AN ORGANIZED HEALTH CARE EDUCATION/TRAINING PROGRAM

## 2024-06-18 RX ORDER — FUROSEMIDE 10 MG/ML
60 INJECTION INTRAMUSCULAR; INTRAVENOUS 2 TIMES DAILY
Status: DISCONTINUED | OUTPATIENT
Start: 2024-06-18 | End: 2024-06-19

## 2024-06-18 RX ORDER — HYDRALAZINE HYDROCHLORIDE 25 MG/1
25 TABLET, FILM COATED ORAL 3 TIMES DAILY
Status: DISCONTINUED | OUTPATIENT
Start: 2024-06-18 | End: 2024-06-19

## 2024-06-18 RX ORDER — POTASSIUM CHLORIDE 1.5 G/1.58G
20 POWDER, FOR SOLUTION ORAL ONCE
Status: COMPLETED | OUTPATIENT
Start: 2024-06-18 | End: 2024-06-18

## 2024-06-18 RX ORDER — LIDOCAINE 40 MG/G
CREAM TOPICAL
Status: DISCONTINUED | OUTPATIENT
Start: 2024-06-18 | End: 2024-06-18

## 2024-06-18 RX ORDER — BISACODYL 10 MG
10 SUPPOSITORY, RECTAL RECTAL ONCE
Status: COMPLETED | OUTPATIENT
Start: 2024-06-18 | End: 2024-06-18

## 2024-06-18 RX ORDER — FUROSEMIDE 10 MG/ML
60 INJECTION INTRAMUSCULAR; INTRAVENOUS ONCE
Status: DISCONTINUED | OUTPATIENT
Start: 2024-06-18 | End: 2024-06-18

## 2024-06-18 RX ORDER — FUROSEMIDE 10 MG/ML
80 INJECTION INTRAMUSCULAR; INTRAVENOUS ONCE
Status: COMPLETED | OUTPATIENT
Start: 2024-06-18 | End: 2024-06-18

## 2024-06-18 RX ORDER — LIDOCAINE HYDROCHLORIDE 20 MG/ML
5 SOLUTION OROPHARYNGEAL ONCE
Status: DISCONTINUED | OUTPATIENT
Start: 2024-06-18 | End: 2024-06-20

## 2024-06-18 RX ORDER — NALOXONE HYDROCHLORIDE 1 MG/ML
2 INJECTION PARENTERAL 3 TIMES DAILY
Status: DISCONTINUED | OUTPATIENT
Start: 2024-06-18 | End: 2024-06-19

## 2024-06-18 RX ORDER — WARFARIN SODIUM 5 MG/1
5 TABLET ORAL
Status: COMPLETED | OUTPATIENT
Start: 2024-06-18 | End: 2024-06-18

## 2024-06-18 RX ADMIN — POTASSIUM PHOSPHATE, MONOBASIC POTASSIUM PHOSPHATE, DIBASIC 15 MMOL: 224; 236 INJECTION, SOLUTION, CONCENTRATE INTRAVENOUS at 00:38

## 2024-06-18 RX ADMIN — HEPARIN SODIUM 1300 UNITS/HR: 10000 INJECTION, SOLUTION INTRAVENOUS at 23:04

## 2024-06-18 RX ADMIN — ONDANSETRON 4 MG: 2 INJECTION INTRAMUSCULAR; INTRAVENOUS at 05:42

## 2024-06-18 RX ADMIN — FUROSEMIDE 60 MG: 10 INJECTION, SOLUTION INTRAMUSCULAR; INTRAVENOUS at 19:56

## 2024-06-18 RX ADMIN — NALOXONE HYDROCHLORIDE 2 MG: 1 INJECTION PARENTERAL at 15:25

## 2024-06-18 RX ADMIN — POTASSIUM CHLORIDE 20 MEQ: 1.5 POWDER, FOR SOLUTION ORAL at 12:13

## 2024-06-18 RX ADMIN — LIDOCAINE 1 PATCH: 4 PATCH TOPICAL at 09:39

## 2024-06-18 RX ADMIN — POTASSIUM & SODIUM PHOSPHATES POWDER PACK 280-160-250 MG 1 PACKET: 280-160-250 PACK at 15:25

## 2024-06-18 RX ADMIN — ATORVASTATIN CALCIUM 20 MG: 20 TABLET, FILM COATED ORAL at 19:56

## 2024-06-18 RX ADMIN — POTASSIUM & SODIUM PHOSPHATES POWDER PACK 280-160-250 MG 1 PACKET: 280-160-250 PACK at 22:40

## 2024-06-18 RX ADMIN — FUROSEMIDE 60 MG: 10 INJECTION, SOLUTION INTRAMUSCULAR; INTRAVENOUS at 00:37

## 2024-06-18 RX ADMIN — ACETAMINOPHEN 975 MG: 325 TABLET, FILM COATED ORAL at 00:37

## 2024-06-18 RX ADMIN — HYDROMORPHONE HYDROCHLORIDE 0.4 MG: 0.2 INJECTION, SOLUTION INTRAMUSCULAR; INTRAVENOUS; SUBCUTANEOUS at 02:49

## 2024-06-18 RX ADMIN — OXYCODONE HYDROCHLORIDE 10 MG: 10 TABLET ORAL at 02:49

## 2024-06-18 RX ADMIN — NALOXONE HYDROCHLORIDE 2 MG: 1 INJECTION PARENTERAL at 19:57

## 2024-06-18 RX ADMIN — ACETAMINOPHEN 975 MG: 325 TABLET, FILM COATED ORAL at 09:37

## 2024-06-18 RX ADMIN — SENNOSIDES AND DOCUSATE SODIUM 2 TABLET: 8.6; 5 TABLET ORAL at 19:56

## 2024-06-18 RX ADMIN — POLYETHYLENE GLYCOL 3350 17 G: 17 POWDER, FOR SOLUTION ORAL at 09:37

## 2024-06-18 RX ADMIN — HYDRALAZINE HYDROCHLORIDE 25 MG: 25 TABLET ORAL at 19:56

## 2024-06-18 RX ADMIN — POTASSIUM & SODIUM PHOSPHATES POWDER PACK 280-160-250 MG 1 PACKET: 280-160-250 PACK at 18:12

## 2024-06-18 RX ADMIN — PROCHLORPERAZINE EDISYLATE 10 MG: 5 INJECTION INTRAMUSCULAR; INTRAVENOUS at 02:41

## 2024-06-18 RX ADMIN — POTASSIUM CHLORIDE 20 MEQ: 29.8 INJECTION, SOLUTION INTRAVENOUS at 00:38

## 2024-06-18 RX ADMIN — MAGNESIUM HYDROXIDE 30 ML: 400 SUSPENSION ORAL at 12:12

## 2024-06-18 RX ADMIN — BISACODYL 10 MG: 10 SUPPOSITORY RECTAL at 09:08

## 2024-06-18 RX ADMIN — POTASSIUM PHOSPHATE, MONOBASIC POTASSIUM PHOSPHATE, DIBASIC 15 MMOL: 224; 236 INJECTION, SOLUTION, CONCENTRATE INTRAVENOUS at 02:33

## 2024-06-18 RX ADMIN — SENNOSIDES AND DOCUSATE SODIUM 2 TABLET: 8.6; 5 TABLET ORAL at 09:38

## 2024-06-18 RX ADMIN — HYDRALAZINE HYDROCHLORIDE 10 MG: 20 INJECTION INTRAMUSCULAR; INTRAVENOUS at 06:43

## 2024-06-18 RX ADMIN — HYDRALAZINE HYDROCHLORIDE 10 MG: 20 INJECTION INTRAMUSCULAR; INTRAVENOUS at 05:42

## 2024-06-18 RX ADMIN — ACETAMINOPHEN 975 MG: 325 TABLET, FILM COATED ORAL at 15:24

## 2024-06-18 RX ADMIN — ONDANSETRON 4 MG: 2 INJECTION INTRAMUSCULAR; INTRAVENOUS at 11:24

## 2024-06-18 RX ADMIN — OXYCODONE HYDROCHLORIDE 5 MG: 5 TABLET ORAL at 12:37

## 2024-06-18 RX ADMIN — HYDRALAZINE HYDROCHLORIDE 10 MG: 20 INJECTION INTRAMUSCULAR; INTRAVENOUS at 12:14

## 2024-06-18 RX ADMIN — FUROSEMIDE 80 MG: 10 INJECTION, SOLUTION INTRAVENOUS at 04:40

## 2024-06-18 RX ADMIN — HYDROXYZINE HYDROCHLORIDE 50 MG: 25 TABLET ORAL at 02:49

## 2024-06-18 RX ADMIN — PANTOPRAZOLE SODIUM 40 MG: 40 INJECTION, POWDER, FOR SOLUTION INTRAVENOUS at 19:56

## 2024-06-18 RX ADMIN — WARFARIN SODIUM 5 MG: 5 TABLET ORAL at 19:30

## 2024-06-18 RX ADMIN — HYDRALAZINE HYDROCHLORIDE 25 MG: 25 TABLET ORAL at 13:31

## 2024-06-18 RX ADMIN — HYDRALAZINE HYDROCHLORIDE 10 MG: 20 INJECTION INTRAMUSCULAR; INTRAVENOUS at 02:33

## 2024-06-18 RX ADMIN — PANTOPRAZOLE SODIUM 40 MG: 40 INJECTION, POWDER, FOR SOLUTION INTRAVENOUS at 09:37

## 2024-06-18 RX ADMIN — FUROSEMIDE 60 MG: 10 INJECTION, SOLUTION INTRAMUSCULAR; INTRAVENOUS at 10:37

## 2024-06-18 RX ADMIN — HEPARIN SODIUM 1300 UNITS/HR: 10000 INJECTION, SOLUTION INTRAVENOUS at 06:44

## 2024-06-18 RX ADMIN — METHOCARBAMOL 500 MG: 500 TABLET ORAL at 05:42

## 2024-06-18 ASSESSMENT — ACTIVITIES OF DAILY LIVING (ADL)
ADLS_ACUITY_SCORE: 38

## 2024-06-18 NOTE — PLAN OF CARE
Goal Outcome Evaluation:     Progress: improving    Assumed pt care at ~1400. Epi off, Dobut started. HR 120s. PRN hydral x1 dose this evening. Frequent PVCs, non-sustained runs of VT -- provider notified. CVP 12 this afternoon, goal 10-12. Heparin gtt restarted after medial Ctx2 removed. Room air, but intermittent 1L oxymask for desats. Tachypneic, shallow breathing. Pt only wanted tylenol, no Oxy or robaxin -- reiterated need for pain management for effective breathing -- pt and wife open to more pain meds tonight. Diuresed x1 this danyell w/ good response. Meds w/ pudding this evening. Continuous nausea -- compazine and cold rag helped minimally.     Wife Corie at bedside, very helpful. Continue pain management and encouragement. Plan for NJT if PO intake does not increase -- pt aware.

## 2024-06-18 NOTE — PROGRESS NOTES
Shift Summary: CVP elevated at 15 x2 throughout the night, team aware, lasix given x2. Prn hydralazine given x3 for MAP >80. Prns given for pain and nausea (see MAR). K replaced x2 and phos replaced x1. Wife updated via phone.     Neuro: A/O x4, follow commands, TAVERA, PERRL. Lethargic, anxious at times. Prns given for anixety/pain.     Cardiac: -120s. Frequent PVCs, team aware. MAP goal 65-80. LVAD 5200, PI 2.9-4.2, flows 3.9-4.3, Pizano 3.5-3.7.    Resp: 2L oxy mask. Tachypneic, 20-40s. CT x2 with minimal output.     GI/: Minimal appetite due to nausea. No BM, is passing gas. Menjivar with adequate output.     Lines: CRISTA mac w/ Lino (54)    GTTs: Dobutamine, Heparin

## 2024-06-18 NOTE — ANESTHESIA POSTPROCEDURE EVALUATION
Patient: Navin Lutz    Procedure: Procedure(s):  Median Sternotomy, INSERTION, LEFT VENTRICULAR ASSIST DEVICE (HEARTMATE III), on Cardiopulmonary Bypass, Transesophageal Echocardiogram by Anesthesia       Anesthesia Type:  General    Note:  Disposition: ICU            ICU Sign Out: Anesthesiologist/ICU physician sign out WAS performed   Postop Pain Control:    PONV:    Neuro/Psych:    Airway/Respiratory:             Sign Out: AIRWAY IN SITU/Resp. Support               Airway in situ/Resp. Support: ETT                 Reason: Planned Pre-op   CV/Hemodynamics:             Sign Out: Detailed CV status               Blood Pressure: Pressors               Perfusion:  Inotropes   Other NRE:    DID A NON-ROUTINE EVENT OCCUR?            Last vitals:  Vitals:    06/18/24 1100 06/18/24 1200 06/18/24 1300   BP: (!) 132/101 102/88 (!) 108/94   Pulse: 111 (!) 124 (!) 126   Resp:      Temp: 37.9  C (100.2  F) 38  C (100.4  F) 38  C (100.4  F)   SpO2: 95% 95% 95%       Electronically Signed By: Jomar Liu MD  June 18, 2024  2:34 PM

## 2024-06-18 NOTE — PROGRESS NOTES
Patient has DVT on R arm  and Pace maker on the left side . I did contact Sarah Guerin  the ordering CNP and did explain to her Patient need to go to IR to get picc . We can not do this case . She is going to send the order to IR for picc placement .

## 2024-06-18 NOTE — PROGRESS NOTES
[facility]   Cardiology II Service ICU/ Advanced Heart Failure  Daily Progress Note      Patient: Navin Lutz  MRN: [id number]  Admission Date: 6/3/2024  Hospital Day # 15    Assessment and Plan: Navin Lutz is a 53 year old male admitted on 6/3/2024. He has a past medical history of chronic HFrEF 2/2 NICM s/p ICD, HLD, and CKD Stage II who presents admitted for decompensated heart failure on milrinone listed status 4, admitted for consideration of advanced therapies, now s/p LVAD .     Today's Plan:  -lasix 60 mg BID, redose to CVP goal 10-12  -speed increased to 5300  -continue dobutamine 2.5  -continue  coumadin with heparin gtt bridge, INR goal 2-3  -okay to remove swan, put PICC in, plan for transfer to floors    # Acute on chronic systolic heart failure/HFrEF secondary to NICM with EF of 10-15% who was on home milrinone prior to admission    Stage D. NYHA Class III. TTE 24 EF 15-20%, LVIDd 7.0 cm, RV normal size and function, mild TR, mild MI, no pericardial effusion. RHC 24: RA 3, PA 25/12/17, PCW 9, Teressa CO/CI 4.63/2.13.   -S/P HM3 LVAD     [place]  (post VAD)   RA: 13 (Goal 10-12)   PA:  43/24   PCWP: 22   CO/CI: 5.4/2.5   SVR: 1420   MAP: Goal 70-80    -Fluid status: euvolemic  -Inotrope: continue dobutamine 2.5  -IABP removed shortly after OR  -RV support: off Laisha  -ACEi/ARB/ARNI/afterload reduction: Holding for now  -BB: coreg 3.125mg on hold  -Aldosterone antagonist: aldactone 25 mg daily on hold   -SGLT2i: Holding  -SCD prophylaxis: ICD    The patient's HeartMate LVAD was interrogated 2024  Heartmate 3 LEFT VS  Flow (Lpm): 4.3 Lpm  Pulse Index (PI): 2.9 PI  Speed (rpm): 5200 rpm  Power (bassett): 3.7 bassett  Current Hct settin     Fluid status: euvolemic   Alarms were reviewed, high PI shortly after OR, but no alarms since.   The driveline exit site was inspected, c/d/i.   All external components were inspected and showed no evidence of damage or  malfunction, none replaced.   No changes to VAD settings made    ================================================================    Subjective/24-Hr Events:   No acute overnight events.    Medications: Reviewed in EPIC.     Physical Exam:   /81   Pulse (!) 131   Temp (!) 100.6  F (38.1  C)   Resp (!) 35   Ht 1.829 m (6')   Wt 97.3 kg (214 lb 8.1 oz)   SpO2 93%   BMI 29.09 kg/m      GENERAL: NAD  HEENT: no scleral icterus  NECK: Supple. No JVD   CV: RRR, LVAD hum noted  RESPIRATORY: decreased breath sounds bilaterally  GI: Soft and non distended   EXTREMITIES: No peripheral edema  NEUROLOGIC: AO x 3    Labs:  CMP  Recent Labs   Lab 06/18/24  0855 06/18/24  0408 06/18/24  0356 06/18/24  0035 06/17/24  2210 06/17/24  2151 06/17/24  1607 06/17/24  1549 06/17/24  0917 06/17/24  0432 06/16/24  2348 06/16/24  1937 06/16/24  0555 06/16/24  0332   NA  --   --  142  --   --  140  --  139  --  137  --  137  --  135   POTASSIUM  --   --  4.3  --   --  3.8  --  3.7  --  3.9  --  4.4  --  4.1   CHLORIDE  --   --  105  --   --  103  --  104  --  102  --  105  --  104   CO2  --   --  26  --   --  25  --  24  --  21*  --  22  --  20*   ANIONGAP  --   --  11  --   --  12  --  11  --  14  --  10  --  11   * 123* 117* 116*   < > 150*   < > 116*   < > 142*   < > 155*  161*   < > 160*   BUN  --   --  20.6*  --   --  20.4*  --  20.2*  --  17.5  --  15.8  --  12.3   CR  --   --  1.19*  --   --  1.13  --  1.08  --  1.09  --  0.97  --  0.90   GFRESTIMATED  --   --  73  --   --  78  --  82  --  81  --  >90  --  >90   NAM  --   --  8.5*  --   --  8.7  --  8.5*  --  8.7  --  8.5*  --  8.3*   MAG  --   --  2.2  --   --  2.2  --  2.3  --  2.4*  --  2.0  --  2.3   PHOS  --   --  3.7  --   --  1.8*  --  1.9*  --  2.5  --  2.6  --  2.5   PROTTOTAL  --   --  5.8*  --   --   --   --   --   --  5.7*  --  5.9*  --  5.2*   ALBUMIN  --   --  3.1*  --   --   --   --   --   --  3.0*  --  3.1*  --  2.9*   BILITOTAL  --   --  0.7  --    --   --   --   --   --  1.9*  --  2.6*  --  2.3*   ALKPHOS  --   --  63  --   --   --   --   --   --  68  --  73  --  66   AST  --   --  38  --   --   --   --   --   --  58*  --  67*  --  87*   ALT  --   --  28  --   --   --   --   --   --  28  --  33  --  37    < > = values in this interval not displayed.       CBC  Recent Labs   Lab 06/18/24 0356 06/17/24 0432 06/16/24  1937 06/16/24  0332   WBC 11.5* 17.1* 19.4* 20.9*   RBC 3.34* 3.60* 3.78* 3.85*   HGB 10.4* 11.3* 11.6* 11.9*   HCT 31.3* 33.7* 35.4* 35.7*   MCV 94 94 94 93   MCH 31.1 31.4 30.7 30.9   MCHC 33.2 33.5 32.8 33.3   RDW 14.3 14.3 14.4 14.3   * 94* 106* 105*       INR  Recent Labs   Lab 06/18/24  0356 06/17/24 0432 06/16/24  1001 06/15/24  0325   INR 1.50* 1.37* 1.41* 1.28*

## 2024-06-18 NOTE — CONSULTS
[facility] Consult Service Note  Interventional Radiology  06/18/24   2:07 PM    Consult Requested: PICC placement c/b non-occlusive thrombus to right mid subclavian and basilic veins and left LVAD wires     Recommendations/Plan:    Team to obtain upper extremity US.  If no Dvt, consult VAS to place PICC.  If there is residual clot, IR can place DL PICC.      Team is hoping to remove Swanz Hugh catheter and needs access.      Case and imaging discussed with IR attending Dr. Kianna Lopes MD   Recommendations were reviewed with Sarah    History of Present Illness:  Navin Lutz is a 53 year old English speaking male with past medical history of chronic HFrEF 2/2 NICM and CKD stage II presented with decompensated heart failure on milrinone. CVTS was consulted as part of the LVAD/ heart transplant evaluation.     VAS evaluated the patient at bedside on 6/18/2024 and given the patient has DVT on right arm and pacemaker on the left side recommend IR placement PICC line.      PICC placement 8/2023 has since been removed.      US done on 6/4/2024 with non occlusive thrombus in mid right SCV. Non occlusive thrombus in superficial right basilic vein.      Pertinent Imaging Reviewed:       Recent Results (from the past 24 hour(s))   XR Chest Port 1 View    Narrative    Exam: XR CHEST PORT 1 VIEW, 6/17/2024 3:18 PM    Comparison: Chest x-ray 6/17/2024 at 0035 hours    History: post CT removal    Findings:    Portable AP view of the chest. Median sternotomy wires. Stable left  chest wall implantable cardiac defibrillator and LVAD. Stable right IJ  [employer]Hugh catheter. Stable left chest tube. Interval removal of  mediastinal drains.    Stable cardiac silhouette. No discernible pneumothorax. Unchanged  blunting of the left costophrenic angle. Perihilar and retrocardiac  opacities.      Impression    Impression:     Interval removal of mediastinal drains. Otherwise stable chest with  continued perihilar and  left basilar opacities.     I have personally reviewed the examination and initial interpretation  and I agree with the findings.    ETHAN LION MD         SYSTEM ID:  [id number]   XR Chest Port 1 View    Narrative    Exam: XR CHEST PORT 1 VIEW, 6/18/2024 1:35 AM    Comparison: 6/17/2024    History: [place], ETT, LVAD    Findings:  Portable AP view of the chest. Median sternotomy wires. Left chest  wall implantable cardiac defibrillator and LVAD. Stable surgical  drains over the left hemithorax. Right IJ [place]-Hugh catheter  terminates in the right pulmonary artery.    Stable cardiac silhouette. No pneumothorax. Unchanged blunting of the  left costophrenic angle. Dense retrocardiac opacification.       Impression    Impression: Stable support devices. Stable perihilar and retrocardiac  opacities, likely atelectasis.    I have personally reviewed the examination and initial interpretation  and I agree with the findings.    VIOLETA DANIELLE MD         SYSTEM ID:  [id number]     Expected date of discharge:  06/21/2024    Vitals:   BP (!) 108/94   Pulse (!) 126   Temp 100.4  F (38  C)   Resp (!) 35   Ht 1.829 m (6')   Wt 97.3 kg (214 lb 8.1 oz)   SpO2 95%   BMI 29.09 kg/m      Pertinent Labs Reviewed:  CBC:  Lab Results   Component Value Date    WBC 11.5 (H) 06/18/2024    WBC 17.1 (H) 06/17/2024    WBC 19.4 (H) 06/16/2024     Lab Results   Component Value Date    HGB 10.4 06/18/2024    HGB 11.3 06/17/2024    HGB 11.6 06/16/2024     Lab Results   Component Value Date     06/18/2024    PLT 94 06/17/2024     06/16/2024    INR:  Lab Results   Component Value Date    INR 1.50 (H) 06/18/2024    PTT 44 (H) 06/17/2024          COVID Results:  COVID-19 Antibody Results, Testing for Immunity           No data to display              COVID-19 PCR Results          8/22/2023    20:03 11/18/2023    00:45   COVID-19 PCR Results   SARS CoV2 PCR Negative  Negative     Potassium   Date Value Ref Range Status    06/18/2024 3.8 3.4 - 5.3 mmol/L Final     Potassium POCT   Date Value Ref Range Status   06/14/2024 4.5 3.4 - 5.3 mmol/L Final     Comment:     CRITICAL VALUES NOTED AND REPORTED TO MD Karen Rosenberg PAMannyC  Interventional Radiology  Pager: [phone]

## 2024-06-18 NOTE — OP NOTE
PREOPERATIVE DIAGNOSES:   1. Dilated cardiomyopathy  2.  Cardiogenic shock on IABP  3. Severe pulmonary hypertension  4. Severe mitral regurgitation  5. Acute on chronic renal failure     POSTOPERATIVE DIAGNOSES:   1. Dilated cardiomyopathy  2.  Cardiogenic shock on IABP  3. Severe pulmonary hypertension  4. Severe mitral regurgitation  5. Acute on chronic renal failure     PROCEDURES:     1. Placement of HeartMate III left ventricular assist device (intracorporeal ventricular assist device).      SURGEON:  Brian Jhaveri MD      ASSISTANT:  Angelo Tsai MD, Cira JOINER      NOTE: A PA was required for this operation to perform  with assisting in performance of the anastomosis as well as the rest of the operation.      OPERATIVE INDICATIONS:   Mr. Lutz is a 53 year old gentleman with a medical history of CKD2, HLD, R subclavian and axillary non-occlusive thrombus on warfarin, NSVT, HFrEF due to NICM s/p ICD requiring milrinone, admitted to the hospital 6/3 with decompensated heart failure  A decision was made to proceed with HeartMate III LVAD.  I discussed the risks and benefits of surgery with the patient and family including risk of death, stroke, bleeding, wound infection, renal failure and arrhythmias.  They understood and willing to proceed with surgery.      OPERATIVE FINDINGS:  The patient's sternum was of adequate quality.  The pericardial space had moderate adhesions.  There was moderate RV dysfunction.  There was only trace aortic regurgitation.  There was severe mitral regurgitation and mild tricuspid regurgitation.  There was no clot within the apex of left ventricle.      DESCRIPTION OF PROCEDURE:  The patient was brought to operating room in stable condition.  Following the general anesthesia, the patient's chest, abdomen and lower extremities prepped and draped in the usual sterile manner.  A median sternotomy was performed.   Preparation of cardiopulmonary bypass included ACT-guided  heparinization and the admission of [place].  The aorta was cannulated with a 20-Tajik arterial cannula via 3-0 Tevdek pursestring pledgeted suture x2.  Venous cannula was inserted in right atrium.  Full cardiopulmonary bypass was initiated.  The apex of the heart was exposed. The inflow ring was secured on the veronica lateral surface of the heart in usual fashion.  Coring knife was used to core out the apex of the portion of left ventricle. The inflow cannula along with the pump was inserted in the heart and secured adequately.  Driveline was tunneled out to right upper quadrant incision.  The outflow graft was measured and sewn to a longitudinal aortotomy using continuous 4-0 Prolene sutures.  De-airing maneuvers were performed in the usual standard fashion.  Following confirmation of de-airing by echocardiography, the patient gradually weaned off cardiopulmonary bypass using Levophed, epinephrine and NO.   LVAD pump was initiated.  Initial hemodynamics were stable.       Hemostasis was eventually achieved. Two anterior mediastinal chest tubes and left pleural chest tubes and a sherley drain were placed.  Sternum was closed with surgical steel wires.  Fascia, subcutaneous tissue, skin of chest were closed in layers.        The patient transferred to ICU in stable critical condition.

## 2024-06-18 NOTE — PROGRESS NOTES
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 Now s/p LVAD by Dr. Jhaveri on 6/14.       Changes/updates today:  - Diuresis per Cards II  - Increase LVAD speed  - Scheduled suppository and enema  - Scheduled MOM  - Remove swan, CVC  - Place PICC  - Transfer to floor     PLAN:  Neuro/ pain/ sedation:  - Monitor neurological status. Notify the MD for any acute changes in exam.  #Acute postoperative pain  Pt states pain has improved since yesterday.  - Scheduled: Tylenol  - PRN: Tylenol, oxycodone, dilaudid, robaxin, volteran gel, atarax, lidocaine patches    Pulmonary care:   # Supplemental oxygen needs  On 2L NC overnight. Unable to have effective cough d/t pain/nausea.  - Titrate down on oxygen needs as able  - Goal SpO2 > 92%  - Encourage pulmonary hygiene.  - Use IS q15-30 minutes when awake.  - Nausea plan below    Cardiovascular:    #S/p LVAD on 6/14 by Dr. Jhaveri  #S/p IABP (6/12-6/14)  #Undifferentiated shock; septic vs cardiogenic  #Hx NSVT  #Acute on chronic HFrEF 2/2 to NICM (EF 10-15%), home milrinone PTA, s/p ICD   #HLD  Continue current plan. Place PICC, then check CVP post placement to ensure they correlate. Goal to pull swan and CVC.  - Goal MAP 65-80, CVP 10-12, and SBP <140, monitor hemodynamics  - Continue dobutamine at 2.5 mcg/kg/min  - Increase LVAD speed to 5300  - Continue PTA atorvastatin  - Low intensity heparin  - Continue warfarin  - PRN hydralazine for MAP > 80  - UOP goal: plan below  - CTs: Meds x 2  - Hold PTA Coreg, Entresto, Aldactone, Jardiance, atorvastatin  - Hold BB      GI care / Nutrition:   # Constipation  # Nausea  # Risk for protein calorie malnutrition  # Hx Coffee ground gastric secretions  - Advance to regular diet  - Consider NJ if  unable to tolerate/take PO  - IV PPI BID  - Last BM 6/11  - Bowel regimen: miraLAX, senna doses. Add scheduled MOM  - Suppository scheduled today  - Administer enema  - Scopolamine patch  - PRN zofran/compazine/haldol     Renal / Fluids / Electrolytes:   # CKD Stage 2  BL creat appears to be ~ 1.2-1.4.     Cr 1.19 this morning.  - Strict I/O, daily weights, avoid/limit nephrotoxins  - BMP q6hrs  - Replete lytes PRN per protocol  - UOP goal net negative -1 -2L (CVP goal 10-12)   - 60mg lasix BID     Endocrine:    #Stress hyperglycemia  Preop A1c 5.6  - Goal BG <180 for optimal healing  - Continue HSSI  - Hypoglycemia order set in place     ID / Antibiotics:  #Stress induced leukocytosis  WBC improving to 11.5 from 17.1, lactate WNL, Tmax 100.8  - Completed LVAD post-op prophylaxis  - Blood cultures: NGTD  - Monitor fever curve, WBC, and inflammatory markers as appropriate  - De-line, remove kuo  - Consider re-culturing and adding on ABX if febrile    Heme:     #Acute blood loss anemia  #Acute blood loss thrombocytopenia  #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  Time spent with patient 35 minutes. This does not include time spent on procedures or teaching.     Sarah Guerin, ANNE CNP    ====================================    TODAY'S PROGRESS  SUBJECTIVE: Weakly TAVERA, follows commands. Pt states he got some  "sleep overnight. States pain to his incision is at a \"5\" right now and continues to have nausea. States numbness to right arm/hand slightly improved sine a-line removed.    OBJECTIVE  1. VITAL SIGNS  /78   Pulse 120   Temp 99.5  F (37.5  C)   Resp 25   Ht 1.829 m (6')   Wt 97.7 kg (215 lb 6.2 oz)   SpO2 95%   BMI 29.21 kg/m        2. INTAKE/ OUTPUT  Intake/Output Summary (Last 24 hours) at 6/18/2024 0612  Last data filed at 6/18/2024 0500  Gross per 24 hour   Intake 1353.48 ml   Output 2385 ml   Net -1031.52 ml       3. PHYSICAL EXAMINATION  EYES: PERRL, Anicteric sclera.   NEURO: Arouses to stimulus and following commands, weakly TAVERA  HEENT:  Normocephalic, atraumatic, trachea midline. Pupils PERRLA  CV: LVAD hum  PULM/CHEST: LS CTAB, symmetric chest rise. CT x2 with thin, serosanguinous drainage. No air leaks/crepitus.  GI: Active bowel sounds. Abdomen distended, mildy tender, soft  : kuo catheter in place, urine yellow and clear  EXTREMITIES: +1 peripheral edema, moving all extremities, peripheral pulses intact  SKIN: Sternal incision well approximated with surgi-glue. Mild bruising noted around site, no edema      4. INVESTIGATIONS  Arterial Blood Gases   Recent Labs   Lab 06/17/24  0433 06/16/24 1937 06/16/24  1813 06/16/24  1428   PH 7.40 7.39 7.38 7.38   PCO2 41 41 41 40   PO2 80 106* 100 136*   HCO3 25 25 24 23     Complete Blood Count   Recent Labs   Lab 06/18/24  0356 06/17/24  0432 06/16/24 1937 06/16/24  0332   WBC 11.5* 17.1* 19.4* 20.9*   HGB 10.4* 11.3* 11.6* 11.9*   * 94* 106* 105*     Basic Metabolic Panel  Recent Labs   Lab 06/18/24  0408 06/18/24  0356 06/18/24  0035 06/17/24  2210 06/17/24  2151 06/17/24  1607 06/17/24  1549 06/17/24  0917 06/17/24  0432   NA  --  142  --   --  140  --  139  --  137   POTASSIUM  --  4.3  --   --  3.8  --  3.7  --  3.9   CHLORIDE  --  105  --   --  103  --  104  --  102   CO2  --  26  --   --  25  --  24  --  21*   BUN  --  20.6*  --   -- " " 20.4*  --  20.2*  --  17.5   CR  --  1.19*  --   --  1.13  --  1.08  --  1.09   * 117* 116* 151* 150*   < > 116*   < > 142*    < > = values in this interval not displayed.     Liver Function Tests  Recent Labs   Lab 06/18/24  0356 06/17/24  0432 06/16/24  1937 06/16/24  1001 06/16/24  0332 06/15/24  0325   AST 38 58* 67*  --  87* 112*   ALT 28 28 33  --  37 45   ALKPHOS 63 68 73  --  66 66   BILITOTAL 0.7 1.9* 2.6*  --  2.3* 2.0*   ALBUMIN 3.1* 3.0* 3.1*  --  2.9* 3.5   INR 1.50* 1.37*  --  1.41*  --  1.28*     Pancreatic Enzymes  No lab results found in last 7 days.  Coagulation Profile  Recent Labs   Lab 06/18/24  0356 06/17/24  0432 06/16/24  1001 06/15/24  0325 06/14/24  1415 06/14/24  1100   INR 1.50* 1.37* 1.41* 1.28* 1.43* 1.61*   PTT  --  44*  --  31 48* 31     Lactate  Invalid input(s): \"LACTATE\"    5. RADIOLOGY  Recent Results (from the past 24 hour(s))   RAJ with Report    Narrative    Rodolfo Kessler MD     6/14/2024 11:25 AM  Perioperative RAJ Procedure Note    Staff -        Anesthesiologist:  Jomar Liu MD       Resident/Fellow: Rodolfo Kessler MD       Performed By: fellow  Preanesthesia Checklist:  Patient identified, IV assessed, risks and   benefits discussed, monitors and equipment assessed, procedure being   performed at surgeon's request and anesthesia consent obtained.    RAJ Probe Insertion    Probe Status PRE Insertion: NO obvious damage  Probe type:  Adult 3D  Bite block used:   Soft  Insertion Technique: Easy, no oropharyngeal manipulation  Insertion complications: None obvious  Billing Report:RAJ report by Anesthesiologist (See Separate Report note)  Probe Status POST Removal: NO obvious damage    RAJ Report  General Procedure Information  Images for this study have been archived.  Modalities: 2D, 3D, CW Doppler, PW Doppler and Color flow mapping  Echocardiographic and Doppler Measurements  Right Ventricle:  Cavity size dilated.    Hypertrophy not present.     Thrombus not " present.    Global function normal.     Left Ventricle:  Cavity size dilated.    Hypertrophy not present.     Thrombus not present.   Global Function severely impaired.       Ventricular Regional Function:  1- Basal Anteroseptal:  hypokinetic  2- Basal Anterior:  hypokinetic  3- Basal Anterolateral:  hypokinetic  4- Basal Inferolateral:  hypokinetic  5- Basal Inferior:  hypokinetic  6- Basal Inferoseptal:  hypokinetic  7- Mid Anteroseptal:  hypokinetic  8- Mid Anterior:  hypokinetic  9- Mid Anterolateral:  hypokinetic  10- Mid Inferolateral:  hypokinetic  11- Mid Inferior:  hypokinetic  12- Mid Inferoseptal:  hypokinetic  13- Apical Anterior:  hypokinetic  14- Apical Lateral:  hypokinetic  15- Apical Inferior:  hypokinetic  16- Apical Septal:  hypokinetic  17- [place]:  hypokinetic    Valves  Aortic Valve: Annulus normal.  Stenosis not present.  Regurgitation   absent.  Leaflets normal.  Leaflet motions normal.    Mitral Valve: Annulus dilated.  Stenosis not present.  Regurgitation +2.    Leaflets normal.  Leaflet motions normal.    Tricuspid Valve: Annulus normal.  Stenosis not present.  Regurgitation +2.    Leaflets normal.  Leaflet motions normal.    Pulmonic Valve: Annulus normal.  Stenosis not present.  Regurgitation   absent.      Aorta: Ascending Aorta: Size normal.  Dissection not present.  Plaque   thickness less than 3 mm.  Mobile plaque not present.    Aortic Arch: Size normal.    Dissection not present.   Plaque thickness   less than 3 mm.   Mobile plaque not present.    Descending Aorta: Size normal.    Dissection not present.   Plaque   thickness less than 3 mm.   Mobile plaque not present.    Other Aortic Findings:  IABP visualized in descending aorta, below   subclavian  Right Atrium:  Size normal.   Spontaneous echo contrast not present.     Thrombus not present.   Tumor not present.   Device not present.     Left Atrium: Size dilated.  Spontaneous echo contrast not present.    Thrombus not present.   Tumor not present.  Device not present.    Left atrial appendage normal.     Atrial Septum: Intra-atrial septal morphology normal.     Other atrial   septal defect findings:  Colorflow suspicious for PFO. Negative bubble   study. Difficult to perform due to high CVP.  Ventricular Septum: Intra-ventricular septum morphology normal.      Diastolic Function Measurements:  Diastolic Dysfunction Grade= III.  E=  ms.  A=  ms.  E/A Ratio= .  DT=    ms.  S/D= .  IVRT= ms.  Other Findings:   Pericardium:  normal. Pleural Effusion:  none. Pulmonary Arteries:    normal. Pulmonary Venous Flow:  normal.     Post Intervention Findings  Procedure(s) performed:  LVAD Placement. Global function:  Unchanged.   Regional wall motion: Unchanged. Surgeon(s) notified of all   postintervention findings: Yes (Notified in real time).         LVAD   Placement:  LV decompressed. PFO not present. Aortic valve opening.    Post Intervention comments: Post bypass LVAD heartmate III placement: RV   function is unchanged. LV function is unchanged. LV is 6.5cm in diameter   (pre-bypass 7cm). Inflow cannula at apex of LV is directed towards the   mitral valve. Outflow cannula seen in ascending aorta. Both cannulas have   appropriate velocities. PFO evaluation was negative via colorflow and   bubble study post bypass. The aortic valve is unchanged and opening on   average every 3rd beat. The mitral valve shows moderate regurgitation   (previously mild). The tricuspid and pulmonic valves are unchanged. IABP   seen in the descending aorta. No echo evidence of aortic dissection. .    Echocardiogram Comments   Cardiac Device Check - Inpatient   Result Value    Date Time Interrogation Session [id number]    Implantable Pulse Generator  [employer]    Implantable Pulse Generator Model [id number] Cobalt XT VR MRI    Implantable Pulse Generator Serial Number [id number]    Type Interrogation Session In Clinic    Clinic Name [facility]    Implantable Pulse Generator Type Defibrillator    Implantable Pulse Generator Implant Date 20230615    Implantable Lead  Medtronic    Implantable Lead Model 6935M Sprint Quattro Secure S MRI SureScan    Implantable Lead Serial Number [id number]    Implantable Lead Implant Date 20230615    Implantable Lead Polarity Type Tripolar Lead    Implantable Lead Location Detail 1 UNKNOWN    Implantable Lead Location Right Ventricle    Implantable Lead Connection Status Connected    Andrés Setting Mode (NBG Code) VVIR    Andrés Setting Lower Rate Limit 60    Andrés Setting Maximum Sensor Rate 130    Lead Channel Setting Sensing Polarity Bipolar    Lead Channel Setting Sensing Anode Location Right Ventricle    Lead Channel Setting Sensing Anode Terminal Ring    Lead Channel Setting Sensing Cathode Location Right Ventricle    Lead Channel Setting Sensing Cathode Terminal Tip    Lead Channel Setting Sensing Sensitivity 0.3    Lead Channel Setting Pacing Polarity Bipolar    Lead Channel Setting Pacing Anode Location Right Ventricle    Lead Channel Setting Pacing Anode Terminal Ring    Lead Channel Setting Sensing Cathode Location Right Ventricle    Lead Channel Setting Sensing Cathode Terminal Tip    Lead Channel Setting Pacing Pulse Width 0.4    Lead Channel Setting Pacing Amplitude 2.0    Lead Channel Setting Pacing Capture Mode Adaptive    Zone Setting Type Category VF    Zone Setting Vendor Type Category VF    Zone Setting Status Inactive    Zone Setting Detection Interval 320    Zone Setting Detection Beats Numerator 30    Zone Setting Detection Beats Denominator 40    Zone Setting Type Category VT    Zone Setting Vendor Type Category FastVT    Zone Setting Status Inactive    Zone Setting Type Category VT    Zone Setting Vendor Type Category VT    Zone Setting Status Inactive    Zone Setting Detection Interval 360    Zone Setting Detection Beats Numerator 16    Zone Setting Detection Beats Denominator  16    Zone Setting Type Category VT    Zone Setting Vendor Type Category MonVT    Zone Setting Status Inactive    Zone Setting Detection Interval 400    Zone Setting Detection Beats Numerator 32    Zone Setting Detection Beats Denominator 32    Lead Channel Impedance Value 323    Lead Channel Impedance Value 456    Lead Channel Sensing Intrinsic Amplitude 14.0    Lead Channel Pacing Threshold Amplitude 0.5    Lead Channel Pacing Threshold Pulse Width 0.4    Battery Date Time of Measurements 95337970423966    Battery Status Middle of Service    Battery RRT Trigger 2.8 V    Battery Remaining Longevity 134    Battery Voltage 3.03    Capacitor Charge Type Shock    Capacitor Last Charge Date Time 53241357525463    Capacitor Charge Time 3.9    Capacitor Charge Energy 18.0    Andrés Statistic Date Time Start 93309666970933    Andrés Statistic Date Time End 53728815608479    Andrés Statistic RV Percent Paced 1.54    CRT Statistic Date Time Start 45268335198310    CRT Statistic Date Time End 80314336914046    Atrial Tachy Statistic Date Time Start 34494469314046    Atrial Tachy Statistic Date Time End 18739452722616    Atrial Tachy Statistic AT/AF [place] Percent 0    Therapy Statistic Recent Shocks Delivered 0    Therapy Statistic Recent Shocks Aborted 0    Therapy Statistic Recent ATP Delivered 0    Therapy Statistic Recent Date Time Start 68866930364717    Therapy Statistic Recent Date Time End 73160599423142    Therapy Statistic Total Shocks Delivered 0    Therapy Statistic Total Shocks Aborted 0    Therapy Statistic Total ATP Delivered 0    Therapy Statistic Total  Date Time Start 47539447919758    Therapy Statistic Total  Date Time End 75967546992497    Episode Statistic Recent Count 0    Episode Statistic Type Category Patient Activated    Episode Statistic Recent Count 5    Episode Statistic Type Category SVT    Episode Statistic Recent Count 32    Episode Statistic Type Category VT    Episode Statistic Recent Count 0     Episode Statistic Type Category VF    Episode Statistic Recent Count 0    Episode Statistic Type Category VT    Episode Statistic Recent Count 0    Episode Statistic Type Category VT    Episode Statistic Recent Count 0    Episode Statistic Type Category VT    Episode Statistic Recent Date Time Start 91013266544153    Episode Statistic Recent Date Time End 33214986204411    Episode Statistic Recent Date Time Start 43623527400091    Episode Statistic Recent Date Time End 18877730342755    Episode Statistic Recent Date Time Start 62260530596002    Episode Statistic Recent Date Time End 66008007276074    Episode Statistic Recent Date Time Start 35744545723618    Episode Statistic Recent Date Time End 08991307860136    Episode Statistic Recent Date Time Start 10628418817301    Episode Statistic Recent Date Time End 56928835166261    Episode Statistic Recent Date Time Start 40145098183127    Episode Statistic Recent Date Time End 21254242740777    Episode Statistic Recent Date Time Start 50721865165099    Episode Statistic Recent Date Time End 57044525787949    Episode Statistic Total Count 0    Episode Statistic Type Category Patient Activated    Episode Statistic Total Count 20    Episode Statistic Type Category SVT    Episode Statistic Total Count 88    Episode Statistic Type Category VT    Episode Statistic Total Count 0    Episode Statistic Type Category VF    Episode Statistic Total Count 0    Episode Statistic Type Category VT    Episode Statistic Total Count 0    Episode Statistic Type Category VT    Episode Statistic Total Count 0    Episode Statistic Type Category VT    Episode Statistic Total Date Time Start 89379370986221    Episode Statistic Total Date Time End 45368811515681    Episode Statistic Total Date Time Start 68967663946831    Episode Statistic Total Date Time End 37507880202316    Episode Statistic Total Date Time Start 11600038390507    Episode Statistic Total Date Time End 88285674014025     Episode Statistic Total Date Time Start 45598605165299    Episode Statistic Total Date Time End 06686768763787    Episode Statistic Total Date Time Start 73554104284986    Episode Statistic Total Date Time End 22771180388003    Episode Statistic Total Date Time Start 01119021766621    Episode Statistic Total Date Time End 79666834369298    Episode Statistic Total Date Time Start 80284627587540    Episode Statistic Total Date Time End 05481038655392    Episode Identifier 98    Episode Type Category SVT    Episode Date Time 63830762573832    Episode Duration 59    Episode Identifier 97    Episode Type Category SVT    Episode Date Time [id number]    Episode Duration 5    Episode Identifier 80    Episode Type Category SVT    Episode Date Time 39837171062839    Episode Duration 28    Episode Identifier 74    Episode Type Category SVT    Episode Date Time 87516698909486    Episode Duration 49    Episode Identifier 73    Episode Type Category SVT    Episode Date Time 70865069708992    Episode Duration 50    Episode Identifier 108    Episode Type Category VT    Episode Date Time 97689863690713    Episode Duration 1    Episode Identifier 107    Episode Type Category VT    Episode Date Time 85019753543861    Episode Duration 1    Episode Identifier 106    Episode Type Category VT    Episode Date Time 30353371159195    Episode Duration 1    Episode Identifier 105    Episode Type Category VT    Episode Date Time 04764021699834    Episode Duration 1    Episode Identifier 104    Episode Type Category VT    Episode Date Time 88513038068059    Episode Duration 3    Episode Identifier 103    Episode Type Category VT    Episode Date Time 72746975589922    Episode Duration 1    Episode Identifier 102    Episode Type Category VT    Episode Date Time 15882578421013    Episode Duration 1    Episode Identifier 101    Episode Type Category VT    Episode Date Time 62485442582414    Episode Duration 2    Episode Identifier 100    Episode  Type Category VT    Episode Date Time 51320321616352    Episode Duration 1    Episode Identifier 99    Episode Type Category VT    Episode Date Time 53490135100481    Episode Duration 2    Episode Identifier 96    Episode Type Category VT    Episode Date Time 72903591773598    Episode Duration 1    Episode Identifier 95    Episode Type Category VT    Episode Date Time 35207815882259    Episode Duration 1    Episode Identifier 94    Episode Type Category VT    Episode Date Time 81425610434926    Episode Duration 1    Episode Identifier 93    Episode Type Category VT    Episode Date Time [id number]    Episode Duration 6    Episode Identifier 92    Episode Type Category VT    Episode Date Time [id number]    Episode Duration 2    Narrative    Patient seen in OR 26 for evaluation and iterative programming of their   ICD per MD orders pre LVAD implant.    Device: [employer] [id number] [place] XT VR MRI  Normal device function.  Mode: VVIR  bpm  : 1.5%  Intrinsic rhythm:  bpm  Thoracic Impedance:Below reference line. Suggests possible intrathoracic   fluid accumulation.  Short V-V intervals: 0  Lead Trends Appear Stable.  Estimated battery longevity to RRT = 13.2 years. Battery voltage = 3.03 V.     Anticoagulant: Warafrin  Ventricular Arrhythmia: 15 NSVT episodes lasting 1-6 sec @ 188-214 bpm.  Setting Changes: ICD Therapies turned OFF. LRL increased to 60 bpm.  Plan: Page Device RN post LVAD implant to turn ON ICD Therapies.    JOEL Vo RN    Single lead ICD    I have reviewed and interpreted the device interrogation, settings,   programming and nurse's summary. The device is functioning within normal   device parameters. I agree with the current findings, assessment and plan.     XR Chest Port 1 View    Narrative    Exam: XR CHEST PORT 1 VIEW, 6/14/2024 2:53 PM    Comparison: Same day 2:31 AM    History: Endotracheal tube positioning    Findings:  Portable AP view of the chest. Endotracheal tube  terminates in the  distal trachea 1.0 cm from the judd. Right IJ [place]-Hugh catheter  terminates in the right pulmonary artery. Left chest wall implantable  cardiac defibrillator. Status post LVAD placement. Median sternotomy  wires. Mediastinal drains and left chest tube. Right PICC terminates  in the SVC.    Enlarged cardiac silhouette. No pneumothorax or pleural effusion.  Perihilar and bibasilar patchy opacities.      Impression    Impression:   1. Endotracheal tube terminates in the distal thoracic trachea 1.0 cm  from the judd. Status post LVAD placement with median sternotomy  wires and multiple chest drains. Stable right PICC.  2. Cardiomegaly with perihilar and bibasilar opacities, likely  atelectasis and edema.    I have personally reviewed the examination and initial interpretation  and I agree with the findings.    VIOLETA DANIELLE MD         SYSTEM ID:  [id number]   XR Abdomen Port 1 View    Narrative    Exam: XR ABDOMEN PORT 1 VIEW, 6/14/2024 2:53 PM    Indication: OG placement    Comparison: 8/22/2023 CT    Findings:     Partially visualized enteric tube tip and sidehole projected over the  stomach. LVAD, partially visualized basilar chest tube and mediastinal  drain.    Suture from small bowel anastomosis seen within the right lower  quadrant. No obstructive bowel gas pattern. No pneumatosis or portal  venous gas. Small colonic stool burden. Metallic inferior IABP pump  marker projecting over L2.      Impression    Impression:     Enteric tube tip project over the stomach with sidehole projecting  near the gastroesophageal junction, consider advancement.    I have personally reviewed the examination and initial interpretation  and I agree with the findings.    ETHAN LION MD         SYSTEM ID:  [id number]   Cardiac Device Check - Inpatient   Result Value    Date Time Interrogation Session 20,240,[id number],[id number]    Implantable Pulse Generator  [employer]    Implantable Pulse Generator  Model [id number] Cobalt XT VR MRI    Implantable Pulse Generator Serial Number [id number]    Type Interrogation Session In Clinic    Clinic Name [facility]    Implantable Pulse Generator Type Defibrillator    Implantable Pulse Generator Implant Date 20,230,615    Implantable Lead  [employer]    Implantable Lead Model 6935M Sprint Quattro Secure S MRI SureScan    Implantable Lead Serial Number [id number]    Implantable Lead Implant Date 20,230,615    Implantable Lead Polarity Type Tripolar Lead    Implantable Lead Location Detail 1 UNKNOWN    Implantable Lead Location Right Ventricle    Implantable Lead Connection Status Connected    Andrés Setting Mode (NBG Code) VVIR    Andrés Setting Lower Rate Limit 60    Andrés Setting Maximum Sensor Rate 130    Lead Channel Setting Sensing Polarity Bipolar    Lead Channel Setting Sensing Anode Location Right Ventricle    Lead Channel Setting Sensing Anode Terminal Ring    Lead Channel Setting Sensing Cathode Location Right Ventricle    Lead Channel Setting Sensing Cathode Terminal Tip    Lead Channel Setting Sensing Sensitivity 0.3    Lead Channel Setting Pacing Polarity Bipolar    Lead Channel Setting Pacing Anode Location Right Ventricle    Lead Channel Setting Pacing Anode Terminal Ring    Lead Channel Setting Sensing Cathode Location Right Ventricle    Lead Channel Setting Sensing Cathode Terminal Tip    Lead Channel Setting Pacing Pulse Width 0.4    Lead Channel Setting Pacing Amplitude 2.0    Lead Channel Setting Pacing Capture Mode Adaptive    Zone Setting Type Category VF    Zone Setting Vendor Type Category VF    Zone Setting Status Active    Zone Setting Detection Interval 320    Zone Setting Detection Beats Numerator 30    Zone Setting Detection Beats Denominator 40    Zone Setting Type Category VT    Zone Setting Vendor Type Category FastVT    Zone Setting Status Inactive    Zone Setting Type Category VT    Zone Setting Vendor Type  Category VT    Zone Setting Status Inactive    Zone Setting Detection Interval 360    Zone Setting Detection Beats Numerator 16    Zone Setting Detection Beats Denominator 16    Zone Setting Type Category VT    Zone Setting Vendor Type Category MonVT    Zone Setting Status Monitor    Zone Setting Detection Interval 400    Zone Setting Detection Beats Numerator 32    Zone Setting Detection Beats Denominator 32    Lead Channel Impedance Value 342    Lead Channel Impedance Value 437    Lead Channel Sensing Intrinsic Amplitude 5.8    Lead Channel Pacing Threshold Amplitude 0.5    Lead Channel Pacing Threshold Pulse Width 0.4    Battery Date Time of Measurements 20,240,614,141,502    Battery RRT Trigger 2.8 V    Battery Remaining Longevity 159    Battery Voltage 3.03    Capacitor Charge Type Shock    Capacitor Last Charge Date Time 20,240,419,090,015    Capacitor Charge Time 3.9    Capacitor Charge Energy 18.0    Andrés Statistic Date Time Start 20,240,403,112,652    Andrés Statistic Date Time End 20,240,614,152,919    Andrés Statistic RA Percent Paced Invalid Value    Andrés Statistic RV Percent Paced 1.53    CRT Statistic Date Time Start 20,240,403,112,652    CRT Statistic Date Time End 20,240,614,152,919    Atrial Tachy Statistic Date Time Start 20,240,403,112,652    Atrial Tachy Statistic Date Time End 20,240,614,152,919    Atrial Tachy Statistic AT/AF [place] Percent 0    Therapy Statistic Recent Shocks Delivered 0    Therapy Statistic Recent Shocks Aborted 0    Therapy Statistic Recent ATP Delivered 0    Therapy Statistic Recent Date Time Start 20,240,403,112,652    Therapy Statistic Recent Date Time End 20,240,614,152,919    Therapy Statistic Total Shocks Delivered 0    Therapy Statistic Total Shocks Aborted 0    Therapy Statistic Total ATP Delivered 0    Therapy Statistic Total  Date Time Start 20,230,615,114,709    Therapy Statistic Total  Date Time End 20,240,614,152,919    Episode Statistic Recent Count 0     Episode Statistic Type Category Patient Activated    Episode Statistic Recent Count 5    Episode Statistic Type Category SVT    Episode Statistic Recent Count 32    Episode Statistic Type Category VT    Episode Statistic Recent Count 0    Episode Statistic Type Category VF    Episode Statistic Recent Count 0    Episode Statistic Type Category VT    Episode Statistic Recent Count 0    Episode Statistic Type Category VT    Episode Statistic Recent Count 0    Episode Statistic Type Category VT    Episode Statistic Recent Date Time Start 20,240,403,112,652    Episode Statistic Recent Date Time End 20,240,614,152,919    Episode Statistic Recent Date Time Start 34308416578634    Episode Statistic Recent Date Time End 74357407927629    Episode Statistic Recent Date Time Start 08034920359793    Episode Statistic Recent Date Time End 73380599680128    Episode Statistic Recent Date Time Start 66218735712403    Episode Statistic Recent Date Time End 87719124152178    Episode Statistic Recent Date Time Start 20907788658751    Episode Statistic Recent Date Time End 35696697622653    Episode Statistic Recent Date Time Start 44330706228863    Episode Statistic Recent Date Time End 86869032392520    Episode Statistic Recent Date Time Start 72408396548181    Episode Statistic Recent Date Time End 79500802471160    Episode Statistic Total Count 0    Episode Statistic Type Category Patient Activated    Episode Statistic Total Count 20    Episode Statistic Type Category SVT    Episode Statistic Total Count 88    Episode Statistic Type Category VT    Episode Statistic Total Count 0    Episode Statistic Type Category VF    Episode Statistic Total Count 0    Episode Statistic Type Category VT    Episode Statistic Total Count 0    Episode Statistic Type Category VT    Episode Statistic Total Count 0    Episode Statistic Type Category VT    Episode Statistic Total Date Time Start 20,230,615,114,709    Episode Statistic Total Date Time End  20,240,614,152,919    Episode Statistic Total Date Time Start 54966456886667    Episode Statistic Total Date Time End 11211967899567    Episode Statistic Total Date Time Start 20230615114709    Episode Statistic Total Date Time End 63251393298223    Episode Statistic Total Date Time Start 38354727736433    Episode Statistic Total Date Time End 52286971555927    Episode Statistic Total Date Time Start 20230615114709    Episode Statistic Total Date Time End 45077118963243    Episode Statistic Total Date Time Start 20230615114709    Episode Statistic Total Date Time End 75116421056612    Episode Statistic Total Date Time Start 49134229938141    Episode Statistic Total Date Time End 86791607582999    Narrative    Patient seen in [facility] 4A for evaluation and iterative programming of their   ICD per MD orders s/p LVAD implant.    Device: Medtronic [id number] [place] XT VR MRI  Normal device function.  Mode: VVIR  bpm  : 1.5%  Intrinsic rhythm:  bpm  Thoracic Impedance: Below reference line. Suggests possible intrathoracic   fluid accumulation.  Short V-V intervals: 2  Lead Trends Appear Stable.  Estimated battery longevity to RRT = 13 years. Battery voltage = 3.03 V.   Ventricular Arrhythmia: None.  Setting Changes: ICD Therapies turned ON.     JOEL Vo RN    Single lead ICD    I have reviewed and interpreted the device interrogation, settings,   programming and nurse's summary. The device is functioning within normal   device parameters. I agree with the current findings, assessment and plan.     XR Chest Port 1 View    Narrative    Exam: XR CHEST PORT 1 VIEW, 6/14/2024 4:16 PM    Comparison: Same day chest x-ray    History: eval after ETT repositioned    Findings:  Portable AP view of the chest. Endotracheal tube tip in the  midthoracic trachea, slightly retracted compared to previous. Gastric  tube with tip out of field of view and sidehole projecting over the  stomach. Right IJ Anatone-Hugh catheter  terminates in the right pulmonary  artery. Left chest wall implantable cardiac defibrillator. LVAD.  Median sternotomy wires. Mediastinal drains and left chest tube. Right  PICC terminates in the SVC.    Enlarged cardiac silhouette, unchanged. No pneumothorax or pleural  effusion. Stable perihilar and bibasilar patchy opacities.      Impression    Impression:   1. Endotracheal tube terminates in the midthoracic trachea slightly  retracted compared to previous. Stable additional support devices.  2. Stable cardiomegaly with unchanged perihilar and bibasilar  opacities, likely atelectasis and edema.    I have personally reviewed the examination and initial interpretation  and I agree with the findings.    VIOLETA DANIELLE MD         SYSTEM ID:  [id number]   XR Abdomen Port 1 View    Narrative    EXAMINATION:  XR ABDOMEN PORT 1 VIEW 6/14/2024     COMPARISON: Same day radiograph    HISTORY: OG advanced.    TECHNIQUE: One frontal supine view of the abdomen.    FINDINGS: Gastric tube tip and sidehole project over the stomach.  Partially visualized LVAD and surgical drains. No abnormally dilated  air-filled loops of bowel in the partially visualized abdomen.       Impression    IMPRESSION:   Gastric tube tip and sidehole are within the stomach.    I have personally reviewed the examination and initial interpretation  and I agree with the findings.    FITZ MIRELES MD         SYSTEM ID:  [id number]   XR Chest Port 1 View    Impression    RESIDENT PRELIMINARY INTERPRETATION  IMPRESSION:  1. Question tiny right apical pneumothorax. Attention on follow-up.  2. Interval placement of esophageal temperature probe. Otherwise,  stable support devices.  3. Decreased right and unchanged left sided opacities, likely  atelectasis.

## 2024-06-18 NOTE — PROGRESS NOTES
D: Stopped by to see patient. No VAD related questions or concerns at this time.   I: Discussed POC and provided support and listened to patient and caregiver's thoughts and concerns.  P: Continue to follow patient and address any questions or concerns patient and or caregiver may have.  Patient is still in ICU, discs used starting education when patient has medically progressed more- likely when out of ICU. Provided my contact information.    Indication of Interrogation:  Other, post operative phase     Type of VAD:  Heartmate 3    Current Parameters:  Flow= 4.6 lpm, Speed= 5300 rpm, Power= 3.7 bassett, PI (if applicable)= 2.8    Abnormal Alarm on History:  No    Abnormal Events/Parameters Notes:  Yes, explain: frequent PI events on 6/14, 6/15 with associated speed drops.    Changes Made during Interrogation:  Yes, explain: per Dr. Ashley increased patient speed to 5300, increase low speed limit to 5100.

## 2024-06-18 NOTE — PROGRESS NOTES
Care Management Follow Up    Length of Stay (days): 15    Expected Discharge Date: 06/21/2024      Patient plan of care discussed at interdisciplinary rounds: Yes    Anticipated Discharge Disposition: ARU     Anticipated Discharge Services: Rehab   Anticipated Discharge DME: [id number]     Additional Information:  Pt had LAVD placement done on 6/14/24 and extubated on 6/16.  PT/OT are following and ARU need is anticipated at this time.  RNCC/SW will cont to follow the plan of care.      Calvin Macedo RN, PHN, BSN  4A and 4E/ ICU  Care Coordinator  Phone: [phone]  Pager: [phone]      SEARCHABLE in [facility] - search CARE COORDINATOR       [place] ([phone]) Saturday & Sunday; ([phone]) [facility] Recognized Holidays     Units: 5A Onc Vocera & 5C Vocera Pager: [phone]    Units: 6B Vocera & 6C Vocera  Pager: [phone]    Units: 7A SOT RNCC Vocera, 7B Med Surg Vocera, & 7C Med Surg Vocera  Pager: [phone]    Units: 6A Vocera & 4A CVICU Vocera, 4C MICU Vocera, and 4E SICU Vocera   Pager: [phone]    Units: 5 Ortho Vocera & 5 Med Surg Vocera  Pager: [phone]    Units: 6 Med Surg Vocera & 8 Med Surg Vocera  Pager [phone]

## 2024-06-18 NOTE — PLAN OF CARE
Shift Summary [id number]    Major Shift Events:      Naylor removed; plan for PICC placement to remove MAC    Patient stood x3 today and actively participated in cares    Patient had 3 Bms     Patient had adequate pain control with tylenol/oycodone    Patient had adequate nausea control with zofran    Goal Outcome Evaluation:      Plan of Care Reviewed With: patient, spouse    Overall Patient Progress: improvingOverall Patient Progress: improving    For vital signs and complete assessments, please see documentation flowsheets.

## 2024-06-18 NOTE — PROGRESS NOTES
Critical Care Attending Progress Note  I, Sue Gibbs MD, saw this patient with the ARIEL and agree with the findings and plan of care as documented in the note. Please see separate note from today for further documentation.     I personally reviewed vital signs, medications, labs and imaging.     Assessment: Mr. 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 Today, Mr. Lutz is progressing as expected with respiratory insufficiency, acute post-operative pain.     Active problems and current treatments include:     Respiratory insufficiency-Continue supplemental oxygen, titrate to SpO2>92%, wean as tolerated.  Acute post-operative pain-Continue multimodal analgesia.   S/p Hm3 LVAD-Continue low intensity heparin infusion, continue warfarin, heart failure team following, appreciate recommendations. Diuresis to optimize cardiac function. Continue dobutamine to support right ventricular function while optimizing LVAD flows.  ID-No acute infectious concerns.  Nutrition-Continue regular diet.  Prophylaxis-PPI, heparin infusion and warfarin as above     I agree with the assessment and plan. I spent 25 minutes exclusive of procedures evaluating and managing this patient, discussing with the consultants, and updating the patient and family.     Sue Gibbs M.D.

## 2024-06-19 ENCOUNTER — APPOINTMENT (OUTPATIENT)
Dept: OCCUPATIONAL THERAPY | Facility: CLINIC | Age: 54
End: 2024-06-19
Attending: STUDENT IN AN ORGANIZED HEALTH CARE EDUCATION/TRAINING PROGRAM
Payer: COMMERCIAL

## 2024-06-19 ENCOUNTER — APPOINTMENT (OUTPATIENT)
Dept: PHYSICAL THERAPY | Facility: CLINIC | Age: 54
End: 2024-06-19
Attending: PHYSICIAN ASSISTANT
Payer: COMMERCIAL

## 2024-06-19 ENCOUNTER — APPOINTMENT (OUTPATIENT)
Dept: GENERAL RADIOLOGY | Facility: CLINIC | Age: 54
End: 2024-06-19
Attending: SURGERY
Payer: COMMERCIAL

## 2024-06-19 LAB
ALBUMIN SERPL BCG-MCNC: 2.7 G/DL (ref 3.5–5.2)
ALP SERPL-CCNC: 55 U/L (ref 40–150)
ALT SERPL W P-5'-P-CCNC: 22 U/L (ref 0–70)
ANION GAP SERPL CALCULATED.3IONS-SCNC: 10 MMOL/L (ref 7–15)
ANION GAP SERPL CALCULATED.3IONS-SCNC: 11 MMOL/L (ref 7–15)
AST SERPL W P-5'-P-CCNC: 31 U/L (ref 0–45)
BASE EXCESS BLDV CALC-SCNC: 6.3 MMOL/L (ref -3–3)
BASE EXCESS BLDV CALC-SCNC: 6.7 MMOL/L (ref -3–3)
BASE EXCESS BLDV CALC-SCNC: 7 MMOL/L (ref -3–3)
BASOPHILS # BLD AUTO: 0 10E3/UL (ref 0–0.2)
BASOPHILS NFR BLD AUTO: 0 %
BILIRUB SERPL-MCNC: 0.5 MG/DL
BUN SERPL-MCNC: 26.3 MG/DL (ref 6–20)
BUN SERPL-MCNC: 29.8 MG/DL (ref 6–20)
CA-I BLD-MCNC: 4.4 MG/DL (ref 4.4–5.2)
CA-I BLD-MCNC: 4.5 MG/DL (ref 4.4–5.2)
CALCIUM SERPL-MCNC: 7.8 MG/DL (ref 8.6–10)
CALCIUM SERPL-MCNC: 8.7 MG/DL (ref 8.6–10)
CHLORIDE SERPL-SCNC: 102 MMOL/L (ref 98–107)
CHLORIDE SERPL-SCNC: 105 MMOL/L (ref 98–107)
CREAT SERPL-MCNC: 1.08 MG/DL (ref 0.67–1.17)
CREAT SERPL-MCNC: 1.23 MG/DL (ref 0.67–1.17)
DEPRECATED HCO3 PLAS-SCNC: 25 MMOL/L (ref 22–29)
DEPRECATED HCO3 PLAS-SCNC: 26 MMOL/L (ref 22–29)
EGFRCR SERPLBLD CKD-EPI 2021: 70 ML/MIN/1.73M2
EGFRCR SERPLBLD CKD-EPI 2021: 82 ML/MIN/1.73M2
EOSINOPHIL # BLD AUTO: 0.1 10E3/UL (ref 0–0.7)
EOSINOPHIL NFR BLD AUTO: 1 %
ERYTHROCYTE [DISTWIDTH] IN BLOOD BY AUTOMATED COUNT: 14 % (ref 10–15)
GLUCOSE BLDC GLUCOMTR-MCNC: 101 MG/DL (ref 70–99)
GLUCOSE BLDC GLUCOMTR-MCNC: 105 MG/DL (ref 70–99)
GLUCOSE BLDC GLUCOMTR-MCNC: 112 MG/DL (ref 70–99)
GLUCOSE BLDC GLUCOMTR-MCNC: 118 MG/DL (ref 70–99)
GLUCOSE BLDC GLUCOMTR-MCNC: 124 MG/DL (ref 70–99)
GLUCOSE BLDC GLUCOMTR-MCNC: 130 MG/DL (ref 70–99)
GLUCOSE BLDC GLUCOMTR-MCNC: 96 MG/DL (ref 70–99)
GLUCOSE SERPL-MCNC: 117 MG/DL (ref 70–99)
GLUCOSE SERPL-MCNC: 95 MG/DL (ref 70–99)
HCO3 BLDV-SCNC: 31 MMOL/L (ref 21–28)
HCO3 BLDV-SCNC: 31 MMOL/L (ref 21–28)
HCO3 BLDV-SCNC: 32 MMOL/L (ref 21–28)
HCT VFR BLD AUTO: 27.7 % (ref 40–53)
HGB BLD-MCNC: 9.1 G/DL (ref 13.3–17.7)
IMM GRANULOCYTES # BLD: 0.1 10E3/UL
IMM GRANULOCYTES NFR BLD: 1 %
INR PPP: 2.78 (ref 0.85–1.15)
LACTATE SERPL-SCNC: 1.2 MMOL/L (ref 0.7–2)
LYMPHOCYTES # BLD AUTO: 1.5 10E3/UL (ref 0.8–5.3)
LYMPHOCYTES NFR BLD AUTO: 15 %
MAGNESIUM SERPL-MCNC: 2 MG/DL (ref 1.7–2.3)
MAGNESIUM SERPL-MCNC: 2.3 MG/DL (ref 1.7–2.3)
MCH RBC QN AUTO: 31 PG (ref 26.5–33)
MCHC RBC AUTO-ENTMCNC: 32.9 G/DL (ref 31.5–36.5)
MCV RBC AUTO: 94 FL (ref 78–100)
MONOCYTES # BLD AUTO: 1.4 10E3/UL (ref 0–1.3)
MONOCYTES NFR BLD AUTO: 14 %
NEUTROPHILS # BLD AUTO: 7.1 10E3/UL (ref 1.6–8.3)
NEUTROPHILS NFR BLD AUTO: 69 %
NRBC # BLD AUTO: 0 10E3/UL
NRBC BLD AUTO-RTO: 0 /100
O2/TOTAL GAS SETTING VFR VENT: 2 %
O2/TOTAL GAS SETTING VFR VENT: 28 %
O2/TOTAL GAS SETTING VFR VENT: 28 %
OXYHGB MFR BLDV: 56 % (ref 70–75)
OXYHGB MFR BLDV: 58 % (ref 70–75)
OXYHGB MFR BLDV: 58 % (ref 70–75)
PATH REPORT.COMMENTS IMP SPEC: NORMAL
PATH REPORT.COMMENTS IMP SPEC: NORMAL
PATH REPORT.FINAL DX SPEC: NORMAL
PATH REPORT.GROSS SPEC: NORMAL
PATH REPORT.MICROSCOPIC SPEC OTHER STN: NORMAL
PATH REPORT.RELEVANT HX SPEC: NORMAL
PCO2 BLDV: 42 MM HG (ref 40–50)
PCO2 BLDV: 42 MM HG (ref 40–50)
PCO2 BLDV: 44 MM HG (ref 40–50)
PH BLDV: 7.46 [PH] (ref 7.32–7.43)
PH BLDV: 7.47 [PH] (ref 7.32–7.43)
PH BLDV: 7.48 [PH] (ref 7.32–7.43)
PHOSPHATE SERPL-MCNC: 2.5 MG/DL (ref 2.5–4.5)
PHOSPHATE SERPL-MCNC: 2.6 MG/DL (ref 2.5–4.5)
PHOTO IMAGE: NORMAL
PLATELET # BLD AUTO: 131 10E3/UL (ref 150–450)
PO2 BLDV: 30 MM HG (ref 25–47)
PO2 BLDV: 30 MM HG (ref 25–47)
PO2 BLDV: 31 MM HG (ref 25–47)
POTASSIUM SERPL-SCNC: 3.5 MMOL/L (ref 3.4–5.3)
POTASSIUM SERPL-SCNC: 3.6 MMOL/L (ref 3.4–5.3)
PROT SERPL-MCNC: 5.1 G/DL (ref 6.4–8.3)
RBC # BLD AUTO: 2.94 10E6/UL (ref 4.4–5.9)
SAO2 % BLDV: 56.5 % (ref 70–75)
SAO2 % BLDV: 58.8 % (ref 70–75)
SAO2 % BLDV: 59.2 % (ref 70–75)
SODIUM SERPL-SCNC: 139 MMOL/L (ref 135–145)
SODIUM SERPL-SCNC: 140 MMOL/L (ref 135–145)
UFH PPP CHRO-ACNC: 0.24 IU/ML
WBC # BLD AUTO: 10.3 10E3/UL (ref 4–11)

## 2024-06-19 PROCEDURE — 250N000011 HC RX IP 250 OP 636: Mod: JZ | Performed by: STUDENT IN AN ORGANIZED HEALTH CARE EDUCATION/TRAINING PROGRAM

## 2024-06-19 PROCEDURE — C9113 INJ PANTOPRAZOLE SODIUM, VIA: HCPCS | Mod: JZ

## 2024-06-19 PROCEDURE — 83605 ASSAY OF LACTIC ACID: CPT

## 2024-06-19 PROCEDURE — 250N000013 HC RX MED GY IP 250 OP 250 PS 637

## 2024-06-19 PROCEDURE — 85520 HEPARIN ASSAY: CPT | Performed by: SURGERY

## 2024-06-19 PROCEDURE — 250N000011 HC RX IP 250 OP 636

## 2024-06-19 PROCEDURE — 83735 ASSAY OF MAGNESIUM: CPT

## 2024-06-19 PROCEDURE — 80053 COMPREHEN METABOLIC PANEL: CPT

## 2024-06-19 PROCEDURE — 99291 CRITICAL CARE FIRST HOUR: CPT | Mod: GC | Performed by: INTERNAL MEDICINE

## 2024-06-19 PROCEDURE — 85610 PROTHROMBIN TIME: CPT | Performed by: STUDENT IN AN ORGANIZED HEALTH CARE EDUCATION/TRAINING PROGRAM

## 2024-06-19 PROCEDURE — 71045 X-RAY EXAM CHEST 1 VIEW: CPT

## 2024-06-19 PROCEDURE — 71045 X-RAY EXAM CHEST 1 VIEW: CPT | Mod: 26 | Performed by: RADIOLOGY

## 2024-06-19 PROCEDURE — 84100 ASSAY OF PHOSPHORUS: CPT

## 2024-06-19 PROCEDURE — 82330 ASSAY OF CALCIUM: CPT | Performed by: PHYSICIAN ASSISTANT

## 2024-06-19 PROCEDURE — 82805 BLOOD GASES W/O2 SATURATION: CPT | Performed by: STUDENT IN AN ORGANIZED HEALTH CARE EDUCATION/TRAINING PROGRAM

## 2024-06-19 PROCEDURE — 99233 SBSQ HOSP IP/OBS HIGH 50: CPT | Mod: FS | Performed by: ANESTHESIOLOGY

## 2024-06-19 PROCEDURE — 82805 BLOOD GASES W/O2 SATURATION: CPT

## 2024-06-19 PROCEDURE — 120N000002 HC R&B MED SURG/OB UMMC

## 2024-06-19 PROCEDURE — 85025 COMPLETE CBC W/AUTO DIFF WBC: CPT

## 2024-06-19 PROCEDURE — 82330 ASSAY OF CALCIUM: CPT | Performed by: STUDENT IN AN ORGANIZED HEALTH CARE EDUCATION/TRAINING PROGRAM

## 2024-06-19 PROCEDURE — 97162 PT EVAL MOD COMPLEX 30 MIN: CPT | Mod: GP | Performed by: REHABILITATION PRACTITIONER

## 2024-06-19 PROCEDURE — 250N000013 HC RX MED GY IP 250 OP 250 PS 637: Performed by: SURGERY

## 2024-06-19 PROCEDURE — 97530 THERAPEUTIC ACTIVITIES: CPT | Mod: GP | Performed by: REHABILITATION PRACTITIONER

## 2024-06-19 PROCEDURE — 250N000011 HC RX IP 250 OP 636: Mod: JZ | Performed by: SURGERY

## 2024-06-19 PROCEDURE — 250N000011 HC RX IP 250 OP 636: Mod: JZ | Performed by: PHYSICIAN ASSISTANT

## 2024-06-19 PROCEDURE — 97535 SELF CARE MNGMENT TRAINING: CPT | Mod: GO

## 2024-06-19 PROCEDURE — 250N000013 HC RX MED GY IP 250 OP 250 PS 637: Performed by: STUDENT IN AN ORGANIZED HEALTH CARE EDUCATION/TRAINING PROGRAM

## 2024-06-19 RX ORDER — WARFARIN SODIUM 1 MG/1
1 TABLET ORAL
Status: COMPLETED | OUTPATIENT
Start: 2024-06-19 | End: 2024-06-19

## 2024-06-19 RX ORDER — HYDRALAZINE HYDROCHLORIDE 20 MG/ML
5 INJECTION INTRAMUSCULAR; INTRAVENOUS EVERY 30 MIN PRN
Status: DISCONTINUED | OUTPATIENT
Start: 2024-06-19 | End: 2024-06-22

## 2024-06-19 RX ORDER — POTASSIUM CHLORIDE 1.5 G/1.58G
20 POWDER, FOR SOLUTION ORAL ONCE
Status: COMPLETED | OUTPATIENT
Start: 2024-06-19 | End: 2024-06-19

## 2024-06-19 RX ORDER — NALOXONE HYDROCHLORIDE 0.4 MG/ML
0.4 INJECTION, SOLUTION INTRAMUSCULAR; INTRAVENOUS; SUBCUTANEOUS
Status: DISCONTINUED | OUTPATIENT
Start: 2024-06-19 | End: 2024-07-04 | Stop reason: HOSPADM

## 2024-06-19 RX ORDER — POTASSIUM CHLORIDE 29.8 MG/ML
20 INJECTION INTRAVENOUS ONCE
Status: COMPLETED | OUTPATIENT
Start: 2024-06-19 | End: 2024-06-19

## 2024-06-19 RX ORDER — NALOXONE HYDROCHLORIDE 0.4 MG/ML
0.2 INJECTION, SOLUTION INTRAMUSCULAR; INTRAVENOUS; SUBCUTANEOUS
Status: DISCONTINUED | OUTPATIENT
Start: 2024-06-19 | End: 2024-07-04 | Stop reason: HOSPADM

## 2024-06-19 RX ORDER — FUROSEMIDE 10 MG/ML
60 INJECTION INTRAMUSCULAR; INTRAVENOUS ONCE
Status: COMPLETED | OUTPATIENT
Start: 2024-06-19 | End: 2024-06-19

## 2024-06-19 RX ORDER — PANTOPRAZOLE SODIUM 40 MG/1
40 TABLET, DELAYED RELEASE ORAL
Status: DISCONTINUED | OUTPATIENT
Start: 2024-06-20 | End: 2024-07-04 | Stop reason: HOSPADM

## 2024-06-19 RX ORDER — MAGNESIUM OXIDE 400 MG/1
400 TABLET ORAL EVERY 4 HOURS
Status: COMPLETED | OUTPATIENT
Start: 2024-06-19 | End: 2024-06-19

## 2024-06-19 RX ORDER — NALOXONE HYDROCHLORIDE 1 MG/ML
2 INJECTION PARENTERAL 3 TIMES DAILY
Status: COMPLETED | OUTPATIENT
Start: 2024-06-19 | End: 2024-06-20

## 2024-06-19 RX ADMIN — ACETAMINOPHEN 975 MG: 325 TABLET, FILM COATED ORAL at 23:39

## 2024-06-19 RX ADMIN — CALCIUM GLUCONATE 1 G: 20 INJECTION, SOLUTION INTRAVENOUS at 07:02

## 2024-06-19 RX ADMIN — WARFARIN SODIUM 1 MG: 1 TABLET ORAL at 17:55

## 2024-06-19 RX ADMIN — PANTOPRAZOLE SODIUM 40 MG: 40 INJECTION, POWDER, FOR SOLUTION INTRAVENOUS at 09:25

## 2024-06-19 RX ADMIN — LIDOCAINE 1 PATCH: 4 PATCH TOPICAL at 09:25

## 2024-06-19 RX ADMIN — POTASSIUM & SODIUM PHOSPHATES POWDER PACK 280-160-250 MG 1 PACKET: 280-160-250 PACK at 15:31

## 2024-06-19 RX ADMIN — HYDRALAZINE HYDROCHLORIDE 25 MG: 25 TABLET ORAL at 09:25

## 2024-06-19 RX ADMIN — ACETAMINOPHEN 975 MG: 325 TABLET, FILM COATED ORAL at 09:25

## 2024-06-19 RX ADMIN — POTASSIUM & SODIUM PHOSPHATES POWDER PACK 280-160-250 MG 1 PACKET: 280-160-250 PACK at 05:01

## 2024-06-19 RX ADMIN — NALOXONE HYDROCHLORIDE 2 MG: 1 INJECTION PARENTERAL at 15:33

## 2024-06-19 RX ADMIN — Medication 37.5 MG: at 15:32

## 2024-06-19 RX ADMIN — POTASSIUM CHLORIDE 20 MEQ: 29.8 INJECTION, SOLUTION INTRAVENOUS at 17:55

## 2024-06-19 RX ADMIN — Medication 37.5 MG: at 23:48

## 2024-06-19 RX ADMIN — DOBUTAMINE HYDROCHLORIDE 2.5 MCG/KG/MIN: 200 INJECTION INTRAVENOUS at 00:46

## 2024-06-19 RX ADMIN — FUROSEMIDE 60 MG: 10 INJECTION, SOLUTION INTRAMUSCULAR; INTRAVENOUS at 10:43

## 2024-06-19 RX ADMIN — Medication 400 MG: at 07:02

## 2024-06-19 RX ADMIN — FUROSEMIDE 60 MG: 10 INJECTION, SOLUTION INTRAMUSCULAR; INTRAVENOUS at 15:31

## 2024-06-19 RX ADMIN — ACETAMINOPHEN 975 MG: 325 TABLET, FILM COATED ORAL at 15:32

## 2024-06-19 RX ADMIN — HYDRALAZINE HYDROCHLORIDE 10 MG: 20 INJECTION INTRAMUSCULAR; INTRAVENOUS at 18:47

## 2024-06-19 RX ADMIN — POTASSIUM CHLORIDE 20 MEQ: 1.5 POWDER, FOR SOLUTION ORAL at 07:02

## 2024-06-19 RX ADMIN — NALOXONE HYDROCHLORIDE 2 MG: 1 INJECTION PARENTERAL at 20:10

## 2024-06-19 RX ADMIN — POTASSIUM & SODIUM PHOSPHATES POWDER PACK 280-160-250 MG 1 PACKET: 280-160-250 PACK at 09:25

## 2024-06-19 RX ADMIN — ATORVASTATIN CALCIUM 20 MG: 20 TABLET, FILM COATED ORAL at 20:09

## 2024-06-19 RX ADMIN — ACETAMINOPHEN 975 MG: 325 TABLET, FILM COATED ORAL at 00:46

## 2024-06-19 RX ADMIN — NALOXONE HYDROCHLORIDE 2 MG: 1 INJECTION PARENTERAL at 09:28

## 2024-06-19 ASSESSMENT — ACTIVITIES OF DAILY LIVING (ADL)
ADLS_ACUITY_SCORE: 35
ADLS_ACUITY_SCORE: 33
ADLS_ACUITY_SCORE: 35
ADLS_ACUITY_SCORE: 33
ADLS_ACUITY_SCORE: 32
ADLS_ACUITY_SCORE: 35
ADLS_ACUITY_SCORE: 33
ADLS_ACUITY_SCORE: 38
ADLS_ACUITY_SCORE: 38
ADLS_ACUITY_SCORE: 32
ADLS_ACUITY_SCORE: 35
ADLS_ACUITY_SCORE: 38
ADLS_ACUITY_SCORE: 35
ADLS_ACUITY_SCORE: 33
ADLS_ACUITY_SCORE: 35
ADLS_ACUITY_SCORE: 33
ADLS_ACUITY_SCORE: 38
ADLS_ACUITY_SCORE: 33

## 2024-06-19 NOTE — PROGRESS NOTES
[facility]   Cardiology II Service ICU/ Advanced Heart Failure  Daily Progress Note      Patient: Navin Lutz  MRN: [id number]  Admission Date: 6/3/2024  Hospital Day # 16    Assessment and Plan: Navin Lutz is a 53 year old male admitted on 6/3/2024. He has a past medical history of chronic HFrEF 2/2 NICM s/p ICD, HLD, and CKD Stage II who presents admitted for decompensated heart failure on milrinone listed status 4, admitted for consideration of advanced therapies, now s/p LVAD .     Today's Plan:  -lasix 60 mg IV once, redose based on response  -increased hydralazine 37.5 mg q8  -increased speed to 5400  -follow-up mv02 after speed increase, if okay, can turn off dobutamine    # Acute on chronic systolic heart failure/HFrEF secondary to NICM with EF of 10-15%, now s/p HM3 LVAD     -Fluid status: CVP 8, give lasix 60 mg IV once  -Inotrope: try to wean off dobutamine today  -RV support: off Laisha  -ACEi/ARB/ARNI/afterload reduction: Holding for now  -BB: coreg 3.125mg on hold  -Aldosterone antagonist: aldactone 25 mg daily on hold   -Afterload reduction: increased hydralazine 37.5 mg q8  -SGLT2i: Holding  -SCD prophylaxis: ICD    The patient's HeartMate LVAD was interrogated 2024  Heartmate 3 LEFT VS  Flow (Lpm): 4.8 Lpm  Pulse Index (PI): 3 PI  Speed (rpm): 5400 rpm  Power (bassett): 3.8 bassett  Current Hct settin   No alarms or PI events.     ================================================================    Subjective/24-Hr Events:   No acute overnight events.    Medications: Reviewed in EPIC.     Physical Exam:   /83   Pulse 118   Temp 99.7  F (37.6  C) (Axillary)   Resp 22   Ht 1.829 m (6')   Wt 95.6 kg (210 lb 12.2 oz)   SpO2 91%   BMI 28.58 kg/m      GENERAL: NAD  HEENT: no scleral icterus  NECK: Supple. No JVD   CV: RRR, LVAD hum noted  RESPIRATORY: decreased breath sounds bilaterally  GI: Soft and non distended   EXTREMITIES: No peripheral edema  NEUROLOGIC:  AO x 3    Labs:  [facility]  Recent Labs   Lab 06/19/24  0910 06/19/24  0908 06/19/24  0428 06/19/24  0427 06/19/24  0052 06/18/24  2230 06/18/24  1530 06/18/24  1516 06/18/24  1054 06/18/24  1046 06/18/24  0408 06/18/24  0356 06/17/24  0917 06/17/24  0432 06/16/24  2348 06/16/24  1937   NA  --   --   --  140  --  140  --  140  --  140  --  142   < > 137  --  137   POTASSIUM  --   --   --  3.5  --  4.0  --  3.9  --  3.8  --  4.3   < > 3.9  --  4.4   CHLORIDE  --   --   --  105  --  101  --  104  --  103  --  105   < > 102  --  105   CO2  --   --   --  25  --  28  --  29  --  26  --  26   < > 21*  --  22   ANIONGAP  --   --   --  10  --  11  --  7  --  11  --  11   < > 14  --  10   GLC  --  112* 124* 117* 118* 130*   < > 127*   < > 123*   < > 117*   < > 142*   < > 155*  161*   BUN  --   --   --  26.3*  --  26.0*  --  24.2*  --  23.1*  --  20.6*   < > 17.5  --  15.8   CR  --   --   --  1.08  --  1.18*  --  1.11  --  1.26*  --  1.19*   < > 1.09  --  0.97   GFRESTIMATED  --   --   --  82  --  74  --  79  --  68  --  73   < > 81  --  >90   NAM  --   --   --  7.8*  --  8.8  --  8.8  --  8.9  --  8.5*   < > 8.7  --  8.5*   MAG 2.3  --   --  2.0  --  2.2  --  2.3  --  2.2  --  2.2   < > 2.4*  --  2.0   PHOS 2.6  --   --  2.5  --  2.4*  --  1.8*  --  2.3*  --  3.7   < > 2.5  --  2.6   PROTTOTAL  --   --   --  5.1*  --   --   --   --   --   --   --  5.8*  --  5.7*  --  5.9*   ALBUMIN  --   --   --  2.7*  --   --   --   --   --   --   --  3.1*  --  3.0*  --  3.1*   BILITOTAL  --   --   --  0.5  --   --   --   --   --   --   --  0.7  --  1.9*  --  2.6*   ALKPHOS  --   --   --  55  --   --   --   --   --   --   --  63  --  68  --  73   AST  --   --   --  31  --   --   --   --   --   --   --  38  --  58*  --  67*   ALT  --   --   --  22  --   --   --   --   --   --   --  28  --  28  --  33    < > = values in this interval not displayed.       CBC  Recent Labs   Lab 06/19/24  0427 06/18/24  0356 06/17/24  0432 06/16/24  1937   WBC 10.3  11.5* 17.1* 19.4*   RBC 2.94* 3.34* 3.60* 3.78*   HGB 9.1* 10.4* 11.3* 11.6*   HCT 27.7* 31.3* 33.7* 35.4*   MCV 94 94 94 94   MCH 31.0 31.1 31.4 30.7   MCHC 32.9 33.2 33.5 32.8   RDW 14.0 14.3 14.3 14.4   * 112* 94* 106*       INR  Recent Labs   Lab 06/19/24  0427 06/18/24  0356 06/17/24  0432 06/16/24  1001   INR 2.78* 1.50* 1.37* 1.41*

## 2024-06-19 NOTE — PLAN OF CARE
Major Shift Events: A&Ox4, TAVERA, follows commands, PERRLA. Ax1/2. Afebrile. Sinus tach 100s-120s w/ freq. PVCs. MAP goal 65-80. CVP 8. Doppler pulses. Dobutamine 2.5 LVAD HM3: 4.5-4.8 flows, PI 2.9-3.1, 1000-8871 RPM, 3.6-3.8 power. 2L NC, weak cough - small secretions. No BM. Insulin sliding scale. Difficulty swallowing meds. Denied nausea. Minimal appetite. Voids spont. No acute skin changes overnight. Heparin 1450 - next Xa check 1230. Replaced Phos x2, Mg, Ca, and K.    Plan: Encourage PO intake and activity. Continue to monitor LVAD. Will continue plan of care and notify the CTICU team w/ any concerns or changes.    For vital signs and complete assessments, please see documentation flowsheets.   Plan of Care Reviewed With: patient, spouse  Overall Patient Progress: improving

## 2024-06-19 NOTE — PLAN OF CARE
Major Shift Events:  Patient a&o x4.  Intermittent incision/CT pain, scheduled tylenol given with good effect.  Up to the chair x2, to the bathroom x1.  PRN hydralazine x1 with good effect.  Dobutamine d/c'd, heparin stopped per Dr. Jhaveri.  Tmax 99.7.  LVAD speed increased to 5400, no alarms this shift.  Lungs diminished, occasionally on 2L NC.  Using IS independently with encouragement.  2 BM's.  Regular diet but poor appetite.  Voids in urinal.    Plan: Continue with therapy, encourage pulm toileting.    For vital signs and complete assessments, please see documentation flowsheets.

## 2024-06-19 NOTE — PROGRESS NOTES
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 Now s/p LVAD by Dr. Jhaveri on 6/14.       Changes/updates today:  - Increase LVAD speed  - INR therapeutic; keep heparin & warfarin today  - Dobutamine held  - Split tubes  - Hydralazine increased to 37.5 Q8H  - Furosemide 60mg x 1    PLAN:  Neuro/ pain/ sedation:  - Monitor neurological status. Notify the MD for any acute changes in exam.  #Acute postoperative pain  Pt states pain has improved since yesterday.  - Scheduled: Tylenol  - PRN: Tylenol, oxycodone, dilaudid, robaxin, volteran gel, atarax, lidocaine patches    Pulmonary care:   # Acute hypoxic respiratory failure  On 2L NC overnight. Unable to have effective cough d/t pain/nausea.  - Titrate down on oxygen needs as able  - Goal SpO2 > 92%  - Encourage pulmonary hygiene.  - Use IS q15-30 minutes when awake.  - Nausea plan below    Cardiovascular:    #S/p LVAD on 6/14 by Dr. Jhaveri  #S/p IABP (6/12-6/14)  #Undifferentiated shock; septic vs cardiogenic - resolved  #Hx NSVT  #Acute on chronic HFrEF 2/2 to NICM (EF 10-15%), home milrinone PTA, s/p ICD   #HLD  Continue current plan. Place PICC, then check CVP post placement to ensure they correlate. Goal to pull swan and CVC.  - Goal MAP 65-80, CVP 10-12, and SBP <140, monitor hemodynamics  - Dobutamine off this afternoon   - Increase LVAD speed 5300-->5400  - Continue PTA atorvastatin  - Low intensity heparin; query discontinue in AM given INR therapeutic  - Continue warfarin  - Increased PO hydralazine to 37.5 mg Q8H  - PRN hydralazine for MAP > 80  - Hold PTA Coreg, Entresto, Aldactone, Jardiance, atorvastatin  - Query GDMT    Heartmate 3 LEFT VS  Flow (Lpm): 4.8 Lpm  Pulse Index (PI): 3.1 PI  Speed (rpm): 5400  rpm  Power (bassett): 3.7 bassett  Current Hct settin     GI care / Nutrition:   # Constipation - improved  # Nausea - improved  # Risk for protein calorie malnutrition  - Advance to regular diet  - Consider NJ if unable to tolerate/take PO  - PO PPI daily  -  large BM  - Bowel regimen: MOM, Narcan PO, miralax BID, and senna BID   - Scopolamine patch  - PRN zofran/compazine/haldol     Renal / Fluids / Electrolytes:   # CKD Stage 2  BL creat appears to be ~ 1.2-1.4.     Cr 1.19 this morning.  - Strict I/O, daily weights, avoid/limit nephrotoxins  - Serial BMP  - Replete lytes PRN per protocol  - UOP goal net negative -1 -2L (CVP goal 10-12)   - 60mg lasix BID     Endocrine:    #Stress hyperglycemia  Preop A1c 5.6  - Goal BG <180 for optimal healing  - Continue HSSI  - Hypoglycemia order set in place     ID / Antibiotics:  #Stress induced leukocytosis - improved  - Completed LVAD post-op prophylaxis  - Blood cultures: NGTD  - Monitor fever curve, WBC, and inflammatory markers as appropriate  - Consider re-culturing and adding on ABX if febrile    Heme:     #Acute blood loss anemia  #Acute blood loss thrombocytopenia  #R brachial non-occlusive thrombus on Warfarin  - Hgb goal > 7.0  - Continue low intensity heparin  - Continue warfarin     MSK / Skin:  #Sternotomy  #Surgical Incision  - Sternal precautions, postop incision management protocol  - PT/OT/CR     Prophylaxis:     - Mechanical DVT ppx  - Chemical DVT ppx: low intensity heparin, warfarin  - PPI     Lines / Tubes / Drains:  - RIJ CVC   - CTs x2       Disposition:  - Transfer to floor    Patient seen, findings and plan discussed with CVICU and cardiothoracic surgeons.  Time spent with patient 35 minutes. This does not include time spent on procedures or teaching.     ANNE Montero CNP    ====================================    TODAY'S PROGRESS  SUBJECTIVE:   NAEON    OBJECTIVE  1. VITAL SIGNS  BP (!) 121/96 (BP Location: Left arm)   Pulse 116    Temp 98.1  F (36.7  C) (Oral)   Resp 28   Ht 1.829 m (6')   Wt 95.6 kg (210 lb 12.2 oz)   SpO2 97%   BMI 28.58 kg/m        2. INTAKE/ OUTPUT  Intake/Output Summary (Last 24 hours) at 6/18/2024 0612  Last data filed at 6/18/2024 0500  Gross per 24 hour   Intake 1353.48 ml   Output 2385 ml   Net -1031.52 ml       3. PHYSICAL EXAMINATION    GEN: not in distress  EYES: PERRL, Anicteric sclera.   HEENT:  Normocephalic, atraumatic, trachea midline, Pupils PERRLA  CV: RRR, no gallops, rubs, or murmurs  PULM/CHEST: Clear breath sounds bilaterally without rhonchi, crackles or wheeze, symmetric chest rise  GI: normal bowel sounds, soft, non-tender, no rebound tenderness or guarding, no masses  : Voids spontaneously  EXTREMITIES: No peripheral edema, moving all extremities, peripheral pulses intact  NEURO: Cranial nerves II-XII grossly intact, no motor-sensory deficits noted  SKIN: Incision well approximated, natural skin color, and no edema  PSYCH:  Affect: appropriate        4. INVESTIGATIONS  Arterial Blood Gases   Recent Labs   Lab 06/17/24  0433 06/16/24  1937 06/16/24  1813 06/16/24  1428   PH 7.40 7.39 7.38 7.38   PCO2 41 41 41 40   PO2 80 106* 100 136*   HCO3 25 25 24 23     Complete Blood Count   Recent Labs   Lab 06/19/24  0427 06/18/24  0356 06/17/24  0432 06/16/24 1937   WBC 10.3 11.5* 17.1* 19.4*   HGB 9.1* 10.4* 11.3* 11.6*   * 112* 94* 106*     Basic Metabolic Panel  Recent Labs   Lab 06/19/24  0428 06/19/24  0427 06/19/24  0052 06/18/24  2230 06/18/24  1530 06/18/24  1516 06/18/24  1054 06/18/24  1046   NA  --  140  --  140  --  140  --  140   POTASSIUM  --  3.5  --  4.0  --  3.9  --  3.8   CHLORIDE  --  105  --  101  --  104  --  103   CO2  --  25  --  28  --  29  --  26   BUN  --  26.3*  --  26.0*  --  24.2*  --  23.1*   CR  --  1.08  --  1.18*  --  1.11  --  1.26*   * 117* 118* 130*   < > 127*   < > 123*    < > = values in this interval not displayed.     Liver Function Tests  Recent  "Labs   Lab 06/19/24  0427 06/18/24  0356 06/17/24  0432 06/16/24  1937 06/16/24  1001   AST 31 38 58* 67*  --    ALT 22 28 28 33  --    ALKPHOS 55 63 68 73  --    BILITOTAL 0.5 0.7 1.9* 2.6*  --    ALBUMIN 2.7* 3.1* 3.0* 3.1*  --    INR 2.78* 1.50* 1.37*  --  1.41*     Pancreatic Enzymes  No lab results found in last 7 days.  Coagulation Profile  Recent Labs   Lab 06/19/24  0427 06/18/24  0356 06/17/24  0432 06/16/24  1001 06/15/24  0325 06/14/24  1415 06/14/24  1100   INR 2.78* 1.50* 1.37* 1.41* 1.28* 1.43* 1.61*   PTT  --   --  44*  --  31 48* 31     Lactate  Invalid input(s): \"LACTATE\"    5. RADIOLOGY  Recent Results (from the past 24 hour(s))   RAJ with Report    Narrative    Rodolfo Kessler MD     6/14/2024 11:25 AM  Perioperative RAJ Procedure Note    Staff -        Anesthesiologist:  Jomar Liu MD       Resident/Fellow: Rodolfo Kessler MD       Performed By: fellow  Preanesthesia Checklist:  Patient identified, IV assessed, risks and   benefits discussed, monitors and equipment assessed, procedure being   performed at surgeon's request and anesthesia consent obtained.    RAJ Probe Insertion    Probe Status PRE Insertion: NO obvious damage  Probe type:  Adult 3D  Bite block used:   Soft  Insertion Technique: Easy, no oropharyngeal manipulation  Insertion complications: None obvious  Billing Report:RAJ report by Anesthesiologist (See Separate Report note)  Probe Status POST Removal: NO obvious damage    RAJ Report  General Procedure Information  Images for this study have been archived.  Modalities: 2D, 3D, CW Doppler, PW Doppler and Color flow mapping  Echocardiographic and Doppler Measurements  Right Ventricle:  Cavity size dilated.    Hypertrophy not present.     Thrombus not present.    Global function normal.     Left Ventricle:  Cavity size dilated.    Hypertrophy not present.     Thrombus not present.   Global Function severely impaired.       Ventricular Regional Function:  1- Basal Anteroseptal:  " hypokinetic  2- Basal Anterior:  hypokinetic  3- Basal Anterolateral:  hypokinetic  4- Basal Inferolateral:  hypokinetic  5- Basal Inferior:  hypokinetic  6- Basal Inferoseptal:  hypokinetic  7- Mid Anteroseptal:  hypokinetic  8- Mid Anterior:  hypokinetic  9- Mid Anterolateral:  hypokinetic  10- Mid Inferolateral:  hypokinetic  11- Mid Inferior:  hypokinetic  12- Mid Inferoseptal:  hypokinetic  13- Apical Anterior:  hypokinetic  14- Apical Lateral:  hypokinetic  15- Apical Inferior:  hypokinetic  16- Apical Septal:  hypokinetic  17- [place]:  hypokinetic    Valves  Aortic Valve: Annulus normal.  Stenosis not present.  Regurgitation   absent.  Leaflets normal.  Leaflet motions normal.    Mitral Valve: Annulus dilated.  Stenosis not present.  Regurgitation +2.    Leaflets normal.  Leaflet motions normal.    Tricuspid Valve: Annulus normal.  Stenosis not present.  Regurgitation +2.    Leaflets normal.  Leaflet motions normal.    Pulmonic Valve: Annulus normal.  Stenosis not present.  Regurgitation   absent.      Aorta: Ascending Aorta: Size normal.  Dissection not present.  Plaque   thickness less than 3 mm.  Mobile plaque not present.    Aortic Arch: Size normal.    Dissection not present.   Plaque thickness   less than 3 mm.   Mobile plaque not present.    Descending Aorta: Size normal.    Dissection not present.   Plaque   thickness less than 3 mm.   Mobile plaque not present.    Other Aortic Findings:  IABP visualized in descending aorta, below   subclavian  Right Atrium:  Size normal.   Spontaneous echo contrast not present.     Thrombus not present.   Tumor not present.   Device not present.     Left Atrium: Size dilated.  Spontaneous echo contrast not present.    Thrombus not present.  Tumor not present.  Device not present.    Left atrial appendage normal.     Atrial Septum: Intra-atrial septal morphology normal.     Other atrial   septal defect findings:  Colorflow suspicious for PFO. Negative bubble   study.  Difficult to perform due to high CVP.  Ventricular Septum: Intra-ventricular septum morphology normal.      Diastolic Function Measurements:  Diastolic Dysfunction Grade= III.  E=  ms.  A=  ms.  E/A Ratio= .  DT=    ms.  S/D= .  IVRT= ms.  Other Findings:   Pericardium:  normal. Pleural Effusion:  none. Pulmonary Arteries:    normal. Pulmonary Venous Flow:  normal.     Post Intervention Findings  Procedure(s) performed:  LVAD Placement. Global function:  Unchanged.   Regional wall motion: Unchanged. Surgeon(s) notified of all   postintervention findings: Yes (Notified in real time).         LVAD   Placement:  LV decompressed. PFO not present. Aortic valve opening.    Post Intervention comments: Post bypass LVAD heartmate III placement: RV   function is unchanged. LV function is unchanged. LV is 6.5cm in diameter   (pre-bypass 7cm). Inflow cannula at apex of LV is directed towards the   mitral valve. Outflow cannula seen in ascending aorta. Both cannulas have   appropriate velocities. PFO evaluation was negative via colorflow and   bubble study post bypass. The aortic valve is unchanged and opening on   average every 3rd beat. The mitral valve shows moderate regurgitation   (previously mild). The tricuspid and pulmonic valves are unchanged. IABP   seen in the descending aorta. No echo evidence of aortic dissection. .    Echocardiogram Comments   Cardiac Device Check - Inpatient   Result Value    Date Time Interrogation Session [id number]    Implantable Pulse Generator  Medtronic    Implantable Pulse Generator Model [id number] Cobalt XT VR MRI    Implantable Pulse Generator Serial Number [id number]    Type Interrogation Session In Clinic    Clinic Name [facility]    Implantable Pulse Generator Type Defibrillator    Implantable Pulse Generator Implant Date 20230615    Implantable Lead  Medtronic    Implantable Lead Model 6935M Sprint Quattro Secure S MRI SureScan     Implantable Lead Serial Number [id number]    Implantable Lead Implant Date 20230615    Implantable Lead Polarity Type Tripolar Lead    Implantable Lead Location Detail 1 UNKNOWN    Implantable Lead Location Right Ventricle    Implantable Lead Connection Status Connected    Andrés Setting Mode (NBG Code) VVIR    Andrés Setting Lower Rate Limit 60    Andrés Setting Maximum Sensor Rate 130    Lead Channel Setting Sensing Polarity Bipolar    Lead Channel Setting Sensing Anode Location Right Ventricle    Lead Channel Setting Sensing Anode Terminal Ring    Lead Channel Setting Sensing Cathode Location Right Ventricle    Lead Channel Setting Sensing Cathode Terminal Tip    Lead Channel Setting Sensing Sensitivity 0.3    Lead Channel Setting Pacing Polarity Bipolar    Lead Channel Setting Pacing Anode Location Right Ventricle    Lead Channel Setting Pacing Anode Terminal Ring    Lead Channel Setting Sensing Cathode Location Right Ventricle    Lead Channel Setting Sensing Cathode Terminal Tip    Lead Channel Setting Pacing Pulse Width 0.4    Lead Channel Setting Pacing Amplitude 2.0    Lead Channel Setting Pacing Capture Mode Adaptive    Zone Setting Type Category VF    Zone Setting Vendor Type Category VF    Zone Setting Status Inactive    Zone Setting Detection Interval 320    Zone Setting Detection Beats Numerator 30    Zone Setting Detection Beats Denominator 40    Zone Setting Type Category VT    Zone Setting Vendor Type Category FastVT    Zone Setting Status Inactive    Zone Setting Type Category VT    Zone Setting Vendor Type Category VT    Zone Setting Status Inactive    Zone Setting Detection Interval 360    Zone Setting Detection Beats Numerator 16    Zone Setting Detection Beats Denominator 16    Zone Setting Type Category VT    Zone Setting Vendor Type Category MonVT    Zone Setting Status Inactive    Zone Setting Detection Interval 400    Zone Setting Detection Beats Numerator 32    Zone Setting Detection Beats  Denominator 32    Lead Channel Impedance Value 323    Lead Channel Impedance Value 456    Lead Channel Sensing Intrinsic Amplitude 14.0    Lead Channel Pacing Threshold Amplitude 0.5    Lead Channel Pacing Threshold Pulse Width 0.4    Battery Date Time of Measurements 40252407527916    Battery Status Middle of Service    Battery RRT Trigger 2.8 V    Battery Remaining Longevity 134    Battery Voltage 3.03    Capacitor Charge Type Shock    Capacitor Last Charge Date Time 80811124480927    Capacitor Charge Time 3.9    Capacitor Charge Energy 18.0    Andrés Statistic Date Time Start 05780908241257    Andrés Statistic Date Time End 73740227896299    Andrés Statistic RV Percent Paced 1.54    CRT Statistic Date Time Start 15962720227350    CRT Statistic Date Time End 49568550984589    Atrial Tachy Statistic Date Time Start 40482425568283    Atrial Tachy Statistic Date Time End 32078590622035    Atrial Tachy Statistic AT/AF [place] Percent 0    Therapy Statistic Recent Shocks Delivered 0    Therapy Statistic Recent Shocks Aborted 0    Therapy Statistic Recent ATP Delivered 0    Therapy Statistic Recent Date Time Start 18875433194278    Therapy Statistic Recent Date Time End 70936361712160    Therapy Statistic Total Shocks Delivered 0    Therapy Statistic Total Shocks Aborted 0    Therapy Statistic Total ATP Delivered 0    Therapy Statistic Total  Date Time Start 09057977983418    Therapy Statistic Total  Date Time End 51131613891907    Episode Statistic Recent Count 0    Episode Statistic Type Category Patient Activated    Episode Statistic Recent Count 5    Episode Statistic Type Category SVT    Episode Statistic Recent Count 32    Episode Statistic Type Category VT    Episode Statistic Recent Count 0    Episode Statistic Type Category VF    Episode Statistic Recent Count 0    Episode Statistic Type Category VT    Episode Statistic Recent Count 0    Episode Statistic Type Category VT    Episode Statistic Recent Count 0     Episode Statistic Type Category VT    Episode Statistic Recent Date Time Start 39994818118134    Episode Statistic Recent Date Time End 36856118265800    Episode Statistic Recent Date Time Start 46237039069210    Episode Statistic Recent Date Time End 73124317306714    Episode Statistic Recent Date Time Start 36560796620367    Episode Statistic Recent Date Time End 19681078645515    Episode Statistic Recent Date Time Start 67442566709982    Episode Statistic Recent Date Time End 50046219330387    Episode Statistic Recent Date Time Start 92133206630803    Episode Statistic Recent Date Time End 45079206534120    Episode Statistic Recent Date Time Start 94877731952287    Episode Statistic Recent Date Time End 33344893547882    Episode Statistic Recent Date Time Start 45351013827195    Episode Statistic Recent Date Time End 45196951964511    Episode Statistic Total Count 0    Episode Statistic Type Category Patient Activated    Episode Statistic Total Count 20    Episode Statistic Type Category SVT    Episode Statistic Total Count 88    Episode Statistic Type Category VT    Episode Statistic Total Count 0    Episode Statistic Type Category VF    Episode Statistic Total Count 0    Episode Statistic Type Category VT    Episode Statistic Total Count 0    Episode Statistic Type Category VT    Episode Statistic Total Count 0    Episode Statistic Type Category VT    Episode Statistic Total Date Time Start 08854967103361    Episode Statistic Total Date Time End 62580449194957    Episode Statistic Total Date Time Start 16151330191484    Episode Statistic Total Date Time End 86805630050004    Episode Statistic Total Date Time Start 22933091380530    Episode Statistic Total Date Time End 47235088789025    Episode Statistic Total Date Time Start 79447387447280    Episode Statistic Total Date Time End 67383287073969    Episode Statistic Total Date Time Start 51471391069620    Episode Statistic Total Date Time End 66658423837520     Episode Statistic Total Date Time Start 51824745638038    Episode Statistic Total Date Time End 89653587035438    Episode Statistic Total Date Time Start 91285688686872    Episode Statistic Total Date Time End 12441678219040    Episode Identifier 98    Episode Type Category SVT    Episode Date Time 29849995020278    Episode Duration 59    Episode Identifier 97    Episode Type Category SVT    Episode Date Time [id number]    Episode Duration 5    Episode Identifier 80    Episode Type Category SVT    Episode Date Time 99797366417023    Episode Duration 28    Episode Identifier 74    Episode Type Category SVT    Episode Date Time 25342058285114    Episode Duration 49    Episode Identifier 73    Episode Type Category SVT    Episode Date Time 66249057449855    Episode Duration 50    Episode Identifier 108    Episode Type Category VT    Episode Date Time 90116292551808    Episode Duration 1    Episode Identifier 107    Episode Type Category VT    Episode Date Time 23772788508406    Episode Duration 1    Episode Identifier 106    Episode Type Category VT    Episode Date Time 11775448608033    Episode Duration 1    Episode Identifier 105    Episode Type Category VT    Episode Date Time 92863768334598    Episode Duration 1    Episode Identifier 104    Episode Type Category VT    Episode Date Time [id number]    Episode Duration 3    Episode Identifier 103    Episode Type Category VT    Episode Date Time 32199132430601    Episode Duration 1    Episode Identifier 102    Episode Type Category VT    Episode Date Time 21394168011575    Episode Duration 1    Episode Identifier 101    Episode Type Category VT    Episode Date Time 79264234414145    Episode Duration 2    Episode Identifier 100    Episode Type Category VT    Episode Date Time 72932070697813    Episode Duration 1    Episode Identifier 99    Episode Type Category VT    Episode Date Time [id number]    Episode Duration 2    Episode Identifier 96    Episode Type  Category VT    Episode Date Time [id number]    Episode Duration 1    Episode Identifier 95    Episode Type Category VT    Episode Date Time 28279998076521    Episode Duration 1    Episode Identifier 94    Episode Type Category VT    Episode Date Time 99604383948730    Episode Duration 1    Episode Identifier 93    Episode Type Category VT    Episode Date Time [id number]    Episode Duration 6    Episode Identifier 92    Episode Type Category VT    Episode Date Time [id number]    Episode Duration 2    Narrative    Patient seen in OR 26 for evaluation and iterative programming of their   ICD per MD orders pre LVAD implant.    Device: Medtronic [id number] [place] XT VR MRI  Normal device function.  Mode: VVIR  bpm  : 1.5%  Intrinsic rhythm:  bpm  Thoracic Impedance:Below reference line. Suggests possible intrathoracic   fluid accumulation.  Short V-V intervals: 0  Lead Trends Appear Stable.  Estimated battery longevity to RRT = 13.2 years. Battery voltage = 3.03 V.     Anticoagulant: Warafrin  Ventricular Arrhythmia: 15 NSVT episodes lasting 1-6 sec @ 188-214 bpm.  Setting Changes: ICD Therapies turned OFF. LRL increased to 60 bpm.  Plan: Page Device RN post LVAD implant to turn ON ICD Therapies.    JOEL Vo, LUIS    Single lead ICD    I have reviewed and interpreted the device interrogation, settings,   programming and nurse's summary. The device is functioning within normal   device parameters. I agree with the current findings, assessment and plan.     XR Chest Port 1 View    Narrative    Exam: XR CHEST PORT 1 VIEW, 6/14/2024 2:53 PM    Comparison: Same day 2:31 AM    History: Endotracheal tube positioning    Findings:  Portable AP view of the chest. Endotracheal tube terminates in the  distal trachea 1.0 cm from the judd. Right IJ Campbell-Hugh catheter  terminates in the right pulmonary artery. Left chest wall implantable  cardiac defibrillator. Status post LVAD placement. Median  sternotomy  wires. Mediastinal drains and left chest tube. Right PICC terminates  in the SVC.    Enlarged cardiac silhouette. No pneumothorax or pleural effusion.  Perihilar and bibasilar patchy opacities.      Impression    Impression:   1. Endotracheal tube terminates in the distal thoracic trachea 1.0 cm  from the judd. Status post LVAD placement with median sternotomy  wires and multiple chest drains. Stable right PICC.  2. Cardiomegaly with perihilar and bibasilar opacities, likely  atelectasis and edema.    I have personally reviewed the examination and initial interpretation  and I agree with the findings.    VIOLETA DANIELLE MD         SYSTEM ID:  [id number]   XR Abdomen Port 1 View    Narrative    Exam: XR ABDOMEN PORT 1 VIEW, 6/14/2024 2:53 PM    Indication: OG placement    Comparison: 8/22/2023 CT    Findings:     Partially visualized enteric tube tip and sidehole projected over the  stomach. LVAD, partially visualized basilar chest tube and mediastinal  drain.    Suture from small bowel anastomosis seen within the right lower  quadrant. No obstructive bowel gas pattern. No pneumatosis or portal  venous gas. Small colonic stool burden. Metallic inferior IABP pump  marker projecting over L2.      Impression    Impression:     Enteric tube tip project over the stomach with sidehole projecting  near the gastroesophageal junction, consider advancement.    I have personally reviewed the examination and initial interpretation  and I agree with the findings.    ETHAN LION MD         SYSTEM ID:  [id number]   Cardiac Device Check - Inpatient   Result Value    Date Time Interrogation Session 20,240,614,[id number],919    Implantable Pulse Generator  Medtronic    Implantable Pulse Generator Model [id number] Cobalt XT VR MRI    Implantable Pulse Generator Serial Number [id number]    Type Interrogation Session In Clinic    Clinic Name [facility]    Implantable Pulse Generator Type  Defibrillator    Implantable Pulse Generator Implant Date 20,230,615    Implantable Lead  Medtronic    Implantable Lead Model 6935M Sprint Quattro Secure S MRI SureScan    Implantable Lead Serial Number [id number]    Implantable Lead Implant Date 20,230,615    Implantable Lead Polarity Type Tripolar Lead    Implantable Lead Location Detail 1 UNKNOWN    Implantable Lead Location Right Ventricle    Implantable Lead Connection Status Connected    Andrés Setting Mode (NBG Code) VVIR    Andrés Setting Lower Rate Limit 60    Andrés Setting Maximum Sensor Rate 130    Lead Channel Setting Sensing Polarity Bipolar    Lead Channel Setting Sensing Anode Location Right Ventricle    Lead Channel Setting Sensing Anode Terminal Ring    Lead Channel Setting Sensing Cathode Location Right Ventricle    Lead Channel Setting Sensing Cathode Terminal Tip    Lead Channel Setting Sensing Sensitivity 0.3    Lead Channel Setting Pacing Polarity Bipolar    Lead Channel Setting Pacing Anode Location Right Ventricle    Lead Channel Setting Pacing Anode Terminal Ring    Lead Channel Setting Sensing Cathode Location Right Ventricle    Lead Channel Setting Sensing Cathode Terminal Tip    Lead Channel Setting Pacing Pulse Width 0.4    Lead Channel Setting Pacing Amplitude 2.0    Lead Channel Setting Pacing Capture Mode Adaptive    Zone Setting Type Category VF    Zone Setting Vendor Type Category VF    Zone Setting Status Active    Zone Setting Detection Interval 320    Zone Setting Detection Beats Numerator 30    Zone Setting Detection Beats Denominator 40    Zone Setting Type Category VT    Zone Setting Vendor Type Category FastVT    Zone Setting Status Inactive    Zone Setting Type Category VT    Zone Setting Vendor Type Category VT    Zone Setting Status Inactive    Zone Setting Detection Interval 360    Zone Setting Detection Beats Numerator 16    Zone Setting Detection Beats Denominator 16    Zone Setting Type Category VT    Zone  Setting Vendor Type Category MonVT    Zone Setting Status Monitor    Zone Setting Detection Interval 400    Zone Setting Detection Beats Numerator 32    Zone Setting Detection Beats Denominator 32    Lead Channel Impedance Value 342    Lead Channel Impedance Value 437    Lead Channel Sensing Intrinsic Amplitude 5.8    Lead Channel Pacing Threshold Amplitude 0.5    Lead Channel Pacing Threshold Pulse Width 0.4    Battery Date Time of Measurements 20,240,614,141,502    Battery RRT Trigger 2.8 V    Battery Remaining Longevity 159    Battery Voltage 3.03    Capacitor Charge Type Shock    Capacitor Last Charge Date Time 20,240,419,090,015    Capacitor Charge Time 3.9    Capacitor Charge Energy 18.0    Andrés Statistic Date Time Start 20,240,403,112,652    Andrés Statistic Date Time End 20,240,614,152,919    Andrés Statistic RA Percent Paced Invalid Value    Andrés Statistic RV Percent Paced 1.53    CRT Statistic Date Time Start 20,240,403,112,652    CRT Statistic Date Time End 20,240,614,152,919    Atrial Tachy Statistic Date Time Start 20,240,403,112,652    Atrial Tachy Statistic Date Time End 20,240,614,152,919    Atrial Tachy Statistic AT/AF [place] Percent 0    Therapy Statistic Recent Shocks Delivered 0    Therapy Statistic Recent Shocks Aborted 0    Therapy Statistic Recent ATP Delivered 0    Therapy Statistic Recent Date Time Start 20,240,403,112,652    Therapy Statistic Recent Date Time End 20,240,614,152,919    Therapy Statistic Total Shocks Delivered 0    Therapy Statistic Total Shocks Aborted 0    Therapy Statistic Total ATP Delivered 0    Therapy Statistic Total  Date Time Start 20,230,615,114,709    Therapy Statistic Total  Date Time End 20,240,614,152,919    Episode Statistic Recent Count 0    Episode Statistic Type Category Patient Activated    Episode Statistic Recent Count 5    Episode Statistic Type Category SVT    Episode Statistic Recent Count 32    Episode Statistic Type Category VT    Episode Statistic  Recent Count 0    Episode Statistic Type Category VF    Episode Statistic Recent Count 0    Episode Statistic Type Category VT    Episode Statistic Recent Count 0    Episode Statistic Type Category VT    Episode Statistic Recent Count 0    Episode Statistic Type Category VT    Episode Statistic Recent Date Time Start 20,240,403,112,652    Episode Statistic Recent Date Time End 20,240,614,152,919    Episode Statistic Recent Date Time Start [id number]    Episode Statistic Recent Date Time End 56468882189168    Episode Statistic Recent Date Time Start 96007566475041    Episode Statistic Recent Date Time End 38988933643297    Episode Statistic Recent Date Time Start 95313325366945    Episode Statistic Recent Date Time End 73264674441289    Episode Statistic Recent Date Time Start 05577524730526    Episode Statistic Recent Date Time End 54485187259212    Episode Statistic Recent Date Time Start 98755047290029    Episode Statistic Recent Date Time End 87715106271078    Episode Statistic Recent Date Time Start 26568129689550    Episode Statistic Recent Date Time End 86547733626819    Episode Statistic Total Count 0    Episode Statistic Type Category Patient Activated    Episode Statistic Total Count 20    Episode Statistic Type Category SVT    Episode Statistic Total Count 88    Episode Statistic Type Category VT    Episode Statistic Total Count 0    Episode Statistic Type Category VF    Episode Statistic Total Count 0    Episode Statistic Type Category VT    Episode Statistic Total Count 0    Episode Statistic Type Category VT    Episode Statistic Total Count 0    Episode Statistic Type Category VT    Episode Statistic Total Date Time Start 20,230,615,114,709    Episode Statistic Total Date Time End 20,240,614,152,919    Episode Statistic Total Date Time Start 65043234609272    Episode Statistic Total Date Time End 73292961766310    Episode Statistic Total Date Time Start 01348046474605    Episode Statistic Total Date  Time End 12251561472891    Episode Statistic Total Date Time Start 59270012228973    Episode Statistic Total Date Time End 57585860938515    Episode Statistic Total Date Time Start 09622989901931    Episode Statistic Total Date Time End 98173725439816    Episode Statistic Total Date Time Start 88655497654460    Episode Statistic Total Date Time End 27059743989070    Episode Statistic Total Date Time Start 23276448926432    Episode Statistic Total Date Time End 48598950922057    Narrative    Patient seen in [address] for evaluation and iterative programming of their   ICD per MD orders s/p LVAD implant.    Device: Medtronic [id number] [place] XT VR MRI  Normal device function.  Mode: VVIR  bpm  : 1.5%  Intrinsic rhythm:  bpm  Thoracic Impedance: Below reference line. Suggests possible intrathoracic   fluid accumulation.  Short V-V intervals: 2  Lead Trends Appear Stable.  Estimated battery longevity to RRT = 13 years. Battery voltage = 3.03 V.   Ventricular Arrhythmia: None.  Setting Changes: ICD Therapies turned ON.     JOEL Vo RN    Single lead ICD    I have reviewed and interpreted the device interrogation, settings,   programming and nurse's summary. The device is functioning within normal   device parameters. I agree with the current findings, assessment and plan.     XR Chest Port 1 View    Narrative    Exam: XR CHEST PORT 1 VIEW, 6/14/2024 4:16 PM    Comparison: Same day chest x-ray    History: eval after ETT repositioned    Findings:  Portable AP view of the chest. Endotracheal tube tip in the  midthoracic trachea, slightly retracted compared to previous. Gastric  tube with tip out of field of view and sidehole projecting over the  stomach. Right IJ Hurst-Hugh catheter terminates in the right pulmonary  artery. Left chest wall implantable cardiac defibrillator. LVAD.  Median sternotomy wires. Mediastinal drains and left chest tube. Right  PICC terminates in the SVC.    Enlarged cardiac  silhouette, unchanged. No pneumothorax or pleural  effusion. Stable perihilar and bibasilar patchy opacities.      Impression    Impression:   1. Endotracheal tube terminates in the midthoracic trachea slightly  retracted compared to previous. Stable additional support devices.  2. Stable cardiomegaly with unchanged perihilar and bibasilar  opacities, likely atelectasis and edema.    I have personally reviewed the examination and initial interpretation  and I agree with the findings.    VIOLETA DANIELLE MD         SYSTEM ID:  [id number]   XR Abdomen Port 1 View    Narrative    EXAMINATION:  XR ABDOMEN PORT 1 VIEW 6/14/2024     COMPARISON: Same day radiograph    HISTORY: OG advanced.    TECHNIQUE: One frontal supine view of the abdomen.    FINDINGS: Gastric tube tip and sidehole project over the stomach.  Partially visualized LVAD and surgical drains. No abnormally dilated  air-filled loops of bowel in the partially visualized abdomen.       Impression    IMPRESSION:   Gastric tube tip and sidehole are within the stomach.    I have personally reviewed the examination and initial interpretation  and I agree with the findings.    FITZ MIRELES MD         SYSTEM ID:  [id number]   XR Chest Port 1 View    Impression    RESIDENT PRELIMINARY INTERPRETATION  IMPRESSION:  1. Question tiny right apical pneumothorax. Attention on follow-up.  2. Interval placement of esophageal temperature probe. Otherwise,  stable support devices.  3. Decreased right and unchanged left sided opacities, likely  atelectasis.

## 2024-06-19 NOTE — PROGRESS NOTES
Critical Care Attending Progress Note  I, Sue Gibbs MD, saw this patient with the ARIEL and agree with the findings and plan of care as documented in the note. Please see separate note from today for further documentation.     I personally reviewed vital signs, medications, labs and imaging.     Assessment: Mr. 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 Today, Mr. Lutz is progressing as expected with respiratory insufficiency, acute post-operative pain.     Active problems and current treatments include:     Respiratory insufficiency-Continue supplemental oxygen, titrate to SpO2>92%, wean as tolerated.  Acute post-operative pain-Continue multimodal analgesia.   S/p Hm3 LVAD-Continue low intensity heparin infusion, continue warfarin-INR therapeutic today stop heparin infusion tomorrow, heart failure team following, appreciate recommendations. Diuresis to optimize cardiac function. Continue dobutamine to support right ventricular function while optimizing LVAD flows-discontinue this afternoon if CI remains in goal range.  ID-No acute infectious concerns.  Nutrition-Continue regular diet.  Prophylaxis-PPI, heparin infusion and warfarin as above     I agree with the assessment and plan. I spent 25 minutes exclusive of procedures evaluating and managing this patient, discussing with the consultants, and updating the patient and family.     Sue Gibbs M.D.

## 2024-06-20 ENCOUNTER — DOCUMENTATION ONLY (OUTPATIENT)
Dept: ANTICOAGULATION | Facility: CLINIC | Age: 54
End: 2024-06-20
Payer: COMMERCIAL

## 2024-06-20 ENCOUNTER — APPOINTMENT (OUTPATIENT)
Dept: OCCUPATIONAL THERAPY | Facility: CLINIC | Age: 54
End: 2024-06-20
Attending: STUDENT IN AN ORGANIZED HEALTH CARE EDUCATION/TRAINING PROGRAM
Payer: COMMERCIAL

## 2024-06-20 ENCOUNTER — APPOINTMENT (OUTPATIENT)
Dept: GENERAL RADIOLOGY | Facility: CLINIC | Age: 54
End: 2024-06-20
Attending: STUDENT IN AN ORGANIZED HEALTH CARE EDUCATION/TRAINING PROGRAM
Payer: COMMERCIAL

## 2024-06-20 ENCOUNTER — APPOINTMENT (OUTPATIENT)
Dept: GENERAL RADIOLOGY | Facility: CLINIC | Age: 54
End: 2024-06-20
Attending: SURGERY
Payer: COMMERCIAL

## 2024-06-20 ENCOUNTER — APPOINTMENT (OUTPATIENT)
Dept: GENERAL RADIOLOGY | Facility: CLINIC | Age: 54
End: 2024-06-20
Payer: COMMERCIAL

## 2024-06-20 DIAGNOSIS — Z95.811 LVAD (LEFT VENTRICULAR ASSIST DEVICE) PRESENT (H): ICD-10-CM

## 2024-06-20 DIAGNOSIS — Z79.01 ANTICOAGULATED ON WARFARIN: ICD-10-CM

## 2024-06-20 DIAGNOSIS — I50.22 CHRONIC SYSTOLIC CONGESTIVE HEART FAILURE (H): ICD-10-CM

## 2024-06-20 DIAGNOSIS — I50.22 CHRONIC SYSTOLIC HEART FAILURE (H): Primary | ICD-10-CM

## 2024-06-20 LAB
ABO/RH(D): NORMAL
ACANTHOCYTES BLD QL SMEAR: ABNORMAL
ALBUMIN SERPL BCG-MCNC: 2.9 G/DL (ref 3.5–5.2)
ALBUMIN SERPL BCG-MCNC: 3 G/DL (ref 3.5–5.2)
ALBUMIN SERPL BCG-MCNC: 3.1 G/DL (ref 3.5–5.2)
ALP SERPL-CCNC: 64 U/L (ref 40–150)
ALP SERPL-CCNC: 68 U/L (ref 40–150)
ALP SERPL-CCNC: 74 U/L (ref 40–150)
ALT SERPL W P-5'-P-CCNC: 26 U/L (ref 0–70)
ALT SERPL W P-5'-P-CCNC: 28 U/L (ref 0–70)
ALT SERPL W P-5'-P-CCNC: 29 U/L (ref 0–70)
ANION GAP SERPL CALCULATED.3IONS-SCNC: 11 MMOL/L (ref 7–15)
ANION GAP SERPL CALCULATED.3IONS-SCNC: 11 MMOL/L (ref 7–15)
ANION GAP SERPL CALCULATED.3IONS-SCNC: 12 MMOL/L (ref 7–15)
ANTIBODY SCREEN: NEGATIVE
AST SERPL W P-5'-P-CCNC: 32 U/L (ref 0–45)
AST SERPL W P-5'-P-CCNC: 33 U/L (ref 0–45)
AST SERPL W P-5'-P-CCNC: 36 U/L (ref 0–45)
AUER BODIES BLD QL SMEAR: ABNORMAL
BACTERIA BLD CULT: NO GROWTH
BACTERIA BLD CULT: NO GROWTH
BASE EXCESS BLDV CALC-SCNC: 6.7 MMOL/L (ref -3–3)
BASE EXCESS BLDV CALC-SCNC: 7.1 MMOL/L (ref -3–3)
BASO STIPL BLD QL SMEAR: ABNORMAL
BASOPHILS # BLD AUTO: 0.1 10E3/UL (ref 0–0.2)
BASOPHILS NFR BLD AUTO: 0 %
BILIRUB SERPL-MCNC: 0.6 MG/DL
BITE CELLS BLD QL SMEAR: ABNORMAL
BLISTER CELLS BLD QL SMEAR: ABNORMAL
BUN SERPL-MCNC: 28.6 MG/DL (ref 6–20)
BUN SERPL-MCNC: 29.4 MG/DL (ref 6–20)
BUN SERPL-MCNC: 29.8 MG/DL (ref 6–20)
BURR CELLS BLD QL SMEAR: ABNORMAL
CA-I BLD-MCNC: 4.6 MG/DL (ref 4.4–5.2)
CALCIUM SERPL-MCNC: 8.7 MG/DL (ref 8.6–10)
CALCIUM SERPL-MCNC: 8.7 MG/DL (ref 8.6–10)
CALCIUM SERPL-MCNC: 8.9 MG/DL (ref 8.6–10)
CHLORIDE SERPL-SCNC: 100 MMOL/L (ref 98–107)
CHLORIDE SERPL-SCNC: 101 MMOL/L (ref 98–107)
CHLORIDE SERPL-SCNC: 102 MMOL/L (ref 98–107)
CREAT SERPL-MCNC: 1.11 MG/DL (ref 0.67–1.17)
CREAT SERPL-MCNC: 1.12 MG/DL (ref 0.67–1.17)
CREAT SERPL-MCNC: 1.23 MG/DL (ref 0.67–1.17)
DACRYOCYTES BLD QL SMEAR: ABNORMAL
DEPRECATED HCO3 PLAS-SCNC: 24 MMOL/L (ref 22–29)
DEPRECATED HCO3 PLAS-SCNC: 26 MMOL/L (ref 22–29)
DEPRECATED HCO3 PLAS-SCNC: 27 MMOL/L (ref 22–29)
EGFRCR SERPLBLD CKD-EPI 2021: 70 ML/MIN/1.73M2
EGFRCR SERPLBLD CKD-EPI 2021: 79 ML/MIN/1.73M2
EGFRCR SERPLBLD CKD-EPI 2021: 79 ML/MIN/1.73M2
ELLIPTOCYTES BLD QL SMEAR: ABNORMAL
EOSINOPHIL # BLD AUTO: 0.3 10E3/UL (ref 0–0.7)
EOSINOPHIL NFR BLD AUTO: 2 %
ERYTHROCYTE [DISTWIDTH] IN BLOOD BY AUTOMATED COUNT: 14 % (ref 10–15)
ERYTHROCYTE [DISTWIDTH] IN BLOOD BY AUTOMATED COUNT: 14 % (ref 10–15)
FRAGMENTS BLD QL SMEAR: ABNORMAL
GLUCOSE BLDC GLUCOMTR-MCNC: 100 MG/DL (ref 70–99)
GLUCOSE BLDC GLUCOMTR-MCNC: 90 MG/DL (ref 70–99)
GLUCOSE BLDC GLUCOMTR-MCNC: 96 MG/DL (ref 70–99)
GLUCOSE SERPL-MCNC: 100 MG/DL (ref 70–99)
GLUCOSE SERPL-MCNC: 130 MG/DL (ref 70–99)
GLUCOSE SERPL-MCNC: 95 MG/DL (ref 70–99)
HCO3 BLDV-SCNC: 29 MMOL/L (ref 21–28)
HCO3 BLDV-SCNC: 30 MMOL/L (ref 21–28)
HCT VFR BLD AUTO: 31.4 % (ref 40–53)
HCT VFR BLD AUTO: 33.1 % (ref 40–53)
HGB BLD-MCNC: 10.4 G/DL (ref 13.3–17.7)
HGB BLD-MCNC: 10.9 G/DL (ref 13.3–17.7)
HGB C CRYSTALS: ABNORMAL
HOLD SPECIMEN: NORMAL
HOWELL-JOLLY BOD BLD QL SMEAR: ABNORMAL
IMM GRANULOCYTES # BLD: 0.4 10E3/UL
IMM GRANULOCYTES NFR BLD: 3 %
INR PPP: 3.64 (ref 0.85–1.15)
LACTATE SERPL-SCNC: 1.2 MMOL/L (ref 0.7–2)
LYMPHOCYTES # BLD AUTO: 1.6 10E3/UL (ref 0.8–5.3)
LYMPHOCYTES NFR BLD AUTO: 12 %
MAGNESIUM SERPL-MCNC: 2.2 MG/DL (ref 1.7–2.3)
MCH RBC QN AUTO: 30.4 PG (ref 26.5–33)
MCH RBC QN AUTO: 30.6 PG (ref 26.5–33)
MCHC RBC AUTO-ENTMCNC: 32.9 G/DL (ref 31.5–36.5)
MCHC RBC AUTO-ENTMCNC: 33.1 G/DL (ref 31.5–36.5)
MCV RBC AUTO: 92 FL (ref 78–100)
MCV RBC AUTO: 93 FL (ref 78–100)
MONOCYTES # BLD AUTO: 1.7 10E3/UL (ref 0–1.3)
MONOCYTES NFR BLD AUTO: 13 %
NEUTROPHILS # BLD AUTO: 9.3 10E3/UL (ref 1.6–8.3)
NEUTROPHILS NFR BLD AUTO: 70 %
NEUTS HYPERSEG BLD QL SMEAR: ABNORMAL
NRBC # BLD AUTO: 0.1 10E3/UL
NRBC BLD AUTO-RTO: 1 /100
O2/TOTAL GAS SETTING VFR VENT: 28 %
O2/TOTAL GAS SETTING VFR VENT: 6 %
OXYHGB MFR BLDV: 65 % (ref 70–75)
OXYHGB MFR BLDV: 88 % (ref 70–75)
PCO2 BLDV: 30 MM HG (ref 40–50)
PCO2 BLDV: 38 MM HG (ref 40–50)
PH BLDV: 7.51 [PH] (ref 7.32–7.43)
PH BLDV: 7.58 [PH] (ref 7.32–7.43)
PHOSPHATE SERPL-MCNC: 3.6 MG/DL (ref 2.5–4.5)
PLAT MORPH BLD: ABNORMAL
PLATELET # BLD AUTO: 210 10E3/UL (ref 150–450)
PLATELET # BLD AUTO: 232 10E3/UL (ref 150–450)
PO2 BLDV: 33 MM HG (ref 25–47)
PO2 BLDV: 53 MM HG (ref 25–47)
POLYCHROMASIA BLD QL SMEAR: SLIGHT
POTASSIUM SERPL-SCNC: 3.6 MMOL/L (ref 3.4–5.3)
POTASSIUM SERPL-SCNC: 3.6 MMOL/L (ref 3.4–5.3)
POTASSIUM SERPL-SCNC: 3.8 MMOL/L (ref 3.4–5.3)
PROT SERPL-MCNC: 5.6 G/DL (ref 6.4–8.3)
PROT SERPL-MCNC: 5.7 G/DL (ref 6.4–8.3)
PROT SERPL-MCNC: 6.1 G/DL (ref 6.4–8.3)
RBC # BLD AUTO: 3.4 10E6/UL (ref 4.4–5.9)
RBC # BLD AUTO: 3.58 10E6/UL (ref 4.4–5.9)
RBC AGGLUT BLD QL: ABNORMAL
RBC MORPH BLD: ABNORMAL
ROULEAUX BLD QL SMEAR: ABNORMAL
SAO2 % BLDV: 66.2 % (ref 70–75)
SAO2 % BLDV: 90.4 % (ref 70–75)
SICKLE CELLS BLD QL SMEAR: ABNORMAL
SMUDGE CELLS BLD QL SMEAR: ABNORMAL
SODIUM SERPL-SCNC: 137 MMOL/L (ref 135–145)
SODIUM SERPL-SCNC: 138 MMOL/L (ref 135–145)
SODIUM SERPL-SCNC: 139 MMOL/L (ref 135–145)
SPECIMEN EXPIRATION DATE: NORMAL
SPHEROCYTES BLD QL SMEAR: ABNORMAL
STOMATOCYTES BLD QL SMEAR: ABNORMAL
TARGETS BLD QL SMEAR: ABNORMAL
TOXIC GRANULES BLD QL SMEAR: ABNORMAL
VARIANT LYMPHS BLD QL SMEAR: ABNORMAL
WBC # BLD AUTO: 13.3 10E3/UL (ref 4–11)
WBC # BLD AUTO: 15.9 10E3/UL (ref 4–11)

## 2024-06-20 PROCEDURE — 83605 ASSAY OF LACTIC ACID: CPT

## 2024-06-20 PROCEDURE — 94640 AIRWAY INHALATION TREATMENT: CPT | Mod: 76

## 2024-06-20 PROCEDURE — 85610 PROTHROMBIN TIME: CPT | Performed by: STUDENT IN AN ORGANIZED HEALTH CARE EDUCATION/TRAINING PROGRAM

## 2024-06-20 PROCEDURE — 250N000013 HC RX MED GY IP 250 OP 250 PS 637

## 2024-06-20 PROCEDURE — 120N000003 HC R&B IMCU UMMC

## 2024-06-20 PROCEDURE — 71045 X-RAY EXAM CHEST 1 VIEW: CPT

## 2024-06-20 PROCEDURE — 250N000013 HC RX MED GY IP 250 OP 250 PS 637: Performed by: PHYSICIAN ASSISTANT

## 2024-06-20 PROCEDURE — 71045 X-RAY EXAM CHEST 1 VIEW: CPT | Mod: 26 | Performed by: RADIOLOGY

## 2024-06-20 PROCEDURE — 36415 COLL VENOUS BLD VENIPUNCTURE: CPT

## 2024-06-20 PROCEDURE — 250N000011 HC RX IP 250 OP 636: Mod: JZ | Performed by: NURSE PRACTITIONER

## 2024-06-20 PROCEDURE — 85025 COMPLETE CBC W/AUTO DIFF WBC: CPT

## 2024-06-20 PROCEDURE — 97530 THERAPEUTIC ACTIVITIES: CPT | Mod: GO

## 2024-06-20 PROCEDURE — 82805 BLOOD GASES W/O2 SATURATION: CPT

## 2024-06-20 PROCEDURE — 250N000013 HC RX MED GY IP 250 OP 250 PS 637: Performed by: SURGERY

## 2024-06-20 PROCEDURE — 97535 SELF CARE MNGMENT TRAINING: CPT | Mod: GO

## 2024-06-20 PROCEDURE — 999N000157 HC STATISTIC RCP TIME EA 10 MIN

## 2024-06-20 PROCEDURE — 99233 SBSQ HOSP IP/OBS HIGH 50: CPT | Mod: 25 | Performed by: INTERNAL MEDICINE

## 2024-06-20 PROCEDURE — 84155 ASSAY OF PROTEIN SERUM: CPT

## 2024-06-20 PROCEDURE — 250N000011 HC RX IP 250 OP 636: Performed by: PHYSICIAN ASSISTANT

## 2024-06-20 PROCEDURE — 93005 ELECTROCARDIOGRAM TRACING: CPT

## 2024-06-20 PROCEDURE — 82247 BILIRUBIN TOTAL: CPT

## 2024-06-20 PROCEDURE — 85027 COMPLETE CBC AUTOMATED: CPT

## 2024-06-20 PROCEDURE — 71045 X-RAY EXAM CHEST 1 VIEW: CPT | Mod: 26 | Performed by: STUDENT IN AN ORGANIZED HEALTH CARE EDUCATION/TRAINING PROGRAM

## 2024-06-20 PROCEDURE — 120N000005 HC R&B MS OVERFLOW UMMC

## 2024-06-20 PROCEDURE — 83735 ASSAY OF MAGNESIUM: CPT

## 2024-06-20 PROCEDURE — 86900 BLOOD TYPING SEROLOGIC ABO: CPT | Performed by: NURSE PRACTITIONER

## 2024-06-20 PROCEDURE — 250N000009 HC RX 250: Performed by: STUDENT IN AN ORGANIZED HEALTH CARE EDUCATION/TRAINING PROGRAM

## 2024-06-20 PROCEDURE — 84100 ASSAY OF PHOSPHORUS: CPT

## 2024-06-20 PROCEDURE — 250N000013 HC RX MED GY IP 250 OP 250 PS 637: Performed by: STUDENT IN AN ORGANIZED HEALTH CARE EDUCATION/TRAINING PROGRAM

## 2024-06-20 PROCEDURE — 71045 X-RAY EXAM CHEST 1 VIEW: CPT | Mod: 77

## 2024-06-20 PROCEDURE — 82330 ASSAY OF CALCIUM: CPT | Performed by: PHYSICIAN ASSISTANT

## 2024-06-20 PROCEDURE — 99233 SBSQ HOSP IP/OBS HIGH 50: CPT | Mod: FS | Performed by: ANESTHESIOLOGY

## 2024-06-20 PROCEDURE — 82805 BLOOD GASES W/O2 SATURATION: CPT | Performed by: NURSE PRACTITIONER

## 2024-06-20 PROCEDURE — 250N000009 HC RX 250

## 2024-06-20 PROCEDURE — 93010 ELECTROCARDIOGRAM REPORT: CPT | Performed by: INTERNAL MEDICINE

## 2024-06-20 RX ORDER — CAPTOPRIL 12.5 MG/1
12.5 TABLET ORAL 3 TIMES DAILY
Status: DISCONTINUED | OUTPATIENT
Start: 2024-06-20 | End: 2024-06-23

## 2024-06-20 RX ORDER — LIDOCAINE 40 MG/G
CREAM TOPICAL
OUTPATIENT
Start: 2024-06-20

## 2024-06-20 RX ORDER — FUROSEMIDE 10 MG/ML
60 INJECTION INTRAMUSCULAR; INTRAVENOUS EVERY 8 HOURS
Status: COMPLETED | OUTPATIENT
Start: 2024-06-20 | End: 2024-06-20

## 2024-06-20 RX ORDER — POTASSIUM CHLORIDE 29.8 MG/ML
20 INJECTION INTRAVENOUS ONCE
Status: COMPLETED | OUTPATIENT
Start: 2024-06-20 | End: 2024-06-20

## 2024-06-20 RX ORDER — GUAIFENESIN 600 MG/1
600 TABLET, EXTENDED RELEASE ORAL 2 TIMES DAILY
Status: DISCONTINUED | OUTPATIENT
Start: 2024-06-20 | End: 2024-07-03

## 2024-06-20 RX ORDER — IPRATROPIUM BROMIDE AND ALBUTEROL SULFATE 2.5; .5 MG/3ML; MG/3ML
3 SOLUTION RESPIRATORY (INHALATION)
Status: DISCONTINUED | OUTPATIENT
Start: 2024-06-20 | End: 2024-06-24

## 2024-06-20 RX ORDER — POLYETHYLENE GLYCOL 3350 17 G/17G
17 POWDER, FOR SOLUTION ORAL DAILY
Status: DISCONTINUED | OUTPATIENT
Start: 2024-06-21 | End: 2024-06-25

## 2024-06-20 RX ORDER — HYDRALAZINE HYDROCHLORIDE 25 MG/1
50 TABLET, FILM COATED ORAL EVERY 8 HOURS
Status: DISCONTINUED | OUTPATIENT
Start: 2024-06-20 | End: 2024-06-24

## 2024-06-20 RX ORDER — ACETAMINOPHEN 325 MG/1
975 TABLET ORAL EVERY 8 HOURS
Status: DISPENSED | OUTPATIENT
Start: 2024-06-20 | End: 2024-06-23

## 2024-06-20 RX ADMIN — Medication 37.5 MG: at 10:15

## 2024-06-20 RX ADMIN — NALOXONE HYDROCHLORIDE 2 MG: 1 INJECTION PARENTERAL at 08:09

## 2024-06-20 RX ADMIN — FUROSEMIDE 60 MG: 10 INJECTION, SOLUTION INTRAMUSCULAR; INTRAVENOUS at 11:12

## 2024-06-20 RX ADMIN — OXYCODONE HYDROCHLORIDE 5 MG: 5 TABLET ORAL at 15:07

## 2024-06-20 RX ADMIN — WARFARIN SODIUM 0.5 MG: 1 TABLET ORAL at 17:43

## 2024-06-20 RX ADMIN — HYDRALAZINE HYDROCHLORIDE 5 MG: 20 INJECTION INTRAMUSCULAR; INTRAVENOUS at 06:15

## 2024-06-20 RX ADMIN — ACETAMINOPHEN 975 MG: 325 TABLET, FILM COATED ORAL at 17:43

## 2024-06-20 RX ADMIN — ACETAMINOPHEN 975 MG: 325 TABLET, FILM COATED ORAL at 08:05

## 2024-06-20 RX ADMIN — ACETAMINOPHEN 650 MG: 325 TABLET, FILM COATED ORAL at 13:37

## 2024-06-20 RX ADMIN — ATORVASTATIN CALCIUM 20 MG: 20 TABLET, FILM COATED ORAL at 19:59

## 2024-06-20 RX ADMIN — IPRATROPIUM BROMIDE AND ALBUTEROL SULFATE 3 ML: .5; 3 SOLUTION RESPIRATORY (INHALATION) at 20:25

## 2024-06-20 RX ADMIN — HYDRALAZINE HYDROCHLORIDE 5 MG: 20 INJECTION INTRAMUSCULAR; INTRAVENOUS at 02:44

## 2024-06-20 RX ADMIN — Medication 37.5 MG: at 15:13

## 2024-06-20 RX ADMIN — SCOPOLAMINE 1 PATCH: 1 PATCH TRANSDERMAL at 00:11

## 2024-06-20 RX ADMIN — HYDRALAZINE HYDROCHLORIDE 50 MG: 25 TABLET ORAL at 23:47

## 2024-06-20 RX ADMIN — CAPTOPRIL 12.5 MG: 12.5 TABLET ORAL at 19:59

## 2024-06-20 RX ADMIN — PANTOPRAZOLE SODIUM 40 MG: 40 TABLET, DELAYED RELEASE ORAL at 08:05

## 2024-06-20 RX ADMIN — GUAIFENESIN 600 MG: 600 TABLET ORAL at 19:59

## 2024-06-20 RX ADMIN — HYDRALAZINE HYDROCHLORIDE 50 MG: 25 TABLET ORAL at 17:44

## 2024-06-20 RX ADMIN — HYDRALAZINE HYDROCHLORIDE 5 MG: 20 INJECTION INTRAMUSCULAR; INTRAVENOUS at 03:35

## 2024-06-20 RX ADMIN — LIDOCAINE 1 PATCH: 4 PATCH TOPICAL at 08:09

## 2024-06-20 RX ADMIN — Medication 6.25 MG: at 11:20

## 2024-06-20 RX ADMIN — FUROSEMIDE 60 MG: 10 INJECTION, SOLUTION INTRAMUSCULAR; INTRAVENOUS at 17:43

## 2024-06-20 RX ADMIN — POTASSIUM CHLORIDE 20 MEQ: 29.8 INJECTION, SOLUTION INTRAVENOUS at 05:02

## 2024-06-20 RX ADMIN — Medication 6.25 MG: at 13:35

## 2024-06-20 ASSESSMENT — ACTIVITIES OF DAILY LIVING (ADL)
ADLS_ACUITY_SCORE: 31
ADLS_ACUITY_SCORE: 31
ADLS_ACUITY_SCORE: 32
ADLS_ACUITY_SCORE: 32
ADLS_ACUITY_SCORE: 31
ADLS_ACUITY_SCORE: 32
ADLS_ACUITY_SCORE: 31
ADLS_ACUITY_SCORE: 32
ADLS_ACUITY_SCORE: 31
ADLS_ACUITY_SCORE: 31
ADLS_ACUITY_SCORE: 32
ADLS_ACUITY_SCORE: 31
ADLS_ACUITY_SCORE: 32
ADLS_ACUITY_SCORE: 32
ADLS_ACUITY_SCORE: 31
ADLS_ACUITY_SCORE: 32

## 2024-06-20 NOTE — PROGRESS NOTES
06/19/24 1600   Appointment Info   Signing Clinician's Name / Credentials (PT) Eleanor Estevez, DPT   Rehab Comments (PT) LVAD, sternal   Living Environment   People in Home spouse   Current Living Arrangements house   Home Accessibility stairs to enter home;stairs within home   Number of Stairs, Main Entrance 2   Stair Railings, Main Entrance none   Number of Stairs, Within Home, Primary greater than 10 stairs   Stair Railings, Within Home, Primary railing on right side (ascending)   Transportation Anticipated family or friend will provide   Living Environment Comments Pt lives with spouse in 2 story home w/ basement. 2 [place]IF[place], and all needs can be met on main level, thus optional 13 steps on each flight of stairs R handrail to other levels. Has walk in shower, built in chair, no grab bars.   Self-Care   Usual Activity Tolerance good   Current Activity Tolerance moderate   Equipment Currently Used at Home none   Fall history within last six months no   Activity/Exercise/Self-Care Comment SALEH with prolonged activity  such as cleaning garage   General Information   Onset of Illness/Injury or Date of Surgery 06/03/24   Referring Physician Cira Mallory, ALIZE   Patient/Family Therapy Goals Statement (PT) return home   Pertinent History of Current Problem (include personal factors and/or comorbidities that impact the POC) 53 year old male admitted on 6/3/2024. He has a past medical history of chronic HFrEF 2/2 NICM s/p ICD, HLD, and CKD Stage II who presents admitted for decompensated heart failure on milrinone listed status 4, admitted for consideration of advanced therapies, now s/p LVAD 6/14.   Existing Precautions/Restrictions sternal   Weight-Bearing Status - LLE full weight-bearing   Weight-Bearing Status - RLE full weight-bearing   Heart Disease Risk Factors Medical history;Gender;Age   General Observations Wife Corie present and attentive   Cognition   Affect/Mental Status (Cognition) WFL   Orientation Status  (Cognition) oriented x 4   Follows Commands (Cognition) follows multi-step commands;over 90% accuracy   Cognitive Status Comments Discusses was not fully oriented in past days, but today is sharp with conversation and interacting very well cognitiviely without any concerns.   Pain Assessment   Patient Currently in Pain Yes, see Vital Sign flowsheet   Posture    Posture Forward head position;Protracted shoulders   Range of Motion (ROM)   Range of Motion ROM is WFL   ROM Comment BLE   Strength (Manual Muscle Testing)   Strength (Manual Muscle Testing) Deficits observed during functional mobility   Strength Comments BLE ~ 4+/5   Bed Mobility   Comment, (Bed Mobility) CGAx1 sup to sit with HOB at ~ 30 degrees with rail   Transfers   Comment, (Transfers) CGA and FWW sit <> stand, WBOS   Gait/Stairs (Locomotion)   [place] Level (Gait) minimum assist (75% patient effort)   Assistive Device (Gait)   ([facility] UE support on staff with min A improving to CGA)   Distance in Feet (Gait) 5   Deviations/Abnormal Patterns (Gait) boby decreased;gait speed decreased;stride length decreased   Balance   Balance Comments good dynamic sitting balance, impaired static and dynamic standing balance   Sensory Examination   Sensory Perception Comments denies LE numbness/tingling, but does report right fingertip tingling.   Coordination   Coordination no deficits were identified   Coordination Comments BLE   Muscle Tone   Muscle Tone no deficits were identified   Muscle Tone Comments BLE   Clinical Impression   Criteria for Skilled Therapeutic Intervention Yes, treatment indicated   PT Diagnosis (PT) impaired functional mobility   Influenced by the following impairments decreased strength, balance, endurance, posture, increased pain, edema   Functional limitations due to impairments up to brief min A, mostly CGA for transfers from raised heights and gait   Clinical Presentation (PT Evaluation Complexity) evolving   Clinical Presentation  Rationale PMH and clinical judgment   Clinical Decision Making (Complexity) moderate complexity   Planned Therapy Interventions (PT) balance training;bed mobility training;gait training;home exercise program;patient/family education;postural re-education;ROM (range of motion);stair training;strengthening;transfer training;progressive activity/exercise;risk factor education;home program guidelines   Risk & Benefits of therapy have been explained evaluation/treatment results reviewed;care plan/treatment goals reviewed;risks/benefits reviewed;current/potential barriers reviewed;participants voiced agreement with care plan;participants included;patient   PT Total Evaluation Time   PT Eval, Moderate Complexity Minutes (30214) 11   Physical Therapy Goals   PT Frequency 5x/week   PT Predicted Duration/Target Date for Goal Attainment 06/29/24   PT Goals Bed Mobility;Transfers;Gait;Stairs   PT: Bed Mobility Supervision/stand-by assist;Supine to/from sit;Within precautions   PT: Transfers Supervision/stand-by assist;Bed to/from chair;Assistive device;Within precautions   PT: Gait Supervision/stand-by assist;Rolling walker;150 feet   Interventions   Interventions Quick Adds Therapeutic Activity;Gait Training;Therapeutic Procedure   Therapeutic Activity   Therapeutic Activities: dynamic activities to improve functional performance Minutes (67703) 8   Symptoms Noted During/After Treatment Fatigue   Treatment Detail/Skilled Intervention Post evaulation, to progress functional mobility, instructed patient in sup <> sit <> stand technique with cues to incorporate sternal precautions,strategies for adherence, and  to avoid leaning onto UEs on armrests when sitting. Educated in strategies for safely raising height for sit <> stand.  Pt sit to stand with CGA from raised height. Ankle pumps and heel slides in bed to prepare LEs for mobility and minimize edema, clot risk, while arranging lines for OOB activity with demonstration of good  understnaidng and technique   Gait Training   Gait Training Minutes (60659) 3   Symptoms Noted During/After Treatment (Gait Training) fatigue   Treatment Detail/Skilled Intervention To progress gait to promote safe return to prior level of function, instructed patient in gait technique.  Patient ambulated5 ft  with UE support on staff and CGAx1 with cues for upright posture, fwd gaze,increased step length with moderate response .If not for fatigue from busy day and toileting needs, likely woudl have walked to welch atleast.   PT Discharge Planning   PT Plan progress transfers, gait with FWW, likely can proceed to welch with wc follow with pillow on seat of wc. Focus on sternal prec adherence with bed mobility. anticipate will progress well.   PT Discharge Recommendation (DC Rec) [facility]-Motivated patient will benefit from intensive, interdisciplinary therapy.  Anticipate will be able to tolerate 3 hours of therapy per day   PT Rationale for DC Rec Patient below indepenent baseline mobility, needing to progress transfers to safely return home. Will track progress and home safety as participates in therapy sessions & update disch recs accordingly. Good potential for continued progress and wife is very supportive.   PT Brief overview of current status Contact Guard Assist via gait belt, FWW, in welch A of 2nd for close wc follow   Total Session Time   Timed Code Treatment Minutes 11   Total Session Time (sum of timed and untimed services) 22   Psychosocial Support   Trust Relationship/Rapport care explained;choices provided;emotional support provided;empathic listening provided;questions answered;questions encouraged;reassurance provided;thoughts/feelings acknowledged   Family/Support System Care support provided;involvement promoted;presence promoted;self-care encouraged

## 2024-06-20 NOTE — PROGRESS NOTES
Cardiovascular Surgery Progress Note  06/21/2024         Assessment and Plan:     Navin Lutz is a 53 year old male with PMH of CKD2, HLD, R Subclavian and axillary vein non-occlusive thrombus on Warfarin, NSVT, HFrEF 2/2 NICM s/p ICD (PTA IV milrinone) who presents admitted 6/3/24 for decompensated HFrEF listed status 4. He is now s/p LVAD by Dr. Jhaveri on 6/14/24.     Cardiovascular:   S/p LVAD on 6/14 by Dr. Jhaveri  S/p IABP (6/12-6/14)  Hx NSVT  Acute on chronic HFrEF 2/2 to NICM (EF 10-15%), home milrinone PTA, s/p ICD   HLD  HTN  Hypervolemia  10 second run of NSVT overnight per RN, ongoing sinus tachycardia, HD stable. MAPs 78-90  Pre-op echo 6/8: LV EF 15-20%, normal RV size/function  - PTA atorvastatin 20 mg daily   - PO hydralazine 50 mg Q8H  - Captopril 12.5 mg TID   - Cards 2 following for hemodynamic & GDMT management; appreciate recs  - Holding PTA Coreg, Entresto, Aldactone, Jardiance     Chest tubes/TPW: removed in ICU    Pulmonary:  - Extubated POD 3 to 5 lpm via NC. Now saturating well on  lpm.   - Supplemental O2 PRN to keep sats > 92%. Wean off as tolerated.  - Pulm toilet, IS, activity and deep breathing  - DuoNebs QID, Mucinex BID    Neurology /Psych:  Acute post-operative pain  - Acute post-operative pain regimen:  - scheduled acetaminophen, lidocaine patches   - PRN: PO oxycodone PRN, Robaxin, Voltaren gel, Atarax     / Renal:  CKD Stage 2  Contraction alkalosis  - Baseline creatinine ~1.1-1.2. Most recent creatinine stable and WNL, adequate UOP.   - Pre-op weight 209 lbs, most recent weight 201 lbs   - Diuresis per Cards 2  - Replace electrolytes per protocol    GI / FEN:   At risk for protein calorie malnutrition  - Regular diet  - Attempted to place NJ in the ICU, unable to place   - +BM since surgery, continue bowel regimen  - hepatic enzymes WNL    Endocrine:  Stress-induced hyperglycemia  Pre-op Hgb A1C 5.6  - Managed on insulin drip postop, transitioned to sliding scale goal BG <180  and has now also been discontinued due to good BG control with no insulin need.     Infectious Disease:  Stress induced leukocytosis  - WBC 16.8 and up-trending, remains afebrile, no signs or symptoms of infection  - Completed perioperative LVAD antibiotics  - UA, blood cultures x2 ordred 6/21 - follow for results  - patient unable to produce sputum for sputum sample   - started empiric IV vancomycin/zosyn 6/21 given up-trending leukocytosis per surgeon     Hematology:   Acute blood loss anemia  Acute blood loss thrombocytopenia  Right brachial non-occlusive thrombus  Hgb stable; Plt WNL, no signs or symptoms of active bleeding  - Daily CBC    Anticoagulation:   Chronic anticoagulation for LVAD, INR goal 2-3  - Warfarin for LVAD, INR goal 2-3. Most recent INR supra-therapeutic at 5.08. Hold warfarin for 2 days per Dr. Jhaveri.  - Chromogenic factor 10 pending     Interval update 1153: chromogenic factor 10 discordant with INR (CFX 3.5 which correlates w/ INR ~2.5 per pharmacy). Will plan to hold warfarin tonight and consult hematology for assistance.     MSK/Skin:  Sternotomy  - PT/OT    Prophylaxis:   - Stress ulcer prophylaxis: Pantoprazole 40 mg daily for 30 days  - DVT prophylaxis: therapeutic anticoagulation, SCD    Disposition:   - Transferred to  on 6/21  - Therapies recommending discharge to ARU vs home once medically ready. LVAD education planned to start 6/24.    Medically Ready for Discharge: Anticipated in 2-4 Days    Clinically Significant Risk Factors              # Hypoalbuminemia: Lowest albumin = 2.7 g/dL at 6/19/2024  4:27 AM, will monitor as appropriate        # End stage heart failure: home medication list includes inotropes          #Precipitous drop in Hgb/Hct: Lowest Hgb this hospitalization: 9.1 g/dL. Will continue to monitor and treat/transfuse as appropriate.     # Overweight: Estimated body mass index is 27.3 kg/m  as calculated from the following:    Height as of this encounter: 1.829 m  (6').    Weight as of this encounter: 91.3 kg (201 lb 4.5 oz).        # Financial/Environmental Concerns: none  # Asthma: noted on problem list  # ICD device present       Discussed with Dr. Brian Jhaveri.    Joel Dang PA-C  Cardiothoracic Surgery    11:18 AM  June 21, 2024  pager [phone]        Interval History:     No overnight events. Reports he feels well this morning.   States pain is well managed on current regimen. Slept well overnight.  Tolerating diet, is passing flatus, + BM. No nausea or vomiting on initial history; however later had a large emesis after eating per RN. Zofran given with improvement in nausea. Denies abdominal pain.   Breathing well without complaints. Higher O2 requirements yesterday from recent baseline of 2L NC, now weaning back down.   Working with therapies and ambulating in halls with assistance.   Denies chest pain, palpitations, dizziness, syncopal symptoms, fevers, chills, myalgias, or sternal popping/clicking, dysuria, diarrhea. Reports minimal non-productive cough which has been overall improving since surgery, mostly in the mornings.          Physical Exam:   Blood pressure 100/85, pulse (!) 125, temperature 97.9  F (36.6  C), temperature source Oral, resp. rate 28, height 1.829 m (6'), weight 91.3 kg (201 lb 4.5 oz), SpO2 98%.  Vitals:    06/19/24 0000 06/20/24 0000 06/21/24 0945   Weight: 95.6 kg (210 lb 12.2 oz) 95.5 kg (210 lb 8.6 oz) 91.3 kg (201 lb 4.5 oz)     Gen: A&Ox4, NAD  Neuro: no focal deficits   CV: tachycardic, RRR, LVAD hum   Pulm: mildly diminished at the bases, CTAB, normal breathing on 3 lpm at the time of exam  Abd: nondistended, normal BS, soft, nontender  Ext: tace peripheral edema, non- pitting  Skin:   Incision: clean, dry, intact, no erythema, sternum stable  Tubes/drain sites: dressing clean and dry  Lines: driveline dressing clean and dry     Heartmate 3 LEFT VS  Flow (Lpm): 4.8 Lpm  Pulse Index (PI): 2.7 PI  Speed (rpm): 5400 rpm  Power (bassett): 3.9  bassett  Current Hct settin         Data:    Imaging:  reviewed recent imaging, no acute concerns  XR Chest Port 1 View  Narrative: Exam: XR CHEST PORT 1 VIEW, 2024 6:32 AM    Comparison: 2024    History: Status post LVAD    Findings:  Portable AP view of the chest. Median sternotomy wires. LVAD and left  chest wall ICD. Stable cardiomegaly. Stable small left apical  pneumothorax. Decreased bibasilar opacities.  Impression: Impression: Stable small left apical pneumothorax. Decreased perihilar  and bibasilar opacities.     I have personally reviewed the examination and initial interpretation  and I agree with the findings.    JACOB OLIVARES MD         SYSTEM ID:  [id number]        Labs:  BMP  Recent Labs   Lab 24  04424  1746 24  1116 24  0813 24  0345 24  1959 24  1547    137  --   --  138  --  139  139   POTASSIUM 3.6  3.6 3.8  --   --  3.6  --  3.6  3.6   CHLORIDE 100 101  --   --  100  --  102  102   NAM 8.7 8.9  --   --  8.7  --  8.7  8.7   CO2 24 24  --   --  27  --  26  26   BUN 26.0* 28.6*  --   --  29.4*  --  29.8*  29.8*   CR 1.06 1.12  --   --  1.11  --  1.23*  1.23*   * 130* 90 96 100*  100*   < > 101*  95  95    < > = values in this interval not displayed.     CBC  Recent Labs   Lab 24  1746 24  0345 24  0427   WBC 16.8* 15.9* 13.3* 10.3   RBC 3.81* 3.58* 3.40* 2.94*   HGB 11.7* 10.9* 10.4* 9.1*   HCT 35.1* 33.1* 31.4* 27.7*   MCV 92 93 92 94   MCH 30.7 30.4 30.6 31.0   MCHC 33.3 32.9 33.1 32.9   RDW 14.2 14.0 14.0 14.0    232 210 131*     INR  Recent Labs   Lab 24  0949 24  0444 24  0345 24  0427   INR 5.08* 4.81* 3.64* 2.78*      Hepatic Panel  Recent Labs   Lab 24  0444 24  1746 24  0345 24  1547   AST 32 32 33 36   ALT 25 26 29 28   ALKPHOS 78 74 68 64   BILITOTAL 0.7 0.6 0.6 0.6   ALBUMIN 3.0* 3.0* 2.9* 3.1*     GLUCOSE:   Recent Labs    Lab 06/21/24  0444 06/20/24  1746 06/20/24  1116 06/20/24  0813 06/20/24  0345   * 130* 90 96 100*  100*

## 2024-06-20 NOTE — PROGRESS NOTES
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 Now s/p LVAD by Dr. Jhaveri on .       Changes/updates today:  - Captopril 6.25mg TID  - Furosemide 60mg BID  - Remove MAC line  - Stopped HSSI    PLAN:  Neuro/ pain/ sedation:  - Monitor neurological status. Notify the MD for any acute changes in exam.  #Acute postoperative pain  Pt states pain has improved since yesterday.  - Scheduled: Tylenol  - PRN: Tylenol, robaxin, volteran gel, atarax, lidocaine patches    Pulmonary care:   # Acute hypoxic respiratory failure - improved  3L oxiplus overnight.   - Titrate down on oxygen needs as able  - Goal SpO2 > 92%  - Encourage pulmonary hygiene.  - Use IS q15-30 minutes when awake.    Cardiovascular:    #S/p LVAD on  by Dr. Jhaveri  #S/p IABP (-)  #Hx NSVT  #Acute on chronic HFrEF 2/2 to NICM (EF 10-15%), home milrinone PTA, s/p ICD   #HLD & HTN  - Goal MAP 65-80, CVP 10-12, and SBP <140, monitor hemodynamics  - Dobutamine off   - Increase LVAD speed 5300-->5400 on   - Continue PTA atorvastatin  - Continue warfarin; INR currently supratherapeutic  - Continue PO hydralazine to 37.5 mg Q8H  - Start Captopril 6.25 TID  - PRN hydralazine for MAP > 80  - Hold PTA Coreg, Entresto, Aldactone, Jardiance, atorvastatin  - Query GDMT    Heartmate 3 LEFT VS  Flow (Lpm): 4.8 Lpm  Pulse Index (PI): 3.1 PI  Speed (rpm): 5400 rpm  Power (bassett): 3.9 bassett  Current Hct settin     GI care / Nutrition:   # Constipation - resolved  # Risk for protein calorie malnutrition  - Advance to regular diet  - PO PPI daily  - Bowel regimen: miralax & Senna   - PRN zofran/compazine/haldol     Renal / Fluids / Electrolytes:   # CKD Stage 2  # Contraction alkalosis  BL creat appears to be  ~ 1.2-1.4.     Cr 1.19 this morning.  - Strict I/O, daily weights, avoid/limit nephrotoxins  - Serial BMP  - Replete lytes PRN per protocol  - UOP goal net negative -1 -2L (CVP goal 10-12)   - 60mg lasix BID     Endocrine:    #Stress hyperglycemia  Preop A1c 5.6  - Goal BG <180 for optimal healing  - Continue HSSI  - Hypoglycemia order set in place     ID / Antibiotics:  #Stress induced leukocytosis - improved  - Completed LVAD post-op prophylaxis  - Blood cultures: NGTD  - Monitor fever curve, WBC, and inflammatory markers as appropriate  - Consider re-culturing and adding on ABX if febrile    Heme:     #Acute blood loss anemia  #Acute blood loss thrombocytopenia  #R brachial non-occlusive thrombus on Warfarin  - Hgb goal > 7.0  - Continue warfarin  - Serial CBC plt w/ diff     MSK / Skin:  #Sternotomy  #Surgical Incision  - Sternal precautions, postop incision management protocol  - PT/OT/CR     Prophylaxis:     - Mechanical DVT ppx  - Chemical DVT ppx: warfarin  - PPI     Lines / Tubes / Drains:  - RIJ CVC   - CTs x2       Disposition:  - Transfer to floor    Patient seen, findings and plan discussed with CVICU and cardiothoracic surgeons.  Time spent with patient 35 minutes. This does not include time spent on procedures or teaching.     ANNE Montero CNP    ====================================    TODAY'S PROGRESS  SUBJECTIVE:   NAEON    OBJECTIVE  1. VITAL SIGNS  BP 94/83   Pulse 109   Temp 99.1  F (37.3  C) (Oral)   Resp 24   Ht 1.829 m (6')   Wt 95.5 kg (210 lb 8.6 oz)   SpO2 91%   BMI 28.55 kg/m        2. INTAKE/ OUTPUT  Intake/Output Summary (Last 24 hours) at 6/18/2024 0612  Last data filed at 6/18/2024 0500  Gross per 24 hour   Intake 1353.48 ml   Output 2385 ml   Net -1031.52 ml       3. PHYSICAL EXAMINATION    GEN: not in distress  EYES: PERRL, Anicteric sclera.   HEENT:  Normocephalic, atraumatic, trachea midline, Pupils PERRLA  CV: RRR, no gallops, rubs, or murmurs  PULM/CHEST: Clear  breath sounds bilaterally without rhonchi, crackles or wheeze, symmetric chest rise  GI: normal bowel sounds, soft, non-tender, no rebound tenderness or guarding, no masses  : Voids spontaneously  EXTREMITIES: No peripheral edema, moving all extremities, peripheral pulses intact  NEURO: Cranial nerves II-XII grossly intact, no motor-sensory deficits noted  SKIN: Incision well approximated, natural skin color, and no edema  PSYCH:  Affect: appropriate        4. INVESTIGATIONS  Arterial Blood Gases   Recent Labs   Lab 06/17/24 0433 06/16/24  1937 06/16/24  1813 06/16/24  1428   PH 7.40 7.39 7.38 7.38   PCO2 41 41 41 40   PO2 80 106* 100 136*   HCO3 25 25 24 23     Complete Blood Count   Recent Labs   Lab 06/20/24 0345 06/19/24 0427 06/18/24 0356 06/17/24 0432   WBC 13.3* 10.3 11.5* 17.1*   HGB 10.4* 9.1* 10.4* 11.3*    131* 112* 94*     Basic Metabolic Panel  Recent Labs   Lab 06/20/24 0345 06/19/24  2351 06/19/24 1959 06/19/24  1547 06/19/24  0428 06/19/24  0427 06/19/24  0052 06/18/24  2230     --   --  139  --  140  --  140   POTASSIUM 3.6  --   --  3.6  --  3.5  --  4.0   CHLORIDE 100  --   --  102  --  105  --  101   CO2 27  --   --  26  --  25  --  28   BUN 29.4*  --   --  29.8*  --  26.3*  --  26.0*   CR 1.11  --   --  1.23*  --  1.08  --  1.18*   *  100* 96 105* 101*  95   < > 117*   < > 130*    < > = values in this interval not displayed.     Liver Function Tests  Recent Labs   Lab 06/20/24 0345 06/19/24 0427 06/18/24 0356 06/17/24 0432   AST 33 31 38 58*   ALT 29 22 28 28   ALKPHOS 68 55 63 68   BILITOTAL 0.6 0.5 0.7 1.9*   ALBUMIN 2.9* 2.7* 3.1* 3.0*   INR 3.64* 2.78* 1.50* 1.37*     Pancreatic Enzymes  No lab results found in last 7 days.  Coagulation Profile  Recent Labs   Lab 06/20/24  0345 06/19/24  0427 06/18/24  0356 06/17/24  0432 06/16/24  1001 06/15/24  0325 06/14/24  1415 06/14/24  1100   INR 3.64* 2.78* 1.50* 1.37*   < > 1.28* 1.43* 1.61*   PTT  --   --   --   "44*  --  31 48* 31    < > = values in this interval not displayed.     Lactate  Invalid input(s): \"LACTATE\"    5. RADIOLOGY  Recent Results (from the past 24 hour(s))   RAJ with Report    Narrative    Rodolfo Kessler MD     6/14/2024 11:25 AM  Perioperative RAJ Procedure Note    Staff -        Anesthesiologist:  Jomar Liu MD       Resident/Fellow: Rodolfo Kessler MD       Performed By: fellow  Preanesthesia Checklist:  Patient identified, IV assessed, risks and   benefits discussed, monitors and equipment assessed, procedure being   performed at surgeon's request and anesthesia consent obtained.    RAJ Probe Insertion    Probe Status PRE Insertion: NO obvious damage  Probe type:  Adult 3D  Bite block used:   Soft  Insertion Technique: Easy, no oropharyngeal manipulation  Insertion complications: None obvious  Billing Report:RAJ report by Anesthesiologist (See Separate Report note)  Probe Status POST Removal: NO obvious damage    RAJ Report  General Procedure Information  Images for this study have been archived.  Modalities: 2D, 3D, CW Doppler, PW Doppler and Color flow mapping  Echocardiographic and Doppler Measurements  Right Ventricle:  Cavity size dilated.    Hypertrophy not present.     Thrombus not present.    Global function normal.     Left Ventricle:  Cavity size dilated.    Hypertrophy not present.     Thrombus not present.   Global Function severely impaired.       Ventricular Regional Function:  1- Basal Anteroseptal:  hypokinetic  2- Basal Anterior:  hypokinetic  3- Basal Anterolateral:  hypokinetic  4- Basal Inferolateral:  hypokinetic  5- Basal Inferior:  hypokinetic  6- Basal Inferoseptal:  hypokinetic  7- Mid Anteroseptal:  hypokinetic  8- Mid Anterior:  hypokinetic  9- Mid Anterolateral:  hypokinetic  10- Mid Inferolateral:  hypokinetic  11- Mid Inferior:  hypokinetic  12- Mid Inferoseptal:  hypokinetic  13- Apical Anterior:  hypokinetic  14- Apical Lateral:  hypokinetic  15- Apical Inferior: "  hypokinetic  16- Apical Septal:  hypokinetic  17- [place]:  hypokinetic    Valves  Aortic Valve: Annulus normal.  Stenosis not present.  Regurgitation   absent.  Leaflets normal.  Leaflet motions normal.    Mitral Valve: Annulus dilated.  Stenosis not present.  Regurgitation +2.    Leaflets normal.  Leaflet motions normal.    Tricuspid Valve: Annulus normal.  Stenosis not present.  Regurgitation +2.    Leaflets normal.  Leaflet motions normal.    Pulmonic Valve: Annulus normal.  Stenosis not present.  Regurgitation   absent.      Aorta: Ascending Aorta: Size normal.  Dissection not present.  Plaque   thickness less than 3 mm.  Mobile plaque not present.    Aortic Arch: Size normal.    Dissection not present.   Plaque thickness   less than 3 mm.   Mobile plaque not present.    Descending Aorta: Size normal.    Dissection not present.   Plaque   thickness less than 3 mm.   Mobile plaque not present.    Other Aortic Findings:  IABP visualized in descending aorta, below   subclavian  Right Atrium:  Size normal.   Spontaneous echo contrast not present.     Thrombus not present.   Tumor not present.   Device not present.     Left Atrium: Size dilated.  Spontaneous echo contrast not present.    Thrombus not present.  Tumor not present.  Device not present.    Left atrial appendage normal.     Atrial Septum: Intra-atrial septal morphology normal.     Other atrial   septal defect findings:  Colorflow suspicious for PFO. Negative bubble   study. Difficult to perform due to high CVP.  Ventricular Septum: Intra-ventricular septum morphology normal.      Diastolic Function Measurements:  Diastolic Dysfunction Grade= III.  E=  ms.  A=  ms.  E/A Ratio= .  DT=    ms.  S/D= .  IVRT= ms.  Other Findings:   Pericardium:  normal. Pleural Effusion:  none. Pulmonary Arteries:    normal. Pulmonary Venous Flow:  normal.     Post Intervention Findings  Procedure(s) performed:  LVAD Placement. Global function:  Unchanged.   Regional wall  motion: Unchanged. Surgeon(s) notified of all   postintervention findings: Yes (Notified in real time).         LVAD   Placement:  LV decompressed. PFO not present. Aortic valve opening.    Post Intervention comments: Post bypass LVAD heartmate III placement: RV   function is unchanged. LV function is unchanged. LV is 6.5cm in diameter   (pre-bypass 7cm). Inflow cannula at apex of LV is directed towards the   mitral valve. Outflow cannula seen in ascending aorta. Both cannulas have   appropriate velocities. PFO evaluation was negative via colorflow and   bubble study post bypass. The aortic valve is unchanged and opening on   average every 3rd beat. The mitral valve shows moderate regurgitation   (previously mild). The tricuspid and pulmonic valves are unchanged. IABP   seen in the descending aorta. No echo evidence of aortic dissection. .    Echocardiogram Comments   Cardiac Device Check - Inpatient   Result Value    Date Time Interrogation Session [id number]    Implantable Pulse Generator  Medtronic    Implantable Pulse Generator Model [id number] Cobalt XT VR MRI    Implantable Pulse Generator Serial Number [id number]    Type Interrogation Session In Clinic    Clinic Name [facility]    Implantable Pulse Generator Type Defibrillator    Implantable Pulse Generator Implant Date 20230615    Implantable Lead  Medtronic    Implantable Lead Model 6935M Sprint Quattro Secure S MRI SureScan    Implantable Lead Serial Number [id number]    Implantable Lead Implant Date 20230615    Implantable Lead Polarity Type Tripolar Lead    Implantable Lead Location Detail 1 UNKNOWN    Implantable Lead Location Right Ventricle    Implantable Lead Connection Status Connected    Andrés Setting Mode (NBG Code) VVIR    Andrés Setting Lower Rate Limit 60    Andrés Setting Maximum Sensor Rate 130    Lead Channel Setting Sensing Polarity Bipolar    Lead Channel Setting Sensing Anode Location Right  Ventricle    Lead Channel Setting Sensing Anode Terminal Ring    Lead Channel Setting Sensing Cathode Location Right Ventricle    Lead Channel Setting Sensing Cathode Terminal Tip    Lead Channel Setting Sensing Sensitivity 0.3    Lead Channel Setting Pacing Polarity Bipolar    Lead Channel Setting Pacing Anode Location Right Ventricle    Lead Channel Setting Pacing Anode Terminal Ring    Lead Channel Setting Sensing Cathode Location Right Ventricle    Lead Channel Setting Sensing Cathode Terminal Tip    Lead Channel Setting Pacing Pulse Width 0.4    Lead Channel Setting Pacing Amplitude 2.0    Lead Channel Setting Pacing Capture Mode Adaptive    Zone Setting Type Category VF    Zone Setting Vendor Type Category VF    Zone Setting Status Inactive    Zone Setting Detection Interval 320    Zone Setting Detection Beats Numerator 30    Zone Setting Detection Beats Denominator 40    Zone Setting Type Category VT    Zone Setting Vendor Type Category FastVT    Zone Setting Status Inactive    Zone Setting Type Category VT    Zone Setting Vendor Type Category VT    Zone Setting Status Inactive    Zone Setting Detection Interval 360    Zone Setting Detection Beats Numerator 16    Zone Setting Detection Beats Denominator 16    Zone Setting Type Category VT    Zone Setting Vendor Type Category MonVT    Zone Setting Status Inactive    Zone Setting Detection Interval 400    Zone Setting Detection Beats Numerator 32    Zone Setting Detection Beats Denominator 32    Lead Channel Impedance Value 323    Lead Channel Impedance Value 456    Lead Channel Sensing Intrinsic Amplitude 14.0    Lead Channel Pacing Threshold Amplitude 0.5    Lead Channel Pacing Threshold Pulse Width 0.4    Battery Date Time of Measurements 92646480854253    Battery Status Middle of Service    Battery RRT Trigger 2.8 V    Battery Remaining Longevity 134    Battery Voltage 3.03    Capacitor Charge Type Shock    Capacitor Last Charge Date Time 99189888720389     Capacitor Charge Time 3.9    Capacitor Charge Energy 18.0    Andrés Statistic Date Time Start 42874084970401    Andrés Statistic Date Time End 25754158173138    Andrés Statistic RV Percent Paced 1.54    CRT Statistic Date Time Start 32672721069741    CRT Statistic Date Time End 34336842176401    Atrial Tachy Statistic Date Time Start 78207169490950    Atrial Tachy Statistic Date Time End 88496453033625    Atrial Tachy Statistic AT/AF [place] Percent 0    Therapy Statistic Recent Shocks Delivered 0    Therapy Statistic Recent Shocks Aborted 0    Therapy Statistic Recent ATP Delivered 0    Therapy Statistic Recent Date Time Start 24434035039731    Therapy Statistic Recent Date Time End 49405754840039    Therapy Statistic Total Shocks Delivered 0    Therapy Statistic Total Shocks Aborted 0    Therapy Statistic Total ATP Delivered 0    Therapy Statistic Total  Date Time Start 35805730020423    Therapy Statistic Total  Date Time End 84264261313517    Episode Statistic Recent Count 0    Episode Statistic Type Category Patient Activated    Episode Statistic Recent Count 5    Episode Statistic Type Category SVT    Episode Statistic Recent Count 32    Episode Statistic Type Category VT    Episode Statistic Recent Count 0    Episode Statistic Type Category VF    Episode Statistic Recent Count 0    Episode Statistic Type Category VT    Episode Statistic Recent Count 0    Episode Statistic Type Category VT    Episode Statistic Recent Count 0    Episode Statistic Type Category VT    Episode Statistic Recent Date Time Start 45054345778320    Episode Statistic Recent Date Time End 13376017604101    Episode Statistic Recent Date Time Start 01545372871795    Episode Statistic Recent Date Time End 88079465077169    Episode Statistic Recent Date Time Start 67719988029268    Episode Statistic Recent Date Time End 51193231546032    Episode Statistic Recent Date Time Start 72987290986815    Episode Statistic Recent Date Time End  87846661051919    Episode Statistic Recent Date Time Start 00096812535356    Episode Statistic Recent Date Time End 08372879510773    Episode Statistic Recent Date Time Start 27459247760418    Episode Statistic Recent Date Time End 09619862893552    Episode Statistic Recent Date Time Start 56007792696937    Episode Statistic Recent Date Time End 81773773078759    Episode Statistic Total Count 0    Episode Statistic Type Category Patient Activated    Episode Statistic Total Count 20    Episode Statistic Type Category SVT    Episode Statistic Total Count 88    Episode Statistic Type Category VT    Episode Statistic Total Count 0    Episode Statistic Type Category VF    Episode Statistic Total Count 0    Episode Statistic Type Category VT    Episode Statistic Total Count 0    Episode Statistic Type Category VT    Episode Statistic Total Count 0    Episode Statistic Type Category VT    Episode Statistic Total Date Time Start 45898305703106    Episode Statistic Total Date Time End 87432694422939    Episode Statistic Total Date Time Start 70593255958086    Episode Statistic Total Date Time End 97666538972666    Episode Statistic Total Date Time Start 25869067534047    Episode Statistic Total Date Time End 72644295571458    Episode Statistic Total Date Time Start 14753626780390    Episode Statistic Total Date Time End 46210420571272    Episode Statistic Total Date Time Start 34190880381573    Episode Statistic Total Date Time End 12534833378312    Episode Statistic Total Date Time Start 85826954808110    Episode Statistic Total Date Time End 95881325335434    Episode Statistic Total Date Time Start 85109558401182    Episode Statistic Total Date Time End 38839259140049    Episode Identifier 98    Episode Type Category SVT    Episode Date Time [id number]    Episode Duration 59    Episode Identifier 97    Episode Type Category SVT    Episode Date Time 64188736946918    Episode Duration 5    Episode Identifier 80     Episode Type Category SVT    Episode Date Time 82725703555555    Episode Duration 28    Episode Identifier 74    Episode Type Category SVT    Episode Date Time 94806781336383    Episode Duration 49    Episode Identifier 73    Episode Type Category SVT    Episode Date Time 92393756907908    Episode Duration 50    Episode Identifier 108    Episode Type Category VT    Episode Date Time 91988195300581    Episode Duration 1    Episode Identifier 107    Episode Type Category VT    Episode Date Time 50559521956960    Episode Duration 1    Episode Identifier 106    Episode Type Category VT    Episode Date Time 82955063948226    Episode Duration 1    Episode Identifier 105    Episode Type Category VT    Episode Date Time 21674741868625    Episode Duration 1    Episode Identifier 104    Episode Type Category VT    Episode Date Time 74425491125815    Episode Duration 3    Episode Identifier 103    Episode Type Category VT    Episode Date Time 71314290142185    Episode Duration 1    Episode Identifier 102    Episode Type Category VT    Episode Date Time 53973426910855    Episode Duration 1    Episode Identifier 101    Episode Type Category VT    Episode Date Time [id number]    Episode Duration 2    Episode Identifier 100    Episode Type Category VT    Episode Date Time [id number]    Episode Duration 1    Episode Identifier 99    Episode Type Category VT    Episode Date Time 25442211037046    Episode Duration 2    Episode Identifier 96    Episode Type Category VT    Episode Date Time 15958333797493    Episode Duration 1    Episode Identifier 95    Episode Type Category VT    Episode Date Time 72642352113430    Episode Duration 1    Episode Identifier 94    Episode Type Category VT    Episode Date Time [id number]    Episode Duration 1    Episode Identifier 93    Episode Type Category VT    Episode Date Time [id number]    Episode Duration 6    Episode Identifier 92    Episode Type Category VT    Episode Date Time  [id number]    Episode Duration 2    Narrative    Patient seen in OR 26 for evaluation and iterative programming of their   ICD per MD orders pre LVAD implant.    Device: Medtronic [id number] [place] XT VR MRI  Normal device function.  Mode: VVIR  bpm  : 1.5%  Intrinsic rhythm:  bpm  Thoracic Impedance:Below reference line. Suggests possible intrathoracic   fluid accumulation.  Short V-V intervals: 0  Lead Trends Appear Stable.  Estimated battery longevity to RRT = 13.2 years. Battery voltage = 3.03 V.     Anticoagulant: Warafrin  Ventricular Arrhythmia: 15 NSVT episodes lasting 1-6 sec @ 188-214 bpm.  Setting Changes: ICD Therapies turned OFF. LRL increased to 60 bpm.  Plan: Page Device RN post LVAD implant to turn ON ICD Therapies.    JOEL Vo RN    Single lead ICD    I have reviewed and interpreted the device interrogation, settings,   programming and nurse's summary. The device is functioning within normal   device parameters. I agree with the current findings, assessment and plan.     XR Chest Port 1 View    Narrative    Exam: XR CHEST PORT 1 VIEW, 6/14/2024 2:53 PM    Comparison: Same day 2:31 AM    History: Endotracheal tube positioning    Findings:  Portable AP view of the chest. Endotracheal tube terminates in the  distal trachea 1.0 cm from the judd. Right IJ [place]-Hugh catheter  terminates in the right pulmonary artery. Left chest wall implantable  cardiac defibrillator. Status post LVAD placement. Median sternotomy  wires. Mediastinal drains and left chest tube. Right PICC terminates  in the SVC.    Enlarged cardiac silhouette. No pneumothorax or pleural effusion.  Perihilar and bibasilar patchy opacities.      Impression    Impression:   1. Endotracheal tube terminates in the distal thoracic trachea 1.0 cm  from the judd. Status post LVAD placement with median sternotomy  wires and multiple chest drains. Stable right PICC.  2. Cardiomegaly with perihilar and bibasilar opacities,  likely  atelectasis and edema.    I have personally reviewed the examination and initial interpretation  and I agree with the findings.    VIOLETA DANIELLE MD         SYSTEM ID:  [id number]   XR Abdomen Port 1 View    Narrative    Exam: XR ABDOMEN PORT 1 VIEW, 6/14/2024 2:53 PM    Indication: OG placement    Comparison: 8/22/2023 CT    Findings:     Partially visualized enteric tube tip and sidehole projected over the  stomach. LVAD, partially visualized basilar chest tube and mediastinal  drain.    Suture from small bowel anastomosis seen within the right lower  quadrant. No obstructive bowel gas pattern. No pneumatosis or portal  venous gas. Small colonic stool burden. Metallic inferior IABP pump  marker projecting over L2.      Impression    Impression:     Enteric tube tip project over the stomach with sidehole projecting  near the gastroesophageal junction, consider advancement.    I have personally reviewed the examination and initial interpretation  and I agree with the findings.    ETHAN LION MD         SYSTEM ID:  [id number]   Cardiac Device Check - Inpatient   Result Value    Date Time Interrogation Session 20,240,[id number],919    Implantable Pulse Generator  Medtronic    Implantable Pulse Generator Model [id number] Cobalt XT VR MRI    Implantable Pulse Generator Serial Number [id number]    Type Interrogation Session In Clinic    Clinic Name [facility]    Implantable Pulse Generator Type Defibrillator    Implantable Pulse Generator Implant Date 20,230,615    Implantable Lead  Medtronic    Implantable Lead Model 6935M Sprint Quattro Secure S MRI SureScan    Implantable Lead Serial Number [id number]    Implantable Lead Implant Date 20,230,615    Implantable Lead Polarity Type Tripolar Lead    Implantable Lead Location Detail 1 UNKNOWN    Implantable Lead Location Right Ventricle    Implantable Lead Connection Status Connected    Andrés Setting Mode (NBG Code)  VVIR    Andrés Setting Lower Rate Limit 60    Andrés Setting Maximum Sensor Rate 130    Lead Channel Setting Sensing Polarity Bipolar    Lead Channel Setting Sensing Anode Location Right Ventricle    Lead Channel Setting Sensing Anode Terminal Ring    Lead Channel Setting Sensing Cathode Location Right Ventricle    Lead Channel Setting Sensing Cathode Terminal Tip    Lead Channel Setting Sensing Sensitivity 0.3    Lead Channel Setting Pacing Polarity Bipolar    Lead Channel Setting Pacing Anode Location Right Ventricle    Lead Channel Setting Pacing Anode Terminal Ring    Lead Channel Setting Sensing Cathode Location Right Ventricle    Lead Channel Setting Sensing Cathode Terminal Tip    Lead Channel Setting Pacing Pulse Width 0.4    Lead Channel Setting Pacing Amplitude 2.0    Lead Channel Setting Pacing Capture Mode Adaptive    Zone Setting Type Category VF    Zone Setting Vendor Type Category VF    Zone Setting Status Active    Zone Setting Detection Interval 320    Zone Setting Detection Beats Numerator 30    Zone Setting Detection Beats Denominator 40    Zone Setting Type Category VT    Zone Setting Vendor Type Category FastVT    Zone Setting Status Inactive    Zone Setting Type Category VT    Zone Setting Vendor Type Category VT    Zone Setting Status Inactive    Zone Setting Detection Interval 360    Zone Setting Detection Beats Numerator 16    Zone Setting Detection Beats Denominator 16    Zone Setting Type Category VT    Zone Setting Vendor Type Category MonVT    Zone Setting Status Monitor    Zone Setting Detection Interval 400    Zone Setting Detection Beats Numerator 32    Zone Setting Detection Beats Denominator 32    Lead Channel Impedance Value 342    Lead Channel Impedance Value 437    Lead Channel Sensing Intrinsic Amplitude 5.8    Lead Channel Pacing Threshold Amplitude 0.5    Lead Channel Pacing Threshold Pulse Width 0.4    Battery Date Time of Measurements 20,240,614,141,502    Battery RRT Trigger  2.8 V    Battery Remaining Longevity 159    Battery Voltage 3.03    Capacitor Charge Type Shock    Capacitor Last Charge Date Time 20,240,419,090,015    Capacitor Charge Time 3.9    Capacitor Charge Energy 18.0    Andrés Statistic Date Time Start 20,240,403,112,652    Andrés Statistic Date Time End 20,240,614,152,919    Andrés Statistic RA Percent Paced Invalid Value    Andrés Statistic RV Percent Paced 1.53    CRT Statistic Date Time Start 20,240,403,112,652    CRT Statistic Date Time End 20,240,614,152,919    Atrial Tachy Statistic Date Time Start 20,240,403,112,652    Atrial Tachy Statistic Date Time End 20,240,614,152,919    Atrial Tachy Statistic AT/AF Lewis Percent 0    Therapy Statistic Recent Shocks Delivered 0    Therapy Statistic Recent Shocks Aborted 0    Therapy Statistic Recent ATP Delivered 0    Therapy Statistic Recent Date Time Start 20,240,403,112,652    Therapy Statistic Recent Date Time End 20,240,614,152,919    Therapy Statistic Total Shocks Delivered 0    Therapy Statistic Total Shocks Aborted 0    Therapy Statistic Total ATP Delivered 0    Therapy Statistic Total  Date Time Start 20,230,615,114,709    Therapy Statistic Total  Date Time End 20,240,614,152,919    Episode Statistic Recent Count 0    Episode Statistic Type Category Patient Activated    Episode Statistic Recent Count 5    Episode Statistic Type Category SVT    Episode Statistic Recent Count 32    Episode Statistic Type Category VT    Episode Statistic Recent Count 0    Episode Statistic Type Category VF    Episode Statistic Recent Count 0    Episode Statistic Type Category VT    Episode Statistic Recent Count 0    Episode Statistic Type Category VT    Episode Statistic Recent Count 0    Episode Statistic Type Category VT    Episode Statistic Recent Date Time Start 20,240,403,112,652    Episode Statistic Recent Date Time End 20,240,614,152,919    Episode Statistic Recent Date Time Start 05876873512909    Episode Statistic Recent Date  Time End 44322787809596    Episode Statistic Recent Date Time Start 36060726248207    Episode Statistic Recent Date Time End 11701799331460    Episode Statistic Recent Date Time Start 27918780851096    Episode Statistic Recent Date Time End 59141242440570    Episode Statistic Recent Date Time Start 13768803655256    Episode Statistic Recent Date Time End 72809716572538    Episode Statistic Recent Date Time Start 97137165913955    Episode Statistic Recent Date Time End 35815014902448    Episode Statistic Recent Date Time Start 47909072822720    Episode Statistic Recent Date Time End 38932841540395    Episode Statistic Total Count 0    Episode Statistic Type Category Patient Activated    Episode Statistic Total Count 20    Episode Statistic Type Category SVT    Episode Statistic Total Count 88    Episode Statistic Type Category VT    Episode Statistic Total Count 0    Episode Statistic Type Category VF    Episode Statistic Total Count 0    Episode Statistic Type Category VT    Episode Statistic Total Count 0    Episode Statistic Type Category VT    Episode Statistic Total Count 0    Episode Statistic Type Category VT    Episode Statistic Total Date Time Start 20,230,615,114,709    Episode Statistic Total Date Time End 20,240,614,152,919    Episode Statistic Total Date Time Start 03237962048719    Episode Statistic Total Date Time End 28624475702133    Episode Statistic Total Date Time Start 87976141477521    Episode Statistic Total Date Time End 70638255374209    Episode Statistic Total Date Time Start 15381531402601    Episode Statistic Total Date Time End 02014665394880    Episode Statistic Total Date Time Start 69971125809941    Episode Statistic Total Date Time End 27285646786771    Episode Statistic Total Date Time Start 52894221654497    Episode Statistic Total Date Time End 40464467556242    Episode Statistic Total Date Time Start 98403579433078    Episode Statistic Total Date Time End 53931240666664     Narrative    Patient seen in [facility] 4A for evaluation and iterative programming of their   ICD per MD orders s/p LVAD implant.    Device: Medtronic [id number] [place] XT VR MRI  Normal device function.  Mode: VVIR  bpm  : 1.5%  Intrinsic rhythm:  bpm  Thoracic Impedance: Below reference line. Suggests possible intrathoracic   fluid accumulation.  Short V-V intervals: 2  Lead Trends Appear Stable.  Estimated battery longevity to RRT = 13 years. Battery voltage = 3.03 V.   Ventricular Arrhythmia: None.  Setting Changes: ICD Therapies turned ON.     JOEL Vo RN    Single lead ICD    I have reviewed and interpreted the device interrogation, settings,   programming and nurse's summary. The device is functioning within normal   device parameters. I agree with the current findings, assessment and plan.     XR Chest Port 1 View    Narrative    Exam: XR CHEST PORT 1 VIEW, 6/14/2024 4:16 PM    Comparison: Same day chest x-ray    History: eval after ETT repositioned    Findings:  Portable AP view of the chest. Endotracheal tube tip in the  midthoracic trachea, slightly retracted compared to previous. Gastric  tube with tip out of field of view and sidehole projecting over the  stomach. Right IJ Shaw-Hugh catheter terminates in the right pulmonary  artery. Left chest wall implantable cardiac defibrillator. LVAD.  Median sternotomy wires. Mediastinal drains and left chest tube. Right  PICC terminates in the SVC.    Enlarged cardiac silhouette, unchanged. No pneumothorax or pleural  effusion. Stable perihilar and bibasilar patchy opacities.      Impression    Impression:   1. Endotracheal tube terminates in the midthoracic trachea slightly  retracted compared to previous. Stable additional support devices.  2. Stable cardiomegaly with unchanged perihilar and bibasilar  opacities, likely atelectasis and edema.    I have personally reviewed the examination and initial interpretation  and I agree with the  findings.    VIOLETA DANIELLE MD         SYSTEM ID:  [id number]   XR Abdomen Port 1 View    Narrative    EXAMINATION:  XR ABDOMEN PORT 1 VIEW 6/14/2024     COMPARISON: Same day radiograph    HISTORY: OG advanced.    TECHNIQUE: One frontal supine view of the abdomen.    FINDINGS: Gastric tube tip and sidehole project over the stomach.  Partially visualized LVAD and surgical drains. No abnormally dilated  air-filled loops of bowel in the partially visualized abdomen.       Impression    IMPRESSION:   Gastric tube tip and sidehole are within the stomach.    I have personally reviewed the examination and initial interpretation  and I agree with the findings.    FITZ MIRELES MD         SYSTEM ID:  [id number]   XR Chest Port 1 View    Impression    RESIDENT PRELIMINARY INTERPRETATION  IMPRESSION:  1. Question tiny right apical pneumothorax. Attention on follow-up.  2. Interval placement of esophageal temperature probe. Otherwise,  stable support devices.  3. Decreased right and unchanged left sided opacities, likely  atelectasis.

## 2024-06-20 NOTE — PLAN OF CARE
ICU End of Shift Summary. See flowsheets for vital signs and detailed assessment.    Changes this shift: No acute events overnight. Neuros remain unchanged. Up Ax1-2. Incisional pain managed with scheduled tylenol. Tmax 100.4. SR/ST with freq PVC. MAP goal 65-80, 5mg IV hydralazine x1. 8pm CVP 12, CTICU deferred additional diuresis dt borderline low PIs. LVAD @ 5400, power ~3.9, flow ~4.9, PI 2.5-3.1. 2-4L NC/oxymask. LS diminished, intermittent congested cough. Regular diet, poor appetite. Voids adequately. Pleural CT x2 with very minimal output. K replaced this AM.     Plan:  Continue LVAD education and post operative management. Trend labs and hemodynamic stability.

## 2024-06-20 NOTE — PROGRESS NOTES
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 He is now s/p LVAD by Dr. Jhaveri on 6/14/24.    Patient has progressed well following surgery and is now appropriate for transfer to the floor. Surgeon and CTICU team requested chart review to optimize patient's progress while awaiting floor bed. Recommendations as below:    - May consider addition of scheduled nebulizer treatments to optimize respiratory function and maintain saturations off supplemental oxygen.  - Patient with consistent need for potassium replacement; consider transition to schedule PO dosing to determine stable dosing.  - Will need to establish stable PO diuretic dosing prior to discharge; consider oral dosing if appropriate.   - Ongoing collaborative hemodynamic management with cardiology, appreciate assistance. Will need to have stable hemodynamics without need for IV meds prior to discharge.  - Patient and caregiver will need to complete LVAD education, scheduled to begin 6/24. If patient is appropriate for ARU transfer, LVAD education can be completed at ARU. If patient progresses to home therapy recs, patient will need to complete LVAD education prior to discharge.   - Chart review and patient reports poor appetite. Consider if calorie counts are indicated. Per patient, NJ unable to be placed via either nare due to deviated septum.  - Pain well managed on oral medications.   - Post-operative ICD check complete.  - Antegrade bowel function since surgery.     Discussed with CTICU team.    Betzaida Baker PA-C  Cardiothoracic Surgery  Pager [phone]    11:55 AM June 20, 2024

## 2024-06-20 NOTE — PROGRESS NOTES
Neuro: A&Ox4. Lethargic.  Cardiac: ST, HR 110s. Doppler MAP 84.   Respiratory: Tachypneic, sating >95% 4-5 L oxymask.  GI/: Adequate urine output via urinal. No BM today.  Diet/appetite: Regular diet, poor appetite.   Activity:  Generalized weakness, assist of 1-2.  Pain: At acceptable level on current regimen.   Skin: R internal jugular site and CT sites- removed today, midsternal incision  LDA's: L PIV- SL, HM3 LVAD- no alarms this shift    Shift Updates: Transferred from ICU this afternoon.     Goal Outcome Evaluation:  Patient overall progress improving. Plan of care reviewed with pt and spouse.    Plan: Continue with POC, encourage incentive spirometer, pulmonary toilet, up to chair. Notify primary team with changes.

## 2024-06-20 NOTE — PLAN OF CARE
Major Shift Events:  Drowsy today. Pt states had very minimal sleep overnight due to frequent interruptions. Able to make needs known. Call light appropriate. Assist of 1 with gait belt, steady and strong gait. Scheduled tylenol and PRN oxycodone for pain. CVP 13, BID diuresis ordered. MAPs 70-90's, holding off PRN hydralazine due to HR in the 120's and PO meds doses changed (see eMAR). HM3, no alarms this shift. 5L oxymask, denies SOB, shallow breathing due to surgical pain. Encouraged IS use during awake hours. Pleural chest tubes pulled by the surgical team around noon. Poor appetite, ate 0% of meals.   Plan: Wean oxygen as able. Transfer to floor when bed becomes available. LVAD teaching on Monday.   For vital signs and complete assessments, please see documentation flowsheets.     Goal Outcome Evaluation:      Plan of Care Reviewed With: patient, spouse    Overall Patient Progress: improvingOverall Patient Progress: improving    Outcome Evaluation: Foor orders were placed. MAC line removed. Will have LVAD teaching on Monday.

## 2024-06-20 NOTE — PROGRESS NOTES
Brief Progress Note     Interval summary note to document updates and changes to care plan/s. Please see daily progress note for full assessment and plan/s.     Interval history: CTs removed at 1200. RN notified that fio2 requirements increasing RA-->6L NC and HR increased 100->110. Patient endorsing pain at old CT site. WOB unremarkable but patient describes taking smaller breaths 2/2 pain. MAP elevated this afternoon Cards II notified and scheduled captopril & hydralazine ordered    Plan:   - CXR ordered/completed; mild increase in LLL opacification and residual persistent small apical PNTHX present  - EKG ordered/unchanged  - Duoneb ordered  - MAP goal 65-80  - VBG/CMP/CBC & plt ordered  - RN to optimize pain regimen including oxycodone if needed  - Encourage pulmonary toilet and up to chair    ANNE Montero CNP   06/20/2024  2:17 PM

## 2024-06-20 NOTE — PROGRESS NOTES
Pt complains of sharp chest pain, rating 7/10, different from the pain experienced before. Rhythm unchanged on tele monitoring and EKG obtained. Provider notified and assessed pt at bedside. MAPs 80's, -120's. PRN oxycodone administered for pain, chest x-ray obtained, and labs ordered. No other interventions at this time.

## 2024-06-20 NOTE — PROGRESS NOTES
VAD Social Work Services Progress Note      Date of Initial Social Work Evaluation:  8/22/2023 with admission assessment completed on 6/5/2024   Collaborated with: Chris and his wife    Data: Patient admitted with worsening heart failure and was listed for heart transplant status 2. However, due to worsening heart failure, ended up going to the OR for VAD implant on 6/14/2024. He has been extubated and progressing well post-surgery with orders to transfer up the 6th floor when a bed becomes available.  Intervention: Met with the patient and wife for supportive visit. Inquired into questions/concerns and assessed coping.  Assessment: Patient was friendly and recognized writer. Aware that he is transferring soon up to 6th floor. Wife present and noted no questions or concerns. Coping appears normal.  Education provided by SW: Ongoing SW availability  Plan:    Discharge Plans in Progress: TBD, but [facility] versus local hotel    Barriers to d/c plan: Medical condition    Follow up Plan: SW will continue to follow for ongoing psychosocial support post-VAD and assistance with discharge planning as needed. Wife currently at the [facility], which is where Chris will discharge to if physically able to skip inpatient rehab. There are no apartments available currently. Wife reports that hotel will be sufficient.    Marivel Sales, MSW, [facility]  Heart Transplant/MCS   ph. [phone]  [employer]

## 2024-06-20 NOTE — PROGRESS NOTES
6/20/24    Received New patient referral signed by Dr. Silvestre.      Follow VAD Anticoag protocol:Yes: HeartMate 3   Bridging: Call MD each time due to new implant   Date VAD placed: 6/14/24        Patient is currently admitted [facility], Bed 9277-21.    Theodore Beckham RN

## 2024-06-20 NOTE — PROGRESS NOTES
Visited with patient and wife to discuss plan for vad education. Will plan to start Monday 6/24 1pm-3pm, will continue all week.   Please page on call vad coordinator with questions/ concerns.

## 2024-06-20 NOTE — PROGRESS NOTES
Critical Care Attending Progress Note  I, Sue Gibbs MD, saw this patient with the ARIEL and agree with the findings and plan of care as documented in the note. Please see separate note from today for further documentation.     I personally reviewed vital signs, medications, labs and imaging.     Assessment: Mr. 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 Today, Mr. Lutz is progressing as expected with respiratory insufficiency, acute post-operative pain.     Active problems and current treatments include:     Respiratory insufficiency-Continue supplemental oxygen, titrate to SpO2>92%, wean as tolerated.  Acute post-operative pain-Continue multimodal analgesia.   S/p Hm3 LVAD-Continue warfarin, heart failure team following, appreciate recommendations. Diuresis to optimize cardiac function. ID-No acute infectious concerns.  Nutrition-Continue regular diet.  Prophylaxis-PPI, warfarin as above     I agree with the assessment and plan. I spent 25 minutes exclusive of procedures evaluating and managing this patient, discussing with the consultants, and updating the patient and family.     Sue Gibbs M.D.

## 2024-06-20 NOTE — PROGRESS NOTES
Brief CT surgery note    Chest tube output declining over the past days and suitable for removal.     Patient's sherley and L pleural Chest tube(s) was/were removed at the bedside without issue. Patient tolerated this well without obvious complications. Post-pull CXR ordered.     Change occlusive dressing in 2 days.     Please page if questions,    Charan Argueta MD PGY3  General Surgery Resident

## 2024-06-20 NOTE — PROGRESS NOTES
[facility]   Cardiology II Service ICU/ Advanced Heart Failure  Daily Progress Note      Patient: Navin Lutz  MRN: [id number]  Admission Date: 6/3/2024  Hospital Day # 17    Assessment and Plan: Navin Lutz is a 53 year old male admitted on 6/3/2024. He has a past medical history of chronic HFrEF 2/2 NICM s/p ICD, HLD, and CKD Stage II who presents admitted for decompensated heart failure on milrinone listed status 4, admitted for consideration of advanced therapies, now s/p LVAD .     Today's Plan:  -lasix 60 mg IV BID  -start captopril 6.25 mg TID    # Acute on chronic systolic heart failure/HFrEF secondary to NICM with EF of 10-15%, now s/p HM3 LVAD     -Fluid status: CVP 13, give lasix 60 mg IV BID  -Inotrope: try to wean off dobutamine today  -RV support: off Laisha  -BB: coreg 3.125mg on hold  -Aldosterone antagonist: aldactone 25 mg daily on hold   -Afterload reduction: continue hydralazine 37.5 mg q8, start captopril 6.25 mg TID  -SGLT2i: Holding  -SCD prophylaxis: ICD    The patient's HeartMate LVAD was interrogated 2024  Heartmate 3 LEFT VS  Flow (Lpm): 4.8 Lpm  Pulse Index (PI): 3.1 PI  Speed (rpm): 5400 rpm  Power (bassett): 3.9 bassett  Current Hct settin   No alarms or PI events.     ================================================================    Subjective/24-Hr Events:   No acute overnight events.    Medications: Reviewed in EPIC.     Physical Exam:   /85   Pulse 103   Temp 99.4  F (37.4  C) (Oral)   Resp 24   Ht 1.829 m (6')   Wt 95.5 kg (210 lb 8.6 oz)   SpO2 91%   BMI 28.55 kg/m      GENERAL: NAD  HEENT: no scleral icterus  NECK: Supple. No JVD   CV: RRR, LVAD hum noted  RESPIRATORY: decreased breath sounds bilaterally  GI: Soft and non distended   EXTREMITIES: No peripheral edema  NEUROLOGIC: AO x 3    Labs:  CMP  Recent Labs   Lab 24  1116 24  0813 24  0345 24  1959 24  1547 24  1126 24  0910  06/19/24 0428 06/19/24 0427 06/19/24 0052 06/18/24 2230 06/18/24 0408 06/18/24  0356 06/17/24  0917 06/17/24  0432   NA  --   --  138  --  139  --   --   --  140  --  140   < > 142   < > 137   POTASSIUM  --   --  3.6  --  3.6  --   --   --  3.5  --  4.0   < > 4.3   < > 3.9   CHLORIDE  --   --  100  --  102  --   --   --  105  --  101   < > 105   < > 102   CO2  --   --  27  --  26  --   --   --  25  --  28   < > 26   < > 21*   ANIONGAP  --   --  11  --  11  --   --   --  10  --  11   < > 11   < > 14   GLC 90 96 100*  100*   < > 101*  95   < >  --    < > 117*   < > 130*   < > 117*   < > 142*   BUN  --   --  29.4*  --  29.8*  --   --   --  26.3*  --  26.0*   < > 20.6*   < > 17.5   CR  --   --  1.11  --  1.23*  --   --   --  1.08  --  1.18*   < > 1.19*   < > 1.09   GFRESTIMATED  --   --  79  --  70  --   --   --  82  --  74   < > 73   < > 81   NAM  --   --  8.7  --  8.7  --   --   --  7.8*  --  8.8   < > 8.5*   < > 8.7   MAG  --   --  2.2  --   --   --  2.3  --  2.0  --  2.2   < > 2.2   < > 2.4*   PHOS  --   --  3.6  --   --   --  2.6  --  2.5  --  2.4*   < > 3.7   < > 2.5   PROTTOTAL  --   --  5.6*  --   --   --   --   --  5.1*  --   --   --  5.8*  --  5.7*   ALBUMIN  --   --  2.9*  --   --   --   --   --  2.7*  --   --   --  3.1*  --  3.0*   BILITOTAL  --   --  0.6  --   --   --   --   --  0.5  --   --   --  0.7  --  1.9*   ALKPHOS  --   --  68  --   --   --   --   --  55  --   --   --  63  --  68   AST  --   --  33  --   --   --   --   --  31  --   --   --  38  --  58*   ALT  --   --  29  --   --   --   --   --  22  --   --   --  28  --  28    < > = values in this interval not displayed.       CBC  Recent Labs   Lab 06/20/24  0345 06/19/24  0427 06/18/24  0356 06/17/24  0432   WBC 13.3* 10.3 11.5* 17.1*   RBC 3.40* 2.94* 3.34* 3.60*   HGB 10.4* 9.1* 10.4* 11.3*   HCT 31.4* 27.7* 31.3* 33.7*   MCV 92 94 94 94   MCH 30.6 31.0 31.1 31.4   MCHC 33.1 32.9 33.2 33.5   RDW 14.0 14.0 14.3 14.3    131* 112* 94*        INR  Recent Labs   Lab 06/20/24  0345 06/19/24  0427 06/18/24  0356 06/17/24  0432   INR 3.64* 2.78* 1.50* 1.37*

## 2024-06-20 NOTE — DISCHARGE INSTRUCTIONS
AFTER YOU GO HOME FROM YOUR HEART SURGERY  (HeartMate III LVAD on 6/14/24 by Dr. Jhaveri)    You had a sternotomy, avoid lifting anything greater than ten pounds for 8 weeks after surgery and then greater than 20 pounds thereafter.  Do not reach backwards or use arms to push out of chair.   Do not let people pull on your arms to assist with standing.   Avoid twisting or reaching too far across your body.    Avoid strenuous activities such as bowling, vacuuming, raking, shoveling, golf or tennis for 12 weeks after your surgery.   It is okay to resume sex if you feel comfortable in doing so. You may have to try different positions with your partner.    Splint your chest incision by hugging a pillow or bringing your arms across your chest when coughing or sneezing.     No driving until cleared by your surgeon.    Do not shower until cleared by your LVAD coordinator.   No baths or swimming.  Cover chest tube sites with dry gauze until they stop draining, then leave open to air. It is not abnormal for chest tube sites to drain yellowish/clear fluid for up to 2-3 weeks after surgery.   Watch for signs of infection: increased redness, tenderness, warmth or any drainage that appears infected (pus like) or is persistent.  Also a temperature > 100.5 F or chills. Call your surgeon or primary care provider's office immediately.     Exercise is very important in your recovery. Please follow the guidelines set up for you in your cardiac rehab classes at the hospital. If outpatient cardiac rehab was ordered for you, we highly recommend you participate. If you have problems arranging your cardiac rehab, please call [phone] for all locations, with the exception of [place], please call [phone] and [place], please call [phone].    Avoid sitting for prolonged periods of time, try to walk every hour during the day. If you have a leg incision, elevate your leg often when you are not walking.    Check your weight when  "you get home from the hospital and continue to check it daily through your recovery for at least a month. If you notice a weight gain of 2-3 pounds in a week, notify your primary care physician, cardiologist or surgeon.    Bowel activity may be slow after surgery. If necessary, you may take an over the counter laxative such as Milk of Magnesia or Miralax. You may have stool softeners prescribed (docusate sodium, Senokot). We recommend using stool softeners while using narcotics for pain (oxycodone/percocet, hydrocodone/vicodin, hydromorphone/dilaudid).      Wean OFF of narcotics (oxycodone, dilaudid, hydrocodone) as soon as possible. You should continue taking acetaminophen as long as you have any surgical pain as the first choice for pain control and add narcotics as necessary for pain to be tolerable.      DENTAL VISITS AFTER SURGERY  If you have had your heart valve repaired or replaced, we do not recommend having any dental work done for 6 months and you will need to take an antibiotic prior to dental visits from now on.  Please notify your dentist before any procedure for the proper treatment needed. The antibiotic is taken by mouth one hour prior to visit. This includes routine cleanings.  You can sometimes hear a mechanical valve \"clicking,\" this is normal and not a sign of something wrong.    DO NOT SMOKE.  IF YOU NEED HELP QUITTING, PLEASE TALK WITH YOUR CARDIOLOGIST OR PRIMARY DOCTOR.    You are on a blood thinner, follow the instructions you were given in the hospital and DO NOT SKIP this medication. Try and take it the same time everyday. Your primary care physician or coumadin clinic will manage the dosing. INR goal is 2-3, chromogenic factor X goal range is 20-40%.    You have an LVAD device, so call your LVAD coordinator with all questions and concerns.  No swimming, showering, or baths. Daily sterile driveline dressing changes as instructed. You cannot have a MRI with your LVAD in place. No contact " sports.     REGARDING PRESCRIPTION REFILLS.  If you need a refill on your pain medication contact us to discuss your pain and a possible one time refill.   All other medications will be adjusted, discontinued and re-filled by your primary care physician and/or your cardiologist as they were prior to your surgery. We have given you enough for one to three month with possibly one refill.    POST-OPERATIVE CLINIC VISITS  You should see your primary care provider in 2-4 weeks after discharge.   It is important to see your cardiologist and LVAD clinic.  If you do not hear from a  in 7 days, please call [phone] (choose option 1) and request to be seen with a general cardiologist or someone that you have seen in the past.   If there is a need to return to see CT Surgery please call our  at [phone].    Please call your VAD coordinator as below with any questions or concerns.    -----------------------------------------------------------------------------------------------------------------------  Going home with your VAD    You are about to leave the hospital with your new VAD. This time can feel overwhelming. Your VAD Coordinator team is here to do everything we can to make this transition as smooth as possible. Below are contact numbers and information to help ease the process. A VAD Coordinator is available 24/7 should you have any questions. We would be more than happy to assist you.     Please remember, if you have a true emergency, ALWAYS call 911 first. Then call the on-call VAD Coordinator.     To Reach the On-Call VAD Coordinator: Dial the main hospital number at [phone]. Choose option 4 to speak with the . Ask him or her to page the on-call VAD Coordinator. We should get back to you within five minutes.     Symptoms to Report: Please contact the on-call VAD Coordinator to report:  Bleeding   Dizziness/lightheadedness  Changes in your VAD parameters (+/- 2 from  baseline)  VAD alarms  Numbness, tingling, or difficulty moving your arms or legs  Changes in speech, swallowing, or mental status  ANY head injury, even if it is just a small bump  Dark, black, tarry, red, or bloody stools  If you vomit and it is bloody, black, or looks like coffee grounds  Increased drainage, redness, tenderness, or trauma to your driveline site, chest incision, or chest tube sites    Questions about your medications  Questions about your Coumadin or INR  Any other changes or concerns    Dressing Supplies: Your dressing supply company is [employer]. Please call them once you leave the hospital. They can be reached at [phone]. Verify that they have the correct address for delivery, especially if you are staying in the [place] before going home.     Blood Thinners: Your Coumadin (warfarin or Jantoven) will be managed by the [facility]. They can be reached M-F, 8am-4pm. They will communicate with you regarding your blood draws and your Coumadin dose. If you have an INR drawn and you don t hear from the clinic by 2pm please call them at [phone].  Please never allow anyone else to adjust your Coumadin or Aspirin without talking to a VAD Coordinator.     Clinic Appointments: The VAD team will schedule you for all of your follow-up visits. If you have any questions please call the main VAD office at [phone] to speak with our . You can also contact your VAD Coordinator.     VAD Coordinator: Your VAD Coordinator, Kim Trinidad, can be reached M-F, 8am-4pm, via [employer], by phone at [phone] or by e-mail at [email or web address].    If you have any further questions or concerns about your VAD please refer to the discharge folder you received from your VAD Coordinator and/or call the on-call VAD Coordinator.

## 2024-06-21 ENCOUNTER — APPOINTMENT (OUTPATIENT)
Dept: GENERAL RADIOLOGY | Facility: CLINIC | Age: 54
End: 2024-06-21
Attending: SURGERY
Payer: COMMERCIAL

## 2024-06-21 ENCOUNTER — APPOINTMENT (OUTPATIENT)
Dept: PHYSICAL THERAPY | Facility: CLINIC | Age: 54
End: 2024-06-21
Attending: STUDENT IN AN ORGANIZED HEALTH CARE EDUCATION/TRAINING PROGRAM
Payer: COMMERCIAL

## 2024-06-21 LAB
ALBUMIN SERPL BCG-MCNC: 3 G/DL (ref 3.5–5.2)
ALBUMIN UR-MCNC: 30 MG/DL
ALP SERPL-CCNC: 78 U/L (ref 40–150)
ALT SERPL W P-5'-P-CCNC: 25 U/L (ref 0–70)
AMORPH CRY #/AREA URNS HPF: ABNORMAL /HPF
ANION GAP SERPL CALCULATED.3IONS-SCNC: 13 MMOL/L (ref 7–15)
APPEARANCE UR: CLEAR
APTT IMM NP PPP: 51 SECONDS (ref 31–45)
APTT IMM NP PPP: 74 SECONDS (ref 31–45)
AST SERPL W P-5'-P-CCNC: 32 U/L (ref 0–45)
ATRIAL RATE - MUSE: 117 BPM
BACTERIA SPT CULT: NORMAL
BASE EXCESS BLDV CALC-SCNC: 5.4 MMOL/L (ref -3–3)
BASOPHILS # BLD AUTO: 0 10E3/UL (ref 0–0.2)
BASOPHILS NFR BLD AUTO: 0 %
BILIRUB SERPL-MCNC: 0.7 MG/DL
BILIRUB UR QL STRIP: NEGATIVE
BUN SERPL-MCNC: 26 MG/DL (ref 6–20)
CALCIUM SERPL-MCNC: 8.7 MG/DL (ref 8.6–10)
CHLORIDE SERPL-SCNC: 100 MMOL/L (ref 98–107)
COLOR UR AUTO: ABNORMAL
CREAT SERPL-MCNC: 1.06 MG/DL (ref 0.67–1.17)
CRP SERPL-MCNC: 150 MG/L
DEPRECATED HCO3 PLAS-SCNC: 24 MMOL/L (ref 22–29)
DIASTOLIC BLOOD PRESSURE - MUSE: NORMAL MMHG
DRVVT CONFIRM NORMALIZED RATIO: 1.17
DRVVT SCREEN MIX RATIO: 1.19
DRVVT SCREEN RATIO: 2.03
EGFRCR SERPLBLD CKD-EPI 2021: 84 ML/MIN/1.73M2
EOSINOPHIL # BLD AUTO: 0.3 10E3/UL (ref 0–0.7)
EOSINOPHIL NFR BLD AUTO: 2 %
ERYTHROCYTE [DISTWIDTH] IN BLOOD BY AUTOMATED COUNT: 14.2 % (ref 10–15)
FACT IX ACT/NOR PPP: 23 %
FACT V ACT/NOR PPP: 125 % (ref 60–140)
FACT VII ACT/NOR PPP: 1 % (ref 50–129)
FACT VIII ACT/NOR PPP: 363 % (ref 55–200)
FACT X ACT/NOR PPP CHRO: 35 % (ref 70–130)
FACT X ACT/NOR PPP: 24 % (ref 60–140)
GLUCOSE SERPL-MCNC: 112 MG/DL (ref 70–99)
GLUCOSE UR STRIP-MCNC: NEGATIVE MG/DL
GRAM STAIN RESULT: NORMAL
HCO3 BLDV-SCNC: 28 MMOL/L (ref 21–28)
HCT VFR BLD AUTO: 35.1 % (ref 40–53)
HGB BLD-MCNC: 11.7 G/DL (ref 13.3–17.7)
HGB UR QL STRIP: NEGATIVE
HOLD SPECIMEN: NORMAL
HOLD SPECIMEN: NORMAL
IMM GRANULOCYTES # BLD: 0.4 10E3/UL
IMM GRANULOCYTES NFR BLD: 2 %
INR PPP: 1.18 (ref 0.85–1.15)
INR PPP: 4.81 (ref 0.85–1.15)
INR PPP: 5.08 (ref 0.85–1.15)
INR PPP: 5.16 (ref 0.85–1.15)
INR PPP: 5.23 (ref 0.85–1.15)
INTERPRETATION ECG - MUSE: NORMAL
KETONES UR STRIP-MCNC: ABNORMAL MG/DL
LA PPP-IMP: POSITIVE
LACTATE SERPL-SCNC: 1.1 MMOL/L (ref 0.7–2)
LEUKOCYTE ESTERASE UR QL STRIP: NEGATIVE
LUPUS INTERPRETATION: ABNORMAL
LYMPHOCYTES # BLD AUTO: 1.3 10E3/UL (ref 0.8–5.3)
LYMPHOCYTES NFR BLD AUTO: 8 %
MAGNESIUM SERPL-MCNC: 2.2 MG/DL (ref 1.7–2.3)
MCH RBC QN AUTO: 30.7 PG (ref 26.5–33)
MCHC RBC AUTO-ENTMCNC: 33.3 G/DL (ref 31.5–36.5)
MCV RBC AUTO: 92 FL (ref 78–100)
MONOCYTES # BLD AUTO: 1.6 10E3/UL (ref 0–1.3)
MONOCYTES NFR BLD AUTO: 10 %
MUCOUS THREADS #/AREA URNS LPF: PRESENT /LPF
NEUTROPHILS # BLD AUTO: 13.2 10E3/UL (ref 1.6–8.3)
NEUTROPHILS NFR BLD AUTO: 78 %
NITRATE UR QL: NEGATIVE
NRBC # BLD AUTO: 0.1 10E3/UL
NRBC BLD AUTO-RTO: 0 /100
O2/TOTAL GAS SETTING VFR VENT: 3 %
OXYHGB MFR BLDV: 78 % (ref 70–75)
P AXIS - MUSE: 102 DEGREES
PATH REPORT.COMMENTS IMP SPEC: ABNORMAL
PCO2 BLDV: 35 MM HG (ref 40–50)
PH BLDV: 7.51 [PH] (ref 7.32–7.43)
PH UR STRIP: 6 [PH] (ref 5–7)
PHOSPHATE SERPL-MCNC: 3.6 MG/DL (ref 2.5–4.5)
PLATELET # BLD AUTO: 263 10E3/UL (ref 150–450)
PLATELET NEUTRALIZATION: 3 SECONDS
PO2 BLDV: 43 MM HG (ref 25–47)
POTASSIUM SERPL-SCNC: 3.6 MMOL/L (ref 3.4–5.3)
POTASSIUM SERPL-SCNC: 3.6 MMOL/L (ref 3.4–5.3)
PR INTERVAL - MUSE: 112 MS
PROT SERPL-MCNC: 5.9 G/DL (ref 6.4–8.3)
PROTHROM ACT/NOR PPP: 27 % (ref 60–140)
PROTHROMBIN TIME: 46.3 SECONDS
PT IMM NP PPP: 14.9 SECONDS
PTT RATIO: 2.11
QRS DURATION - MUSE: 68 MS
QT - MUSE: 344 MS
QTC - MUSE: 471 MS
R AXIS - MUSE: -45 DEGREES
RBC # BLD AUTO: 3.81 10E6/UL (ref 4.4–5.9)
RBC URINE: 0 /HPF
SAO2 % BLDV: 79.7 % (ref 70–75)
SODIUM SERPL-SCNC: 137 MMOL/L (ref 135–145)
SP GR UR STRIP: 1.02 (ref 1–1.03)
SQUAMOUS EPITHELIAL: <1 /HPF
SYSTOLIC BLOOD PRESSURE - MUSE: NORMAL MMHG
T AXIS - MUSE: 14 DEGREES
THROMBIN TIME: 17.7 SECONDS (ref 13–19)
UROBILINOGEN UR STRIP-MCNC: NORMAL MG/DL
VENTRICULAR RATE- MUSE: 113 BPM
WBC # BLD AUTO: 16.8 10E3/UL (ref 4–11)
WBC URINE: <1 /HPF

## 2024-06-21 PROCEDURE — 82805 BLOOD GASES W/O2 SATURATION: CPT | Performed by: STUDENT IN AN ORGANIZED HEALTH CARE EDUCATION/TRAINING PROGRAM

## 2024-06-21 PROCEDURE — 85730 THROMBOPLASTIN TIME PARTIAL: CPT | Performed by: PHYSICIAN ASSISTANT

## 2024-06-21 PROCEDURE — 99255 IP/OBS CONSLTJ NEW/EST HI 80: CPT | Mod: AI | Performed by: INTERNAL MEDICINE

## 2024-06-21 PROCEDURE — 250N000011 HC RX IP 250 OP 636: Mod: JZ | Performed by: NURSE PRACTITIONER

## 2024-06-21 PROCEDURE — 250N000013 HC RX MED GY IP 250 OP 250 PS 637

## 2024-06-21 PROCEDURE — 36415 COLL VENOUS BLD VENIPUNCTURE: CPT | Performed by: PHYSICIAN ASSISTANT

## 2024-06-21 PROCEDURE — 87205 SMEAR GRAM STAIN: CPT | Performed by: STUDENT IN AN ORGANIZED HEALTH CARE EDUCATION/TRAINING PROGRAM

## 2024-06-21 PROCEDURE — 250N000013 HC RX MED GY IP 250 OP 250 PS 637: Performed by: STUDENT IN AN ORGANIZED HEALTH CARE EDUCATION/TRAINING PROGRAM

## 2024-06-21 PROCEDURE — 85390 FIBRINOLYSINS SCREEN I&R: CPT | Mod: 26 | Performed by: PATHOLOGY

## 2024-06-21 PROCEDURE — 85230 CLOT FACTOR VII PROCONVERTIN: CPT | Performed by: PHYSICIAN ASSISTANT

## 2024-06-21 PROCEDURE — 36415 COLL VENOUS BLD VENIPUNCTURE: CPT

## 2024-06-21 PROCEDURE — 250N000011 HC RX IP 250 OP 636: Mod: JZ | Performed by: PHYSICIAN ASSISTANT

## 2024-06-21 PROCEDURE — 999N000157 HC STATISTIC RCP TIME EA 10 MIN

## 2024-06-21 PROCEDURE — 85025 COMPLETE CBC W/AUTO DIFF WBC: CPT

## 2024-06-21 PROCEDURE — 250N000011 HC RX IP 250 OP 636: Performed by: SURGERY

## 2024-06-21 PROCEDURE — 71045 X-RAY EXAM CHEST 1 VIEW: CPT | Mod: 26 | Performed by: RADIOLOGY

## 2024-06-21 PROCEDURE — 85610 PROTHROMBIN TIME: CPT | Performed by: PHYSICIAN ASSISTANT

## 2024-06-21 PROCEDURE — 83735 ASSAY OF MAGNESIUM: CPT

## 2024-06-21 PROCEDURE — 81001 URINALYSIS AUTO W/SCOPE: CPT | Performed by: PHYSICIAN ASSISTANT

## 2024-06-21 PROCEDURE — 94640 AIRWAY INHALATION TREATMENT: CPT | Mod: 76

## 2024-06-21 PROCEDURE — 85220 BLOOC CLOT FACTOR V TEST: CPT | Performed by: PHYSICIAN ASSISTANT

## 2024-06-21 PROCEDURE — 85610 PROTHROMBIN TIME: CPT | Performed by: STUDENT IN AN ORGANIZED HEALTH CARE EDUCATION/TRAINING PROGRAM

## 2024-06-21 PROCEDURE — 250N000011 HC RX IP 250 OP 636: Performed by: PHYSICIAN ASSISTANT

## 2024-06-21 PROCEDURE — 250N000013 HC RX MED GY IP 250 OP 250 PS 637: Performed by: PHYSICIAN ASSISTANT

## 2024-06-21 PROCEDURE — 120N000003 HC R&B IMCU UMMC

## 2024-06-21 PROCEDURE — 99232 SBSQ HOSP IP/OBS MODERATE 35: CPT | Mod: FS | Performed by: PHYSICIAN ASSISTANT

## 2024-06-21 PROCEDURE — 86140 C-REACTIVE PROTEIN: CPT | Performed by: PHYSICIAN ASSISTANT

## 2024-06-21 PROCEDURE — 97116 GAIT TRAINING THERAPY: CPT | Mod: GP

## 2024-06-21 PROCEDURE — 83605 ASSAY OF LACTIC ACID: CPT

## 2024-06-21 PROCEDURE — 93750 INTERROGATION VAD IN PERSON: CPT | Performed by: STUDENT IN AN ORGANIZED HEALTH CARE EDUCATION/TRAINING PROGRAM

## 2024-06-21 PROCEDURE — 85613 RUSSELL VIPER VENOM DILUTED: CPT | Performed by: PHYSICIAN ASSISTANT

## 2024-06-21 PROCEDURE — 86147 CARDIOLIPIN ANTIBODY EA IG: CPT | Performed by: STUDENT IN AN ORGANIZED HEALTH CARE EDUCATION/TRAINING PROGRAM

## 2024-06-21 PROCEDURE — 250N000009 HC RX 250

## 2024-06-21 PROCEDURE — 87070 CULTURE OTHR SPECIMN AEROBIC: CPT | Performed by: STUDENT IN AN ORGANIZED HEALTH CARE EDUCATION/TRAINING PROGRAM

## 2024-06-21 PROCEDURE — 99232 SBSQ HOSP IP/OBS MODERATE 35: CPT | Mod: 25 | Performed by: STUDENT IN AN ORGANIZED HEALTH CARE EDUCATION/TRAINING PROGRAM

## 2024-06-21 PROCEDURE — 258N000003 HC RX IP 258 OP 636: Mod: JZ | Performed by: SURGERY

## 2024-06-21 PROCEDURE — 85250 CLOT FACTOR IX PTC/CHRSTMAS: CPT | Performed by: PHYSICIAN ASSISTANT

## 2024-06-21 PROCEDURE — 87040 BLOOD CULTURE FOR BACTERIA: CPT | Performed by: PHYSICIAN ASSISTANT

## 2024-06-21 PROCEDURE — 86146 BETA-2 GLYCOPROTEIN ANTIBODY: CPT | Performed by: STUDENT IN AN ORGANIZED HEALTH CARE EDUCATION/TRAINING PROGRAM

## 2024-06-21 PROCEDURE — 94640 AIRWAY INHALATION TREATMENT: CPT

## 2024-06-21 PROCEDURE — 85210 CLOT FACTOR II PROTHROM SPEC: CPT | Performed by: PHYSICIAN ASSISTANT

## 2024-06-21 PROCEDURE — 250N000013 HC RX MED GY IP 250 OP 250 PS 637: Performed by: SURGERY

## 2024-06-21 PROCEDURE — 80053 COMPREHEN METABOLIC PANEL: CPT

## 2024-06-21 PROCEDURE — 85260 CLOT FACTOR X STUART-POWER: CPT | Performed by: SURGERY

## 2024-06-21 PROCEDURE — 84100 ASSAY OF PHOSPHORUS: CPT

## 2024-06-21 PROCEDURE — 71045 X-RAY EXAM CHEST 1 VIEW: CPT

## 2024-06-21 PROCEDURE — 85260 CLOT FACTOR X STUART-POWER: CPT | Performed by: PHYSICIAN ASSISTANT

## 2024-06-21 PROCEDURE — 85240 CLOT FACTOR VIII AHG 1 STAGE: CPT | Performed by: PHYSICIAN ASSISTANT

## 2024-06-21 RX ORDER — POTASSIUM CHLORIDE 750 MG/1
20 TABLET, EXTENDED RELEASE ORAL ONCE
Status: COMPLETED | OUTPATIENT
Start: 2024-06-21 | End: 2024-06-21

## 2024-06-21 RX ORDER — PIPERACILLIN SODIUM, TAZOBACTAM SODIUM 3; .375 G/15ML; G/15ML
3.38 INJECTION, POWDER, LYOPHILIZED, FOR SOLUTION INTRAVENOUS EVERY 6 HOURS
Status: DISCONTINUED | OUTPATIENT
Start: 2024-06-21 | End: 2024-06-22

## 2024-06-21 RX ADMIN — OXYCODONE HYDROCHLORIDE 5 MG: 5 TABLET ORAL at 11:04

## 2024-06-21 RX ADMIN — HYDRALAZINE HYDROCHLORIDE 5 MG: 20 INJECTION INTRAMUSCULAR; INTRAVENOUS at 01:20

## 2024-06-21 RX ADMIN — OXYCODONE HYDROCHLORIDE 5 MG: 5 TABLET ORAL at 20:27

## 2024-06-21 RX ADMIN — IPRATROPIUM BROMIDE AND ALBUTEROL SULFATE 3 ML: .5; 3 SOLUTION RESPIRATORY (INHALATION) at 12:06

## 2024-06-21 RX ADMIN — VANCOMYCIN HYDROCHLORIDE 1250 MG: 10 INJECTION, POWDER, LYOPHILIZED, FOR SOLUTION INTRAVENOUS at 22:06

## 2024-06-21 RX ADMIN — OXYCODONE HYDROCHLORIDE 5 MG: 5 TABLET ORAL at 16:25

## 2024-06-21 RX ADMIN — GUAIFENESIN 600 MG: 600 TABLET ORAL at 20:19

## 2024-06-21 RX ADMIN — ACETAMINOPHEN 975 MG: 325 TABLET, FILM COATED ORAL at 17:29

## 2024-06-21 RX ADMIN — ACETAMINOPHEN 975 MG: 325 TABLET, FILM COATED ORAL at 09:52

## 2024-06-21 RX ADMIN — CAPTOPRIL 12.5 MG: 12.5 TABLET ORAL at 08:55

## 2024-06-21 RX ADMIN — HYDRALAZINE HYDROCHLORIDE 50 MG: 25 TABLET ORAL at 11:00

## 2024-06-21 RX ADMIN — HYDRALAZINE HYDROCHLORIDE 5 MG: 20 INJECTION INTRAMUSCULAR; INTRAVENOUS at 03:58

## 2024-06-21 RX ADMIN — IPRATROPIUM BROMIDE AND ALBUTEROL SULFATE 3 ML: .5; 3 SOLUTION RESPIRATORY (INHALATION) at 16:27

## 2024-06-21 RX ADMIN — ONDANSETRON 4 MG: 2 INJECTION INTRAMUSCULAR; INTRAVENOUS at 10:31

## 2024-06-21 RX ADMIN — HYDRALAZINE HYDROCHLORIDE 5 MG: 20 INJECTION INTRAMUSCULAR; INTRAVENOUS at 02:21

## 2024-06-21 RX ADMIN — CAPTOPRIL 12.5 MG: 12.5 TABLET ORAL at 14:47

## 2024-06-21 RX ADMIN — IPRATROPIUM BROMIDE AND ALBUTEROL SULFATE 3 ML: .5; 3 SOLUTION RESPIRATORY (INHALATION) at 08:37

## 2024-06-21 RX ADMIN — PIPERACILLIN AND TAZOBACTAM 3.38 G: 3; .375 INJECTION, POWDER, FOR SOLUTION INTRAVENOUS at 16:39

## 2024-06-21 RX ADMIN — PIPERACILLIN AND TAZOBACTAM 3.38 G: 3; .375 INJECTION, POWDER, FOR SOLUTION INTRAVENOUS at 10:55

## 2024-06-21 RX ADMIN — ATORVASTATIN CALCIUM 20 MG: 20 TABLET, FILM COATED ORAL at 20:19

## 2024-06-21 RX ADMIN — GUAIFENESIN 600 MG: 600 TABLET ORAL at 08:55

## 2024-06-21 RX ADMIN — HYDRALAZINE HYDROCHLORIDE 50 MG: 25 TABLET ORAL at 19:34

## 2024-06-21 RX ADMIN — POTASSIUM CHLORIDE 20 MEQ: 750 TABLET, EXTENDED RELEASE ORAL at 08:55

## 2024-06-21 RX ADMIN — IPRATROPIUM BROMIDE AND ALBUTEROL SULFATE 3 ML: .5; 3 SOLUTION RESPIRATORY (INHALATION) at 20:38

## 2024-06-21 RX ADMIN — ACETAMINOPHEN 975 MG: 325 TABLET, FILM COATED ORAL at 01:20

## 2024-06-21 RX ADMIN — METHOCARBAMOL 500 MG: 500 TABLET ORAL at 09:52

## 2024-06-21 RX ADMIN — HYDROXYZINE HYDROCHLORIDE 25 MG: 25 TABLET ORAL at 17:29

## 2024-06-21 RX ADMIN — VANCOMYCIN HYDROCHLORIDE 2000 MG: 1 INJECTION, POWDER, LYOPHILIZED, FOR SOLUTION INTRAVENOUS at 11:37

## 2024-06-21 RX ADMIN — CAPTOPRIL 12.5 MG: 12.5 TABLET ORAL at 19:34

## 2024-06-21 RX ADMIN — PANTOPRAZOLE SODIUM 40 MG: 40 TABLET, DELAYED RELEASE ORAL at 08:55

## 2024-06-21 RX ADMIN — LIDOCAINE 2 PATCH: 4 PATCH TOPICAL at 11:40

## 2024-06-21 ASSESSMENT — ACTIVITIES OF DAILY LIVING (ADL)
ADLS_ACUITY_SCORE: 30
ADLS_ACUITY_SCORE: 38
ADLS_ACUITY_SCORE: 38
ADLS_ACUITY_SCORE: 30
ADLS_ACUITY_SCORE: 38
ADLS_ACUITY_SCORE: 30
ADLS_ACUITY_SCORE: 38
ADLS_ACUITY_SCORE: 30
ADLS_ACUITY_SCORE: 31
ADLS_ACUITY_SCORE: 38
ADLS_ACUITY_SCORE: 30

## 2024-06-21 NOTE — PHARMACY-ADMISSION MEDICATION HISTORY
"Pharmacy Vancomycin Initial Note  Date of Service 2024  Patient's  1970  53 year old, male    Indication: Postoperative Infection    Current estimated CrCl = Estimated Creatinine Clearance: 96.7 mL/min (based on SCr of 1.06 mg/dL).    Creatinine for last 3 days  2024: 10:46 AM Creatinine 1.26 mg/dL;  3:16 PM Creatinine 1.11 mg/dL; 10:30 PM Creatinine 1.18 mg/dL  2024:  4:27 AM Creatinine 1.08 mg/dL;  3:47 PM Creatinine 1.23 mg/dL;  3:47 PM Creatinine 1.23 mg/dL  2024:  3:45 AM Creatinine 1.11 mg/dL;  5:46 PM Creatinine 1.12 mg/dL  2024:  4:44 AM Creatinine 1.06 mg/dL    Recent Vancomycin Level(s) for last 3 days  No results found for requested labs within last 3 days.      Vancomycin IV Administrations (past 72 hours)        No vancomycin orders with administrations in past 72 hours.                    Nephrotoxins and other renal medications (From now, onward)      Start     Dose/Rate Route Frequency Ordered Stop    24 2200  vancomycin (VANCOCIN) 1,250 mg in 0.9% NaCl 250 mL intermittent infusion        Placed in \"Followed by\" Linked Group    1,250 mg  over 90 Minutes Intravenous EVERY 12 HOURS 24 0903      24 1000  vancomycin (VANCOCIN) 2,000 mg in sodium chloride 0.9 % 500 mL intermittent infusion        Placed in \"Followed by\" Linked Group    2,000 mg  over 120 Minutes Intravenous ONCE 24 0903      24 0900  piperacillin-tazobactam (ZOSYN) 3.375 g vial to attach to  mL bag        Note to Pharmacy: For [facility], [facility] and [facility]: For Zosyn-naive patients, use the \"Zosyn initial dose + extended infusion\" order panel.    3.375 g  over 30 Minutes Intravenous EVERY 6 HOURS 24 0851      24 2000  captopril (CAPOTEN) tablet 12.5 mg         12.5 mg Oral 3 TIMES DAILY 24 1528              Contrast Orders - past 72 hours (72h ago, onward)      None            [employer] Prediction of Planned Initial Vancomycin Regimen  Loading dose: 2000 mg at " 10:00 06/21/2024.  Regimen: 1250 mg IV every 12 hours.  Start time: 22:00 on 06/21/2024  Exposure target: AUC24 (range)400-600 mg/L.hr   AUC24,ss: 592 mg/L.hr  Probability of AUC24 > 400: 86 %  Ctrough,ss: 19.3 mg/L  Probability of Ctrough,ss > 20: 47 %  Probability of nephrotoxicity (Lodise CANDIS 2009): 16 %          Plan:  Start vancomycin  2000 mg IV once, followed by 1250 mg IV every 12 hours  Vancomycin monitoring method: AUC  Vancomycin therapeutic monitoring goal: 400-600 mg*h/L  Pharmacy will check vancomycin levels as appropriate in 1-3 Days.    Serum creatinine levels will be ordered daily for the first week of therapy and at least twice weekly for subsequent weeks.      Maren CalderónD

## 2024-06-21 NOTE — PROVIDER NOTIFICATION
"Time of notification: 5:30 AM  Provider notified:  Patient status:  \"Pt has received IV Hydralazine x3 for MAP >80 but wondering if you want us to hold off d/t HR 120s.\"  Orders received:  Okay to hold off on giving IV Hydralazine until day team reassesses.  "

## 2024-06-21 NOTE — CONSULTS
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 He required LVAD placement on 6/14 and has been on warfarin for this as well. The patient's INR was noted to be supratherapeutic and disconcordant with his chromogenic Factor X. 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 Factor VIII and V were appropriate.  Given the patient has an inhibitor present, it would be recommended to have the patient have his anticoagulation (Warfarin) monitored via chromogenic factor X only as his INR will be affected by this inhibitor. 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          Recommendations:   - Recommend utilizing only chromogenic Factor X for monitoring anticoagulation for patient given patient has an inhibitor.  - Please order Chromogenic Factor X tomorrow and hold Warfarin tonight (would aim for a chromogenic factor X goal of 20-40% as this would correlate with INR 2-3. Please contact our team if there are questions throughout his stay regarding his complicated monitoring).    Thank you for this consult and the opportunity to treat this pleasant patient. We will continue to follow this patient. " Please do not hesitate to page with any questions or concerns.    Patient was seen and plan of care was discussed with attending physician Dr. Beasley. Attestation to follow.     Godwin Christopher DO   Hematology/Oncology/BMT Fellow PGY4  Pager: [phone]  Attending  The patient was seen and examined by me separate from the resident/fellow provider.The note above reflects my assessment and plan. I have personally reviewed today's labs,vital and radiology results. The points of care that were added by me are:    Pt with LVAD on warfarin who has developed  a lupus anticoagulants with prolonged INR  No bleeding Our workup  suggests that INR is unreliable to monitor warfarin. Would hold warfarin tonight  repeat INR and Chromogenic Factor X tomorrow and then decide on dose of warfarin.Of note the Factor VII was only 1% suggesting there was adequate Vitamin K reductase inhibition with the warfarin doses Going forward will need to monitor warfarin with a chromogenic X at 20-40%  Sree Beasley M.D.  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 He had an LVAD placed this admission on 6/14 and also has been on warfarin for this as well.   He is being intermittently diuresed by cardiology teams.   He has been on chronic anticoagulation via warfarin (reportedly 9 months per patient) for his LVAD. His most recent INR was noted to be supratherapeutic and chromogenic factor X was noted to be disconcordant with his INR. The patient denies bleeding issues or clotting issues in the past. He denies melena, hematochezia, melena or other new symptoms.    Numerous Factor and mixing studies performed for  patient.       Review of Systems:  A comprehensive ROS was performed and found to be negative or non-contributory with the exception of that noted in the HPI above.    Past Medical History:  No past medical history on file.    Past Surgical History:  Past Surgical History:   Procedure Laterality Date    CARDIAC SURGERY      COLONOSCOPY N/A 8/25/2023    Procedure: COLONOSCOPY, WITH POLYPECTOMY AND BIOPSY;  Surgeon: Alejandro Rodriguez MD;  Location:  GI    CV INTRA AORTIC BALLOON N/A 6/12/2024    Procedure: Intra aortic Balloon Pump Insertion;  Surgeon: Elfego Cruz MD;  Location:  HEART CARDIAC CATH LAB    CV RIGHT HEART CATH MEASUREMENTS RECORDED N/A 08/22/2023    Procedure: Heart Cath Right Heart Cath;  Surgeon: Elfego Cruz MD;  [facility]    CV RIGHT HEART CATH MEASUREMENTS RECORDED N/A 9/20/2023    Procedure: Heart Cath Right Heart Cath;  Surgeon: Elfego Cruz MD;  [facility]    CV RIGHT HEART CATH MEASUREMENTS RECORDED N/A 1/19/2024    Procedure: Heart Cath Right Heart Cath;  Surgeon: Tobin Jaime MD;  [facility]    CV RIGHT HEART CATH MEASUREMENTS RECORDED N/A 5/3/2024    Procedure: Heart Cath Right Heart Cath;  Surgeon: Dion Ashley MD;  [facility]    CV RIGHT HEART CATH MEASUREMENTS RECORDED N/A 6/4/2024    Procedure: Right Heart Catheterization;  Surgeon: Cassandra Rizzo MD;  Location:  HEART CARDIAC CATH LAB    CV RIGHT HEART CATH MEASUREMENTS RECORDED N/A 6/12/2024    Procedure: Right Heart Catheterization;  Surgeon: Elfego Cruz MD;  Location:  HEART CARDIAC CATH LAB    INSERT VENTRICULAR ASSIST DEVICE LEFT (HEARTMATE II) N/A 6/14/2024    Procedure: Median Sternotomy, INSERTION, LEFT VENTRICULAR ASSIST DEVICE (HEARTMATE III), on Cardiopulmonary Bypass, Transesophageal Echocardiogram by Anesthesia;   Surgeon: Brian Jhaveri MD;  Location: [facility]    PICC DOUBLE LUMEN PLACEMENT Right 08/22/2023    Brachial Vein Medial 5F DL 45 cm, 2 cm out       Social History:  Social History     Socioeconomic History    Marital status:    Tobacco Use    Smoking status: Never    Smokeless tobacco: Never   Substance and Sexual Activity    Alcohol use: Never    Drug use: Never    Sexual activity: Yes     Partners: Female     Social Determinants of Health     Financial Resource Strain: Low Risk  (11/17/2023)    Received from ,     Overall Financial Resource Strain (CARDIA)     Difficulty of Paying Living Expenses: Not hard at all   Food Insecurity: No Food Insecurity (11/17/2023)    Received from ,     Hunger Vital Sign     Worried About Running Out of Food in the Last Year: Never true     Ran Out of Food in the Last Year: Never true   Transportation Needs: No Transportation Needs (11/17/2023)    Received from ,     PRAPARE - Transportation     Lack of Transportation (Medical): No     Lack of Transportation (Non-Medical): No   Housing Stability: Unknown (11/17/2023)    Received from ,     Housing Stability Vital Sign     Unable to Pay for Housing in the Last Year: No     Unstable Housing in the Last Year: No        Family History  No family history on file.    Outpatient Medications:  Current Facility-Administered Medications   Medication Dose Route Frequency Provider Last Rate Last Admin    acetaminophen (TYLENOL) tablet 650 mg  650 mg Oral Q4H PRN Cira Mallory PA-C   650 mg at 06/20/24 1337    acetaminophen (TYLENOL) tablet 975 mg  975 mg Oral Q8H Darnell Glaser PA-C   975 mg at 06/21/24 0952    atorvastatin (LIPITOR) tablet 20 mg  20 mg Oral or Feeding Tube QPM Chase Mallory MD   20  mg at 06/20/24 1959    bisacodyl (DULCOLAX) suppository 10 mg  10 mg Rectal Daily PRN Cira Mallory PA-C        captopril (CAPOTEN) tablet 12.5 mg  12.5 mg Oral TID Deon Rosa APRN CNP   12.5 mg at 06/21/24 0855    glucose gel 15-30 g  15-30 g Oral Q15 Min PRN Misha Guerra MD        Or    dextrose 50 % injection 25-50 mL  25-50 mL Intravenous Q15 Min PRN Misha Guerra MD        Or    glucagon injection 1 mg  1 mg Subcutaneous Q15 Min PRN Misha Guerra MD        diclofenac (VOLTAREN) 1 % topical gel 2 g  2 g Topical 4x Daily PRN Sarah Guerin APRN CNP        guaiFENesin (MUCINEX) 12 hr tablet 600 mg  600 mg Oral BID Mary Baker PA-C   600 mg at 06/21/24 0855    hydrALAZINE (APRESOLINE) injection 5 mg  5 mg Intravenous Q30 Min PRN Gissell Balbuena, CNP   5 mg at 06/21/24 0358    hydrALAZINE (APRESOLINE) tablet 50 mg  50 mg Oral Q8H Deon Rosa APRN CNP   50 mg at 06/21/24 1100    hydrOXYzine HCl (ATARAX) tablet 25 mg  25 mg Oral Q6H PRN Misha Guerra MD        Or    hydrOXYzine HCl (ATARAX) tablet 50 mg  50 mg Oral Q6H PRN Misha Guerra MD   50 mg at 06/18/24 0249    ipratropium - albuterol 0.5 mg/2.5 mg/3 mL (DUONEB) neb solution 3 mL  3 mL Nebulization 4x daily Deon Rosa APRN CNP   3 mL at 06/21/24 1206    Lidocaine (LIDOCARE) 4 % Patch 1-2 patch  1-2 patch Transdermal Q24H Sarah Guerin APRN CNP   2 patch at 06/21/24 1140    lidocaine (LMX4) cream   Topical Q1H PRN Cira Mallory PA-C        lidocaine 1 % 0.1-1 mL  0.1-1 mL Other Q1H PRN Brong, Cira, PA-C        melatonin tablet 5 mg  5 mg Oral At Bedtime PRN Misha Guerra MD   5 mg at 06/17/24 2035    methocarbamol (ROBAXIN) tablet 500 mg  500 mg Oral 4x Daily PRN Sarah Guerin APRN CNP   500 mg at 06/21/24 0952    naloxone (NARCAN) injection 0.2 mg  0.2 mg Intravenous Q2 Min PRN Brian Jhaveri MD        Or    naloxone (NARCAN) injection 0.4 mg  0.4 mg Intravenous Q2 Min  PRN Biran Jhaveri MD        Or    naloxone (NARCAN) injection 0.2 mg  0.2 mg Intramuscular Q2 Min PRBrian Bustos MD        Or    naloxone (NARCAN) injection 0.4 mg  0.4 mg Intramuscular Q2 Min PRN Brian Jhaveri MD        ondansetron (ZOFRAN ODT) ODT tab 4 mg  4 mg Oral Q6H PRN Cira Mallory PA-C        Or    ondansetron (ZOFRAN) injection 4 mg  4 mg Intravenous Q6H PRN Cira Mallory PA-C   4 mg at 06/21/24 1031    oxyCODONE (ROXICODONE) tablet 5 mg  5 mg Oral Q4H PRN Cira Mallory PA-C   5 mg at 06/21/24 1104    Or    oxyCODONE IR (ROXICODONE) tablet 10 mg  10 mg Oral Q4H PRN Cira Mallory PA-C   10 mg at 06/18/24 0249    pantoprazole (PROTONIX) EC tablet 40 mg  40 mg Oral QAM Deon Arana APRN CNP   40 mg at 06/21/24 0855    piperacillin-tazobactam (ZOSYN) 3.375 g vial to attach to  mL bag  3.375 g Intravenous Q6H Galilea Dang PA-C   3.375 g at 06/21/24 1055    polyethylene glycol (MIRALAX) Packet 17 g  17 g Oral Daily Darnell Glaser PA-C        prochlorperazine (COMPAZINE) injection 10 mg  10 mg Intravenous Q6H PRN Cira Mallory PA-C   10 mg at 06/18/24 0241    Or    prochlorperazine (COMPAZINE) tablet 10 mg  10 mg Oral Q6H PRN Cira Mallory, PA-C        Reason beta blocker order not selected   Does not apply DOES NOT GO TO Cira Dixon PA-C        senna-docusate (SENOKOT-S/PERICOLACE) 8.6-50 MG per tablet 2 tablet  2 tablet Oral BID Sarah Guerin APRN CNP   2 tablet at 06/18/24 1956    sodium chloride (PF) 0.9% PF flush 3 mL  3 mL Intracatheter Q8H Cira Mallory PA-C   3 mL at 06/21/24 0855    sodium chloride (PF) 0.9% PF flush 3 mL  3 mL Intracatheter q1 min prn Cira Mallory PA-C   3 mL at 06/19/24 2010    vancomycin (VANCOCIN) 2,000 mg in sodium chloride 0.9 % 500 mL intermittent infusion  2,000 mg Intravenous Once Brian Jhaveri MD   2,000 mg at 06/21/24 1137    Followed by    vancomycin (VANCOCIN) 1,250 mg in 0.9% NaCl 250 mL intermittent infusion  1,250 mg  Intravenous Q12H Brian Jhaveri MD        Warfarin Dose Required Daily - Pharmacist Managed  1 each Oral See Admin Instructions Misha Guerra MD        warfarin-No DOSE today  1 each Does not apply no dose today (warfarin) Brian Jhaveri MD            Physical Exam:    Blood pressure 100/85, pulse 120, temperature 98.9  F (37.2  C), temperature source Oral, resp. rate 22, height 1.829 m (6'), weight 91.3 kg (201 lb 4.5 oz), SpO2 96%.  General: alert and cooperative, lying in bed, no acute distress  HEENT: sclera anicteric, EOMI, MMM  Neck: supple, normal ROM  CV: RRR, LVAD hum present   Resp: CTAB, normal respiratory effort on 3LPM  GI: soft, non-tender, non-distended,   MSK: warm and well-perfused, normal tone  Skin: no rashes on limited exam, no jaundice  Neuro: Alert and interactive, moves all extremities equally, no focal deficits    Labs & Studies: I personally reviewed the following studies:  ROUTINE LABS (Last four results):  CMP  Recent Labs   Lab 06/21/24  0444 06/20/24  1746 06/20/24  1116 06/20/24  0813 06/20/24  0345 06/19/24  1959 06/19/24  1547 06/19/24  1126 06/19/24  0910 06/19/24  0428 06/19/24  0427    137  --   --  138  --  139  139  --   --   --  140   POTASSIUM 3.6  3.6 3.8  --   --  3.6  --  3.6  3.6  --   --   --  3.5   CHLORIDE 100 101  --   --  100  --  102  102  --   --   --  105   CO2 24 24  --   --  27  --  26  26  --   --   --  25   ANIONGAP 13 12  --   --  11  --  11  11  --   --   --  10   * 130* 90 96 100*  100*   < > 101*  95  95   < >  --    < > 117*   BUN 26.0* 28.6*  --   --  29.4*  --  29.8*  29.8*  --   --   --  26.3*   CR 1.06 1.12  --   --  1.11  --  1.23*  1.23*  --   --   --  1.08   GFRESTIMATED 84 79  --   --  79  --  70  70  --   --   --  82   NAM 8.7 8.9  --   --  8.7  --  8.7  8.7  --   --   --  7.8*   MAG 2.2  --   --   --  2.2  --   --   --  2.3  --  2.0   PHOS 3.6  --   --   --  3.6  --   --   --  2.6  --  2.5   PROTTOTAL 5.9* 6.1*  --   --   5.6*  --  5.7*  --   --   --  5.1*   ALBUMIN 3.0* 3.0*  --   --  2.9*  --  3.1*  --   --   --  2.7*   BILITOTAL 0.7 0.6  --   --  0.6  --  0.6  --   --   --  0.5   ALKPHOS 78 74  --   --  68  --  64  --   --   --  55   AST 32 32  --   --  33  --  36  --   --   --  31   ALT 25 26  --   --  29  --  28  --   --   --  22    < > = values in this interval not displayed.     CBC  Recent Labs   Lab 06/21/24 0444 06/20/24  1746 06/20/24 0345 06/19/24  0427   WBC 16.8* 15.9* 13.3* 10.3   RBC 3.81* 3.58* 3.40* 2.94*   HGB 11.7* 10.9* 10.4* 9.1*   HCT 35.1* 33.1* 31.4* 27.7*   MCV 92 93 92 94   MCH 30.7 30.4 30.6 31.0   MCHC 33.3 32.9 33.1 32.9   RDW 14.2 14.0 14.0 14.0    232 210 131*     INR  Recent Labs   Lab 06/21/24  0949 06/21/24 0444 06/20/24 0345 06/19/24  0427   INR 5.08* 4.81* 3.64* 2.78*

## 2024-06-21 NOTE — PLAN OF CARE
Neuro: A&Ox4.  Sleeping between cares.  Cardiac: Sinus Tach with frequent PVCs, HR 110s - 120s, team aware. Pt had 10 second run of Vtach, HR 180s, team aware.    Respiratory: Sating >92% on 3 L NC.  Shallow & tachypnic at times.  GI/: Adequate urine output via urinal.  Last BM 6/19/2024, passing flatus.  Diet/appetite: On regular diet w/ poor appetite.   Activity:  Pt not OOB.  Encouraged pt to shift weight overnight.  Pain: At acceptable level on current regimen.   Skin: No new deficits noted.  LDA's: HM3 LVAD.  L PIV SL.    Plan: Continue with POC. Notify primary team with changes.

## 2024-06-21 NOTE — PHARMACY-ANTICOAGULATION SERVICE
Warfarin Therapy Hold Note  This patient is currently receiving warfarin for LVAD.    Goal INR:  2-3.      Anticoagulation Dose History  More data exists         Latest Ref Rng & Units 6/15/2024 6/16/2024 6/17/2024 6/18/2024 6/19/2024 6/20/2024 6/21/2024   Recent Dosing and Labs   warfarin ANTICOAGULANT (COUMADIN) 0.25 mg TABS quarter-tab - - 3.5 mg, $Given - - - - -   warfarin ANTICOAGULANT (COUMADIN) 0.5 mg TABS half-tab - - - - - - 0.5 mg, $Given -   warfarin ANTICOAGULANT (COUMADIN) 1 MG tablet - - - - - 1 mg, $Given - -   warfarin ANTICOAGULANT (COUMADIN) 5 MG tablet - - - 5 mg, $Given 5 mg, $Given - - -   INR 0.85 - 1.15 1.28  1.41  1.37  1.50  2.78  3.64  4.81       Details                     Bleeding Signs/Symptoms:  None    Assessment:  Current INR level is supratherapeutic.  This is most likely due to: nutrition changes, stress from acute illness.    Chromogenic factor X was measured at 35% which correlates to an INR of about 2.0-2.5. Upon chart review, patient has previously tested for lupus anticoagulant and was negative, though his thrombin time was slightly elevated. Unclear why his INR is elevated while patient on much lower doses of warfarin compared to home dosing. Discussed with CVTS who plan to consult heme to weigh in on INR vs chromogenic factor X monitoring for warfarin in this patient.     Plan:  HOLD today s warfarin dose.   An order has been placed in EPIC for  Warfarin- No Dose Today    Do not recommend reversal with vitamin K or FFP at this time.   Recheck next INR tomorrow with AM labs    The primary team has been contacted about the above plan.  Griselda Noel, MarenD

## 2024-06-21 NOTE — PROGRESS NOTES
[facility]   Cardiology II Service ICU/ Advanced Heart Failure  Daily Progress Note      Patient: Navin Lutz  MRN: [id number]  Admission Date: 6/3/2024  Hospital Day # 18    Assessment and Plan: Navin Lutz is a 53 year old male admitted on 6/3/2024. He has a past medical history of chronic HFrEF 2/2 NICM s/p ICD, HLD, and CKD Stage II who presents admitted for decompensated heart failure on milrinone listed status 4, admitted for consideration of advanced therapies, now s/p LVAD .     Today's Plan:  -Would hold diuretics today and shoot for goal of even to slightly positive  -Continue captopril 12.5 mg TID  -continue to replete electrolytes K>4, Mag >2    # Acute on chronic systolic heart failure/HFrEF secondary to NICM with EF of 10-15%, now s/p HM3 LVAD     -Fluid status: CVP 13, give lasix 60 mg IV BID  -Inotrope: try to wean off dobutamine today  -RV support: off Laisha  -BB: coreg 3.125mg on hold  -Aldosterone antagonist: aldactone 25 mg daily on hold   -Afterload reduction: continue hydralazine 37.5 mg q8, start captopril 6.25 mg TID  -SGLT2i: Holding  -SCD prophylaxis: ICD    The patient's HeartMate LVAD was interrogated 2024  Heartmate 3 LEFT VS  Flow (Lpm): 5.0 Lpm  Pulse Index (PI): 3.1 PI  Speed (rpm): 5400 rpm  Power (bassett): 3.9 bassett  Current Hct settin   No alarms or PI events.     This plan was discussed with attending Dr. Konrad Nagy MD  Cardiology Fellow    ================================================================    Subjective/24-Hr Events:   Overnight had 10 seconds NSVT, asymptomatic, otherwise stable    Medications: Reviewed in EPIC.     Physical Exam:   /85   Pulse (!) 121   Temp 98.3  F (36.8  C) (Oral)   Resp 30   Ht 1.829 m (6')   Wt 95.5 kg (210 lb 8.6 oz)   SpO2 96%   BMI 28.55 kg/m      GENERAL: NAD  HEENT: no scleral icterus  NECK: Supple. No JVD   CV: tachycardic, regular rhythm, LVAD hum noted  RESPIRATORY:  decreased breath sounds bilaterally  GI: Soft and non distended   EXTREMITIES: No peripheral edema  NEUROLOGIC: AO x 3    Labs:  CMP  Recent Labs   Lab 06/21/24  0444 06/20/24  1746 06/20/24  1116 06/20/24  0813 06/20/24  0345 06/19/24  1959 06/19/24  1547 06/19/24  1126 06/19/24  0910 06/19/24  0428 06/19/24  0427    137  --   --  138  --  139  139  --   --   --  140   POTASSIUM 3.6  3.6 3.8  --   --  3.6  --  3.6  3.6  --   --   --  3.5   CHLORIDE 100 101  --   --  100  --  102  102  --   --   --  105   CO2 24 24  --   --  27  --  26  26  --   --   --  25   ANIONGAP 13 12  --   --  11  --  11  11  --   --   --  10   * 130* 90 96 100*  100*   < > 101*  95  95   < >  --    < > 117*   BUN 26.0* 28.6*  --   --  29.4*  --  29.8*  29.8*  --   --   --  26.3*   CR 1.06 1.12  --   --  1.11  --  1.23*  1.23*  --   --   --  1.08   GFRESTIMATED 84 79  --   --  79  --  70  70  --   --   --  82   NAM 8.7 8.9  --   --  8.7  --  8.7  8.7  --   --   --  7.8*   MAG 2.2  --   --   --  2.2  --   --   --  2.3  --  2.0   PHOS 3.6  --   --   --  3.6  --   --   --  2.6  --  2.5   PROTTOTAL 5.9* 6.1*  --   --  5.6*  --  5.7*  --   --   --  5.1*   ALBUMIN 3.0* 3.0*  --   --  2.9*  --  3.1*  --   --   --  2.7*   BILITOTAL 0.7 0.6  --   --  0.6  --  0.6  --   --   --  0.5   ALKPHOS 78 74  --   --  68  --  64  --   --   --  55   AST 32 32  --   --  33  --  36  --   --   --  31   ALT 25 26  --   --  29  --  28  --   --   --  22    < > = values in this interval not displayed.       CBC  Recent Labs   Lab 06/21/24 0444 06/20/24 1746 06/20/24 0345 06/19/24 0427   WBC 16.8* 15.9* 13.3* 10.3   RBC 3.81* 3.58* 3.40* 2.94*   HGB 11.7* 10.9* 10.4* 9.1*   HCT 35.1* 33.1* 31.4* 27.7*   MCV 92 93 92 94   MCH 30.7 30.4 30.6 31.0   MCHC 33.3 32.9 33.1 32.9   RDW 14.2 14.0 14.0 14.0    232 210 131*       INR  Recent Labs   Lab 06/21/24 0444 06/20/24 0345 06/19/24 0427 06/18/24  0356   INR 4.81* 3.64* 2.78* 1.50*

## 2024-06-22 ENCOUNTER — APPOINTMENT (OUTPATIENT)
Dept: OCCUPATIONAL THERAPY | Facility: CLINIC | Age: 54
End: 2024-06-22
Attending: STUDENT IN AN ORGANIZED HEALTH CARE EDUCATION/TRAINING PROGRAM
Payer: COMMERCIAL

## 2024-06-22 ENCOUNTER — APPOINTMENT (OUTPATIENT)
Dept: CT IMAGING | Facility: CLINIC | Age: 54
End: 2024-06-22
Attending: PHYSICIAN ASSISTANT
Payer: COMMERCIAL

## 2024-06-22 ENCOUNTER — APPOINTMENT (OUTPATIENT)
Dept: GENERAL RADIOLOGY | Facility: CLINIC | Age: 54
End: 2024-06-22
Attending: PHYSICIAN ASSISTANT
Payer: COMMERCIAL

## 2024-06-22 LAB
ANION GAP SERPL CALCULATED.3IONS-SCNC: 11 MMOL/L (ref 7–15)
ANION GAP SERPL CALCULATED.3IONS-SCNC: 9 MMOL/L (ref 7–15)
BASOPHILS # BLD AUTO: 0.1 10E3/UL (ref 0–0.2)
BASOPHILS NFR BLD AUTO: 0 %
BUN SERPL-MCNC: 21 MG/DL (ref 6–20)
BUN SERPL-MCNC: 22.7 MG/DL (ref 6–20)
CALCIUM SERPL-MCNC: 8.4 MG/DL (ref 8.6–10)
CALCIUM SERPL-MCNC: 8.5 MG/DL (ref 8.6–10)
CHLORIDE SERPL-SCNC: 100 MMOL/L (ref 98–107)
CHLORIDE SERPL-SCNC: 98 MMOL/L (ref 98–107)
CREAT SERPL-MCNC: 1.12 MG/DL (ref 0.67–1.17)
CREAT SERPL-MCNC: 1.29 MG/DL (ref 0.67–1.17)
CRP SERPL-MCNC: 174 MG/L
DEPRECATED HCO3 PLAS-SCNC: 24 MMOL/L (ref 22–29)
DEPRECATED HCO3 PLAS-SCNC: 25 MMOL/L (ref 22–29)
EGFRCR SERPLBLD CKD-EPI 2021: 66 ML/MIN/1.73M2
EGFRCR SERPLBLD CKD-EPI 2021: 79 ML/MIN/1.73M2
EOSINOPHIL # BLD AUTO: 0.7 10E3/UL (ref 0–0.7)
EOSINOPHIL NFR BLD AUTO: 4 %
ERYTHROCYTE [DISTWIDTH] IN BLOOD BY AUTOMATED COUNT: 14.3 % (ref 10–15)
FACT X ACT/NOR PPP CHRO: 27 % (ref 70–130)
GLUCOSE SERPL-MCNC: 106 MG/DL (ref 70–99)
GLUCOSE SERPL-MCNC: 133 MG/DL (ref 70–99)
HCT VFR BLD AUTO: 34 % (ref 40–53)
HGB BLD-MCNC: 10.9 G/DL (ref 13.3–17.7)
HOLD SPECIMEN: NORMAL
IMM GRANULOCYTES # BLD: 0.4 10E3/UL
IMM GRANULOCYTES NFR BLD: 2 %
INR PPP: 3.7 (ref 0.85–1.15)
LYMPHOCYTES # BLD AUTO: 1.2 10E3/UL (ref 0.8–5.3)
LYMPHOCYTES NFR BLD AUTO: 7 %
MAGNESIUM SERPL-MCNC: 2.2 MG/DL (ref 1.7–2.3)
MCH RBC QN AUTO: 30 PG (ref 26.5–33)
MCHC RBC AUTO-ENTMCNC: 32.1 G/DL (ref 31.5–36.5)
MCV RBC AUTO: 94 FL (ref 78–100)
MONOCYTES # BLD AUTO: 1.6 10E3/UL (ref 0–1.3)
MONOCYTES NFR BLD AUTO: 10 %
NEUTROPHILS # BLD AUTO: 12.9 10E3/UL (ref 1.6–8.3)
NEUTROPHILS NFR BLD AUTO: 77 %
NRBC # BLD AUTO: 0 10E3/UL
NRBC BLD AUTO-RTO: 0 /100
PHOSPHATE SERPL-MCNC: 3.2 MG/DL (ref 2.5–4.5)
PLATELET # BLD AUTO: 261 10E3/UL (ref 150–450)
POTASSIUM SERPL-SCNC: 3.7 MMOL/L (ref 3.4–5.3)
POTASSIUM SERPL-SCNC: 3.9 MMOL/L (ref 3.4–5.3)
RADIOLOGIST FLAGS: ABNORMAL
RBC # BLD AUTO: 3.63 10E6/UL (ref 4.4–5.9)
SODIUM SERPL-SCNC: 133 MMOL/L (ref 135–145)
SODIUM SERPL-SCNC: 134 MMOL/L (ref 135–145)
WBC # BLD AUTO: 16.8 10E3/UL (ref 4–11)

## 2024-06-22 PROCEDURE — 999N000157 HC STATISTIC RCP TIME EA 10 MIN

## 2024-06-22 PROCEDURE — 84100 ASSAY OF PHOSPHORUS: CPT

## 2024-06-22 PROCEDURE — 250N000013 HC RX MED GY IP 250 OP 250 PS 637: Performed by: PHYSICIAN ASSISTANT

## 2024-06-22 PROCEDURE — 99232 SBSQ HOSP IP/OBS MODERATE 35: CPT | Mod: 25 | Performed by: PHYSICIAN ASSISTANT

## 2024-06-22 PROCEDURE — 99232 SBSQ HOSP IP/OBS MODERATE 35: CPT | Performed by: INTERNAL MEDICINE

## 2024-06-22 PROCEDURE — 71046 X-RAY EXAM CHEST 2 VIEWS: CPT

## 2024-06-22 PROCEDURE — 250N000011 HC RX IP 250 OP 636: Performed by: NURSE PRACTITIONER

## 2024-06-22 PROCEDURE — 71260 CT THORAX DX C+: CPT

## 2024-06-22 PROCEDURE — 250N000013 HC RX MED GY IP 250 OP 250 PS 637

## 2024-06-22 PROCEDURE — 120N000003 HC R&B IMCU UMMC

## 2024-06-22 PROCEDURE — 250N000013 HC RX MED GY IP 250 OP 250 PS 637: Performed by: STUDENT IN AN ORGANIZED HEALTH CARE EDUCATION/TRAINING PROGRAM

## 2024-06-22 PROCEDURE — 258N000003 HC RX IP 258 OP 636: Mod: JZ | Performed by: SURGERY

## 2024-06-22 PROCEDURE — 85025 COMPLETE CBC W/AUTO DIFF WBC: CPT

## 2024-06-22 PROCEDURE — 86140 C-REACTIVE PROTEIN: CPT | Performed by: PHYSICIAN ASSISTANT

## 2024-06-22 PROCEDURE — 36415 COLL VENOUS BLD VENIPUNCTURE: CPT | Performed by: SURGERY

## 2024-06-22 PROCEDURE — 250N000011 HC RX IP 250 OP 636: Performed by: PHYSICIAN ASSISTANT

## 2024-06-22 PROCEDURE — 250N000011 HC RX IP 250 OP 636: Performed by: SURGERY

## 2024-06-22 PROCEDURE — 80048 BASIC METABOLIC PNL TOTAL CA: CPT | Performed by: PHYSICIAN ASSISTANT

## 2024-06-22 PROCEDURE — 36415 COLL VENOUS BLD VENIPUNCTURE: CPT | Performed by: PHYSICIAN ASSISTANT

## 2024-06-22 PROCEDURE — 94640 AIRWAY INHALATION TREATMENT: CPT | Mod: 76

## 2024-06-22 PROCEDURE — 71046 X-RAY EXAM CHEST 2 VIEWS: CPT | Mod: 26 | Performed by: STUDENT IN AN ORGANIZED HEALTH CARE EDUCATION/TRAINING PROGRAM

## 2024-06-22 PROCEDURE — 85260 CLOT FACTOR X STUART-POWER: CPT | Performed by: SURGERY

## 2024-06-22 PROCEDURE — 87205 SMEAR GRAM STAIN: CPT | Performed by: PHYSICIAN ASSISTANT

## 2024-06-22 PROCEDURE — 250N000013 HC RX MED GY IP 250 OP 250 PS 637: Performed by: SURGERY

## 2024-06-22 PROCEDURE — 99232 SBSQ HOSP IP/OBS MODERATE 35: CPT | Mod: FS | Performed by: PHYSICIAN ASSISTANT

## 2024-06-22 PROCEDURE — 85610 PROTHROMBIN TIME: CPT | Performed by: STUDENT IN AN ORGANIZED HEALTH CARE EDUCATION/TRAINING PROGRAM

## 2024-06-22 PROCEDURE — 94640 AIRWAY INHALATION TREATMENT: CPT

## 2024-06-22 PROCEDURE — 71260 CT THORAX DX C+: CPT | Mod: 26 | Performed by: RADIOLOGY

## 2024-06-22 PROCEDURE — 250N000011 HC RX IP 250 OP 636: Mod: JZ | Performed by: PHYSICIAN ASSISTANT

## 2024-06-22 PROCEDURE — 250N000009 HC RX 250

## 2024-06-22 PROCEDURE — 97530 THERAPEUTIC ACTIVITIES: CPT | Mod: GO

## 2024-06-22 PROCEDURE — 93750 INTERROGATION VAD IN PERSON: CPT | Performed by: STUDENT IN AN ORGANIZED HEALTH CARE EDUCATION/TRAINING PROGRAM

## 2024-06-22 PROCEDURE — 83735 ASSAY OF MAGNESIUM: CPT

## 2024-06-22 PROCEDURE — 74177 CT ABD & PELVIS W/CONTRAST: CPT | Mod: 26 | Performed by: RADIOLOGY

## 2024-06-22 PROCEDURE — 999N000127 HC STATISTIC PERIPHERAL IV START W US GUIDANCE

## 2024-06-22 RX ORDER — AZITHROMYCIN 250 MG/1
500 TABLET, FILM COATED ORAL ONCE
Status: COMPLETED | OUTPATIENT
Start: 2024-06-22 | End: 2024-06-22

## 2024-06-22 RX ORDER — FUROSEMIDE 10 MG/ML
40 INJECTION INTRAMUSCULAR; INTRAVENOUS ONCE
Status: COMPLETED | OUTPATIENT
Start: 2024-06-22 | End: 2024-06-22

## 2024-06-22 RX ORDER — PIPERACILLIN SODIUM, TAZOBACTAM SODIUM 4; .5 G/20ML; G/20ML
4.5 INJECTION, POWDER, LYOPHILIZED, FOR SOLUTION INTRAVENOUS EVERY 6 HOURS
Status: DISCONTINUED | OUTPATIENT
Start: 2024-06-22 | End: 2024-06-27

## 2024-06-22 RX ORDER — AZITHROMYCIN 250 MG/1
250 TABLET, FILM COATED ORAL DAILY
Status: COMPLETED | OUTPATIENT
Start: 2024-06-23 | End: 2024-06-26

## 2024-06-22 RX ORDER — IOPAMIDOL 755 MG/ML
127 INJECTION, SOLUTION INTRAVASCULAR ONCE
Status: COMPLETED | OUTPATIENT
Start: 2024-06-22 | End: 2024-06-22

## 2024-06-22 RX ORDER — WARFARIN SODIUM 2 MG/1
2 TABLET ORAL
Status: COMPLETED | OUTPATIENT
Start: 2024-06-22 | End: 2024-06-22

## 2024-06-22 RX ORDER — POTASSIUM CHLORIDE 20MEQ/15ML
40 LIQUID (ML) ORAL ONCE
Status: COMPLETED | OUTPATIENT
Start: 2024-06-22 | End: 2024-06-22

## 2024-06-22 RX ORDER — HYDRALAZINE HYDROCHLORIDE 20 MG/ML
5 INJECTION INTRAMUSCULAR; INTRAVENOUS EVERY 4 HOURS PRN
Status: DISCONTINUED | OUTPATIENT
Start: 2024-06-22 | End: 2024-07-04 | Stop reason: HOSPADM

## 2024-06-22 RX ORDER — LEVOFLOXACIN 250 MG/1
750 TABLET, FILM COATED ORAL DAILY
Status: DISCONTINUED | OUTPATIENT
Start: 2024-06-22 | End: 2024-06-22

## 2024-06-22 RX ADMIN — CAPTOPRIL 12.5 MG: 12.5 TABLET ORAL at 19:48

## 2024-06-22 RX ADMIN — METHOCARBAMOL 500 MG: 500 TABLET ORAL at 15:19

## 2024-06-22 RX ADMIN — PIPERACILLIN AND TAZOBACTAM 3.38 G: 3; .375 INJECTION, POWDER, FOR SOLUTION INTRAVENOUS at 06:03

## 2024-06-22 RX ADMIN — FUROSEMIDE 40 MG: 10 INJECTION, SOLUTION INTRAVENOUS at 12:30

## 2024-06-22 RX ADMIN — ACETAMINOPHEN 975 MG: 325 TABLET, FILM COATED ORAL at 02:02

## 2024-06-22 RX ADMIN — METHOCARBAMOL 500 MG: 500 TABLET ORAL at 00:35

## 2024-06-22 RX ADMIN — ACETAMINOPHEN 975 MG: 325 TABLET, FILM COATED ORAL at 17:21

## 2024-06-22 RX ADMIN — HYDRALAZINE HYDROCHLORIDE 5 MG: 20 INJECTION INTRAMUSCULAR; INTRAVENOUS at 00:55

## 2024-06-22 RX ADMIN — HYDROXYZINE HYDROCHLORIDE 25 MG: 25 TABLET ORAL at 08:41

## 2024-06-22 RX ADMIN — OXYCODONE HYDROCHLORIDE 5 MG: 5 TABLET ORAL at 10:27

## 2024-06-22 RX ADMIN — PIPERACILLIN AND TAZOBACTAM 4.5 G: 4; .5 INJECTION, POWDER, LYOPHILIZED, FOR SOLUTION INTRAVENOUS at 17:20

## 2024-06-22 RX ADMIN — IPRATROPIUM BROMIDE AND ALBUTEROL SULFATE 3 ML: .5; 3 SOLUTION RESPIRATORY (INHALATION) at 08:03

## 2024-06-22 RX ADMIN — CAPTOPRIL 12.5 MG: 12.5 TABLET ORAL at 08:29

## 2024-06-22 RX ADMIN — FUROSEMIDE 40 MG: 10 INJECTION, SOLUTION INTRAVENOUS at 15:57

## 2024-06-22 RX ADMIN — AZITHROMYCIN DIHYDRATE 500 MG: 250 TABLET ORAL at 17:20

## 2024-06-22 RX ADMIN — VANCOMYCIN HYDROCHLORIDE 1250 MG: 10 INJECTION, POWDER, LYOPHILIZED, FOR SOLUTION INTRAVENOUS at 22:09

## 2024-06-22 RX ADMIN — SENNOSIDES AND DOCUSATE SODIUM 2 TABLET: 8.6; 5 TABLET ORAL at 19:48

## 2024-06-22 RX ADMIN — IOPAMIDOL 127 ML: 755 INJECTION, SOLUTION INTRAVENOUS at 14:18

## 2024-06-22 RX ADMIN — HYDRALAZINE HYDROCHLORIDE 50 MG: 25 TABLET ORAL at 12:30

## 2024-06-22 RX ADMIN — HYDRALAZINE HYDROCHLORIDE 50 MG: 25 TABLET ORAL at 19:51

## 2024-06-22 RX ADMIN — PIPERACILLIN AND TAZOBACTAM 3.38 G: 3; .375 INJECTION, POWDER, FOR SOLUTION INTRAVENOUS at 12:30

## 2024-06-22 RX ADMIN — OXYCODONE HYDROCHLORIDE 5 MG: 5 TABLET ORAL at 02:01

## 2024-06-22 RX ADMIN — HYDRALAZINE HYDROCHLORIDE 50 MG: 25 TABLET ORAL at 04:17

## 2024-06-22 RX ADMIN — GUAIFENESIN 600 MG: 600 TABLET ORAL at 08:29

## 2024-06-22 RX ADMIN — METHOCARBAMOL 500 MG: 500 TABLET ORAL at 08:41

## 2024-06-22 RX ADMIN — PANTOPRAZOLE SODIUM 40 MG: 40 TABLET, DELAYED RELEASE ORAL at 08:29

## 2024-06-22 RX ADMIN — POLYETHYLENE GLYCOL 3350 17 G: 17 POWDER, FOR SOLUTION ORAL at 19:50

## 2024-06-22 RX ADMIN — SENNOSIDES AND DOCUSATE SODIUM 2 TABLET: 8.6; 5 TABLET ORAL at 08:29

## 2024-06-22 RX ADMIN — ACETAMINOPHEN 975 MG: 325 TABLET, FILM COATED ORAL at 10:27

## 2024-06-22 RX ADMIN — PIPERACILLIN AND TAZOBACTAM 3.38 G: 3; .375 INJECTION, POWDER, FOR SOLUTION INTRAVENOUS at 00:35

## 2024-06-22 RX ADMIN — ATORVASTATIN CALCIUM 20 MG: 20 TABLET, FILM COATED ORAL at 19:49

## 2024-06-22 RX ADMIN — HYDROXYZINE HYDROCHLORIDE 25 MG: 25 TABLET ORAL at 15:19

## 2024-06-22 RX ADMIN — IPRATROPIUM BROMIDE AND ALBUTEROL SULFATE 3 ML: .5; 3 SOLUTION RESPIRATORY (INHALATION) at 11:01

## 2024-06-22 RX ADMIN — VANCOMYCIN HYDROCHLORIDE 1250 MG: 10 INJECTION, POWDER, LYOPHILIZED, FOR SOLUTION INTRAVENOUS at 10:29

## 2024-06-22 RX ADMIN — CAPTOPRIL 12.5 MG: 12.5 TABLET ORAL at 14:52

## 2024-06-22 RX ADMIN — WARFARIN SODIUM 2 MG: 2 TABLET ORAL at 17:21

## 2024-06-22 RX ADMIN — LIDOCAINE 2 PATCH: 4 PATCH TOPICAL at 12:31

## 2024-06-22 RX ADMIN — GUAIFENESIN 600 MG: 600 TABLET ORAL at 19:49

## 2024-06-22 RX ADMIN — POTASSIUM CHLORIDE 40 MEQ: 20 SOLUTION ORAL at 19:50

## 2024-06-22 RX ADMIN — IPRATROPIUM BROMIDE AND ALBUTEROL SULFATE 3 ML: .5; 3 SOLUTION RESPIRATORY (INHALATION) at 16:02

## 2024-06-22 RX ADMIN — OXYCODONE HYDROCHLORIDE 5 MG: 5 TABLET ORAL at 06:03

## 2024-06-22 RX ADMIN — IPRATROPIUM BROMIDE AND ALBUTEROL SULFATE 3 ML: .5; 3 SOLUTION RESPIRATORY (INHALATION) at 19:33

## 2024-06-22 ASSESSMENT — ACTIVITIES OF DAILY LIVING (ADL)
ADLS_ACUITY_SCORE: 34
ADLS_ACUITY_SCORE: 38
ADLS_ACUITY_SCORE: 38
ADLS_ACUITY_SCORE: 34

## 2024-06-22 NOTE — PROGRESS NOTES
[facility]   Cardiology II Service / Advanced Heart Failure  Daily Consult Progress Note      Patient: Navin Lutz  MRN: [id number]  Admission Date: 6/3/2024  Hospital Day # 19    Assessment and Plan: Navin Lutz is a 53 year old male admitted on 6/3/2024. He has a past medical history of chronic HFrEF 2/2 NICM s/p ICD, HLD, and CKD Stage II who presents admitted for decompensated heart failure with NICM on milrinone listed status 4, admitted for consideration of advanced therapies and is now s/p HM3 LVAD on 6/14/24 with Dr. Jhaveri.    Today's Plan:  - Decrease the IV hydral to only for MAP >95 and only every 4 hours prn (ordered for you)  - May be able to increase captopril today +/- decrease po hydral  - Will order 40 mg of IV lasix this AM, likely will need 2nd dose this afternoon  - BID BMP today for lytes  - Would consider CT Chest/Abd/Pelvis given rising wbc and inflammatory markers on broad spectrum abx  - Sending LDH for tomorrow    # Acute on chronic systolic heart failure secondary to NICM   # s/p HM3 LVAD as BTT on 6/14/24 (no active contraindications to transplant other than wait time)  Stage D. NYHA Class III confounded by recent surgery    -Fluid status: hypervolemic  -ACEi/ARB/ARNi: yes- captopril 12.5 mg TID, may increase further today  -Afterload reduction: Hydralazine 50 mg TID with additional available PRN  -Inotrope: dobutamine stopped on 6/19/24  -BB contraindicated currently given recent LVAD implant  -Aldosterone antagonist no, but will consider in the near future pending renal function trend  -SCD prophylaxis ICD  -MAP: Goal 65-85, Has been 80-90  -LDH trends: send tomorrow, establishing baseline  -Anticoagulation: warfarin dosing per pharmacy, FOLLOWING chromogenic factor 10: goal 20-40, 27 today, dosing per pharmacy  -Antiplatelet: no ASA indicated per NICOLE trial results    # Leukocytosis.   # Fevers  Concern for infection. Some coughing otherwise no localizing symptoms.  Delined except for a PIV.  -CXR today, pending  -Trending CRP- 150 yesterday now up to 174  -6/21/24 blood cultures NGTD  -Respiratory culture NGTD  -Vanc/Zosyn since 6/21  - Would consider CT Chest/Abd/Pelvis given rising wbc and inflammatory markers on broad spectrum abx    # Right subclavian and axillary vein thrombus, nonocclusive   - Was on Coumadin PTA, now resumed, follow chromogenic factor 10 as above    # HLD  - Continue atorvastatin 20 mg daily     # CKD stage 2. B/l cr has been listed at 1.2-1-4  - Improved from baseline ~1.0 currently       Eliz Choi PA-C  Advanced Heart Failure/Cardiology II Service  Vocera preferred, or pager [phone]      Patient discussed with Dr. Silvestre.        60 minutes spent on the date of the encounter doing chart review, history and exam, documentation and further activities per the note    ================================================================    Subjective/24-Hr Events:   Last 24 hr care team notes reviewed. Feeling okay today. Had a cough yesterday, but better today. He also had one episode of vomitting yesterday. He attributes this to having a lot of phlem that he had swallowed and it making the food get stuck, so he immediately threw it up. He denies nausea and abdominal pain. Having regular bms. No diarrhea. No dysuria. No other infectious symptoms. He denies fever, chills. He is on O2 when laying flat, but when he is walking around it gets better. No bleeding symptoms. No LVAD alarms.    ROS:  4 point ROS including respiratory, CV, GI and  (other than that noted in the HPI) is negative.     Medications: Reviewed in EPIC.     Physical Exam:   /85   Pulse 116   Temp 98.4  F (36.9  C) (Oral)   Resp 20   Ht 1.829 m (6')   Wt 93.8 kg (206 lb 12.7 oz)   SpO2 96%   BMI 28.05 kg/m      GENERAL: Appears mildly uncomfortable, light sweat but under many blankets and feeling cold.  HEENT: Eye symmetrical, no discharge or icterus bilaterally.    NECK: Supple, JVD mid neck at 60 degrees.   CV: Hum of LVAD, no adventitious sounds  RESPIRATORY: Respirations regular, even, and unlabored. Lungs CTA throughout.    GI: Soft and non distended with normoactive bowel sounds present No tenderness, rebound, guarding.   EXTREMITIES: 1+ b/l lower extremity peripheral edema. All extremities are warm and well perfused  NEUROLOGIC: Alert and interacting appropriately.   SKIN: No jaundice. No rashes or lesions.     Labs:  CMP  Recent Labs   Lab 06/22/24  0528 06/21/24  0444 06/20/24  1746 06/20/24  1116 06/20/24  0813 06/20/24  0345 06/19/24  1959 06/19/24  1547 06/19/24  1126 06/19/24  0910   * 137 137  --   --  138  --  139  139  --   --    POTASSIUM 3.9 3.6  3.6 3.8  --   --  3.6  --  3.6  3.6  --   --    CHLORIDE 100 100 101  --   --  100  --  102  102  --   --    CO2 24 24 24  --   --  27  --  26  26  --   --    ANIONGAP 9 13 12  --   --  11  --  11  11  --   --    * 112* 130* 90   < > 100*  100*   < > 101*  95  95   < >  --    BUN 22.7* 26.0* 28.6*  --   --  29.4*  --  29.8*  29.8*  --   --    CR 1.12 1.06 1.12  --   --  1.11  --  1.23*  1.23*  --   --    GFRESTIMATED 79 84 79  --   --  79  --  70  70  --   --    NAM 8.5* 8.7 8.9  --   --  8.7  --  8.7  8.7  --   --    MAG 2.2 2.2  --   --   --  2.2  --   --   --  2.3   PHOS 3.2 3.6  --   --   --  3.6  --   --   --  2.6   PROTTOTAL  --  5.9* 6.1*  --   --  5.6*  --  5.7*  --   --    ALBUMIN  --  3.0* 3.0*  --   --  2.9*  --  3.1*  --   --    BILITOTAL  --  0.7 0.6  --   --  0.6  --  0.6  --   --    ALKPHOS  --  78 74  --   --  68  --  64  --   --    AST  --  32 32  --   --  33  --  36  --   --    ALT  --  25 26  --   --  29  --  28  --   --     < > = values in this interval not displayed.       CBC  Recent Labs   Lab 06/22/24  0528 06/21/24  0444 06/20/24  1746 06/20/24  0345   WBC 16.8* 16.8* 15.9* 13.3*   RBC 3.63* 3.81* 3.58* 3.40*   HGB 10.9* 11.7* 10.9* 10.4*   HCT 34.0* 35.1* 33.1*  31.4*   MCV 94 92 93 92   MCH 30.0 30.7 30.4 30.6   MCHC 32.1 33.3 32.9 33.1   RDW 14.3 14.2 14.0 14.0    263 232 210       INR  Recent Labs   Lab 06/22/24  0528 06/21/24  1312 06/21/24  0949 06/21/24  0444   INR 3.70* 5.23*  5.16* 5.08* 4.81*

## 2024-06-22 NOTE — PROGRESS NOTES
"CLINICAL NUTRITION SERVICES - BRIEF NOTE    Nutrition Prescription      Recommendations already ordered by Registered Dietitian (RD):  - Request via Vocera to change supplements: Ensure Enlive --> Ensure Clear per pt request.    Future/Additional Recommendations:  - PO/supp tolerance     Nutrition Progress Note - f/u for progress towards previous nutrition POC (see previous reassessment for details)    Dagmar Woods, RD, LD, [facility]  Neuro ICU Dietitian; 6A Neuro  Vocera \"4E Clinical Dietitian\"  Weekend/holiday \"Weekend Clinical Dietitian\"      Unit RD via Vocera     "

## 2024-06-22 NOTE — PHARMACY-ANTICOAGULATION SERVICE
Clinical Pharmacy - Warfarin Dosing Consult --> UPDATE      Pharmacy has been consulted to manage this patient s warfarin therapy.  Indication: LVAD/RVAD  Therapy Goal: Chr Factor 10: 20-40%  Provider/Team: [facility]: [facility]  Warfarin Prior to Admission: Yes  Warfarin PTA Regimen: 7.5 mg Monday, Wednesday, Friday; 5 mg all other days  Significant drug interactions: IV heparin (increased risk of bleeding); zosyn (increased INR)  Recent documented change in oral intake/nutrition: Yes (NPO for surgery)  Dose Comments: lupus anticoagulant positive 6/21/2024    INR   Date Value Ref Range Status   06/22/2024 3.70 (H) 0.85 - 1.15 Final   06/21/2024 5.16 (HH) 0.85 - 1.15 Final   06/21/2024 5.23 (HH) 0.85 - 1.15 Final     Factor 10 Chromogenic   Date Value Ref Range Status   06/22/2024 27 (L) 70 - 130 % Final     Factor 2 Assay   Date Value Ref Range Status   06/21/2024 27 (L) 60 - 140 % Final       Changed from INR monitoring to Chromogenic Factor X due to positive lupus anticoagulant.    Per discussion with Dr. Beasley and Alvaro Dang, ALIZE will give warfarin 2mg PO today. Pharmacy will monitor Navin Lutz daily and order warfarin doses to achieve specified goal.      Please contact pharmacy as soon as possible if the warfarin needs to be held for a procedure or if the warfarin goals change.      Ava Larsen, Pharm.D., BCPS, BCTXP  Pager [phone]

## 2024-06-22 NOTE — PROGRESS NOTES
The patient's HeartMate LVAD was interrogated 6/22/2024  * Speed 5400 rpm   * Pulsatility index 2.6   * Power 3.8 Pizano   * Flow 4.7 L/minute   Fluid status: hypervolemic   Alarms were reviewed, and notable for rare pi events, no alarms  The driveline exit site was inspected, c/d/i.   All external components were inspected and showed no evidence of damage or malfunction, none replaced.   No changes to VAD settings made

## 2024-06-22 NOTE — PROGRESS NOTES
Cardiovascular Surgery Progress Note  06/22/2024         Assessment and Plan:     Navin Lutz is a 53 year old male with PMH of CKD2, HLD, R Subclavian and axillary vein non-occlusive thrombus on Warfarin, NSVT, HFrEF 2/2 NICM s/p ICD (PTA IV milrinone) who presents admitted 6/3/24 for decompensated HFrEF listed status 4. He is now s/p LVAD by Dr. Jhaveri on 6/14/24.     Cardiovascular:   S/p LVAD on 6/14 by Dr. Jhaveri  S/p IABP (6/12-6/14)  Hx NSVT  Acute on chronic HFrEF 2/2 to NICM (EF 10-15%), home milrinone PTA, s/p ICD   HLD  HTN  Hypervolemia  No arrhythmias reported overnight, ongoing sinus tachycardia, HD stable. MAPs 80-90  Pre-op echo 6/8: LV EF 15-20%, normal RV size/function  - PTA atorvastatin 20 mg daily   - PO hydralazine 50 mg Q8H  - Captopril 12.5 mg TID   - Cards 2 following for hemodynamic & GDMT management; appreciate recs  - Holding PTA Coreg, Entresto, Aldactone, Jardiance     Chest tubes/TPW: removed in ICU    Pulmonary:  - Extubated POD 3 to 5 lpm via NC. Now saturating well on 1 lpm.   - Supplemental O2 PRN to keep sats > 92%. Wean off as tolerated.  - Pulm toilet, IS, activity and deep breathing  - DuoNebs QID, Mucinex BID    Neurology /Psych:  Acute post-operative pain  - Acute post-operative pain regimen:  - scheduled acetaminophen, lidocaine patches   - PRN: PO oxycodone PRN, Robaxin, Voltaren gel, Atarax     / Renal:  CKD Stage 2  Contraction alkalosis  - Baseline creatinine ~1.1-1.2. Most recent creatinine 206 lb (up from 201 lb yesterday), adequate UOP.   - Pre-op weight 209 lbs, most recent weight 201 lbs   - Diuresis per Cards 2  - Replace electrolytes per protocol    GI / FEN:   At risk for protein calorie malnutrition  - Regular diet  - Attempted to place NJ in the ICU, unable to place   - +BM since surgery, continue bowel regimen  - hepatic enzymes WNL    Endocrine:  Stress-induced hyperglycemia  Pre-op Hgb A1C 5.6  - Managed on insulin drip postop, transitioned to sliding  scale goal BG <180 and has now also been discontinued due to good BG control with no insulin need.     Infectious Disease:  Stress induced leukocytosis  - WBC 16.8 and stable, remains afebrile, no signs or symptoms of infection  - Completed perioperative LVAD antibiotics  - UA, blood cultures x2 ordred 6/21 - UA unremarkable, BCxs NGTD, follow for results  - patient unable to produce sputum for sputum sample (contaminated result), will re-order and try again although he reports cough is minimal   - started empiric IV vancomycin/zosyn 6/21 given up-trending leukocytosis per surgeon     Hematology:   Acute blood loss anemia  Acute blood loss thrombocytopenia  Right brachial non-occlusive thrombus  Hgb stable; Plt WNL, no signs or symptoms of active bleeding  - Daily CBC    Anticoagulation:   Chronic anticoagulation for LVAD, CFX goal 20-40%  +Lupus anticoagulant   - Warfarin for LVAD  - INRs noted to be supra-therapeutic and discordant with Chromogenic Factor X, hematology consulted 6/21 - lupus anticoagulant positive  - plan to use Chromogenic factor X for warfarin dosing as INR is not reliable, goal CFX 20-40%, pharmacy notified     MSK/Skin:  Sternotomy  - PT/OT    Prophylaxis:   - Stress ulcer prophylaxis: Pantoprazole 40 mg daily for 30 days  - DVT prophylaxis: therapeutic anticoagulation, SCD    Disposition:   - Transferred to  on 6/21  - Therapies recommending discharge to ARU vs home once medically ready. LVAD education planned to start 6/24.    Medically Ready for Discharge: Anticipated in 2-4 Days    Clinically Significant Risk Factors              # Hypoalbuminemia: Lowest albumin = 2.7 g/dL at 6/19/2024  4:27 AM, will monitor as appropriate        # End stage heart failure: home medication list includes inotropes          #Precipitous drop in Hgb/Hct: Lowest Hgb this hospitalization: 9.1 g/dL. Will continue to monitor and treat/transfuse as appropriate.     # Overweight: Estimated body mass index is  "28.05 kg/m  as calculated from the following:    Height as of this encounter: 1.829 m (6').    Weight as of this encounter: 93.8 kg (206 lb 12.7 oz).        # Financial/Environmental Concerns: none  # Asthma: noted on problem list  # ICD device present       Discussed with Dr. Brian Jhaveri.    Joel Dang PA-C  Cardiothoracic Surgery    9:15 AM  2024  pager [phone]        Interval History:     No overnight events. Reports he feels better this morning.   States pain is well managed on current regimen. Slept well overnight.  Tolerating diet, is passing flatus, + BM. No nausea or vomiting since known emesis yesterday morning. Denies abdominal pain.   Breathing is improving, reports \"less shallow.\" O2 requirements are coming down from yesterday.  Working with therapies and ambulating in halls with assistance.   Denies chest pain, palpitations, dizziness, syncopal symptoms, fevers, chills, myalgias, or sternal popping/clicking, dysuria, diarrhea. Ongoing minimal non-productive cough which has been overall improving since surgery.          Physical Exam:   Blood pressure 100/85, pulse 118, temperature 98.4  F (36.9  C), temperature source Oral, resp. rate 20, height 1.829 m (6'), weight 93.8 kg (206 lb 12.7 oz), SpO2 93%.  Vitals:    24 0945 24 0531 24 0841   Weight: 91.3 kg (201 lb 4.5 oz) 90.2 kg (198 lb 13.7 oz) 93.8 kg (206 lb 12.7 oz)     Gen: A&Ox4, NAD  Neuro: no focal deficits   CV: tachycardic, RRR, LVAD hum   Pulm: CTAB, normal breathing on 1 lpm at the time of exam  Abd: nondistended, normal BS, soft, nontender  Ext: trace peripheral edema, non- pitting  Skin:   Incision: clean, dry, intact, no erythema, sternum stable  Tubes/drain sites: dressing clean and dry  Lines: driveline dressing clean and dry     Heartmate 3 LEFT VS  Flow (Lpm): 4.8 Lpm  Pulse Index (PI): 3 PI  Speed (rpm): 5400 rpm  Power (bassett): 3.9 bassett  Current Hct settin         Data:    Imaging:  reviewed recent " imaging, no acute concerns  XR Chest Port 1 View  Narrative: Exam: XR CHEST PORT 1 VIEW, 6/21/2024 6:32 AM    Comparison: 6/20/2024    History: Status post LVAD    Findings:  Portable AP view of the chest. Median sternotomy wires. LVAD and left  chest wall ICD. Stable cardiomegaly. Stable small left apical  pneumothorax. Decreased bibasilar opacities.  Impression: Impression: Stable small left apical pneumothorax. Decreased perihilar  and bibasilar opacities.     I have personally reviewed the examination and initial interpretation  and I agree with the findings.    JACOB OLIVARES MD         SYSTEM ID:  [id number]        Labs:  BMP  Recent Labs   Lab 06/22/24  0528 06/21/24  0444 06/20/24  1746 06/20/24  1116 06/20/24  0813 06/20/24  0345   * 137 137  --   --  138   POTASSIUM 3.9 3.6  3.6 3.8  --   --  3.6   CHLORIDE 100 100 101  --   --  100   NAM 8.5* 8.7 8.9  --   --  8.7   CO2 24 24 24  --   --  27   BUN 22.7* 26.0* 28.6*  --   --  29.4*   CR 1.12 1.06 1.12  --   --  1.11   * 112* 130* 90   < > 100*  100*    < > = values in this interval not displayed.     CBC  Recent Labs   Lab 06/22/24 0528 06/21/24 0444 06/20/24 1746 06/20/24  0345   WBC 16.8* 16.8* 15.9* 13.3*   RBC 3.63* 3.81* 3.58* 3.40*   HGB 10.9* 11.7* 10.9* 10.4*   HCT 34.0* 35.1* 33.1* 31.4*   MCV 94 92 93 92   MCH 30.0 30.7 30.4 30.6   MCHC 32.1 33.3 32.9 33.1   RDW 14.3 14.2 14.0 14.0    263 232 210     INR  Recent Labs   Lab 06/22/24  0528 06/21/24  1312 06/21/24  0949 06/21/24  0444   INR 3.70* 5.23*  5.16* 5.08* 4.81*      Hepatic Panel  Recent Labs   Lab 06/21/24  0444 06/20/24  1746 06/20/24  0345 06/19/24  1547   AST 32 32 33 36   ALT 25 26 29 28   ALKPHOS 78 74 68 64   BILITOTAL 0.7 0.6 0.6 0.6   ALBUMIN 3.0* 3.0* 2.9* 3.1*     GLUCOSE:   Recent Labs   Lab 06/22/24  0528 06/21/24  0444 06/20/24  1746 06/20/24  1116 06/20/24  0813 06/20/24  0345   * 112* 130* 90 96 100*  100*

## 2024-06-22 NOTE — CONSULTS
"  Hematology  Progress Note   Date of Service: 06/22/2024    Patient: Navin Lutz  MRN: [id number]  Admission Date: 6/3/2024  Hospital Day # 19       Reason for Consult: \"supratherapeutic INR, INR discordant with INR, unclear etiology/assistance with warfarin dosing for LVAD\"        Assessment & Plan:   Navin Lutz is a 53 year old male with a medical history of HFrEF (2/2 NICM, s/p ICD on IV milrinone), HLD, R non-occlusive subclavian/axillary vein thrombosis (on warfarin), NSVT, and CKD3 that is admitted for decompensated HF. He required LVAD placement on 6/14 and has been on warfarin for this as well. The patient's INR was noted to be supratherapeutic and disconcordant with his chromogenic Factor X. 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 Factor VIII and V were appropriate.  Given the patient has an inhibitor present, it would be recommended to have the patient have his anticoagulation (Warfarin) monitored via chromogenic factor X only as his INR will be affected by this inhibitor. 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     Pt is doing well  on LVAD Discussed with Dr Brian Jhaveri today  With lupus anticoagulant need to follow chromogenic X.He ahs had a tapering dose of warfarin over last several days  Will need to adjust daily for today Saturday give 2mg     Recommendations:   - Recommend utilizing only chromogenic Factor X for monitoring anticoagulation for patient given patient has an inhibitor.  - Please order Chromogenic Factor X tomorrow and hold Warfarin tonight (would aim for a chromogenic factor X goal of 20-40% as this would correlate with INR " 2-3. Please contact our team if there are questions throughout his stay regarding his complicated monitoring).  Give warfarin 2mg today June 22    Thank you for this consult and the opportunity to treat this pleasant patient. We will continue to follow this patient. Please do not hesitate to page with any questions or concerns.      Sree Beasley M.D.  [phone]       ==============================================================================      History of Present Illness:    Navin Lutz is a 53 year old male with a medical history of HFrEF (2/2 NICM, s/p ICD on IV milrinone), HLD, R non-occlusive subclavian/axillary vein thrombosis (on warfarin), NSVT, and CKD3 that presented to the hospital with dyspnea. He had an LVAD placed this admission on 6/14 and also has been on warfarin for this as well.   He is being intermittently diuresed by cardiology teams.   He has been on chronic anticoagulation via warfarin (reportedly 9 months per patient) for his LVAD. His most recent INR was noted to be supratherapeutic and chromogenic factor X was noted to be disconcordant with his INR. The patient denies bleeding issues or clotting issues in the past. He denies melena, hematochezia, melena or other new symptoms.    Numerous Factor and mixing studies performed for patient.     INTERVAL HISTORY   No fever over night no bleeding Warafarin held last night, INR 3.7 and chromogenic Factor X 27%  Review of Systems:  A comprehensive ROS was performed and found to be negative or non-contributory with the exception of that noted in the HPI above.    Past Medical History:  No past medical history on file.    Past Surgical History:  Past Surgical History:   Procedure Laterality Date    CARDIAC SURGERY      COLONOSCOPY N/A 8/25/2023    Procedure: COLONOSCOPY, WITH POLYPECTOMY AND BIOPSY;  Surgeon: Alejandro Rodriguez MD;  Location: [facility]    CV INTRA AORTIC BALLOON N/A 6/12/2024    Procedure: Intra aortic Balloon Pump  Insertion;  Surgeon: Elfego Cruz MD;  [facility]    CV RIGHT HEART CATH MEASUREMENTS RECORDED N/A 08/22/2023    Procedure: Heart Cath Right Heart Cath;  Surgeon: Elfego Cruz MD;  [facility]    CV RIGHT HEART CATH MEASUREMENTS RECORDED N/A 9/20/2023    Procedure: Heart Cath Right Heart Cath;  Surgeon: Elfego Cruz MD;  [facility]    CV RIGHT HEART CATH MEASUREMENTS RECORDED N/A 1/19/2024    Procedure: Heart Cath Right Heart Cath;  Surgeon: Tobin Jaime MD;  Location:  [facility]    CV RIGHT HEART CATH MEASUREMENTS RECORDED N/A 5/3/2024    Procedure: Heart Cath Right Heart Cath;  Surgeon: Dion Ashley MD;  Location:  [facility]    CV RIGHT HEART CATH MEASUREMENTS RECORDED N/A 6/4/2024    Procedure: Right Heart Catheterization;  Surgeon: Cassandra Rizzo MD;  Location:  [facility]    CV RIGHT HEART CATH MEASUREMENTS RECORDED N/A 6/12/2024    Procedure: Right Heart Catheterization;  Surgeon: Elfego Cruz MD;  Location:  [facility]    INSERT VENTRICULAR ASSIST DEVICE LEFT (HEARTMATE II) N/A 6/14/2024    Procedure: Median Sternotomy, INSERTION, LEFT VENTRICULAR ASSIST DEVICE (HEARTMATE III), on Cardiopulmonary Bypass, Transesophageal Echocardiogram by Anesthesia;  Surgeon: Brian Jhaveri MD;  Location: [facility]    PICC DOUBLE LUMEN PLACEMENT Right 08/22/2023    Brachial Vein Medial 5F DL 45 cm, 2 cm out       Social History:  Social History     Socioeconomic History    Marital status:    Tobacco Use    Smoking status: Never    Smokeless tobacco: Never   Substance and Sexual Activity    Alcohol use: Never    Drug use: Never    Sexual activity: Yes     Partners: Female     Social Determinants of Health     Financial Resource Strain: Low Risk  (11/17/2023)    Received from [facility] and  [facility], [facility]    Overall Financial Resource Strain (CARDIA)     Difficulty of Paying Living Expenses: Not hard at all   Food Insecurity: No Food Insecurity (11/17/2023)    Received from [facility], [facility]    Hunger Vital Sign     Worried About Running Out of Food in the Last Year: Never true     Ran Out of Food in the Last Year: Never true   Transportation Needs: No Transportation Needs (11/17/2023)    Received from [facility], [facility]    PRAPARE - Transportation     Lack of Transportation (Medical): No     Lack of Transportation (Non-Medical): No   Housing Stability: Unknown (11/17/2023)    Received from [facility], [facility]    Housing Stability Vital Sign     Unable to Pay for Housing in the Last Year: No     Unstable Housing in the Last Year: No        Family History  No family history on file.    Outpatient Medications:  Current Facility-Administered Medications   Medication Dose Route Frequency Provider Last Rate Last Admin    acetaminophen (TYLENOL) tablet 650 mg  650 mg Oral Q4H PRN Cira Mallory PA-C   650 mg at 06/20/24 1337    acetaminophen (TYLENOL) tablet 975 mg  975 mg Oral Q8H Darnell Glaser PA-C   975 mg at 06/22/24 1027    atorvastatin (LIPITOR) tablet 20 mg  20 mg Oral or Feeding Tube QPM Chase Mallory MD   20 mg at 06/21/24 2019    bisacodyl (DULCOLAX) suppository 10 mg  10 mg Rectal Daily PRN Cira Mallory PA-C        captopril (CAPOTEN) tablet 12.5 mg  12.5 mg Oral TID Deon Rosa APRN CNP   12.5 mg at 06/22/24 0829    glucose gel 15-30 g  15-30 g Oral Q15 Min PRN Misha Guerra MD        Or    dextrose 50 % injection 25-50 mL  25-50 mL Intravenous Q15 Min PRN Misha Guerra MD        Or    glucagon injection 1 mg  1 mg Subcutaneous Q15 Min PRN Misha Guerra MD        diclofenac (VOLTAREN) 1 % topical gel 2 g  2 g Topical 4x  Daily PRN Sarah Guerin, ANNE CNP        guaiFENesin (MUCINEX) 12 hr tablet 600 mg  600 mg Oral BID Mary Baker PA-C   600 mg at 06/22/24 0829    hydrALAZINE (APRESOLINE) injection 5 mg  5 mg Intravenous Q30 Min PRN Gissell Balbuena CNP   5 mg at 06/22/24 0055    hydrALAZINE (APRESOLINE) tablet 50 mg  50 mg Oral Q8H Deon Rosa APRN CNP   50 mg at 06/22/24 0417    hydrOXYzine HCl (ATARAX) tablet 25 mg  25 mg Oral Q6H PRN Misha Guerra MD   25 mg at 06/22/24 0841    Or    hydrOXYzine HCl (ATARAX) tablet 50 mg  50 mg Oral Q6H PRN Misha Guerra MD   50 mg at 06/18/24 0249    ipratropium - albuterol 0.5 mg/2.5 mg/3 mL (DUONEB) neb solution 3 mL  3 mL Nebulization 4x daily Deon Rosa APRN CNP   3 mL at 06/22/24 1101    Lidocaine (LIDOCARE) 4 % Patch 1-2 patch  1-2 patch Transdermal Q24H Sarah Guerin APRN CNP   2 patch at 06/21/24 1140    lidocaine (LMX4) cream   Topical Q1H PRN Cira Mallory PA-C        lidocaine 1 % 0.1-1 mL  0.1-1 mL Other Q1H PRN Cira Mallory PA-C        melatonin tablet 5 mg  5 mg Oral At Bedtime PRN Misha Guerra MD   5 mg at 06/17/24 2035    methocarbamol (ROBAXIN) tablet 500 mg  500 mg Oral 4x Daily PRN Sarah Guerin APRN CNP   500 mg at 06/22/24 0841    naloxone (NARCAN) injection 0.2 mg  0.2 mg Intravenous Q2 Min PRN Brian Jhaveri MD        Or    naloxone (NARCAN) injection 0.4 mg  0.4 mg Intravenous Q2 Min PRN Brian Jhaveri MD        Or    naloxone (NARCAN) injection 0.2 mg  0.2 mg Intramuscular Q2 Min PRN Brian Jhaveri MD        Or    naloxone (NARCAN) injection 0.4 mg  0.4 mg Intramuscular Q2 Min PRN Brian Jhaveri MD        ondansetron (ZOFRAN ODT) ODT tab 4 mg  4 mg Oral Q6H PRN Cira Mallory PA-C        Or    ondansetron (ZOFRAN) injection 4 mg  4 mg Intravenous Q6H PRN Cira Mallory PA-C   4 mg at 06/21/24 1031    oxyCODONE (ROXICODONE) tablet 5 mg  5 mg Oral Q4H PRN Cira Mallory PA-C   5 mg at 06/22/24  1027    Or    oxyCODONE IR (ROXICODONE) tablet 10 mg  10 mg Oral Q4H PRN Cira Mallory, PA-C   10 mg at 06/18/24 0249    pantoprazole (PROTONIX) EC tablet 40 mg  40 mg Oral QAM Deon Arana APRN CNP   40 mg at 06/22/24 0829    piperacillin-tazobactam (ZOSYN) 3.375 g vial to attach to  mL bag  3.375 g Intravenous Q6H Galilea Dang PA-C 200 mL/hr at 06/22/24 0603 3.375 g at 06/22/24 0603    polyethylene glycol (MIRALAX) Packet 17 g  17 g Oral Daily Darnell Glaser PA-C        prochlorperazine (COMPAZINE) injection 10 mg  10 mg Intravenous Q6H PRN Shawnee Cira, PA-C   10 mg at 06/18/24 0241    Or    prochlorperazine (COMPAZINE) tablet 10 mg  10 mg Oral Q6H PRN Cira Mallory, PA-C        Reason beta blocker order not selected   Does not apply DOES NOT GO TO MAR Cira Mallory PA-C        senna-docusate (SENOKOT-S/PERICOLACE) 8.6-50 MG per tablet 2 tablet  2 tablet Oral BID Sarah Guerin, ANNE CNP   2 tablet at 06/22/24 0829    sodium chloride (PF) 0.9% PF flush 3 mL  3 mL Intracatheter Q8H Shawnee Cira, PA-C   3 mL at 06/21/24 0855    sodium chloride (PF) 0.9% PF flush 3 mL  3 mL Intracatheter q1 min prn Bronnichole Cira, PA-C   3 mL at 06/19/24 2010    vancomycin (VANCOCIN) 1,250 mg in 0.9% NaCl 250 mL intermittent infusion  1,250 mg Intravenous Q12H Brian Jhaveri  mL/hr at 06/21/24 2206 1,250 mg at 06/22/24 1029    Warfarin Dose Required Daily - Pharmacist Managed  1 each Oral See Admin Instructions Misha Guerra MD            Physical Exam:    Blood pressure 100/85, pulse 116, temperature 98.4  F (36.9  C), temperature source Oral, resp. rate 20, height 1.829 m (6'), weight 93.8 kg (206 lb 12.7 oz), SpO2 96%.  General: alert and cooperative, lying in bed, no acute distress  HEENT: sclera anicteric, EOMI, MMM  Neck: supple, normal ROM  CV: RRR, LVAD hum present   Resp: CTAB, normal respiratory effort on 3LPM  GI: soft, non-tender, non-distended,   MSK: warm and well-perfused,  normal tone  Skin: no rashes on limited exam, no jaundice  Neuro: Alert and interactive, moves all extremities equally, no focal deficits    Labs & Studies: I personally reviewed the following studies:  ROUTINE LABS (Last four results):  CMP  Recent Labs   Lab 06/22/24  0528 06/21/24  0444 06/20/24  1746 06/20/24  1116 06/20/24  0813 06/20/24  0345 06/19/24  1959 06/19/24  1547 06/19/24  1126 06/19/24  0910   * 137 137  --   --  138  --  139  139  --   --    POTASSIUM 3.9 3.6  3.6 3.8  --   --  3.6  --  3.6  3.6  --   --    CHLORIDE 100 100 101  --   --  100  --  102  102  --   --    CO2 24 24 24  --   --  27  --  26  26  --   --    ANIONGAP 9 13 12  --   --  11  --  11  11  --   --    * 112* 130* 90   < > 100*  100*   < > 101*  95  95   < >  --    BUN 22.7* 26.0* 28.6*  --   --  29.4*  --  29.8*  29.8*  --   --    CR 1.12 1.06 1.12  --   --  1.11  --  1.23*  1.23*  --   --    GFRESTIMATED 79 84 79  --   --  79  --  70  70  --   --    NAM 8.5* 8.7 8.9  --   --  8.7  --  8.7  8.7  --   --    MAG 2.2 2.2  --   --   --  2.2  --   --   --  2.3   PHOS 3.2 3.6  --   --   --  3.6  --   --   --  2.6   PROTTOTAL  --  5.9* 6.1*  --   --  5.6*  --  5.7*  --   --    ALBUMIN  --  3.0* 3.0*  --   --  2.9*  --  3.1*  --   --    BILITOTAL  --  0.7 0.6  --   --  0.6  --  0.6  --   --    ALKPHOS  --  78 74  --   --  68  --  64  --   --    AST  --  32 32  --   --  33  --  36  --   --    ALT  --  25 26  --   --  29  --  28  --   --     < > = values in this interval not displayed.     CBC  Recent Labs   Lab 06/22/24  0528 06/21/24  0444 06/20/24  1746 06/20/24  0345   WBC 16.8* 16.8* 15.9* 13.3*   RBC 3.63* 3.81* 3.58* 3.40*   HGB 10.9* 11.7* 10.9* 10.4*   HCT 34.0* 35.1* 33.1* 31.4*   MCV 94 92 93 92   MCH 30.0 30.7 30.4 30.6   MCHC 32.1 33.3 32.9 33.1   RDW 14.3 14.2 14.0 14.0    263 232 210     INR  Recent Labs   Lab 06/22/24  0528 06/21/24  1312 06/21/24  0949 06/21/24  0444   INR 3.70* 5.23*  5.16*  5.08* 4.81*

## 2024-06-22 NOTE — PLAN OF CARE
/85   Pulse (!) 126   Temp 98.6  F (37  C) (Oral)   Resp 20   Ht 1.829 m (6')   Wt 93.8 kg (206 lb 12.7 oz)   SpO2 93%   BMI 28.05 kg/m      Goal Outcome Evaluation:    Plan of care reviewed with: patient and his wife   Overall patient progress: no change    Time: 7199-3374  Status: S/p LVAD on 06/14/2024.   Neuro/Pain: A&Ox4, c/o incisional pain. PRN oxycodone, Robaxin, Tylenol and Atarax.   Activity: stayed in bed most of the shift, assist of one person with walker    Cardiac/vs: LVAD, tachycardia 100-120s. Afebrile.   Respiratory: on room air sating >91%, LS clear, mild SOB at rest. Working with IS independently. Productive cough.   GI/: no bm this shift. Active bowel sound. Bloating and abdominal distention per pt report. Senokot and Miralax given. Voided using urinal, Lasix 40 mg iv push this afternoon with good urine output.    Diet: regular, ate 50% of his dinner.   Skin: old chest tube site dressing changed. Midsternal incision open to air. No s/s of infection. Right groin site dressing CDI.   LDAs: PIV x 2 saline locked b/n abx.   Labs/Imaging: CT this afternoon. Sputum specimen sent.   Change this shift: added abx.   Plan: pain management, encourage out of bed, deep cough. I&O monitoring.

## 2024-06-22 NOTE — PLAN OF CARE
Neuro: A&Ox4. Pleasant, able to make needs known.  Cardiac: ST 110s-120s, CVTS & Cards 2 aware. HM3, parameters WNL. Doppler maps 80s-90.   Respiratory: Sating >92% on 1L NC when resting, on RA when awake & with activity. IS & Flutter at bedside, done with encouragement.  GI/: Adequate urine output. BM X1.  Diet/appetite: Tolerating regular diet. Appetite improving.  Activity:  Assist of 1, ambulated in welch x3, up in recliner.  Pain: Comfortably managed with prn oxy, atarax, robaxin and scheduled tylenol & lidocaine patches.   Skin: No new deficits noted.  LDA's:  -L PIV: SL  -L PIV: SL  -3     Shift events: 2view xray and CT completed this shift.      Plan: Encourage OOB activity & PO intake. Continue with POC. Notify primary team with changes.      Goal Outcome Evaluation: progress     Patient with history of recurrent spontaneous pneumothoraces that have failed multiple treatment attempts. CTS consulted on admission to determine chest tube management.     Plan:  -Continue working with CTS

## 2024-06-22 NOTE — PLAN OF CARE
Neuro: A&Ox4. Pleasant, able to make needs known.  Cardiac: ST 110s-120s, CVTS provider aware. HM3, parameters WNL - PI trending 2.5-3, cards 2 aware. Doppler maps 80-90. CVTS & Cards 2 aware, prioritizing managing pain before giving prn hydralazine.   Respiratory: Sating >92% on 1-2L NC when resting, weaned to RA when awake & with activity. IS & Flutter at bedside, done with encouragement.  GI/: Adequate urine output. BM X1.  Diet/appetite: Tolerating regular diet. Appetite improving.  Activity:  Assist of 1, ambulated to bathroom & in halls. Unable to find recliner this shift.  Pain: discussed pain management options with pt & importance of maintaining a pain level that allows adequate pulmonary hygiene. Pain more comfortable after prn oxy, robaxin & atarax were given along with scheduled lidocaine patches & tylenol.   Skin: No new deficits noted.  LDA's:  -L PIV: SL  -HM3    Shift events: pt had one unprovoked large emesis ~10:30am, provider aware. Nausea improved after prn zofran.     Plan: Encourage OOB activity & PO intake. Continue with POC. Notify primary team with changes.      Goal Outcome Evaluation: progressing

## 2024-06-22 NOTE — PLAN OF CARE
Neuro: A&Ox4.   Cardiac: Sinus Tachy w/ PVCs. MAPs 80-84, PRN Hydralazine given x1. Also has scheduled dose.   Respiratory: Sating >93% on O2 at 1 lpm via NC.  GI/: Adequate urine output via urinal. No BM this shift.  Diet/appetite: Tolerating regular diet. Eating well.  Activity:  Assist of 1, in bed the whole shift.  Pain: At acceptable level on current regimen.   Skin: Skin check done by Neyda SMITH & Lewis SMITH. Scabs on the right neck and bruising in the abdominal and right thigh/groin site noted. Midline incision, approximated. Old CT sites. VAD dressing CDI.  LDA's: L PIV on TKO. LVAD HM3.    Plan: Continue with POC. Notify primary team with changes.

## 2024-06-23 ENCOUNTER — APPOINTMENT (OUTPATIENT)
Dept: GENERAL RADIOLOGY | Facility: CLINIC | Age: 54
End: 2024-06-23
Attending: PHYSICIAN ASSISTANT
Payer: COMMERCIAL

## 2024-06-23 LAB
ALBUMIN SERPL BCG-MCNC: 2.9 G/DL (ref 3.5–5.2)
ALP SERPL-CCNC: 105 U/L (ref 40–150)
ALT SERPL W P-5'-P-CCNC: 78 U/L (ref 0–70)
ANION GAP SERPL CALCULATED.3IONS-SCNC: 11 MMOL/L (ref 7–15)
ANION GAP SERPL CALCULATED.3IONS-SCNC: 11 MMOL/L (ref 7–15)
AST SERPL W P-5'-P-CCNC: 76 U/L (ref 0–45)
BASOPHILS # BLD AUTO: 0.1 10E3/UL (ref 0–0.2)
BASOPHILS NFR BLD AUTO: 0 %
BILIRUB DIRECT SERPL-MCNC: 0.35 MG/DL (ref 0–0.3)
BILIRUB SERPL-MCNC: 0.7 MG/DL
BUN SERPL-MCNC: 19 MG/DL (ref 6–20)
BUN SERPL-MCNC: 20 MG/DL (ref 6–20)
CALCIUM SERPL-MCNC: 8.2 MG/DL (ref 8.6–10)
CALCIUM SERPL-MCNC: 8.7 MG/DL (ref 8.6–10)
CHLORIDE SERPL-SCNC: 102 MMOL/L (ref 98–107)
CHLORIDE SERPL-SCNC: 102 MMOL/L (ref 98–107)
CREAT SERPL-MCNC: 1.13 MG/DL (ref 0.67–1.17)
CREAT SERPL-MCNC: 1.29 MG/DL (ref 0.67–1.17)
CRP SERPL-MCNC: 205 MG/L
DEPRECATED HCO3 PLAS-SCNC: 21 MMOL/L (ref 22–29)
DEPRECATED HCO3 PLAS-SCNC: 23 MMOL/L (ref 22–29)
EGFRCR SERPLBLD CKD-EPI 2021: 66 ML/MIN/1.73M2
EGFRCR SERPLBLD CKD-EPI 2021: 78 ML/MIN/1.73M2
EOSINOPHIL # BLD AUTO: 0.7 10E3/UL (ref 0–0.7)
EOSINOPHIL NFR BLD AUTO: 4 %
ERYTHROCYTE [DISTWIDTH] IN BLOOD BY AUTOMATED COUNT: 14.2 % (ref 10–15)
FACT X ACT/NOR PPP CHRO: 23 % (ref 70–130)
GLUCOSE SERPL-MCNC: 106 MG/DL (ref 70–99)
GLUCOSE SERPL-MCNC: 113 MG/DL (ref 70–99)
HCT VFR BLD AUTO: 33.4 % (ref 40–53)
HGB BLD-MCNC: 10.8 G/DL (ref 13.3–17.7)
IMM GRANULOCYTES # BLD: 0.3 10E3/UL
IMM GRANULOCYTES NFR BLD: 2 %
INR PPP: 3.16 (ref 0.85–1.15)
LACTATE SERPL-SCNC: 1.6 MMOL/L (ref 0.7–2)
LDH SERPL L TO P-CCNC: 352 U/L (ref 0–250)
LDH SERPL L TO P-CCNC: 363 U/L (ref 0–250)
LYMPHOCYTES # BLD AUTO: 1.4 10E3/UL (ref 0.8–5.3)
LYMPHOCYTES NFR BLD AUTO: 8 %
MAGNESIUM SERPL-MCNC: 2.2 MG/DL (ref 1.7–2.3)
MCH RBC QN AUTO: 30.1 PG (ref 26.5–33)
MCHC RBC AUTO-ENTMCNC: 32.3 G/DL (ref 31.5–36.5)
MCV RBC AUTO: 93 FL (ref 78–100)
MONOCYTES # BLD AUTO: 1.6 10E3/UL (ref 0–1.3)
MONOCYTES NFR BLD AUTO: 9 %
NEUTROPHILS # BLD AUTO: 14.3 10E3/UL (ref 1.6–8.3)
NEUTROPHILS NFR BLD AUTO: 77 %
NRBC # BLD AUTO: 0 10E3/UL
NRBC BLD AUTO-RTO: 0 /100
PHOSPHATE SERPL-MCNC: 3.1 MG/DL (ref 2.5–4.5)
PLATELET # BLD AUTO: 285 10E3/UL (ref 150–450)
POTASSIUM SERPL-SCNC: 3.8 MMOL/L (ref 3.4–5.3)
POTASSIUM SERPL-SCNC: 3.8 MMOL/L (ref 3.4–5.3)
PROT SERPL-MCNC: 5.7 G/DL (ref 6.4–8.3)
RBC # BLD AUTO: 3.59 10E6/UL (ref 4.4–5.9)
SODIUM SERPL-SCNC: 134 MMOL/L (ref 135–145)
SODIUM SERPL-SCNC: 136 MMOL/L (ref 135–145)
VANCOMYCIN SERPL-MCNC: 15.2 UG/ML
WBC # BLD AUTO: 18.3 10E3/UL (ref 4–11)

## 2024-06-23 PROCEDURE — 250N000013 HC RX MED GY IP 250 OP 250 PS 637

## 2024-06-23 PROCEDURE — 86140 C-REACTIVE PROTEIN: CPT | Performed by: PHYSICIAN ASSISTANT

## 2024-06-23 PROCEDURE — 99232 SBSQ HOSP IP/OBS MODERATE 35: CPT | Performed by: INTERNAL MEDICINE

## 2024-06-23 PROCEDURE — 83615 LACTATE (LD) (LDH) ENZYME: CPT | Performed by: PHYSICIAN ASSISTANT

## 2024-06-23 PROCEDURE — 250N000013 HC RX MED GY IP 250 OP 250 PS 637: Performed by: PHYSICIAN ASSISTANT

## 2024-06-23 PROCEDURE — 71046 X-RAY EXAM CHEST 2 VIEWS: CPT

## 2024-06-23 PROCEDURE — 36415 COLL VENOUS BLD VENIPUNCTURE: CPT | Performed by: SURGERY

## 2024-06-23 PROCEDURE — 250N000009 HC RX 250

## 2024-06-23 PROCEDURE — 87040 BLOOD CULTURE FOR BACTERIA: CPT | Performed by: PHYSICIAN ASSISTANT

## 2024-06-23 PROCEDURE — 93750 INTERROGATION VAD IN PERSON: CPT | Performed by: STUDENT IN AN ORGANIZED HEALTH CARE EDUCATION/TRAINING PROGRAM

## 2024-06-23 PROCEDURE — 99232 SBSQ HOSP IP/OBS MODERATE 35: CPT | Mod: FS | Performed by: PHYSICIAN ASSISTANT

## 2024-06-23 PROCEDURE — 85025 COMPLETE CBC W/AUTO DIFF WBC: CPT

## 2024-06-23 PROCEDURE — 99232 SBSQ HOSP IP/OBS MODERATE 35: CPT | Mod: 25 | Performed by: STUDENT IN AN ORGANIZED HEALTH CARE EDUCATION/TRAINING PROGRAM

## 2024-06-23 PROCEDURE — 82248 BILIRUBIN DIRECT: CPT | Performed by: PHYSICIAN ASSISTANT

## 2024-06-23 PROCEDURE — 250N000013 HC RX MED GY IP 250 OP 250 PS 637: Performed by: SURGERY

## 2024-06-23 PROCEDURE — 250N000011 HC RX IP 250 OP 636: Mod: JZ | Performed by: PHYSICIAN ASSISTANT

## 2024-06-23 PROCEDURE — 250N000011 HC RX IP 250 OP 636: Performed by: SURGERY

## 2024-06-23 PROCEDURE — 99207 PR NO BILLABLE SERVICE THIS VISIT: CPT | Performed by: STUDENT IN AN ORGANIZED HEALTH CARE EDUCATION/TRAINING PROGRAM

## 2024-06-23 PROCEDURE — 250N000011 HC RX IP 250 OP 636: Performed by: PHYSICIAN ASSISTANT

## 2024-06-23 PROCEDURE — 120N000003 HC R&B IMCU UMMC

## 2024-06-23 PROCEDURE — 76604 US EXAM CHEST: CPT | Mod: 26 | Performed by: STUDENT IN AN ORGANIZED HEALTH CARE EDUCATION/TRAINING PROGRAM

## 2024-06-23 PROCEDURE — 80202 ASSAY OF VANCOMYCIN: CPT | Performed by: SURGERY

## 2024-06-23 PROCEDURE — 83735 ASSAY OF MAGNESIUM: CPT | Performed by: PHYSICIAN ASSISTANT

## 2024-06-23 PROCEDURE — 87641 MR-STAPH DNA AMP PROBE: CPT | Performed by: INTERNAL MEDICINE

## 2024-06-23 PROCEDURE — 36415 COLL VENOUS BLD VENIPUNCTURE: CPT | Performed by: PHYSICIAN ASSISTANT

## 2024-06-23 PROCEDURE — 258N000003 HC RX IP 258 OP 636: Mod: JZ | Performed by: SURGERY

## 2024-06-23 PROCEDURE — 71046 X-RAY EXAM CHEST 2 VIEWS: CPT | Mod: 26 | Performed by: RADIOLOGY

## 2024-06-23 PROCEDURE — 250N000013 HC RX MED GY IP 250 OP 250 PS 637: Performed by: STUDENT IN AN ORGANIZED HEALTH CARE EDUCATION/TRAINING PROGRAM

## 2024-06-23 PROCEDURE — 85610 PROTHROMBIN TIME: CPT | Performed by: STUDENT IN AN ORGANIZED HEALTH CARE EDUCATION/TRAINING PROGRAM

## 2024-06-23 PROCEDURE — 94640 AIRWAY INHALATION TREATMENT: CPT

## 2024-06-23 PROCEDURE — 94640 AIRWAY INHALATION TREATMENT: CPT | Mod: 76

## 2024-06-23 PROCEDURE — 80048 BASIC METABOLIC PNL TOTAL CA: CPT | Performed by: PHYSICIAN ASSISTANT

## 2024-06-23 PROCEDURE — 84100 ASSAY OF PHOSPHORUS: CPT

## 2024-06-23 PROCEDURE — 93010 ELECTROCARDIOGRAM REPORT: CPT | Performed by: INTERNAL MEDICINE

## 2024-06-23 PROCEDURE — 999N000157 HC STATISTIC RCP TIME EA 10 MIN

## 2024-06-23 PROCEDURE — 85260 CLOT FACTOR X STUART-POWER: CPT | Performed by: SURGERY

## 2024-06-23 PROCEDURE — 93005 ELECTROCARDIOGRAM TRACING: CPT

## 2024-06-23 PROCEDURE — 99255 IP/OBS CONSLTJ NEW/EST HI 80: CPT | Performed by: INTERNAL MEDICINE

## 2024-06-23 PROCEDURE — 83605 ASSAY OF LACTIC ACID: CPT | Performed by: PHYSICIAN ASSISTANT

## 2024-06-23 RX ORDER — METHOCARBAMOL 750 MG/1
750 TABLET, FILM COATED ORAL 4 TIMES DAILY PRN
Status: DISCONTINUED | OUTPATIENT
Start: 2024-06-23 | End: 2024-07-04 | Stop reason: HOSPADM

## 2024-06-23 RX ORDER — WARFARIN SODIUM 2 MG/1
2 TABLET ORAL
Status: DISCONTINUED | OUTPATIENT
Start: 2024-06-23 | End: 2024-06-23

## 2024-06-23 RX ORDER — FUROSEMIDE 10 MG/ML
40 INJECTION INTRAMUSCULAR; INTRAVENOUS 2 TIMES DAILY
Status: DISCONTINUED | OUTPATIENT
Start: 2024-06-23 | End: 2024-06-26

## 2024-06-23 RX ORDER — POTASSIUM CHLORIDE 20MEQ/15ML
20 LIQUID (ML) ORAL ONCE
Status: COMPLETED | OUTPATIENT
Start: 2024-06-23 | End: 2024-06-23

## 2024-06-23 RX ADMIN — OXYCODONE HYDROCHLORIDE 5 MG: 5 TABLET ORAL at 10:13

## 2024-06-23 RX ADMIN — Medication 18.75 MG: at 14:26

## 2024-06-23 RX ADMIN — MAGNESIUM HYDROXIDE 30 ML: 400 SUSPENSION ORAL at 13:36

## 2024-06-23 RX ADMIN — ATORVASTATIN CALCIUM 20 MG: 20 TABLET, FILM COATED ORAL at 20:00

## 2024-06-23 RX ADMIN — PIPERACILLIN AND TAZOBACTAM 4.5 G: 4; .5 INJECTION, POWDER, LYOPHILIZED, FOR SOLUTION INTRAVENOUS at 00:51

## 2024-06-23 RX ADMIN — IPRATROPIUM BROMIDE AND ALBUTEROL SULFATE 3 ML: .5; 3 SOLUTION RESPIRATORY (INHALATION) at 11:51

## 2024-06-23 RX ADMIN — OXYCODONE HYDROCHLORIDE 5 MG: 5 TABLET ORAL at 15:58

## 2024-06-23 RX ADMIN — AZITHROMYCIN DIHYDRATE 250 MG: 250 TABLET ORAL at 07:59

## 2024-06-23 RX ADMIN — PIPERACILLIN AND TAZOBACTAM 4.5 G: 4; .5 INJECTION, POWDER, LYOPHILIZED, FOR SOLUTION INTRAVENOUS at 13:14

## 2024-06-23 RX ADMIN — OXYCODONE HYDROCHLORIDE 5 MG: 5 TABLET ORAL at 21:37

## 2024-06-23 RX ADMIN — Medication 18.75 MG: at 20:00

## 2024-06-23 RX ADMIN — GUAIFENESIN 600 MG: 600 TABLET ORAL at 20:00

## 2024-06-23 RX ADMIN — SENNOSIDES AND DOCUSATE SODIUM 2 TABLET: 8.6; 5 TABLET ORAL at 20:00

## 2024-06-23 RX ADMIN — ACETAMINOPHEN 975 MG: 325 TABLET, FILM COATED ORAL at 03:51

## 2024-06-23 RX ADMIN — HYDROXYZINE HYDROCHLORIDE 25 MG: 25 TABLET ORAL at 08:01

## 2024-06-23 RX ADMIN — VANCOMYCIN HYDROCHLORIDE 1250 MG: 10 INJECTION, POWDER, LYOPHILIZED, FOR SOLUTION INTRAVENOUS at 21:36

## 2024-06-23 RX ADMIN — HYDROXYZINE HYDROCHLORIDE 25 MG: 25 TABLET ORAL at 14:25

## 2024-06-23 RX ADMIN — PIPERACILLIN AND TAZOBACTAM 4.5 G: 4; .5 INJECTION, POWDER, LYOPHILIZED, FOR SOLUTION INTRAVENOUS at 18:41

## 2024-06-23 RX ADMIN — IPRATROPIUM BROMIDE AND ALBUTEROL SULFATE 3 ML: .5; 3 SOLUTION RESPIRATORY (INHALATION) at 08:48

## 2024-06-23 RX ADMIN — PIPERACILLIN AND TAZOBACTAM 4.5 G: 4; .5 INJECTION, POWDER, LYOPHILIZED, FOR SOLUTION INTRAVENOUS at 06:15

## 2024-06-23 RX ADMIN — METHOCARBAMOL 500 MG: 500 TABLET ORAL at 07:59

## 2024-06-23 RX ADMIN — ACETAMINOPHEN 650 MG: 325 TABLET, FILM COATED ORAL at 18:49

## 2024-06-23 RX ADMIN — VANCOMYCIN HYDROCHLORIDE 1250 MG: 10 INJECTION, POWDER, LYOPHILIZED, FOR SOLUTION INTRAVENOUS at 09:25

## 2024-06-23 RX ADMIN — POTASSIUM CHLORIDE 20 MEQ: 1.5 SOLUTION ORAL at 08:01

## 2024-06-23 RX ADMIN — METHOCARBAMOL 750 MG: 750 TABLET ORAL at 14:25

## 2024-06-23 RX ADMIN — HYDRALAZINE HYDROCHLORIDE 50 MG: 25 TABLET ORAL at 12:24

## 2024-06-23 RX ADMIN — IPRATROPIUM BROMIDE AND ALBUTEROL SULFATE 3 ML: .5; 3 SOLUTION RESPIRATORY (INHALATION) at 20:38

## 2024-06-23 RX ADMIN — HYDRALAZINE HYDROCHLORIDE 50 MG: 25 TABLET ORAL at 19:59

## 2024-06-23 RX ADMIN — Medication 18.75 MG: at 07:59

## 2024-06-23 RX ADMIN — LIDOCAINE 2 PATCH: 4 PATCH TOPICAL at 11:58

## 2024-06-23 RX ADMIN — PANTOPRAZOLE SODIUM 40 MG: 40 TABLET, DELAYED RELEASE ORAL at 07:59

## 2024-06-23 RX ADMIN — POLYETHYLENE GLYCOL 3350 17 G: 17 POWDER, FOR SOLUTION ORAL at 08:01

## 2024-06-23 RX ADMIN — OXYCODONE HYDROCHLORIDE 5 MG: 5 TABLET ORAL at 09:25

## 2024-06-23 RX ADMIN — SENNOSIDES AND DOCUSATE SODIUM 2 TABLET: 8.6; 5 TABLET ORAL at 07:59

## 2024-06-23 RX ADMIN — ACETAMINOPHEN 650 MG: 325 TABLET, FILM COATED ORAL at 12:27

## 2024-06-23 RX ADMIN — GUAIFENESIN 600 MG: 600 TABLET ORAL at 07:59

## 2024-06-23 RX ADMIN — FUROSEMIDE 40 MG: 10 INJECTION, SOLUTION INTRAVENOUS at 16:08

## 2024-06-23 RX ADMIN — HYDRALAZINE HYDROCHLORIDE 50 MG: 25 TABLET ORAL at 03:51

## 2024-06-23 RX ADMIN — FUROSEMIDE 40 MG: 10 INJECTION, SOLUTION INTRAVENOUS at 12:24

## 2024-06-23 RX ADMIN — IPRATROPIUM BROMIDE AND ALBUTEROL SULFATE 3 ML: .5; 3 SOLUTION RESPIRATORY (INHALATION) at 16:24

## 2024-06-23 ASSESSMENT — ACTIVITIES OF DAILY LIVING (ADL)
ADLS_ACUITY_SCORE: 34

## 2024-06-23 NOTE — PLAN OF CARE
Neuro: A&Ox4. Pleasant, able to make needs known.  Cardiac: ST 110s-120s, CVTS & Cards 2 aware. HM3, parameters WNL except PI dropped to 2.1-2.5 for ~one hour this afternoon, Cards 2 provider made aware, no new orders. Doppler maps 78-90.   Respiratory: Sating >92% on 1L NC when lying in bed, on RA when awake & with activity. IS & Flutter at bedside, done with encouragement.  GI/: Adequate urine output. Pt c/o bloating, milk of magnesium ordered & given, BM X1.  Diet/appetite: Tolerating regular diet. Poor appetite.  Activity:  Assist of 1 for line management, ambulated in welch x2 with GB/walker, up in recliner during day.  Pain: C/o pain at left lower chest. Provider aware, EKG ordered. Robaxin increased from 500mg to 750mg with good effect. Prn oxy, atarax, & tylenol and scheduled lidocaine patches given.  Skin: No new deficits noted.  LDA's:  -L PIV: SL  -L PIV: SL  -3     Shift events: 2view xray completed this shift.      Plan: Encourage OOB activity, IS/Flutter use & PO intake. Continue with POC. Notify primary team with changes        Goal Outcome Evaluation: progressing

## 2024-06-23 NOTE — CONSULTS
[facility] CONSULTATION     Patient: Navin Lutz   Date of birth 1970, Medical record number [id number]  Date of Visit: 06/23/2024  Date of Admission: 6/3/2024  Consult Requester: Brian Jhaveri MD          Assessment and Recommendations:     ASSESSMENT:  1. Leukocytosis  2. NICM, s/p LVAD placement on 6/14/24  3. Substernal fluid collection, postprocedural vs infection  4. Cough, c/f pneumonia  5. Pleural effusions  6. CKD  7. Non-occlusive DVT R brachial    RECOMMENDATIONS:  1. Continue current empiric antibiotics for now (vancomycin + pip-tazo + azithro)  2. Follow pending blood and respiratory cultures, repeat if any fevers  3. No immediate surgical plans for substernal fluid collection, though will need ongoing monitoring  4. Agree with thoracentesis of L pleural effusion, send fluid for analysis and cultures  5. I added MRSA screen of nares  6. Continue to trend daily CRP       DISCUSSION: 52 yo M with a h/o HFrEF due to NICM s/p ICD, and CKD that was admitted on 6/3/24 with decompensated heart failure and is now s/p HM3 LVAD placement on 6/14/24. He had been having worsening SOB for a few weeks PTA, and these were associated with cough that was not responding to lasix. He states that the coughing has been somewhat improved in the hospital, but he does feel that it has been more productive over the last 2 days. He did receive perioperative antibiotics for LVAD including vancomycin, rifampin, fluconazole, levo/pip-tazo from 6/14-6/16). He has also developed a worsening leukocytosis starting 6/20 and CRP has been increasing over this period (currently 205). He has been afebrile over the last several days. He has had some pain at his LVAD insertion site that has improved today but other than cough denies localizing symptoms. A CT CAP was obtained on 6/22, which showed findings suspicious for DVT of RIJ, postoperative changes of LVAD placement, bilateral pleural effusions with mild groundgrass, and  "anterior chest subcutaneous air and stranding with gas and fluid collection in substernal region though postprocedural vs infection. Discussed latter findings with CVTS, who feel likely postoperative and do not plan on in surgical interventions currently (though will continue monitoring in context of worsening leukocytosis). They are also planning to consult IR for thoracentesis of L pleural effusion. Given progressive leukocytosis, he was started on vancomycin and pip-tazo on 6/21, and azithromycin was added on 6/22. At this point, source of leukocytosis is unclear, though primary concerns include pneumonia (given cough and effusions), substernal fluid collection, other occult infection, or non-ID causes of fever. I would continue current antibiotics for now which will continue to provide broad-spectrum coverage as we await additional culture information. If ongoing progression of leukocytosis despite antibiotics, we would think more about possible source control issues (raising concern for substernal collection).      Recommendations were discussed with the primary team.      ID will continue to follow.  Dr. Murillo will be taking over the service tomorrow. Contact anytime with questions.     Total time spent during this encounter (chart review, documentation, MDM, coordination of care): 90 minutes     Rodoflo Giron MD  Contact via [employer] or [facility] medical Paging/Directory  ________________________________________________________________    Consult Question: \"up-trending white count, CRP, concern for infection on broad spectrum antibiotics \"  Admission Diagnosis: Acute on chronic systolic CHF (congestive heart failure) (H)  Acute decompensated heart failure (H)         History of Present Illness:     Navin Lutz is a 54 yo M with a h/o HFrEF due to NICM s/p ICD, and CKD that was admitted on 6/3/24 with decompensated heart failure and is now s/p HM3 LVAD placement on 6/14/24. He had been having worsening SOB for a few " weeks PTA, and these were associated with cough that was not responding to lasix. He states that the coughing has been somewhat improved in the hospital, but he does feel that it has been more productive over the last 2 days. He did receive perioperative antibiotics for LVAD including vancomycin, rifampin, fluconazole, levo/pip-tazo from 6/14-6/16). He has also developed a worsening leukocytosis starting 6/20 and CRP has been increasing over this period (currently 205). He has been afebrile over the last several days. He has had some pain at his LVAD insertion site that has improved today but other than cough denies localizing symptoms. A CT CAP was obtained on 6/22, which showed findings suspicious for DVT of RIJ, postoperative changes of LVAD placement, bilateral pleural effusions with mild groundgrass, and anterior chest subcutaneous air and stranding with gas and fluid collection in substernal region though postprocedural vs infection. Discussed latter findings with CVTS, who feel likely postoperative and do not plan on in surgical interventions currently (though will continue monitoring in context of worsening leukocytosis). They are also planning to consult IR for thoracentesis of L pleural effusion. Given progressive leukocytosis, he was started on vancomycin and pip-tazo on 6/21, and azithromycin was added on 6/22. ID being consulted for additional antibiotics recommendations. Blood cultures on 6/15 and 6/21 with NGTD. Sputum cultures pending.      Microbiology:  6/22 Sputum Pending  6/21 Blood  NGTD  6/15 Blood  NG  Sputum Mixed eugene    Current antibiotics:  Pip-tazo: 6/21-  Vancomycin: 6/21-  Azithromycin: 6/22-    Prior antibiotics:  Completed perioperative LVAD antibiotics 6/14-6/16  (Vancomycin, rifampin, fluconazole, levo -> pip/tazo)         Review of Systems:     Full 12-point ROS negative or as stated in HPI.         Past Medical History:   No past medical history on file.         Past Surgical  History:     Past Surgical History:   Procedure Laterality Date    CARDIAC SURGERY      COLONOSCOPY N/A 8/25/2023    Procedure: COLONOSCOPY, WITH POLYPECTOMY AND BIOPSY;  Surgeon: Alejandro Rodriguez MD;  Location:  GI    CV INTRA AORTIC BALLOON N/A 6/12/2024    Procedure: Intra aortic Balloon Pump Insertion;  Surgeon: Elfego Cruz MD;  Location:  HEART CARDIAC CATH LAB    CV RIGHT HEART CATH MEASUREMENTS RECORDED N/A 08/22/2023    Procedure: Heart Cath Right Heart Cath;  Surgeon: Elfego Cruz MD;  [facility]    CV RIGHT HEART CATH MEASUREMENTS RECORDED N/A 9/20/2023    Procedure: Heart Cath Right Heart Cath;  Surgeon: Elfego Cruz MD;  [facility]    CV RIGHT HEART CATH MEASUREMENTS RECORDED N/A 1/19/2024    Procedure: Heart Cath Right Heart Cath;  Surgeon: Tobin Jaime MD;  [facility]    CV RIGHT HEART CATH MEASUREMENTS RECORDED N/A 5/3/2024    Procedure: Heart Cath Right Heart Cath;  Surgeon: Dion Ashley MD;  [facility]    CV RIGHT HEART CATH MEASUREMENTS RECORDED N/A 6/4/2024    Procedure: Right Heart Catheterization;  Surgeon: Cassandra Rizzo MD;  Location:  HEART CARDIAC CATH LAB    CV RIGHT HEART CATH MEASUREMENTS RECORDED N/A 6/12/2024    Procedure: Right Heart Catheterization;  Surgeon: Elfego Cruz MD;  Location:  HEART CARDIAC CATH LAB    INSERT VENTRICULAR ASSIST DEVICE LEFT (HEARTMATE II) N/A 6/14/2024    Procedure: Median Sternotomy, INSERTION, LEFT VENTRICULAR ASSIST DEVICE (HEARTMATE III), on Cardiopulmonary Bypass, Transesophageal Echocardiogram by Anesthesia;  Surgeon: Brian Jhaveri MD;  Location: [facility]    PICC DOUBLE LUMEN PLACEMENT Right 08/22/2023    Brachial Vein Medial 5F DL 45 cm, 2 cm out          Family History:   Reviewed and non-contributory.   No family history on file.         Social  History:     Social History     Tobacco Use    Smoking status: Never    Smokeless tobacco: Never   Substance Use Topics    Alcohol use: Never     History   Sexual Activity    Sexual activity: Yes    Partners: Female          Current Medications:     Current Facility-Administered Medications   Medication Dose Route Frequency Provider Last Rate Last Admin    atorvastatin (LIPITOR) tablet 20 mg  20 mg Oral or Feeding Tube QPM Chase Mallory MD   20 mg at 06/22/24 1949    azithromycin (ZITHROMAX) tablet 250 mg  250 mg Oral Daily Aida Lamas MD   250 mg at 06/23/24 0759    captopril (CAPOTEN) half-tab 18.75 mg  18.75 mg Oral TID Eliz Choi PA-C   18.75 mg at 06/23/24 0759    furosemide (LASIX) injection 40 mg  40 mg Intravenous BID Eliz Choi PA-C   40 mg at 06/23/24 1224    guaiFENesin (MUCINEX) 12 hr tablet 600 mg  600 mg Oral BID Mary Baker PA-C   600 mg at 06/23/24 0759    hydrALAZINE (APRESOLINE) tablet 50 mg  50 mg Oral Q8H Deon Rosa APRN CNP   50 mg at 06/23/24 1224    ipratropium - albuterol 0.5 mg/2.5 mg/3 mL (DUONEB) neb solution 3 mL  3 mL Nebulization 4x daily Deon Rosa APRN CNP   3 mL at 06/23/24 1151    Lidocaine (LIDOCARE) 4 % Patch 1-2 patch  1-2 patch Transdermal Q24H Sarah Guerin APRN CNP   2 patch at 06/23/24 1158    pantoprazole (PROTONIX) EC tablet 40 mg  40 mg Oral QAM AC Deon Rosa APRN CNP   40 mg at 06/23/24 0759    piperacillin-tazobactam (ZOSYN) 4.5 g vial to attach to  mL bag  4.5 g Intravenous Q6H Galilea Dang PA-C   4.5 g at 06/23/24 0615    polyethylene glycol (MIRALAX) Packet 17 g  17 g Oral Daily Darnell Glaser PA-C   17 g at 06/23/24 0801    senna-docusate (SENOKOT-S/PERICOLACE) 8.6-50 MG per tablet 2 tablet  2 tablet Oral BID Sarah Guerin, ANNE CNP   2 tablet at 06/23/24 0759    sodium chloride (PF) 0.9% PF flush 3 mL  3 mL Intracatheter Q8H Cira Mallory PA-C   3 mL at 06/23/24 0804     vancomycin (VANCOCIN) 1,250 mg in 0.9% NaCl 250 mL intermittent infusion  1,250 mg Intravenous Q12H Brian Jhaveri  mL/hr at 06/21/24 2206 1,250 mg at 06/23/24 0925    Warfarin Dose Required Daily - Pharmacist Managed  1 each Oral See Admin Instructions Misha Guerra MD        warfarin-No DOSE today  1 each Does not apply no dose today (warfarin) Brian Jhaveri MD              Allergies:   No Known Allergies         Physical Exam:   Vitals were reviewed  Patient Vitals for the past 24 hrs:   Temp Temp src Pulse Resp SpO2 Weight   06/23/24 1225 -- -- -- 26 -- --   06/23/24 1151 -- -- 119 -- 96 % --   06/23/24 1112 98.3  F (36.8  C) Oral 116 -- -- --   06/23/24 1000 -- -- -- -- 100 % --   06/23/24 0848 -- -- 110 -- 95 % --   06/23/24 0801 -- -- -- -- -- 92.4 kg (203 lb 11.3 oz)   06/23/24 0741 -- -- 110 22 -- --   06/23/24 0732 98.3  F (36.8  C) Oral -- 20 94 % --   06/23/24 0400 -- -- -- -- 95 % --   06/23/24 0346 99.4  F (37.4  C) Oral -- 20 95 % --   06/23/24 0030 98  F (36.7  C) Oral 115 22 96 % --   06/22/24 2300 98.3  F (36.8  C) Oral -- 20 -- --   06/22/24 1946 98.6  F (37  C) Oral (!) 126 20 93 % --   06/22/24 1907 98.2  F (36.8  C) Oral 114 22 91 % --   06/22/24 1602 -- -- 112 -- 92 % --   06/22/24 1544 98.5  F (36.9  C) Oral (!) 123 28 92 % --     Physical Examination:  Gen: A&Ox4, NAD  Neuro: no focal deficits   CV: tachycardic, RRR, LVAD hum   Pulm: CTAB, normal breathing on 1 lpm at the time of exam  Abd: nondistended, normal BS, soft, nontender  Ext: trace peripheral edema, non- pitting  Skin: incision clean, dry, intact, no erythema, sternum stable.  Tubes/drain sites: dressing clean and dry  Lines: driveline dressing clean and dry          Laboratory Data:     Inflammatory Markers  No lab results found.    Hematology Studies    Recent Labs   Lab Test 06/23/24  0507 06/22/24  0528 06/21/24  0444 06/20/24  1746 06/20/24  0345 06/19/24  0427   WBC 18.3* 16.8* 16.8* 15.9* 13.3* 10.3   HGB 10.8*  "10.9* 11.7* 10.9* 10.4* 9.1*   MCV 93 94 92 93 92 94    261 263 232 210 131*     Metabolic Studies     Recent Labs   Lab Test 24  0507 24  1626 24  0528 24  0444 24  1746    134* 133* 137 137   POTASSIUM 3.8 3.7 3.9 3.6  3.6 3.8   CHLORIDE 102 98 100 100 101   CO2  24 24   BUN 20.0 21.0* 22.7* 26.0* 28.6*   CR 1.29* 1.29* 1.12 1.06 1.12   GFRESTIMATED 66 66 79 84 79     Hepatic Studies    Recent Labs   Lab Test 24  0507 24  0444 24  1746 24  0345 24  1547 24  0427   BILITOTAL 0.7 0.7 0.6 0.6 0.6 0.5   ALKPHOS 105 78 74 68 64 55   ALBUMIN 2.9* 3.0* 3.0* 2.9* 3.1* 2.7*   AST 76* 32 32 33 36 31   ALT 78* 25 26 29 28 22     Urine Studies    Recent Labs   Lab Test 24  1731 06/15/24  1008 24  1822 23  2227   LEUKEST Negative Negative Negative Negative   WBCU <1 1 1 <1     Vancomycin Levels    Recent Labs   Lab Test 24  0507   VANCOMYCIN 15.2     Hepatitis B Testing   Recent Labs   Lab Test 23  0702   HBCAB Nonreactive   HEPBANG Nonreactive     Hepatitis C Testing     Hepatitis C Antibody   Date Value Ref Range Status   2023 Nonreactive Nonreactive Final     Respiratory Virus Testing    No results found for: \"RS\", \"FLUAG\"         Imagin/23/24 CXR  1. Stable small left pleural effusion.  2. Stable trace left apical pneumothorax.  3. Similar mild streaky perihilar and retrocardiac opacities.    24 CT CAP  1.  Suspicion of right internal jugular vein DVT. Recommend ultrasound  for full characterization.  2.  Postoperative changes of LVAD placement. LVAD is appropriate  position. Sternotomy wires are intact.  3.  Anterior chest subcutaneous air and stranding.  Gas and fluid  collection in the substernal region which could be infectious versus  postprocedural.  4.   Bilateral pleural effusions with adjacent atelectasis and mild  groundglass. Underlying infection cannot be entirely ruled out " the  lung bases.    6/22 CXR  1. Small left apical pneumothorax, appears slightly decreased compared  to 6/21/2024.  2. Left basilar and retrocardiac opacities, may represent atelectasis  versus consolidation

## 2024-06-23 NOTE — PHARMACY-VANCOMYCIN DOSING SERVICE
"Pharmacy Vancomycin Note  Date of Service 2024  Patient's  1970   53 year old, male    Indication: Postoperative Infection  Day of Therapy: 2  Current vancomycin regimen:  1250 mg IV q12h  Current vancomycin monitoring method: AUC  Current vancomycin therapeutic monitoring goal: 400-600 mg*h/L    [employer] Prediction of Current Vancomycin Regimen  Loading dose: N/A  Regimen: 1250 mg IV every 12 hours.  Start time: 10:09 on 2024  Exposure target: AUC24 (range)400-600 mg/L.hr   AUC24,ss: 507 mg/L.hr  Probability of AUC24 > 400: 82 %  Ctrough,ss: 17 mg/L  Probability of Ctrough,ss > 20: 37 %  Probability of nephrotoxicity (Lodise CANDIS ): 13 %      Current estimated CrCl = Estimated Creatinine Clearance: 86.6 mL/min (A) (based on SCr of 1.29 mg/dL (H)).    Creatinine for last 3 days  2024:  5:46 PM Creatinine 1.12 mg/dL  2024:  4:44 AM Creatinine 1.06 mg/dL  2024:  5:28 AM Creatinine 1.12 mg/dL;  4:26 PM Creatinine 1.29 mg/dL  2024:  5:07 AM Creatinine 1.29 mg/dL    Recent Vancomycin Levels (past 3 days)  2024:  5:07 AM Vancomycin 15.2 ug/mL    Vancomycin IV Administrations (past 72 hours)                     vancomycin (VANCOCIN) 1,250 mg in 0.9% NaCl 250 mL intermittent infusion (mg) 1,250 mg New Bag 24 2209     1,250 mg New Bag  1029     1,250 mg New Bag 24 2206    vancomycin (VANCOCIN) 2,000 mg in sodium chloride 0.9 % 500 mL intermittent infusion (mg) 2,000 mg New Bag 24 1137                    Nephrotoxins and other renal medications (From now, onward)      Start     Dose/Rate Route Frequency Ordered Stop    24 0800  captopril (CAPOTEN) half-tab 18.75 mg         18.75 mg Oral 3 TIMES DAILY 24 0717      24 1800  piperacillin-tazobactam (ZOSYN) 4.5 g vial to attach to  mL bag        Note to Pharmacy: For [facility], [facility] and [facility]: For Zosyn-naive patients, use the \"Zosyn initial dose + extended infusion\" order panel.    4.5 g  over " "30 Minutes Intravenous EVERY 6 HOURS 06/22/24 1344      06/21/24 2200  vancomycin (VANCOCIN) 1,250 mg in 0.9% NaCl 250 mL intermittent infusion        Placed in \"Followed by\" Linked Group    1,250 mg  over 90 Minutes Intravenous EVERY 12 HOURS 06/21/24 0903                 Contrast Orders - past 72 hours (72h ago, onward)      Start     Dose/Rate Route Frequency Stop    06/22/24 1400  iopamidol (ISOVUE-370) solution 127 mL         127 mL Intravenous ONCE 06/22/24 1418            Interpretation of levels and current regimen:  Vancomycin level is reflective of -600    Has serum creatinine changed greater than 50% in last 72 hours: No    Urine output:  good urine output    Renal Function:  Scr is starting to inc      Plan:  Continue Current Dose  Vancomycin monitoring method: AUC  Vancomycin therapeutic monitoring goal: 400-600 mg*h/L  Pharmacy will check vancomycin levels as appropriate in 3-5 Days.  Serum creatinine levels will be ordered daily for the first week of therapy and at least twice weekly for subsequent weeks.    Osvaldo Sanchez RPH    "

## 2024-06-23 NOTE — PROGRESS NOTES
Cardiovascular Surgery Progress Note  06/23/2024         Assessment and Plan:     Navin Lutz is a 53 year old male with PMH of CKD2, HLD, R Subclavian and axillary vein non-occlusive thrombus on Warfarin, NSVT, HFrEF 2/2 NICM s/p ICD (PTA IV milrinone) who presents admitted 6/3/24 for decompensated HFrEF listed status 4. He is now s/p LVAD by Dr. Jhaveri on 6/14/24.     Cardiovascular:   S/p LVAD on 6/14 by Dr. hJaveri  S/p IABP (6/12-6/14)  Hx NSVT  Acute on chronic HFrEF 2/2 to NICM (EF 10-15%), home milrinone PTA, s/p ICD   HLD  HTN  Hypervolemia  No arrhythmias reported overnight, ongoing sinus tachycardia, HD stable. MAPs 76-90  Pre-op echo 6/8: LV EF 15-20%, normal RV size/function  - PTA atorvastatin 20 mg daily   - PO hydralazine 50 mg Q8H  - Captopril 18.75 mg TID   - Cards 2 following for hemodynamic & GDMT management; appreciate recs  - Holding PTA Coreg, Entresto, Aldactone, Jardiance     Chest tubes/TPW: removed in ICU    Pulmonary:  - Extubated POD 3 to 5 lpm via NC. Now saturating well on 1 lpm.   - Supplemental O2 PRN to keep sats > 92%. Wean off as tolerated.  - Pulm toilet, IS, activity and deep breathing  - DuoNebs QID, Mucinex BID    Left pleural effusion  - CAPS procedural team consulted 6/23 to assess for thoracentesis - loculated effusion, not amendable to bedside thoracentesis but recommend IR consult   - IR consulted 6/23 for diagnostic and therapeutic thoracentesis given up-trending white count/CRP     Neurology /Psych:  Acute post-operative pain  - Acute post-operative pain regimen:  - scheduled acetaminophen, lidocaine patches   - PRN: PO oxycodone PRN, Robaxin, Voltaren gel, Atarax     / Renal:  CKD Stage 2  Contraction alkalosis  - Baseline creatinine ~1.1-1.2. Most recent creatinine 1.19, adequate UOP.   - Pre-op weight 209 lbs, most recent weight 203 lbs (down from 206 lb yesterday)  - Diuresis per Cards 2  - Replace electrolytes per protocol    GI / FEN:   At risk for protein  calorie malnutrition  - Regular diet  - Attempted to place NJ in the ICU, unable to place   - +BM since surgery, continue bowel regimen  - hepatic enzymes WNL, now slightly elevated 6/23 - will continue to monitor     Endocrine:  Stress-induced hyperglycemia  Pre-op Hgb A1C 5.6  - Managed on insulin drip postop, transitioned to sliding scale goal BG <180 and has now also been discontinued due to good BG control with no insulin need.     Infectious Disease:  Stress induced leukocytosis  - WBC 18.3 and up-trending,  and up-trending, remains afebrile  - Completed perioperative LVAD antibiotics   - UA, blood cultures x2 ordred 6/21 - UA unremarkable, BCxs NGTD, follow for results  - sputum culture 6/22 - no organisms seen  - CT CAP 6/22 with anterior chest subcutaneous air and stranding.  Gas and fluid collection in the substernal region which could be infectious versus postprocedural. Bilateral pleural effusions with adjacent atelectasis and mild groundglass opacities.  - CT results were reviewed with Dr. Miller and Dr. Jhaveri - no indication for surgical washout at this time, plan to continue on broad spectrum antibiotics for at least 2 weeks per Dr. Jhaveri  - will attempt thoracentesis of the left pleural effusion as discussed above and send fluid studies  - Repeat blood cultures sent 6/23 - follow for results  - started empiric IV vancomycin/zosyn 6/21 given up-trending leukocytosis per surgeon  - PO azithromycin added 6/22 per cardiology for possible pneumonia   -  ID consulted 6/23 given up-trending white count and CRP on broad spectrum antibiotics, appreciate recommendations     Hematology:   Acute blood loss anemia  Acute blood loss thrombocytopenia  Right brachial non-occlusive thrombus  Hgb stable; Plt WNL, no signs or symptoms of active bleeding  - Daily CBC    Anticoagulation:   Chronic anticoagulation for LVAD, CFX goal 20-40%  +Lupus anticoagulant   - Warfarin for LVAD  - INRs noted to be  supra-therapeutic and discordant with Chromogenic Factor X, hematology consulted 6/21 - lupus anticoagulant positive  - plan to use Chromogenic factor X for warfarin dosing as INR is not reliable, goal CFX 20-40%, pharmacy notified   - plan to hold warfarin dose tonight, 6/23 per discussion with Dr. Jhavrei, pharmD notified     MSK/Skin:  Sternotomy  - PT/OT    Prophylaxis:   - Stress ulcer prophylaxis: Pantoprazole 40 mg daily for 30 days  - DVT prophylaxis: therapeutic anticoagulation, SCD    Disposition:   - Transferred to  on 6/21  - Therapies recommending discharge to ARU vs home once medically ready. LVAD education planned to start 6/24.    Medically Ready for Discharge: 5+ days    Clinically Significant Risk Factors              # Hypoalbuminemia: Lowest albumin = 2.7 g/dL at 6/19/2024  4:27 AM, will monitor as appropriate        # End stage heart failure: home medication list includes inotropes          #Precipitous drop in Hgb/Hct: Lowest Hgb this hospitalization: 9.1 g/dL. Will continue to monitor and treat/transfuse as appropriate.     # Overweight: Estimated body mass index is 28.05 kg/m  as calculated from the following:    Height as of this encounter: 1.829 m (6').    Weight as of this encounter: 93.8 kg (206 lb 12.7 oz).        # Financial/Environmental Concerns: none  # Asthma: noted on problem list  # ICD device present       Discussed with Dr. Brian Jhaveri.    Joel Dang PA-C  Cardiothoracic Surgery    1:05 PM  June 23, 2024  pager [phone]        Interval History:     No overnight events. Reports he continues to feel better this morning.   After ambulating to the bathroom this morning noted increased pain to his left lower chest wall. He states this pain has been ongoing since surgery but was noted to be worse today with movement. He thinks it is worse today because he has been moving around more yesterday and today. The pain is described as sharp, worse with palpation and deep respiration, and  exacerbated by certain movements. He denies chest pressure or associated lightheadedness/dizziness or SOB. Also notes the pain was worse today after he underwent a lung ultrasound to the area. On re-assessment reported improvement with PO pain medications.   EKG without significant change (reviewed with cardiology).   States pain is otherwise well managed on current regimen. Slept well overnight.  Tolerating diet, is passing flatus, + BM. No nausea or vomiting. Denies abdominal pain. He does report feeling a bit bloated today. Despite having a small BM this morning still feels somewhat constipated. Will add MoM.   Breathing is improving overall. O2 requirements are continuing to come down.  Working with therapies and ambulating in halls with assistance.   Denies palpitations, dizziness, syncopal symptoms, fevers, chills, myalgias, sternal popping/clicking, dysuria, diarrhea. Ongoing cough but is now producing some mucus as of yesterday.          Physical Exam:   Blood pressure 100/85, pulse 115, temperature 99.4  F (37.4  C), temperature source Oral, resp. rate 20, height 1.829 m (6'), weight 93.8 kg (206 lb 12.7 oz), SpO2 95%.  Vitals:    24 0945 24 0531 24 0841   Weight: 91.3 kg (201 lb 4.5 oz) 90.2 kg (198 lb 13.7 oz) 93.8 kg (206 lb 12.7 oz)     Gen: A&Ox4, NAD  Neuro: no focal deficits   CV: tachycardic, RRR, LVAD hum   Pulm: CTAB, normal breathing on 1 lpm at the time of exam  Abd: nondistended, normal BS, soft, nontender  Ext: trace peripheral edema, non- pitting  Skin:   Incision: clean, dry, intact, no erythema, sternum stable  Tubes/drain sites: dressing clean and dry  Lines: driveline dressing clean and dry   Right groin IABP insertio site C/D/I without erythema or drainage     Heartmate 3 LEFT VS  Flow (Lpm): 4.9 Lpm  Pulse Index (PI): 2.9 PI  Speed (rpm): 5400 rpm  Power (bassett): 3.8 bassett  Current Hct settin         Data:    Imaging:  reviewed recent imaging, no acute  concerns  CT Chest/Abdomen/Pelvis w Contrast  Narrative: EXAM: Chest/abdomen/pelvis CT with contrast 6/22/2024 2:52 PM    HISTORY: 53 years Male leukocytosis and elevated CRP on braod spectrum  abx    COMPARISON: CT 8/22/2023.    TECHNIQUE: Helical CT images from the thoracic inlet through the  symphysis pubis were obtained IV contrast. Contrast dose: Isovue 370.    FINDINGS:   image(s) are noncontributory.    LINES/TUBES: Left chest implantable cardiac defibrillator with leads  terminating in the right ventricular apex. LVAD positioned at the  cardiac apex with the outflow communicating with the aorta. The LVAD  portacaval crosses into the anterior mediastinum and underneath the  rib cage and of the right abdominal soft tissues.    CHEST:  LOWER NECK: Thyroid gland is unremarkable. There is no abnormal or  dilated cervical chain lymph nodes. No suspicious mass..     PULMONARY: Left greater than right pleural effusions with associated  compressive atelectasis. There is adjacent small volume of groundglass  opacification just the effusions. There is no pneumothorax.     CARDIAC: Cardiomegaly. All that is in place as described above. There  is no intracardiac mass or thrombus. No pericardial effusion. Streak  artifact from the LVAD device limits evaluation of the cardiac  structures and adjacent soft tissues.    MEDIASTINUM: The mid ascending thoracic aorta and main pulmonary  arterial diameters are within normal limits. There is no central  pulmonary embolus. There is no focal wall abnormality of the aortic  arch are descending aorta. Normal branching pattern of the great  vessels off the aortic arch. The origins of the great vessels appear  to be widely patent. The anterior mediastinum the outflow tract of the  LVAD is present. There is a small fluid collection with punctate foci  of air at the level of the sternum. There is no peripheral enhancement  of the anterior mediastinum fluid, likely postsurgical  seroma.    ESOPHAGUS: Unremarkable    ABDOMEN/PELVIS:  HEPATIC: No suspicious focal hepatic lesion.    BILIARY: No calcified intraluminal gallstone. No intrahepatic or  extrahepatic biliary ductal dilation.    PANCREAS: No focal pancreatic lesion. The main pancreatic duct is not  dilated.    SPLEEN: No splenomegaly. No suspicious lesions or masses.    ADRENALS: Unremarkable.    RENAL: No hydronephrosis, calculi, or mass.    URINARY BLADDER: Unremarkable.    REPRODUCTIVE: Unremarkable.    GASTROINTESTINAL: Unremarkable stomach and duodenum. Normal caliber  large and small bowel. No abnormal bowel wall thickening or  enhancement. No pneumatosis or portal venous gas. No appendicitis.  Colonic diverticulosis without diverticulitis.    MESENTERY/PERITONEUM: Trace amount of fluid and stranding along the  left paracolic gutter. No intraabdominal free air..    VASCULAR: Filling defect in the right internal jugular vein suspicious  for nonocclusive thrombosis. Nonaneurysmal abdominal aorta. Major  abdominal vessels appear patent. Mild atherosclerotic vascular  calcifications.    LYMPH NODES: No mesenteric lymphadenopathy.     MUSCULOSKELETAL: Degenerative changes in keeping with the patient's  age. No acute osseous abnormality. No aggressive osseous lesions.   Bilateral left greater than right fat-containing inguinal hernias.    SOFT TISSUES: Tract with air extending along the anterior abdomen, may  correspond to prior mediastinal drain tubing.  Impression: IMPRESSION:   1.  Suspicion of right internal jugular vein DVT. Recommend ultrasound  for full characterization.  2.  Postoperative changes of LVAD placement. LVAD is appropriate  position. Sternotomy wires are intact.  3.  Anterior chest subcutaneous air and stranding.  Gas and fluid  collection in the substernal region which could be infectious versus  postprocedural.  4.   Bilateral pleural effusions with adjacent atelectasis and mild  groundglass. Underlying infection  cannot be entirely ruled out the  lung bases.    [Urgent Result: Right IJ thrombus. Possible substernal abscess.]    Finding was identified on 6/22/2024 5:08 PM.     Dr. Lamas was contacted by Dr. Baljeet Scott at 6/22/2024 6:07 PM and  verbalized understanding of the urgent finding.  :    I have personally reviewed the examination and initial interpretation  and I agree with the findings.    GÓMEZ FLORENCE MD         SYSTEM ID:  [id number]  XR Chest 2 Views  Narrative: Exam: XR CHEST 2 VIEWS, 6/22/2024 10:00 AM    Indication: s/p LVAD, monitor lung fields    Comparison: 6/21/2024    Findings:   Portable supine AP radiograph of the chest. LVAD is in stable  position. Left chest and implantable cardiac defibrillator fibula with  distal lead in the right ventricular apex. Postoperative changes of  the chest with intact median sternotomy wires. The cardiac silhouette  is stable. Distinct pulmonary vasculature. Dense retrocardiac and left  basilar pulmonary opacities, likely represents basilar atelectasis. No  focal pulmonary consolidation. Trace left apical pneumothorax. The  right lung apex is clear. Upper abdomen is unremarkable. No acute  osseous or soft tissue abnormality.  Impression: Impression:     1. Small left apical pneumothorax, appears slightly decreased compared  to 6/21/2024.  2. Left basilar and retrocardiac opacities, may represent atelectasis  versus consolidation    I have personally reviewed the examination and initial interpretation  and I agree with the findings.    ETHAN LION MD         SYSTEM ID:  [id number]        Labs:  BMP  Recent Labs   Lab 06/23/24  0507 06/22/24  1626 06/22/24  0528 06/21/24  0444    134* 133* 137   POTASSIUM 3.8 3.7 3.9 3.6  3.6   CHLORIDE 102 98 100 100   NAM 8.2* 8.4* 8.5* 8.7   CO2 23 25 24 24   BUN 20.0 21.0* 22.7* 26.0*   CR 1.29* 1.29* 1.12 1.06   * 133* 106* 112*     CBC  Recent Labs   Lab 06/23/24  0507 06/22/24  0528 06/21/24  0444 06/20/24  1746    WBC 18.3* 16.8* 16.8* 15.9*   RBC 3.59* 3.63* 3.81* 3.58*   HGB 10.8* 10.9* 11.7* 10.9*   HCT 33.4* 34.0* 35.1* 33.1*   MCV 93 94 92 93   MCH 30.1 30.0 30.7 30.4   MCHC 32.3 32.1 33.3 32.9   RDW 14.2 14.3 14.2 14.0    261 263 232     INR  Recent Labs   Lab 06/23/24  0507 06/22/24  0528 06/21/24  1312 06/21/24  0949   INR 3.16* 3.70* 5.23*  5.16* 5.08*      Hepatic Panel  Recent Labs   Lab 06/21/24  0444 06/20/24  1746 06/20/24  0345 06/19/24  1547   AST 32 32 33 36   ALT 25 26 29 28   ALKPHOS 78 74 68 64   BILITOTAL 0.7 0.6 0.6 0.6   ALBUMIN 3.0* 3.0* 2.9* 3.1*     GLUCOSE:   Recent Labs   Lab 06/23/24  0507 06/22/24  1626 06/22/24  0528 06/21/24  0444 06/20/24  1746 06/20/24  1116   * 133* 106* 112* 130* 90

## 2024-06-23 NOTE — PROVIDER NOTIFICATION
Patient and his wife asking for CT result update and patient also c/o feeling bloating. Patient's concerns notified to Laine Hernandez MD with cardiovascular surgery, call back received with no new order and primary team will come tomorrow. MD also pointed that another antibiotics was added part of the CT result. Patient's wife was already left the floor, but patient updated MD's call.

## 2024-06-23 NOTE — PROGRESS NOTES
[facility]   Cardiology II Service / Advanced Heart Failure  Daily Consult Progress Note      Patient: Navin Lutz  MRN: [id number]  Admission Date: 6/3/2024  Hospital Day # 20    Assessment and Plan: Navin Lutz is a 53 year old male admitted on 6/3/2024. He has a past medical history of chronic HFrEF 2/2 NICM s/p ICD, HLD, and CKD Stage II who presents admitted for decompensated heart failure with NICM on milrinone listed status 4, admitted for consideration of advanced therapies and is now s/p HM3 LVAD on 6/14/24 with Dr. Jhaveri.    Today's Plan:  - Decrease the IV hydral to only for MAP >95 and only every 4 hours prn (ordered for you)  - Increased captopril 18.75 mg TID  - Would strongly consider consulting ID   - Will order 40 mg of IV lasix BID  - BID BMP today for lytes  - Agree with adding azyithromycin     # Acute on chronic systolic heart failure secondary to NICM   # s/p HM3 LVAD as BTT on 6/14/24 (no active contraindications to transplant other than wait time)  Stage D. NYHA Class III confounded by recent surgery    -Fluid status: hypervolemic- lasix 40 mg IV BID today  -ACEi/ARB/ARNi: yes- increase captopril to 18.75 mg TID, may increase further today  -Afterload reduction: Hydralazine 50 mg TID with additional available PRN  -Inotrope: dobutamine stopped on 6/19/24  -BB contraindicated currently given recent LVAD implant  -Aldosterone antagonist no, but will consider in the near future pending renal function trend  -SCD prophylaxis ICD  -MAP: Goal 65-85, Has been   -LDH trends: 352, establishing baseline  -Anticoagulation: warfarin dosing per pharmacy, FOLLOWING chromogenic factor 10: goal 20-40, 23 today, dosing per pharmacy  -Antiplatelet: no ASA indicated per NICOLE trial results    # Leukocytosis.   # Fevers  Concern for infection. Some coughing that is becoming more productive. CT chest/abd/pelvis from 6/22/23 with some anterior chest subcutaneous air and stranding with  substernal gas, read as infectious vs postprocedureal, felt to be procedural per CVTS. Also with b/l pleural effusions and mild ground glass in the lung bases. UA negative.   -Would consider ID consult  -On Vanc/Zosyn since 6/21 and added azithromycin 6/22  -CRP and WBC continue to uptrend- wbc up to 18.3 and CRP is now 205  -6/21/24 blood cultures NGTD  -Respiratory culture NGTD    # Right subclavian and axillary vein thrombus, nonocclusive, known prior to VAD. Redeomonstrated on 6/22 CT, likely old clot vs line related from right neck swan in ICU.  - Was on Coumadin PTA, now resumed, follow chromogenic factor 10 as above    # HLD  - Continue atorvastatin 20 mg daily     # CKD stage 2. B/l cr has been listed at 1.2-1-4  - Cr stable at 1.29 jarrett Choi PA-C  Advanced Heart Failure/Cardiology II Service  Vocera preferred, or pager [phone]      Patient discussed with Dr. Silvestre.        45 minutes spent on the date of the encounter doing chart review, history and exam, documentation and further activities per the note    ================================================================    Subjective/24-Hr Events:   Last 24 hr care team notes reviewed. He is feeling better than yesterday. He feels like his fever broke. His breathing feels a bit better. He is coughin  more and coughin up some sputum which had some streaks of blood. No more vomtiting. No abdominal pain. He does feels full. Maybe the fluid is slightly improved in the abdomen. No LE edema. No dysuria. No drainage from any of his surgial sites. No lightheadenss or dizzness     ROS:  4 point ROS including respiratory, CV, GI and  (other than that noted in the HPI) is negative.     Medications: Reviewed in EPIC.     Physical Exam:   /85   Pulse 116   Temp 98.3  F (36.8  C) (Oral)   Resp 22   Ht 1.829 m (6')   Wt 92.4 kg (203 lb 11.3 oz)   SpO2 100%   BMI 27.63 kg/m      GENERAL: Appears mildly uncomfortable, light sweat  but under many blankets and feeling cold.  HEENT: Eye symmetrical, no discharge or icterus bilaterally.   NECK: Supple, JVD mid neck at 60 degrees.   CV: Hum of LVAD, no adventitious sounds  RESPIRATORY: Respirations regular, even, and unlabored. Lungs CTA throughout.    GI: Soft and non distended with normoactive bowel sounds present No tenderness, rebound, guarding.   EXTREMITIES: tace b/l b/l lower extremity peripheral edema. All extremities are warm and well perfused  NEUROLOGIC: Alert and interacting appropriately.   SKIN: No jaundice. No rashes or lesions. Surgical sites per CVTS, sternum was c/d/i    Labs:  CMP  Recent Labs   Lab 06/23/24  0507 06/22/24  1626 06/22/24  0528 06/21/24  0444 06/20/24  1746 06/20/24  0813 06/20/24  0345    134* 133* 137 137  --  138   POTASSIUM 3.8 3.7 3.9 3.6  3.6 3.8  --  3.6   CHLORIDE 102 98 100 100 101  --  100   CO2 23 25 24 24 24  --  27   ANIONGAP 11 11 9 13 12  --  11   * 133* 106* 112* 130*   < > 100*  100*   BUN 20.0 21.0* 22.7* 26.0* 28.6*  --  29.4*   CR 1.29* 1.29* 1.12 1.06 1.12  --  1.11   GFRESTIMATED 66 66 79 84 79  --  79   NAM 8.2* 8.4* 8.5* 8.7 8.9  --  8.7   MAG 2.2  --  2.2 2.2  --   --  2.2   PHOS 3.1  --  3.2 3.6  --   --  3.6   PROTTOTAL 5.7*  --   --  5.9* 6.1*  --  5.6*   ALBUMIN 2.9*  --   --  3.0* 3.0*  --  2.9*   BILITOTAL 0.7  --   --  0.7 0.6  --  0.6   ALKPHOS 105  --   --  78 74  --  68   AST 76*  --   --  32 32  --  33   ALT 78*  --   --  25 26  --  29    < > = values in this interval not displayed.       CBC  Recent Labs   Lab 06/23/24  0507 06/22/24  0528 06/21/24  0444 06/20/24  1746   WBC 18.3* 16.8* 16.8* 15.9*   RBC 3.59* 3.63* 3.81* 3.58*   HGB 10.8* 10.9* 11.7* 10.9*   HCT 33.4* 34.0* 35.1* 33.1*   MCV 93 94 92 93   MCH 30.1 30.0 30.7 30.4   MCHC 32.3 32.1 33.3 32.9   RDW 14.2 14.3 14.2 14.0    261 263 232       INR  Recent Labs   Lab 06/23/24  0507 06/22/24  0528 06/21/24  1312 06/21/24  0949   INR 3.16* 3.70*  5.23*  5.16* 5.08*

## 2024-06-23 NOTE — PROGRESS NOTES
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 He required LVAD placement on 6/14 and has been on warfarin for this as well. The patient's INR was noted to be supratherapeutic and disconcordant with his chromogenic Factor X. 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 Factor VIII and V were appropriate.  Given the patient has an inhibitor present, it would be recommended to have the patient have his anticoagulation (Warfarin) monitored via chromogenic factor X only as his INR will be affected by this inhibitor. 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     I am worried about the substernal fluid collection and the elevated CRP and the fact that Navin does not feel well.  If a sample of the fluid is to be done tomorrow I would hold the warfarin tonight as he would likely still be in the therapeutic range based on the chromogenic 10 assay  Recommendations:   - Recommend utilizing only chromogenic Factor X for monitoring anticoagulation for patient given patient has an inhibitor.  - Please check Chromogenic Factor X tomorrow and hold Warfarin tonight (would aim for a chromogenic " factor X goal of 20-40% as this would correlate with INR 2-3. Please contact our team if there are questions throughout his stay regarding his complicated monitoring).      Thank you for this consult and the opportunity to treat this pleasant patient. We will continue to follow this patient. Please do not hesitate to page with any questions or concerns.      Sree Beasley M.D.  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 He had an LVAD placed this admission on 6/14 and also has been on warfarin for this as well.   He is being intermittently diuresed by cardiology teams.   He has been on chronic anticoagulation via warfarin (reportedly 9 months per patient) for his LVAD. His most recent INR was noted to be supratherapeutic and chromogenic factor X was noted to be disconcordant with his INR. The patient denies bleeding issues or clotting issues in the past. He denies melena, hematochezia, melena or other new symptoms.    Numerous Factor and mixing studies performed for patient.     INTERVAL HISTORY   No fever over night no bleeding Warafarin held last night, INR 3.1and chromogenic Factor X 23%after 2mg warfarin last night  Review of Systems:  A comprehensive ROS was performed and found to be negative or non-contributory with the exception of that noted in the HPI above.    Past Medical History:  No past medical history on file.    Past Surgical History:  Past Surgical History:   Procedure Laterality Date    CARDIAC SURGERY      COLONOSCOPY N/A 8/25/2023    Procedure: COLONOSCOPY, WITH POLYPECTOMY AND BIOPSY;  Surgeon: Alejandro Rodriguez MD;  Location: [facility]    CV INTRA AORTIC BALLOON  N/A 6/12/2024    Procedure: Intra aortic Balloon Pump Insertion;  Surgeon: Elfego Cruz MD;  Location:  HEART CARDIAC CATH LAB    CV RIGHT HEART CATH MEASUREMENTS RECORDED N/A 08/22/2023    Procedure: Heart Cath Right Heart Cath;  Surgeon: Elfego Cruz MD;  Location:  HEART CARDIAC CATH LAB    CV RIGHT HEART CATH MEASUREMENTS RECORDED N/A 9/20/2023    Procedure: Heart Cath Right Heart Cath;  Surgeon: Elfego Cruz MD;  Location:  HEART CARDIAC CATH LAB    CV RIGHT HEART CATH MEASUREMENTS RECORDED N/A 1/19/2024    Procedure: Heart Cath Right Heart Cath;  Surgeon: Tobin Jaime MD;  Location:  HEART CARDIAC CATH LAB    CV RIGHT HEART CATH MEASUREMENTS RECORDED N/A 5/3/2024    Procedure: Heart Cath Right Heart Cath;  Surgeon: Dion Ashley MD;  Location:  HEART CARDIAC CATH LAB    CV RIGHT HEART CATH MEASUREMENTS RECORDED N/A 6/4/2024    Procedure: Right Heart Catheterization;  Surgeon: Cassandra Rizzo MD;  Location:  HEART CARDIAC CATH LAB    CV RIGHT HEART CATH MEASUREMENTS RECORDED N/A 6/12/2024    Procedure: Right Heart Catheterization;  Surgeon: Elfego Cruz MD;  Location:  HEART CARDIAC CATH LAB    INSERT VENTRICULAR ASSIST DEVICE LEFT (HEARTMATE II) N/A 6/14/2024    Procedure: Median Sternotomy, INSERTION, LEFT VENTRICULAR ASSIST DEVICE (HEARTMATE III), on Cardiopulmonary Bypass, Transesophageal Echocardiogram by Anesthesia;  Surgeon: Brian Jhaveri MD;  Location:  OR    PICC DOUBLE LUMEN PLACEMENT Right 08/22/2023    Brachial Vein Medial 5F DL 45 cm, 2 cm out       Social History:  Social History     Socioeconomic History    Marital status:    Tobacco Use    Smoking status: Never    Smokeless tobacco: Never   Substance and Sexual Activity    Alcohol use: Never    Drug use: Never    Sexual activity: Yes     Partners: Female     Social Determinants of Health     Financial Resource Strain: Low Risk   (11/17/2023)    [facility]    Overall Financial Resource Strain (CARDIA)     Difficulty of Paying Living Expenses: Not hard at all   Food Insecurity: No Food Insecurity (11/17/2023)    [facility] from [facility], [facility]    Hunger Vital Sign     Worried About Running Out of Food in the Last Year: Never true     Ran Out of Food in the Last Year: Never true   Transportation Needs: No Transportation Needs (11/17/2023)    [facility] from [facility], [facility]    PRAPARE - Transportation     Lack of Transportation (Medical): No     Lack of Transportation (Non-Medical): No   Housing Stability: Unknown (11/17/2023)    Received from [facility], [facility]    Housing Stability Vital Sign     Unable to Pay for Housing in the Last Year: No     Unstable Housing in the Last Year: No        Family History  No family history on file.    Outpatient Medications:  Current Facility-Administered Medications   Medication Dose Route Frequency Provider Last Rate Last Admin    acetaminophen (TYLENOL) tablet 650 mg  650 mg Oral Q4H PRN Cira Mallory PA-C   650 mg at 06/23/24 1227    atorvastatin (LIPITOR) tablet 20 mg  20 mg Oral or Feeding Tube QPM Chase Mallory MD   20 mg at 06/22/24 1949    azithromycin (ZITHROMAX) tablet 250 mg  250 mg Oral Daily Aida Lamas MD   250 mg at 06/23/24 0759    bisacodyl (DULCOLAX) suppository 10 mg  10 mg Rectal Daily PRN Cira Mallory PA-C        captopril (CAPOTEN) half-tab 18.75 mg  18.75 mg Oral TID Eliz Choi PA-C   18.75 mg at 06/23/24 0759    glucose gel 15-30 g  15-30 g Oral Q15 Min PRN Misha Guerra MD        Or    dextrose 50 % injection 25-50 mL  25-50 mL Intravenous Q15 Min PRN Misha Guerra MD        Or    glucagon injection 1 mg  1 mg Subcutaneous Q15 Min PRN Misha Guerra MD         diclofenac (VOLTAREN) 1 % topical gel 2 g  2 g Topical 4x Daily PRN Sarah Guerin APRN CNP        furosemide (LASIX) injection 40 mg  40 mg Intravenous BID Eliz Choi PA-C   40 mg at 06/23/24 1224    guaiFENesin (MUCINEX) 12 hr tablet 600 mg  600 mg Oral BID Mary Baker PA-C   600 mg at 06/23/24 0759    hydrALAZINE (APRESOLINE) injection 5 mg  5 mg Intravenous Q4H PRN Eliz Choi PA-C        hydrALAZINE (APRESOLINE) tablet 50 mg  50 mg Oral Q8H Deon Rosa APRN CNP   50 mg at 06/23/24 1224    hydrOXYzine HCl (ATARAX) tablet 25 mg  25 mg Oral Q6H PRN Misha Guerra MD   25 mg at 06/23/24 0801    Or    hydrOXYzine HCl (ATARAX) tablet 50 mg  50 mg Oral Q6H PRN Misha Guerra MD   50 mg at 06/18/24 0249    ipratropium - albuterol 0.5 mg/2.5 mg/3 mL (DUONEB) neb solution 3 mL  3 mL Nebulization 4x daily Deon Rosa APRN CNP   3 mL at 06/23/24 1151    Lidocaine (LIDOCARE) 4 % Patch 1-2 patch  1-2 patch Transdermal Q24H Sarah Guerin APRN CNP   2 patch at 06/23/24 1158    lidocaine (LMX4) cream   Topical Q1H PRN Cira Mallory PA-C        lidocaine 1 % 0.1-1 mL  0.1-1 mL Other Q1H PRN Cira Mallory PA-C        melatonin tablet 5 mg  5 mg Oral At Bedtime PRN Misha Guerra MD   5 mg at 06/17/24 2035    methocarbamol (ROBAXIN) tablet 750 mg  750 mg Oral 4x Daily PRN Galilea Dang PA-C        naloxone (NARCAN) injection 0.2 mg  0.2 mg Intravenous Q2 Min PRN Brian Jhaveri MD        Or    naloxone (NARCAN) injection 0.4 mg  0.4 mg Intravenous Q2 Min PRN Brian Jhaveri MD        Or    naloxone (NARCAN) injection 0.2 mg  0.2 mg Intramuscular Q2 Min PRN Brian Jhaveri MD        Or    naloxone (NARCAN) injection 0.4 mg  0.4 mg Intramuscular Q2 Min PRN Brian Jhaveri MD        ondansetron (ZOFRAN ODT) ODT tab 4 mg  4 mg Oral Q6H PRN Cira Mallory PA-C        Or    ondansetron (ZOFRAN) injection 4 mg  4 mg Intravenous Q6H PRN Cira Mallory PA-C   4 mg at 06/21/24  1031    oxyCODONE (ROXICODONE) tablet 5 mg  5 mg Oral Q4H PRN Cira Mallory PA-C   5 mg at 06/23/24 1013    Or    oxyCODONE IR (ROXICODONE) tablet 10 mg  10 mg Oral Q4H PRN Cira Mallory PA-C   10 mg at 06/18/24 0249    pantoprazole (PROTONIX) EC tablet 40 mg  40 mg Oral QAM Deon Arana APRN CNP   40 mg at 06/23/24 0759    piperacillin-tazobactam (ZOSYN) 4.5 g vial to attach to  mL bag  4.5 g Intravenous Q6H Galilea Dang PA-C   4.5 g at 06/23/24 1314    polyethylene glycol (MIRALAX) Packet 17 g  17 g Oral Daily Darnell Glaser PA-C   17 g at 06/23/24 0801    prochlorperazine (COMPAZINE) injection 10 mg  10 mg Intravenous Q6H PRN Cira Mallory PA-C   10 mg at 06/18/24 0241    Or    prochlorperazine (COMPAZINE) tablet 10 mg  10 mg Oral Q6H PRN Cira Mallory PA-C        Reason beta blocker order not selected   Does not apply DOES NOT GO TO Cira Dixon PA-C        senna-docusate (SENOKOT-S/PERICOLACE) 8.6-50 MG per tablet 2 tablet  2 tablet Oral BID Sarah Guerin APRN CNP   2 tablet at 06/23/24 0759    sodium chloride (PF) 0.9% PF flush 3 mL  3 mL Intracatheter Q8H Cira Mallory PA-C   3 mL at 06/23/24 0804    sodium chloride (PF) 0.9% PF flush 3 mL  3 mL Intracatheter q1 min prn Cira Mallory PA-C   3 mL at 06/19/24 2010    vancomycin (VANCOCIN) 1,250 mg in 0.9% NaCl 250 mL intermittent infusion  1,250 mg Intravenous Q12H Brian Jhaveri  mL/hr at 06/21/24 2206 1,250 mg at 06/23/24 0925    Warfarin Dose Required Daily - Pharmacist Managed  1 each Oral See Admin Instructions Misha Guerra MD        warfarin-No DOSE today  1 each Does not apply no dose today (warfarin) Brian Jhaveri MD            Physical Exam:    Blood pressure 100/85, pulse 119, temperature 98.3  F (36.8  C), temperature source Oral, resp. rate 26, height 1.829 m (6'), weight 92.4 kg (203 lb 11.3 oz), SpO2 96%.  General: alert and cooperative, lying in bed, no acute distress  HEENT: sclera  anicteric, EOMI, MMM  Neck: supple, normal ROM  CV: RRR, LVAD hum present   Resp: CTAB, normal respiratory effort on 3LPM  GI: Some tendernesss sub xiphoid BS ok  MSK: warm and well-perfused, normal tone  Skin: no rashes on limited exam, no jaundice  Neuro: Alert and interactive, moves all extremities equally, no focal deficits    Labs & Studies: I personally reviewed the following studies:  ROUTINE LABS (Last four results):  CMP  Recent Labs   Lab 06/23/24  0507 06/22/24  1626 06/22/24  0528 06/21/24  0444 06/20/24  1746 06/20/24  0813 06/20/24  0345    134* 133* 137 137  --  138   POTASSIUM 3.8 3.7 3.9 3.6  3.6 3.8  --  3.6   CHLORIDE 102 98 100 100 101  --  100   CO2 23 25 24 24 24  --  27   ANIONGAP 11 11 9 13 12  --  11   * 133* 106* 112* 130*   < > 100*  100*   BUN 20.0 21.0* 22.7* 26.0* 28.6*  --  29.4*   CR 1.29* 1.29* 1.12 1.06 1.12  --  1.11   GFRESTIMATED 66 66 79 84 79  --  79   NAM 8.2* 8.4* 8.5* 8.7 8.9  --  8.7   MAG 2.2  --  2.2 2.2  --   --  2.2   PHOS 3.1  --  3.2 3.6  --   --  3.6   PROTTOTAL 5.7*  --   --  5.9* 6.1*  --  5.6*   ALBUMIN 2.9*  --   --  3.0* 3.0*  --  2.9*   BILITOTAL 0.7  --   --  0.7 0.6  --  0.6   ALKPHOS 105  --   --  78 74  --  68   AST 76*  --   --  32 32  --  33   ALT 78*  --   --  25 26  --  29    < > = values in this interval not displayed.     CBC  Recent Labs   Lab 06/23/24  0507 06/22/24  0528 06/21/24 0444 06/20/24  1746   WBC 18.3* 16.8* 16.8* 15.9*   RBC 3.59* 3.63* 3.81* 3.58*   HGB 10.8* 10.9* 11.7* 10.9*   HCT 33.4* 34.0* 35.1* 33.1*   MCV 93 94 92 93   MCH 30.1 30.0 30.7 30.4   MCHC 32.3 32.1 33.3 32.9   RDW 14.2 14.3 14.2 14.0    261 263 232     INR  Recent Labs   Lab 06/23/24  0507 06/22/24  0528 06/21/24  1312 06/21/24  0949   INR 3.16* 3.70* 5.23*  5.16* 5.08*

## 2024-06-23 NOTE — PROGRESS NOTES
The patient's HeartMate LVAD was interrogated 6/23/2024  * Speed 5400 rpm   * Pulsatility index 2.9   * Power 3.9 [place]   * Flow 4.9 L/minute   Fluid status: hypervolemic   Alarms were reviewed, and notable for occasional pi events, but history goes back one week. No alarms  The driveline exit site was inspected, c/d/i.   All external components were inspected and showed no evidence of damage or malfunction, none replaced.   No changes to VAD settings made

## 2024-06-23 NOTE — PLAN OF CARE
Goal Outcome Evaluation:         [id number]  Neuro: A&Ox4.   Cardiac: ST 100s-110s. HM3 LVAD. Doppler MAPs 70s-80s.   Respiratory: Sating above 94% on RA during day, 1-2L NC at night.   GI/: Adequate urine output via urinal. No BM this shift.   Diet/appetite: Tolerating regular diet.  Activity:  Assist of 1, independently repositions  Pain: At acceptable level on current regimen. Incisional pain, scheduled tylenol given- declined other PRNs overnight.   Skin: No new deficits noted.  LDA's: 2 PIV(SL, TKO)    Plan: Continue with POC. Notify primary team with changes.

## 2024-06-24 ENCOUNTER — APPOINTMENT (OUTPATIENT)
Dept: GENERAL RADIOLOGY | Facility: CLINIC | Age: 54
End: 2024-06-24
Attending: STUDENT IN AN ORGANIZED HEALTH CARE EDUCATION/TRAINING PROGRAM
Payer: COMMERCIAL

## 2024-06-24 LAB
ALBUMIN SERPL BCG-MCNC: 2.7 G/DL (ref 3.5–5.2)
ALP SERPL-CCNC: 107 U/L (ref 40–150)
ALT SERPL W P-5'-P-CCNC: 76 U/L (ref 0–70)
ANION GAP SERPL CALCULATED.3IONS-SCNC: 9 MMOL/L (ref 7–15)
ANION GAP SERPL CALCULATED.3IONS-SCNC: 9 MMOL/L (ref 7–15)
AST SERPL W P-5'-P-CCNC: 67 U/L (ref 0–45)
ATRIAL RATE - MUSE: 119 BPM
ATRIAL RATE - MUSE: 127 BPM
ATRIAL RATE - MUSE: 76 BPM
B2 GLYCOPROT1 IGG SERPL IA-ACNC: <0.8 U/ML
B2 GLYCOPROT1 IGM SERPL IA-ACNC: <2.4 U/ML
BACTERIA SPT CULT: ABNORMAL
BACTERIA SPT CULT: NORMAL
BASOPHILS # BLD AUTO: 0 10E3/UL (ref 0–0.2)
BASOPHILS NFR BLD AUTO: 0 %
BILIRUB DIRECT SERPL-MCNC: 0.28 MG/DL (ref 0–0.3)
BILIRUB SERPL-MCNC: 0.6 MG/DL
BUN SERPL-MCNC: 17.7 MG/DL (ref 6–20)
BUN SERPL-MCNC: 18.6 MG/DL (ref 6–20)
C DIFF TOX B STL QL: NEGATIVE
CALCIUM SERPL-MCNC: 8.2 MG/DL (ref 8.6–10)
CALCIUM SERPL-MCNC: 8.5 MG/DL (ref 8.6–10)
CARDIOLIPIN IGG SER IA-ACNC: <2 GPL-U/ML
CARDIOLIPIN IGG SER IA-ACNC: NEGATIVE
CARDIOLIPIN IGM SER IA-ACNC: <2 MPL-U/ML
CARDIOLIPIN IGM SER IA-ACNC: NEGATIVE
CHLORIDE SERPL-SCNC: 100 MMOL/L (ref 98–107)
CHLORIDE SERPL-SCNC: 102 MMOL/L (ref 98–107)
CREAT SERPL-MCNC: 1.21 MG/DL (ref 0.67–1.17)
CREAT SERPL-MCNC: 1.28 MG/DL (ref 0.67–1.17)
CRP SERPL-MCNC: 201 MG/L
DEPRECATED HCO3 PLAS-SCNC: 23 MMOL/L (ref 22–29)
DEPRECATED HCO3 PLAS-SCNC: 24 MMOL/L (ref 22–29)
DIASTOLIC BLOOD PRESSURE - MUSE: NORMAL MMHG
EGFRCR SERPLBLD CKD-EPI 2021: 67 ML/MIN/1.73M2
EGFRCR SERPLBLD CKD-EPI 2021: 72 ML/MIN/1.73M2
EOSINOPHIL # BLD AUTO: 0.7 10E3/UL (ref 0–0.7)
EOSINOPHIL NFR BLD AUTO: 5 %
ERYTHROCYTE [DISTWIDTH] IN BLOOD BY AUTOMATED COUNT: 14 % (ref 10–15)
FACT X ACT/NOR PPP CHRO: 22 % (ref 70–130)
GLUCOSE SERPL-MCNC: 121 MG/DL (ref 70–99)
GLUCOSE SERPL-MCNC: 165 MG/DL (ref 70–99)
GRAM STAIN RESULT: ABNORMAL
GRAM STAIN RESULT: NORMAL
HCT VFR BLD AUTO: 30.9 % (ref 40–53)
HGB BLD-MCNC: 10.3 G/DL (ref 13.3–17.7)
IMM GRANULOCYTES # BLD: 0.2 10E3/UL
IMM GRANULOCYTES NFR BLD: 1 %
INR PPP: 3.71 (ref 0.85–1.15)
INTERPRETATION ECG - MUSE: NORMAL
LDH SERPL L TO P-CCNC: 346 U/L (ref 0–250)
LYMPHOCYTES # BLD AUTO: 1.4 10E3/UL (ref 0.8–5.3)
LYMPHOCYTES NFR BLD AUTO: 10 %
MAGNESIUM SERPL-MCNC: 2.3 MG/DL (ref 1.7–2.3)
MCH RBC QN AUTO: 30.4 PG (ref 26.5–33)
MCHC RBC AUTO-ENTMCNC: 33.3 G/DL (ref 31.5–36.5)
MCV RBC AUTO: 91 FL (ref 78–100)
MONOCYTES # BLD AUTO: 1.1 10E3/UL (ref 0–1.3)
MONOCYTES NFR BLD AUTO: 8 %
MRSA DNA SPEC QL NAA+PROBE: NEGATIVE
NEUTROPHILS # BLD AUTO: 10.7 10E3/UL (ref 1.6–8.3)
NEUTROPHILS NFR BLD AUTO: 76 %
NRBC # BLD AUTO: 0 10E3/UL
NRBC BLD AUTO-RTO: 0 /100
P AXIS - MUSE: 20 DEGREES
P AXIS - MUSE: 53 DEGREES
P AXIS - MUSE: NORMAL DEGREES
PHOSPHATE SERPL-MCNC: 3 MG/DL (ref 2.5–4.5)
PLATELET # BLD AUTO: 292 10E3/UL (ref 150–450)
POTASSIUM SERPL-SCNC: 3.8 MMOL/L (ref 3.4–5.3)
POTASSIUM SERPL-SCNC: 4 MMOL/L (ref 3.4–5.3)
PR INTERVAL - MUSE: 82 MS
PR INTERVAL - MUSE: 88 MS
PR INTERVAL - MUSE: NORMAL MS
PROT SERPL-MCNC: 5.6 G/DL (ref 6.4–8.3)
PROT SERPL-MCNC: 5.6 G/DL (ref 6.4–8.3)
QRS DURATION - MUSE: 100 MS
QRS DURATION - MUSE: 64 MS
QRS DURATION - MUSE: 98 MS
QT - MUSE: 300 MS
QT - MUSE: 344 MS
QT - MUSE: 368 MS
QTC - MUSE: 434 MS
QTC - MUSE: 483 MS
QTC - MUSE: 534 MS
R AXIS - MUSE: -62 DEGREES
R AXIS - MUSE: -71 DEGREES
R AXIS - MUSE: 220 DEGREES
RBC # BLD AUTO: 3.39 10E6/UL (ref 4.4–5.9)
SA TARGET DNA: POSITIVE
SODIUM SERPL-SCNC: 133 MMOL/L (ref 135–145)
SODIUM SERPL-SCNC: 134 MMOL/L (ref 135–145)
SYSTOLIC BLOOD PRESSURE - MUSE: NORMAL MMHG
T AXIS - MUSE: 107 DEGREES
T AXIS - MUSE: 11 DEGREES
T AXIS - MUSE: 26 DEGREES
VENTRICULAR RATE- MUSE: 119 BPM
VENTRICULAR RATE- MUSE: 126 BPM
VENTRICULAR RATE- MUSE: 127 BPM
WBC # BLD AUTO: 14.1 10E3/UL (ref 4–11)

## 2024-06-24 PROCEDURE — 94640 AIRWAY INHALATION TREATMENT: CPT

## 2024-06-24 PROCEDURE — 82248 BILIRUBIN DIRECT: CPT | Performed by: PHYSICIAN ASSISTANT

## 2024-06-24 PROCEDURE — 84100 ASSAY OF PHOSPHORUS: CPT

## 2024-06-24 PROCEDURE — 87205 SMEAR GRAM STAIN: CPT | Performed by: PHYSICIAN ASSISTANT

## 2024-06-24 PROCEDURE — 99233 SBSQ HOSP IP/OBS HIGH 50: CPT | Performed by: INTERNAL MEDICINE

## 2024-06-24 PROCEDURE — G0463 HOSPITAL OUTPT CLINIC VISIT: HCPCS | Mod: 25

## 2024-06-24 PROCEDURE — 71045 X-RAY EXAM CHEST 1 VIEW: CPT | Mod: 26 | Performed by: RADIOLOGY

## 2024-06-24 PROCEDURE — 250N000013 HC RX MED GY IP 250 OP 250 PS 637

## 2024-06-24 PROCEDURE — 250N000013 HC RX MED GY IP 250 OP 250 PS 637: Performed by: STUDENT IN AN ORGANIZED HEALTH CARE EDUCATION/TRAINING PROGRAM

## 2024-06-24 PROCEDURE — 80053 COMPREHEN METABOLIC PANEL: CPT | Performed by: PHYSICIAN ASSISTANT

## 2024-06-24 PROCEDURE — 0W9B3ZX DRAINAGE OF LEFT PLEURAL CAVITY, PERCUTANEOUS APPROACH, DIAGNOSTIC: ICD-10-PCS

## 2024-06-24 PROCEDURE — 120N000003 HC R&B IMCU UMMC

## 2024-06-24 PROCEDURE — 36415 COLL VENOUS BLD VENIPUNCTURE: CPT | Performed by: SURGERY

## 2024-06-24 PROCEDURE — 250N000013 HC RX MED GY IP 250 OP 250 PS 637: Performed by: PHYSICIAN ASSISTANT

## 2024-06-24 PROCEDURE — 87205 SMEAR GRAM STAIN: CPT

## 2024-06-24 PROCEDURE — 99232 SBSQ HOSP IP/OBS MODERATE 35: CPT | Mod: 25 | Performed by: STUDENT IN AN ORGANIZED HEALTH CARE EDUCATION/TRAINING PROGRAM

## 2024-06-24 PROCEDURE — 85610 PROTHROMBIN TIME: CPT | Performed by: STUDENT IN AN ORGANIZED HEALTH CARE EDUCATION/TRAINING PROGRAM

## 2024-06-24 PROCEDURE — 83615 LACTATE (LD) (LDH) ENZYME: CPT | Performed by: PHYSICIAN ASSISTANT

## 2024-06-24 PROCEDURE — 87493 C DIFF AMPLIFIED PROBE: CPT | Performed by: PHYSICIAN ASSISTANT

## 2024-06-24 PROCEDURE — 84155 ASSAY OF PROTEIN SERUM: CPT | Performed by: PHYSICIAN ASSISTANT

## 2024-06-24 PROCEDURE — 83735 ASSAY OF MAGNESIUM: CPT

## 2024-06-24 PROCEDURE — 82310 ASSAY OF CALCIUM: CPT | Performed by: PHYSICIAN ASSISTANT

## 2024-06-24 PROCEDURE — 86140 C-REACTIVE PROTEIN: CPT | Performed by: PHYSICIAN ASSISTANT

## 2024-06-24 PROCEDURE — 250N000011 HC RX IP 250 OP 636: Performed by: SURGERY

## 2024-06-24 PROCEDURE — 999N000157 HC STATISTIC RCP TIME EA 10 MIN

## 2024-06-24 PROCEDURE — 71045 X-RAY EXAM CHEST 1 VIEW: CPT

## 2024-06-24 PROCEDURE — 250N000009 HC RX 250

## 2024-06-24 PROCEDURE — 36415 COLL VENOUS BLD VENIPUNCTURE: CPT | Performed by: PHYSICIAN ASSISTANT

## 2024-06-24 PROCEDURE — 93750 INTERROGATION VAD IN PERSON: CPT | Performed by: STUDENT IN AN ORGANIZED HEALTH CARE EDUCATION/TRAINING PROGRAM

## 2024-06-24 PROCEDURE — 250N000013 HC RX MED GY IP 250 OP 250 PS 637: Performed by: SURGERY

## 2024-06-24 PROCEDURE — 250N000011 HC RX IP 250 OP 636: Performed by: PHYSICIAN ASSISTANT

## 2024-06-24 PROCEDURE — 85025 COMPLETE CBC W/AUTO DIFF WBC: CPT

## 2024-06-24 PROCEDURE — 85260 CLOT FACTOR X STUART-POWER: CPT | Performed by: SURGERY

## 2024-06-24 PROCEDURE — 258N000003 HC RX IP 258 OP 636: Mod: JZ | Performed by: SURGERY

## 2024-06-24 RX ORDER — SPIRONOLACTONE 25 MG/1
25 TABLET ORAL EVERY EVENING
Status: DISCONTINUED | OUTPATIENT
Start: 2024-06-24 | End: 2024-07-04 | Stop reason: HOSPADM

## 2024-06-24 RX ORDER — LISINOPRIL 5 MG/1
5 TABLET ORAL DAILY
Status: DISCONTINUED | OUTPATIENT
Start: 2024-06-25 | End: 2024-07-03

## 2024-06-24 RX ORDER — POTASSIUM CHLORIDE 750 MG/1
20 TABLET, EXTENDED RELEASE ORAL ONCE
Status: DISCONTINUED | OUTPATIENT
Start: 2024-06-24 | End: 2024-06-24

## 2024-06-24 RX ORDER — POTASSIUM CHLORIDE 20MEQ/15ML
20 LIQUID (ML) ORAL ONCE
Status: COMPLETED | OUTPATIENT
Start: 2024-06-24 | End: 2024-06-24

## 2024-06-24 RX ORDER — HYDRALAZINE HYDROCHLORIDE 25 MG/1
25 TABLET, FILM COATED ORAL EVERY 8 HOURS SCHEDULED
Status: DISCONTINUED | OUTPATIENT
Start: 2024-06-24 | End: 2024-06-25

## 2024-06-24 RX ORDER — IPRATROPIUM BROMIDE AND ALBUTEROL SULFATE 2.5; .5 MG/3ML; MG/3ML
3 SOLUTION RESPIRATORY (INHALATION) EVERY 6 HOURS PRN
Status: DISCONTINUED | OUTPATIENT
Start: 2024-06-24 | End: 2024-07-04 | Stop reason: HOSPADM

## 2024-06-24 RX ADMIN — PIPERACILLIN AND TAZOBACTAM 4.5 G: 4; .5 INJECTION, POWDER, LYOPHILIZED, FOR SOLUTION INTRAVENOUS at 12:48

## 2024-06-24 RX ADMIN — VANCOMYCIN HYDROCHLORIDE 1250 MG: 10 INJECTION, POWDER, LYOPHILIZED, FOR SOLUTION INTRAVENOUS at 10:04

## 2024-06-24 RX ADMIN — GUAIFENESIN 600 MG: 600 TABLET ORAL at 08:45

## 2024-06-24 RX ADMIN — HYDRALAZINE HYDROCHLORIDE 25 MG: 25 TABLET ORAL at 19:38

## 2024-06-24 RX ADMIN — PIPERACILLIN AND TAZOBACTAM 4.5 G: 4; .5 INJECTION, POWDER, LYOPHILIZED, FOR SOLUTION INTRAVENOUS at 18:40

## 2024-06-24 RX ADMIN — ACETAMINOPHEN 650 MG: 325 TABLET, FILM COATED ORAL at 21:19

## 2024-06-24 RX ADMIN — Medication 18.75 MG: at 13:31

## 2024-06-24 RX ADMIN — Medication 18.75 MG: at 09:33

## 2024-06-24 RX ADMIN — PIPERACILLIN AND TAZOBACTAM 4.5 G: 4; .5 INJECTION, POWDER, LYOPHILIZED, FOR SOLUTION INTRAVENOUS at 01:27

## 2024-06-24 RX ADMIN — HYDRALAZINE HYDROCHLORIDE 50 MG: 25 TABLET ORAL at 04:05

## 2024-06-24 RX ADMIN — ACETAMINOPHEN 650 MG: 325 TABLET, FILM COATED ORAL at 16:21

## 2024-06-24 RX ADMIN — SENNOSIDES AND DOCUSATE SODIUM 2 TABLET: 8.6; 5 TABLET ORAL at 08:45

## 2024-06-24 RX ADMIN — ATORVASTATIN CALCIUM 20 MG: 20 TABLET, FILM COATED ORAL at 19:37

## 2024-06-24 RX ADMIN — ACETAMINOPHEN 650 MG: 325 TABLET, FILM COATED ORAL at 10:52

## 2024-06-24 RX ADMIN — OXYCODONE HYDROCHLORIDE 5 MG: 5 TABLET ORAL at 16:21

## 2024-06-24 RX ADMIN — AZITHROMYCIN DIHYDRATE 250 MG: 250 TABLET ORAL at 08:45

## 2024-06-24 RX ADMIN — PANTOPRAZOLE SODIUM 40 MG: 40 TABLET, DELAYED RELEASE ORAL at 08:45

## 2024-06-24 RX ADMIN — OXYCODONE HYDROCHLORIDE 5 MG: 5 TABLET ORAL at 21:19

## 2024-06-24 RX ADMIN — PIPERACILLIN AND TAZOBACTAM 4.5 G: 4; .5 INJECTION, POWDER, LYOPHILIZED, FOR SOLUTION INTRAVENOUS at 05:38

## 2024-06-24 RX ADMIN — IPRATROPIUM BROMIDE AND ALBUTEROL SULFATE 3 ML: .5; 3 SOLUTION RESPIRATORY (INHALATION) at 12:03

## 2024-06-24 RX ADMIN — HYDRALAZINE HYDROCHLORIDE 50 MG: 25 TABLET ORAL at 11:45

## 2024-06-24 RX ADMIN — LIDOCAINE 2 PATCH: 4 PATCH TOPICAL at 11:45

## 2024-06-24 RX ADMIN — OXYCODONE HYDROCHLORIDE 5 MG: 5 TABLET ORAL at 01:26

## 2024-06-24 RX ADMIN — GUAIFENESIN 600 MG: 600 TABLET ORAL at 19:37

## 2024-06-24 RX ADMIN — OXYCODONE HYDROCHLORIDE 10 MG: 10 TABLET ORAL at 05:37

## 2024-06-24 RX ADMIN — FUROSEMIDE 40 MG: 10 INJECTION, SOLUTION INTRAVENOUS at 16:05

## 2024-06-24 RX ADMIN — OXYCODONE HYDROCHLORIDE 5 MG: 5 TABLET ORAL at 10:52

## 2024-06-24 RX ADMIN — FUROSEMIDE 40 MG: 10 INJECTION, SOLUTION INTRAVENOUS at 08:46

## 2024-06-24 RX ADMIN — POTASSIUM CHLORIDE 20 MEQ: 1.5 SOLUTION ORAL at 08:41

## 2024-06-24 RX ADMIN — SPIRONOLACTONE 25 MG: 25 TABLET ORAL at 19:37

## 2024-06-24 RX ADMIN — VANCOMYCIN HYDROCHLORIDE 1250 MG: 10 INJECTION, POWDER, LYOPHILIZED, FOR SOLUTION INTRAVENOUS at 21:18

## 2024-06-24 ASSESSMENT — ACTIVITIES OF DAILY LIVING (ADL)
ADLS_ACUITY_SCORE: 34
ADLS_ACUITY_SCORE: 31
ADLS_ACUITY_SCORE: 30
ADLS_ACUITY_SCORE: 34
ADLS_ACUITY_SCORE: 34
ADLS_ACUITY_SCORE: 31
ADLS_ACUITY_SCORE: 34
ADLS_ACUITY_SCORE: 30
ADLS_ACUITY_SCORE: 31
ADLS_ACUITY_SCORE: 34
ADLS_ACUITY_SCORE: 30
ADLS_ACUITY_SCORE: 30
ADLS_ACUITY_SCORE: 34
ADLS_ACUITY_SCORE: 31
ADLS_ACUITY_SCORE: 34
ADLS_ACUITY_SCORE: 31
ADLS_ACUITY_SCORE: 34

## 2024-06-24 NOTE — CONSULTS
"      [facility] Consult Service Note  Interventional Radiology  06/24/24   7:33 AM    Consult Requested: \"diagnostic and therpeutic thoracentesis\"    Recommendations/Plan:    -Patient is approved for diagnostic and therapeutic left thoracentesis.    -Timing of procedure is TBD based on IR staffing/schedule, triage, and chromogenic factor 10. Pending results, thora will possibly be later today or tomorrow.  -Please contact the IR charge RN at [phone] for estimated time of procedure.   -Platelets at 292 are WNL for procedure. Chromogenic factor 10 still pending this AM.  -Orders entered for procedure. No NPO required.  -Medications to be held include: none.  -Informed consent will be completed prior to procedure.   -Case and imaging discussed with IR attending Dr. Brent Patel MD.  -Recommendations were reviewed with Galilea Dang from requesting team.    History of Present Illness: HFrEF due to NICM s/p ICD, and CKD that was admitted on 6/3/24 with decompensated heart failure and is now s/p HM3 LVAD placement on 6/14/24. He had been having worsening SOB for a few weeks. Patient is on a lupus anticoagulant, therefore INR is not a reliable read. Requesting team is monitoring with chromogenic factor 10. Patient on warfarin due to h/o R subclavian and axillary vein non-occlusive thrombus. CAPS was consulted and ultimately denied performing a thoracentesis due to patient appearing loculated.     Diagnostic labs to be entered by: Galilea Dang     Pertinent Imaging Reviewed:       Recent Results (from the past 24 hour(s))   XR Chest 2 Views    Narrative    Exam: XR CHEST 2 VIEWS, 6/23/2024 3:19 PM    Comparison: CT 6/22/2024, radiographs 6/22/2024, 6/21/2024    History: reassess pleural effusions, pleuritic/musculoskeletal chest  pain    Findings:  PA and lateral views of the chest. Left chest wall implantable cardiac  defibrillator with leads projecting over the right atrium. LVAD in  place. " Postsurgical changes of the chest with intact median sternotomy  wires.    Trachea is midline. Cardiomediastinal silhouette is stable. Stable  small left pleural effusion. Stable trace left apical pneumothorax.  Similar mild streaky perihilar and retrocardiac opacities. No acute  osseous abnormality. Visualized upper abdomen is unremarkable. There      Impression    Impression:   1. Stable small left pleural effusion.  2. Stable trace left apical pneumothorax.  3. Similar mild streaky perihilar and retrocardiac opacities.    I have personally reviewed the examination and initial interpretation  and I agree with the findings.    GÓMEZ FLORENCE MD         SYSTEM ID:  [id number]   XR Chest Port 1 View    Impression    RESIDENT PRELIMINARY INTERPRETATION  Impression:   1. Stable small left pleural effusion.  2. No appreciable pneumothorax.  3. Stable mild streaky perihilar and retrocardiac opacities, likely  atelectasis.      Expected date of discharge: TBD    Vitals:   /85   Pulse 111   Temp 97.8  F (36.6  C) (Oral)   Resp 20   Ht 1.829 m (6')   Wt 92.4 kg (203 lb 11.3 oz)   SpO2 97%   BMI 27.63 kg/m      Pertinent Labs Reviewed:  CBC:  Lab Results   Component Value Date    WBC 14.1 (H) 06/24/2024    WBC 18.3 (H) 06/23/2024    WBC 16.8 (H) 06/22/2024     Lab Results   Component Value Date    HGB 10.3 06/24/2024    HGB 10.8 06/23/2024    HGB 10.9 06/22/2024     Lab Results   Component Value Date     06/24/2024     06/23/2024     06/22/2024    INR:  Lab Results   Component Value Date    INR 3.71 (H) 06/24/2024    PTT 44 (H) 06/17/2024          COVID Results:  COVID-19 Antibody Results, Testing for Immunity           No data to display              COVID-19 PCR Results          8/22/2023    20:03 11/18/2023    00:45   COVID-19 PCR Results   SARS CoV2 PCR Negative  Negative     Potassium   Date Value Ref Range Status   06/24/2024 3.8 3.4 - 5.3 mmol/L Final     Potassium POCT   Date Value  Ref Range Status   06/14/2024 4.5 3.4 - 5.3 mmol/L Final     Comment:     CRITICAL VALUES NOTED AND REPORTED TO MD Megan Hendricks PA-C  Interventional Radiology

## 2024-06-24 NOTE — PROGRESS NOTES
Goal outcome evaluation:  Time: 1900 -2300  Plan of care reviewed with: Patient  Overall patient progress: No change  VS /85   Pulse 120   Temp 98.4  F (36.9  C) (Oral)   Resp 20   Ht 1.829 m (6')   Wt 92.4 kg (203 lb 11.3 oz)   SpO2 92%   BMI 27.63 kg/m        Navin Lutz is a 53 year old male admitted on 6/3/2024. He has a past medical history of chronic HFrEF 2/2 NICM s/p ICD, HLD, and CKD Stage II who presents admitted for decompensated heart failure with NICM on milrinone listed status 4, admitted for consideration of advanced therapies and is now s/p HM3 LVAD on 6/14/24 with Dr. Jhaveri.     Activity: Assist x 1  Neuros: Alert and oriented to person, place, situation and time. Calls appropriately. Able to make needs known. Clear and appropriate speech. Follows instructions.  Psychosocial Assessment: Cooperative and interacts appropriately with staff.   Cardiac: Sinus tach 120's. HM3 parameters WNL. MAP 80.   Respiratory: 2L O2 via nasal canula. Sating well above 92%.  GI/: Bowel sounds are present in all quadrants, last BM 6/23/24. Reports no difficulty or pain with urination.   Diet: Tolerated diet well, no complaints of nausea, vomiting or abdominal discomfort.  Skin/Incisions: Skin is warm and dry, no new deficits noted.  Lines/Drains: LPIV x 2. Infusing Vanco.  Labs: Reviewed  Pain/Nausea: c/o of pain, oxy x 1 with effect.  New changes this shift:None  Plan: Continue POC, notify provider of changes

## 2024-06-24 NOTE — PROGRESS NOTES
"CLINICAL NUTRITION SERVICES - REASSESSMENT NOTE     Nutrition Prescription    RECOMMENDATIONS FOR MDs/PROVIDERS TO ORDER:  Encourage PO intake, minimize diet restrictions as able    Malnutrition Status:    Patient does not meet two of the established criteria necessary for diagnosing malnutrition but is at risk for malnutrition    Recommendations already ordered by Registered Dietitian (RD):  Dre cts 6/25-6/28  Discontinued Ensure Clear, not taking per report. Replaced with Magic Cup with meal trays, monitor for pt acceptance/flavor preference.   Encourage PO intake, prioritize high protein food first on meal trays    Future/Additional Recommendations:  Monitor nutrition-related findings and follow pt per protocol  Needs edu for high protein s/p LVAD  Follow up on snack/supplement preferences as able     EVALUATION OF THE PROGRESS TOWARD GOALS   Diet: Regular  Supplement: Ensure Clear at 2 PM and 8 PM (started 6/22)      PO Intake: 0-25-50% intake documented for most meals, per I/O since last assessment. Per review of room service selections, pt is ordering 2 meals per day + supplements scheduled as above.     Patient busy at multiple attempts to see today. Currently busy for next 1-2 hours, receiving LVAD education.  Discussed intake with bedside RN who reports that \"about 16\" oral nutrition supplements are collecting in his room. Per chart review, patient had been receiving Ensure Enlive, but had requested to discontinue them/switch to Ensure Clear over weekend. Bedside RN reports the supplements she observed were Ensure Clear.     Discontinued supplement order, and replaced with Magic Cup for now until able to better assess pt flavor preferences. Bedside RN reports that pt as been C/O exaggerated flavors which have been impacting his appetite (vanilla flavors taste like vanilla extract and pt disgusted by the strong taste of a Dorito, per report). Nurse reports primary team aware of this/following intake.     NEW " FINDINGS   RESPIRATORY:   Recent RN note -  Sating above 94% on RA during day, 1-2L NC at night.     GI:  Last BM: today  0-2 unmeasured BMs per day recently per I/O  Bowel regimen: Miralax daily, Senokot BID    GI concerns reported: altered taste per bedside RN report     RENAL:  CKD II noted per chart     Weights:  Down 2.8 kg since admit (~3%), daily trends fluctuate likely 2/2 fluid status. Pt on Lasix. Monitor.     LABS: Reviewed  Electrolytes  Potassium (mmol/L)   Date Value   06/24/2024 3.8   06/23/2024 3.8   06/23/2024 3.8     Potassium POCT (mmol/L)   Date Value   06/14/2024 4.5   06/14/2024 4.6   06/14/2024 4.8     Phosphorus (mg/dL)   Date Value   06/24/2024 3.0   06/23/2024 3.1   06/22/2024 3.2   06/21/2024 3.6   06/20/2024 3.6    Blood Glucose  Glucose (mg/dL)   Date Value   06/24/2024 121 (H)   06/23/2024 113 (H)   06/23/2024 106 (H)   06/22/2024 133 (H)   06/22/2024 106 (H)     GLUCOSE BY METER POCT (mg/dL)   Date Value   06/20/2024 90   06/20/2024 96   06/20/2024 100 (H)   06/19/2024 96   06/19/2024 105 (H)     Hemoglobin A1C (%)   Date Value   08/22/2023 5.6    Inflammatory Markers  WBC Count (10e3/uL)   Date Value   06/24/2024 14.1 (H)   06/23/2024 18.3 (H)   06/22/2024 16.8 (H)     Albumin (g/dL)   Date Value   06/24/2024 2.7 (L)   06/23/2024 2.9 (L)   06/21/2024 3.0 (L)      Magnesium (mg/dL)   Date Value   06/24/2024 2.3   06/23/2024 2.2   06/22/2024 2.2     Sodium (mmol/L)   Date Value   06/24/2024 134 (L)   06/23/2024 134 (L)   06/23/2024 136    Renal  Urea Nitrogen (mg/dL)   Date Value   06/24/2024 18.6   06/23/2024 19.0   06/23/2024 20.0     Creatinine (mg/dL)   Date Value   06/24/2024 1.21 (H)   06/23/2024 1.13   06/23/2024 1.29 (H)     Additional  Triglycerides (mg/dL)   Date Value   11/18/2023 85   08/22/2023 114     Ketones Urine (mg/dL)   Date Value   06/21/2024 Trace (A)          Medications:  Zosyn  Vancomycin    Skin:  New LVAD  WOCN not following pt     MALNUTRITION  % Intake: </=  "50% for >/= 5 days (severe)  % Weight Loss: Unable to assess -suspect confounded by fluid status   Subcutaneous Fat Loss: None observed -Per last NFPE 6/17  Muscle Loss: None observed \" \"   Fluid Accumulation/Edema: Does not meet criteria (trace)  Malnutrition Diagnosis: Patient does not meet two of the established criteria necessary for diagnosing malnutrition but is at risk for malnutrition    Previous Goals   Patient to consume % of nutritionally adequate meal trays TID, or the equivalent with supplements/snacks.   Evaluation: Not met    Previous Nutrition Diagnosis  Increased nutrient needs related to hypercatabolism as evidenced by recent LVAD placement.     Evaluation: No change    CURRENT NUTRITION DIAGNOSIS  Increased nutrient needs related to hypercatabolism as evidenced by recent LVAD placement.       INTERVENTIONS  Implementation  Collaboration with other providers - Bedside RN   Medical food supplement therapy - Modified   Modify composition of meals/snacks - Enc PO, consume high protein foods first    Goals  Patient to consume % of nutritionally adequate meal trays TID, or the equivalent with supplements/snacks.    Monitoring/Evaluation  Progress toward goals will be monitored and evaluated per protocol.    Prasad Davies, MS, RDN, LD, CNSC  Available by Rayku or phone   Vocnina: M-F (7:00-3:30)  6B Clinical Dietitian  or 1A Obs Clinical Dietitian  Weekend/Holiday (7:00-3:30) - Weekend Clinical Dietitian   6B RD desk: [phone]       **Clinical Dietitians are no longer available by pager.    "

## 2024-06-24 NOTE — PROGRESS NOTES
The patient's HeartMate LVAD was interrogated 6/24/2024  * Speed 5400 rpm   * Pulsatility index 3.2   * Power 4.0 Pizano   * Flow 4.7 L/minute   Fluid status: mild hypervolemia   Alarms were reviewed, and notable for occasional pi events, no alarms.   The driveline exit site was inspected, c/d/i.   All external components were inspected and showed no evidence of damage or malfunction, none replaced.   No changes to VAD settings made

## 2024-06-24 NOTE — PROGRESS NOTES
[facility] GENER[facility] INFECTIOUS DISEASES PROGRESS NOTE    Patient:  Navin Lutz   YOB: 1970, MRN: [id number]  Date of Visit: 06/24/2024  Date of Admission: 6/3/2024  Consult Requester: Brian Jhaveri MD          Assessment and Recommendations:   ID Problem List:  Leukocytosis  Cough c/f pneumonia  K pneumoniae on sputum 6/22/24  Bilateral pleural effusions   NICM, s/p LVAD placement on 6/14/24  Substernal fluid collection, postprocedural vs infection  CKD  Non-occlusive DVT R brachial    Mr. Lutz is clinically improving on broad-spectrum antibiotic therapy. Sputum cultures now with K pneumoniae, susceptibilities pending. HCAP could explain his symptoms and improvement on antibiotic therapy. Would discontinue vancomycin as he has had several days of negative blood cultures and no gram positive organisms isolated. He can complete a CAP course of azithromycin in case there are any atypical organisms present too. Thoracentesis is pending, will follow results and adjust therapies as necessary.     Recommendations:  Continue IV pip/tazo for now  Discontinue IV vancomycin  Follow pending K pneumoniae susceptibilities  Follow pending thoracentesis studies.     Thank you for the consult. ID will continue to follow with you.    Mia Murillo MD   Infectious Diseases  Pager: [phone]  06/24/2024         Interval History and Events:   - Afebrile and HDS.   - Pt undergoing LVAD teaching during the time of my visit.   - States his cough has significantly improved. Tolerating antibiotics well.   - 6/22 sputum w/ K pneumoniae.   - Thoracentesis pending.          Relevant Microbiology:   6/21/24 Blood cultures X2: NGTD  6/22/24 Sputum (expectorated):   GS: >25 PMNs, no organisms   Culture: K pneumoniae  6/23/24 Blood cultures X2: NGTD         Antimicrobial Treatment:   Pip/tazo 6/21 - present  Vancomycin 6/21 - present   Azithromycin 6/22 - present          Review of Systems:   Targeted 4 point ROS was completed with  pertinent positives and negatives are detailed above.         Physical Examination:   Temp:  [97.8  F (36.6  C)-98.6  F (37  C)] 98.3  F (36.8  C)  Pulse:  [111-121] 116  Resp:  [20-26] 20  SpO2:  [90 %-97 %] 95 %    Constitutional: Pleasant and cooperative male seen sitting up in bed, in NAD. Awake, alert, interactive.  HEENT: EOMI, sclera clear, conjunctiva normal, OP with MMM  Respiratory: No increased work of breathing,   Skin: Warm, dry, well-perfused. No bruising, bleeding, rashes, or lesions on limited exam.  Musculoskeletal: Extremities grossly normal, non-tender, no edema.   Neurologic: A&O. Answers questions appropriately, speech normal. Moves all extremities spontaneously.  Neuropsychiatric: Calm. Affect appropriate to situation.         Medications:     Current Facility-Administered Medications   Medication Dose Route Frequency Provider Last Rate Last Admin    atorvastatin (LIPITOR) tablet 20 mg  20 mg Oral or Feeding Tube QPM Chase Malolry MD   20 mg at 06/23/24 2000    azithromycin (ZITHROMAX) tablet 250 mg  250 mg Oral Daily Aida Lamas MD   250 mg at 06/24/24 0845    captopril (CAPOTEN) half-tab 18.75 mg  18.75 mg Oral TID Eliz Choi PA-C   18.75 mg at 06/24/24 1331    furosemide (LASIX) injection 40 mg  40 mg Intravenous BID Eliz Choi PA-C   40 mg at 06/24/24 0846    guaiFENesin (MUCINEX) 12 hr tablet 600 mg  600 mg Oral BID Mary Baker PA-C   600 mg at 06/24/24 0845    hydrALAZINE (APRESOLINE) tablet 50 mg  50 mg Oral Q8H Deon Rosa APRN CNP   50 mg at 06/24/24 1145    Lidocaine (LIDOCARE) 4 % Patch 1-2 patch  1-2 patch Transdermal Q24H Sarah Guerin APRN CNP   2 patch at 06/24/24 1145    pantoprazole (PROTONIX) EC tablet 40 mg  40 mg Oral QAM AC Deon Rosa APRN CNP   40 mg at 06/24/24 0845    piperacillin-tazobactam (ZOSYN) 4.5 g vial to attach to  mL bag  4.5 g Intravenous Q6H Laurence, Galilea SHIN PA-C   4.5 g at 06/24/24 3054     "polyethylene glycol (MIRALAX) Packet 17 g  17 g Oral Daily Darnell Glaser PA-C   17 g at 06/23/24 0801    senna-docusate (SENOKOT-S/PERICOLACE) 8.6-50 MG per tablet 2 tablet  2 tablet Oral BID Sarah Guerin, ANNE CNP   2 tablet at 06/24/24 0845    sodium chloride (PF) 0.9% PF flush 3 mL  3 mL Intracatheter Q8H Cira Mallory PA-C   3 mL at 06/24/24 1003    vancomycin (VANCOCIN) 1,250 mg in 0.9% NaCl 250 mL intermittent infusion  1,250 mg Intravenous Q12H Brian Jhaveri  mL/hr at 06/21/24 2206 1,250 mg at 06/24/24 1004    Warfarin Dose Required Daily - Pharmacist Managed  1 each Oral See Admin Instructions Misha Guerra MD        warfarin-No DOSE today  1 each Does not apply no dose today (warfarin) Brian Jhaveri MD           Antiinfectives:  Anti-infectives (From now, onward)      Start     Dose/Rate Route Frequency Ordered Stop    06/23/24 0800  azithromycin (ZITHROMAX) tablet 250 mg         250 mg Oral DAILY 06/22/24 1637 06/27/24 0759    06/22/24 1800  piperacillin-tazobactam (ZOSYN) 4.5 g vial to attach to  mL bag        Note to Pharmacy: For [facility], [facility] and [facility]: For Zosyn-naive patients, use the \"Zosyn initial dose + extended infusion\" order panel.    4.5 g  over 30 Minutes Intravenous EVERY 6 HOURS 06/22/24 1344      06/21/24 2200  vancomycin (VANCOCIN) 1,250 mg in 0.9% NaCl 250 mL intermittent infusion        Placed in \"Followed by\" Linked Group    1,250 mg  over 90 Minutes Intravenous EVERY 12 HOURS 06/21/24 0903                   Laboratory Data:   Metabolic Studies     Recent Labs   Lab Test 06/24/24  0510 06/23/24  1321 06/23/24  0507 08/23/23  0702 08/22/23  2200   * 134* 136   < >  --    POTASSIUM 3.8 3.8 3.8   < >  --    CHLORIDE 102 102 102   < >  --    CO2 23 21* 23   < >  --    ANIONGAP 9 11 11   < >  --    BUN 18.6 19.0 20.0   < >  --    CR 1.21* 1.13 1.29*   < >  --    GFRESTIMATED 72 78 66   < >  --    * 113* 106*   < >  --    A1C  --   --   --   --  5.6 "   NAM 8.2* 8.7 8.2*   < >  --    PHOS 3.0  --  3.1   < >  --    MAG 2.3  --  2.2   < >  --    LACT  --  1.6  --    < >  --     < > = values in this interval not displayed.     Hepatic Studies    Recent Labs   Lab Test 24  0510 24  1321 24  0507 24  0444   BILITOTAL 0.6  --  0.7 0.7   ALKPHOS 107  --  105 78   ALBUMIN 2.7*  --  2.9* 3.0*   AST 67*  --  76* 32   ALT 76*  --  78* 25   *   < > 352*  --     < > = values in this interval not displayed.     Hematology Studies      Recent Labs   Lab Test 24  0510 24  0507 24  0528   WBC 14.1* 18.3* 16.8*   HGB 10.3* 10.8* 10.9*   HCT 30.9* 33.4* 34.0*    285 261     Urine Studies     Recent Labs   Lab Test 24  1731 06/15/24  1008 24  1822   URINEPH 6.0 5.0 5.5   NITRITE Negative Negative Negative   LEUKEST Negative Negative Negative   WBCU <1 1 1     Vancomycin Levels     Recent Labs   Lab Test 24  0507   VANCOMYCIN 15.2            Imagin24 CXR  Impression:   1. Stable small left pleural effusion.  2. No appreciable pneumothorax.  3. Stable mild streaky perihilar and retrocardiac opacities, likely  atelectasis.

## 2024-06-24 NOTE — PLAN OF CARE
Hours of care: 5079-2374    Problem: Heart Failure  Goal: Optimal Functional Ability  Outcome: Progressing  Goal: Fluid and Electrolyte Balance  Outcome: Progressing  Goal: Improved Oral Intake  Outcome: Progressing  Goal: Effective Oxygenation and Ventilation  Outcome: Progressing  Intervention: Optimize Oxygenation and Ventilation  Recent Flowsheet Documentation  Taken 6/24/2024 7163 by Dara Hernandez, RN  Head of Bed (HOB) Positioning: HOB at 30 degrees     Shift events:   -Loose stools x2 today; pending additional sample to r/o C. Diff.   -Potassium replaced per protocol; good UOP with diuretics  -Sputum sample with oral contamination; pending recollection  -Tele notable for 9 beat run VT this afternoon; remains ST with PVCs  -LVAD teaching occurred with coordinator at bedside this afternoon  -Calorie Counts initiated with low appetite; notable for altered taste (patient discussed with both CVTS and Cards 2 provider with plan to monitor for a couple days)  -Pending Thoracentesis this evening or tomorrow

## 2024-06-24 NOTE — PLAN OF CARE
Goal Outcome Evaluation:         [id number]  Neuro: A&Ox4.   Cardiac: ST 100s-110s. HM3 LVAD. Doppler MAPs 70s-80s.   Respiratory: Sating above 94% on RA during day, 1-2L NC at night.   GI/: Adequate urine output via urinal. No BM this shift.   Diet/appetite: Tolerating regular diet.  Activity:  Assist of 1, independently repositions  Pain: At acceptable level on current regimen.   Skin: No new deficits noted.  LDA's: 2 PIV(SL, TKO)     Plan: Continue with POC. Notify primary team with changes.

## 2024-06-24 NOTE — CONSULTS
[facility]  CAPS NOTE  Date of Admission:  6/3/2024  Consult Requested by: Cardiothoracic Surgery  Reason for Consult: diagnostic evaluation of pleural effusion and management of symptomatic pleural effusion    POC US GUIDE FOR THORACENTESIS     Impression  Limited point of care pleural/lung ultrasound to evaluate for thoracentesis.    Thoracentesis Indication:pleural effusion    Views/Acquisition: Left pleural space(s): upright and semi recumbent.    Findings/Interpretation: Pleural effusion on L side with question of loculations on imaging (present in both positions).    Homero Beck MD        Assessment and Plan:  Requested procedure was not performed.   Pleural effusion on bedside US with concern for loculations (both in upright and in semi recumbent position) .  Discussed with primary team before and after imaging, including recommendations.     Homero Beck MD  [facility]  Securely message with Memvu (more info)  Text page via [facility] Paging/Directory   See signed in provider for up to date coverage information

## 2024-06-24 NOTE — PROGRESS NOTES
[facility]   Cardiology II Service / Advanced Heart Failure  Daily Consult Progress Note      Patient: Navin Lutz  MRN: [id number]  Admission Date: 6/3/2024  Hospital Day # 21    Assessment and Plan: Navin Lutz is a 53 year old male admitted on 6/3/2024. He has a past medical history of chronic HFrEF 2/2 NICM s/p ICD, HLD, and CKD Stage II who presents admitted for decompensated heart failure with NICM on milrinone listed status 4, admitted for consideration of advanced therapies and is now s/p HM3 LVAD on 6/14/24 with Dr. Jhaveri with course complicated by fevers and leukocytosis.    Today's Plan:  - Continue captopril 18.75 mg TID though today, then start lisinopril 5 mg daily tomorrow AM   - Decrease hydralazine to 25 mg TID  - Will continue 40 mg of IV lasix BID, aiming for net negative 500-1L, can be more aggressive when stable from the infection perspective  - Start aldactone 25 mg daily  - BID BMP today for lytes  - Sending c. Diff given new diarrhea and abd cramping today  - IR today for diagnostic and theraputic thoracentesis    # Acute on chronic systolic heart failure secondary to NICM   # s/p HM3 LVAD as BTT on 6/14/24 (no active contraindications to transplant other than wait time)  Stage D. NYHA Class III confounded by recent surgery    -Fluid status: hypervolemic- lasix 40 mg IV BID today, somewhat cautious today given infection with new diarrhea  -ACEi/ARB/ARNi: yes- captopril to 18.75 mg TID-> switching to lisinopril 5m g daily tomorrow  -Afterload reduction: Decrease hydralazine 25 mg TID with additional available PRN, aiming to decrease/stop this prior to discharge  -Inotrope: dobutamine stopped on 6/19/24  -BB contraindicated currently given recent LVAD implant  -Aldosterone antagonist adding aldactone 25 mg daily today  -SGLT2i: will try to add prior to discharge, other medication changes today  -SCD prophylaxis ICD  -MAP: Goal 65-85, Has been 78-90  -LDH trends: 346,  establishing baseline  -Anticoagulation: warfarin dosing per pharmacy, Following chromogenic factor 10: goal 20-40, 22 today, dosing per pharmacy  -Antiplatelet: no ASA indicated per NICOLE trial results    # Leukocytosis, possible pneumonia  # Fevers  Concern for infection. Some coughing that is becoming more productive. CT chest/abd/pelvis from 6/22/23 with some anterior chest subcutaneous air and stranding with substernal gas, read as infectious vs postprocedureal, felt to be procedural per CVTS. Also with b/l pleural effusions and mild ground glass in the lung bases concerning for pneumonia. Resp culture negative. UA negative. New diarrhea with abdominal cramping on 6/24 after 3-4 days of abx.  -Appreciate ID consult  -IR today for diagnostic and therapeutic thora  -On Vanc/Zosyn since 6/21 and added azithromycin 6/22  -CRP and WBC now downtrending- WBC to 14.1 and CRP to 200  -6/21/24 blood cultures NGTD  -F/up C.diff    # Right subclavian and axillary vein thrombus, nonocclusive, known prior to VAD. Redeomonstrated on 6/22 CT, likely old clot vs line related from right neck swan in ICU.  - Was on Coumadin PTA, now resumed, follow chromogenic factor 10 as above    # HLD  - Continue atorvastatin 20 mg daily     # CKD stage 2. B/l cr has been listed at 1.2-1-4  - Cr stable at 1.21 jarrett Choi PA-C  Advanced Heart Failure/Cardiology II Service  Jany preferred, or pager [phone]      Patient discussed with Dr. Silvestre.        40 minutes spent on the date of the encounter doing chart review, history and exam, documentation and further activities per the note    ================================================================    Subjective/24-Hr Events:   Last 24 hr care team notes reviewed. He is feeling better than yesterday. No more fevers or chills. His breathing feels a bit labored today (just got back into bed after having diarrhea) He is coughing  more and coughin up some sputum. No more  vomiting. He is having cramping abd pain associated with new diarrhea today, no other abd pain. Has already had multipe episodes of diearrhea this morning.  Maybe the fluid is slightly improved in the abdomen. No LE edema. No dysuria. No drainage from any of his surgial sites. No lightheadenss or dizzness     ROS:  4 point ROS including respiratory, CV, GI and  (other than that noted in the HPI) is negative.     Medications: Reviewed in EPIC.     Physical Exam:   /85   Pulse 116   Temp 98.3  F (36.8  C) (Oral)   Resp 20   Ht 1.829 m (6')   Wt 92 kg (202 lb 13.2 oz)   SpO2 95%   BMI 27.51 kg/m      GENERAL: Appears comfortable and in no distress, non-toxic  HEENT: Eye symmetrical, no discharge or icterus bilaterally.   NECK: Supple, JVD mid neck at 60 degrees.   CV: Hum of LVAD, no adventitious sounds  RESPIRATORY: Respirations regular, even, and unlabored. Lungs CTA throughout.    GI: Soft and non distended with normoactive bowel sounds present No tenderness, rebound, guarding.   EXTREMITIES: Trace b/l b/l lower extremity peripheral edema. All extremities are warm and well perfused  NEUROLOGIC: Alert and interacting appropriately.   SKIN: No jaundice. No rashes or lesions. Surgical sites per CVTS, sternum was c/d/i    Labs:  CMP  Recent Labs   Lab 06/24/24  0510 06/23/24  1321 06/23/24  0507 06/22/24  1626 06/22/24  0528 06/21/24  0444 06/20/24  1746   * 134* 136 134* 133* 137 137   POTASSIUM 3.8 3.8 3.8 3.7 3.9 3.6  3.6 3.8   CHLORIDE 102 102 102 98 100 100 101   CO2 23 21* 23 25 24 24 24   ANIONGAP 9 11 11 11 9 13 12   * 113* 106* 133* 106* 112* 130*   BUN 18.6 19.0 20.0 21.0* 22.7* 26.0* 28.6*   CR 1.21* 1.13 1.29* 1.29* 1.12 1.06 1.12   GFRESTIMATED 72 78 66 66 79 84 79   NAM 8.2* 8.7 8.2* 8.4* 8.5* 8.7 8.9   MAG 2.3  --  2.2  --  2.2 2.2  --    PHOS 3.0  --  3.1  --  3.2 3.6  --    PROTTOTAL 5.6*  5.6*  --  5.7*  --   --  5.9* 6.1*   ALBUMIN 2.7*  --  2.9*  --   --  3.0* 3.0*    BILITOTAL 0.6  --  0.7  --   --  0.7 0.6   ALKPHOS 107  --  105  --   --  78 74   AST 67*  --  76*  --   --  32 32   ALT 76*  --  78*  --   --  25 26       CBC  Recent Labs   Lab 06/24/24  0510 06/23/24  0507 06/22/24  0528 06/21/24  0444   WBC 14.1* 18.3* 16.8* 16.8*   RBC 3.39* 3.59* 3.63* 3.81*   HGB 10.3* 10.8* 10.9* 11.7*   HCT 30.9* 33.4* 34.0* 35.1*   MCV 91 93 94 92   MCH 30.4 30.1 30.0 30.7   MCHC 33.3 32.3 32.1 33.3   RDW 14.0 14.2 14.3 14.2    285 261 263       INR  Recent Labs   Lab 06/24/24  0510 06/23/24  0507 06/22/24  0528 06/21/24  1312   INR 3.71* 3.16* 3.70* 5.23*  5.16*

## 2024-06-24 NOTE — PROGRESS NOTES
Cardiovascular Surgery Progress Note  06/24/2024         Assessment and Plan:     Navin Lutz is a 53 year old male with PMH of CKD2, HLD, R Subclavian and axillary vein non-occlusive thrombus on Warfarin, NSVT, HFrEF 2/2 NICM s/p ICD (PTA IV milrinone) who presents admitted 6/3/24 for decompensated HFrEF listed status 4. He is now s/p LVAD by Dr. Jhaveri on 6/14/24.     Cardiovascular:   S/p LVAD on 6/14 by Dr. Jhaveri  S/p IABP (6/12-6/14)  Hx NSVT  Acute on chronic HFrEF 2/2 to NICM (EF 10-15%), home milrinone PTA, s/p ICD   HLD  HTN  Hypervolemia  No arrhythmias reported overnight, ongoing sinus tachycardia, HD stable. MAPs 70s-80s  Pre-op echo 6/8: LV EF 15-20%, normal RV size/function  - PTA atorvastatin 20 mg daily   - PO hydralazine 50 mg Q8H  - Captopril 18.75 mg TID   - Cards 2 following for hemodynamic & GDMT management; appreciate recs  - Holding PTA Coreg, Entresto, Aldactone, Jardiance     Chest tubes/TPW: removed in ICU    Pulmonary:  - Extubated POD 3 to 5 lpm via NC. Now saturating well on RA during the day, 1-2 lpm overnight.   - Supplemental O2 PRN to keep sats > 92%. Wean off as tolerated.  - Pulm toilet, IS, activity and deep breathing  - DuoNebs QID, Mucinex BID    Left pleural effusion  - CAPS procedural team consulted 6/23 to assess for thoracentesis - loculated effusion, not amendable to bedside thoracentesis but recommend IR consult   - IR consulted 6/23 for diagnostic and therapeutic thoracentesis given leukocytosis, plan to perform 6/24 or 6/25     Neurology /Psych:  Acute post-operative pain  - Acute post-operative pain regimen:  - scheduled acetaminophen, lidocaine patches   - PRN: PO oxycodone PRN, Robaxin, Voltaren gel, Atarax     / Renal:  CKD Stage 2  Contraction alkalosis  - Baseline creatinine ~1.1-1.2. Most recent creatinine 1.21, adequate UOP.   - Pre-op weight 209 lbs, most recent weight 202 lbs and down-trending   - Diuresis per Cards 2  - Replace electrolytes per  protocol    GI / FEN:   At risk for protein calorie malnutrition  - Regular diet  - Attempted to place NJ in the ICU, unable to place   - +BM since surgery, continue bowel regimen  - hepatic enzymes WNL, now slightly elevated 6/23 - will continue to monitor   - new onset diarrhea 6/24, cdiff ordered and pending     Endocrine:  Stress-induced hyperglycemia  Pre-op Hgb A1C 5.6  - Managed on insulin drip postop, transitioned to sliding scale goal BG <180 and has now also been discontinued due to good BG control with no insulin need.     Infectious Disease:  Stress induced leukocytosis  - WBC 14.1 and down-trending,  and down-trending, remains afebrile  - Completed perioperative LVAD antibiotics   - UA, blood cultures x2 ordred 6/21 - UA unremarkable, BCxs NGTD, follow for results  - sputum culture 6/22 - no organisms seen  - CT CAP 6/22 with anterior chest subcutaneous air and stranding.  Gas and fluid collection in the substernal region which could be infectious versus postprocedural. Bilateral pleural effusions with adjacent atelectasis and mild groundglass opacities.  - CT results were reviewed with Dr. Miller and Dr. Jhaveri - no indication for surgical washout at this time, plan to continue on broad spectrum antibiotics for at least 2 weeks per Dr. Jhaveri  - will attempt thoracentesis of the left pleural effusion as discussed above and send fluid studies  - Repeat blood cultures sent 6/23 - NGTD, follow for results  - started empiric IV vancomycin/zosyn 6/21 given up-trending leukocytosis per surgeon  - PO azithromycin added 6/22 per cardiology for possible pneumonia   -  ID consulted 6/23 given up-trending white count and CRP on broad spectrum antibiotics, appreciate recommendations - see their note for details     Hematology:   Acute blood loss anemia  Acute blood loss thrombocytopenia  Right brachial non-occlusive thrombus  Hgb stable; Plt WNL, no signs or symptoms of active bleeding  - Daily  CBC    Anticoagulation:   Chronic anticoagulation for LVAD, CFX goal 20-40%  +Lupus anticoagulant   - Warfarin for LVAD  - INRs noted to be supra-therapeutic and discordant with Chromogenic Factor X, hematology consulted 6/21 - lupus anticoagulant positive  - plan to use Chromogenic factor X for warfarin dosing as INR is not reliable, goal CFX 20-40%, pharmacy notified     MSK/Skin:  Sternotomy  - PT/OT    Prophylaxis:   - Stress ulcer prophylaxis: Pantoprazole 40 mg daily for 30 days  - DVT prophylaxis: therapeutic anticoagulation, SCD    Disposition:   - Transferred to  on 6/21  - Therapies recommending discharge to ARU vs home once medically ready. LVAD education planned to start 6/24.    Medically Ready for Discharge: 5+ days    Clinically Significant Risk Factors              # Hypoalbuminemia: Lowest albumin = 2.7 g/dL at 6/24/2024  5:10 AM, will monitor as appropriate        # End stage heart failure: home medication list includes inotropes          #Precipitous drop in Hgb/Hct: Lowest Hgb this hospitalization: 9.1 g/dL. Will continue to monitor and treat/transfuse as appropriate.     # Overweight: Estimated body mass index is 27.51 kg/m  as calculated from the following:    Height as of this encounter: 1.829 m (6').    Weight as of this encounter: 92 kg (202 lb 13.2 oz).        # Financial/Environmental Concerns: none  # Asthma: noted on problem list  # ICD device present       Seen and discussed with Dr. Brian Jhaveri.    Joel Dang PA-C  Cardiothoracic Surgery    12:13 PM  June 24, 2024  pager [phone]        Interval History:     No overnight events. Reports he is feeling a bit better this morning.   Still having some left sided pain with movement, palpation, and deep respirations - unchanged.  States pain is well managed on current regimen. Slept well overnight.  Tolerating diet, is passing flatus, + BM. No nausea or vomiting. Denies abdominal pain. Loose stools this morning after MoM given. Will send  cdiff given concern for infection with leukocytosis per cardiology request.   Breathing is improving overall. O2 requirements are continuing to come down.  Working with therapies and ambulating in halls with assistance.   Denies palpitations, dizziness, syncopal symptoms, fevers, chills, myalgias, sternal popping/clicking, dysuria, diarrhea. Ongoing cough, less mucus production compared to yesterday per patient.          Physical Exam:   Blood pressure 100/85, pulse 111, temperature 98.3  F (36.8  C), temperature source Oral, resp. rate 20, height 1.829 m (6'), weight 92 kg (202 lb 13.2 oz), SpO2 97%.  Vitals:    24 0841 24 0801 24 1019   Weight: 93.8 kg (206 lb 12.7 oz) 92.4 kg (203 lb 11.3 oz) 92 kg (202 lb 13.2 oz)     Gen: A&Ox4, NAD  Neuro: no focal deficits   CV: tachycardic, RRR, LVAD hum   Pulm: CTAB, normal breathing on 1 lpm at the time of exam  Abd: nondistended, normal BS, soft, nontender  Ext: trace peripheral edema, non- pitting  Skin:   Incision: clean, dry, intact, no erythema, sternum stable  Tubes/drain sites: dressing clean and dry  Lines: driveline dressing clean and dry   Right groin IABP insertio site C/D/I without erythema or drainage     Heartmate 3 LEFT VS  Flow (Lpm): 5.1 Lpm  Pulse Index (PI): 2.2 PI  Speed (rpm): 5400 rpm  Power (bassett): 3.9 bassett  Current Hct settin         Data:    Imaging:  reviewed recent imaging, no acute concerns  XR Chest Port 1 View  Narrative: Exam: TEMPORARY, 2024 6:34 AM    Indication: Follow-up chest x-ray for pleuritic pain    Comparison: 2024    Findings:   Single frontal view of the chest. Postsurgical changes in the chest  with intact median sternotomy wires. LVAD. Left chest wall implantable  cardiac defibrillator with leads in stable position. Trachea is  midline. Cardiac silhouette is stable. Similar streaky perihilar and  retrocardiac opacities. Small left pleural effusion. No pneumothorax.  Impression: Impression:   1.  Stable small left pleural effusion.  2. No appreciable pneumothorax.  3. Stable mild streaky perihilar and retrocardiac opacities, likely  atelectasis.     I have personally reviewed the examination and initial interpretation  and I agree with the findings.    GAYE MOON MD         SYSTEM ID:  [id number]        Labs:  BMP  Recent Labs   Lab 06/24/24  0510 06/23/24  1321 06/23/24  0507 06/22/24  1626   * 134* 136 134*   POTASSIUM 3.8 3.8 3.8 3.7   CHLORIDE 102 102 102 98   NAM 8.2* 8.7 8.2* 8.4*   CO2 23 21* 23 25   BUN 18.6 19.0 20.0 21.0*   CR 1.21* 1.13 1.29* 1.29*   * 113* 106* 133*     CBC  Recent Labs   Lab 06/24/24  0510 06/23/24  0507 06/22/24  0528 06/21/24  0444   WBC 14.1* 18.3* 16.8* 16.8*   RBC 3.39* 3.59* 3.63* 3.81*   HGB 10.3* 10.8* 10.9* 11.7*   HCT 30.9* 33.4* 34.0* 35.1*   MCV 91 93 94 92   MCH 30.4 30.1 30.0 30.7   MCHC 33.3 32.3 32.1 33.3   RDW 14.0 14.2 14.3 14.2    285 261 263     INR  Recent Labs   Lab 06/24/24  0510 06/23/24  0507 06/22/24  0528 06/21/24  1312   INR 3.71* 3.16* 3.70* 5.23*  5.16*      Hepatic Panel  Recent Labs   Lab 06/24/24  0510 06/23/24  0507 06/21/24  0444 06/20/24  1746   AST 67* 76* 32 32   ALT 76* 78* 25 26   ALKPHOS 107 105 78 74   BILITOTAL 0.6 0.7 0.7 0.6   ALBUMIN 2.7* 2.9* 3.0* 3.0*     GLUCOSE:   Recent Labs   Lab 06/24/24  0510 06/23/24  1321 06/23/24  0507 06/22/24  1626 06/22/24  0528 06/21/24  0444   * 113* 106* 133* 106* 112*

## 2024-06-25 ENCOUNTER — APPOINTMENT (OUTPATIENT)
Dept: ULTRASOUND IMAGING | Facility: CLINIC | Age: 54
End: 2024-06-25
Payer: COMMERCIAL

## 2024-06-25 ENCOUNTER — DOCUMENTATION ONLY (OUTPATIENT)
Dept: ANTICOAGULATION | Facility: CLINIC | Age: 54
End: 2024-06-25
Payer: COMMERCIAL

## 2024-06-25 ENCOUNTER — APPOINTMENT (OUTPATIENT)
Dept: GENERAL RADIOLOGY | Facility: CLINIC | Age: 54
End: 2024-06-25
Attending: NURSE PRACTITIONER
Payer: COMMERCIAL

## 2024-06-25 ENCOUNTER — APPOINTMENT (OUTPATIENT)
Dept: INTERVENTIONAL RADIOLOGY/VASCULAR | Facility: CLINIC | Age: 54
End: 2024-06-25
Payer: COMMERCIAL

## 2024-06-25 DIAGNOSIS — I50.22 CHRONIC SYSTOLIC HEART FAILURE (H): Primary | ICD-10-CM

## 2024-06-25 DIAGNOSIS — I82.622 ACUTE DEEP VEIN THROMBOSIS (DVT) OF OTHER VEIN OF LEFT UPPER EXTREMITY (H): Primary | ICD-10-CM

## 2024-06-25 DIAGNOSIS — Z95.811 LVAD (LEFT VENTRICULAR ASSIST DEVICE) PRESENT (H): ICD-10-CM

## 2024-06-25 DIAGNOSIS — Z79.01 ANTICOAGULATED ON WARFARIN: ICD-10-CM

## 2024-06-25 DIAGNOSIS — Z79.01 ANTICOAGULATED ON COUMADIN: ICD-10-CM

## 2024-06-25 LAB
ALBUMIN SERPL BCG-MCNC: 2.8 G/DL (ref 3.5–5.2)
ALP SERPL-CCNC: 131 U/L (ref 40–150)
ALT SERPL W P-5'-P-CCNC: 138 U/L (ref 0–70)
ANION GAP SERPL CALCULATED.3IONS-SCNC: 11 MMOL/L (ref 7–15)
APPEARANCE FLD: ABNORMAL
AST SERPL W P-5'-P-CCNC: 115 U/L (ref 0–45)
BASOPHILS # BLD AUTO: 0.1 10E3/UL (ref 0–0.2)
BASOPHILS NFR BLD AUTO: 0 %
BASOPHILS NFR FLD MANUAL: 2 %
BILIRUB DIRECT SERPL-MCNC: 0.34 MG/DL (ref 0–0.3)
BILIRUB SERPL-MCNC: 0.7 MG/DL
BUN SERPL-MCNC: 18.3 MG/DL (ref 6–20)
CALCIUM SERPL-MCNC: 8.4 MG/DL (ref 8.6–10)
CELL COUNT BODY FLUID SOURCE: ABNORMAL
CHLORIDE SERPL-SCNC: 100 MMOL/L (ref 98–107)
COLOR FLD: ABNORMAL
CREAT SERPL-MCNC: 1.17 MG/DL (ref 0.67–1.17)
CRP SERPL-MCNC: 186 MG/L
DEPRECATED HCO3 PLAS-SCNC: 22 MMOL/L (ref 22–29)
EGFRCR SERPLBLD CKD-EPI 2021: 75 ML/MIN/1.73M2
EOSINOPHIL # BLD AUTO: 0.7 10E3/UL (ref 0–0.7)
EOSINOPHIL NFR BLD AUTO: 5 %
EOSINOPHIL NFR FLD MANUAL: 1 %
ERYTHROCYTE [DISTWIDTH] IN BLOOD BY AUTOMATED COUNT: 14 % (ref 10–15)
FACT X ACT/NOR PPP CHRO: 26 % (ref 70–130)
GLUCOSE BODY FLUID SOURCE: NORMAL
GLUCOSE FLD-MCNC: 112 MG/DL
GLUCOSE SERPL-MCNC: 112 MG/DL (ref 70–99)
HCT VFR BLD AUTO: 31.6 % (ref 40–53)
HGB BLD-MCNC: 10.5 G/DL (ref 13.3–17.7)
IMM GRANULOCYTES # BLD: 0.2 10E3/UL
IMM GRANULOCYTES NFR BLD: 1 %
INR PPP: 2.76 (ref 0.85–1.15)
LD BODY BODY FLUID SOURCE: NORMAL
LDH FLD L TO P-CCNC: 626 U/L
LYMPHOCYTES # BLD AUTO: 1.4 10E3/UL (ref 0.8–5.3)
LYMPHOCYTES NFR BLD AUTO: 10 %
LYMPHOCYTES NFR FLD MANUAL: 31 %
MAGNESIUM SERPL-MCNC: 2.2 MG/DL (ref 1.7–2.3)
MCH RBC QN AUTO: 30.3 PG (ref 26.5–33)
MCHC RBC AUTO-ENTMCNC: 33.2 G/DL (ref 31.5–36.5)
MCV RBC AUTO: 91 FL (ref 78–100)
MONOCYTES # BLD AUTO: 1.1 10E3/UL (ref 0–1.3)
MONOCYTES NFR BLD AUTO: 9 %
MONOS+MACROS NFR FLD MANUAL: 0 %
NEUTROPHILS # BLD AUTO: 9.9 10E3/UL (ref 1.6–8.3)
NEUTROPHILS NFR BLD AUTO: 75 %
NEUTS BAND NFR FLD MANUAL: 64 %
NRBC # BLD AUTO: 0 10E3/UL
NRBC BLD AUTO-RTO: 0 /100
OTHER CELLS FLD MANUAL: 2 %
PHOSPHATE SERPL-MCNC: 2.6 MG/DL (ref 2.5–4.5)
PLATELET # BLD AUTO: 337 10E3/UL (ref 150–450)
POTASSIUM SERPL-SCNC: 3.7 MMOL/L (ref 3.4–5.3)
PROT FLD-MCNC: 3.3 G/DL
PROT SERPL-MCNC: 5.8 G/DL (ref 6.4–8.3)
PROTEIN BODY FLUID SOURCE: NORMAL
RADIOLOGIST FLAGS: ABNORMAL
RBC # BLD AUTO: 3.47 10E6/UL (ref 4.4–5.9)
SODIUM SERPL-SCNC: 133 MMOL/L (ref 135–145)
WBC # BLD AUTO: 13.3 10E3/UL (ref 4–11)
WBC # FLD AUTO: 9290 /UL

## 2024-06-25 PROCEDURE — 250N000013 HC RX MED GY IP 250 OP 250 PS 637: Performed by: STUDENT IN AN ORGANIZED HEALTH CARE EDUCATION/TRAINING PROGRAM

## 2024-06-25 PROCEDURE — 258N000003 HC RX IP 258 OP 636: Mod: JZ

## 2024-06-25 PROCEDURE — 71046 X-RAY EXAM CHEST 2 VIEWS: CPT

## 2024-06-25 PROCEDURE — 250N000013 HC RX MED GY IP 250 OP 250 PS 637

## 2024-06-25 PROCEDURE — 87075 CULTR BACTERIA EXCEPT BLOOD: CPT | Performed by: PHYSICIAN ASSISTANT

## 2024-06-25 PROCEDURE — 32555 ASPIRATE PLEURA W/ IMAGING: CPT

## 2024-06-25 PROCEDURE — 93931 UPPER EXTREMITY STUDY: CPT | Mod: RT

## 2024-06-25 PROCEDURE — 120N000003 HC R&B IMCU UMMC

## 2024-06-25 PROCEDURE — 274N000009 HC DEVICE HM UNIVERSAL BATTERY CHARGER, INITIAL ONLY, EACH

## 2024-06-25 PROCEDURE — 93750 INTERROGATION VAD IN PERSON: CPT | Performed by: STUDENT IN AN ORGANIZED HEALTH CARE EDUCATION/TRAINING PROGRAM

## 2024-06-25 PROCEDURE — 99232 SBSQ HOSP IP/OBS MODERATE 35: CPT | Mod: FS

## 2024-06-25 PROCEDURE — 250N000013 HC RX MED GY IP 250 OP 250 PS 637: Performed by: PHYSICIAN ASSISTANT

## 2024-06-25 PROCEDURE — 93971 EXTREMITY STUDY: CPT | Mod: 26 | Performed by: STUDENT IN AN ORGANIZED HEALTH CARE EDUCATION/TRAINING PROGRAM

## 2024-06-25 PROCEDURE — 84100 ASSAY OF PHOSPHORUS: CPT

## 2024-06-25 PROCEDURE — 36415 COLL VENOUS BLD VENIPUNCTURE: CPT | Performed by: SURGERY

## 2024-06-25 PROCEDURE — 32555 ASPIRATE PLEURA W/ IMAGING: CPT | Mod: LT

## 2024-06-25 PROCEDURE — 250N000013 HC RX MED GY IP 250 OP 250 PS 637: Performed by: SURGERY

## 2024-06-25 PROCEDURE — 71046 X-RAY EXAM CHEST 2 VIEWS: CPT | Mod: 26 | Performed by: RADIOLOGY

## 2024-06-25 PROCEDURE — 250N000011 HC RX IP 250 OP 636: Mod: JZ | Performed by: PHYSICIAN ASSISTANT

## 2024-06-25 PROCEDURE — 274N000012 HC DEVICE HM POWER UNIT MOBILE W/AC PWR CABLE, INITIAL ONLY, EACH

## 2024-06-25 PROCEDURE — 250N000011 HC RX IP 250 OP 636: Performed by: NURSE PRACTITIONER

## 2024-06-25 PROCEDURE — 272N000502 HC NEEDLE CR3

## 2024-06-25 PROCEDURE — 99207 PR SC NO CHARGE VISIT/PATIENT NOT SEEN: CPT | Performed by: INTERNAL MEDICINE

## 2024-06-25 PROCEDURE — 83735 ASSAY OF MAGNESIUM: CPT

## 2024-06-25 PROCEDURE — 85025 COMPLETE CBC W/AUTO DIFF WBC: CPT

## 2024-06-25 PROCEDURE — 85260 CLOT FACTOR X STUART-POWER: CPT | Performed by: SURGERY

## 2024-06-25 PROCEDURE — 85610 PROTHROMBIN TIME: CPT | Performed by: STUDENT IN AN ORGANIZED HEALTH CARE EDUCATION/TRAINING PROGRAM

## 2024-06-25 PROCEDURE — 250N000011 HC RX IP 250 OP 636

## 2024-06-25 PROCEDURE — 87205 SMEAR GRAM STAIN: CPT | Performed by: PHYSICIAN ASSISTANT

## 2024-06-25 PROCEDURE — 80048 BASIC METABOLIC PNL TOTAL CA: CPT | Performed by: PHYSICIAN ASSISTANT

## 2024-06-25 PROCEDURE — 93931 UPPER EXTREMITY STUDY: CPT | Mod: 26 | Performed by: RADIOLOGY

## 2024-06-25 PROCEDURE — 250N000009 HC RX 250: Performed by: STUDENT IN AN ORGANIZED HEALTH CARE EDUCATION/TRAINING PROGRAM

## 2024-06-25 PROCEDURE — 82945 GLUCOSE OTHER FLUID: CPT | Performed by: PHYSICIAN ASSISTANT

## 2024-06-25 PROCEDURE — 86140 C-REACTIVE PROTEIN: CPT | Performed by: PHYSICIAN ASSISTANT

## 2024-06-25 PROCEDURE — 93971 EXTREMITY STUDY: CPT | Mod: RT

## 2024-06-25 PROCEDURE — 89050 BODY FLUID CELL COUNT: CPT | Performed by: PHYSICIAN ASSISTANT

## 2024-06-25 PROCEDURE — 84157 ASSAY OF PROTEIN OTHER: CPT | Performed by: PHYSICIAN ASSISTANT

## 2024-06-25 PROCEDURE — 83615 LACTATE (LD) (LDH) ENZYME: CPT | Performed by: PHYSICIAN ASSISTANT

## 2024-06-25 PROCEDURE — 99232 SBSQ HOSP IP/OBS MODERATE 35: CPT | Mod: 25 | Performed by: NURSE PRACTITIONER

## 2024-06-25 PROCEDURE — 82247 BILIRUBIN TOTAL: CPT | Performed by: NURSE PRACTITIONER

## 2024-06-25 PROCEDURE — 87102 FUNGUS ISOLATION CULTURE: CPT | Performed by: PHYSICIAN ASSISTANT

## 2024-06-25 RX ORDER — HYDROMORPHONE HCL IN WATER/PF 6 MG/30 ML
0.2 PATIENT CONTROLLED ANALGESIA SYRINGE INTRAVENOUS
Status: COMPLETED | OUTPATIENT
Start: 2024-06-25 | End: 2024-06-25

## 2024-06-25 RX ORDER — WARFARIN SODIUM 1 MG/1
1 TABLET ORAL
Status: COMPLETED | OUTPATIENT
Start: 2024-06-25 | End: 2024-06-25

## 2024-06-25 RX ORDER — LOPERAMIDE HCL 2 MG
2 CAPSULE ORAL 4 TIMES DAILY PRN
Status: DISCONTINUED | OUTPATIENT
Start: 2024-06-25 | End: 2024-07-04 | Stop reason: HOSPADM

## 2024-06-25 RX ORDER — POLYETHYLENE GLYCOL 3350 17 G/17G
17 POWDER, FOR SOLUTION ORAL 2 TIMES DAILY PRN
Status: DISCONTINUED | OUTPATIENT
Start: 2024-06-25 | End: 2024-07-04 | Stop reason: HOSPADM

## 2024-06-25 RX ORDER — LIDOCAINE HYDROCHLORIDE 10 MG/ML
1-30 INJECTION, SOLUTION EPIDURAL; INFILTRATION; INTRACAUDAL; PERINEURAL
Status: COMPLETED | OUTPATIENT
Start: 2024-06-25 | End: 2024-06-25

## 2024-06-25 RX ORDER — POTASSIUM CHLORIDE 750 MG/1
20 TABLET, EXTENDED RELEASE ORAL ONCE
Status: COMPLETED | OUTPATIENT
Start: 2024-06-25 | End: 2024-06-25

## 2024-06-25 RX ORDER — AMOXICILLIN 250 MG
2 CAPSULE ORAL 2 TIMES DAILY PRN
Status: DISCONTINUED | OUTPATIENT
Start: 2024-06-25 | End: 2024-07-04 | Stop reason: HOSPADM

## 2024-06-25 RX ADMIN — OXYCODONE HYDROCHLORIDE 10 MG: 10 TABLET ORAL at 15:35

## 2024-06-25 RX ADMIN — OXYCODONE HYDROCHLORIDE 5 MG: 5 TABLET ORAL at 01:51

## 2024-06-25 RX ADMIN — VANCOMYCIN HYDROCHLORIDE 1250 MG: 10 INJECTION, POWDER, LYOPHILIZED, FOR SOLUTION INTRAVENOUS at 09:17

## 2024-06-25 RX ADMIN — LISINOPRIL 5 MG: 2.5 TABLET ORAL at 08:34

## 2024-06-25 RX ADMIN — PIPERACILLIN AND TAZOBACTAM 4.5 G: 4; .5 INJECTION, POWDER, LYOPHILIZED, FOR SOLUTION INTRAVENOUS at 06:11

## 2024-06-25 RX ADMIN — HYDROMORPHONE HYDROCHLORIDE 0.2 MG: 0.2 INJECTION, SOLUTION INTRAMUSCULAR; INTRAVENOUS; SUBCUTANEOUS at 12:54

## 2024-06-25 RX ADMIN — PANTOPRAZOLE SODIUM 40 MG: 40 TABLET, DELAYED RELEASE ORAL at 08:32

## 2024-06-25 RX ADMIN — LIDOCAINE HYDROCHLORIDE 10 ML: 10 INJECTION, SOLUTION EPIDURAL; INFILTRATION; INTRACAUDAL; PERINEURAL at 11:19

## 2024-06-25 RX ADMIN — SPIRONOLACTONE 25 MG: 25 TABLET ORAL at 19:12

## 2024-06-25 RX ADMIN — Medication 18.75 MG: at 00:05

## 2024-06-25 RX ADMIN — OXYCODONE HYDROCHLORIDE 5 MG: 5 TABLET ORAL at 10:24

## 2024-06-25 RX ADMIN — OXYCODONE HYDROCHLORIDE 5 MG: 5 TABLET ORAL at 06:11

## 2024-06-25 RX ADMIN — HYDRALAZINE HYDROCHLORIDE 25 MG: 25 TABLET ORAL at 06:11

## 2024-06-25 RX ADMIN — POTASSIUM & SODIUM PHOSPHATES POWDER PACK 280-160-250 MG 1 PACKET: 280-160-250 PACK at 11:59

## 2024-06-25 RX ADMIN — PIPERACILLIN AND TAZOBACTAM 4.5 G: 4; .5 INJECTION, POWDER, LYOPHILIZED, FOR SOLUTION INTRAVENOUS at 00:05

## 2024-06-25 RX ADMIN — GUAIFENESIN 600 MG: 600 TABLET ORAL at 19:12

## 2024-06-25 RX ADMIN — LIDOCAINE 1 PATCH: 4 PATCH TOPICAL at 12:01

## 2024-06-25 RX ADMIN — ACETAMINOPHEN 650 MG: 325 TABLET, FILM COATED ORAL at 06:10

## 2024-06-25 RX ADMIN — HYDROXYZINE HYDROCHLORIDE 50 MG: 25 TABLET ORAL at 19:12

## 2024-06-25 RX ADMIN — METHOCARBAMOL 750 MG: 750 TABLET ORAL at 11:59

## 2024-06-25 RX ADMIN — FUROSEMIDE 40 MG: 10 INJECTION, SOLUTION INTRAVENOUS at 08:34

## 2024-06-25 RX ADMIN — PIPERACILLIN AND TAZOBACTAM 4.5 G: 4; .5 INJECTION, POWDER, LYOPHILIZED, FOR SOLUTION INTRAVENOUS at 23:45

## 2024-06-25 RX ADMIN — OXYCODONE HYDROCHLORIDE 10 MG: 10 TABLET ORAL at 20:42

## 2024-06-25 RX ADMIN — FUROSEMIDE 40 MG: 10 INJECTION, SOLUTION INTRAVENOUS at 15:35

## 2024-06-25 RX ADMIN — POTASSIUM & SODIUM PHOSPHATES POWDER PACK 280-160-250 MG 1 PACKET: 280-160-250 PACK at 08:34

## 2024-06-25 RX ADMIN — AZITHROMYCIN DIHYDRATE 250 MG: 250 TABLET ORAL at 08:34

## 2024-06-25 RX ADMIN — ACETAMINOPHEN 650 MG: 325 TABLET, FILM COATED ORAL at 01:51

## 2024-06-25 RX ADMIN — METHOCARBAMOL 750 MG: 750 TABLET ORAL at 19:13

## 2024-06-25 RX ADMIN — PIPERACILLIN AND TAZOBACTAM 4.5 G: 4; .5 INJECTION, POWDER, LYOPHILIZED, FOR SOLUTION INTRAVENOUS at 12:03

## 2024-06-25 RX ADMIN — WARFARIN SODIUM 1 MG: 1 TABLET ORAL at 18:03

## 2024-06-25 RX ADMIN — VANCOMYCIN HYDROCHLORIDE 1250 MG: 10 INJECTION, POWDER, LYOPHILIZED, FOR SOLUTION INTRAVENOUS at 20:42

## 2024-06-25 RX ADMIN — PIPERACILLIN AND TAZOBACTAM 4.5 G: 4; .5 INJECTION, POWDER, LYOPHILIZED, FOR SOLUTION INTRAVENOUS at 18:03

## 2024-06-25 RX ADMIN — POTASSIUM CHLORIDE 20 MEQ: 750 TABLET, EXTENDED RELEASE ORAL at 08:34

## 2024-06-25 RX ADMIN — ACETAMINOPHEN 650 MG: 325 TABLET, FILM COATED ORAL at 23:10

## 2024-06-25 RX ADMIN — GUAIFENESIN 600 MG: 600 TABLET ORAL at 08:34

## 2024-06-25 ASSESSMENT — ACTIVITIES OF DAILY LIVING (ADL)
ADLS_ACUITY_SCORE: 34
ADLS_ACUITY_SCORE: 34
ADLS_ACUITY_SCORE: 33
ADLS_ACUITY_SCORE: 34
ADLS_ACUITY_SCORE: 33
ADLS_ACUITY_SCORE: 33
ADLS_ACUITY_SCORE: 34
ADLS_ACUITY_SCORE: 33
ADLS_ACUITY_SCORE: 34
ADLS_ACUITY_SCORE: 33
ADLS_ACUITY_SCORE: 34
ADLS_ACUITY_SCORE: 33
ADLS_ACUITY_SCORE: 34
ADLS_ACUITY_SCORE: 34

## 2024-06-25 NOTE — Clinical Note
FYI- patient is being monitored by chrom factor 10 right now, advised to also check INRS and at end of July to re-check lupus panel to see if we can transition back to INRs. (Per Desire Silverman NP note from today.)

## 2024-06-25 NOTE — PROGRESS NOTES
CLINICAL NUTRITION SERVICES - BRIEF NOTE  For last full RD assessment, see note dated 6/24/24    NEW FINDINGS   - Able to meet with patient and wife at bedside this AM to discuss nutrition edu s/p LVAD. Reviewed protein goal with patient (125 gm/day on low end of est needs x 6-8 weeks) and reviewed strategies to meet this goal/meal planning. Pt/wife accepted handouts and verbalized an understanding to the information discussed.     - Pt admits to having a very low appetite. He reports struggling with dysgeusia, characterized by hyper-sensitive taste. Wife reports that it helped when he plugged his nose during PO yesterday but pt already feels SOB so doesn't plan to continue doing this.     - Reviewed supplement/snack options with pt. Pt reports liking Ensure Clear (apple) the best - yesterday was reported by RN that pt dislikes, however pt reports he dislikes the regular Ensure. He does not care for the Magic Cup, which writer started sending with meal trays (will discontinue). Provided pt with supplement/snack list and placed order to allow pt to request any snack/supplement as desired, on PRN basis. Sent one time trial of Boost Soothe as this supplement may be helpful with dysgeusia. Provided pt/wife with cafe meal passes, as pt consumed a yogurt parfait from cafeteria better than food from menu yesterday, so encouraged pt to continue trying high protein foods from cafeteria, as able, if helpful.     - Pt/wife aware of plan to begin dre cts today. Continue to enc intake/high protein foods.    INTERVENTIONS  Implementation  Nutrition education for recommended modifications  Supplement order adjusted  Dre cts to begin    Monitoring/Evaluation  Will continue to monitor and evaluate per protocol.    Prasad Davies, MS, RDN, LD, CNSC  Available by [employer] or phone   Vocera: M-F (7:00-3:30)  6B Clinical Dietitian  or 1A Obs Clinical Dietitian  Weekend/Holiday (7:00-3:30) - Weekend Clinical Dietitian   6B RD desk:  [phone]       **Clinical Dietitians are no longer available by pager.

## 2024-06-25 NOTE — PROGRESS NOTES
ANTICOAGULATION  MANAGEMENT: NEW REFERRAL      SUBJECTIVE/OBJECTIVE     Navin Lutz, a 53 year old male  is newly referred to [facility].    Anticoagulation:    Previously on warfarin:  yes, per chart review, approximately 9 months.   Warfarin initiation date (approximate): 6/16/24 (during hospitalization)   Indication(s): LVAD, DVT, and Lupus Anticoagulant   Goal Range: CFX  40-20% (= INR 2.0-3.0)   Anticoagulation Bridge/Overlap: No and Heparin bridge inpatient    [facility]    General Dietary/Social Hx:    Typical vitamin K intake: EVELIN at this time    Other dietary considerations: High protein foods recommended by nutrition services.     Social History:   Social History     Tobacco Use    Smoking status: Never    Smokeless tobacco: Never   Substance Use Topics    Alcohol use: Never    Drug use: Never       Results:        Recent labs: (last 7 days)     06/21/24  1312 06/22/24  0528 06/25/24  0420   INR 5.23*  5.16*   < > 2.76*   [id number]  --    < > 26*   F2 27*  --   --     < > = values in this interval not displayed.       Wt Readings from Last 2 Encounters:   06/26/24 91.4 kg (201 lb 8 oz)   05/31/24 90.9 kg (200 lb 6.4 oz)      Estimated body mass index is 27.33 kg/m  as calculated from the following:    Height as of 6/3/24: 1.829 m (6').    Weight as of 6/26/24: 91.4 kg (201 lb 8 oz).  Lab Results   Component Value Date     (H) 06/26/2024     (H) 06/26/2024    ALBUMIN 2.8 (L) 06/26/2024     Lab Results   Component Value Date    CR 1.19 (H) 06/26/2024     Estimated Creatinine Clearance: 92.8 mL/min (A) (based on SCr of 1.19 mg/dL (H)).    ASSESSMENT     Chromogenic Factor X of 40-20% appropriate for indication(s) above  On warfarin > 30 days; maintenance dose has been established  Maintenance dose established prior to referral    PLAN     Education provided:   None -currently hospitalized     Education still needed:   All  -currently hospitalized       [facility]  will contact patient once discharged from the hospital.       Standing orders placed in Epic: Chromogenic Factor X ([id number])    Plan made per [facility] anticoagulation protocol and per LVAD protocol    JESSICA CALDERON RN  Anticoagulation Clinic  6/25/2024

## 2024-06-25 NOTE — PLAN OF CARE
Highlights/changes today:   Up to recliner and bathroom this am.   Thoracentesis this am, 500 ml of fluid removed and sent to lab. Dressing to be kept on for 24 hours, clean dry intact.   Ultrasound ordered and completed this afternoon.  LVAD coordinator stopped by and had another LVAD education session with pt and family. LVAD dressing changed with drainage noted quarter size, continue to monitor per LVAD coordinator, will stop by again tomorrow.     /85   Pulse 113   Temp 98.5  F (36.9  C) (Oral)   Resp 18   Ht 1.829 m (6')   Wt 92 kg (202 lb 13.2 oz)   SpO2 93%   BMI 27.51 kg/m      Neuro: A&Ox4. Calm, cooperative, and pleasant.   Cardiac: LVAD HM 3, alarm checked completed this am and values within range, maps 70-80's. ST with PVCs, HR's 100-110's. Afebrile and VSS.  Respiratory: Sating in the high 90's on RA.   GI/: Up to bathroom with adequate urine output and 1 BM during shift.   Diet/appetite: Tolerating regular diet, good appetite and eating well.  Activity: STBA with walker, up to recliner and bathroom this am.   Pain: Sternal incisional pain, PRN oxy given x1.   Lab/lytes: K 3.7 and phosphorus 2.6, both replaced this am.   Skin: No new deficits noted.  LDA's: L PIV, infusing vancomycin.     Plan: Continue with POC. Notify primary team with changes.

## 2024-06-25 NOTE — PROGRESS NOTES
Brief ID follow up note:  I was unable to see pt X2 today as he was in various procedures/imaging studies. I will formally see tomorrow. Chart reviewed. K pneumoniae is susceptible to ceftriaxone, so recommend narrowing from pip/tazo to this (ceftriaxone 2g qday). Pleural studies are slightly exudative, but also grossly bloody which is a probable explanation. Will follow culture results.     Mia Murillo MD  Pg [phone]

## 2024-06-25 NOTE — PROGRESS NOTES
[facility]   Cardiology II Service / Advanced Heart Failure  Daily Consult Progress Note      Patient: Navin Lutz  MRN: [id number]  Admission Date: 6/3/2024  Hospital Day # 22    Assessment and Plan: Navin Lutz is a 53 year old male admitted on 6/3/2024. He has a past medical history of chronic HFrEF 2/2 NICM s/p ICD, HLD, and CKD Stage II who presents admitted for decompensated heart failure with NICM on milrinone listed status 4, admitted for consideration of advanced therapies and is now s/p HM3 LVAD on 6/14/24 with Dr. Jhaveri with course complicated by fevers and leukocytosis.    Today's Plan:  - lisinopril 5 mg daily    - dc hydralazine to 25 mg TID  - 40 mg IV lasix BID, aiming for net negative 500-1L, can be more aggressive when stable from infection perspective  - BID BMP   - monitor pleural cx  - follow-up CXR pending  - monitor hepatic panel    # Acute on chronic systolic heart failure secondary to NICM   # s/p HM3 LVAD as BTT on 6/14/24 (no active contraindications to transplant other than wait time)  Stage D. NYHA Class III confounded by recent surgery    -Fluid status: lasix 40 mg IV BID, cautious today given infection with new diarrhea  -ACEi/ARB/ARNi: lisinopril 5 mg daily   -Afterload reduction: stop hydralazine 25 mg TID  -Inotrope: dobutamine stopped on 6/19/24  -BB contraindicated currently given recent LVAD implant  -Aldosterone antagonist: aldactone 25 mg daily   -SGLT2i: try to add prior to discharge  -SCD prophylaxis ICD  -MAP: Goal 65-85  -LDH trends: 346  -Anticoagulation: warfarin dosing per pharmacy, Following chromogenic factor 10: goal 20-40, dosing per pharmacy  -Antiplatelet: no ASA indicated per NICOLE trial results    # +Lupus anticoagulant  - negative testing in 2023, then + this admission for lupus anticoagulant  - following CFx as above per heme, goal 20-40    # Leukocytosis, possible pneumonia  # Fevers, resolved  # Left pleural effusion s/p thoracentesis    Concern for infection, +productive cough. CT chest/abd/pelvis 6/22/24 anterior chest subcutaneous air and stranding with substernal gas, read as infectious vs postprocedureal, felt to be procedural per CVTS. Also with b/l pleural effusions and mild ground glass in the lung bases concerning for pneumonia. Resp culture negative. UA negative. New diarrhea with abdominal cramping on 6/24 after 3-4 days of abx.  -Appreciate ID consult  -IR diagnostic and therapeutic thora 6/25. 500mL drained  -On Vanc/Zosyn since 6/21 and added azithromycin 6/22   - stop vanco tomorrow per Dr Mi  -CRP and WBC downtrending- WBC 13 and   -6/21/24 blood cultures NGTD  -cdif negative 6/24    # Right subclavian and axillary vein thrombus, nonocclusive, known prior to VAD. Redemonstrated 6/22 CT, likely old clot vs line related from right neck swan in ICU.  - warfarin as above, follow chromogenic factor X    # Elevated LFTs  - AST/ALT trending up, 115 and 138 today respectively, no symptoms   - trend daily  - diuresis as above  - consider US abd if trending up further  - hold statin for now    # HLD  - hold atorvastatin 20 mg daily as above     # CKD stage 2. B/l cr has been listed at 1.2-1-4  - Cr stable 1.1      Patient discussed with Dr. Silvestre.        40 minutes spent on the date of the encounter doing chart review, history and exam, documentation and further activities per the note    Desire Silverman, JS, NP-C  Nurse Practitioner - Advanced Heart Failure/Cardiology II Service  Jany preferred or Pager [phone]    ================================================================    Subjective/24-Hr Events:   Last 24 hr care team notes reviewed. Significant pain after thoracentesis. Unable to tell if breathing has improved at this time    ROS:  4 point ROS including respiratory, CV, GI and  (other than that noted in the HPI) is negative.     Medications: Reviewed in EPIC.     Physical Exam:   /85   Pulse 113   Temp 98.5   F (36.9  C) (Oral)   Resp 18   Ht 1.829 m (6')   Wt 92 kg (202 lb 13.2 oz)   SpO2 93%   BMI 27.51 kg/m      GENERAL: Appears comfortable and in no distress, non-toxic  HEENT: Eye symmetrical, no discharge or icterus bilaterally.   NECK: Supple, JVD 3cm above clavicle at 80 degrees.   CV: Hum of LVAD, no adventitious sounds  RESPIRATORY: Respirations regular, even, and unlabored. Lungs CTA throughout.    GI: Soft and non distended with normoactive bowel sounds present No tenderness, rebound, guarding.   EXTREMITIES: Trace b/l b/l lower extremity peripheral edema. All extremities are warm and well perfused  NEUROLOGIC: Alert and interacting appropriately.   SKIN: No jaundice. No rashes or lesions. Surgical sites per CVTS, sternum was c/d/i    Labs:  CMP  Recent Labs   Lab 06/25/24  0420 06/24/24  1556 06/24/24  0510 06/23/24  1321 06/23/24  0507 06/22/24  1626 06/22/24  0528 06/21/24  0444 06/20/24  1746   * 133* 134* 134* 136   < > 133* 137 137   POTASSIUM 3.7 4.0 3.8 3.8 3.8   < > 3.9 3.6  3.6 3.8   CHLORIDE 100 100 102 102 102   < > 100 100 101   CO2 22 24 23 21* 23   < > 24 24 24   ANIONGAP 11 9 9 11 11   < > 9 13 12   * 165* 121* 113* 106*   < > 106* 112* 130*   BUN 18.3 17.7 18.6 19.0 20.0   < > 22.7* 26.0* 28.6*   CR 1.17 1.28* 1.21* 1.13 1.29*   < > 1.12 1.06 1.12   GFRESTIMATED 75 67 72 78 66   < > 79 84 79   NAM 8.4* 8.5* 8.2* 8.7 8.2*   < > 8.5* 8.7 8.9   MAG 2.2  --  2.3  --  2.2  --  2.2 2.2  --    PHOS 2.6  --  3.0  --  3.1  --  3.2 3.6  --    PROTTOTAL  --   --  5.6*  5.6*  --  5.7*  --   --  5.9* 6.1*   ALBUMIN  --   --  2.7*  --  2.9*  --   --  3.0* 3.0*   BILITOTAL  --   --  0.6  --  0.7  --   --  0.7 0.6   ALKPHOS  --   --  107  --  105  --   --  78 74   AST  --   --  67*  --  76*  --   --  32 32   ALT  --   --  76*  --  78*  --   --  25 26    < > = values in this interval not displayed.       CBC  Recent Labs   Lab 06/25/24  0420 06/24/24  0510 06/23/24  0507 06/22/24  0528    WBC 13.3* 14.1* 18.3* 16.8*   RBC 3.47* 3.39* 3.59* 3.63*   HGB 10.5* 10.3* 10.8* 10.9*   HCT 31.6* 30.9* 33.4* 34.0*   MCV 91 91 93 94   MCH 30.3 30.4 30.1 30.0   MCHC 33.2 33.3 32.3 32.1   RDW 14.0 14.0 14.2 14.3    292 285 261       INR  Recent Labs   Lab 06/25/24  0420 06/24/24  0510 06/23/24  0507 06/22/24  0528   INR 2.76* 3.71* 3.16* 3.70*

## 2024-06-25 NOTE — PROGRESS NOTES
The patient's HeartMate LVAD was interrogated 6/25/2024  * Speed 5400 rpm   * Pulsatility index 2.9   * Power 3.8 Piz[place]   * Flow 4.8 L/minute   Fluid status: mild hypervolemia   Alarms were reviewed, and notable for occasional pi events, no alarms.   The driveline exit site was inspected, c/d/i.   All external components were inspected and showed no evidence of damage or malfunction, none replaced.   No changes to VAD settings made

## 2024-06-25 NOTE — PROGRESS NOTES
Changed dressing per protocol today.  Found pt to have pink rash, just beginning around the gauze dressing, under the adhesive of the cover dressing.  Instructed caregiver and pt to discontinue use of the top bandage and transition to securing guaze using medipore tape.

## 2024-06-25 NOTE — PLAN OF CARE
Neuro: A&Ox 4.   Cardiac: LVAD HM 3, no alarms and all values within range on shift, MAP 70-80's. ST with PVC's, -120. Afebrile.    Respiratory: Sating > 92% on RA.   GI/: Adequate urine output via urinal. BM X1, loose stools, PM bowel meds held.   Diet/appetite: Tolerating regular diet. Eating poor, inez counts 6/25-6/28. No N/V.   Activity:  Assist of 1 with walker. Sternal precautions.   Pain: At acceptable level on current regimen. PRN oxy given x3, PRN tylenol given x3.  Skin: No new deficits noted.   LDA's: LVAD. L PIV.     Plan: Continue with POC. Notify primary team with changes.     Plan for Thora 6/25

## 2024-06-25 NOTE — IR NOTE
Patient Name: Navin Lutz  Medical Record Number: [id number]  Today's Date: 6/25/2024    Procedure: image guided left thoracentesis - diagnostic and therapeutic  Proceduralist: KATY Hendricks PA-C    Sedation Notes: lidocaine used at site     Procedure start time: 1118  Procedure end time: 1129    Report given to: Holger SMITH 6B  : none    Other Notes: Pt arrived to IR room 6 from . Consent reviewed, pt confirmed. Pt denies any questions or concerns regarding procedure. Pt positioned seated and monitored per protocol. 500 ml Dark serousanguinous fluid drained. Labs sent as ordered. Left back site cleansed and dressed per protocol. Pt tolerated procedure without any noted complications. Pt transferred back to .

## 2024-06-25 NOTE — PROCEDURES
[facility]    Procedure: IR Procedure Note    Date/Time: 6/25/2024 11:32 AM    Performed by: Megan Hendricks PA-C  Authorized by: Megan Hendricks PA-C      UNIVERSAL PROTOCOL   Site Marked: NA  Prior Images Obtained and Reviewed:  Yes  Required items: Required blood products, implants, devices and special equipment available    Patient identity confirmed:  Verbally with patient, arm band, provided demographic data and hospital-assigned identification number  Patient was reevaluated immediately before administering moderate or deep sedation or anesthesia  Confirmation Checklist:  Patient's identity using two indicators, relevant allergies, procedure was appropriate and matched the consent or emergent situation and correct equipment/implants were available  Time out: Immediately prior to the procedure a time out was called    Universal Protocol: the Joint Commission Universal Protocol was followed    Preparation: Patient was prepped and draped in usual sterile fashion       ANESTHESIA    Anesthesia:  Local infiltration  Local Anesthetic:  Lidocaine 1% without epinephrine  Anesthetic Total (mL):  7      SEDATION    Patient Sedated: No    See dictated procedure note for full details.  Findings: N/A    Specimens: fluid and/or tissue for laboratory analysis    Complications: None    Condition: Stable    Plan: Follow-up per primary team.      PROCEDURE  Describe Procedure: US-guided left therapeutic and diagnostic thoracentesis with return of clear red fluid. 50 mL sent to lab. 500 mL of left pleural fluid drained, patient wanted procedure to end due to chest tightness/discomfort.  Patient Tolerance:  Patient tolerated the procedure well with no immediate complications  Length of time physician/provider present for 1:1 monitoring during sedation: 0

## 2024-06-25 NOTE — PROGRESS NOTES
Cardiovascular Surgery Progress Note  06/25/2024         Assessment and Plan:     Navin Lutz is a 53 year old male with PMH of CKD2, HLD, R Subclavian and axillary vein non-occlusive thrombus on Warfarin, NSVT, HFrEF 2/2 NICM s/p ICD (PTA IV milrinone) who presents admitted 6/3/24 for decompensated HFrEF listed status 4. He is now s/p LVAD by Dr. Jhaveri on 6/14/24.     Cardiovascular:   S/p LVAD on 6/14 by Dr. Jhaveri  S/p IABP (6/12-6/14)  Hx NSVT  Acute on chronic HFrEF 2/2 to NICM (EF 10-15%), home milrinone PTA, s/p ICD   HLD  HTN  Hypervolemia  No arrhythmias reported overnight, ongoing sinus tachycardia, HD stable. MAPs 70s-80s  Pre-op echo 6/8: LV EF 15-20%, normal RV size/function  - PTA atorvastatin 20 mg daily   - Lisinopril 5 mg daily  - Cards 2 following for hemodynamic & GDMT management; appreciate recs  - Holding PTA Coreg, Entresto, Aldactone, Jardiance     Chest tubes/TPW: removed in ICU    Pulmonary:  - Extubated POD 3 to 5 lpm via NC. Now saturating well on RA  - Supplemental O2 PRN to keep sats > 92%. Wean off as tolerated.  - Pulm toilet, IS, activity and deep breathing  - DuoNebs QID, Mucinex BID    Left pleural effusion  - CAPS team consulted 6/23 for thoracentesis,  not amendable to bedside thoracentesis  - IR consulted, now s/p thoracentesis on 6/25 with removal of 500 ml from left pleural space, labs pending    Neurology /Psych:  Acute post-operative pain  - Acute post-operative pain regimen:  - lidocaine patches   - PRN: PO oxycodone PRN, Robaxin, Voltaren gel, Atarax, Tylenol (given rising LFTs)     / Renal:  CKD Stage 2  Contraction alkalosis  - Baseline creatinine ~1.1-1.2. Most recent creatinine 1.17, adequate UOP.   - Pre-op weight 209 lbs, most recent weight requested/pending  - Diuresis per Cards 2  - Replace electrolytes per protocol    GI / FEN:   At risk for protein calorie malnutrition  Diarrhea  Elevated LFTs  - Regular diet  - Attempted to place NJ in the ICU, unable to  place   - +BM since surgery  - new onset diarrhea 6/24, C. Diff negative, bowel regimen made PRN & added PRN immodium  - hepatic enzymes continue to rise, monitoring    Endocrine:  Stress-induced hyperglycemia  Pre-op Hgb A1C 5.6  - Managed on insulin drip postop, transitioned to sliding scale goal BG <180 and has now also been discontinued due to good BG control with no insulin need.     Infectious Disease:  Stress induced leukocytosis  HCAP  - WBC 14.1 and down-trending,  and down-trending, remains afebrile  - Completed perioperative LVAD antibiotics   - CT CAP 6/22 with anterior chest subcutaneous air and stranding.  Gas and fluid collection in the substernal region which could be infectious versus postprocedural. Bilateral pleural effusions with adjacent atelectasis and mild groundglass opacities.  - CT results were reviewed with Dr. Miller and Dr. Jhaveri - no indication for surgical washout at this time, plan to continue on broad spectrum antibiotics for at least 2 weeks per Dr. Jhaveri  - Continue IV vancomycin per Dr. Jhaveri  - sputum culture 6/22 + for Klebsiella pneumoniae  - UA 6/21 non-infectious  - blood cultures x2 6/21, 6/23 NGTD  - Awaiting thoracentesis labs  -  ID consulted 6/23, recommendations:  - continue pip/tazo, discontinue IV vancomycin  - follow K. Pneumonieae susceptibilities  -  follow thoracentesis studies    Antibiotics:  - Empiric IV vancomycin/zosyn 6/21 - present per surgeon  - PO azithromycin 6/22 - present    Hematology:   Acute blood loss anemia  Acute blood loss thrombocytopenia  Right brachial non-occlusive thrombus  Hgb stable; Plt WNL, no signs or symptoms of active bleeding  - Daily CBC    Anticoagulation:   Chronic anticoagulation for LVAD, CFX goal 20-40%  +Lupus anticoagulant   Partially occlusive internal jugular thrombus, partially occlusive brachial venous thrombus  - Warfarin for LVAD  - INRs noted to be supra-therapeutic and discordant with Chromogenic Factor X,  hematology consulted 6/21 - lupus anticoagulant positive  - plan to use Chromogenic factor X for warfarin dosing as INR is not reliable, goal CFX 20-40%, most recent factor X within goal  - Hematology paged 6/25, signed off, recommend repeat outpatient testing for Lupus anticoagulant in ~4 weeks. If negative, INR monitoring may be an option. Okay to reach out to heme team outpatient with questions  - Ultrasound of right upper extremity 6/26 with DVT as above  - Radiology also noted presence of usual arterial waveform in ulnar artery but no waveform in radial artery. Possible that lack of pulsatility may contribute as patient has an LVAD; however unusual that ulnar and radial arteries differ. Ordered full right upper extremity arterial workup to further clarify, pending. Suspect A-line induced thrombus.    MSK/Skin:  Sternotomy  - PT/OT    Prophylaxis:   - Stress ulcer prophylaxis: Pantoprazole 40 mg daily for 30 days  - DVT prophylaxis: therapeutic anticoagulation, SCD    Disposition:   - Transferred to  on 6/21  - Therapies recommending discharge to ARU vs home once medically ready. Barriers to discharge include need for LVAD education, infection control, pain control. LVAD education planned to start 6/24.    Medically Ready for Discharge: 5+ days    Clinically Significant Risk Factors              # Hypoalbuminemia: Lowest albumin = 2.7 g/dL at 6/24/2024  5:10 AM, will monitor as appropriate        # End stage heart failure: home medication list includes inotropes          #Precipitous drop in Hgb/Hct: Lowest Hgb this hospitalization: 9.1 g/dL. Will continue to monitor and treat/transfuse as appropriate.     # Overweight: Estimated body mass index is 27.51 kg/m  as calculated from the following:    Height as of this encounter: 1.829 m (6').    Weight as of this encounter: 92 kg (202 lb 13.2 oz).        # Financial/Environmental Concerns: none  # Asthma: noted on problem list  # ICD device present     Discussed  with Dr. Jhaveri who also physically rounded on & examined this patient.     Betzaida Baker PA-C  Cardiothoracic Surgery  Pager [phone]    8:28 AM 2024        Interval History:     No overnight events. Reports he is feeling a bit better this morning.   Left sided pain improved. Pain overall is adequately managed on current regimen.   Tolerating diet, is passing flatus, + BM. No nausea or vomiting. Denies abdominal pain.   Breathing is improving overall. O2 requirements are continuing to come down.   Working with therapies and ambulating in halls with assistance.   Denies palpitations, dizziness, syncopal symptoms, fevers, chills, myalgias, sternal popping/clicking, dysuria, diarrhea. Ongoing cough, with occasional mucous production.         Physical Exam:   Blood pressure 100/85, pulse 113, temperature 98.5  F (36.9  C), temperature source Oral, resp. rate 18, height 1.829 m (6'), weight 92 kg (202 lb 13.2 oz), SpO2 93%.  Vitals:    24 0841 24 0801 24 1019   Weight: 93.8 kg (206 lb 12.7 oz) 92.4 kg (203 lb 11.3 oz) 92 kg (202 lb 13.2 oz)     Gen: NAD, sitting up in the chair, family at bedside  Neuro: no focal deficits, A&Ox4,   CV: tachycardic, RRR, LVAD hum   Pulm: CTAB, normal breathing on RA at the time of exam  Abd: nondistended, normal BS, soft, nontender  Ext: trace peripheral edema, non- pitting  Skin:   Incision: clean, dry, intact, no erythema, sternum stable  Tubes/drain sites: dressing clean and dry  Lines: driveline dressing clean and dry   Right groin IABP insertio site C/D/I without erythema or drainage     Heartmate 3 LEFT VS  Flow (Lpm): 4.8 Lpm  Pulse Index (PI): 3.1 PI  Speed (rpm): 5400 rpm  Power (bassett): 3.9 bassett  Current Hct settin         Data:    Imaging:  reviewed recent imaging, no acute concerns  POC US GUIDE FOR THORACENTESIS  Limited point of care pleural/lung ultrasound to evaluate for thoracentesis.    Thoracentesis Indication:pleural effusion      Views/Acquisition: Left pleural space(s): upright and semi recumbent.      Findings/Interpretation: Pleural effusion on L side with question of loculations on imaging (present in both positions).     Homero Beck MD   XR Chest Port 1 View  Narrative: Exam: TEMPORARY, 6/24/2024 6:34 AM    Indication: Follow-up chest x-ray for pleuritic pain    Comparison: 6/23/2024    Findings:   Single frontal view of the chest. Postsurgical changes in the chest  with intact median sternotomy wires. LVAD. Left chest wall implantable  cardiac defibrillator with leads in stable position. Trachea is  midline. Cardiac silhouette is stable. Similar streaky perihilar and  retrocardiac opacities. Small left pleural effusion. No pneumothorax.  Impression: Impression:   1. Stable small left pleural effusion.  2. No appreciable pneumothorax.  3. Stable mild streaky perihilar and retrocardiac opacities, likely  atelectasis.     I have personally reviewed the examination and initial interpretation  and I agree with the findings.    GAYE MOON MD         SYSTEM ID:  [id number]        Labs:  BMP  Recent Labs   Lab 06/25/24  0420 06/24/24  1556 06/24/24  0510 06/23/24  1321   * 133* 134* 134*   POTASSIUM 3.7 4.0 3.8 3.8   CHLORIDE 100 100 102 102   NAM 8.4* 8.5* 8.2* 8.7   CO2 22 24 23 21*   BUN 18.3 17.7 18.6 19.0   CR 1.17 1.28* 1.21* 1.13   * 165* 121* 113*     CBC  Recent Labs   Lab 06/25/24  0420 06/24/24  0510 06/23/24  0507 06/22/24  0528   WBC 13.3* 14.1* 18.3* 16.8*   RBC 3.47* 3.39* 3.59* 3.63*   HGB 10.5* 10.3* 10.8* 10.9*   HCT 31.6* 30.9* 33.4* 34.0*   MCV 91 91 93 94   MCH 30.3 30.4 30.1 30.0   MCHC 33.2 33.3 32.3 32.1   RDW 14.0 14.0 14.2 14.3    292 285 261     INR  Recent Labs   Lab 06/25/24  0420 06/24/24  0510 06/23/24  0507 06/22/24  0528   INR 2.76* 3.71* 3.16* 3.70*      Hepatic Panel  Recent Labs   Lab 06/24/24  0510 06/23/24  0507 06/21/24  0444 06/20/24  1746   AST 67* 76* 32 32   ALT 76* 78* 25  26   ALKPHOS 107 105 78 74   BILITOTAL 0.6 0.7 0.7 0.6   ALBUMIN 2.7* 2.9* 3.0* 3.0*     GLUCOSE:   Recent Labs   Lab 06/25/24  0420 06/24/24  1556 06/24/24  0510 06/23/24  1321 06/23/24  0507 06/22/24  1626   * 165* 121* 113* 106* 133*

## 2024-06-26 ENCOUNTER — APPOINTMENT (OUTPATIENT)
Dept: PHYSICAL THERAPY | Facility: CLINIC | Age: 54
End: 2024-06-26
Attending: STUDENT IN AN ORGANIZED HEALTH CARE EDUCATION/TRAINING PROGRAM
Payer: COMMERCIAL

## 2024-06-26 LAB
ALBUMIN SERPL BCG-MCNC: 2.8 G/DL (ref 3.5–5.2)
ALP SERPL-CCNC: 155 U/L (ref 40–150)
ALT SERPL W P-5'-P-CCNC: 167 U/L (ref 0–70)
ANION GAP SERPL CALCULATED.3IONS-SCNC: 10 MMOL/L (ref 7–15)
AST SERPL W P-5'-P-CCNC: 122 U/L (ref 0–45)
BACTERIA BLD CULT: NO GROWTH
BACTERIA BLD CULT: NO GROWTH
BASOPHILS # BLD AUTO: 0.1 10E3/UL (ref 0–0.2)
BASOPHILS NFR BLD AUTO: 0 %
BILIRUB DIRECT SERPL-MCNC: 0.31 MG/DL (ref 0–0.3)
BILIRUB SERPL-MCNC: 0.6 MG/DL
BUN SERPL-MCNC: 17.8 MG/DL (ref 6–20)
CALCIUM SERPL-MCNC: 8.4 MG/DL (ref 8.6–10)
CHLORIDE SERPL-SCNC: 100 MMOL/L (ref 98–107)
CREAT SERPL-MCNC: 1.19 MG/DL (ref 0.67–1.17)
CRP SERPL-MCNC: 175 MG/L
DEPRECATED HCO3 PLAS-SCNC: 22 MMOL/L (ref 22–29)
EGFRCR SERPLBLD CKD-EPI 2021: 73 ML/MIN/1.73M2
EOSINOPHIL # BLD AUTO: 0.5 10E3/UL (ref 0–0.7)
EOSINOPHIL NFR BLD AUTO: 4 %
ERYTHROCYTE [DISTWIDTH] IN BLOOD BY AUTOMATED COUNT: 13.6 % (ref 10–15)
FACT X ACT/NOR PPP CHRO: 45 % (ref 70–130)
GLUCOSE SERPL-MCNC: 106 MG/DL (ref 70–99)
HCT VFR BLD AUTO: 32.3 % (ref 40–53)
HGB BLD-MCNC: 10.9 G/DL (ref 13.3–17.7)
IMM GRANULOCYTES # BLD: 0.2 10E3/UL
IMM GRANULOCYTES NFR BLD: 1 %
INR PPP: 1.85 (ref 0.85–1.15)
LYMPHOCYTES # BLD AUTO: 1.5 10E3/UL (ref 0.8–5.3)
LYMPHOCYTES NFR BLD AUTO: 12 %
MAGNESIUM SERPL-MCNC: 2.1 MG/DL (ref 1.7–2.3)
MCH RBC QN AUTO: 30.5 PG (ref 26.5–33)
MCHC RBC AUTO-ENTMCNC: 33.7 G/DL (ref 31.5–36.5)
MCV RBC AUTO: 91 FL (ref 78–100)
MONOCYTES # BLD AUTO: 1.1 10E3/UL (ref 0–1.3)
MONOCYTES NFR BLD AUTO: 9 %
NEUTROPHILS # BLD AUTO: 9.1 10E3/UL (ref 1.6–8.3)
NEUTROPHILS NFR BLD AUTO: 74 %
NRBC # BLD AUTO: 0 10E3/UL
NRBC BLD AUTO-RTO: 0 /100
PHOSPHATE SERPL-MCNC: 2.9 MG/DL (ref 2.5–4.5)
PLATELET # BLD AUTO: 378 10E3/UL (ref 150–450)
POTASSIUM SERPL-SCNC: 3.7 MMOL/L (ref 3.4–5.3)
PROT SERPL-MCNC: 6 G/DL (ref 6.4–8.3)
RBC # BLD AUTO: 3.57 10E6/UL (ref 4.4–5.9)
SODIUM SERPL-SCNC: 132 MMOL/L (ref 135–145)
WBC # BLD AUTO: 12.4 10E3/UL (ref 4–11)

## 2024-06-26 PROCEDURE — 86140 C-REACTIVE PROTEIN: CPT | Performed by: PHYSICIAN ASSISTANT

## 2024-06-26 PROCEDURE — 250N000013 HC RX MED GY IP 250 OP 250 PS 637: Performed by: PHYSICIAN ASSISTANT

## 2024-06-26 PROCEDURE — 36415 COLL VENOUS BLD VENIPUNCTURE: CPT | Performed by: SURGERY

## 2024-06-26 PROCEDURE — 97530 THERAPEUTIC ACTIVITIES: CPT | Mod: GP

## 2024-06-26 PROCEDURE — 80053 COMPREHEN METABOLIC PANEL: CPT | Performed by: NURSE PRACTITIONER

## 2024-06-26 PROCEDURE — 120N000003 HC R&B IMCU UMMC

## 2024-06-26 PROCEDURE — 85025 COMPLETE CBC W/AUTO DIFF WBC: CPT

## 2024-06-26 PROCEDURE — 250N000013 HC RX MED GY IP 250 OP 250 PS 637: Performed by: SURGERY

## 2024-06-26 PROCEDURE — 250N000013 HC RX MED GY IP 250 OP 250 PS 637: Performed by: STUDENT IN AN ORGANIZED HEALTH CARE EDUCATION/TRAINING PROGRAM

## 2024-06-26 PROCEDURE — 99232 SBSQ HOSP IP/OBS MODERATE 35: CPT | Mod: FS | Performed by: STUDENT IN AN ORGANIZED HEALTH CARE EDUCATION/TRAINING PROGRAM

## 2024-06-26 PROCEDURE — 93750 INTERROGATION VAD IN PERSON: CPT | Performed by: STUDENT IN AN ORGANIZED HEALTH CARE EDUCATION/TRAINING PROGRAM

## 2024-06-26 PROCEDURE — 274N000006 HC DEVICE HM POCKET SHOWER BAG, INITIAL ONLY, EACH

## 2024-06-26 PROCEDURE — 97116 GAIT TRAINING THERAPY: CPT | Mod: GP

## 2024-06-26 PROCEDURE — 250N000013 HC RX MED GY IP 250 OP 250 PS 637

## 2024-06-26 PROCEDURE — 99232 SBSQ HOSP IP/OBS MODERATE 35: CPT | Mod: 25 | Performed by: STUDENT IN AN ORGANIZED HEALTH CARE EDUCATION/TRAINING PROGRAM

## 2024-06-26 PROCEDURE — 250N000011 HC RX IP 250 OP 636: Mod: JZ | Performed by: PHYSICIAN ASSISTANT

## 2024-06-26 PROCEDURE — 250N000011 HC RX IP 250 OP 636: Performed by: STUDENT IN AN ORGANIZED HEALTH CARE EDUCATION/TRAINING PROGRAM

## 2024-06-26 PROCEDURE — 258N000003 HC RX IP 258 OP 636: Performed by: STUDENT IN AN ORGANIZED HEALTH CARE EDUCATION/TRAINING PROGRAM

## 2024-06-26 PROCEDURE — 99233 SBSQ HOSP IP/OBS HIGH 50: CPT | Performed by: INTERNAL MEDICINE

## 2024-06-26 PROCEDURE — 85260 CLOT FACTOR X STUART-POWER: CPT | Performed by: SURGERY

## 2024-06-26 PROCEDURE — 85610 PROTHROMBIN TIME: CPT | Performed by: STUDENT IN AN ORGANIZED HEALTH CARE EDUCATION/TRAINING PROGRAM

## 2024-06-26 PROCEDURE — 274N000003 HC DEVICE HM 14-V LITHIUM BATTERY, INITIAL ONLY, EACH

## 2024-06-26 PROCEDURE — 83735 ASSAY OF MAGNESIUM: CPT

## 2024-06-26 PROCEDURE — 84100 ASSAY OF PHOSPHORUS: CPT

## 2024-06-26 RX ORDER — WARFARIN SODIUM 2 MG/1
2 TABLET ORAL
Status: COMPLETED | OUTPATIENT
Start: 2024-06-26 | End: 2024-06-26

## 2024-06-26 RX ORDER — POTASSIUM CHLORIDE 750 MG/1
20 TABLET, EXTENDED RELEASE ORAL ONCE
Status: COMPLETED | OUTPATIENT
Start: 2024-06-26 | End: 2024-06-26

## 2024-06-26 RX ADMIN — PIPERACILLIN AND TAZOBACTAM 4.5 G: 4; .5 INJECTION, POWDER, LYOPHILIZED, FOR SOLUTION INTRAVENOUS at 11:13

## 2024-06-26 RX ADMIN — PIPERACILLIN AND TAZOBACTAM 4.5 G: 4; .5 INJECTION, POWDER, LYOPHILIZED, FOR SOLUTION INTRAVENOUS at 23:56

## 2024-06-26 RX ADMIN — POTASSIUM CHLORIDE 20 MEQ: 750 TABLET, EXTENDED RELEASE ORAL at 06:45

## 2024-06-26 RX ADMIN — VANCOMYCIN HYDROCHLORIDE 1250 MG: 10 INJECTION, POWDER, LYOPHILIZED, FOR SOLUTION INTRAVENOUS at 14:05

## 2024-06-26 RX ADMIN — ACETAMINOPHEN 650 MG: 325 TABLET, FILM COATED ORAL at 18:25

## 2024-06-26 RX ADMIN — LIDOCAINE 1 PATCH: 4 PATCH TOPICAL at 11:13

## 2024-06-26 RX ADMIN — GUAIFENESIN 600 MG: 600 TABLET ORAL at 20:07

## 2024-06-26 RX ADMIN — AZITHROMYCIN DIHYDRATE 250 MG: 250 TABLET ORAL at 08:39

## 2024-06-26 RX ADMIN — LISINOPRIL 5 MG: 2.5 TABLET ORAL at 08:39

## 2024-06-26 RX ADMIN — GUAIFENESIN 600 MG: 600 TABLET ORAL at 08:39

## 2024-06-26 RX ADMIN — ACETAMINOPHEN 650 MG: 325 TABLET, FILM COATED ORAL at 08:38

## 2024-06-26 RX ADMIN — PANTOPRAZOLE SODIUM 40 MG: 40 TABLET, DELAYED RELEASE ORAL at 08:39

## 2024-06-26 RX ADMIN — OXYCODONE HYDROCHLORIDE 5 MG: 5 TABLET ORAL at 15:56

## 2024-06-26 RX ADMIN — PIPERACILLIN AND TAZOBACTAM 4.5 G: 4; .5 INJECTION, POWDER, LYOPHILIZED, FOR SOLUTION INTRAVENOUS at 05:39

## 2024-06-26 RX ADMIN — WARFARIN SODIUM 2 MG: 2 TABLET ORAL at 18:25

## 2024-06-26 RX ADMIN — OXYCODONE HYDROCHLORIDE 10 MG: 10 TABLET ORAL at 04:47

## 2024-06-26 RX ADMIN — FUROSEMIDE 40 MG: 10 INJECTION, SOLUTION INTRAVENOUS at 08:39

## 2024-06-26 RX ADMIN — OXYCODONE HYDROCHLORIDE 10 MG: 10 TABLET ORAL at 00:46

## 2024-06-26 RX ADMIN — SPIRONOLACTONE 25 MG: 25 TABLET ORAL at 20:07

## 2024-06-26 RX ADMIN — PIPERACILLIN AND TAZOBACTAM 4.5 G: 4; .5 INJECTION, POWDER, LYOPHILIZED, FOR SOLUTION INTRAVENOUS at 18:25

## 2024-06-26 ASSESSMENT — ACTIVITIES OF DAILY LIVING (ADL)
ADLS_ACUITY_SCORE: 30
ADLS_ACUITY_SCORE: 31
ADLS_ACUITY_SCORE: 30
ADLS_ACUITY_SCORE: 31
ADLS_ACUITY_SCORE: 30
ADLS_ACUITY_SCORE: 30
ADLS_ACUITY_SCORE: 31
ADLS_ACUITY_SCORE: 30
ADLS_ACUITY_SCORE: 31
ADLS_ACUITY_SCORE: 30
ADLS_ACUITY_SCORE: 31
ADLS_ACUITY_SCORE: 33

## 2024-06-26 NOTE — PLAN OF CARE
BP (!) 81/65   Pulse 103   Temp 98.4  F (36.9  C) (Oral)   Resp 18   Ht 1.829 m (6')   Wt 92 kg (202 lb 13.2 oz)   SpO2 93%   BMI 27.51 kg/m      ST on tele with rates in the low 100's at rest and upper 110's with activity. Doppler MAP's of 68 and 76. Afebrile. Room air. LVAD with no alarms. Alert and oriented x 4. PRN oxy given twice for pain. Reports having poor/fair appetite d/t not finding food appealing. Denies n/v. Voiding per urinal. No BM overnight. Left PIV saline locked. LVAD dressing c/d/I. Weight shifting self in bed. OOB with Assist x 1 and walker. Able to stand for morning weight with a pain rating of only 2/10. Greatly improved pain rating with activity from yesterday. Continue to monitor.

## 2024-06-26 NOTE — PROGRESS NOTES
Calorie Count  Intake recorded for: 6/25  Total Kcals: 1162 Total Protein: 82g  Kcals from Hospital Food: 510  Protein: 23g  Kcals from Outside Food (average):652 Protein: 59g  # Meals Ordered from Kitchen: 3 meals + food from outside the hospital   # Meals Recorded: 2 meals (First - 100% omelet w/ ham, veggies & cheese, 4 oz 1% milk, jello)       (Second - 100% 2 chicken tenders, 3 chicken wings, french fries, barbeque sauce & ranch dressing - from outside the hospital)   # Supplements Recorded: 100% 1 Ensure Clear

## 2024-06-26 NOTE — PROGRESS NOTES
[facility]EAS[facility] PROGRESS NOTE     Patient:  Navin Lutz   YOB: 1970, MRN: [id number]  Date of Visit: 06/26/2024  Date of Admission: 6/3/2024  Consult Requester: Brian Jhaveri MD          Assessment and Recommendations:   ID Problem List:  Leukocytosis - improving  Cough c/f pneumonia - improving  K pneumoniae on sputum 6/22/24  Bilateral pleural effusions   S/p thoracentesis 6/25  Elevated LFTs  NICM, s/p LVAD placement on 6/14/24  Substernal fluid collection, likely postprocedural  CKD  Non-occlusive DVT R internal jugular, proximal brachial   R radial artery occlusion    Mr. Lutz continues to clinically improve on antibiotics. Continue to suspect healthcare associated pneumonia as the cause of his symptoms. K pneumo is susceptible to multiple antibiotics, so recommend narrowing to IV ceftriaxone for a 7-10 day course. If discharging prior to completion, he can be switched to PO levofloxacin.      Recommendations:  Recommend narrowing to ceftriaxone 2g qday for treatment of HCAP. Duration 7-10 days, and can be switched to PO levofloxacin if discharging prior to completion of course.   Follow pending thoracentesis cultures.   Agree with RUQ US given LFT elevation.    Thank you for the consult. ID will sign off at this time. Please page with questions/concerns.     Mia Murillo MD   Infectious Diseases  Pager: [phone]  06/26/2024         Interval History and Events:   - S/p thoracentesis yesterday, painful. Grossly bloody fluid.   - Chest pain has improved significantly since yesterday. No further cough. Feels short of breath with ambulation, but not at rest.          Relevant Microbiology:   6/21/24 Blood cultures X2: NGTD  6/22/24 Sputum (expectorated):               GS: >25 PMNs, no organisms               Culture: K pneumoniae  Susceptibility     Klebsiella pneumoniae     CANELO     Amikacin <=2 ug/mL Susceptible *     Ampicillin  Resistant 1     Ampicillin/ Sulbactam 8 ug/mL  Susceptible     Cefepime <=1 ug/mL Susceptible     Cefoxitin <=4 ug/mL Susceptible *     Ceftazidime <=1 ug/mL Susceptible     Ceftriaxone <=1 ug/mL Susceptible     Ciprofloxacin <=0.25 ug/mL Susceptible     Extended Spectrum Beta-Lactamase Negative ug/mL ESBL Negative *     Gentamicin <=1 ug/mL Susceptible     Levofloxacin <=0.12 ug/mL Susceptible     Meropenem <=0.25 ug/mL Susceptible     Nitrofurantoin 64 ug/mL Intermediate *     Piperacillin/Tazobactam <=4 ug/mL Susceptible     Tobramycin <=1 ug/mL Susceptible     Trimethoprim/Sulfamethoxazole <=1/19 ug/mL Susceptible      6/23/24 Blood cultures X2: NGTD         Antimicrobial Treatment:   Pip/tazo 6/21 - present  Vancomycin 6/21 - present   Azithromycin 6/22 - present          Review of Systems:   Targeted 4 point ROS was completed with pertinent positives and negatives are detailed above.         Physical Examination:   Temp:  [98.1  F (36.7  C)-98.5  F (36.9  C)] 98.5  F (36.9  C)  Pulse:  [103-116] 116  Resp:  [16-18] 18  SpO2:  [91 %-96 %] 91 %    Constitutional: Pleasant and cooperative male seen sitting up in bed, in NAD. Awake, alert, interactive.  HEENT: EOMI, sclera clear, conjunctiva normal, OP with MMM  Cardiac: +LVAD hum  Respiratory: No increased work of breathing,   Skin: Warm, dry, well-perfused. No bruising, bleeding, rashes, or lesions on limited exam.  Musculoskeletal: Extremities grossly normal, non-tender, no edema.   Neurologic: A&O. Answers questions appropriately, speech normal. Moves all extremities spontaneously.  Neuropsychiatric: Calm. Affect appropriate to situation.         Medications:     Current Facility-Administered Medications   Medication Dose Route Frequency Provider Last Rate Last Admin    [Held by provider] atorvastatin (LIPITOR) tablet 20 mg  20 mg Oral or Feeding Tube QPM Chase Mallory MD   20 mg at 06/24/24 1937    guaiFENesin (MUCINEX) 12 hr tablet 600 mg  600 mg Oral BID Mary Baker PA-C   600 mg at  "06/26/24 0839    Lidocaine (LIDOCARE) 4 % Patch 1-2 patch  1-2 patch Transdermal Q24H Sarah Guerin APRN CNP   1 patch at 06/26/24 1113    lisinopril (ZESTRIL) tablet 5 mg  5 mg Oral Daily Eliz Choi PA-C   5 mg at 06/26/24 0839    pantoprazole (PROTONIX) EC tablet 40 mg  40 mg Oral QAM AC Deon Rosa APRN CNP   40 mg at 06/26/24 0839    piperacillin-tazobactam (ZOSYN) 4.5 g vial to attach to  mL bag  4.5 g Intravenous Q6H Galilea Dang PA-C   4.5 g at 06/26/24 1113    sodium chloride (PF) 0.9% PF flush 3 mL  3 mL Intracatheter Q8H Cira Mallory PA-C   3 mL at 06/26/24 1628    spironolactone (ALDACTONE) tablet 25 mg  25 mg Oral QPM Eliz Choi PA-C   25 mg at 06/25/24 1912    vancomycin (VANCOCIN) 1,250 mg in 0.9% NaCl 250 mL intermittent infusion  1,250 mg Intravenous Q12H Divina Guzman PA-C   1,250 mg at 06/26/24 1405    warfarin ANTICOAGULANT (COUMADIN) tablet 2 mg  2 mg Oral ONCE at 18:00 Brian Jhaveri MD        Warfarin Dose Required Daily - Pharmacist Managed  1 each Oral See Admin Instructions Misha Guerra MD           Antiinfectives:  Anti-infectives (From now, onward)      Start     Dose/Rate Route Frequency Ordered Stop    06/26/24 1200  vancomycin (VANCOCIN) 1,250 mg in 0.9% NaCl 250 mL intermittent infusion         1,250 mg  over 90 Minutes Intravenous EVERY 12 HOURS 06/26/24 1134 06/27/24 1159    06/22/24 1800  piperacillin-tazobactam (ZOSYN) 4.5 g vial to attach to  mL bag        Note to Pharmacy: For [facility], [facility] and [facility]: For Zosyn-naive patients, use the \"Zosyn initial dose + extended infusion\" order panel.    4.5 g  over 30 Minutes Intravenous EVERY 6 HOURS 06/22/24 1344                   Laboratory Data:   Metabolic Studies     Recent Labs   Lab Test 06/26/24  0530 06/25/24  0420 06/24/24  1556 06/24/24  0510 06/23/24  1321 08/23/23  0702 08/22/23  2200   * 133* 133*   < > 134*   < >  --    POTASSIUM 3.7 3.7 4.0   < > 3.8   < >  --  "   CHLORIDE 100 100 100   < > 102   < >  --    CO2 22 22 24   < > 21*   < >  --    ANIONGAP 10 11 9   < > 11   < >  --    BUN 17.8 18.3 17.7   < > 19.0   < >  --    CR 1.19* 1.17 1.28*   < > 1.13   < >  --    GFRESTIMATED 73 75 67   < > 78   < >  --    * 112* 165*   < > 113*   < >  --    A1C  --   --   --   --   --   --  5.6   NAM 8.4* 8.4* 8.5*   < > 8.7   < >  --    PHOS 2.9 2.6  --    < >  --    < >  --    MAG 2.1 2.2  --    < >  --    < >  --    LACT  --   --   --   --  1.6   < >  --     < > = values in this interval not displayed.     Hepatic Studies    Recent Labs   Lab Test 24  0530 24  0420 24  0510   BILITOTAL 0.6 0.7 0.6   ALKPHOS 155* 131 107   ALBUMIN 2.8* 2.8* 2.7*   * 115* 67*   * 138* 76*   LDH  --   --  346*     Hematology Studies      Recent Labs   Lab Test 24  0530 24  0420 24  0510   WBC 12.4* 13.3* 14.1*   HGB 10.9* 10.5* 10.3*   HCT 32.3* 31.6* 30.9*    337 292       Vancomycin Levels     Recent Labs   Lab Test 24  0507   VANCOMYCIN 15.2            Imagin24 US upper extremity       IMPRESSION:  1.  Nonocclusive thrombus within the right IJV.  2.   Partially occlusive thrombus within  paired proximal brachial  vein.  3.   Incidental no flow within the radial artery from the upper  forearm to the wrist.     24 RUE arterial duplex   IMPRESSION: High brachial bifurcation. Persistent right radial artery  occlusion from the proximal forearm to wrist. Right ulnar artery  patent to the wrist. Slow flow and abnormal waveforms consistent with  LVAD.

## 2024-06-26 NOTE — PROVIDER NOTIFICATION
10:18AM Megan HERRMANN notified: PI lowest was 2.2 with PT while doing stairs. Recovered to above 2.5 with rest. No PI 3.9. Pt back in room. He was short of breath with strenuous exercise but is resolved now.   10:28AM ALIZE Verbalized understanding. No new orders

## 2024-06-26 NOTE — PLAN OF CARE
"Neuro: A&Ox4.   Cardiac: -120s. VSS. Doppler MAP: 82, __.  Respiratory: Sating >92% on RA.  GI/: Adequate urine output. No BM this shift.   Diet/appetite: Tolerating Regular diet. Eating fair. Calorie counts.   Activity:  Assist of SBA with Walker, up to chair and in halls.  Pain: At acceptable level on current regimen.   Skin: No new deficits noted.-see flowsheet.   LDA's:LVAD HM3. L PIV-SL.     Plan: LVAD teaching 12-2pm. Tylenol X1 for pain. See prior MD notification note for PI, no other numbers outside range this shift. Abdominal ultrasound scheduled for 6/27 at 8AM. Pt to be NPO after midnight.  Encouraged ADLs and PO intake. Updated care plan.     Goal Outcome Evaluation:      Plan of Care Reviewed With: patient, spouse    Overall Patient Progress: improvingOverall Patient Progress: improving    Outcome Evaluation: Acvitivy tolerance increased. LVAD teaching 12-2 today.      Problem: Adult Inpatient Plan of Care  Goal: Plan of Care Review  Description: The Plan of Care Review/Shift note should be completed every shift.  The Outcome Evaluation is a brief statement about your assessment that the patient is improving, declining, or no change.  This information will be displayed automatically on your shift  note.  Outcome: Progressing  Flowsheets (Taken 6/26/2024 1019)  Outcome Evaluation: Acvitivy tolerance increased. LVAD teaching 12-2 today.  Plan of Care Reviewed With:   patient   spouse  Overall Patient Progress: improving  Goal: Patient-Specific Goal (Individualized)  Description: You can add care plan individualizations to a care plan. Examples of Individualization might be:  \"Parent requests to be called daily at 9am for status\", \"I have a hard time hearing out of my right ear\", or \"Do not touch me to wake me up as it startles  me\".  Outcome: Progressing  Goal: Absence of Hospital-Acquired Illness or Injury  Outcome: Progressing  Intervention: Identify and Manage Fall Risk  Recent Flowsheet " Documentation  Taken 6/26/2024 0830 by Amber Barnhart RN  Safety Promotion/Fall Prevention: safety round/check completed  Intervention: Prevent Skin Injury  Recent Flowsheet Documentation  Taken 6/26/2024 0830 by Amber Barnhart RN  Body Position: position changed independently  Intervention: Prevent and Manage VTE (Venous Thromboembolism) Risk  Recent Flowsheet Documentation  Taken 6/26/2024 0830 by Amber Barnhart RN  VTE Prevention/Management: patient refused intervention  Goal: Optimal Comfort and Wellbeing  Outcome: Progressing  Intervention: Monitor Pain and Promote Comfort  Recent Flowsheet Documentation  Taken 6/26/2024 0838 by Amber Barnhart RN  Pain Management Interventions: medication (see MAR)  Intervention: Provide Person-Centered Care  Recent Flowsheet Documentation  Taken 6/26/2024 0830 by Amber Barnhart RN  Trust Relationship/Rapport:   care explained   choices provided  Goal: Readiness for Transition of Care  Outcome: Progressing     Problem: Heart Failure  Goal: Optimal Coping  Outcome: Progressing  Intervention: Support Psychosocial Response  Recent Flowsheet Documentation  Taken 6/26/2024 0830 by Amber Barnhart RN  Supportive Measures:   decision-making supported   goal-setting facilitated  Family/Support System Care: self-care encouraged  Goal: Optimal Cardiac Output  Outcome: Progressing  Intervention: Optimize Cardiac Output  Recent Flowsheet Documentation  Taken 6/26/2024 0830 by Amber Barnhart RN  Environmental Support: calm environment promoted  Goal: Stable Heart Rate and Rhythm  Outcome: Progressing  Goal: Optimal Functional Ability  Outcome: Progressing  Intervention: Optimize Functional Ability  Recent Flowsheet Documentation  Taken 6/26/2024 0830 by Amber Barnhart RN  Activity Management: activity encouraged  Goal: Fluid and Electrolyte Balance  Outcome: Progressing  Intervention: Monitor and Manage Fluid and Electrolyte Balance  Recent Flowsheet Documentation  Taken  6/26/2024 0830 by Amber Barnhart RN  Fluid/Electrolyte Management: fluids provided  Goal: Improved Oral Intake  Outcome: Progressing  Goal: Effective Oxygenation and Ventilation  Outcome: Progressing  Intervention: Promote Airway Secretion Clearance  Recent Flowsheet Documentation  Taken 6/26/2024 0830 by Amber Barnhart RN  Cough And Deep Breathing: done independently per patient  Activity Management: activity encouraged  Intervention: Optimize Oxygenation and Ventilation  Recent Flowsheet Documentation  Taken 6/26/2024 0830 by Amber Barnhart RN  Head of Bed (HOB) Positioning: HOB at 20-30 degrees  Goal: Effective Breathing Pattern During Sleep  Outcome: Progressing  Intervention: Monitor and Manage Obstructive Sleep Apnea  Recent Flowsheet Documentation  Taken 6/26/2024 0830 by Amber Barnhart RN  Medication Review/Management: medications reviewed     Problem: Cardiac Catheterization (Diagnostic/Interventional)  Goal: Absence of Bleeding  Outcome: Progressing  Goal: Absence of Contrast-Induced Injury  Outcome: Progressing  Goal: Stable Heart Rate and Rhythm  Outcome: Progressing  Intervention: Monitor and Manage Cardiac Rhythm Effect  Recent Flowsheet Documentation  Taken 6/26/2024 0830 by Amber Barnhart RN  Fluid/Electrolyte Management: fluids provided  Goal: Absence of Embolism Signs and Symptoms  Outcome: Progressing  Intervention: Prevent or Manage Embolism  Recent Flowsheet Documentation  Taken 6/26/2024 0830 by Amber Barnhart RN  VTE Prevention/Management: patient refused intervention  Goal: Anesthesia/Sedation Recovery  Intervention: Optimize Anesthesia Recovery  Recent Flowsheet Documentation  Taken 6/26/2024 0830 by Amber Barnhart RN  Safety Promotion/Fall Prevention: safety round/check completed  Administration (IS): self-administered  Reorientation Measures:   clock in view   calendar in view  Goal: Optimal Pain Control and Function  Outcome: Progressing  Intervention: Prevent or Manage  Pain  Recent Flowsheet Documentation  Taken 6/26/2024 0838 by Amber Barnhart RN  Pain Management Interventions: medication (see MAR)  Goal: Absence of Vascular Access Complication  Outcome: Progressing  Intervention: Prevent and Manage Access Complications  Recent Flowsheet Documentation  Taken 6/26/2024 0830 by Amber Barnhart RN  Bleeding Precautions: blood pressure closely monitored  Activity Management: activity encouraged  Head of Bed (HOB) Positioning: HOB at 20-30 degrees       Problem: Ventricular Assist Device  Goal: Optimal Adjustment to Device  Outcome: Progressing  Intervention: Optimize Psychosocial Response to VAD  Recent Flowsheet Documentation  Taken 6/26/2024 0830 by Amber Barnhart RN  Supportive Measures:   decision-making supported   goal-setting facilitated  Family/Support System Care: self-care encouraged  Goal: Absence of Bleeding  Outcome: Progressing  Intervention: Monitor and Manage Bleeding  Recent Flowsheet Documentation  Taken 6/26/2024 0830 by Amber Barnhart RN  Bleeding Precautions: blood pressure closely monitored  Bleeding Management: dressing monitored  Goal: Absence of Embolism Signs and Symptoms  Outcome: Progressing  Intervention: Prevent or Manage Embolism  Recent Flowsheet Documentation  Taken 6/26/2024 0830 by Amber Barnhart RN  VTE Prevention/Management: patient refused intervention  Goal: Optimal Blood Flow  Outcome: Progressing  Goal: Absence of Infection Signs and Symptoms  Outcome: Progressing  Goal: Effective Right-Sided Heart Function  Outcome: Progressing  Intervention: Manage Decreased Right Heart Function  Recent Flowsheet Documentation  Taken 6/26/2024 0830 by Amber Barnhart RN  Fluid/Electrolyte Management: fluids provided  Medication Review/Management: medications reviewed     Problem: Cardiovascular Surgery  Goal: Improved Activity Tolerance  Outcome: Progressing  Intervention: Optimize Tolerance for Activity  Recent Flowsheet Documentation  Taken  6/26/2024 0830 by Amber Barnhart RN  Environmental Support: calm environment promoted  Goal: Optimal Coping with Heart Surgery  Outcome: Progressing  Intervention: Support Psychosocial Response to Surgery  Recent Flowsheet Documentation  Taken 6/26/2024 0830 by Amber Barnhart RN  Supportive Measures:   decision-making supported   goal-setting facilitated  Family/Support System Care: self-care encouraged  Goal: Absence of Bleeding  Outcome: Progressing  Intervention: Monitor and Manage Bleeding  Recent Flowsheet Documentation  Taken 6/26/2024 0830 by Amber Barnhart RN  Bleeding Management: dressing monitored  Goal: Effective Bowel Elimination  Outcome: Progressing  Goal: Effective Cardiac Function  Outcome: Progressing  Goal: Optimal Cerebral Tissue Perfusion  Outcome: Progressing  Intervention: Protect and Optimize Cerebral Perfusion  Recent Flowsheet Documentation  Taken 6/26/2024 0830 by Amber Barnhart RN  Sensory Stimulation Regulation: television on  Head of Bed (HOB) Positioning: HOB at 20-30 degrees  Goal: Fluid and Electrolyte Balance  Outcome: Progressing  Intervention: Monitor and Manage Fluid and Electrolyte Balance  Recent Flowsheet Documentation  Taken 6/26/2024 0830 by Amber Barnhart RN  Fluid/Electrolyte Management: fluids provided  Goal: Blood Glucose Level Within Targeted Range  Outcome: Progressing  Goal: Absence of Infection Signs and Symptoms  Outcome: Progressing  Goal: Anesthesia/Sedation Recovery  Outcome: Progressing  Intervention: Optimize Anesthesia Recovery  Recent Flowsheet Documentation  Taken 6/26/2024 0830 by Amber Barnhart RN  Safety Promotion/Fall Prevention: safety round/check completed  Administration (IS): self-administered  Reorientation Measures:   clock in view   calendar in view  Goal: Acceptable Pain Control  Outcome: Progressing  Intervention: Prevent or Manage Pain  Recent Flowsheet Documentation  Taken 6/26/2024 0838 by Amber Barnhart RN  Pain Management  Interventions: medication (see MAR)  Goal: Nausea and Vomiting Relief  Outcome: Progressing  Goal: Effective Urinary Elimination  Outcome: Progressing  Goal: Effective Oxygenation and Ventilation  Outcome: Progressing  Intervention: Promote Airway Secretion Clearance  Recent Flowsheet Documentation  Taken 6/26/2024 0830 by Amber Barnhart RN  Administration (IS): self-administered  Cough And Deep Breathing: done independently per patient  Intervention: Optimize Oxygenation and Ventilation  Recent Flowsheet Documentation  Taken 6/26/2024 0830 by Amber Barnhart RN  Chest Tube Safety: all connections secured

## 2024-06-26 NOTE — PROGRESS NOTES
The patient's HeartMate LVAD was interrogated 6/26/2024  * Speed 5400 rpm   * Pulsatility index 2.2-3   * Power 3.8 Pizano   * Flow 4.8 L/minute   Fluid status: grossly euvolemic  Alarms were reviewed, and notable for occasional pi events, no alarms.   The driveline exit site was inspected, c/d/i.   All external components were inspected and showed no evidence of damage or malfunction, none replaced.   No changes to VAD settings made

## 2024-06-26 NOTE — PLAN OF CARE
BP (!) 81/65   Pulse 112   Temp 98.4  F (36.9  C) (Oral)   Resp 16   Ht 1.829 m (6')   Wt 92 kg (202 lb 13.2 oz)   SpO2 92%   BMI 27.51 kg/m      Goal Outcome Evaluation:    Plan of care reviewed with: patient and his wife   Overall patient progress: no change    Time: 6814-0945  Status: S/p LVAD on 06/14/2024.   Neuro/Pain: A&Ox4, c/o thoracentesis site pain. PRN oxycodone 10 mg x 2, Robaxin once, Tylenol and Atarax once.   Activity: stayed in bed most of the shift, assist of one person with walker. Sitted at the edge of bed for dinner.    Cardiac/vs: LVAD, sinus tachycardia 100-120s. Afebrile.   Respiratory: on room air sating >91%, LS clear, mild SOB at rest. Working with IS independently. No cough this shift.   GI/: no bm this shift. Active bowel sound. Voided using urinal, Lasix 40 mg iv push this afternoon with good urine output.    Diet: regular, ate 50% of his dinner.   Skin: old chest tube site dressing small dried dranage. Midsternal incision open to air. No s/s of infection. Right groin site dressing CDI.   LDAs: PIV x 1 saline locked b/n abx.   Labs/Imaging: reviewed   Change this shift: US on extremities.   Plan: pain management, encourage out of bed, deep cough. I&O monitoring.

## 2024-06-26 NOTE — PROGRESS NOTES
[facility]   Cardiology II Service / Advanced Heart Failure  Daily Consult Progress Note      Patient: Navin Lutz  MRN: [id number]  Admission Date: 6/3/2024  Hospital Day # 23    Assessment and Plan: Navin Lutz is a 53 year old male with HFrEF 2/2 NICM s/p ICD, HLD, and CKD Stage II who presents admitted for decompensated heart failure with NICM on milrinone listed status 4, admitted for consideration of advanced therapies and is now s/p HM3 LVAD on 6/14/24 with Dr. Jhaveri with course complicated by fevers and leukocytosis.    Recommendations:  - lisinopril 5 mg daily    - start PO diuretic - aim for net even  - defer heparin gtt today increase warfarin  - monitor pleural cx, abx per CVTS  - monitor hepatic panel, recommend abd US today    # Acute on chronic systolic heart failure secondary to NICM   # s/p HM3 LVAD as BTT on 6/14/24 (no active contraindications to transplant other than wait time)  Stage D. NYHA Class III confounded by recent surgery    -Fluid status: nearing euvolemic, aim net even, change diuretic to PO  -ACEi/ARB/ARNi: lisinopril 5 mg daily   -Afterload reduction: discontinued hydralazine 25 mg TID  -Inotrope: dobutamine stopped on 6/19/24  -BB: contraindicated currently given recent LVAD implant  -Aldosterone antagonist: aldactone 25 mg daily   -SGLT2i: try to add prior to discharge  -SCD prophylaxis: ICD  -MAP: goal 65-85  -LDH trends: 346  -Anticoagulation: warfarin dosing per pharmacy, Following chromogenic factor 10: goal 20-40, dosing per pharmacy  -Antiplatelet: no ASA indicated per NICOLE trial results    # +Lupus anticoagulant  - negative testing in 2023, then + this admission for lupus anticoagulant  - following CFx as above per heme, goal 20-40  - per heme 6/25 repeat lupus anticoag panel end of July, may be able to switch back to INR monitoring    # Leukocytosis, possible pneumonia  # Fevers, resolved  # Left pleural effusion s/p thoracentesis   Concern for  infection, +productive cough. CT chest/abd/pelvis 6/22/24 anterior chest subcutaneous air and stranding with substernal gas, read as infectious vs postprocedureal, felt to be procedural per CVTS. Also with b/l pleural effusions and mild ground glass in the lung bases concerning for pneumonia. Resp culture negative. UA negative. New diarrhea with abdominal cramping on 6/24 after 3-4 days of abx.  -Appreciate ID consult  -IR diagnostic and therapeutic thora 6/25. 500mL drained  -On Vanc/Zosyn since 6/21 and added azithromycin 6/22   - stop zosyn today per Dr Jhaveri, ?vanco timing   - sp azithro course  -CRP and WBC downtrending- WBC 13 and   -6/21/24 blood cultures NGTD  -cdif negative 6/24    # Right radial artery occlusion 2/2 arterial line   # Right subclavian and axillary vein thrombus, nonocclusive, known prior to VAD.   Redemonstrated DVT 6/22 CT, likely old clot vs line related from right neck swan in ICU.  - warfarin as above, follow chromogenic factor X  - warning signs for arterial clot reviewed with patient and his wife     # Elevated LFTs  - AST/ALT trending up, 100s, no symptoms   - trend daily  - diuresis as above  - US abd   - hold statin for now, rec pharmacy review of other meds    # HLD  - hold atorvastatin 20 mg daily as above     # CKD stage 2. B/l cr 1.2-1-4  - Cr stable 1.1      Patient discussed with Dr. Silvestre.        40 minutes spent on the date of the encounter doing chart review, history and exam, documentation and further activities per the note    Desire Silverman DNP, NP-C  Nurse Practitioner - Advanced Heart Failure/Cardiology II Service  Vocera preferred or Pager [phone]    ================================================================    Subjective/24-Hr Events:   Last 24 hr care team notes reviewed. Pain sig improved today. Moving well today. No resp symptoms. No o2 needs.     ROS:  4 point ROS including respiratory, CV, GI and  (other than that noted in the HPI) is  negative.     Medications: Reviewed in EPIC.     Physical Exam:   BP (!) 81/65   Pulse 103   Temp 98.5  F (36.9  C) (Oral)   Resp 18   Ht 1.829 m (6')   Wt 91.4 kg (201 lb 8 oz)   SpO2 93%   BMI 27.33 kg/m      GENERAL: Appears comfortable and in no distress, non-toxic  HEENT: Eye symmetrical, no discharge or icterus bilaterally.   NECK: Supple, JVD 3cm above clavicle at 80 degrees.   CV: Hum of LVAD, no adventitious sounds  RESPIRATORY: Respirations regular, even, and unlabored. Lungs CTA throughout.    GI: Soft and non distended with normoactive bowel sounds present No tenderness, rebound, guarding.   EXTREMITIES: Trace b/l b/l lower extremity peripheral edema. All extremities are warm and well perfused  NEUROLOGIC: Alert and interacting appropriately.   SKIN: No jaundice. No rashes or lesions. Surgical sites per CVTS, sternum was c/d/i    Labs:  CMP  Recent Labs   Lab 06/26/24  0530 06/25/24  0420 06/24/24  1556 06/24/24  0510 06/23/24  1321 06/23/24  0507   * 133* 133* 134*   < > 136   POTASSIUM 3.7 3.7 4.0 3.8   < > 3.8   CHLORIDE 100 100 100 102   < > 102   CO2 22 22 24 23   < > 23   ANIONGAP 10 11 9 9   < > 11   * 112* 165* 121*   < > 106*   BUN 17.8 18.3 17.7 18.6   < > 20.0   CR 1.19* 1.17 1.28* 1.21*   < > 1.29*   GFRESTIMATED 73 75 67 72   < > 66   NAM 8.4* 8.4* 8.5* 8.2*   < > 8.2*   MAG 2.1 2.2  --  2.3  --  2.2   PHOS 2.9 2.6  --  3.0  --  3.1   PROTTOTAL 6.0* 5.8*  --  5.6*  5.6*  --  5.7*   ALBUMIN 2.8* 2.8*  --  2.7*  --  2.9*   BILITOTAL 0.6 0.7  --  0.6  --  0.7   ALKPHOS 155* 131  --  107  --  105   * 115*  --  67*  --  76*   * 138*  --  76*  --  78*    < > = values in this interval not displayed.       CBC  Recent Labs   Lab 06/26/24  0530 06/25/24  0420 06/24/24  0510 06/23/24  0507   WBC 12.4* 13.3* 14.1* 18.3*   RBC 3.57* 3.47* 3.39* 3.59*   HGB 10.9* 10.5* 10.3* 10.8*   HCT 32.3* 31.6* 30.9* 33.4*   MCV 91 91 91 93   MCH 30.5 30.3 30.4 30.1   MCHC 33.7 33.2  33.3 32.3   RDW 13.6 14.0 14.0 14.2    337 292 285       INR  Recent Labs   Lab 06/26/24  0530 06/25/24  0420 06/24/24  0510 06/23/24  0507   INR 1.85* 2.76* 3.71* 3.16*

## 2024-06-26 NOTE — PROGRESS NOTES
VAD Social Work Services Progress Note      Date of Initial Social Work Evaluation: 8/22/2023 with admission assessment completed on 6/5/2024   Collaborated with: Chris and wife, along with CVTS and Cards II    Data: Chris remains hospitalized now on the 6th floor post-VAD implant on 6/14/2024. He was experiencing some chest pain and found to have fluid in his lungs, for which he had a thoracentesis yesterday. Therapies haven't seen Chris in a couple of days, but were recommending ARU at discharge. Per team, he could be ready for discharge at the end of the week or weekend. Should be done with VAD teaching by end of week. Wife here daily and staying at the [facility],but continue to be interested in getting an apartment if one became available.  Intervention: Met with Chris and his wife for supportive visit. Inquired into questions/concerns and assessed coping. Discussed discharge planning process and potential need for inpatient rehab at discharge on [facility]. Referral made to [facility] rehab. Also updated them on lack of apartments currently, but writer did  for [address] and he manages other apartment buildings and will look to see if any units are available at other apartment complexes.   Assessment: Both Chris and wife seem to be coping well post-VAD implant. Wife here daily and working sometimes from the hospital room. No psychosocial concerns noted at this time. Would like an apartment if possible.  Education provided by SW: Ongoing SW availability, support group, peer visitors, discharge planning process and options, as well as housing resources.  Plan:    Discharge Plans in Progress: [facility] vs. Local hotel/apartment    Barriers to d/c plan: Medical condition    Follow up Plan: SW will continue to follow for ongoing psychosocial support post-VAD implant and assistance with discharge planning and housing resources.

## 2024-06-26 NOTE — PROGRESS NOTES
Cardiovascular Surgery Progress Note  06/26/2024         Assessment and Plan:     Navin Lutz is a 53 year old male with PMH of CKD2, HLD, R Subclavian and axillary vein non-occlusive thrombus on Warfarin, NSVT, HFrEF 2/2 NICM s/p ICD (PTA IV milrinone) who presents admitted 6/3/24 for decompensated HFrEF listed status 4. He is now s/p LVAD by Dr. Jhaveri on 6/14/24.     Cardiovascular:   S/p LVAD on 6/14 by Dr. Jhaveri  S/p IABP (6/12-6/14)  Hx NSVT  Acute on chronic HFrEF 2/2 to NICM (EF 10-15%), home milrinone PTA, s/p ICD   HLD  HTN  Hypervolemia  No arrhythmias reported overnight, ongoing sinus tachycardia, HD stable. MAPs 70s  Pre-op echo 6/8: LV EF 15-20%, normal RV size/function  - PTA atorvastatin 20 mg daily - on hold due to rising LFTs  - Lisinopril 5 mg daily  - Cards 2 following for hemodynamic & GDMT management; appreciate recs  - Holding PTA Coreg, Entresto, Aldactone, Jardiance   - Notified by RN that PI dropped to 2.2 while patient walking stairs with therapy. Discussed with cards, will decrease diuretic today.    Chest tubes/TPW: removed in ICU    Pulmonary:  - Extubated POD 3 to 5 lpm via NC. Now saturating well on RA  - Supplemental O2 PRN to keep sats > 92%. Wean off as tolerated.  - Pulm toilet, IS, activity and deep breathing  - DuoNebs QID, Mucinex BID    Left pleural effusion  - IR performed thoracentesis on 6/25 with removal of 500 ml from left pleural space, labs pending. Fluid removal limited by pain. Continue to monitor effusion with CXR, ordered for 6/27    Neurology /Psych:  Acute post-operative pain  - Acute post-operative pain regimen:  - lidocaine patches   - PRN: PO oxycodone PRN, Robaxin, Voltaren gel, Atarax, Tylenol (consider holding given rising LFTs)     / Renal:  CKD Stage 2  Contraction alkalosis  Hyponatremia  - Baseline creatinine ~1.1-1.2. Most recent creatinine 1.19, adequate UOP.   - Pre-op weight 209 lbs, most recent weight 201 lbs  - Diuresis per Cards 2, decrease  2/2 low PI this AM  - Replace electrolytes per protocol    GI / FEN:   At risk for protein calorie malnutrition  Diarrhea  Elevated LFTs  - Regular diet  - Attempted to place NJ in the ICU, unable to place   - Regular BMs, continue bowel reg PRN  - New onset diarrhea 6/24, C. Diff negative  - Hepatic enzymes increasing, could be associated with statin/tylenol/azithromycin (last dose azithro was this morning). Pain in RUQ with deep palpation, will order RUQ US after discussion with Cards2.    Endocrine:  Stress-induced hyperglycemia, resolved  Pre-op Hgb A1C 5.6%  - Managed on insulin drip postop, transitioned to sliding scale goal BG <180 and has now also been discontinued due to good BG control with no insulin need.     Infectious Disease:  Stress induced leukocytosis, improving  HCAP  - WBC 12.4 and down-trending, CRP 175and down-trending, remains afebrile  - Completed perioperative LVAD antibiotics   - CT CAP 6/22 with anterior chest subcutaneous air and stranding.  Gas and fluid collection in the substernal region which could be infectious versus postprocedural. Bilateral pleural effusions with adjacent atelectasis and mild groundglass opacities.  - CT results were reviewed with Dr. Miller and Dr. Jhaveri - no indication for surgical washout at this time, plan to continue on broad spectrum antibiotics for at least 2 weeks per Dr. Jhaveri  - sputum culture 6/22 + for Klebsiella pneumoniae  - UA 6/21 non-infectious  - blood cultures x2 6/21, 6/23 NGTD  - Awaiting thoracentesis labs  -  ID consulted 6/23, recommendations:  - continue pip/tazo, discontinue IV vancomycin  - K. Pneumonieae susceptible to ceftriaxone, may narrow to ceftriaxone 2 g daily  - follow thoracentesis studies  - Continue IV vancomycin and Zosyn per Dr. Jhaveri through today. Then can transition to ceftriaxone 6/27    Antibiotics:  - Empiric IV vancomycin/zosyn 6/21 - 6/26 per surgeon  - PO azithromycin 6/22 - 6/26    Hematology:   Acute blood loss  anemia, stable  Acute blood loss thrombocytopenia, resolved  Right brachial non-occlusive thrombus  Right internal jugular non-occlusive thrombus  Right radial artery occlusion 2/2 arterial line  Hgb stable; Plt WNL, no signs or symptoms of active bleeding  - Daily CBC  - Upper extremity arterial ultrasound completed 6/25  - Discussed warning signs for radial artery occlusion with patient and wife. Bilateral hands warm and well-perfused on exam this morning.    Anticoagulation:   Chronic anticoagulation for LVAD, CFX goal 20-40%  +Lupus anticoagulant   Partially occlusive internal jugular thrombus, partially occlusive brachial venous thrombus  - Warfarin for LVAD  - INRs noted to be supra-therapeutic and discordant with Chromogenic Factor X, hematology consulted 6/21 - lupus anticoagulant positive  - Plan to use Chromogenic factor X for warfarin dosing as INR is not reliable, goal CFX 20-40%, most recent factor X within goal  - Hematology paged 6/25, signed off, recommend repeat outpatient testing for Lupus anticoagulant at the end of July. If negative, INR monitoring may be an option. Okay to reach out to heme outpatient team with questions.    MSK/Skin:  Sternotomy  - PT/OT    Prophylaxis:   - Stress ulcer prophylaxis: Pantoprazole 40 mg daily for 30 days  - DVT prophylaxis: warfarin, SCD    Disposition:   - Transferred to  on 6/21  - Therapies recommending discharge to ARU vs home once medically ready. Barriers to discharge include need for LVAD education, infection control off IV abx, pain control. LVAD education started 6/24.    Medically Ready for Discharge: 5+ days    Clinically Significant Risk Factors              # Hypoalbuminemia: Lowest albumin = 2.7 g/dL at 6/24/2024  5:10 AM, will monitor as appropriate        # End stage heart failure: home medication list includes inotropes          #Precipitous drop in Hgb/Hct: Lowest Hgb this hospitalization: 9.1 g/dL. Will continue to monitor and treat/transfuse  as appropriate.     # Overweight: Estimated body mass index is 27.33 kg/m  as calculated from the following:    Height as of this encounter: 1.829 m (6').    Weight as of this encounter: 91.4 kg (201 lb 8 oz).        # Financial/Environmental Concerns: none  # Asthma: noted on problem list  # ICD device present     Discussed with Dr. Jhaveri who also physically rounded on & examined this patient.     Divina Guzman PA-C  Cardiothoracic Surgery  Pager [phone]  11:50 AM on 6/26/2024        Interval History:     No overnight events. Feels much better today than yesterday, pain is improved.  Tolerating diet, is passing flatus, + BM. No nausea or vomiting. Denies abdominal pain.   Breathing is improving overall. On room air, using IS.   Working with therapies and ambulating in halls with assistance. PI dropped to 2.2 this morning while doing stairs with PT but recovered at rest. Decreasing diuretic dosing today.  Denies palpitations, dizziness, syncopal symptoms, fevers, chills, myalgias, sternal popping/clicking, dysuria, diarrhea. Ongoing cough, with occasional mucous production.         Physical Exam:   Blood pressure (!) 81/65, pulse 103, temperature 98.4  F (36.9  C), temperature source Oral, resp. rate 18, height 1.829 m (6'), weight 91.4 kg (201 lb 8 oz), SpO2 93%.  Vitals:    06/23/24 0801 06/24/24 1019 06/26/24 0549   Weight: 92.4 kg (203 lb 11.3 oz) 92 kg (202 lb 13.2 oz) 91.4 kg (201 lb 8 oz)     Gen: NAD, sitting up in the chair, wife at bedside  Neuro: no focal deficits, A&Ox4  CV: sinus tachycardia, +LVAD hum   Pulm: CTAB, normal breathing on RA at the time of exam  Abd: nondistended, normal BS, soft, mildly tender with deep palpation, no peritoneal signs  Ext: trace peripheral edema, non- pitting  Skin:   Incision: clean, dry, intact, no erythema, sternum stable  Tubes/drain sites: dressing clean and dry  Lines: driveline dressing clean and dry   Right groin IABP insertion site C/D/I without erythema  or drainage     Heartmate 3 LEFT VS  Flow (Lpm): 4.8 Lpm  Pulse Index (PI): 2.7 PI  Speed (rpm): 5400 rpm  Power (bassett): 3.9 bassett  Current Hct settin         Data:    Imaging:  reviewed recent imaging, no acute concerns  US Upper Extremity Arterial Duplex Right  Narrative: ULTRASOUND UPPER EXTREMITY ARTERIAL DUPLEX RIGHT 2024 4:07 PM    CLINICAL HISTORY: no arterial waveform noted in radial artery when it  was present in ulnar, incidental finding during venous scan from .    COMPARISONS: None available.    REFERRING PROVIDER: SHANTI NASCIMENTO    TECHNIQUE: Right arm arteries evaluated with grayscale, color Doppler,  and spectral pulsed wave Doppler ultrasound.    FINDINGS: Abnormal waveforms consistent with LVAD.    RIGHT:  Subclavian artery, medial: 28/12 cm/s, multiphasic  Subclavian artery, mid-lateral: 44/0 cm/s, multiphasic    Axillary artery: 31/11 cm/s, multiphasic    Brachial artery, upper arm: 33/12 cm/s, multiphasic    Ulnar artery, upper arm: 38/8 cm/s, multiphasic  Ulnar artery, mid arm: 25/13 cm/s, multiphasic  Ulnar artery, antecubital fossa: 32/11 cm/s, multiphasic  Ulnar artery, mid forearm: 25/3 cm/s, multiphasic  Ulnar artery, wrist: 26/0 cm/s, multiphasic    Radial artery, upper arm: 29/18 cm/s, multiphasic  Radial artery, mid arm: 26/6 cm/s, multiphasic  Radial artery, antecubital fossa: 22/6 cm/s, multiphasic  Radial artery, proximal forearm: 0 cm/s, occluded with thrombus  Radial artery, mid forearm: 0 cm/s, occluded with thrombus  Radial artery, wrist: 0 cm/s, occluded with thrombus  Impression: IMPRESSION: High brachial bifurcation. Persistent right radial artery  occlusion from the proximal forearm to wrist. Right ulnar artery  patent to the wrist. Slow flow and abnormal waveforms consistent with  LVAD.    I have personally reviewed the examination and initial interpretation  and I agree with the findings.    FRANK SEVILLA MD         SYSTEM ID:   [id number]        Labs:  BMP  Recent Labs   Lab 06/26/24  0530 06/25/24  0420 06/24/24  1556 06/24/24  0510   * 133* 133* 134*   POTASSIUM 3.7 3.7 4.0 3.8   CHLORIDE 100 100 100 102   NAM 8.4* 8.4* 8.5* 8.2*   CO2 22 22 24 23   BUN 17.8 18.3 17.7 18.6   CR 1.19* 1.17 1.28* 1.21*   * 112* 165* 121*     CBC  Recent Labs   Lab 06/26/24  0530 06/25/24  0420 06/24/24  0510 06/23/24  0507   WBC 12.4* 13.3* 14.1* 18.3*   RBC 3.57* 3.47* 3.39* 3.59*   HGB 10.9* 10.5* 10.3* 10.8*   HCT 32.3* 31.6* 30.9* 33.4*   MCV 91 91 91 93   MCH 30.5 30.3 30.4 30.1   MCHC 33.7 33.2 33.3 32.3   RDW 13.6 14.0 14.0 14.2    337 292 285     INR  Recent Labs   Lab 06/26/24  0530 06/25/24  0420 06/24/24  0510 06/23/24  0507   INR 1.85* 2.76* 3.71* 3.16*      Hepatic Panel  Recent Labs   Lab 06/26/24  0530 06/25/24  0420 06/24/24  0510 06/23/24  0507   * 115* 67* 76*   * 138* 76* 78*   ALKPHOS 155* 131 107 105   BILITOTAL 0.6 0.7 0.6 0.7   ALBUMIN 2.8* 2.8* 2.7* 2.9*     GLUCOSE:   Recent Labs   Lab 06/26/24  0530 06/25/24  0420 06/24/24  1556 06/24/24  0510 06/23/24  1321 06/23/24  0507   * 112* 165* 121* 113* 106*

## 2024-06-27 ENCOUNTER — APPOINTMENT (OUTPATIENT)
Dept: ULTRASOUND IMAGING | Facility: CLINIC | Age: 54
End: 2024-06-27
Attending: STUDENT IN AN ORGANIZED HEALTH CARE EDUCATION/TRAINING PROGRAM
Payer: COMMERCIAL

## 2024-06-27 ENCOUNTER — APPOINTMENT (OUTPATIENT)
Dept: OCCUPATIONAL THERAPY | Facility: CLINIC | Age: 54
End: 2024-06-27
Attending: STUDENT IN AN ORGANIZED HEALTH CARE EDUCATION/TRAINING PROGRAM
Payer: COMMERCIAL

## 2024-06-27 ENCOUNTER — APPOINTMENT (OUTPATIENT)
Dept: GENERAL RADIOLOGY | Facility: CLINIC | Age: 54
End: 2024-06-27
Attending: STUDENT IN AN ORGANIZED HEALTH CARE EDUCATION/TRAINING PROGRAM
Payer: COMMERCIAL

## 2024-06-27 ENCOUNTER — APPOINTMENT (OUTPATIENT)
Dept: PHYSICAL THERAPY | Facility: CLINIC | Age: 54
End: 2024-06-27
Attending: STUDENT IN AN ORGANIZED HEALTH CARE EDUCATION/TRAINING PROGRAM
Payer: COMMERCIAL

## 2024-06-27 LAB
ALBUMIN SERPL BCG-MCNC: 2.5 G/DL (ref 3.5–5.2)
ALP SERPL-CCNC: 161 U/L (ref 40–150)
ALT SERPL W P-5'-P-CCNC: 174 U/L (ref 0–70)
ANION GAP SERPL CALCULATED.3IONS-SCNC: 11 MMOL/L (ref 7–15)
AST SERPL W P-5'-P-CCNC: 115 U/L (ref 0–45)
BASOPHILS # BLD AUTO: 0.1 10E3/UL (ref 0–0.2)
BASOPHILS NFR BLD AUTO: 1 %
BILIRUB DIRECT SERPL-MCNC: 0.21 MG/DL (ref 0–0.3)
BILIRUB SERPL-MCNC: 0.5 MG/DL
BUN SERPL-MCNC: 13.4 MG/DL (ref 6–20)
CALCIUM SERPL-MCNC: 8.1 MG/DL (ref 8.6–10)
CHLORIDE SERPL-SCNC: 104 MMOL/L (ref 98–107)
CREAT SERPL-MCNC: 1.01 MG/DL (ref 0.67–1.17)
CRP SERPL-MCNC: 149 MG/L
DEPRECATED HCO3 PLAS-SCNC: 18 MMOL/L (ref 22–29)
EGFRCR SERPLBLD CKD-EPI 2021: 89 ML/MIN/1.73M2
EOSINOPHIL # BLD AUTO: 0.4 10E3/UL (ref 0–0.7)
EOSINOPHIL NFR BLD AUTO: 4 %
ERYTHROCYTE [DISTWIDTH] IN BLOOD BY AUTOMATED COUNT: 13.6 % (ref 10–15)
FACT X ACT/NOR PPP CHRO: 52 % (ref 70–130)
GLUCOSE SERPL-MCNC: 102 MG/DL (ref 70–99)
HCT VFR BLD AUTO: 30.6 % (ref 40–53)
HGB BLD-MCNC: 9.8 G/DL (ref 13.3–17.7)
IMM GRANULOCYTES # BLD: 0.2 10E3/UL
IMM GRANULOCYTES NFR BLD: 2 %
INR PPP: 1.66 (ref 0.85–1.15)
LYMPHOCYTES # BLD AUTO: 1.5 10E3/UL (ref 0.8–5.3)
LYMPHOCYTES NFR BLD AUTO: 14 %
MAGNESIUM SERPL-MCNC: 2.1 MG/DL (ref 1.7–2.3)
MCH RBC QN AUTO: 29.5 PG (ref 26.5–33)
MCHC RBC AUTO-ENTMCNC: 32 G/DL (ref 31.5–36.5)
MCV RBC AUTO: 92 FL (ref 78–100)
MONOCYTES # BLD AUTO: 1.2 10E3/UL (ref 0–1.3)
MONOCYTES NFR BLD AUTO: 11 %
NEUTROPHILS # BLD AUTO: 7.5 10E3/UL (ref 1.6–8.3)
NEUTROPHILS NFR BLD AUTO: 68 %
NRBC # BLD AUTO: 0 10E3/UL
NRBC BLD AUTO-RTO: 0 /100
PHOSPHATE SERPL-MCNC: 2.6 MG/DL (ref 2.5–4.5)
PLATELET # BLD AUTO: 416 10E3/UL (ref 150–450)
POTASSIUM SERPL-SCNC: 4 MMOL/L (ref 3.4–5.3)
PROT SERPL-MCNC: 5.6 G/DL (ref 6.4–8.3)
RBC # BLD AUTO: 3.32 10E6/UL (ref 4.4–5.9)
SODIUM SERPL-SCNC: 133 MMOL/L (ref 135–145)
TSH SERPL DL<=0.005 MIU/L-ACNC: 2.35 UIU/ML (ref 0.3–4.2)
WBC # BLD AUTO: 10.9 10E3/UL (ref 4–11)

## 2024-06-27 PROCEDURE — 93976 VASCULAR STUDY: CPT | Mod: 26 | Performed by: STUDENT IN AN ORGANIZED HEALTH CARE EDUCATION/TRAINING PROGRAM

## 2024-06-27 PROCEDURE — 85260 CLOT FACTOR X STUART-POWER: CPT | Performed by: SURGERY

## 2024-06-27 PROCEDURE — 93976 VASCULAR STUDY: CPT

## 2024-06-27 PROCEDURE — 250N000013 HC RX MED GY IP 250 OP 250 PS 637: Performed by: SURGERY

## 2024-06-27 PROCEDURE — 250N000013 HC RX MED GY IP 250 OP 250 PS 637

## 2024-06-27 PROCEDURE — 36415 COLL VENOUS BLD VENIPUNCTURE: CPT | Performed by: SURGERY

## 2024-06-27 PROCEDURE — 250N000013 HC RX MED GY IP 250 OP 250 PS 637: Performed by: PHYSICIAN ASSISTANT

## 2024-06-27 PROCEDURE — 99232 SBSQ HOSP IP/OBS MODERATE 35: CPT | Mod: FS

## 2024-06-27 PROCEDURE — 250N000011 HC RX IP 250 OP 636

## 2024-06-27 PROCEDURE — 97535 SELF CARE MNGMENT TRAINING: CPT | Mod: GO | Performed by: OCCUPATIONAL THERAPIST

## 2024-06-27 PROCEDURE — 85610 PROTHROMBIN TIME: CPT | Performed by: STUDENT IN AN ORGANIZED HEALTH CARE EDUCATION/TRAINING PROGRAM

## 2024-06-27 PROCEDURE — 85025 COMPLETE CBC W/AUTO DIFF WBC: CPT

## 2024-06-27 PROCEDURE — 80053 COMPREHEN METABOLIC PANEL: CPT | Performed by: NURSE PRACTITIONER

## 2024-06-27 PROCEDURE — 99232 SBSQ HOSP IP/OBS MODERATE 35: CPT | Mod: 25 | Performed by: NURSE PRACTITIONER

## 2024-06-27 PROCEDURE — 97116 GAIT TRAINING THERAPY: CPT | Mod: GP

## 2024-06-27 PROCEDURE — 250N000011 HC RX IP 250 OP 636: Performed by: STUDENT IN AN ORGANIZED HEALTH CARE EDUCATION/TRAINING PROGRAM

## 2024-06-27 PROCEDURE — 258N000003 HC RX IP 258 OP 636: Performed by: STUDENT IN AN ORGANIZED HEALTH CARE EDUCATION/TRAINING PROGRAM

## 2024-06-27 PROCEDURE — 86140 C-REACTIVE PROTEIN: CPT | Performed by: PHYSICIAN ASSISTANT

## 2024-06-27 PROCEDURE — 250N000013 HC RX MED GY IP 250 OP 250 PS 637: Performed by: NURSE PRACTITIONER

## 2024-06-27 PROCEDURE — 84443 ASSAY THYROID STIM HORMONE: CPT | Performed by: NURSE PRACTITIONER

## 2024-06-27 PROCEDURE — 97530 THERAPEUTIC ACTIVITIES: CPT | Mod: GP

## 2024-06-27 PROCEDURE — 83735 ASSAY OF MAGNESIUM: CPT

## 2024-06-27 PROCEDURE — 71045 X-RAY EXAM CHEST 1 VIEW: CPT

## 2024-06-27 PROCEDURE — 250N000011 HC RX IP 250 OP 636: Mod: JZ | Performed by: PHYSICIAN ASSISTANT

## 2024-06-27 PROCEDURE — 93750 INTERROGATION VAD IN PERSON: CPT | Performed by: STUDENT IN AN ORGANIZED HEALTH CARE EDUCATION/TRAINING PROGRAM

## 2024-06-27 PROCEDURE — 84100 ASSAY OF PHOSPHORUS: CPT

## 2024-06-27 PROCEDURE — 76705 ECHO EXAM OF ABDOMEN: CPT | Mod: 26 | Performed by: STUDENT IN AN ORGANIZED HEALTH CARE EDUCATION/TRAINING PROGRAM

## 2024-06-27 PROCEDURE — 71045 X-RAY EXAM CHEST 1 VIEW: CPT | Mod: 26 | Performed by: RADIOLOGY

## 2024-06-27 PROCEDURE — 999N000248 HC STATISTIC IV INSERT WITH US BY RN

## 2024-06-27 PROCEDURE — 120N000003 HC R&B IMCU UMMC

## 2024-06-27 RX ORDER — WARFARIN SODIUM 4 MG/1
4 TABLET ORAL
Status: COMPLETED | OUTPATIENT
Start: 2024-06-27 | End: 2024-06-27

## 2024-06-27 RX ORDER — CEFTRIAXONE 2 G/1
2 INJECTION, POWDER, FOR SOLUTION INTRAMUSCULAR; INTRAVENOUS EVERY 24 HOURS
Status: DISCONTINUED | OUTPATIENT
Start: 2024-06-27 | End: 2024-06-29

## 2024-06-27 RX ORDER — FUROSEMIDE 20 MG
20 TABLET ORAL
Status: DISCONTINUED | OUTPATIENT
Start: 2024-06-27 | End: 2024-06-29

## 2024-06-27 RX ADMIN — PANTOPRAZOLE SODIUM 40 MG: 40 TABLET, DELAYED RELEASE ORAL at 09:31

## 2024-06-27 RX ADMIN — GUAIFENESIN 600 MG: 600 TABLET ORAL at 09:31

## 2024-06-27 RX ADMIN — FUROSEMIDE 20 MG: 20 TABLET ORAL at 16:53

## 2024-06-27 RX ADMIN — OXYCODONE HYDROCHLORIDE 10 MG: 10 TABLET ORAL at 08:49

## 2024-06-27 RX ADMIN — POTASSIUM & SODIUM PHOSPHATES POWDER PACK 280-160-250 MG 1 PACKET: 280-160-250 PACK at 12:09

## 2024-06-27 RX ADMIN — POTASSIUM & SODIUM PHOSPHATES POWDER PACK 280-160-250 MG 1 PACKET: 280-160-250 PACK at 09:32

## 2024-06-27 RX ADMIN — CEFTRIAXONE SODIUM 2 G: 2 INJECTION, POWDER, FOR SOLUTION INTRAMUSCULAR; INTRAVENOUS at 12:00

## 2024-06-27 RX ADMIN — WARFARIN SODIUM 4 MG: 4 TABLET ORAL at 17:21

## 2024-06-27 RX ADMIN — METHOCARBAMOL 750 MG: 750 TABLET ORAL at 05:49

## 2024-06-27 RX ADMIN — OXYCODONE HYDROCHLORIDE 5 MG: 5 TABLET ORAL at 00:04

## 2024-06-27 RX ADMIN — GUAIFENESIN 600 MG: 600 TABLET ORAL at 20:13

## 2024-06-27 RX ADMIN — SPIRONOLACTONE 25 MG: 25 TABLET ORAL at 20:13

## 2024-06-27 RX ADMIN — OXYCODONE HYDROCHLORIDE 10 MG: 10 TABLET ORAL at 17:00

## 2024-06-27 RX ADMIN — FUROSEMIDE 20 MG: 20 TABLET ORAL at 14:04

## 2024-06-27 RX ADMIN — VANCOMYCIN HYDROCHLORIDE 1250 MG: 10 INJECTION, POWDER, LYOPHILIZED, FOR SOLUTION INTRAVENOUS at 01:07

## 2024-06-27 RX ADMIN — PIPERACILLIN AND TAZOBACTAM 4.5 G: 4; .5 INJECTION, POWDER, LYOPHILIZED, FOR SOLUTION INTRAVENOUS at 05:45

## 2024-06-27 RX ADMIN — LISINOPRIL 5 MG: 2.5 TABLET ORAL at 09:32

## 2024-06-27 ASSESSMENT — ACTIVITIES OF DAILY LIVING (ADL)
ADLS_ACUITY_SCORE: 30

## 2024-06-27 NOTE — PROGRESS NOTES
Calorie Count  Intake recorded for: 6/26  Total Kcals: 566 Total Protein: 18g  Kcals from Hospital Food: 2763   Protein: 12g  Kcals from Outside Food (average):290 Protein: 6g  # Meals Ordered from Kitchen: 2 ordered   # Meals Recorded: 3 recorded (First - 100% blueberry yogurt parfait)       (Second - 50% peanut butter jelly sandwich)      (Third - 50% orange juice, 25% ham and cheese omelet)  # Supplements Recorded: no intake recorded

## 2024-06-27 NOTE — PLAN OF CARE
Neuro: A&Ox4.   Cardiac: Sinus tachycardia. VSS.   Respiratory: Sating 89 on RA.  GI/: Adequate urine output. BM X2.  Diet/appetite: Tolerating regular diet. Eating fair.  Activity:  Assist of 1, up to chair and in halls. Uses walker to hold equipment.  Pain: At acceptable level on current regimen.   Skin: No new deficits noted.  LDA's: Left PIV, saline locked. Heartmate 3.     Plan: Continue with POC. Notify primary team with changes.

## 2024-06-27 NOTE — CONSULTS
"      [facility] Consult Service Note  Interventional Radiology  06/27/24   11:05 AM    Consult Requested: \"Left pigtail placement, persistent left pleural effusion.\"    Recommendations/Plan:    Patient is approved for IR thoracentesis on 6/28/24, we will not offer a chest tube placement.    Timing of procedure is TBD based on IR staffing/schedule and triage.  Please contact the IR charge RN at [phone] for estimated time of procedure.     Labs WNL for procedure.    Orders entered for procedure. No NPO required.  Medications to be held include: None.   Informed consent will be completed prior to procedure.     Case and imaging discussed with IR attending Dr. Cristhian Haji MD   Recommendations were reviewed with Betzaida aBker PA-C    History of Present Illness:  Navin Lutz is a 53 year old English speaking male with past medical history of HFrEF due to NICM s/p ICD, and CKD that was admitted on 6/3/24 with decompensated heart failure and is now s/p HM3 LVAD placement on 6/14/24. He had been having worsening SOB for a few weeks. Patient is on a lupus anticoagulant, therefore INR is not a reliable. Requesting team is monitoring with chromogenic factor 10. Patient on warfarin due to h/o R subclavian and axillary vein non-occlusive thrombus. IR was consulted on 6/24/24 as CAPS was consulted and ultimately denied performing a thoracentesis due to patient appearing loculated. IR completed a left thoracentesis on 6/25/24. Approximately 500 mL clear red fluid aspirated from the  left pleural space, and we were requested to stop as patient had chest discomfort/pain. Patient is not on any supplemental O2. He continues to saturate at 91-96%.     Per Betzaida Baker, she discussed patient's case with Dr. Jhaveri, and request is to place a pigtail as they feel like he will have a recurrent effusion just given the nature of the LVAD being in place. They feel like by having a pigtail drain in place, he would be further " optimized for potential discharge. He tolerated a chest tube previously, so they feel that patient would do better with a chest tube than a thoracentesis.  They have no intention of giving lytics.     Pertinent Imaging Reviewed:     Recent Results (from the past 24 hour(s))   XR Chest Port 1 View    Narrative    Exam: TEMPORARY, 6/27/2024 5:56 AM    Indication: Left pleural effusion, LVAD    Comparison: 6/25/2024    Findings:   Single view of the chest. Left chest wall ICD with leads in stable  position. Post surgical changes in the chest with intact median  sternotomy wires. LVAD. Trachea is midline. Cardiac silhouette is  stable. Continued hazy opacities in the left midlung and retrocardiac  opacities. Large left pleural effusion. No pneumothorax.      Impression    Impression: Stable large left pleural effusion. Continued hazy  opacities in the left midlung, likely atelectasis.    I have personally reviewed the examination and initial interpretation  and I agree with the findings.    JACOB OLIVARES MD         SYSTEM ID:  [id number]   US Abdomen Limited w Abdomen Doppler Limited    Narrative    EXAM: US ABDOMEN LIMITED W ABDOMEN DOPPLER LIMITED, 6/27/2024 8:45 AM    COMPARISON: CT CAP 6/22/2024    HISTORY: decreasing liver function and mild RUQ pain    FINDINGS:     Fluid: Trace right effusion.    Liver: The liver demonstrates normal echogenicity, measuring 16 cm in  craniocaudal dimension. There is no focal mass. Left lobe of the liver  is not well seen.    Extrahepatic portal vein flow is antegrade at 41 cm/s.  Right portal vein flow is antegrade, measuring 10 cm/s.  Left portal vein flow is antegrade, measuring 10 cm/s.    Flow in the hepatic artery is towards the liver and:  65 cm/s peak systolic  Low resistance hepatic artery waveform likely due to known LVAD.    The splenic vein is not visualized.  The left, middle, and right  hepatic veins are patent with flow towards the IVC. The IVC is patent  with flow  towards the heart.      Gallbladder: There is no wall thickening, pericholecystic fluid or  evidence for cholelithiasis.    Bile Ducts: The visualized common bile duct measures 0.4 cm in  diameter.    Pancreas: Visualized portions of the head and body of the pancreas are  unremarkable.     Kidney: The right kidney measures 11.0 cm long. There is no  hydronephrosis or hydroureter, no shadowing renal calculi, cystic  lesion or mass.       Impression    IMPRESSION:     1. Patent hepatic vasculature by Doppler evaluation. Altered arterial  waveform likely secondary to left ventricular assist device dependent  circulation.  2. No cholelithiasis demonstrated .  3. Limited visualization of the left lobe of liver, within this  limitation no focal lesion demonstrated    I have personally reviewed the examination and initial interpretation  and I agree with the findings.    ETHAN LION MD         SYSTEM ID:  [id number]       Expected date of discharge:  06/28/2024    Vitals:   BP (!) 81/65   Pulse 111   Temp 98.3  F (36.8  C) (Oral)   Resp 16   Ht 1.829 m (6')   Wt 89 kg (196 lb 3.4 oz)   SpO2 93%   BMI 26.61 kg/m      Pertinent Labs Reviewed:  CBC:  Lab Results   Component Value Date    WBC 10.9 06/27/2024    WBC 12.4 (H) 06/26/2024    WBC 13.3 (H) 06/25/2024     Lab Results   Component Value Date    HGB 9.8 06/27/2024    HGB 10.9 06/26/2024    HGB 10.5 06/25/2024     Lab Results   Component Value Date     06/27/2024     06/26/2024     06/25/2024    INR:  Lab Results   Component Value Date    INR 1.66 (H) 06/27/2024    PTT 44 (H) 06/17/2024          COVID Results:  COVID-19 Antibody Results, Testing for Immunity           No data to display              COVID-19 PCR Results          8/22/2023    20:03 11/18/2023    00:45   COVID-19 PCR Results   SARS CoV2 PCR Negative  Negative     Potassium   Date Value Ref Range Status   06/27/2024 4.0 3.4 - 5.3 mmol/L Final     Potassium POCT   Date Value  Ref Range Status   06/14/2024 4.5 3.4 - 5.3 mmol/L Final     Comment:     CRITICAL VALUES NOTED AND REPORTED TO MD Marcia Banuelos PAMannyC  Interventional Radiology  Pager: [phone]

## 2024-06-27 NOTE — PROGRESS NOTES
The patient's HeartMate LVAD was interrogated 6/27/2024  * Speed 5400 rpm   * Pulsatility index 2.8-3.3   * Power 3.8 [place]   * Flow 4.6-5 L/minute   Fluid status: near euvolemic  Alarms were reviewed, and notable for occasional pi events, no alarms.   The driveline exit site was inspected, c/d/i.   All external components were inspected and showed no evidence of damage or malfunction, none replaced.   No changes to VAD settings made

## 2024-06-27 NOTE — PROGRESS NOTES
[facility]   Cardiology II Service / Advanced Heart Failure  Daily Consult Progress Note      Patient: Navin Lutz  MRN: [id number]  Admission Date: 6/3/2024  Hospital Day # 24    Assessment and Plan: Navin Lutz is a 53 year old male with HFrEF 2/2 NICM s/p ICD, HLD, and CKD Stage II who presents admitted for decompensated heart failure with NICM on milrinone listed status 4, admitted for consideration of advanced therapies and is now s/p HM3 LVAD on 6/14/24 with Dr. Jhaveri with course complicated by fevers and leukocytosis.    Recommendations:  - start PO lasix 20 mg bid - aim for net even  - resume jardiance 10 mg daily  - defer heparin gtt   - pigtail per CVTS  - monitor hepatic panel  - check TSH    # Acute on chronic systolic heart failure secondary to NICM   # s/p HM3 LVAD as BTT on 6/14/24 (no active contraindications to transplant other than wait time)  Stage D. NYHA Class III confounded by recent surgery    -Fluid status: nearing euvolemic, lasix 20 mg bid  -ACEi/ARB/ARNi: lisinopril 5 mg daily   -Afterload reduction: discontinued hydralazine 25 mg TID  -Inotrope: dobutamine stopped on 6/19/24  -BB: contraindicated currently given recent LVAD implant  -Aldosterone antagonist: aldactone 25 mg daily   -SGLT2i: resume pta jardiance 10 mg daily  -SCD prophylaxis: ICD  -MAP: goal 65-85  -LDH trends: 346  -Anticoagulation: warfarin dosing per pharmacy, Following chromogenic factor 10: goal 20-40, dosing per pharmacy; 52 today - defer heparin per CVTS  -Antiplatelet: no ASA indicated per NICOLE trial results    # +Lupus anticoagulant  - negative testing in 2023, then + this admission for lupus anticoagulant  - following CFx as above per heme, goal 20-40  - per heme 6/25 repeat lupus anticoag panel end of July, may be able to switch back to INR monitoring    # Leukocytosis, pneumonia, resolved  # Fevers, resolved  # Left pleural effusion s/p thoracentesis   Concern for infection, +productive  cough. CT chest/abd/pelvis 6/22/24 anterior chest subcutaneous air and stranding with substernal gas, read as infectious vs postprocedureal, felt to be procedural per CVTS. Also with b/l pleural effusions and mild ground glass in the lung bases concerning for pneumonia. Resp culture negative. UA negative. New diarrhea with abdominal cramping on 6/24 after 3-4 days of abx.  -Appreciate ID consult  -IR diagnostic and therapeutic thora 6/25. 500mL drained  -On Vanc/Zosyn since 6/21 and added azithromycin 6/22   - stop zosyn and vanco 6/26 per Dr Jhaveri   - sp jaya course   - currently on rocpehin, plan for total 10 days ok for levaquin per ID  -CRP and WBC downtrending- WBC 10.2 and   -6/21/24 blood cultures NGTD  -cdif negative 6/24    # Right radial artery occlusion 2/2 arterial line   # Right subclavian and axillary vein thrombus, nonocclusive, known prior to VAD.   Redemonstrated DVT 6/22 CT, likely old clot vs line related from right neck swan in ICU.  - warfarin as above, follow chromogenic factor X  - warning signs for arterial clot reviewed with patient and his wife     # Elevated LFTs  - AST/ALT trending up this week, now stable, 100s, no symptoms   - trend daily  - diuresis as above  - US abd negative for cause 6/27  - hold statin for now, rec pharmacy review of other meds    # HLD  - hold atorvastatin 20 mg daily as above     # CKD stage 2. B/l cr 1.2-1-4  - Cr stable 1.1      Patient discussed with Dr. Silvestre.        40 minutes spent on the date of the encounter doing chart review, history and exam, documentation and further activities per the note    Desire Silverman DNP, NP-C  Nurse Practitioner - Advanced Heart Failure/Cardiology II Service  Jany preferred or Pager [phone]    ================================================================    Subjective/24-Hr Events:   Last 24 hr care team notes reviewed. Pain controlled. Breathing is stable, mild dyspnea with exetion. Cough improved. Notes  sweating in bed in early AMs, no fevers or chills.     ROS:  4 point ROS including respiratory, CV, GI and  (other than that noted in the HPI) is negative.     Medications: Reviewed in EPIC.     Physical Exam:   BP (!) 81/65   Pulse 111   Temp 98.3  F (36.8  C) (Oral)   Resp 16   Ht 1.829 m (6')   Wt 89 kg (196 lb 3.4 oz)   SpO2 93%   BMI 26.61 kg/m      GENERAL: Appears comfortable and in no distress, non-toxic  HEENT: Eye symmetrical, no discharge or icterus bilaterally.   NECK: Supple, JVD just  above clavicle at 80 degrees.   CV: Hum of LVAD, no adventitious sounds  RESPIRATORY: Respirations regular, even, and unlabored. Lungs CTA throughout.    GI: Soft and non distended with normoactive bowel sounds present No tenderness, rebound, guarding.   EXTREMITIES: Trace b/l b/l lower extremity peripheral edema. All extremities are warm and well perfused  NEUROLOGIC: Alert and interacting appropriately.   SKIN: No jaundice. No rashes or lesions. Surgical sites per CVTS, sternum was c/d/i    Labs:  CMP  Recent Labs   Lab 06/27/24  0510 06/26/24  0530 06/25/24  0420 06/24/24  1556 06/24/24  0510   * 132* 133* 133* 134*   POTASSIUM 4.0 3.7 3.7 4.0 3.8   CHLORIDE 104 100 100 100 102   CO2 18* 22 22 24 23   ANIONGAP 11 10 11 9 9   * 106* 112* 165* 121*   BUN 13.4 17.8 18.3 17.7 18.6   CR 1.01 1.19* 1.17 1.28* 1.21*   GFRESTIMATED 89 73 75 67 72   NAM 8.1* 8.4* 8.4* 8.5* 8.2*   MAG 2.1 2.1 2.2  --  2.3   PHOS 2.6 2.9 2.6  --  3.0   PROTTOTAL 5.6* 6.0* 5.8*  --  5.6*  5.6*   ALBUMIN 2.5* 2.8* 2.8*  --  2.7*   BILITOTAL 0.5 0.6 0.7  --  0.6   ALKPHOS 161* 155* 131  --  107   * 122* 115*  --  67*   * 167* 138*  --  76*       CBC  Recent Labs   Lab 06/27/24  0510 06/26/24  0530 06/25/24  0420 06/24/24  0510   WBC 10.9 12.4* 13.3* 14.1*   RBC 3.32* 3.57* 3.47* 3.39*   HGB 9.8* 10.9* 10.5* 10.3*   HCT 30.6* 32.3* 31.6* 30.9*   MCV 92 91 91 91   MCH 29.5 30.5 30.3 30.4   MCHC 32.0 33.7 33.2 33.3    RDW 13.6 13.6 14.0 14.0    378 337 292       INR  Recent Labs   Lab 06/27/24  0510 06/26/24  0530 06/25/24  0420 06/24/24  0510   INR 1.66* 1.85* 2.76* 3.71*

## 2024-06-27 NOTE — PLAN OF CARE
BP (!) 81/65   Pulse 109   Temp 97.4  F (36.3  C) (Oral)   Resp 18   Ht 1.829 m (6')   Wt 91.4 kg (201 lb 8 oz)   SpO2 94%   BMI 27.33 kg/m      VSS. Doppler MAP's 78 and 80 overnight. Room air. Afebrile. LVAD with no alarms. Alert and oriented x 4. Patient reports pain at left thoracentesis site much better than yesterday. PRN oxy 5 mg and Robaxin each given once overnight. NPO as of 00:00 for Abd US today. Otherwise, tolerating regular diet with no n/v. Left PIV saline locked. LVAD dressing c/d/I. OOB with SBA/assist x 1 and walker. Continue to monitor.

## 2024-06-27 NOTE — PROGRESS NOTES
Cardiovascular Surgery Progress Note  06/27/2024         Assessment and Plan:     Navin Lutz is a 53 year old male with PMH of CKD2, HLD, R Subclavian and axillary vein non-occlusive thrombus on Warfarin, NSVT, HFrEF 2/2 NICM s/p ICD (PTA IV milrinone) who presents admitted 6/3/24 for decompensated HFrEF listed status 4. He is now s/p LVAD by Dr. Jhaveri on 6/14/24.     Cardiovascular:   S/p LVAD on 6/14 by Dr. Jhaveri  S/p IABP (6/12-6/14)  Hx NSVT  Acute on chronic HFrEF 2/2 to NICM (EF 10-15%), home milrinone PTA, s/p ICD   HLD  HTN  Hypervolemia  No arrhythmias reported overnight, ongoing sinus tachycardia, HD stable. MAPs ~70-80  Pre-op echo 6/8: LV EF 15-20%, normal RV size/function  - PTA atorvastatin 20 mg daily, on hold due to elevated LFTs  - Lisinopril 5 mg daily  - Cards 2 following for hemodynamic & GDMT management; appreciate recs  - Holding PTA Coreg, Entresto, Aldactone, Jardiance   - PI dropped to 2.2 on 6/26, diuretic therapy held    Chest tubes/TPW: removed in ICU    Pulmonary:  - Extubated POD 3 to 5 lpm via NC. Now saturating well on RA  - Supplemental O2 PRN to keep sats > 92%. Wean off as tolerated.  - Pulm toilet, IS, activity and deep breathing  - DuoNebs QID, Mucinex BID    Left pleural effusion  - IR performed thoracentesis on 6/25 with removal of 500 ml from left pleural space. Fluid removal limited by pain. 6/27 CXR with persistent pleural effusion.   - IR consulted again for left pigtail placement after discussion with surgeon, pending review    Neurology /Psych:  Acute post-operative pain  - Acute post-operative pain regimen:  - lidocaine patches   - PRN: PO oxycodone PRN, Robaxin, Voltaren gel, Atarax, Tylenol     / Renal:  CKD Stage 2  Contraction alkalosis  Hyponatremia  - Baseline creatinine ~1.1-1.2. Most recent creatinine 1.01, adequate UOP   - Pre-op weight 209 lbs, most recent weight 196 lbs  - Diuresis per Cards 2  - Replace electrolytes per protocol    GI / FEN:   At risk  for protein calorie malnutrition  Diarrhea  Elevated LFTs  - Regular diet  - Attempted to place NJ in the ICU, unable to place   - Regular BMs, continue bowel reg PRN  - New onset diarrhea 6/24, C. Diff negative  - Hepatic enzymes increasing. Multiple medications could contribute (statin/tylenol/azithromycin). 6/27 RUQ US showed patent hepatic vasculature and no evidence of cholelithiasis. Will continue to trend LFTs    Endocrine:  Stress-induced hyperglycemia, resolved  Pre-op Hgb A1C 5.6%  - Managed on insulin drip postop, transitioned to sliding scale goal BG <180 and has now also been discontinued due to good BG control with no insulin need.   - Agree with thyroid labs with ongoing sweating and facial flushing, pending    Infectious Disease:  Stress induced leukocytosis, improving  HCAP  - WBC now WNL,  and down-trending, remains afebrile  - Completed perioperative LVAD antibiotics   - CT CAP 6/22 with anterior chest subcutaneous air and stranding.  Gas and fluid collection in the substernal region which could be infectious versus postprocedural. Bilateral pleural effusions with adjacent atelectasis and mild groundglass opacities.  - CT results were reviewed with Dr. Miller and Dr. Jhaveri - no indication for surgical washout at this time, plan to continue on broad spectrum antibiotics for at least 2 weeks per Dr. Jhaveri  - sputum culture 6/22 + for Klebsiella pneumoniae  - UA 6/21 non-infectious  - blood cultures x2 6/21, 6/23 NGTD  - 6/25 thoracentesis labs without culture growth, Lights' criteria supportive of exudative effusion, possibly clouded by high serum LDH in the setting of LVAD  -  ID consulted 6/23, recommendations:  - K. Pneumonieae susceptible to ceftriaxone, may narrow to ceftriaxone 2 g daily for 7-10 day course  - follow thoracentesis studies    Antibiotics:  - Ceftriaxone 6/27 - 7/1 (or transition to levofloxacin PO at discharge, cleared with surgeon)  - Empiric IV vancomycin/zosyn 6/21 - 6/27  per surgeon  - PO azithromycin 6/22 - 6/26    Hematology:   Acute blood loss anemia, stable  Acute blood loss thrombocytopenia, resolved  Right brachial non-occlusive thrombus  Right internal jugular non-occlusive thrombus  Right radial artery occlusion 2/2 arterial line  Hgb stable; Plt WNL, no signs or symptoms of active bleeding  - Daily CBC  - Upper extremity arterial ultrasound completed 6/25  - Discussed warning signs for radial artery occlusion with patient and wife. Bilateral hands warm and well-perfused on exam this morning.    Anticoagulation:   Chronic anticoagulation for LVAD, CFX goal 20-40%  +Lupus anticoagulant   Partially occlusive internal jugular thrombus, partially occlusive brachial venous thrombus  - Warfarin for LVAD  - INRs noted to be supra-therapeutic and discordant with Chromogenic Factor X, hematology consulted 6/21 - lupus anticoagulant positive  - Plan to use Chromogenic factor X for warfarin dosing as INR is not reliable, goal CFX 20-40%, most recent factor X not within goal.   - No heparin gtt to bridge as of 6/27, reassess 6/28 if chromogenic factor X is further outside goal range  - Hematology paged 6/25, signed off, recommend repeat outpatient testing for Lupus anticoagulant at the end of July. If negative, INR monitoring may be an option. Okay to reach out to heme outpatient team with questions.    MSK/Skin:  Sternotomy  - PT/OT    Prophylaxis:   - Stress ulcer prophylaxis: Pantoprazole 40 mg daily for 30 days  - DVT prophylaxis: warfarin, SCD    Disposition:   - Transferred to  on 6/21  - Therapies recommending discharge to ARU vs home once medically ready. Barriers to discharge include need for LVAD education, infection control off IV abx, pain control. LVAD education started 6/24, anticipated through 6/28.    Medically Ready for Discharge: 5+ days    Clinically Significant Risk Factors              # Hypoalbuminemia: Lowest albumin = 2.5 g/dL at 6/27/2024  5:10 AM, will  monitor as appropriate        # End stage heart failure: home medication list includes inotropes          #Precipitous drop in Hgb/Hct: Lowest Hgb this hospitalization: 9.1 g/dL. Will continue to monitor and treat/transfuse as appropriate.     # Overweight: Estimated body mass index is 26.61 kg/m  as calculated from the following:    Height as of this encounter: 1.829 m (6').    Weight as of this encounter: 89 kg (196 lb 3.4 oz).        # Financial/Environmental Concerns: none  # Asthma: noted on problem list  # ICD device present     Discussed with Dr. Jhaveri who also physically rounded on & examined this patient.     Betzaida Baker PA-C  Cardiothoracic Surgery  Pager [phone]    7:19 AM June 27, 2024        Interval History:   No overnight events.  Discouraged about possible pigtail placement however is in agreement with moving forward. Reports he does still feel somewhat short of breath.   Otherwise progressing well.   Continues to have heightened sense of smell and taste.  Reports ongoing facial flushing and forehead sweating overnight.  Tolerating diet, is passing flatus, + BM. No nausea or vomiting. Denies abdominal pain.   Working with therapies.  Denies palpitations, dizziness, syncopal symptoms, fevers, chills, myalgias, sternal popping/clicking, dysuria, diarrhea.          Physical Exam:   Blood pressure (!) 81/65, pulse 111, temperature 98.3  F (36.8  C), temperature source Oral, resp. rate 16, height 1.829 m (6'), weight 89 kg (196 lb 3.4 oz), SpO2 93%.  Vitals:    06/24/24 1019 06/26/24 0549 06/27/24 0546   Weight: 92 kg (202 lb 13.2 oz) 91.4 kg (201 lb 8 oz) 89 kg (196 lb 3.4 oz)     Gen: NAD, sitting up in the chair, wife at bedside, working with therapy  Neuro: no focal deficits, A&Ox4  CV: sinus tachycardia, +LVAD hum   Pulm: decreased breath sounds over left lower lobe, normal breathing on RA at the time of exam  Abd: nondistended, normal BS, soft, mildly tender with deep palpation, no peritoneal  signs  Ext: trace peripheral edema, non- pitting  Skin:   Incision: clean, dry, intact, no erythema, sternum stable  Tubes/drain sites: dressing clean and dry  Lines: driveline dressing clean and dry   Right groin IABP insertion site C/D/I without erythema or drainage     Heartmate 3 LEFT VS  Flow (Lpm): 4.9 Lpm  Pulse Index (PI): 3.1 PI  Speed (rpm): 5400 rpm  Power (bassett): 3.9 bassett  Current Hct settin         Data:    Imaging:  reviewed recent imaging, no acute concerns  US Abdomen Limited w Abdomen Doppler Limited  Narrative: EXAM: US ABDOMEN LIMITED W ABDOMEN DOPPLER LIMITED, 2024 8:45 AM    COMPARISON: CT CAP 2024    HISTORY: decreasing liver function and mild RUQ pain    FINDINGS:     Fluid: Trace right effusion.    Liver: The liver demonstrates normal echogenicity, measuring 16 cm in  craniocaudal dimension. There is no focal mass. Left lobe of the liver  is not well seen.    Extrahepatic portal vein flow is antegrade at 41 cm/s.  Right portal vein flow is antegrade, measuring 10 cm/s.  Left portal vein flow is antegrade, measuring 10 cm/s.    Flow in the hepatic artery is towards the liver and:  65 cm/s peak systolic  Low resistance hepatic artery waveform likely due to known LVAD.    The splenic vein is not visualized.  The left, middle, and right  hepatic veins are patent with flow towards the IVC. The IVC is patent  with flow towards the heart.      Gallbladder: There is no wall thickening, pericholecystic fluid or  evidence for cholelithiasis.    Bile Ducts: The visualized common bile duct measures 0.4 cm in  diameter.    Pancreas: Visualized portions of the head and body of the pancreas are  unremarkable.     Kidney: The right kidney measures 11.0 cm long. There is no  hydronephrosis or hydroureter, no shadowing renal calculi, cystic  lesion or mass.   Impression: IMPRESSION:     1. Patent hepatic vasculature by Doppler evaluation. Altered arterial  waveform likely secondary to left  ventricular assist device dependent  circulation.  2. No cholelithiasis demonstrated .  3. Limited visualization of the left lobe of liver, within this  limitation no focal lesion demonstrated    I have personally reviewed the examination and initial interpretation  and I agree with the findings.    ETHAN LION MD         SYSTEM ID:  [id number]  XR Chest Port 1 View  Narrative: Exam: TEMPORARY, 6/27/2024 5:56 AM    Indication: Left pleural effusion, LVAD    Comparison: 6/25/2024    Findings:   Single view of the chest. Left chest wall ICD with leads in stable  position. Post surgical changes in the chest with intact median  sternotomy wires. LVAD. Trachea is midline. Cardiac silhouette is  stable. Continued hazy opacities in the left midlung and retrocardiac  opacities. Large left pleural effusion. No pneumothorax.  Impression: Impression: Stable large left pleural effusion. Continued hazy  opacities in the left midlung, likely atelectasis.    I have personally reviewed the examination and initial interpretation  and I agree with the findings.    JACOB OLIVARES MD         SYSTEM ID:  [id number]        Labs:  BMP  Recent Labs   Lab 06/27/24  0510 06/26/24  0530 06/25/24  0420 06/24/24  1556   * 132* 133* 133*   POTASSIUM 4.0 3.7 3.7 4.0   CHLORIDE 104 100 100 100   NAM 8.1* 8.4* 8.4* 8.5*   CO2 18* 22 22 24   BUN 13.4 17.8 18.3 17.7   CR 1.01 1.19* 1.17 1.28*   * 106* 112* 165*     CBC  Recent Labs   Lab 06/27/24  0510 06/26/24  0530 06/25/24  0420 06/24/24  0510   WBC 10.9 12.4* 13.3* 14.1*   RBC 3.32* 3.57* 3.47* 3.39*   HGB 9.8* 10.9* 10.5* 10.3*   HCT 30.6* 32.3* 31.6* 30.9*   MCV 92 91 91 91   MCH 29.5 30.5 30.3 30.4   MCHC 32.0 33.7 33.2 33.3   RDW 13.6 13.6 14.0 14.0    378 337 292     INR  Recent Labs   Lab 06/27/24  0510 06/26/24  0530 06/25/24  0420 06/24/24  0510   INR 1.66* 1.85* 2.76* 3.71*      Hepatic Panel  Recent Labs   Lab 06/27/24  0510 06/26/24  0530 06/25/24  0420  06/24/24  0510   * 122* 115* 67*   * 167* 138* 76*   ALKPHOS 161* 155* 131 107   BILITOTAL 0.5 0.6 0.7 0.6   ALBUMIN 2.5* 2.8* 2.8* 2.7*     GLUCOSE:   Recent Labs   Lab 06/27/24  0510 06/26/24  0530 06/25/24  0420 06/24/24  1556 06/24/24  0510 06/23/24  1321   * 106* 112* 165* 121* 113*

## 2024-06-27 NOTE — PLAN OF CARE
"Neuro: A&Ox4. Pleasant, able to make needs known.  Cardiac: ST 100s-110s, 120s with activity. HM3, parameters WNL. Doppler maps 76-80.   Respiratory: Sating >92% on RA. IS & Flutter at bedside, done with encouragement.  GI/: Adequate urine output. No BM this shift.  Diet/appetite: Tolerating regular diet. Poor appetite. Continues to c/o foods tasting \"weird\".  Activity:  SBA, ambulated in welch x1 with GB/walker and to bathroom. Activity encouraged.  Pain: Comfortably managed on current regimen.  Skin: No new deficits noted.  LDA's:  -L PIV: SL  -HM3     Plan: Encourage OOB activity, IS/Flutter use & PO intake. Continue with POC. Notify primary team with change    Goal Outcome Evaluation: progressing    "

## 2024-06-28 ENCOUNTER — APPOINTMENT (OUTPATIENT)
Dept: GENERAL RADIOLOGY | Facility: CLINIC | Age: 54
End: 2024-06-28
Payer: COMMERCIAL

## 2024-06-28 ENCOUNTER — APPOINTMENT (OUTPATIENT)
Dept: CARDIOLOGY | Facility: CLINIC | Age: 54
End: 2024-06-28
Attending: NURSE PRACTITIONER
Payer: COMMERCIAL

## 2024-06-28 ENCOUNTER — APPOINTMENT (OUTPATIENT)
Dept: INTERVENTIONAL RADIOLOGY/VASCULAR | Facility: CLINIC | Age: 54
End: 2024-06-28
Attending: NURSE PRACTITIONER
Payer: COMMERCIAL

## 2024-06-28 LAB
ALBUMIN SERPL BCG-MCNC: 2.7 G/DL (ref 3.5–5.2)
ALP SERPL-CCNC: 179 U/L (ref 40–150)
ALT SERPL W P-5'-P-CCNC: 202 U/L (ref 0–70)
ANION GAP SERPL CALCULATED.3IONS-SCNC: 10 MMOL/L (ref 7–15)
AST SERPL W P-5'-P-CCNC: 116 U/L (ref 0–45)
BACTERIA BLD CULT: NO GROWTH
BACTERIA BLD CULT: NO GROWTH
BASOPHILS # BLD AUTO: 0.1 10E3/UL (ref 0–0.2)
BASOPHILS NFR BLD AUTO: 1 %
BILIRUB DIRECT SERPL-MCNC: <0.2 MG/DL (ref 0–0.3)
BILIRUB SERPL-MCNC: 0.4 MG/DL
BUN SERPL-MCNC: 13.6 MG/DL (ref 6–20)
CALCIUM SERPL-MCNC: 8.5 MG/DL (ref 8.6–10)
CHLORIDE SERPL-SCNC: 102 MMOL/L (ref 98–107)
CREAT SERPL-MCNC: 1.11 MG/DL (ref 0.67–1.17)
CRP SERPL-MCNC: 142 MG/L
DEPRECATED HCO3 PLAS-SCNC: 22 MMOL/L (ref 22–29)
EGFRCR SERPLBLD CKD-EPI 2021: 79 ML/MIN/1.73M2
EOSINOPHIL # BLD AUTO: 0.3 10E3/UL (ref 0–0.7)
EOSINOPHIL NFR BLD AUTO: 3 %
ERYTHROCYTE [DISTWIDTH] IN BLOOD BY AUTOMATED COUNT: 13.5 % (ref 10–15)
FACT X ACT/NOR PPP CHRO: 59 % (ref 70–130)
GLUCOSE SERPL-MCNC: 138 MG/DL (ref 70–99)
HCT VFR BLD AUTO: 32.8 % (ref 40–53)
HGB BLD-MCNC: 11 G/DL (ref 13.3–17.7)
IMM GRANULOCYTES # BLD: 0.2 10E3/UL
IMM GRANULOCYTES NFR BLD: 2 %
INR PPP: 1.69 (ref 0.85–1.15)
LVEF ECHO: NORMAL
LYMPHOCYTES # BLD AUTO: 1.5 10E3/UL (ref 0.8–5.3)
LYMPHOCYTES NFR BLD AUTO: 15 %
MAGNESIUM SERPL-MCNC: 2.1 MG/DL (ref 1.7–2.3)
MCH RBC QN AUTO: 30.3 PG (ref 26.5–33)
MCHC RBC AUTO-ENTMCNC: 33.5 G/DL (ref 31.5–36.5)
MCV RBC AUTO: 90 FL (ref 78–100)
MONOCYTES # BLD AUTO: 1.1 10E3/UL (ref 0–1.3)
MONOCYTES NFR BLD AUTO: 11 %
NEUTROPHILS # BLD AUTO: 7.1 10E3/UL (ref 1.6–8.3)
NEUTROPHILS NFR BLD AUTO: 68 %
NRBC # BLD AUTO: 0 10E3/UL
NRBC BLD AUTO-RTO: 0 /100
PHOSPHATE SERPL-MCNC: 2.8 MG/DL (ref 2.5–4.5)
PLATELET # BLD AUTO: 460 10E3/UL (ref 150–450)
POTASSIUM SERPL-SCNC: 3.8 MMOL/L (ref 3.4–5.3)
PROT SERPL-MCNC: 6.1 G/DL (ref 6.4–8.3)
RBC # BLD AUTO: 3.63 10E6/UL (ref 4.4–5.9)
SODIUM SERPL-SCNC: 134 MMOL/L (ref 135–145)
WBC # BLD AUTO: 10.2 10E3/UL (ref 4–11)

## 2024-06-28 PROCEDURE — 250N000013 HC RX MED GY IP 250 OP 250 PS 637

## 2024-06-28 PROCEDURE — 250N000013 HC RX MED GY IP 250 OP 250 PS 637: Performed by: PHYSICIAN ASSISTANT

## 2024-06-28 PROCEDURE — 80048 BASIC METABOLIC PNL TOTAL CA: CPT

## 2024-06-28 PROCEDURE — 71045 X-RAY EXAM CHEST 1 VIEW: CPT | Mod: 26 | Performed by: RADIOLOGY

## 2024-06-28 PROCEDURE — 82248 BILIRUBIN DIRECT: CPT | Performed by: NURSE PRACTITIONER

## 2024-06-28 PROCEDURE — 85260 CLOT FACTOR X STUART-POWER: CPT | Performed by: SURGERY

## 2024-06-28 PROCEDURE — 250N000011 HC RX IP 250 OP 636

## 2024-06-28 PROCEDURE — 85610 PROTHROMBIN TIME: CPT | Performed by: STUDENT IN AN ORGANIZED HEALTH CARE EDUCATION/TRAINING PROGRAM

## 2024-06-28 PROCEDURE — 99232 SBSQ HOSP IP/OBS MODERATE 35: CPT | Mod: 25 | Performed by: NURSE PRACTITIONER

## 2024-06-28 PROCEDURE — 71045 X-RAY EXAM CHEST 1 VIEW: CPT

## 2024-06-28 PROCEDURE — 120N000003 HC R&B IMCU UMMC

## 2024-06-28 PROCEDURE — 36415 COLL VENOUS BLD VENIPUNCTURE: CPT | Performed by: SURGERY

## 2024-06-28 PROCEDURE — 0W9B30Z DRAINAGE OF LEFT PLEURAL CAVITY WITH DRAINAGE DEVICE, PERCUTANEOUS APPROACH: ICD-10-PCS | Performed by: PHYSICIAN ASSISTANT

## 2024-06-28 PROCEDURE — 84100 ASSAY OF PHOSPHORUS: CPT

## 2024-06-28 PROCEDURE — 272N000502 IR CHEST TUBE PLACEMENT NON-TUNNELLED LEFT

## 2024-06-28 PROCEDURE — 86140 C-REACTIVE PROTEIN: CPT | Performed by: PHYSICIAN ASSISTANT

## 2024-06-28 PROCEDURE — 93750 INTERROGATION VAD IN PERSON: CPT | Performed by: NURSE PRACTITIONER

## 2024-06-28 PROCEDURE — 85025 COMPLETE CBC W/AUTO DIFF WBC: CPT

## 2024-06-28 PROCEDURE — 83735 ASSAY OF MAGNESIUM: CPT

## 2024-06-28 PROCEDURE — 36415 COLL VENOUS BLD VENIPUNCTURE: CPT

## 2024-06-28 PROCEDURE — 99232 SBSQ HOSP IP/OBS MODERATE 35: CPT | Mod: FS

## 2024-06-28 PROCEDURE — 93306 TTE W/DOPPLER COMPLETE: CPT

## 2024-06-28 PROCEDURE — 32557 INSERT CATH PLEURA W/ IMAGE: CPT | Mod: LT | Performed by: PHYSICIAN ASSISTANT

## 2024-06-28 PROCEDURE — 93306 TTE W/DOPPLER COMPLETE: CPT | Mod: 26 | Performed by: INTERNAL MEDICINE

## 2024-06-28 PROCEDURE — 250N000009 HC RX 250: Performed by: PHYSICIAN ASSISTANT

## 2024-06-28 PROCEDURE — 250N000013 HC RX MED GY IP 250 OP 250 PS 637: Performed by: SURGERY

## 2024-06-28 PROCEDURE — 999N000065 XR CHEST PORT 1 VIEW

## 2024-06-28 RX ORDER — GABAPENTIN 100 MG/1
100 CAPSULE ORAL 3 TIMES DAILY
Status: DISCONTINUED | OUTPATIENT
Start: 2024-06-28 | End: 2024-07-01

## 2024-06-28 RX ORDER — POTASSIUM CHLORIDE 750 MG/1
20 TABLET, EXTENDED RELEASE ORAL ONCE
Status: DISCONTINUED | OUTPATIENT
Start: 2024-06-28 | End: 2024-06-28

## 2024-06-28 RX ORDER — WARFARIN SODIUM 5 MG/1
5 TABLET ORAL
Status: COMPLETED | OUTPATIENT
Start: 2024-06-28 | End: 2024-06-28

## 2024-06-28 RX ORDER — POTASSIUM CHLORIDE 750 MG/1
20 TABLET, EXTENDED RELEASE ORAL ONCE
Status: COMPLETED | OUTPATIENT
Start: 2024-06-28 | End: 2024-06-28

## 2024-06-28 RX ORDER — LIDOCAINE HYDROCHLORIDE 10 MG/ML
1-30 INJECTION, SOLUTION EPIDURAL; INFILTRATION; INTRACAUDAL; PERINEURAL
Status: COMPLETED | OUTPATIENT
Start: 2024-06-28 | End: 2024-06-28

## 2024-06-28 RX ORDER — HYDROMORPHONE HCL IN WATER/PF 6 MG/30 ML
0.2 PATIENT CONTROLLED ANALGESIA SYRINGE INTRAVENOUS
Status: DISCONTINUED | OUTPATIENT
Start: 2024-06-28 | End: 2024-06-29

## 2024-06-28 RX ADMIN — LIDOCAINE HYDROCHLORIDE 3 ML: 10 INJECTION, SOLUTION EPIDURAL; INFILTRATION; INTRACAUDAL; PERINEURAL at 08:42

## 2024-06-28 RX ADMIN — POTASSIUM CHLORIDE 20 MEQ: 750 TABLET, EXTENDED RELEASE ORAL at 04:57

## 2024-06-28 RX ADMIN — GABAPENTIN 100 MG: 100 CAPSULE ORAL at 10:24

## 2024-06-28 RX ADMIN — HYDROMORPHONE HYDROCHLORIDE 0.2 MG: 0.2 INJECTION, SOLUTION INTRAMUSCULAR; INTRAVENOUS; SUBCUTANEOUS at 11:36

## 2024-06-28 RX ADMIN — GABAPENTIN 100 MG: 100 CAPSULE ORAL at 14:12

## 2024-06-28 RX ADMIN — CEFTRIAXONE SODIUM 2 G: 2 INJECTION, POWDER, FOR SOLUTION INTRAMUSCULAR; INTRAVENOUS at 10:25

## 2024-06-28 RX ADMIN — SPIRONOLACTONE 25 MG: 25 TABLET ORAL at 20:06

## 2024-06-28 RX ADMIN — GUAIFENESIN 600 MG: 600 TABLET ORAL at 10:11

## 2024-06-28 RX ADMIN — OXYCODONE HYDROCHLORIDE 10 MG: 10 TABLET ORAL at 20:06

## 2024-06-28 RX ADMIN — OXYCODONE HYDROCHLORIDE 10 MG: 10 TABLET ORAL at 13:17

## 2024-06-28 RX ADMIN — GUAIFENESIN 600 MG: 600 TABLET ORAL at 20:06

## 2024-06-28 RX ADMIN — ACETAMINOPHEN 650 MG: 325 TABLET, FILM COATED ORAL at 12:08

## 2024-06-28 RX ADMIN — GABAPENTIN 100 MG: 100 CAPSULE ORAL at 20:06

## 2024-06-28 RX ADMIN — PANTOPRAZOLE SODIUM 40 MG: 40 TABLET, DELAYED RELEASE ORAL at 10:11

## 2024-06-28 RX ADMIN — OXYCODONE HYDROCHLORIDE 10 MG: 10 TABLET ORAL at 09:21

## 2024-06-28 RX ADMIN — WARFARIN SODIUM 5 MG: 5 TABLET ORAL at 18:16

## 2024-06-28 RX ADMIN — METHOCARBAMOL 750 MG: 750 TABLET ORAL at 09:23

## 2024-06-28 RX ADMIN — LISINOPRIL 5 MG: 2.5 TABLET ORAL at 10:11

## 2024-06-28 RX ADMIN — LIDOCAINE 1 PATCH: 4 PATCH TOPICAL at 16:15

## 2024-06-28 RX ADMIN — METHOCARBAMOL 750 MG: 750 TABLET ORAL at 16:15

## 2024-06-28 ASSESSMENT — ACTIVITIES OF DAILY LIVING (ADL)
ADLS_ACUITY_SCORE: 30

## 2024-06-28 NOTE — PROGRESS NOTES
[facility]   Cardiology II Service / Advanced Heart Failure  Daily Consult Progress Note      Patient: Navin Lutz  MRN: [id number]  Admission Date: 6/3/2024  Hospital Day # 25    Assessment and Plan: Navin Lutz is a 53 year old male with HFrEF 2/2 NICM s/p ICD, HLD, and CKD Stage II who presents admitted for decompensated heart failure with NICM on milrinone listed status 4, admitted for consideration of advanced therapies and is now s/p HM3 LVAD on 6/14/24 with Dr. Jhaveri with course complicated by fevers and leukocytosis.    Recommendations:  - PO lasix 20 mg bid - aim for net even  - defer heparin gtt   - CT management per CVTS  - monitor hepatic panel    # Acute on chronic systolic heart failure secondary to NICM   # s/p HM3 LVAD as BTT on 6/14/24 (no active contraindications to transplant other than wait time)  Stage D. NYHA Class III confounded by recent surgery    -Fluid status: grossly euvolemic, lasix 20 mg bid  -ACEi/ARB/ARNi: lisinopril 5 mg daily   -Afterload reduction: discontinued hydralazine 25 mg TID  -Inotrope: dobutamine stopped on 6/19/24  -BB: contraindicated currently given recent LVAD implant  -Aldosterone antagonist: aldactone 25 mg daily   -SGLT2i: resume pta jardiance 10 mg daily  -SCD prophylaxis: ICD  -MAP: goal 65-85  -LDH trends: 346  -Anticoagulation: warfarin dosing per pharmacy, Following chromogenic factor 10: goal 20-40, dosing per pharmacy; 59 today - defer heparin per CVTS given chest tube output  -Antiplatelet: no ASA indicated per NICOLE trial results    # +Lupus anticoagulant  - negative testing in 2023, then + this admission for lupus anticoagulant  - following CFx as above per heme, goal 20-40  - per heme 6/25 repeat lupus anticoag panel end of July, may be able to switch back to INR monitoring    # Leukocytosis, pneumonia, resolved  # Fevers, resolved  # Left pleural effusion s/p thoracentesis 6/25 then pigtail 6/28  Concern for infection, +productive  cough. CT chest/abd/pelvis 6/22/24 anterior chest subcutaneous air and stranding with substernal gas, read as infectious vs postprocedureal, felt to be procedural per CVTS. Also with b/l pleural effusions and mild ground glass in the lung bases concerning for pneumonia. Resp culture negative. UA negative. New diarrhea with abdominal cramping on 6/24 after 3-4 days of abx.  -Appreciate ID consult  -IR diagnostic and therapeutic thora 6/25. 500mL drained  -On Vanc/Zosyn since 6/21 and added azithromycin 6/22   - stop vanco 6/26 per Dr Sebastien lake course   - currently on rocpehin, plan for total 10 days ok for levaquin per ID (through 6/30)  -CRP and WBC downtrending- WBC wnl and   -6/21/24 blood cultures NGTD  -cdif negative 6/24  -s/p pigtail chest tube 6/28, 1.5L drained immediately     # Right radial artery occlusion 2/2 arterial line   # Right subclavian and axillary vein thrombus, nonocclusive, known prior to VAD.   Redemonstrated DVT 6/22 CT, likely old clot vs line related from right neck swan in ICU.  - warfarin as above, follow chromogenic factor X  - warning signs for arterial clot reviewed with patient and his wife     # Elevated LFTs  - AST/ALT trending up this week, now stable, 100s, no symptoms   - trend daily  - diuresis as above  - US abd negative for cause 6/27  - hold statin for now, rec pharmacy review of other meds ?Rocephin    # HLD  - hold atorvastatin 20 mg daily as above     # CKD stage 2. B/l cr 1.2-1-4  - Cr stable 1.1      Patient discussed with Dr. Dallas.        40 minutes spent on the date of the encounter doing chart review, history and exam, documentation and further activities per the note    Desire Silverman DNP, NP-C  Nurse Practitioner - Advanced Heart Failure/Cardiology II Service  Jany preferred or Pager [phone]    ================================================================    Subjective/24-Hr Events:   Last 24 hr care team notes reviewed. Tolerated chest tube  insertion but having site pain. Tube imemdiately drained 1.5L. patient unable to tell if breathing improved yet. Remains on RA. Weight stable.     ROS:  4 point ROS including respiratory, CV, GI and  (other than that noted in the HPI) is negative.     Medications: Reviewed in EPIC.     Physical Exam:   BP (!) 81/65   Pulse 113   Temp 98.1  F (36.7  C) (Oral)   Resp 16   Ht 1.829 m (6')   Wt 90.2 kg (198 lb 13.7 oz)   SpO2 92%   BMI 26.97 kg/m      GENERAL: Appears comfortable and in no distress, non-toxic  HEENT: Eye symmetrical, no discharge or icterus bilaterally.   NECK: Supple, JVD at clavicle at 90 degrees.   CV: Hum of LVAD, no adventitious sounds  RESPIRATORY: Respirations regular, even, and unlabored. Lungs CTA throughout.    GI: Soft and non distended with normoactive bowel sounds present No tenderness, rebound, guarding.   EXTREMITIES: No b/l lower extremity peripheral edema. All extremities are warm and well perfused  NEUROLOGIC: Alert and interacting appropriately.   SKIN: No jaundice. No rashes or lesions. Surgical sites per CVTS, sternum was c/d/i    Labs:  CMP  Recent Labs   Lab 06/28/24  0513 06/28/24  0139 06/27/24  0510 06/26/24  0530 06/25/24  0420   NA  --  134* 133* 132* 133*   POTASSIUM  --  3.8 4.0 3.7 3.7   CHLORIDE  --  102 104 100 100   CO2  --  22 18* 22 22   ANIONGAP  --  10 11 10 11   GLC  --  138* 102* 106* 112*   BUN  --  13.6 13.4 17.8 18.3   CR  --  1.11 1.01 1.19* 1.17   GFRESTIMATED  --  79 89 73 75   NAM  --  8.5* 8.1* 8.4* 8.4*   MAG  --  2.1 2.1 2.1 2.2   PHOS 2.8  --  2.6 2.9 2.6   PROTTOTAL 6.1*  --  5.6* 6.0* 5.8*   ALBUMIN 2.7*  --  2.5* 2.8* 2.8*   BILITOTAL 0.4  --  0.5 0.6 0.7   ALKPHOS 179*  --  161* 155* 131   *  --  115* 122* 115*   *  --  174* 167* 138*       CBC  Recent Labs   Lab 06/28/24  0513 06/27/24  0510 06/26/24  0530 06/25/24  0420   WBC 10.2 10.9 12.4* 13.3*   RBC 3.63* 3.32* 3.57* 3.47*   HGB 11.0* 9.8* 10.9* 10.5*   HCT 32.8* 30.6*  32.3* 31.6*   MCV 90 92 91 91   MCH 30.3 29.5 30.5 30.3   MCHC 33.5 32.0 33.7 33.2   RDW 13.5 13.6 13.6 14.0   * 416 378 337       INR  Recent Labs   Lab 06/28/24  0513 06/27/24  0510 06/26/24  0530 06/25/24  0420   INR 1.69* 1.66* 1.85* 2.76*

## 2024-06-28 NOTE — PROGRESS NOTES
All VAD education is complete.   Both patient and Corie verbalized understanding of and/or correctly demonstrated the ability to:   -Switch power sources  -Identify controller and states function, power sources, and Battery charger function, alarms, and alarms management.   -Perform Self test on controller   -State VAD precautions and warnings (including but not limited to: travel bags within arms reach at all time, using AC power while sleeping, avoid total immersion in water, boating, MRIs, metal detectors, contact sports, vacuuming or activity that might create static electricity).   -Identify an emergency and state the correct action in the event of an emergency.   -Recognize situations that require paging VAD coordinator and demonstrate proper paging technique.   -Patient and Corie present for doppler blood pressure education.   -Instructions given on proper application of blood pressure cuff.   -Instructions given on auscultating antecubital pulse using the doppler  -Instructions given on how to pump up the cuff and slowly release to obtain the doppler 'ed pressure.   -Patient and Corie able to recite back instructions.  -Corie demonstrated proper technique.  -Determine procedures that necessitate prophylactic antibiotics.   -Warfarin is managed by [facility] LVAD anticoagulation clinic. Pt understands that (s)he should never stop or change coumadin dose unless directed to do so by either VAD team or [facility] anticoagulation.  -Verbalized understanding of VAD parameters, normal limits, and actions required when outside of range.    -Corie demonstrated removing the old dressing, cleaning the exit site, and applying new dressing using sterile technique. Understands need for DL immobilization and signs/symptoms of infection.  -Patient and Corie passed written test.  -Patient confirms having working  3-pronged Petta outlets at home.  -Critical life-sustaining medical equipment letter faxed to power company.  -VAD  information packet faxed to pt's identified EMS service, primary care provider, and local cardiologist (if applicable).  -Referral sent to [facility] medication monitoring clinic for warfaring management and dosing. Pt will have labs drawn at local clinic as outpatient  -Driveline exit site supplies will be ordered from undecided at this point    **Change controller still needs to be completed. Will either complete in hospital Monday or at first clinic visit.  OK to discontinue without this.  They both verbalized understanding that patient should only change controller after discussion with VAD Coordinator and in the presence of a trained caregiver whenever possible.

## 2024-06-28 NOTE — PROCEDURES
[facility]    Procedure: IR Procedure Note    Date/Time: 6/28/2024 9:01 AM    Performed by: Larry Rosenberg Chi, PA-C  Authorized by: Larry Rosenberg Chi, PA-C      UNIVERSAL PROTOCOL   Site Marked: NA  Prior Images Obtained and Reviewed:  Yes  Required items: Required blood products, implants, devices and special equipment available    Patient identity confirmed:  Verbally with patient, arm band, provided demographic data and hospital-assigned identification number  Patient was reevaluated immediately before administering moderate or deep sedation or anesthesia  Confirmation Checklist:  Patient's identity using two indicators, relevant allergies, procedure was appropriate and matched the consent or emergent situation and correct equipment/implants were available  Time out: Immediately prior to the procedure a time out was called    Universal Protocol: the Joint Commission Universal Protocol was followed    Preparation: Patient was prepped and draped in usual sterile fashion       ANESTHESIA    Anesthesia:  Local infiltration  Local Anesthetic:  Lidocaine 1% without epinephrine      SEDATION    Patient Sedated: No    See dictated procedure note for full details.  Findings: Left small pleural effusion     Specimens: none    Complications: None    Condition: Stable    Plan: Tranport back to inpatient bed for recovery       PROCEDURE  Describe Procedure: Completed US guided chest tube placement into left pleural space with 12FR LOCKING Skater catheter.  No fluid requested for diagnostic testing.   Connected to [facility].  DSD placed.  Patient to  travel back to inpatient bed on [facility].     Management of chest tube per primary team.      Patient Tolerance:  Patient tolerated the procedure well with no immediate complications  Length of time physician/provider present for 1:1 monitoring during sedation: 0

## 2024-06-28 NOTE — PROGRESS NOTES
Cardiovascular Surgery Progress Note  06/28/2024         Assessment and Plan:     Navin Lutz is a 53 year old male with PMH of CKD2, HLD, R Subclavian and axillary vein non-occlusive thrombus on Warfarin, NSVT, HFrEF 2/2 NICM s/p ICD (PTA IV milrinone) who presents admitted 6/3/24 for decompensated HFrEF listed status 4. He is now s/p LVAD by Dr. Jhaveri on 6/14/24.     Cardiovascular:   S/p LVAD on 6/14 by Dr. Jhaveri  S/p IABP (6/12-6/14)  Hx NSVT  Acute on chronic HFrEF 2/2 to NICM (EF 10-15%), home milrinone PTA, s/p ICD   HLD  HTN  Hypervolemia  Ventricular tachycardia  20 beat run of VT overnight, otherwise ongoing sinus tachycardia, HD stable. MAPs ~70-80  Pre-op echo 6/8: LV EF 15-20%, normal RV size/function  Echo ordered 6/28 in light of new VT, pending  - PTA atorvastatin 20 mg daily, on hold due to elevated LFTs  - Lisinopril 5 mg daily  - Cards 2 following for hemodynamic & GDMT management; appreciate recs  - Holding PTA Coreg, Entresto, Aldactone, Jardiance   - PI dropped to 2.2 on 6/26, diuretic therapy held    Chest tubes/TPW: removed in ICU    Pulmonary:  - Extubated POD 3 to 5 lpm via NC. Now saturating well on RA  - Supplemental O2 PRN to keep sats > 92%. Wean off as tolerated.  - Pulm toilet, IS, activity and deep breathing  - DuoNebs QID, Mucinex BID    Left pleural effusion - s/p thoracentesis, pigtail placement  - IR performed thoracentesis on 6/25 with removal of 500 ml from left pleural space. Fluid removal limited by pain. 6/27 CXR with persistent pleural effusion.   - S/p pigtail placement on 6/28 by IR. On return to the floor, immediate drainage of 1500cc serosanguinous fluid. Chest tube clamped X1 hour. STAT chest xray showed small left apical pneumothorax. Chest tube unclamped, no immediate drainage. No air leak. Repeat chest xray ordered for 1300.     Neurology /Psych:  Acute post-operative pain  - Acute post-operative pain regimen:  - Scheduled: lidocaine patches, gabapentin  - PRN:  PO oxycodone PRN, Robaxin, Voltaren gel, Atarax, Tylenol  - Added PRN IV dilaudid on 6/28 for pigtail- and procedure-associated pain, plan to discontinue when chest tube is removed or earlier is tolerated     / Renal:  CKD Stage 2  Contraction alkalosis  Hyponatremia  - Baseline creatinine ~1.1-1.2. Most recent creatinine 1.11, adequate UOP   - Pre-op weight 209 lbs, most recent weight 198 lbs  - Diuresis per Cards 2  - Replace electrolytes per protocol    GI / FEN:   At risk for protein calorie malnutrition  Diarrhea  Elevated LFTs  - Regular diet  - Attempted to place NJ in the ICU, unable to place   - Regular BMs, continue bowel reg PRN  - New onset diarrhea 6/24, C. Diff negative  - Hepatic enzymes increasing. Multiple medications could contribute (statin/tylenol/azithromycin/ceftriaxone). 6/27 RUQ US showed patent hepatic vasculature and no evidence of cholelithiasis. Will continue to trend LFTs    Endocrine:  Stress-induced hyperglycemia, resolved  Pre-op Hgb A1C 5.6%  - Managed on insulin drip postop, transitioned to sliding scale goal BG <180 and has now also been discontinued due to good BG control with no insulin need.   - TSH ordered with facial flushing, WNL    Infectious Disease:  Stress induced leukocytosis, improving  HCAP  - WBC now WNL,  and down-trending, remains afebrile  - Completed perioperative LVAD antibiotics   - CT CAP 6/22 with anterior chest subcutaneous air and stranding.  Gas and fluid collection in the substernal region which could be infectious versus postprocedural. Bilateral pleural effusions with adjacent atelectasis and mild groundglass opacities.  - CT results were reviewed with Dr. Miller and Dr. Jhaveri - no indication for surgical washout at this time, plan to continue on broad spectrum antibiotics for at least 2 weeks per Dr. Jhaveri  - sputum culture 6/22 + for Klebsiella pneumoniae  - UA 6/21 non-infectious  - blood cultures x2 6/21, 6/23 NGTD  - 6/25 thoracentesis labs  without culture growth, Lights' criteria supportive of exudative effusion, possibly clouded by high serum LDH in the setting of LVAD  -  ID consulted 6/23, recommendations:  - K. Pneumonieae susceptible to ceftriaxone, may narrow to ceftriaxone 2 g daily for 7-10 day course  - follow thoracentesis studies    Antibiotics:  - Ceftriaxone 6/27 - 6/30 (or transition to levofloxacin PO at discharge if sooner than 6/30, cleared with surgeon)  - Empiric IV vancomycin/zosyn 6/21 - 6/27 per surgeon  - PO azithromycin 6/22 - 6/26    Hematology:   Acute blood loss anemia, stable  Acute blood loss thrombocytopenia, resolved  Right brachial non-occlusive thrombus  Right internal jugular non-occlusive thrombus  Right radial artery occlusion 2/2 arterial line  Hgb stable; Plt WNL, no signs or symptoms of active bleeding  - Daily CBC  - Upper extremity arterial ultrasound completed 6/25  - Discussed warning signs for radial artery occlusion with patient and wife. Bilateral hands warm and well-perfused on exam this morning.    Anticoagulation:   Chronic anticoagulation for LVAD, CFX goal 20-40%  +Lupus anticoagulant   Partially occlusive internal jugular thrombus, partially occlusive brachial venous thrombus  - Warfarin for LVAD  - INRs noted to be supra-therapeutic and discordant with Chromogenic Factor X, hematology consulted 6/21 - lupus anticoagulant positive  - Plan to use Chromogenic factor X for warfarin dosing as INR is not reliable, goal CFX 20-40%, most recent factor X not within goal.   - No heparin gtt to bridge since 6/27 per discussion with surgeon. Continue to assess daily indication.   - Hematology paged 6/25, signed off, recommend repeat outpatient testing for Lupus anticoagulant at the end of July. If negative, INR monitoring may be an option. Okay to reach out to heme outpatient team with questions.    MSK/Skin:  Sternotomy  - PT/OT    Prophylaxis:   - Stress ulcer prophylaxis: Pantoprazole 40 mg daily for 30  days  - DVT prophylaxis: warfarin, SCD    Disposition:   - Transferred to  on 6/21  - Therapies recommending discharge to ARU vs home once medically ready. Barriers to discharge include need for LVAD education, infection control, pain control, need for thoracentesis. LVAD education started 6/24, anticipated through 6/28.    Medically Ready for Discharge: Anticipated in 2-4 Days      Clinically Significant Risk Factors              # Hypoalbuminemia: Lowest albumin = 2.5 g/dL at 6/27/2024  5:10 AM, will monitor as appropriate        # End stage heart failure: home medication list includes inotropes          #Precipitous drop in Hgb/Hct: Lowest Hgb this hospitalization: 9.1 g/dL. Will continue to monitor and treat/transfuse as appropriate.     # Overweight: Estimated body mass index is 26.97 kg/m  as calculated from the following:    Height as of this encounter: 1.829 m (6').    Weight as of this encounter: 90.2 kg (198 lb 13.7 oz).        # Financial/Environmental Concerns: none  # Asthma: noted on problem list  # ICD device present     Discussed with Dr. Jhaveri who also physically rounded on & examined this patient.     Betzaida Baker PA-C  Cardiothoracic Surgery  Pager [phone]    7:12 AM June 28, 2024        Interval History:   Had a 20-beat run of VT. Otherwise no acute events overnight. No new complaints.  Had pleural pigtail placement this AM and increased pain following this. He reports chest pain localized to chest wall at pigtail insertion. This is aggravated by deep breathing and movement. He feels like he has a muscle spasm at insertion site. These symptoms improved with medication.   Continues to have heightened sense of smell and taste.  Reports ongoing facial flushing and forehead sweating overnight.  Tolerating diet, is passing flatus, + BM. No nausea or vomiting. Denies abdominal pain.   Working with therapies.  Denies palpitations, dizziness, syncopal symptoms, fevers, chills, myalgias, sternal  popping/clicking, dysuria, diarrhea.          Physical Exam:   Blood pressure (!) 81/65, pulse 113, temperature 98.1  F (36.7  C), temperature source Oral, resp. rate 16, height 1.829 m (6'), weight 90.2 kg (198 lb 13.7 oz), SpO2 92%.  Vitals:    24 0549 24 0546 24 0444   Weight: 91.4 kg (201 lb 8 oz) 89 kg (196 lb 3.4 oz) 90.2 kg (198 lb 13.7 oz)     Gen:  in discomfort after pigtail placement, sitting up in the chair, wife at bedside, working with therapy  Neuro: no focal deficits, A&Ox4  CV: sinus tachycardia, +LVAD hum   Pulm: CTA in all lung fields, splinted breathing on RA  Abd: nondistended, normal BS, soft, mildly tender with deep palpation, no peritoneal signs  Ext: trace peripheral edema, non- pitting  Skin:   Incision: clean, dry, intact, no erythema, sternum stable  Tubes/drain sites: dressing clean and dry, serosanguinous drainage  Lines: driveline dressing clean and dry     Heartmate 3 LEFT VS  Flow (Lpm): 4.6 Lpm  Pulse Index (PI): 3.9 PI  Speed (rpm): 5400 rpm  Power (bassett): 3.8 bassett  Current Hct settin         Data:    Imaging:  reviewed recent imaging, no acute concerns  US Abdomen Limited w Abdomen Doppler Limited  Narrative: EXAM: US ABDOMEN LIMITED W ABDOMEN DOPPLER LIMITED, 2024 8:45 AM    COMPARISON: CT CAP 2024    HISTORY: decreasing liver function and mild RUQ pain    FINDINGS:     Fluid: Trace right effusion.    Liver: The liver demonstrates normal echogenicity, measuring 16 cm in  craniocaudal dimension. There is no focal mass. Left lobe of the liver  is not well seen.    Extrahepatic portal vein flow is antegrade at 41 cm/s.  Right portal vein flow is antegrade, measuring 10 cm/s.  Left portal vein flow is antegrade, measuring 10 cm/s.    Flow in the hepatic artery is towards the liver and:  65 cm/s peak systolic  Low resistance hepatic artery waveform likely due to known LVAD.    The splenic vein is not visualized.  The left, middle, and right  hepatic  veins are patent with flow towards the IVC. The IVC is patent  with flow towards the heart.      Gallbladder: There is no wall thickening, pericholecystic fluid or  evidence for cholelithiasis.    Bile Ducts: The visualized common bile duct measures 0.4 cm in  diameter.    Pancreas: Visualized portions of the head and body of the pancreas are  unremarkable.     Kidney: The right kidney measures 11.0 cm long. There is no  hydronephrosis or hydroureter, no shadowing renal calculi, cystic  lesion or mass.   Impression: IMPRESSION:     1. Patent hepatic vasculature by Doppler evaluation. Altered arterial  waveform likely secondary to left ventricular assist device dependent  circulation.  2. No cholelithiasis demonstrated .  3. Limited visualization of the left lobe of liver, within this  limitation no focal lesion demonstrated    I have personally reviewed the examination and initial interpretation  and I agree with the findings.    ETHAN LION MD         SYSTEM ID:  [id number]  XR Chest Port 1 View  Narrative: Exam: TEMPORARY, 6/27/2024 5:56 AM    Indication: Left pleural effusion, LVAD    Comparison: 6/25/2024    Findings:   Single view of the chest. Left chest wall ICD with leads in stable  position. Post surgical changes in the chest with intact median  sternotomy wires. LVAD. Trachea is midline. Cardiac silhouette is  stable. Continued hazy opacities in the left midlung and retrocardiac  opacities. Large left pleural effusion. No pneumothorax.  Impression: Impression: Stable large left pleural effusion. Continued hazy  opacities in the left midlung, likely atelectasis.    I have personally reviewed the examination and initial interpretation  and I agree with the findings.    JACOB OLIVARES MD         SYSTEM ID:  [id number]        Labs:  [facility]  Recent Labs   Lab 06/28/24  0139 06/27/24  0510 06/26/24  0530 06/25/24  0420   * 133* 132* 133*   POTASSIUM 3.8 4.0 3.7 3.7   CHLORIDE 102 104 100 100   NAM 8.5* 8.1*  8.4* 8.4*   CO2 22 18* 22 22   BUN 13.6 13.4 17.8 18.3   CR 1.11 1.01 1.19* 1.17   * 102* 106* 112*     CBC  Recent Labs   Lab 06/28/24  0513 06/27/24  0510 06/26/24  0530 06/25/24  0420   WBC 10.2 10.9 12.4* 13.3*   RBC 3.63* 3.32* 3.57* 3.47*   HGB 11.0* 9.8* 10.9* 10.5*   HCT 32.8* 30.6* 32.3* 31.6*   MCV 90 92 91 91   MCH 30.3 29.5 30.5 30.3   MCHC 33.5 32.0 33.7 33.2   RDW 13.5 13.6 13.6 14.0   * 416 378 337     INR  Recent Labs   Lab 06/28/24  0513 06/27/24  0510 06/26/24  0530 06/25/24  0420   INR 1.69* 1.66* 1.85* 2.76*      Hepatic Panel  Recent Labs   Lab 06/28/24  0513 06/27/24  0510 06/26/24  0530 06/25/24  0420   * 115* 122* 115*   * 174* 167* 138*   ALKPHOS 179* 161* 155* 131   BILITOTAL 0.4 0.5 0.6 0.7   ALBUMIN 2.7* 2.5* 2.8* 2.8*     GLUCOSE:   Recent Labs   Lab 06/28/24  0139 06/27/24  0510 06/26/24  0530 06/25/24  0420 06/24/24  1556 06/24/24  0510   * 102* 106* 112* 165* 121*

## 2024-06-28 NOTE — PROGRESS NOTES
Neuro: A&Ox4.   Cardiac: LVAD doppler MAP: 82, 82, 83. Multiple runs of MD robb notified of 20 beat run overnight. See imported ECGs.  Respiratory: 2L NC overnight for O2 dropping in the upper 80s  GI/: Adequate urine output. No BM this shift  Diet/appetite: Low fat low sodium. Calorie counts through the end of today.   Activity:  Up with standby assist by bedside overnight   Pain: At acceptable level on current regimen.   Skin: No new deficits noted.   LDA's: L PIV SL    Plan: Continue with POC. Notify primary team with changes. Orders for thoracentesis. No scheduled time yet. No change in diet orders at this time.

## 2024-06-28 NOTE — PROVIDER NOTIFICATION
Time of notification: 5:09 PM  Provider notified: Najma Currie  Patient status: PI range 2.2- 2.5 in past hour - notifying per order parameters. No low PI alarms.   Temp:  [97.5  F (36.4  C)-98.9  F (37.2  C)] 98.1  F (36.7  C)  Pulse:  [102-116] 107  Resp:  [14-18] 14  SpO2:  [92 %-98 %] 95 %  Orders received:   Provider to update team.

## 2024-06-28 NOTE — PLAN OF CARE
Hours of care: [id number]    Neuro: A&Ox4. R finger numbness.   Cardiac: Heartmate 3 LVAD. Doppler MAP 78-89. VSS.   Respiratory: Sating >95% on RA.  GI/: Adequate urine output via urinal. No BM this shift.  Diet/appetite: Tolerating regular diet. Poor appetite.  Activity:  Assist of 1, up to chair.  Pain: At acceptable level on current regimen. C/o constant cramping around chest tube. Lidocaine patch in place. Robaxin, Oxycodone, and Dilaudid given with adequate relief.  Skin: No new deficits noted.  LDA's: L PIV (TKO), LVAD    Plan: Continue with POC. Notify primary team with changes.    Pertinent shift events: Chest tube placed in AM - immediately drained 1400 mL upon arrival back to 6B from IR. CT clamped, team to bedside. CT unclamped per CVTS after ~1 hour with minimal drainage thereafter. Low PI's in afternoon - team notified.    Goal Outcome Evaluation:      Plan of Care Reviewed With: patient, spouse    Overall Patient Progress: improvingOverall Patient Progress: improving    Outcome Evaluation: Pt reports improved SOB today post chest tube placement.

## 2024-06-28 NOTE — PROGRESS NOTES
Calorie Count  Intake recorded for: 6/27  Total Kcals: 1498 Total Protein: 46g  Kcals from Hospital Food: 1498   Protein: 46g  Kcals from Outside Food (average):0 Protein: 0g  # Meals Ordered from Kitchen: 2  # Meals Recorded: 2  1: 100% two pancakes with syrup and strawberry sauce, 8oz whole milk, yogurt  2: 100% 8oz whole milk, chili, 50% cream of potato soup, 50% cantaloupe, 10% grilled cheese on white bread  # Supplements Recorded: 0

## 2024-06-28 NOTE — IR NOTE
Patient Name: Navin Lutz  Medical Record Number: [id number]  Today's Date: 6/28/2024    Procedure: left chest tube placement  Proceduralist: ENRIQUE Rosenberg PA-C  Pathology present: n/a  Brief completed: n/a    Procedure Start: 0836  Procedure end: 0852  Sedation medications administered: local lidocaine only    Report given to: Rosalinda KWOK RN  : N/A    Other Notes: Pt arrived to IR room 07 from 6B. Consent reviewed. Pt denies any questions or concerns regarding procedure. Pt positioned sitting and monitored per protocol. Pt tolerated procedure without any noted complications. Left posterior/lateral chest site cleansed and dressed per protocol. Pt transferred back to 6B.

## 2024-06-28 NOTE — PROGRESS NOTES
Time of notification: 1:01 AM  Provider notified: Pat Galindo MD  Patient status: Patient had a 20 beat run of Vtach @0034. Patient stable, doppler MAP of 82. PI of 3.5      Temp:  [97.4  F (36.3  C)-98.9  F (37.2  C)] 98.5  F (36.9  C)  Pulse:  [109-122] 110  Resp:  [16-18] 16  SpO2:  [93 %-95 %] 95 %    Orders received: Pending.

## 2024-06-28 NOTE — PROGRESS NOTES
LVAD dressing  Wife, Corie, ok to do dressing change with RN monitoring for glove sterility.  Use daily dressing kit w/ micorpore surgical tape (order # [id number]).  No skin prep

## 2024-06-28 NOTE — CONSULTS
SPIRITUAL HE[facility] SERVICES Consult Note  [facility] ([place] 6B    Oriented Navin to Spirit[facility] Service. Navin expressed that he would like a  to come back another time. He mentioned that he feels he has a good support system.    I plan to follow up with Navin on Monday (07/01) if he is still in this unit. Spirit[facility] Services remains available upon request.    Dav Benavides   Intern  Pager [phone]    * [facility] remains available 24/7 for emergent requests/referrals, either by having the switchboard page the on-call  or by entering an ASAP/STAT consult in Epic (this will also page the on-call ). Routine Epic consults receive an initial response within 24 hours.*

## 2024-06-28 NOTE — PROGRESS NOTES
The patient's HeartMate LVAD was interrogated 6/28/2024  * Speed 5400 rpm   * Pulsatility index 2.8-3.3   * Power 3.8 Pizano   * Flow 4.6-5 L/minute   Fluid status: near euvolemic  Alarms were reviewed, and notable for occasional pi events, no alarms.   The driveline exit site was inspected, c/d/i.   All external components were inspected and showed no evidence of damage or malfunction, none replaced.   No changes to VAD settings made

## 2024-06-28 NOTE — PROGRESS NOTES
IR progress note.    Pt on IR schedule 6/28 for left thoracentesis. Discussion between Dr. Haji from IR and Dr. Jhaveri from cardiology. Requesting chest tube and not thoracentesis.    Order modified. IR charge RN updated.    Libertad Hunt DNP, APRN  Interventional Radiology   IR on-call pager: [phone]

## 2024-06-29 ENCOUNTER — APPOINTMENT (OUTPATIENT)
Dept: GENERAL RADIOLOGY | Facility: CLINIC | Age: 54
End: 2024-06-29
Attending: STUDENT IN AN ORGANIZED HEALTH CARE EDUCATION/TRAINING PROGRAM
Payer: COMMERCIAL

## 2024-06-29 ENCOUNTER — APPOINTMENT (OUTPATIENT)
Dept: PHYSICAL THERAPY | Facility: CLINIC | Age: 54
End: 2024-06-29
Attending: STUDENT IN AN ORGANIZED HEALTH CARE EDUCATION/TRAINING PROGRAM
Payer: COMMERCIAL

## 2024-06-29 LAB
ALBUMIN SERPL BCG-MCNC: 2.8 G/DL (ref 3.5–5.2)
ALP SERPL-CCNC: 181 U/L (ref 40–150)
ALT SERPL W P-5'-P-CCNC: 218 U/L (ref 0–70)
ANION GAP SERPL CALCULATED.3IONS-SCNC: 10 MMOL/L (ref 7–15)
AST SERPL W P-5'-P-CCNC: 114 U/L (ref 0–45)
BASOPHILS # BLD AUTO: 0 10E3/UL (ref 0–0.2)
BASOPHILS NFR BLD AUTO: 0 %
BILIRUB DIRECT SERPL-MCNC: <0.2 MG/DL (ref 0–0.3)
BILIRUB SERPL-MCNC: 0.4 MG/DL
BUN SERPL-MCNC: 11.7 MG/DL (ref 6–20)
CALCIUM SERPL-MCNC: 8.6 MG/DL (ref 8.6–10)
CHLORIDE SERPL-SCNC: 104 MMOL/L (ref 98–107)
CREAT SERPL-MCNC: 0.89 MG/DL (ref 0.67–1.17)
CRP SERPL-MCNC: 97 MG/L
DEPRECATED HCO3 PLAS-SCNC: 21 MMOL/L (ref 22–29)
EGFRCR SERPLBLD CKD-EPI 2021: >90 ML/MIN/1.73M2
EOSINOPHIL # BLD AUTO: 0.3 10E3/UL (ref 0–0.7)
EOSINOPHIL NFR BLD AUTO: 4 %
ERYTHROCYTE [DISTWIDTH] IN BLOOD BY AUTOMATED COUNT: 13.5 % (ref 10–15)
FACT X ACT/NOR PPP CHRO: 58 % (ref 70–130)
GLUCOSE SERPL-MCNC: 111 MG/DL (ref 70–99)
HCT VFR BLD AUTO: 33.5 % (ref 40–53)
HGB BLD-MCNC: 11 G/DL (ref 13.3–17.7)
IMM GRANULOCYTES # BLD: 0.1 10E3/UL
IMM GRANULOCYTES NFR BLD: 1 %
INR PPP: 1.56 (ref 0.85–1.15)
LYMPHOCYTES # BLD AUTO: 1.9 10E3/UL (ref 0.8–5.3)
LYMPHOCYTES NFR BLD AUTO: 19 %
MAGNESIUM SERPL-MCNC: 2.1 MG/DL (ref 1.7–2.3)
MCH RBC QN AUTO: 29.3 PG (ref 26.5–33)
MCHC RBC AUTO-ENTMCNC: 32.8 G/DL (ref 31.5–36.5)
MCV RBC AUTO: 89 FL (ref 78–100)
MONOCYTES # BLD AUTO: 0.9 10E3/UL (ref 0–1.3)
MONOCYTES NFR BLD AUTO: 9 %
NEUTROPHILS # BLD AUTO: 6.5 10E3/UL (ref 1.6–8.3)
NEUTROPHILS NFR BLD AUTO: 67 %
NRBC # BLD AUTO: 0 10E3/UL
NRBC BLD AUTO-RTO: 0 /100
PHOSPHATE SERPL-MCNC: 3 MG/DL (ref 2.5–4.5)
PLATELET # BLD AUTO: 428 10E3/UL (ref 150–450)
POTASSIUM SERPL-SCNC: 4 MMOL/L (ref 3.4–5.3)
PROT SERPL-MCNC: 5.9 G/DL (ref 6.4–8.3)
RBC # BLD AUTO: 3.75 10E6/UL (ref 4.4–5.9)
SODIUM SERPL-SCNC: 135 MMOL/L (ref 135–145)
WBC # BLD AUTO: 9.8 10E3/UL (ref 4–11)

## 2024-06-29 PROCEDURE — 86140 C-REACTIVE PROTEIN: CPT | Performed by: PHYSICIAN ASSISTANT

## 2024-06-29 PROCEDURE — 99232 SBSQ HOSP IP/OBS MODERATE 35: CPT | Mod: 25 | Performed by: NURSE PRACTITIONER

## 2024-06-29 PROCEDURE — 85610 PROTHROMBIN TIME: CPT | Performed by: STUDENT IN AN ORGANIZED HEALTH CARE EDUCATION/TRAINING PROGRAM

## 2024-06-29 PROCEDURE — 120N000003 HC R&B IMCU UMMC

## 2024-06-29 PROCEDURE — 84100 ASSAY OF PHOSPHORUS: CPT

## 2024-06-29 PROCEDURE — 250N000013 HC RX MED GY IP 250 OP 250 PS 637

## 2024-06-29 PROCEDURE — 250N000011 HC RX IP 250 OP 636: Performed by: STUDENT IN AN ORGANIZED HEALTH CARE EDUCATION/TRAINING PROGRAM

## 2024-06-29 PROCEDURE — 85260 CLOT FACTOR X STUART-POWER: CPT | Performed by: SURGERY

## 2024-06-29 PROCEDURE — 71045 X-RAY EXAM CHEST 1 VIEW: CPT | Mod: 26 | Performed by: RADIOLOGY

## 2024-06-29 PROCEDURE — 80053 COMPREHEN METABOLIC PANEL: CPT | Performed by: NURSE PRACTITIONER

## 2024-06-29 PROCEDURE — 36415 COLL VENOUS BLD VENIPUNCTURE: CPT | Performed by: STUDENT IN AN ORGANIZED HEALTH CARE EDUCATION/TRAINING PROGRAM

## 2024-06-29 PROCEDURE — 83735 ASSAY OF MAGNESIUM: CPT

## 2024-06-29 PROCEDURE — 250N000013 HC RX MED GY IP 250 OP 250 PS 637: Performed by: SURGERY

## 2024-06-29 PROCEDURE — 250N000013 HC RX MED GY IP 250 OP 250 PS 637: Performed by: PHYSICIAN ASSISTANT

## 2024-06-29 PROCEDURE — 250N000013 HC RX MED GY IP 250 OP 250 PS 637: Performed by: NURSE PRACTITIONER

## 2024-06-29 PROCEDURE — 93750 INTERROGATION VAD IN PERSON: CPT | Performed by: NURSE PRACTITIONER

## 2024-06-29 PROCEDURE — 71045 X-RAY EXAM CHEST 1 VIEW: CPT

## 2024-06-29 PROCEDURE — 97116 GAIT TRAINING THERAPY: CPT | Mod: GP

## 2024-06-29 PROCEDURE — 97530 THERAPEUTIC ACTIVITIES: CPT | Mod: GP

## 2024-06-29 PROCEDURE — 85025 COMPLETE CBC W/AUTO DIFF WBC: CPT

## 2024-06-29 RX ORDER — WARFARIN SODIUM 7.5 MG/1
7.5 TABLET ORAL
Status: COMPLETED | OUTPATIENT
Start: 2024-06-29 | End: 2024-06-29

## 2024-06-29 RX ORDER — LEVOFLOXACIN 5 MG/ML
750 INJECTION, SOLUTION INTRAVENOUS EVERY 24 HOURS
Status: COMPLETED | OUTPATIENT
Start: 2024-06-29 | End: 2024-06-30

## 2024-06-29 RX ADMIN — PANTOPRAZOLE SODIUM 40 MG: 40 TABLET, DELAYED RELEASE ORAL at 09:01

## 2024-06-29 RX ADMIN — METHOCARBAMOL 750 MG: 750 TABLET ORAL at 21:47

## 2024-06-29 RX ADMIN — ACETAMINOPHEN 650 MG: 325 TABLET, FILM COATED ORAL at 10:17

## 2024-06-29 RX ADMIN — GUAIFENESIN 600 MG: 600 TABLET ORAL at 09:01

## 2024-06-29 RX ADMIN — WARFARIN SODIUM 7.5 MG: 7.5 TABLET ORAL at 17:24

## 2024-06-29 RX ADMIN — LISINOPRIL 5 MG: 2.5 TABLET ORAL at 09:01

## 2024-06-29 RX ADMIN — OXYCODONE HYDROCHLORIDE 10 MG: 10 TABLET ORAL at 05:15

## 2024-06-29 RX ADMIN — OXYCODONE HYDROCHLORIDE 10 MG: 10 TABLET ORAL at 17:31

## 2024-06-29 RX ADMIN — SPIRONOLACTONE 25 MG: 25 TABLET ORAL at 20:32

## 2024-06-29 RX ADMIN — ACETAMINOPHEN 650 MG: 325 TABLET, FILM COATED ORAL at 00:26

## 2024-06-29 RX ADMIN — METHOCARBAMOL 750 MG: 750 TABLET ORAL at 10:17

## 2024-06-29 RX ADMIN — GUAIFENESIN 600 MG: 600 TABLET ORAL at 20:33

## 2024-06-29 RX ADMIN — GABAPENTIN 100 MG: 100 CAPSULE ORAL at 20:33

## 2024-06-29 RX ADMIN — GABAPENTIN 100 MG: 100 CAPSULE ORAL at 09:00

## 2024-06-29 RX ADMIN — GABAPENTIN 100 MG: 100 CAPSULE ORAL at 14:47

## 2024-06-29 RX ADMIN — EMPAGLIFLOZIN 10 MG: 10 TABLET, FILM COATED ORAL at 09:00

## 2024-06-29 RX ADMIN — LEVOFLOXACIN 750 MG: 5 INJECTION, SOLUTION INTRAVENOUS at 10:25

## 2024-06-29 RX ADMIN — METHOCARBAMOL 750 MG: 750 TABLET ORAL at 00:26

## 2024-06-29 RX ADMIN — LIDOCAINE 1 PATCH: 4 PATCH TOPICAL at 21:47

## 2024-06-29 ASSESSMENT — ACTIVITIES OF DAILY LIVING (ADL)
ADLS_ACUITY_SCORE: 30
ADLS_ACUITY_SCORE: 29
ADLS_ACUITY_SCORE: 30
ADLS_ACUITY_SCORE: 30
ADLS_ACUITY_SCORE: 29
ADLS_ACUITY_SCORE: 30
ADLS_ACUITY_SCORE: 29
ADLS_ACUITY_SCORE: 30
ADLS_ACUITY_SCORE: 30
ADLS_ACUITY_SCORE: 29
ADLS_ACUITY_SCORE: 30
ADLS_ACUITY_SCORE: 30
ADLS_ACUITY_SCORE: 29
ADLS_ACUITY_SCORE: 29
ADLS_ACUITY_SCORE: 30
ADLS_ACUITY_SCORE: 29

## 2024-06-29 NOTE — PROGRESS NOTES
[facility]   Cardiology II Service / Advanced Heart Failure  Daily Consult Progress Note      Patient: Navin Lutz  MRN: [id number]  Admission Date: 6/3/2024  Hospital Day # 26    Assessment and Plan: Navin Lutz is a 53 year old male with HFrEF 2/2 NICM s/p ICD, HLD, and CKD Stage II who presents admitted for decompensated heart failure with NICM on milrinone listed status 4, admitted for consideration of advanced therapies and is now s/p HM3 LVAD on 6/14/24 with Dr. Jhaveri with course complicated by klebsiella PNA (sp 10d abx) and recurrent left pleural effusion s/p thoracentesis then pigtail CT.     Recommendations:  - hold lasix   - CT management per CVTS  - consider heparin gtt if CFx not trending down  - consider switching rocephin to levaquin given possibility causing elevated lfts  - monitor hepatic panel    # Acute on chronic systolic heart failure secondary to NICM   # s/p HM3 LVAD as BTT on 6/14/24 (no active contraindications to transplant other than wait time)  Stage D. NYHA Class III confounded by recent surgery    -Fluid status: grossly euvolemic, hold lasix given low PIs last night  -ACEi/ARB/ARNi: lisinopril 5 mg daily   -Afterload reduction: discontinued hydralazine 25 mg TID  -Inotrope: dobutamine stopped on 6/19/24  -BB: contraindicated given recent LVAD implant  -Aldosterone antagonist: aldactone 25 mg daily   -SGLT2i: Jardiance 10 mg daily  -SCD prophylaxis: ICD  -MAP: goal 65-85  -LDH trends: 346  -Anticoagulation: warfarin dosing per pharmacy, Following chromogenic factor 10: goal 20-40, dosing per pharmacy; pending today - defer heparin per CVTS given chest tube output  -Antiplatelet: no ASA indicated per NICOLE trial results    # +Lupus anticoagulant  - negative testing in 2023, then + this admission for lupus anticoagulant  - following CFx as above per heme, goal 20-40  - per heme 6/25 repeat lupus anticoag panel end of July, may be able to switch back to INR  monitoring    # Leukocytosis, pneumonia, resolved  # Fevers, resolved  # Left pleural effusion s/p thoracentesis 6/25 then pigtail 6/28  Concern for infection, +productive cough. CT chest/abd/pelvis 6/22/24 anterior chest subcutaneous air and stranding with substernal gas, read as infectious vs postprocedureal, felt to be procedural per CVTS. Also with b/l pleural effusions and mild ground glass in the lung bases concerning for pneumonia. Resp culture negative. UA negative. New diarrhea with abdominal cramping on 6/24 after 3-4 days of abx.  -Appreciate ID consult  -IR diagnostic and therapeutic thora 6/25. 500mL drained  -Vanc/Zosyn started  6/21 and added azithromycin 6/22   - stop vanco 6/26 per Dr Jhaveri s/p jaya course   - currently on rocpehin, plan for total 10 days ok for levaquin per ID (through 6/30)  -CRP downtrending and WBC normalized  -6/21/24 blood cultures NGTD  -cdif negative 6/24  -s/p pigtail chest tube 6/28, 1.5L drained immediately, now minimal, management per CVTS    # Acute chest pain 2/2 globus sensation vs esophagitis   6/29 AM after eating omlet; no shortness of breath or coughing  - CXR today for CT ordered, can rule out aspiration  - monitor, consider acid reducing agents if heart burn symptoms persist    # Right radial artery occlusion 2/2 arterial line   # Right subclavian and axillary vein thrombus, nonocclusive, known prior to VAD.   Redemonstrated DVT 6/22 CT, likely old clot vs line related from right neck swan in ICU.  - warfarin as above, follow chromogenic factor X  - warning signs for arterial clot reviewed with patient and his wife     # Elevated LFTs  - AST/ALT trending up this week, 200s, no symptoms   - trend daily  - diuresis as above  - US abd negative for cause 6/27  - hold statin for now  - pharmacy review of other meds ?Rocephin 3% incidence, recommend swithing to levaquin as above    # HLD  - hold atorvastatin 20 mg daily as above     # CKD stage 2. B/l cr 1.2-1-4  -  Cr stable 1.1      Patient discussed with Dr. Dallas.        40 minutes spent on the date of the encounter doing chart review, history and exam, documentation and further activities per the note    Desire Silverman DNP, NP-C  Nurse Practitioner - Advanced Heart Failure/Cardiology II Service  [place] preferred or Pager [phone]    ================================================================    Subjective/24-Hr Events:   Last 24 hr care team notes reviewed. Slept well and comfortable overnight. This AM took bite of dry omlet now having acute chest discomfort. No shortness of breath or coughing. Food feels stuck in throat.     ROS:  4 point ROS including respiratory, CV, GI and  (other than that noted in the HPI) is negative.     Medications: Reviewed in EPIC.     Physical Exam:   BP (!) 81/65   Pulse 102   Temp 97  F (36.1  C) (Axillary)   Resp 16   Ht 1.829 m (6')   Wt 88.1 kg (194 lb 3.6 oz)   SpO2 93%   BMI 26.34 kg/m      GENERAL: Appears comfortable and in no distress, non-toxic  HEENT: Eye symmetrical, no discharge or icterus bilaterally.   NECK: Supple, JVD at clavicle at 90 degrees.   CV: Hum of LVAD, no adventitious sounds  RESPIRATORY: Respirations regular, even, and unlabored. Lungs CTA throughout.    GI: Soft and non distended with normoactive bowel sounds present No tenderness, rebound, guarding.   EXTREMITIES: No b/l lower extremity peripheral edema. All extremities are warm and well perfused  NEUROLOGIC: Alert and interacting appropriately.   SKIN: No jaundice. No rashes or lesions. Surgical sites per CVTS, sternum was c/d/i    Labs:  CMP  Recent Labs   Lab 06/29/24  0423 06/28/24  0513 06/28/24  0139 06/27/24  0510 06/26/24  0530     --  134* 133* 132*   POTASSIUM 4.0  --  3.8 4.0 3.7   CHLORIDE 104  --  102 104 100   CO2 21*  --  22 18* 22   ANIONGAP 10  --  10 11 10   *  --  138* 102* 106*   BUN 11.7  --  13.6 13.4 17.8   CR 0.89  --  1.11 1.01 1.19*   GFRESTIMATED >90  --  79  89 73   NAM 8.6  --  8.5* 8.1* 8.4*   MAG 2.1  --  2.1 2.1 2.1   PHOS 3.0 2.8  --  2.6 2.9   PROTTOTAL 5.9* 6.1*  --  5.6* 6.0*   ALBUMIN 2.8* 2.7*  --  2.5* 2.8*   BILITOTAL 0.4 0.4  --  0.5 0.6   ALKPHOS 181* 179*  --  161* 155*   * 116*  --  115* 122*   * 202*  --  174* 167*       CBC  Recent Labs   Lab 06/29/24 0423 06/28/24  0513 06/27/24  0510 06/26/24  0530   WBC 9.8 10.2 10.9 12.4*   RBC 3.75* 3.63* 3.32* 3.57*   HGB 11.0* 11.0* 9.8* 10.9*   HCT 33.5* 32.8* 30.6* 32.3*   MCV 89 90 92 91   MCH 29.3 30.3 29.5 30.5   MCHC 32.8 33.5 32.0 33.7   RDW 13.5 13.5 13.6 13.6    460* 416 378       INR  Recent Labs   Lab 06/29/24 0423 06/28/24  0513 06/27/24  0510 06/26/24  0530   INR 1.56* 1.69* 1.66* 1.85*

## 2024-06-29 NOTE — PROGRESS NOTES
The patient's HeartMate LVAD was interrogated 6/29/2024  * Speed 5400 rpm   * Pulsatility index 2.3 -> 3.6  * Power 3.8 Pizano   * Flow 4.7-4.9 L/minute   Fluid status: euvolemic  Alarms were reviewed, and notable for occasional pi events, no alarms.   The driveline exit site was inspected, c/d/i.   All external components were inspected and showed no evidence of damage or malfunction, none replaced.   No changes to VAD settings made

## 2024-06-29 NOTE — PLAN OF CARE
Hours of care: 070-1900     Neuro: A&Ox4. R finger numbness.   Cardiac: Heartmate 3 LVAD. Doppler MAP 79-85. VSS. ST, 110s.        Respiratory: Sating >95% on RA.   GI/: Adequate urine output via bathroom. No BM this shift.  Diet/appetite: Tolerating regular diet. Fair appetite.  Activity:  Assist of 1, up to chair.  Pain: At acceptable level on current regimen. Robaxin & Oxycodone, given with adequate relief.  Skin: No new deficits noted.  LDA's: L PIV (TKO), LVAD     Plan: Continue with POC. Notify primary team with changes.     Pertinent shift events: Improved appetite this shift.     Goal Outcome Evaluation:      Plan of Care Reviewed With: patient, spouse    Overall Patient Progress: improvingOverall Patient Progress: improving    Outcome Evaluation: Pt reports improved WOB, denies SOB. Chest tube output diminished.

## 2024-06-29 NOTE — PLAN OF CARE
Provided care [id number]    Neuro: A&Ox4.   Cardiac: Heart Mate 3, doppler MAP in 80s.  Respiratory: Sating >92% on RA.  GI/: Adequate urine output. Last BM 6/27  Diet/appetite: Tolerating reg diet. Dre counts. Eating well.  Activity:  Assist of 1.  Pain: Pain @ CT site, PRN oxycodone x1.   Skin: No new deficits noted.  LDA's: L CT: Water seal, PIV: SL     Plan: Continue with POC. Notify primary team with changes.

## 2024-06-29 NOTE — PLAN OF CARE
Neuro: A&Ox4.   Cardiac: ST with PACs. HM3, within order parameters overnight. Doppler MAP 78-80   Respiratory: Sating >90% on RA.  GI/: Adequate urine output, using urinal overnight; no BM this shift  Diet/appetite: Tolerating regular diet, poor appetite.  Activity:  Assist of 1 for managing lines, up to edge of bed and standing at bedside x1 overnight  Pain: At acceptable level on current regimen. PRN medications given  Skin: No new deficits noted.  LDA's: HM3 LVAD; L PIV, SL; L CT with minimal output    Plan: Continue with POC. Notify primary team with changes.

## 2024-06-30 ENCOUNTER — APPOINTMENT (OUTPATIENT)
Dept: GENERAL RADIOLOGY | Facility: CLINIC | Age: 54
End: 2024-06-30
Attending: STUDENT IN AN ORGANIZED HEALTH CARE EDUCATION/TRAINING PROGRAM
Payer: COMMERCIAL

## 2024-06-30 ENCOUNTER — APPOINTMENT (OUTPATIENT)
Dept: PHYSICAL THERAPY | Facility: CLINIC | Age: 54
End: 2024-06-30
Attending: STUDENT IN AN ORGANIZED HEALTH CARE EDUCATION/TRAINING PROGRAM
Payer: COMMERCIAL

## 2024-06-30 ENCOUNTER — APPOINTMENT (OUTPATIENT)
Dept: OCCUPATIONAL THERAPY | Facility: CLINIC | Age: 54
End: 2024-06-30
Attending: STUDENT IN AN ORGANIZED HEALTH CARE EDUCATION/TRAINING PROGRAM
Payer: COMMERCIAL

## 2024-06-30 LAB
ALBUMIN SERPL BCG-MCNC: 3 G/DL (ref 3.5–5.2)
ALP SERPL-CCNC: 190 U/L (ref 40–150)
ALT SERPL W P-5'-P-CCNC: 215 U/L (ref 0–70)
ANION GAP SERPL CALCULATED.3IONS-SCNC: 12 MMOL/L (ref 7–15)
AST SERPL W P-5'-P-CCNC: 92 U/L (ref 0–45)
BACTERIA PLR CULT: NO GROWTH
BASOPHILS # BLD AUTO: 0.1 10E3/UL (ref 0–0.2)
BASOPHILS NFR BLD AUTO: 1 %
BILIRUB DIRECT SERPL-MCNC: <0.2 MG/DL (ref 0–0.3)
BILIRUB SERPL-MCNC: 0.4 MG/DL
BUN SERPL-MCNC: 12.6 MG/DL (ref 6–20)
CALCIUM SERPL-MCNC: 9.1 MG/DL (ref 8.6–10)
CHLORIDE SERPL-SCNC: 103 MMOL/L (ref 98–107)
CREAT SERPL-MCNC: 0.92 MG/DL (ref 0.67–1.17)
CRP SERPL-MCNC: 82.6 MG/L
DEPRECATED HCO3 PLAS-SCNC: 21 MMOL/L (ref 22–29)
EGFRCR SERPLBLD CKD-EPI 2021: >90 ML/MIN/1.73M2
EOSINOPHIL # BLD AUTO: 0.3 10E3/UL (ref 0–0.7)
EOSINOPHIL NFR BLD AUTO: 3 %
ERYTHROCYTE [DISTWIDTH] IN BLOOD BY AUTOMATED COUNT: 13.7 % (ref 10–15)
FACT X ACT/NOR PPP CHRO: 71 % (ref 70–130)
GLUCOSE SERPL-MCNC: 99 MG/DL (ref 70–99)
GRAM STAIN RESULT: NORMAL
GRAM STAIN RESULT: NORMAL
HCT VFR BLD AUTO: 34.5 % (ref 40–53)
HGB BLD-MCNC: 11.2 G/DL (ref 13.3–17.7)
IMM GRANULOCYTES # BLD: 0.1 10E3/UL
IMM GRANULOCYTES NFR BLD: 1 %
INR PPP: 1.46 (ref 0.85–1.15)
LYMPHOCYTES # BLD AUTO: 1.5 10E3/UL (ref 0.8–5.3)
LYMPHOCYTES NFR BLD AUTO: 14 %
MAGNESIUM SERPL-MCNC: 2.1 MG/DL (ref 1.7–2.3)
MCH RBC QN AUTO: 29.4 PG (ref 26.5–33)
MCHC RBC AUTO-ENTMCNC: 32.5 G/DL (ref 31.5–36.5)
MCV RBC AUTO: 91 FL (ref 78–100)
MONOCYTES # BLD AUTO: 0.9 10E3/UL (ref 0–1.3)
MONOCYTES NFR BLD AUTO: 9 %
NEUTROPHILS # BLD AUTO: 7.4 10E3/UL (ref 1.6–8.3)
NEUTROPHILS NFR BLD AUTO: 72 %
NRBC # BLD AUTO: 0 10E3/UL
NRBC BLD AUTO-RTO: 0 /100
PHOSPHATE SERPL-MCNC: 3.5 MG/DL (ref 2.5–4.5)
PLATELET # BLD AUTO: 411 10E3/UL (ref 150–450)
POTASSIUM SERPL-SCNC: 4.4 MMOL/L (ref 3.4–5.3)
PROT SERPL-MCNC: 6.2 G/DL (ref 6.4–8.3)
RBC # BLD AUTO: 3.81 10E6/UL (ref 4.4–5.9)
SODIUM SERPL-SCNC: 136 MMOL/L (ref 135–145)
UFH PPP CHRO-ACNC: <0.1 IU/ML
WBC # BLD AUTO: 10.2 10E3/UL (ref 4–11)

## 2024-06-30 PROCEDURE — 85260 CLOT FACTOR X STUART-POWER: CPT | Performed by: SURGERY

## 2024-06-30 PROCEDURE — 250N000013 HC RX MED GY IP 250 OP 250 PS 637: Performed by: SURGERY

## 2024-06-30 PROCEDURE — 250N000013 HC RX MED GY IP 250 OP 250 PS 637: Performed by: PHYSICIAN ASSISTANT

## 2024-06-30 PROCEDURE — 250N000013 HC RX MED GY IP 250 OP 250 PS 637

## 2024-06-30 PROCEDURE — 85610 PROTHROMBIN TIME: CPT | Performed by: STUDENT IN AN ORGANIZED HEALTH CARE EDUCATION/TRAINING PROGRAM

## 2024-06-30 PROCEDURE — 97110 THERAPEUTIC EXERCISES: CPT | Mod: GP

## 2024-06-30 PROCEDURE — 36415 COLL VENOUS BLD VENIPUNCTURE: CPT | Performed by: SURGERY

## 2024-06-30 PROCEDURE — 250N000013 HC RX MED GY IP 250 OP 250 PS 637: Performed by: NURSE PRACTITIONER

## 2024-06-30 PROCEDURE — 85004 AUTOMATED DIFF WBC COUNT: CPT

## 2024-06-30 PROCEDURE — 120N000003 HC R&B IMCU UMMC

## 2024-06-30 PROCEDURE — 71045 X-RAY EXAM CHEST 1 VIEW: CPT | Mod: 26 | Performed by: RADIOLOGY

## 2024-06-30 PROCEDURE — 250N000011 HC RX IP 250 OP 636: Performed by: STUDENT IN AN ORGANIZED HEALTH CARE EDUCATION/TRAINING PROGRAM

## 2024-06-30 PROCEDURE — 71045 X-RAY EXAM CHEST 1 VIEW: CPT

## 2024-06-30 PROCEDURE — 86140 C-REACTIVE PROTEIN: CPT | Performed by: PHYSICIAN ASSISTANT

## 2024-06-30 PROCEDURE — 97110 THERAPEUTIC EXERCISES: CPT | Mod: GO

## 2024-06-30 PROCEDURE — 97530 THERAPEUTIC ACTIVITIES: CPT | Mod: GP

## 2024-06-30 PROCEDURE — 80053 COMPREHEN METABOLIC PANEL: CPT | Performed by: NURSE PRACTITIONER

## 2024-06-30 PROCEDURE — 93750 INTERROGATION VAD IN PERSON: CPT | Performed by: NURSE PRACTITIONER

## 2024-06-30 PROCEDURE — 97116 GAIT TRAINING THERAPY: CPT | Mod: GP

## 2024-06-30 PROCEDURE — 84100 ASSAY OF PHOSPHORUS: CPT

## 2024-06-30 PROCEDURE — 83735 ASSAY OF MAGNESIUM: CPT

## 2024-06-30 PROCEDURE — 99232 SBSQ HOSP IP/OBS MODERATE 35: CPT | Mod: 25 | Performed by: NURSE PRACTITIONER

## 2024-06-30 PROCEDURE — 71045 X-RAY EXAM CHEST 1 VIEW: CPT | Mod: 76

## 2024-06-30 PROCEDURE — 85520 HEPARIN ASSAY: CPT | Performed by: SURGERY

## 2024-06-30 RX ORDER — WARFARIN SODIUM 7.5 MG/1
7.5 TABLET ORAL
Status: COMPLETED | OUTPATIENT
Start: 2024-06-30 | End: 2024-06-30

## 2024-06-30 RX ORDER — HEPARIN SODIUM 10000 [USP'U]/100ML
0-5000 INJECTION, SOLUTION INTRAVENOUS CONTINUOUS
Status: DISCONTINUED | OUTPATIENT
Start: 2024-06-30 | End: 2024-07-04 | Stop reason: HOSPADM

## 2024-06-30 RX ADMIN — HEPARIN SODIUM 1050 UNITS/HR: 10000 INJECTION, SOLUTION INTRAVENOUS at 14:44

## 2024-06-30 RX ADMIN — OXYCODONE HYDROCHLORIDE 5 MG: 5 TABLET ORAL at 00:40

## 2024-06-30 RX ADMIN — GABAPENTIN 100 MG: 100 CAPSULE ORAL at 20:27

## 2024-06-30 RX ADMIN — PANTOPRAZOLE SODIUM 40 MG: 40 TABLET, DELAYED RELEASE ORAL at 08:03

## 2024-06-30 RX ADMIN — GABAPENTIN 100 MG: 100 CAPSULE ORAL at 08:03

## 2024-06-30 RX ADMIN — SPIRONOLACTONE 25 MG: 25 TABLET ORAL at 20:27

## 2024-06-30 RX ADMIN — GABAPENTIN 100 MG: 100 CAPSULE ORAL at 14:43

## 2024-06-30 RX ADMIN — METHOCARBAMOL 750 MG: 750 TABLET ORAL at 04:16

## 2024-06-30 RX ADMIN — GUAIFENESIN 600 MG: 600 TABLET ORAL at 20:27

## 2024-06-30 RX ADMIN — GUAIFENESIN 600 MG: 600 TABLET ORAL at 08:03

## 2024-06-30 RX ADMIN — EMPAGLIFLOZIN 10 MG: 10 TABLET, FILM COATED ORAL at 08:03

## 2024-06-30 RX ADMIN — LISINOPRIL 5 MG: 2.5 TABLET ORAL at 08:03

## 2024-06-30 RX ADMIN — WARFARIN SODIUM 7.5 MG: 7.5 TABLET ORAL at 18:40

## 2024-06-30 RX ADMIN — LEVOFLOXACIN 750 MG: 5 INJECTION, SOLUTION INTRAVENOUS at 09:54

## 2024-06-30 RX ADMIN — METHOCARBAMOL 750 MG: 750 TABLET ORAL at 11:08

## 2024-06-30 ASSESSMENT — ACTIVITIES OF DAILY LIVING (ADL)
ADLS_ACUITY_SCORE: 30

## 2024-06-30 NOTE — PROGRESS NOTES
[facility]   Cardiology II Service / Advanced Heart Failure  Daily Consult Progress Note      Patient: Navin Lutz  MRN: [id number]  Admission Date: 6/3/2024  Hospital Day # 27    Assessment and Plan: Navin Lutz is a 53 year old male with HFrEF 2/2 NICM s/p ICD, HLD, and CKD Stage II who presents admitted for decompensated heart failure with NICM on milrinone listed status 4, admitted for consideration of advanced therapies and is now s/p HM3 LVAD on 6/14/24 with Dr. Jhaveri with course complicated by klebsiella PNA (sp 10d abx) and recurrent left pleural effusion s/p thoracentesis then pigtail CT.     Recommendations:  - will ask OT for hand therapy recs  - addendum: recommend heparin gtt given CFx 71 and arterial clot  - monitor hepatic panel, improved today  - consider increased gabapentin for neuropathy    # Acute on chronic systolic heart failure secondary to NICM, previously on home milrinone  # s/p HM3 LVAD as BTT on 6/14/24   Stage D. NYHA Class III confounded by recent surgery    -Fluid status: grossly euvolemic, PIs improved off lasix  -ACEi/ARB/ARNi: lisinopril 5 mg daily   -Afterload reduction: discontinued hydralazine 25 mg TID  -Inotrope: dobutamine stopped on 6/19/24  -BB: contraindicated given recent LVAD implant  -Aldosterone antagonist: aldactone 25 mg daily   -SGLT2i: Jardiance 10 mg daily  -SCD prophylaxis: ICD  -MAP: goal 65-85  -LDH trends: 346  -Anticoagulation: warfarin dosing per pharmacy, Following chromogenic factor 10: goal 20-40, dosing per pharmacy; pending today - defer heparin per CVTS given chest tube output  -Antiplatelet: no ASA indicated per NICOLE trial results    # NSVT  Occ runs of NSVR 10-24 beats this week, asymptomatic and HD stable. Echo 6/28 showed LVIDd 6.2c, at 5400 rpm.   - monitor tele  - defer Rx for now  - goal K>4 Mg>2    # +Lupus anticoagulant  - negative testing in 2023, then + this admission for lupus anticoagulant  - following CFx as above per  heme, goal 20-40  - per heme 6/25 repeat lupus anticoag panel end of July, may be able to switch back to INR monitoring    # Leukocytosis, pneumonia, resolved  # Fevers, resolved  # Left pleural effusion s/p thoracentesis 6/25 then pigtail 6/28  Concern for infection, +productive cough. CT chest/abd/pelvis 6/22/24 anterior chest subcutaneous air and stranding with substernal gas, read as infectious vs postprocedureal, felt to be procedural per CVTS. Also with b/l pleural effusions and mild ground glass in the lung bases concerning for pneumonia. Resp culture negative. UA negative. New diarrhea with abdominal cramping on 6/24 after 3-4 days of abx.  -Appreciate ID consult  -IR diagnostic and therapeutic thora 6/25. 500mL drained  -Vanc/Zosyn started  6/21 and added azithromycin 6/22   - stop vanco 6/26 per Dr Jhaveri s/p azithro course   - rocpehin changed tp levaquin 6/29 for LFTs, plan for total 10 days ok for levaquin per ID (through 6/30)  -CRP downtrending and WBC normalized  -6/21/24 blood cultures NGTD  -cdif negative 6/24  -s/p pigtail chest tube 6/28, 1.5L drained immediately, now minimal, removed 6/30 per CVTS    # Acute chest pain 2/2 globus sensation vs esophagitis, resolved  6/29 AM after eating omlet; no shortness of breath or coughing  - CXR 6/29 negative for signs of aspiration  - monitor, consider acid reducing agents if heart burn symptoms persist    # Right radial artery occlusion 2/2 arterial line   # Neuropathy  # Right subclavian and axillary vein thrombus, nonocclusive, known prior to VAD.   Redemonstrated DVT 6/22 CT, likely old clot vs line related from right neck swan in ICU. Having numbness and tingling of right hand.  - warfarin as above, follow chromogenic factor X  - warning signs for arterial clot reviewed with patient and his wife   - OT hand therapies  - consider increased dose gabapentin    # Elevated LFTs  - AST/ALT trending up this week, 200s, no symptoms   - trend daily  - US abd  negative for cause 6/27  - hold statin for now  - pharmacy review of other meds ?Rocephin 3% incidence, switched to levaquin as above, LFTs improving    # HLD  - hold atorvastatin 20 mg as above     # CKD stage 2. B/l cr 1.2-1-4  - Cr stable 1.1      Patient discussed with Dr. Dallas.        40 minutes spent on the date of the encounter doing chart review, history and exam, documentation and further activities per the note    Desire Silverman DNP, NP-C  Nurse Practitioner - Advanced Heart Failure/Cardiology II Service  Vocera preferred or Pager [phone]    ================================================================    Subjective/24-Hr Events:   Last 24 hr care team notes reviewed. 10 beats NSVT overnight while awake, no symptoms. Chest tube out this morning, still a little painful. No new concerns. Hasn't walked yet without chest tube. Long discussion with wife and patient today re: expected recovery.     ROS:  4 point ROS including respiratory, CV, GI and  (other than that noted in the HPI) is negative.     Medications: Reviewed in EPIC.     Physical Exam:   BP (!) 81/65   Pulse 109   Temp 97.9  F (36.6  C) (Oral)   Resp 16   Ht 1.829 m (6')   Wt 86.6 kg (190 lb 14.7 oz)   SpO2 93%   BMI 25.89 kg/m      GENERAL: Appears comfortable and in no distress.  HEENT: Eye symmetrical, no discharge or icterus bilaterally.   NECK: Supple, JVD at clavicle at 90 degrees.   CV: Hum of LVAD, no adventitious sounds  RESPIRATORY: Respirations regular, even, and unlabored. Lungs CTA throughout.    GI: Soft and non distended with normoactive bowel sounds present No tenderness, rebound, guarding.   EXTREMITIES: No b/l lower extremity peripheral edema. All extremities are warm and well perfused  NEUROLOGIC: Alert, CN grossly intact.  SKIN: No jaundice. No rashes or lesions. Sternum c/d/i    Labs:  CMP  Recent Labs   Lab 06/30/24  0416 06/29/24  0423 06/28/24  0513 06/28/24  0139 06/27/24  0510    135  --  134* 133*    POTASSIUM 4.4 4.0  --  3.8 4.0   CHLORIDE 103 104  --  102 104   CO2 21* 21*  --  22 18*   ANIONGAP 12 10  --  10 11   GLC 99 111*  --  138* 102*   BUN 12.6 11.7  --  13.6 13.4   CR 0.92 0.89  --  1.11 1.01   GFRESTIMATED >90 >90  --  79 89   NAM 9.1 8.6  --  8.5* 8.1*   MAG 2.1 2.1  --  2.1 2.1   PHOS 3.5 3.0 2.8  --  2.6   PROTTOTAL 6.2* 5.9* 6.1*  --  5.6*   ALBUMIN 3.0* 2.8* 2.7*  --  2.5*   BILITOTAL 0.4 0.4 0.4  --  0.5   ALKPHOS 190* 181* 179*  --  161*   AST 92* 114* 116*  --  115*   * 218* 202*  --  174*       CBC  Recent Labs   Lab 06/30/24 0416 06/29/24 0423 06/28/24  0513 06/27/24  0510   WBC 10.2 9.8 10.2 10.9   RBC 3.81* 3.75* 3.63* 3.32*   HGB 11.2* 11.0* 11.0* 9.8*   HCT 34.5* 33.5* 32.8* 30.6*   MCV 91 89 90 92   MCH 29.4 29.3 30.3 29.5   MCHC 32.5 32.8 33.5 32.0   RDW 13.7 13.5 13.5 13.6    428 460* 416       INR  Recent Labs   Lab 06/30/24 0416 06/29/24  0423 06/28/24  0513 06/27/24  0510   INR 1.46* 1.56* 1.69* 1.66*

## 2024-06-30 NOTE — PLAN OF CARE
Hours of care: [id number]     Neuro: A&Ox4. R finger numbness.   Cardiac: Heartmate 3 LVAD. Doppler MAP 75-81. VSS. ST w/frequent PVCs. HR 110s.        Respiratory: Sating >95% on RA.   GI/: Adequate urine output via bathroom. BMx1.  Diet/appetite: Tolerating regular diet. Good appetite.  Activity:  Assist of 1, up to chair and in halls. Cleared per therapy to walk halls w/family.  Pain: At acceptable level on current regimen. C/o incisional pain 1-2/10. Robaxin given with adequate relief.  Skin: No new deficits noted.  LDA's: L PIV (heparin gtt @ 1050), LVAD     Plan: Continue with POC. Notify primary team with changes.     Pertinent shift events: Vtach runs lasting 5-11 bpm, asymptomatic - provider aware. Heparin gtt started.     Goal Outcome Evaluation:      Plan of Care Reviewed With: patient, spouse    Overall Patient Progress: improvingOverall Patient Progress: improving    Outcome Evaluation: Pt reports less pain, denies SOB. Tolerating increased activity. Increased PO intake.

## 2024-06-30 NOTE — PROGRESS NOTES
Cardiovascular Surgery Progress Note  06/30/2024         Assessment and Plan:     Navin Lutz is a 53 year old male with PMH of CKD2, HLD, R Subclavian and axillary vein non-occlusive thrombus on Warfarin, NSVT, HFrEF 2/2 NICM s/p ICD (PTA IV milrinone) who presents admitted 6/3/24 for decompensated HFrEF listed status 4. He is now s/p LVAD by Dr. Jhaveri on 6/14/24.     Cardiovascular:   S/p LVAD on 6/14 by Dr. Jhaveri  S/p IABP (6/12-6/14)  Hx NSVT  Acute on chronic HFrEF 2/2 to NICM (EF 10-15%), home milrinone PTA, s/p ICD   HLD  HTN  Hypervolemia  Ventricular tachycardia  Ongoing sinus tachycardia, HD stable. MAPs ~80  Pre-op echo 6/8: LV EF 15-20%, normal RV size/function  Echo ordered 6/28 in light of new VT, LVEF <30%, AV closed, no AI, septum midline, RV function moderately reduced, severe diffuse hypokinesis, no effusion  - PTA atorvastatin 20 mg daily, on hold due to elevated LFTs  - Lisinopril 5 mg daily  - Cards 2 following for hemodynamic & GDMT management; appreciate recs  - Holding PTA Coreg, Entresto, Aldactone, Jardiance   - PI dropped to 2.2 on 6/26, diuretic therapy held, patient grossly euvolemic    Surgical chest tubes: removed in ICU  L pigtail with 30 mL out yesterday on suction. No air leak. Removed without complication, occlusive dressing placed, follow up CXR reviewed and stable.    Pulmonary:  - Extubated POD 3 to 5 lpm via NC. Now saturating well on RA  - Supplemental O2 PRN to keep sats > 92%.  - Pulm toilet, IS, activity and deep breathing  - DuoNebs QID, Mucinex BID  Left pleural effusion - improved s/p thoracentesis, pigtail placement  - IR performed thoracentesis on 6/25 with removal of 500 ml from left pleural space. Fluid removal limited by pain. 6/27 CXR with persistent pleural effusion.   - S/p pigtail placement on 6/28 by IR. On return to the floor, immediate drainage of 1500cc serosanguinous fluid. Chest tube clamped X1 hour. STAT chest xray showed small left apical  pneumothorax. Chest tube unclamped, no immediate drainage. No air leak.   - Removed 6/30 as above    Neurology /Psych:  Acute post-operative pain  - Acute post-operative pain regimen:  - Scheduled: lidocaine patches, gabapentin  - PRN: PO oxycodone PRN, Robaxin, Voltaren gel, Atarax, Tylenol (held due to elevated LFTs)  - Added PRN IV dilaudid on 6/28 for pigtail- and procedure-associated pain, plan to discontinue when chest tube is removed or earlier if tolerated. Will discontinue 6/29 as has not been used.     / Renal:  CKD Stage 2  Contraction alkalosis  Hyponatremia, resolved  - Baseline creatinine ~1.1-1.2. Most recent creatinine 0.92, UOP adequate  - Pre-op weight 209 lbs, most recent weight 190 lbs  - Diuresis per Cards 2, on hold, patient euvolemic and auto-diuresing  - Replace electrolytes per protocol    GI / FEN:   Diarrhea, resolved  Elevated LFTs  - Regular diet  - Regular BMs, continue bowel reg PRN  - New onset diarrhea 6/24, C. Diff negative  - Calorie count 6/25-6/27 with good protein intake, continue supplements  - Hepatic enzymes increasing. Multiple medications could contribute (statin/tylenol/azithromycin/ceftriaxone). 6/27 RUQ US showed patent hepatic vasculature and no evidence of cholelithiasis. Will continue to trend LFTs. Increased 6/29, switch ceftriaxone to levofloxacin for 2 doses. Tylenol and statin on hold. LFTs slightly improved 6/30.    Endocrine:  Stress-induced hyperglycemia, resolved  Pre-op Hgb A1C 5.6%  - Managed on insulin drip postop, transitioned to sliding scale goal BG <180 and has now also been discontinued due to good BG control with no insulin need.   - TSH ordered with facial flushing, WNL    Infectious Disease:  Stress induced leukocytosis, resolved  HCAP, resolved  - WBC WNL, CRP 97 and down-trending, remains afebrile  - Completed perioperative LVAD antibiotics   - CT CAP 6/22 with anterior chest subcutaneous air and stranding.  Gas and fluid collection in the  substernal region which could be infectious versus postprocedural. Bilateral pleural effusions with adjacent atelectasis and mild groundglass opacities.  - CT results were reviewed with Dr. Miller and Dr. Jhaveri - no indication for surgical washout at this time, plan to continue on broad spectrum antibiotics for at least 2 weeks per Dr. Jhaveri  - Sputum culture 6/22 + for Klebsiella pneumoniae  - UA 6/21 non-infectious  - Blood cultures x2 6/21, 6/23 no growth  - 6/25 thoracentesis labs without culture growth, Lights' criteria supportive of exudative effusion, possibly clouded by high serum LDH in the setting of LVAD  -  ID consulted 6/23, recommendations:  - K. Pneumonieae susceptible to ceftriaxone, may narrow to ceftriaxone 2 g daily for 7-10 day course    Antibiotics:  - Levofloxacin IV 6/29 - 6/30 with concern for ceftriaxone elevating LFTs, this will complete 10 day course.  - Ceftriaxone 6/27 - 6/29  - Empiric IV vancomycin/zosyn 6/21 - 6/27 per surgeon  - PO azithromycin 6/22 - 6/26    Hematology:   Acute blood loss anemia, stable  Acute blood loss thrombocytopenia, resolved  Right brachial non-occlusive thrombus  Right internal jugular non-occlusive thrombus  Right radial artery occlusion 2/2 arterial line  Hgb stable; Plt WNL, no signs or symptoms of active bleeding  - Daily CBC  - Upper extremity arterial ultrasound completed 6/25  - Discussed warning signs for radial artery occlusion with patient and wife. Bilateral hands warm and well-perfused on exam    Anticoagulation:   Chronic anticoagulation for LVAD, CFX goal 20-40%  +Lupus anticoagulant   Partially occlusive internal jugular thrombus, partially occlusive brachial venous thrombus  - Warfarin for LVAD  - INRs noted to be supra-therapeutic and discordant with Chromogenic Factor X, hematology consulted 6/21 - lupus anticoagulant positive. Heme recommended repeat outpatient testing for Lupus anticoagulant at the end of July. If negative, INR monitoring may  be an option. Okay to reach out to heme outpatient team with questions.  - Plan to use Chromogenic factor X for warfarin dosing as INR is not reliable, goal CFX 20-40%, most recent factor X not within goal, today's still pending.   - No heparin gtt to bridge since 6/27 per discussion with surgeon. Continue to assess daily indication, hold off per surgeon 6/29.    MSK/Skin:  Sternotomy  - PT/OT    Prophylaxis:   - Stress ulcer prophylaxis: Pantoprazole 40 mg daily for 30 days  - DVT prophylaxis: warfarin, SCD    Disposition:   - Transferred to  on 6/21  - Therapies recommending discharge to ARU vs home once medically ready. Barriers to discharge include infection control, pain control, last day of LVAD teaching 7/1.    Medically Ready for Discharge: Anticipated in 2-4 Days      Clinically Significant Risk Factors              # Hypoalbuminemia: Lowest albumin = 2.5 g/dL at 6/27/2024  5:10 AM, will monitor as appropriate        # End stage heart failure: home medication list includes inotropes          #Precipitous drop in Hgb/Hct: Lowest Hgb this hospitalization: 9.1 g/dL. Will continue to monitor and treat/transfuse as appropriate.     # Overweight: Estimated body mass index is 25.89 kg/m  as calculated from the following:    Height as of this encounter: 1.829 m (6').    Weight as of this encounter: 86.6 kg (190 lb 14.7 oz).        # Financial/Environmental Concerns: none  # Asthma: noted on problem list  # ICD device present     Discussed with Dr. Jhaveri via written communication.     Divina Guzman PA-C  Cardiothoracic Surgery  Pager [phone]  11:46 AM on 6/30/2024        Interval History:     Had 11 beat run of VT this morning per RN, patient asymptomatic. Feels well today. Pigtail removed without complication. Patient and wife state they both passed their LVAD education testing, have one last session on Monday.  Pain and breathing improved today.  Tolerating diet, good intake on calorie count, + BM. No  nausea or vomiting. Denies abdominal pain.   Working with therapies, motivated to walk.  Denies palpitations, dizziness, syncopal symptoms, fevers, chills, myalgias, sternal popping/clicking, dysuria, diarrhea.          Physical Exam:   Blood pressure (!) 81/65, pulse 109, temperature 97.9  F (36.6  C), temperature source Oral, resp. rate 16, height 1.829 m (6'), weight 86.6 kg (190 lb 14.7 oz), SpO2 93%.  Vitals:    24 0444 24 0515 24 0207   Weight: 90.2 kg (198 lb 13.7 oz) 88.1 kg (194 lb 3.6 oz) 86.6 kg (190 lb 14.7 oz)     Gen:  NAD, sitting at side of bed eating breakfast, wife at bedside  Neuro: no focal deficits, A&Ox4  CV: sinus tachycardia, +LVAD hum   Pulm: CTA in all lung fields, unlabored work of breathing on RA  Abd: nondistended, normal BS, soft, nontender  Ext: no edema  Skin:   Incision: clean, dry, intact, no erythema, sternum stable  Tubes/drain sites: dressing clean and dry, removed as above  Lines: driveline dressing clean and dry     Heartmate 3 LEFT VS  Flow (Lpm): 4.6 Lpm  Pulse Index (PI): 2.8-3.9 PI  Speed (rpm): 5400 rpm  Power (bassett): 3.8 bassett  Current Hct settin         Data:    Imaging:  reviewed recent imaging, no acute concerns  XR Chest Port 1 View  Narrative: Exam: XR CHEST PORT 1 VIEW, 2024 11:16 AM    Indication: pigtail removal    Comparison: Same day    Findings:   Left chest wall ICD lead is intact and in stable position. Intact  median sternotomy wires. Stable LVAD. Left basilar pigtail chest tube  removed. Stable enlarged cardiac silhouette. The pulmonary vasculature  is within normal limits. Stable small left pleural effusion and  streaky left perihilar and basilar opacities. Stable small left apical  pneumothorax.  Impression: Impression:   1. Stable small left hydropneumothorax status post removal of a left  basilar pigtail chest tube.  2. Stable streaky left perihilar/basilar opacities, likely  atelectasis.    NATHAN RIDLEY, DO         SYSTEM  ID:  [id number]  XR Chest Port 1 View  Narrative: Exam: XR CHEST PORT 1 VIEW, 6/30/2024 11:05 AM    Indication: pigtail, left effusion    Comparison: 6/28/2024    Findings:   Left chest wall ICD lead is intact and in stable position. Intact  median sternotomy wires. Stable LVAD and left basilar pigtail chest  tube. Stable enlarged cardiac silhouette. The pulmonary vasculature is  within normal limits. Stable small left pleural effusion and streaky  left perihilar and basilar opacities. Stable small left apical  pneumothorax.  Impression: Impression:   1. Stable small left hydropneumothorax with left basilar pigtail chest  tube in place.  2. Stable streaky left perihilar/basilar opacities, likely  atelectasis.    NATHAN RIDLEY DO         SYSTEM ID:  [id number]        Labs:  BMP  Recent Labs   Lab 06/30/24 0416 06/29/24  0423 06/28/24  0139 06/27/24  0510    135 134* 133*   POTASSIUM 4.4 4.0 3.8 4.0   CHLORIDE 103 104 102 104   NAM 9.1 8.6 8.5* 8.1*   CO2 21* 21* 22 18*   BUN 12.6 11.7 13.6 13.4   CR 0.92 0.89 1.11 1.01   GLC 99 111* 138* 102*     CBC  Recent Labs   Lab 06/30/24 0416 06/29/24 0423 06/28/24  0513 06/27/24  0510   WBC 10.2 9.8 10.2 10.9   RBC 3.81* 3.75* 3.63* 3.32*   HGB 11.2* 11.0* 11.0* 9.8*   HCT 34.5* 33.5* 32.8* 30.6*   MCV 91 89 90 92   MCH 29.4 29.3 30.3 29.5   MCHC 32.5 32.8 33.5 32.0   RDW 13.7 13.5 13.5 13.6    428 460* 416     INR  Recent Labs   Lab 06/30/24 0416 06/29/24  0423 06/28/24  0513 06/27/24  0510   INR 1.46* 1.56* 1.69* 1.66*      Hepatic Panel  Recent Labs   Lab 06/30/24 0416 06/29/24  0423 06/28/24  0513 06/27/24  0510   AST 92* 114* 116* 115*   * 218* 202* 174*   ALKPHOS 190* 181* 179* 161*   BILITOTAL 0.4 0.4 0.4 0.5   ALBUMIN 3.0* 2.8* 2.7* 2.5*     GLUCOSE:   Recent Labs   Lab 06/30/24  0416 06/29/24  0423 06/28/24  0139 06/27/24  0510 06/26/24  0530 06/25/24  0420   GLC 99 111* 138* 102* 106* 112*

## 2024-06-30 NOTE — PLAN OF CARE
[id number]    Neuro: A&Ox4. R finger numbness.   Cardiac: HM3 LVAD. Sinus arrhythmia with frequent PVCs - HR 110s. Doppler MAPs 70-80s. Afebrile.  Respiratory: Sating >92% on RA.  GI/: Adequate urine output via urinal. No BM this shift.  Diet/appetite: Tolerating regular diet.  Activity:  Assist of 1 w/ walker, up to bathroom this shift.  Pain: At acceptable level on current regimen. Oxy 5mg given x1, robaxin given x2 this shift.  Skin: No new deficits noted.  LDA's: R PIV - SL. L CT -20 suction, no output this shift. LVAD.     Plan: Continue with POC. Notify primary team with changes.

## 2024-06-30 NOTE — PROGRESS NOTES
The patient's HeartMate LVAD was interrogated 6/30/2024  * Speed 5400 rpm   * Pulsatility index 2.8 -> 3.9  * Power 3.8 Pizano   * Flow 4.6 L/minute   Fluid status: euvolemic  Alarms were reviewed, and notable for rare pi events, no alarms.   The driveline exit site was inspected, c/d/i.   All external components were inspected and showed no evidence of damage or malfunction, none replaced.   No changes to VAD settings made

## 2024-07-01 ENCOUNTER — APPOINTMENT (OUTPATIENT)
Dept: OCCUPATIONAL THERAPY | Facility: CLINIC | Age: 54
End: 2024-07-01
Attending: STUDENT IN AN ORGANIZED HEALTH CARE EDUCATION/TRAINING PROGRAM
Payer: COMMERCIAL

## 2024-07-01 ENCOUNTER — APPOINTMENT (OUTPATIENT)
Dept: GENERAL RADIOLOGY | Facility: CLINIC | Age: 54
End: 2024-07-01
Attending: STUDENT IN AN ORGANIZED HEALTH CARE EDUCATION/TRAINING PROGRAM
Payer: COMMERCIAL

## 2024-07-01 ENCOUNTER — DOCUMENTATION ONLY (OUTPATIENT)
Dept: ANTICOAGULATION | Facility: CLINIC | Age: 54
End: 2024-07-01
Payer: COMMERCIAL

## 2024-07-01 LAB
ALBUMIN SERPL BCG-MCNC: 2.9 G/DL (ref 3.5–5.2)
ALP SERPL-CCNC: 186 U/L (ref 40–150)
ALT SERPL W P-5'-P-CCNC: 187 U/L (ref 0–70)
ANION GAP SERPL CALCULATED.3IONS-SCNC: 10 MMOL/L (ref 7–15)
AST SERPL W P-5'-P-CCNC: 81 U/L (ref 0–45)
BASOPHILS # BLD AUTO: 0.1 10E3/UL (ref 0–0.2)
BASOPHILS NFR BLD AUTO: 1 %
BILIRUB DIRECT SERPL-MCNC: <0.2 MG/DL (ref 0–0.3)
BILIRUB SERPL-MCNC: 0.4 MG/DL
BUN SERPL-MCNC: 11.1 MG/DL (ref 6–20)
CALCIUM SERPL-MCNC: 8.7 MG/DL (ref 8.6–10)
CHLORIDE SERPL-SCNC: 102 MMOL/L (ref 98–107)
CREAT SERPL-MCNC: 0.96 MG/DL (ref 0.67–1.17)
CRP SERPL-MCNC: 66.6 MG/L
DEPRECATED HCO3 PLAS-SCNC: 21 MMOL/L (ref 22–29)
EGFRCR SERPLBLD CKD-EPI 2021: >90 ML/MIN/1.73M2
EOSINOPHIL # BLD AUTO: 0.3 10E3/UL (ref 0–0.7)
EOSINOPHIL NFR BLD AUTO: 4 %
ERYTHROCYTE [DISTWIDTH] IN BLOOD BY AUTOMATED COUNT: 13.7 % (ref 10–15)
FACT X ACT/NOR PPP CHRO: 67 % (ref 70–130)
GLUCOSE SERPL-MCNC: 101 MG/DL (ref 70–99)
HCT VFR BLD AUTO: 34.6 % (ref 40–53)
HGB BLD-MCNC: 11.5 G/DL (ref 13.3–17.7)
HOLD SPECIMEN: NORMAL
IMM GRANULOCYTES # BLD: 0.1 10E3/UL
IMM GRANULOCYTES NFR BLD: 1 %
INR PPP: 1.71 (ref 0.85–1.15)
LYMPHOCYTES # BLD AUTO: 1.8 10E3/UL (ref 0.8–5.3)
LYMPHOCYTES NFR BLD AUTO: 22 %
MAGNESIUM SERPL-MCNC: 2 MG/DL (ref 1.7–2.3)
MCH RBC QN AUTO: 29.7 PG (ref 26.5–33)
MCHC RBC AUTO-ENTMCNC: 33.2 G/DL (ref 31.5–36.5)
MCV RBC AUTO: 89 FL (ref 78–100)
MONOCYTES # BLD AUTO: 0.9 10E3/UL (ref 0–1.3)
MONOCYTES NFR BLD AUTO: 10 %
NEUTROPHILS # BLD AUTO: 5.4 10E3/UL (ref 1.6–8.3)
NEUTROPHILS NFR BLD AUTO: 62 %
NRBC # BLD AUTO: 0 10E3/UL
NRBC BLD AUTO-RTO: 0 /100
PHOSPHATE SERPL-MCNC: 3.3 MG/DL (ref 2.5–4.5)
PLATELET # BLD AUTO: 354 10E3/UL (ref 150–450)
POTASSIUM SERPL-SCNC: 4 MMOL/L (ref 3.4–5.3)
PROT SERPL-MCNC: 6 G/DL (ref 6.4–8.3)
RBC # BLD AUTO: 3.87 10E6/UL (ref 4.4–5.9)
SODIUM SERPL-SCNC: 133 MMOL/L (ref 135–145)
UFH PPP CHRO-ACNC: 0.12 IU/ML
UFH PPP CHRO-ACNC: 0.17 IU/ML
UFH PPP CHRO-ACNC: 0.2 IU/ML
UFH PPP CHRO-ACNC: NORMAL
WBC # BLD AUTO: 8.6 10E3/UL (ref 4–11)

## 2024-07-01 PROCEDURE — 84100 ASSAY OF PHOSPHORUS: CPT

## 2024-07-01 PROCEDURE — 85260 CLOT FACTOR X STUART-POWER: CPT | Performed by: SURGERY

## 2024-07-01 PROCEDURE — 250N000013 HC RX MED GY IP 250 OP 250 PS 637: Performed by: SURGERY

## 2024-07-01 PROCEDURE — 250N000013 HC RX MED GY IP 250 OP 250 PS 637: Performed by: NURSE PRACTITIONER

## 2024-07-01 PROCEDURE — 80053 COMPREHEN METABOLIC PANEL: CPT | Performed by: NURSE PRACTITIONER

## 2024-07-01 PROCEDURE — 93750 INTERROGATION VAD IN PERSON: CPT | Performed by: NURSE PRACTITIONER

## 2024-07-01 PROCEDURE — 250N000013 HC RX MED GY IP 250 OP 250 PS 637: Performed by: PHYSICIAN ASSISTANT

## 2024-07-01 PROCEDURE — 85025 COMPLETE CBC W/AUTO DIFF WBC: CPT

## 2024-07-01 PROCEDURE — 36415 COLL VENOUS BLD VENIPUNCTURE: CPT

## 2024-07-01 PROCEDURE — 36415 COLL VENOUS BLD VENIPUNCTURE: CPT | Performed by: SURGERY

## 2024-07-01 PROCEDURE — 97535 SELF CARE MNGMENT TRAINING: CPT | Mod: GO

## 2024-07-01 PROCEDURE — 86140 C-REACTIVE PROTEIN: CPT | Performed by: PHYSICIAN ASSISTANT

## 2024-07-01 PROCEDURE — 71046 X-RAY EXAM CHEST 2 VIEWS: CPT | Mod: 26 | Performed by: RADIOLOGY

## 2024-07-01 PROCEDURE — 83735 ASSAY OF MAGNESIUM: CPT

## 2024-07-01 PROCEDURE — 85520 HEPARIN ASSAY: CPT

## 2024-07-01 PROCEDURE — 99232 SBSQ HOSP IP/OBS MODERATE 35: CPT | Mod: 25 | Performed by: NURSE PRACTITIONER

## 2024-07-01 PROCEDURE — 250N000013 HC RX MED GY IP 250 OP 250 PS 637

## 2024-07-01 PROCEDURE — 99232 SBSQ HOSP IP/OBS MODERATE 35: CPT | Mod: FS | Performed by: STUDENT IN AN ORGANIZED HEALTH CARE EDUCATION/TRAINING PROGRAM

## 2024-07-01 PROCEDURE — 999N000065 XR CHEST 2 VIEWS

## 2024-07-01 PROCEDURE — 85520 HEPARIN ASSAY: CPT | Performed by: SURGERY

## 2024-07-01 PROCEDURE — 120N000003 HC R&B IMCU UMMC

## 2024-07-01 PROCEDURE — 85610 PROTHROMBIN TIME: CPT | Performed by: STUDENT IN AN ORGANIZED HEALTH CARE EDUCATION/TRAINING PROGRAM

## 2024-07-01 PROCEDURE — 250N000011 HC RX IP 250 OP 636: Performed by: STUDENT IN AN ORGANIZED HEALTH CARE EDUCATION/TRAINING PROGRAM

## 2024-07-01 PROCEDURE — 250N000013 HC RX MED GY IP 250 OP 250 PS 637: Performed by: STUDENT IN AN ORGANIZED HEALTH CARE EDUCATION/TRAINING PROGRAM

## 2024-07-01 RX ORDER — GABAPENTIN 100 MG/1
200 CAPSULE ORAL 3 TIMES DAILY
Status: DISCONTINUED | OUTPATIENT
Start: 2024-07-01 | End: 2024-07-04 | Stop reason: HOSPADM

## 2024-07-01 RX ORDER — WARFARIN SODIUM 7.5 MG/1
7.5 TABLET ORAL
Status: COMPLETED | OUTPATIENT
Start: 2024-07-01 | End: 2024-07-01

## 2024-07-01 RX ADMIN — PANTOPRAZOLE SODIUM 40 MG: 40 TABLET, DELAYED RELEASE ORAL at 10:01

## 2024-07-01 RX ADMIN — SPIRONOLACTONE 25 MG: 25 TABLET ORAL at 20:33

## 2024-07-01 RX ADMIN — METHOCARBAMOL 750 MG: 750 TABLET ORAL at 00:00

## 2024-07-01 RX ADMIN — LISINOPRIL 5 MG: 2.5 TABLET ORAL at 10:01

## 2024-07-01 RX ADMIN — EMPAGLIFLOZIN 10 MG: 10 TABLET, FILM COATED ORAL at 10:01

## 2024-07-01 RX ADMIN — GABAPENTIN 200 MG: 100 CAPSULE ORAL at 14:46

## 2024-07-01 RX ADMIN — GUAIFENESIN 600 MG: 600 TABLET ORAL at 20:33

## 2024-07-01 RX ADMIN — WARFARIN SODIUM 7.5 MG: 7.5 TABLET ORAL at 18:59

## 2024-07-01 RX ADMIN — GABAPENTIN 100 MG: 100 CAPSULE ORAL at 10:01

## 2024-07-01 RX ADMIN — GUAIFENESIN 600 MG: 600 TABLET ORAL at 10:01

## 2024-07-01 RX ADMIN — GABAPENTIN 200 MG: 100 CAPSULE ORAL at 20:33

## 2024-07-01 RX ADMIN — HEPARIN SODIUM 1500 UNITS/HR: 10000 INJECTION, SOLUTION INTRAVENOUS at 11:46

## 2024-07-01 ASSESSMENT — ACTIVITIES OF DAILY LIVING (ADL)
ADLS_ACUITY_SCORE: 29
ADLS_ACUITY_SCORE: 29
ADLS_ACUITY_SCORE: 30
ADLS_ACUITY_SCORE: 29
ADLS_ACUITY_SCORE: 30
ADLS_ACUITY_SCORE: 29
ADLS_ACUITY_SCORE: 30
ADLS_ACUITY_SCORE: 30
ADLS_ACUITY_SCORE: 29

## 2024-07-01 NOTE — PROGRESS NOTES
Received message from VAD CC:  Patient is currently hospitalzed.    FYI- patient is being monitored by chrom factor 10 right now, advised to also check INRS and at end of July to re-check lupus panel to see if we can transition back to INRs.     From Desire Silverman's note 7/1/24:    Anticoagulation:   Chronic anticoagulation for LVAD, CFX goal 20-40%  +Lupus anticoagulant   Partially occlusive internal jugular thrombus, partially occlusive brachial venous thrombus  - Warfarin for LVAD  - INRs noted to be discordant with Chromogenic Factor X, hematology consulted 6/21 - Lupus anticoagulant positive. [facility] recommended repeat outpatient testing for Lupus anticoagulant at the end of July. If negative, INR monitoring may be an option. Okay to reach out to [facility] outpatient team with questions.  - Plan to use Chromogenic factor X for warfarin dosing as INR is not reliable, goal CFX 20-40%, most recent factor X not within goal but improving, 67% today.   - Heparin gtt to bridge due to LVAD and radial artery occlusion and venous thrombi

## 2024-07-01 NOTE — PROGRESS NOTES
The patient's HeartMate LVAD was interrogated 7/1/2024  * Speed 5400 rpm   * Pulsatility index 2.3-3.8  * Power 3.8 Pizano   * Flow 4.6-4.8 L/minute   Fluid status: euvolemic  Alarms were reviewed, and notable for rare pi events, no alarms.   The driveline exit site was inspected, c/d/i.   All external components were inspected and showed no evidence of damage or malfunction, none replaced.   No changes to VAD settings made

## 2024-07-01 NOTE — PLAN OF CARE
Nursing Plan of Care Note  Shift: [id number]    Neuro: A&Ox4. Able to follow commands and make needs known. PEERL. No headache. Afebrile. Numbness and stiffness of right 4th and 5th digits.    Pain: 1/10, generalized chest discomfort    Cardiac: LVAD Heartmate 3. Doppler MAPs 74-82. 15 beat run of Vtach. Otherwise sinus tach with PVCs . -120's. +2 radial pulses.     Resp: Sats >95% on RA. Lung sounds clear. No shortness of breath.     GI/: Using bedside urinal or up to bathroom with assist. Abdomen soft, nontender. Bowel sounds active. 4 bowel movements today.Tolerating regular diet with appetite.    Skin: Midsternal chest incision DELMAR approximated with liquid bandage.     LDAs: L PIV - heparin gtt, not therapeutic. Redraw Xa at 2015.      Activity: Assist x1, steady but help with lines. Spouse present for majority of day. Up to chair.    Plan: Plan for possible discharge tomorrow depending on INR. Continue with plan of care. Notify primary team with changes.

## 2024-07-01 NOTE — DISCHARGE SUMMARY
[facility], [place]   Cardioth[facility] Surgery Hospital Discharge Summary     Navin Lutz MRN# [id number]   Age: 53 year old YOB: 1970     Admitting Physician:  Micaela Silvestre MD  Discharge Physician:  Mary Baker PA-C   Primary Care Physician:        Libertad Briceno     DATE OF ADMISSION: 6/3/2024      DATE OF DISCHARGE: July 4, 2024     Admit Wt: 209 lbs  Discharge Wt: 187 lbs          Primary Diagnoses:   Acute on chronic HFrEF 2/2 NICM, s/p ICD - now s/p LVAD  S/p IABP 6/12-6/14  Hx NSVT  Left pleural effusion s/p thoracentesis and pigtail placement, improved  Hospital acquired pneumonia, resolved  Right brachial vein non-occlusive thrombus  Right internal jugular vein non-occlusive thrombus  Right radial artery occlusion 2/2 arterial line  Chronic anticoagulation with warfarin  Positive lupus anticoagulant  Rare ventricular tachycardia          Secondary Diagnoses:   Acute postoperative pain, resolved  Acute postoperative leukocytosis, resolved  Acute blood loss anemia, stable  Acute blood loss/post cardiopulmonary bypass thrombocytopenia, resolved  HTN  HLD  CKD stage II  Hyponatremia, resolved  Elevated LFTs, improving  Left pneumothorax, resolved    PROCEDURES PERFORMED:   Date: 6/14/2024.  Surgeon: Dr. Jhaveri  Placement of HeartMate III left ventricular assist device (intracorporeal ventricular assist device).     INTRAOPERATIVE FINDINGS:    The patient's sternum was of adequate quality.  The pericardial space had moderate adhesions.  There was moderate RV dysfunction.  There was only trace aortic regurgitation.  There was severe mitral regurgitation and mild tricuspid regurgitation.  There was no clot within the apex of left ventricle.     PATHOLOGY RESULTS:    A(1). CORE LEFT VENTRICLE:  - Myocardial tissue with hypertrophic changes    CULTURE RESULTS:    Respiratory culture 6/22 positive for Klebsiella pneumoniae    CONSULTS:     PT/OT  Intensivist  Social work  Heart Failure Cardiology  Interventional radiology  Hematology  Infectious disease  Procedural medicine team  [facility]    BRIEF HISTORY OF ILLNESS:  Navin Lutz is a 53 year old male with a history of CKD2, HLD, R subclavian and axillary non-occlusive thrombus on warfarin, NSVT, HFrEF due to NICM s/p ICD requiring milrinone, admitted to the hospital 6/3 with decompensated heart failure. Patient was critically ill in cardiogenic shock and a decision was made to proceed with LVAD placement as bridge to transplant after advanced heart failure workup. IABP was placed prior to surgery for further support.    HOSPITAL COURSE: Navin Lutz is a 53 year old male who on 6/14/2024 underwent the above-named procedures and tolerated the operation well.     Immediately postoperatively the patient was admitted to the CVICU. Patient was extubated on POD 3 after inhaled nitric oxide was weaned off. Blood pressure and cardiac index were managed with vasopressors and inotropic agents which were continuously weaned. Patient was subsequently transferred to the surgical telemetry floor on POD 7.    The patient continued to progress well. Chest tubes and temporary pacemaker wires were removed as appropriate.    Patient was transiently hyperglycemic and treated with insulin infusion then transitioned to sliding scale insulin per protocol. Blood sugars remained stable. No further glycemic control agents needed at this time.     The patient was fluid overloaded and treated with diuretics. These were stopped in the last week prior to discharge due to some PI events and patient auto-diuresing well. Future need will be evaluated further in cardiology clinic.    Patient with leukocytosis and upon infectious workup, sputum culture on 6/22 was positive for Klebsiella pneumoniae. Blood cultures and UA remained negative. He was started on empiric broad spectrum antibiotics (Vanc/Zosyn/azithromycin),  ID was consulted and recommended continuation of Zosyn, later narrowed to ceftriaxone after susceptibilities returned. This was changed to levofloxacin for the last 2 days of treatment due to elevated LFTs. He completed a 10 day course of antibiotics for hospital acquired pneumonia. On discharge, WBC was normal, patient afebrile and without signs or symptoms of infection.    Hepatic enzymes began to trend up, likely iatrogenic due to multiple medications. RUQ ultrasound showed patent hepatic vasculature and no evidence of cholelithiasis. With removal of offending agents, LFTs were trending down at time of discharge. Acetaminophen and statin remain held. LFTs will be check prior to cardiology appointment in ~1 week. If normalized, would plan to resume statin.    Warfarin was started for anticoagulation for HM3 LVAD. INRs were noted to be discordant with Chromogenic Factor X and hematology was consulted. Found to be lupus anticoagulant positive, and Chromogenic factor X monitoring was recommended for warfarin dosing as INR not reliable. Goal CFX 20-40% which correlates with INR 2-3. Hematology recommended repeat outpatient lupus anticoagulant testing at the end of July, and if negative, INR monitoring may be an option.     The patient developed left pleural effusion. Thoracentesis performed on 6/25 with 500 ml removed before procedure was stopped 2/2 pain. Effusion persisted, and IR placed a left-sided pigtail on 6/28 with immediate drainage of 1.5 L. Drainage decreased and pigtail removed on 6/30 without complication. Patient to have follow up CXR prior to next outpatient appointment to confirm improvement/resolution of effusion.    For these procedures, patient's warfarin was held and chromogenic factor X's were outside therapeutic range. His dosing was increased while inpatient and he was on an IV heparin bridge to therapeutic range. His chromogenic factor X's progressed towards therapeutic range but were still  outside range on day of discharge. This was discussed with cardiac surgeon and cardiology. Decision was made to discontinue heparin drip and discharge patient as labs are trending in the right direction. He will have a repeat chromogenic factor X checked on 7/5, the day after discharge. Future warfarin dosing should come from anticoagulation clinic.     Partially occlusive right internal jugular thrombus, partially occlusive right brachial venous thrombus were found on ultrasound. Radiology noted presence of usual arterial waveform in ulnar artery but no waveform in radial artery. Possible that lack of pulsatility may contribute as patient has an LVAD; however unusual that ulnar and radial arteries differ. Further imaging revealed right radial artery occlusion, likely 2/2 arterial line-induced thrombus. Right ulnar artery patent. Patient had good perfusion without evidence of ischemia, pallor, or congestion on exam. He had no motor deficits. Plan is to continue anticoagulation. Discussed warning signs for radial artery occlusion with patient and wife. Bilateral hands remained warm and well-perfused throughout admission.      Prior to discharge, his pain was controlled well, he was working well with therapies, able to perform most ADLs, ambulate without difficulty, and had full return of bowel and bladder function. He completed all relevant LVAD education with his spouse prior to discharge. On July 4, 2024, he was discharged to local [facility] in stable condition. Follow up with cardiology and surgeon will be arranged by LVAD coordinators. Pt encouraged to follow up with PCP and cardiac rehab upon discharge.    Patient discharged on aspirin:  No, not indicated per NICOLE trial  Patient discharged on beta blocker: no   Patient discharged on ACE Inhibitor/ARB: yes      Patient discharged on statin: no - HELD in the setting of elevated LFTs  Patient discharged on anticoagulation: yes warfarin (Chromogenic factor X  monitoring)         Discharge Disposition:     Discharged to home            Condition on Discharge:     Discharge condition: Good   Discharge vitals: Blood pressure (!) 81/65, pulse 120, temperature 97.8  F (36.6  C), temperature source Oral, resp. rate 16, height 1.829 m (6'), weight 84.9 kg (187 lb 2.7 oz), SpO2 94%.   Code status on discharge: Full Code     Vitals:    07/02/24 0410 07/03/24 0335 07/04/24 0516   Weight: 85.8 kg (189 lb 2.5 oz) 85.5 kg (188 lb 7.9 oz) 84.9 kg (187 lb 2.7 oz)       DAY OF DISCHARGE PHYSICAL EXAM:    Gen: NAD, moving independently around room, family at bedside  Neuro: Intact with no focal deficits, A&O X4  CV: RRR, normal S1 S2, no murmurs appreciated, LVAD hum  Pulm: CTA, no wheezing or rhonchi, normal breathing on RA  Abd: nondistended, normal BS, soft, nontender  Ext: no peripheral edema, no pitting  Skin:  Sternal incision: clean, dry, intact, no erythema, sternum stable  Tubes/drain sites: open to air, clean, dry, intact, no erythema  LVAD: speed 5400, flow 4.5, PI 4.0, power 3.9.     LABS  Most Recent 3 CBC's:  Recent Labs   Lab Test 07/04/24  0548 07/03/24  0321 07/02/24  0501   WBC 7.9 8.8 8.9   HGB 12.3* 11.8* 11.3*   MCV 89 91 91    300 337      Most Recent 3 BMP's:  Recent Labs   Lab Test 07/04/24  0548 07/03/24  0321 07/02/24  0501    136 134*   POTASSIUM 4.3 4.3 3.8   CHLORIDE 106 105 104   CO2 22 21* 20*   BUN 11.9 14.6 13.6   CR 0.91 0.99 0.82   ANIONGAP 9 10 10   NAM 9.3 9.1 8.9   * 108* 141*     Most Recent 2 LFT's:  Recent Labs   Lab Test 07/04/24  0548 07/03/24  0321   AST 40 46*   * 149*   ALKPHOS 191* 187*   BILITOTAL 0.4 0.3     Most Recent INR's and Anticoagulation Dosing History:  Anticoagulation Dose History  More data exists           Latest Ref Rng & Units 6/28/2024 6/29/2024 6/30/2024 7/1/2024 7/2/2024 7/3/2024 7/4/2024   Recent Dosing and Labs   warfarin ANTICOAGULANT (COUMADIN) 5 MG tablet - 5 mg, $Given - - - - - -  "  warfarin ANTICOAGULANT (COUMADIN) 7.5 MG tablet - - 7.5 mg, $Given 7.5 mg, $Given 7.5 mg, $Given - - -   warfarin ANTICOAGULANT (COUMADIN) 10 MG tablet - - - - - 10 mg, $Given 10 mg, $Given -   Chromogenic Factor 10 70 - 130 % 59  58  71  67  61  55  49    INR 0.85 - 1.15 1.69  1.56  1.46  1.71  1.53  1.72  1.91         Details                    Per pharmacy anticoagulation note on day of discharge:  \"Vitamin K doses administered during the last 7 days: none          FFP administered during the last 7 days: none  Recommend discharging the patient on a warfarin regimen of 12.5 mg once tonight with a prescription for warfarin 5mg tablets.  Of note, patient's CFX is not yet therapeutic but will discharge today without bridging per primary team with close follow-up tomorrow for an INR check. Will dose more aggressively tonight in light of this. Patient has been requiring higher warfarin doses than previous regimen to reach therapeutic goal.     The patient should have an INR checked  tomorrow, 7/5. Further dosing to be determined by anticoagulation clinic based on tomorrow's INR .\"      Most Recent Cholesterol Panel:  Recent Labs   Lab Test 11/18/23  0018   CHOL 161   LDL 96   HDL 48   TRIG 85     Most Recent 6 Bacteria Isolates From Any Culture (See EPIC Reports for Culture Details):No lab results found.  Most Recent TSH, T4 and A1c Labs:  Recent Labs   Lab Test 06/27/24  0510 08/22/23  2200   TSH 2.35  --    A1C  --  5.6      Recent Labs   Lab 07/04/24  0548 07/03/24  0321 07/02/24  0501 07/01/24  0422 06/30/24  0416 06/29/24  0423   * 108* 141* 101* 99 111*       Imaging:  Chest radiograph 7/1/24:  FINDINGS: Two views of the chest. Postsurgical changes in the chest  with intact median sternotomy wires. Left chest wall implantable  cardiac defibrillator with leads in stable position. LVAD. Trachea is  midline. Cardiac silhouette is stable. Streaky right basilar  opacities. No appreciable pneumothorax. " Continued small left pleural  effusion.                                                                      IMPRESSION:   1. No residual left apical pneumothorax. Ongoing small left pleural  effusion.  2. Linear right basilar opacities, likely atelectasis.    Echocardiogram 6/28/24:  Interpretation Summary  s/p LVAD hm3 5400 rpm     Left ventricular function is decreased. The ejection fraction is <30%  (severely reduced).  LVIDd: 6.2 cm.  AV is closed. No AI.  Septum is midline.  Inflow cannot be asessed. Otflow graft velocity is normal (0.7 m/s)  The right ventricle is normal size.  Global right ventricular function is moderately reduced.  The inferior vena cava was normal in size with preserved respiratory  variability.     This study was compared with the study from 6/8/2024 .  RV function declined. LVAD is new.     ______________________________________________________________________________  Left Ventricle  LV eccentric hypertrophy. Moderate left ventricular dilation is present. Left  ventricular function is decreased. The ejection fraction is <30% (severely  reduced). Diastolic function not assessed due to presence of LVAD. Severe  diffuse hypokinesis is present.     Right Ventricle  The right ventricle is normal size. Global right ventricular function is  moderately reduced.     Atria  The atria cannot be assessed.     Mitral Valve  The mitral valve is normal. Trace mitral insufficiency is present.     Aortic Valve  No aortic regurgitation is present. AV is closed.     Tricuspid Valve  Trace tricuspid insufficiency is present. The right ventricular systolic  pressure is approximated at 12.2 mmHg plus the right atrial pressure.     Pulmonic Valve  The pulmonic valve is normal.     Vessels  The aorta root is normal. The inferior vena cava was normal in size with  preserved respiratory variability.     Pericardium  No pericardial effusion is present.    Cardiac Device Check - Inpatient     Value    Date Time  Interrogation Session [id number]    Implantable Pulse Generator  Medtronic    Implantable Pulse Generator Model [id number] Cobalt XT VR MRI    Implantable Pulse Generator Serial Number [id number]    Type Interrogation Session In Clinic    Clinic Name [facility]    Implantable Pulse Generator Type Defibrillator    Implantable Pulse Generator Implant Date 20230615    Implantable Lead  Medtronic    Implantable Lead Model 6935M [employer] Quattro Secure S MRI SureScan    Implantable Lead Serial Number [id number]    Implantable Lead Implant Date 20230615    Implantable Lead Polarity Type Tripolar Lead    Implantable Lead Location Detail 1 UNKNOWN    Implantable Lead Location Right Ventricle    Implantable Lead Connection Status Connected    Andrés Setting Mode (NBG Code) VVIR    Andrés Setting Lower Rate Limit 60    Andrés Setting Maximum Sensor Rate 130    Lead Channel Setting Sensing Polarity Bipolar    Lead Channel Setting Sensing Anode Location Right Ventricle    Lead Channel Setting Sensing Anode Terminal Ring    Lead Channel Setting Sensing Cathode Location Right Ventricle    Lead Channel Setting Sensing Cathode Terminal Tip    Lead Channel Setting Sensing Sensitivity 0.3    Lead Channel Setting Pacing Polarity Bipolar    Lead Channel Setting Pacing Anode Location Right Ventricle    Lead Channel Setting Pacing Anode Terminal Ring    Lead Channel Setting Sensing Cathode Location Right Ventricle    Lead Channel Setting Sensing Cathode Terminal Tip    Lead Channel Setting Pacing Pulse Width 0.4    Lead Channel Setting Pacing Amplitude 2.0    Lead Channel Setting Pacing Capture Mode Adaptive    Zone Setting Type Category VF    Zone Setting Vendor Type Category VF    Zone Setting Status Inactive    Zone Setting Detection Interval 320    Zone Setting Detection Beats Numerator 30    Zone Setting Detection Beats Denominator 40    Zone Setting Type Category VT    Zone Setting Vendor  Type Category FastVT    Zone Setting Status Inactive    Zone Setting Type Category VT    Zone Setting Vendor Type Category VT    Zone Setting Status Inactive    Zone Setting Detection Interval 360    Zone Setting Detection Beats Numerator 16    Zone Setting Detection Beats Denominator 16    Zone Setting Type Category VT    Zone Setting Vendor Type Category MonVT    Zone Setting Status Inactive    Zone Setting Detection Interval 400    Zone Setting Detection Beats Numerator 32    Zone Setting Detection Beats Denominator 32    Lead Channel Impedance Value 323    Lead Channel Impedance Value 456    Lead Channel Sensing Intrinsic Amplitude 14.0    Lead Channel Pacing Threshold Amplitude 0.5    Lead Channel Pacing Threshold Pulse Width 0.4    Battery Date Time of Measurements 67263887215804    Battery Status Middle of Service    Battery RRT Trigger 2.8 V    Battery Remaining Longevity 134    Battery Voltage 3.03    Capacitor Charge Type Shock    Capacitor Last Charge Date Time 52083128750261    Capacitor Charge Time 3.9    Capacitor Charge Energy 18.0    Andrés Statistic Date Time Start 74790857273551    Andrés Statistic Date Time End 67213681685507    Andrés Statistic RV Percent Paced 1.54    CRT Statistic Date Time Start 17491575162161    CRT Statistic Date Time End 22989680742198    Atrial Tachy Statistic Date Time Start 81560340819036    Atrial Tachy Statistic Date Time End 38501344238153    Atrial Tachy Statistic AT/AF [place] Percent 0    Therapy Statistic Recent Shocks Delivered 0    Therapy Statistic Recent Shocks Aborted 0    Therapy Statistic Recent ATP Delivered 0    Therapy Statistic Recent Date Time Start 43543817102701    Therapy Statistic Recent Date Time End 09838138505794    Therapy Statistic Total Shocks Delivered 0    Therapy Statistic Total Shocks Aborted 0    Therapy Statistic Total ATP Delivered 0    Therapy Statistic Total  Date Time Start 16387687911928    Therapy Statistic Total  Date Time End  04107729198654    Episode Statistic Recent Count 0    Episode Statistic Type Category Patient Activated    Episode Statistic Recent Count 5    Episode Statistic Type Category SVT    Episode Statistic Recent Count 32    Episode Statistic Type Category VT    Episode Statistic Recent Count 0    Episode Statistic Type Category VF    Episode Statistic Recent Count 0    Episode Statistic Type Category VT    Episode Statistic Recent Count 0    Episode Statistic Type Category VT    Episode Statistic Recent Count 0    Episode Statistic Type Category VT    Episode Statistic Recent Date Time Start 47292346032040    Episode Statistic Recent Date Time End 32395859560871    Episode Statistic Recent Date Time Start 35991098458466    Episode Statistic Recent Date Time End 44697463938505    Episode Statistic Recent Date Time Start 77937219511357    Episode Statistic Recent Date Time End 57089938751372    Episode Statistic Recent Date Time Start 38397321957788    Episode Statistic Recent Date Time End 70060574824901    Episode Statistic Recent Date Time Start 86130428869211    Episode Statistic Recent Date Time End 35773251323902    Episode Statistic Recent Date Time Start 71550009757257    Episode Statistic Recent Date Time End 54431272699706    Episode Statistic Recent Date Time Start 89646927906973    Episode Statistic Recent Date Time End 61781271117870    Episode Statistic Total Count 0    Episode Statistic Type Category Patient Activated    Episode Statistic Total Count 20    Episode Statistic Type Category SVT    Episode Statistic Total Count 88    Episode Statistic Type Category VT    Episode Statistic Total Count 0    Episode Statistic Type Category VF    Episode Statistic Total Count 0    Episode Statistic Type Category VT    Episode Statistic Total Count 0    Episode Statistic Type Category VT    Episode Statistic Total Count 0    Episode Statistic Type Category VT    Episode Statistic Total Date Time Start 73588695075781     Episode Statistic Total Date Time End 92939251499729    Episode Statistic Total Date Time Start 45351490063468    Episode Statistic Total Date Time End 50319817346930    Episode Statistic Total Date Time Start 62856281610538    Episode Statistic Total Date Time End 14258629942279    Episode Statistic Total Date Time Start 04669609407203    Episode Statistic Total Date Time End 68673034349826    Episode Statistic Total Date Time Start 40935514612083    Episode Statistic Total Date Time End 32033388434001    Episode Statistic Total Date Time Start 50075160080297    Episode Statistic Total Date Time End 27597192271983    Episode Statistic Total Date Time Start 86372580834188    Episode Statistic Total Date Time End 81862457117229    Episode Identifier 98    Episode Type Category SVT    Episode Date Time 85395622928924    Episode Duration 59    Episode Identifier 97    Episode Type Category SVT    Episode Date Time 93749040390175    Episode Duration 5    Episode Identifier 80    Episode Type Category SVT    Episode Date Time [id number]    Episode Duration 28    Episode Identifier 74    Episode Type Category SVT    Episode Date Time [id number]    Episode Duration 49    Episode Identifier 73    Episode Type Category SVT    Episode Date Time 99854778984385    Episode Duration 50    Episode Identifier 108    Episode Type Category VT    Episode Date Time [id number]    Episode Duration 1    Episode Identifier 107    Episode Type Category VT    Episode Date Time [id number]    Episode Duration 1    Episode Identifier 106    Episode Type Category VT    Episode Date Time [id number]    Episode Duration 1    Episode Identifier 105    Episode Type Category VT    Episode Date Time 75659257781740    Episode Duration 1    Episode Identifier 104    Episode Type Category VT    Episode Date Time [id number]    Episode Duration 3    Episode Identifier 103    Episode Type Category VT    Episode Date Time  81532423428250    Episode Duration 1    Episode Identifier 102    Episode Type Category VT    Episode Date Time [id number]    Episode Duration 1    Episode Identifier 101    Episode Type Category VT    Episode Date Time [id number]    Episode Duration 2    Episode Identifier 100    Episode Type Category VT    Episode Date Time 48871230177307    Episode Duration 1    Episode Identifier 99    Episode Type Category VT    Episode Date Time 05647423777474    Episode Duration 2    Episode Identifier 96    Episode Type Category VT    Episode Date Time [id number]    Episode Duration 1    Episode Identifier 95    Episode Type Category VT    Episode Date Time 78657214496364    Episode Duration 1    Episode Identifier 94    Episode Type Category VT    Episode Date Time 47098569858457    Episode Duration 1    Episode Identifier 93    Episode Type Category VT    Episode Date Time 10042696271538    Episode Duration 6    Episode Identifier 92    Episode Type Category VT    Episode Date Time [id number]    Episode Duration 2    Narrative    Patient seen in OR 26 for evaluation and iterative programming of their   ICD per MD orders pre LVAD implant.    Device: Medtronic [id number] [place] XT VR MRI  Normal device function.  Mode: VVIR  bpm  : 1.5%  Intrinsic rhythm:  bpm  Thoracic Impedance:Below reference line. Suggests possible intrathoracic   fluid accumulation.  Short V-V intervals: 0  Lead Trends Appear Stable.  Estimated battery longevity to RRT = 13.2 years. Battery voltage = 3.03 V.     Anticoagulant: Warafrin  Ventricular Arrhythmia: 15 NSVT episodes lasting 1-6 sec @ 188-214 bpm.  Setting Changes: ICD Therapies turned OFF. LRL increased to 60 bpm.  Plan: Page Device RN post LVAD implant to turn ON ICD Therapies.    JOEL Vo RN    Single lead ICD    I have reviewed and interpreted the device interrogation, settings,   programming and nurse's summary. The device is functioning within normal    device parameters. I agree with the current findings, assessment and plan.          PRE-ADMISSION MEDICATIONS:  Medications Prior to Admission   Medication Sig Dispense Refill Last Dose    reason aspirin not prescribed, intentional, Please choose reason not prescribed from choices below. (Patient not taking: Reported on 5/3/2024)       [DISCONTINUED] atorvastatin (LIPITOR) 20 MG tablet Take 1 tablet (20 mg) by mouth every evening for 3 days 3 tablet 0 6/3/2024 at am    [DISCONTINUED] carvedilol (COREG) 6.25 MG tablet Take 12.5 mg by mouth 2 times daily (with meals)   6/3/2024 at am    [DISCONTINUED] empagliflozin (JARDIANCE) 10 MG TABS tablet Take 10 mg by mouth   6/3/2024 at am    [DISCONTINUED] furosemide (LASIX) 20 MG tablet Take 1 tablet (20 mg) by mouth as needed (for weight gain, swelling) 3 tablet 0 6/3/2024 at am    [DISCONTINUED] milrinone (PRIMACOR) in sodium chloride 100 mL infusion Inject 0.3 mcg/kg/min into the vein continuously Dosing weight: 93.9 kg (0.3 mcg/kg/min = 28.17 mcg/min)   Past Week at unknown    [DISCONTINUED] nitroGLYcerin (NITROSTAT) 0.4 MG sublingual tablet Place 0.4 mg under the tongue every 5 minutes as needed for chest pain May repeat every 5 minutes for a total of 3 doses.   Unknown at unknown    [DISCONTINUED] potassium chloride araceli ER (KLOR-CON M20) 20 MEQ CR tablet Take 1 tablet (20 mEq) by mouth daily 30 tablet 1 6/3/2024 at am    [DISCONTINUED] sacubitril-valsartan (ENTRESTO) 49-51 MG per tablet Take 1 tablet by mouth 2 times daily for 3 doses 3 tablet 0 6/3/2024 at am    [DISCONTINUED] spironolactone (ALDACTONE) 25 MG tablet Take 25 mg by mouth at bedtime   6/3/2024 at pm    [DISCONTINUED] warfarin ANTICOAGULANT (COUMADIN) 5 MG tablet Take 7.5 mg by mouth every Mon, Wed, Friday; 5 mg all other days   6/2/2024       DISCHARGE MEDICATIONS:   Current Discharge Medication List        START taking these medications    Details   gabapentin (NEURONTIN) 100 MG capsule Take 2  capsules (200 mg) by mouth 3 times daily  Qty: 90 capsule, Refills: 0    Associated Diagnoses: LVAD (left ventricular assist device) present (H)      lisinopril (ZESTRIL) 10 MG tablet Take 1 tablet (10 mg) by mouth daily  Qty: 60 tablet, Refills: 0    Associated Diagnoses: LVAD (left ventricular assist device) present (H)      methocarbamol (ROBAXIN) 750 MG tablet Take 1 tablet (750 mg) by mouth 4 times daily as needed for muscle spasms or other (post-op pain)  Qty: 30 tablet, Refills: 0    Associated Diagnoses: LVAD (left ventricular assist device) present (H)      pantoprazole (PROTONIX) 40 MG EC tablet Take 1 tablet (40 mg) by mouth every morning (before breakfast)  Qty: 60 tablet, Refills: 0    Associated Diagnoses: LVAD (left ventricular assist device) present (H)      polyethylene glycol (MIRALAX) 17 GM/Dose powder Take 17 g by mouth daily as needed  Qty: 510 g, Refills: 0    Associated Diagnoses: LVAD (left ventricular assist device) present (H)           CONTINUE these medications which have CHANGED    Details   empagliflozin (JARDIANCE) 10 MG TABS tablet Take 1 tablet (10 mg) by mouth daily  Qty: 90 tablet, Refills: 1    Associated Diagnoses: LVAD (left ventricular assist device) present (H)      spironolactone (ALDACTONE) 25 MG tablet Take 1 tablet (25 mg) by mouth every evening  Qty: 30 tablet, Refills: 0    Associated Diagnoses: LVAD (left ventricular assist device) present (H)      warfarin ANTICOAGULANT (COUMADIN) 5 MG tablet Take 2.5 tablets (12.5 mg) on 7/4/24 P.M. You will have a chromogenic factor X check on 7/5. Always follow the most recent instructions from your INR clinic.  Qty: 60 tablet, Refills: 0    Associated Diagnoses: LVAD (left ventricular assist device) present (H)           CONTINUE these medications which have NOT CHANGED    Details   reason aspirin not prescribed, intentional, Please choose reason not prescribed from choices below.           STOP taking these medications        atorvastatin (LIPITOR) 20 MG tablet Comments:   Reason for Stopping:         carvedilol (COREG) 6.25 MG tablet Comments:   Reason for Stopping:         furosemide (LASIX) 20 MG tablet Comments:   Reason for Stopping:         milrinone (PRIMACOR) in sodium chloride 100 mL infusion Comments:   Reason for Stopping:         nitroGLYcerin (NITROSTAT) 0.4 MG sublingual tablet Comments:   Reason for Stopping:         potassium chloride araceli ER (KLOR-CON M20) 20 MEQ CR tablet Comments:   Reason for Stopping:         sacubitril-valsartan (ENTRESTO) 49-51 MG per tablet Comments:   Reason for Stopping:                CC:Libertad Dunlap,    Betzaida Baker PA-C  [facility]   Cardiothoracic Surgery  Office phone: [phone]  Office fax: [phone]

## 2024-07-01 NOTE — PLAN OF CARE
Neuro: A&Ox4.   Cardiac: ST with PVCs. HM3, within order parameters overnight. Doppler MAP 80s this shift     Respiratory: Sating >90% on RA.  GI/: Adequate urine output, using urinal overnight; no BM this shift  Diet/appetite: Tolerating regular diet  Activity:  Assist of 1 for managing lines, up to edge of bed and standing at bedside x1 overnight  Pain: At acceptable level on current regimen. Robaxin x1  Skin: No new deficits noted.  LDA's: HM3 LVAD; L PIV, SL; L CT with minimal output    Heparin gtt continues, next Xa check @12:30pm     Plan: Continue with POC. Notify primary team with changes.

## 2024-07-01 NOTE — PROGRESS NOTES
All VAD education is complete.   Both patient and wife verbalized understanding of and/or correctly demonstrated the ability to:   -Change controller. They both demonstrate controller change properly and verbalized understanding that patient should only change controller after discussion with VAD Coordinator and in the presence of a trained caregiver whenever possible.     Reviewed mychart and upcoming apts with patient and wife, answered miscellaneous questions. Provided my contact information and encouraged to call with questions/ concerns.

## 2024-07-01 NOTE — PROGRESS NOTES
VAD Social Work Service Discharge Note      Patient Name:  Navin Lutz     Anticipated Discharge Date:  7/2/2024    Discharge Disposition:   Other:  Local [facility]. Wife has been staying there and utilizing shuttle services to get back and forth.    Plan for 24 hour care for immediate post transplant period: Wife will provide 24-hour caregiver support at the hotel. Able to work remotely. Will also accompany him to all follow up appointments    If not local, plans for short term stay:  See above-but will discharge to a local hot[facility]    Additional Services/Equipment Arranged:  N/A     Persons notified of above discharge plan:  Patient/wife. Last week, the recommendations had been to discharge to rehab, but he has was able to progress out of that and was cleared to discharge with wife to their local hotel.    Patient / Family response to discharge plan:  Agreeable.     Education and resources provided by SW at discharge: Role of VAD  within the VAD program. Provided contact info for , Temporary relocation options, and availability of support groups via Zoom.    Plan: Met with the patient and wife a the bedside. Discussed need for 24-hour caregiver support at discharge and confirmed local discharge plans. Assessed coping and inquired into questions and concerns related to having the LVAD and discharging for the first time with it.    Chris was friendly and talkative. Reports feeling so much better now that the fluid has been removed and the chest tube is out. He states he can breathe so much better. Wife always present for ongoing support.    Affect seems bright and coping skills seem appropriate considering he just got an LVAD and the adjustment required post-implant.    No psychosocial concerns noted. They both verbalized understanding toward how to reach writer with future concerns and understanding of how to log onto support group.    Marivel Sales, MSW, [facility]  Heart  Transplant/MCS   ph. [phone]  Vocera/Teams

## 2024-07-01 NOTE — PROGRESS NOTES
Cardiovascular Surgery Progress Note  07/01/2024         Assessment and Plan:     Navin Lutz is a 53 year old male with PMH of CKD2, HLD, R Subclavian and axillary vein non-occlusive thrombus on Warfarin, NSVT, HFrEF 2/2 NICM s/p ICD (PTA IV milrinone) who presents admitted 6/3/24 for decompensated HFrEF listed status 4. He is now s/p LVAD by Dr. Jhaveri on 6/14/24.    Cardiovascular:   S/p LVAD on 6/14 by Dr. Jhaveri  S/p IABP (6/12-6/14)  Hx NSVT  Acute on chronic HFrEF 2/2 to NICM (EF 10-15%), home milrinone PTA, s/p ICD   HLD  HTN  Hypervolemia  Rare ventricular tachycardia  Ongoing sinus tachycardia, HD stable. MAPs ~74-82  Pre-op echo 6/8: LV EF 15-20%, normal RV size/function  Echo ordered 6/28 in light of new VT, LVEF <30%, AV closed, no AI, septum midline, RV function moderately reduced, severe diffuse hypokinesis, no effusion  - PTA atorvastatin 20 mg daily, on hold due to elevated LFTs  - Lisinopril 5 mg daily  - Cards 2 following for hemodynamic & GDMT management; appreciate recs  - Holding PTA Coreg, Entresto, Aldactone, Jardiance   - PI dropped to 2.2 on 6/26, diuretic therapy held, patient grossly euvolemic    Surgical chest tubes: removed in ICU  L pigtail removed 6/30    Pulmonary:  - Extubated POD 3 to 5 lpm via NC. Now saturating well on RA  - Supplemental O2 PRN to keep sats > 92%.  - Pulm toilet, IS, activity and deep breathing  - DuoNebs QID, Mucinex BID  Left pleural effusion - improved s/p thoracentesis, pigtail placement  Small left pneumothorax after pigtail, resolved  - IR performed thoracentesis on 6/25 with removal of 500 ml from left pleural space. Fluid removal limited by pain. 6/27 CXR with persistent pleural effusion.   - S/p pigtail placement on 6/28 by IR. On return to the floor, immediate drainage of 1500cc serosanguinous fluid. Chest tube clamped X1 hour. STAT chest xray showed small left apical pneumothorax. Chest tube unclamped, no immediate drainage. No air leak.   - Removed  6/30 as above, follow up CXR stable, pneumothorax resolved  - Will order follow up CXR to be done prior to next outpatient appointment at [facility]    Neurology /Psych:  Acute post-operative pain  - Acute post-operative pain regimen:  - Scheduled: lidocaine patches, gabapentin (increased dose to 200 mg TID for neuropathic pain in RUE)  - PRN: PO oxycodone PRN, Robaxin, Voltaren gel, Atarax, Tylenol (held due to elevated LFTs)  - Patient denies pain aside from RUE (gabapentin ordered, has not been using PRNs)     / Renal:  CKD Stage 2  Contraction alkalosis  Hyponatremia, resolved  - Baseline creatinine ~1.1-1.2. Most recent creatinine 0.96, UOP adequate  - Pre-op weight 209 lbs, most recent weight 188 lbs  - Diuresis per Cards 2, on hold, patient euvolemic and auto-diuresing  - Replace electrolytes per protocol    GI / FEN:   Diarrhea, resolved  Elevated LFTs, improving  - Regular diet  - Regular BMs, continue bowel reg PRN  - New onset diarrhea 6/24, C. Diff negative  - Calorie count 6/25-6/27 with good protein intake, continue supplements  - Hepatic enzymes increasing. Multiple medications could contribute (statin/tylenol/azithromycin/ceftriaxone). 6/27 RUQ US showed patent hepatic vasculature and no evidence of cholelithiasis. Will continue to trend LFTs. Increased 6/29, switch ceftriaxone to levofloxacin for 2 doses. Tylenol and statin on hold. LFTs improving    Endocrine:  Stress-induced hyperglycemia, resolved  Pre-op Hgb A1C 5.6%  - Managed on insulin drip postop, transitioned to sliding scale goal BG <180 and has now also been discontinued due to good BG control with no insulin need.   - TSH ordered with facial flushing, WNL    Infectious Disease:  Stress induced leukocytosis, resolved  HCAP, resolved  - WBC WNL, CRP 66 and down-trending, remains afebrile  - Completed perioperative LVAD antibiotics   - CT CAP 6/22 with anterior chest subcutaneous air and stranding.  Gas and fluid collection in the substernal  region which could be infectious versus postprocedural. Bilateral pleural effusions with adjacent atelectasis and mild groundglass opacities.  - CT results were reviewed with Dr. Miller and Dr. Jhaveri - no indication for surgical washout at this time, plan to continue on broad spectrum antibiotics for at least 2 weeks per Dr. Jhaveri  - Sputum culture 6/22 + for Klebsiella pneumoniae  - UA 6/21 non-infectious  - Blood cultures x2 6/21, 6/23 no growth  - 6/25 thoracentesis labs without culture growth, Lights' criteria supportive of exudative effusion, possibly clouded by high serum LDH in the setting of LVAD  -  ID consulted 6/23, recommendations:  - K. Pneumonieae susceptible to ceftriaxone, may narrow to ceftriaxone 2 g daily for 7-10 day course, completed    Antibiotics:  - Levofloxacin IV 6/29 - 6/30 with concern for ceftriaxone elevating LFTs, this will complete 10 day course.  - Ceftriaxone 6/27 - 6/29  - Empiric IV vancomycin/zosyn 6/21 - 6/27 per surgeon  - PO azithromycin 6/22 - 6/26    Hematology:   Acute blood loss anemia, stable  Acute blood loss thrombocytopenia, resolved  Right brachial non-occlusive thrombus  Right internal jugular non-occlusive thrombus  Right radial artery occlusion 2/2 arterial line  Hgb stable; Plt WNL, no signs or symptoms of active bleeding  - Daily CBC  - Upper extremity arterial ultrasound completed 6/25  - Discussed warning signs for radial artery occlusion with patient and wife. Bilateral hands warm and well-perfused on exam.    Anticoagulation:   Chronic anticoagulation for LVAD, CFX goal 20-40%  +Lupus anticoagulant   Partially occlusive internal jugular thrombus, partially occlusive brachial venous thrombus  - Warfarin for LVAD  - INRs noted to be discordant with Chromogenic Factor X, hematology consulted 6/21 - Lupus anticoagulant positive. Heme recommended repeat outpatient testing for Lupus anticoagulant at the end of July. If negative, INR monitoring may be an option. Okay to  reach out to heme outpatient team with questions.  - Plan to use Chromogenic factor X for warfarin dosing as INR is not reliable, goal CFX 20-40%, most recent factor X not within goal but improving, 67% today.   - Heparin gtt to bridge due to LVAD and radial artery occlusion and venous thrombi    MSK/Skin:  Sternotomy  - PT/OT    Prophylaxis:   - Stress ulcer prophylaxis: Pantoprazole 40 mg daily for 30 days  - DVT prophylaxis: heparin, warfarin, SCD    Disposition:   - Transferred to  on 6/21  - Therapies recommending discharge to home. Barriers to discharge include continued downtrending chromogenic factor X. Ok to discharge prior to being therapeutic if close per Dr. Jhaveri. Will need close follow up/monitoring.  - Will need follow up outpatient lupus anticoagulant testing at end of July    Medically Ready for Discharge: Anticipated Tomorrow      Clinically Significant Risk Factors              # Hypoalbuminemia: Lowest albumin = 2.5 g/dL at 6/27/2024  5:10 AM, will monitor as appropriate        # End stage heart failure: home medication list includes inotropes          #Precipitous drop in Hgb/Hct: Lowest Hgb this hospitalization: 9.1 g/dL. Will continue to monitor and treat/transfuse as appropriate.     # Overweight: Estimated body mass index is 25.5 kg/m  as calculated from the following:    Height as of this encounter: 1.829 m (6').    Weight as of this encounter: 85.3 kg (188 lb 0.8 oz).        # Financial/Environmental Concerns: none  # Asthma: noted on problem list  # ICD device present     Patient seen and plan of care discussed with Dr. Jhaveri on AM rounds.    Divina Guzman PA-C  Cardiothoracic Surgery  Pager [phone]  7:50 AM on 7/1/2024        Interval History:     No events overnight.  Appears very well, no pain since pigtail removed, breathing feels better. Motivated to walk in the halls multiple times per day. Feels ready to go home tomorrow.  Right hand feels weak, working with putty per OT.  Still with tingling/nerve pain in right 4th/5th fingers, likely 2/2 positioning during surgery. Will continue to monitor and if not resolved in a few months may seek out neurology evaluation.  Tolerating diet, good intake on calorie count, + BM. No nausea or vomiting. Denies abdominal pain.   Denies palpitations, dizziness, syncopal symptoms, fevers, chills, myalgias, sternal popping/clicking, dysuria, diarrhea.         Physical Exam:   Blood pressure (!) 81/65, pulse 110, temperature 97.7  F (36.5  C), temperature source Oral, resp. rate 18, height 1.829 m (6'), weight 85.3 kg (188 lb 0.8 oz), SpO2 95%.  Vitals:    24 0515 24 0207 24 0430   Weight: 88.1 kg (194 lb 3.6 oz) 86.6 kg (190 lb 14.7 oz) 85.3 kg (188 lb 0.8 oz)     Gen:  NAD, sitting in chair, conversational, wife at bedside  Neuro: no focal deficits, A&Ox4  CV: sinus tachycardia, +LVAD hum   Pulm: CTA, dull at left base, unlabored work of breathing on RA  Abd: nondistended, normal BS, soft, nontender  Ext: no edema  Skin:   Incision: clean, dry, intact, healing well, no erythema, sternum stable  Tubes/drain sites: open to air, no drainage, no erythema or induration  Lines: driveline dressing clean and dry     Heartmate 3 LEFT VS  Flow (Lpm): 4.4 Lpm  Pulse Index (PI): 5 PI  Speed (rpm): 5400 rpm  Power (bassett): 3.8 bassett  Current Hct settin         Data:    Imaging:  reviewed recent imaging, no acute concerns  XR Chest 2 Views  Narrative: EXAM: XR CHEST 2 VIEWS  2024 9:24 AM      HISTORY: follow up after pigtail removal    COMPARISON: 2024    FINDINGS: Two views of the chest. Postsurgical changes in the chest  with intact median sternotomy wires. Left chest wall implantable  cardiac defibrillator with leads in stable position. LVAD. Trachea is  midline. Cardiac silhouette is stable. Streaky right basilar  opacities. No appreciable pneumothorax. Continued small left pleural  effusion.  Impression: IMPRESSION:   1. No  residual left apical pneumothorax. Ongoing small left pleural  effusion.  2. Linear right basilar opacities, likely atelectasis.    I have personally reviewed the examination and initial interpretation  and I agree with the findings.    JHOAN SOTELO MD         SYSTEM ID:  [id number]        Labs:  BMP  Recent Labs   Lab 07/01/24 0422 06/30/24 0416 06/29/24  0423 06/28/24  0139   * 136 135 134*   POTASSIUM 4.0 4.4 4.0 3.8   CHLORIDE 102 103 104 102   NAM 8.7 9.1 8.6 8.5*   CO2 21* 21* 21* 22   BUN 11.1 12.6 11.7 13.6   CR 0.96 0.92 0.89 1.11   * 99 111* 138*     CBC  Recent Labs   Lab 07/01/24 0422 06/30/24 0416 06/29/24  0423 06/28/24  0513   WBC 8.6 10.2 9.8 10.2   RBC 3.87* 3.81* 3.75* 3.63*   HGB 11.5* 11.2* 11.0* 11.0*   HCT 34.6* 34.5* 33.5* 32.8*   MCV 89 91 89 90   MCH 29.7 29.4 29.3 30.3   MCHC 33.2 32.5 32.8 33.5   RDW 13.7 13.7 13.5 13.5    411 428 460*     INR  Recent Labs   Lab 07/01/24 0422 06/30/24  0416 06/29/24  0423 06/28/24  0513   INR 1.71* 1.46* 1.56* 1.69*      Hepatic Panel  Recent Labs   Lab 07/01/24 0422 06/30/24  0416 06/29/24  0423 06/28/24  0513   AST 81* 92* 114* 116*   * 215* 218* 202*   ALKPHOS 186* 190* 181* 179*   BILITOTAL 0.4 0.4 0.4 0.4   ALBUMIN 2.9* 3.0* 2.8* 2.7*     GLUCOSE:   Recent Labs   Lab 07/01/24  0422 06/30/24  0416 06/29/24  0423 06/28/24  0139 06/27/24  0510 06/26/24  0530   * 99 111* 138* 102* 106*

## 2024-07-01 NOTE — PROGRESS NOTES
CLINICAL NUTRITION SERVICES - REASSESSMENT NOTE     Nutrition Prescription    Malnutrition Status:    Patient does not meet two of the established criteria necessary for diagnosing malnutrition but is at risk for malnutrition    Recommendations already ordered by Registered Dietitian (RD):  Continue to encourage high protein foods w/ each meal + snack; reinforced protein goal of 125 gm/day x6-8 weeks post LVAD    Cafe meal passes PRN    Oral nutrition supplements PRN, has list    Future/Additional Recommendations:  Monitor nutrition-related findings and follow pt per protocol     EVALUATION OF THE PROGRESS TOWARD GOALS   Diet: Regular  Supplement: Ensure Clear once daily + additional ONS by request, PRN      PO Intake: 50-75% intake documented for most meals, per I/O; some variation with higher or lower %intake at times. Per review of room service selections, pt is ordering 2 meals per day + 1 Ensure Clear per day. If pt were eating 50-75% of food on meal trays, avg intake over past 5-days of ~961-1440 Kcals (42-63% goal) and 34-50 gm protein (27- 40% of goal). Patient also receives food from the cafeteria daily, and has been utilizing cafe meal passes, so the above estimate of intake does not provide full picture of actual intake.     Per pt/wife report, pt continues to eat better day to day, although dysgeusia continues. Chris reports that he has been continuing to drink at least 1 Ensure Clear supplement per day (occasionally 2) and has been consuming additional high protein foods from outside of the hospital such as chicken wings, and spaghetti with meatballs over the weekend.     Reviewed high protein goal with pt/wife again today + strategies to meet this goal. Sending 1x trial of Boost Soothe to room for pt to try. Otherwise, no change to nutrition POC.      NEW FINDINGS   Weights:  Weight is down 9.5 kg since admission (10%, severe loss). Likely at least partially attributed to fluid status, as pt had been  diuresing.   Down 10.1L fluid since admit, based on I/O    LABS: Reviewed  Electrolytes  Potassium (mmol/L)   Date Value   07/01/2024 4.0   06/30/2024 4.4   06/29/2024 4.0     Potassium POCT (mmol/L)   Date Value   06/14/2024 4.5   06/14/2024 4.6   06/14/2024 4.8     Phosphorus (mg/dL)   Date Value   07/01/2024 3.3   06/30/2024 3.5   06/29/2024 3.0   06/28/2024 2.8   06/27/2024 2.6    Blood Glucose  Glucose (mg/dL)   Date Value   07/01/2024 101 (H)   06/30/2024 99   06/29/2024 111 (H)   06/28/2024 138 (H)   06/27/2024 102 (H)     GLUCOSE BY METER POCT (mg/dL)   Date Value   06/20/2024 90   06/20/2024 96   06/20/2024 100 (H)   06/19/2024 96   06/19/2024 105 (H)     Hemoglobin A1C (%)   Date Value   08/22/2023 5.6    Inflammatory Markers  WBC Count (10e3/uL)   Date Value   07/01/2024 8.6   06/30/2024 10.2   06/29/2024 9.8     Albumin (g/dL)   Date Value   07/01/2024 2.9 (L)   06/30/2024 3.0 (L)   06/29/2024 2.8 (L)      Magnesium (mg/dL)   Date Value   07/01/2024 2.0   06/30/2024 2.1   06/29/2024 2.1     Sodium (mmol/L)   Date Value   07/01/2024 133 (L)   06/30/2024 136   06/29/2024 135    Renal  Urea Nitrogen (mg/dL)   Date Value   07/01/2024 11.1   06/30/2024 12.6   06/29/2024 11.7     Creatinine (mg/dL)   Date Value   07/01/2024 0.96   06/30/2024 0.92   06/29/2024 0.89     Additional  Triglycerides (mg/dL)   Date Value   11/18/2023 85   08/22/2023 114     Ketones Urine (mg/dL)   Date Value   06/21/2024 Trace (A)        RESPIRATORY:   Room air      RENAL:  GFR >90      GI:  Last BM: 6/30 x1  0-4 unmeasured BMs per day over past week, per I/O.   Bowel regimen: PRN Miralax, Senokot      MEDICATIONS:  Lipitor  Jardiance    SKIN:  New LVAD  WOCN not following     MALNUTRITION  % Intake: Unable to assess -Suspect actual intake higher than appears per inez cts, incomplete data  % Weight Loss: > 2% in 1 week (severe) - Likely at least partially confounded by fluid status   Subcutaneous Fat Loss: None observed  Muscle Loss:  None observed  Fluid Accumulation/Edema: Does not meet criteria (trace)  Malnutrition Diagnosis: Patient does not meet two of the established criteria necessary for diagnosing malnutrition but is at risk for malnutrition    Previous Goals   Patient to consume % of nutritionally adequate meal trays TID, or the equivalent with supplements/snacks.   Evaluation: Progressing    Previous Nutrition Diagnosis  Increased nutrient needs related to hypercatabolism as evidenced by recent LVAD placement.     Evaluation: No change    CURRENT NUTRITION DIAGNOSIS  Increased nutrient needs related to hypercatabolism as evidenced by recent LVAD placement.       INTERVENTIONS  Implementation  Continue current nutrition POC    Goals  Patient to consume % of nutritionally adequate meal trays TID, or the equivalent with supplements/snacks.    Monitoring/Evaluation  Progress toward goals will be monitored and evaluated per protocol.    Prasad Davies, MS, RDN, LD, CNSC  Available by [employer] or phone   Vocnina: M-F (7:00-3:30)  6B Clinical Dietitian  or 1A Obs Clinical Dietitian  Weekend/Holiday (7:00-3:30) - Weekend Clinical Dietitian   6B RD desk: [phone]       **Clinical Dietitians are no longer available by pager.

## 2024-07-01 NOTE — PROGRESS NOTES
[facility]   Cardiology II Service / Advanced Heart Failure  Daily Consult Progress Note      Patient: Navin Lutz  MRN: [id number]  Admission Date: 6/3/2024  Hospital Day # 28    Assessment and Plan: Navin Lutz is a 53 year old male with HFrEF 2/2 NICM s/p ICD, HLD, and CKD Stage II who presents admitted for decompensated heart failure with NICM on milrinone listed status 4, admitted for consideration of advanced therapies and is now s/p HM3 LVAD on 6/14/24 with Dr. Jhaveri with course complicated by klebsiella PNA (sp 10d abx) and recurrent left pleural effusion s/p thoracentesis then pigtail CT.     Recommendations:  - continue heparin gtt for bridging until CFX is closer to 40  - monitor hepatic panel  - consider increased gabapentin for neuropathy  - outpatient cardiac rehab referral placed    # Acute on chronic systolic heart failure secondary to NICM, previously on home milrinone  # s/p HM3 LVAD as BTT on 6/14/24   Stage D. NYHA Class III confounded by recent surgery    -Fluid status: grossly euvolemic, PIs improved off lasix  -ACEi/ARB/ARNi: lisinopril 5 mg daily   -Afterload reduction: discontinued hydralazine   -Inotrope: dobutamine stopped on 6/19/24  -BB: contraindicated given recent LVAD implant  -Aldosterone antagonist: aldactone 25 mg daily   -SGLT2i: Jardiance 10 mg daily  -SCD prophylaxis: ICD  -MAP: goal 65-85  -LDH trends: 346  -Anticoagulation: warfarin dosing per pharmacy, Following chromogenic factor 10: goal 20-40, dosing per pharmacy; 71 yesterday so started on heparin gtt for bridging   -Antiplatelet: no ASA indicated per NICOLE trial results    # NSVT  Occ runs of NSVT 10-24 beats this week, asymptomatic and HD stable. Echo 6/28 showed LVIDd 6.2cm, at 5400 rpm.   - monitor tele  - defer Rx for now  - goal K>4 Mg>2    # +Lupus anticoagulant  - negative testing in 2023, then + this admission for lupus anticoagulant  - following CFx as above per heme, goal 20-40  - per heme  6/25 repeat lupus anticoag panel end of July, may be able to switch back to INR monitoring    # Leukocytosis, pneumonia, resolved  # Fevers, resolved  # Left pleural effusion s/p thoracentesis 6/25 then pigtail 6/28  Concern for infection, +productive cough. CT chest/abd/pelvis 6/22/24 anterior chest subcutaneous air and stranding with substernal gas, read as infectious vs postprocedureal, felt to be procedural per CVTS. Also with b/l pleural effusions and mild ground glass in the lung bases concerning for pneumonia. Resp culture negative. UA negative. New diarrhea with abdominal cramping on 6/24 after 3-4 days of abx.  -Appreciate ID consult  -IR diagnostic and therapeutic thora 6/25. 500mL drained  -Vanc/Zosyn started  6/21 and added azithromycin 6/22   - stop vanco 6/26 per Dr Jhaveri s/p azithro course   - rocpehin changed tp levaquin 6/29 for LFTs, plan for total 10 days ok for levaquin per ID (through 6/30)  -CRP downtrending and WBC normalized  -6/21/24 blood cultures NGTD  -cdif negative 6/24  -s/p pigtail chest tube 6/28, 1.5L drained immediately, now minimal, removed 6/30 per CVTS    # Acute chest pain 2/2 globus sensation vs esophagitis, resolved  6/29 AM after eating omlet; no shortness of breath or coughing  - CXR 6/29 negative for signs of aspiration  - monitor, consider acid reducing agents if heart burn symptoms persist    # Right radial artery occlusion 2/2 arterial line   # Neuropathy  # Right subclavian and axillary vein thrombus, nonocclusive, known prior to VAD.   Redemonstrated DVT 6/22 CT, likely old clot vs line related from right neck swan in ICU. Having numbness and tingling of right hand.  - warfarin and heparin as above, follow chromogenic factor X  - warning signs for arterial clot reviewed with patient and his wife   - OT hand therapies  - consider increased dose gabapentin    # Elevated LFTs  - AST/ALT trending up this week, 200s, no symptoms   - trend daily  - US abd negative for cause  6/27  - hold statin for now  - pharmacy review of other meds ?Rocephin 3% incidence, switched to levaquin as above, LFTs improving    # HLD  - hold atorvastatin 20 mg as above     # CKD stage 2. B/l cr 1.2-1-4  - Cr stable 1.1      Patient discussed with Dr. Dallas.        40 minutes spent on the date of the encounter doing chart review, history and exam, documentation and further activities per the note    Desire Silverman DNP, NP-C  Nurse Practitioner - Advanced Heart Failure/Cardiology II Service  Jany preferred or Pager [phone]    ================================================================    Subjective/24-Hr Events:   Last 24 hr care team notes reviewed. Intermittent NSVT, longest 10 beats, no symptoms MAP stable. Did really well with therapy yesterday - cleared for home. Patient and wife comfortable with discharge plans.    ROS:  4 point ROS including respiratory, CV, GI and  (other than that noted in the HPI) is negative.     Medications: Reviewed in EPIC.     Physical Exam:   BP (!) 81/65   Pulse 110   Temp 97.7  F (36.5  C) (Oral)   Resp 18   Ht 1.829 m (6')   Wt 85.3 kg (188 lb 0.8 oz)   SpO2 95%   BMI 25.50 kg/m      GENERAL: Appears comfortable and in no distress.  HEENT: Eye symmetrical, no discharge or icterus bilaterally.   NECK: Supple, JVD at clavicle at 90 degrees.   CV: Hum of LVAD, no adventitious sounds  RESPIRATORY: Respirations regular, even, and unlabored. Lungs CTA throughout.    GI: Soft and non distended with normoactive bowel sounds present No tenderness, rebound, guarding.   EXTREMITIES: No b/l lower extremity peripheral edema. All extremities are warm and well perfused. normal cap refill right hand.  NEUROLOGIC: Alert, CN grossly intact.  SKIN: No jaundice. No rashes or lesions. Sternum c/d/i    Labs:  CMP  Recent Labs   Lab 07/01/24  0422 06/30/24  0416 06/29/24  0423 06/28/24  0513 06/28/24  0139   * 136 135  --  134*   POTASSIUM 4.0 4.4 4.0  --  3.8   CHLORIDE 102  103 104  --  102   CO2 21* 21* 21*  --  22   ANIONGAP 10 12 10  --  10   * 99 111*  --  138*   BUN 11.1 12.6 11.7  --  13.6   CR 0.96 0.92 0.89  --  1.11   GFRESTIMATED >90 >90 >90  --  79   NAM 8.7 9.1 8.6  --  8.5*   MAG 2.0 2.1 2.1  --  2.1   PHOS 3.3 3.5 3.0 2.8  --    PROTTOTAL 6.0* 6.2* 5.9* 6.1*  --    ALBUMIN 2.9* 3.0* 2.8* 2.7*  --    BILITOTAL 0.4 0.4 0.4 0.4  --    ALKPHOS 186* 190* 181* 179*  --    AST 81* 92* 114* 116*  --    * 215* 218* 202*  --        CBC  Recent Labs   Lab 07/01/24 0422 06/30/24 0416 06/29/24 0423 06/28/24  0513   WBC 8.6 10.2 9.8 10.2   RBC 3.87* 3.81* 3.75* 3.63*   HGB 11.5* 11.2* 11.0* 11.0*   HCT 34.6* 34.5* 33.5* 32.8*   MCV 89 91 89 90   MCH 29.7 29.4 29.3 30.3   MCHC 33.2 32.5 32.8 33.5   RDW 13.7 13.7 13.5 13.5    411 428 460*       INR  Recent Labs   Lab 07/01/24 0422 06/30/24 0416 06/29/24 0423 06/28/24  0513   INR 1.71* 1.46* 1.56* 1.69*

## 2024-07-02 ENCOUNTER — APPOINTMENT (OUTPATIENT)
Dept: PHYSICAL THERAPY | Facility: CLINIC | Age: 54
End: 2024-07-02
Attending: STUDENT IN AN ORGANIZED HEALTH CARE EDUCATION/TRAINING PROGRAM
Payer: COMMERCIAL

## 2024-07-02 LAB
ALBUMIN SERPL BCG-MCNC: 3 G/DL (ref 3.5–5.2)
ALP SERPL-CCNC: 192 U/L (ref 40–150)
ALT SERPL W P-5'-P-CCNC: 181 U/L (ref 0–70)
ANION GAP SERPL CALCULATED.3IONS-SCNC: 10 MMOL/L (ref 7–15)
AST SERPL W P-5'-P-CCNC: 76 U/L (ref 0–45)
BACTERIA PLR CULT: NORMAL
BASOPHILS # BLD AUTO: 0.1 10E3/UL (ref 0–0.2)
BASOPHILS NFR BLD AUTO: 1 %
BILIRUB DIRECT SERPL-MCNC: <0.2 MG/DL (ref 0–0.3)
BILIRUB SERPL-MCNC: 0.3 MG/DL
BUN SERPL-MCNC: 13.6 MG/DL (ref 6–20)
CALCIUM SERPL-MCNC: 8.9 MG/DL (ref 8.6–10)
CHLORIDE SERPL-SCNC: 104 MMOL/L (ref 98–107)
CREAT SERPL-MCNC: 0.82 MG/DL (ref 0.67–1.17)
DEPRECATED HCO3 PLAS-SCNC: 20 MMOL/L (ref 22–29)
EGFRCR SERPLBLD CKD-EPI 2021: >90 ML/MIN/1.73M2
EOSINOPHIL # BLD AUTO: 0.3 10E3/UL (ref 0–0.7)
EOSINOPHIL NFR BLD AUTO: 4 %
ERYTHROCYTE [DISTWIDTH] IN BLOOD BY AUTOMATED COUNT: 13.9 % (ref 10–15)
FACT X ACT/NOR PPP CHRO: 61 % (ref 70–130)
GLUCOSE SERPL-MCNC: 141 MG/DL (ref 70–99)
HCT VFR BLD AUTO: 35 % (ref 40–53)
HGB BLD-MCNC: 11.3 G/DL (ref 13.3–17.7)
IMM GRANULOCYTES # BLD: 0.1 10E3/UL
IMM GRANULOCYTES NFR BLD: 1 %
INR PPP: 1.53 (ref 0.85–1.15)
LYMPHOCYTES # BLD AUTO: 1.7 10E3/UL (ref 0.8–5.3)
LYMPHOCYTES NFR BLD AUTO: 19 %
MAGNESIUM SERPL-MCNC: 2 MG/DL (ref 1.7–2.3)
MCH RBC QN AUTO: 29.4 PG (ref 26.5–33)
MCHC RBC AUTO-ENTMCNC: 32.3 G/DL (ref 31.5–36.5)
MCV RBC AUTO: 91 FL (ref 78–100)
MONOCYTES # BLD AUTO: 0.7 10E3/UL (ref 0–1.3)
MONOCYTES NFR BLD AUTO: 8 %
NEUTROPHILS # BLD AUTO: 5.9 10E3/UL (ref 1.6–8.3)
NEUTROPHILS NFR BLD AUTO: 67 %
NRBC # BLD AUTO: 0 10E3/UL
NRBC BLD AUTO-RTO: 0 /100
PHOSPHATE SERPL-MCNC: 3.2 MG/DL (ref 2.5–4.5)
PLATELET # BLD AUTO: 337 10E3/UL (ref 150–450)
POTASSIUM SERPL-SCNC: 3.8 MMOL/L (ref 3.4–5.3)
PROT SERPL-MCNC: 6.1 G/DL (ref 6.4–8.3)
RBC # BLD AUTO: 3.84 10E6/UL (ref 4.4–5.9)
SODIUM SERPL-SCNC: 134 MMOL/L (ref 135–145)
UFH PPP CHRO-ACNC: 0.12 IU/ML
UFH PPP CHRO-ACNC: 0.16 IU/ML
UFH PPP CHRO-ACNC: 0.74 IU/ML
WBC # BLD AUTO: 8.9 10E3/UL (ref 4–11)

## 2024-07-02 PROCEDURE — 93750 INTERROGATION VAD IN PERSON: CPT | Performed by: PHYSICIAN ASSISTANT

## 2024-07-02 PROCEDURE — 36415 COLL VENOUS BLD VENIPUNCTURE: CPT | Performed by: STUDENT IN AN ORGANIZED HEALTH CARE EDUCATION/TRAINING PROGRAM

## 2024-07-02 PROCEDURE — 84100 ASSAY OF PHOSPHORUS: CPT

## 2024-07-02 PROCEDURE — 250N000013 HC RX MED GY IP 250 OP 250 PS 637: Performed by: SURGERY

## 2024-07-02 PROCEDURE — 85520 HEPARIN ASSAY: CPT | Performed by: STUDENT IN AN ORGANIZED HEALTH CARE EDUCATION/TRAINING PROGRAM

## 2024-07-02 PROCEDURE — 99232 SBSQ HOSP IP/OBS MODERATE 35: CPT | Mod: FS | Performed by: NURSE PRACTITIONER

## 2024-07-02 PROCEDURE — 250N000013 HC RX MED GY IP 250 OP 250 PS 637: Performed by: PHYSICIAN ASSISTANT

## 2024-07-02 PROCEDURE — 85260 CLOT FACTOR X STUART-POWER: CPT | Performed by: SURGERY

## 2024-07-02 PROCEDURE — 120N000003 HC R&B IMCU UMMC

## 2024-07-02 PROCEDURE — 250N000013 HC RX MED GY IP 250 OP 250 PS 637: Performed by: NURSE PRACTITIONER

## 2024-07-02 PROCEDURE — 250N000013 HC RX MED GY IP 250 OP 250 PS 637: Performed by: STUDENT IN AN ORGANIZED HEALTH CARE EDUCATION/TRAINING PROGRAM

## 2024-07-02 PROCEDURE — 80053 COMPREHEN METABOLIC PANEL: CPT | Performed by: NURSE PRACTITIONER

## 2024-07-02 PROCEDURE — 97530 THERAPEUTIC ACTIVITIES: CPT | Mod: GP

## 2024-07-02 PROCEDURE — 85025 COMPLETE CBC W/AUTO DIFF WBC: CPT

## 2024-07-02 PROCEDURE — 83735 ASSAY OF MAGNESIUM: CPT

## 2024-07-02 PROCEDURE — 85610 PROTHROMBIN TIME: CPT | Performed by: STUDENT IN AN ORGANIZED HEALTH CARE EDUCATION/TRAINING PROGRAM

## 2024-07-02 PROCEDURE — 99232 SBSQ HOSP IP/OBS MODERATE 35: CPT | Mod: 25 | Performed by: PHYSICIAN ASSISTANT

## 2024-07-02 PROCEDURE — 999N000248 HC STATISTIC IV INSERT WITH US BY RN

## 2024-07-02 PROCEDURE — 97110 THERAPEUTIC EXERCISES: CPT | Mod: GP

## 2024-07-02 PROCEDURE — 36415 COLL VENOUS BLD VENIPUNCTURE: CPT | Performed by: SURGERY

## 2024-07-02 PROCEDURE — 84630 ASSAY OF ZINC: CPT | Performed by: NURSE PRACTITIONER

## 2024-07-02 PROCEDURE — 250N000011 HC RX IP 250 OP 636: Performed by: STUDENT IN AN ORGANIZED HEALTH CARE EDUCATION/TRAINING PROGRAM

## 2024-07-02 PROCEDURE — 250N000013 HC RX MED GY IP 250 OP 250 PS 637

## 2024-07-02 RX ORDER — MAGNESIUM OXIDE 400 MG/1
400 TABLET ORAL EVERY 4 HOURS
Status: COMPLETED | OUTPATIENT
Start: 2024-07-02 | End: 2024-07-02

## 2024-07-02 RX ORDER — WARFARIN SODIUM 10 MG/1
10 TABLET ORAL
Status: COMPLETED | OUTPATIENT
Start: 2024-07-02 | End: 2024-07-02

## 2024-07-02 RX ORDER — POTASSIUM CHLORIDE 1.5 G/1.58G
20 POWDER, FOR SOLUTION ORAL ONCE
Status: COMPLETED | OUTPATIENT
Start: 2024-07-02 | End: 2024-07-02

## 2024-07-02 RX ADMIN — Medication 400 MG: at 12:11

## 2024-07-02 RX ADMIN — HEPARIN SODIUM 1800 UNITS/HR: 10000 INJECTION, SOLUTION INTRAVENOUS at 02:12

## 2024-07-02 RX ADMIN — LISINOPRIL 5 MG: 2.5 TABLET ORAL at 08:33

## 2024-07-02 RX ADMIN — HEPARIN SODIUM 2100 UNITS/HR: 10000 INJECTION, SOLUTION INTRAVENOUS at 15:46

## 2024-07-02 RX ADMIN — SPIRONOLACTONE 25 MG: 25 TABLET ORAL at 20:05

## 2024-07-02 RX ADMIN — Medication 400 MG: at 08:34

## 2024-07-02 RX ADMIN — GUAIFENESIN 600 MG: 600 TABLET ORAL at 20:05

## 2024-07-02 RX ADMIN — GABAPENTIN 200 MG: 100 CAPSULE ORAL at 08:34

## 2024-07-02 RX ADMIN — POTASSIUM CHLORIDE 20 MEQ: 1.5 POWDER, FOR SOLUTION ORAL at 08:34

## 2024-07-02 RX ADMIN — GABAPENTIN 200 MG: 100 CAPSULE ORAL at 14:24

## 2024-07-02 RX ADMIN — GABAPENTIN 200 MG: 100 CAPSULE ORAL at 20:05

## 2024-07-02 RX ADMIN — GUAIFENESIN 600 MG: 600 TABLET ORAL at 08:34

## 2024-07-02 RX ADMIN — WARFARIN SODIUM 10 MG: 10 TABLET ORAL at 18:17

## 2024-07-02 RX ADMIN — EMPAGLIFLOZIN 10 MG: 10 TABLET, FILM COATED ORAL at 08:33

## 2024-07-02 RX ADMIN — PANTOPRAZOLE SODIUM 40 MG: 40 TABLET, DELAYED RELEASE ORAL at 08:34

## 2024-07-02 ASSESSMENT — ACTIVITIES OF DAILY LIVING (ADL)
ADLS_ACUITY_SCORE: 29
ADLS_ACUITY_SCORE: 30
ADLS_ACUITY_SCORE: 29
ADLS_ACUITY_SCORE: 30
ADLS_ACUITY_SCORE: 29
ADLS_ACUITY_SCORE: 30
ADLS_ACUITY_SCORE: 29
ADLS_ACUITY_SCORE: 29
ADLS_ACUITY_SCORE: 30
ADLS_ACUITY_SCORE: 29
ADLS_ACUITY_SCORE: 30
ADLS_ACUITY_SCORE: 29
ADLS_ACUITY_SCORE: 30
ADLS_ACUITY_SCORE: 29
ADLS_ACUITY_SCORE: 30
ADLS_ACUITY_SCORE: 29

## 2024-07-02 NOTE — PROGRESS NOTES
[facility]   Cardiology II Service / Advanced Heart Failure  Daily Consult Progress Note      Patient: Navin Lutz  MRN: [id number]  Admission Date: 6/3/2024  Hospital Day # 29    Assessment and Plan: Navin Lutz is a 53 year old male with HFrEF 2/2 NICM s/p ICD, HLD, and CKD Stage II who presents admitted for decompensated heart failure with NICM on milrinone listed status 4, admitted for consideration of advanced therapies and is now s/p HM3 LVAD on 6/14/24 with Dr. Jhaveri with course complicated by klebsiella PNA (sp 10d abx) and recurrent left pleural effusion s/p thoracentesis then pigtail CT.     Recommendations:  - continue heparin gtt for bridging until CFX is closer to 40  - monitor hepatic panel  - outpatient cardiac rehab referral placed    # Acute on chronic systolic heart failure secondary to NICM, previously on home milrinone  # s/p HM3 LVAD as BTT on 6/14/24   Stage D. NYHA Class III confounded by recent surgery    -Fluid status: grossly euvolemic, PIs improved off lasix  -ACEi/ARB/ARNi: lisinopril 5 mg daily   -Afterload reduction: discontinued hydralazine   -Inotrope: dobutamine stopped on 6/19/24  -BB: contraindicated given recent LVAD implant  -Aldosterone antagonist: aldactone 25 mg daily   -SGLT2i: Jardiance 10 mg daily  -SCD prophylaxis: ICD  -MAP: goal 65-85, has been 75-85  -LDH trends: 346 on 6/24, check weekly  -Anticoagulation: warfarin dosing per pharmacy, Following chromogenic factor 10: goal 20-40, dosing per pharmacy; 61 today, continue heparin gtt as brindge  -Antiplatelet: no ASA indicated per NICOLE trial results    # NSVT  Occ runs of NSVT 10-24 beats this week, asymptomatic and HD stable. Echo 6/28 showed LVIDd 6.2cm, at 5400 rpm.   - monitor tele  - defer Rx for now  - goal K>4 Mg>2    # +Lupus anticoagulant  - negative testing in 2023, then + this admission for lupus anticoagulant  - following CFx as above per heme, goal 20-40  - per heme 6/25 repeat lupus  anticoag panel end of July, may be able to switch back to INR monitoring    # Leukocytosis, pneumonia, resolved  # Fevers, resolved  # Left pleural effusion s/p thoracentesis 6/25 then pigtail 6/28  Concern for infection, +productive cough. CT chest/abd/pelvis 6/22/24 anterior chest subcutaneous air and stranding with substernal gas, read as infectious vs postprocedureal, felt to be procedural per CVTS. Also with b/l pleural effusions and mild ground glass in the lung bases concerning for pneumonia. Resp culture ositive for klebsiella on 6/22. Blood cultures negative. UA negative. New diarrhea with abdominal cramping on 6/24 after 3-4 days of abx. -IR diagnostic and therapeutic thora 6/25. 500mL drained, and then s/p pigtail chest tube 6/28, 1.5L drained immediately, removed 6/30 per CVTS  -Appreciate ID consult  -Vanc/Zosyn started  6/21 and added azithromycin 6/22   - stop vanco 6/26 per Dr Jhaveri s/p azithro course   - rocpehin changed tp levaquin 6/29 for LFTs, plan for total 10 days ok for levaquin per ID (through 6/30)  -CRP downtrending and WBC normalized    # Acute chest pain 2/2 globus sensation vs esophagitis, resolved  6/29 AM after eating omlet; no shortness of breath or coughing  - CXR 6/29 negative for signs of aspiration  - monitor, consider acid reducing agents if heart burn symptoms persist    # Right radial artery occlusion 2/2 arterial line   # Neuropathy  # Right subclavian and axillary vein thrombus, nonocclusive, known prior to VAD.   Redemonstrated DVT 6/22 CT, likely old clot vs line related from right neck swan in ICU. Having numbness and tingling of right hand.  - warfarin and heparin as above, follow chromogenic factor X  - warning signs for arterial clot reviewed with patient and his wife   - OT hand therapies  - gabapentin increased on 7/1 per CVTS    # Elevated LFTs AST/ALT trending up this week, 200s, no symptoms. US abd negative for cause 6/27.  - trend daily, currently downtrending  -  hold statin for now  - pharmacy review of other meds ?Rocephin 3% incidence, switched to levaquin as above, LFTs improving    # HLD  - hold atorvastatin 20 mg as above     # CKD stage 2. B/l cr 1.2-1-4  - Cr stable 0.82 (baseline improved post-vad)      Patient discussed with Dr. Dallas.        35 minutes spent on the date of the encounter doing chart review, history and exam, documentation and further activities per the note    Eliz Choi PA-C  Nurse Practitioner - Advanced Heart Failure/Cardiology II Service  Vocera preferred or Pager [phone]    ================================================================    Subjective/24-Hr Events:   Last 24 hr care team notes reviewed. Doing well with therapy. Walked almost one mile, and goal is to get to a mile today. No lightheadedness, dizziness, pre-sycnope or syncope. No headaches. Still coughing some, but improving. No abdominal edema or LE edema. No blood in the urine or blood in the stool. No driveling infection symptoms.     ROS:  4 point ROS including respiratory, CV, GI and  (other than that noted in the HPI) is negative.     Medications: Reviewed in EPIC.     Physical Exam:   BP (!) 81/65   Pulse 105   Temp 98.7  F (37.1  C) (Axillary)   Resp 14   Ht 1.829 m (6')   Wt 85.8 kg (189 lb 2.5 oz)   SpO2 94%   BMI 25.65 kg/m      GENERAL: Appears comfortable and in no distress.  HEENT: Eye symmetrical, no discharge or icterus bilaterally.   NECK: Supple, JVD <6 at 90 degrees.  CV: Hum of LVAD, no adventitious sounds  RESPIRATORY: Respirations regular, even, and unlabored. Lungs CTA throughout.    GI: Soft and non distended.  No tenderness, rebound, guarding.   EXTREMITIES: No b/l lower extremity peripheral edema. All extremities are warm and well perfused. normal cap refill right hand.  NEUROLOGIC: Alert, CN grossly intact.  SKIN: No jaundice. No rashes or lesions. Sternum c/d/I. Driveline dressing c/d/i    Labs:  CMP  Recent Labs   Lab  07/02/24  0501 07/01/24  0422 06/30/24  0416 06/29/24  0423   * 133* 136 135   POTASSIUM 3.8 4.0 4.4 4.0   CHLORIDE 104 102 103 104   CO2 20* 21* 21* 21*   ANIONGAP 10 10 12 10   * 101* 99 111*   BUN 13.6 11.1 12.6 11.7   CR 0.82 0.96 0.92 0.89   GFRESTIMATED >90 >90 >90 >90   NAM 8.9 8.7 9.1 8.6   MAG 2.0 2.0 2.1 2.1   PHOS 3.2 3.3 3.5 3.0   PROTTOTAL 6.1* 6.0* 6.2* 5.9*   ALBUMIN 3.0* 2.9* 3.0* 2.8*   BILITOTAL 0.3 0.4 0.4 0.4   ALKPHOS 192* 186* 190* 181*   AST 76* 81* 92* 114*   * 187* 215* 218*       CBC  Recent Labs   Lab 07/02/24  0501 07/01/24  0422 06/30/24  0416 06/29/24  0423   WBC 8.9 8.6 10.2 9.8   RBC 3.84* 3.87* 3.81* 3.75*   HGB 11.3* 11.5* 11.2* 11.0*   HCT 35.0* 34.6* 34.5* 33.5*   MCV 91 89 91 89   MCH 29.4 29.7 29.4 29.3   MCHC 32.3 33.2 32.5 32.8   RDW 13.9 13.7 13.7 13.5    354 411 428       INR  Recent Labs   Lab 07/02/24  0501 07/01/24  0422 06/30/24  0416 06/29/24  0423   INR 1.53* 1.71* 1.46* 1.56*

## 2024-07-02 NOTE — PROGRESS NOTES
The patient's HeartMate LVAD was interrogated 7/2/2024  * Speed 5400 rpm   * Pulsatility index 4.5   * Power 3.8 [place]   * Flow 4.4 L/minute   Fluid status: euvolemic   Alarms were reviewed, and notable for rare pi events, history goes back 8 days, no alarms.   The driveline exit site was inspected, c/d/i.   All external components were inspected and showed no evidence of damage or malfunction, none replaced.   No changes to VAD settings made

## 2024-07-02 NOTE — PROGRESS NOTES
Care Management Follow Up    Length of Stay (days): 29    Expected Discharge Date: 07/03/2024?     Concerns to be Addressed: Medical readiness, discharge planning.  Patient plan of care discussed at interdisciplinary rounds: Yes    Anticipated Discharge Disposition: Home  Anticipated Discharge Services: OP CR  Anticipated Discharge DME:  Walker    Patient/family educated on Medicare website which has current facility and service quality ratings:  NA  Education Provided on the Discharge Plan:  Yes  Patient/Family in Agreement with the Plan:  Yes    Referrals Placed by CM/SW:  OP  Private pay costs discussed: NA    Additional Information:  Per chart review, when patient is medically ready he will discharge to a local hotel w/his spouse who will provide 24hr care. LVAD education has been completed. Patient will need ongoing anticoagulation management, LVAD coordinator (Kim Trinidad RN) to place the anticoagulation referral, noting that the patient will not have INRs drawn, but will have his warfarin dosed off of chromogenic factor 10 levels (goal 20-40%). Unit RNCC will assist w/scheduling lab once discharge date is known and OP anticoagulation referral is completed.     OP CR order done. Therapy placed order for a front wheel walker, will issue prior to discharge. No additional discharge needs noted.     Care coordination will continue to follow.     Patricia Novoa, Nurse Coordinator, BSN  Phone: [phone]  Vocera: 6B [facility]

## 2024-07-02 NOTE — PROGRESS NOTES
Cardiovascular Surgery Progress Note  07/02/2024         Assessment and Plan:     Navin Lutz is a 53 year old male with PMH of CKD2, HLD, R Subclavian and axillary vein non-occlusive thrombus on Warfarin, NSVT, HFrEF 2/2 NICM s/p ICD (PTA IV milrinone) who presents admitted 6/3/24 for decompensated HFrEF listed status 4. He is now s/p LVAD by Dr. Jhaveri on 6/14/24.    Cardiovascular:   S/p LVAD on 6/14 by Dr. Jhaveri  S/p IABP (6/12-6/14)  Hx NSVT  Acute on chronic HFrEF 2/2 to NICM (EF 10-15%), home milrinone PTA, s/p ICD   HLD  HTN  Hypervolemia  Rare ventricular tachycardia  Ongoing sinus tachycardia, HD stable. MAPs ~74-82  Pre-op echo 6/8: LV EF 15-20%, normal RV size/function  Echo ordered 6/28 in light of new VT, LVEF <30%, AV closed, no AI, septum midline, RV function moderately reduced, severe diffuse hypokinesis, no effusion  - PTA atorvastatin 20 mg daily, on hold due to elevated LFTs  - Cards 2 following for hemodynamic & GDMT management; appreciate recs  -Fluid status: grossly euvolemic, PIs improved off lasix  -ACEi/ARB/ARNi: lisinopril 5 mg daily   -Afterload reduction: discontinued hydralazine   -Inotrope: dobutamine stopped on 6/19/24  -BB: contraindicated given recent LVAD implant  -Aldosterone antagonist: aldactone 25 mg daily   -SGLT2i: Jardiance 10 mg daily  -SCD prophylaxis: ICD  -MAP: goal 65-85, has been 75-85  -LDH trends: 346 on 6/24, check weekly  -Anticoagulation: warfarin dosing per pharmacy, Following chromogenic factor 10: goal 20-40, dosing per pharmacy; 61 today, continue heparin gtt as brindge  -Antiplatelet: no ASA indicated per NICOLE trial results      Surgical chest tubes: removed in ICU  L pigtail removed 6/30    Pulmonary:  # Left pleural effusion, resolved   # Left Pneumothorax, resolved   - Extubated POD 3 to 5 lpm via NC. Now saturating well on RA  - Supplemental O2 PRN to keep sats > 92%.  - Pulm toilet, IS, activity and deep breathing  - DuoNebs QID, Mucinex BID  - Left  pleural effusion - improved s/p thoracentesis, pigtail placement  -- IR performed thoracentesis on 6/25 with removal of 500 ml from left pleural space. Fluid removal limited by pain. 6/27 CXR with persistent pleural effusion.   - S/p pigtail placement on 6/28 by IR. On return to the floor, immediate drainage of 1500cc serosanguinous fluid. Chest tube clamped X1 hour. STAT chest xray showed small left apical pneumothorax. Chest tube unclamped, no immediate drainage. No air leak. Removed 6/30 as above, follow up CXR stable, pneumothorax resolved  - Will order follow up CXR to be done prior to next outpatient appointment at [facility]    Neurology /Psych:  Acute post-operative pain  - Acute post-operative pain regimen:  - Scheduled: lidocaine patches, gabapentin (increased dose to 200 mg TID for neuropathic pain in RUE)  - PRN: PO oxycodone PRN, Robaxin, Voltaren gel, Atarax, Tylenol (held due to elevated LFTs)  - Patient denies pain aside from RUE (gabapentin ordered, has not been using PRNs)     / Renal:  CKD Stage 2  Contraction alkalosis  Hyponatremia, resolved  - Baseline creatinine ~1.1-1.2. Most recent creatinine 0.82, UOP adequate  - Pre-op weight 209 lbs, most recent weight 189 lbs  - Diuresis per Cards 2, on hold, patient euvolemic and auto-diuresing  - Replace electrolytes per protocol    GI / FEN:   Diarrhea, resolved  Elevated LFTs, improving  - Regular diet  - Regular BMs, continue bowel reg PRN  - New onset diarrhea 6/24, C. Diff negative  - Calorie count 6/25-6/27 with good protein intake, continue supplements  - Hepatic enzymes increased thought to be medication related, now down trending, holding Acetaminophen and statin     Endocrine:  Stress-induced hyperglycemia, resolved  Pre-op Hgb A1C 5.6%  - Managed on insulin drip postop, transitioned to sliding scale goal BG <180 and has now also been discontinued due to good BG control with no insulin need.   - TSH ordered with facial flushing, WNL    Infectious  Disease:  Stress induced leukocytosis, resolved  HCAP, resolved  - WBC WNL, remains afebrile  - Completed perioperative LVAD antibiotics   - CT CAP 6/22 with anterior chest subcutaneous air and stranding.  Gas and fluid collection in the substernal region which could be infectious versus postprocedural. Bilateral pleural effusions with adjacent atelectasis and mild groundglass opacities.  - CT results were reviewed with Dr. Miller and Dr. Jhaveri - no indication for surgical washout at this time, plan to continue on broad spectrum antibiotics for at least 2 weeks per Dr. Jhaveri  - Sputum culture 6/22 + for Klebsiella pneumoniae  - UA 6/21 non-infectious  - Blood cultures x2 6/21, 6/23 no growth  - 6/25 thoracentesis labs without culture growth, Lights' criteria supportive of exudative effusion, possibly clouded by high serum LDH in the setting of LVAD  -  ID consulted 6/23, recommendations:  - K. Pneumonieae susceptible to ceftriaxone, may narrow to ceftriaxone 2 g daily for 7-10 day course, completed    Antibiotics:  - Levofloxacin IV 6/29 - 6/30 with concern for ceftriaxone elevating LFTs, this will complete 10 day course.  - Ceftriaxone 6/27 - 6/29  - Empiric IV vancomycin/zosyn 6/21 - 6/27 per surgeon  - PO azithromycin 6/22 - 6/26    Hematology:   Acute blood loss anemia, stable  Acute blood loss thrombocytopenia, resolved  Right brachial non-occlusive thrombus  Right internal jugular non-occlusive thrombus  Right radial artery occlusion 2/2 arterial line  Hgb stable; Plt WNL, no signs or symptoms of active bleeding  - Daily CBC  - Upper extremity arterial ultrasound completed 6/25  - Discussed warning signs for radial artery occlusion with patient and wife. Bilateral hands warm and well-perfused on exam.    Anticoagulation:   Chronic anticoagulation for LVAD, CFX goal 20-40%  +Lupus anticoagulant   Partially occlusive internal jugular thrombus, partially occlusive brachial venous thrombus  - Warfarin for LVAD  - INRs  noted to be discordant with Chromogenic Factor X, hematology consulted 6/21 - Lupus anticoagulant positive. [facility] recommended repeat outpatient testing for Lupus anticoagulant at the end of July. If negative, INR monitoring may be an option. Okay to reach out to [facility] outpatient team with questions.  - Plan to use Chromogenic factor X for warfarin dosing as INR is not reliable, goal CFX 20-40%, most recent factor X not within goal but improving, 61% today.   - Heparin gtt to bridge due to LVAD and radial artery occlusion and venous thrombi    MSK/Skin:  Sternotomy  - PT/OT    Prophylaxis:   - Stress ulcer prophylaxis: Pantoprazole 40 mg daily for 30 days  - DVT prophylaxis: heparin, warfarin, SCD    Disposition:   - Transferred to  on 6/21  - Therapies recommending discharge to home. Barriers to discharge include continued downtrending chromogenic factor X. Ok to discharge prior to being therapeutic if close per Dr. Jhaveri. Will need close follow up/monitoring.  - Will need follow up outpatient lupus anticoagulant testing at end of July    Medically Ready for Discharge: Anticipated Tomorrow      Clinically Significant Risk Factors              # Hypoalbuminemia: Lowest albumin = 2.5 g/dL at 6/27/2024  5:10 AM, will monitor as appropriate        # End stage heart failure: home medication list includes inotropes          #Precipitous drop in Hgb/Hct: Lowest Hgb this hospitalization: 9.1 g/dL. Will continue to monitor and treat/transfuse as appropriate.     # Overweight: Estimated body mass index is 25.65 kg/m  as calculated from the following:    Height as of this encounter: 1.829 m (6').    Weight as of this encounter: 85.8 kg (189 lb 2.5 oz).        # Financial/Environmental Concerns: none  # Asthma: noted on problem list  # ICD device present     Patient seen and plan of care discussed with Dr. Jhaveri on AM rounds.    Yogi Bishop DNP APRN CNP CCRN   Cardiovascular Surgery   Pager [phone]          Interval  History:     No events overnight.  Appears very well, no pain since pigtail removed, breathing feels better. Motivated to walk in the halls multiple times per day. Feels ready to go home tomorrow.  Right hand feels weak, working with putty per OT. Still with tingling/nerve pain in right 4th/5th fingers, likely 2/2 positioning during surgery. Will continue to monitor and if not resolved in a few months may seek out neurology evaluation.  Tolerating diet, good intake on calorie count, + BM. No nausea or vomiting. Denies abdominal pain.   Denies palpitations, dizziness, syncopal symptoms, fevers, chills, myalgias, sternal popping/clicking, dysuria, diarrhea.         Physical Exam:   Blood pressure (!) 81/65, pulse 105, temperature 98.7  F (37.1  C), temperature source Axillary, resp. rate 14, height 1.829 m (6'), weight 85.8 kg (189 lb 2.5 oz), SpO2 94%.  Vitals:    24 0207 24 0430 24 0410   Weight: 86.6 kg (190 lb 14.7 oz) 85.3 kg (188 lb 0.8 oz) 85.8 kg (189 lb 2.5 oz)     Gen:  NAD, sitting in chair, conversational, wife at bedside  Neuro: no focal deficits, A&Ox4  CV: sinus tachycardia, +LVAD hum   Pulm: CTA, dull at left base, unlabored work of breathing on RA  Abd: nondistended, normal BS, soft, nontender  Ext: no edema  Skin:   Incision: clean, dry, intact, healing well, no erythema, sternum stable  Tubes/drain sites: open to air, no drainage, no erythema or induration  Lines: driveline dressing clean and dry     Heartmate 3 LEFT VS  Flow (Lpm): 4.7 Lpm  Pulse Index (PI): 5 PI  Speed (rpm): 5400 rpm  Power (bassett): 3.9 bassett  Current Hct settin         Data:    Imaging:  reviewed recent imaging, no acute concerns  XR Chest 2 Views  Narrative: EXAM: XR CHEST 2 VIEWS  2024 9:24 AM      HISTORY: follow up after pigtail removal    COMPARISON: 2024    FINDINGS: Two views of the chest. Postsurgical changes in the chest  with intact median sternotomy wires. Left chest wall  implantable  cardiac defibrillator with leads in stable position. LVAD. Trachea is  midline. Cardiac silhouette is stable. Streaky right basilar  opacities. No appreciable pneumothorax. Continued small left pleural  effusion.  Impression: IMPRESSION:   1. No residual left apical pneumothorax. Ongoing small left pleural  effusion.  2. Linear right basilar opacities, likely atelectasis.    I have personally reviewed the examination and initial interpretation  and I agree with the findings.    JHOAN SOTELO MD         SYSTEM ID:  [id number]        Labs:  BMP  Recent Labs   Lab 07/02/24  0501 07/01/24 0422 06/30/24 0416 06/29/24 0423   * 133* 136 135   POTASSIUM 3.8 4.0 4.4 4.0   CHLORIDE 104 102 103 104   NAM 8.9 8.7 9.1 8.6   CO2 20* 21* 21* 21*   BUN 13.6 11.1 12.6 11.7   CR 0.82 0.96 0.92 0.89   * 101* 99 111*     CBC  Recent Labs   Lab 07/02/24  0501 07/01/24 0422 06/30/24 0416 06/29/24 0423   WBC 8.9 8.6 10.2 9.8   RBC 3.84* 3.87* 3.81* 3.75*   HGB 11.3* 11.5* 11.2* 11.0*   HCT 35.0* 34.6* 34.5* 33.5*   MCV 91 89 91 89   MCH 29.4 29.7 29.4 29.3   MCHC 32.3 33.2 32.5 32.8   RDW 13.9 13.7 13.7 13.5    354 411 428     INR  Recent Labs   Lab 07/02/24  0501 07/01/24 0422 06/30/24 0416 06/29/24  0423   INR 1.53* 1.71* 1.46* 1.56*      Hepatic Panel  Recent Labs   Lab 07/02/24  0501 07/01/24 0422 06/30/24 0416 06/29/24 0423   AST 76* 81* 92* 114*   * 187* 215* 218*   ALKPHOS 192* 186* 190* 181*   BILITOTAL 0.3 0.4 0.4 0.4   ALBUMIN 3.0* 2.9* 3.0* 2.8*     GLUCOSE:   Recent Labs   Lab 07/02/24  0501 07/01/24  0422 06/30/24  0416 06/29/24  0423 06/28/24  0139 06/27/24  0510   * 101* 99 111* 138* 102*

## 2024-07-02 NOTE — PLAN OF CARE
Neuro: A&Ox4.   Cardiac: SR. VSS. Sinus tachycardic. HR 90-120s. MAP 79. LVAD HM3. No alarms today.   Respiratory: Spot check. SpO2 90s.  GI/: Adequate urine output. BM X2.   Diet/appetite: Tolerating regular diet. Eating fair.   Activity: Standby assist (to manage lines), up to chair and in halls.  Pain: Pt denies - 0-1/10. At acceptable level on current regimen. Managed with scheduled meds.   Skin: Bruising from prior IV site, outlined with skin marker  LDA's: L PIV infusing heparin gtt 2100 units. LVAD.    Plan: Continue with POC. Notify primary team with changes.      Pending discharge in the upcoming day(s) based on chromogenic factor 10.

## 2024-07-02 NOTE — PLAN OF CARE
Neuro: A&Ox4.   Cardiac: ST. VSS.   Respiratory: Sating >92% on RA.  GI/: Adequate urine output. BM X1  Diet/appetite: Tolerating regular diet.  Activity:  Standby Assist up to chair.  Pain: At acceptable level on current regimen.   Skin: No new deficits noted.  LDA's:PIV x1, LVAD    Plan: Continue with POC. Notify primary team with changes.

## 2024-07-03 ENCOUNTER — APPOINTMENT (OUTPATIENT)
Dept: OCCUPATIONAL THERAPY | Facility: CLINIC | Age: 54
End: 2024-07-03
Attending: STUDENT IN AN ORGANIZED HEALTH CARE EDUCATION/TRAINING PROGRAM
Payer: COMMERCIAL

## 2024-07-03 ENCOUNTER — APPOINTMENT (OUTPATIENT)
Dept: PHYSICAL THERAPY | Facility: CLINIC | Age: 54
End: 2024-07-03
Attending: STUDENT IN AN ORGANIZED HEALTH CARE EDUCATION/TRAINING PROGRAM
Payer: COMMERCIAL

## 2024-07-03 DIAGNOSIS — Z95.811 LVAD (LEFT VENTRICULAR ASSIST DEVICE) PRESENT (H): ICD-10-CM

## 2024-07-03 DIAGNOSIS — I50.22 CHRONIC SYSTOLIC HEART FAILURE (H): Primary | ICD-10-CM

## 2024-07-03 LAB
ALBUMIN SERPL BCG-MCNC: 3.1 G/DL (ref 3.5–5.2)
ALP SERPL-CCNC: 187 U/L (ref 40–150)
ALT SERPL W P-5'-P-CCNC: 149 U/L (ref 0–70)
ANION GAP SERPL CALCULATED.3IONS-SCNC: 10 MMOL/L (ref 7–15)
AST SERPL W P-5'-P-CCNC: 46 U/L (ref 0–45)
BASOPHILS # BLD AUTO: 0.1 10E3/UL (ref 0–0.2)
BASOPHILS NFR BLD AUTO: 1 %
BILIRUB DIRECT SERPL-MCNC: <0.2 MG/DL (ref 0–0.3)
BILIRUB SERPL-MCNC: 0.3 MG/DL
BUN SERPL-MCNC: 14.6 MG/DL (ref 6–20)
CALCIUM SERPL-MCNC: 9.1 MG/DL (ref 8.6–10)
CHLORIDE SERPL-SCNC: 105 MMOL/L (ref 98–107)
CREAT SERPL-MCNC: 0.99 MG/DL (ref 0.67–1.17)
DEPRECATED HCO3 PLAS-SCNC: 21 MMOL/L (ref 22–29)
EGFRCR SERPLBLD CKD-EPI 2021: >90 ML/MIN/1.73M2
EOSINOPHIL # BLD AUTO: 0.3 10E3/UL (ref 0–0.7)
EOSINOPHIL NFR BLD AUTO: 4 %
ERYTHROCYTE [DISTWIDTH] IN BLOOD BY AUTOMATED COUNT: 13.9 % (ref 10–15)
FACT X ACT/NOR PPP CHRO: 55 % (ref 70–130)
GLUCOSE SERPL-MCNC: 108 MG/DL (ref 70–99)
HCT VFR BLD AUTO: 37.2 % (ref 40–53)
HGB BLD-MCNC: 11.8 G/DL (ref 13.3–17.7)
HOLD SPECIMEN: NORMAL
HOLD SPECIMEN: NORMAL
IMM GRANULOCYTES # BLD: 0.1 10E3/UL
IMM GRANULOCYTES NFR BLD: 1 %
INR PPP: 1.72 (ref 0.85–1.15)
LYMPHOCYTES # BLD AUTO: 2.2 10E3/UL (ref 0.8–5.3)
LYMPHOCYTES NFR BLD AUTO: 25 %
MAGNESIUM SERPL-MCNC: 2.1 MG/DL (ref 1.7–2.3)
MCH RBC QN AUTO: 28.8 PG (ref 26.5–33)
MCHC RBC AUTO-ENTMCNC: 31.7 G/DL (ref 31.5–36.5)
MCV RBC AUTO: 91 FL (ref 78–100)
MONOCYTES # BLD AUTO: 0.7 10E3/UL (ref 0–1.3)
MONOCYTES NFR BLD AUTO: 8 %
NEUTROPHILS # BLD AUTO: 5.4 10E3/UL (ref 1.6–8.3)
NEUTROPHILS NFR BLD AUTO: 61 %
NRBC # BLD AUTO: 0 10E3/UL
NRBC BLD AUTO-RTO: 0 /100
PHOSPHATE SERPL-MCNC: 3.3 MG/DL (ref 2.5–4.5)
PLATELET # BLD AUTO: 300 10E3/UL (ref 150–450)
POTASSIUM SERPL-SCNC: 4.3 MMOL/L (ref 3.4–5.3)
PROT SERPL-MCNC: 6.2 G/DL (ref 6.4–8.3)
RBC # BLD AUTO: 4.1 10E6/UL (ref 4.4–5.9)
SODIUM SERPL-SCNC: 136 MMOL/L (ref 135–145)
UFH PPP CHRO-ACNC: 0.43 IU/ML
UFH PPP CHRO-ACNC: 0.48 IU/ML
UFH PPP CHRO-ACNC: 0.83 IU/ML
WBC # BLD AUTO: 8.8 10E3/UL (ref 4–11)
ZINC SERPL-MCNC: 86 UG/DL

## 2024-07-03 PROCEDURE — 36415 COLL VENOUS BLD VENIPUNCTURE: CPT

## 2024-07-03 PROCEDURE — 85520 HEPARIN ASSAY: CPT

## 2024-07-03 PROCEDURE — 250N000013 HC RX MED GY IP 250 OP 250 PS 637: Performed by: NURSE PRACTITIONER

## 2024-07-03 PROCEDURE — 99232 SBSQ HOSP IP/OBS MODERATE 35: CPT | Mod: 25 | Performed by: PHYSICIAN ASSISTANT

## 2024-07-03 PROCEDURE — 80053 COMPREHEN METABOLIC PANEL: CPT | Performed by: NURSE PRACTITIONER

## 2024-07-03 PROCEDURE — 97110 THERAPEUTIC EXERCISES: CPT | Mod: GP

## 2024-07-03 PROCEDURE — 85025 COMPLETE CBC W/AUTO DIFF WBC: CPT

## 2024-07-03 PROCEDURE — 85520 HEPARIN ASSAY: CPT | Performed by: STUDENT IN AN ORGANIZED HEALTH CARE EDUCATION/TRAINING PROGRAM

## 2024-07-03 PROCEDURE — 250N000011 HC RX IP 250 OP 636: Performed by: STUDENT IN AN ORGANIZED HEALTH CARE EDUCATION/TRAINING PROGRAM

## 2024-07-03 PROCEDURE — 250N000013 HC RX MED GY IP 250 OP 250 PS 637

## 2024-07-03 PROCEDURE — 250N000013 HC RX MED GY IP 250 OP 250 PS 637: Performed by: SURGERY

## 2024-07-03 PROCEDURE — 85260 CLOT FACTOR X STUART-POWER: CPT | Performed by: SURGERY

## 2024-07-03 PROCEDURE — 93750 INTERROGATION VAD IN PERSON: CPT | Performed by: PHYSICIAN ASSISTANT

## 2024-07-03 PROCEDURE — 97530 THERAPEUTIC ACTIVITIES: CPT | Mod: GO

## 2024-07-03 PROCEDURE — 84100 ASSAY OF PHOSPHORUS: CPT

## 2024-07-03 PROCEDURE — 97535 SELF CARE MNGMENT TRAINING: CPT | Mod: GO

## 2024-07-03 PROCEDURE — 250N000013 HC RX MED GY IP 250 OP 250 PS 637: Performed by: PHYSICIAN ASSISTANT

## 2024-07-03 PROCEDURE — 120N000003 HC R&B IMCU UMMC

## 2024-07-03 PROCEDURE — 36415 COLL VENOUS BLD VENIPUNCTURE: CPT | Performed by: STUDENT IN AN ORGANIZED HEALTH CARE EDUCATION/TRAINING PROGRAM

## 2024-07-03 PROCEDURE — 250N000013 HC RX MED GY IP 250 OP 250 PS 637: Performed by: STUDENT IN AN ORGANIZED HEALTH CARE EDUCATION/TRAINING PROGRAM

## 2024-07-03 PROCEDURE — 97110 THERAPEUTIC EXERCISES: CPT | Mod: GO

## 2024-07-03 PROCEDURE — 83735 ASSAY OF MAGNESIUM: CPT

## 2024-07-03 PROCEDURE — 85610 PROTHROMBIN TIME: CPT | Performed by: STUDENT IN AN ORGANIZED HEALTH CARE EDUCATION/TRAINING PROGRAM

## 2024-07-03 RX ORDER — WARFARIN SODIUM 10 MG/1
10 TABLET ORAL ONCE
Status: DISCONTINUED | OUTPATIENT
Start: 2024-07-03 | End: 2024-07-03

## 2024-07-03 RX ORDER — WARFARIN SODIUM 10 MG/1
10 TABLET ORAL ONCE
Status: COMPLETED | OUTPATIENT
Start: 2024-07-03 | End: 2024-07-03

## 2024-07-03 RX ADMIN — EMPAGLIFLOZIN 10 MG: 10 TABLET, FILM COATED ORAL at 08:06

## 2024-07-03 RX ADMIN — GABAPENTIN 200 MG: 100 CAPSULE ORAL at 14:27

## 2024-07-03 RX ADMIN — HEPARIN SODIUM 1500 UNITS/HR: 10000 INJECTION, SOLUTION INTRAVENOUS at 23:27

## 2024-07-03 RX ADMIN — SPIRONOLACTONE 25 MG: 25 TABLET ORAL at 20:20

## 2024-07-03 RX ADMIN — PANTOPRAZOLE SODIUM 40 MG: 40 TABLET, DELAYED RELEASE ORAL at 08:07

## 2024-07-03 RX ADMIN — GABAPENTIN 200 MG: 100 CAPSULE ORAL at 08:06

## 2024-07-03 RX ADMIN — WARFARIN SODIUM 10 MG: 10 TABLET ORAL at 12:13

## 2024-07-03 RX ADMIN — GABAPENTIN 200 MG: 100 CAPSULE ORAL at 20:20

## 2024-07-03 RX ADMIN — LISINOPRIL 7.5 MG: 2.5 TABLET ORAL at 08:07

## 2024-07-03 RX ADMIN — HEPARIN SODIUM 1500 UNITS/HR: 10000 INJECTION, SOLUTION INTRAVENOUS at 07:41

## 2024-07-03 ASSESSMENT — ACTIVITIES OF DAILY LIVING (ADL)
ADLS_ACUITY_SCORE: 29
ADLS_ACUITY_SCORE: 29
ADLS_ACUITY_SCORE: 30
ADLS_ACUITY_SCORE: 30
ADLS_ACUITY_SCORE: 29
ADLS_ACUITY_SCORE: 29
ADLS_ACUITY_SCORE: 30
ADLS_ACUITY_SCORE: 29
ADLS_ACUITY_SCORE: 30
ADLS_ACUITY_SCORE: 29
ADLS_ACUITY_SCORE: 30
ADLS_ACUITY_SCORE: 29
ADLS_ACUITY_SCORE: 30
ADLS_ACUITY_SCORE: 29
ADLS_ACUITY_SCORE: 30
ADLS_ACUITY_SCORE: 29
ADLS_ACUITY_SCORE: 29
ADLS_ACUITY_SCORE: 30
ADLS_ACUITY_SCORE: 29

## 2024-07-03 NOTE — PROGRESS NOTES
Physical Therapy Discharge Summary    Reason for therapy discharge:    All goals and outcomes met, no further needs identified.    Progress towards therapy goal(s). See goals on Care Plan in [facility] electronic health record for goal details.  Goals met    Therapy recommendation(s):    Continued therapy is recommended.  Rationale/Recommendations:  progress functional tolerance in OP CR..

## 2024-07-03 NOTE — CONSULTS
SPIRITUAL HEALTH SERVICES Consult Note  [facility] 6B    Follow up visit with Navin and his wife. They declined acute spiritual or emotional needs at this time. Navin shared that he hopes that he will be discharged from the hospital tomorrow morning.    No follow-up planned at this time. Spiritual Health Services remains available upon request.    Dav Benavides   Intern  Pager [phone]    * [facility] remains available 24/7 for emergent requests/referrals, either by having the switchboard page the on-call  or by entering an ASAP/STAT consult in Epic (this will also page the on-call ). Routine Epic consults receive an initial response within 24 hours.*

## 2024-07-03 NOTE — PROGRESS NOTES
Cardiovascular Surgery Progress Note  07/03/2024         Assessment and Plan:     Navin Lutz is a 53 year old male with PMH of CKD2, HLD, R Subclavian and axillary vein non-occlusive thrombus on Warfarin, NSVT, HFrEF 2/2 NICM s/p ICD (PTA IV milrinone) who presents admitted 6/3/24 for decompensated HFrEF listed status 4. He is now s/p LVAD by Dr. Jhaveri on 6/14/24.    Cardiovascular:   S/p LVAD on 6/14 by Dr. Jhaveri  S/p IABP (6/12-6/14)  Hx NSVT  Acute on chronic HFrEF 2/2 to NICM (EF 10-15%), home milrinone PTA, s/p ICD   HLD  HTN  Hypervolemia  Rare ventricular tachycardia  Ongoing sinus tachycardia, HD stable. MAPs ~74-82  Pre-op echo 6/8: LV EF 15-20%, normal RV size/function  Echo ordered 6/28 in light of new VT, LVEF <30%, AV closed, no AI, septum midline, RV function moderately reduced, severe diffuse hypokinesis, no effusion  - PTA atorvastatin 20 mg daily, on hold due to elevated LFTs  - Cards 2 following for hemodynamic & GDMT management; appreciate recs  -Fluid status: grossly euvolemic, PIs improved off lasix  -ACEi/ARB/ARNi: lisinopril 5 mg daily   -Afterload reduction: discontinued hydralazine   -Inotrope: dobutamine stopped on 6/19/24  -BB: contraindicated given recent LVAD implant  -Aldosterone antagonist: aldactone 25 mg daily   -SGLT2i: Jardiance 10 mg daily  -SCD prophylaxis: ICD  -MAP: goal 65-85, has been 75-85  -LDH trends: 346 on 6/24, check weekly  -Anticoagulation: warfarin dosing per pharmacy, Following chromogenic factor 10: goal 20-40, dosing per pharmacy; 61 today, continue heparin gtt as brindge  -Antiplatelet: no ASA indicated per NICOLE trial results      Surgical chest tubes: removed in ICU  L pigtail removed 6/30    Pulmonary:  # Left pleural effusion, resolved   # Left Pneumothorax, resolved   - Extubated POD 3 to 5 lpm via NC. Now saturating well on RA  - Supplemental O2 PRN to keep sats > 92%.  - Pulm toilet, IS, activity and deep breathing  - DuoNebs QID, Mucinex BID  - Left  pleural effusion - improved s/p thoracentesis, pigtail placement  -- IR performed thoracentesis on 6/25 with removal of 500 ml from left pleural space. Fluid removal limited by pain. 6/27 CXR with persistent pleural effusion.   - S/p pigtail placement on 6/28 by IR. On return to the floor, immediate drainage of 1500cc serosanguinous fluid. Chest tube clamped X1 hour. STAT chest xray showed small left apical pneumothorax. Chest tube unclamped, no immediate drainage. No air leak. Removed 6/30 as above, follow up CXR stable, pneumothorax resolved  - Will order follow up CXR to be done prior to next outpatient appointment at [facility] [place]    Neurology /Psych:  Acute post-operative pain  - Acute post-operative pain regimen:  - Scheduled: lidocaine patches, gabapentin (increased dose to 200 mg TID for neuropathic pain in RUE)  - PRN: PO oxycodone PRN, Robaxin, Voltaren gel, Atarax, Tylenol (held due to elevated LFTs)  - Patient denies pain aside from RUE (gabapentin ordered, has not been using PRNs)     / Renal:  CKD Stage 2  Contraction alkalosis  Hyponatremia, resolved  - Baseline creatinine ~1.1-1.2. Most recent creatinine 0.82, UOP adequate  - Pre-op weight 209 lbs, most recent weight 189 lbs  - Diuresis per Cards 2, on hold, patient euvolemic and auto-diuresing  - Replace electrolytes per protocol    GI / FEN:   Diarrhea, resolved  Elevated LFTs, improving  - Regular diet  - Regular BMs, continue bowel reg PRN  - New onset diarrhea 6/24, C. Diff negative  - Calorie count 6/25-6/27 with good protein intake, continue supplements  - Hepatic enzymes increased thought to be medication related, now down trending, holding Acetaminophen and statin     Endocrine:  Stress-induced hyperglycemia, resolved  Pre-op Hgb A1C 5.6%  - Managed on insulin drip postop, transitioned to sliding scale goal BG <180 and has now also been discontinued due to good BG control with no insulin need.   - TSH ordered with facial flushing, WNL    Infectious  Disease:  Stress induced leukocytosis, resolved  HCAP, resolved  - WBC WNL, remains afebrile  - Completed perioperative LVAD antibiotics   - CT CAP 6/22 with anterior chest subcutaneous air and stranding.  Gas and fluid collection in the substernal region which could be infectious versus postprocedural. Bilateral pleural effusions with adjacent atelectasis and mild groundglass opacities.  - CT results were reviewed with Dr. Miller and Dr. Jhaveri - no indication for surgical washout at this time, plan to continue on broad spectrum antibiotics for at least 2 weeks per Dr. Jhaveri  - Sputum culture 6/22 + for Klebsiella pneumoniae  - UA 6/21 non-infectious  - Blood cultures x2 6/21, 6/23 no growth  - 6/25 thoracentesis labs without culture growth, Lights' criteria supportive of exudative effusion, possibly clouded by high serum LDH in the setting of LVAD  -  ID consulted 6/23, recommendations:  - K. Pneumonieae susceptible to ceftriaxone, may narrow to ceftriaxone 2 g daily for 7-10 day course, completed    Antibiotics:  - Levofloxacin IV 6/29 - 6/30 with concern for ceftriaxone elevating LFTs, this will complete 10 day course.  - Ceftriaxone 6/27 - 6/29  - Empiric IV vancomycin/zosyn 6/21 - 6/27 per surgeon  - PO azithromycin 6/22 - 6/26    Hematology:   Acute blood loss anemia, stable  Acute blood loss thrombocytopenia, resolved  Right brachial non-occlusive thrombus  Right internal jugular non-occlusive thrombus  Right radial artery occlusion 2/2 arterial line  Hgb stable; Plt WNL, no signs or symptoms of active bleeding  - Daily CBC  - Upper extremity arterial ultrasound completed 6/25  - Previous documentation that provider has discussed warning signs for radial artery occlusion with patient and wife. Bilateral hands warm and well-perfused on exam.  -     Anticoagulation:   Chronic anticoagulation for LVAD, CFX goal 20-40%  +Lupus anticoagulant   Partially occlusive internal jugular thrombus, partially occlusive brachial  venous thrombus  - Warfarin for LVAD  - INRs noted to be discordant with Chromogenic Factor X, hematology consulted 6/21 - Lupus anticoagulant positive. [facility] recommended repeat outpatient testing for Lupus anticoagulant at the end of July. If negative, INR monitoring may be an option. Okay to reach out to [facility] outpatient team with questions.  - Plan to use Chromogenic factor X for warfarin dosing as INR is not reliable, goal CFX 20-40%, most recent factor X not within goal but improving, 55 % today.   - Heparin gtt to bridge due to LVAD and radial artery occlusion and venous thrombi    MSK/Skin:  Sternotomy  - PT/OT    Prophylaxis:   - Stress ulcer prophylaxis: Pantoprazole 40 mg daily for 30 days  - DVT prophylaxis: heparin, warfarin, SCD    Disposition:   - Transferred to  on 6/21  - Therapies recommending discharge to home. Barriers to discharge include continued downtrending chromogenic factor X. Ok to discharge prior to being therapeutic if close per Dr. Jhaveri. Will need close follow up/monitoring.  - Will need follow up outpatient lupus anticoagulant testing at end of July    Medically Ready for Discharge: Anticipated Tomorrow      Clinically Significant Risk Factors              # Hypoalbuminemia: Lowest albumin = 2.5 g/dL at 6/27/2024  5:10 AM, will monitor as appropriate        # End stage heart failure: home medication list includes inotropes          #Precipitous drop in Hgb/Hct: Lowest Hgb this hospitalization: 9.1 g/dL. Will continue to monitor and treat/transfuse as appropriate.     # Overweight: Estimated body mass index is 25.56 kg/m  as calculated from the following:    Height as of this encounter: 1.829 m (6').    Weight as of this encounter: 85.5 kg (188 lb 7.9 oz).        # Financial/Environmental Concerns: none  # Asthma: noted on problem list  # ICD device present     Patient seen and plan of care discussed with Dr. Jhaveri on AM rounds.    Yogi Bishop DNP APRN CNP CCRN   Cardiovascular  Surgery   Pager [phone]          Interval History:     No events overnight.  Appears very well, no pain since pigtail removed, breathing feels better. Motivated to walk in the halls multiple times per day. Feels ready to go home tomorrow.  Will continue to monitor and if not resolved in a few months may seek out neurology evaluation.  Tolerating diet, good intake on calorie count, + BM. No nausea or vomiting. Denies abdominal pain.   Denies palpitations, dizziness, syncopal symptoms, fevers, chills, myalgias, sternal popping/clicking, dysuria, diarrhea.         Physical Exam:   Blood pressure (!) 81/65, pulse 104, temperature 97.9  F (36.6  C), temperature source Oral, resp. rate 16, height 1.829 m (6'), weight 85.5 kg (188 lb 7.9 oz), SpO2 92%.  Vitals:    24 0430 24 0410 24 0335   Weight: 85.3 kg (188 lb 0.8 oz) 85.8 kg (189 lb 2.5 oz) 85.5 kg (188 lb 7.9 oz)     Gen:  NAD, sitting in chair, conversational, wife at bedside  Neuro: no focal deficits, A&Ox4  CV: sinus tachycardia, +LVAD hum   Pulm: CTA, dull at left base, unlabored work of breathing on RA  Abd: nondistended, normal BS, soft, nontender  Ext: no edema  Skin:   Incision: clean, dry, intact, healing well, no erythema, sternum stable  Tubes/drain sites: open to air, no drainage, no erythema or induration  Lines: driveline dressing clean and dry     Heartmate 3 LEFT VS  Flow (Lpm): 4.7 Lpm  Pulse Index (PI): 5 PI  Speed (rpm): 5400 rpm  Power (basstet): 3.9 bassett  Current Hct settin         Data:    Imaging:  reviewed recent imaging, no acute concerns  XR Chest 2 Views  Narrative: EXAM: XR CHEST 2 VIEWS  2024 9:24 AM      HISTORY: follow up after pigtail removal    COMPARISON: 2024    FINDINGS: Two views of the chest. Postsurgical changes in the chest  with intact median sternotomy wires. Left chest wall implantable  cardiac defibrillator with leads in stable position. LVAD. Trachea is  midline. Cardiac silhouette is  stable. Streaky right basilar  opacities. No appreciable pneumothorax. Continued small left pleural  effusion.  Impression: IMPRESSION:   1. No residual left apical pneumothorax. Ongoing small left pleural  effusion.  2. Linear right basilar opacities, likely atelectasis.    I have personally reviewed the examination and initial interpretation  and I agree with the findings.    JHOAN SOTELO MD         SYSTEM ID:  [id number]        Labs:  BMP  Recent Labs   Lab 07/03/24 0321 07/02/24 0501 07/01/24 0422 06/30/24  0416    134* 133* 136   POTASSIUM 4.3 3.8 4.0 4.4   CHLORIDE 105 104 102 103   NAM 9.1 8.9 8.7 9.1   CO2 21* 20* 21* 21*   BUN 14.6 13.6 11.1 12.6   CR 0.99 0.82 0.96 0.92   * 141* 101* 99     CBC  Recent Labs   Lab 07/03/24 0321 07/02/24 0501 07/01/24 0422 06/30/24  0416   WBC 8.8 8.9 8.6 10.2   RBC 4.10* 3.84* 3.87* 3.81*   HGB 11.8* 11.3* 11.5* 11.2*   HCT 37.2* 35.0* 34.6* 34.5*   MCV 91 91 89 91   MCH 28.8 29.4 29.7 29.4   MCHC 31.7 32.3 33.2 32.5   RDW 13.9 13.9 13.7 13.7    337 354 411     INR  Recent Labs   Lab 07/03/24 0321 07/02/24 0501 07/01/24 0422 06/30/24  0416   INR 1.72* 1.53* 1.71* 1.46*      Hepatic Panel  Recent Labs   Lab 07/03/24 0321 07/02/24 0501 07/01/24 0422 06/30/24  0416   AST 46* 76* 81* 92*   * 181* 187* 215*   ALKPHOS 187* 192* 186* 190*   BILITOTAL 0.3 0.3 0.4 0.4   ALBUMIN 3.1* 3.0* 2.9* 3.0*     GLUCOSE:   Recent Labs   Lab 07/03/24 0321 07/02/24 0501 07/01/24  0422 06/30/24  0416 06/29/24  0423 06/28/24  0139   * 141* 101* 99 111* 138*

## 2024-07-03 NOTE — PROGRESS NOTES
[facility]   Cardiology II Service / Advanced Heart Failure  Daily Consult Progress Note      Patient: Navin Lutz  MRN: [id number]  Admission Date: 6/3/2024  Hospital Day # 30    Assessment and Plan: Navin Lutz is a 53 year old male with HFrEF 2/2 NICM s/p ICD, HLD, and CKD Stage II who presents admitted for decompensated heart failure with NICM on milrinone listed status 4, admitted for consideration of advanced therapies and is now s/p HM3 LVAD on 6/14/24 with Dr. Jhaveri with course complicated by klebsiella PNA (sp 10d abx) and recurrent left pleural effusion s/p thoracentesis then pigtail CT.     Recommendations:  - continue heparin gtt for bridging until CFX is 40 (APLS, radial thrombus)  - increased lisinopril to 7.5 mg daily (ordered for you)  - monitor hepatic panel- will need a hepatic panel 3-4 days post discharge  - has LVAD clinic follow-up scheduled for 7/12 with Dr. Dallas  - outpatient cardiac rehab referral placed    # Acute on chronic systolic heart failure secondary to NICM, previously on home milrinone  # s/p HM3 LVAD as BTT on 6/14/24   Stage D. NYHA Class III confounded by recent surgery    -Fluid status: grossly euvolemic, PIs improved off lasix  -ACEi/ARB/ARNi: lisinopril 5 mg daily   -Afterload reduction: discontinued hydralazine   -Inotrope: dobutamine stopped on 6/19/24  -BB: contraindicated given recent LVAD implant  -Aldosterone antagonist: aldactone 25 mg daily   -SGLT2i: Jardiance 10 mg daily  -SCD prophylaxis: ICD  -MAP: goal 65-85, has been 78-93  -LDH trends: 346 on 6/24, check weekly  -Anticoagulation: warfarin dosing per pharmacy, Following chromogenic factor 10: goal 20-40, dosing per pharmacy; 55  today, continue heparin gtt as bridge until chromogenic factor is between 20-40  -Antiplatelet: no ASA indicated per NICOLE trial results    # NSVT  Occ runs of NSVT 10-24 beats this week, asymptomatic and HD stable. Echo 6/28 showed LVIDd 6.2cm, at 5400 rpm.   -  monitor tele  - defer Rx for now  - goal K>4 Mg>2    # +Lupus anticoagulant  - negative testing in 2023, then + this admission for lupus anticoagulant  - following CFx as above per heme, goal 20-40  - per heme 6/25 repeat lupus anticoag panel end of July, may be able to switch back to INR monitoring    # Leukocytosis, pneumonia, resolved  # Fevers, resolved  # Left pleural effusion s/p thoracentesis 6/25 then pigtail 6/28  Concern for infection, +productive cough. CT chest/abd/pelvis 6/22/24 anterior chest subcutaneous air and stranding with substernal gas, read as infectious vs postprocedureal, felt to be procedural per CVTS. Also with b/l pleural effusions and mild ground glass in the lung bases concerning for pneumonia. Resp culture ositive for klebsiella on 6/22. Blood cultures negative. UA negative. New diarrhea with abdominal cramping on 6/24 after 3-4 days of abx. -IR diagnostic and therapeutic thora 6/25. 500mL drained, and then s/p pigtail chest tube 6/28, 1.5L drained immediately, removed 6/30 per CVTS  -Appreciate ID consult  -Vanc/Zosyn started  6/21 and added azithromycin 6/22   - stop vanco 6/26 per Dr Jhaveri s/p azithro course   - rocpehin changed tp levaquin 6/29 for LFTs, plan for total 10 days ok for levaquin per ID (through 6/30)  -CRP downtrending and WBC normalized    # Acute chest pain 2/2 globus sensation vs esophagitis, resolved  6/29 AM after eating omlet; no shortness of breath or coughing  - CXR 6/29 negative for signs of aspiration  - monitor, consider acid reducing agents if heart burn symptoms persist    # Right radial artery occlusion 2/2 arterial line   # Neuropathy  # Right subclavian and axillary vein thrombus, nonocclusive, known prior to VAD.   Redemonstrated DVT 6/22 CT, likely old clot vs line related from right neck swan in ICU. Having numbness and tingling of right hand.  - warfarin and heparin as above, follow chromogenic factor X  - warning signs for arterial clot reviewed  with patient and his wife   - OT hand therapies  - gabapentin increased on 7/1 per CVTS    # Elevated LFTs AST/ALT trending up this week, 200s, no symptoms. US abd negative for cause 6/27.  - trend daily, currently downtrending  - hold statin for now  - needs hepatic panel 3-4 days after discharge  - pharmacy review of other meds ?Rocephin 3% incidence, switched to levaquin as above, LFTs improving    # HLD  - hold atorvastatin 20 mg as above     # CKD stage 2. B/l cr 1.2-1-4  - Cr stable 0.82 (baseline improved post-vad)      Patient discussed with Dr. Dallas.        40 minutes spent on the date of the encounter doing chart review, history and exam, documentation and further activities per the note    Eliz Choi PA-C  Nurse Practitioner - Advanced Heart Failure/Cardiology II Service  Vocera preferred or Pager [phone]    ================================================================    Subjective/24-Hr Events:   Last 24 hr care team notes reviewed. Doing well with therapy. Walked one mile yesterday. No lightheadedness, dizziness, pre-sycnope or syncope. No headaches. Still coughing some, but improving. No abdominal edema or LE edema. No blood in the urine or blood in the stool. No driveline infection symptoms.     ROS:  4 point ROS including respiratory, CV, GI and  (other than that noted in the HPI) is negative.     Medications: Reviewed in EPIC.     Physical Exam:   BP (!) 81/65   Pulse 104   Temp 97.9  F (36.6  C) (Oral)   Resp 16   Ht 1.829 m (6')   Wt 85.5 kg (188 lb 7.9 oz)   SpO2 92%   BMI 25.56 kg/m      GENERAL: Appears comfortable and in no distress.  HEENT: Eye symmetrical, no discharge or icterus bilaterally.   NECK: Supple, JVD <6 at 90 degrees.  CV: Hum of LVAD, no adventitious sounds  RESPIRATORY: Respirations regular, even, and unlabored. Lungs CTA throughout.    GI: Soft and non distended.  No tenderness, rebound, guarding.   EXTREMITIES: No b/l lower extremity peripheral  edema. All extremities are warm and well perfused. normal cap refill right hand.  NEUROLOGIC: Alert, CN grossly intact.  SKIN: No jaundice. No rashes or lesions. Sternum c/d/I. Driveline dressing c/d/i    Labs:  CMP  Recent Labs   Lab 07/03/24 0321 07/02/24 0501 07/01/24 0422 06/30/24  0416    134* 133* 136   POTASSIUM 4.3 3.8 4.0 4.4   CHLORIDE 105 104 102 103   CO2 21* 20* 21* 21*   ANIONGAP 10 10 10 12   * 141* 101* 99   BUN 14.6 13.6 11.1 12.6   CR 0.99 0.82 0.96 0.92   GFRESTIMATED >90 >90 >90 >90   NAM 9.1 8.9 8.7 9.1   MAG 2.1 2.0 2.0 2.1   PHOS 3.3 3.2 3.3 3.5   PROTTOTAL 6.2* 6.1* 6.0* 6.2*   ALBUMIN 3.1* 3.0* 2.9* 3.0*   BILITOTAL 0.3 0.3 0.4 0.4   ALKPHOS 187* 192* 186* 190*   AST 46* 76* 81* 92*   * 181* 187* 215*       CBC  Recent Labs   Lab 07/03/24 0321 07/02/24 0501 07/01/24 0422 06/30/24  0416   WBC 8.8 8.9 8.6 10.2   RBC 4.10* 3.84* 3.87* 3.81*   HGB 11.8* 11.3* 11.5* 11.2*   HCT 37.2* 35.0* 34.6* 34.5*   MCV 91 91 89 91   MCH 28.8 29.4 29.7 29.4   MCHC 31.7 32.3 33.2 32.5   RDW 13.9 13.9 13.7 13.7    337 354 411       INR  Recent Labs   Lab 07/03/24 0321 07/02/24  0501 07/01/24 0422 06/30/24  0416   INR 1.72* 1.53* 1.71* 1.46*

## 2024-07-03 NOTE — PLAN OF CARE
Neuro: A&Ox4.   Cardiac: ST. VSS.   Respiratory: Sating >92% on RA.  GI/: Adequate urine output.   Diet/appetite: Tolerating regular diet. Eating well.  Activity:  Standby assist of up to chair and in halls.  Pain: At acceptable level on current regimen.   Skin: No new deficits noted.  LDA's:LVAD, PIV,     Plan: Continue with POC. Notify primary team with changes.

## 2024-07-03 NOTE — PROGRESS NOTES
The patient's HeartMate LVAD was interrogated 7/3/2024  * Speed 5400 rpm   * Pulsatility index 4.0   * Power 3.8 Pizano   * Flow 4.6 L/minute   Fluid status: euvolemic   Alarms were reviewed, and notable for rare pi events, history goes back 7 days.   The driveline exit site was inspected, c/d/i.   All external components were inspected and showed no evidence of damage or malfunction, none replaced.   No changes to VAD settings made

## 2024-07-03 NOTE — PROGRESS NOTES
Care Management Follow Up    Length of Stay (days): 30    Expected Discharge Date: 07/04/2024     Concerns to be Addressed: Discharge planning, medical readiness.   Patient plan of care discussed at interdisciplinary rounds: Yes    Anticipated Discharge Disposition: Local apartment  Anticipated Discharge Services: OP CR, OP anticoagulation management  Anticipated Discharge DME:  4 wheel walker (spouse     Patient/family educated on Medicare website which has current facility and service quality ratings:  NA  Education Provided on the Discharge Plan: Yes  Patient/Family in Agreement with the Plan: Yes    Referrals Placed by CM/SW:    Private pay costs discussed: Not applicable    Additional Information:  Per discussion w/the primary team, likely discharge tomorrow, will need OP chromogenic factor 10 lab on Friday. Pt's primary VAD coordinator is out of the office, writer contacted the on-call coordinator, Adeola Garcias, requested anticoagulation referral, writer will schedule OP lab for Friday as requested by the primary team. Writer met w/patient and spouse briefly, provided update on lab. Pt's spouse confirmed that she purchased a 4 wheel seated walker off of [employer], no additional DME needed. Patient's spouse noted that there were cards sent to the patient in the mail while he was hospitalized that they have not received, charge nurse updated, she will look into it.     3810 Addendum:  Anticoagulation referral placed by LVAD coordinator, Chromogenic Factor 10 [place] 8[place]15 [place] [facility] [place].     Care coordination will continue to follow.     Patricia Novoa, Nurse Coordinator, BSN  Phone: [phone]  Vocera: 6B C RNCC

## 2024-07-04 VITALS
RESPIRATION RATE: 16 BRPM | TEMPERATURE: 97.8 F | BODY MASS INDEX: 25.35 KG/M2 | OXYGEN SATURATION: 94 % | WEIGHT: 187.17 LBS | DIASTOLIC BLOOD PRESSURE: 65 MMHG | SYSTOLIC BLOOD PRESSURE: 81 MMHG | HEART RATE: 120 BPM | HEIGHT: 72 IN

## 2024-07-04 LAB
ALBUMIN SERPL BCG-MCNC: 3.3 G/DL (ref 3.5–5.2)
ALP SERPL-CCNC: 191 U/L (ref 40–150)
ALT SERPL W P-5'-P-CCNC: 127 U/L (ref 0–70)
ANION GAP SERPL CALCULATED.3IONS-SCNC: 9 MMOL/L (ref 7–15)
AST SERPL W P-5'-P-CCNC: 40 U/L (ref 0–45)
BASOPHILS # BLD AUTO: 0.1 10E3/UL (ref 0–0.2)
BASOPHILS NFR BLD AUTO: 1 %
BILIRUB DIRECT SERPL-MCNC: <0.2 MG/DL (ref 0–0.3)
BILIRUB SERPL-MCNC: 0.4 MG/DL
BUN SERPL-MCNC: 11.9 MG/DL (ref 6–20)
CALCIUM SERPL-MCNC: 9.3 MG/DL (ref 8.6–10)
CHLORIDE SERPL-SCNC: 106 MMOL/L (ref 98–107)
CREAT SERPL-MCNC: 0.91 MG/DL (ref 0.67–1.17)
DEPRECATED HCO3 PLAS-SCNC: 22 MMOL/L (ref 22–29)
EGFRCR SERPLBLD CKD-EPI 2021: >90 ML/MIN/1.73M2
EOSINOPHIL # BLD AUTO: 0.4 10E3/UL (ref 0–0.7)
EOSINOPHIL NFR BLD AUTO: 4 %
ERYTHROCYTE [DISTWIDTH] IN BLOOD BY AUTOMATED COUNT: 14 % (ref 10–15)
FACT X ACT/NOR PPP CHRO: 49 % (ref 70–130)
GLUCOSE SERPL-MCNC: 103 MG/DL (ref 70–99)
HCT VFR BLD AUTO: 37.2 % (ref 40–53)
HGB BLD-MCNC: 12.3 G/DL (ref 13.3–17.7)
IMM GRANULOCYTES # BLD: 0.1 10E3/UL
IMM GRANULOCYTES NFR BLD: 1 %
INR PPP: 1.91 (ref 0.85–1.15)
LYMPHOCYTES # BLD AUTO: 1.8 10E3/UL (ref 0.8–5.3)
LYMPHOCYTES NFR BLD AUTO: 23 %
MAGNESIUM SERPL-MCNC: 2.1 MG/DL (ref 1.7–2.3)
MCH RBC QN AUTO: 29.6 PG (ref 26.5–33)
MCHC RBC AUTO-ENTMCNC: 33.1 G/DL (ref 31.5–36.5)
MCV RBC AUTO: 89 FL (ref 78–100)
MONOCYTES # BLD AUTO: 0.7 10E3/UL (ref 0–1.3)
MONOCYTES NFR BLD AUTO: 9 %
NEUTROPHILS # BLD AUTO: 4.9 10E3/UL (ref 1.6–8.3)
NEUTROPHILS NFR BLD AUTO: 62 %
NRBC # BLD AUTO: 0 10E3/UL
NRBC BLD AUTO-RTO: 0 /100
PHOSPHATE SERPL-MCNC: 3.9 MG/DL (ref 2.5–4.5)
PLATELET # BLD AUTO: 288 10E3/UL (ref 150–450)
POTASSIUM SERPL-SCNC: 4.3 MMOL/L (ref 3.4–5.3)
PROT SERPL-MCNC: 6.5 G/DL (ref 6.4–8.3)
RBC # BLD AUTO: 4.16 10E6/UL (ref 4.4–5.9)
SODIUM SERPL-SCNC: 137 MMOL/L (ref 135–145)
UFH PPP CHRO-ACNC: 0.49 IU/ML
WBC # BLD AUTO: 7.9 10E3/UL (ref 4–11)

## 2024-07-04 PROCEDURE — 83735 ASSAY OF MAGNESIUM: CPT

## 2024-07-04 PROCEDURE — 85520 HEPARIN ASSAY: CPT | Performed by: THORACIC SURGERY (CARDIOTHORACIC VASCULAR SURGERY)

## 2024-07-04 PROCEDURE — 250N000013 HC RX MED GY IP 250 OP 250 PS 637: Performed by: STUDENT IN AN ORGANIZED HEALTH CARE EDUCATION/TRAINING PROGRAM

## 2024-07-04 PROCEDURE — 84100 ASSAY OF PHOSPHORUS: CPT

## 2024-07-04 PROCEDURE — 250N000013 HC RX MED GY IP 250 OP 250 PS 637

## 2024-07-04 PROCEDURE — 93750 INTERROGATION VAD IN PERSON: CPT | Performed by: INTERNAL MEDICINE

## 2024-07-04 PROCEDURE — 85610 PROTHROMBIN TIME: CPT | Performed by: STUDENT IN AN ORGANIZED HEALTH CARE EDUCATION/TRAINING PROGRAM

## 2024-07-04 PROCEDURE — 80053 COMPREHEN METABOLIC PANEL: CPT | Performed by: NURSE PRACTITIONER

## 2024-07-04 PROCEDURE — 250N000013 HC RX MED GY IP 250 OP 250 PS 637: Performed by: NURSE PRACTITIONER

## 2024-07-04 PROCEDURE — 85260 CLOT FACTOR X STUART-POWER: CPT | Performed by: SURGERY

## 2024-07-04 PROCEDURE — 250N000013 HC RX MED GY IP 250 OP 250 PS 637: Performed by: PHYSICIAN ASSISTANT

## 2024-07-04 PROCEDURE — 85025 COMPLETE CBC W/AUTO DIFF WBC: CPT

## 2024-07-04 PROCEDURE — 99238 HOSP IP/OBS DSCHRG MGMT 30/<: CPT

## 2024-07-04 PROCEDURE — 36415 COLL VENOUS BLD VENIPUNCTURE: CPT | Performed by: SURGERY

## 2024-07-04 PROCEDURE — 99232 SBSQ HOSP IP/OBS MODERATE 35: CPT | Mod: 25 | Performed by: INTERNAL MEDICINE

## 2024-07-04 RX ORDER — WARFARIN SODIUM 5 MG/1
TABLET ORAL
Qty: 60 TABLET | Refills: 0 | Status: ON HOLD | OUTPATIENT
Start: 2024-07-04 | End: 2024-07-10

## 2024-07-04 RX ORDER — LISINOPRIL 5 MG/1
10 TABLET ORAL DAILY
Status: DISCONTINUED | OUTPATIENT
Start: 2024-07-05 | End: 2024-07-04 | Stop reason: HOSPADM

## 2024-07-04 RX ORDER — LISINOPRIL 10 MG/1
10 TABLET ORAL DAILY
Qty: 60 TABLET | Refills: 0 | Status: ON HOLD | OUTPATIENT
Start: 2024-07-05 | End: 2024-07-10

## 2024-07-04 RX ORDER — METHOCARBAMOL 750 MG/1
750 TABLET, FILM COATED ORAL 4 TIMES DAILY PRN
Qty: 30 TABLET | Refills: 0 | Status: SHIPPED | OUTPATIENT
Start: 2024-07-04 | End: 2024-07-31

## 2024-07-04 RX ORDER — SPIRONOLACTONE 25 MG/1
25 TABLET ORAL EVERY EVENING
Qty: 30 TABLET | Refills: 0 | Status: SHIPPED | OUTPATIENT
Start: 2024-07-04 | End: 2024-07-31

## 2024-07-04 RX ORDER — GABAPENTIN 100 MG/1
200 CAPSULE ORAL 3 TIMES DAILY
Qty: 90 CAPSULE | Refills: 0 | Status: SHIPPED | OUTPATIENT
Start: 2024-07-04 | End: 2024-07-22

## 2024-07-04 RX ORDER — PANTOPRAZOLE SODIUM 40 MG/1
40 TABLET, DELAYED RELEASE ORAL
Qty: 60 TABLET | Refills: 0 | Status: SHIPPED | OUTPATIENT
Start: 2024-07-05 | End: 2024-07-31

## 2024-07-04 RX ORDER — POLYETHYLENE GLYCOL 3350 17 G/17G
17 POWDER, FOR SOLUTION ORAL DAILY PRN
Qty: 510 G | Refills: 0 | Status: SHIPPED | OUTPATIENT
Start: 2024-07-04 | End: 2024-07-31

## 2024-07-04 RX ADMIN — LISINOPRIL 7.5 MG: 2.5 TABLET ORAL at 08:13

## 2024-07-04 RX ADMIN — PANTOPRAZOLE SODIUM 40 MG: 40 TABLET, DELAYED RELEASE ORAL at 08:13

## 2024-07-04 RX ADMIN — GABAPENTIN 200 MG: 100 CAPSULE ORAL at 13:51

## 2024-07-04 RX ADMIN — GABAPENTIN 200 MG: 100 CAPSULE ORAL at 08:13

## 2024-07-04 RX ADMIN — EMPAGLIFLOZIN 10 MG: 10 TABLET, FILM COATED ORAL at 08:13

## 2024-07-04 ASSESSMENT — ACTIVITIES OF DAILY LIVING (ADL)
ADLS_ACUITY_SCORE: 29
ADLS_ACUITY_SCORE: 29
ADLS_ACUITY_SCORE: 30
ADLS_ACUITY_SCORE: 29
ADLS_ACUITY_SCORE: 30
ADLS_ACUITY_SCORE: 29
ADLS_ACUITY_SCORE: 30
ADLS_ACUITY_SCORE: 29

## 2024-07-04 NOTE — PHARMACY-ANTICOAGULATION SERVICE
Clinical Pharmacy- Warfarin Discharge Note  This patient is currently on warfarin for the treatment of  lupus anticoagulant positive, internal jugular thrombus, brachial venous thrombus .  Goal=  Chromogenic Factor X: 20-40%      Anticoagulation Dose History  More data exists         Latest Ref Rng & Units 6/28/2024 6/29/2024 6/30/2024 7/1/2024 7/2/2024 7/3/2024 7/4/2024   Recent Dosing and Labs   warfarin ANTICOAGULANT (COUMADIN) 5 MG tablet - 5 mg, $Given - - - - - -   warfarin ANTICOAGULANT (COUMADIN) 7.5 MG tablet - - 7.5 mg, $Given 7.5 mg, $Given 7.5 mg, $Given - - -   warfarin ANTICOAGULANT (COUMADIN) 10 MG tablet - - - - - 10 mg, $Given 10 mg, $Given -   Chromogenic Factor 10 70 - 130 % 59  58  71  67  61  55  49    INR 0.85 - 1.15 1.69  1.56  1.46  1.71  1.53  1.72  1.91       Details                   Vitamin K doses administered during the last 7 days: none   FFP administered during the last 7 days: none  Recommend discharging the patient on a warfarin regimen of 12.5 mg once tonight with a prescription for warfarin 5mg tablets.  Of note, patient's CFX is not yet therapeutic but will discharge today without bridging per primary team with close follow-up tomorrow for an INR check. Will dose more aggressively tonight in light of this. Patient has been requiring higher warfarin doses than previous regimen to reach therapeutic goal.    The patient should have an INR checked  tomorrow, 7/5. Further dosing to be determined by anticoagulation clinic based on tomorrow's INR .     Connie Stanton, PharmD, BCPS

## 2024-07-04 NOTE — PROGRESS NOTES
The patient's HeartMate LVAD was interrogated 7/4/2024  * Speed 5400 rpm   * Pulsatility index 4.0   * Power 3.9 Pizano   * Flow 4.5 L/minute   Fluid status: euvolemic   Alarms were reviewed, and notable for rare pi events without speed drops  All external components were inspected and showed no evidence of damage or malfunction, none replaced.   No changes to VAD settings made

## 2024-07-04 NOTE — PLAN OF CARE
Neuro: A&Ox4.   Cardiac: ST. VSS.   Respiratory: Sating >92% on RA.  GI/: Adequate urine output.  Diet/appetite: Tolerating regular diet. Eating well.  Activity:   StandbyAssist up to chair and in halls.  Pain: At acceptable level on current regimen.   Skin: No new deficits noted.  LDA's: PIV x1, LVAD    Plan: Continue with POC. Notify primary team with changes.

## 2024-07-04 NOTE — PROGRESS NOTES
[facility]   Cardiology II Service / Advanced Heart Failure  Daily Consult Progress Note      Patient: Navin Lutz  MRN: [id number]  Admission Date: 6/3/2024  Hospital Day # 31    Assessment and Plan: Navin Lutz is a 53 year old male with HFrEF 2/2 NICM s/p ICD, HLD, and CKD Stage II who presents admitted for decompensated heart failure with NICM on milrinone listed status 4, admitted for consideration of advanced therapies and is now s/p HM3 LVAD on 6/14/24 with Dr. Jhaveri with course complicated by klebsiella PNA (sp 10d abx) and recurrent left pleural effusion s/p thoracentesis then pigtail CT.     Recommendations:  - INR not yet therapeutic; will need outpatient INR (chromogenic) draws to ensure therapeutic   - ordered for outpatient labs tomorrow and next wednesday  - increase lisinopril to 10mg daily   - cmp next wednesday  - monitor hepatic panel- will need a hepatic panel 3-4 days post discharge  - has LVAD clinic follow-up scheduled for 7/12 with Dr. Dallas  - outpatient cardiac rehab referral placed    # Acute on chronic systolic heart failure secondary to NICM, previously on home milrinone  # s/p HM3 LVAD as BTT on 6/14/24   Stage D. NYHA Class III confounded by recent surgery    -Fluid status: grossly euvolemic, PIs improved off lasix  -ACEi/ARB/ARNi: lisinopril 10 mg daily   -Inotrope: dobutamine stopped on 6/19/24  -BB: contraindicated given recent LVAD implant  -Aldosterone antagonist: aldactone 25 mg daily   -SGLT2i: Jardiance 10 mg daily  -SCD prophylaxis: ICD  -MAP: goal 65-85, has been 78-93  -LDH trends: 346 on 6/24, check weekly  -Anticoagulation: warfarin dosing per pharmacy  -Antiplatelet: no ASA indicated per NICOLE trial results    # NSVT  Occ runs of NSVT 10-24 beats this week, asymptomatic and HD stable. Echo 6/28 showed LVIDd 6.2cm, at 5400 rpm.   - monitor tele  - defer Rx for now  - goal K>4 Mg>2    # +Lupus anticoagulant  - negative testing in 2023, then + this  admission for lupus anticoagulant  - following CFx as above per heme, goal 20-40  - per heme 6/25 repeat lupus anticoag panel end of July, may be able to switch back to INR monitoring    # Leukocytosis, pneumonia, resolved  # Fevers, resolved  # Left pleural effusion s/p thoracentesis 6/25 then pigtail 6/28  Concern for infection, +productive cough. CT chest/abd/pelvis 6/22/24 anterior chest subcutaneous air and stranding with substernal gas, read as infectious vs postprocedureal, felt to be procedural per CVTS. Also with b/l pleural effusions and mild ground glass in the lung bases concerning for pneumonia. Resp culture ositive for klebsiella on 6/22. Blood cultures negative. UA negative. New diarrhea with abdominal cramping on 6/24 after 3-4 days of abx. -IR diagnostic and therapeutic thora 6/25. 500mL drained, and then s/p pigtail chest tube 6/28, 1.5L drained immediately, removed 6/30 per CVTS  -Appreciate ID consult  -Vanc/Zosyn started  6/21 and added azithromycin 6/22   - stop vanco 6/26 per Dr Jhaveri s/p richardro course   - rocpehin changed tp levaquin 6/29 for LFTs, plan for total 10 days ok for levaquin per ID (through 6/30)  -CRP downtrending and WBC normalized    # Acute chest pain 2/2 globus sensation vs esophagitis, resolved  6/29 AM after eating omlet; no shortness of breath or coughing  - CXR 6/29 negative for signs of aspiration  - monitor, consider acid reducing agents if heart burn symptoms persist    # Right radial artery occlusion 2/2 arterial line   # Neuropathy  # Right subclavian and axillary vein thrombus, nonocclusive, known prior to VAD.   Redemonstrated DVT 6/22 CT, likely old clot vs line related from right neck swan in ICU. Having numbness and tingling of right hand.  - warfarin and heparin as above, follow chromogenic factor X  - warning signs for arterial clot reviewed with patient and his wife   - OT hand therapies  - gabapentin increased on 7/1 per CVTS    # Elevated LFTs AST/ALT  trending up this week, 200s, no symptoms. US abd negative for cause 6/27.  - trend daily, currently downtrending  - hold statin for now  - needs hepatic panel 3-4 days after discharge  - pharmacy review of other meds ?Rocephin 3% incidence, switched to levaquin as above, LFTs improving    # HLD  - hold atorvastatin 20 mg as above     # CKD stage 2. B/l cr 1.2-1-4  - Cr stable 0.82 (baseline improved post-vad)      Patient discussed with Dr. Dallas.        40 minutes spent on the date of the encounter doing chart review, history and exam, documentation and further activities per the note    Eliz Choi PA-C  Nurse Practitioner - Advanced Heart Failure/Cardiology II Service  Vocera preferred or Pager [phone]    ================================================================    Subjective/24-Hr Events:   Last 24 hr care team notes reviewed. Doing well with therapy. Walked one mile yesterday. No lightheadedness, dizziness, pre-sycnope or syncope. No headaches. Still coughing some, but improving. No abdominal edema or LE edema. No blood in the urine or blood in the stool. No driveline infection symptoms.     ROS:  4 point ROS including respiratory, CV, GI and  (other than that noted in the HPI) is negative.     Medications: Reviewed in EPIC.     Physical Exam:   BP (!) 81/65   Pulse 109   Temp 98.1  F (36.7  C) (Oral)   Resp 16   Ht 1.829 m (6')   Wt 84.9 kg (187 lb 2.7 oz)   SpO2 94%   BMI 25.38 kg/m      GENERAL: Appears comfortable and in no distress.  HEENT: Eye symmetrical, no discharge or icterus bilaterally.   NECK: Supple, JVD <6 at 90 degrees.  CV: Hum of LVAD, no adventitious sounds  RESPIRATORY: Respirations regular, even, and unlabored. Lungs CTA throughout.    GI: Soft and non distended.  No tenderness, rebound, guarding.   EXTREMITIES: No b/l lower extremity peripheral edema. All extremities are warm and well perfused. normal cap refill right hand.  NEUROLOGIC: Alert, CN grossly  intact.  SKIN: No jaundice. No rashes or lesions. Sternum c/d/I. Driveline dressing c/d/i    Labs:  CMP  Recent Labs   Lab 07/04/24  0548 07/03/24 0321 07/02/24  0501 07/01/24  0422    136 134* 133*   POTASSIUM 4.3 4.3 3.8 4.0   CHLORIDE 106 105 104 102   CO2 22 21* 20* 21*   ANIONGAP 9 10 10 10   * 108* 141* 101*   BUN 11.9 14.6 13.6 11.1   CR 0.91 0.99 0.82 0.96   GFRESTIMATED >90 >90 >90 >90   NAM 9.3 9.1 8.9 8.7   MAG 2.1 2.1 2.0 2.0   PHOS 3.9 3.3 3.2 3.3   PROTTOTAL 6.5 6.2* 6.1* 6.0*   ALBUMIN 3.3* 3.1* 3.0* 2.9*   BILITOTAL 0.4 0.3 0.3 0.4   ALKPHOS 191* 187* 192* 186*   AST 40 46* 76* 81*   * 149* 181* 187*       CBC  Recent Labs   Lab 07/04/24  0548 07/03/24 0321 07/02/24 0501 07/01/24  0422   WBC 7.9 8.8 8.9 8.6   RBC 4.16* 4.10* 3.84* 3.87*   HGB 12.3* 11.8* 11.3* 11.5*   HCT 37.2* 37.2* 35.0* 34.6*   MCV 89 91 91 89   MCH 29.6 28.8 29.4 29.7   MCHC 33.1 31.7 32.3 33.2   RDW 14.0 13.9 13.9 13.7    300 337 354       INR  Recent Labs   Lab 07/04/24  0548 07/03/24 0321 07/02/24  0501 07/01/24  0422   INR 1.91* 1.72* 1.53* 1.71*

## 2024-07-04 NOTE — PLAN OF CARE
Goal Outcome Evaluation:  DISCHARGE                         7/4/2024  2:30 PM  ----------------------------------------------------------------------------  Discharged to: Home  Via: private transportation  Accompanied by: Family  Discharge Instructions:  Regular diet, as tolerate  activity, medications, follow up appointments, when to call the MD, aftercare instructions.  Prescriptions: To be filled by   [facility] discharge  pharmacy; medication list reviewed & sent with pt  Follow Up Appointments: arranged; information given  Belongings: All sent with pt  IV: d/c'd  Telemetry: d/c'd  Pt exhibits understanding of above discharge instructions; all questions answered.    Discharge Paperwork: Signed, copied, and sent home with patient.

## 2024-07-05 ENCOUNTER — CARE COORDINATION (OUTPATIENT)
Dept: CARDIOLOGY | Facility: CLINIC | Age: 54
End: 2024-07-05

## 2024-07-05 ENCOUNTER — ANTICOAGULATION THERAPY VISIT (OUTPATIENT)
Dept: ANTICOAGULATION | Facility: CLINIC | Age: 54
End: 2024-07-05

## 2024-07-05 ENCOUNTER — LAB (OUTPATIENT)
Dept: LAB | Facility: CLINIC | Age: 54
End: 2024-07-05
Payer: COMMERCIAL

## 2024-07-05 ENCOUNTER — DOCUMENTATION ONLY (OUTPATIENT)
Dept: ANTICOAGULATION | Facility: CLINIC | Age: 54
End: 2024-07-05

## 2024-07-05 DIAGNOSIS — Z79.01 ANTICOAGULATED ON COUMADIN: ICD-10-CM

## 2024-07-05 DIAGNOSIS — I50.22 CHRONIC SYSTOLIC CONGESTIVE HEART FAILURE (H): Primary | ICD-10-CM

## 2024-07-05 DIAGNOSIS — I50.22 CHRONIC SYSTOLIC HEART FAILURE (H): ICD-10-CM

## 2024-07-05 DIAGNOSIS — Z95.811 LVAD (LEFT VENTRICULAR ASSIST DEVICE) PRESENT (H): ICD-10-CM

## 2024-07-05 DIAGNOSIS — Z79.01 ANTICOAGULATED ON WARFARIN: ICD-10-CM

## 2024-07-05 LAB
ALBUMIN SERPL BCG-MCNC: 3.5 G/DL (ref 3.5–5.2)
ALP SERPL-CCNC: 206 U/L (ref 40–150)
ALT SERPL W P-5'-P-CCNC: 136 U/L (ref 0–70)
ANION GAP SERPL CALCULATED.3IONS-SCNC: 11 MMOL/L (ref 7–15)
AST SERPL W P-5'-P-CCNC: 51 U/L (ref 0–45)
BILIRUB SERPL-MCNC: 0.5 MG/DL
BUN SERPL-MCNC: 13.5 MG/DL (ref 6–20)
CALCIUM SERPL-MCNC: 9.8 MG/DL (ref 8.6–10)
CHLORIDE SERPL-SCNC: 104 MMOL/L (ref 98–107)
CREAT SERPL-MCNC: 1.01 MG/DL (ref 0.67–1.17)
DEPRECATED HCO3 PLAS-SCNC: 24 MMOL/L (ref 22–29)
EGFRCR SERPLBLD CKD-EPI 2021: 89 ML/MIN/1.73M2
FACT X ACT/NOR PPP CHRO: 44 % (ref 70–130)
GLUCOSE SERPL-MCNC: 108 MG/DL (ref 70–99)
INR PPP: 2.03 (ref 0.85–1.15)
LDH SERPL L TO P-CCNC: 241 U/L (ref 0–250)
POTASSIUM SERPL-SCNC: 4.5 MMOL/L (ref 3.4–5.3)
PROT SERPL-MCNC: 6.9 G/DL (ref 6.4–8.3)
SODIUM SERPL-SCNC: 139 MMOL/L (ref 135–145)

## 2024-07-05 PROCEDURE — 83615 LACTATE (LD) (LDH) ENZYME: CPT | Performed by: PATHOLOGY

## 2024-07-05 PROCEDURE — 36415 COLL VENOUS BLD VENIPUNCTURE: CPT | Performed by: PATHOLOGY

## 2024-07-05 PROCEDURE — 80053 COMPREHEN METABOLIC PANEL: CPT | Performed by: PATHOLOGY

## 2024-07-05 PROCEDURE — 85610 PROTHROMBIN TIME: CPT | Performed by: PATHOLOGY

## 2024-07-05 PROCEDURE — 99000 SPECIMEN HANDLING OFFICE-LAB: CPT | Performed by: PATHOLOGY

## 2024-07-05 PROCEDURE — 85260 CLOT FACTOR X STUART-POWER: CPT | Performed by: STUDENT IN AN ORGANIZED HEALTH CARE EDUCATION/TRAINING PROGRAM

## 2024-07-05 NOTE — PROGRESS NOTES
Called patient/caregiver to check in 24 hours post discharge. Pt reports VAD parameters WNL and weight WNL. Reviewed medications and answered any questions. Patient reports sleeping well and minimal anxiety since being home with LVAD. Patient is able to move around the house and care for himself with minimal assistance from his spouse. DLES is WNL and pt has all required supplies for dressing changes.     Pt would like a letter sent to [facility], as pt and spouse had purchased ticket insurance and are hopeful for reimbursement. Pt and spouse provided permission to share procedure performed and requirement to reside in [place] for the time being in this letter. Pt and spouse expressed some disappointment about not being able to attend. Emphasized importance of self-care for both pt and caregiver during this period of transition; reviewed coping strategies: enlisting support from friends and love ones, attending patient and caregiver support groups, reviewing LVAD educational materials to reinforce knowledge, and talking about concerns with family/care providers/trusted others.     Encouraged pt to page VAD Coordinator at any time with any issues or questions. Pt verbalizes understanding.

## 2024-07-05 NOTE — LETTER
7/5/2024        Due to unforseen medical circumstances, Torey Lutz are unable to attend the  [employer] as originally intended. Navin Lutz recently underwent a procedure to implant a left ventricular assist device (LVAD), which is a life-sustaining piece of equipment. Due to these complex medical circumstances, Torey Lutz are required to remain in [place], [place] for the foreseeable future. Due to health privacy laws, we are unable to further disclose the nature of his condition.           Please do not hesitate to contact me if you have any questions/concerns.     Sincerely,     Kristen Noel RN VAD Coordinator  [facility], Mechanical Circulatory Support  Office: [phone]  Fax: [phone]

## 2024-07-05 NOTE — PROGRESS NOTES
ANTICOAGULATION MANAGEMENT     Navin Lutz 53 year old male is on warfarin with subtherapeutic result. (Goal Chromogenic Factor X 40-20%)    Recent labs: (last 7 days)     07/05/24  0845   INR 2.03*   [id number]*     ASSESSMENT     Source(s): Chart Review and Patient/Caregiver Call     Warfarin doses taken: Warfarin taken as instructed  Diet:  Patient will drink Ensure every other days  Medication/supplement changes: Nothing new since discharge yesterday.  Patient started Neurontin, protonix, Zestril, Robaxin  New illness, injury, or hospitalization: Yes: Patient was hospitalized 6/3-7/4/24 for LVAD placement  Signs or symptoms of bleeding or clotting: No  Previous result: Subtherapeutic  Additional findings: Patient was on warfarin for 9 months prior to hospital stay.  Patient does not feel the need for further warfarin education today. Chris understands if Chromo X results are not back on 7/8 and doesn't hear from [facility] he will take warfarin 10mg.      PLAN     Recommended plan for ongoing change(s) affecting result    Dosing Instructions:  New MD set today  with next Chromogenic Factor X in 3 days       Telephone call with Navin who verbalizes understanding and agrees to plan and who agrees to plan and repeated back plan correctly    Lab visit scheduled    Education provided:   Taking warfarin: Importance of taking warfarin as instructed  Goal range and lab monitoring: goal range and significance of current result, Importance of therapeutic range, and Importance of following up at instructed interval  Dietary considerations: importance of consistent vitamin K intake  How chromogenic X level works inversely of INR level as warfarin builds up in system    Plan made with [facility] Pharmacist Lay Baldwin, RN  Anticoagulation Clinic  7/5/2024

## 2024-07-05 NOTE — PROGRESS NOTES
ANTICOAGULATION  MANAGEMENT: Discharge Review    Navin Lutz chart reviewed for anticoagulation continuity of care    Hospital Admission on 6/3-24 for LVAD placement.    Discharge disposition: Home    Results:    Recent labs: (last 7 days)     24  0423 24  0416 24  2139 24  0422 24  0555 24  1231 24  2024 24  0501 24  1133 24  1841 24  0321 24  1030 24  1824 24  0548 24  0845   INR 1.56* 1.46*  --  1.71*  --   --   --  1.53*  --   --  1.72*  --   --  1.91* 2.03*   [id number] 58* 71  --  67*  --   --   --  61*  --   --  55*  --   --  49*  --    AAUFH  --   --  <0.10  --  0.20 0.17 0.12 0.16 0.12 0.74 0.83 0.48 0.43 0.49  --      Anticoagulation inpatient management:     See calendar    Anticoagulation discharge instructions:     Warfarin dosin.5mg on 24 with labs on 24 going forward   Bridging: No   INR goal change: Yes: Patient was found to have a positive lupus anticoagulant while in house. Chromogenic X's should be monitored for now.  Notes indicated that patient should have another lupus anticoagulant test at the end of July.  If Lupus anticoagulant is positive than patient can resume INR testing for warfarin management.      Medication changes affecting anticoagulation: Yes: gabapentin (NEURONTIN)  lisinopril (ZESTRIL)  Start taking on: 2024  methocarbamol (ROBAXIN)  pantoprazole (PROTONIX)  Start taking on: 2024  polyethylene glycol (MIRALAX)    Additional factors affecting anticoagulation: Yes: Patient is recovering from a month long hospital stay and major surgery.  LFT's were also elevated.  Patient also received Ensure while in the hospital     PLAN     Agree with discharge plan for follow up on 24    Patient not contacted  INR results are back.  Chromogenic X level is currently pending.  Writer will call patient and answer questions once Chromogenic X results are back later  today.     Anticoagulation Calendar updated    Danica Baldwin RN

## 2024-07-08 ENCOUNTER — CARE COORDINATION (OUTPATIENT)
Dept: CARDIOLOGY | Facility: CLINIC | Age: 54
End: 2024-07-08

## 2024-07-08 ENCOUNTER — ANTICOAGULATION THERAPY VISIT (OUTPATIENT)
Dept: ANTICOAGULATION | Facility: CLINIC | Age: 54
End: 2024-07-08

## 2024-07-08 ENCOUNTER — LAB (OUTPATIENT)
Dept: LAB | Facility: CLINIC | Age: 54
End: 2024-07-08
Payer: COMMERCIAL

## 2024-07-08 DIAGNOSIS — Z95.811 LVAD (LEFT VENTRICULAR ASSIST DEVICE) PRESENT (H): ICD-10-CM

## 2024-07-08 DIAGNOSIS — Z79.01 ANTICOAGULATED ON WARFARIN: ICD-10-CM

## 2024-07-08 DIAGNOSIS — I82.622 ACUTE DEEP VEIN THROMBOSIS (DVT) OF OTHER VEIN OF LEFT UPPER EXTREMITY (H): ICD-10-CM

## 2024-07-08 DIAGNOSIS — I50.22 CHRONIC SYSTOLIC CONGESTIVE HEART FAILURE (H): Primary | ICD-10-CM

## 2024-07-08 DIAGNOSIS — I50.22 CHRONIC SYSTOLIC HEART FAILURE (H): ICD-10-CM

## 2024-07-08 DIAGNOSIS — Z79.01 ANTICOAGULATED ON COUMADIN: ICD-10-CM

## 2024-07-08 LAB
ALBUMIN SERPL BCG-MCNC: 3.6 G/DL (ref 3.5–5.2)
ALP SERPL-CCNC: 200 U/L (ref 40–150)
ALT SERPL W P-5'-P-CCNC: 104 U/L (ref 0–70)
ANION GAP SERPL CALCULATED.3IONS-SCNC: 12 MMOL/L (ref 7–15)
AST SERPL W P-5'-P-CCNC: 42 U/L (ref 0–45)
BILIRUB SERPL-MCNC: 0.4 MG/DL
BUN SERPL-MCNC: 14.9 MG/DL (ref 6–20)
CALCIUM SERPL-MCNC: 9.3 MG/DL (ref 8.6–10)
CHLORIDE SERPL-SCNC: 100 MMOL/L (ref 98–107)
CREAT SERPL-MCNC: 0.96 MG/DL (ref 0.67–1.17)
DEPRECATED HCO3 PLAS-SCNC: 24 MMOL/L (ref 22–29)
EGFRCR SERPLBLD CKD-EPI 2021: >90 ML/MIN/1.73M2
GLUCOSE SERPL-MCNC: 121 MG/DL (ref 70–99)
INR PPP: 2.62 (ref 0.85–1.15)
POTASSIUM SERPL-SCNC: 4.4 MMOL/L (ref 3.4–5.3)
PROT SERPL-MCNC: 6.8 G/DL (ref 6.4–8.3)
SODIUM SERPL-SCNC: 136 MMOL/L (ref 135–145)

## 2024-07-08 PROCEDURE — 80053 COMPREHEN METABOLIC PANEL: CPT | Performed by: PATHOLOGY

## 2024-07-08 PROCEDURE — 85260 CLOT FACTOR X STUART-POWER: CPT | Performed by: STUDENT IN AN ORGANIZED HEALTH CARE EDUCATION/TRAINING PROGRAM

## 2024-07-08 PROCEDURE — 85610 PROTHROMBIN TIME: CPT | Performed by: PATHOLOGY

## 2024-07-08 PROCEDURE — 36415 COLL VENOUS BLD VENIPUNCTURE: CPT | Performed by: PATHOLOGY

## 2024-07-08 PROCEDURE — 99000 SPECIMEN HANDLING OFFICE-LAB: CPT | Performed by: PATHOLOGY

## 2024-07-08 NOTE — PROGRESS NOTES
Situation:   Called and spoke with Patient, Family to follow up after recent hospitalization.     Background:   Patient was recently admitted from 6/3 to 7/4/24, for Left Ventricular Assist Device Implant.    Assessment:  Patient states since discharge they are feeling well. Having some back soreness, discussed using PRN robaxin, sitcky heat pads from local pharmacy (not electric heating pads), over the counter lidocaine patches.   Answered questions regarding their care and discharge plan.  Patient/Caregiver state understanding of needing to call dressing supply company to get supplies ordered for LVAD drive line exit site, reviewed where on AVS this was listed.   Patient is wondering if he can start taking a multivitamin, discussed that would be okay but to send pill ingredients to coumadin clinic prior to starting taking something.     Recommendation:  Patient, Family verbalized understanding and will call with further questions concerns. Follow up appointment scheduled for 7/12 with Dr. Dallas.

## 2024-07-09 ENCOUNTER — HOSPITAL ENCOUNTER (OUTPATIENT)
Facility: CLINIC | Age: 54
Setting detail: OBSERVATION
Discharge: HOME | End: 2024-07-10
Attending: EMERGENCY MEDICINE | Admitting: INTERNAL MEDICINE
Payer: COMMERCIAL

## 2024-07-09 ENCOUNTER — CARE COORDINATION (OUTPATIENT)
Dept: CARDIOLOGY | Facility: CLINIC | Age: 54
End: 2024-07-09
Payer: COMMERCIAL

## 2024-07-09 ENCOUNTER — APPOINTMENT (OUTPATIENT)
Dept: CARDIOLOGY | Facility: CLINIC | Age: 54
End: 2024-07-09
Attending: STUDENT IN AN ORGANIZED HEALTH CARE EDUCATION/TRAINING PROGRAM
Payer: COMMERCIAL

## 2024-07-09 ENCOUNTER — APPOINTMENT (OUTPATIENT)
Dept: SPEECH THERAPY | Facility: CLINIC | Age: 54
End: 2024-07-09
Attending: STUDENT IN AN ORGANIZED HEALTH CARE EDUCATION/TRAINING PROGRAM
Payer: COMMERCIAL

## 2024-07-09 ENCOUNTER — ANTICOAGULATION THERAPY VISIT (OUTPATIENT)
Dept: ANTICOAGULATION | Facility: CLINIC | Age: 54
End: 2024-07-09
Payer: COMMERCIAL

## 2024-07-09 ENCOUNTER — APPOINTMENT (OUTPATIENT)
Dept: CT IMAGING | Facility: CLINIC | Age: 54
End: 2024-07-09
Attending: EMERGENCY MEDICINE
Payer: COMMERCIAL

## 2024-07-09 DIAGNOSIS — R29.90 STROKE-LIKE SYMPTOMS: ICD-10-CM

## 2024-07-09 DIAGNOSIS — Z95.811 LVAD (LEFT VENTRICULAR ASSIST DEVICE) PRESENT (H): ICD-10-CM

## 2024-07-09 DIAGNOSIS — I50.22 CHRONIC SYSTOLIC CONGESTIVE HEART FAILURE (H): Primary | ICD-10-CM

## 2024-07-09 DIAGNOSIS — G45.9 TIA (TRANSIENT ISCHEMIC ATTACK): Primary | ICD-10-CM

## 2024-07-09 DIAGNOSIS — Z79.01 ANTICOAGULATED ON COUMADIN: ICD-10-CM

## 2024-07-09 DIAGNOSIS — Z79.01 ANTICOAGULATED ON WARFARIN: ICD-10-CM

## 2024-07-09 DIAGNOSIS — H53.8 OTHER VISUAL DISTURBANCES: ICD-10-CM

## 2024-07-09 DIAGNOSIS — I50.22 CHRONIC SYSTOLIC HEART FAILURE (H): ICD-10-CM

## 2024-07-09 DIAGNOSIS — H53.2 DIPLOPIA: ICD-10-CM

## 2024-07-09 LAB
ANION GAP SERPL CALCULATED.3IONS-SCNC: 9 MMOL/L (ref 7–15)
APTT PPP: 43 SECONDS (ref 22–38)
ATRIAL RATE - MUSE: 39 BPM
BASOPHILS # BLD AUTO: 0.1 10E3/UL (ref 0–0.2)
BASOPHILS NFR BLD AUTO: 1 %
BUN SERPL-MCNC: 17.3 MG/DL (ref 6–20)
CALCIUM SERPL-MCNC: 9.5 MG/DL (ref 8.6–10)
CHLORIDE SERPL-SCNC: 101 MMOL/L (ref 98–107)
CREAT BLD-MCNC: 1.1 MG/DL (ref 0.7–1.3)
CREAT SERPL-MCNC: 1 MG/DL (ref 0.67–1.17)
DEPRECATED HCO3 PLAS-SCNC: 27 MMOL/L (ref 22–29)
DIASTOLIC BLOOD PRESSURE - MUSE: NORMAL MMHG
EGFRCR SERPLBLD CKD-EPI 2021: 90 ML/MIN/1.73M2
EGFRCR SERPLBLD CKD-EPI 2021: >60 ML/MIN/1.73M2
EOSINOPHIL # BLD AUTO: 0.5 10E3/UL (ref 0–0.7)
EOSINOPHIL NFR BLD AUTO: 7 %
ERYTHROCYTE [DISTWIDTH] IN BLOOD BY AUTOMATED COUNT: 14.1 % (ref 10–15)
FACT X ACT/NOR PPP CHRO: 32 % (ref 70–130)
FACT X ACT/NOR PPP CHRO: 33 % (ref 70–130)
GLUCOSE BLDC GLUCOMTR-MCNC: 131 MG/DL (ref 70–99)
GLUCOSE SERPL-MCNC: 123 MG/DL (ref 70–99)
HCT VFR BLD AUTO: 44.1 % (ref 40–53)
HGB BLD-MCNC: 14.4 G/DL (ref 13.3–17.7)
HOLD SPECIMEN: NORMAL
IMM GRANULOCYTES # BLD: 0.1 10E3/UL
IMM GRANULOCYTES NFR BLD: 1 %
INR PPP: 2.47 (ref 0.85–1.15)
INR PPP: 2.65 (ref 0.85–1.15)
INTERPRETATION ECG - MUSE: NORMAL
LVEF ECHO: NORMAL
LYMPHOCYTES # BLD AUTO: 1.6 10E3/UL (ref 0.8–5.3)
LYMPHOCYTES NFR BLD AUTO: 22 %
MCH RBC QN AUTO: 29.3 PG (ref 26.5–33)
MCHC RBC AUTO-ENTMCNC: 32.7 G/DL (ref 31.5–36.5)
MCV RBC AUTO: 90 FL (ref 78–100)
MONOCYTES # BLD AUTO: 0.6 10E3/UL (ref 0–1.3)
MONOCYTES NFR BLD AUTO: 9 %
NEUTROPHILS # BLD AUTO: 4.5 10E3/UL (ref 1.6–8.3)
NEUTROPHILS NFR BLD AUTO: 60 %
NRBC # BLD AUTO: 0 10E3/UL
NRBC BLD AUTO-RTO: 0 /100
NT-PROBNP SERPL-MCNC: 294 PG/ML (ref 0–900)
P AXIS - MUSE: NORMAL DEGREES
PLATELET # BLD AUTO: 282 10E3/UL (ref 150–450)
POTASSIUM SERPL-SCNC: 4.4 MMOL/L (ref 3.4–5.3)
PR INTERVAL - MUSE: NORMAL MS
QRS DURATION - MUSE: 92 MS
QT - MUSE: 368 MS
QTC - MUSE: 500 MS
R AXIS - MUSE: 215 DEGREES
RBC # BLD AUTO: 4.92 10E6/UL (ref 4.4–5.9)
SODIUM SERPL-SCNC: 137 MMOL/L (ref 135–145)
SYSTOLIC BLOOD PRESSURE - MUSE: NORMAL MMHG
T AXIS - MUSE: 90 DEGREES
TROPONIN T SERPL HS-MCNC: 115 NG/L
TROPONIN T SERPL HS-MCNC: 122 NG/L
VENTRICULAR RATE- MUSE: 111 BPM
WBC # BLD AUTO: 7.4 10E3/UL (ref 4–11)

## 2024-07-09 PROCEDURE — 85025 COMPLETE CBC W/AUTO DIFF WBC: CPT | Performed by: EMERGENCY MEDICINE

## 2024-07-09 PROCEDURE — 70496 CT ANGIOGRAPHY HEAD: CPT

## 2024-07-09 PROCEDURE — 82374 ASSAY BLOOD CARBON DIOXIDE: CPT | Performed by: EMERGENCY MEDICINE

## 2024-07-09 PROCEDURE — 93306 TTE W/DOPPLER COMPLETE: CPT | Mod: 26 | Performed by: STUDENT IN AN ORGANIZED HEALTH CARE EDUCATION/TRAINING PROGRAM

## 2024-07-09 PROCEDURE — 93005 ELECTROCARDIOGRAM TRACING: CPT

## 2024-07-09 PROCEDURE — 85610 PROTHROMBIN TIME: CPT | Performed by: STUDENT IN AN ORGANIZED HEALTH CARE EDUCATION/TRAINING PROGRAM

## 2024-07-09 PROCEDURE — 82962 GLUCOSE BLOOD TEST: CPT

## 2024-07-09 PROCEDURE — 85730 THROMBOPLASTIN TIME PARTIAL: CPT | Performed by: EMERGENCY MEDICINE

## 2024-07-09 PROCEDURE — 70496 CT ANGIOGRAPHY HEAD: CPT | Mod: 26 | Performed by: RADIOLOGY

## 2024-07-09 PROCEDURE — 85260 CLOT FACTOR X STUART-POWER: CPT | Performed by: STUDENT IN AN ORGANIZED HEALTH CARE EDUCATION/TRAINING PROGRAM

## 2024-07-09 PROCEDURE — 99285 EMERGENCY DEPT VISIT HI MDM: CPT | Mod: 25 | Performed by: EMERGENCY MEDICINE

## 2024-07-09 PROCEDURE — 92526 ORAL FUNCTION THERAPY: CPT | Mod: GN

## 2024-07-09 PROCEDURE — 84484 ASSAY OF TROPONIN QUANT: CPT | Performed by: EMERGENCY MEDICINE

## 2024-07-09 PROCEDURE — 99291 CRITICAL CARE FIRST HOUR: CPT | Performed by: EMERGENCY MEDICINE

## 2024-07-09 PROCEDURE — 93750 INTERROGATION VAD IN PERSON: CPT | Performed by: INTERNAL MEDICINE

## 2024-07-09 PROCEDURE — 84484 ASSAY OF TROPONIN QUANT: CPT | Mod: 91 | Performed by: STUDENT IN AN ORGANIZED HEALTH CARE EDUCATION/TRAINING PROGRAM

## 2024-07-09 PROCEDURE — 83880 ASSAY OF NATRIURETIC PEPTIDE: CPT | Performed by: STUDENT IN AN ORGANIZED HEALTH CARE EDUCATION/TRAINING PROGRAM

## 2024-07-09 PROCEDURE — 36415 COLL VENOUS BLD VENIPUNCTURE: CPT | Performed by: STUDENT IN AN ORGANIZED HEALTH CARE EDUCATION/TRAINING PROGRAM

## 2024-07-09 PROCEDURE — 70498 CT ANGIOGRAPHY NECK: CPT | Mod: 26 | Performed by: RADIOLOGY

## 2024-07-09 PROCEDURE — 99223 1ST HOSP IP/OBS HIGH 75: CPT | Mod: 25 | Performed by: INTERNAL MEDICINE

## 2024-07-09 PROCEDURE — 250N000009 HC RX 250: Performed by: EMERGENCY MEDICINE

## 2024-07-09 PROCEDURE — 99291 CRITICAL CARE FIRST HOUR: CPT | Mod: GC | Performed by: PSYCHIATRY & NEUROLOGY

## 2024-07-09 PROCEDURE — 93306 TTE W/DOPPLER COMPLETE: CPT

## 2024-07-09 PROCEDURE — 92610 EVALUATE SWALLOWING FUNCTION: CPT | Mod: GN

## 2024-07-09 PROCEDURE — 250N000011 HC RX IP 250 OP 636: Performed by: EMERGENCY MEDICINE

## 2024-07-09 PROCEDURE — 85610 PROTHROMBIN TIME: CPT | Performed by: EMERGENCY MEDICINE

## 2024-07-09 PROCEDURE — 250N000013 HC RX MED GY IP 250 OP 250 PS 637: Performed by: INTERNAL MEDICINE

## 2024-07-09 PROCEDURE — 82565 ASSAY OF CREATININE: CPT

## 2024-07-09 PROCEDURE — 36415 COLL VENOUS BLD VENIPUNCTURE: CPT | Performed by: EMERGENCY MEDICINE

## 2024-07-09 PROCEDURE — 250N000013 HC RX MED GY IP 250 OP 250 PS 637: Performed by: STUDENT IN AN ORGANIZED HEALTH CARE EDUCATION/TRAINING PROGRAM

## 2024-07-09 PROCEDURE — 70450 CT HEAD/BRAIN W/O DYE: CPT | Mod: XU

## 2024-07-09 PROCEDURE — 999N000176 HC STATISTIC STROKE CODE W/O ACCESS

## 2024-07-09 RX ORDER — SPIRONOLACTONE 25 MG/1
25 TABLET ORAL EVERY EVENING
Status: DISCONTINUED | OUTPATIENT
Start: 2024-07-10 | End: 2024-07-10 | Stop reason: HOSPADM

## 2024-07-09 RX ORDER — GABAPENTIN 100 MG/1
200 CAPSULE ORAL 3 TIMES DAILY
Status: DISCONTINUED | OUTPATIENT
Start: 2024-07-09 | End: 2024-07-10 | Stop reason: HOSPADM

## 2024-07-09 RX ORDER — IOPAMIDOL 755 MG/ML
67 INJECTION, SOLUTION INTRAVASCULAR ONCE
Status: COMPLETED | OUTPATIENT
Start: 2024-07-09 | End: 2024-07-09

## 2024-07-09 RX ORDER — POLYETHYLENE GLYCOL 3350 17 G/17G
17 POWDER, FOR SOLUTION ORAL DAILY
Status: DISCONTINUED | OUTPATIENT
Start: 2024-07-09 | End: 2024-07-10 | Stop reason: HOSPADM

## 2024-07-09 RX ORDER — PANTOPRAZOLE SODIUM 40 MG/1
40 TABLET, DELAYED RELEASE ORAL
Status: DISCONTINUED | OUTPATIENT
Start: 2024-07-10 | End: 2024-07-10 | Stop reason: HOSPADM

## 2024-07-09 RX ORDER — ACETAMINOPHEN 650 MG/1
650 SUPPOSITORY RECTAL EVERY 4 HOURS PRN
Status: DISCONTINUED | OUTPATIENT
Start: 2024-07-09 | End: 2024-07-09

## 2024-07-09 RX ORDER — ASPIRIN 81 MG/1
81 TABLET, CHEWABLE ORAL DAILY
Status: DISCONTINUED | OUTPATIENT
Start: 2024-07-09 | End: 2024-07-10 | Stop reason: HOSPADM

## 2024-07-09 RX ORDER — AMOXICILLIN 250 MG
1 CAPSULE ORAL 2 TIMES DAILY PRN
Status: DISCONTINUED | OUTPATIENT
Start: 2024-07-09 | End: 2024-07-10 | Stop reason: HOSPADM

## 2024-07-09 RX ORDER — ACETAMINOPHEN 325 MG/1
650 TABLET ORAL EVERY 4 HOURS PRN
Status: DISCONTINUED | OUTPATIENT
Start: 2024-07-09 | End: 2024-07-09

## 2024-07-09 RX ORDER — METHOCARBAMOL 750 MG/1
750 TABLET, FILM COATED ORAL 4 TIMES DAILY PRN
Status: DISCONTINUED | OUTPATIENT
Start: 2024-07-09 | End: 2024-07-10 | Stop reason: HOSPADM

## 2024-07-09 RX ORDER — LIDOCAINE 40 MG/G
CREAM TOPICAL
Status: DISCONTINUED | OUTPATIENT
Start: 2024-07-09 | End: 2024-07-10 | Stop reason: HOSPADM

## 2024-07-09 RX ORDER — AMOXICILLIN 250 MG
2 CAPSULE ORAL 2 TIMES DAILY PRN
Status: DISCONTINUED | OUTPATIENT
Start: 2024-07-09 | End: 2024-07-10 | Stop reason: HOSPADM

## 2024-07-09 RX ADMIN — GABAPENTIN 200 MG: 100 CAPSULE ORAL at 20:28

## 2024-07-09 RX ADMIN — WARFARIN SODIUM 7.5 MG: 5 TABLET ORAL at 18:50

## 2024-07-09 RX ADMIN — SODIUM CHLORIDE, PRESERVATIVE FREE 100 ML: 5 INJECTION INTRAVENOUS at 13:32

## 2024-07-09 RX ADMIN — ASPIRIN 81 MG CHEWABLE TABLET 81 MG: 81 TABLET CHEWABLE at 18:51

## 2024-07-09 RX ADMIN — IOPAMIDOL 67 ML: 755 INJECTION, SOLUTION INTRAVENOUS at 13:32

## 2024-07-09 ASSESSMENT — ACTIVITIES OF DAILY LIVING (ADL)
ADLS_ACUITY_SCORE: 23
ADLS_ACUITY_SCORE: 23
ADLS_ACUITY_SCORE: 38

## 2024-07-09 ASSESSMENT — COLUMBIA-SUICIDE SEVERITY RATING SCALE - C-SSRS
6. HAVE YOU EVER DONE ANYTHING, STARTED TO DO ANYTHING, OR PREPARED TO DO ANYTHING TO END YOUR LIFE?: NO
1. IN THE PAST MONTH, HAVE YOU WISHED YOU WERE DEAD OR WISHED YOU COULD GO TO SLEEP AND NOT WAKE UP?: NO
2. HAVE YOU ACTUALLY HAD ANY THOUGHTS OF KILLING YOURSELF IN THE PAST MONTH?: NO

## 2024-07-09 NOTE — PROGRESS NOTES
"   07/09/24 1700   Appointment Info   Signing Clinician's Name / Credentials (SLP) Sailaja Roman MA CCC-SLP   General Information   Onset of Illness/Injury or Date of Surgery 07/09/24   Referring Physician Sadiq Gillette MD   Pertinent History of Current Problem Per MD, \"Navin Lutz is a 53 year old left-handed male with PMHx CKD, HTN, HLD, R subclavian and axillary non-occlusive thrombus on warfarin, NSVT, HFrEF due to NICM s/p LVAD 6/14 (Heartmate 3) who presented to the ED with acute onset double vision for which stroke code was called.\"  Clinical swallow evaluation completed at 3:00pm per MD order.       Present no   Pain Assessment   Patient Currently in Pain No   Type of Evaluation   Type of Evaluation Swallow Evaluation   Oral Motor   Oral Musculature generally intact   Structural Abnormalities none present   Mucosal Quality good   Dentition (Oral Motor)   Comment, Dentition (Oral Motor) 1 missing tooth   Dentition (Oral Motor) natural dentition;adequate dentition   Facial Symmetry (Oral Motor)   Facial Symmetry (Oral Motor) WNL   Lip Function (Oral Motor)   Lip Range of Motion (Oral Motor) WNL   Tongue Function (Oral Motor)   Tongue Coordination/Speed (Oral Motor) WNL   Tongue ROM (Oral Motor) WNL   Jaw Function (Oral Motor)   Jaw Function (Oral Motor) WNL   Cough/Swallow/Gag Reflex (Oral Motor)   Volitional Throat Clear/Cough (Oral Motor) WNL   Volitional Swallow (Oral Motor) WNL   Vocal Quality/Secretion Management (Oral Motor)   Vocal Quality (Oral Motor) WNL   Secretion Management (Oral Motor) WNL   General Swallowing Observations   Past History of Dysphagia None per chart review or pt report   Respiratory Support room air   Current Diet/Method of Nutritional Intake (General Swallowing Observations, NIS) NPO   Swallowing Evaluation Clinical swallow evaluation   Clinical Swallow Evaluation   Clinical Swallow Evaluation Textures Trialed thin liquids;pureed;solid foods   Clinical " Swallow Eval: Thin Liquid Texture Trial   Mode of Presentation, Thin Liquids self-fed;spoon;cup;straw   Volume of Liquid or Food Presented 3 oz   Oral Phase of Swallow WFL   Pharyngeal Phase of Swallow intact   Diagnostic Statement No overt s/sx of aspiration   Clinical Swallow Evaluation: Puree Solid Texture Trial   Mode of Presentation, Puree self-fed;spoon   Volume of Puree Presented 2 tablespoons   Oral Phase, Puree WFL   Pharyngeal Phase, Puree intact   Diagnostic Statement No overt s/sx of aspiration   Clinical Swallow Evaluation: Solid Food Texture Trial   Mode of Presentation self-fed   Volume Presented 1 li cracker   Oral Phase WFL   Pharyngeal Phase intact   Diagnostic Statement No overt s/sx of aspiration   Esophageal Phase of Swallow   Patient reports or presents with symptoms of esophageal dysphagia No   Swallowing Recommendations   Diet Consistency Recommendations thin liquids (level 0);regular diet   Supervision Level for Intake patient independent   Mode of Delivery Recommendations bolus size, small;slow rate of intake   Monitoring/Assistance Required (Eating/Swallowing) stop eating activities when fatigue is present   Recommended Feeding/Eating Techniques (Swallow Eval) patient is independent, no specific recommendations   Medication Administration Recommendations, Swallowing (SLP) Orally   Instrumental Assessment Recommendations instrumental evaluation not recommended at this time   Clinical Impression   Criteria for Skilled Therapeutic Interventions Met (SLP Eval) Evaluation only;No problems identified which require skilled intervention   SLP Diagnosis WNL oropharyngeal swallowing abilities   Risks & Benefits of therapy have been explained evaluation/treatment results reviewed;care plan/treatment goals reviewed;risks/benefits reviewed;current/potential barriers reviewed;participants voiced agreement with care plan;participants included;patient   Clinical Impression Comments Clinical swallow  evaluation completed per MD order.  Pt presents with WNL oropharyngeal swallowing abilities.  Oral mech remarkable for 1 missing tooth.  Pt assessed with ice chips, thin liquids via spoon, cup and straw, puree and regular solids.  Oral and pharyngeal phases observed to be unremarkable.  No overt s/sx of aspiration observed across consistencies.      Recommend regular diet and thin liquids.  Please ensure pt is upright and alert for all PO intake, takes small bites/sips at a slow rate.  No further speech therapy indicated at this time.  Will complete orders.   SLP Discharge Planning   SLP Plan s-o; eval only   SLP Discharge Recommendation home with assist   SLP Rationale for DC Rec WNL oropharyngeal swallowing abilities   SLP Brief overview of current status  Recommend regular diet and thin liquids. Please ensure pt is upright and alert for all PO intake, takes small bites/sips at a slow rate. No further speech therapy indicated at this time. Will complete orders.

## 2024-07-09 NOTE — H&P
Cardiology 2 Note    07/09/2024    Navin Lutz MRN# [id number]   Age: 53 year old YOB: 1970     Assessment and Plan:    Navin Lutz is a 53 year old male with a history of NICM c/b HFrEF s/p Heartmate 3 LVAD on 6/14/24 who is admitted due to concern for stroke.     # Concern for CVA Event  # Acute onset diplopia  Sudden onset focal neurologic deficit involving diplopia. Symptoms now resolved. Has several RF for stroke including recent VAD and known lupus anticoagulant positivity. CT head w/o hemorrhage. CTA w/o large vessel occlusion. EKG is NSR, no hx of afib or flutter. Neurology consulted with recs as below:  - admit for further monitoring  - telemetry  - TTE w/ bubble  - repeat CT head in 24hrs  - Ophthalmology consulted   - start 81 mg ASA       # NICM c/b HFrEF s/p HM3 LVAD on 6/14/24  Recent LVAD implantation on 6/14. Discharged on 7/4. Compensated on admission. Rest as below  Etiology: NICM  Baseline weight: 187 lbs  Speed: 5400 rpm  Last TTE: 6/28 w/ speed 5400 rpm with EF < 30% and LVIDd 6.2cm. AV closed. No AI.   Therapeutics   > Volume: not on home diuretics   > Afterload: as below   > Inotrope: n/a   > BB: not on prior to admission   > ARNI: Lisinopril 10   > MRA: beck 25   > SGLT2i: Empa 10   > Anticoagulation: Warfarin with goal INR 2-3    # Subclavian and axillary vein thrombus  # Lupus Anticoagulant Positive  DVT provoked in the setting of lines. Does have positive lupus anticoagulant positivity detected incidentally on pre-LVAD workup and thus need to utilize chromogenic C to guide INR at least until repeat testing.  - warfarin as above  - chromogenic factor daily    # Hepatocellular Pattern of Liver Injury  Felt to be related to drug effects during last admission in the context of statin / tylenol / azithromycin interaction. Remains elevated though persistently elevated here.  - LFTs daily    # Hyperlipidemia  Not on statin prior to admission due to abnormal LFTs as  discussed above.   - statin initiation pending LFTs    # NSVT  Incidentally noted NSVT during last admission  - monitor on telemetry    # Non MI Troponin Elevation  Trop elevated to ~120 on admission. EKG nonischemic.   - trend      Checklist:  #Feeding: NPO pending swallow eval  #Analgesia: n/a  #Sedation: n/a  #Thromboembolism ppx: holding warfarin  #HOB: 30 degrees   #Ulcer ppx: PPI  #Glucose: insulin prn  #SBT/SAT: n/a  #Bowel reg: miralax scheduled; senna prn  #Lines/tubes/drains: n/a  #Code status: Full    Patient seen and discussed with staff physician, Dr. Matthias Gillette MD  Cardiology Fellow    Clinically Significant Risk Factors Present on Admission               # Drug Induced Coagulation Defect: home medication list includes an anticoagulant medication    # Hypertension: Home medication list includes antihypertensive(s)  # End stage heart failure: Ventricular assist device (VAD) present                # Financial/Environmental Concerns:    # Asthma: noted on problem list  # ICD device present        Subjective   History of Present Illness:    Navin Lutz is a 53 year old male with a history of NICM c/b HFrEF s/p Heartmate 3 LVAD on 6/14/24 who is admitted due to concern for stroke.     Recently discharged following LVAD implantation. Then had acute onset double vision for which he presented to ER. Last well known time was 12:45 pm. Symptoms started at rest while eating. Double vision resolved with closing one eye. Denies other associated neurologic symptoms such as unilateral numbness / tingling / focal deficits. On arrival in ER CT head was without hemorrhage. Symptoms resolved by this point in time. Neurology consulted in ER. Recommended admission for further monitoring.     On interview with the patient he reports right before coming the hospital, he had an episode of double vision lasting approximately 20 minutes. During this he reports he was looking at his shrimp and was seeing two tails.  He says he was seeing 1.5x everything he looked at, however, this would resolve when covering the opposite eye. He says during this time he could not het high R eye to look to his R. He also says he had pain in his R eye during this episode. Patient was still having diplopia during neuro exam in the ED as well.      Review of Systems:  A comprehensive ROS was performed and found to be negative or non-contributory with the exception of that noted in the HPI above.    No past medical history on file.  Past Surgical History:   Procedure Laterality Date    CARDIAC SURGERY      COLONOSCOPY N/A 8/25/2023    Procedure: COLONOSCOPY, WITH POLYPECTOMY AND BIOPSY;  Surgeon: Alejandro Rodriguez MD;  Location:  GI    CV INTRA AORTIC BALLOON N/A 6/12/2024    Procedure: Intra aortic Balloon Pump Insertion;  Surgeon: Elfego Cruz MD;  Location:  HEART CARDIAC CATH LAB    CV RIGHT HEART CATH MEASUREMENTS RECORDED N/A 08/22/2023    Procedure: Heart Cath Right Heart Cath;  Surgeon: Elfego Cruz MD;  Location:  HEART CARDIAC CATH LAB    CV RIGHT HEART CATH MEASUREMENTS RECORDED N/A 9/20/2023    Procedure: Heart Cath Right Heart Cath;  Surgeon: Elfego Cruz MD;  Location:  HEART CARDIAC CATH LAB    CV RIGHT HEART CATH MEASUREMENTS RECORDED N/A 1/19/2024    Procedure: Heart Cath Right Heart Cath;  Surgeon: Tobin Jaime MD;  Location:  HEART CARDIAC CATH LAB    CV RIGHT HEART CATH MEASUREMENTS RECORDED N/A 5/3/2024    Procedure: Heart Cath Right Heart Cath;  Surgeon: Dion Ashley MD;  Location:  HEART CARDIAC CATH LAB    CV RIGHT HEART CATH MEASUREMENTS RECORDED N/A 6/4/2024    Procedure: Right Heart Catheterization;  Surgeon: Cassandra Rizzo MD;  Location:  HEART CARDIAC CATH LAB    CV RIGHT HEART CATH MEASUREMENTS RECORDED N/A 6/12/2024    Procedure: Right Heart Catheterization;  Surgeon: Elfego Cruz MD;  Location: [facility]  CARDIAC CATH LAB    INSERT VENTRICULAR ASSIST DEVICE LEFT (HEARTMATE II) N/A 6/14/2024    Procedure: Median Sternotomy, INSERTION, LEFT VENTRICULAR ASSIST DEVICE (HEARTMATE III), on Cardiopulmonary Bypass, Transesophageal Echocardiogram by Anesthesia;  Surgeon: Brian Jhaveri MD;  Location: [facility] OR    IR CHEST TUBE PLACEMENT NON-TUNNELLED LEFT  6/28/2024    IR THORACENTESIS  6/25/2024    PICC DOUBLE LUMEN PLACEMENT Right 08/22/2023    Brachial Vein Medial 5F DL 45 cm, 2 cm out     Social History     Socioeconomic History    Marital status:    Tobacco Use    Smoking status: Never    Smokeless tobacco: Never   Substance and Sexual Activity    Alcohol use: Never    Drug use: Never    Sexual activity: Yes     Partners: Female     Social Determinants of Health     Financial Resource Strain: Low Risk  (11/17/2023)    [facility]    Overall Financial Resource Strain (CARDIA)     Difficulty of Paying Living Expenses: Not hard at all   Food Insecurity: No Food Insecurity (11/17/2023)    [facility]    Hunger Vital Sign     Worried About Running Out of Food in the Last Year: Never true     Ran Out of Food in the Last Year: Never true   Transportation Needs: No Transportation Needs (11/17/2023)    [facility]    PRAPARE - Transportation     Lack of Transportation (Medical): No     Lack of Transportation (Non-Medical): No   Housing Stability: Unknown (11/17/2023)    [facility]    Housing Stability Vital Sign     Unable to Pay for Housing in the Last Year: No     Unstable Housing in the Last Year: No      No family history on file.  (Not in a hospital admission)    No current facility-administered medications for this encounter.     Current Outpatient Medications   Medication  Sig Dispense Refill    empagliflozin (JARDIANCE) 10 MG TABS tablet Take 1 tablet (10 mg) by mouth daily 90 tablet 1    gabapentin (NEURONTIN) 100 MG capsule Take 2 capsules (200 mg) by mouth 3 times daily 90 capsule 0    lisinopril (ZESTRIL) 10 MG tablet Take 1 tablet (10 mg) by mouth daily 60 tablet 0    methocarbamol (ROBAXIN) 750 MG tablet Take 1 tablet (750 mg) by mouth 4 times daily as needed for muscle spasms or other (post-op pain) 30 tablet 0    pantoprazole (PROTONIX) 40 MG EC tablet Take 1 tablet (40 mg) by mouth every morning (before breakfast) 60 tablet 0    polyethylene glycol (MIRALAX) 17 GM/Dose powder Take 17 g by mouth daily as needed 510 g 0    reason aspirin not prescribed, intentional, Please choose reason not prescribed from choices below. (Patient not taking: Reported on 5/3/2024)      spironolactone (ALDACTONE) 25 MG tablet Take 1 tablet (25 mg) by mouth every evening 30 tablet 0    warfarin ANTICOAGULANT (COUMADIN) 5 MG tablet Take 2.5 tablets (12.5 mg) on 7/4/24 P.M. You will have a chromogenic factor X check on 7/5. Always follow the most recent instructions from your INR clinic. 60 tablet 0       Objective   Physical Exam:    Pulse 58, temperature 97.6  F (36.4  C), resp. rate 14, SpO2 97%.    Exam:  General: NAD  HEENT: no scleral icterus or injection  CARDIAC: LVAD hum  RESP:  CTAB  GI: nondistended  : No kuo  EXTREMITIES: no LE edema  SKIN: No acute lesions appreciated  NEURO: No focal deficits    Labs & Studies: I personally reviewed the following studies:  CMP  Recent Labs   Lab 07/09/24  1324 07/09/24  1322 07/08/24  1349 07/05/24  0845 07/04/24  0548 07/03/24  0321   NA  --  137 136 139 137 136   POTASSIUM  --  4.4 4.4 4.5 4.3 4.3   CHLORIDE  --  101 100 104 106 105   CO2  --  27 24 24 22 21*   ANIONGAP  --  9 12 11 9 10   GLC  --  123* 121* 108* 103* 108*   BUN  --  17.3 14.9 13.5 11.9 14.6   CR 1.1 1.00 0.96 1.01 0.91 0.99   GFRESTIMATED >60 90 >90 89 >90 >90   NAM  --  9.5  9.3 9.8 9.3 9.1   MAG  --   --   --   --  2.1 2.1   PHOS  --   --   --   --  3.9 3.3   PROTTOTAL  --   --  6.8 6.9 6.5 6.2*   ALBUMIN  --   --  3.6 3.5 3.3* 3.1*   BILITOTAL  --   --  0.4 0.5 0.4 0.3   ALKPHOS  --   --  200* 206* 191* 187*   AST  --   --  42 51* 40 46*   ALT  --   --  104* 136* 127* 149*     CBC  Recent Labs   Lab 24  1322 24  0548 24  0321   WBC 7.4 7.9 8.8   RBC 4.92 4.16* 4.10*   HGB 14.4 12.3* 11.8*   HCT 44.1 37.2* 37.2*   MCV 90 89 91   MCH 29.3 29.6 28.8   MCHC 32.7 33.1 31.7   RDW 14.1 14.0 13.9    288 300     INR  Recent Labs   Lab 24  1322 24  1349 24  0845 24  0548   INR 2.65* 2.62* 2.03* 1.91*            No data to display                  Recent Results (from the past 4320 hour(s))   Echo Complete   Result Value    LVEF  <30% (severely reduced)    [facility]    [id number]  [id number]  [id number]  [id number]^MAR^IDALMIS^KAMRAN     [facility],[place]  Echocardiography Laboratory  [address] [place] [id number]     Name: ALVARADO OCHOA  MRN: [id number]  : 1970  Study Date: 2024 11:31 AM  Age: 53 yrs  Gender: Male  Patient Location: [facility]  Reason For Study: Cardiac Device, Unspecified  Ordering Physician: IDALMIS MANUEL  Referring Physician: BRINA PAGAN  Performed By: Paige Guevara     BSA: 2.1 m2  Height: 72 in  Weight: 196 lb  HR: 114  ______________________________________________________________________________  Procedure  LVAD Echocardiogram with two-dimensional, color and spectral Doppler  performed. Technically difficult study.Extremely poor acoustic windows.  ______________________________________________________________________________  Interpretation Summary  s/p LVAD hm3 5400 rpm     Left ventricular function is decreased. The ejection fraction is <30%  (severely reduced).  LVIDd: 6.2 cm.  AV is closed. No AI.  Septum is midline.  Inflow cannot be asessed. Otflow graft velocity is  normal (0.7 m/s)  The right ventricle is normal size.  Global right ventricular function is moderately reduced.  The inferior vena cava was normal in size with preserved respiratory  variability.     This study was compared with the study from 2024 .  RV function declined. LVAD is new.     ______________________________________________________________________________  Left Ventricle  LV eccentric hypertrophy. Moderate left ventricular dilation is present. Left  ventricular function is decreased. The ejection fraction is <30% (severely  reduced). Diastolic function not assessed due to presence of LVAD. Severe  diffuse hypokinesis is present.     Right Ventricle  The right ventricle is normal size. Global right ventricular function is  moderately reduced.     Atria  The atria cannot be assessed.     Mitral Valve  The mitral valve is normal. Trace mitral insufficiency is present.     Aortic Valve  No aortic regurgitation is present. AV is closed.     Tricuspid Valve  Trace tricuspid insufficiency is present. The right ventricular systolic  pressure is approximated at 12.2 mmHg plus the right atrial pressure.     Pulmonic Valve  The pulmonic valve is normal.     Vessels  The aorta root is normal. The inferior vena cava was normal in size with  preserved respiratory variability.     Pericardium  No pericardial effusion is present.     Compared to Previous Study  This study was compared with the study from 2024 . RV function declined.  LVAD is new.  ______________________________________________________________________________  MMode/2D Measurements & Calculations     IVSd: 1.1 cm  LVIDd: 6.2 cm  LVIDs: 5.9 cm  LVPWd: 0.83 cm  FS: 6.1 %  LV mass(C)d: 251.5 grams  LV mass(C)dI: 119.1 grams/m2  RWT: 0.27     Doppler Measurements & Calculations  TR max ofelia: 174.6 cm/sec  TR max P.2 mmHg     ______________________________________________________________________________  Report approved by: Marbella MORATAYA  2024 12:31 PM         Echo Complete   Result Value    LVEF  15-20% (severely reduced)    [facility]    [id number]  [id number]  [id number]  [id number]^LATASHA^BRINA^ALEIDA     [facility],[place]  Echocardiography Laboratory  [address] [place] [address]     Name: ALVARADO OCHOA  MRN: [id number]  : 1970  Study Date: 2024 07:27 AM  Age: 53 yrs  Gender: Male  Patient Location: [facility]  Reason For Study: Heart Failure  Ordering Physician: BRINA PAGAN  Referring Physician: BRINA PAGAN  Performed By: Paige Guevara     BSA: 2.1 m2  Height: 72 in  Weight: 203 lb  HR: 100  BP: 76/62 mmHg  ______________________________________________________________________________  Procedure  Complete Portable Echo Adult. Contrast Optison. Optison (NDC #7711-9560-83)  given intravenously. Patient was given 6 ml mixture of 3 ml Optison and 6 ml  saline. 3 ml wasted.  ______________________________________________________________________________  Interpretation Summary  Left ventricular function is decreased. The ejection fraction is 15-20%  (severely reduced). Severe diffuse hypokinesis is present.  The right ventricle is normal size. Global right ventricular function is  normal.  No significant valvular abnormalities.  IVC diameter <2.1 cm collapsing >50% with sniff suggests a normal RA pressure  of 3 mmHg.  This study was compared with the study from 2023. No significant changes  noted.  ______________________________________________________________________________  Left Ventricle  Left ventricular wall thickness is normal. Moderate to severe left ventricular  dilation is present. Left ventricular function is decreased. The ejection  fraction is 15-20% (severely reduced). Grade I or early diastolic dysfunction.  Severe diffuse hypokinesis is present.     Right Ventricle  The right ventricle is normal size. Global right ventricular function is  normal. A pacemaker lead is  noted in the right ventricle.     Atria  Mild to moderate left atrial enlargement is present.     Mitral Valve  Mild mitral insufficiency is present.     Aortic Valve  The aortic valve is tricuspid. On Doppler interrogation, there is no  significant stenosis or regurgitation.     Tricuspid Valve  Mild tricuspid insufficiency is present. The right ventricular systolic  pressure is approximated at 19.5 mmHg plus the right atrial pressure.     Pulmonic Valve  The valve leaflets are not well visualized. Trace pulmonic insufficiency is  present.     Vessels  The aorta root is normal. The thoracic aorta is normal. IVC diameter <2.1 cm  collapsing >50% with sniff suggests a normal RA pressure of 3 mmHg.     Pericardium  No pericardial effusion is present.     Miscellaneous  No significant valvular abnormalities present.     Compared to Previous Study  This study was compared with the study from 2023 . No significant  changes noted.  ______________________________________________________________________________  MMode/2D Measurements & Calculations  IVSd: 0.84 cm  LVIDd: 7.0 cm  LVIDs: 6.5 cm  LVPWd: 1.0 cm  FS: 7.3 %  LV mass(C)d: 297.4 grams  LV mass(C)dI: 138.7 grams/m2  Ao root diam: 3.2 cm  asc Aorta Diam: 3.0 cm  LVOT diam: 2.3 cm  LVOT area: 4.1 cm2  Ao root diam index Ht(cm/m): 1.8  Ao root diam index BSA (cm/m2): 1.5  Asc Ao diam index BSA (cm/m2): 1.4  Asc Ao diam index Ht(cm/m): 1.6  LA Volume (BP): 90.4 ml     LA Volume Index (BP): 42.2 ml/m2  RWT: 0.30  TAPSE: 1.8 cm     Doppler Measurements & Calculations  MV E max jimmy: 71.3 cm/sec  MV A max jimmy: 51.4 cm/sec  MV E/A: 1.4  MV dec slope: 613.4 cm/sec2  MV dec time: 0.12 sec  TR max jimmy: 221.0 cm/sec  TR max P.5 mmHg  E/E' av.0  Lateral E/e': 9.2  Medial E/e': 14.8  RV S Jimmy: 11.0 cm/sec     ______________________________________________________________________________  Report approved by: Cait Acosta 2024 09:41 AM              Imaging:  Reviewed

## 2024-07-09 NOTE — CONSULTS
[facility]     Stroke Code Note          History of Present Illness     Chief Complaint: Stroke Symptoms    Navin Lutz is a 53 year old left-handed male with PMHx CKD, HTN, HLD, R subclavian and axillary non-occlusive thrombus on warfarin, NSVT, HFrEF due to NICM s/p LVAD 6/14 (Heartmate 3) who presented to the ED with acute onset double vision for which stroke code was called.    Last known well at 12:45 PM when patient was sitting on a couch and having shrimp.  When he ate the shrimp and put the tail back, he noticed that there were 2 tails [id number].  This is when he realized that he started having full vision.  This resolved on closing 1 eye.  No other focal neurological deficits.  He was not having any dizziness or nausea.    His mean arterial pressure was 80 (unable to get SBP due to LVAD).  He is on warfarin, last dose was yesterday night.  His factor X chromogenic from this morning was 33%, and a level of 20-40% corresponds to an INR of 2-3 so technically he was therapeutic (INR is not accurate on him, but it was 2.65 as well).    Wife notes that on his LVAD monitor his pulsatility index was also elevated at 4.8 at the time of episode.         Past Medical History     Stroke risk factors: HLD, HF on LVAD, HTN    Preadmission antithrombotic regimen: Warfarin    Modified Evans Score (Pre-morbid)  0-No deficits                   Assessment and Plan       1.  Acute diplopia  53-year-old male with above-mentioned PMHx that includes a recent LVAD placement on 6/14 who came with acute onset double vision, LK W 12:45 PM on 7/9.  NIHSS of 0, though with direction changing nystagmus (left beating on left gaze, and right beating on right gaze) possibly suggestive of central etiology.  Symptoms improving per patient and diplopia is almost resolved.  CT/CTA unremarkable.  Given the acute onset symptoms and the characteristic of nystagmus, this could certainly be  a TIA (if symptoms resolve) or a tiny stroke.  Unfortunately with the LVAD he cannot get an MRI, so we will recommend getting repeat CT head in 24 hours from now.  He should also get an echo.  No LVO for EVT, and deferred thrombolysis due to minor symptoms and chromogenic factor of 33%)     Intravenous Thrombolysis  Not given due to:   - minor/isolated/quickly resolving symptoms  - DOAC dose within 48 hours or INR > 1.7     Endovascular Treatment  Not initiated due to absence of proximal vessel occlusion     Plan:  -Repeat CT head in 24 hours.  -TTE.     ___________________________________________________________________    The patient was discussed with Stroke Staff Dr. Daiz.    Robert Wagoner MD  Neurology Resident  Pager:  [phone]  ___________________________________________________________________        Imaging/Labs   (personally reviewed )    CT head: No bleed  CTA head/neck: No LVO         Physical Examination         Pulse: 58   Resp: 14   Temp: 97.6  F (36.4  C)       SpO2: 97 %            Wt Readings from Last 2 Encounters:   07/04/24 84.9 kg (187 lb 2.7 oz)   05/31/24 90.9 kg (200 lb 6.4 oz)       General Exam  General: No acute distress.  HEENT: Normocephalic, atraumatic. Sclera anicteric. No nasal drainage or epistaxis.  CV: Extremities appear to be appropriately perfused.  Pulmonary: Breathing comfortably on room air. No accessory muscle use.  GI/: Soft, non-distended.  MSK: No LE edema.  Integument: Warm, dry. No jaundice.     Neuro Exam  Mental Status:  alert, oriented x 3, follows commands, speech clear and fluent, naming and repetition normal  Cranial Nerves:  visual fields intact, PERRL 4 mm, facial sensation intact and symmetric, facial movements symmetric, no dysarthria, shoulder shrug strong bilaterally, tongue protrusion midline    On EOM, he had left beating nystagmus on left gaze (possibly a torsional component but hard to assess), and right beating nystagmus on right gaze.  In all gazes  and primary gaze, no overt disconjugate gaze was noted.  He reported that diplopia gets worse on looking to the left (with objects [id number]), and gets better on looking to the right.  No overt ABHILASH noted.  He was not dizzy so did not perform a HINTS  Motor:  normal muscle tone and bulk, no abnormal movements, able to move all limbs spontaneously, no pronator drift  Reflexes:  no clonus  Sensory:  light touch sensation intact and symmetric throughout upper and lower extremities, no extinction on double simultaneous stimulation   Coordination:  normal finger-to-nose and heel-to-shin bilaterally without dysmetria  Station/Gait:  deferred        Stroke Scales       NIHSS  1a. Level of Consciousness 0-->Alert, keenly responsive   1b. LOC Questions 0-->Answers both questions correctly   1c. LOC Commands 0-->Performs both tasks correctly   2.   Best Gaze 0-->Normal   3.   Visual 0-->No visual loss   4.   Facial Palsy 0-->Normal symmetrical movements   5a. Motor Arm, Left 0-->No drift, limb holds 90 (or 45) degrees for full 10 secs   5b. Motor Arm, Right 0-->No drift, limb holds 90 (or 45) degrees for full 10 secs   6a. Motor Leg, Left 0-->No drift, leg holds 30 degree position for full 5 secs   6b. Motor Leg, right 0-->No drift, leg holds 30 degree position for full 5 secs   7.   Limb Ataxia 0-->Absent   8.   Sensory 0-->Normal, no sensory loss   9.   Best Language 0-->No aphasia, normal   10. Dysarthria 0-->Normal   11. Extinction and Inattention  0-->No abnormality   Total 0 (07/09/24 1329)            Labs     CBC  Lab Results   Component Value Date    HGB 14.4 07/09/2024    HCT 44.1 07/09/2024    WBC 7.4 07/09/2024     07/09/2024       BMP  Lab Results   Component Value Date     07/08/2024    POTASSIUM 4.4 07/08/2024    CHLORIDE 100 07/08/2024    CO2 24 07/08/2024    BUN 14.9 07/08/2024    CR 1.1 07/09/2024     (H) 07/08/2024    NAM 9.3 07/08/2024       INR  INR   Date Value Ref Range Status    07/08/2024 2.62 (H) 0.85 - 1.15 Final   07/05/2024 2.03 (H) 0.85 - 1.15 Final   07/04/2024 1.91 (H) 0.85 - 1.15 Final       Data   Stroke Code Data  (for stroke code without tele)  Stroke code activated 07/09/24  1315   First stroke provider response 07/09/24  1318   Last known normal 07/09/24  1250   Time of discovery (or onset of symptoms)        Head CT read by Stroke Neuro Provider 07/09/24  1333   Was stroke code de-escalated? Yes              Clinically Significant Risk Factors Present on Admission               # Drug Induced Coagulation Defect: home medication list includes an anticoagulant medication    # Hypertension: Home medication list includes antihypertensive(s)  # End stage heart failure: Ventricular assist device (VAD) present                # Financial/Environmental Concerns:    # Asthma: noted on problem list  # ICD device present         Time Spent on this Encounter   Billing:

## 2024-07-09 NOTE — ED PROVIDER NOTES
[place] EMERGENCY DEPARTMENT ([facility])    7/09/24       ED PROVIDER NOTE   ED 2 1:25 PM    History     Chief Complaint   Patient presents with    Stroke Symptoms     The history is provided by the patient and medical records.     Navin Lutz is a 53 year old male with history of acute on chronic heart failure with reduced EF secondary to nonischemic cardiomyopathy s/p LVAD on warfarin, prior DVT who presents to the Emergency Department with sudden onset of double vision and episode of loss of right peripheral vision.  He had a recent significant hospitalization from 6/3-7/4/2024, had LVAD device placed 6/14/24. He was discharged 5 days ago. Patient was at his usual state of health today, eating some shrimp when he noticed sudden vision changes. He went to put the shrimp tail down when he started seeing double vertically and it felt as if his right eye didn't want to look to the right, had limited range to peripheral vision as a result. Patient called nurse triage line and was told to go to the Emergency Department right away for stroke rule out. Last known well time was approximately 1255 today.  Here he continues to have vertical double vision when gazing straight. When both of his eyes are open he sees double, when closes either one of his eyes his vision normalizes.  The right eye seems to be a little worse than the left. The double vision is slowly resolving and he no longer has difficulty gazing to the right. He has never had vision changes like this before.  Otherwise feels fine, no unilateral numbness or weakness. No changes in speech (this is corroborated by family).  No facial asymmetry.     Past Medical History  No past medical history on file.  Past Surgical History:   Procedure Laterality Date    CARDIAC SURGERY      COLONOSCOPY N/A 8/25/2023    Procedure: COLONOSCOPY, WITH POLYPECTOMY AND BIOPSY;  Surgeon: Alejandro Rodriguez MD;  Location: [facility]    CV INTRA AORTIC BALLOON N/A  6/12/2024    Procedure: Intra aortic Balloon Pump Insertion;  Surgeon: Elfego Cruz MD;  Location:  HEART CARDIAC CATH LAB    CV RIGHT HEART CATH MEASUREMENTS RECORDED N/A 08/22/2023    Procedure: Heart Cath Right Heart Cath;  Surgeon: Elfego Cruz MD;  Location:  HEART CARDIAC CATH LAB    CV RIGHT HEART CATH MEASUREMENTS RECORDED N/A 9/20/2023    Procedure: Heart Cath Right Heart Cath;  Surgeon: Elfego Cruz MD;  Location:  HEART CARDIAC CATH LAB    CV RIGHT HEART CATH MEASUREMENTS RECORDED N/A 1/19/2024    Procedure: Heart Cath Right Heart Cath;  Surgeon: Tobin Jaime MD;  Location:  HEART CARDIAC CATH LAB    CV RIGHT HEART CATH MEASUREMENTS RECORDED N/A 5/3/2024    Procedure: Heart Cath Right Heart Cath;  Surgeon: Dion Ashley MD;  Location:  HEART CARDIAC CATH LAB    CV RIGHT HEART CATH MEASUREMENTS RECORDED N/A 6/4/2024    Procedure: Right Heart Catheterization;  Surgeon: Cassandra Rizzo MD;  Location:  HEART CARDIAC CATH LAB    CV RIGHT HEART CATH MEASUREMENTS RECORDED N/A 6/12/2024    Procedure: Right Heart Catheterization;  Surgeon: Elfego Cruz MD;  Location:  HEART CARDIAC CATH LAB    INSERT VENTRICULAR ASSIST DEVICE LEFT (HEARTMATE II) N/A 6/14/2024    Procedure: Median Sternotomy, INSERTION, LEFT VENTRICULAR ASSIST DEVICE (HEARTMATE III), on Cardiopulmonary Bypass, Transesophageal Echocardiogram by Anesthesia;  Surgeon: Brian Jhaveri MD;  Location: [facility]    IR CHEST TUBE PLACEMENT NON-TUNNELLED LEFT  6/28/2024    IR THORACENTESIS  6/25/2024    PICC DOUBLE LUMEN PLACEMENT Right 08/22/2023    Brachial Vein Medial 5F DL 45 cm, 2 cm out     empagliflozin (JARDIANCE) 10 MG TABS tablet  gabapentin (NEURONTIN) 100 MG capsule  lisinopril (ZESTRIL) 10 MG tablet  methocarbamol (ROBAXIN) 750 MG tablet  pantoprazole (PROTONIX) 40 MG EC tablet  polyethylene glycol (MIRALAX) 17 GM/Dose powder  reason aspirin not  prescribed, intentional,  spironolactone (ALDACTONE) 25 MG tablet  warfarin ANTICOAGULANT (COUMADIN) 5 MG tablet      No Known Allergies  Family History  No family history on file.  Social History   Social History     Tobacco Use    Smoking status: Never    Smokeless tobacco: Never   Substance Use Topics    Alcohol use: Never    Drug use: Never      A medically appropriate review of systems was performed with pertinent positives and negatives noted in the HPI, and all other systems negative.    Physical Exam   Pulse: 58  Temp: 97.6  F (36.4  C)  Resp: 14  SpO2: 97 %    Wt Readings from Last 10 Encounters:   07/04/24 84.9 kg (187 lb 2.7 oz)   05/31/24 90.9 kg (200 lb 6.4 oz)   05/03/24 95.3 kg (210 lb 3.2 oz)   05/03/24 96.7 kg (213 lb 1.6 oz)   03/13/24 91.8 kg (202 lb 6.4 oz)   01/19/24 95.4 kg (210 lb 6.4 oz)   01/19/24 91.2 kg (201 lb)   01/19/24 96.3 kg (212 lb 3.2 oz)   12/08/23 91.4 kg (201 lb 6.4 oz)   11/20/23 93.9 kg (207 lb 0.2 oz)       Physical Exam  Constitutional:       General: He is not in acute distress.     Appearance: He is not toxic-appearing.   HENT:      Head: Normocephalic and atraumatic.      Mouth/Throat:      Mouth: Mucous membranes are moist.      Pharynx: Oropharynx is clear.   Eyes:      Extraocular Movements: Extraocular movements intact.      Pupils: Pupils are equal, round, and reactive to light.   Cardiovascular:      Rate and Rhythm: Normal rate and regular rhythm.   Pulmonary:      Effort: Pulmonary effort is normal. No respiratory distress.      Breath sounds: No wheezing or rales.   Musculoskeletal:         General: Normal range of motion.      Cervical back: Normal range of motion.   Skin:     General: Skin is warm and dry.   Neurological:      General: No focal deficit present.      Mental Status: He is alert and oriented to person, place, and time.      Cranial Nerves: No cranial nerve deficit.      Sensory: No sensory deficit.      Motor: No weakness.           ED Course,  Procedures, & Data     ED Course as of 07/09/24 1523   Tue Jul 09, 2024   1319 Stroke team here to see patient    1342 Discussion with Neuro Stroke team     Procedures                 Results for orders placed or performed during the hospital encounter of 07/09/24   CT Head w/o Contrast     Status: None    Narrative    CT HEAD W/O CONTRAST 7/9/2024 1:46 PM    History:  Code Stroke to evaluate for potential thrombolysis and  thrombectomy. PLEASE READ IMMEDIATELY.     Comparison: None     Technique: Using multidetector thin collimation helical acquisition  technique, axial, coronal and sagittal CT images from the skull base  to the vertex were obtained without intravenous contrast.     Findings: There is no intracranial hemorrhage, mass effect or midline  shift. There is no focal loss of gray-white matter differentiation,  insular ribbon sign, or focally hyperdense artery to suggest acute  infarction. The ventricles are proportionate to the cerebral sulci.    The bony calvaria and the bones of the skull base appear normal. The  visualized portions of the paranasal sinuses and mastoid air cells are  unremarkable.       Impression    Impression:   1. No acute intracranial hemorrhage.  2. ASPECT Score: 10/10.    [Stroke Code Result: Negative]    Finding was identified on 7/9/2024 1:51 PM.     Dr. Colette JONES was contacted by Dr. Paul on 7/9/2024 1:52 PM  and verbalized understanding of the result.    [facility] Stroke Code Communication Guidelines:  [place] ED: [phone] (EB ED)  [place] Inpatient: [phone] (Resident Pager)   or Jany 'Stroke First Call Resident [[place]]' or Amcom [phone]  [facility] ED: [phone] (WB ED)  [facility] Inpatient: Page [phone]    I have personally reviewed the examination and initial interpretation  and I agree with the findings.    ADRIANA RIZVI MD         SYSTEM ID:  [id number]   CTA Head Neck w Contrast     Status: None    Narrative    EXAM: CTA HEAD NECK W CONTRAST  7/9/2024  1:48 PM     HISTORY:  Code Stroke to evaluate for potential thrombolysis and  thrombectomy. PLEASE READ IMMEDIATELY.       COMPARISON:  CT abdomen 823    TECHNIQUE:    HEAD and NECK CTA: During rapid bolus intravenous injection of  nonionic contrast material, axial images were obtained using thin  collimation multidetector helical technique from the base of the upper  aortic arch through the [place]. This CT angiogram data was  reconstructed at thin intervals with mild overlap. Images were sent to  the 3D workstation, and 3D reconstructions were obtained. The axial  source images, multiplanar reformations, 3D reconstructions in both  maximum intensity projection display and volume rendered models were  reviewed, with reconstructions performed by the technologist.    CONTRAST: iopamidol (ISOVUE-370) solution 67 mL    FINDINGS:  Head CTA demonstrates no intracranial arterial aneurysm or stenosis.  Anterior communicating artery is patent. Posterior commuting artery is  increased appear patent bilaterally.    Neck CTA demonstrates conventional aortic arch branching pattern and  patent great vessel origins. No internal carotid artery stenosis. No  vertebral artery stenosis.    Partially visualized implantable cardiac device and postprocedural  changes of the ascending aorta status post LVAD, better appreciated on  CT 6/22/2024. Small left pleural effusion.      Impression    IMPRESSION:    1. Head CTA demonstrates no aneurysm or stenosis of the major  intracranial arteries.   2. Neck CTA demonstrates no stenosis of the major cervical arteries.  3. Partially visualized layering pleural fluid in the left hemithorax,  consistent with known left hydropneumothorax.     I have personally reviewed the examination and initial interpretation  and I agree with the findings.    ADRIANA RIZVI MD         SYSTEM ID:  [id number]   [place] Draw     Status: None    Narrative    The following orders were created for panel  order [place] Draw.  Procedure                               Abnormality         Status                     ---------                               -----------         ------                     Extra Blue Top Tube[[id number]]                              Final result               Extra Red Top Tube[[id number]]                               Final result               Extra Green Top (Lithium...[[id number]]                      Final result               Extra Purple Top Tube[[id number]]                            Final result                 Please view results for these tests on the individual orders.   Extra Blue Top Tube     Status: None   Result Value Ref Range    Hold Specimen JIC    Extra Red Top Tube     Status: None   Result Value Ref Range    Hold Specimen JIC    Extra Green Top (Lithium Heparin) Tube     Status: None   Result Value Ref Range    Hold Specimen JIC    Extra Purple Top Tube     Status: None   Result Value Ref Range    Hold Specimen JIC    Basic metabolic panel     Status: Abnormal   Result Value Ref Range    Sodium 137 135 - 145 mmol/L    Potassium 4.4 3.4 - 5.3 mmol/L    Chloride 101 98 - 107 mmol/L    Carbon Dioxide (CO2) 27 22 - 29 mmol/L    Anion Gap 9 7 - 15 mmol/L    Urea Nitrogen 17.3 6.0 - 20.0 mg/dL    Creatinine 1.00 0.67 - 1.17 mg/dL    GFR Estimate 90 >60 mL/min/1.73m2    Calcium 9.5 8.6 - 10.0 mg/dL    Glucose 123 (H) 70 - 99 mg/dL   INR     Status: Abnormal   Result Value Ref Range    INR 2.65 (H) 0.85 - 1.15   Partial thromboplastin time     Status: Abnormal   Result Value Ref Range    aPTT 43 (H) 22 - 38 Seconds   Troponin T, High Sensitivity     Status: Abnormal   Result Value Ref Range    Troponin T, High Sensitivity 122 (HH) <=22 ng/L   CBC with platelets and differential     Status: None   Result Value Ref Range    WBC Count 7.4 4.0 - 11.0 10e3/uL    RBC Count 4.92 4.40 - 5.90 10e6/uL    Hemoglobin 14.4 13.3 - 17.7 g/dL    Hematocrit 44.1 40.0 - 53.0 %    MCV 90 78 - 100 fL     MCH 29.3 26.5 - 33.0 pg    MCHC 32.7 31.5 - 36.5 g/dL    RDW 14.1 10.0 - 15.0 %    Platelet Count 282 150 - 450 10e3/uL    % Neutrophils 60 %    % Lymphocytes 22 %    % Monocytes 9 %    % Eosinophils 7 %    % Basophils 1 %    % Immature Granulocytes 1 %    NRBCs per 100 WBC 0 <1 /100    Absolute Neutrophils 4.5 1.6 - 8.3 10e3/uL    Absolute Lymphocytes 1.6 0.8 - 5.3 10e3/uL    Absolute Monocytes 0.6 0.0 - 1.3 10e3/uL    Absolute Eosinophils 0.5 0.0 - 0.7 10e3/uL    Absolute Basophils 0.1 0.0 - 0.2 10e3/uL    Absolute Immature Granulocytes 0.1 <=0.4 10e3/uL    Absolute NRBCs 0.0 10e3/uL   Creatinine POCT     Status: Normal   Result Value Ref Range    Creatinine POCT 1.1 0.7 - 1.3 mg/dL    GFR, ESTIMATED POCT >60 >60 mL/min/1.73m2   Factor 10 chromogenic     Status: Abnormal   Result Value Ref Range    Factor 10 Chromogenic 32 (L) 70 - 130 %    Narrative    Therapeutic Range:  A Chromogenic Factor 10 level of approximately 20-40% inversely correlates with an INR of 2-3   for patients receiving Warfarin. Chromogenic Factor 10 levels below 20% indicate an INR greater than 3 and levels above 40% indicate an INR less than 2.   Nt probnp inpatient     Status: Normal   Result Value Ref Range    N terminal Pro BNP Inpatient 294 0 - 900 pg/mL   CBC with Platelets & Differential     Status: None    Narrative    The following orders were created for panel order CBC with Platelets & Differential.  Procedure                               Abnormality         Status                     ---------                               -----------         ------                     CBC with platelets and d...[[id number]]                      Final result                 Please view results for these tests on the individual orders.     Medications   lidocaine 1 % 0.1-1 mL (has no administration in time range)   lidocaine (LMX4) cream (has no administration in time range)   sodium chloride (PF) 0.9% PF flush 3 mL (has no administration in time  range)   sodium chloride (PF) 0.9% PF flush 3 mL (has no administration in time range)   medication instruction (has no administration in time range)   polyethylene glycol (MIRALAX) Packet 17 g (has no administration in time range)   senna-docusate (SENOKOT-S/PERICOLACE) 8.6-50 MG per tablet 1 tablet (has no administration in time range)     Or   senna-docusate (SENOKOT-S/PERICOLACE) 8.6-50 MG per tablet 2 tablet (has no administration in time range)   iopamidol (ISOVUE-370) solution 67 mL (67 mLs Intravenous $Given 7/9/24 1332)     And   sodium chloride 0.9 % bag for CT scan flush use (100 mLs As instructed $Given 7/9/24 1332)     Labs Ordered and Resulted from Time of ED Arrival to Time of ED Departure   BASIC METABOLIC PANEL - Abnormal       Result Value    Sodium 137      Potassium 4.4      Chloride 101      Carbon Dioxide (CO2) 27      Anion Gap 9      Urea Nitrogen 17.3      Creatinine 1.00      GFR Estimate 90      Calcium 9.5      Glucose 123 (*)    INR - Abnormal    INR 2.65 (*)    PARTIAL THROMBOPLASTIN TIME - Abnormal    aPTT 43 (*)    TROPONIN T, HIGH SENSITIVITY - Abnormal    Troponin T, High Sensitivity 122 (*)    FACTOR 10 CHROMOGENIC - Abnormal    Factor 10 Chromogenic 32 (*)    ISTAT CREATININE POCT - Normal    Creatinine POCT 1.1      GFR, ESTIMATED POCT >60     NT PROBNP INPATIENT - Normal    N terminal Pro BNP Inpatient 294     CBC WITH PLATELETS AND DIFFERENTIAL    WBC Count 7.4      RBC Count 4.92      Hemoglobin 14.4      Hematocrit 44.1      MCV 90      MCH 29.3      MCHC 32.7      RDW 14.1      Platelet Count 282      % Neutrophils 60      % Lymphocytes 22      % Monocytes 9      % Eosinophils 7      % Basophils 1      % Immature Granulocytes 1      NRBCs per 100 WBC 0      Absolute Neutrophils 4.5      Absolute Lymphocytes 1.6      Absolute Monocytes 0.6      Absolute Eosinophils 0.5      Absolute Basophils 0.1      Absolute Immature Granulocytes 0.1      Absolute NRBCs 0.0     GLUCOSE  MONITOR NURSING POCT   TROPONIN T, HIGH SENSITIVITY     CTA Head Neck w Contrast   Final Result   IMPRESSION:     1. Head CTA demonstrates no aneurysm or stenosis of the major   intracranial arteries.    2. Neck CTA demonstrates no stenosis of the major cervical arteries.   3. Partially visualized layering pleural fluid in the left hemithorax,   consistent with known left hydropneumothorax.       I have personally reviewed the examination and initial interpretation   and I agree with the findings.      ADRIANA RIZVI MD            SYSTEM ID:  [id number]      CT Head w/o Contrast   Final Result   Impression:    1. No acute intracranial hemorrhage.   2. ASPECT Score: 10/10.      [Stroke Code Result: Negative]      Finding was identified on 7/9/2024 1:51 PM.       Dr. Colette JONES was contacted by Dr. Paul on 7/9/2024 1:52 PM   and verbalized understanding of the result.      [facility] Stroke Code Communication Guidelines:   [place] ED: [phone] (EB ED)   [place] Inpatient: [phone] (Resident Pager)    or Jany 'Stroke First Call Resident [[place]]' or Amcom 0979   [facility] ED: [phone] ([facility] ED)   [facility] Inpatient: Page [phone]      I have personally reviewed the examination and initial interpretation   and I agree with the findings.      ADRIANA RIZVI MD            SYSTEM ID:  [id number]      Echocardiogram Complete    (Results Pending)          Critical Care Addendum  My initial assessment, based on my review of prehospital provider report, review of nursing observations, review of vital signs, focused history, and physical exam, established a high suspicion that Navin Lutz has ischemic or hemorrhagic stroke, which requires immediate intervention, and therefore he is critically ill.     After the initial assessment, the care team initiated multiple lab tests and consulted with neurology  to provide stabilization care. Due to the critical nature of this patient, I reassessed nursing  observations, vital signs, physical exam, and neurologic status multiple times prior to his disposition.     Time also spent performing documentation, discussion with family to obtain medical information for decision making, reviewing test results, discussion with consultants, and coordination of care.     Critical care time (excluding teaching time and procedures): 40 minutes.       Assessment & Plan    This is a 53-year-old male with history of heart failure with a LVAD here with acute onset of diplopia.  He stroke code was paged out on the patient's arrival.  Neurology saw the patient at bedside and the patient went to CT.  CT did not show signs of acute intracranial process.  Labs overall not suggestive of acute process.    The patient noted his symptoms were improving.  Though his CTs are negative would still consider TIA or CVA as a cause of his diplopia.  The patient cannot get a MRI.  Neurology recommended he have a repeat CT scan in the morning and ultrasounds to assess for embolic source.  With his history of recent LVAD placement he was admitted to cardiology to service.      I have reviewed the nursing notes. I have reviewed the findings, diagnosis, plan and need for follow up with the patient.    New Prescriptions    No medications on file       Final diagnoses:   Stroke-like symptoms   Diplopia     I, Chitra Argueta, am serving as a trained medical scribe to document services personally performed by Misha Alvarenga MD based on the provider's statements to me on July 9, 2024.  This document has been checked and approved by the attending provider.    IMisha MD, was physically present and have reviewed and verified the accuracy of this note documented by Chitra Argueta medical scribe.      Misha Alvarenga MD   [facility] EMERGENCY DEPARTMENT  7/9/2024        Misha Alvarenga MD  07/09/24 1533

## 2024-07-09 NOTE — PROGRESS NOTES
"D:  Pt called and reported a change in vision that came on all of a sudden, 5 minutes ago.  Pt denies changes to mentation, speech logical and normal paced. Wife denies facial droop and changes to strength.  VAD parameters stable. Within 10 minutes of initial symptom change, pt states \"maybe its getting a little better\"  I:  Instructed pt to report to ER to r/o stroke.  Wife will drive pt to ER, which is less than 5 minutes away.  ER charge RN called to inform of pt's pending arrival.  A:  Vision changes  P:  Pt verbalized understanding of the instructions given.  Will call VAD coordinator with further needs and questions.    "

## 2024-07-09 NOTE — PROGRESS NOTES
D: Called patient to follow up on lab resutls from 7/8    I/A: Labs wnl for patient.   Patient denies SOB, weight gain/loss, palpitations, swelling.  Wt today, 183lb, 185lb on day after hospital discharge. Overall feeling well.    Walking 3x / day for 1/2 mile.     P:  Patient, Family notified to page on-call coordinator if symptoms worsen or with other concerns. Patient, Family verbalized understanding.

## 2024-07-09 NOTE — PROGRESS NOTES
D: Stopped by to see patient. No VAD related questions or concerns at this time.   I: Discussed POC and provided support and listened to patient and caregiver's thoughts and concerns.  P: Continue to follow patient and address any questions or concerns patient and or caregiver may have.      Indication of Interrogation:  Other, concern for stroke    Type of VAD:  Heartmate 3    Current Parameters:  Flow= 4.5 lpm, Speed= 5400 rpm, Power= 3.8 bassett, PI (if applicable)= 3    Abnormal Alarm on History:  No    Abnormal Events/Parameters Notes:  No, hx back 4 days, pi range 2-4.5    Changes Made during Interrogation:  No

## 2024-07-09 NOTE — PROGRESS NOTES
ANTICOAGULATION MANAGEMENT     Navin Lutz 53 year old male is on warfarin with therapeutic result. (Goal Chromogenic Factor X 40-20%)    Recent labs: (last 7 days)     07/08/24  1349   INR 2.62*   [id number] 33*       ASSESSMENT     Source(s): Chart Review and Patient/Caregiver Call     Warfarin doses taken: Warfarin taken as instructed  Diet: No new diet changes identified  Medication/supplement changes: starting a multivitamin today but it does not have Bruna K in it.   New illness, injury, or hospitalization: No  Signs or symptoms of bleeding or clotting: No  Previous result: Subtherapeutic  Additional findings: None     PLAN     Recommended plan for no diet, medication or health factor changes affecting result    Dosing Instructions: Continue your current warfarin dose 7.5 mg Sun, Tues, Fri and 10 mg ROW   with next Chromogenic Factor X in 4 days       Telephone call with Navin who verbalizes understanding and agrees to plan    Lab visit scheduled    Education provided:   Please call back if any changes to your diet, medications or how you've been taking warfarin    Plan made with [facility] Pharmacist Lay Mclaughlin, RN  Anticoagulation Clinic  7/9/2024       Upon checking patient and bipap use, I found patient off of bipap machine. Patient declining use for balance of night. Patient states he can not tolerate it any more.

## 2024-07-09 NOTE — ED TRIAGE NOTES
PT arrives from home recommended to come in with stroke like symptoms. Pt reports vision changes at 1250 today and R eye drooping. PT has 2 known clots in RUE. New LVAD placed 6/14.     Triage Assessment (Adult)       Row Name 07/09/24 1312          Triage Assessment    Airway WDL WDL        Respiratory WDL    Respiratory WDL WDL        Skin Circulation/Temperature WDL    Skin Circulation/Temperature WDL WDL        Cardiac WDL    Cardiac WDL X  LVAD        Peripheral/Neurovascular WDL    Peripheral Neurovascular WDL WDL        Cognitive/Neuro/Behavioral WDL    Cognitive/Neuro/Behavioral WDL WDL                     
no chest pain

## 2024-07-09 NOTE — PROVIDER NOTIFICATION
Stroke Code Nurse-Responder Note    Arrival Time to Stroke Code: 1315    Stroke Code Team interventions: Vitals monitoring, stat CT, observed Dr Wagoner conduct complete neuro assessment  - De-escalated at 1345 by DOMINGO Wagoner  Neuro MD    ED/Bedside Nurse providing handoff: Rodolfo    Time left for CT: 1324    Time arrived to next location (ED/Unit/IR): 1335    ED/Bedside Nurse given handoff (name/time): Rodolfo Lazaro RN

## 2024-07-09 NOTE — ED PROVIDER NOTES
"       [facility]GENCY DEPARTMENT ([facility])    7/09/24       ED PROVIDER NOTE   History   No chief complaint on file.    KYLAH Lutz is a 53 year old male who presents to the ED  ***    {History Review Selection (Optional):580273}  {ROS Selection (Optional):832437}    Physical Exam   Pulse: 58  Resp: 14  SpO2: 97 %  Physical Exam  ***    ED Course, Procedures, & Data      Procedures       {ED Course Selections (Optional):346960}  {ED Sepsis CMS Documentation (Optional):244455::\" \"}       No results found for any visits on 07/09/24.  Medications - No data to display  Labs Ordered and Resulted from Time of ED Arrival to Time of ED Departure - No data to display  No orders to display          {Critical Care Performed?:670385}    Assessment & Plan    ***    I have reviewed the nursing notes. I have reviewed the findings, diagnosis, plan and need for follow up with the patient.    New Prescriptions    No medications on file       Final diagnoses:   None       ***  [facility] EMERGENCY DEPARTMENT  7/9/2024  "

## 2024-07-09 NOTE — PHARMACY-ANTICOAGULATION SERVICE
Clinical Pharmacy - Warfarin Dosing Consult     Pharmacy has been consulted to manage this patient s warfarin therapy.  Indication: LVAD/RVAD  Therapy Goal: Chr Factor 10: 20-40%  OP Anticoag Clinic: [facility]  Warfarin PTA Regimen: 7.5 mg Sun, Tues, Fri, 10mg all other days  Significant drug interactions: aspirin  Recent documented change in oral intake/nutrition: Unknown    INR   Date Value Ref Range Status   07/09/2024 2.65 (H) 0.85 - 1.15 Final   07/08/2024 2.62 (H) 0.85 - 1.15 Final     Factor 10 Chromogenic   Date Value Ref Range Status   07/09/2024 32 (L) 70 - 130 % Final     Factor 2 Assay   Date Value Ref Range Status   06/21/2024 27 (L) 60 - 140 % Final       Recommend warfarin 7.5 mg today.  Pharmacy will monitor Navin Lutz daily and order warfarin doses to achieve specified goal.      Please contact pharmacy as soon as possible if the warfarin needs to be held for a procedure or if the warfarin goals change.      Isaac Garrett, PharmD, BCPS

## 2024-07-10 ENCOUNTER — DOCUMENTATION ONLY (OUTPATIENT)
Dept: ANTICOAGULATION | Facility: CLINIC | Age: 54
End: 2024-07-10

## 2024-07-10 ENCOUNTER — APPOINTMENT (OUTPATIENT)
Dept: CT IMAGING | Facility: CLINIC | Age: 54
End: 2024-07-10
Attending: NURSE PRACTITIONER
Payer: COMMERCIAL

## 2024-07-10 VITALS
TEMPERATURE: 97.5 F | DIASTOLIC BLOOD PRESSURE: 75 MMHG | WEIGHT: 187.2 LBS | RESPIRATION RATE: 16 BRPM | HEART RATE: 109 BPM | OXYGEN SATURATION: 94 % | BODY MASS INDEX: 25.35 KG/M2 | SYSTOLIC BLOOD PRESSURE: 86 MMHG | HEIGHT: 72 IN

## 2024-07-10 PROBLEM — G45.9 TIA (TRANSIENT ISCHEMIC ATTACK): Status: ACTIVE | Noted: 2024-07-10

## 2024-07-10 LAB
ALBUMIN SERPL BCG-MCNC: 3.5 G/DL (ref 3.5–5.2)
ALP SERPL-CCNC: 198 U/L (ref 40–150)
ALT SERPL W P-5'-P-CCNC: 95 U/L (ref 0–70)
ANION GAP SERPL CALCULATED.3IONS-SCNC: 9 MMOL/L (ref 7–15)
AST SERPL W P-5'-P-CCNC: 39 U/L (ref 0–45)
BILIRUB DIRECT SERPL-MCNC: <0.2 MG/DL (ref 0–0.3)
BILIRUB SERPL-MCNC: 0.4 MG/DL
BUN SERPL-MCNC: 16.7 MG/DL (ref 6–20)
CALCIUM SERPL-MCNC: 9.2 MG/DL (ref 8.6–10)
CHLORIDE SERPL-SCNC: 100 MMOL/L (ref 98–107)
CHOLEST SERPL-MCNC: 195 MG/DL
CREAT SERPL-MCNC: 0.93 MG/DL (ref 0.67–1.17)
DEPRECATED HCO3 PLAS-SCNC: 24 MMOL/L (ref 22–29)
EGFRCR SERPLBLD CKD-EPI 2021: >90 ML/MIN/1.73M2
FACT X ACT/NOR PPP CHRO: 30 % (ref 70–130)
GLUCOSE SERPL-MCNC: 97 MG/DL (ref 70–99)
HBA1C MFR BLD: 5.5 %
HDLC SERPL-MCNC: 36 MG/DL
HOLD SPECIMEN: NORMAL
INR PPP: 2.52 (ref 0.85–1.15)
LDH SERPL L TO P-CCNC: 237 U/L (ref 0–250)
LDLC SERPL CALC-MCNC: 136 MG/DL
MAGNESIUM SERPL-MCNC: 2.5 MG/DL (ref 1.7–2.3)
NONHDLC SERPL-MCNC: 159 MG/DL
POTASSIUM SERPL-SCNC: 4.5 MMOL/L (ref 3.4–5.3)
PROT SERPL-MCNC: 6.7 G/DL (ref 6.4–8.3)
SODIUM SERPL-SCNC: 133 MMOL/L (ref 135–145)
TRIGL SERPL-MCNC: 117 MG/DL

## 2024-07-10 PROCEDURE — 36415 COLL VENOUS BLD VENIPUNCTURE: CPT

## 2024-07-10 PROCEDURE — 82465 ASSAY BLD/SERUM CHOLESTEROL: CPT

## 2024-07-10 PROCEDURE — 85610 PROTHROMBIN TIME: CPT | Performed by: STUDENT IN AN ORGANIZED HEALTH CARE EDUCATION/TRAINING PROGRAM

## 2024-07-10 PROCEDURE — 83036 HEMOGLOBIN GLYCOSYLATED A1C: CPT

## 2024-07-10 PROCEDURE — 250N000013 HC RX MED GY IP 250 OP 250 PS 637: Performed by: STUDENT IN AN ORGANIZED HEALTH CARE EDUCATION/TRAINING PROGRAM

## 2024-07-10 PROCEDURE — 82248 BILIRUBIN DIRECT: CPT | Performed by: NURSE PRACTITIONER

## 2024-07-10 PROCEDURE — 99239 HOSP IP/OBS DSCHRG MGMT >30: CPT | Mod: 25 | Performed by: INTERNAL MEDICINE

## 2024-07-10 PROCEDURE — 250N000013 HC RX MED GY IP 250 OP 250 PS 637: Performed by: NURSE PRACTITIONER

## 2024-07-10 PROCEDURE — 70450 CT HEAD/BRAIN W/O DYE: CPT

## 2024-07-10 PROCEDURE — 99232 SBSQ HOSP IP/OBS MODERATE 35: CPT | Mod: GC | Performed by: PSYCHIATRY & NEUROLOGY

## 2024-07-10 PROCEDURE — 83735 ASSAY OF MAGNESIUM: CPT

## 2024-07-10 PROCEDURE — 93750 INTERROGATION VAD IN PERSON: CPT | Performed by: INTERNAL MEDICINE

## 2024-07-10 PROCEDURE — G0378 HOSPITAL OBSERVATION PER HR: HCPCS

## 2024-07-10 PROCEDURE — 70450 CT HEAD/BRAIN W/O DYE: CPT | Mod: 26 | Performed by: RADIOLOGY

## 2024-07-10 PROCEDURE — 80048 BASIC METABOLIC PNL TOTAL CA: CPT

## 2024-07-10 PROCEDURE — 83615 LACTATE (LD) (LDH) ENZYME: CPT

## 2024-07-10 PROCEDURE — 85260 CLOT FACTOR X STUART-POWER: CPT | Performed by: STUDENT IN AN ORGANIZED HEALTH CARE EDUCATION/TRAINING PROGRAM

## 2024-07-10 PROCEDURE — 36415 COLL VENOUS BLD VENIPUNCTURE: CPT | Performed by: STUDENT IN AN ORGANIZED HEALTH CARE EDUCATION/TRAINING PROGRAM

## 2024-07-10 RX ORDER — WARFARIN SODIUM 5 MG/1
TABLET ORAL
Status: ACTIVE | COMMUNITY
Start: 2024-07-10

## 2024-07-10 RX ORDER — ROSUVASTATIN CALCIUM 20 MG/1
20 TABLET, COATED ORAL DAILY
Status: DISCONTINUED | OUTPATIENT
Start: 2024-07-10 | End: 2024-07-10 | Stop reason: HOSPADM

## 2024-07-10 RX ORDER — ROSUVASTATIN CALCIUM 20 MG/1
20 TABLET, COATED ORAL DAILY
Qty: 90 TABLET | Refills: 3 | Status: SHIPPED | OUTPATIENT
Start: 2024-07-10 | End: 2024-07-31

## 2024-07-10 RX ORDER — WARFARIN SODIUM 10 MG/1
10 TABLET ORAL
Status: DISCONTINUED | OUTPATIENT
Start: 2024-07-10 | End: 2024-07-10 | Stop reason: HOSPADM

## 2024-07-10 RX ORDER — ASPIRIN 81 MG/1
81 TABLET, CHEWABLE ORAL DAILY
Qty: 90 TABLET | Refills: 3 | Status: SHIPPED | OUTPATIENT
Start: 2024-07-11 | End: 2024-07-31

## 2024-07-10 RX ADMIN — ASPIRIN 81 MG CHEWABLE TABLET 81 MG: 81 TABLET CHEWABLE at 08:10

## 2024-07-10 RX ADMIN — PANTOPRAZOLE SODIUM 40 MG: 40 TABLET, DELAYED RELEASE ORAL at 07:00

## 2024-07-10 RX ADMIN — GABAPENTIN 200 MG: 100 CAPSULE ORAL at 08:10

## 2024-07-10 RX ADMIN — GABAPENTIN 200 MG: 100 CAPSULE ORAL at 14:07

## 2024-07-10 RX ADMIN — POLYETHYLENE GLYCOL 3350 17 G: 17 POWDER, FOR SOLUTION ORAL at 08:10

## 2024-07-10 RX ADMIN — EMPAGLIFLOZIN 10 MG: 10 TABLET, FILM COATED ORAL at 08:10

## 2024-07-10 RX ADMIN — ROSUVASTATIN CALCIUM 20 MG: 20 TABLET, FILM COATED ORAL at 11:38

## 2024-07-10 ASSESSMENT — ACTIVITIES OF DAILY LIVING (ADL)
ADLS_ACUITY_SCORE: 23

## 2024-07-10 ASSESSMENT — VISUAL ACUITY: OU: NORMAL ACUITY

## 2024-07-10 NOTE — ED NOTES
Messaged provider due to q 15 minute neuro check order to see if provider still wanted this frequency or what orders they want for this patient.

## 2024-07-10 NOTE — PROGRESS NOTES
Admitted from ED, accompanied by spouse.  Up SBA to bed from .  Personal belonging: Shoes, pants, t-shirt, socks, cap, 4-LVAD batteries, extra-controller (all labeled).  Two person skin assessment done with RN-Cinthia, noted to have old sternal incision, with 4-old chest-tube dressing and no other deficit noted.

## 2024-07-10 NOTE — UTILIZATION REVIEW
"Admission Status; Secondary Review Determination     Under the authority of the Utilization Management Committee, the utilization review process indicated a secondary review on the above patient.  The review outcome is based on review of the medical records, discussions with staff, and applying clinical experience noted on the date of the review.       (x) Observation Status Appropriate - This patient does not meet hospital inpatient criteria and is placed in observation status. If this patient's primary payer is Medicare and was admitted as an inpatient, Condition Code 44 should be used and patient status changed to \"observation\".     RATIONALE FOR DETERMINATION:  53-year-old male with a history of NICM c/b HFrEF s/p Heartmate 3 LVAD on 6/14/24 who presents to the emergency room with a sudden onset of double vision and episode of loss of right peripheral vision.  Symptoms significantly improved while in the emergency room.  Thus clinically this complex patient has symptoms of a posterior circulation TIA.  Initial CT of the head and CT angiogram head and neck are unremarkable.  Observation care appropriate for initial evaluation with plans for follow-up CT imaging in 24 hours.  If patient has significant new abnormality on repeat CT imaging then at that time patient would be appropriate to advance to inpatient care.        The severity of illness, intensity of service provided, expected LOS and risk for adverse outcome make the care appropriate for further observation; however, doesn't meet criteria for hospital inpatient admission. This was discussed with attending physician who concurred with this determination.    The information on this document is developed by the utilization review team in order for the business office to ensure compliance.  This only denotes the appropriateness of proper admission status and does not reflect the quality of care rendered.         The definitions of Inpatient Status and " Observation Status used in making the determination above are those provided in the CMS Coverage Manual, Chapter 1 and Chapter 6, section 70.4.      Sincerely,     Obi Santa MD    Physician Advisor  Utilization Review/ Case Management  [facility].

## 2024-07-10 NOTE — DISCHARGE SUMMARY
[facility]   Cardiology II Service / Advanced Heart Failure  Discharge Summary     Navin Lutz MRN# [id number]   YOB: 1970 Age: 53 year old     DATE OF ADMISSION:  7/9/2024  DATE OF DISCHARGE: 7/10/2024  ADMITTING PROVIDER: No admitting provider for patient encounter.  DISCHARGE PROVIDER: Sita Rizzo MD and Desire Silverman DNP, ANP-C   PRIMARY PROVIDER:  Libertad Briceno    ADMIT DIAGNOSES:   Vision changes c/f CVA  HFrEF s/p recent HM3 LVAD  Subtherapeutic anticoagulation   +lupus anticoagulant  Troponin elevation    DISCHARGE DIAGNOSES:   Diplopia, probably right esotropia 2/2 TIA  HFrEF s/p recent HM3 LVAD  Subtherapeutic anticoagulation   +lupus anticoagulant  Troponin elevation, non specific, ACS ruled out    FOLLOW-UP:  [] INR, CMP Friday prior to VAD clinic  [] follow-up with neuro stroke in clinic in 2-3 months (referral placed)    PENDING RESULTS:   [] none    HPI: Please see the detailed H & P by Viri Gillette and Matthias from 7/9/2024. Briefly, Navin Lutz is a 53 year old male with a history of NICM c/b HFrEF s/p Heartmate 3 LVAD on 6/14/24 who is admitted due to concern for stroke.   Recently discharged following LVAD implantation. Then had acute onset double vision for which he presented to ER. Last well known time was 12:45 pm. Symptoms started at rest while eating. Double vision resolved with closing one eye. Denies other associated neurologic symptoms such as unilateral numbness / tingling / focal deficits. On arrival in ER CT head was without hemorrhage. Symptoms resolved by this point in time. Neurology consulted in ER. Recommended admission for further monitoring. On interview with the patient he reports right before coming the hospital, he had an episode of double vision lasting approximately 20 minutes. During this he reports he was looking at his shrimp and was seeing two tails. He says he was seeing 1.5x everything he looked at, however, this would resolve when  covering the opposite eye. He says during this time he could not het high R eye to look to his R. He also says he had pain in his R eye during this episode. Patient was still having diplopia during neuro exam in the ED as well.    HOSPITAL COURSE:   # Acute onset diplopia, right esotropia  # 2/2 likely TIA  Sudden onset focal neurologic deficit involving diplopia. Symptoms resolved on hospital day 1. Has several RF for stroke including recent VAD and known lupus anticoagulant positivity. Note: CFx 10 had been subtherapeutic at time of hospital d.c and after discharge. CTA w/o large vessel occlusion. EKG is NSR, no hx of afib or flutter. Neurology consulted - TTE with bubble remarkable, head CT negative for ischemia or bleed on 7/9 and 7/10. Optho consulted - Anti-Ach receptor and MUSK antibodies recommend to r/o myasthenia gravis but not drawn. Draw with next labs. Start 81 mg ASA.       # Chronic systolic heart failure/HFrEF 2/2 NICM  # s/p HM3 LVAD on 6/14/24  # Subtherapeutic anticoagulation  Recent LVAD implantation on 6/14. Discharged on 7/4. Compensated on admission.   Baseline weight: 187 lbs  Speed: 5400 rpm  TTE: 6/28 w/ speed 5400 rpm with EF < 30% and LVIDd 6.2cm. AV closed. No AI.   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 Does have positive lupus anticoagulant positivity detected incidentally on pre-LVAD workup and thus need to utilize  chromogenic factor to guide INR at least until repeat testing.  - warfarin as above  - chromogenic factor daily goal 20-30  - per heme 6/25 repeat lupus anticoag panel end of July, may be able to switch back to INR monitoring  - OT hand therapies for neuropathy  - continue gabapentin     # Hepatocellular pattern of liver injury  Felt to be related to drug effects during last admission in the context of statin / tylenol / azithromycin interaction. Remains elevated though improved -  (downtrending), AST wnl. Repeat Friday.      # Hyperlipidemia  , goal < 70. Not on statin prior to admission due to abnormal LFTs as above. Resumed crestor 20 mg daily given TIA< monitor LFTs. Consider lipid clinic refer to LFTs trend up again.      # NSVT  Incidentally noted NSVT during last admission. No NSVT this admission.      # Non MI Troponin Elevation  Trop elevated to ~120 on admission. EKG nonischemic.   - trend       Desire Silverman DNP, ANP-C  Advanced Heart Failure/Cardiology 2 Nurse Practitioner  7/10/2024  Vocera preferred, pr Pager: [phone]    PHYSICAL EXAM:  Blood pressure (!) 86/75, pulse 105, temperature 98  F (36.7  C), temperature source Oral, resp. rate 18, height 1.829 m (6'), weight 84.9 kg (187 lb 3.2 oz), SpO2 95%.    GENERAL: Appears comfortable, in no distress.  HEENT: Eye symmetrical and without discharge or icterus bilaterally. Mucous membranes moist and without lesions.  NECK: Supple, JVD not visible.   CV: +VAD hum.   RESPIRATORY: Respirations regular, even, and unlabored. Lungs CTA throughout.   GI: Soft and non distended with normoactive bowel sounds present in all quadrants. No tenderness, rebound, guarding.   EXTREMITIES: No peripheral edema. Non pulsatile.   NEUROLOGIC: Alert and orientated x 3. No focal deficits.   MUSCULOSKELETAL: No joint swelling or tenderness.   SKIN: No jaundice. No rashes or lesions.     LABS:   Last CBC:   Recent Labs   Lab Test 07/09/24  1322   WBC 7.4   RBC  4.92   HGB 14.4   HCT 44.1   MCV 90   MCH 29.3   MCHC 32.7   RDW 14.1          Last CMP:  Recent Labs   Lab Test 07/10/24  0646 07/09/24  1320 07/08/24  1349   *   < > 136   POTASSIUM 4.5   < > 4.4   CHLORIDE 100   < > 100   NAM 9.2   < > 9.3   CO2 24   < > 24   BUN 16.7   < > 14.9   CR 0.93   < > 0.96   GLC 97   < > 121*   AST  --   --  42   ALT  --   --  104*   BILITOTAL  --   --  0.4   ALBUMIN  --   --  3.6   PROTTOTAL  --   --  6.8   ALKPHOS  --   --  200*    < > = values in this interval not displayed.       IMAGING:  Results for orders placed or performed during the hospital encounter of 07/09/24   CT Head w/o Contrast    Narrative    CT HEAD W/O CONTRAST 7/9/2024 1:46 PM    History:  Code Stroke to evaluate for potential thrombolysis and  thrombectomy. PLEASE READ IMMEDIATELY.     Comparison: None     Technique: Using multidetector thin collimation helical acquisition  technique, axial, coronal and sagittal CT images from the skull base  to the vertex were obtained without intravenous contrast.     Findings: There is no intracranial hemorrhage, mass effect or midline  shift. There is no focal loss of gray-white matter differentiation,  insular ribbon sign, or focally hyperdense artery to suggest acute  infarction. The ventricles are proportionate to the cerebral sulci.    The bony calvaria and the bones of the skull base appear normal. The  visualized portions of the paranasal sinuses and mastoid air cells are  unremarkable.       Impression    Impression:   1. No acute intracranial hemorrhage.  2. ASPECT Score: 10/10.    [Stroke Code Result: Negative]    Finding was identified on 7/9/2024 1:51 PM.     Dr. Colette JONES was contacted by Dr. Paul on 7/9/2024 1:52 PM  and verbalized understanding of the result.    [facility] Stroke Code Communication Guidelines:  [place] ED: [phone] (EB ED)  [place] Inpatient: [phone] (Resident Pager)   or Vocnina 'Stroke First Call Resident [[place]]'  or Amcom 0979  [facility]: [phone] ([facility] ED)  [facility]: Page [phone]    I have personally reviewed the examination and initial interpretation  and I agree with the findings.    ADRIANA RIZVI MD         SYSTEM ID:  [id number]   CTA Head Neck w Contrast    Narrative    EXAM: CTA HEAD NECK W CONTRAST  7/9/2024 1:48 PM     HISTORY:  Code Stroke to evaluate for potential thrombolysis and  thrombectomy. PLEASE READ IMMEDIATELY.       COMPARISON:  CT abdomen 823    TECHNIQUE:    HEAD and NECK CTA: During rapid bolus intravenous injection of  nonionic contrast material, axial images were obtained using thin  collimation multidetector helical technique from the base of the upper  aortic arch through the [place]. This CT angiogram data was  reconstructed at thin intervals with mild overlap. Images were sent to  the 3D workstation, and 3D reconstructions were obtained. The axial  source images, multiplanar reformations, 3D reconstructions in both  maximum intensity projection display and volume rendered models were  reviewed, with reconstructions performed by the technologist.    CONTRAST: iopamidol (ISOVUE-370) solution 67 mL    FINDINGS:  Head CTA demonstrates no intracranial arterial aneurysm or stenosis.  Anterior communicating artery is patent. Posterior commuting artery is  increased appear patent bilaterally.    Neck CTA demonstrates conventional aortic arch branching pattern and  patent great vessel origins. No internal carotid artery stenosis. No  vertebral artery stenosis.    Partially visualized implantable cardiac device and postprocedural  changes of the ascending aorta status post LVAD, better appreciated on  CT 6/22/2024. Small left pleural effusion.      Impression    IMPRESSION:    1. Head CTA demonstrates no aneurysm or stenosis of the major  intracranial arteries.   2. Neck CTA demonstrates no stenosis of the major cervical arteries.  3. Partially visualized layering pleural fluid in  the left hemithorax,  consistent with known left hydropneumothorax.     I have personally reviewed the examination and initial interpretation  and I agree with the findings.    ADRIANA RIZVI MD         SYSTEM ID:  [id number]   Echocardiogram Complete     Value    LVEF  15-20% (severely reduced)    [facility]    [id number]  [id number]  [id number]  [id number]^KIMBERLY^ADRIANA^KAMRAN     [facility],[place]  Echocardiography Laboratory  [address] [place], [place] [address]     Name: ALVARADO OCHOA  MRN: [id number]  : 1970  Study Date: 2024 03:04 PM  Age: 53 yrs  Gender: Male  Patient Location: [facility]  Reason For Study: CVA  Ordering Physician: ADRIANA HARRIS  Performed By: Jennifer Walls RDCS     BSA: 2.1 m2  Height: 72 in  Weight: 187 lb  ______________________________________________________________________________  Procedure  Complete Portable Echo Adult.  ______________________________________________________________________________  Interpretation Summary  s/p HeartMate 3 LVAD at 5400 rpm  LVIDD is 5.6 cm. Left ventricular function is decreased. The ejection fraction  is 15-20% (severely reduced).  LVAD cannula was seen in the expected anatomic position in the LV apex.  Outflow graft is visualized. Interventricular septum is midline.  The right ventricle is normal size. Global right ventricular function is  moderately reduced.  The aortic valve does not open during the cardiac cycle.  The inferior vena cava was normal in size with preserved respiratory  variability.  No pericardial effusion is present.     This study was compared with the study from 2024. Left ventricular size  is smaller.  ______________________________________________________________________________  Left Ventricle  Left ventricular wall thickness is normal. LVIDD is 5.6 cm. Left ventricular  function is decreased. The ejection fraction is 15-20% (severely reduced).  LVAD cannula was seen in the expected anatomic  position in the LV apex.  Outflow graft is visualized. Normal outflow graft velocities. Septum is  midline.     Right Ventricle  The right ventricle is normal size. Global right ventricular function is  moderately reduced. A pacemaker lead is noted in the right ventricle.     Atria  Both atria appear normal.     Mitral Valve  Trace mitral insufficiency is present.     Aortic Valve  The aortic valve is tricuspid. The aortic valve does not open during the  cardiac cycle.     Tricuspid Valve  Trace tricuspid insufficiency is present. The right ventricular systolic  pressure is 13mmHg above the right atrial pressure.     Pulmonic Valve  The valve leaflets are not well visualized. On Doppler interrogation, there is  no significant stenosis or regurgitation.     Vessels  The inferior vena cava was normal in size with preserved respiratory  variability.     Pericardium  No pericardial effusion is present.     Compared to Previous Study  This study was compared with the study from 2024 . Left ventricular size  is smaller.  ______________________________________________________________________________  MMode/2D Measurements & Calculations  IVSd: 0.94 cm  LVIDd: 5.6 cm  LVIDs: 5.2 cm  LVPWd: 0.87 cm  FS: 7.8 %  LV mass(C)d: 193.8 grams  LV mass(C)dI: 93.6 grams/m2  RWT: 0.31     Doppler Measurements & Calculations  TR max ofelia: 181.1 cm/sec  TR max P.1 mmHg     ______________________________________________________________________________  Report approved by: Dr Misha Hinojosa 2024 03:55 PM             PROCEDURES:  none    CONSULTATIONS:   Neurology  Ophthalmology  SLP    DISCHARGE MEDICATIONS:  Discharge Medication List as of 7/10/2024  3:46 PM        START taking these medications    Details   aspirin (ASA) 81 MG chewable tablet Take 1 tablet (81 mg) by mouth daily, Disp-90 tablet, R-3, E-Prescribe      rosuvastatin (CRESTOR) 20 MG tablet Take 1 tablet (20 mg) by mouth daily, Disp-90 tablet, R-3, E-Prescribe  "          CONTINUE these medications which have CHANGED    Details   warfarin ANTICOAGULANT (COUMADIN) 5 MG tablet Take two tablets (10 mg) daily. Next chromogenic factor X on 7/12 in clinic. Always follow the most recent instructions from your INR clinic., Historical           CONTINUE these medications which have NOT CHANGED    Details   empagliflozin (JARDIANCE) 10 MG TABS tablet Take 1 tablet (10 mg) by mouth daily, Disp-90 tablet, R-1, E-Prescribe      gabapentin (NEURONTIN) 100 MG capsule Take 2 capsules (200 mg) by mouth 3 times daily, Disp-90 capsule, R-0, E-Prescribe      methocarbamol (ROBAXIN) 750 MG tablet Take 1 tablet (750 mg) by mouth 4 times daily as needed for muscle spasms or other (post-op pain), Disp-30 tablet, R-0, E-Prescribe      pantoprazole (PROTONIX) 40 MG EC tablet Take 1 tablet (40 mg) by mouth every morning (before breakfast), Disp-60 tablet, R-0, E-Prescribe      polyethylene glycol (MIRALAX) 17 GM/Dose powder Take 17 g by mouth daily as needed, Disp-510 g, R-0, E-Prescribe      spironolactone (ALDACTONE) 25 MG tablet Take 1 tablet (25 mg) by mouth every evening, Disp-30 tablet, R-0, E-Prescribe           STOP taking these medications       lisinopril (ZESTRIL) 10 MG tablet Comments:   Reason for Stopping:         reason aspirin not prescribed, intentional, Comments:   Reason for Stopping:               DISCHARGE DISPOSITION: Navin Lutz will discharge to home in stable condition.     DISCHARGE INSTRUCTIONS:  Discharge Procedure Orders   Reason for your hospital stay   Order Comments: Vision changes (diplopia or right esotropia) likely due to transient ischemic event (TIA, \"mini stroke\")     Activity   Order Comments: Your activity upon discharge: activity as tolerated     Order Specific Question Answer Comments   Is discharge order? Yes      Adult P/[facility] Follow-up and recommended labs and tests   Order Comments: Follow up with cardiology LVAD clinic as scheduled with lab work " prior    Appointments on [place] and/or [facility] (with [facility] or [facility] provider or service). Call [phone] if you haven't heard regarding these appointments within 7 days of discharge.     Diet   Order Comments: Follow this diet upon discharge: Orders Placed This Encounter      Regular Diet Adult     Order Specific Question Answer Comments   Is discharge order? Yes      Stroke Hospital Follow Up (for neurologist use only)   Standing Status: Future Standing Exp. Date: 01/10/26   Order Comments: [employer] will call you to coordinate care as prescribed by your provider. If you don t hear from a representative within 2 business days, please call [phone].       Order Specific Question Answer Comments   Schedule Patient With: Any Stroke ARIEL or General Neurologist    Specific Diagnosis: posterior circulation TIA    Follow up range in weeks (after discharge) 2-3 months    Contact: Patient    Scheduling Instructions: [employer] will call you to coordinate care as prescribed by your provider. If you don t hear from a representative within 2 business days, please call [phone].        60 minutes spent in discharge, including >50% in counseling and coordination of care, medication review and plan of care recommended on follow up. Questions were answered, patient agrees to plan.

## 2024-07-10 NOTE — PROGRESS NOTES
Neuro: A&OX4, Up with SBA, neuro intact, had eye exam that dilated his pupils now is at 3mm.  Denies any diplopia currently.  Resp: On Room, denies SOB and SALEH, LS clear.  Cardiac: ST in 100s, no CP, AVSS, doppler map 70s-80s.  GI/: Regular diet, r/o dysphagia, tolerating liquids ok.  Voiding via bedside urinal with ease, last BM 7/9/24.  Patient was flagged for CP-  Plan: Will continue with cares and notify MD of status changes.

## 2024-07-10 NOTE — PROGRESS NOTES
ANTICOAGULATION  MANAGEMENT: Discharge Review    Navin Lutz chart reviewed for anticoagulation continuity of care    Hospital Admission on 7/9/24-7/10/24 for In summary, Navin Lutz is a 53 year old male left-handed male with PMHx CKD, HTN, HLD, R subclavian and axillary non-occlusive thrombus on warfarin, NSVT, HFrEF due to NICM s/p LVAD 6/14 (Heartmate 3) who presented to the ED with acute onset double vision. LKW 1245 PM. Pt was sitting and eating shrimp. When he was putting the shrimp tail back he noticed two bowls. Bowls were one next to other and covering either eye would resolved the double vision. On ED evaluation pt initially reported double vision which got better after coming back from CT scanner.      CT head unremarkable  CTA head and neck no LVO or stenosis or dissection     Impression   Double vision   Ddx includes but not limited to ICH SAH SDH ischemic stroke and or TIA.    SXS resolved    Discharge disposition: Home    Results:    Recent labs: (last 7 days)     07/03/24  1824 07/04/24  0548 07/05/24  0845 07/08/24  1349 07/09/24  1322 07/09/24  1933 07/10/24  0545   INR  --  1.91* 2.03* 2.62* 2.65* 2.47* 2.52*   [id number]  --  49* 44* 33* 32*  --  30*   AAUFH 0.43 0.49  --   --   --   --   --      Anticoagulation inpatient management:     home regimen continued    Anticoagulation discharge instructions:     Warfarin dosing: home regimen continued   Bridging: No   INR goal change: No      Medication changes affecting anticoagulation: No    Additional factors affecting anticoagulation: No     PLAN     No adjustment to anticoagulation plan needed    Patient not contacted    No adjustment to Anticoagulation Calendar was required Pt already scheduled for INR check on 7/12/24    Gracie Blair RN

## 2024-07-10 NOTE — PROCEDURES
The patient's HeartMate LVAD was interrogated 7/10/2024  * Speed 5400 rpm   * Pulsatility index 3   * Power 4 Pizano   * Flow 4.1-4.9 L/minute   Fluid status: euvolemic   Alarms were reviewed, and notable for rare PI events.   The driveline exit site was inspected, dressing cdi.   All external components were inspected and showed no evidence of damage or malfunction, none replaced.   No changes to VAD settings made

## 2024-07-10 NOTE — CONSULTS
OPHTHALMOLOGY CONSULT NOTE      Patient: Navin Lutz  Consulted by: [facility]  Reason for Consult: Acute onset diplopia  Date of initial evaluation: 07/09/24    ASSESSMENT/PLAN:     Navin Lutz is a 53 year old male who presents with     # Acute onset binocular diplopia - resolved  VA 20/25 each eye. IOP's nml. No APD. EOMs full and painless. VF grossly full to confrontation. Patient reports approximately one hour of acute onset binocular diplopia associated with vertigo and leaning to right side. He states during this episode he had horizontal diplopia and his right eye lateral gaze was restricted. His diplopia has resolved at time of evaluation. Anterior and posterior segment examinations are within normal. His presentation is overall most consistent with TIA leading to acute abducens palsy in the setting of likely hypercoagulable state (lupus anticoagulant positive, history of provoked DVT's); patient also has had a fairly recent LVAD placement raising question of cardioembolic source. Also could represent an episode of myasthenia gravis or convergence spasm both of which can be abducens palsy mimics.  - Agree with repeat head CT and TTE per neurology  - Anti-Ach receptor and MuSK antibodies for MG evaluation  - If this episode occurs again, please obtain a video and ask patient to look in all directions of gaze.    It is our pleasure to participate in this patient's care and treatment. Please contact us with any further questions or concerns. Ophthalmology will sign off at this time.    Patient discussed with neuro-ophthalmology fellow Salvatore Butt.    Subhash Adams MD  Resident Physician, PGY-2  Department of Ophthalmology      HISTORY OF PRESENTING ILLNESS:     Navin Lutz is a 53 year old male w/ history of NICM c/'b HFrEF s/p LVAD placement on 6/14/24, lupus anticoagulant positive, hx of provoked DVT's who presents for evaluation of acute onset diplopia which occurred around 12:30pm today. He  states that he was sitting on the couch eating shrimp from a bowl when he noticed acute onset double vision. He states the bowls became stacked horizontally. This was associated with some vertigo and the feeling that he wanted to lean to the right side. He closed both eyes individually and the diplopia would resolve. He then states he realized his double vision was most profound in right gaze, and that his right eye would not abduct appropriately. He also notes some mild retro-orbital pain. He states that by the time he arrived at the ED about 1 hour later the diplopia had resolved. He has not noticed any other neurologic issues.    Ocular ROS negative for vision loss, flashes of light, floaters, eye pain, redness, or discharge. Patient denies recent unexplained fatigue, fevers, weight loss, joint pain or stiffness, new headaches, scalp tenderness, jaw claudication (tiredness with talking or chewing), or double vision.     Review of systems were otherwise negative except for that which has been stated above.      OCULAR/MEDICAL/SURGICAL HISTORIES:     Past Ocular History:  Last eye exam: 1-2 years ago  Prior eye surgery/laser: None  Contact lens wear: None  Glasses: Reading glasses  Eyedrops: None    Family History:  Not discussed    Social History:  Not discussed    No past medical history on file.    Past Surgical History:   Procedure Laterality Date    CARDIAC SURGERY      COLONOSCOPY N/A 8/25/2023    Procedure: COLONOSCOPY, WITH POLYPECTOMY AND BIOPSY;  Surgeon: Alejandro Rodriguez MD;  Location:  GI    CV INTRA AORTIC BALLOON N/A 6/12/2024    Procedure: Intra aortic Balloon Pump Insertion;  Surgeon: Elfego Cruz MD;  Location:  HEART CARDIAC CATH LAB    CV RIGHT HEART CATH MEASUREMENTS RECORDED N/A 08/22/2023    Procedure: Heart Cath Right Heart Cath;  Surgeon: Elfego Cruz MD;  Location:  HEART CARDIAC CATH LAB    CV RIGHT HEART CATH MEASUREMENTS RECORDED N/A  9/20/2023    Procedure: Heart Cath Right Heart Cath;  Surgeon: Elfego Cruz MD;  Location:  HEART CARDIAC CATH LAB    CV RIGHT HEART CATH MEASUREMENTS RECORDED N/A 1/19/2024    Procedure: Heart Cath Right Heart Cath;  Surgeon: Tobin Jaime MD;  Location:  HEART CARDIAC CATH LAB    CV RIGHT HEART CATH MEASUREMENTS RECORDED N/A 5/3/2024    Procedure: Heart Cath Right Heart Cath;  Surgeon: Dion Ashley MD;  Location:  HEART CARDIAC CATH LAB    CV RIGHT HEART CATH MEASUREMENTS RECORDED N/A 6/4/2024    Procedure: Right Heart Catheterization;  Surgeon: Cassandra Rizzo MD;  Location:  HEART CARDIAC CATH LAB    CV RIGHT HEART CATH MEASUREMENTS RECORDED N/A 6/12/2024    Procedure: Right Heart Catheterization;  Surgeon: Elfego Cruz MD;  Location:  HEART CARDIAC CATH LAB    INSERT VENTRICULAR ASSIST DEVICE LEFT (HEARTMATE II) N/A 6/14/2024    Procedure: Median Sternotomy, INSERTION, LEFT VENTRICULAR ASSIST DEVICE (HEARTMATE III), on Cardiopulmonary Bypass, Transesophageal Echocardiogram by Anesthesia;  Surgeon: Brian Jhaveri MD;  Location: [facility] OR    IR CHEST TUBE PLACEMENT NON-TUNNELLED LEFT  6/28/2024    IR THORACENTESIS  6/25/2024    PICC DOUBLE LUMEN PLACEMENT Right 08/22/2023    Brachial Vein Medial 5F DL 45 cm, 2 cm out       EXAMINATION:     Base Eye Exam       Visual Acuity (Snellen - Linear)         Right Left    Near sc 20/25 20/25              Tonometry (Tonopen, 4:33 PM)         Right Left    Pressure 13 14              Pupils         Pupils APD    Right PERRL None    Left PERRL None              Visual Fields         Left Right     Full Full              Extraocular Movement         Right Left     Full Full              Dilation       Left eye: 1.0% Mydriacyl, 2.5% Benji Synephrine @ 4:33 PM                  Slit Lamp and Fundus Exam       External Exam         Right Left    External Normal Normal              Slit Lamp Exam         Right Left     Lids/Lashes Normal, no lacrimal gland enlargment Normal, no lacrimal gland enlargment    Conjunctiva/Sclera White and quiet, no injection, no perilimbal flush, no enlarged or violacious vessels White and quiet, no injection, no perilimbal flush, no enlarged or violacious vessels    Cornea Clear Clear    Anterior Chamber Deep and quiet, no cell Deep and quiet, no cell    Iris Round and reactive, no nodules, no synechia, no TIDs --> pharm dilated Round and reactive, no nodules, no synechia, no TIDs --> pharm dilated    Lens 1+ NS 1+ NS    Anterior Vitreous Normal, no cell Normal, no cell              Fundus Exam         Right Left    Disc Normal, pink, sharp margins Normal    C/D Ratio 0.3 0.3    Macula Normal, sharp FLR, no lesions/exudates/edema Normal, sharp FLR, no lesions/exudates/edema    Vessels Normal, no vascular sheathing or hemorrages Normal, no vascular sheathing or hemorrages    Periphery Normal, no lesions or CWS, no retinal whitening Normal, no lesions or CWS, no retinal whitening                    Labs/Studies/Imaging Performed:    Non-contrasted head CT and CTA head and neck personally reviewed - no acute intracranial abnormalities or LVO.     I discussed this patient with the fellow, Salvatore Butt, but did not see this patient.  The concern here would be Transient ischemic attack given the nonisolated nature of the esotropia.      Misha Montelongo MD

## 2024-07-10 NOTE — PROGRESS NOTES
[facility]    Stroke Progress Note    Interval Events  Symptoms resolved over an hour.  No acute concerns overnight.    On rediscussing history, patient affirms to me that he did not have any dizziness or vertigo yesterday, just double vision.  He had an episode of vertigo 2 years ago with swaying to the right side, but that last just for a few minutes.  He did not have the symptoms yesterday.    HPI Summary  Navin Lutz is a 53 year old left-handed male with PMHx CKD, HTN, HLD, R subclavian and axillary non-occlusive thrombus on warfarin, NSVT, HFrEF due to NICM s/p LVAD 6/14 (Heartmate 3) who presented to the ED with acute onset double vision for which stroke code was called.     Last known well at 12:45 PM when patient was sitting on a couch and having shrimp.  When he ate the shrimp and put the tail back, he noticed that there were 2 tails [id number].  This is when he realized that he started having full vision.  This resolved on closing 1 eye.  No other focal neurological deficits.  He was not having any dizziness or nausea.     His mean arterial pressure was 80 (unable to get SBP due to LVAD).  He is on warfarin, last dose was yesterday night.  His factor X chromogenic from this morning was 33%, and a level of 20-40% corresponds to an INR of 2-3 so technically he was therapeutic (INR is not accurate on him, but it was 2.65 as well).     Wife notes that on his LVAD monitor his pulsatility index was also elevated at 4.8 at the time of episode.    TIA Evaluation Summarized    MRI and/or Head CT                        Impression:   1. No acute intracranial hemorrhage.  2. ASPECT Score: 10/10.   Intracranial Vasculature Head CTA demonstrates no aneurysm or stenosis of the major  intracranial arteries.    Cervical Vasculature Neck CTA demonstrates no stenosis of the major cervical arteries.      Echocardiogram s/p HeartMate 3 LVAD at 5400 rpm  LVIDD is 5.6 cm.  Left ventricular function is decreased. The ejection fraction  is 15-20% (severely reduced).  LVAD cannula was seen in the expected anatomic position in the LV apex.  Outflow graft is visualized. Interventricular septum is midline.  The right ventricle is normal size. Global right ventricular function is  moderately reduced.  The aortic valve does not open during the cardiac cycle.  The inferior vena cava was normal in size with preserved respiratory  variability.  No pericardial effusion is present.   EKG/Telemetry Accelerated Junctional rhythm with occasional Premature ventricular complexes    Other Testing Not Applicable      LDL 7/10/2024: 136 mg/dL   A1C 7/10/2024: 5.5 %       ABCD2 Patients Score   Age ? 60 years 1 point 0   Blood Pressure    SBP ? 140 or DBP ?  90    1 point 0   Clinical Features    - Unilateral weakness    - Speech disturbance w/o weakness    - Other    2 points  1 point    0 points 0   Duration of symptoms    ? 60 minutes    10-59 minutes    < 10 minutes   2 points  1 point  0 points 2   Diabetes  1 point 0   Patient s ABCD2 Score (0-7) = 2       Impression   #Posterior circulation TIA  53-year-old male with above-mentioned PMHx that includes a recent LVAD placement on 6/14 who came with acute onset double vision, LK W 12:45 PM on 7/9. NIHSS of 0, though with direction changing nystagmus (left beating on left gaze, and right beating on right gaze) possibly suggestive of central etiology. Symptoms improving per patient and diplopia is almost resolved. CT/CTA unremarkable     Symptoms resolved by an hour/hour and a half, so this would represent a TIA and ABCD2 score is 2.  On asking patient, it seems that perhaps he had another different posterior circulation TIA couple of years ago with the vertigo and gait swaying that resolved.  Given the symptoms are occurring even on the therapeutic warfarin per chromogenic factor, it is reasonable to add aspirin to warfarin.    We are getting a repeat  CT head today to look for any changes, and if this is negative, then patient can be discharged from our perspective.  His LDL is 136, so would recommend initiation of high intensity statin.  Echo shows reduced EF of 15 to 20% and an LVAD, but no thrombus, would defer further cardiac workup to cardiology.  We will arrange for follow-up in stroke clinic.      Plan  -Repeat CT head today.  If this is stable, then patient can be discharged from a neurological perspective  -Agree with aspirin 81 mg in addition to warfarin.    Patient Follow-up    -Stroke follow-up in 2 to 3 months (ordered)  -We will plan to sign off at this point.  Kindly call us with further questions.  Patient can be discharged from a neurological perspective if repeat CT head is stable..    No further stroke evaluation is recommended, so we will sign off. Please contact us with any additional questions.    Medically Ready for Discharge: Ready Now    The patient was discussed with Stroke Staff Dr. Diaz.    Robert Wagoner MD  Neurology Resident  Pager:  [phone]  ______________________________________________________    Clinically Significant Risk Factors Present on Admission               # Drug Induced Coagulation Defect: home medication list includes an anticoagulant medication    # Hypertension: Home medication list includes antihypertensive(s)  # End stage heart failure: Ventricular assist device (VAD) present            # Overweight: Estimated body mass index is 25.39 kg/m  as calculated from the following:    Height as of this encounter: 1.829 m (6').    Weight as of this encounter: 84.9 kg (187 lb 3.2 oz).       # Financial/Environmental Concerns:    # Asthma: noted on problem list  # ICD device present            Medications   Scheduled Meds  Current Facility-Administered Medications   Medication Dose Route Frequency Provider Last Rate Last Admin    aspirin (ASA) chewable tablet 81 mg  81 mg Oral Daily Sadiq Gillette MD   81 mg at 07/10/24 0810     empagliflozin (JARDIANCE) tablet 10 mg  10 mg Oral Daily Sadiq Gillette MD   10 mg at 07/10/24 0810    gabapentin (NEURONTIN) capsule 200 mg  200 mg Oral TID Sadiq Gillette MD   200 mg at 07/10/24 0810    pantoprazole (PROTONIX) EC tablet 40 mg  40 mg Oral QAM AC Sadiq Gillette MD   40 mg at 07/10/24 0700    polyethylene glycol (MIRALAX) Packet 17 g  17 g Oral Daily Sadiq Gillette MD   17 g at 07/10/24 0810    rosuvastatin (CRESTOR) tablet 20 mg  20 mg Oral Daily Jennifer Silverman APRN CNP   20 mg at 07/10/24 1138    sodium chloride (PF) 0.9% PF flush 3 mL  3 mL Intracatheter Q8H Sadiq Gillette MD   3 mL at 07/10/24 0546    spironolactone (ALDACTONE) tablet 25 mg  25 mg Oral QPM Sadiq Gillette MD        warfarin ANTICOAGULANT (COUMADIN) tablet 10 mg  10 mg Oral ONCE at 18:00 Cassandra Rizzo MD        Warfarin Dose Required Daily - Pharmacist Managed  1 each Oral See Admin Instructions Sadiq Gillette MD           Infusion Meds  Current Facility-Administered Medications   Medication Dose Route Frequency Provider Last Rate Last Admin    medication instruction   Does not apply Continuous PRN Sadiq Gillette MD           PRN Meds  Current Facility-Administered Medications   Medication Dose Route Frequency Provider Last Rate Last Admin    lidocaine (LMX4) cream   Topical Q1H PRN Sadiq Gillette MD        lidocaine 1 % 0.1-1 mL  0.1-1 mL Other Q1H PRN Sadiq Gillette MD        medication instruction   Does not apply Continuous PRN Sadiq Gillette MD        methocarbamol (ROBAXIN) tablet 750 mg  750 mg Oral 4x Daily PRN Sadiq Gillette MD        senna-docusate (SENOKOT-S/PERICOLACE) 8.6-50 MG per tablet 1 tablet  1 tablet Oral BID PRN Sadiq Gillette MD        Or    senna-docusate (SENOKOT-S/PERICOLACE) 8.6-50 MG per tablet 2 tablet  2 tablet Oral BID PRN Sadiq Gillette MD        sodium chloride (PF) 0.9% PF flush 3 mL  3 mL Intracatheter q1 min prn Sadiq Gillette MD              PHYSICAL EXAMINATION  Temp:  [97.5  F (36.4  C)-98.1  F  (36.7  C)] 97.5  F (36.4  C)  Pulse:  [] 109  Resp:  [14-19] 16  BP: ()/() 86/75  Cuff Mean (mmHg):  [131] 131  SpO2:  [93 %-98 %] 94 %      General Exam  General: No acute distress.  HEENT: Normocephalic, atraumatic. Sclera anicteric. No nasal drainage or epistaxis.  CV: Extremities appear to be appropriately perfused.  Pulmonary: Breathing comfortably on room air. No accessory muscle use.  GI/: Soft, non-distended.  MSK: No LE edema.  Integument: Warm, dry. No jaundice.     Neuro Exam  Mental Status:  alert, oriented x 3, follows commands, speech clear and fluent, naming and repetition normal  Cranial Nerves:  visual fields intact, PERRL, EOMI with normal smooth pursuit, facial sensation intact and symmetric, facial movements symmetric, no dysarthria, tongue protrusion midline  Motor:  normal muscle tone and bulk, no abnormal movements, antigravity without drift in all 4 extremities.  On formal strength testing, has trace right finger extension weakness but this he says is chronic after his surgery.  Reflexes:  toes down-going  Sensory:  light touch sensation intact and symmetric throughout upper and lower extremities, no extinction on double simultaneous stimulation   Coordination:  normal finger-to-nose and heel-to-shin bilaterally without dysmetria  Station/Gait:  deferred    Stroke Scales    NIHSS  1a. Level of Consciousness 0-->Alert, keenly responsive   1b. LOC Questions 0-->Answers both questions correctly   1c. LOC Commands 0-->Performs both tasks correctly   2.   Best Gaze 0-->Normal   3.   Visual 0-->No visual loss   4.   Facial Palsy 0-->Normal symmetrical movements   5a. Motor Arm, Left 0-->No drift, limb holds 90 (or 45) degrees for full 10 secs   5b. Motor Arm, Right 0-->No drift, limb holds 90 (or 45) degrees for full 10 secs   6a. Motor Leg, Left 0-->No drift, leg holds 30 degree position for full 5 secs   6b. Motor Leg, right 0-->No drift, leg holds 30 degree position for full  5 secs   7.   Limb Ataxia 0-->Absent   8.   Sensory 0-->Normal, no sensory loss   9.   Best Language 0-->No aphasia, normal   10. Dysarthria 0-->Normal   11. Extinction and Inattention  0-->No abnormality   Total 0 (07/09/24 1329)       Modified Franklin Score (Pre-morbid)    -      Imaging  I personally reviewed all imaging; relevant findings per HPI.     Lab Results Data   CBC  Recent Labs   Lab 07/09/24  1322 07/04/24  0548   WBC 7.4 7.9   RBC 4.92 4.16*   HGB 14.4 12.3*   HCT 44.1 37.2*    288     Basic Metabolic Panel    Recent Labs   Lab 07/10/24  0646 07/09/24  1324 07/09/24  1322 07/09/24  1320 07/08/24  1349   *  --  137  --  136   POTASSIUM 4.5  --  4.4  --  4.4   CHLORIDE 100  --  101  --  100   CO2 24  --  27  --  24   BUN 16.7  --  17.3  --  14.9   CR 0.93 1.1 1.00  --  0.96   GLC 97  --  123* 131* 121*   NAM 9.2  --  9.5  --  9.3     Liver Panel  Recent Labs   Lab 07/10/24  0646 07/08/24  1349 07/05/24  0845   PROTTOTAL 6.7 6.8 6.9   ALBUMIN 3.5 3.6 3.5   BILITOTAL 0.4 0.4 0.5   ALKPHOS 198* 200* 206*   AST 39 42 51*   ALT 95* 104* 136*     INR    Recent Labs   Lab Test 07/10/24  0545 07/09/24  1933 07/09/24  1322   INR 2.52* 2.47* 2.65*      Lipid Profile    Recent Labs   Lab Test 07/10/24  0646 11/18/23  0018 08/22/23  1037   CHOL 195 161 245*   HDL 36* 48 42   * 96 180*   TRIG 117 85 114     A1C    Recent Labs   Lab Test 07/10/24  0545 08/22/23  2200   A1C 5.5 5.6     Troponin    Recent Labs   Lab 07/09/24 2029 07/09/24  1322   CTROPT 115* 122*          Data

## 2024-07-10 NOTE — PHARMACY-ANTICOAGULATION SERVICE
Clinical Pharmacy - Warfarin Dosing Consult     Pharmacy has been consulted to manage this patient s warfarin therapy.  Indication: LVAD/RVAD  Therapy Goal: Other - (CFX 20-30% (NEW goal))  Provider/Team: Moisés ROMAN Anticoag Clinic: [facility]  Warfarin Prior to Admission: Yes  Warfarin PTA Regimen: 7.5 mg Sun, Tues, Fri, 10mg all other days  Significant drug interactions: aspirin (can increase risk of bleeding but will not impact INR) (started inpatient 2/2 concern for stroke/TIA)  Recent documented change in oral intake/nutrition: Unknown    INR   Date Value Ref Range Status   07/10/2024 2.52 (H) 0.85 - 1.15 Final   07/09/2024 2.47 (H) 0.85 - 1.15 Final     Factor 10 Chromogenic   Date Value Ref Range Status   07/10/2024 30 (L) 70 - 130 % Final     Factor 2 Assay   Date Value Ref Range Status   06/21/2024 27 (L) 60 - 140 % Final       Plan for discharge: Recommend warfarin 10 mg daily until next CFX level check on Friday 7/12/24.     Pharmacy will monitor Navin Lutz daily and order warfarin doses to achieve specified goal.    Please contact pharmacy as soon as possible if the warfarin needs to be held for a procedure or if the warfarin goals change.

## 2024-07-11 ENCOUNTER — CARE COORDINATION (OUTPATIENT)
Dept: CARDIOLOGY | Facility: CLINIC | Age: 54
End: 2024-07-11
Payer: COMMERCIAL

## 2024-07-11 DIAGNOSIS — G45.9 TIA (TRANSIENT ISCHEMIC ATTACK): Primary | ICD-10-CM

## 2024-07-12 ENCOUNTER — OFFICE VISIT (OUTPATIENT)
Dept: CARDIOLOGY | Facility: CLINIC | Age: 54
End: 2024-07-12
Attending: STUDENT IN AN ORGANIZED HEALTH CARE EDUCATION/TRAINING PROGRAM
Payer: COMMERCIAL

## 2024-07-12 ENCOUNTER — ANTICOAGULATION THERAPY VISIT (OUTPATIENT)
Dept: ANTICOAGULATION | Facility: CLINIC | Age: 54
End: 2024-07-12

## 2024-07-12 ENCOUNTER — LAB (OUTPATIENT)
Dept: LAB | Facility: CLINIC | Age: 54
End: 2024-07-12
Payer: COMMERCIAL

## 2024-07-12 VITALS — SYSTOLIC BLOOD PRESSURE: 92 MMHG | OXYGEN SATURATION: 95 % | WEIGHT: 187.9 LBS | BODY MASS INDEX: 25.48 KG/M2

## 2024-07-12 DIAGNOSIS — I50.22 CHRONIC SYSTOLIC HEART FAILURE (H): ICD-10-CM

## 2024-07-12 DIAGNOSIS — Z95.811 LVAD (LEFT VENTRICULAR ASSIST DEVICE) PRESENT (H): ICD-10-CM

## 2024-07-12 DIAGNOSIS — I50.32 CHRONIC DIASTOLIC CONGESTIVE HEART FAILURE (H): ICD-10-CM

## 2024-07-12 DIAGNOSIS — I50.22 CHRONIC SYSTOLIC CONGESTIVE HEART FAILURE (H): Primary | ICD-10-CM

## 2024-07-12 DIAGNOSIS — G45.9 TIA (TRANSIENT ISCHEMIC ATTACK): ICD-10-CM

## 2024-07-12 DIAGNOSIS — Z94.1 HEART REPLACED BY TRANSPLANT (H): ICD-10-CM

## 2024-07-12 DIAGNOSIS — I82.622 ACUTE DEEP VEIN THROMBOSIS (DVT) OF OTHER VEIN OF LEFT UPPER EXTREMITY (H): ICD-10-CM

## 2024-07-12 DIAGNOSIS — I50.42 CHRONIC COMBINED SYSTOLIC AND DIASTOLIC HEART FAILURE (H): Primary | ICD-10-CM

## 2024-07-12 DIAGNOSIS — Z79.01 ANTICOAGULATED ON COUMADIN: ICD-10-CM

## 2024-07-12 DIAGNOSIS — I50.22 CHRONIC SYSTOLIC CONGESTIVE HEART FAILURE (H): ICD-10-CM

## 2024-07-12 DIAGNOSIS — Z79.01 ANTICOAGULATED ON WARFARIN: ICD-10-CM

## 2024-07-12 DIAGNOSIS — I10 ESSENTIAL HYPERTENSION: ICD-10-CM

## 2024-07-12 LAB
ALBUMIN SERPL BCG-MCNC: 3.7 G/DL (ref 3.5–5.2)
ALP SERPL-CCNC: 191 U/L (ref 40–150)
ALT SERPL W P-5'-P-CCNC: 88 U/L (ref 0–70)
ANION GAP SERPL CALCULATED.3IONS-SCNC: 11 MMOL/L (ref 7–15)
AST SERPL W P-5'-P-CCNC: 39 U/L (ref 0–45)
BILIRUB SERPL-MCNC: 0.3 MG/DL
BUN SERPL-MCNC: 18.3 MG/DL (ref 6–20)
CALCIUM SERPL-MCNC: 9.6 MG/DL (ref 8.6–10)
CHLORIDE SERPL-SCNC: 104 MMOL/L (ref 98–107)
CREAT SERPL-MCNC: 0.99 MG/DL (ref 0.67–1.17)
DEPRECATED HCO3 PLAS-SCNC: 22 MMOL/L (ref 22–29)
EGFRCR SERPLBLD CKD-EPI 2021: >90 ML/MIN/1.73M2
ERYTHROCYTE [DISTWIDTH] IN BLOOD BY AUTOMATED COUNT: 13.7 % (ref 10–15)
FACT X ACT/NOR PPP CHRO: 25 % (ref 70–130)
FERRITIN SERPL-MCNC: 368 NG/ML (ref 31–409)
GLUCOSE SERPL-MCNC: 117 MG/DL (ref 70–99)
HCT VFR BLD AUTO: 43.1 % (ref 40–53)
HGB BLD-MCNC: 13.9 G/DL (ref 13.3–17.7)
INR PPP: 3.19 (ref 0.85–1.15)
IRON BINDING CAPACITY (ROCHE): 272 UG/DL (ref 240–430)
IRON SATN MFR SERPL: 16 % (ref 15–46)
IRON SERPL-MCNC: 44 UG/DL (ref 61–157)
LDH SERPL L TO P-CCNC: 243 U/L (ref 0–250)
MCH RBC QN AUTO: 28.4 PG (ref 26.5–33)
MCHC RBC AUTO-ENTMCNC: 32.3 G/DL (ref 31.5–36.5)
MCV RBC AUTO: 88 FL (ref 78–100)
NT-PROBNP SERPL-MCNC: 731 PG/ML (ref 0–900)
PLATELET # BLD AUTO: 294 10E3/UL (ref 150–450)
POTASSIUM SERPL-SCNC: 4.1 MMOL/L (ref 3.4–5.3)
PROT SERPL-MCNC: 6.9 G/DL (ref 6.4–8.3)
RBC # BLD AUTO: 4.89 10E6/UL (ref 4.4–5.9)
SODIUM SERPL-SCNC: 137 MMOL/L (ref 135–145)
TRANSFERRIN SERPL-MCNC: 228 MG/DL (ref 200–360)
WBC # BLD AUTO: 5.2 10E3/UL (ref 4–11)

## 2024-07-12 PROCEDURE — 83880 ASSAY OF NATRIURETIC PEPTIDE: CPT | Performed by: PATHOLOGY

## 2024-07-12 PROCEDURE — 83615 LACTATE (LD) (LDH) ENZYME: CPT | Performed by: PATHOLOGY

## 2024-07-12 PROCEDURE — 99000 SPECIMEN HANDLING OFFICE-LAB: CPT | Performed by: PATHOLOGY

## 2024-07-12 PROCEDURE — 82728 ASSAY OF FERRITIN: CPT | Performed by: PATHOLOGY

## 2024-07-12 PROCEDURE — 93282 PRGRMG EVAL IMPLANTABLE DFB: CPT | Performed by: INTERNAL MEDICINE

## 2024-07-12 PROCEDURE — 85260 CLOT FACTOR X STUART-POWER: CPT | Performed by: STUDENT IN AN ORGANIZED HEALTH CARE EDUCATION/TRAINING PROGRAM

## 2024-07-12 PROCEDURE — 84466 ASSAY OF TRANSFERRIN: CPT | Performed by: INTERNAL MEDICINE

## 2024-07-12 PROCEDURE — 93750 INTERROGATION VAD IN PERSON: CPT | Performed by: INTERNAL MEDICINE

## 2024-07-12 PROCEDURE — 86041 ACETYLCHOLN RCPTR BNDNG ANTB: CPT | Mod: 90 | Performed by: PATHOLOGY

## 2024-07-12 PROCEDURE — 85610 PROTHROMBIN TIME: CPT | Performed by: PATHOLOGY

## 2024-07-12 PROCEDURE — 83550 IRON BINDING TEST: CPT | Performed by: PATHOLOGY

## 2024-07-12 PROCEDURE — 85027 COMPLETE CBC AUTOMATED: CPT | Performed by: PATHOLOGY

## 2024-07-12 PROCEDURE — 86833 HLA CLASS II HIGH DEFIN QUAL: CPT | Performed by: STUDENT IN AN ORGANIZED HEALTH CARE EDUCATION/TRAINING PROGRAM

## 2024-07-12 PROCEDURE — 99215 OFFICE O/P EST HI 40 MIN: CPT | Mod: 25 | Performed by: INTERNAL MEDICINE

## 2024-07-12 PROCEDURE — 86832 HLA CLASS I HIGH DEFIN QUAL: CPT | Performed by: STUDENT IN AN ORGANIZED HEALTH CARE EDUCATION/TRAINING PROGRAM

## 2024-07-12 PROCEDURE — 83540 ASSAY OF IRON: CPT | Performed by: PATHOLOGY

## 2024-07-12 PROCEDURE — 80053 COMPREHEN METABOLIC PANEL: CPT | Performed by: PATHOLOGY

## 2024-07-12 PROCEDURE — 84238 ASSAY NONENDOCRINE RECEPTOR: CPT | Mod: 90 | Performed by: PATHOLOGY

## 2024-07-12 PROCEDURE — 83519 RIA NONANTIBODY: CPT | Mod: 90 | Performed by: PATHOLOGY

## 2024-07-12 PROCEDURE — 36415 COLL VENOUS BLD VENIPUNCTURE: CPT | Performed by: PATHOLOGY

## 2024-07-12 PROCEDURE — 99213 OFFICE O/P EST LOW 20 MIN: CPT | Mod: 25 | Performed by: INTERNAL MEDICINE

## 2024-07-12 RX ORDER — LISINOPRIL 10 MG/1
10 TABLET ORAL DAILY
Qty: 90 TABLET | Refills: 3 | Status: SHIPPED | OUTPATIENT
Start: 2024-07-12 | End: 2024-07-31

## 2024-07-12 ASSESSMENT — PAIN SCALES - GENERAL: PAINLEVEL: NO PAIN (0)

## 2024-07-12 NOTE — PROGRESS NOTES
ANTICOAGULATION MANAGEMENT     Navin Lutz 53 year old male is on warfarin with therapeutic result. (Goal Chromogenic Factor X 30-20% )    Recent labs: (last 7 days)     07/12/24  0806   INR 3.19*   [id number] 25*       ASSESSMENT     Source(s): Chart Review and Patient/Caregiver Call     Warfarin doses taken: Warfarin taken as instructed  Diet: No new diet changes identified  Medication/supplement changes:  Resume taking Lisinopril- no interactions anticipated.   New illness, injury, or hospitalization: No  Signs or symptoms of bleeding or clotting: No  Previous result: Therapeutic last 2(+) visits  Additional findings:  Goal range changed 7/9 from 40-20% to 30-20% due to suspected TIA.  Possibly moving back home around August 1st --will need to fax orders to home lab when that's determined where he's going.      PLAN     Recommended plan for no diet, medication or health factor changes affecting result    Dosing Instructions: Continue your current warfarin dose 7.5mg Sun/tues/Friday & 10mg all other days  with next Chromogenic Factor X in 5 days       Telephone call with Navin who verbalizes understanding and agrees to plan and who agrees to plan and repeated back plan correctly    Lab visit scheduled    Education provided:   Please call back if any changes to your diet, medications or how you've been taking warfarin    Plan made per [facility] anticoagulation protocol and per LVAD protocol    Brandee Gaines RN  Anticoagulation Clinic  7/12/2024

## 2024-07-12 NOTE — PATIENT INSTRUCTIONS
It was a pleasure to see you in clinic today, please do not hesitate to call us for questions or concerns.  We look forward to seeing you back in clinic on 9/4/24, when you return to see Dr. Silvestre.    Jessenia Rivera RN    Electrophysiology Nurse Clinician  [facility]    During Business Hours Please Call:  [phone]  After Hours Please Call:  [phone] - select option #4 and ask for job code [phone]

## 2024-07-12 NOTE — PROGRESS NOTES
[facility]  Advanced Heart Failure Clinic    Patient Name: Navin Lutz   : 1970   Date of Visit: 2024    Primary Care Physician: Libertad Briceno NP     Referring Physician: Libertad Briceno NP      Reason for Visit: LVAD    Dear Libertad Briceno NP     I had the pleasure of seeing Navin Lutz today in the [facility] Advanced Heart Failure Clinic. As you know Navin Lutz is a pleasant 53 year old year old male, who presents today for further evaluation of LVAD.      Navin has a history of NICM c/b HFrEF s/p Heartmate 3 LVAD on 24.   His postoperative course was relatively unremarkable.  He was treated with antibiotics for Klebsiella pneumonia with a 10-day course.  He developed a left pleural effusion that required to be drained with a pigtail catheter.  He also had abnormal elevated LFTs which were thought to be iatrogenic due to multiple medications (statin, acetaminophen, azithromycin).  Acetaminophen and statin were held.  His LFTs have remained elevated    During workup for his LVAD he was found to have INRs that were discordant with Chromogenic Factor X and hematology was consulted. Found to be lupus anticoagulant positive, and Chromogenic factor X monitoring was recommended for warfarin dosing as INR not reliable. Goal CFX 20-40% which correlates with INR 2-3. Hematology recommended repeat outpatient lupus anticoagulant testing at the end of July, and if negative, INR monitoring may be an option.       He presented back to ER soon after discharge with sudden onset focal neurologic deficit with diplopia. Symptoms resolved on hospital day 1. He has several risk factors for stroke including recent VAD and known lupus anticoagulant positivity. Note: CFx 10 had been borderline subtherapeutic at time of hospital discharge and after discharge. CTA without large vessel occlusion. EKG is NSR, no hx of afib or flutter. Neurology consulted -transthoracic echo  with bubble study was unremarkable, head CT negative for ischemia or bleed on 7/9 and 7/10. Optho consulted - Anti-Ach receptor and MUSK antibodies recommend to r/o myasthenia gravis but not drawn in hospital, and are scheduled to be drawn today.  He was also started on aspirin 81 mg daily.  In hospital, he is chromogenic factor goal was changed from 20-40% to 20-30%.     He presents today for LVAD clinic follow-up. His main complaint is altered sense of taste where most food tastes rotten, making it difficult to eat. This has been present since initial hospitalization for LVAD implantation and well before TIA. He had COVID in 2022 with altered taste and smell, but things had improved since then until this recent worsening. His vision is back to normal, no other concerns. He is walking 2-2.5 miles per day. He denies chest pain, shortness of breath, PND/orthopnea, palpitations, lightheadedness, syncope, leg swelling, LVAD alarms, dark urine, concerns with driveline or driveline site.  Compliant with medications and notes no problems taking them.     Home BPs - does not have a Doppler    ROS:  A complete 10-point ROS was negative except as above.    PAST MEDICAL HISTORY:  Nonischemic cardiomyopathy  HeartMate 3 LVAD  TIA July 2024 (diplopia)  Lupus anticoagulant positivity    PAST SURGICAL HISTORY:  Past Surgical History:   Procedure Laterality Date    CARDIAC SURGERY      COLONOSCOPY N/A 8/25/2023    Procedure: COLONOSCOPY, WITH POLYPECTOMY AND BIOPSY;  Surgeon: Alejandro Rodriguez MD;  Location:  GI    CV INTRA AORTIC BALLOON N/A 6/12/2024    Procedure: Intra aortic Balloon Pump Insertion;  Surgeon: Elfego Cruz MD;  Location:  HEART CARDIAC CATH LAB    CV RIGHT HEART CATH MEASUREMENTS RECORDED N/A 08/22/2023    Procedure: Heart Cath Right Heart Cath;  Surgeon: Elfego Cruz MD;  Location:  HEART CARDIAC CATH LAB    CV RIGHT HEART CATH MEASUREMENTS RECORDED N/A 9/20/2023     Procedure: Heart Cath Right Heart Cath;  Surgeon: Elfego Cruz MD;  Location: [facility] HE[facility] CARDIAC CATH LAB    CV RIGHT HEART CATH MEASUREMENTS RECORDED N/A 1/19/2024    Procedure: Heart Cath Right Heart Cath;  Surgeon: Tobin Jaime MD;  Location: [facility] HEART CARDIAC CATH LAB    CV RIGHT HEART CATH MEASUREMENTS RECORDED N/A 5/3/2024    Procedure: Heart Cath Right Heart Cath;  Surgeon: Dion Ashley MD;  Location: [facility] HEART CARDIAC CATH LAB    CV RIGHT HEART CATH MEASUREMENTS RECORDED N/A 6/4/2024    Procedure: Right Heart Catheterization;  Surgeon: Cassandra Rizzo MD;  Location: [facility] HEART CARDIAC CATH LAB    CV RIGHT HEART CATH MEASUREMENTS RECORDED N/A 6/12/2024    Procedure: Right Heart Catheterization;  Surgeon: Elfego Cruz MD;  Location:  HEART CARDIAC CATH LAB    INSERT VENTRICULAR ASSIST DEVICE LEFT (HEARTMATE II) N/A 6/14/2024    Procedure: Median Sternotomy, INSERTION, LEFT VENTRICULAR ASSIST DEVICE (HEARTMATE III), on Cardiopulmonary Bypass, Transesophageal Echocardiogram by Anesthesia;  Surgeon: Brian Jhaveri MD;  Location: [facility]    IR CHEST TUBE PLACEMENT NON-TUNNELLED LEFT  6/28/2024    IR THORACENTESIS  6/25/2024    PICC DOUBLE LUMEN PLACEMENT Right 08/22/2023    Brachial Vein Medial 5F DL 45 cm, 2 cm out       FAMILY HISTORY:  No family history on file.    SOCIAL HISTORY:  Social History     Socioeconomic History    Marital status:    Tobacco Use    Smoking status: Never    Smokeless tobacco: Never   Substance and Sexual Activity    Alcohol use: Never    Drug use: Never    Sexual activity: Yes     Partners: Female     Social Determinants of Health     Financial Resource Strain: Low Risk  (11/17/2023)    Received from [facility], [facility]    Overall Financial Resource Strain (CARDIA)     Difficulty of Paying Living Expenses: Not hard at all   Food Insecurity: No Food Insecurity (11/17/2023)    Received  from [facility], [facility]    Hunger Vital Sign     Worried About Running Out of Food in the Last Year: Never true     Ran Out of Food in the Last Year: Never true   Transportation Needs: No Transportation Needs (11/17/2023)    Received from [facility], [facility]    PRAPARE - Transportation     Lack of Transportation (Medical): No     Lack of Transportation (Non-Medical): No   Housing Stability: Unknown (11/17/2023)    Received from [facility], [facility]    Housing Stability Vital Sign     Unable to Pay for Housing in the Last Year: No     Unstable Housing in the Last Year: No       MEDICATIONS:  Current Outpatient Medications   Medication Sig Dispense Refill    aspirin (ASA) 81 MG chewable tablet Take 1 tablet (81 mg) by mouth daily 90 tablet 3    empagliflozin (JARDIANCE) 10 MG TABS tablet Take 1 tablet (10 mg) by mouth daily 90 tablet 1    gabapentin (NEURONTIN) 100 MG capsule Take 2 capsules (200 mg) by mouth 3 times daily 90 capsule 0    methocarbamol (ROBAXIN) 750 MG tablet Take 1 tablet (750 mg) by mouth 4 times daily as needed for muscle spasms or other (post-op pain) 30 tablet 0    pantoprazole (PROTONIX) 40 MG EC tablet Take 1 tablet (40 mg) by mouth every morning (before breakfast) 60 tablet 0    polyethylene glycol (MIRALAX) 17 GM/Dose powder Take 17 g by mouth daily as needed 510 g 0    rosuvastatin (CRESTOR) 20 MG tablet Take 1 tablet (20 mg) by mouth daily 90 tablet 3    spironolactone (ALDACTONE) 25 MG tablet Take 1 tablet (25 mg) by mouth every evening 30 tablet 0    warfarin ANTICOAGULANT (COUMADIN) 5 MG tablet Take two tablets (10 mg) daily. Next chromogenic factor X on 7/12 in clinic. Always follow the most recent instructions from your INR clinic.       No current facility-administered medications for this visit.        ALLERGIES:   No Known Allergies    PHYSICAL EXAM:  BP (!)  92/0 (BP Location: Right arm, Patient Position: Chair, Cuff Size: Adult Regular)   Wt 85.2 kg (187 lb 14.4 oz)   SpO2 95%   BMI 25.48 kg/m    Gen: alert, interactive, NAD  Neck: supple, no JVD  CV: VAD hum+  Chest: CTAB, no wheezes or crackles  Abd: Soft, driveline dressing in place, intact  Ext: no LE edema    LABS:    CMP  Last Comprehensive Metabolic Panel:  Sodium   Date Value Ref Range Status   07/12/2024 137 135 - 145 mmol/L Final     Potassium   Date Value Ref Range Status   07/12/2024 4.1 3.4 - 5.3 mmol/L Final     Potassium POCT   Date Value Ref Range Status   06/14/2024 4.5 3.4 - 5.3 mmol/L Final     Comment:     CRITICAL VALUES NOTED AND REPORTED TO MD     Chloride   Date Value Ref Range Status   07/12/2024 104 98 - 107 mmol/L Final     Chloride (External)   Date Value Ref Range Status   05/28/2024 104 101 - 111 mmol/L Final     Carbon Dioxide (CO2)   Date Value Ref Range Status   07/12/2024 22 22 - 29 mmol/L Final     Anion Gap   Date Value Ref Range Status   07/12/2024 11 7 - 15 mmol/L Final     Glucose   Date Value Ref Range Status   07/12/2024 117 (H) 70 - 99 mg/dL Final     GLUCOSE BY METER POCT   Date Value Ref Range Status   07/09/2024 131 (H) 70 - 99 mg/dL Final     Urea Nitrogen   Date Value Ref Range Status   07/12/2024 18.3 6.0 - 20.0 mg/dL Final     Creatinine   Date Value Ref Range Status   07/12/2024 0.99 0.67 - 1.17 mg/dL Final     GFR Estimate   Date Value Ref Range Status   07/12/2024 >90 >60 mL/min/1.73m2 Final     Comment:     eGFR calculated using 2021 CKD-EPI equation.     GFR, ESTIMATED POCT   Date Value Ref Range Status   07/09/2024 >60 >60 mL/min/1.73m2 Final     Calcium   Date Value Ref Range Status   07/12/2024 9.6 8.6 - 10.0 mg/dL Final     Bilirubin Total   Date Value Ref Range Status   07/12/2024 0.3 <=1.2 mg/dL Final     Alkaline Phosphatase   Date Value Ref Range Status   07/12/2024 191 (H) 40 - 150 U/L Final     ALT   Date Value Ref Range Status   07/12/2024 88 (H) 0 -  "70 U/L Final     Comment:     Reference intervals for this test were updated on 6/12/2023 to more accurately reflect our healthy population. There may be differences in the flagging of prior results with similar values performed with this method. Interpretation of those prior results can be made in the context of the updated reference intervals.       AST   Date Value Ref Range Status   07/12/2024 39 0 - 45 U/L Final     Comment:     Reference intervals for this test were updated on 6/12/2023 to more accurately reflect our healthy population. There may be differences in the flagging of prior results with similar values performed with this method. Interpretation of those prior results can be made in the context of the updated reference intervals.       NT-proBNP -  today, 7/12/2024 -731    TROP  No results found for: \"TROPI\", \"TROPONIN\", \"TROPR\", \"TROPN\"    CBC  CBC RESULTS:   Recent Labs   Lab Test 07/12/24  0806   WBC 5.2   RBC 4.89   HGB 13.9   HCT 43.1   MCV 88   MCH 28.4   MCHC 32.3   RDW 13.7          LIPIDS  Recent Labs   Lab Test 07/10/24  0646 11/18/23  0018   CHOL 195 161   HDL 36* 48   * 96   TRIG 117 85       TSH  TSH   Date Value Ref Range Status   06/27/2024 2.35 0.30 - 4.20 uIU/mL Final       HBA1C  Lab Results   Component Value Date    A1C 5.5 07/10/2024    A1C 5.6 08/22/2023         CARDIAC DATA:    Echo:  7/9/24  s/p HeartMate 3 LVAD at 5400 rpm  LVIDD is 5.6 cm. Left ventricular function is decreased. The ejection fraction is 15-20% (severely reduced).  LVAD cannula was seen in the expected anatomic position in the LV apex.  Outflow graft is visualized. Interventricular septum is midline.  The right ventricle is normal size. Global right ventricular function is moderately reduced.  The aortic valve does not open during the cardiac cycle.  The inferior vena cava was normal in size with preserved respiratory variability.  No pericardial effusion is present.  This study was compared with " the study from 6/28/2024. Left ventricular size is smaller.    Cardiac Catheterization:  6/12/24, prior to LVAD implant  BSA 2.14 m2  /73/85 mmHg  RA mean of 10 mmHg and V wave of 15 mmhg  PA 57/35/48 mmHg  PCWP --/--/30  Teressa CO/CI 3.9/1.8   PVR 4.6  PA sat 63%   Hgb 17 g/dL     Right sided filling pressures are moderately elevated.    Left sided filling pressures are moderately elevated.    Severely elevated pulmonary artery hypertension.    Reduced cardiac output level.    Hemodynamic data has been modified in Epic per physician review.     Elevated bilateral filling pressures with severe pulmonary hypertension and reduced cardiac output  Uncomplicated R femoral radial access with uncomplicated IABP insertion. Position confirmed on fluoroscopy.   Leave in [place] catheter via RIJ access.       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 Discharged on 7/4.  Readmitted with concerns for TIA that has resolved.  No ongoing concerns with LVAD..     GDMT:                 > Euvolemic: not on home diuretics                 > BB: not on this due to recent LVAD implantation                  > ACE/ARB/ARNI: lisinopril 10 milligram once daily                 > MRA: Spironolactone 25 milligram once daily                 > SGLT2i: Empagliflozin 10 mg once daily                 > Anticoagulation: Warfarin with goal CFx 20-30 (changed from 20-40 due to TIA), CFX today pending, INR 3.19                 > Antiplatelet: ASA 81 mg for CVA prevention/TIA, started after his TIA    Left ventricular assist device interrogation: The patient's HeartMate III LVAD was interrogated today 07/12/24 .     I have personally interrogated the LVAD, reviewing all parameters and alarm trends as noted in the note.     Patient is euvolemic      Exam otherwise as noted in the note    Heartmate 3 LEFT VS  Flow (Lpm): 4.1 Lpm  Pulse Index (PI): 4.5 PI  Speed (rpm): 5400 rpm  Power (bassett): 3.9 bassett  Current Hct settin     Alarms were reviewed, and notable for none.  The driveline exit site was with dressing in place and showed no concerns.  All external components were inspected and showed no evidence of damage or malfunction.  No components were replaced.  No changes to VAD settings made       # Acute onset diplopia, right esotropia  # Thought secondary to TIA  Sudden onset focal neurologic deficit involving diplopia on 2024. Symptoms resolved on hospital day 1. He has several risk factors for stroke including recent VAD and known lupus anticoagulant positivity. Note: CFx 10 had been borderline subtherapeutic at time of hospital discharge and after discharge. CTA without large vessel occlusion. EKG is NSR, no hx of afib or flutter. Neurology consulted -transthoracic echo with bubble study was unremarkable, head CT negative for ischemia or bleed on  and 7/10. Optho consulted - Anti-Ach receptor and MUSK antibodies recommend to r/o myasthenia gravis but not drawn in hospital, and are scheduled to be drawn today.  He was also started on aspirin 81 mg daily.  In hospital, he is chromogenic factor goal was changed from 20-40% to 20-30%.     # Altered taste and smell  This is longstanding and preceded his stroke.  He had the symptoms after COVID in  but had improved and now have more recently worsened with recent hospitalization during his LVAD implantation.  We will review his medications with our pharmacist to make sure none of them are contributing to this but otherwise may suggest follow-up with neurology that he will have as part of his recent TIA.    # Hypertension  His MAP is 92 today.  His lisinopril 10 mg once daily was discontinued in the hospital for unclear reason.  Will check back with the inpatient team and looks like this was an  error.  Will resume lisinopril today.  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 Does have positive lupus anticoagulant positivity detected incidentally on pre-LVAD workup and thus need to utilize chromogenic factor to guide INR at least until repeat testing.  - warfarin as above  - chromogenic factor daily goal 20-30  - per heme 6/25 repeat lupus anticoag panel end of July, may be able to switch back to INR monitoring  - OT hand therapies for neuropathy  - continue gabapentin     # Hepatocellular pattern of liver injury  Felt to be related to drug effects during last admission in the context of statin / tylenol / azithromycin interaction.  He remains off these medications, but was resumed on rosuvastatin in hospital following his TIA.  His labs today are stable.  He had an abdominal ultrasound in hospital that showed normal liver morphology.  Will continue to monitor and consider further evaluation based on the trend     # Hyperlipidemia  , goal < 70. Not on statin prior to admission due to abnormal LFTs as above. Resumed crestor 20 mg daily given TIA< monitor LFTs. Consider lipid clinic refer if LFTs trend up again.      I spent 48 minutes in care of the patient today including obtaining recent medical history, personally reviewing recent cardiac testing and/or lab results, today's examination, discussion of testing results and care recommendations with patient.       Thank you for the opportunity to participate in this patient's care. Please feel free to reach out with any questions or concerns.      Chantal Dallas MD, [facility]  Associate Professor of Medicine  Advanced Heart Failure, LVAD & Cardiac Transplant  Director,  Cardio-Obstetrics  Cardio-Oncology  [facility]

## 2024-07-12 NOTE — LETTER
2024      RE: Navin Lutz  [address] [place] [address]       Dear Colleague,    Thank you for the opportunity to participate in the care of your patient, Navin Lutz, at the [facility]ART [facility] [place] at [facility]. Please see a copy of my visit note below.    [facility]  Advanced Heart Failure Clinic    Patient Name: Navin Lutz   : 1970   Date of Visit: 2024    Primary Care Physician: Libertad Briceno NP     Referring Physician: Libertad Briceno NP      Reason for Visit: LVAD    Dear Libertad Briceno NP     I had the pleasure of seeing Navin Lutz today in the [facility] Advanced Heart Failure Clinic. As you know Navin Lutz is a pleasant 53 year old year old male, who presents today for further evaluation of LVAD.      Navin has a history of NICM c/b HFrEF s/p Heartmate 3 LVAD on 24.   His postoperative course was relatively unremarkable.  He was treated with antibiotics for Klebsiella pneumonia with a 10-day course.  He developed a left pleural effusion that required to be drained with a pigtail catheter.  He also had abnormal elevated LFTs which were thought to be iatrogenic due to multiple medications (statin, acetaminophen, azithromycin).  Acetaminophen and statin were held.  His LFTs have remained elevated    During workup for his LVAD he was found to have INRs that were discordant with Chromogenic Factor X and hematology was consulted. Found to be lupus anticoagulant positive, and Chromogenic factor X monitoring was recommended for warfarin dosing as INR not reliable. Goal CFX 20-40% which correlates with INR 2-3. Hematology recommended repeat outpatient lupus anticoagulant testing at the end of July, and if negative, INR monitoring may be an option.       He presented back to ER soon after discharge with sudden onset focal neurologic deficit with diplopia. Symptoms resolved on  hospital day 1. He has several risk factors for stroke including recent VAD and known lupus anticoagulant positivity. Note: CFx 10 had been borderline subtherapeutic at time of hospital discharge and after discharge. CTA without large vessel occlusion. EKG is NSR, no hx of afib or flutter. Neurology consulted -transthoracic echo with bubble study was unremarkable, head CT negative for ischemia or bleed on 7/9 and 7/10. Optho consulted - Anti-Ach receptor and MUSK antibodies recommend to r/o myasthenia gravis but not drawn in hospital, and are scheduled to be drawn today.  He was also started on aspirin 81 mg daily.  In hospital, he is chromogenic factor goal was changed from 20-40% to 20-30%.     He presents today for LVAD clinic follow-up. His main complaint is altered sense of taste where most food tastes rotten, making it difficult to eat. This has been present since initial hospitalization for LVAD implantation and well before TIA. He had COVID in 2022 with altered taste and smell, but things had improved since then until this recent worsening. His vision is back to normal, no other concerns. He is walking 2-2.5 miles per day. He denies chest pain, shortness of breath, PND/orthopnea, palpitations, lightheadedness, syncope, leg swelling, LVAD alarms, dark urine, concerns with driveline or driveline site.  Compliant with medications and notes no problems taking them.     Home BPs - does not have a Doppler    ROS:  A complete 10-point ROS was negative except as above.    PAST MEDICAL HISTORY:  Nonischemic cardiomyopathy  HeartMate 3 LVAD  TIA July 2024 (diplopia)  Lupus anticoagulant positivity    PAST SURGICAL HISTORY:  Past Surgical History:   Procedure Laterality Date    CARDIAC SURGERY      COLONOSCOPY N/A 8/25/2023    Procedure: COLONOSCOPY, WITH POLYPECTOMY AND BIOPSY;  Surgeon: Alejandro Rodriguez MD;  Location: [facility]    CV INTRA AORTIC BALLOON N/A 6/12/2024    Procedure: Intra aortic Balloon Pump  Insertion;  Surgeon: Elfego Cruz MD;  Location:  HEART CARDIAC CATH LAB    CV RIGHT HEART CATH MEASUREMENTS RECORDED N/A 08/22/2023    Procedure: Heart Cath Right Heart Cath;  Surgeon: Elfego Cruz MD;  Location:  HEART CARDIAC CATH LAB    CV RIGHT HEART CATH MEASUREMENTS RECORDED N/A 9/20/2023    Procedure: Heart Cath Right Heart Cath;  Surgeon: Elfego Cruz MD;  Location:  HEART CARDIAC CATH LAB    CV RIGHT HEART CATH MEASUREMENTS RECORDED N/A 1/19/2024    Procedure: Heart Cath Right Heart Cath;  Surgeon: Tobin Jaime MD;  Location:  HEART CARDIAC CATH LAB    CV RIGHT HEART CATH MEASUREMENTS RECORDED N/A 5/3/2024    Procedure: Heart Cath Right Heart Cath;  Surgeon: Dion Ashley MD;  Location:  HEART CARDIAC CATH LAB    CV RIGHT HEART CATH MEASUREMENTS RECORDED N/A 6/4/2024    Procedure: Right Heart Catheterization;  Surgeon: Cassandra Rizzo MD;  Location:  HEART CARDIAC CATH LAB    CV RIGHT HEART CATH MEASUREMENTS RECORDED N/A 6/12/2024    Procedure: Right Heart Catheterization;  Surgeon: Elfego Cruz MD;  Location:  HEART CARDIAC CATH LAB    INSERT VENTRICULAR ASSIST DEVICE LEFT (HEARTMATE II) N/A 6/14/2024    Procedure: Median Sternotomy, INSERTION, LEFT VENTRICULAR ASSIST DEVICE (HEARTMATE III), on Cardiopulmonary Bypass, Transesophageal Echocardiogram by Anesthesia;  Surgeon: Brian Jhaveri MD;  Location:  OR    IR CHEST TUBE PLACEMENT NON-TUNNELLED LEFT  6/28/2024    IR THORACENTESIS  6/25/2024    PICC DOUBLE LUMEN PLACEMENT Right 08/22/2023    Brachial Vein Medial 5F DL 45 cm, 2 cm out       FAMILY HISTORY:  No family history on file.    SOCIAL HISTORY:  Social History     Socioeconomic History    Marital status:    Tobacco Use    Smoking status: Never    Smokeless tobacco: Never   Substance and Sexual Activity    Alcohol use: Never    Drug use: Never    Sexual activity: Yes     Partners: Female      Social Determinants of Health     Financial Resource Strain: Low Risk  (11/17/2023)    [facility]    Overall Financial Resource Strain (CARDIA)     Difficulty of Paying Living Expenses: Not hard at all   Food Insecurity: No Food Insecurity (11/17/2023)    [facility]    Hunger Vital Sign     Worried About Running Out of Food in the Last Year: Never true     Ran Out of Food in the Last Year: Never true   Transportation Needs: No Transportation Needs (11/17/2023)    [facility]    PRAPARE - Transportation     Lack of Transportation (Medical): No     Lack of Transportation (Non-Medical): No   Housing Stability: Unknown (11/17/2023)    [facility]    Housing Stability Vital Sign     Unable to Pay for Housing in the Last Year: No     Unstable Housing in the Last Year: No       MEDICATIONS:  Current Outpatient Medications   Medication Sig Dispense Refill    aspirin (ASA) 81 MG chewable tablet Take 1 tablet (81 mg) by mouth daily 90 tablet 3    empagliflozin (JARDIANCE) 10 MG TABS tablet Take 1 tablet (10 mg) by mouth daily 90 tablet 1    gabapentin (NEURONTIN) 100 MG capsule Take 2 capsules (200 mg) by mouth 3 times daily 90 capsule 0    methocarbamol (ROBAXIN) 750 MG tablet Take 1 tablet (750 mg) by mouth 4 times daily as needed for muscle spasms or other (post-op pain) 30 tablet 0    pantoprazole (PROTONIX) 40 MG EC tablet Take 1 tablet (40 mg) by mouth every morning (before breakfast) 60 tablet 0    polyethylene glycol (MIRALAX) 17 GM/Dose powder Take 17 g by mouth daily as needed 510 g 0    rosuvastatin (CRESTOR) 20 MG tablet Take 1 tablet (20 mg) by mouth daily 90 tablet 3    spironolactone (ALDACTONE) 25 MG tablet Take 1 tablet (25 mg) by mouth every evening 30  tablet 0    warfarin ANTICOAGULANT (COUMADIN) 5 MG tablet Take two tablets (10 mg) daily. Next chromogenic factor X on 7/12 in clinic. Always follow the most recent instructions from your INR clinic.       No current facility-administered medications for this visit.        ALLERGIES:   No Known Allergies    PHYSICAL EXAM:  BP (!) 92/0 (BP Location: Right arm, Patient Position: Chair, Cuff Size: Adult Regular)   Wt 85.2 kg (187 lb 14.4 oz)   SpO2 95%   BMI 25.48 kg/m    Gen: alert, interactive, NAD  Neck: supple, no JVD  CV: VAD hum+  Chest: CTAB, no wheezes or crackles  Abd: Soft, driveline dressing in place, intact  Ext: no LE edema    LABS:    CMP  Last Comprehensive Metabolic Panel:  Sodium   Date Value Ref Range Status   07/12/2024 137 135 - 145 mmol/L Final     Potassium   Date Value Ref Range Status   07/12/2024 4.1 3.4 - 5.3 mmol/L Final     Potassium POCT   Date Value Ref Range Status   06/14/2024 4.5 3.4 - 5.3 mmol/L Final     Comment:     CRITICAL VALUES NOTED AND REPORTED TO MD     Chloride   Date Value Ref Range Status   07/12/2024 104 98 - 107 mmol/L Final     Chloride (External)   Date Value Ref Range Status   05/28/2024 104 101 - 111 mmol/L Final     Carbon Dioxide (CO2)   Date Value Ref Range Status   07/12/2024 22 22 - 29 mmol/L Final     Anion Gap   Date Value Ref Range Status   07/12/2024 11 7 - 15 mmol/L Final     Glucose   Date Value Ref Range Status   07/12/2024 117 (H) 70 - 99 mg/dL Final     GLUCOSE BY METER POCT   Date Value Ref Range Status   07/09/2024 131 (H) 70 - 99 mg/dL Final     Urea Nitrogen   Date Value Ref Range Status   07/12/2024 18.3 6.0 - 20.0 mg/dL Final     Creatinine   Date Value Ref Range Status   07/12/2024 0.99 0.67 - 1.17 mg/dL Final     GFR Estimate   Date Value Ref Range Status   07/12/2024 >90 >60 mL/min/1.73m2 Final     Comment:     eGFR calculated using 2021 CKD-EPI equation.     GFR, ESTIMATED POCT   Date Value Ref Range Status   07/09/2024 >60 >60 mL/min/1.73m2  "Final     Calcium   Date Value Ref Range Status   07/12/2024 9.6 8.6 - 10.0 mg/dL Final     Bilirubin Total   Date Value Ref Range Status   07/12/2024 0.3 <=1.2 mg/dL Final     Alkaline Phosphatase   Date Value Ref Range Status   07/12/2024 191 (H) 40 - 150 U/L Final     ALT   Date Value Ref Range Status   07/12/2024 88 (H) 0 - 70 U/L Final     Comment:     Reference intervals for this test were updated on 6/12/2023 to more accurately reflect our healthy population. There may be differences in the flagging of prior results with similar values performed with this method. Interpretation of those prior results can be made in the context of the updated reference intervals.       AST   Date Value Ref Range Status   07/12/2024 39 0 - 45 U/L Final     Comment:     Reference intervals for this test were updated on 6/12/2023 to more accurately reflect our healthy population. There may be differences in the flagging of prior results with similar values performed with this method. Interpretation of those prior results can be made in the context of the updated reference intervals.       NT-proBNP -  today, 7/12/2024 -731    TROP  No results found for: \"TROPI\", \"TROPONIN\", \"TROPR\", \"TROPN\"    CBC  CBC RESULTS:   Recent Labs   Lab Test 07/12/24  0806   WBC 5.2   RBC 4.89   HGB 13.9   HCT 43.1   MCV 88   MCH 28.4   MCHC 32.3   RDW 13.7          LIPIDS  Recent Labs   Lab Test 07/10/24  0646 11/18/23  0018   CHOL 195 161   HDL 36* 48   * 96   TRIG 117 85       TSH  TSH   Date Value Ref Range Status   06/27/2024 2.35 0.30 - 4.20 uIU/mL Final       HBA1C  Lab Results   Component Value Date    A1C 5.5 07/10/2024    A1C 5.6 08/22/2023         CARDIAC DATA:    Echo:  7/9/24  s/p HeartMate 3 LVAD at 5400 rpm  LVIDD is 5.6 cm. Left ventricular function is decreased. The ejection fraction is 15-20% (severely reduced).  LVAD cannula was seen in the expected anatomic position in the LV apex.  Outflow graft is visualized. " Interventricular septum is midline.  The right ventricle is normal size. Global right ventricular function is moderately reduced.  The aortic valve does not open during the cardiac cycle.  The inferior vena cava was normal in size with preserved respiratory variability.  No pericardial effusion is present.  This study was compared with the study from 6/28/2024. Left ventricular size is smaller.    Cardiac Catheterization:  6/12/24, prior to LVAD implant  BSA 2.14 m2  /73/85 mmHg  RA mean of 10 mmHg and V wave of 15 mmhg  PA 57/35/48 mmHg  PCWP --/--/30  Teressa CO/CI 3.9/1.8   PVR 4.6  PA sat 63%   Hgb 17 g/dL     Right sided filling pressures are moderately elevated.    Left sided filling pressures are moderately elevated.    Severely elevated pulmonary artery hypertension.    Reduced cardiac output level.    Hemodynamic data has been modified in Epic per physician review.     Elevated bilateral filling pressures with severe pulmonary hypertension and reduced cardiac output  Uncomplicated R femoral radial access with uncomplicated IABP insertion. Position confirmed on fluoroscopy.   Leave in Gilman-Hugh catheter via RIJ access.       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 Discharged on 7/4.  Readmitted with concerns for TIA that has resolved.  No ongoing concerns with LVAD..     GDMT:                 > Euvolemic: not on home diuretics                 > BB: not on this due to recent LVAD implantation                  > ACE/ARB/ARNI: lisinopril 10 milligram once daily                 > MRA: Spironolactone 25 milligram once daily                 > SGLT2i: Empagliflozin 10 mg once daily                 > Anticoagulation: Warfarin with goal CFx 20-30 (changed from 20-40 due to TIA), CFX today pending, INR 3.19                  > Antiplatelet: ASA 81 mg for CVA prevention/TIA, started after his TIA    Left ventricular assist device interrogation: The patient's HeartMate III LVAD was interrogated today 24 .     I have personally interrogated the LVAD, reviewing all parameters and alarm trends as noted in the note.     Patient is euvolemic     Exam otherwise as noted in the note    Heartmate 3 LEFT VS  Flow (Lpm): 4.1 Lpm  Pulse Index (PI): 4.5 PI  Speed (rpm): 5400 rpm  Power (bassett): 3.9 bassett  Current Hct settin     Alarms were reviewed, and notable for none.  The driveline exit site was with dressing in place and showed no concerns.  All external components were inspected and showed no evidence of damage or malfunction.  No components were replaced.  No changes to VAD settings made       # Acute onset diplopia, right esotropia  # Thought secondary to TIA  Sudden onset focal neurologic deficit involving diplopia on 2024. Symptoms resolved on hospital day 1. He has several risk factors for stroke including recent VAD and known lupus anticoagulant positivity. Note: CFx 10 had been borderline subtherapeutic at time of hospital discharge and after discharge. CTA without large vessel occlusion. EKG is NSR, no hx of afib or flutter. Neurology consulted -transthoracic echo with bubble study was unremarkable, head CT negative for ischemia or bleed on  and 7/10. Optho consulted - Anti-Ach receptor and MUSK antibodies recommend to r/o myasthenia gravis but not drawn in hospital, and are scheduled to be drawn today.  He was also started on aspirin 81 mg daily.  In hospital, he is chromogenic factor goal was changed from 20-40% to 20-30%.     # Altered taste and smell  This is longstanding and preceded his stroke.  He had the symptoms after COVID in  but had improved and now have more recently worsened with recent hospitalization during his LVAD implantation.  We will review his medications with our pharmacist to  make sure none of them are contributing to this but otherwise may suggest follow-up with neurology that he will have as part of his recent TIA.    # Hypertension  His MAP is 92 today.  His lisinopril 10 mg once daily was discontinued in the hospital for unclear reason.  Will check back with the inpatient team and looks like this was an error.  Will resume lisinopril today.  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 Does have positive lupus anticoagulant positivity detected incidentally on pre-LVAD workup and thus need to utilize chromogenic factor to guide INR at least until repeat testing.  - warfarin as above  - chromogenic factor daily goal 20-30  - per heme 6/25 repeat lupus anticoag panel end of July, may be able to switch back to INR monitoring  - OT hand therapies for neuropathy  - continue gabapentin     # Hepatocellular pattern of liver injury  Felt to be related to drug effects during last admission in the context of statin / tylenol / azithromycin interaction.  He remains off these medications, but was resumed on rosuvastatin in hospital following his TIA.  His labs today are stable.  He had an abdominal ultrasound in hospital that showed normal liver morphology.  Will continue to monitor and consider further evaluation based on the trend     # Hyperlipidemia  , goal < 70. Not on statin prior to admission due to abnormal LFTs as above. Resumed crestor 20 mg daily given TIA< monitor LFTs. Consider lipid clinic refer if LFTs trend up again.      I spent 48 minutes in care of the patient today including obtaining recent medical history, personally reviewing recent cardiac testing and/or lab results, today's examination, discussion of testing  results and care recommendations with patient.       Thank you for the opportunity to participate in this patient's care. Please feel free to reach out with any questions or concerns.      Chantal Dallas MD, [facility]  Associate Professor of Medicine  Advanced Heart Failure, LVAD & Cardiac Transplant  Director, Cardio-Obstetrics  Cardio-Oncology  [facility]

## 2024-07-12 NOTE — NURSING NOTE
MCS VAD Pump Info       Row Name 07/12/24 0851             MCS VAD Information    Implant LVAD      LVAD Pump HeartMate 3         Heartmate 3 LEFT VS    Flow (Lpm) 4.1 Lpm      Pulse Index (PI) 4.5 PI      Speed (rpm) 5400 rpm      Power (bassett) 3.9 bassett      Current Hct setting 43      Retired: Unexpected Alarms --         Primary Controller    Serial number [facility] [id number]      Low flow alarm setting --      High watt alarm setting --      EBB: Patient use 4      Replace in 32 Months         Backup Controller    Serial number [facility] [id number]      EBB: Patient use 11      Replace EBB in 32 Months      Speed & HCT match primary controller --         VAD Interrogation    Alarms reported by patient N      Unexpected alarms noted upon interrogation None      PI events Occasional;w/ associated speed drops  pi range 2-5      Damage to equipment is noted N      Action taken Reviewed proper equipment care and maintenance         Driveline Exit Site    Dressing change done N      Driveline properly secured Yes      DLES assessment c/d/i per pt report      Dressing used Daily kit      Frequency patient changes dressing Daily      Dressing modifications --      Dressing change supplier --                    Called patient/caregiver to check in 1 wk post discharge. Reviewed medications and answered any questions. Patient reports sleeping well and low anxiety since being home with LVAD. Patient is able to move around the house and care for himself independently, walking 1-2.5 miles/day.     Discussed specific new problems/stressors since being discharged from the hospital: dressing supplier out of network. Empathized with patient and reviewed coping strategies: enlisting support from friends and love ones, attending patient and caregiver support groups, reviewing LVAD educational materials to reinforce knowledge, and talking about concerns with family/care providers/trusted others. Encouraged pt to page VAD Coordinator with any issues  or questions. Pt verbalizes understanding.

## 2024-07-12 NOTE — PATIENT INSTRUCTIONS
"Medications:  Called patient and updated him on orders- RESUME Lisinopril 10mg daily    Instructions:  Kim will work on dressing supplies and will call you with updates    Follow-up:    As scheduled   Set up neuro follow up please call [phone].   The [facility] Medication Therapy Management (pharmacist) department will contact you to schedule an appointment. You may also schedule the appointment by calling [phone] or toll-free at [phone].     Equipment Maintenance Reminders:   Charge your back-up controller at least every 6 months. To charge, connect it to either batteries or wall power. The screen will read \"Charging\". Keep connected until the screen reads \"charging complete\". Disconnect power once the controller battery is charged. Also do a self-test when the controller is connected to power.  Check your battery charger for when it is due for maintenance. It requires inspection in clinic once per year. There will be a sticker stating the month and year maintenance is due. When you bring your battery charger to clinic, tell one of the schedulers you have LVAD equipment that needs maintenance. They will call [employer]. You can leave your  under the LVAD sign by the  at the far end of clinic. You must drop it off before 2pm.   Replace the AA batteries in your mobile power unit every 6 months.       Page the VAD Coordinator on call if you gain more than 3 lb in a day or 5 in a week. Please also page if you feel unwell or have alarms.   Great to see you in clinic today. To Page the VAD Coordinator on call, dial [phone] option #[phone] and ask to speak to the VAD coordinator on call.     "

## 2024-07-12 NOTE — NURSING NOTE
Chief Complaint   Patient presents with    Follow Up     RETURN VAD     Vitals were taken and medications reconciled.    Evan Mcbride, EMT  8:43 AM     abnormal ecg

## 2024-07-14 LAB
ACHR BIND AB SER-SCNC: 0 NMOL/L
STFR SERPL-MCNC: 9.1 MG/L

## 2024-07-15 LAB — MAYO MISC RESULT: NORMAL

## 2024-07-16 NOTE — PROGRESS NOTES
Advanced Heart Failure/Transplant Clinic Note    HPI  Dear colleagues,     I had the pleasure of seeing Mr. Navin Lutz in the Cardiology clinic.  As you know, Mr. Navin Lutz is a pleasant 53 year old male with a past medical history of chronic HFrEF 2/2 NICM s/p HM3 LVAD as BTT on 6/14/24 who presents for ongoing evaluation and management.    His postoperative course was relatively unremarkable.  He was treated with antibiotics for Klebsiella pneumonia with a 10-day course.  He developed a left pleural effusion that required to be drained with a pigtail catheter. He also had abnormal elevated LFTs, which were thought to be iatrogenic due to multiple medications (statin, acetaminophen, azithromycin).  Acetaminophen and statin were held.  His LFTs have remained elevated. During workup for his LVAD he was found to have INRs that were discordant with Chromogenic Factor X and hematology was consulted. Found to be lupus anticoagulant positive, and Chromogenic factor X monitoring was recommended for warfarin dosing as INR not reliable. Goal CFX 20-40% which correlates with INR 2-3. Hematology recommended repeat outpatient lupus anticoagulant testing at the end of July, and if negative, INR monitoring may be an option. He presented back to ER soon after discharge with sudden onset focal neurologic deficit with diplopia. Symptoms resolved on hospital day 1. He has several risk factors for stroke including recent VAD and known lupus anticoagulant positivity. Note: CFx 10 had been borderline subtherapeutic at time of hospital discharge and after discharge. CTA without large vessel occlusion. EKG is NSR, no hx of afib or flutter. Neurology consulted. Transthoracic echo with bubble study was unremarkable. Head CT negative for ischemia or bleed on 7/9 and 7/10/24. Optho consulted - Anti-Ach receptor and MUSK antibodies recommend to r/o myasthenia gravis. He was also started on aspirin 81 mg daily. In hospital, he is  chromogenic factor goal was changed from 20-40% to 20-30%.      He continues to struggle with altered sense of taste where most food tastes very powerful, making it difficult to eat. This has been present since initial hospitalization for LVAD implantation and well before TIA. He had COVID in 2022 with altered taste and smell, but things had improved since then until this recent worsening. His vision is back to normal, no other concerns. He is walking ~2.5 miles per day. He denies chest pain, shortness of breath, PND/orthopnea, palpitations, lightheadedness, syncope, leg swelling, LVAD alarms, dark urine, concerns with driveline or driveline site.     ROS:  A complete 12-point ROS was negative except as above.    PAST MEDICAL HISTORY:  Patient Active Problem List   Diagnosis    Acute on chronic systolic CHF (congestive heart failure) (H)    Chest pain    ICD (implantable cardioverter-defibrillator) in place    Inguinal hernia    Multiple lung nodules on CT    Non-ischemic cardiomyopathy (H)    Pure hypercholesterolemia    RAD (reactive airway disease) with wheezing, mild intermittent, uncomplicated    Acute deep vein thrombosis (DVT) of other vein of left upper extremity (H)    Cardiogenic shock (H)    Acute decompensated heart failure (H)    Chronic systolic congestive heart failure (H)    Anticoagulated on warfarin    LVAD (left ventricular assist device) present (H)    Chronic systolic heart failure (H)    Anticoagulated on Coumadin    Diplopia    Stroke-like symptoms    TIA (transient ischemic attack)        FAMILY HISTORY:  No family history on file.    SOCIAL HISTORY:  Social History     Tobacco Use    Smoking status: Never    Smokeless tobacco: Never   Substance Use Topics    Alcohol use: Never    Drug use: Never        ALLERGIES:  No Known Allergies    CURRENT MEDICATIONS:  Current Outpatient Medications   Medication Sig Dispense Refill    aspirin (ASA) 81 MG chewable tablet Take 1 tablet (81 mg) by mouth  daily 90 tablet 3    empagliflozin (JARDIANCE) 10 MG TABS tablet Take 1 tablet (10 mg) by mouth daily 90 tablet 1    gabapentin (NEURONTIN) 100 MG capsule Take 2 capsules (200 mg) by mouth 3 times daily 90 capsule 0    lisinopril (ZESTRIL) 10 MG tablet Take 1 tablet (10 mg) by mouth daily 90 tablet 3    methocarbamol (ROBAXIN) 750 MG tablet Take 1 tablet (750 mg) by mouth 4 times daily as needed for muscle spasms or other (post-op pain) 30 tablet 0    pantoprazole (PROTONIX) 40 MG EC tablet Take 1 tablet (40 mg) by mouth every morning (before breakfast) 60 tablet 0    polyethylene glycol (MIRALAX) 17 GM/Dose powder Take 17 g by mouth daily as needed 510 g 0    rosuvastatin (CRESTOR) 20 MG tablet Take 1 tablet (20 mg) by mouth daily 90 tablet 3    spironolactone (ALDACTONE) 25 MG tablet Take 1 tablet (25 mg) by mouth every evening 30 tablet 0    warfarin ANTICOAGULANT (COUMADIN) 5 MG tablet Take two tablets (10 mg) daily. Next chromogenic factor X on 7/12 in clinic. Always follow the most recent instructions from your INR clinic.         EXAM:  BP (!) 88/0 (BP Location: Left arm, Patient Position: Chair, Cuff Size: Adult Regular)   Wt 88 kg (194 lb)   SpO2 96%   BMI 26.31 kg/m      General: appears comfortable, alert and interactive, in no acute distress  Head: normocephalic, atraumatic  Eyes: anicteric sclera, EOMI  Mouth: MMM  Neck: supple, no cervical adenopathy  CV: regular rate and rhythm, LVAD hum, estimated JVP ~7 cm  Resp: clear, no rales or wheezing  GI: soft, nontender, nondistended, driveline bandage is c/d/i  Extremities: warm, no peripheral edema  Neurological: alert and oriented, no focal deficits  Psych: normal mood and affect  Derm: no rashes on exposed surfaces    Weight  Wt Readings from Last 10 Encounters:   07/17/24 88 kg (194 lb)   07/12/24 85.2 kg (187 lb 14.4 oz)   07/09/24 84.9 kg (187 lb 3.2 oz)   07/04/24 84.9 kg (187 lb 2.7 oz)   05/31/24 90.9 kg (200 lb 6.4 oz)   05/03/24 95.3 kg (210  lb 3.2 oz)   05/03/24 96.7 kg (213 lb 1.6 oz)   03/13/24 91.8 kg (202 lb 6.4 oz)   01/19/24 95.4 kg (210 lb 6.4 oz)   01/19/24 91.2 kg (201 lb)       I personally reviewed recent labs and data as below and discussed the results with the patient in clinic today.  Labs:  CBC RESULTS:  Lab Results   Component Value Date    WBC 6.3 07/17/2024    RBC 5.05 07/17/2024    HGB 14.5 07/17/2024    HCT 43.9 07/17/2024    MCV 87 07/17/2024    MCH 28.7 07/17/2024    MCHC 33.0 07/17/2024    RDW 13.7 07/17/2024     07/17/2024       CMP RESULTS:  Lab Results   Component Value Date     07/12/2024    POTASSIUM 4.1 07/12/2024    POTASSIUM 4.5 06/14/2024    CHLORIDE 104 07/12/2024    CHLORIDE 104 05/28/2024    CO2 22 07/12/2024    ANIONGAP 11 07/12/2024     (H) 07/12/2024     (H) 07/09/2024    BUN 18.3 07/12/2024    CR 0.99 07/12/2024    GFRESTIMATED >90 07/12/2024    GFRESTIMATED >60 07/09/2024    NAM 9.6 07/12/2024    BILITOTAL 0.3 07/12/2024    ALBUMIN 3.7 07/12/2024    ALKPHOS 191 (H) 07/12/2024    ALT 88 (H) 07/12/2024    AST 39 07/12/2024        Recent Labs   Lab Test 07/10/24  0646 11/18/23  0018   CHOL 195 161   HDL 36* 48   * 96   TRIG 117 85        Testing/Procedures:  I personally visualized and interpreted:  Echo:  7/9/24  s/p HeartMate 3 LVAD at 5400 rpm  LVIDD is 5.6 cm. Left ventricular function is decreased. The ejection fraction is 15-20% (severely reduced).  LVAD cannula was seen in the expected anatomic position in the LV apex.  Outflow graft is visualized. Interventricular septum is midline.  The right ventricle is normal size. Global right ventricular function is moderately reduced.  The aortic valve does not open during the cardiac cycle.  The inferior vena cava was normal in size with preserved respiratory variability.  No pericardial effusion is present.  This study was compared with the study from 6/28/2024. Left ventricular size is smaller.     Cardiac  Catheterization:  6/12/24, prior to LVAD implant  BSA 2.14 m2  /73/85 mmHg  RA mean of 10 mmHg and V wave of 15 mmhg  PA 57/35/48 mmHg  PCWP --/--/30  Teressa CO/CI 3.9/1.8   PVR 4.6  PA sat 63%   Hgb 17 g/dL     Right sided filling pressures are moderately elevated.    Left sided filling pressures are moderately elevated.    Severely elevated pulmonary artery hypertension.    Reduced cardiac output level.    Hemodynamic data has been modified in Epic per physician review.     Elevated bilateral filling pressures with severe pulmonary hypertension and reduced cardiac output  Uncomplicated R femoral radial access with uncomplicated IABP insertion. Position confirmed on fluoroscopy.   Leave in [place]ugh catheter via RIJ access.     Outside results of note:  Outside records were obtained and relevant results/notes have been incorporated into HPI.    Assessment and Plan:     In summary, 53 year old male with a past medical history of chronic HFrEF 2/2 NICM s/p HM3 LVAD as BTT on 6/14/24 who presents for ongoing evaluation and management.    # Chronic systolic heart failure secondary to NICM s/p HM3 LVAD as 6/14/24 on BTT  Stage D. NYHA Class II.  Fluid status: euvolemic without diuretics  ACEi/ARB/ARNI: continue lisinopril 10 mg daily  BB: Start carvedilol 3.125 mg BID  Aldosterone antagonist: continue spironolactone 25 mg daily  SGLT2i: Continue empagliflozin 10 mg daily  SCD prophylaxis: s/p ICD  BP: MAP goal 65-85, start coreg as above  LDH trends: Stable  Anticoagulation: warfarin with chromogenic factor goal 20-30%  Antiplatelet: On ASA 81 mg daily given TIA  Cardiac rehab: ongoing    VAD interrogation today: VAD interrogation reviewed with VAD coordinator. Agree with findings. No frequent PI events, no power spikes, speed drops, or other findings suspicious of pump malfunction noted.      # Acute onset diplopia, right esotropia  # Thought secondary to TIA  All symptoms are resolved.  - Will continue to  monitor     # Altered taste and smell  This is longstanding and preceded his stroke. He had the symptoms after COVID in 2022 but had improved and now have more recently worsened with recent hospitalization during his LVAD implantation.  We will review his medications with our pharmacist to make sure none of them are contributing to this but otherwise may suggest follow-up with neurology that he will have as part of his recent TIA.  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 Does have positive lupus anticoagulant positivity detected incidentally on pre-LVAD workup and thus need to utilize chromogenic factor to guide INR at least until repeat testing.  - Warfarin as above  - Chromogenic factor goal 20-30  - Per heme 6/25 repeat lupus anticoag panel end of July '24, may be able to switch back to INR monitoring  - OT hand therapies for neuropathy  - Continue gabapentin     # Hepatocellular pattern of liver injury  Felt to be related to drug effects during last admission in the context of statin / tylenol / azithromycin interaction.   - Will continue to monitor and consider further evaluation based on the trend     # Hyperlipidemia  , goal < 70. Not on statin prior to admission due to abnormal LFTs as above.   - Continue rosuvastatin 20 mg daily    Optimal Vascular Metrics    Blood Pressure   BP < 140/90 Yes    On Aspirin  Yes    On Statin  Yes    Tobacco use  No       To Do:  - Start carvedilol 3.125 mg BID  - Follow up with me on 7/24/24 as scheduled    The patient states understanding and is agreeable with plan.   Feel free to contact myself regarding questions or concerns. It was a pleasure to see this patient today.    A total of 47 minutes was spent on the day  of the visit, which includes preparation for the visit (reviewing previous medical records, laboratories and investigations), in conjunction with the actual clinic visit with the patient, which includes obtaining a history and physical exam, creating and reviewing the care plan, patient education (and family if present), counseling, documenting clinical information in the electronic health record and care coordination.     The longitudinal plan of care for the diagnosis(es)/condition(s) as documented were addressed during this visit. Due to the added complexity in care, I will continue to support Navin in the subsequent management and with ongoing continuity of care.     Micaela Silvestre MD   of Medicine, [facility]  Advanced Heart Failure and Transplant Cardiology     CC  Libertad Briceno

## 2024-07-17 ENCOUNTER — HOSPITAL ENCOUNTER (OUTPATIENT)
Dept: CARDIAC REHAB | Facility: CLINIC | Age: 54
Discharge: HOME | End: 2024-07-17
Attending: NURSE PRACTITIONER
Payer: COMMERCIAL

## 2024-07-17 ENCOUNTER — OFFICE VISIT (OUTPATIENT)
Dept: CARDIOLOGY | Facility: CLINIC | Age: 54
End: 2024-07-17
Attending: STUDENT IN AN ORGANIZED HEALTH CARE EDUCATION/TRAINING PROGRAM
Payer: COMMERCIAL

## 2024-07-17 ENCOUNTER — LAB (OUTPATIENT)
Dept: LAB | Facility: CLINIC | Age: 54
End: 2024-07-17
Payer: COMMERCIAL

## 2024-07-17 VITALS — SYSTOLIC BLOOD PRESSURE: 88 MMHG | OXYGEN SATURATION: 96 % | WEIGHT: 194 LBS | BODY MASS INDEX: 26.31 KG/M2

## 2024-07-17 DIAGNOSIS — I50.32 CHRONIC DIASTOLIC CONGESTIVE HEART FAILURE (H): ICD-10-CM

## 2024-07-17 DIAGNOSIS — Z95.811 LVAD (LEFT VENTRICULAR ASSIST DEVICE) PRESENT (H): ICD-10-CM

## 2024-07-17 DIAGNOSIS — I50.22 CHRONIC SYSTOLIC HEART FAILURE (H): ICD-10-CM

## 2024-07-17 DIAGNOSIS — Z94.1 HEART REPLACED BY TRANSPLANT (H): ICD-10-CM

## 2024-07-17 DIAGNOSIS — Z95.811 LVAD (LEFT VENTRICULAR ASSIST DEVICE) PRESENT (H): Primary | ICD-10-CM

## 2024-07-17 DIAGNOSIS — I42.8 NONISCHEMIC CARDIOMYOPATHY (H): ICD-10-CM

## 2024-07-17 DIAGNOSIS — I10 ESSENTIAL HYPERTENSION: ICD-10-CM

## 2024-07-17 DIAGNOSIS — G45.9 TIA (TRANSIENT ISCHEMIC ATTACK): ICD-10-CM

## 2024-07-17 DIAGNOSIS — Z79.01 ANTICOAGULATED ON COUMADIN: ICD-10-CM

## 2024-07-17 DIAGNOSIS — E78.5 HYPERLIPIDEMIA LDL GOAL <100: ICD-10-CM

## 2024-07-17 DIAGNOSIS — Z79.01 ANTICOAGULATED ON WARFARIN: ICD-10-CM

## 2024-07-17 DIAGNOSIS — I50.22 CHRONIC SYSTOLIC CONGESTIVE HEART FAILURE (H): ICD-10-CM

## 2024-07-17 DIAGNOSIS — I50.9 ACUTE DECOMPENSATED HEART FAILURE (H): ICD-10-CM

## 2024-07-17 DIAGNOSIS — R74.8 ELEVATED LIVER ENZYMES: ICD-10-CM

## 2024-07-17 LAB
ACHR BIND IGG+IGM SER IA-SCNC: 0 NMOL/L
ALBUMIN SERPL BCG-MCNC: 3.8 G/DL (ref 3.5–5.2)
ALP SERPL-CCNC: 176 U/L (ref 40–150)
ALT SERPL W P-5'-P-CCNC: 112 U/L (ref 0–70)
ANION GAP SERPL CALCULATED.3IONS-SCNC: 9 MMOL/L (ref 7–15)
AST SERPL W P-5'-P-CCNC: 47 U/L (ref 0–45)
BILIRUB SERPL-MCNC: 0.3 MG/DL
BUN SERPL-MCNC: 14.5 MG/DL (ref 6–20)
CALCIUM SERPL-MCNC: 9.5 MG/DL (ref 8.8–10.4)
CHLORIDE SERPL-SCNC: 103 MMOL/L (ref 98–107)
CREAT SERPL-MCNC: 1.01 MG/DL (ref 0.67–1.17)
EGFRCR SERPLBLD CKD-EPI 2021: 89 ML/MIN/1.73M2
ERYTHROCYTE [DISTWIDTH] IN BLOOD BY AUTOMATED COUNT: 13.7 % (ref 10–15)
GLUCOSE SERPL-MCNC: 137 MG/DL (ref 70–99)
HCO3 SERPL-SCNC: 24 MMOL/L (ref 22–29)
HCT VFR BLD AUTO: 43.9 % (ref 40–53)
HGB BLD-MCNC: 14.5 G/DL (ref 13.3–17.7)
IMMUNOLOGIST REVIEW: NORMAL
INR PPP: 3.98 (ref 0.85–1.15)
LDH SERPL L TO P-CCNC: 235 U/L (ref 0–250)
MCH RBC QN AUTO: 28.7 PG (ref 26.5–33)
MCHC RBC AUTO-ENTMCNC: 33 G/DL (ref 31.5–36.5)
MCV RBC AUTO: 87 FL (ref 78–100)
NT-PROBNP SERPL-MCNC: 545 PG/ML (ref 0–900)
PLATELET # BLD AUTO: 323 10E3/UL (ref 150–450)
POTASSIUM SERPL-SCNC: 4.3 MMOL/L (ref 3.4–5.3)
PROT SERPL-MCNC: 6.9 G/DL (ref 6.4–8.3)
RBC # BLD AUTO: 5.05 10E6/UL (ref 4.4–5.9)
SODIUM SERPL-SCNC: 136 MMOL/L (ref 135–145)
WBC # BLD AUTO: 6.3 10E3/UL (ref 4–11)

## 2024-07-17 PROCEDURE — 99211 OFF/OP EST MAY X REQ PHY/QHP: CPT | Mod: 25 | Performed by: STUDENT IN AN ORGANIZED HEALTH CARE EDUCATION/TRAINING PROGRAM

## 2024-07-17 PROCEDURE — 99000 SPECIMEN HANDLING OFFICE-LAB: CPT | Performed by: PATHOLOGY

## 2024-07-17 PROCEDURE — 93750 INTERROGATION VAD IN PERSON: CPT | Performed by: STUDENT IN AN ORGANIZED HEALTH CARE EDUCATION/TRAINING PROGRAM

## 2024-07-17 PROCEDURE — G2211 COMPLEX E/M VISIT ADD ON: HCPCS | Performed by: STUDENT IN AN ORGANIZED HEALTH CARE EDUCATION/TRAINING PROGRAM

## 2024-07-17 PROCEDURE — 93797 PHYS/QHP OP CAR RHAB WO ECG: CPT

## 2024-07-17 PROCEDURE — 85260 CLOT FACTOR X STUART-POWER: CPT | Performed by: STUDENT IN AN ORGANIZED HEALTH CARE EDUCATION/TRAINING PROGRAM

## 2024-07-17 PROCEDURE — 83880 ASSAY OF NATRIURETIC PEPTIDE: CPT | Performed by: PATHOLOGY

## 2024-07-17 PROCEDURE — 85610 PROTHROMBIN TIME: CPT | Performed by: PATHOLOGY

## 2024-07-17 PROCEDURE — 80053 COMPREHEN METABOLIC PANEL: CPT | Performed by: PATHOLOGY

## 2024-07-17 PROCEDURE — 99215 OFFICE O/P EST HI 40 MIN: CPT | Performed by: STUDENT IN AN ORGANIZED HEALTH CARE EDUCATION/TRAINING PROGRAM

## 2024-07-17 PROCEDURE — 36415 COLL VENOUS BLD VENIPUNCTURE: CPT | Performed by: PATHOLOGY

## 2024-07-17 PROCEDURE — 85027 COMPLETE CBC AUTOMATED: CPT | Performed by: PATHOLOGY

## 2024-07-17 PROCEDURE — 83615 LACTATE (LD) (LDH) ENZYME: CPT | Performed by: PATHOLOGY

## 2024-07-17 RX ORDER — CARVEDILOL 3.12 MG/1
3.12 TABLET ORAL 2 TIMES DAILY WITH MEALS
Qty: 60 TABLET | Refills: 11 | Status: SHIPPED | OUTPATIENT
Start: 2024-07-17 | End: 2024-07-31

## 2024-07-17 ASSESSMENT — PAIN SCALES - GENERAL: PAINLEVEL: NO PAIN (0)

## 2024-07-17 NOTE — NURSING NOTE
Chief Complaint   Patient presents with    Follow Up     Return VAD       Vitals were taken, medications reconciled.     Joey Guevara, Visit Facilitator    9:50 AM

## 2024-07-17 NOTE — NURSING NOTE
MCS VAD Pump Info       Row Name 07/17/24 1000             MCS VAD Information    Implant LVAD      LVAD Pump HeartMate 3         Heartmate 3 LEFT VS    Flow (Lpm) 3.8 Lpm      Pulse Index (PI) 4.3 PI      Speed (rpm) 5400 rpm      Power (bassett) 3.9 bassett      Current Hct setting 44      Retired: Unexpected Alarms --         Primary Controller    Serial number [facility] [place], [place] [id number]      Low flow alarm setting --      High watt alarm setting --      EBB: Patient use 4      Replace in 32 Months         Backup Controller    Serial number [facility] [place], [place] [id number]      EBB: Patient use 11      Replace EBB in 32 Months      Speed & HCT match primary controller --         VAD Interrogation    Alarms reported by patient N      Unexpected alarms noted upon interrogation None      PI events Occasional;w/ associated speed drops  hx back 3 days, range 2-4      Damage to equipment is noted N      Action taken Reviewed proper equipment care and maintenance         Driveline Exit Site    Dressing change done N      Driveline properly secured Yes      DLES assessment c/d/i per pt report  no drainage, scab at suture site      Dressing used Daily kit      Frequency patient changes dressing Daily      Dressing modifications --      Dressing change supplier --                    Reviewed Heartmate battery maintenance.

## 2024-07-17 NOTE — PATIENT INSTRUCTIONS
"Medications:  Start Coreg 1 tab (3.125mg) twice a day     Instructions:      Follow-up: as previously scheduled     Equipment Maintenance Reminders:   Charge your back-up controller at least every 6 months. To charge, connect it to either batteries or wall power. The screen will read \"Charging\". Keep connected until the screen reads \"charging complete\". Disconnect power once the controller battery is charged. Also do a self-test when the controller is connected to power.  Check your battery charger for when it is due for maintenance. It requires inspection in clinic once per year. There will be a sticker stating the month and year maintenance is due. When you bring your battery charger to clinic, tell one of the schedulers you have LVAD equipment that needs maintenance. They will call [employer]. You can leave your  under the LVAD sign by the  at the far end of clinic. You must drop it off before 2pm.   Replace the AA batteries in your mobile power unit every 6 months.       Page the VAD Coordinator on call if you gain more than 3 lb in a day or 5 in a week. Please also page if you feel unwell or have alarms.   Great to see you in clinic today. To Page the VAD Coordinator on call, dial [phone] option #4 and ask to speak to the VAD coordinator on call.     "

## 2024-07-17 NOTE — LETTER
7/17/2024      RE: Navin Lutz  [address] [place] [address]       Dear Colleague,    Thank you for the opportunity to participate in the care of your patient, Navin Lutz, at the [facility] [place] at [facility]. Please see a copy of my visit note below.    Advanced Heart Failure/Transplant Clinic Note    HPI  Dear colleagues,     I had the pleasure of seeing Mr. Navin Lutz in the Cardiology clinic.  As you know, Mr. Navin Lutz is a pleasant 53 year old male with a past medical history of chronic HFrEF 2/2 NICM s/p HM3 LVAD as BTT on 6/14/24 who presents for ongoing evaluation and management.    His postoperative course was relatively unremarkable.  He was treated with antibiotics for Klebsiella pneumonia with a 10-day course.  He developed a left pleural effusion that required to be drained with a pigtail catheter. He also had abnormal elevated LFTs, which were thought to be iatrogenic due to multiple medications (statin, acetaminophen, azithromycin).  Acetaminophen and statin were held.  His LFTs have remained elevated. During workup for his LVAD he was found to have INRs that were discordant with Chromogenic Factor X and hematology was consulted. Found to be lupus anticoagulant positive, and Chromogenic factor X monitoring was recommended for warfarin dosing as INR not reliable. Goal CFX 20-40% which correlates with INR 2-3. Hematology recommended repeat outpatient lupus anticoagulant testing at the end of July, and if negative, INR monitoring may be an option. He presented back to ER soon after discharge with sudden onset focal neurologic deficit with diplopia. Symptoms resolved on hospital day 1. He has several risk factors for stroke including recent VAD and known lupus anticoagulant positivity. Note: CFx 10 had been borderline subtherapeutic at time of hospital discharge and after discharge. CTA without large vessel occlusion. EKG  is NSR, no hx of afib or flutter. Neurology consulted. Transthoracic echo with bubble study was unremarkable. Head CT negative for ischemia or bleed on 7/9 and 7/10/24. Optho consulted - Anti-Ach receptor and MUSK antibodies recommend to r/o myasthenia gravis. He was also started on aspirin 81 mg daily. In hospital, he is chromogenic factor goal was changed from 20-40% to 20-30%.      He continues to struggle with altered sense of taste where most food tastes very powerful, making it difficult to eat. This has been present since initial hospitalization for LVAD implantation and well before TIA. He had COVID in 2022 with altered taste and smell, but things had improved since then until this recent worsening. His vision is back to normal, no other concerns. He is walking ~2.5 miles per day. He denies chest pain, shortness of breath, PND/orthopnea, palpitations, lightheadedness, syncope, leg swelling, LVAD alarms, dark urine, concerns with driveline or driveline site.     ROS:  A complete 12-point ROS was negative except as above.    PAST MEDICAL HISTORY:  Patient Active Problem List   Diagnosis    Acute on chronic systolic CHF (congestive heart failure) (H)    Chest pain    ICD (implantable cardioverter-defibrillator) in place    Inguinal hernia    Multiple lung nodules on CT    Non-ischemic cardiomyopathy (H)    Pure hypercholesterolemia    RAD (reactive airway disease) with wheezing, mild intermittent, uncomplicated    Acute deep vein thrombosis (DVT) of other vein of left upper extremity (H)    Cardiogenic shock (H)    Acute decompensated heart failure (H)    Chronic systolic congestive heart failure (H)    Anticoagulated on warfarin    LVAD (left ventricular assist device) present (H)    Chronic systolic heart failure (H)    Anticoagulated on Coumadin    Diplopia    Stroke-like symptoms    TIA (transient ischemic attack)        FAMILY HISTORY:  No family history on file.    SOCIAL HISTORY:  Social History      Tobacco Use    Smoking status: Never    Smokeless tobacco: Never   Substance Use Topics    Alcohol use: Never    Drug use: Never        ALLERGIES:  No Known Allergies    CURRENT MEDICATIONS:  Current Outpatient Medications   Medication Sig Dispense Refill    aspirin (ASA) 81 MG chewable tablet Take 1 tablet (81 mg) by mouth daily 90 tablet 3    empagliflozin (JARDIANCE) 10 MG TABS tablet Take 1 tablet (10 mg) by mouth daily 90 tablet 1    gabapentin (NEURONTIN) 100 MG capsule Take 2 capsules (200 mg) by mouth 3 times daily 90 capsule 0    lisinopril (ZESTRIL) 10 MG tablet Take 1 tablet (10 mg) by mouth daily 90 tablet 3    methocarbamol (ROBAXIN) 750 MG tablet Take 1 tablet (750 mg) by mouth 4 times daily as needed for muscle spasms or other (post-op pain) 30 tablet 0    pantoprazole (PROTONIX) 40 MG EC tablet Take 1 tablet (40 mg) by mouth every morning (before breakfast) 60 tablet 0    polyethylene glycol (MIRALAX) 17 GM/Dose powder Take 17 g by mouth daily as needed 510 g 0    rosuvastatin (CRESTOR) 20 MG tablet Take 1 tablet (20 mg) by mouth daily 90 tablet 3    spironolactone (ALDACTONE) 25 MG tablet Take 1 tablet (25 mg) by mouth every evening 30 tablet 0    warfarin ANTICOAGULANT (COUMADIN) 5 MG tablet Take two tablets (10 mg) daily. Next chromogenic factor X on 7/12 in clinic. Always follow the most recent instructions from your INR clinic.         EXAM:  BP (!) 88/0 (BP Location: Left arm, Patient Position: Chair, Cuff Size: Adult Regular)   Wt 88 kg (194 lb)   SpO2 96%   BMI 26.31 kg/m      General: appears comfortable, alert and interactive, in no acute distress  Head: normocephalic, atraumatic  Eyes: anicteric sclera, EOMI  Mouth: MMM  Neck: supple, no cervical adenopathy  CV: regular rate and rhythm, LVAD hum, estimated JVP ~7 cm  Resp: clear, no rales or wheezing  GI: soft, nontender, nondistended, driveline bandage is c/d/i  Extremities: warm, no peripheral edema  Neurological: alert and  oriented, no focal deficits  Psych: normal mood and affect  Derm: no rashes on exposed surfaces    Weight  Wt Readings from Last 10 Encounters:   07/17/24 88 kg (194 lb)   07/12/24 85.2 kg (187 lb 14.4 oz)   07/09/24 84.9 kg (187 lb 3.2 oz)   07/04/24 84.9 kg (187 lb 2.7 oz)   05/31/24 90.9 kg (200 lb 6.4 oz)   05/03/24 95.3 kg (210 lb 3.2 oz)   05/03/24 96.7 kg (213 lb 1.6 oz)   03/13/24 91.8 kg (202 lb 6.4 oz)   01/19/24 95.4 kg (210 lb 6.4 oz)   01/19/24 91.2 kg (201 lb)       I personally reviewed recent labs and data as below and discussed the results with the patient in clinic today.  Labs:  CBC RESULTS:  Lab Results   Component Value Date    WBC 6.3 07/17/2024    RBC 5.05 07/17/2024    HGB 14.5 07/17/2024    HCT 43.9 07/17/2024    MCV 87 07/17/2024    MCH 28.7 07/17/2024    MCHC 33.0 07/17/2024    RDW 13.7 07/17/2024     07/17/2024       CMP RESULTS:  Lab Results   Component Value Date     07/12/2024    POTASSIUM 4.1 07/12/2024    POTASSIUM 4.5 06/14/2024    CHLORIDE 104 07/12/2024    CHLORIDE 104 05/28/2024    CO2 22 07/12/2024    ANIONGAP 11 07/12/2024     (H) 07/12/2024     (H) 07/09/2024    BUN 18.3 07/12/2024    CR 0.99 07/12/2024    GFRESTIMATED >90 07/12/2024    GFRESTIMATED >60 07/09/2024    NAM 9.6 07/12/2024    BILITOTAL 0.3 07/12/2024    ALBUMIN 3.7 07/12/2024    ALKPHOS 191 (H) 07/12/2024    ALT 88 (H) 07/12/2024    AST 39 07/12/2024        Recent Labs   Lab Test 07/10/24  0646 11/18/23  0018   CHOL 195 161   HDL 36* 48   * 96   TRIG 117 85        Testing/Procedures:  I personally visualized and interpreted:  Echo:  7/9/24  s/p HeartMate 3 LVAD at 5400 rpm  LVIDD is 5.6 cm. Left ventricular function is decreased. The ejection fraction is 15-20% (severely reduced).  LVAD cannula was seen in the expected anatomic position in the LV apex.  Outflow graft is visualized. Interventricular septum is midline.  The right ventricle is normal size. Global right ventricular  function is moderately reduced.  The aortic valve does not open during the cardiac cycle.  The inferior vena cava was normal in size with preserved respiratory variability.  No pericardial effusion is present.  This study was compared with the study from 6/28/2024. Left ventricular size is smaller.     Cardiac Catheterization:  6/12/24, prior to LVAD implant  BSA 2.14 m2  /73/85 mmHg  RA mean of 10 mmHg and V wave of 15 mmhg  PA 57/35/48 mmHg  PCWP --/--/30  Teressa CO/CI 3.9/1.8   PVR 4.6  PA sat 63%   Hgb 17 g/dL     Right sided filling pressures are moderately elevated.    Left sided filling pressures are moderately elevated.    Severely elevated pulmonary artery hypertension.    Reduced cardiac output level.    Hemodynamic data has been modified in Epic per physician review.     Elevated bilateral filling pressures with severe pulmonary hypertension and reduced cardiac output  Uncomplicated R femoral radial access with uncomplicated IABP insertion. Position confirmed on fluoroscopy.   Leave in [place] catheter via RIJ access.     Outside results of note:  Outside records were obtained and relevant results/notes have been incorporated into HPI.    Assessment and Plan:     In summary, 53 year old male with a past medical history of chronic HFrEF 2/2 NICM s/p HM3 LVAD as BTT on 6/14/24 who presents for ongoing evaluation and management.    # Chronic systolic heart failure secondary to NICM s/p HM3 LVAD as 6/14/24 on BTT  Stage D. NYHA Class II.  Fluid status: euvolemic without diuretics  ACEi/ARB/ARNI: continue lisinopril 10 mg daily  BB: Start carvedilol 3.125 mg BID  Aldosterone antagonist: continue spironolactone 25 mg daily  SGLT2i: Continue empagliflozin 10 mg daily  SCD prophylaxis: s/p ICD  BP: MAP goal 65-85, start coreg as above  LDH trends: Stable  Anticoagulation: warfarin with chromogenic factor goal 20-30%  Antiplatelet: On ASA 81 mg daily given TIA  Cardiac rehab: ongoing    VAD interrogation  today: VAD interrogation reviewed with VAD coordinator. Agree with findings. No frequent PI events, no power spikes, speed drops, or other findings suspicious of pump malfunction noted.      # Acute onset diplopia, right esotropia  # Thought secondary to TIA  All symptoms are resolved.  - Will continue to monitor     # Altered taste and smell  This is longstanding and preceded his stroke. He had the symptoms after COVID in 2022 but had improved and now have more recently worsened with recent hospitalization during his LVAD implantation.  We will review his medications with our pharmacist to make sure none of them are contributing to this but otherwise may suggest follow-up with neurology that he will have as part of his recent TIA.  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 Does have positive lupus anticoagulant positivity detected incidentally on pre-LVAD workup and thus need to utilize chromogenic factor to guide INR at least until repeat testing.  - Warfarin as above  - Chromogenic factor goal 20-30  - Per heme 6/25 repeat lupus anticoag panel end of July '24, may be able to switch back to INR monitoring  - OT hand therapies for neuropathy  - Continue gabapentin     # Hepatocellular pattern of liver injury  Felt to be related to drug effects during last admission in the context of statin / tylenol / azithromycin interaction.   - Will continue to monitor and consider further evaluation based on the trend     # Hyperlipidemia  , goal < 70. Not on statin prior to admission due to abnormal LFTs as above.   - Continue rosuvastatin 20 mg daily    Optimal Vascular Metrics    Blood Pressure   BP < 140/90 Yes    On Aspirin  Yes    On Statin  Yes    Tobacco use  No        To Do:  - Start carvedilol 3.125 mg BID  - Follow up with me on 7/24/24 as scheduled    The patient states understanding and is agreeable with plan.   Feel free to contact myself regarding questions or concerns. It was a pleasure to see this patient today.    A total of 47 minutes was spent on the day of the visit, which includes preparation for the visit (reviewing previous medical records, laboratories and investigations), in conjunction with the actual clinic visit with the patient, which includes obtaining a history and physical exam, creating and reviewing the care plan, patient education (and family if present), counseling, documenting clinical information in the electronic health record and care coordination.     The longitudinal plan of care for the diagnosis(es)/condition(s) as documented were addressed during this visit. Due to the added complexity in care, I will continue to support Navin in the subsequent management and with ongoing continuity of care.     Micaela Silvestre MD   of Medicine, [facility]  Advanced Heart Failure and Transplant Cardiology     CC  Libertad Briceno

## 2024-07-17 NOTE — NURSING NOTE
No chief complaint on file.      Vitals were taken, medications reconciled.     Joey Guevara, Visit Facilitator    9:38 AM

## 2024-07-18 ENCOUNTER — ANTICOAGULATION THERAPY VISIT (OUTPATIENT)
Dept: ANTICOAGULATION | Facility: CLINIC | Age: 54
End: 2024-07-18
Payer: COMMERCIAL

## 2024-07-18 ENCOUNTER — MYC MEDICAL ADVICE (OUTPATIENT)
Dept: CARDIOLOGY | Facility: CLINIC | Age: 54
End: 2024-07-18
Payer: COMMERCIAL

## 2024-07-18 DIAGNOSIS — Z79.01 ANTICOAGULATED ON COUMADIN: ICD-10-CM

## 2024-07-18 DIAGNOSIS — I50.22 CHRONIC SYSTOLIC CONGESTIVE HEART FAILURE (H): Primary | ICD-10-CM

## 2024-07-18 DIAGNOSIS — I50.22 CHRONIC SYSTOLIC HEART FAILURE (H): ICD-10-CM

## 2024-07-18 DIAGNOSIS — Z79.01 ANTICOAGULATED ON WARFARIN: ICD-10-CM

## 2024-07-18 DIAGNOSIS — Z95.811 LVAD (LEFT VENTRICULAR ASSIST DEVICE) PRESENT (H): ICD-10-CM

## 2024-07-18 LAB
FACT X ACT/NOR PPP CHRO: 21 % (ref 70–130)
SA 1  COMMENTS: NORMAL
SA 1 CELL: NORMAL
SA 1 TEST METHOD: NORMAL
SA 2 CELL: NORMAL
SA 2 COMMENTS: NORMAL
SA 2 TEST METHOD: NORMAL
SA1 HI RISK ABY: NORMAL
SA1 MOD RISK ABY: NORMAL
SA2 HI RISK ABY: NORMAL
SA2 MOD RISK ABY: NORMAL
UNACCEPTABLE ANTIGENS: NORMAL
UNOS CPRA: 0

## 2024-07-18 NOTE — PROGRESS NOTES
ANTICOAGULATION MANAGEMENT     Navin LOPEZ Bolivar 53 year old male is on warfarin with therapeutic result. (Goal  chromogenic factor X  30 - 20 )    Recent labs: (last 7 days)     07/17/24  0934   INR 3.98*   [id number] 21*       ASSESSMENT     Source(s): Chart Review and Patient/Caregiver Call     Warfarin doses taken: Warfarin taken as instructed  Diet: No new diet changes identified  Medication/supplement changes:  7/17/24 started carvedilol (per micromedex, no interaction with warfarin)  New illness, injury, or hospitalization: No  Signs or symptoms of bleeding or clotting: No  Previous result: Therapeutic last 2(+) visits  Additional findings:  may be going home early August--will need to fax orders when he goes     PLAN     Recommended plan for no diet, medication or health factor changes affecting result    Dosing Instructions: Continue your current warfarin dose 7.5 mg Morales/Tu/Fri; and 10 mg all other days of the week  with next Chromogenic Factor X in 6 days       Telephone call with Navin who verbalizes understanding and agrees to plan and who agrees to plan and repeated back plan correctly    Check at provider office visit/lab visit scheduled    Education provided:   Contact [phone] with any changes, questions or concerns.     Plan made per [facility] anticoagulation protocol and per LVAD protocol    Analy Cisneros RN  Anticoagulation Clinic  7/18/2024

## 2024-07-22 DIAGNOSIS — Z95.811 LVAD (LEFT VENTRICULAR ASSIST DEVICE) PRESENT (H): ICD-10-CM

## 2024-07-22 DIAGNOSIS — I50.22 CHRONIC SYSTOLIC CONGESTIVE HEART FAILURE (H): Primary | ICD-10-CM

## 2024-07-22 DIAGNOSIS — I50.32 CHRONIC DIASTOLIC CONGESTIVE HEART FAILURE (H): ICD-10-CM

## 2024-07-22 DIAGNOSIS — Z79.01 ANTICOAGULATED ON WARFARIN: ICD-10-CM

## 2024-07-22 LAB
MDC_IDC_EPISODE_DTM: NORMAL
MDC_IDC_EPISODE_DURATION: 1 S
MDC_IDC_EPISODE_DURATION: 2 S
MDC_IDC_EPISODE_DURATION: 4 S
MDC_IDC_EPISODE_ID: 126
MDC_IDC_EPISODE_ID: 127
MDC_IDC_EPISODE_ID: 128
MDC_IDC_EPISODE_ID: 129
MDC_IDC_EPISODE_ID: 130
MDC_IDC_EPISODE_ID: 131
MDC_IDC_EPISODE_ID: 132
MDC_IDC_EPISODE_ID: 133
MDC_IDC_EPISODE_ID: 134
MDC_IDC_EPISODE_ID: 135
MDC_IDC_EPISODE_ID: 136
MDC_IDC_EPISODE_ID: 137
MDC_IDC_EPISODE_ID: 138
MDC_IDC_EPISODE_ID: 139
MDC_IDC_EPISODE_ID: 140
MDC_IDC_EPISODE_TYPE: NORMAL
MDC_IDC_LEAD_CONNECTION_STATUS: NORMAL
MDC_IDC_LEAD_IMPLANT_DT: NORMAL
MDC_IDC_LEAD_LOCATION: NORMAL
MDC_IDC_LEAD_LOCATION_DETAIL_1: NORMAL
MDC_IDC_LEAD_MFG: NORMAL
MDC_IDC_LEAD_MODEL: NORMAL
MDC_IDC_LEAD_POLARITY_TYPE: NORMAL
MDC_IDC_LEAD_SERIAL: NORMAL
MDC_IDC_MSMT_BATTERY_DTM: NORMAL
MDC_IDC_MSMT_BATTERY_REMAINING_LONGEVITY: 158 MO
MDC_IDC_MSMT_BATTERY_RRT_TRIGGER: NORMAL
MDC_IDC_MSMT_BATTERY_STATUS: NORMAL
MDC_IDC_MSMT_BATTERY_VOLTAGE: 3.03 V
MDC_IDC_MSMT_CAP_CHARGE_DTM: NORMAL
MDC_IDC_MSMT_CAP_CHARGE_ENERGY: 18 J
MDC_IDC_MSMT_CAP_CHARGE_TIME: 3.9 S
MDC_IDC_MSMT_CAP_CHARGE_TYPE: NORMAL
MDC_IDC_MSMT_LEADCHNL_RV_IMPEDANCE_VALUE: 304 OHM
MDC_IDC_MSMT_LEADCHNL_RV_IMPEDANCE_VALUE: 380 OHM
MDC_IDC_MSMT_LEADCHNL_RV_PACING_THRESHOLD_AMPLITUDE: 0.75 V
MDC_IDC_MSMT_LEADCHNL_RV_PACING_THRESHOLD_PULSEWIDTH: 0.4 MS
MDC_IDC_MSMT_LEADCHNL_RV_SENSING_INTR_AMPL: 6.4 MV
MDC_IDC_PG_IMPLANT_DTM: NORMAL
MDC_IDC_PG_MFG: NORMAL
MDC_IDC_PG_MODEL: NORMAL
MDC_IDC_PG_SERIAL: NORMAL
MDC_IDC_PG_TYPE: NORMAL
MDC_IDC_SESS_CLINIC_NAME: NORMAL
MDC_IDC_SESS_DTM: NORMAL
MDC_IDC_SESS_TYPE: NORMAL
MDC_IDC_SET_BRADY_LOWRATE: 60 {BEATS}/MIN
MDC_IDC_SET_BRADY_MAX_SENSOR_RATE: 130 {BEATS}/MIN
MDC_IDC_SET_BRADY_MODE: NORMAL
MDC_IDC_SET_LEADCHNL_RA_SENSING_SENSITIVITY: NORMAL
MDC_IDC_SET_LEADCHNL_RV_PACING_AMPLITUDE: 2 V
MDC_IDC_SET_LEADCHNL_RV_PACING_ANODE_ELECTRODE_1: NORMAL
MDC_IDC_SET_LEADCHNL_RV_PACING_ANODE_LOCATION_1: NORMAL
MDC_IDC_SET_LEADCHNL_RV_PACING_CAPTURE_MODE: NORMAL
MDC_IDC_SET_LEADCHNL_RV_PACING_CATHODE_ELECTRODE_1: NORMAL
MDC_IDC_SET_LEADCHNL_RV_PACING_CATHODE_LOCATION_1: NORMAL
MDC_IDC_SET_LEADCHNL_RV_PACING_POLARITY: NORMAL
MDC_IDC_SET_LEADCHNL_RV_PACING_PULSEWIDTH: 0.4 MS
MDC_IDC_SET_LEADCHNL_RV_SENSING_ANODE_ELECTRODE_1: NORMAL
MDC_IDC_SET_LEADCHNL_RV_SENSING_ANODE_LOCATION_1: NORMAL
MDC_IDC_SET_LEADCHNL_RV_SENSING_CATHODE_ELECTRODE_1: NORMAL
MDC_IDC_SET_LEADCHNL_RV_SENSING_CATHODE_LOCATION_1: NORMAL
MDC_IDC_SET_LEADCHNL_RV_SENSING_POLARITY: NORMAL
MDC_IDC_SET_LEADCHNL_RV_SENSING_SENSITIVITY: 0.3 MV
MDC_IDC_SET_ZONE_DETECTION_BEATS_DENOMINATOR: 16 {BEATS}
MDC_IDC_SET_ZONE_DETECTION_BEATS_DENOMINATOR: 32 {BEATS}
MDC_IDC_SET_ZONE_DETECTION_BEATS_DENOMINATOR: 40 {BEATS}
MDC_IDC_SET_ZONE_DETECTION_BEATS_NUMERATOR: 16 {BEATS}
MDC_IDC_SET_ZONE_DETECTION_BEATS_NUMERATOR: 30 {BEATS}
MDC_IDC_SET_ZONE_DETECTION_BEATS_NUMERATOR: 32 {BEATS}
MDC_IDC_SET_ZONE_DETECTION_INTERVAL: 300 MS
MDC_IDC_SET_ZONE_DETECTION_INTERVAL: 360 MS
MDC_IDC_SET_ZONE_DETECTION_INTERVAL: 400 MS
MDC_IDC_SET_ZONE_STATUS: NORMAL
MDC_IDC_SET_ZONE_TYPE: NORMAL
MDC_IDC_SET_ZONE_VENDOR_TYPE: NORMAL
MDC_IDC_STAT_AT_BURDEN_PERCENT: 0 %
MDC_IDC_STAT_AT_DTM_END: NORMAL
MDC_IDC_STAT_AT_DTM_START: NORMAL
MDC_IDC_STAT_BRADY_DTM_END: NORMAL
MDC_IDC_STAT_BRADY_DTM_START: NORMAL
MDC_IDC_STAT_BRADY_RV_PERCENT_PACED: 0.01 %
MDC_IDC_STAT_EPISODE_RECENT_COUNT: 0
MDC_IDC_STAT_EPISODE_RECENT_COUNT: 32
MDC_IDC_STAT_EPISODE_RECENT_COUNT_DTM_END: NORMAL
MDC_IDC_STAT_EPISODE_RECENT_COUNT_DTM_START: NORMAL
MDC_IDC_STAT_EPISODE_TOTAL_COUNT: 0
MDC_IDC_STAT_EPISODE_TOTAL_COUNT: 120
MDC_IDC_STAT_EPISODE_TOTAL_COUNT: 20
MDC_IDC_STAT_EPISODE_TOTAL_COUNT_DTM_END: NORMAL
MDC_IDC_STAT_EPISODE_TOTAL_COUNT_DTM_START: NORMAL
MDC_IDC_STAT_EPISODE_TYPE: NORMAL
MDC_IDC_STAT_TACHYTHERAPY_ATP_DELIVERED_RECENT: 0
MDC_IDC_STAT_TACHYTHERAPY_ATP_DELIVERED_TOTAL: 0
MDC_IDC_STAT_TACHYTHERAPY_RECENT_DTM_END: NORMAL
MDC_IDC_STAT_TACHYTHERAPY_RECENT_DTM_START: NORMAL
MDC_IDC_STAT_TACHYTHERAPY_SHOCKS_ABORTED_RECENT: 0
MDC_IDC_STAT_TACHYTHERAPY_SHOCKS_ABORTED_TOTAL: 0
MDC_IDC_STAT_TACHYTHERAPY_SHOCKS_DELIVERED_RECENT: 0
MDC_IDC_STAT_TACHYTHERAPY_SHOCKS_DELIVERED_TOTAL: 0
MDC_IDC_STAT_TACHYTHERAPY_TOTAL_DTM_END: NORMAL
MDC_IDC_STAT_TACHYTHERAPY_TOTAL_DTM_START: NORMAL

## 2024-07-22 RX ORDER — GABAPENTIN 100 MG/1
200 CAPSULE ORAL 3 TIMES DAILY
Qty: 180 CAPSULE | Refills: 0 | Status: SHIPPED | OUTPATIENT
Start: 2024-07-22 | End: 2024-07-31

## 2024-07-23 ENCOUNTER — HOSPITAL ENCOUNTER (OUTPATIENT)
Dept: CARDIAC REHAB | Facility: CLINIC | Age: 54
Discharge: HOME | End: 2024-07-23
Attending: NURSE PRACTITIONER
Payer: COMMERCIAL

## 2024-07-23 LAB — BACTERIA PLR CULT: NO GROWTH

## 2024-07-23 PROCEDURE — 93798 PHYS/QHP OP CAR RHAB W/ECG: CPT

## 2024-07-24 ENCOUNTER — ANTICOAGULATION THERAPY VISIT (OUTPATIENT)
Dept: ANTICOAGULATION | Facility: CLINIC | Age: 54
End: 2024-07-24

## 2024-07-24 ENCOUNTER — OFFICE VISIT (OUTPATIENT)
Dept: CARDIOLOGY | Facility: CLINIC | Age: 54
End: 2024-07-24
Attending: STUDENT IN AN ORGANIZED HEALTH CARE EDUCATION/TRAINING PROGRAM
Payer: COMMERCIAL

## 2024-07-24 ENCOUNTER — LAB (OUTPATIENT)
Dept: LAB | Facility: CLINIC | Age: 54
End: 2024-07-24
Payer: COMMERCIAL

## 2024-07-24 VITALS — OXYGEN SATURATION: 98 % | WEIGHT: 195 LBS | SYSTOLIC BLOOD PRESSURE: 80 MMHG | BODY MASS INDEX: 26.45 KG/M2

## 2024-07-24 DIAGNOSIS — Z95.811 LVAD (LEFT VENTRICULAR ASSIST DEVICE) PRESENT (H): ICD-10-CM

## 2024-07-24 DIAGNOSIS — I50.22 CHRONIC SYSTOLIC HEART FAILURE (H): ICD-10-CM

## 2024-07-24 DIAGNOSIS — I50.22 CHRONIC SYSTOLIC CONGESTIVE HEART FAILURE (H): Primary | ICD-10-CM

## 2024-07-24 DIAGNOSIS — G45.9 TIA (TRANSIENT ISCHEMIC ATTACK): ICD-10-CM

## 2024-07-24 DIAGNOSIS — I50.22 CHRONIC SYSTOLIC CONGESTIVE HEART FAILURE (H): ICD-10-CM

## 2024-07-24 DIAGNOSIS — I50.32 CHRONIC DIASTOLIC CONGESTIVE HEART FAILURE (H): ICD-10-CM

## 2024-07-24 DIAGNOSIS — R74.8 ELEVATED LIVER ENZYMES: ICD-10-CM

## 2024-07-24 DIAGNOSIS — Z79.01 ANTICOAGULATED ON WARFARIN: ICD-10-CM

## 2024-07-24 DIAGNOSIS — Z95.811 LVAD (LEFT VENTRICULAR ASSIST DEVICE) PRESENT (H): Primary | ICD-10-CM

## 2024-07-24 DIAGNOSIS — I42.8 NONISCHEMIC CARDIOMYOPATHY (H): ICD-10-CM

## 2024-07-24 DIAGNOSIS — Z79.01 ANTICOAGULATED ON COUMADIN: ICD-10-CM

## 2024-07-24 LAB
ALBUMIN SERPL BCG-MCNC: 3.8 G/DL (ref 3.5–5.2)
ALP SERPL-CCNC: 151 U/L (ref 40–150)
ALT SERPL W P-5'-P-CCNC: 94 U/L (ref 0–70)
ANION GAP SERPL CALCULATED.3IONS-SCNC: 9 MMOL/L (ref 7–15)
AST SERPL W P-5'-P-CCNC: 40 U/L (ref 0–45)
BILIRUB SERPL-MCNC: 0.3 MG/DL
BUN SERPL-MCNC: 14.7 MG/DL (ref 6–20)
CALCIUM SERPL-MCNC: 9.4 MG/DL (ref 8.8–10.4)
CHLORIDE SERPL-SCNC: 104 MMOL/L (ref 98–107)
CREAT SERPL-MCNC: 1.01 MG/DL (ref 0.67–1.17)
EGFRCR SERPLBLD CKD-EPI 2021: 89 ML/MIN/1.73M2
ERYTHROCYTE [DISTWIDTH] IN BLOOD BY AUTOMATED COUNT: 13.5 % (ref 10–15)
FACT X ACT/NOR PPP CHRO: 16 % (ref 70–130)
GLUCOSE SERPL-MCNC: 112 MG/DL (ref 70–99)
HCO3 SERPL-SCNC: 25 MMOL/L (ref 22–29)
HCT VFR BLD AUTO: 45 % (ref 40–53)
HGB BLD-MCNC: 14.6 G/DL (ref 13.3–17.7)
INR PPP: 4.8 (ref 0.85–1.15)
LDH SERPL L TO P-CCNC: 208 U/L (ref 0–250)
MCH RBC QN AUTO: 28.3 PG (ref 26.5–33)
MCHC RBC AUTO-ENTMCNC: 32.4 G/DL (ref 31.5–36.5)
MCV RBC AUTO: 87 FL (ref 78–100)
NT-PROBNP SERPL-MCNC: 472 PG/ML (ref 0–900)
PLATELET # BLD AUTO: 308 10E3/UL (ref 150–450)
POTASSIUM SERPL-SCNC: 4.4 MMOL/L (ref 3.4–5.3)
PROT SERPL-MCNC: 6.6 G/DL (ref 6.4–8.3)
RBC # BLD AUTO: 5.16 10E6/UL (ref 4.4–5.9)
SODIUM SERPL-SCNC: 138 MMOL/L (ref 135–145)
WBC # BLD AUTO: 6.3 10E3/UL (ref 4–11)

## 2024-07-24 PROCEDURE — 99215 OFFICE O/P EST HI 40 MIN: CPT | Mod: 25 | Performed by: STUDENT IN AN ORGANIZED HEALTH CARE EDUCATION/TRAINING PROGRAM

## 2024-07-24 PROCEDURE — 85027 COMPLETE CBC AUTOMATED: CPT | Performed by: PATHOLOGY

## 2024-07-24 PROCEDURE — 99000 SPECIMEN HANDLING OFFICE-LAB: CPT | Performed by: PATHOLOGY

## 2024-07-24 PROCEDURE — 36415 COLL VENOUS BLD VENIPUNCTURE: CPT | Performed by: PATHOLOGY

## 2024-07-24 PROCEDURE — 83880 ASSAY OF NATRIURETIC PEPTIDE: CPT | Performed by: PATHOLOGY

## 2024-07-24 PROCEDURE — 80053 COMPREHEN METABOLIC PANEL: CPT | Performed by: PATHOLOGY

## 2024-07-24 PROCEDURE — 99211 OFF/OP EST MAY X REQ PHY/QHP: CPT | Performed by: STUDENT IN AN ORGANIZED HEALTH CARE EDUCATION/TRAINING PROGRAM

## 2024-07-24 PROCEDURE — 83615 LACTATE (LD) (LDH) ENZYME: CPT | Performed by: PATHOLOGY

## 2024-07-24 PROCEDURE — 85610 PROTHROMBIN TIME: CPT | Performed by: PATHOLOGY

## 2024-07-24 PROCEDURE — 93750 INTERROGATION VAD IN PERSON: CPT | Performed by: STUDENT IN AN ORGANIZED HEALTH CARE EDUCATION/TRAINING PROGRAM

## 2024-07-24 PROCEDURE — 85260 CLOT FACTOR X STUART-POWER: CPT | Performed by: STUDENT IN AN ORGANIZED HEALTH CARE EDUCATION/TRAINING PROGRAM

## 2024-07-24 ASSESSMENT — PAIN SCALES - GENERAL: PAINLEVEL: NO PAIN (0)

## 2024-07-24 NOTE — PATIENT INSTRUCTIONS
"Medications:  No medication changes today.    Instructions:  Please report to the [facility] Waiting Room of the hospital at 830am on  7/31 for the Echo and lab. Then you will come to the clinic for the Dr Silvestre appointment. There is a shuttle bus that can bring you. You can  park once and ask for your car over at the clinic.  On 7/31 we will show you the weekly dressing and the shower bag.    Follow-up: (make these appointments before you leave)  Please follow-up with Dr Silvestre and Dr Jhaveri as outlined in you long list of appointments.    Equipment Maintenance Reminders:   Charge your back-up controller at least every 6 months. To charge, connect it to either batteries or wall power. The screen will read \"Charging\". Keep connected until the screen reads \"charging complete\". Disconnect power once the controller battery is charged. Also do a self-test when the controller is connected to power.  Check your battery charger for when it is due for maintenance. It requires inspection in clinic once per year. There will be a sticker stating the month and year maintenance is due. When you bring your battery charger to clinic, tell one of the schedulers you have LVAD equipment that needs maintenance. They will call [employer]. You can leave your  under the LVAD sign by the  at the far end of clinic. You must drop it off before 2pm.   Replace the AA batteries in your mobile power unit every 6 months.       Page the VAD Coordinator on call if you gain more than 3 lb in a day or 5 in a week. Please also page if you feel unwell or have alarms.   Great to see you in clinic today. To Page the VAD Coordinator on call, dial [phone] option #[phone] and ask to speak to the VAD coordinator on call.    "

## 2024-07-24 NOTE — LETTER
7/24/2024      RE: Navin Lutz  [address] [place] [address]       Dear Colleague,    Thank you for the opportunity to participate in the care of your patient, Navin Lutz, at the [facility]. Please see a copy of my visit note below.    Advanced Heart Failure/Transplant Clinic Note    HPI  Dear colleagues,     I had the pleasure of seeing Mr. Navin Lutz in the Cardiology clinic.  As you know, Mr. Navin Lutz is a pleasant 53 year old male with a past medical history of chronic HFrEF 2/2 NICM s/p HM3 LVAD as BTT on 6/14/24 who presents for ongoing evaluation and management.    His postoperative course was relatively unremarkable.  He was treated with antibiotics for Klebsiella pneumonia with a 10-day course.  He developed a left pleural effusion that required to be drained with a pigtail catheter. He also had abnormal elevated LFTs, which were thought to be iatrogenic due to multiple medications (statin, acetaminophen, azithromycin).  Acetaminophen and statin were held.  His LFTs have remained elevated. During workup for his LVAD he was found to have INRs that were discordant with Chromogenic Factor X and hematology was consulted. Found to be lupus anticoagulant positive, and Chromogenic factor X monitoring was recommended for warfarin dosing as INR not reliable. Goal CFX 20-40% which correlates with INR 2-3. Hematology recommended repeat outpatient lupus anticoagulant testing at the end of July, and if negative, INR monitoring may be an option. He presented back to ER soon after discharge with sudden onset focal neurologic deficit with diplopia. Symptoms resolved on hospital day 1. He has several risk factors for stroke including recent VAD and known lupus anticoagulant positivity. Note: CFx 10 had been borderline subtherapeutic at time of hospital discharge and after discharge. CTA without large vessel occlusion. EKG  is NSR, no hx of afib or flutter. Neurology consulted. Transthoracic echo with bubble study was unremarkable. Head CT negative for ischemia or bleed on 7/9 and 7/10/24. Optho consulted - Anti-Ach receptor and MUSK antibodies recommend to r/o myasthenia gravis. He was also started on aspirin 81 mg daily. In hospital, he is chromogenic factor goal was changed from 20-40% to 20-30%.      He continues to struggle with altered sense of taste where most food tastes very powerful, making it difficult to eat, but is able to eat small, frequent meals. This has been present since initial hospitalization for LVAD implantation and well before TIA. He is walking ~2.5 miles per day without any difficulty. He denies chest pain, shortness of breath, PND/orthopnea, palpitations, lightheadedness, syncope, leg swelling, LVAD alarms, dark urine, blood in stool, concerns with driveline or driveline site. He is hoping he can return home next week if everything goes well with his visits and testing then.    ROS:  A complete 12-point ROS was negative except as above.    PAST MEDICAL HISTORY:  Patient Active Problem List   Diagnosis     Acute on chronic systolic CHF (congestive heart failure) (H)     Chest pain     ICD (implantable cardioverter-defibrillator) in place     Inguinal hernia     Multiple lung nodules on CT     Non-ischemic cardiomyopathy (H)     Pure hypercholesterolemia     RAD (reactive airway disease) with wheezing, mild intermittent, uncomplicated     Acute deep vein thrombosis (DVT) of other vein of left upper extremity (H)     Cardiogenic shock (H)     Acute decompensated heart failure (H)     Chronic systolic congestive heart failure (H)     Anticoagulated on warfarin     LVAD (left ventricular assist device) present (H)     Chronic systolic heart failure (H)     Anticoagulated on Coumadin     Diplopia     Stroke-like symptoms     TIA (transient ischemic attack)        FAMILY HISTORY:  No family history on  file.    SOCIAL HISTORY:  Social History     Tobacco Use     Smoking status: Never     Smokeless tobacco: Never   Substance Use Topics     Alcohol use: Never     Drug use: Never        ALLERGIES:  No Known Allergies    CURRENT MEDICATIONS:  Current Outpatient Medications   Medication Sig Dispense Refill     aspirin (ASA) 81 MG chewable tablet Take 1 tablet (81 mg) by mouth daily 90 tablet 3     carvedilol (COREG) 3.125 MG tablet Take 1 tablet (3.125 mg) by mouth 2 times daily (with meals) 60 tablet 11     empagliflozin (JARDIANCE) 10 MG TABS tablet Take 1 tablet (10 mg) by mouth daily 90 tablet 1     gabapentin (NEURONTIN) 100 MG capsule Take 2 capsules (200 mg) by mouth 3 times daily 180 capsule 0     lisinopril (ZESTRIL) 10 MG tablet Take 1 tablet (10 mg) by mouth daily 90 tablet 3     methocarbamol (ROBAXIN) 750 MG tablet Take 1 tablet (750 mg) by mouth 4 times daily as needed for muscle spasms or other (post-op pain) 30 tablet 0     pantoprazole (PROTONIX) 40 MG EC tablet Take 1 tablet (40 mg) by mouth every morning (before breakfast) 60 tablet 0     polyethylene glycol (MIRALAX) 17 GM/Dose powder Take 17 g by mouth daily as needed 510 g 0     rosuvastatin (CRESTOR) 20 MG tablet Take 1 tablet (20 mg) by mouth daily 90 tablet 3     spironolactone (ALDACTONE) 25 MG tablet Take 1 tablet (25 mg) by mouth every evening 30 tablet 0     warfarin ANTICOAGULANT (COUMADIN) 5 MG tablet Take two tablets (10 mg) daily. Next chromogenic factor X on 7/12 in clinic. Always follow the most recent instructions from your INR clinic.         EXAM:  BP (!) 80/0 (BP Location: Left arm, Patient Position: Chair, Cuff Size: Adult Regular)   Wt 88.5 kg (195 lb)   SpO2 98%   BMI 26.45 kg/m      General: appears comfortable, alert and interactive, in no acute distress  Head: normocephalic, atraumatic  Eyes: anicteric sclera, EOMI  Mouth: MMM  Neck: supple, no cervical adenopathy  CV: regular rate and rhythm, LVAD hum, estimated JVP ~7  cm  Resp: clear, no rales or wheezing  GI: soft, nontender, nondistended, driveline bandage is c/d/i  Extremities: warm, no peripheral edema  Neurological: alert and oriented, no focal deficits  Psych: normal mood and affect  Derm: no rashes on exposed surfaces    Weight  Wt Readings from Last 10 Encounters:   07/24/24 88.5 kg (195 lb)   07/17/24 88 kg (194 lb)   07/12/24 85.2 kg (187 lb 14.4 oz)   07/09/24 84.9 kg (187 lb 3.2 oz)   07/04/24 84.9 kg (187 lb 2.7 oz)   05/31/24 90.9 kg (200 lb 6.4 oz)   05/03/24 95.3 kg (210 lb 3.2 oz)   05/03/24 96.7 kg (213 lb 1.6 oz)   03/13/24 91.8 kg (202 lb 6.4 oz)   01/19/24 95.4 kg (210 lb 6.4 oz)       I personally reviewed recent labs and data as below and discussed the results with the patient in clinic today.  Labs:  CBC RESULTS:  Lab Results   Component Value Date    WBC 6.3 07/24/2024    RBC 5.16 07/24/2024    HGB 14.6 07/24/2024    HCT 45.0 07/24/2024    MCV 87 07/24/2024    MCH 28.3 07/24/2024    MCHC 32.4 07/24/2024    RDW 13.5 07/24/2024     07/24/2024       CMP RESULTS:  Lab Results   Component Value Date     07/24/2024    POTASSIUM 4.4 07/24/2024    POTASSIUM 4.5 06/14/2024    CHLORIDE 104 07/24/2024    CHLORIDE 104 05/28/2024    CO2 25 07/24/2024    ANIONGAP 9 07/24/2024     (H) 07/24/2024     (H) 07/09/2024    BUN 14.7 07/24/2024    CR 1.01 07/24/2024    GFRESTIMATED 89 07/24/2024    GFRESTIMATED >60 07/09/2024    NAM 9.4 07/24/2024    BILITOTAL 0.3 07/24/2024    ALBUMIN 3.8 07/24/2024    ALKPHOS 151 (H) 07/24/2024    ALT 94 (H) 07/24/2024    AST 40 07/24/2024        Recent Labs   Lab Test 07/10/24  0646 11/18/23  0018   CHOL 195 161   HDL 36* 48   * 96   TRIG 117 85        Testing/Procedures:  I personally visualized and interpreted:  Echo:  7/9/24  s/p HeartMate 3 LVAD at 5400 rpm  LVIDD is 5.6 cm. Left ventricular function is decreased. The ejection fraction is 15-20% (severely reduced).  LVAD cannula was seen in the expected  anatomic position in the LV apex.  Outflow graft is visualized. Interventricular septum is midline.  The right ventricle is normal size. Global right ventricular function is moderately reduced.  The aortic valve does not open during the cardiac cycle.  The inferior vena cava was normal in size with preserved respiratory variability.  No pericardial effusion is present.  This study was compared with the study from 6/28/2024. Left ventricular size is smaller.     Cardiac Catheterization:  6/12/24, prior to LVAD implant  BSA 2.14 m2  /73/85 mmHg  RA mean of 10 mmHg and V wave of 15 mmhg  PA 57/35/48 mmHg  PCWP --/--/30  Teressa CO/CI 3.9/1.8   PVR 4.6  PA sat 63%   Hgb 17 g/dL      Right sided filling pressures are moderately elevated.     Left sided filling pressures are moderately elevated.     Severely elevated pulmonary artery hypertension.     Reduced cardiac output level.     Hemodynamic data has been modified in Epic per physician review.     Elevated bilateral filling pressures with severe pulmonary hypertension and reduced cardiac output  Uncomplicated R femoral radial access with uncomplicated IABP insertion. Position confirmed on fluoroscopy.   Leave in Bitely-Hugh catheter via RIJ access.     Outside results of note:  Outside records were obtained and relevant results/notes have been incorporated into HPI.    Assessment and Plan:     In summary, 53 year old male with a past medical history of chronic HFrEF 2/2 NICM s/p HM3 LVAD as BTT on 6/14/24 who presents for ongoing evaluation and management.    # Chronic systolic heart failure secondary to NICM s/p HM3 LVAD as 6/14/24 on BTT  Stage D. NYHA Class II  Fluid status: euvolemic without diuretics  ACEi/ARB/ARNI: continue lisinopril 10 mg daily  BB: Continue carvedilol 3.125 mg BID  Aldosterone antagonist: continue spironolactone 25 mg daily  SGLT2i: Continue empagliflozin 10 mg daily  SCD prophylaxis: s/p ICD  BP: MAP goal 65-85  LDH trends:  Stable  Anticoagulation: warfarin with chromogenic factor goal 20-30%  Antiplatelet: On ASA 81 mg daily given TIA  Cardiac rehab: ongoing    VAD interrogation today: VAD interrogation reviewed with VAD coordinator. Agree with findings. No frequent PI events, no power spikes, speed drops, or other findings suspicious of pump malfunction noted.      # Acute onset diplopia, right esotropia  # Thought secondary to TIA  All symptoms are resolved.  - Will continue to monitor     # Altered taste and smell  This is longstanding and preceded his stroke. He had the symptoms after COVID in 2022 but had improved and now have more recently worsened with recent hospitalization during his LVAD implantation.  We will review his medications with our pharmacist to make sure none of them are contributing to this but otherwise may suggest follow-up with neurology that he will have as part of his recent TIA.  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 Does have positive lupus anticoagulant positivity detected incidentally on pre-LVAD workup and thus need to utilize chromogenic factor to guide INR at least until repeat testing.  - Warfarin as above  - Chromogenic factor goal 20-30  - Per heme 6/25 repeat lupus anticoag panel end of July '24, may be able to switch back to INR monitoring  - OT hand therapies for neuropathy  - Continue gabapentin     # Hepatocellular pattern of liver injury, imrpoving  Felt to be related to drug effects during last admission in the context of statin / tylenol / azithromycin interaction.   - Will continue to monitor and consider further evaluation based on the trend     # Hyperlipidemia  , goal < 70. Not on statin prior to admission due to abnormal LFTs  as above.   - Continue rosuvastatin 20 mg daily    Optimal Vascular Metrics    Blood Pressure   BP < 140/90 Yes    On Aspirin  Yes    On Statin  Yes    Tobacco use  No       To Do:  - No medication changes today  - Follow up with CVTS on 7/29 as scheduled  - Follow up with me on 7/31/24 as scheduled    The patient states understanding and is agreeable with plan.   Feel free to contact myself regarding questions or concerns. It was a pleasure to see this patient today.    A total of 43 minutes was spent on the day of the visit, which includes preparation for the visit (reviewing previous medical records, laboratories and investigations), in conjunction with the actual clinic visit with the patient, which includes obtaining a history and physical exam, creating and reviewing the care plan, patient education (and family if present), counseling, documenting clinical information in the electronic health record and care coordination.     The longitudinal plan of care for the diagnosis(es)/condition(s) as documented were addressed during this visit. Due to the added complexity in care, I will continue to support Navin in the subsequent management and with ongoing continuity of care.     Micaela Silvestre MD   of Medicine, [facility]  Advanced Heart Failure and Transplant Cardiology     CC  Libertad Briceno         Please do not hesitate to contact me if you have any questions/concerns.     Sincerely,     Micaela Silvestre MD

## 2024-07-24 NOTE — NURSING NOTE
MCS VAD Pump Info       Row Name 07/24/24 0959             MCS VAD Information    Implant LVAD      LVAD Pump HeartMate 3         Heartmate 3 LEFT VS    Flow (Lpm) 4.2 Lpm  4.2-5.2      Pulse Index (PI) 3.9 PI  2.5-6      Speed (rpm) 5400 rpm      Power (bassett) 3.9 bassett      Current Hct setting 45      Retired: Unexpected Alarms --         Primary Controller    Serial number [facility] [id number]      Low flow alarm setting 2.5      High watt alarm setting na      EBB: Patient use 4      Replace in 32 Months         Backup Controller    Serial number [facility] [id number]      EBB: Patient use 11      Replace EBB in 32 Months      Speed & HCT match primary controller Y         VAD Interrogation    Alarms reported by patient Y      Unexpected alarms noted upon interrogation None      PI events Occasional;w/ associated speed drops      Damage to equipment is noted N      Action taken Reviewed proper equipment care and maintenance         Driveline Exit Site    Dressing change done N      Driveline properly secured Yes      DLES assessment c/d/i per pt report      Dressing used Daily kit  with blue tape instead of outer covering of the dailyt dsng.      Frequency patient changes dressing Daily      Dressing modifications --      Dressing change supplier --                      Education Complete: Yes   Charge the BACKUP controller s backup battery every 6 months  Perform a self test on BACKUP every 6 months  Change the MPU s batteries every 6 months:Yes  Have equipment serviced yearly (if applicable):Yes but not today    Reviewed Heartmate battery maintenance. Hand out given to patient regarding weekly, monthly, and yearly maintenance.        I saw the patient/caregiver in the clinic and did a check in 3 weeks post discharge. Pt reports VAD parameters stable and baseline and weight stable and baseline. Reviewed medications and answered any questions. Patient reports sleeping fine and zero anxiety since being in the hotel with LVAD.  Patient is fully able to move around the house and care for himself . He is currently enrolled in cardiac rehab here at [facility] and then is hoping [facility] for the remainder of his rehab     Discussed specific new problems/stressors since being discharged from the hospital: none at this time. Empathized with patient and reviewed coping strategies: enlisting support from friends and love ones, attending patient and caregiver support groups, reviewing LVAD educational materials to reinforce knowledge, and talking about concerns with family/care providers/trusted others. Encouraged pt to page VAD Coordinator with any issues or questions. Pt verbalizes understanding.

## 2024-07-24 NOTE — PROGRESS NOTES
Advanced Heart Failure/Transplant Clinic Note    HPI  Dear colleagues,     I had the pleasure of seeing Mr. Navin Lutz in the Cardiology clinic.  As you know, Mr. Navin Lutz is a pleasant 53 year old male with a past medical history of chronic HFrEF 2/2 NICM s/p HM3 LVAD as BTT on 6/14/24 who presents for ongoing evaluation and management.    His postoperative course was relatively unremarkable.  He was treated with antibiotics for Klebsiella pneumonia with a 10-day course.  He developed a left pleural effusion that required to be drained with a pigtail catheter. He also had abnormal elevated LFTs, which were thought to be iatrogenic due to multiple medications (statin, acetaminophen, azithromycin).  Acetaminophen and statin were held.  His LFTs have remained elevated. During workup for his LVAD he was found to have INRs that were discordant with Chromogenic Factor X and hematology was consulted. Found to be lupus anticoagulant positive, and Chromogenic factor X monitoring was recommended for warfarin dosing as INR not reliable. Goal CFX 20-40% which correlates with INR 2-3. Hematology recommended repeat outpatient lupus anticoagulant testing at the end of July, and if negative, INR monitoring may be an option. He presented back to ER soon after discharge with sudden onset focal neurologic deficit with diplopia. Symptoms resolved on hospital day 1. He has several risk factors for stroke including recent VAD and known lupus anticoagulant positivity. Note: CFx 10 had been borderline subtherapeutic at time of hospital discharge and after discharge. CTA without large vessel occlusion. EKG is NSR, no hx of afib or flutter. Neurology consulted. Transthoracic echo with bubble study was unremarkable. Head CT negative for ischemia or bleed on 7/9 and 7/10/24. Optho consulted - Anti-Ach receptor and MUSK antibodies recommend to r/o myasthenia gravis. He was also started on aspirin 81 mg daily. In hospital, he is  chromogenic factor goal was changed from 20-40% to 20-30%.      He continues to struggle with altered sense of taste where most food tastes very powerful, making it difficult to eat, but is able to eat small, frequent meals. This has been present since initial hospitalization for LVAD implantation and well before TIA. He is walking ~2.5 miles per day without any difficulty. He denies chest pain, shortness of breath, PND/orthopnea, palpitations, lightheadedness, syncope, leg swelling, LVAD alarms, dark urine, blood in stool, concerns with driveline or driveline site. He is hoping he can return home next week if everything goes well with his visits and testing then.    ROS:  A complete 12-point ROS was negative except as above.    PAST MEDICAL HISTORY:  Patient Active Problem List   Diagnosis    Acute on chronic systolic CHF (congestive heart failure) (H)    Chest pain    ICD (implantable cardioverter-defibrillator) in place    Inguinal hernia    Multiple lung nodules on CT    Non-ischemic cardiomyopathy (H)    Pure hypercholesterolemia    RAD (reactive airway disease) with wheezing, mild intermittent, uncomplicated    Acute deep vein thrombosis (DVT) of other vein of left upper extremity (H)    Cardiogenic shock (H)    Acute decompensated heart failure (H)    Chronic systolic congestive heart failure (H)    Anticoagulated on warfarin    LVAD (left ventricular assist device) present (H)    Chronic systolic heart failure (H)    Anticoagulated on Coumadin    Diplopia    Stroke-like symptoms    TIA (transient ischemic attack)        FAMILY HISTORY:  No family history on file.    SOCIAL HISTORY:  Social History     Tobacco Use    Smoking status: Never    Smokeless tobacco: Never   Substance Use Topics    Alcohol use: Never    Drug use: Never        ALLERGIES:  No Known Allergies    CURRENT MEDICATIONS:  Current Outpatient Medications   Medication Sig Dispense Refill    aspirin (ASA) 81 MG chewable tablet Take 1 tablet (81  mg) by mouth daily 90 tablet 3    carvedilol (COREG) 3.125 MG tablet Take 1 tablet (3.125 mg) by mouth 2 times daily (with meals) 60 tablet 11    empagliflozin (JARDIANCE) 10 MG TABS tablet Take 1 tablet (10 mg) by mouth daily 90 tablet 1    gabapentin (NEURONTIN) 100 MG capsule Take 2 capsules (200 mg) by mouth 3 times daily 180 capsule 0    lisinopril (ZESTRIL) 10 MG tablet Take 1 tablet (10 mg) by mouth daily 90 tablet 3    methocarbamol (ROBAXIN) 750 MG tablet Take 1 tablet (750 mg) by mouth 4 times daily as needed for muscle spasms or other (post-op pain) 30 tablet 0    pantoprazole (PROTONIX) 40 MG EC tablet Take 1 tablet (40 mg) by mouth every morning (before breakfast) 60 tablet 0    polyethylene glycol (MIRALAX) 17 GM/Dose powder Take 17 g by mouth daily as needed 510 g 0    rosuvastatin (CRESTOR) 20 MG tablet Take 1 tablet (20 mg) by mouth daily 90 tablet 3    spironolactone (ALDACTONE) 25 MG tablet Take 1 tablet (25 mg) by mouth every evening 30 tablet 0    warfarin ANTICOAGULANT (COUMADIN) 5 MG tablet Take two tablets (10 mg) daily. Next chromogenic factor X on 7/12 in clinic. Always follow the most recent instructions from your INR clinic.         EXAM:  BP (!) 80/0 (BP Location: Left arm, Patient Position: Chair, Cuff Size: Adult Regular)   Wt 88.5 kg (195 lb)   SpO2 98%   BMI 26.45 kg/m      General: appears comfortable, alert and interactive, in no acute distress  Head: normocephalic, atraumatic  Eyes: anicteric sclera, EOMI  Mouth: MMM  Neck: supple, no cervical adenopathy  CV: regular rate and rhythm, LVAD hum, estimated JVP ~7 cm  Resp: clear, no rales or wheezing  GI: soft, nontender, nondistended, driveline bandage is c/d/i  Extremities: warm, no peripheral edema  Neurological: alert and oriented, no focal deficits  Psych: normal mood and affect  Derm: no rashes on exposed surfaces    Weight  Wt Readings from Last 10 Encounters:   07/24/24 88.5 kg (195 lb)   07/17/24 88 kg (194 lb)   07/12/24  85.2 kg (187 lb 14.4 oz)   07/09/24 84.9 kg (187 lb 3.2 oz)   07/04/24 84.9 kg (187 lb 2.7 oz)   05/31/24 90.9 kg (200 lb 6.4 oz)   05/03/24 95.3 kg (210 lb 3.2 oz)   05/03/24 96.7 kg (213 lb 1.6 oz)   03/13/24 91.8 kg (202 lb 6.4 oz)   01/19/24 95.4 kg (210 lb 6.4 oz)       I personally reviewed recent labs and data as below and discussed the results with the patient in clinic today.  Labs:  CBC RESULTS:  Lab Results   Component Value Date    WBC 6.3 07/24/2024    RBC 5.16 07/24/2024    HGB 14.6 07/24/2024    HCT 45.0 07/24/2024    MCV 87 07/24/2024    MCH 28.3 07/24/2024    MCHC 32.4 07/24/2024    RDW 13.5 07/24/2024     07/24/2024       CMP RESULTS:  Lab Results   Component Value Date     07/24/2024    POTASSIUM 4.4 07/24/2024    POTASSIUM 4.5 06/14/2024    CHLORIDE 104 07/24/2024    CHLORIDE 104 05/28/2024    CO2 25 07/24/2024    ANIONGAP 9 07/24/2024     (H) 07/24/2024     (H) 07/09/2024    BUN 14.7 07/24/2024    CR 1.01 07/24/2024    GFRESTIMATED 89 07/24/2024    GFRESTIMATED >60 07/09/2024    NAM 9.4 07/24/2024    BILITOTAL 0.3 07/24/2024    ALBUMIN 3.8 07/24/2024    ALKPHOS 151 (H) 07/24/2024    ALT 94 (H) 07/24/2024    AST 40 07/24/2024        Recent Labs   Lab Test 07/10/24  0646 11/18/23  0018   CHOL 195 161   HDL 36* 48   * 96   TRIG 117 85        Testing/Procedures:  I personally visualized and interpreted:  Echo:  7/9/24  s/p HeartMate 3 LVAD at 5400 rpm  LVIDD is 5.6 cm. Left ventricular function is decreased. The ejection fraction is 15-20% (severely reduced).  LVAD cannula was seen in the expected anatomic position in the LV apex.  Outflow graft is visualized. Interventricular septum is midline.  The right ventricle is normal size. Global right ventricular function is moderately reduced.  The aortic valve does not open during the cardiac cycle.  The inferior vena cava was normal in size with preserved respiratory variability.  No pericardial effusion is  present.  This study was compared with the study from 6/28/2024. Left ventricular size is smaller.     Cardiac Catheterization:  6/12/24, prior to LVAD implant  BSA 2.14 m2  /73/85 mmHg  RA mean of 10 mmHg and V wave of 15 mmhg  PA 57/35/48 mmHg  PCWP --/--/30  Teressa CO/CI 3.9/1.8   PVR 4.6  PA sat 63%   Hgb 17 g/dL     Right sided filling pressures are moderately elevated.    Left sided filling pressures are moderately elevated.    Severely elevated pulmonary artery hypertension.    Reduced cardiac output level.    Hemodynamic data has been modified in Epic per physician review.     Elevated bilateral filling pressures with severe pulmonary hypertension and reduced cardiac output  Uncomplicated R femoral radial access with uncomplicated IABP insertion. Position confirmed on fluoroscopy.   Leave in [place] catheter via RIJ access.     Outside results of note:  Outside records were obtained and relevant results/notes have been incorporated into HPI.    Assessment and Plan:     In summary, 53 year old male with a past medical history of chronic HFrEF 2/2 NICM s/p HM3 LVAD as BTT on 6/14/24 who presents for ongoing evaluation and management.    # Chronic systolic heart failure secondary to NICM s/p HM3 LVAD as 6/14/24 on BTT  Stage D. NYHA Class II  Fluid status: euvolemic without diuretics  ACEi/ARB/ARNI: continue lisinopril 10 mg daily  BB: Continue carvedilol 3.125 mg BID  Aldosterone antagonist: continue spironolactone 25 mg daily  SGLT2i: Continue empagliflozin 10 mg daily  SCD prophylaxis: s/p ICD  BP: MAP goal 65-85  LDH trends: Stable  Anticoagulation: warfarin with chromogenic factor goal 20-30%  Antiplatelet: On ASA 81 mg daily given TIA  Cardiac rehab: ongoing    VAD interrogation today: VAD interrogation reviewed with VAD coordinator. Agree with findings. No frequent PI events, no power spikes, speed drops, or other findings suspicious of pump malfunction noted.      # Acute onset diplopia, right  esotropia  # Thought secondary to TIA  All symptoms are resolved.  - Will continue to monitor     # Altered taste and smell  This is longstanding and preceded his stroke. He had the symptoms after COVID in 2022 but had improved and now have more recently worsened with recent hospitalization during his LVAD implantation.  We will review his medications with our pharmacist to make sure none of them are contributing to this but otherwise may suggest follow-up with neurology that he will have as part of his recent TIA.  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 Does have positive lupus anticoagulant positivity detected incidentally on pre-LVAD workup and thus need to utilize chromogenic factor to guide INR at least until repeat testing.  - Warfarin as above  - Chromogenic factor goal 20-30  - Per heme 6/25 repeat lupus anticoag panel end of July '24, may be able to switch back to INR monitoring  - OT hand therapies for neuropathy  - Continue gabapentin     # Hepatocellular pattern of liver injury, imrpoving  Felt to be related to drug effects during last admission in the context of statin / tylenol / azithromycin interaction.   - Will continue to monitor and consider further evaluation based on the trend     # Hyperlipidemia  , goal < 70. Not on statin prior to admission due to abnormal LFTs as above.   - Continue rosuvastatin 20 mg daily    Optimal Vascular Metrics    Blood Pressure   BP < 140/90 Yes    On Aspirin  Yes    On Statin  Yes    Tobacco use  No       To Do:  - No medication changes today  - Follow up with CVTS on 7/29 as scheduled  - Follow up with me on 7/31/24 as scheduled    The patient states understanding and is agreeable with plan.   Feel free to  contact myself regarding questions or concerns. It was a pleasure to see this patient today.    A total of 43 minutes was spent on the day of the visit, which includes preparation for the visit (reviewing previous medical records, laboratories and investigations), in conjunction with the actual clinic visit with the patient, which includes obtaining a history and physical exam, creating and reviewing the care plan, patient education (and family if present), counseling, documenting clinical information in the electronic health record and care coordination.     The longitudinal plan of care for the diagnosis(es)/condition(s) as documented were addressed during this visit. Due to the added complexity in care, I will continue to support Navin in the subsequent management and with ongoing continuity of care.     Micaela Silvestre MD   of Medicine, [facility]  Advanced Heart Failure and Transplant Cardiology     Libertad Cole

## 2024-07-24 NOTE — NURSING NOTE
Chief Complaint   Patient presents with    Follow Up     reason for visit: RTN HF: 53 year old male presents with NICM, Ef 13% for virtual follow up       Vitals were taken, medications reconciled.     Joey Guevara, Visit Facilitator    9:08 AM

## 2024-07-24 NOTE — PROGRESS NOTES
ANTICOAGULATION MANAGEMENT     Navin Lutz 53 year old male is on warfarin with supratherapeutic result. (Goal  30-20% )    Recent labs: (last 7 days)     07/24/24  0850   INR 4.80*   [id number] 16*       ASSESSMENT     Source(s): Chart Review and Patient/Caregiver Call     Warfarin doses taken: Warfarin taken as instructed  Diet: Decreased greens/vitamin K in diet; ongoing change  Medication/supplement changes: None noted  New illness, injury, or hospitalization: No  Signs or symptoms of bleeding or clotting: No  Previous result: Therapeutic last 2(+) visits  Additional findings: Pt reports that he has recently been staying away from greens. Pt thinks that once he gets home and out of hotel that his eating will improve. Recommended trying to eat some greens 1-2 times per week if possible.     PLAN     Recommended plan for ongoing change(s) affecting result    Dosing Instructions:  decrease your warfarin dose 10 mg Mondays/Fridays and 7.5 mg all other days (8% change) with next Chromogenic Factor X and INR in 1 week   (7/31/24)    Telephone call with Navin who verbalizes understanding and agrees to plan    Lab visit scheduled    Education provided:   Contact [phone] with any changes, questions or concerns.     Plan made with [facility] Pharmacist Lay Quan and per LVAD protocol    Gracie Blair, RN  Anticoagulation Clinic  7/24/2024

## 2024-07-26 ENCOUNTER — CARE COORDINATION (OUTPATIENT)
Dept: CARDIOLOGY | Facility: CLINIC | Age: 54
End: 2024-07-26
Payer: COMMERCIAL

## 2024-07-26 ENCOUNTER — HOSPITAL ENCOUNTER (OUTPATIENT)
Dept: CARDIAC REHAB | Facility: CLINIC | Age: 54
Discharge: HOME | End: 2024-07-26
Attending: NURSE PRACTITIONER
Payer: COMMERCIAL

## 2024-07-26 DIAGNOSIS — I50.22 CHRONIC SYSTOLIC CONGESTIVE HEART FAILURE (H): Primary | ICD-10-CM

## 2024-07-26 DIAGNOSIS — I50.32 CHRONIC DIASTOLIC CONGESTIVE HEART FAILURE (H): ICD-10-CM

## 2024-07-26 DIAGNOSIS — Z79.01 ANTICOAGULATED ON WARFARIN: ICD-10-CM

## 2024-07-26 DIAGNOSIS — Z95.811 LVAD (LEFT VENTRICULAR ASSIST DEVICE) PRESENT (H): ICD-10-CM

## 2024-07-26 PROCEDURE — 93798 PHYS/QHP OP CAR RHAB W/ECG: CPT

## 2024-07-26 NOTE — LETTER
Mechanical Circulatory Support Program  [place] [id number], [facility] [address] [place], [place] [address] [phone] Office Phone  [phone] Fax Number  OUTPATIENT TEST/PROCEDURE ORDER.    Patient Name: Navin Lutz   : 1970   Diagnosis/ICD-9: V43.21 LVAD; 428.22 Chronic systolic heart failure   Requesting Physician: Dr. Micaela Silvestre   Date of Request: 24     For Questions:  Call  [phone], option #4 and ask for the VAD Coordinator on call  ORDER Date TESTS   Cardiac Rehab - Phase 2  Evaluate and treat for cardiac rehabilitation. Pt with NYHA class III-IV heart failure symptoms for >6months. EF 5-10%. No major cardiovascular hospitalization for procedures in the last 6 weeks and none planned for the next 6 months.      Of note, patient has started cardiac rehab at the [facility] while waiting to be sent home. I have asked their team to reach out to you as well.      Signed,        Micaela Silvestre MD   of Medicine  [facility], Cardiovascular Division      Mechanical Circulatory Support Program  [place] [id number], [facility] [address] [place] [address]  [phone] Office Phone  [phone] Fax Number  HeartMate 3 LVAD    Contacts: VAD Coordinating Team: [phone]     on-call VAD Coordinator: [phone], choose option 4 to speak to the  - Ask him/her to page the VAD coordinator on call.    Guidelines and Precautions for OP-cardiac rehab.    Patients can do Stairmaster, bike or NuStep. Limit knee lifts and avoid rowing machine or exercises that include twisting in the abdomen.    Encourage activities such as: Treadmill, stair climbing, and arm ergometer.    Attempt to achieve a target of 11-14 on the Rating of Perceived Exertion or RPE scale.    In some cases we do encourage light muscle conditioning with upper and lower body.  Patient's native heart rate should not exceed 140 BPM during exercise without an  order.    Telemetry will be helpful to identify sustained V-tach or other underlying arrhythmias. Keep in mind that the patient may or may not be symptomatic with arrhythmias. The pump does not affect the patient's heart rhythm.    Manual and automatic cuff blood pressures may be difficult to obtain due to the narrow pulse pressure created by the continuous (rather than pulsatile) flow pump.    This video demonstrates how to obtain a doppler blood pressure on a patient with an LVAD: [email or web address]-measure-blood-[email or web address]-flow or scan this QR Code:       Most patient's have their own doppler at home, you can ask if they do and if they can bring it to rehab.    Oximetry readings may also be difficult to obtain due to low pulsatility.    Symptoms that may result from activity intolerance: lightheadedness, SOB, Tachycardia, exaggerated BP response.    Patients should always wear driveline anchor and have controller secured during exercise.    Sternotomy precautions for 6-8 weeks post-op.    Lifelong with VAD we do not encourage lifting over 20lbs as it can cause strain on the abdomen and driveline.    The patient's Travel Bag should accompany him/her at all times complete with back-up controller, battery clips, and spare batteries.    A set of 2 batteries will last 12-16 hours.    The goal is for the patient to achieve 30 minutes of continuous treadmill walking prior to heart transplantation.    Follow ACLS/BLS guidelines. Chest compressions if signs of inadequate perfusion. Use defibrillation as normal.      For further questions please contact the on-call VAD Coordinator at [phone] then choose option 4 and ask for the VAD Coordinator on-call.     If you would like more information about the Heartmate 3 LVAD, please visit [email or web address].

## 2024-07-26 NOTE — PROGRESS NOTES
Called patient to check in 3 weeks post hospital discharge. Patient is currently doing cardiac rehab, spoke with patient's wife. Reports no concerns. They received dressing kits from [facility], she does not have any questions/ concerns.     Asked to verify if cardiac rehab will be reaching out to alex regarding progress and orders. Will send VAD specific guidelines to [facility] cardiac rehab.

## 2024-07-29 ENCOUNTER — OFFICE VISIT (OUTPATIENT)
Dept: CARDIOLOGY | Facility: CLINIC | Age: 54
End: 2024-07-29
Attending: STUDENT IN AN ORGANIZED HEALTH CARE EDUCATION/TRAINING PROGRAM
Payer: COMMERCIAL

## 2024-07-29 VITALS
HEART RATE: 63 BPM | WEIGHT: 198.7 LBS | BODY MASS INDEX: 26.95 KG/M2 | OXYGEN SATURATION: 98 % | SYSTOLIC BLOOD PRESSURE: 84 MMHG

## 2024-07-29 DIAGNOSIS — I50.22 CHRONIC SYSTOLIC CONGESTIVE HEART FAILURE (H): Primary | ICD-10-CM

## 2024-07-29 PROCEDURE — 99212 OFFICE O/P EST SF 10 MIN: CPT | Performed by: SURGERY

## 2024-07-29 PROCEDURE — 99213 OFFICE O/P EST LOW 20 MIN: CPT | Performed by: SURGERY

## 2024-07-29 ASSESSMENT — PAIN SCALES - GENERAL: PAINLEVEL: NO PAIN (0)

## 2024-07-29 NOTE — NURSING NOTE
Chief Complaint   Patient presents with    Follow Up     POST-OP - 6 week post implant VAD 6/14/24   Vitals were taken and medications were reconciled.    Meng Saleem, Visit Facilitator Clinic  2:36 PM

## 2024-07-29 NOTE — NURSING NOTE
Suture removed at patient's post implant surgical appointment. Skin is well adhered to the driveline internally, as evidence by manual assessment of driveline stability.     Skin is well approximated at the lvad driveline exit site, with no drainage. Will assess for weekly dressing at appointment on 7/31.

## 2024-07-29 NOTE — Clinical Note
7/29/2024      RE: Navin Lutz  [address] [place] [address]       Dear Colleague,    Thank you for the opportunity to participate in the care of your patient, Navin Lutz, at the [facility]. Please see a copy of my visit note below.    No notes on file    Please do not hesitate to contact me if you have any questions/concerns.     Sincerely,     Brian Jhaveri MD

## 2024-07-29 NOTE — PATIENT INSTRUCTIONS
Instructions:   Continue daily dressings, VAD Coordinator will assess for Weekly dressings and showering on 7/31

## 2024-07-30 ENCOUNTER — HOSPITAL ENCOUNTER (OUTPATIENT)
Dept: CARDIAC REHAB | Facility: CLINIC | Age: 54
Discharge: HOME | End: 2024-07-30
Attending: NURSE PRACTITIONER
Payer: COMMERCIAL

## 2024-07-30 PROCEDURE — 93798 PHYS/QHP OP CAR RHAB W/ECG: CPT

## 2024-07-30 NOTE — PROGRESS NOTES
Advanced Heart Failure/Transplant Clinic Note    HPI  Dear colleagues,     I had the pleasure of seeing Mr. Navin Lutz in the Cardiology clinic.  As you know, Mr. Navin Lutz is a pleasant 53 year old male with a past medical history of chronic HFrEF 2/2 NICM s/p HM3 LVAD as BTT on 6/14/24 who presents for ongoing evaluation and management.    His postoperative course was relatively unremarkable.  He was treated with antibiotics for Klebsiella pneumonia with a 10-day course.  He developed a left pleural effusion that required to be drained with a pigtail catheter. He also had abnormal elevated LFTs, which were thought to be iatrogenic due to multiple medications (statin, acetaminophen, azithromycin).  Acetaminophen and statin were held.  His LFTs have remained elevated. During workup for his LVAD he was found to have INRs that were discordant with Chromogenic Factor X and hematology was consulted. Found to be lupus anticoagulant positive, and Chromogenic factor X monitoring was recommended for warfarin dosing as INR not reliable. Goal CFX 20-40% which correlates with INR 2-3. Hematology recommended repeat outpatient lupus anticoagulant testing at the end of July, and if negative, INR monitoring may be an option. He presented back to ER soon after discharge with sudden onset focal neurologic deficit with diplopia. Symptoms resolved on hospital day 1. He has several risk factors for stroke including recent VAD and known lupus anticoagulant positivity. Note: CFx 10 had been borderline subtherapeutic at time of hospital discharge and after discharge. CTA without large vessel occlusion. EKG is NSR, no hx of afib or flutter. Neurology consulted. Transthoracic echo with bubble study was unremarkable. Head CT negative for ischemia or bleed on 7/9 and 7/10/24. Optho consulted - Anti-Ach receptor and MUSK antibodies recommend to r/o myasthenia gravis. He was also started on aspirin 81 mg daily. In hospital, he is  chromogenic factor goal was changed from 20-40% to 20-30%.      Patient feels well and is eager to discharge to home from local housing now. He continues to struggle with altered sense of taste where most food tastes very powerful, making it difficult to eat, but is able to eat small, frequent meals. This has been present since initial hospitalization for LVAD implantation and well before TIA. He is walking >2.5 miles per day without any difficulty. He denies chest pain, shortness of breath, PND/orthopnea, palpitations, lightheadedness, syncope, leg swelling, LVAD alarms, dark urine, blood in stool, concerns with driveline or driveline site. He just had his suture removed from his driveline.    ROS:  A complete 12-point ROS was negative except as above.    PAST MEDICAL HISTORY:  Patient Active Problem List   Diagnosis    Acute on chronic systolic CHF (congestive heart failure) (H)    Chest pain    ICD (implantable cardioverter-defibrillator) in place    Inguinal hernia    Multiple lung nodules on CT    Non-ischemic cardiomyopathy (H)    Pure hypercholesterolemia    RAD (reactive airway disease) with wheezing, mild intermittent, uncomplicated    Acute deep vein thrombosis (DVT) of other vein of left upper extremity (H)    Cardiogenic shock (H)    Acute decompensated heart failure (H)    Chronic systolic congestive heart failure (H)    Anticoagulated on warfarin    LVAD (left ventricular assist device) present (H)    Chronic systolic heart failure (H)    Anticoagulated on Coumadin    Diplopia    Stroke-like symptoms    TIA (transient ischemic attack)        FAMILY HISTORY:  No family history on file.    SOCIAL HISTORY:  Social History     Tobacco Use    Smoking status: Never    Smokeless tobacco: Never   Substance Use Topics    Alcohol use: Never    Drug use: Never        ALLERGIES:  No Known Allergies    CURRENT MEDICATIONS:  Current Outpatient Medications   Medication Sig Dispense Refill    aspirin (ASA) 81 MG  chewable tablet Take 1 tablet (81 mg) by mouth daily 90 tablet 3    carvedilol (COREG) 6.25 MG tablet Take 1 tablet (6.25 mg) by mouth 2 times daily (with meals) 180 tablet 3    empagliflozin (JARDIANCE) 10 MG TABS tablet Take 1 tablet (10 mg) by mouth daily 90 tablet 1    lisinopril (ZESTRIL) 10 MG tablet Take 1 tablet (10 mg) by mouth 2 times daily 180 tablet 3    rosuvastatin (CRESTOR) 20 MG tablet Take 1 tablet (20 mg) by mouth daily 90 tablet 3    spironolactone (ALDACTONE) 25 MG tablet Take 1 tablet (25 mg) by mouth every evening 90 tablet 3    warfarin ANTICOAGULANT (COUMADIN) 5 MG tablet Take two tablets (10 mg) daily. Next chromogenic factor X on 7/12 in clinic. Always follow the most recent instructions from your INR clinic.         EXAM:  BP (!) 92/0 (BP Location: Left arm, Patient Position: Chair, Cuff Size: Adult Regular)   Pulse 60   Wt 89.8 kg (198 lb)   SpO2 96%   BMI 26.85 kg/m      General: appears comfortable, alert and interactive, in no acute distress  Head: normocephalic, atraumatic  Eyes: anicteric sclera, EOMI  Mouth: MMM  Neck: supple, no cervical adenopathy  CV: regular rate and rhythm, LVAD hum, estimated JVP ~7 cm  Resp: clear, no rales or wheezing  GI: soft, nontender, nondistended, driveline bandage is c/d/i  Extremities: warm, no peripheral edema  Neurological: alert and oriented, no focal deficits  Psych: normal mood and affect  Derm: no rashes on exposed surfaces    Weight  Wt Readings from Last 10 Encounters:   07/31/24 89.8 kg (198 lb)   07/29/24 90.1 kg (198 lb 11.2 oz)   07/24/24 88.5 kg (195 lb)   07/17/24 88 kg (194 lb)   07/12/24 85.2 kg (187 lb 14.4 oz)   07/09/24 84.9 kg (187 lb 3.2 oz)   07/04/24 84.9 kg (187 lb 2.7 oz)   05/31/24 90.9 kg (200 lb 6.4 oz)   05/03/24 95.3 kg (210 lb 3.2 oz)   05/03/24 96.7 kg (213 lb 1.6 oz)       I personally reviewed recent labs and data as below and discussed the results with the patient in clinic today.  Labs:  CBC RESULTS:  Lab  Results   Component Value Date    WBC 5.9 07/31/2024    RBC 5.46 07/31/2024    HGB 15.2 07/31/2024    HCT 47.7 07/31/2024    MCV 87 07/31/2024    MCH 27.8 07/31/2024    MCHC 31.9 07/31/2024    RDW 13.7 07/31/2024     07/31/2024       CMP RESULTS:  Lab Results   Component Value Date     07/31/2024    POTASSIUM 4.4 07/31/2024    POTASSIUM 4.5 06/14/2024    CHLORIDE 102 07/31/2024    CHLORIDE 104 05/28/2024    CO2 27 07/31/2024    ANIONGAP 9 07/31/2024    GLC 95 07/31/2024     (H) 07/09/2024    BUN 11.3 07/31/2024    CR 1.04 07/31/2024    GFRESTIMATED 86 07/31/2024    GFRESTIMATED >60 07/09/2024    NAM 9.8 07/31/2024    BILITOTAL 0.4 07/31/2024    ALBUMIN 4.1 07/31/2024    ALKPHOS 153 (H) 07/31/2024     (H) 07/31/2024    AST 54 (H) 07/31/2024        Recent Labs   Lab Test 07/10/24  0646 11/18/23  0018   CHOL 195 161   HDL 36* 48   * 96   TRIG 117 85        Testing/Procedures:  I personally visualized and interpreted:  Echo:  7/9/24  s/p HeartMate 3 LVAD at 5400 rpm  LVIDD is 5.6 cm. Left ventricular function is decreased. The ejection fraction is 15-20% (severely reduced).  LVAD cannula was seen in the expected anatomic position in the LV apex.  Outflow graft is visualized. Interventricular septum is midline.  The right ventricle is normal size. Global right ventricular function is moderately reduced.  The aortic valve does not open during the cardiac cycle.  The inferior vena cava was normal in size with preserved respiratory variability.  No pericardial effusion is present.  This study was compared with the study from 6/28/2024. Left ventricular size is smaller.     Cardiac Catheterization:  6/12/24, prior to LVAD implant  BSA 2.14 m2  /73/85 mmHg  RA mean of 10 mmHg and V wave of 15 mmhg  PA 57/35/48 mmHg  PCWP --/--/30  Teressa CO/CI 3.9/1.8   PVR 4.6  PA sat 63%   Hgb 17 g/dL     Right sided filling pressures are moderately elevated.    Left sided filling pressures are  moderately elevated.    Severely elevated pulmonary artery hypertension.    Reduced cardiac output level.    Hemodynamic data has been modified in Epic per physician review.     Elevated bilateral filling pressures with severe pulmonary hypertension and reduced cardiac output  Uncomplicated R femoral radial access with uncomplicated IABP insertion. Position confirmed on fluoroscopy.   Leave in Manlius-Hugh catheter via RIJ access.     Outside results of note:  Outside records were obtained and relevant results/notes have been incorporated into HPI.    Assessment and Plan:     In summary, 53 year old male with a past medical history of chronic HFrEF 2/2 NICM s/p HM3 LVAD as BTT on 6/14/24 who presents for ongoing evaluation and management.    # Chronic systolic heart failure secondary to NICM s/p HM3 LVAD as 6/14/24 on BTT  Stage D. NYHA Class II  Fluid status: euvolemic without diuretics  ACEi/ARB/ARNI: increase lisinopril to 20 mg daily  BB: Increase carvedilol to 6.25 mg BID  Aldosterone antagonist: continue spironolactone 25 mg daily  SGLT2i: Continue empagliflozin 10 mg daily  SCD prophylaxis: s/p ICD  BP: MAP goal 65-85  LDH trends: Stable  Anticoagulation: warfarin with chromogenic factor goal 20-30%  Antiplatelet: On ASA 81 mg daily given TIA  Cardiac rehab: ongoing    VAD interrogation today: VAD interrogation reviewed with VAD coordinator. Agree with findings. No frequent PI events, no power spikes, speed drops, or other findings suspicious of pump malfunction noted.      # Acute onset diplopia, right esotropia  # Thought secondary to TIA  All symptoms are resolved.  - Will continue to monitor     # Altered taste and smell  This is longstanding and preceded his stroke. He had the symptoms after COVID in 2022 but had improved and now have more recently worsened with recent hospitalization during his LVAD implantation.  We will review his medications with our pharmacist to make sure none of them are contributing  to this but otherwise may suggest follow-up with neurology that he will have as part of his recent TIA.  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 Does have positive lupus anticoagulant positivity detected incidentally on pre-LVAD workup and thus need to utilize chromogenic factor to guide INR at least until repeat testing.  - Warfarin as above  - Chromogenic factor goal 20-30  - Per heme 6/25 repeat lupus anticoag panel end of July '24 (pending), may be able to switch back to INR monitoring  - OT hand therapies for neuropathy  - Continue gabapentin     # Hepatocellular pattern of liver injury, stable  [place] to be related to drug effects during last admission in the context of statin / tylenol / azithromycin interaction.   - Will continue to monitor and consider further evaluation based on the trend     # Hyperlipidemia  , goal < 70. Not on statin prior to admission due to abnormal LFTs as above.   - Continue rosuvastatin 20 mg daily    Optimal Vascular Metrics    Blood Pressure   BP < 140/90 Yes    On Aspirin  Yes    On Statin  Yes    Tobacco use  No       To Do:  - Increase lisinopril to 10 mg BID and carvedilol to 6.25 mg BID  - Repeat BMP locally  - Follow up pending labs from today  - Follow up with me on 8/14/24 as scheduled, if doing ok can cancel that visit and then just follow up on 9/4/24 with repeat testing    The patient states understanding and is agreeable with plan.   Feel free to contact myself regarding questions or concerns. It was a pleasure to see this patient today.    A total of 48 minutes was spent on the day of the visit, which includes preparation for the visit (reviewing previous medical records, laboratories and investigations), in  conjunction with the actual clinic visit with the patient, which includes obtaining a history and physical exam, creating and reviewing the care plan, patient education (and family if present), counseling, documenting clinical information in the electronic health record and care coordination.     The longitudinal plan of care for the diagnosis(es)/condition(s) as documented were addressed during this visit. Due to the added complexity in care, I will continue to support Navin in the subsequent management and with ongoing continuity of care.     Micaela Silvestre MD   of Medicine, [facility]  Advanced Heart Failure and Transplant Cardiology     CC  Libertad Briceno

## 2024-07-31 ENCOUNTER — HOSPITAL ENCOUNTER (OUTPATIENT)
Dept: CARDIOLOGY | Facility: CLINIC | Age: 54
Discharge: HOME | End: 2024-07-31
Attending: STUDENT IN AN ORGANIZED HEALTH CARE EDUCATION/TRAINING PROGRAM
Payer: COMMERCIAL

## 2024-07-31 ENCOUNTER — ANTICOAGULATION THERAPY VISIT (OUTPATIENT)
Dept: ANTICOAGULATION | Facility: CLINIC | Age: 54
End: 2024-07-31

## 2024-07-31 ENCOUNTER — OFFICE VISIT (OUTPATIENT)
Dept: CARDIOLOGY | Facility: CLINIC | Age: 54
End: 2024-07-31
Attending: STUDENT IN AN ORGANIZED HEALTH CARE EDUCATION/TRAINING PROGRAM
Payer: COMMERCIAL

## 2024-07-31 ENCOUNTER — LAB (OUTPATIENT)
Dept: LAB | Facility: CLINIC | Age: 54
End: 2024-07-31
Payer: COMMERCIAL

## 2024-07-31 VITALS
WEIGHT: 198 LBS | HEART RATE: 60 BPM | SYSTOLIC BLOOD PRESSURE: 92 MMHG | OXYGEN SATURATION: 96 % | BODY MASS INDEX: 26.85 KG/M2

## 2024-07-31 DIAGNOSIS — I50.22 CHRONIC SYSTOLIC HEART FAILURE (H): ICD-10-CM

## 2024-07-31 DIAGNOSIS — I82.622 ACUTE DEEP VEIN THROMBOSIS (DVT) OF OTHER VEIN OF LEFT UPPER EXTREMITY (H): ICD-10-CM

## 2024-07-31 DIAGNOSIS — I50.22 CHRONIC SYSTOLIC CONGESTIVE HEART FAILURE (H): Primary | ICD-10-CM

## 2024-07-31 DIAGNOSIS — Z79.01 ANTICOAGULATED ON COUMADIN: ICD-10-CM

## 2024-07-31 DIAGNOSIS — I50.32 CHRONIC DIASTOLIC CONGESTIVE HEART FAILURE (H): ICD-10-CM

## 2024-07-31 DIAGNOSIS — I42.8 NONISCHEMIC CARDIOMYOPATHY (H): ICD-10-CM

## 2024-07-31 DIAGNOSIS — I50.22 CHRONIC SYSTOLIC CONGESTIVE HEART FAILURE (H): ICD-10-CM

## 2024-07-31 DIAGNOSIS — G45.9 TIA (TRANSIENT ISCHEMIC ATTACK): ICD-10-CM

## 2024-07-31 DIAGNOSIS — Z95.811 LVAD (LEFT VENTRICULAR ASSIST DEVICE) PRESENT (H): ICD-10-CM

## 2024-07-31 DIAGNOSIS — Z95.811 LVAD (LEFT VENTRICULAR ASSIST DEVICE) PRESENT (H): Primary | ICD-10-CM

## 2024-07-31 DIAGNOSIS — Z79.01 ANTICOAGULATED ON WARFARIN: ICD-10-CM

## 2024-07-31 DIAGNOSIS — I10 PRIMARY HYPERTENSION: ICD-10-CM

## 2024-07-31 LAB
ALBUMIN SERPL BCG-MCNC: 4.1 G/DL (ref 3.5–5.2)
ALP SERPL-CCNC: 153 U/L (ref 40–150)
ALT SERPL W P-5'-P-CCNC: 139 U/L (ref 0–70)
ANION GAP SERPL CALCULATED.3IONS-SCNC: 9 MMOL/L (ref 7–15)
AST SERPL W P-5'-P-CCNC: 54 U/L (ref 0–45)
BILIRUB SERPL-MCNC: 0.4 MG/DL
BUN SERPL-MCNC: 11.3 MG/DL (ref 6–20)
CALCIUM SERPL-MCNC: 9.8 MG/DL (ref 8.8–10.4)
CHLORIDE SERPL-SCNC: 102 MMOL/L (ref 98–107)
CREAT SERPL-MCNC: 1.04 MG/DL (ref 0.67–1.17)
EGFRCR SERPLBLD CKD-EPI 2021: 86 ML/MIN/1.73M2
ERYTHROCYTE [DISTWIDTH] IN BLOOD BY AUTOMATED COUNT: 13.7 % (ref 10–15)
FACT X ACT/NOR PPP CHRO: 17 % (ref 70–130)
GLUCOSE SERPL-MCNC: 95 MG/DL (ref 70–99)
HCO3 SERPL-SCNC: 27 MMOL/L (ref 22–29)
HCT VFR BLD AUTO: 47.7 % (ref 40–53)
HGB BLD-MCNC: 15.2 G/DL (ref 13.3–17.7)
INR PPP: 4.64 (ref 0.85–1.15)
LDH SERPL L TO P-CCNC: 246 U/L (ref 0–250)
LVEF ECHO: NORMAL
MCH RBC QN AUTO: 27.8 PG (ref 26.5–33)
MCHC RBC AUTO-ENTMCNC: 31.9 G/DL (ref 31.5–36.5)
MCV RBC AUTO: 87 FL (ref 78–100)
NT-PROBNP SERPL-MCNC: 862 PG/ML (ref 0–900)
PLATELET # BLD AUTO: 279 10E3/UL (ref 150–450)
POTASSIUM SERPL-SCNC: 4.4 MMOL/L (ref 3.4–5.3)
PROT SERPL-MCNC: 7.3 G/DL (ref 6.4–8.3)
RBC # BLD AUTO: 5.46 10E6/UL (ref 4.4–5.9)
SODIUM SERPL-SCNC: 138 MMOL/L (ref 135–145)
WBC # BLD AUTO: 5.9 10E3/UL (ref 4–11)

## 2024-07-31 PROCEDURE — 85260 CLOT FACTOR X STUART-POWER: CPT | Performed by: STUDENT IN AN ORGANIZED HEALTH CARE EDUCATION/TRAINING PROGRAM

## 2024-07-31 PROCEDURE — 85390 FIBRINOLYSINS SCREEN I&R: CPT | Mod: 26 | Performed by: PATHOLOGY

## 2024-07-31 PROCEDURE — 83880 ASSAY OF NATRIURETIC PEPTIDE: CPT | Performed by: PATHOLOGY

## 2024-07-31 PROCEDURE — 99215 OFFICE O/P EST HI 40 MIN: CPT | Mod: 25 | Performed by: STUDENT IN AN ORGANIZED HEALTH CARE EDUCATION/TRAINING PROGRAM

## 2024-07-31 PROCEDURE — 93306 TTE W/DOPPLER COMPLETE: CPT | Mod: 26 | Performed by: INTERNAL MEDICINE

## 2024-07-31 PROCEDURE — 99000 SPECIMEN HANDLING OFFICE-LAB: CPT | Performed by: PATHOLOGY

## 2024-07-31 PROCEDURE — 93306 TTE W/DOPPLER COMPLETE: CPT

## 2024-07-31 PROCEDURE — 85027 COMPLETE CBC AUTOMATED: CPT | Performed by: PATHOLOGY

## 2024-07-31 PROCEDURE — 36415 COLL VENOUS BLD VENIPUNCTURE: CPT | Performed by: PATHOLOGY

## 2024-07-31 PROCEDURE — 85613 RUSSELL VIPER VENOM DILUTED: CPT | Performed by: STUDENT IN AN ORGANIZED HEALTH CARE EDUCATION/TRAINING PROGRAM

## 2024-07-31 PROCEDURE — 80053 COMPREHEN METABOLIC PANEL: CPT | Performed by: PATHOLOGY

## 2024-07-31 PROCEDURE — 83615 LACTATE (LD) (LDH) ENZYME: CPT | Performed by: PATHOLOGY

## 2024-07-31 PROCEDURE — 85610 PROTHROMBIN TIME: CPT | Performed by: PATHOLOGY

## 2024-07-31 PROCEDURE — 93750 INTERROGATION VAD IN PERSON: CPT | Performed by: STUDENT IN AN ORGANIZED HEALTH CARE EDUCATION/TRAINING PROGRAM

## 2024-07-31 PROCEDURE — 99211 OFF/OP EST MAY X REQ PHY/QHP: CPT | Mod: 25 | Performed by: STUDENT IN AN ORGANIZED HEALTH CARE EDUCATION/TRAINING PROGRAM

## 2024-07-31 RX ORDER — SPIRONOLACTONE 25 MG/1
25 TABLET ORAL EVERY EVENING
Qty: 90 TABLET | Refills: 3 | Status: SHIPPED | OUTPATIENT
Start: 2024-07-31

## 2024-07-31 RX ORDER — ASPIRIN 81 MG/1
81 TABLET, CHEWABLE ORAL DAILY
Qty: 90 TABLET | Refills: 3 | Status: SHIPPED | OUTPATIENT
Start: 2024-07-31

## 2024-07-31 RX ORDER — ROSUVASTATIN CALCIUM 20 MG/1
20 TABLET, COATED ORAL DAILY
Qty: 90 TABLET | Refills: 3 | Status: SHIPPED | OUTPATIENT
Start: 2024-07-31 | End: 2024-08-14

## 2024-07-31 RX ORDER — LISINOPRIL 10 MG/1
10 TABLET ORAL 2 TIMES DAILY
Qty: 180 TABLET | Refills: 3 | Status: SHIPPED | OUTPATIENT
Start: 2024-07-31 | End: 2024-08-16

## 2024-07-31 RX ORDER — CARVEDILOL 6.25 MG/1
6.25 TABLET ORAL 2 TIMES DAILY WITH MEALS
Qty: 180 TABLET | Refills: 3 | Status: SHIPPED | OUTPATIENT
Start: 2024-07-31

## 2024-07-31 ASSESSMENT — PAIN SCALES - GENERAL: PAINLEVEL: NO PAIN (0)

## 2024-07-31 NOTE — NURSING NOTE
07/31/24 1000   MCS VAD Information   Implant LVAD   LVAD Pump HeartMate 3   Heartmate 3 LEFT VS   Flow (Lpm) 3.7 Lpm   Pulse Index (PI) 6.7 PI  (Range 2.2 - 7.5)   Speed (rpm) 5400 rpm   Power (bassett) 4 bassett   Current Hct setting 48  (Updated)   Primary Controller   Serial number [id number]   Low flow alarm setting 2.5   EBB: Patient use 4   Replace in 32 Months   Backup Controller   Serial number [id number]   EBB: Patient use 11   Replace EBB in 32 Months   VAD Interrogation   Alarms reported by patient N   Unexpected alarms noted upon interrogation None   PI events Occasional   Damage to equipment is noted N   Action taken Reviewed proper equipment care and maintenance   Driveline Exit Site   Dressing change done Y   Driveline properly secured Yes   DLES assessment c/d/i   Dressing used Weekly kit   Frequency patient changes dressing Weekly       Education Complete: Yes   Charge the BACKUP controller s backup battery every 6 months  Perform a self test on BACKUP every 6 months  Change the MPU s batteries every 6 months:Yes  Have equipment serviced yearly (if applicable):Yes    Reviewed Heartmate battery maintenance. Hand out given to patient regarding weekly, monthly, and yearly maintenance.     Patient and caregiver presented to clinic for education on showering and the use of the shower bag.     Provided instructions on how to safely use shower bag and cover LVAD drive line exit site dressing and modular cable during showers.   Instructed patient that if they are using a daily dressing, it should be changed immediately following a shower.  Instructed patient that if they are using a weekly dressing, dressing can remain in place for up to 1 week.  The weekly dressing must be changed if there is an accumulation of drainage.  The weekly dressing must be changed if there is moisture under the dressing after the shower.   The weekly dressing needs to be changed earlier if the dressing has come loose after the  shower.  The shower bag must stay attached to patient at all times throughout the shower.    Patient and caregiver presented to clinic for education on LVAD drive line exit site weekly dressing change.     Provided instructions on performing the weekly dressing and demonstrated on dressing board.     Instructed patient that the weekly dressing can remain in place for up to 1 week.  The weekly dressing must be changed if there is an accumulation of drainage, vad coordinator should be notified.  The weekly dressing must be changed if there is moisture under the dressing.   The weekly dressing needs to be changed earlier if the dressing has come loose.

## 2024-07-31 NOTE — PROGRESS NOTES
ANTICOAGULATION MANAGEMENT     Navin Lutz 53 year old male is on warfarin with supratherapeutic result. (Goal Chromogenic Factor X 30-20% )    Recent labs: (last 7 days)     07/31/24  0949   INR 4.64*   [id number] 17*       ASSESSMENT     Source(s): Chart Review and Patient/Caregiver Call     Warfarin doses taken: Warfarin taken as instructed  Diet: No new diet changes identified  Medication/supplement changes: Lisinopril and Carvedilol were increased today. No interaction with warfarin anticipated  New illness, injury, or hospitalization: No  Signs or symptoms of bleeding or clotting: No  Previous result: Supratherapeutic  Additional findings:  Patient is going home to [place] today.  Standing INR and chromo X orders are faxed today     PLAN     Recommended plan for no diet, medication or health factor changes affecting result    Dosing Instructions: partial hold then decrease your warfarin dose (13% change) 5mg on Wed and 7.5mg all other days  with next Chromogenic Factor X in 1 week       Telephone call with Navin who verbalizes understanding and agrees to plan and who agrees to plan and repeated back plan correctly    Patient using outside facility for labs    Education provided:   Taking warfarin: Importance of taking warfarin as instructed  Goal range and lab monitoring: goal range and significance of current result, Importance of therapeutic range, and Importance of following up at instructed interval    Plan made with [facility] Pharmacist Lay Quan and per LVAD protocol    Danica Baldwin RN  Anticoagulation Clinic  7/31/2024

## 2024-07-31 NOTE — NURSING NOTE
Chief Complaint   Patient presents with    Follow Up     RETURN VAD - 6 week post VAD implant 6/14/24   Vitals were taken and medications were reconciled.    Meng Saleem, Visit Facilitator Clinic  10:34 AM

## 2024-07-31 NOTE — LETTER
7/31/2024      RE: Navin Lutz  [address] [place] [address]       Dear Colleague,    Thank you for the opportunity to participate in the care of your patient, Navin Lutz, at the [facility] [place] at [facility]. Please see a copy of my visit note below.    Advanced Heart Failure/Transplant Clinic Note    HPI  Dear colleagues,     I had the pleasure of seeing Mr. Navin Lutz in the Cardiology clinic.  As you know, Mr. Navin Lutz is a pleasant 53 year old male with a past medical history of chronic HFrEF 2/2 NICM s/p HM3 LVAD as BTT on 6/14/24 who presents for ongoing evaluation and management.    His postoperative course was relatively unremarkable.  He was treated with antibiotics for Klebsiella pneumonia with a 10-day course.  He developed a left pleural effusion that required to be drained with a pigtail catheter. He also had abnormal elevated LFTs, which were thought to be iatrogenic due to multiple medications (statin, acetaminophen, azithromycin).  Acetaminophen and statin were held.  His LFTs have remained elevated. During workup for his LVAD he was found to have INRs that were discordant with Chromogenic Factor X and hematology was consulted. Found to be lupus anticoagulant positive, and Chromogenic factor X monitoring was recommended for warfarin dosing as INR not reliable. Goal CFX 20-40% which correlates with INR 2-3. Hematology recommended repeat outpatient lupus anticoagulant testing at the end of July, and if negative, INR monitoring may be an option. He presented back to ER soon after discharge with sudden onset focal neurologic deficit with diplopia. Symptoms resolved on hospital day 1. He has several risk factors for stroke including recent VAD and known lupus anticoagulant positivity. Note: CFx 10 had been borderline subtherapeutic at time of hospital discharge and after discharge. CTA without large vessel occlusion. EKG  is NSR, no hx of afib or flutter. Neurology consulted. Transthoracic echo with bubble study was unremarkable. Head CT negative for ischemia or bleed on 7/9 and 7/10/24. Optho consulted - Anti-Ach receptor and MUSK antibodies recommend to r/o myasthenia gravis. He was also started on aspirin 81 mg daily. In hospital, he is chromogenic factor goal was changed from 20-40% to 20-30%.      Patient feels well and is eager to discharge to home from local housing now. He continues to struggle with altered sense of taste where most food tastes very powerful, making it difficult to eat, but is able to eat small, frequent meals. This has been present since initial hospitalization for LVAD implantation and well before TIA. He is walking >2.5 miles per day without any difficulty. He denies chest pain, shortness of breath, PND/orthopnea, palpitations, lightheadedness, syncope, leg swelling, LVAD alarms, dark urine, blood in stool, concerns with driveline or driveline site. He just had his suture removed from his driveline.    ROS:  A complete 12-point ROS was negative except as above.    PAST MEDICAL HISTORY:  Patient Active Problem List   Diagnosis     Acute on chronic systolic CHF (congestive heart failure) (H)     Chest pain     ICD (implantable cardioverter-defibrillator) in place     Inguinal hernia     Multiple lung nodules on CT     Non-ischemic cardiomyopathy (H)     Pure hypercholesterolemia     RAD (reactive airway disease) with wheezing, mild intermittent, uncomplicated     Acute deep vein thrombosis (DVT) of other vein of left upper extremity (H)     Cardiogenic shock (H)     Acute decompensated heart failure (H)     Chronic systolic congestive heart failure (H)     Anticoagulated on warfarin     LVAD (left ventricular assist device) present (H)     Chronic systolic heart failure (H)     Anticoagulated on Coumadin     Diplopia     Stroke-like symptoms     TIA (transient ischemic attack)        FAMILY HISTORY:  No  family history on file.    SOCIAL HISTORY:  Social History     Tobacco Use     Smoking status: Never     Smokeless tobacco: Never   Substance Use Topics     Alcohol use: Never     Drug use: Never        ALLERGIES:  No Known Allergies    CURRENT MEDICATIONS:  Current Outpatient Medications   Medication Sig Dispense Refill     aspirin (ASA) 81 MG chewable tablet Take 1 tablet (81 mg) by mouth daily 90 tablet 3     carvedilol (COREG) 6.25 MG tablet Take 1 tablet (6.25 mg) by mouth 2 times daily (with meals) 180 tablet 3     empagliflozin (JARDIANCE) 10 MG TABS tablet Take 1 tablet (10 mg) by mouth daily 90 tablet 1     lisinopril (ZESTRIL) 10 MG tablet Take 1 tablet (10 mg) by mouth 2 times daily 180 tablet 3     rosuvastatin (CRESTOR) 20 MG tablet Take 1 tablet (20 mg) by mouth daily 90 tablet 3     spironolactone (ALDACTONE) 25 MG tablet Take 1 tablet (25 mg) by mouth every evening 90 tablet 3     warfarin ANTICOAGULANT (COUMADIN) 5 MG tablet Take two tablets (10 mg) daily. Next chromogenic factor X on 7/12 in clinic. Always follow the most recent instructions from your INR clinic.         EXAM:  BP (!) 92/0 (BP Location: Left arm, Patient Position: Chair, Cuff Size: Adult Regular)   Pulse 60   Wt 89.8 kg (198 lb)   SpO2 96%   BMI 26.85 kg/m      General: appears comfortable, alert and interactive, in no acute distress  Head: normocephalic, atraumatic  Eyes: anicteric sclera, EOMI  Mouth: MMM  Neck: supple, no cervical adenopathy  CV: regular rate and rhythm, LVAD hum, estimated JVP ~7 cm  Resp: clear, no rales or wheezing  GI: soft, nontender, nondistended, driveline bandage is c/d/i  Extremities: warm, no peripheral edema  Neurological: alert and oriented, no focal deficits  Psych: normal mood and affect  Derm: no rashes on exposed surfaces    Weight  Wt Readings from Last 10 Encounters:   07/31/24 89.8 kg (198 lb)   07/29/24 90.1 kg (198 lb 11.2 oz)   07/24/24 88.5 kg (195 lb)   07/17/24 88 kg (194 lb)    07/12/24 85.2 kg (187 lb 14.4 oz)   07/09/24 84.9 kg (187 lb 3.2 oz)   07/04/24 84.9 kg (187 lb 2.7 oz)   05/31/24 90.9 kg (200 lb 6.4 oz)   05/03/24 95.3 kg (210 lb 3.2 oz)   05/03/24 96.7 kg (213 lb 1.6 oz)       I personally reviewed recent labs and data as below and discussed the results with the patient in clinic today.  Labs:  CBC RESULTS:  Lab Results   Component Value Date    WBC 5.9 07/31/2024    RBC 5.46 07/31/2024    HGB 15.2 07/31/2024    HCT 47.7 07/31/2024    MCV 87 07/31/2024    MCH 27.8 07/31/2024    MCHC 31.9 07/31/2024    RDW 13.7 07/31/2024     07/31/2024       CMP RESULTS:  Lab Results   Component Value Date     07/31/2024    POTASSIUM 4.4 07/31/2024    POTASSIUM 4.5 06/14/2024    CHLORIDE 102 07/31/2024    CHLORIDE 104 05/28/2024    CO2 27 07/31/2024    ANIONGAP 9 07/31/2024    GLC 95 07/31/2024     (H) 07/09/2024    BUN 11.3 07/31/2024    CR 1.04 07/31/2024    GFRESTIMATED 86 07/31/2024    GFRESTIMATED >60 07/09/2024    NAM 9.8 07/31/2024    BILITOTAL 0.4 07/31/2024    ALBUMIN 4.1 07/31/2024    ALKPHOS 153 (H) 07/31/2024     (H) 07/31/2024    AST 54 (H) 07/31/2024        Recent Labs   Lab Test 07/10/24  0646 11/18/23  0018   CHOL 195 161   HDL 36* 48   * 96   TRIG 117 85        Testing/Procedures:  I personally visualized and interpreted:  Echo:  7/9/24  s/p HeartMate 3 LVAD at 5400 rpm  LVIDD is 5.6 cm. Left ventricular function is decreased. The ejection fraction is 15-20% (severely reduced).  LVAD cannula was seen in the expected anatomic position in the LV apex.  Outflow graft is visualized. Interventricular septum is midline.  The right ventricle is normal size. Global right ventricular function is moderately reduced.  The aortic valve does not open during the cardiac cycle.  The inferior vena cava was normal in size with preserved respiratory variability.  No pericardial effusion is present.  This study was compared with the study from 6/28/2024. Left  ventricular size is smaller.     Cardiac Catheterization:  6/12/24, prior to LVAD implant  BSA 2.14 m2  /73/85 mmHg  RA mean of 10 mmHg and V wave of 15 mmhg  PA 57/35/48 mmHg  PCWP --/--/30  Teressa CO/CI 3.9/1.8   PVR 4.6  PA sat 63%   Hgb 17 g/dL      Right sided filling pressures are moderately elevated.     Left sided filling pressures are moderately elevated.     Severely elevated pulmonary artery hypertension.     Reduced cardiac output level.     Hemodynamic data has been modified in Epic per physician review.     Elevated bilateral filling pressures with severe pulmonary hypertension and reduced cardiac output  Uncomplicated R femoral radial access with uncomplicated IABP insertion. Position confirmed on fluoroscopy.   Leave in [place]ugh catheter via RIJ access.     Outside results of note:  Outside records were obtained and relevant results/notes have been incorporated into HPI.    Assessment and Plan:     In summary, 53 year old male with a past medical history of chronic HFrEF 2/2 NICM s/p HM3 LVAD as BTT on 6/14/24 who presents for ongoing evaluation and management.    # Chronic systolic heart failure secondary to NICM s/p HM3 LVAD as 6/14/24 on BTT  Stage D. NYHA Class II  Fluid status: euvolemic without diuretics  ACEi/ARB/ARNI: increase lisinopril to 20 mg daily  BB: Increase carvedilol to 6.25 mg BID  Aldosterone antagonist: continue spironolactone 25 mg daily  SGLT2i: Continue empagliflozin 10 mg daily  SCD prophylaxis: s/p ICD  BP: MAP goal 65-85  LDH trends: Stable  Anticoagulation: warfarin with chromogenic factor goal 20-30%  Antiplatelet: On ASA 81 mg daily given TIA  Cardiac rehab: ongoing    VAD interrogation today: VAD interrogation reviewed with VAD coordinator. Agree with findings. No frequent PI events, no power spikes, speed drops, or other findings suspicious of pump malfunction noted.      # Acute onset diplopia, right esotropia  # Thought secondary to TIA  All symptoms are  resolved.  - Will continue to monitor     # Altered taste and smell  This is longstanding and preceded his stroke. He had the symptoms after COVID in 2022 but had improved and now have more recently worsened with recent hospitalization during his LVAD implantation.  We will review his medications with our pharmacist to make sure none of them are contributing to this but otherwise may suggest follow-up with neurology that he will have as part of his recent TIA.  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 Does have positive lupus anticoagulant positivity detected incidentally on pre-LVAD workup and thus need to utilize chromogenic factor to guide INR at least until repeat testing.  - Warfarin as above  - Chromogenic factor goal 20-30  - Per heme 6/25 repeat lupus anticoag panel end of July '24 (pending), may be able to switch back to INR monitoring  - OT hand therapies for neuropathy  - Continue gabapentin     # Hepatocellular pattern of liver injury, stable  [place] to be related to drug effects during last admission in the context of statin / tylenol / azithromycin interaction.   - Will continue to monitor and consider further evaluation based on the trend     # Hyperlipidemia  , goal < 70. Not on statin prior to admission due to abnormal LFTs as above.   - Continue rosuvastatin 20 mg daily    Optimal Vascular Metrics    Blood Pressure   BP < 140/90 Yes    On Aspirin  Yes    On Statin  Yes    Tobacco use  No       To Do:  - Increase lisinopril to 10 mg BID and carvedilol to 6.25 mg BID  - Repeat BMP locally  - Follow up pending labs from today  - Follow up with me on 8/14/24 as scheduled, if doing ok can cancel that visit and then just follow up on 9/4/24 with  repeat testing    The patient states understanding and is agreeable with plan.   Feel free to contact myself regarding questions or concerns. It was a pleasure to see this patient today.    A total of 48 minutes was spent on the day of the visit, which includes preparation for the visit (reviewing previous medical records, laboratories and investigations), in conjunction with the actual clinic visit with the patient, which includes obtaining a history and physical exam, creating and reviewing the care plan, patient education (and family if present), counseling, documenting clinical information in the electronic health record and care coordination.     The longitudinal plan of care for the diagnosis(es)/condition(s) as documented were addressed during this visit. Due to the added complexity in care, I will continue to support Navin in the subsequent management and with ongoing continuity of care.     Micaela Silvestre MD   of Medicine, [facility]  Advanced Heart Failure and Transplant Cardiology     CC  Libertad Briceno         Please do not hesitate to contact me if you have any questions/concerns.     Sincerely,     Micaela Silvestre MD

## 2024-07-31 NOTE — PATIENT INSTRUCTIONS
"Medications:  INCREASE Lisinopril 10mg twice a day  INCREASE Carvedilol 6.25mg twice a day    Instructions:  Change positions slowly  Watch for dizziness or lightheadedness  Keep your appt August 14th.  Call Kim Monday the 12th, to check in. (We may be able to cancel this appt if all things are going well.  You would just do labs at home in the case)  Please get labs labs (BMP) drawn next week (Tuesday, Wednesday, Thursday)   Please do a MAP 2 hours after morning meds, every day.  Call Kim with MAP's next week to discuss    Shower & weekly dressing instructions:  If you are using a daily dressing, it should be changed immediately following a shower.  The shower bag must stay attached to patient at all times throughout the shower.   3.   For the weekly dressing, the dressing can remain in place for up to 1 week.  The weekly dressing must be changed if there is an accumulation of drainage.  The weekly dressing must be changed if there is moisture under the dressing after the shower.   The weekly dressing needs to be changed earlier if the dressing has come loose after the shower.    Follow-up: As previously arranged  Please schedule an ECHO with your 9/4 appointment to follow up on the fluid collection around your heart      Equipment Maintenance Reminders:   Charge your back-up controller at least every 6 months. To charge, connect it to either batteries or wall power. The screen will read \"Charging\". Keep connected until the screen reads \"charging complete\". Disconnect power once the controller battery is charged. Also do a self-test when the controller is connected to power.  Check your battery charger for when it is due for maintenance. It requires inspection in clinic once per year. There will be a sticker stating the month and year maintenance is due. When you bring your battery charger to clinic, tell one of the schedulers you have LVAD equipment that needs maintenance. They will call [employer]. You can " leave your  under the LVAD sign by the  at the far end of clinic. You must drop it off before 2pm.   Replace the AA batteries in your mobile power unit every 6 months.       Page the VAD Coordinator on call if you gain more than 3 lb in a day or 5 in a week. Please also page if you feel unwell or have alarms.   Great to see you in clinic today. To Page the VAD Coordinator on call, dial [phone] option #[phone] and ask to speak to the VAD coordinator on call.

## 2024-07-31 NOTE — LETTER
Mechanical Circulatory Support Program  [place] [address], [facility] [address] [place] [address]  [phone] Office Phone  [phone] Fax Number  Faxed to: [facility] lab   Fax Number: [phone]    Patient Name: Navin Lutz   : 1970   Diagnosis/ICD-10: Heart Failure, unspecified I50.9; LVAD Z95.811   Requesting Physician: Dr. Micaela Silvestre   Date of Request: 24   Date to Draw Approximately       Please fax results to [phone] or email to [email or web address]   *Call [phone] with questions   Please call critical results to [phone], press option 4, ask to talk to the VAD coordinator on-call  ORDER TEST   x Basic Metabolic Panel       Signed,        Micaela Silvestre MD   of Medicine  [facility], Cardiovascular Division

## 2024-07-31 NOTE — PROGRESS NOTES
VAD coordinator present for VAD speed optimization echo.   VAD Coordinator adjusted speed, monitored VAD parameters and EKG, LV size, patient tolerance, and recorded findings according to Speed Optimization protocol.  Speed range evaluated: 5200 - 5600 rpm's. Protocol guidelines followed.       Speed Flow PI Power LVEDD LVESD AV open? AI MR   5200 4.1 5.3 3.7 6.73 6.3 closed trace trace   5300 4.2 5 3.8 6.39 6.28 closed trace trace   Baseline  5400 4.5 4.2 4 6.6 6.32 closed trace trace   5500 4.3 4.8 4.1 6.27 6.12 closed trace trace   5600 4.4 4.1 4.2 6.1 5.89 closed trace trace

## 2024-08-01 LAB
DRVVT CONFIRM NORMALIZED RATIO: 1.18
DRVVT SCREEN MIX RATIO: 1.03
DRVVT SCREEN RATIO: 1.97
LA PPP-IMP: NEGATIVE
LUPUS INTERPRETATION: ABNORMAL
PLATELET NEUTRALIZATION: -3 SECONDS
PTT 1:2 MIX: 38 SECONDS (ref 31–45)
PTT RATIO: 1.83
THROMBIN TIME: 17.6 SECONDS (ref 13–19)

## 2024-08-02 ENCOUNTER — CARE COORDINATION (OUTPATIENT)
Dept: CARDIOLOGY | Facility: CLINIC | Age: 54
End: 2024-08-02
Payer: COMMERCIAL

## 2024-08-02 NOTE — PROGRESS NOTES
D: Called patient to check in after returning home to [place], 4 wks post hospital discharge     I/A: MAP 98- patient realized he did not update Coreg in his med box- will do that now and track Maps over the weekend.   Pis ~ in 4s    08/02/24 0900   Heartmate 3 LEFT VS   Flow (Lpm) 4.6 Lpm   Pulse Index (PI) 3.4 PI   Speed (rpm) 5400 rpm   Power (bassett) 4 bassett     Reviewed medications and answered any questions. Patient reports sleeping well and denies anxiety since being home with LVAD. Patient is able to move around the house and care for himself independently. Patient heard from home cardiac rehab last week, will hear from them again next week to set thing up.      Discussed specific new problems/stressors since being discharged from the hospital: excited to be home and see family. Adjusting to home. Empathized with patient and reviewed coping strategies: enlisting support from friends and love ones, attending patient and caregiver support groups, reviewing LVAD educational materials to reinforce knowledge, and talking about concerns with family/care providers/trusted others. Encouraged pt to page VAD Coordinator with any issues or questions. Pt verbalizes understanding.      P: Patient notified to page on-call coordinator if symptoms worsen or with other concerns. Patient verbalized understanding.

## 2024-08-05 ENCOUNTER — DOCUMENTATION ONLY (OUTPATIENT)
Dept: ANTICOAGULATION | Facility: CLINIC | Age: 54
End: 2024-08-05
Payer: COMMERCIAL

## 2024-08-05 ENCOUNTER — CARE COORDINATION (OUTPATIENT)
Dept: CARDIOLOGY | Facility: CLINIC | Age: 54
End: 2024-08-05
Payer: COMMERCIAL

## 2024-08-05 DIAGNOSIS — I50.22 CHRONIC SYSTOLIC CONGESTIVE HEART FAILURE (H): ICD-10-CM

## 2024-08-05 DIAGNOSIS — Z79.01 ANTICOAGULATED ON COUMADIN: ICD-10-CM

## 2024-08-05 DIAGNOSIS — Z95.811 LVAD (LEFT VENTRICULAR ASSIST DEVICE) PRESENT (H): Primary | ICD-10-CM

## 2024-08-05 DIAGNOSIS — I50.22 CHRONIC SYSTOLIC HEART FAILURE (H): ICD-10-CM

## 2024-08-05 NOTE — PROGRESS NOTES
"D: Called patient to f/up on lupus anticoagulant test.     I/A: Per Hematology, Dr. Zuniga \"Looking at the lupus anticoagulant data and Chromogenic X data I feels he does not have a lupus anticoagulant now and can now just follow his INR \" The previous lupus anticoagulant was suspected to be elevated due to the elevated CRP. Per Dr. Silvestre will repeat one more chromogenic x & INR at the same time to ensure correlation before switching to an INR goal of 2.5-3 due to TIA after implant.     Followed up on maps, patient states they have been in the 80s after Coreg dose adjustment.     P: Patient will get INR, Chromogenic factor x, and bmp on Wednesday this week.  Patient, Family notified to page on-call coordinator if symptoms worsen or with other concerns. Patient, Family verbalized understanding.     "

## 2024-08-05 NOTE — PROGRESS NOTES
"8/5/24: Received message from VAD team:      We repeated a lupus anticoagulant test. Per Hematology consult:  \"he does not have a lupus   anticoagulant now and can now just follow his INR\"       I spoke with Dr. Silvestre who would like the goal to be 2.5-3 following his TIA and would like one more set of INR/Chromogenic at the same time to verify they're correlating.     If you get the next set of INR/Chromogenic and have concerns they're not correlating please send a message to Dr. Silvestre.   "

## 2024-08-09 ENCOUNTER — CARE COORDINATION (OUTPATIENT)
Dept: CARDIOLOGY | Facility: CLINIC | Age: 54
End: 2024-08-09
Payer: COMMERCIAL

## 2024-08-09 ENCOUNTER — ANTICOAGULATION THERAPY VISIT (OUTPATIENT)
Dept: ANTICOAGULATION | Facility: CLINIC | Age: 54
End: 2024-08-09
Payer: COMMERCIAL

## 2024-08-09 DIAGNOSIS — Z79.01 ANTICOAGULATED ON COUMADIN: ICD-10-CM

## 2024-08-09 DIAGNOSIS — I50.22 CHRONIC SYSTOLIC HEART FAILURE (H): ICD-10-CM

## 2024-08-09 DIAGNOSIS — I50.22 CHRONIC SYSTOLIC CONGESTIVE HEART FAILURE (H): ICD-10-CM

## 2024-08-09 DIAGNOSIS — Z95.811 LVAD (LEFT VENTRICULAR ASSIST DEVICE) PRESENT (H): Primary | ICD-10-CM

## 2024-08-09 NOTE — PROGRESS NOTES
Called patient/caregiver to check in 8 weeks post discharge. Pt reports VAD parameters at baseline and weight stable. Reviewed medications and answered any questions. Patient reports sleeping well and no anxiety since being home with LVAD.  Reviewed lab results from yesterday.  Lupus panel and Chrom X have not resulted yet.  Pt states he has been feeling well.  MAPs 87-92.

## 2024-08-09 NOTE — PROGRESS NOTES
ANTICOAGULATION MANAGEMENT     Navin LOPEZ Bolivar 53 year old male is on warfarin with supratherapeutic INR result. (Goal INR 2.5-3.0)    Recent labs: (last 7 days)     08/08/24  1002   INR 3.95       ASSESSMENT     Source(s): Chart Review and Patient/Caregiver Call     Warfarin doses taken: Warfarin taken as instructed  Diet: No new diet changes identified  Medication/supplement changes: None noted  New illness, injury, or hospitalization: No  Signs or symptoms of bleeding or clotting: Yes: bruise on inner thigh-unknown injury  Previous result: Supratherapeutic-Previous encounter: partial hold then decrease your warfarin dose (13% change)   Additional findings:     Need one more set of CFX/INR per Hematology; CFX was NOT drawn with INR at outside lab yesterday.    [facility] consult: 15% reduction and no partial. I am not sure why we would do one more set when heme said use INR. also we are not at steady state so doing one doesn't make sense right now as we are adjusting his dose. let's wait a bit to do both until we are on a consistent dose.     CFX and INR added to 9/4/24 lab notes     PLAN     Recommended plan for ongoing change(s) affecting INR     Dosing Instructions: decrease your warfarin dose (15% change) with next INR in 1 week       Summary  As of 8/9/2024      Full warfarin instructions:  7.5 mg every Sun, Tue, Thu; 5 mg all other days   Next INR check:  8/14/2024               Telephone call with Navin who agrees to plan and repeated back plan correctly  Sent [employer] message with dosing and follow up instructions    Lab visit scheduled-previously scheduled, INR added to lab note     Education provided: Goal range and lab monitoring: goal range and significance of current result and Importance of following up at instructed interval  Symptom monitoring: monitoring for bleeding signs and symptoms and when to seek medical attention/emergency care  Contact [phone] with any changes, questions or concerns.      Plan made with [facility] Pharmacist Roxana Chang and per LVAD protocol    JESSICA CALDERON RN  Anticoagulation Clinic  8/9/2024    _______________________________________________________________________     Anticoagulation Episode Summary       Current INR goal:  2.5-3.0   TTR:  18.7% (1.1 mo)   Target end date:  Indefinite   Send INR reminders to:  ANTICOAG LVAD    Indications    LVAD (left ventricular assist device) present (H) [Z95.811]  Chronic systolic heart failure (H) [I50.22]  Chronic systolic congestive heart failure (H) [I50.22]  Anticoagulated on Coumadin [Z79.01]             Comments:  GOAL: INR 2.5 - 3 changed 8/5/24  Follow VAD Anticoag protocol:Yes: HeartMate 3  Bridging: Call MD each time due to NEW IMPLANT  Date VAD placed: 6/14/24 8/23/23: internal jugular Thrombus; 6/21/24: lupus anticoagulant +  8/5/24: he does NOT have lupus               Anticoagulation Care Providers       Provider Role Specialty Phone number    Micaela Silvestre MD Referring Advanced Heart Failure and Transplant Cardiology [phone]    Chantal Dallas MD Referring Advanced Heart Failure and Transplant Cardiology [phone]

## 2024-08-12 ENCOUNTER — CARE COORDINATION (OUTPATIENT)
Dept: CARDIOLOGY | Facility: CLINIC | Age: 54
End: 2024-08-12
Payer: COMMERCIAL

## 2024-08-12 NOTE — LETTER
Mechanical Circulatory Support Program  [address], [facility] [address]  [address] [place] [address]  [phone] Office Phone  [phone] Fax Number  Faxed to: [facility]   Fax Number:  [phone]    Patient Name: Navin Lutz   : 1970   Diagnosis/ICD-10: Heart Failure, unspecified I50.9; LVAD Z95.811   Requesting Physician: Dr. Micaela Silvestre   Date of Request: 24   Date to Draw 8/15/24      Please fax results to [phone] or email to [email or web address]   *Call [phone] with questions   Please call critical results to [phone], press option 4, ask to talk to the VAD coordinator on-call  ORDER TEST   X Complete Metabolic Panel   X Complete Blood Count   X Lactate Dehydrogenase   X INR   X N Terminal Pro BNP       Micaela Steele MD   of Medicine  [facility], Cardiovascular Division\

## 2024-08-12 NOTE — PROGRESS NOTES
D: Called patient to check in and discuss apt later this week.     I/A: Patient is feeling well. MAPs reading around high 80s-low 90s.   Parameters in patient's normal range.   Wt been gaining ~ 1 lb every few days, 192lb. Feels like he is building muscle back.     P:  Will discuss with Dr. Silvestre when she would like labs next- had labs last week (in care everywhere). Patient has blood draw for INR on Thursday.  Patient notified to page on-call coordinator if symptoms worsen or with other concerns. Patient verbalized understanding.      Writer called patient to inform them of recent Heartmate urgent medical device correction that impacts the patient's Heartmate 3 controller.      Writer reviewed:  Beginning March 2024, [employer] noted that the grey cover (aka User interface) of some controllers starts lifting off during sterilization in the factory.   Impacted controllers run the risk of water getting inside causing controller malfunction, such as: visual alarms, issues with the display LED lights, or unresponsive buttons   Based on the serial number of the patient's controller(s), they may have this problem.   Reviewed the following safety information:   Perform self-test daily to ensure controller is working  Protect controller from water or moisture. Only use the approved shower bag when showering.   Report any alarms or failed self-tests to the VAD team by paging the on-call VAD Coordinator   The patient should monitor controller routinely for damage/issues and notify the VAD Coordinator on call immediately if they ever notice a defective UI.   Patient's controller assessment and plan:  Patient states they do not see screen lifting.  Because the screen lifting is very subtle and it poses a risk of harm to the patient, the recommendation for all patients who have an impacted controller is to come to clinic for assessment ASAP.      Patient verbalized understanding of the above. Patient does agree to come to clinic  for assessment. Patient has appointment on 9/4/24.

## 2024-08-14 ENCOUNTER — CARE COORDINATION (OUTPATIENT)
Dept: CARDIOLOGY | Facility: CLINIC | Age: 54
End: 2024-08-14

## 2024-08-14 VITALS — BODY MASS INDEX: 26.18 KG/M2 | WEIGHT: 193 LBS

## 2024-08-14 DIAGNOSIS — G45.9 TIA (TRANSIENT ISCHEMIC ATTACK): ICD-10-CM

## 2024-08-14 DIAGNOSIS — Z95.811 LVAD (LEFT VENTRICULAR ASSIST DEVICE) PRESENT (H): ICD-10-CM

## 2024-08-14 DIAGNOSIS — I10 PRIMARY HYPERTENSION: ICD-10-CM

## 2024-08-14 RX ORDER — ROSUVASTATIN CALCIUM 10 MG/1
10 TABLET, COATED ORAL DAILY
Qty: 90 TABLET | Refills: 3 | Status: SHIPPED | OUTPATIENT
Start: 2024-08-14

## 2024-08-14 NOTE — PROGRESS NOTES
Pt paged VAD coordinator on call to report a high MAP.   Pt states MAP today was 102. He typically runs closer to 90. MAP today was taken about 1.5hrs after am meds.   Pt denies dizziness or SOB  VAD parameters as follows: Speed: 5400rpm's, Flow: 4.4l/min, PI: 4.8, Power: 3.9w  Weight: 193lb (1lb higher than last week)  Reviewed med list with pt and he is taking medications as instructed.   Will review findings with Dr. Silvestre and call patient back with a plan.   Next appt 9/4 with Dr. Silvestre

## 2024-08-14 NOTE — PROGRESS NOTES
D: Called patient to discuss labs and symptoms.     I/A:   Maps , patient had paged this AM with elevated MAP of 102  Wt 193lb- stable                 P: Will discuss with Dr. Silvestre.  Patient notified to page on-call coordinator if symptoms worsen or with other concerns. Patient verbalized understanding.      Per Dr. Silvestre Decrease Crestor to 10mg daily and INCREASE Lisinopril to 20mg bid, patient realized he was only taking lisinopril 10mg daily not twice a day. Will increase to 10mg BID now and will report back MAP in AM.  Repeat Labs in 1 week w/ INR.

## 2024-08-14 NOTE — LETTER
Mechanical Circulatory Support Program  [place] [address], [facility] [address]  [address] [place] [address]  [phone] Office Phone  [phone] Fax Number  Faxed to: [facility]   Fax Number:  [phone]    Patient Name: Navin Lutz   : 1970   Diagnosis/ICD-10: Heart Failure, unspecified I50.9; LVAD Z95.811   Requesting Physician: Dr. Micaela Silvestre   Date of Request: 24   Date to Draw 24      Please fax results to [phone] or email to [email or web address]   *Call [phone] with questions   Please call critical results to [phone], press option 4, ask to talk to the VAD coordinator on-call  ORDER TEST   X Complete Metabolic Panel    Complete Blood Count    Lactate Dehydrogenase    INR    N Terminal Pro BNP       Cedric,        Micaela Silvestre MD   of Medicine  [facility], Cardiovascular Division\

## 2024-08-15 ENCOUNTER — ANTICOAGULATION THERAPY VISIT (OUTPATIENT)
Dept: ANTICOAGULATION | Facility: CLINIC | Age: 54
End: 2024-08-15
Payer: COMMERCIAL

## 2024-08-15 DIAGNOSIS — Z95.811 LVAD (LEFT VENTRICULAR ASSIST DEVICE) PRESENT (H): Primary | ICD-10-CM

## 2024-08-15 DIAGNOSIS — I50.22 CHRONIC SYSTOLIC HEART FAILURE (H): ICD-10-CM

## 2024-08-15 DIAGNOSIS — Z79.01 ANTICOAGULATED ON COUMADIN: ICD-10-CM

## 2024-08-15 DIAGNOSIS — I50.22 CHRONIC SYSTOLIC CONGESTIVE HEART FAILURE (H): ICD-10-CM

## 2024-08-15 RX ORDER — GABAPENTIN 100 MG/1
200 CAPSULE ORAL 3 TIMES DAILY
Qty: 180 CAPSULE | Refills: 0 | Status: CANCELLED | OUTPATIENT
Start: 2024-08-15

## 2024-08-15 NOTE — PROGRESS NOTES
Called patient to check in on BP- pt has been away from home all day and has not been able to obtain. Asked to call and let us know what Map is when he gets it.   --  Addendum:   MAP 90 today, no alarms, feeling well today.

## 2024-08-15 NOTE — PROGRESS NOTES
ANTICOAGULATION MANAGEMENT     Navin JESSICA Lutz 53 year old male is on warfarin with supratherapeutic INR result. (Goal INR 2.5-3.0)    Recent labs: (last 7 days)     08/13/24  1120   INR 3.33       ASSESSMENT     Source(s): Chart Review and Patient/Caregiver Call     Warfarin doses taken: Warfarin taken as instructed  Diet: No new diet changes identified  Medication/supplement changes:  Crestor started on 8/14/24 No interaction anticipated  New illness, injury, or hospitalization: No  Signs or symptoms of bleeding or clotting: No  Previous result: Supratherapeutic  Additional findings:  [facility] received INR results from 8/13 on 8/15.  Reviewed with Roxana Chang RPH.  Will await stable dosing off of INR results for 14 days until ordering a Factor 10 Chromogenic lab.  Roxana changed priority to Critical to ensure the [facility] looks in [facility] for results.  Writer asked Navin to call the clinic if he hasn't heard from us by 3pm on the days he is drawn locally.       PLAN     Recommended plan for ongoing change(s) affecting INR     Dosing Instructions: decrease your warfarin dose (11.8% change) with next INR in 6 days       Summary  As of 8/15/2024      Full warfarin instructions:  7.5 mg every Tue; 5 mg all other days   Next INR check:  8/19/2024               Telephone call with Navin who verbalizes understanding and agrees to plan  Sent [employer] message with dosing and follow up instructions    Patient using outside facility for labs    Education provided: Please call back if any changes to your diet, medications or how you've been taking warfarin  Taking warfarin: purpose of warfarin and how it works, take warfarin at same time each day; preferably in the evening, prescribed tablet strength and color, importance of following ACC instructions vs instructions on the prescription bottle, and Importance of taking warfarin as instructed  Goal range and lab monitoring: goal range and significance of current result, Importance of  therapeutic range, and Importance of following up at instructed interval  Symptom monitoring: monitoring for bleeding signs and symptoms, monitoring for clotting signs and symptoms, monitoring for stroke signs and symptoms, when to seek medical attention/emergency care, and if you hit your head or have a bad fall seek emergency care  Importance of notifying anticoagulation clinic for: changes in medications; a sooner lab recheck maybe needed, diarrhea, nausea/vomiting, reduced intake, cold/flu, and/or infections; a sooner lab recheck maybe needed, and if you did not receive dosing instructions on the same day as your labs were checked    Plan made with [facility] Pharmacist Roxana Chang and per LVAD protocol    Theodore Beckham, RN  Anticoagulation Clinic  8/15/2024    _______________________________________________________________________     Anticoagulation Episode Summary       Current INR goal:  2.5-3.0   TTR:  16.3% (1.3 mo)   Target end date:  Indefinite   Send INR reminders to:  ANTICOAG LVAD    Indications    LVAD (left ventricular assist device) present (H) [Z95.811]  Chronic systolic heart failure (H) [I50.22]  Chronic systolic congestive heart failure (H) [I50.22]  Anticoagulated on Coumadin [Z79.01]             Comments:  GOAL: INR 2.5 - 3 changed 8/5/24  Follow VAD Anticoag protocol:Yes: HeartMate 3  Bridging: Call MD each time due to NEW IMPLANT  Date VAD placed: 6/14/24 8/23/23: internal jugular Thrombus; 6/21/24: lupus anticoagulant +  8/5/24: he does NOT have lupus               Anticoagulation Care Providers       Provider Role Specialty Phone number    Micaela Silvestre MD Referring Advanced Heart Failure and Transplant Cardiology [phone]    Cahntal Dallas MD Referring Advanced Heart Failure and Transplant Cardiology [phone]

## 2024-08-16 ENCOUNTER — CARE COORDINATION (OUTPATIENT)
Dept: CARDIOLOGY | Facility: CLINIC | Age: 54
End: 2024-08-16
Payer: COMMERCIAL

## 2024-08-16 DIAGNOSIS — Z95.811 LVAD (LEFT VENTRICULAR ASSIST DEVICE) PRESENT (H): ICD-10-CM

## 2024-08-16 RX ORDER — LISINOPRIL 10 MG/1
20 TABLET ORAL 2 TIMES DAILY
Qty: 180 TABLET | Refills: 3 | Status: SHIPPED | OUTPATIENT
Start: 2024-08-16 | End: 2024-08-18

## 2024-08-18 ENCOUNTER — CARE COORDINATION (OUTPATIENT)
Dept: CARDIOLOGY | Facility: CLINIC | Age: 54
End: 2024-08-18
Payer: COMMERCIAL

## 2024-08-18 DIAGNOSIS — Z95.811 LVAD (LEFT VENTRICULAR ASSIST DEVICE) PRESENT (H): ICD-10-CM

## 2024-08-18 RX ORDER — LISINOPRIL 10 MG/1
10 TABLET ORAL 2 TIMES DAILY
Qty: 180 TABLET | Refills: 3 | Status: SHIPPED | OUTPATIENT
Start: 2024-08-18 | End: 2024-08-20

## 2024-08-18 NOTE — PROGRESS NOTES
D:  Pt called to report decrease PI, MAP associated with dizziness.  Current LVAD numbers, Speed: 5400, Flow: 5.4, PI: 1.3, Power: 3.9 and MAP 64.  I:  Dicussed meds.  Pt found to be taking twice the prescribed dose of Carvedilol due to looking at [facility], med list.  Current medications explained.  Encouraged pt to continue to monitor MAP's, VAD parameters and symptoms.  Advised pt to call with continued issues.  A:  Low PI, MAP  P:  Pt verbalized understanding of the instructions given.  Will call VAD coordinator with further needs and questions.

## 2024-08-20 ENCOUNTER — CARE COORDINATION (OUTPATIENT)
Dept: CARDIOLOGY | Facility: CLINIC | Age: 54
End: 2024-08-20
Payer: COMMERCIAL

## 2024-08-20 DIAGNOSIS — Z95.811 LVAD (LEFT VENTRICULAR ASSIST DEVICE) PRESENT (H): ICD-10-CM

## 2024-08-20 RX ORDER — LISINOPRIL 10 MG/1
20 TABLET ORAL 2 TIMES DAILY
Qty: 120 TABLET | Refills: 11 | Status: SHIPPED | OUTPATIENT
Start: 2024-08-20

## 2024-08-20 NOTE — PROGRESS NOTES
D: Called patient to follow up on blood pressures and meds.     I/A: Patient states map 90 on correct coreg dose and 10mg bid lisinopril, discussed increasing to 20mg bid lisinopril per dr. Silvestre.     P: Pt  will increase Lisinopril, will touch base Friday & get labs.  Patient notified to page on-call coordinator if symptoms worsen or with other concerns. Patient verbalized understanding.

## 2024-08-20 NOTE — LETTER
Mechanical Circulatory Support Program  [place] [address], [facility] [address]  [address] [place] [address]  [phone] Office Phone  [phone] Fax Number  Faxed to: [facility]   Fax Number:  [phone]    Patient Name: Navin Lutz   : 1970   Diagnosis/ICD-10: Heart Failure, unspecified I50.9; LVAD Z95.811   Requesting Physician: Dr. Micaela Silvestre   Date of Request: 24   Date to Draw 24      Please fax results to [phone] or email to [email or web address]   *Call [phone] with questions   Please call critical results to [phone], press option 4, ask to talk to the VAD coordinator on-call  ORDER TEST   X Complete Metabolic Panel    Complete Blood Count    Lactate Dehydrogenase    INR    N Terminal Pro BNP       Cedric,        Micaela Silvestre MD   of Medicine  [facility], Cardiovascular Division\

## 2024-08-20 NOTE — LETTER
Mechanical Circulatory Support Program  [place] [address], [facility] [address]  [address] [place] [address]  [phone] Office Phone  [phone] Fax Number  Faxed to: [facility]   Fax Number:  [phone]    Patient Name: Navin Lutz   : 1970   Diagnosis/ICD-10: Heart Failure, unspecified I50.9; LVAD Z95.811   Requesting Physician: Dr. Micaela Silvestre   Date of Request: 24   Date to Draw 24      Please fax results to [phone] or email to [email or web address]   *Call [phone] with questions   Please call critical results to [phone], press option 4, ask to talk to the VAD coordinator on-call  ORDER TEST   X Complete Metabolic Panel    Complete Blood Count    Lactate Dehydrogenase    INR    N Terminal Pro BNP       Cedric,        Micaela Silvestre MD   of Medicine  [facility], Cardiovascular Division\

## 2024-08-20 NOTE — PROGRESS NOTES
History   Mr. Navin Lutz is a pleasant 53 year old male with a past medical history of chronic HFrEF 2/2 NICM s/p HM3 LVAD as BTT on 6/14/24 who presents for ongoing evaluation and management.     His postoperative course was relatively unremarkable.  He was treated with antibiotics for Klebsiella pneumonia with a 10-day course.  He developed a left pleural effusion that required to be drained with a pigtail catheter. He also had abnormal elevated LFTs, which were thought to be iatrogenic due to multiple medications (statin, acetaminophen, azithromycin).  Acetaminophen and statin were held.  His LFTs have remained elevated. During workup for his LVAD he was found to have INRs that were discordant with Chromogenic Factor X and hematology was consulted. Found to be lupus anticoagulant positive, and Chromogenic factor X monitoring was recommended for warfarin dosing as INR not reliable. Goal CFX 20-40% which correlates with INR 2-3. Hematology recommended repeat outpatient lupus anticoagulant testing at the end of July, and if negative, INR monitoring may be an option. He presented back to ER soon after discharge with sudden onset focal neurologic deficit with diplopia. Symptoms resolved on hospital day 1. He has several risk factors for stroke including recent VAD and known lupus anticoagulant positivity. Note: CFx 10 had been borderline subtherapeutic at time of hospital discharge and after discharge. CTA without large vessel occlusion. EKG is NSR, no hx of afib or flutter. Neurology consulted. Transthoracic echo with bubble study was unremarkable. Head CT negative for ischemia or bleed on 7/9 and 7/10/24. Optho consulted - Anti-Ach receptor and MUSK antibodies recommend to r/o myasthenia gravis. He was also started on aspirin 81 mg daily. In hospital, he is chromogenic factor goal was changed from 20-40% to 20-30%.      Patient feels well and is eager to discharge to home from local housing now. He continues to  struggle with altered sense of taste where most food tastes very powerful, making it difficult to eat, but is able to eat small, frequent meals. This has been present since initial hospitalization for LVAD implantation and well before TIA. He is walking >2.5 miles per day without any difficulty. He denies chest pain, shortness of breath, PND/orthopnea, palpitations, lightheadedness, syncope, leg swelling, LVAD alarms, dark urine, blood in stool, concerns with driveline or driveline site. He just had his suture removed from his driveline.     ROS:  A complete 12-point ROS was negative except as above.     PAST MEDICAL HISTORY:      Patient Active Problem List   Diagnosis    Acute on chronic systolic CHF (congestive heart failure) (H)    Chest pain    ICD (implantable cardioverter-defibrillator) in place    Inguinal hernia    Multiple lung nodules on CT    Non-ischemic cardiomyopathy (H)    Pure hypercholesterolemia    RAD (reactive airway disease) with wheezing, mild intermittent, uncomplicated    Acute deep vein thrombosis (DVT) of other vein of left upper extremity (H)    Cardiogenic shock (H)    Acute decompensated heart failure (H)    Chronic systolic congestive heart failure (H)    Anticoagulated on warfarin    LVAD (left ventricular assist device) present (H)    Chronic systolic heart failure (H)    Anticoagulated on Coumadin    Diplopia    Stroke-like symptoms    TIA (transient ischemic attack)         FAMILY HISTORY:  Family History   No family history on file.        SOCIAL HISTORY:  Social History              Tobacco Use    Smoking status: Never    Smokeless tobacco: Never   Substance Use Topics    Alcohol use: Never    Drug use: Never            ALLERGIES:  Allergies   No Known Allergies        CURRENT MEDICATIONS:         Current Outpatient Medications   Medication Sig Dispense Refill    aspirin (ASA) 81 MG chewable tablet Take 1 tablet (81 mg) by mouth daily 90 tablet 3    carvedilol (COREG) 6.25 MG  tablet Take 1 tablet (6.25 mg) by mouth 2 times daily (with meals) 180 tablet 3    empagliflozin (JARDIANCE) 10 MG TABS tablet Take 1 tablet (10 mg) by mouth daily 90 tablet 1    lisinopril (ZESTRIL) 10 MG tablet Take 1 tablet (10 mg) by mouth 2 times daily 180 tablet 3    rosuvastatin (CRESTOR) 20 MG tablet Take 1 tablet (20 mg) by mouth daily 90 tablet 3    spironolactone (ALDACTONE) 25 MG tablet Take 1 tablet (25 mg) by mouth every evening 90 tablet 3    warfarin ANTICOAGULANT (COUMADIN) 5 MG tablet Take two tablets (10 mg) daily. Next chromogenic factor X on 7/12 in clinic. Always follow the most recent instructions from your INR clinic.                EXAM:  BP (!) 92/0 (BP Location: Left arm, Patient Position: Chair, Cuff Size: Adult Regular)   Pulse 60   Wt 89.8 kg (198 lb)   SpO2 96%   BMI 26.85 kg/m       General: appears comfortable, alert and interactive, in no acute distress  Head: normocephalic, atraumatic  Eyes: anicteric sclera, EOMI  Mouth: MMM  Neck: supple, no cervical adenopathy  CV: regular rate and rhythm, LVAD hum, estimated JVP ~7 cm  Resp: clear, no rales or wheezing  GI: soft, nontender, nondistended, driveline bandage is c/d/i  Extremities: warm, no peripheral edema  Neurological: alert and oriented, no focal deficits  Psych: normal mood and affect  Derm: no rashes on exposed surfaces          I personally reviewed recent labs and data as below and discussed the results with the patient in clinic today.  Labs:  CBC RESULTS:        Lab Results   Component Value Date     WBC 5.9 07/31/2024     RBC 5.46 07/31/2024     HGB 15.2 07/31/2024     HCT 47.7 07/31/2024     MCV 87 07/31/2024     MCH 27.8 07/31/2024     MCHC 31.9 07/31/2024     RDW 13.7 07/31/2024      07/31/2024         CMP RESULTS:        Lab Results   Component Value Date      07/31/2024     POTASSIUM 4.4 07/31/2024     POTASSIUM 4.5 06/14/2024     CHLORIDE 102 07/31/2024     CHLORIDE 104 05/28/2024     CO2 27  07/31/2024     ANIONGAP 9 07/31/2024     GLC 95 07/31/2024      (H) 07/09/2024     BUN 11.3 07/31/2024     CR 1.04 07/31/2024     GFRESTIMATED 86 07/31/2024     GFRESTIMATED >60 07/09/2024     NAM 9.8 07/31/2024     BILITOTAL 0.4 07/31/2024     ALBUMIN 4.1 07/31/2024     ALKPHOS 153 (H) 07/31/2024      (H) 07/31/2024     AST 54 (H) 07/31/2024              Recent Labs   Lab Test 07/10/24  0646 11/18/23  0018   CHOL 195 161   HDL 36* 48   * 96   TRIG 117 85         Testing/Procedures:    Echo:  7/9/24  s/p HeartMate 3 LVAD at 5400 rpm  LVIDD is 5.6 cm. Left ventricular function is decreased. The ejection fraction is 15-20% (severely reduced).  LVAD cannula was seen in the expected anatomic position in the LV apex.  Outflow graft is visualized. Interventricular septum is midline.  The right ventricle is normal size. Global right ventricular function is moderately reduced.  The aortic valve does not open during the cardiac cycle.  The inferior vena cava was normal in size with preserved respiratory variability.  No pericardial effusion is present.  This study was compared with the study from 6/28/2024. Left ventricular size is smaller.         Assessment and Plan:      53 year old male with a past medical history of chronic HFrEF 2/2 NICM s/p HM3 LVAD as BTT on 6/14/24 who presents for post operative evaluation. Patient is doing relatively well. Drive line wound is healing well.

## 2024-08-23 ENCOUNTER — CARE COORDINATION (OUTPATIENT)
Dept: CARDIOLOGY | Facility: CLINIC | Age: 54
End: 2024-08-23
Payer: COMMERCIAL

## 2024-08-23 ENCOUNTER — ANTICOAGULATION THERAPY VISIT (OUTPATIENT)
Dept: ANTICOAGULATION | Facility: CLINIC | Age: 54
End: 2024-08-23
Payer: COMMERCIAL

## 2024-08-23 DIAGNOSIS — Z95.811 LVAD (LEFT VENTRICULAR ASSIST DEVICE) PRESENT (H): Primary | ICD-10-CM

## 2024-08-23 DIAGNOSIS — I50.22 CHRONIC SYSTOLIC HEART FAILURE (H): ICD-10-CM

## 2024-08-23 DIAGNOSIS — I50.22 CHRONIC SYSTOLIC CONGESTIVE HEART FAILURE (H): ICD-10-CM

## 2024-08-23 DIAGNOSIS — Z79.01 ANTICOAGULATED ON COUMADIN: ICD-10-CM

## 2024-08-23 LAB — INR (EXTERNAL): 1.5 (ref 0.9–1.1)

## 2024-08-23 NOTE — PROGRESS NOTES
ANTICOAGULATION MANAGEMENT     Navin Lutz 53 year old male is on warfarin with subtherapeutic INR result. (Goal INR 2.5-3.0)    Recent labs: (last 7 days)     08/23/24  1716   INR 1.5*       ASSESSMENT     Source(s): Chart Review and Patient/Caregiver Call     Warfarin doses taken: Less warfarin taken than planned which may be affecting INR and Updated calendar  Diet: Increased greens/vitamin K in diet; plans to resume previous intake Patient had a large serving of [employer] on 8/22/24  Medication/supplement changes:  Lisinopril dose increased on 8/20/24 No interaction anticipated  New illness, injury, or hospitalization: No  Signs or symptoms of bleeding or clotting: No  Previous result: Supratherapeutic  Additional findings:  Reviewed with patient when to seek medical attention.  Signs and symptoms of clotting. Less Warfarin taken this week, increase in Vitamin K suspect for subtherapeutic INR results today.Followed  3 Sub-therapeutic protocol.  Adjusted [place], recheck INR within 3-7 days.   Sent [employer] message to reference Care Everywhere on Monday.       PLAN     Recommended plan for temporary change(s) affecting INR     Dosing Instructions: booster dose then change your warfarin dose to 7.5mg on Sat; 5mg all other days of the week with next INR in 3 days       Summary  As of 8/23/2024      Full warfarin instructions:  8/23: 10 mg; Otherwise 7.5 mg every Sat; 5 mg all other days   Next INR check:  8/26/2024               Telephone call with Navin who verbalizes understanding and agrees to plan  Sent [employer] message with dosing and follow up instructions    Patient using outside facility for labs    Education provided: Please call back if any changes to your diet, medications or how you've been taking warfarin  Taking warfarin: purpose of warfarin and how it works, take warfarin at same time each day; preferably in the evening, prescribed tablet strength and color, and importance of following ACC  instructions vs instructions on the prescription bottle  Goal range and lab monitoring: goal range and significance of current result, Importance of therapeutic range, and Importance of following up at instructed interval  Dietary considerations: importance of consistent vitamin K intake, impact of vitamin K foods on INR, vitamin K content of foods, and importance of notifying ACC to changes in diet  Interaction IS NOT anticipated between warfarin and Lisinopril  Symptom monitoring: monitoring for bleeding signs and symptoms, monitoring for clotting signs and symptoms, monitoring for stroke signs and symptoms, when to seek medical attention/emergency care, if you hit your head or have a bad fall seek emergency care, and travel related clotting risk and prevention  Importance of notifying anticoagulation clinic for: changes in medications; a sooner lab recheck maybe needed, diarrhea, nausea/vomiting, reduced intake, cold/flu, and/or infections; a sooner lab recheck maybe needed, and if you did not receive dosing instructions on the same day as your labs were checked  Contact [phone] with any changes, questions or concerns.     Plan made with ACC Pharmacist Lay Quan and per LVAD protocol    Theodore Beckham, RN  Anticoagulation Clinic  8/23/2024    _______________________________________________________________________     Anticoagulation Episode Summary       Current INR goal:  2.5-3.0   TTR:  18.6% (1.6 mo)   Target end date:  Indefinite   Send INR reminders to:  ANTICOAG LVAD    Indications    LVAD (left ventricular assist device) present (H) [Z95.811]  Chronic systolic heart failure (H) [I50.22]  Chronic systolic congestive heart failure (H) [I50.22]  Anticoagulated on Coumadin [Z79.01]             Comments:  GOAL: INR 2.5 - 3 changed 8/5/24  Follow VAD Anticoag protocol:Yes: HeartMate 3  Bridging: Call MD each time due to NEW IMPLANT  Date VAD placed: 6/14/24 8/23/23: internal jugular Thrombus; 6/21/24:  lupus anticoagulant +  8/5/24: he does NOT have lupus               Anticoagulation Care Providers       Provider Role Specialty Phone number    Micaela Silvestre MD Referring Advanced Heart Failure and Transplant Cardiology [phone]    Chantal Dallas MD Referring Advanced Heart Failure and Transplant Cardiology [phone]

## 2024-08-23 NOTE — PROGRESS NOTES
D: Called patient to check in on Maps/labs      I/A: MAPs in 80s   Dizzy spell yesterday- brief but also felt like he had a pinched nerve in his arm pit at the same time. PI was in 5.8 yesterday when this happened.     No vad alarms        P: Asked patient to page with further dizzy spells.  Patient notified to page on-call coordinator if symptoms worsen or with other concerns. Patient verbalized understanding.

## 2024-08-26 ENCOUNTER — ANTICOAGULATION THERAPY VISIT (OUTPATIENT)
Dept: ANTICOAGULATION | Facility: CLINIC | Age: 54
End: 2024-08-26
Payer: COMMERCIAL

## 2024-08-26 DIAGNOSIS — Z95.811 LVAD (LEFT VENTRICULAR ASSIST DEVICE) PRESENT (H): Primary | ICD-10-CM

## 2024-08-26 DIAGNOSIS — I50.22 CHRONIC SYSTOLIC CONGESTIVE HEART FAILURE (H): ICD-10-CM

## 2024-08-26 DIAGNOSIS — Z79.01 ANTICOAGULATED ON COUMADIN: ICD-10-CM

## 2024-08-26 DIAGNOSIS — I50.22 CHRONIC SYSTOLIC HEART FAILURE (H): ICD-10-CM

## 2024-08-26 LAB — INR (EXTERNAL): 2.06 (ref 0.9–1.1)

## 2024-08-26 NOTE — PROGRESS NOTES
ANTICOAGULATION MANAGEMENT     Navin Lutz 53 year old male is on warfarin with subtherapeutic INR result. (Goal INR 2.5-3.0)    Recent labs: (last 7 days)     08/26/24  1554   INR 2.06*       ASSESSMENT     Source(s): Chart Review and Patient/Caregiver Call     Warfarin doses taken: Booster dose(s) recently taken which may be affecting INR  Diet: No new diet changes identified  Medication/supplement changes: None noted  New illness, injury, or hospitalization: No  Signs or symptoms of bleeding or clotting: No  Previous result: Subtherapeutic  Additional findings:  Spoke to Navin.  Following protocol with Monday being a holiday, and may extend by 24 hours to check at provider visit per  3 subtherapeutic INR.; Navin will test an INR on 9/3/24.       PLAN     Recommended plan for no diet, medication or health factor changes affecting INR     Dosing Instructions: booster dose then Increase your warfarin dose (13.3% change) with next INR in 1 week       Summary  As of 8/26/2024      Full warfarin instructions:  7.5 mg every Mon, Wed, Fri; 5 mg all other days   Next INR check:  9/3/2024               Telephone call with Navin who verbalizes understanding and agrees to plan  Sent Tee[employer] message with dosing and follow up instructions    Lab visit scheduled    Education provided: Please call back if any changes to your diet, medications or how you've been taking warfarin  Symptom monitoring: monitoring for bleeding signs and symptoms, monitoring for clotting signs and symptoms, monitoring for stroke signs and symptoms, and when to seek medical attention/emergency care  Importance of notifying anticoagulation clinic for: changes in medications; a sooner lab recheck maybe needed, diarrhea, nausea/vomiting, reduced intake, cold/flu, and/or infections; a sooner lab recheck maybe needed, upcoming surgeries and procedures 2 weeks in advance, and if you did not receive dosing instructions on the same day as your labs  were checked    Plan made per [facility] anticoagulation protocol and per LVAD protocol    Theodore Beckham, RN  Anticoagulation Clinic  8/26/2024    _______________________________________________________________________     Anticoagulation Episode Summary       Current INR goal:  2.5-3.0   TTR:  17.4% (1.7 mo)   Target end date:  Indefinite   Send INR reminders to:  ANTICOAG LVAD    Indications    LVAD (left ventricular assist device) present (H) [Z95.811]  Chronic systolic heart failure (H) [I50.22]  Chronic systolic congestive heart failure (H) [I50.22]  Anticoagulated on Coumadin [Z79.01]             Comments:  GOAL: INR 2.5 - 3 changed 8/5/24  Follow VAD Anticoag protocol:Yes: HeartMate 3  Bridging: Call MD each time due to NEW IMPLANT  Date VAD placed: 6/14/24 8/23/23: internal jugular Thrombus; 6/21/24: lupus anticoagulant +  8/5/24: he does NOT have lupus               Anticoagulation Care Providers       Provider Role Specialty Phone number    Micaela Silvestre MD Referring Advanced Heart Failure and Transplant Cardiology [phone]    Chantal Dallas MD Referring Advanced Heart Failure and Transplant Cardiology [phone]

## 2024-08-28 ENCOUNTER — MYC MEDICAL ADVICE (OUTPATIENT)
Dept: CARDIOLOGY | Facility: CLINIC | Age: 54
End: 2024-08-28
Payer: COMMERCIAL

## 2024-08-28 NOTE — TELEPHONE ENCOUNTER
D: Called patient/caregiver to check in 8 weeks post discharge.    I/A: Patient has been having an intermittent pain in neck area- feels like a muscle cramp, it comes on suddenly and he gets a nauseas feeling when it happens. Some days 2-3/days other days not at all.   On Sunday when they happened, simultaneously felt dizzy. Noticed PI up to 10 when the pain happened. Then PI returned back to normal.   Discussed with patient that intrathoracic pressure changes can affect PI- could be holding breath/ bearing down to cope with pain without realizing it.    Map 88     Pt reports VAD parameters stable and weight 196/197, gained back wt that he had loss during hospitalization. Reviewed medications and answered any questions.  Patient is able to move around the house and care for himself independently- walking about 1-2 miles.     Discussed specific new problems/stressors since being discharged from the hospital: getting back into the swing of normal life  Empathized with patient and reviewed coping strategies: enlisting support from friends and love ones, attending patient and caregiver support groups, reviewing LVAD educational materials to reinforce knowledge, and talking about concerns with family/care providers/trusted others.     P:  Will discuss with Dr. Silvestre. Encouraged patient to keep up hydration.  Patient notified to page on-call coordinator if symptoms worsen or with other concerns. Patient verbalized understanding.

## 2024-08-30 DIAGNOSIS — Z79.01 ANTICOAGULATED ON WARFARIN: ICD-10-CM

## 2024-08-30 DIAGNOSIS — Z79.899 ENCOUNTER FOR LONG-TERM (CURRENT) USE OF MEDICATIONS: ICD-10-CM

## 2024-08-30 DIAGNOSIS — Z79.899 LONG TERM USE OF DRUG: ICD-10-CM

## 2024-08-30 DIAGNOSIS — Z95.811 LVAD (LEFT VENTRICULAR ASSIST DEVICE) PRESENT (H): ICD-10-CM

## 2024-08-30 DIAGNOSIS — Z95.811 LEFT VENTRICULAR ASSIST DEVICE PRESENT (H): ICD-10-CM

## 2024-08-30 DIAGNOSIS — I50.22 CHRONIC SYSTOLIC HEART FAILURE (H): ICD-10-CM

## 2024-08-30 DIAGNOSIS — I50.22 CHRONIC SYSTOLIC CONGESTIVE HEART FAILURE (H): Primary | ICD-10-CM

## 2024-09-03 ENCOUNTER — ANCILLARY PROCEDURE (OUTPATIENT)
Dept: CARDIOLOGY | Facility: CLINIC | Age: 54
End: 2024-09-03
Attending: STUDENT IN AN ORGANIZED HEALTH CARE EDUCATION/TRAINING PROGRAM
Payer: COMMERCIAL

## 2024-09-03 DIAGNOSIS — I50.22 CHRONIC SYSTOLIC CONGESTIVE HEART FAILURE (H): ICD-10-CM

## 2024-09-03 DIAGNOSIS — Z95.811 LVAD (LEFT VENTRICULAR ASSIST DEVICE) PRESENT (H): ICD-10-CM

## 2024-09-03 LAB — LVEF ECHO: NORMAL

## 2024-09-03 PROCEDURE — 93306 TTE W/DOPPLER COMPLETE: CPT | Performed by: INTERNAL MEDICINE

## 2024-09-04 ENCOUNTER — TELEPHONE (OUTPATIENT)
Dept: ANTICOAGULATION | Facility: CLINIC | Age: 54
End: 2024-09-04

## 2024-09-04 ENCOUNTER — LAB (OUTPATIENT)
Dept: LAB | Facility: CLINIC | Age: 54
End: 2024-09-04
Payer: COMMERCIAL

## 2024-09-04 ENCOUNTER — OFFICE VISIT (OUTPATIENT)
Dept: CARDIOLOGY | Facility: CLINIC | Age: 54
End: 2024-09-04
Attending: STUDENT IN AN ORGANIZED HEALTH CARE EDUCATION/TRAINING PROGRAM
Payer: COMMERCIAL

## 2024-09-04 ENCOUNTER — ANTICOAGULATION THERAPY VISIT (OUTPATIENT)
Dept: ANTICOAGULATION | Facility: CLINIC | Age: 54
End: 2024-09-04

## 2024-09-04 VITALS — WEIGHT: 209 LBS | OXYGEN SATURATION: 95 % | BODY MASS INDEX: 28.35 KG/M2 | SYSTOLIC BLOOD PRESSURE: 86 MMHG

## 2024-09-04 DIAGNOSIS — Z79.899 LONG TERM USE OF DRUG: ICD-10-CM

## 2024-09-04 DIAGNOSIS — I50.22 CHRONIC SYSTOLIC CONGESTIVE HEART FAILURE (H): ICD-10-CM

## 2024-09-04 DIAGNOSIS — Z79.01 ANTICOAGULATED ON COUMADIN: ICD-10-CM

## 2024-09-04 DIAGNOSIS — Z79.01 ANTICOAGULATED ON WARFARIN: ICD-10-CM

## 2024-09-04 DIAGNOSIS — Z95.811 LEFT VENTRICULAR ASSIST DEVICE PRESENT (H): ICD-10-CM

## 2024-09-04 DIAGNOSIS — Z95.811 LVAD (LEFT VENTRICULAR ASSIST DEVICE) PRESENT (H): Primary | ICD-10-CM

## 2024-09-04 DIAGNOSIS — Z79.899 ENCOUNTER FOR LONG-TERM (CURRENT) USE OF MEDICATIONS: ICD-10-CM

## 2024-09-04 DIAGNOSIS — I50.22 CHRONIC SYSTOLIC HEART FAILURE (H): ICD-10-CM

## 2024-09-04 DIAGNOSIS — Z95.811 LVAD (LEFT VENTRICULAR ASSIST DEVICE) PRESENT (H): ICD-10-CM

## 2024-09-04 DIAGNOSIS — R55 PRE-SYNCOPE: ICD-10-CM

## 2024-09-04 DIAGNOSIS — G45.9 TIA (TRANSIENT ISCHEMIC ATTACK): ICD-10-CM

## 2024-09-04 DIAGNOSIS — I82.622 ACUTE DEEP VEIN THROMBOSIS (DVT) OF OTHER VEIN OF LEFT UPPER EXTREMITY (H): ICD-10-CM

## 2024-09-04 DIAGNOSIS — I42.8 NONISCHEMIC CARDIOMYOPATHY (H): ICD-10-CM

## 2024-09-04 LAB
ALBUMIN SERPL BCG-MCNC: 3.9 G/DL (ref 3.5–5.2)
ALP SERPL-CCNC: 122 U/L (ref 40–150)
ALT SERPL W P-5'-P-CCNC: 111 U/L (ref 0–70)
ANION GAP SERPL CALCULATED.3IONS-SCNC: 9 MMOL/L (ref 7–15)
AST SERPL W P-5'-P-CCNC: 53 U/L (ref 0–45)
BILIRUB SERPL-MCNC: 0.3 MG/DL
BUN SERPL-MCNC: 19.4 MG/DL (ref 6–20)
CALCIUM SERPL-MCNC: 9.3 MG/DL (ref 8.8–10.4)
CHLORIDE SERPL-SCNC: 106 MMOL/L (ref 98–107)
CREAT SERPL-MCNC: 1.12 MG/DL (ref 0.67–1.17)
EGFRCR SERPLBLD CKD-EPI 2021: 79 ML/MIN/1.73M2
ERYTHROCYTE [DISTWIDTH] IN BLOOD BY AUTOMATED COUNT: 14.6 % (ref 10–15)
FACT X ACT/NOR PPP CHRO: 29 % (ref 70–130)
GLUCOSE SERPL-MCNC: 96 MG/DL (ref 70–99)
HCO3 SERPL-SCNC: 23 MMOL/L (ref 22–29)
HCT VFR BLD AUTO: 48.7 % (ref 40–53)
HGB BLD-MCNC: 15.8 G/DL (ref 13.3–17.7)
INR PPP: 1.89 (ref 0.85–1.15)
LDH SERPL L TO P-CCNC: 219 U/L (ref 0–250)
Lab: NORMAL
MCH RBC QN AUTO: 27.2 PG (ref 26.5–33)
MCHC RBC AUTO-ENTMCNC: 32.4 G/DL (ref 31.5–36.5)
MCV RBC AUTO: 84 FL (ref 78–100)
NT-PROBNP SERPL-MCNC: 213 PG/ML (ref 0–900)
PERFORMING LABORATORY: NORMAL
PLATELET # BLD AUTO: 195 10E3/UL (ref 150–450)
POTASSIUM SERPL-SCNC: 4.5 MMOL/L (ref 3.4–5.3)
PROT SERPL-MCNC: 6.7 G/DL (ref 6.4–8.3)
RBC # BLD AUTO: 5.81 10E6/UL (ref 4.4–5.9)
SODIUM SERPL-SCNC: 138 MMOL/L (ref 135–145)
SPECIMEN STATUS: NORMAL
TEST NAME: NORMAL
WBC # BLD AUTO: 5.7 10E3/UL (ref 4–11)

## 2024-09-04 PROCEDURE — 85260 CLOT FACTOR X STUART-POWER: CPT | Performed by: STUDENT IN AN ORGANIZED HEALTH CARE EDUCATION/TRAINING PROGRAM

## 2024-09-04 PROCEDURE — 80053 COMPREHEN METABOLIC PANEL: CPT | Performed by: PATHOLOGY

## 2024-09-04 PROCEDURE — 93750 INTERROGATION VAD IN PERSON: CPT | Performed by: STUDENT IN AN ORGANIZED HEALTH CARE EDUCATION/TRAINING PROGRAM

## 2024-09-04 PROCEDURE — 93282 PRGRMG EVAL IMPLANTABLE DFB: CPT | Performed by: INTERNAL MEDICINE

## 2024-09-04 PROCEDURE — G0480 DRUG TEST DEF 1-7 CLASSES: HCPCS | Mod: 90 | Performed by: PATHOLOGY

## 2024-09-04 PROCEDURE — 99215 OFFICE O/P EST HI 40 MIN: CPT | Mod: 25 | Performed by: STUDENT IN AN ORGANIZED HEALTH CARE EDUCATION/TRAINING PROGRAM

## 2024-09-04 PROCEDURE — 36415 COLL VENOUS BLD VENIPUNCTURE: CPT | Performed by: PATHOLOGY

## 2024-09-04 PROCEDURE — 99211 OFF/OP EST MAY X REQ PHY/QHP: CPT | Mod: 25 | Performed by: STUDENT IN AN ORGANIZED HEALTH CARE EDUCATION/TRAINING PROGRAM

## 2024-09-04 PROCEDURE — 99000 SPECIMEN HANDLING OFFICE-LAB: CPT | Performed by: PATHOLOGY

## 2024-09-04 PROCEDURE — 85027 COMPLETE CBC AUTOMATED: CPT | Performed by: PATHOLOGY

## 2024-09-04 PROCEDURE — 83615 LACTATE (LD) (LDH) ENZYME: CPT | Performed by: PATHOLOGY

## 2024-09-04 PROCEDURE — 80307 DRUG TEST PRSMV CHEM ANLYZR: CPT | Performed by: PATHOLOGY

## 2024-09-04 PROCEDURE — 85610 PROTHROMBIN TIME: CPT | Performed by: PATHOLOGY

## 2024-09-04 PROCEDURE — 83880 ASSAY OF NATRIURETIC PEPTIDE: CPT | Performed by: PATHOLOGY

## 2024-09-04 RX ORDER — GABAPENTIN 100 MG/1
200 CAPSULE ORAL
COMMUNITY
Start: 2024-08-16

## 2024-09-04 ASSESSMENT — PAIN SCALES - GENERAL: PAINLEVEL: NO PAIN (0)

## 2024-09-04 NOTE — TELEPHONE ENCOUNTER
INR today 1.86.  Chromogenic X=29.  Dr. Silvestre had requested another chromogenic X along with INR for comparison.

## 2024-09-04 NOTE — NURSING NOTE
MCS VAD Pump Info       Row Name 09/04/24 0910 09/04/24 0900          MCS VAD Information    Implant -- LVAD     LVAD Pump -- HeartMate 3        Heartmate 3 LEFT VS    Flow (Lpm) 4.6 Lpm 4.8 Lpm     Pulse Index (PI) 3.9 PI 3.2 PI     Speed (rpm) 5400 rpm 5400 rpm     Power (bassett) 3.9 bassett 4 bassett     Current Hct setting 49 47     Retired: Unexpected Alarms -- --        Primary Controller    Serial number -- [facility] [place] [id number]     Low flow alarm setting -- --     High watt alarm setting -- --     EBB: Patient use -- 4     Replace in -- 30 Months        Backup Controller    Serial number -- [facility] [place] [id number]     EBB: Patient use -- 11     Replace EBB in -- 30 Months     Speed & HCT match primary controller -- --        VAD Interrogation    Alarms reported by patient -- N     Unexpected alarms noted upon interrogation -- None     PI events -- Occasional, hx back 4days, pi range 1.8-8.8     Damage to equipment is noted -- N     Action taken -- Reviewed proper equipment care and maintenance        Driveline Exit Site    Dressing change done -- N     Driveline properly secured -- Yes     DLES assessment -- c/d/i     Dressing used -- Weekly kit     Frequency patient changes dressing -- --     Dressing modifications -- --     Dressing change supplier -- --                     Education Complete: Yes   Charge the BACKUP controller s backup battery every 6 months  Perform a self test on BACKUP every 6 months  Change the MPU s batteries every 6 months:Yes  Have equipment serviced yearly (if applicable):Yes    Reviewed Heart failure medications.    Assessed user interface no defects noted on both primary and back up controllers.     Called patient/caregiver to check in 12 weeks post discharge. Pt reports VAD parameters stable- as above, filling in log sheets daily and weight stable. Reviewed medications and answered any questions.  Patient is able to move around the house and care for himself independently.     Discussed specific new  problems/stressors since being discharged from the hospital: still working to find the best way to wear equipment, re-acclimate to normal life Empathized with patient and reviewed coping strategies: enlisting support from friends and love ones, attending patient and caregiver support groups, reviewing LVAD educational materials to reinforce knowledge, and talking about concerns with family/care providers/trusted others. Encouraged pt to page VAD Coordinator with any issues or questions. Pt verbalizes understanding.

## 2024-09-04 NOTE — LETTER
9/4/2024      RE: Navin Lutz  [address] [place] [address]       Dear Colleague,    Thank you for the opportunity to participate in the care of your patient, Navin Lutz, at the [facility] [place] at [facility]. Please see a copy of my visit note below.    Advanced Heart Failure/Transplant Clinic Note    HPI  Dear colleagues,     I had the pleasure of seeing Mr. Navin Lutz in the Cardiology clinic.  As you know, Mr. Navin Lutz is a pleasant 53 year old male with a past medical history of chronic HFrEF 2/2 NICM s/p HM3 LVAD as BTT on 6/14/24 who presents for ongoing evaluation and management.    His postoperative course was relatively unremarkable.  He was treated with antibiotics for Klebsiella pneumonia with a 10-day course.  He developed a left pleural effusion that required to be drained with a pigtail catheter. He also had abnormal elevated LFTs, which were thought to be iatrogenic due to multiple medications (statin, acetaminophen, azithromycin).  Acetaminophen and statin were held.  His LFTs have remained elevated. During workup for his LVAD he was found to have INRs that were discordant with Chromogenic Factor X and hematology was consulted. Found to be lupus anticoagulant positive, and Chromogenic factor X monitoring was recommended for warfarin dosing as INR not reliable. Goal CFX 20-40% which correlates with INR 2-3. Hematology recommended repeat outpatient lupus anticoagulant testing at the end of July, and if negative, INR monitoring may be an option. He presented back to ER soon after discharge with sudden onset focal neurologic deficit with diplopia. Symptoms resolved on hospital day 1. He has several risk factors for stroke including recent VAD and known lupus anticoagulant positivity. Note: CFx 10 had been borderline subtherapeutic at time of hospital discharge and after discharge. CTA without large vessel occlusion. EKG  is NSR, no hx of afib or flutter. Neurology consulted. Transthoracic echo with bubble study was unremarkable. Head CT negative for ischemia or bleed on 7/9 and 7/10/24. Optho consulted - Anti-Ach receptor and MUSK antibodies recommend to r/o myasthenia gravis. He was also started on aspirin 81 mg daily.     Patient feels well and is very happy to have been home lately. His sense of smell and taste is returning to normal. He is walking >2.5 miles per day without any difficulty. He denies chest pain, shortness of breath, PND/orthopnea, palpitations, lsyncope, leg swelling, LVAD alarms, dark urine, blood in stool, concerns with driveline or driveline site. He does have period neck pain with associated dizzy spells, but these do not occur every day.    ROS:  A complete 12-point ROS was negative except as above.    PAST MEDICAL HISTORY:  Patient Active Problem List   Diagnosis     Acute on chronic systolic CHF (congestive heart failure) (H)     Chest pain     ICD (implantable cardioverter-defibrillator) in place     Inguinal hernia     Multiple lung nodules on CT     Non-ischemic cardiomyopathy (H)     Pure hypercholesterolemia     RAD (reactive airway disease) with wheezing, mild intermittent, uncomplicated     Acute deep vein thrombosis (DVT) of other vein of left upper extremity (H)     Cardiogenic shock (H)     Acute decompensated heart failure (H)     Chronic systolic congestive heart failure (H)     Anticoagulated on warfarin     LVAD (left ventricular assist device) present (H)     Chronic systolic heart failure (H)     Anticoagulated on Coumadin     Diplopia     Stroke-like symptoms     TIA (transient ischemic attack)        FAMILY HISTORY:  No family history on file.    SOCIAL HISTORY:  Social History     Tobacco Use     Smoking status: Never     Smokeless tobacco: Never   Substance Use Topics     Alcohol use: Never     Drug use: Never        ALLERGIES:  No Known Allergies    CURRENT MEDICATIONS:  Current  Outpatient Medications   Medication Sig Dispense Refill     aspirin (ASA) 81 MG chewable tablet Take 1 tablet (81 mg) by mouth daily 90 tablet 3     carvedilol (COREG) 6.25 MG tablet Take 1 tablet (6.25 mg) by mouth 2 times daily (with meals) 180 tablet 3     empagliflozin (JARDIANCE) 10 MG TABS tablet Take 1 tablet (10 mg) by mouth daily 90 tablet 1     gabapentin (NEURONTIN) 100 MG capsule Take 200 mg by mouth.       lisinopril (ZESTRIL) 10 MG tablet Take 2 tablets (20 mg) by mouth 2 times daily 120 tablet 11     rosuvastatin (CRESTOR) 10 MG tablet Take 1 tablet (10 mg) by mouth daily 90 tablet 3     spironolactone (ALDACTONE) 25 MG tablet Take 1 tablet (25 mg) by mouth every evening 90 tablet 3     warfarin ANTICOAGULANT (COUMADIN) 5 MG tablet Take two tablets (10 mg) daily. Next chromogenic factor X on 7/12 in clinic. Always follow the most recent instructions from your INR clinic.         EXAM:  BP (!) 86/0 (BP Location: Left arm, Patient Position: Chair, Cuff Size: Adult Regular)   Wt 94.8 kg (209 lb)   SpO2 95%   BMI 28.35 kg/m      General: appears comfortable, alert and interactive, in no acute distress  Head: normocephalic, atraumatic  Eyes: anicteric sclera, EOMI  Mouth: MMM  Neck: supple, no cervical adenopathy  CV: regular rate and rhythm, LVAD hum, estimated JVP ~7 cm  Resp: clear, no rales or wheezing  GI: soft, nontender, nondistended, driveline bandage is c/d/i  Extremities: warm, no peripheral edema  Neurological: alert and oriented, no focal deficits  Psych: normal mood and affect  Derm: no rashes on exposed surfaces    Weight  Wt Readings from Last 10 Encounters:   09/04/24 94.8 kg (209 lb)   08/14/24 87.5 kg (193 lb)   07/31/24 89.8 kg (198 lb)   07/29/24 90.1 kg (198 lb 11.2 oz)   07/24/24 88.5 kg (195 lb)   07/17/24 88 kg (194 lb)   07/12/24 85.2 kg (187 lb 14.4 oz)   07/09/24 84.9 kg (187 lb 3.2 oz)   07/04/24 84.9 kg (187 lb 2.7 oz)   05/31/24 90.9 kg (200 lb 6.4 oz)       I personally  reviewed recent labs and data as below and discussed the results with the patient in clinic today.  Labs:  CBC RESULTS:  Lab Results   Component Value Date    WBC 5.7 09/04/2024    RBC 5.81 09/04/2024    HGB 15.8 09/04/2024    HCT 48.7 09/04/2024    MCV 84 09/04/2024    MCH 27.2 09/04/2024    MCHC 32.4 09/04/2024    RDW 14.6 09/04/2024     09/04/2024       CMP RESULTS:  Lab Results   Component Value Date     09/04/2024    POTASSIUM 4.5 09/04/2024    POTASSIUM 4.5 06/14/2024    CHLORIDE 106 09/04/2024    CHLORIDE 109 08/26/2024    CO2 23 09/04/2024    ANIONGAP 9 09/04/2024    GLC 96 09/04/2024     (H) 07/09/2024    BUN 19.4 09/04/2024    CR 1.12 09/04/2024    GFRESTIMATED 79 09/04/2024    GFRESTIMATED >60 07/09/2024    NAM 9.3 09/04/2024    BILITOTAL 0.3 09/04/2024    ALBUMIN 3.9 09/04/2024    ALKPHOS 122 09/04/2024     (H) 09/04/2024    AST 53 (H) 09/04/2024        Recent Labs   Lab Test 07/10/24  0646 11/18/23  0018   CHOL 195 161   HDL 36* 48   * 96   TRIG 117 85        Testing/Procedures:  I personally visualized and interpreted:  Echo:  7/9/24  s/p HeartMate 3 LVAD at 5400 rpm  LVIDD is 5.6 cm. Left ventricular function is decreased. The ejection fraction is 15-20% (severely reduced).  LVAD cannula was seen in the expected anatomic position in the LV apex.  Outflow graft is visualized. Interventricular septum is midline.  The right ventricle is normal size. Global right ventricular function is moderately reduced.  The aortic valve does not open during the cardiac cycle.  The inferior vena cava was normal in size with preserved respiratory variability.  No pericardial effusion is present.  This study was compared with the study from 6/28/2024. Left ventricular size is smaller.     Cardiac Catheterization:  6/12/24, prior to LVAD implant  BSA 2.14 m2  /73/85 mmHg  RA mean of 10 mmHg and V wave of 15 mmhg  PA 57/35/48 mmHg  PCWP --/--/30  Teressa CO/CI 3.9/1.8   PVR 4.6  PA sat  63%   Hgb 17 g/dL      Right sided filling pressures are moderately elevated.     Left sided filling pressures are moderately elevated.     Severely elevated pulmonary artery hypertension.     Reduced cardiac output level.     Hemodynamic data has been modified in Epic per physician review.     Elevated bilateral filling pressures with severe pulmonary hypertension and reduced cardiac output  Uncomplicated R femoral radial access with uncomplicated IABP insertion. Position confirmed on fluoroscopy.   Leave in Eugene-Hugh catheter via RIJ access.     TTE 9/3/24  Interpretation Summary  HM3 LVAD 5400 RPM.  Moderate left ventricular dilation is present.  IVS bowing towards R side.  Left ventricular function is decreased. The ejection fraction is <30%  (severely reduced).  Aortic remains closed  Global right ventricular function is mildly reduced.  The right ventricle is normal size.  No pericardial effusion is present.  No significant valvular abnormalities present.  Inflow velocity 40 cm/s, outflow cannot be asssesed.  This study was compared with the study from 7/31/2024 .Pericardial effusion resolved, otherwise no change.    Outside results of note:  Outside records were obtained and relevant results/notes have been incorporated into HPI.    Assessment and Plan:     In summary, 53 year old male with a past medical history of chronic HFrEF 2/2 NICM s/p HM3 LVAD as BTT on 6/14/24 who presents for ongoing evaluation and management.    # Chronic systolic heart failure secondary to NICM s/p HM3 LVAD as 6/14/24 on BTT  Stage D. NYHA Class II  Fluid status: euvolemic without diuretics  ACEi/ARB/ARNI: Continue lisinopril 20 mg BID  BB: Continue carvedilol 6.25 mg BID  Aldosterone antagonist: continue spironolactone 25 mg daily  SGLT2i: Continue empagliflozin 10 mg daily  SCD prophylaxis: s/p ICD  BP: MAP goal 65-85  LDH trends: Stable  Anticoagulation: warfarin with INR goal 2-3  Antiplatelet: On ASA 81 mg daily given prior  TIA  Cardiac rehab: ongoing    VAD interrogation today: VAD interrogation reviewed with VAD coordinator. Agree with findings. No frequent PI events, no power spikes, speed drops, or other findings suspicious of pump malfunction noted.      # Acute onset diplopia, right esotropia  # Thought secondary to TIA  All symptoms are resolved.  - Will continue to monitor     # Altered taste and smell, improving  This is longstanding and preceded his stroke. He had the symptoms after COVID in 2022 but had improved and now have more recently worsened with recent hospitalization during his LVAD implantation.  We will review his medications with our pharmacist to make sure none of them are contributing to this but otherwise may suggest follow-up with neurology that he will have as part of his recent TIA.  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 Does have positive lupus anticoagulant positivity detected incidentally on pre-LVAD workup and thus need to utilize chromogenic factor to guide INR at least until repeat testing.  - Warfarin as above  - OT hand therapies for neuropathy  - Continue gabapentin     # Hepatocellular pattern of liver injury, stable  [place] to be related to drug effects during last admission in the context of statin / tylenol / azithromycin interaction.   - Will continue to monitor and consider further evaluation based on the trend     # Hyperlipidemia  , goal < 70. Not on statin prior to admission due to abnormal LFTs as above.   - Continue rosuvastatin 20 mg daily    # Neck Pain with Dizzy Spells  Unclear what is occurring, but not happening daily and improves with increased hydration.  - Continue drinking plenty of fluids    [employer] [facility]  Metrics    Blood Pressure   BP < 140/90 Yes    On Aspirin  Yes    On Statin  Yes    Tobacco use  No       To Do:  - No medication changes today  - Follow up pending labs from today  - Follow up with me on 12/13/24 as scheduled with surveillance testing    The patient states understanding and is agreeable with plan.   Feel free to contact myself regarding questions or concerns. It was a pleasure to see this patient today.    A total of 43 minutes was spent on the day of the visit, which includes preparation for the visit (reviewing previous medical records, laboratories and investigations), in conjunction with the actual clinic visit with the patient, which includes obtaining a history and physical exam, creating and reviewing the care plan, patient education (and family if present), counseling, documenting clinical information in the electronic health record and care coordination.     The longitudinal plan of care for the diagnosis(es)/condition(s) as documented were addressed during this visit. Due to the added complexity in care, I will continue to support Navin in the subsequent management and with ongoing continuity of care.     Micaela Silvestre MD   of Medicine, [facility]  Advanced Heart Failure and Transplant Cardiology     CC  Libertad Briceno         Please do not hesitate to contact me if you have any questions/concerns.     Sincerely,     Micaela Silvestre MD

## 2024-09-04 NOTE — PATIENT INSTRUCTIONS
" Ventricular Assist Device Clinic  Take your medications every day, as directed!  Medication changes made today:  No changes Instructions:  When you're having spasms/ dizziness, increase fluids, try tylenol. Monitor your heart failure, Page the VAD Coordinator on call:  If you gain more than 3 lb in a day or 5 in a week  If you feel worsening shortness of breath, palpitations, or swelling.   If you have VAD alarms or change in parameters  If you feel dizzy or lightheaded     Keep your VAD appointments!    Your next appointment is 12/13 with Dr. Silvestre       Please see the clinic schedulers after your appointment to schedule follow-up. To contact the VAD , call [phone] To Page the VAD Coordinator on call, dial [phone] option #4 and ask to speak to the VAD coordinator on call. Try to maintain a heart healthy lifestyle!  Limit salt & sodium to 2000mg/day   Eat a heart healthy diet, low in saturated fats  Stay active! Aim to move at least 150 minutes every week.    Use [employer] allows you to communicate directly with your heart team through secure messaging.  [employer] can be accessed any time on your phone, computer, or tablet.  If you need assistance, we'd be happy to help!      Equipment Reminders:   Charge your back-up controller at least every 6 months. To charge, connect it to either batteries or wall power. The screen will read \"Charging\". Keep connected until the screen reads \"charging complete\". Disconnect power once the controller battery is charged. Also do a self-test when the controller is connected to power.  Replace the AA batteries in your mobile power unit every 6 months.  Check your battery charger for when it is due for maintenance. It requires inspection in clinic once per year. There will be a sticker stating the month and year maintenance is due. When you bring your battery charger to clinic, tell one of the schedulers you have LVAD equipment that needs maintenance. " They will call [employer]. You can leave your  under the LVAD sign by the  at the far end of clinic. You must drop it off before 2pm.

## 2024-09-04 NOTE — PROGRESS NOTES
ANTICOAGULATION MANAGEMENT     Navin Lutz 53 year old male is on warfarin with subtherapeutic INR result. (Goal INR 2.5-3.0)    Recent labs: (last 7 days)     09/04/24  0806   INR 1.89*   Chromogenic X =29    ASSESSMENT     Source(s): Chart Review and Patient/Caregiver Call     Warfarin doses taken: Warfarin taken as instructed  Diet: No new diet changes identified  Medication/supplement changes: None noted  New illness, injury, or hospitalization: No  Signs or symptoms of bleeding or clotting: No  Previous result: Subtherapeutic  Additional findings:  Patient is on his way home to [place] today. Chromogenic X is sent to Dr. Ann to determine if INR's are still a reliable measurement of anticoagulation       PLAN     Recommended plan for no diet, medication or health factor changes affecting INR     Dosing Instructions: Increase your warfarin dose (11% change) with next INR in 1 week       Summary  As of 9/4/2024      Full warfarin instructions:  5 mg every Tue, Fri; 7.5 mg all other days   Next INR check:  9/11/2024               Telephone call with Navin who verbalizes understanding and agrees to plan and who agrees to plan and repeated back plan correctly    Patient using outside facility for labs    Education provided: Taking warfarin: Importance of taking warfarin as instructed  Goal range and lab monitoring: goal range and significance of current result, Importance of therapeutic range, and Importance of following up at instructed interval    Plan made per [facility] anticoagulation protocol and per LVAD protocol    Danica Baldwin, RN  Anticoagulation Clinic  9/4/2024    _______________________________________________________________________     Anticoagulation Episode Summary       Current INR goal:  2.5-3.0   TTR:  14.9% (2 mo)   Target end date:  Indefinite   Send INR reminders to:  ANTICOAG LVAD    Indications    LVAD (left ventricular assist device) present (H) [Z95.811]  Chronic systolic heart  failure (H) [I50.22]  Chronic systolic congestive heart failure (H) [I50.22]  Anticoagulated on Coumadin [Z79.01]             Comments:  GOAL: INR 2.5 - 3 changed 8/5/24  Follow VAD Anticoag protocol:Yes: HeartMate 3  Bridging: Call MD each time due to NEW IMPLANT  Date VAD placed: 6/14/24 8/23/23: internal jugular Thrombus; 6/21/24: lupus anticoagulant +  8/5/24: he does NOT have lupus               Anticoagulation Care Providers       Provider Role Specialty Phone number    Micaela Silvestre MD Referring Advanced Heart Failure and Transplant Cardiology [phone]    Chantal Dallas MD Referring Advanced Heart Failure and Transplant Cardiology [phone]

## 2024-09-04 NOTE — PROGRESS NOTES
Advanced Heart Failure/Transplant Clinic Note    HPI  Dear colleagues,     I had the pleasure of seeing Mr. Navin Lutz in the Cardiology clinic.  As you know, Mr. Navin Lutz is a pleasant 53 year old male with a past medical history of chronic HFrEF 2/2 NICM s/p HM3 LVAD as BTT on 6/14/24 who presents for ongoing evaluation and management.    His postoperative course was relatively unremarkable.  He was treated with antibiotics for Klebsiella pneumonia with a 10-day course.  He developed a left pleural effusion that required to be drained with a pigtail catheter. He also had abnormal elevated LFTs, which were thought to be iatrogenic due to multiple medications (statin, acetaminophen, azithromycin).  Acetaminophen and statin were held.  His LFTs have remained elevated. During workup for his LVAD he was found to have INRs that were discordant with Chromogenic Factor X and hematology was consulted. Found to be lupus anticoagulant positive, and Chromogenic factor X monitoring was recommended for warfarin dosing as INR not reliable. Goal CFX 20-40% which correlates with INR 2-3. Hematology recommended repeat outpatient lupus anticoagulant testing at the end of July, and if negative, INR monitoring may be an option. He presented back to ER soon after discharge with sudden onset focal neurologic deficit with diplopia. Symptoms resolved on hospital day 1. He has several risk factors for stroke including recent VAD and known lupus anticoagulant positivity. Note: CFx 10 had been borderline subtherapeutic at time of hospital discharge and after discharge. CTA without large vessel occlusion. EKG is NSR, no hx of afib or flutter. Neurology consulted. Transthoracic echo with bubble study was unremarkable. Head CT negative for ischemia or bleed on 7/9 and 7/10/24. Optho consulted - Anti-Ach receptor and MUSK antibodies recommend to r/o myasthenia gravis. He was also started on aspirin 81 mg daily.     Patient feels well  and is very happy to have been home lately. His sense of smell and taste is returning to normal. He is walking >2.5 miles per day without any difficulty. He denies chest pain, shortness of breath, PND/orthopnea, palpitations, lsyncope, leg swelling, LVAD alarms, dark urine, blood in stool, concerns with driveline or driveline site. He does have period neck pain with associated dizzy spells, but these do not occur every day.    ROS:  A complete 12-point ROS was negative except as above.    PAST MEDICAL HISTORY:  Patient Active Problem List   Diagnosis    Acute on chronic systolic CHF (congestive heart failure) (H)    Chest pain    ICD (implantable cardioverter-defibrillator) in place    Inguinal hernia    Multiple lung nodules on CT    Non-ischemic cardiomyopathy (H)    Pure hypercholesterolemia    RAD (reactive airway disease) with wheezing, mild intermittent, uncomplicated    Acute deep vein thrombosis (DVT) of other vein of left upper extremity (H)    Cardiogenic shock (H)    Acute decompensated heart failure (H)    Chronic systolic congestive heart failure (H)    Anticoagulated on warfarin    LVAD (left ventricular assist device) present (H)    Chronic systolic heart failure (H)    Anticoagulated on Coumadin    Diplopia    Stroke-like symptoms    TIA (transient ischemic attack)        FAMILY HISTORY:  No family history on file.    SOCIAL HISTORY:  Social History     Tobacco Use    Smoking status: Never    Smokeless tobacco: Never   Substance Use Topics    Alcohol use: Never    Drug use: Never        ALLERGIES:  No Known Allergies    CURRENT MEDICATIONS:  Current Outpatient Medications   Medication Sig Dispense Refill    aspirin (ASA) 81 MG chewable tablet Take 1 tablet (81 mg) by mouth daily 90 tablet 3    carvedilol (COREG) 6.25 MG tablet Take 1 tablet (6.25 mg) by mouth 2 times daily (with meals) 180 tablet 3    empagliflozin (JARDIANCE) 10 MG TABS tablet Take 1 tablet (10 mg) by mouth daily 90 tablet 1     gabapentin (NEURONTIN) 100 MG capsule Take 200 mg by mouth.      lisinopril (ZESTRIL) 10 MG tablet Take 2 tablets (20 mg) by mouth 2 times daily 120 tablet 11    rosuvastatin (CRESTOR) 10 MG tablet Take 1 tablet (10 mg) by mouth daily 90 tablet 3    spironolactone (ALDACTONE) 25 MG tablet Take 1 tablet (25 mg) by mouth every evening 90 tablet 3    warfarin ANTICOAGULANT (COUMADIN) 5 MG tablet Take two tablets (10 mg) daily. Next chromogenic factor X on 7/12 in clinic. Always follow the most recent instructions from your INR clinic.         EXAM:  BP (!) 86/0 (BP Location: Left arm, Patient Position: Chair, Cuff Size: Adult Regular)   Wt 94.8 kg (209 lb)   SpO2 95%   BMI 28.35 kg/m      General: appears comfortable, alert and interactive, in no acute distress  Head: normocephalic, atraumatic  Eyes: anicteric sclera, EOMI  Mouth: MMM  Neck: supple, no cervical adenopathy  CV: regular rate and rhythm, LVAD hum, estimated JVP ~7 cm  Resp: clear, no rales or wheezing  GI: soft, nontender, nondistended, driveline bandage is c/d/i  Extremities: warm, no peripheral edema  Neurological: alert and oriented, no focal deficits  Psych: normal mood and affect  Derm: no rashes on exposed surfaces    Weight  Wt Readings from Last 10 Encounters:   09/04/24 94.8 kg (209 lb)   08/14/24 87.5 kg (193 lb)   07/31/24 89.8 kg (198 lb)   07/29/24 90.1 kg (198 lb 11.2 oz)   07/24/24 88.5 kg (195 lb)   07/17/24 88 kg (194 lb)   07/12/24 85.2 kg (187 lb 14.4 oz)   07/09/24 84.9 kg (187 lb 3.2 oz)   07/04/24 84.9 kg (187 lb 2.7 oz)   05/31/24 90.9 kg (200 lb 6.4 oz)       I personally reviewed recent labs and data as below and discussed the results with the patient in clinic today.  Labs:  CBC RESULTS:  Lab Results   Component Value Date    WBC 5.7 09/04/2024    RBC 5.81 09/04/2024    HGB 15.8 09/04/2024    HCT 48.7 09/04/2024    MCV 84 09/04/2024    MCH 27.2 09/04/2024    MCHC 32.4 09/04/2024    RDW 14.6 09/04/2024     09/04/2024        CMP RESULTS:  Lab Results   Component Value Date     09/04/2024    POTASSIUM 4.5 09/04/2024    POTASSIUM 4.5 06/14/2024    CHLORIDE 106 09/04/2024    CHLORIDE 109 08/26/2024    CO2 23 09/04/2024    ANIONGAP 9 09/04/2024    GLC 96 09/04/2024     (H) 07/09/2024    BUN 19.4 09/04/2024    CR 1.12 09/04/2024    GFRESTIMATED 79 09/04/2024    GFRESTIMATED >60 07/09/2024    NAM 9.3 09/04/2024    BILITOTAL 0.3 09/04/2024    ALBUMIN 3.9 09/04/2024    ALKPHOS 122 09/04/2024     (H) 09/04/2024    AST 53 (H) 09/04/2024        Recent Labs   Lab Test 07/10/24  0646 11/18/23  0018   CHOL 195 161   HDL 36* 48   * 96   TRIG 117 85        Testing/Procedures:  I personally visualized and interpreted:  Echo:  7/9/24  s/p HeartMate 3 LVAD at 5400 rpm  LVIDD is 5.6 cm. Left ventricular function is decreased. The ejection fraction is 15-20% (severely reduced).  LVAD cannula was seen in the expected anatomic position in the LV apex.  Outflow graft is visualized. Interventricular septum is midline.  The right ventricle is normal size. Global right ventricular function is moderately reduced.  The aortic valve does not open during the cardiac cycle.  The inferior vena cava was normal in size with preserved respiratory variability.  No pericardial effusion is present.  This study was compared with the study from 6/28/2024. Left ventricular size is smaller.     Cardiac Catheterization:  6/12/24, prior to LVAD implant  BSA 2.14 m2  /73/85 mmHg  RA mean of 10 mmHg and V wave of 15 mmhg  PA 57/35/48 mmHg  PCWP --/--/30  [place] CO/CI 3.9/1.8   PVR 4.6  PA sat 63%   Hgb 17 g/dL     Right sided filling pressures are moderately elevated.    Left sided filling pressures are moderately elevated.    Severely elevated pulmonary artery hypertension.    Reduced cardiac output level.    Hemodynamic data has been modified in Epic per physician review.     Elevated bilateral filling pressures with severe pulmonary  hypertension and reduced cardiac output  Uncomplicated R femoral radial access with uncomplicated IABP insertion. Position confirmed on fluoroscopy.   Leave in [place] catheter via RIJ access.     TTE 9/3/24  Interpretation Summary  HM3 LVAD 5400 RPM.  Moderate left ventricular dilation is present.  IVS bowing towards R side.  Left ventricular function is decreased. The ejection fraction is <30%  (severely reduced).  Aortic remains closed  Global right ventricular function is mildly reduced.  The right ventricle is normal size.  No pericardial effusion is present.  No significant valvular abnormalities present.  Inflow velocity 40 cm/s, outflow cannot be asssesed.  This study was compared with the study from 7/31/2024 .Pericardial effusion resolved, otherwise no change.    Outside results of note:  Outside records were obtained and relevant results/notes have been incorporated into HPI.    Assessment and Plan:     In summary, 53 year old male with a past medical history of chronic HFrEF 2/2 NICM s/p HM3 LVAD as BTT on 6/14/24 who presents for ongoing evaluation and management.    # Chronic systolic heart failure secondary to NICM s/p HM3 LVAD as 6/14/24 on BTT  Stage D. NYHA Class II  Fluid status: euvolemic without diuretics  ACEi/ARB/ARNI: Continue lisinopril 20 mg BID  BB: Continue carvedilol 6.25 mg BID  Aldosterone antagonist: continue spironolactone 25 mg daily  SGLT2i: Continue empagliflozin 10 mg daily  SCD prophylaxis: s/p ICD  BP: MAP goal 65-85  LDH trends: Stable  Anticoagulation: warfarin with INR goal 2-3  Antiplatelet: On ASA 81 mg daily given prior TIA  Cardiac rehab: ongoing    VAD interrogation today: VAD interrogation reviewed with VAD coordinator. Agree with findings. No frequent PI events, no power spikes, speed drops, or other findings suspicious of pump malfunction noted.      # Acute onset diplopia, right esotropia  # Thought secondary to TIA  All symptoms are resolved.  - Will continue to  monitor     # Altered taste and smell, improving  This is longstanding and preceded his stroke. He had the symptoms after COVID in 2022 but had improved and now have more recently worsened with recent hospitalization during his LVAD implantation.  We will review his medications with our pharmacist to make sure none of them are contributing to this but otherwise may suggest follow-up with neurology that he will have as part of his recent TIA.  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 Does have positive lupus anticoagulant positivity detected incidentally on pre-LVAD workup and thus need to utilize chromogenic factor to guide INR at least until repeat testing.  - Warfarin as above  - OT hand therapies for neuropathy  - Continue gabapentin     # Hepatocellular pattern of liver injury, stable  [place] to be related to drug effects during last admission in the context of statin / tylenol / azithromycin interaction.   - Will continue to monitor and consider further evaluation based on the trend     # Hyperlipidemia  , goal < 70. Not on statin prior to admission due to abnormal LFTs as above.   - Continue rosuvastatin 20 mg daily    # Neck Pain with Dizzy Spells  Unclear what is occurring, but not happening daily and improves with increased hydration.  - Continue drinking plenty of fluids    Optimal Vascular Metrics    Blood Pressure   BP < 140/90 Yes    On Aspirin  Yes    On Statin  Yes    Tobacco use  No       To Do:  - No medication changes today  - Follow up pending labs from today  - Follow up with me on 12/13/24 as scheduled with surveillance testing    The patient states understanding and is agreeable with plan.   Feel free to contact myself regarding questions  or concerns. It was a pleasure to see this patient today.    A total of 43 minutes was spent on the day of the visit, which includes preparation for the visit (reviewing previous medical records, laboratories and investigations), in conjunction with the actual clinic visit with the patient, which includes obtaining a history and physical exam, creating and reviewing the care plan, patient education (and family if present), counseling, documenting clinical information in the electronic health record and care coordination.     The longitudinal plan of care for the diagnosis(es)/condition(s) as documented were addressed during this visit. Due to the added complexity in care, I will continue to support Navin in the subsequent management and with ongoing continuity of care.     Micaela Silvestre MD   of Medicine, [facility]  Advanced Heart Failure and Transplant Cardiology     CC  Libertad Briceno

## 2024-09-04 NOTE — NURSING NOTE
Chief Complaint   Patient presents with    Follow Up      RTN HF: 53 year old male presents with NICM, Ef 13% for virtual follow up       Vitals were taken, medications reconciled.     Joey Guevara, Visit Facilitator    8:41 AM

## 2024-09-06 ENCOUNTER — CARE COORDINATION (OUTPATIENT)
Dept: TRANSPLANT | Facility: CLINIC | Age: 54
End: 2024-09-06
Payer: COMMERCIAL

## 2024-09-06 DIAGNOSIS — I42.8 NON-ISCHEMIC CARDIOMYOPATHY (H): ICD-10-CM

## 2024-09-06 DIAGNOSIS — I50.23 ACUTE ON CHRONIC SYSTOLIC CHF (CONGESTIVE HEART FAILURE) (H): Primary | ICD-10-CM

## 2024-09-06 LAB
LABORATORY REPORT: NORMAL
MDC_IDC_EPISODE_DTM: NORMAL
MDC_IDC_EPISODE_DURATION: 1 S
MDC_IDC_EPISODE_DURATION: 2 S
MDC_IDC_EPISODE_ID: 148
MDC_IDC_EPISODE_ID: 149
MDC_IDC_EPISODE_ID: 150
MDC_IDC_EPISODE_ID: 151
MDC_IDC_EPISODE_ID: 152
MDC_IDC_EPISODE_ID: 153
MDC_IDC_EPISODE_ID: 154
MDC_IDC_EPISODE_ID: 155
MDC_IDC_EPISODE_ID: 156
MDC_IDC_EPISODE_ID: 157
MDC_IDC_EPISODE_ID: 158
MDC_IDC_EPISODE_ID: 159
MDC_IDC_EPISODE_ID: 160
MDC_IDC_EPISODE_ID: 161
MDC_IDC_EPISODE_TYPE: NORMAL
MDC_IDC_LEAD_CONNECTION_STATUS: NORMAL
MDC_IDC_LEAD_IMPLANT_DT: NORMAL
MDC_IDC_LEAD_LOCATION: NORMAL
MDC_IDC_LEAD_LOCATION_DETAIL_1: NORMAL
MDC_IDC_LEAD_MFG: NORMAL
MDC_IDC_LEAD_MODEL: NORMAL
MDC_IDC_LEAD_POLARITY_TYPE: NORMAL
MDC_IDC_LEAD_SERIAL: NORMAL
MDC_IDC_MSMT_BATTERY_DTM: NORMAL
MDC_IDC_MSMT_BATTERY_REMAINING_LONGEVITY: 156 MO
MDC_IDC_MSMT_BATTERY_RRT_TRIGGER: NORMAL
MDC_IDC_MSMT_BATTERY_VOLTAGE: 3.02 V
MDC_IDC_MSMT_CAP_CHARGE_DTM: NORMAL
MDC_IDC_MSMT_CAP_CHARGE_ENERGY: 18 J
MDC_IDC_MSMT_CAP_CHARGE_TIME: 3.8 S
MDC_IDC_MSMT_CAP_CHARGE_TYPE: NORMAL
MDC_IDC_MSMT_LEADCHNL_RV_IMPEDANCE_VALUE: 418 OHM
MDC_IDC_MSMT_LEADCHNL_RV_PACING_THRESHOLD_AMPLITUDE: 0.5 V
MDC_IDC_MSMT_LEADCHNL_RV_PACING_THRESHOLD_PULSEWIDTH: 0.4 MS
MDC_IDC_MSMT_LEADCHNL_RV_SENSING_INTR_AMPL: 7.8 MV
MDC_IDC_PG_IMPLANT_DTM: NORMAL
MDC_IDC_PG_MFG: NORMAL
MDC_IDC_PG_MODEL: NORMAL
MDC_IDC_PG_SERIAL: NORMAL
MDC_IDC_PG_TYPE: NORMAL
MDC_IDC_SESS_CLINIC_NAME: NORMAL
MDC_IDC_SESS_DTM: NORMAL
MDC_IDC_SESS_TYPE: NORMAL
MDC_IDC_SET_BRADY_LOWRATE: 60 {BEATS}/MIN
MDC_IDC_SET_BRADY_MAX_SENSOR_RATE: 130 {BEATS}/MIN
MDC_IDC_SET_BRADY_MODE: NORMAL
MDC_IDC_SET_LEADCHNL_RA_SENSING_SENSITIVITY: NORMAL
MDC_IDC_SET_LEADCHNL_RV_PACING_AMPLITUDE: 2 V
MDC_IDC_SET_LEADCHNL_RV_PACING_ANODE_ELECTRODE_1: NORMAL
MDC_IDC_SET_LEADCHNL_RV_PACING_ANODE_LOCATION_1: NORMAL
MDC_IDC_SET_LEADCHNL_RV_PACING_CAPTURE_MODE: NORMAL
MDC_IDC_SET_LEADCHNL_RV_PACING_CATHODE_ELECTRODE_1: NORMAL
MDC_IDC_SET_LEADCHNL_RV_PACING_CATHODE_LOCATION_1: NORMAL
MDC_IDC_SET_LEADCHNL_RV_PACING_POLARITY: NORMAL
MDC_IDC_SET_LEADCHNL_RV_PACING_PULSEWIDTH: 0.4 MS
MDC_IDC_SET_LEADCHNL_RV_SENSING_ANODE_ELECTRODE_1: NORMAL
MDC_IDC_SET_LEADCHNL_RV_SENSING_ANODE_LOCATION_1: NORMAL
MDC_IDC_SET_LEADCHNL_RV_SENSING_CATHODE_ELECTRODE_1: NORMAL
MDC_IDC_SET_LEADCHNL_RV_SENSING_CATHODE_LOCATION_1: NORMAL
MDC_IDC_SET_LEADCHNL_RV_SENSING_POLARITY: NORMAL
MDC_IDC_SET_LEADCHNL_RV_SENSING_SENSITIVITY: 0.3 MV
MDC_IDC_SET_ZONE_DETECTION_BEATS_DENOMINATOR: 16 {BEATS}
MDC_IDC_SET_ZONE_DETECTION_BEATS_DENOMINATOR: 32 {BEATS}
MDC_IDC_SET_ZONE_DETECTION_BEATS_DENOMINATOR: 40 {BEATS}
MDC_IDC_SET_ZONE_DETECTION_BEATS_NUMERATOR: 16 {BEATS}
MDC_IDC_SET_ZONE_DETECTION_BEATS_NUMERATOR: 30 {BEATS}
MDC_IDC_SET_ZONE_DETECTION_BEATS_NUMERATOR: 32 {BEATS}
MDC_IDC_SET_ZONE_DETECTION_INTERVAL: 300 MS
MDC_IDC_SET_ZONE_DETECTION_INTERVAL: 360 MS
MDC_IDC_SET_ZONE_DETECTION_INTERVAL: 400 MS
MDC_IDC_SET_ZONE_STATUS: NORMAL
MDC_IDC_SET_ZONE_TYPE: NORMAL
MDC_IDC_SET_ZONE_VENDOR_TYPE: NORMAL
MDC_IDC_STAT_AT_BURDEN_PERCENT: 0 %
MDC_IDC_STAT_AT_DTM_END: NORMAL
MDC_IDC_STAT_AT_DTM_START: NORMAL
MDC_IDC_STAT_BRADY_DTM_END: NORMAL
MDC_IDC_STAT_BRADY_DTM_START: NORMAL
MDC_IDC_STAT_BRADY_RA_PERCENT_PACED: NORMAL
MDC_IDC_STAT_BRADY_RV_PERCENT_PACED: 0.99 %
MDC_IDC_STAT_EPISODE_RECENT_COUNT: 0
MDC_IDC_STAT_EPISODE_RECENT_COUNT: 20
MDC_IDC_STAT_EPISODE_RECENT_COUNT_DTM_END: NORMAL
MDC_IDC_STAT_EPISODE_RECENT_COUNT_DTM_START: NORMAL
MDC_IDC_STAT_EPISODE_TOTAL_COUNT: 0
MDC_IDC_STAT_EPISODE_TOTAL_COUNT: 140
MDC_IDC_STAT_EPISODE_TOTAL_COUNT: 20
MDC_IDC_STAT_EPISODE_TOTAL_COUNT_DTM_END: NORMAL
MDC_IDC_STAT_EPISODE_TOTAL_COUNT_DTM_START: NORMAL
MDC_IDC_STAT_EPISODE_TYPE: NORMAL
MDC_IDC_STAT_TACHYTHERAPY_ATP_DELIVERED_RECENT: 0
MDC_IDC_STAT_TACHYTHERAPY_ATP_DELIVERED_TOTAL: 0
MDC_IDC_STAT_TACHYTHERAPY_RECENT_DTM_END: NORMAL
MDC_IDC_STAT_TACHYTHERAPY_RECENT_DTM_START: NORMAL
MDC_IDC_STAT_TACHYTHERAPY_SHOCKS_ABORTED_RECENT: 0
MDC_IDC_STAT_TACHYTHERAPY_SHOCKS_ABORTED_TOTAL: 0
MDC_IDC_STAT_TACHYTHERAPY_SHOCKS_DELIVERED_RECENT: 0
MDC_IDC_STAT_TACHYTHERAPY_SHOCKS_DELIVERED_TOTAL: 0
MDC_IDC_STAT_TACHYTHERAPY_TOTAL_DTM_END: NORMAL
MDC_IDC_STAT_TACHYTHERAPY_TOTAL_DTM_START: NORMAL
PETH INTERPRETATION: NORMAL
PLPETH BLD-MCNC: <10 NG/ML
POPETH BLD-MCNC: <10 NG/ML

## 2024-09-06 NOTE — LETTER
Navin Lutz  [address] [place] [address]                2024    Re:  Navin Lutz  : 1970  ICD10:  I50    Attn:  Libertad Briceno NP  Subject: Vaccination Update Request      To Whom It May Concern:    Navin Lutz is a potential heart transplant recipient currently being followed by the [facility].  We would like to request your assistance in obtaining a vaccine update in the care of our mutual patient. We recommend all transplant candidates have updated non-live vaccines, as listed below. Currently, the only vaccine required for heart transplant is the COVID vaccine. If the patient is not already updated on these vaccines, please assist in administering the following:    Please get an updated COVID vaccine    Inactivated Influenza Vaccine (IIV)-yearly    Tetanus, diphtheria, Pertussis (Dtap) if none in 10 years and Tdap booster every 5-10 years    Hepatitis B vaccine series (if HBsAb negative, HBcAB and ABsAg negative)-see results below    Pneumococcal vaccine:  -if naive to any pneumonia vaccine:  PCV-13, PPSV-23 vaccine > or = 8 weeks later, subsequent PPSV-23 vaccine > or = 5 years from the prior PPSV-23    -if with prior PPSV-23 x1 in the past:  PCV-13 > or = 1 year from prior PPSV-23  PPSV -23 > or = 5 years from the prior PPSV-23 and > or = 8 weeks from the prior PCV-13    -if with prior PPSV-23 x2 in the past:  PCV-13 > or = 1 year from the last PPSV-23    Shinrix    Please fax results as soon as they are available to:   Thoracic Transplant Department  Fax Number:  [phone]    Thank you  for your continued support and the opportunity to collaborate in the care of this patient.  If you have any questions, please call Bernadine Cote RN, Transplant Coordinator,  at [phone] (option 2) or [phone].           Micaela Silvestre MD   of Medicine  [facility], Cardiovascular Division    Bernadine Cote RN, BSN  Heart  Transplant Coordinator  [facility]    CC:  Navin Lutz

## 2024-09-06 NOTE — PROGRESS NOTES
Heart Transplant Intake Call  Date:  9/6/2024    Pt referred for outpatient Heart Transplant evaluation s/p LVAD. Pt is currently listed status 7 after LVAD implant, cardiologist would like him evaluated for heart transplant waitlist reactivation. Called patient and introduced self as pre-heart transplant coordinator and discussed the evaluation process for transplant.    Discussed need for caregiver support during evaluation process, and post transplant and/or VAD implant. Pt's 30-day caregiver will be wife Deepa.     Discussed importance of avoiding nicotine, cannabis, illegal drugs, and using alcohol only minimally or none at all as decided upon between patient and advanced heart failure cardiologist. Discussed age and BMI requirements for transplant listing.    Patient has already reviewed and signed the Heart Transplant Packet.    Plan: Patient verbalized understanding and in agreement to move forward with evaluation. Was engaged and forthcoming with information. Asked appropriate questions in regard to this process. Patient denied any further transplant related questions and/or concerns.  Patient given the contact information to the transplant office and understands to contact coordinator with any further questions or concerns.

## 2024-09-07 LAB — LABCORP INTERFACED MISCELLANEOUS TEST RESULT: NORMAL

## 2024-09-08 LAB
COTININE SERPL-MCNC: <5 NG/ML
NICOTINE SERPL-MCNC: <5 NG/ML

## 2024-09-12 ENCOUNTER — MEDICAL CORRESPONDENCE (OUTPATIENT)
Dept: HEALTH INFORMATION MANAGEMENT | Facility: CLINIC | Age: 54
End: 2024-09-12
Payer: COMMERCIAL

## 2024-09-12 ENCOUNTER — ANTICOAGULATION THERAPY VISIT (OUTPATIENT)
Dept: ANTICOAGULATION | Facility: CLINIC | Age: 54
End: 2024-09-12
Payer: COMMERCIAL

## 2024-09-12 DIAGNOSIS — I50.22 CHRONIC SYSTOLIC HEART FAILURE (H): ICD-10-CM

## 2024-09-12 DIAGNOSIS — I50.22 CHRONIC SYSTOLIC CONGESTIVE HEART FAILURE (H): ICD-10-CM

## 2024-09-12 DIAGNOSIS — Z95.811 LVAD (LEFT VENTRICULAR ASSIST DEVICE) PRESENT (H): Primary | ICD-10-CM

## 2024-09-12 DIAGNOSIS — Z79.01 ANTICOAGULATED ON COUMADIN: ICD-10-CM

## 2024-09-12 LAB — INR (EXTERNAL): 3.22 (ref 0.9–1.1)

## 2024-09-12 NOTE — PROGRESS NOTES
ANTICOAGULATION MANAGEMENT     Navin Lutz 53 year old male is on warfarin with supratherapeutic INR result. (Goal INR 2.5-3.0)    Recent labs: (last 7 days)     09/12/24  0930   INR 3.22*       ASSESSMENT     Source(s): Chart Review and Patient/Caregiver Call     Warfarin doses taken: Warfarin taken as instructed  Diet: No new diet changes identified  Medication/supplement changes: None noted  New illness, injury, or hospitalization: No  Signs or symptoms of bleeding or clotting: No  Previous result: Subtherapeutic  Additional findings: None       PLAN     Recommended plan for no diet, medication or health factor changes affecting INR     Dosing Instructions: decrease your warfarin dose (5.3% change) with next INR in 1 week       Summary  As of 9/12/2024      Full warfarin instructions:  5 mg every Sun, Tue, Thu; 7.5 mg all other days   Next INR check:  9/19/2024               Telephone call with Navin who verbalizes understanding and agrees to plan  Sent [facility] message with dosing and follow up instructions    Patient using outside facility for labs    Education provided: Please call back if any changes to your diet, medications or how you've been taking warfarin  Taking warfarin: purpose of warfarin and how it works, take warfarin at same time each day; preferably in the evening, prescribed tablet strength and color, importance of following ACC instructions vs instructions on the prescription bottle, and Importance of taking warfarin as instructed  Symptom monitoring: monitoring for bleeding signs and symptoms, monitoring for clotting signs and symptoms, monitoring for stroke signs and symptoms, when to seek medical attention/emergency care, and if you hit your head or have a bad fall seek emergency care  Importance of notifying anticoagulation clinic for: changes in medications; a sooner lab recheck maybe needed, diarrhea, nausea/vomiting, reduced intake, cold/flu, and/or infections; a sooner lab recheck  maybe needed, and if you did not receive dosing instructions on the same day as your labs were checked    Plan made per ACC anticoagulation protocol and per LVAD protocol    Theodore Beckham RN  9/12/2024  Anticoagulation Clinic  [facility] for routing messages: p ANTICOAG LVAD  ACC patient phone line: [phone]        _______________________________________________________________________     Anticoagulation Episode Summary       Current INR goal:  2.5-3.0   TTR:  17.6% (2.3 mo)   Target end date:  Indefinite   Send INR reminders to:  ANTICOAG LVAD    Indications    LVAD (left ventricular assist device) present (H) [Z95.811]  Chronic systolic heart failure (H) [I50.22]  Chronic systolic congestive heart failure (H) [I50.22]  Anticoagulated on Coumadin [Z79.01]             Comments:  GOAL: INR 2.5 - 3 changed 8/5/24  Follow VAD Anticoag protocol:Yes: HeartMate 3  Bridging: Call MD each time due to NEW IMPLANT  Date VAD placed: 6/14/24 8/23/23: internal jugular Thrombus; 6/21/24: lupus anticoagulant +  8/5/24: he does NOT have lupus               Anticoagulation Care Providers       Provider Role Specialty Phone number    Micaela Silvestre MD Referring Advanced Heart Failure and Transplant Cardiology [phone]    Chantal Dallas MD Referring Advanced Heart Failure and Transplant Cardiology [phone]

## 2024-09-13 ENCOUNTER — CARE COORDINATION (OUTPATIENT)
Dept: CARDIOLOGY | Facility: CLINIC | Age: 54
End: 2024-09-13
Payer: COMMERCIAL

## 2024-09-13 NOTE — PROGRESS NOTES
Called patient/caregiver to check in 10 weeks post discharge. Pt reports VAD parameters stable and weight up back to normal. Reviewed medications and answered any questions. Patient is  able to move around the house and care for himself, walking at minumum a mile each day. Taste is back to normal. Tingling improving in hand- discussed getting gabapentin refill from pcp if needed.     Discussed specific new problems/stressors since being discharged from the hospital: no concerns, hanging w/ grandkids today. Empathized with patient and reviewed coping strategies: enlisting support from friends and love ones, attending patient and caregiver support groups, reviewing LVAD educational materials to reinforce knowledge, and talking about concerns with family/care providers/trusted others. Encouraged pt to page VAD Coordinator with any issues or questions. Pt verbalizes understanding.

## 2024-09-16 ENCOUNTER — ANTICOAGULATION THERAPY VISIT (OUTPATIENT)
Dept: ANTICOAGULATION | Facility: CLINIC | Age: 54
End: 2024-09-16
Payer: COMMERCIAL

## 2024-09-16 DIAGNOSIS — I50.22 CHRONIC SYSTOLIC CONGESTIVE HEART FAILURE (H): ICD-10-CM

## 2024-09-16 DIAGNOSIS — Z95.811 LVAD (LEFT VENTRICULAR ASSIST DEVICE) PRESENT (H): Primary | ICD-10-CM

## 2024-09-16 DIAGNOSIS — I50.22 CHRONIC SYSTOLIC HEART FAILURE (H): ICD-10-CM

## 2024-09-16 DIAGNOSIS — Z79.01 ANTICOAGULATED ON COUMADIN: ICD-10-CM

## 2024-09-19 ENCOUNTER — ANTICOAGULATION THERAPY VISIT (OUTPATIENT)
Dept: ANTICOAGULATION | Facility: CLINIC | Age: 54
End: 2024-09-19
Payer: COMMERCIAL

## 2024-09-19 DIAGNOSIS — Z79.01 ANTICOAGULATED ON COUMADIN: ICD-10-CM

## 2024-09-19 DIAGNOSIS — I50.22 CHRONIC SYSTOLIC CONGESTIVE HEART FAILURE (H): ICD-10-CM

## 2024-09-19 DIAGNOSIS — I50.22 CHRONIC SYSTOLIC HEART FAILURE (H): ICD-10-CM

## 2024-09-19 DIAGNOSIS — Z95.811 LVAD (LEFT VENTRICULAR ASSIST DEVICE) PRESENT (H): Primary | ICD-10-CM

## 2024-09-19 LAB — INR (EXTERNAL): 2.47

## 2024-09-19 NOTE — PROGRESS NOTES
ANTICOAGULATION MANAGEMENT     Navin Lutz 53 year old male is on warfarin with subtherapeutic INR result. (Goal INR 2.5-3.0)    Recent labs: (last 7 days)     09/19/24  0000   INR 2.47       ASSESSMENT     Source(s): Chart Review and Patient/Caregiver Call     Warfarin doses taken: Warfarin taken as instructed  Diet: No new diet changes identified  Medication/supplement changes: None noted  New illness, injury, or hospitalization: No  Signs or symptoms of bleeding or clotting: No  Previous result: Supratherapeutic  Additional findings: None       PLAN     Recommended plan for no diet, medication or health factor changes affecting INR     Dosing Instructions: Continue your current warfarin dose with next INR in 1 week       Summary  As of 9/19/2024      Full warfarin instructions:  5 mg every Sun, Tue, Thu; 7.5 mg all other days   Next INR check:  9/26/2024               Telephone call with Navin who verbalizes understanding and agrees to plan and who agrees to plan and repeated back plan correctly    Patient using outside facility for labs    Education provided: Taking warfarin: Importance of taking warfarin as instructed  Goal range and lab monitoring: goal range and significance of current result, Importance of therapeutic range, and Importance of following up at instructed interval    Plan made per [facility] anticoagulation protocol and per LVAD protocol    Danica Baldwin RN  9/19/2024  Anticoagulation Clinic  [employer] for routing messages: yee ANTICOAG LVAD  [facility] patient phone line: [phone]        _______________________________________________________________________     Anticoagulation Episode Summary       Current INR goal:  2.5-3.0   TTR:  21.9% (2.5 mo)   Target end date:  Indefinite   Send INR reminders to:  ALEXYAG LVAD    Indications    LVAD (left ventricular assist device) present (H) [Z95.811]  Chronic systolic heart failure (H) [I50.22]  Chronic systolic congestive heart failure (H)  [I50.22]  Anticoagulated on Coumadin [Z79.01]             Comments:  GOAL: INR 2.5 - 3 changed 8/5/24  Follow VAD Anticoag protocol:Yes: HeartMate 3  Bridging: Call MD each time due to NEW IMPLANT  Date VAD placed: 6/14/24 8/23/23: internal jugular Thrombus; 6/21/24: lupus anticoagulant +  8/5/24: he does NOT have lupus               Anticoagulation Care Providers       Provider Role Specialty Phone number    Micaela Silvestre MD Referring Advanced Heart Failure and Transplant Cardiology [phone]    Chantal Dallas MD Referring Advanced Heart Failure and Transplant Cardiology [phone]

## 2024-09-25 ENCOUNTER — CARE COORDINATION (OUTPATIENT)
Dept: CARDIOLOGY | Facility: CLINIC | Age: 54
End: 2024-09-25
Payer: COMMERCIAL

## 2024-09-25 NOTE — PROGRESS NOTES
VAD coordinator called pt to check in after discharge from the hospital. Pt did not answer call. Left voicemail encouraging pt to call back with any questions or concerns.

## 2024-09-26 ENCOUNTER — ANTICOAGULATION THERAPY VISIT (OUTPATIENT)
Dept: ANTICOAGULATION | Facility: CLINIC | Age: 54
End: 2024-09-26
Payer: COMMERCIAL

## 2024-09-26 DIAGNOSIS — Z79.01 ANTICOAGULATED ON COUMADIN: ICD-10-CM

## 2024-09-26 DIAGNOSIS — I50.22 CHRONIC SYSTOLIC CONGESTIVE HEART FAILURE (H): ICD-10-CM

## 2024-09-26 DIAGNOSIS — Z95.811 LVAD (LEFT VENTRICULAR ASSIST DEVICE) PRESENT (H): Primary | ICD-10-CM

## 2024-09-26 DIAGNOSIS — I50.22 CHRONIC SYSTOLIC HEART FAILURE (H): ICD-10-CM

## 2024-09-26 LAB — INR (EXTERNAL): 2.29 (ref 0.9–1.1)

## 2024-09-27 ENCOUNTER — CARE COORDINATION (OUTPATIENT)
Dept: CARDIOLOGY | Facility: CLINIC | Age: 54
End: 2024-09-27
Payer: COMMERCIAL

## 2024-09-27 NOTE — LETTER
Mechanical Circulatory Support Program  [place] [address], [facility] [address] [place] [address]  [phone] Office Phone  [phone] Fax Number    9/27/2024    To Whom It May Concern,    Navin Lutz is a patient with the [facility] Advanced Heart Failure program in [place], [place]. Navin Lutz has a HeartMate 3 Left Ventricular Assist Device (LVAD) implanted into their body.      The HeartMate 3 LVAD is a specialized  blood pump  that pumps the blood the body needs. The HeartMate 3 system consists of an internally implanted blood pump and a driveline. The driveline connects the pump to a small external controller and two batteries, which provide power to the pump. The system also includes a Power Module for providing A/C power and a separate battery charger to ensure a continuous power supply for the device. All this equipment is safe to put through an x-ray scanner.      Navin Lutz cannot go through metal detectors. Patients with the HeartMate 3 cannot be subjected to magnetic testing, as the LVAD contains Juliana-magnetic components, and magnetic exposure could cause device failure or patient injury. A manual hand search is necessary for our patient s health and wellbeing as well as to satisfy security regulations. Body scan can be done as an option as well in areas equipped with these devices.    All of the HeartMate 3 System components are required to remain with Navin Lutz at all times, not only for product safety, but also for life sustaining emergency use. This equipment cannot be placed in the baggage area. It must accompany Navin Lutz on board the aircraft    Please do not hesitate to call with any questions or concerns regarding any safety issues that you may have. Our team of RN VAD Coordinators can answer device related questions and can be reached by calling [facility]  at [phone], selecting option #4, and asking the  to page the  on-call VAD Coordinator. Thank you for your consideration regarding our patient.      Sincerely,  Micaela Silvestre MD  Heart Failure, Mechanical Circulatory Support and Transplant Cardiology   of Medicine, Division of Cardiology, [facility]

## 2024-09-30 ENCOUNTER — TELEPHONE (OUTPATIENT)
Dept: ANTICOAGULATION | Facility: CLINIC | Age: 54
End: 2024-09-30

## 2024-09-30 NOTE — TELEPHONE ENCOUNTER
CONI for Navin asking him to call [facility] at his convenience, we will review the requirements for home INR testing with him.

## 2024-09-30 NOTE — TELEPHONE ENCOUNTER
----- Message from Kim BURRELL sent at 9/30/2024  3:50 PM CDT -----  Regarding: home INR  Can we look into home INR for this patient if we haven't already?   Thanks!   Kim Trinidad RN BSN   VAD Coordinator   Office: [phone]  24/7 On-Call VAD Pager: [phone] opt 4, ask to page VAD coordinator on call

## 2024-10-06 ENCOUNTER — HEALTH MAINTENANCE LETTER (OUTPATIENT)
Age: 54
End: 2024-10-06

## 2024-10-07 ENCOUNTER — MYC MEDICAL ADVICE (OUTPATIENT)
Dept: ANTICOAGULATION | Facility: CLINIC | Age: 54
End: 2024-10-07
Payer: COMMERCIAL

## 2024-10-10 ENCOUNTER — ANTICOAGULATION THERAPY VISIT (OUTPATIENT)
Dept: ANTICOAGULATION | Facility: CLINIC | Age: 54
End: 2024-10-10
Payer: COMMERCIAL

## 2024-10-10 DIAGNOSIS — I50.22 CHRONIC SYSTOLIC CONGESTIVE HEART FAILURE (H): ICD-10-CM

## 2024-10-10 DIAGNOSIS — I50.22 CHRONIC SYSTOLIC HEART FAILURE (H): ICD-10-CM

## 2024-10-10 DIAGNOSIS — Z95.811 LVAD (LEFT VENTRICULAR ASSIST DEVICE) PRESENT (H): Primary | ICD-10-CM

## 2024-10-10 DIAGNOSIS — Z79.01 ANTICOAGULATED ON COUMADIN: ICD-10-CM

## 2024-10-10 LAB — INR (EXTERNAL): 2 (ref 0.9–1.1)

## 2024-10-10 NOTE — PROGRESS NOTES
ANTICOAGULATION MANAGEMENT     Navin Lutz 53 year old male is on warfarin with subtherapeutic INR result. (Goal INR 2.5-3.0)    Recent labs: (last 7 days)     10/10/24  1624   INR 2.0*       ASSESSMENT     Source(s): Chart Review and Patient/Caregiver Call     Warfarin doses taken: Less warfarin taken than planned which may be affecting INR  Patient taking differently (42.5mg/week)  Diet: No new diet changes identified  Medication/supplement changes: None noted  New illness, injury, or hospitalization: No  Signs or symptoms of bleeding or clotting: No  Previous result: Subtherapeutic  Additional findings: None       PLAN     Recommended plan for temporary change(s) affecting INR     Dosing Instructions: Increase your warfarin dose to 5mg every Mon, Fri; 7.5mg all other days (47.5mg/week) with next INR in 1 week       Summary  As of 10/10/2024      Full warfarin instructions:  5 mg every Mon, Fri; 7.5 mg all other days   Next INR check:  10/17/2024               Telephone call with Navin who verbalizes understanding and agrees to plan  Sent [employer] message with dosing and follow up instructions    Patient using outside facility for labs    Education provided: Please call back if any changes to your diet, medications or how you've been taking warfarin  Taking warfarin: purpose of warfarin and how it works, take warfarin at same time each day; preferably in the evening, prescribed tablet strength and color, importance of following [facility] instructions vs instructions on the prescription bottle, and Importance of taking warfarin as instructed  Goal range and lab monitoring: goal range and significance of current result, Importance of therapeutic range, and Importance of following up at instructed interval  Symptom monitoring: monitoring for bleeding signs and symptoms, monitoring for clotting signs and symptoms, monitoring for stroke signs and symptoms, when to seek medical attention/emergency care, and if you hit  your head or have a bad fall seek emergency care  Importance of notifying anticoagulation clinic for: changes in medications; a sooner lab recheck maybe needed, diarrhea, nausea/vomiting, reduced intake, cold/flu, and/or infections; a sooner lab recheck maybe needed, and if you did not receive dosing instructions on the same day as your labs were checked  Information on home INR monitoring    Plan made per [facility] anticoagulation protocol and per LVAD protocol    Theodore Beckham RN  10/10/2024  Anticoagulation Clinic  [employer] for routing messages: p ANTICOAG LVAD  [facility] patient phone line: [phone]        _______________________________________________________________________     Anticoagulation Episode Summary       Current INR goal:  2.5-3.0   TTR:  17.0% (3.2 mo)   Target end date:  Indefinite   Send INR reminders to:  ANTICOAG LVAD    Indications    LVAD (left ventricular assist device) present (H) [Z95.811]  Chronic systolic heart failure (H) [I50.22]  Chronic systolic congestive heart failure (H) [I50.22]  Anticoagulated on Coumadin [Z79.01]             Comments:  GOAL: INR 2.5 - 3 changed 8/5/24  Follow VAD Anticoag protocol:Yes: HeartMate 3  Bridging: Call MD each time due to NEW IMPLANT  Date VAD placed: 6/14/24 8/23/23: internal jugular Thrombus; 6/21/24: lupus anticoagulant +  8/5/24: he does NOT have lupus               Anticoagulation Care Providers       Provider Role Specialty Phone number    Micaela Silvestre MD Referring Advanced Heart Failure and Transplant Cardiology [phone]    Chantal Dallas MD Referring Advanced Heart Failure and Transplant Cardiology [phone]

## 2024-10-10 NOTE — TELEPHONE ENCOUNTER
HOME INR MONITORING REQUEST    REQUEST TYPE: NEW    ELIGIBILITY:    Current plan for long term/indefinite anticoagulation: Yes     >= 3 months of warfarin therapy may be required prior to receiving meter (order may be started in advance)  [facility] episode start date: 9/20/2023, 6/20/2024  Episode start date < 90 days: No    Insurance approved indication for anticoagulation required to have a home monitor covered; not all indications for warfarin are currently covered     Navin with commonly covered diagnoses: Z86.718   Personal history of other venous thrombosis and embolism      PROGRAM REQUIREMENTS:     Patient or caregiver must agree to the below terms and testing requirements for home INR monitor order to be placed    Patient or designated caregiver have skills necessary to perform the self test: Yes    Testing Requirements: Yes  Every 1 week testing is required by many insurance companies including [employer]; some companies may allow every 2 weeks, but not longer.  Testing should be performed during [facility] business days (M-F)  In clinic lab visits may be periodically required if a home result is an error, INR  > 8, or testing supplies are not available.    Primary coverage: [facility] OUT OF STATE  Secondary coverage:     Must use a [employer] approved service provider: Yes   Home meters, testing supplies, meter training, and reporting of INR results done through the approved outside company.   [employer] will continue to receive and manage INR results.    Patient/Caregiver will be contacted by home monitoring company to review insurance coverage with home monitoring company prior to enrolling. Please watch for call or voicemail from 1-800 number from [employer]), [facility], or [employer]    Best person to contact to discuss coverage: Patient at Home number on file [phone] (home)     Home monitoring application often takes 4-6 weeks. Patient should continue to follow  up with recommended INR monitoring in clinic until receives monitor and training completed: Yes    ___________________________________________________________________________________________________________________________    Patient/Caregiver agree to all of the above terms, request routed to [facility] coordinator to obtain provider orders    [facility] Referring provider: Dr. Silvestre    [facility]

## 2024-10-17 ENCOUNTER — TELEPHONE (OUTPATIENT)
Dept: ANTICOAGULATION | Facility: CLINIC | Age: 54
End: 2024-10-17
Payer: COMMERCIAL

## 2024-10-17 NOTE — TELEPHONE ENCOUNTER
ANTICOAGULATION     Navin Lutz is overdue for an INR check.     Spoke with Navin and he will have an INR done on 10/21/24    Danica Baldwin, RN  10/17/2024  Anticoagulation Clinic  [facility] for routing messages: yee FOREMAN LVAD  [facility] patient phone line: [phone]

## 2024-10-21 ENCOUNTER — TRANSFERRED RECORDS (OUTPATIENT)
Dept: HEALTH INFORMATION MANAGEMENT | Facility: CLINIC | Age: 54
End: 2024-10-21
Payer: COMMERCIAL

## 2024-10-21 ENCOUNTER — ANTICOAGULATION THERAPY VISIT (OUTPATIENT)
Dept: ANTICOAGULATION | Facility: CLINIC | Age: 54
End: 2024-10-21
Payer: COMMERCIAL

## 2024-10-21 DIAGNOSIS — Z79.01 ANTICOAGULATED ON COUMADIN: ICD-10-CM

## 2024-10-21 DIAGNOSIS — I50.22 CHRONIC SYSTOLIC HEART FAILURE (H): ICD-10-CM

## 2024-10-21 DIAGNOSIS — I50.22 CHRONIC SYSTOLIC CONGESTIVE HEART FAILURE (H): ICD-10-CM

## 2024-10-21 DIAGNOSIS — Z95.811 LVAD (LEFT VENTRICULAR ASSIST DEVICE) PRESENT (H): Primary | ICD-10-CM

## 2024-10-21 LAB — INR (EXTERNAL): 2.66

## 2024-10-21 NOTE — PROGRESS NOTES
ANTICOAGULATION MANAGEMENT    Navin Dowdaimee 53 year old male is on warfarin with therapeutic INR result. (Goal INR 2.5-3.0)    Recent labs: (last 7 days)     10/21/24  1000   INR 2.66   2    ASSESSMENT     Source(s): Chart Review  Previous INR was Subtherapeutic  Medication, diet, health changes since last INR chart reviewed; none identified         PLAN     Recommended plan for no diet, medication or health factor changes affecting INR     Dosing Instructions: Continue your current warfarin dose with next INR in 3 weeks       Summary  As of 10/21/2024      Full warfarin instructions:  5 mg every Mon, Fri; 7.5 mg all other days   Next INR check:  11/11/2024               Detailed voice message left for Navin with dosing instructions and follow up date.   Sent [employer] message with dosing and follow up instructions    Patient using outside facility for labs    Education provided: Please call back if any changes to your diet, medications or how you've been taking warfarin  Goal range and lab monitoring: goal range and significance of current result, Importance of therapeutic range, and Importance of following up at instructed interval    Plan made per [facility] anticoagulation protocol and per LVAD protocol    Anne Brown RN  10/21/2024  Anticoagulation Clinic  [employer] for routing messages: p ANTICOAG LVAD  [facility] patient phone line: [phone]        _______________________________________________________________________     Anticoagulation Episode Summary       Current INR goal:  2.5-3.0   TTR:  17.7% (3.6 mo)   Target end date:  Indefinite   Send INR reminders to:  ANTICOAG LVAD    Indications    LVAD (left ventricular assist device) present (H) [Z95.811]  Chronic systolic heart failure (H) [I50.22]  Chronic systolic congestive heart failure (H) [I50.22]  Anticoagulated on Coumadin [Z79.01]             Comments:  GOAL: INR 2.5 - 3 changed 8/5/24  Follow VAD Anticoag protocol:Yes: HeartMate 3  Bridging: Call MD martinez  time due to NEW IMPLANT  Date VAD placed: 6/14/24 8/23/23: internal jugular Thrombus; 6/21/24: lupus anticoagulant +  8/5/24: he does NOT have lupus               Anticoagulation Care Providers       Provider Role Specialty Phone number    Micaela Silvestre MD Referring Advanced Heart Failure and Transplant Cardiology [phone]    Chantal Dallas MD Referring Advanced Heart Failure and Transplant Cardiology [phone]

## 2024-10-21 NOTE — PROGRESS NOTES
Incoming fax from  [facility]    Date of result 10/21/24    INR result 2.66    Route result to: p ANTICOAG LVAD

## 2024-10-23 ENCOUNTER — CARE COORDINATION (OUTPATIENT)
Dept: CARDIOLOGY | Facility: CLINIC | Age: 54
End: 2024-10-23
Payer: COMMERCIAL

## 2024-10-23 NOTE — PROGRESS NOTES
Called patient/caregiver to check in 16 weeks post discharge from VAD implant hospitilization. Pt reports VAD parameters:   10/23/24 1400   Heartmate 3 LEFT VS   Flow (Lpm) 4.7 Lpm   Pulse Index (PI) 4 PI   Speed (rpm) 5400 rpm   Power (bassett) 3.6 bassett      and weight 202. Reviewed medications and answered any questions. Patient reports sleeping well and no anxiety since being home with LVAD. Patient is  able to move around the house and care for himself    Discussed specific new problems/stressors since being discharged from the hospital: none. Empathized with patient and reviewed coping strategies: enlisting support from friends and love ones, attending patient and caregiver support groups, reviewing LVAD educational materials to reinforce knowledge, and talking about concerns with family/care providers/trusted others. Encouraged pt to page VAD Coordinator with any issues or questions. Pt verbalizes understanding.

## 2024-11-04 ENCOUNTER — CARE COORDINATION (OUTPATIENT)
Dept: CARDIOLOGY | Facility: CLINIC | Age: 54
End: 2024-11-04
Payer: COMMERCIAL

## 2024-11-04 DIAGNOSIS — Z95.811 LVAD (LEFT VENTRICULAR ASSIST DEVICE) PRESENT (H): ICD-10-CM

## 2024-11-04 RX ORDER — LISINOPRIL 5 MG/1
15 TABLET ORAL 2 TIMES DAILY
Qty: 540 TABLET | Refills: 3 | Status: SHIPPED | OUTPATIENT
Start: 2024-11-04

## 2024-11-04 RX ORDER — LISINOPRIL 10 MG/1
10 TABLET ORAL 2 TIMES DAILY
Qty: 120 TABLET | Refills: 11 | Status: SHIPPED | OUTPATIENT
Start: 2024-11-04 | End: 2024-11-04

## 2024-11-04 NOTE — PROGRESS NOTES
Navin called in to inquire about the dose of lisinopril he is supposed to be taking. Currently, his MAPS have been 84-92, his VAD numbers baseline and stable 5400, 4.6 - 5 flow, 3 - 3.9 PI, and 4 - 4.2 bassett. He has not been dizzy or short of breath.    The orders in EPIC state:  Lisinopril 20mg BID  Spironolactone 25mg daily  Coreg 6.25mg BID    Navin said back in August he was told by Dr. Silvestre to go up to lisinopril 20mg BID. Then, he said someone called him back and told him not to do this. Later, in early fall, he accidentally referred to his [facility] which had lisinopril 20mg BID listed. He did this for several days and felt dizzy everytime he looked upwards. He went back to lisinopril 10mg BID.    He remains taking lisinopril 10mg BID. I told him he should continue getting maps over the next few days, and continue taking lisinopril 10mg BID. I let Dr Silvestre know of the discrepancy.    Navin RTC in December.

## 2024-11-04 NOTE — LETTER
Mechanical Circulatory Support Program  [place] [address], [facility] [address] [place] [address]  [phone] Office Phone  [phone] Fax Number  Faxed to: [facility]    Fax Number:  [phone]    Patient Name: Navin Lutz   : 1970   Diagnosis/ICD-10: Heart Failure, unspecified I50.9; LVAD Z95.811   Requesting Physician: Dr. Micaela Silvestre   Date of Request: 24   Date to Draw 24      Please fax results to [phone] or email to [email or web address]   *Call [phone] with questions   Please call critical results to [phone], press option 4, ask to talk to the VAD coordinator on-call  ORDER TEST   X Complete Metabolic Panel    Complete Blood Count    Lactate Dehydrogenase    INR    N Terminal Pro BNP       Micaela Steele MD   of Medicine  [facility], Cardiovascular Division

## 2024-11-04 NOTE — PROGRESS NOTES
Spoke with patient regarding recommendations from Dr. Silvestre, Increase Lisinopril to 15mg BID. Will re-check labs in 1 week. 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 Silvestre. Patient notified to page on-call coordinator if symptoms worsen or with other concerns. Patient verbalized understanding.

## 2024-11-07 ENCOUNTER — TELEPHONE (OUTPATIENT)
Dept: ANTICOAGULATION | Facility: CLINIC | Age: 54
End: 2024-11-07
Payer: COMMERCIAL

## 2024-11-07 NOTE — TELEPHONE ENCOUNTER
I spoke with Patricia who said that the home monitor is at their office, which is 4 hours from pt. Pt is not interested in going to their office for training.   Patricia reached out to someone she works with the see if they can do virtual training, but she said she is not sure if that is possible but would check.   I also let her know that pt has an appt in hospitals on 12/12 and asked if it was possible to coordinate something while he was here.   She said that she will see what her  has to say about the virtual training and will also check into seeing if coordinating something while he is in hospitals would work.   She will call [facility] and let us know when she finds something out.     Aileen Mclaughlin RN

## 2024-11-07 NOTE — TELEPHONE ENCOUNTER
Patricia, from [facility], called and notes that the patient is outside of the area for teaching. They are in [place] and he is in [facility]. There is an option for possible clinic training please call to discuss options.    Elsa Gray RN    [facility] Home Anticoagulation [facility]

## 2024-11-11 ENCOUNTER — TELEPHONE (OUTPATIENT)
Dept: CARDIOLOGY | Facility: CLINIC | Age: 54
End: 2024-11-11
Payer: COMMERCIAL

## 2024-11-11 ENCOUNTER — ANTICOAGULATION THERAPY VISIT (OUTPATIENT)
Dept: ANTICOAGULATION | Facility: CLINIC | Age: 54
End: 2024-11-11
Payer: COMMERCIAL

## 2024-11-11 ENCOUNTER — TELEPHONE (OUTPATIENT)
Dept: ANTICOAGULATION | Facility: CLINIC | Age: 54
End: 2024-11-11
Payer: COMMERCIAL

## 2024-11-11 DIAGNOSIS — I50.22 CHRONIC SYSTOLIC CONGESTIVE HEART FAILURE (H): ICD-10-CM

## 2024-11-11 DIAGNOSIS — Z79.01 ANTICOAGULATED ON COUMADIN: ICD-10-CM

## 2024-11-11 DIAGNOSIS — I50.22 CHRONIC SYSTOLIC HEART FAILURE (H): ICD-10-CM

## 2024-11-11 DIAGNOSIS — Z95.811 LVAD (LEFT VENTRICULAR ASSIST DEVICE) PRESENT (H): Primary | ICD-10-CM

## 2024-11-11 LAB — INR (EXTERNAL): 3.25

## 2024-11-11 NOTE — TELEPHONE ENCOUNTER
ANTICOAGULATION     Navin Lutz is overdue for an INR check (due today).     Spoke with Navin and advised INR recheck today or as soon as able and to call [facility] if does not hear from us the day of, so that we can go over result/dosing. Advised the importance of INR checks in clinic as advised by [facility], until receiving home meter and training is completed. Navin verbalized understanding.    Kajal Rosa RN  11/11/2024  Anticoagulation Clinic  [employer] for routing messages: yee FOREMAN LVAD  [facility] patient phone line: [phone]

## 2024-11-11 NOTE — TELEPHONE ENCOUNTER
Spoke with Cecil at [facility]. He is wanting to coordinate a plan to arrange training on the home meters to be completed while fior is in [place] for appts on 12/12 & 12/13. Talked with Cecil and let him know that patient should have availability after his appts. On 12/12 or 12/13: after 3:30pm. He is wanting to arrange a space for the training to take place. I will reach out to LVAD coordinator to see if they can help with arranging a space for the training to occur. Cecil states he will reach out to training center to confirm a time as well as contact [facility] and then will let [facility] know its been scheduled.     Brandee Gaines RN  11/11/24  1:34 PM

## 2024-11-11 NOTE — TELEPHONE ENCOUNTER
11/11/24: Spoke with Cecil from [facility] who stated that patient is approved for home testing, said they could ship the meter to the closest training center as patient is outside of their service area (4 hr away) - patient had said he was unable to go to closest training center. Virtual training is not available anymore. Per Cecil, could do Physician train through clinic, patient is coming to [facility] 12/12/24 and could ship meter to Chris to take to this appointment and set up to have a  available there. Cecil will let patient know about this and call [facility] back if any issues. Will notify Chris that he should still test INR in clinic as advised by [facility], until receives meter and completes training 12/12/24.     Kajal Rosa RN  Anticoagulation Clinic

## 2024-11-11 NOTE — TELEPHONE ENCOUNTER
I spoke with Patricia who said that they are working to coordinate for a local  from [facility] to meet up with pt while he is here for appts 12/12-12/13 (likely will be here 12/11 since his appt on 12/12 is early in the AM).   They will coordinate this from here on out.   Aileen Mclaughlin RN

## 2024-11-11 NOTE — PROGRESS NOTES
ANTICOAGULATION MANAGEMENT     Navin Lutz 54 year old male is on warfarin with supratherapeutic INR result. (Goal INR 2.5-3.0)    Recent labs: (last 7 days)     11/11/24  1053   INR 3.25       ASSESSMENT     Source(s): Chart Review and Patient/Caregiver Call     Warfarin doses taken: Warfarin taken as instructed  Diet: No new diet changes identified  Medication/supplement changes: None noted  New illness, injury, or hospitalization: No  Signs or symptoms of bleeding or clotting: No  Previous result: Therapeutic last visit; previously outside of goal range  Additional findings:  Navin will have some greens tonight, continue current maintenance dose, monitor for bleeding, recheck in one week-if he remains elevated with recheck maintenance dose will need to be decreased slightly then (subtherapeutic with 45 mg/week)       PLAN     Recommended plan for no diet, medication or health factor changes affecting INR     Dosing Instructions: Continue your current warfarin dose with next INR in 1 week       Summary  As of 11/11/2024      Full warfarin instructions:  5 mg every Mon, Fri; 7.5 mg all other days   Next INR check:  11/18/2024               Telephone call with Navin who agrees to plan and repeated back plan correctly    Patient using outside facility for labs    Education provided: Goal range and lab monitoring: goal range and significance of current result and Importance of following up at instructed interval  Symptom monitoring: monitoring for bleeding signs and symptoms and when to seek medical attention/emergency care  Contact [phone] with any changes, questions or concerns.     Plan made per [facility] anticoagulation protocol and per LVAD protocol    JESSICA CALDERON RN  11/11/2024  Anticoagulation Clinic  [employer] for routing messages: yee FOREMAN LVAD  [facility] patient phone line: [phone]        _______________________________________________________________________     Anticoagulation Episode Summary        Current INR goal:  2.5-3.0   TTR:  24.2% (4.3 mo)   Target end date:  Indefinite   Send INR reminders to:  ANTICOAG LVAD    Indications    LVAD (left ventricular assist device) present (H) [Z95.811]  Chronic systolic heart failure (H) [I50.22]  Chronic systolic congestive heart failure (H) [I50.22]  Anticoagulated on Coumadin [Z79.01]             Comments:  GOAL: INR 2.5 - 3 changed 8/5/24  Follow VAD Anticoag protocol:Yes: HeartMate 3  Bridging: Call MD each time due to NEW IMPLANT  Date VAD placed: 6/14/24 8/23/23: internal jugular Thrombus; 6/21/24: lupus anticoagulant +  8/5/24: he does NOT have lupus               Anticoagulation Care Providers       Provider Role Specialty Phone number    Micaela Silvestre MD Referring Advanced Heart Failure and Transplant Cardiology [phone]    Chantal Dallas MD Referring Advanced Heart Failure and Transplant Cardiology [phone]

## 2024-11-11 NOTE — TELEPHONE ENCOUNTER
[facility] Call Center    Phone Message    May a detailed message be left on voicemail: yes     Reason for Call: Other: Cecil is requesting a call back to discuss coordinating training for pt' in home INR's     Action Taken: Other: cardio    Travel Screening: Not Applicable    Thank you!  [facility]       Date of Service:

## 2024-11-19 ENCOUNTER — TELEPHONE (OUTPATIENT)
Dept: ANTICOAGULATION | Facility: CLINIC | Age: 54
End: 2024-11-19
Payer: COMMERCIAL

## 2024-11-19 NOTE — TELEPHONE ENCOUNTER
ANTICOAGULATION     Navin Lutz is overdue for an INR check.     Spoke with Navin and scheduled lab appointment on 11/20/24 when he goes into the clinic    Danica Baldwin, RN  11/19/2024  Anticoagulation Clinic  [facility] for routing messages: yee FOREMAN LVAD  [facility] patient phone line: [phone]

## 2024-11-21 ENCOUNTER — ANTICOAGULATION THERAPY VISIT (OUTPATIENT)
Dept: ANTICOAGULATION | Facility: CLINIC | Age: 54
End: 2024-11-21
Payer: COMMERCIAL

## 2024-11-21 DIAGNOSIS — Z79.01 ANTICOAGULATED ON COUMADIN: ICD-10-CM

## 2024-11-21 DIAGNOSIS — I50.22 CHRONIC SYSTOLIC HEART FAILURE (H): ICD-10-CM

## 2024-11-21 DIAGNOSIS — I50.22 CHRONIC SYSTOLIC CONGESTIVE HEART FAILURE (H): ICD-10-CM

## 2024-11-21 DIAGNOSIS — Z95.811 LVAD (LEFT VENTRICULAR ASSIST DEVICE) PRESENT (H): Primary | ICD-10-CM

## 2024-11-21 LAB — INR (EXTERNAL): 3.89

## 2024-11-21 NOTE — PROGRESS NOTES
ANTICOAGULATION MANAGEMENT     Navin Luzt 54 year old male is on warfarin with supratherapeutic INR result. (Goal INR 2.5-3.0)    Recent labs: (last 7 days)     11/19/24  0000   INR 3.89       ASSESSMENT     Source(s): Chart Review and Patient/Caregiver Call     Warfarin doses taken: Warfarin taken as instructed  Diet: No new diet changes identified  Medication/supplement changes: lisinopril dose increased  New illness, injury, or hospitalization: No  Signs or symptoms of bleeding or clotting: No  Previous result: Supratherapeutic  Additional findings: None       PLAN     Recommended plan for no diet, medication or health factor changes affecting INR     Dosing Instructions: partial hold then decrease your warfarin dose (5.3% change) with next INR in 1 week       Summary  As of 11/21/2024      Full warfarin instructions:  11/21: 5 mg; Otherwise 5 mg every Sun, Tue, Fri; 7.5 mg all other days   Next INR check:  11/27/2024               Detailed voice message left for Navin with dosing instructions and follow up date.   Sent [employer] message with dosing and follow up instructions    Patient using outside facility for labs    Education provided: Please call back if any changes to your diet, medications or how you've been taking warfarin  Symptom monitoring: monitoring for bleeding signs and symptoms, when to seek medical attention/emergency care, and if you hit your head or have a bad fall seek emergency care  Contact [phone] with any changes, questions or concerns.     Plan made per [facility] anticoagulation protocol and per LVAD protocol    Analy Cisneros RN  11/21/2024  Anticoagulation Clinic  [facility] for routing messages: yee FOREMAN LVAD  [facility] patient phone line: [phone]        _______________________________________________________________________     Anticoagulation Episode Summary       Current INR goal:  2.5-3.0   TTR:  22.9% (4.5 mo)   Target end date:  Indefinite   Send INR reminders to:   ANTICOAG LVAD    Indications    LVAD (left ventricular assist device) present (H) [Z95.811]  Chronic systolic heart failure (H) [I50.22]  Chronic systolic congestive heart failure (H) [I50.22]  Anticoagulated on Coumadin [Z79.01]             Comments:  GOAL: INR 2.5 - 3 changed 8/5/24  Follow VAD Anticoag protocol:Yes: HeartMate 3  Bridging: Call MD each time due to NEW IMPLANT  Date VAD placed: 6/14/24 8/23/23: internal jugular Thrombus; 6/21/24: lupus anticoagulant +  8/5/24: he does NOT have lupus               Anticoagulation Care Providers       Provider Role Specialty Phone number    Micaela Silvestre MD Referring Advanced Heart Failure and Transplant Cardiology [phone]    Chantal Dallas MD Referring Advanced Heart Failure and Transplant Cardiology [phone]

## 2024-12-02 ENCOUNTER — CARE COORDINATION (OUTPATIENT)
Dept: CARDIOLOGY | Facility: CLINIC | Age: 54
End: 2024-12-02
Payer: COMMERCIAL

## 2024-12-02 ENCOUNTER — MYC MEDICAL ADVICE (OUTPATIENT)
Dept: ANTICOAGULATION | Facility: CLINIC | Age: 54
End: 2024-12-02
Payer: COMMERCIAL

## 2024-12-02 NOTE — PROGRESS NOTES
D: Called patient to follow up on overnight call.     I/A: Woke up out of a dead sleep and felt the room was spinning, became cold/clammy after getting nauseated. Resolved after about an hour or so- at one point was about to go to ED/call 911.  States he could feel the pump almost thumping every 15-20 seconds- discussed that sometimes patient can feel this with abnormal heart beats- he did send device interrogation in.   We increased Lisinopril in early November. Pt reports had slight dizziness w/ getting up out of the chair quickly after increasing- nothing major/concerning.   MAPs running 80s -90s. Vad parameters stable.  Labs after increasing lisinopril on 11/27 look good (in epic)     Felt similar to when he has had vertigo in the past.     P: discussed if this were to happen again immediately call 911. Will discuss with Dr. Silvestre. Patient is agreeable to speaking with accrediting board today, would just like a heads up call.  Patient, Family notified to page on-call coordinator if symptoms worsen or with other concerns. Patient, Family verbalized understanding.

## 2024-12-02 NOTE — PROGRESS NOTES
"D:  Pt paged to report sudden, dizziness, nausea and diaphoresis and occasional \"feeling like the pump is beating really hard\".  MAP's 80-84, temp 97.7, Current LVAD numbers, Speed: 5600, Flow: 4.7, PI: 3.8, Power: 3.9, which are all baseline for pt. Pt confirmed no signs of stroke, denies chest pain.  I:  VAD parameters are stable, no signs of infection or stroke, instructed pt to send in a device transmission and page the oncall device RN,and follow their instructions.  Advised pt if he continues to feel unwell, he should re-page and can report to his local ER for evaluation.    A:  Sudden onset dizziness and nausea  P:  Pt verbalized understanding of the instructions given.  Will call VAD coordinator with further needs and questions.    "

## 2024-12-03 ENCOUNTER — CARE COORDINATION (OUTPATIENT)
Dept: CARDIOLOGY | Facility: CLINIC | Age: 54
End: 2024-12-03
Payer: COMMERCIAL

## 2024-12-03 ENCOUNTER — ANTICOAGULATION THERAPY VISIT (OUTPATIENT)
Dept: ANTICOAGULATION | Facility: CLINIC | Age: 54
End: 2024-12-03
Payer: COMMERCIAL

## 2024-12-03 DIAGNOSIS — I50.22 CHRONIC SYSTOLIC HEART FAILURE (H): ICD-10-CM

## 2024-12-03 DIAGNOSIS — Z95.811 LVAD (LEFT VENTRICULAR ASSIST DEVICE) PRESENT (H): Primary | ICD-10-CM

## 2024-12-03 DIAGNOSIS — I50.22 CHRONIC SYSTOLIC CONGESTIVE HEART FAILURE (H): ICD-10-CM

## 2024-12-03 DIAGNOSIS — Z79.01 ANTICOAGULATED ON COUMADIN: ICD-10-CM

## 2024-12-03 LAB — INR (EXTERNAL): 1.87 (ref 0.9–1.1)

## 2024-12-03 NOTE — PROGRESS NOTES
Per Dr. Silvestre will repeat labs, in agreement if this happens again patient to call 911 immediately, patient states understanding will get labs 12/3 AM.

## 2024-12-03 NOTE — PROGRESS NOTES
I called pt regarding the INR of 2.38 that was received today, but was done on Wednesday 11/27.   He states that he did only take 2.5 mg instead of 5 mg on Sunday 12/1 because he ran out of Coumadin (updated in flowsheet).   Pt also states that he did have his INR done at [facility] this AM. I called them ([phone]) and spoke to Yara. She states that the  comes at around noon and they usually don't get the results until 3:00/3:30, and then will send them to [facility].   Awaiting INR result from today.       Note: he has an RHC on 12/13. In the past pt states that he has needed to hold his coumadin for 2 days prior to procedure.       Aileen Mclaughlin RN

## 2024-12-03 NOTE — PROGRESS NOTES
ANTICOAGULATION MANAGEMENT     Navin LOPEZ Bolivar 54 year old male is on warfarin with subtherapeutic INR result. (Goal INR 2.5-3.0)    Recent labs: (last 7 days)     12/03/24  1612   INR 1.87*       ASSESSMENT     Source(s): Chart Review and Patient/Caregiver Call     Warfarin doses taken: Less warfarin taken than planned which may be affecting INR  Diet: No new diet changes identified  Medication/supplement changes: None noted  New illness, injury, or hospitalization: No  Signs or symptoms of bleeding or clotting: No  Previous result: Subtherapeutic  Additional findings: None       PLAN     Recommended plan for temporary change(s) affecting INR     Dosing Instructions: booster dose then Increase your warfarin dose (11.1% change) with next INR in 6 days       Summary  As of 12/3/2024      Full warfarin instructions:  12/3: 10 mg; Otherwise 5 mg every Mon; 7.5 mg all other days   Next INR check:  12/9/2024               Telephone call with Navin who verbalizes understanding and agrees to plan    Patient using outside facility for labs    Education provided: Please call back if any changes to your diet, medications or how you've been taking warfarin  Goal range and lab monitoring: goal range and significance of current result, Importance of therapeutic range, and Importance of following up at instructed interval  Symptom monitoring: monitoring for clotting signs and symptoms and monitoring for stroke signs and symptoms    Plan made per [facility] anticoagulation protocol and per LVAD protocol    Aileen Mclaughlin RN  12/3/2024  Anticoagulation Clinic  [employer] for routing messages: p ANTICOAG LVAD  [facility] patient phone line: [phone]        _______________________________________________________________________     Anticoagulation Episode Summary       Current INR goal:  2.5-3.0   TTR:  22.5% (5 mo)   Target end date:  Indefinite   Send INR reminders to:  ANTICOAG LVAD    Indications    LVAD (left ventricular assist  device) present (H) [Z95.811]  Chronic systolic heart failure (H) [I50.22]  Chronic systolic congestive heart failure (H) [I50.22]  Anticoagulated on Coumadin [Z79.01]             Comments:  GOAL: INR 2.5 - 3 changed 8/5/24  Follow VAD Anticoag protocol:Yes: HeartMate 3  Bridging: Call MD each time due to NEW IMPLANT  Date VAD placed: 6/14/24 8/23/23: internal jugular Thrombus; 6/21/24: lupus anticoagulant +  8/5/24: he does NOT have lupus               Anticoagulation Care Providers       Provider Role Specialty Phone number    Micaela Silvestre MD Referring Advanced Heart Failure and Transplant Cardiology [phone]    Chantal Dallas MD Referring Advanced Heart Failure and Transplant Cardiology [phone]

## 2024-12-03 NOTE — PROGRESS NOTES
D: called patient to follow up on lab results from today.     I/A: patient is feeling well               P:  Will discuss with Dr. Silvestre.  Patient notified to page on-call coordinator if symptoms worsen or with other concerns. Patient verbalized understanding.    Followed up with patient, Dr. Silvestre recommends increasing fluids- perhaps slightly dry with elevated hgb level. Will plan to re-check at clinic visit 12/12/24. Patient states understanding.

## 2024-12-04 NOTE — PROGRESS NOTES
12/4/24:  Received a call from Darcy at the [facility]. She states they have not been able to fax his INR results to the [facility] at [phone].  Writer gave her the [facility] fax number for back up; but asked that they keep trying the [facility] fax number first.  Analy Cisneros RN     [[email or web address]] : I saw patient Elizabeth Myers in the office today.  The patient is a 55-year-old female who has been diagnosed with factor V Leiden deficiency and is currently on anticoagulation.  Elizabeth has had 1 episode of a DVT and 1 episode of a superficial thrombophlebitis.  The patient has also been diagnosed with atrial fibrillation and sees a cardiologist on a regular basis.  The patient is on Eliquis as well as a beta-blocker.  Although the patient has no specific coronary artery disease she has been diagnosed with small coronary arteries and currently takes a lipid-lowering agent.  Elizabeth denies a history of dysphagia but does have intermittent episodes of significant vomiting.  This invariably follows a higher fiber diet including cruciferous vegetables and salads.  On several occasions she ate these foods and developed constant retching of up to 8-10 times.  There was no blood in the vomitus and eventually the symptoms subsided though her abdomen does remain sore for several days.  She apparently does move her bowels during this period of time.  The patient has had a laparoscopy as well as an umbilical wall hernia repair and abdominal wall hernia repair with mesh.  The patient has no significant caffeine or ethanol and does not smoke.  The patient has never had a colonoscopy.  She states that in between these attacks there is no abdominal pain, and her bowel movements are normal. Statement Selected

## 2024-12-04 NOTE — PROGRESS NOTES
Incoming fax from  [facility]    Date of result 12/3/24    INR result 1.87    Route result to: yee FOREMAN LVAD

## 2024-12-05 ENCOUNTER — CARE COORDINATION (OUTPATIENT)
Dept: CARDIOLOGY | Facility: CLINIC | Age: 54
End: 2024-12-05
Payer: COMMERCIAL

## 2024-12-05 DIAGNOSIS — Z95.811 LVAD (LEFT VENTRICULAR ASSIST DEVICE) PRESENT (H): ICD-10-CM

## 2024-12-05 DIAGNOSIS — I50.22 CHRONIC SYSTOLIC CONGESTIVE HEART FAILURE (H): Primary | ICD-10-CM

## 2024-12-05 DIAGNOSIS — Z79.01 ANTICOAGULATED ON WARFARIN: ICD-10-CM

## 2024-12-12 ENCOUNTER — ANCILLARY PROCEDURE (OUTPATIENT)
Dept: BONE DENSITY | Facility: CLINIC | Age: 54
End: 2024-12-12
Attending: STUDENT IN AN ORGANIZED HEALTH CARE EDUCATION/TRAINING PROGRAM
Payer: COMMERCIAL

## 2024-12-12 ENCOUNTER — ALLIED HEALTH/NURSE VISIT (OUTPATIENT)
Dept: TRANSPLANT | Facility: CLINIC | Age: 54
End: 2024-12-12
Attending: STUDENT IN AN ORGANIZED HEALTH CARE EDUCATION/TRAINING PROGRAM
Payer: COMMERCIAL

## 2024-12-12 ENCOUNTER — OFFICE VISIT (OUTPATIENT)
Dept: PULMONOLOGY | Facility: CLINIC | Age: 54
End: 2024-12-12
Attending: STUDENT IN AN ORGANIZED HEALTH CARE EDUCATION/TRAINING PROGRAM
Payer: COMMERCIAL

## 2024-12-12 ENCOUNTER — LAB (OUTPATIENT)
Dept: LAB | Facility: CLINIC | Age: 54
End: 2024-12-12
Attending: STUDENT IN AN ORGANIZED HEALTH CARE EDUCATION/TRAINING PROGRAM
Payer: COMMERCIAL

## 2024-12-12 ENCOUNTER — ANTICOAGULATION THERAPY VISIT (OUTPATIENT)
Dept: ANTICOAGULATION | Facility: CLINIC | Age: 54
End: 2024-12-12

## 2024-12-12 DIAGNOSIS — Z79.01 ANTICOAGULATED ON WARFARIN: ICD-10-CM

## 2024-12-12 DIAGNOSIS — I42.8 NON-ISCHEMIC CARDIOMYOPATHY (H): ICD-10-CM

## 2024-12-12 DIAGNOSIS — Z94.1 HEART REPLACED BY TRANSPLANT (H): ICD-10-CM

## 2024-12-12 DIAGNOSIS — I50.23 ACUTE ON CHRONIC SYSTOLIC CHF (CONGESTIVE HEART FAILURE) (H): ICD-10-CM

## 2024-12-12 DIAGNOSIS — Z79.01 ANTICOAGULATED ON COUMADIN: ICD-10-CM

## 2024-12-12 DIAGNOSIS — I50.22 CHRONIC SYSTOLIC HEART FAILURE (H): ICD-10-CM

## 2024-12-12 DIAGNOSIS — I50.22 CHRONIC SYSTOLIC CONGESTIVE HEART FAILURE (H): ICD-10-CM

## 2024-12-12 DIAGNOSIS — Z95.811 LVAD (LEFT VENTRICULAR ASSIST DEVICE) PRESENT (H): ICD-10-CM

## 2024-12-12 DIAGNOSIS — Z95.811 LVAD (LEFT VENTRICULAR ASSIST DEVICE) PRESENT (H): Primary | ICD-10-CM

## 2024-12-12 DIAGNOSIS — Z79.899 LONG TERM USE OF DRUG: ICD-10-CM

## 2024-12-12 DIAGNOSIS — Z79.899 ENCOUNTER FOR LONG-TERM (CURRENT) USE OF MEDICATIONS: ICD-10-CM

## 2024-12-12 LAB
6 MIN WALK (FT): 1500 FT
6 MIN WALK (M): 457 M
CMV IGG SERPL IA-ACNC: <0.2 U/ML
CMV IGG SERPL IA-ACNC: NORMAL
CYSTATIN C (ROCHE): 0.9 MG/L (ref 0.6–1)
DLCOCOR-%PRED-PRE: 78 %
DLCOCOR-PRE: 23.85 ML/MIN/MMHG
DLCOUNC-%PRED-PRE: 87 %
DLCOUNC-PRE: 26.41 ML/MIN/MMHG
DLCOUNC-PRED: 30.31 ML/MIN/MMHG
ERV-%PRED-PRE: 34 %
ERV-PRE: 0.53 L
ERV-PRED: 1.56 L
EXPTIME-PRE: 5.54 SEC
FEF2575-%PRED-PRE: 80 %
FEF2575-PRE: 2.68 L/SEC
FEF2575-PRED: 3.32 L/SEC
FEFMAX-%PRED-PRE: 57 %
FEFMAX-PRE: 5.77 L/SEC
FEFMAX-PRED: 10.07 L/SEC
FEV1-%PRED-PRE: 70 %
FEV1-PRE: 2.7 L
FEV1FEV6-PRE: 81 %
FEV1FEV6-PRED: 80 %
FEV1FVC-PRE: 80 %
FEV1FVC-PRED: 78 %
FEV1SVC-PRE: 79 %
FEV1SVC-PRED: 68 %
FIFMAX-PRE: 6.85 L/SEC
FRCPLETH-%PRED-PRE: 81 %
FRCPLETH-PRE: 3.1 L
FRCPLETH-PRED: 3.82 L
FVC-%PRED-PRE: 69 %
FVC-PRE: 3.38 L
FVC-PRED: 4.9 L
GFR/BSA.PRED SERPLBLD CYS-BASED-ARV: >90 ML/MIN/1.73M2
HCV AB SERPL QL IA: NONREACTIVE
HIV 1+2 AB+HIV1 P24 AG SERPL QL IA: NONREACTIVE
IC-%PRED-PRE: 71 %
IC-PRE: 2.88 L
IC-PRED: 4.02 L
INR HOME MONITORING: 3.4 RATIO (ref 2.5–3)
INR PPP: 3.1 (ref 0.85–1.15)
Lab: NORMAL
PERFORMING LABORATORY: NORMAL
PREALB SERPL-MCNC: 26 MG/DL (ref 20–40)
RVPLETH-%PRED-PRE: 119 %
RVPLETH-PRE: 2.56 L
RVPLETH-PRED: 2.14 L
SPECIMEN STATUS: NORMAL
T PALLIDUM AB SER QL: NONREACTIVE
TEST NAME: NORMAL
TLCPLETH-%PRED-PRE: 77 %
TLCPLETH-PRE: 5.97 L
TLCPLETH-PRED: 7.71 L
VA-%PRED-PRE: 74 %
VA-PRE: 5.27 L
VC-%PRED-PRE: 60 %
VC-PRE: 3.41 L
VC-PRED: 5.63 L

## 2024-12-12 PROCEDURE — 84134 ASSAY OF PREALBUMIN: CPT | Performed by: STUDENT IN AN ORGANIZED HEALTH CARE EDUCATION/TRAINING PROGRAM

## 2024-12-12 PROCEDURE — 99000 SPECIMEN HANDLING OFFICE-LAB: CPT | Performed by: PATHOLOGY

## 2024-12-12 PROCEDURE — 82610 CYSTATIN C: CPT | Performed by: STUDENT IN AN ORGANIZED HEALTH CARE EDUCATION/TRAINING PROGRAM

## 2024-12-12 PROCEDURE — 77080 DXA BONE DENSITY AXIAL: CPT | Performed by: INTERNAL MEDICINE

## 2024-12-12 PROCEDURE — 86803 HEPATITIS C AB TEST: CPT | Performed by: STUDENT IN AN ORGANIZED HEALTH CARE EDUCATION/TRAINING PROGRAM

## 2024-12-12 PROCEDURE — 86644 CMV ANTIBODY: CPT | Performed by: STUDENT IN AN ORGANIZED HEALTH CARE EDUCATION/TRAINING PROGRAM

## 2024-12-12 PROCEDURE — 86780 TREPONEMA PALLIDUM: CPT | Performed by: STUDENT IN AN ORGANIZED HEALTH CARE EDUCATION/TRAINING PROGRAM

## 2024-12-12 NOTE — PROGRESS NOTES
ANTICOAGULATION MANAGEMENT     Navin LOPEZ Bolivar 54 year old male is on warfarin with supratherapeutic INR result. (Goal INR 2.5-3.0)    Recent labs: (last 7 days)     12/12/24  1220   INR 3.10*       ASSESSMENT     Source(s): Chart Review and Patient/Caregiver Call     Warfarin doses taken: Warfarin taken as instructed  Diet: No new diet changes identified  Medication/supplement changes: None noted  New illness, injury, or hospitalization: No  Signs or symptoms of bleeding or clotting: No  Previous result: Subtherapeutic  Additional findings:  Patient has higher INR goal range so will continue with current dose and recheck an INR again in a week.  Continue to watch trend.        PLAN     Recommended plan for no diet, medication or health factor changes affecting INR     Dosing Instructions: Continue your current warfarin dose with next INR in 1 week       Summary  As of 12/12/2024      Full warfarin instructions:  5 mg every Mon; 7.5 mg all other days   Next INR check:  12/19/2024               Telephone call with Navin who verbalizes understanding and agrees to plan and who agrees to plan and repeated back plan correctly    Patient to recheck with home meter    Education provided: Taking warfarin: Importance of taking warfarin as instructed  Goal range and lab monitoring: goal range and significance of current result, Importance of therapeutic range, and Importance of following up at instructed interval    Plan made per [facility] anticoagulation protocol and per LVAD protocol    Danica Baldwin RN  12/12/2024  Anticoagulation Clinic  [employer] for routing messages: yee ANTICOAG LVAD  [facility] patient phone line: [phone]        _______________________________________________________________________     Anticoagulation Episode Summary       Current INR goal:  2.5-3.0   TTR:  23.5% (5.3 mo)   Target end date:  Indefinite   Send INR reminders to:  ALEXYAG LVAD    Indications    LVAD (left ventricular assist device) present  (H) [Z95.811]  Chronic systolic heart failure (H) [I50.22]  Chronic systolic congestive heart failure (H) [I50.22]  Anticoagulated on Coumadin [Z79.01]             Comments:  GOAL: INR 2.5 - 3 changed 8/5/24  Follow VAD Anticoag protocol:Yes: HeartMate 3  Bridging: Call MD each time due to NEW IMPLANT  Date VAD placed: 6/14/24 8/23/23: internal jugular Thrombus; 6/21/24: lupus anticoagulant +  8/5/24: he does NOT have lupus               Anticoagulation Care Providers       Provider Role Specialty Phone number    Micaela Silvestre MD Referring Advanced Heart Failure and Transplant Cardiology [phone]    Chantal Dallas MD Referring Advanced Heart Failure and Transplant Cardiology [phone]

## 2024-12-12 NOTE — PROGRESS NOTES
Transplant Teaching 12/12/2024    Writer met with patient and his wife via inperson,  to complete heart transplant teaching. We reviewed the candidate selection process, insurance prior authorization, UNOS priority statuses, the waiting period, donor issues, and the pre-, cathleen-, and post-op periods. We also reviewed the major risks of transplantation including rejection, infection and transplant vasculopathy. We  discussed patient and caregiver responsibilities before and after transplant including the need for patient to identify a caregiver to be present for education and to assist patient post discharge. The patient was informed of the weight and chemical cessation policy and agrees to these requirements.     Discussed DCD/OCS with patient and his wife, including risks and benefits of receiving DCD donor organs, and option to decline such offers. Will send DCD information sheet with post teaching packet. Pt and and his wife verbalized understanding. Pt has decided to accept DCD donor organ offers.      Discussed the possibility of accepting a donor heart that has known Hepatitis C infection (past or current).  The risks of receiving a donor organ which may be infected with hepatitis C have been discussed with patient and his wife.  The risk in accepting this organ (including the possibility of tai an infection with hepatitis C) have been discussed. Treatment options have also been discussed with this patient and he/she has been informed that there is no guarantee for a cure with these treatments. The option of declining this organ has also been discussed. Patient was given the opportunity to ask questions and understands the risks and/or benefits associated with accepting this type of organ.  Patient would like to accept Hep C donors, consent signed previously.    Throughout the session, the patient and his wife were attentive, eager to learn, and asked appropriate questions.  Patient  was given a copy of  the UNOS brochure on Multiple Waitlisting, the  Heart Transplant brochure and current program statistics.  Patient was also encouraged to visit the informational transplant education website for further information and video classes ([email or web address]).      Gave the patient the transplant office number and encouraged to call with future questions or concerns.    In follow up, the patient was instructed on completing the following items for his/her transplant evaluation:    Dental clearance  COVID vaccine

## 2024-12-13 ENCOUNTER — OFFICE VISIT (OUTPATIENT)
Dept: CARDIOLOGY | Facility: CLINIC | Age: 54
End: 2024-12-13
Attending: STUDENT IN AN ORGANIZED HEALTH CARE EDUCATION/TRAINING PROGRAM
Payer: COMMERCIAL

## 2024-12-13 ENCOUNTER — APPOINTMENT (OUTPATIENT)
Dept: MEDSURG UNIT | Facility: CLINIC | Age: 54
End: 2024-12-13
Attending: INTERNAL MEDICINE
Payer: COMMERCIAL

## 2024-12-13 ENCOUNTER — APPOINTMENT (OUTPATIENT)
Dept: LAB | Facility: CLINIC | Age: 54
End: 2024-12-13
Attending: INTERNAL MEDICINE
Payer: COMMERCIAL

## 2024-12-13 ENCOUNTER — HOSPITAL ENCOUNTER (OUTPATIENT)
Facility: CLINIC | Age: 54
Discharge: HOME | End: 2024-12-13
Attending: INTERNAL MEDICINE | Admitting: INTERNAL MEDICINE
Payer: COMMERCIAL

## 2024-12-13 ENCOUNTER — HOSPITAL ENCOUNTER (OUTPATIENT)
Dept: CARDIOLOGY | Facility: CLINIC | Age: 54
Discharge: HOME | End: 2024-12-13
Attending: STUDENT IN AN ORGANIZED HEALTH CARE EDUCATION/TRAINING PROGRAM | Admitting: INTERNAL MEDICINE
Payer: COMMERCIAL

## 2024-12-13 VITALS
HEART RATE: 88 BPM | WEIGHT: 212.3 LBS | OXYGEN SATURATION: 99 % | SYSTOLIC BLOOD PRESSURE: 138 MMHG | RESPIRATION RATE: 18 BRPM | BODY MASS INDEX: 28.79 KG/M2 | DIASTOLIC BLOOD PRESSURE: 78 MMHG | TEMPERATURE: 98.2 F

## 2024-12-13 VITALS — WEIGHT: 216.4 LBS | SYSTOLIC BLOOD PRESSURE: 70 MMHG | BODY MASS INDEX: 29.35 KG/M2 | OXYGEN SATURATION: 96 %

## 2024-12-13 DIAGNOSIS — Z95.811 LEFT VENTRICULAR ASSIST DEVICE PRESENT (H): ICD-10-CM

## 2024-12-13 DIAGNOSIS — Z79.899 LONG TERM USE OF DRUG: ICD-10-CM

## 2024-12-13 DIAGNOSIS — I50.23 ACUTE ON CHRONIC SYSTOLIC CHF (CONGESTIVE HEART FAILURE) (H): ICD-10-CM

## 2024-12-13 DIAGNOSIS — I42.8 NONISCHEMIC CARDIOMYOPATHY (H): ICD-10-CM

## 2024-12-13 DIAGNOSIS — I50.22 CHRONIC SYSTOLIC HEART FAILURE (H): ICD-10-CM

## 2024-12-13 DIAGNOSIS — I50.22 CHRONIC SYSTOLIC CONGESTIVE HEART FAILURE (H): ICD-10-CM

## 2024-12-13 DIAGNOSIS — Z95.811 LVAD (LEFT VENTRICULAR ASSIST DEVICE) PRESENT (H): ICD-10-CM

## 2024-12-13 DIAGNOSIS — Z95.811 LVAD (LEFT VENTRICULAR ASSIST DEVICE) PRESENT (H): Primary | ICD-10-CM

## 2024-12-13 DIAGNOSIS — N17.9 AKI (ACUTE KIDNEY INJURY) (H): ICD-10-CM

## 2024-12-13 DIAGNOSIS — G45.9 TIA (TRANSIENT ISCHEMIC ATTACK): ICD-10-CM

## 2024-12-13 LAB
ACANTHOCYTES BLD QL SMEAR: SLIGHT
ALBUMIN SERPL BCG-MCNC: 3.9 G/DL (ref 3.5–5.2)
ALP SERPL-CCNC: 112 U/L (ref 40–150)
ALT SERPL W P-5'-P-CCNC: 35 U/L (ref 0–70)
ANION GAP SERPL CALCULATED.3IONS-SCNC: 8 MMOL/L (ref 7–15)
AST SERPL W P-5'-P-CCNC: 31 U/L (ref 0–45)
BILIRUB SERPL-MCNC: 0.6 MG/DL
BUN SERPL-MCNC: 13.6 MG/DL (ref 6–20)
CALCIUM SERPL-MCNC: 9.1 MG/DL (ref 8.8–10.4)
CHLORIDE SERPL-SCNC: 104 MMOL/L (ref 98–107)
CREAT SERPL-MCNC: 1.34 MG/DL (ref 0.67–1.17)
EGFRCR SERPLBLD CKD-EPI 2021: 63 ML/MIN/1.73M2
ELLIPTOCYTES BLD QL SMEAR: SLIGHT
ERYTHROCYTE [DISTWIDTH] IN BLOOD BY AUTOMATED COUNT: 16 % (ref 10–15)
GLUCOSE SERPL-MCNC: 102 MG/DL (ref 70–99)
HCO3 SERPL-SCNC: 23 MMOL/L (ref 22–29)
HCT VFR BLD AUTO: 53.3 % (ref 40–53)
HGB BLD-MCNC: 18 G/DL (ref 13.3–17.7)
INR PPP: 2.84 (ref 0.85–1.15)
LDH SERPL L TO P-CCNC: 257 U/L (ref 0–250)
LVEF ECHO: NORMAL
MCH RBC QN AUTO: 28.4 PG (ref 26.5–33)
MCHC RBC AUTO-ENTMCNC: 33.8 G/DL (ref 31.5–36.5)
MCV RBC AUTO: 84 FL (ref 78–100)
NT-PROBNP SERPL-MCNC: 157 PG/ML (ref 0–900)
PLAT MORPH BLD: ABNORMAL
PLATELET # BLD AUTO: 182 10E3/UL (ref 150–450)
POLYCHROMASIA BLD QL SMEAR: SLIGHT
POTASSIUM SERPL-SCNC: 4.6 MMOL/L (ref 3.4–5.3)
PROT SERPL-MCNC: 6.7 G/DL (ref 6.4–8.3)
PSA SERPL DL<=0.01 NG/ML-MCNC: 1.52 NG/ML (ref 0–3.5)
RBC # BLD AUTO: 6.34 10E6/UL (ref 4.4–5.9)
RBC MORPH BLD: ABNORMAL
SODIUM SERPL-SCNC: 135 MMOL/L (ref 135–145)
WBC # BLD AUTO: 9.8 10E3/UL (ref 4–11)

## 2024-12-13 PROCEDURE — 36415 COLL VENOUS BLD VENIPUNCTURE: CPT | Performed by: STUDENT IN AN ORGANIZED HEALTH CARE EDUCATION/TRAINING PROGRAM

## 2024-12-13 PROCEDURE — 999N000142 HC STATISTIC PROCEDURE PREP ONLY

## 2024-12-13 PROCEDURE — 83880 ASSAY OF NATRIURETIC PEPTIDE: CPT | Performed by: STUDENT IN AN ORGANIZED HEALTH CARE EDUCATION/TRAINING PROGRAM

## 2024-12-13 PROCEDURE — 80323 ALKALOIDS NOS: CPT | Performed by: STUDENT IN AN ORGANIZED HEALTH CARE EDUCATION/TRAINING PROGRAM

## 2024-12-13 PROCEDURE — 272N000001 HC OR GENERAL SUPPLY STERILE: Performed by: INTERNAL MEDICINE

## 2024-12-13 PROCEDURE — 93451 RIGHT HEART CATH: CPT | Mod: 26 | Performed by: INTERNAL MEDICINE

## 2024-12-13 PROCEDURE — 83615 LACTATE (LD) (LDH) ENZYME: CPT | Performed by: STUDENT IN AN ORGANIZED HEALTH CARE EDUCATION/TRAINING PROGRAM

## 2024-12-13 PROCEDURE — 80053 COMPREHEN METABOLIC PANEL: CPT | Performed by: STUDENT IN AN ORGANIZED HEALTH CARE EDUCATION/TRAINING PROGRAM

## 2024-12-13 PROCEDURE — C1751 CATH, INF, PER/CENT/MIDLINE: HCPCS | Performed by: INTERNAL MEDICINE

## 2024-12-13 PROCEDURE — 250N000009 HC RX 250: Performed by: INTERNAL MEDICINE

## 2024-12-13 PROCEDURE — 250N000009 HC RX 250: Performed by: STUDENT IN AN ORGANIZED HEALTH CARE EDUCATION/TRAINING PROGRAM

## 2024-12-13 PROCEDURE — 85027 COMPLETE CBC AUTOMATED: CPT | Performed by: STUDENT IN AN ORGANIZED HEALTH CARE EDUCATION/TRAINING PROGRAM

## 2024-12-13 PROCEDURE — 93306 TTE W/DOPPLER COMPLETE: CPT

## 2024-12-13 PROCEDURE — 85610 PROTHROMBIN TIME: CPT | Performed by: STUDENT IN AN ORGANIZED HEALTH CARE EDUCATION/TRAINING PROGRAM

## 2024-12-13 PROCEDURE — 93282 PRGRMG EVAL IMPLANTABLE DFB: CPT | Performed by: INTERNAL MEDICINE

## 2024-12-13 PROCEDURE — 93451 RIGHT HEART CATH: CPT | Performed by: INTERNAL MEDICINE

## 2024-12-13 PROCEDURE — C1894 INTRO/SHEATH, NON-LASER: HCPCS | Performed by: INTERNAL MEDICINE

## 2024-12-13 PROCEDURE — 80321 ALCOHOLS BIOMARKERS 1OR 2: CPT | Performed by: STUDENT IN AN ORGANIZED HEALTH CARE EDUCATION/TRAINING PROGRAM

## 2024-12-13 PROCEDURE — 99213 OFFICE O/P EST LOW 20 MIN: CPT | Mod: 25 | Performed by: STUDENT IN AN ORGANIZED HEALTH CARE EDUCATION/TRAINING PROGRAM

## 2024-12-13 PROCEDURE — G0103 PSA SCREENING: HCPCS | Performed by: STUDENT IN AN ORGANIZED HEALTH CARE EDUCATION/TRAINING PROGRAM

## 2024-12-13 PROCEDURE — 999N000132 HC STATISTIC PP CARE STAGE 1

## 2024-12-13 PROCEDURE — 93750 INTERROGATION VAD IN PERSON: CPT | Performed by: STUDENT IN AN ORGANIZED HEALTH CARE EDUCATION/TRAINING PROGRAM

## 2024-12-13 RX ORDER — LIDOCAINE 40 MG/G
CREAM TOPICAL
OUTPATIENT
Start: 2024-12-13

## 2024-12-13 RX ORDER — LIDOCAINE 40 MG/G
CREAM TOPICAL
Status: COMPLETED | OUTPATIENT
Start: 2024-12-13 | End: 2024-12-13

## 2024-12-13 RX ADMIN — LIDOCAINE: 40 CREAM TOPICAL at 08:34

## 2024-12-13 ASSESSMENT — ACTIVITIES OF DAILY LIVING (ADL)
ADLS_ACUITY_SCORE: 57

## 2024-12-13 ASSESSMENT — PAIN SCALES - GENERAL: PAINLEVEL_OUTOF10: NO PAIN (0)

## 2024-12-13 NOTE — PATIENT INSTRUCTIONS
" Ventricular Assist Device Clinic  Take your medications every day, as directed!  Medication changes made today:  No changes Instructions:  Hydrate! Your right heart cath and your labs are showing that you are dehydrated.  Next time you order supplies, ask for the uni- or cathgrip anchor. You can choose small, medium or large. You can also order up to 15 anchors per month, which would allow you to double anchor.    Monitor your heart failure, Page the VAD Coordinator on call:  If you gain more than 3 lb in a day or 5 in a week  If you feel worsening shortness of breath, palpitations, or swelling.   If you have VAD alarms or change in parameters  If you feel dizzy or lightheaded     Keep your VAD appointments!    Your next appointment should be scheduled for 3 months from now with VAD ARIEL.   You also need to schedule for 6 months from now with Dr. Silvestre. You will need an echo and right heart cath with this visit.       Please see the clinic schedulers after your appointment to schedule follow-up.    If you do not have an appointment scheduled, you need to call the VAD  at [phone]. To Page the VAD Coordinator on call, dial [phone] option #4 and ask to speak to the VAD coordinator on call. Try to maintain a heart healthy lifestyle!  Limit salt & sodium to 2000mg/day   Eat a heart healthy diet, low in saturated fats  Stay active! Aim to move at least 150 minutes every week.    Use [employer] allows you to communicate directly with your heart team through secure messaging.  "[employer]" can be accessed any time on your phone, computer, or tablet.  If you need assistance, we'd be happy to help!      Equipment Reminders:   Charge your back-up controller at least every 6 months. To charge, connect it to either batteries or wall power. The screen will read \"Charging\". Keep connected until the screen reads \"charging complete\". Disconnect power once the controller battery is charged. Also do a " self-test when the controller is connected to power.  Replace the AA batteries in your mobile power unit every 6 months.  Check your battery charger for when it is due for maintenance. It requires inspection in clinic once per year. There will be a sticker stating the month and year maintenance is due. When you bring your battery charger to clinic, tell one of the schedulers you have LVAD equipment that needs maintenance. They will call [facility]. You can leave your  under the LVAD sign by the  at the far end of clinic. You must drop it off before 2pm.

## 2024-12-13 NOTE — NURSING NOTE
Chief Complaint   Patient presents with    Follow Up     RETURN VAD      Vitals were taken and medications reconciled.    Eric Mahajan, EMT  1:38 PM

## 2024-12-13 NOTE — DISCHARGE INSTRUCTIONS
[facility]                        Interventional Cardiology  Discharge Instructions   Post Right Heart Cath and/or Heart Biopsy      AFTER YOU GO HOME:  DO drink plenty of fluids  DO resume your regular diet and medications unless otherwise instructed by your Primary Physician  Do Not scrub the procedure site vigorously  No lotion or powder to the puncture site for 3 days    CALL YOUR PRIMARY PHYSICIAN IF: You may resume all normal activity.  Monitor neck site for bleeding, swelling, or voice changes. If you notice bleeding or swelling immediately apply pressure to the site and call number below to speak with Cardiology Fellow.  If you experience any changes in your breathing you should call your doctor immediately or come to the closest Emergency Department.  Do not drive yourself.    ADDITIONAL INSTRUCTIONS: Medications: You are to resume all home medications including anticoagulation therapy unless otherwise advised by your primary cardiologist or nurse coordinator.    Follow Up: Per your primary cardiology team    If you have any questions or concerns regarding your procedure site please call [phone] at anytime and ask for Cardiology Fellow on call.  They are available 24 hours a day.  You may also contact the Cardiology Clinic after hours number at [phone].                                                       Telephone Numbers [phone] Monday-Friday 8:00 am to 4:30 pm    [phone] After 4:30 pm Monday-Friday, Weekends & Holidays  Ask for Interventional Cardiologist on call. Someone is on call 24 hours/day   [facility] toll free number [phone] Monday-Friday 8:00 am to 4:30 pm   [facility] Emergency Dept [phone]

## 2024-12-13 NOTE — PRE-PROCEDURE
Consenting/Education for Cardiology Procedure: Right heart catheterization     Patient understands we would like to perform the listed procedure(s) due to LVAD speed optimization.    The patient understands the following:     The procedure was described to the patient in detail.    No sedation is planned for this procedure. Patient understands risks and complications of the procedure which include but are not limited to bruising/swelling around the incision site, infection, bleeding, allergic reaction to local anesthetic, air embolism, arterial puncture, stroke, heart attack, need for emergency heart surgery, death.       Patient verbalized understanding of risks and benefits and has elected to proceed with the procedure or procedures listed above.    ANNE Ruffin CNP  Cardiology  Clinically Significant Risk Factors Present on Admission               # Drug Induced Coagulation Defect: home medication list includes an anticoagulant medication  # Drug Induced Platelet Defect: home medication list includes an antiplatelet medication   # Hypertension: Home medication list includes antihypertensive(s)  # End stage heart failure: Ventricular assist device (VAD) present              # Financial/Environmental Concerns:    # Asthma: noted on problem list  # ICD device present      Cardiovascular: Cardiomyopathy  Systolic    Not present on admission

## 2024-12-13 NOTE — NURSING NOTE
MCS VAD Pump Info       Row Name 12/13/24 1345 12/13/24 1000 12/13/24 0800       MCS VAD Information    Implant -- -- --    LVAD Pump -- -- --       Heartmate 3 LEFT VS    Flow (Lpm) 5.1 Lpm -- --    Pulse Index (PI) 2.3 PI -- --    Speed (rpm) 5400 rpm -- --    Power (bassett) 4 bassett -- --    Current Hct setting --  53.3 -- --       Heartmate 3 Right (centrifugal flow) VS    Flow (Lpm) -- 5.1 Lpm 5.1 Lpm    Pulse Index (PI) -- 2.3 PI 2.3 PI    Speed (rpm) -- 5450 rpm 5450 rpm    Power (bassett) -- 4.1 bassett 4.1 bassett       Primary Controller    Serial number [id number] -- --    Low flow alarm setting -- -- --    High watt alarm setting -- -- --    EBB: Patient use 4 -- --    Replace in 27 Months -- --       Backup Controller    Serial number [id number] -- --    EBB: Patient use 11 -- --    Replace EBB in 27 Months -- --    Speed & HCT match primary controller -- -- --       VAD Interrogation    Alarms reported by patient N -- --    Unexpected alarms noted upon interrogation None -- --    PI events Frequent  PI 1.8-6.5, hx 1.5 days, occasional speed drops -- --    Damage to equipment is noted N -- --    Action taken Reviewed proper equipment care and maintenance -- --       Driveline Exit Site    Dressing change done N -- --    Driveline properly secured Yes  pt requesting different anchor. centurion is causing irritation and skin tears. gave pt sample of cath- to try. pt instructed to call next week if it is comfortable and it can be added to dressing change order. -- --    DLES assessment c/d/i -- --    Dressing used Weekly kit -- --    Frequency patient changes dressing Weekly -- --    Dressing modifications -- -- --    Dressing change supplier -- -- --                    Education Complete: Yes   Charge the BACKUP controller s backup battery every 6 months  Perform a self test on BACKUP every 6 months  Change the MPU s batteries every 6 months:Yes  Have equipment serviced yearly (if applicable):Yes

## 2024-12-13 NOTE — LETTER
12/13/2024      RE: Navin Lutz  [address] [place] [address]       Dear Colleague,    Thank you for the opportunity to participate in the care of your patient, Navin Lutz, at the [facility] [place] at [facility] [place]. Please see a copy of my visit note below.    Advanced Heart Failure/Transplant Clinic Note    HPI  Dear colleagues,     I had the pleasure of seeing Mr. Navin Lutz in the Cardiology clinic.  As you know, Mr. Navin Lutz is a pleasant 54 year old male with a past medical history of chronic HFrEF 2/2 NICM s/p HM3 LVAD as BTT on 6/14/24 who presents for ongoing evaluation and management.    His postoperative course was relatively unremarkable. He was treated with antibiotics for Klebsiella pneumonia with a 10-day course.  He developed a left pleural effusion that required to be drained with a pigtail catheter. He also had abnormal elevated LFTs, which were thought to be iatrogenic due to multiple medications (statin, acetaminophen, azithromycin).  Acetaminophen and statin were held.  His LFTs have remained elevated. During workup for his LVAD he was found to have INRs that were discordant with Chromogenic Factor X and hematology was consulted. Found to be lupus anticoagulant positive, and Chromogenic factor X monitoring was recommended for warfarin dosing as INR not reliable. Goal CFX 20-40% which correlates with INR 2-3. Hematology recommended repeat outpatient lupus anticoagulant testing at the end of July, and if negative, INR monitoring may be an option. He presented back to ER soon after discharge with sudden onset focal neurologic deficit with diplopia. Symptoms resolved on hospital day 1. He has several risk factors for stroke including recent VAD and known lupus anticoagulant positivity. Note: CFx 10 had been borderline subtherapeutic at time of hospital discharge and after discharge. CTA without large vessel occlusion. EKG  is NSR, no hx of afib or flutter. Neurology consulted. Transthoracic echo with bubble study was unremarkable. Head CT negative for ischemia or bleed on 7/9 and 7/10/24. Optho consulted - Anti-Ach receptor and MUSK antibodies recommend to r/o myasthenia gravis. He was also started on aspirin 81 mg daily.     Patient feels well lately except for an episode of being nausea and dizzy. He has not had any recurrence of this. He is walking >2.5 miles per day without any difficulty. He denies chest pain, shortness of breath, PND/orthopnea, palpitations, syncope, leg swelling, LVAD alarms, dark urine, blood in stool, concerns with driveline or driveline site.    ROS:  A complete 12-point ROS was negative except as above.    PAST MEDICAL HISTORY:  Patient Active Problem List   Diagnosis     Acute on chronic systolic CHF (congestive heart failure) (H)     Chest pain     ICD (implantable cardioverter-defibrillator) in place     Inguinal hernia     Multiple lung nodules on CT     Non-ischemic cardiomyopathy (H)     Pure hypercholesterolemia     RAD (reactive airway disease) with wheezing, mild intermittent, uncomplicated     Acute deep vein thrombosis (DVT) of other vein of left upper extremity (H)     Cardiogenic shock (H)     Acute decompensated heart failure (H)     Chronic systolic congestive heart failure (H)     Anticoagulated on warfarin     LVAD (left ventricular assist device) present (H)     Chronic systolic heart failure (H)     Anticoagulated on Coumadin     Diplopia     Stroke-like symptoms     TIA (transient ischemic attack)        FAMILY HISTORY:  No family history on file.    SOCIAL HISTORY:  Social History     Tobacco Use     Smoking status: Never     Smokeless tobacco: Never   Substance Use Topics     Alcohol use: Never     Drug use: Never        ALLERGIES:  No Known Allergies    CURRENT MEDICATIONS:  Current Outpatient Medications   Medication Sig Dispense Refill     aspirin (ASA) 81 MG chewable tablet Take 1  tablet (81 mg) by mouth daily 90 tablet 3     carvedilol (COREG) 6.25 MG tablet Take 1 tablet (6.25 mg) by mouth 2 times daily (with meals) 180 tablet 3     empagliflozin (JARDIANCE) 10 MG TABS tablet Take 1 tablet (10 mg) by mouth daily 90 tablet 1     gabapentin (NEURONTIN) 100 MG capsule Take 200 mg by mouth.       lisinopril (ZESTRIL) 5 MG tablet Take 3 tablets (15 mg) by mouth 2 times daily. 540 tablet 3     rosuvastatin (CRESTOR) 10 MG tablet Take 1 tablet (10 mg) by mouth daily 90 tablet 3     spironolactone (ALDACTONE) 25 MG tablet Take 1 tablet (25 mg) by mouth every evening 90 tablet 3     warfarin ANTICOAGULANT (COUMADIN) 5 MG tablet Take two tablets (10 mg) daily. Next chromogenic factor X on 7/12 in clinic. Always follow the most recent instructions from your INR clinic.         EXAM:  BP (!) 70/0 (BP Location: Right arm, Patient Position: Chair, Cuff Size: Adult Large)   Wt 98.2 kg (216 lb 6.4 oz)   SpO2 96%   BMI 29.35 kg/m      General: appears comfortable, alert and interactive, in no acute distress  Head: normocephalic, atraumatic  Eyes: anicteric sclera, EOMI  Mouth: MMM  Neck: supple, no cervical adenopathy  CV: regular rate and rhythm, LVAD hum, estimated JVP ~7 cm  Resp: clear, no rales or wheezing  GI: soft, nontender, nondistended, driveline bandage is c/d/i  Extremities: warm, no peripheral edema  Neurological: alert and oriented, no focal deficits  Psych: normal mood and affect  Derm: no rashes on exposed surfaces    Weight  Wt Readings from Last 10 Encounters:   12/13/24 96.3 kg (212 lb 4.9 oz)   12/13/24 98.2 kg (216 lb 6.4 oz)   09/04/24 94.8 kg (209 lb)   08/14/24 87.5 kg (193 lb)   07/31/24 89.8 kg (198 lb)   07/29/24 90.1 kg (198 lb 11.2 oz)   07/24/24 88.5 kg (195 lb)   07/17/24 88 kg (194 lb)   07/12/24 85.2 kg (187 lb 14.4 oz)   07/09/24 84.9 kg (187 lb 3.2 oz)       I personally reviewed recent labs and data as below and discussed the results with the patient in clinic  today.  Labs:  CBC RESULTS:  Lab Results   Component Value Date    WBC 9.8 12/13/2024    RBC 6.34 (H) 12/13/2024    HGB 18.0 (H) 12/13/2024    HCT 53.3 (H) 12/13/2024    MCV 84 12/13/2024    MCH 28.4 12/13/2024    MCHC 33.8 12/13/2024    RDW 16.0 (H) 12/13/2024     12/13/2024       CMP RESULTS:  Lab Results   Component Value Date     12/13/2024    POTASSIUM 4.6 12/13/2024    POTASSIUM 4.5 06/14/2024    CHLORIDE 104 12/13/2024    CHLORIDE 104 12/03/2024    CO2 23 12/13/2024    ANIONGAP 8 12/13/2024     (H) 12/13/2024     (H) 07/09/2024    BUN 13.6 12/13/2024    CR 1.34 (H) 12/13/2024    GFRESTIMATED 63 12/13/2024    GFRESTIMATED >60 07/09/2024    NAM 9.1 12/13/2024    BILITOTAL 0.6 12/13/2024    ALBUMIN 3.9 12/13/2024    ALKPHOS 112 12/13/2024    ALT 35 12/13/2024    AST 31 12/13/2024      Recent Labs   Lab Test 07/10/24  0646 11/18/23  0018   CHOL 195 161   HDL 36* 48   * 96   TRIG 117 85        Testing/Procedures:  I personally visualized and interpreted:  Echo:  7/9/24  s/p HeartMate 3 LVAD at 5400 rpm  LVIDD is 5.6 cm. Left ventricular function is decreased. The ejection fraction is 15-20% (severely reduced).  LVAD cannula was seen in the expected anatomic position in the LV apex.  Outflow graft is visualized. Interventricular septum is midline.  The right ventricle is normal size. Global right ventricular function is moderately reduced.  The aortic valve does not open during the cardiac cycle.  The inferior vena cava was normal in size with preserved respiratory variability.  No pericardial effusion is present.  This study was compared with the study from 6/28/2024. Left ventricular size is smaller.     Cardiac Catheterization:  6/12/24, prior to LVAD implant  BSA 2.14 m2  /73/85 mmHg  RA mean of 10 mmHg and V wave of 15 mmhg  PA 57/35/48 mmHg  PCWP --/--/30  Teressa CO/CI 3.9/1.8   PVR 4.6  PA sat 63%   Hgb 17 g/dL      Right sided filling pressures are moderately  elevated.     Left sided filling pressures are moderately elevated.     Severely elevated pulmonary artery hypertension.     Reduced cardiac output level.     Hemodynamic data has been modified in Epic per physician review.     Elevated bilateral filling pressures with severe pulmonary hypertension and reduced cardiac output  Uncomplicated R femoral radial access with uncomplicated IABP insertion. Position confirmed on fluoroscopy.   Leave in Mingus-Hugh catheter via RIJ access.     TTE 9/3/24  Interpretation Summary  HM3 LVAD 5400 RPM.  Moderate left ventricular dilation is present.  IVS bowing towards R side.  Left ventricular function is decreased. The ejection fraction is <30%  (severely reduced).  Aortic remains closed  Global right ventricular function is mildly reduced.  The right ventricle is normal size.  No pericardial effusion is present.  No significant valvular abnormalities present.  Inflow velocity 40 cm/s, outflow cannot be asssesed.  This study was compared with the study from 7/31/2024 .Pericardial effusion resolved, otherwise no change.    TTE 12/13/24  Interpretation Summary  S/p Heartmate III LVAD at 5400 RPM.     Left ventricular function is decreased. The ejection fraction is 10-20%  (severely reduced).  LVAD cannula was seen in the expected anatomic position in the LV apex. Normal  inflow and outflow Doppler. Septum is midline.  The right ventricle is normal size. Global right ventricular function is  mildly reduced.  Aortic valve remains closed during the cardiac cycle. No significant aortic  regurgitation noted.  No pericardial effusion is present.    RHC 12/13/24  BP: 120/85/105 mmHg  RA: 1/1/1 mmHg  RV: 16/1 mmHg  PA: 16/7/10 mmHg  W: 1/3/1 mmHg  PA sat: 73.4 %  Teressa CO/CI: 6.2/2.8  Thermo CO/CI: 4.8/2.2  PVR: 1.45 BYRD  Right sided filling pressures are normal. Left sided filling pressures are normal. Normal PA pressures. Basal HM3 LVAD Settings:  Speed 5400 rpm    Outside results of  note:  Outside records were obtained and relevant results/notes have been incorporated into HPI.    Assessment and Plan:     In summary, 54 year old male with a past medical history of chronic HFrEF 2/2 NICM s/p HM3 LVAD as BTT on 6/14/24 who presents for ongoing evaluation and management.    # Chronic systolic heart failure secondary to NICM s/p HM3 LVAD as 6/14/24 on BTT  Stage D. NYHA Class II  Fluid status: euvolemic without diuretics  ACEi/ARB/ARNI: Continue lisinopril 15 mg BID  BB: Continue carvedilol 6.25 mg BID  Aldosterone antagonist: continue spironolactone 25 mg daily  SGLT2i: Continue empagliflozin 10 mg daily  SCD prophylaxis: s/p ICD  BP: MAP goal 65-85  LDH trends: Stable  Anticoagulation: warfarin with INR goal 2-3  Antiplatelet: On ASA 81 mg daily given prior TIA on LVAD support  Cardiac rehab: completed    VAD interrogation today: VAD interrogation reviewed with VAD coordinator. Agree with findings. No frequent PI events, no power spikes, speed drops, or other findings suspicious of pump malfunction noted.      # Acute onset diplopia, right esotropia  # Thought secondary to TIA  All symptoms are resolved.  - Will continue to monitor     # Altered taste and smell, resolved  This is longstanding and preceded his stroke. He had the symptoms after COVID in 2022 but had improved and now have more recently worsened with hospitalization during his LVAD implantation.  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 Does have positive lupus anticoagulant positivity detected incidentally on pre-LVAD workup and thus need to utilize chromogenic factor to guide INR at least until repeat testing.  - Warfarin as above  - OT hand therapies for neuropathy  - Continue  gabapentin     # Hepatocellular pattern of liver injury, stable  Dante to be related to drug effects during last admission in the context of statin / tylenol / azithromycin interaction.   - Will continue to monitor and consider further evaluation based on the trend     # Hyperlipidemia  - Continue rosuvastatin 20 mg daily    # Neck Pain with Dizzy Spells  Unclear what is occurring, but not happening daily and improves with increased hydration.  - Continue drinking plenty of fluids    # JASIEL  - Increase fluid intake    Optimal Vascular Metrics    Blood Pressure   BP < 140/90 Yes    On Aspirin  Yes    On Statin  Yes    Tobacco use  No       To Do:  - No medication changes today  - Follow up pending labs from today  - Increase fluid intake  - Follow up in 3 months with LVAD ARIEL  - Follow up with me in 6 months with next surveillance testing  - Will discuss with the multidisciplinary team about upgrading back to status 4 given patient is now 6+ months post-op    The patient states understanding and is agreeable with plan.   Feel free to contact myself regarding questions or concerns. It was a pleasure to see this patient today.    A total of 47 minutes was spent on the day of the visit, which includes preparation for the visit (reviewing previous medical records, laboratories and investigations), in conjunction with the actual clinic visit with the patient, which includes obtaining a history and physical exam, creating and reviewing the care plan, patient education (and family if present), counseling, documenting clinical information in the electronic health record and care coordination.     The longitudinal plan of care for the diagnosis(es)/condition(s) as documented were addressed during this visit. Due to the added complexity in care, I will continue to support Navin in the subsequent management and with ongoing continuity of care.     Micaela Silvestre MD   of Medicine, [employer]   [place]  Advanced Heart Failure and Transplant Cardiology       [facility]ert[facility] Briceno         Please do not hesitate to contact me if you have any questions/concerns.     Sincerely,     Micaela Silvestre MD

## 2024-12-13 NOTE — PROGRESS NOTES
Patient arrived to floor with RN from CCL s/p [facility]. VSS. RIJ/LIJ sites CDI, no hematoma. Patient denies pain, tolerating regular diet. Discharge instructions reviewed with patient.

## 2024-12-13 NOTE — PROGRESS NOTES
Advanced Heart Failure/Transplant Clinic Note    HPI  Dear colleagues,     I had the pleasure of seeing Mr. Navin Lutz in the Cardiology clinic.  As you know, Mr. Navin Lutz is a pleasant 54 year old male with a past medical history of chronic HFrEF 2/2 NICM s/p HM3 LVAD as BTT on 6/14/24 who presents for ongoing evaluation and management.    His postoperative course was relatively unremarkable. He was treated with antibiotics for Klebsiella pneumonia with a 10-day course.  He developed a left pleural effusion that required to be drained with a pigtail catheter. He also had abnormal elevated LFTs, which were thought to be iatrogenic due to multiple medications (statin, acetaminophen, azithromycin).  Acetaminophen and statin were held.  His LFTs have remained elevated. During workup for his LVAD he was found to have INRs that were discordant with Chromogenic Factor X and hematology was consulted. Found to be lupus anticoagulant positive, and Chromogenic factor X monitoring was recommended for warfarin dosing as INR not reliable. Goal CFX 20-40% which correlates with INR 2-3. Hematology recommended repeat outpatient lupus anticoagulant testing at the end of July, and if negative, INR monitoring may be an option. He presented back to ER soon after discharge with sudden onset focal neurologic deficit with diplopia. Symptoms resolved on hospital day 1. He has several risk factors for stroke including recent VAD and known lupus anticoagulant positivity. Note: CFx 10 had been borderline subtherapeutic at time of hospital discharge and after discharge. CTA without large vessel occlusion. EKG is NSR, no hx of afib or flutter. Neurology consulted. Transthoracic echo with bubble study was unremarkable. Head CT negative for ischemia or bleed on 7/9 and 7/10/24. Optho consulted - Anti-Ach receptor and MUSK antibodies recommend to r/o myasthenia gravis. He was also started on aspirin 81 mg daily.     Patient feels well  lately except for an episode of being nausea and dizzy. He has not had any recurrence of this. He is walking >2.5 miles per day without any difficulty. He denies chest pain, shortness of breath, PND/orthopnea, palpitations, syncope, leg swelling, LVAD alarms, dark urine, blood in stool, concerns with driveline or driveline site.    ROS:  A complete 12-point ROS was negative except as above.    PAST MEDICAL HISTORY:  Patient Active Problem List   Diagnosis    Acute on chronic systolic CHF (congestive heart failure) (H)    Chest pain    ICD (implantable cardioverter-defibrillator) in place    Inguinal hernia    Multiple lung nodules on CT    Non-ischemic cardiomyopathy (H)    Pure hypercholesterolemia    RAD (reactive airway disease) with wheezing, mild intermittent, uncomplicated    Acute deep vein thrombosis (DVT) of other vein of left upper extremity (H)    Cardiogenic shock (H)    Acute decompensated heart failure (H)    Chronic systolic congestive heart failure (H)    Anticoagulated on warfarin    LVAD (left ventricular assist device) present (H)    Chronic systolic heart failure (H)    Anticoagulated on Coumadin    Diplopia    Stroke-like symptoms    TIA (transient ischemic attack)        FAMILY HISTORY:  No family history on file.    SOCIAL HISTORY:  Social History     Tobacco Use    Smoking status: Never    Smokeless tobacco: Never   Substance Use Topics    Alcohol use: Never    Drug use: Never        ALLERGIES:  No Known Allergies    CURRENT MEDICATIONS:  Current Outpatient Medications   Medication Sig Dispense Refill    aspirin (ASA) 81 MG chewable tablet Take 1 tablet (81 mg) by mouth daily 90 tablet 3    carvedilol (COREG) 6.25 MG tablet Take 1 tablet (6.25 mg) by mouth 2 times daily (with meals) 180 tablet 3    empagliflozin (JARDIANCE) 10 MG TABS tablet Take 1 tablet (10 mg) by mouth daily 90 tablet 1    gabapentin (NEURONTIN) 100 MG capsule Take 200 mg by mouth.      lisinopril (ZESTRIL) 5 MG tablet  Take 3 tablets (15 mg) by mouth 2 times daily. 540 tablet 3    rosuvastatin (CRESTOR) 10 MG tablet Take 1 tablet (10 mg) by mouth daily 90 tablet 3    spironolactone (ALDACTONE) 25 MG tablet Take 1 tablet (25 mg) by mouth every evening 90 tablet 3    warfarin ANTICOAGULANT (COUMADIN) 5 MG tablet Take two tablets (10 mg) daily. Next chromogenic factor X on 7/12 in clinic. Always follow the most recent instructions from your INR clinic.         EXAM:  BP (!) 70/0 (BP Location: Right arm, Patient Position: Chair, Cuff Size: Adult Large)   Wt 98.2 kg (216 lb 6.4 oz)   SpO2 96%   BMI 29.35 kg/m      General: appears comfortable, alert and interactive, in no acute distress  Head: normocephalic, atraumatic  Eyes: anicteric sclera, EOMI  Mouth: MMM  Neck: supple, no cervical adenopathy  CV: regular rate and rhythm, LVAD hum, estimated JVP ~7 cm  Resp: clear, no rales or wheezing  GI: soft, nontender, nondistended, driveline bandage is c/d/i  Extremities: warm, no peripheral edema  Neurological: alert and oriented, no focal deficits  Psych: normal mood and affect  Derm: no rashes on exposed surfaces    Weight  Wt Readings from Last 10 Encounters:   12/13/24 96.3 kg (212 lb 4.9 oz)   12/13/24 98.2 kg (216 lb 6.4 oz)   09/04/24 94.8 kg (209 lb)   08/14/24 87.5 kg (193 lb)   07/31/24 89.8 kg (198 lb)   07/29/24 90.1 kg (198 lb 11.2 oz)   07/24/24 88.5 kg (195 lb)   07/17/24 88 kg (194 lb)   07/12/24 85.2 kg (187 lb 14.4 oz)   07/09/24 84.9 kg (187 lb 3.2 oz)       I personally reviewed recent labs and data as below and discussed the results with the patient in clinic today.  Labs:  CBC RESULTS:  Lab Results   Component Value Date    WBC 9.8 12/13/2024    RBC 6.34 (H) 12/13/2024    HGB 18.0 (H) 12/13/2024    HCT 53.3 (H) 12/13/2024    MCV 84 12/13/2024    MCH 28.4 12/13/2024    MCHC 33.8 12/13/2024    RDW 16.0 (H) 12/13/2024     12/13/2024       CMP RESULTS:  Lab Results   Component Value Date     12/13/2024     POTASSIUM 4.6 12/13/2024    POTASSIUM 4.5 06/14/2024    CHLORIDE 104 12/13/2024    CHLORIDE 104 12/03/2024    CO2 23 12/13/2024    ANIONGAP 8 12/13/2024     (H) 12/13/2024     (H) 07/09/2024    BUN 13.6 12/13/2024    CR 1.34 (H) 12/13/2024    GFRESTIMATED 63 12/13/2024    GFRESTIMATED >60 07/09/2024    NAM 9.1 12/13/2024    BILITOTAL 0.6 12/13/2024    ALBUMIN 3.9 12/13/2024    ALKPHOS 112 12/13/2024    ALT 35 12/13/2024    AST 31 12/13/2024      Recent Labs   Lab Test 07/10/24  0646 11/18/23  0018   CHOL 195 161   HDL 36* 48   * 96   TRIG 117 85        Testing/Procedures:  I personally visualized and interpreted:  Echo:  7/9/24  s/p HeartMate 3 LVAD at 5400 rpm  LVIDD is 5.6 cm. Left ventricular function is decreased. The ejection fraction is 15-20% (severely reduced).  LVAD cannula was seen in the expected anatomic position in the LV apex.  Outflow graft is visualized. Interventricular septum is midline.  The right ventricle is normal size. Global right ventricular function is moderately reduced.  The aortic valve does not open during the cardiac cycle.  The inferior vena cava was normal in size with preserved respiratory variability.  No pericardial effusion is present.  This study was compared with the study from 6/28/2024. Left ventricular size is smaller.     Cardiac Catheterization:  6/12/24, prior to LVAD implant  BSA 2.14 m2  /73/85 mmHg  RA mean of 10 mmHg and V wave of 15 mmhg  PA 57/35/48 mmHg  PCWP --/--/30  Teressa CO/CI 3.9/1.8   PVR 4.6  PA sat 63%   Hgb 17 g/dL     Right sided filling pressures are moderately elevated.    Left sided filling pressures are moderately elevated.    Severely elevated pulmonary artery hypertension.    Reduced cardiac output level.    Hemodynamic data has been modified in Epic per physician review.     Elevated bilateral filling pressures with severe pulmonary hypertension and reduced cardiac output  Uncomplicated R femoral radial access with  uncomplicated IABP insertion. Position confirmed on fluoroscopy.   Leave in [place]-Hugh catheter via RIJ access.     TTE 9/3/24  Interpretation Summary  HM3 LVAD 5400 RPM.  Moderate left ventricular dilation is present.  IVS bowing towards R side.  Left ventricular function is decreased. The ejection fraction is <30%  (severely reduced).  Aortic remains closed  Global right ventricular function is mildly reduced.  The right ventricle is normal size.  No pericardial effusion is present.  No significant valvular abnormalities present.  Inflow velocity 40 cm/s, outflow cannot be asssesed.  This study was compared with the study from 7/31/2024 .Pericardial effusion resolved, otherwise no change.    TTE 12/13/24  Interpretation Summary  S/p Heartmate III LVAD at 5400 RPM.     Left ventricular function is decreased. The ejection fraction is 10-20%  (severely reduced).  LVAD cannula was seen in the expected anatomic position in the LV apex. Normal  inflow and outflow Doppler. Septum is midline.  The right ventricle is normal size. Global right ventricular function is  mildly reduced.  Aortic valve remains closed during the cardiac cycle. No significant aortic  regurgitation noted.  No pericardial effusion is present.    RHC 12/13/24  BP: 120/85/105 mmHg  RA: 1/1/1 mmHg  RV: 16/1 mmHg  PA: 16/7/10 mmHg  W: 1/3/1 mmHg  PA sat: 73.4 %  Teressa CO/CI: 6.2/2.8  Thermo CO/CI: 4.8/2.2  PVR: 1.45 BYRD  Right sided filling pressures are normal. Left sided filling pressures are normal. Normal PA pressures. Basal HM3 LVAD Settings:  Speed 5400 rpm    Outside results of note:  Outside records were obtained and relevant results/notes have been incorporated into HPI.    Assessment and Plan:     In summary, 54 year old male with a past medical history of chronic HFrEF 2/2 NICM s/p HM3 LVAD as BTT on 6/14/24 who presents for ongoing evaluation and management.    # Chronic systolic heart failure secondary to NICM s/p HM3 LVAD as 6/14/24 on  BTT  Stage D. NYHA Class II  Fluid status: euvolemic without diuretics  ACEi/ARB/ARNI: Continue lisinopril 15 mg BID  BB: Continue carvedilol 6.25 mg BID  Aldosterone antagonist: continue spironolactone 25 mg daily  SGLT2i: Continue empagliflozin 10 mg daily  SCD prophylaxis: s/p ICD  BP: MAP goal 65-85  LDH trends: Stable  Anticoagulation: warfarin with INR goal 2-3  Antiplatelet: On ASA 81 mg daily given prior TIA on LVAD support  Cardiac rehab: completed    VAD interrogation today: VAD interrogation reviewed with VAD coordinator. Agree with findings. No frequent PI events, no power spikes, speed drops, or other findings suspicious of pump malfunction noted.      # Acute onset diplopia, right esotropia  # Thought secondary to TIA  All symptoms are resolved.  - Will continue to monitor     # Altered taste and smell, resolved  This is longstanding and preceded his stroke. He had the symptoms after COVID in 2022 but had improved and now have more recently worsened with hospitalization during his LVAD implantation.  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 Does have positive lupus anticoagulant positivity detected incidentally on pre-LVAD workup and thus need to utilize chromogenic factor to guide INR at least until repeat testing.  - Warfarin as above  - OT hand therapies for neuropathy  - Continue gabapentin     # Hepatocellular pattern of liver injury, stable  [place] to be related to drug effects during last admission in the context of statin / tylenol / azithromycin interaction.   - Will continue to monitor and consider further evaluation based on the trend     # Hyperlipidemia  - Continue rosuvastatin 20 mg daily    # Neck Pain with Dizzy Spells  Unclear what is  occurring, but not happening daily and improves with increased hydration.  - Continue drinking plenty of fluids    # JASIEL  - Increase fluid intake    Optimal Vascular Metrics    Blood Pressure   BP < 140/90 Yes    On Aspirin  Yes    On Statin  Yes    Tobacco use  No       To Do:  - No medication changes today  - Follow up pending labs from today  - Increase fluid intake  - Follow up in 3 months with LVAD ARIEL  - Follow up with me in 6 months with next surveillance testing  - Will discuss with the multidisciplinary team about upgrading back to status 4 given patient is now 6+ months post-op    The patient states understanding and is agreeable with plan.   Feel free to contact myself regarding questions or concerns. It was a pleasure to see this patient today.    A total of 47 minutes was spent on the day of the visit, which includes preparation for the visit (reviewing previous medical records, laboratories and investigations), in conjunction with the actual clinic visit with the patient, which includes obtaining a history and physical exam, creating and reviewing the care plan, patient education (and family if present), counseling, documenting clinical information in the electronic health record and care coordination.     The longitudinal plan of care for the diagnosis(es)/condition(s) as documented were addressed during this visit. Due to the added complexity in care, I will continue to support Navin in the subsequent management and with ongoing continuity of care.     Micaela Silvestre MD   of Medicine, [facility]  Advanced Heart Failure and Transplant Cardiology     Libertad Cole

## 2024-12-13 NOTE — Clinical Note
Potential access sites were evaluated for patency using ultrasound.   The left jugular vein was selected. Access was obtained under with Sonosite guidance using a micropuncture 21 gauge needle with direct visualization of needle entry.

## 2024-12-15 LAB
LABORATORY REPORT: NORMAL
PETH INTERPRETATION: NORMAL
PLPETH BLD-MCNC: <10 NG/ML
POPETH BLD-MCNC: <10 NG/ML

## 2024-12-16 DIAGNOSIS — I50.23 ACUTE ON CHRONIC SYSTOLIC CHF (CONGESTIVE HEART FAILURE) (H): Primary | ICD-10-CM

## 2024-12-16 LAB
COTININE SERPL-MCNC: <5 NG/ML
MDC_IDC_EPISODE_DTM: NORMAL
MDC_IDC_EPISODE_DURATION: 1 S
MDC_IDC_EPISODE_DURATION: 2 S
MDC_IDC_EPISODE_DURATION: 2 S
MDC_IDC_EPISODE_DURATION: 3 S
MDC_IDC_EPISODE_ID: 189
MDC_IDC_EPISODE_ID: 190
MDC_IDC_EPISODE_ID: 191
MDC_IDC_EPISODE_ID: 192
MDC_IDC_EPISODE_ID: 193
MDC_IDC_EPISODE_ID: 194
MDC_IDC_EPISODE_ID: 195
MDC_IDC_EPISODE_ID: 196
MDC_IDC_EPISODE_ID: 197
MDC_IDC_EPISODE_TYPE: NORMAL
MDC_IDC_LEAD_CONNECTION_STATUS: NORMAL
MDC_IDC_LEAD_IMPLANT_DT: NORMAL
MDC_IDC_LEAD_LOCATION: NORMAL
MDC_IDC_LEAD_LOCATION_DETAIL_1: NORMAL
MDC_IDC_LEAD_MFG: NORMAL
MDC_IDC_LEAD_MODEL: NORMAL
MDC_IDC_LEAD_POLARITY_TYPE: NORMAL
MDC_IDC_LEAD_SERIAL: NORMAL
MDC_IDC_MSMT_BATTERY_DTM: NORMAL
MDC_IDC_MSMT_BATTERY_REMAINING_LONGEVITY: 153 MO
MDC_IDC_MSMT_BATTERY_RRT_TRIGGER: NORMAL
MDC_IDC_MSMT_BATTERY_VOLTAGE: 3.02 V
MDC_IDC_MSMT_CAP_CHARGE_DTM: NORMAL
MDC_IDC_MSMT_CAP_CHARGE_ENERGY: 18 J
MDC_IDC_MSMT_CAP_CHARGE_TIME: 3.8 S
MDC_IDC_MSMT_CAP_CHARGE_TYPE: NORMAL
MDC_IDC_MSMT_LEADCHNL_RV_IMPEDANCE_VALUE: 399 OHM
MDC_IDC_MSMT_LEADCHNL_RV_IMPEDANCE_VALUE: 475 OHM
MDC_IDC_MSMT_LEADCHNL_RV_PACING_THRESHOLD_AMPLITUDE: 0.75 V
MDC_IDC_MSMT_LEADCHNL_RV_PACING_THRESHOLD_AMPLITUDE: 0.75 V
MDC_IDC_MSMT_LEADCHNL_RV_PACING_THRESHOLD_PULSEWIDTH: 0.4 MS
MDC_IDC_MSMT_LEADCHNL_RV_PACING_THRESHOLD_PULSEWIDTH: 0.4 MS
MDC_IDC_MSMT_LEADCHNL_RV_SENSING_INTR_AMPL: 8.6 MV
MDC_IDC_PG_IMPLANT_DTM: NORMAL
MDC_IDC_PG_MFG: NORMAL
MDC_IDC_PG_MODEL: NORMAL
MDC_IDC_PG_SERIAL: NORMAL
MDC_IDC_PG_TYPE: NORMAL
MDC_IDC_SESS_CLINIC_NAME: NORMAL
MDC_IDC_SESS_DTM: NORMAL
MDC_IDC_SESS_TYPE: NORMAL
MDC_IDC_SET_BRADY_LOWRATE: 60 {BEATS}/MIN
MDC_IDC_SET_BRADY_MAX_SENSOR_RATE: 130 {BEATS}/MIN
MDC_IDC_SET_BRADY_MODE: NORMAL
MDC_IDC_SET_LEADCHNL_RA_SENSING_SENSITIVITY: NORMAL
MDC_IDC_SET_LEADCHNL_RV_PACING_AMPLITUDE: 2 V
MDC_IDC_SET_LEADCHNL_RV_PACING_ANODE_ELECTRODE_1: NORMAL
MDC_IDC_SET_LEADCHNL_RV_PACING_ANODE_LOCATION_1: NORMAL
MDC_IDC_SET_LEADCHNL_RV_PACING_CAPTURE_MODE: NORMAL
MDC_IDC_SET_LEADCHNL_RV_PACING_CATHODE_ELECTRODE_1: NORMAL
MDC_IDC_SET_LEADCHNL_RV_PACING_CATHODE_LOCATION_1: NORMAL
MDC_IDC_SET_LEADCHNL_RV_PACING_POLARITY: NORMAL
MDC_IDC_SET_LEADCHNL_RV_PACING_PULSEWIDTH: 0.4 MS
MDC_IDC_SET_LEADCHNL_RV_SENSING_ANODE_ELECTRODE_1: NORMAL
MDC_IDC_SET_LEADCHNL_RV_SENSING_ANODE_LOCATION_1: NORMAL
MDC_IDC_SET_LEADCHNL_RV_SENSING_CATHODE_ELECTRODE_1: NORMAL
MDC_IDC_SET_LEADCHNL_RV_SENSING_CATHODE_LOCATION_1: NORMAL
MDC_IDC_SET_LEADCHNL_RV_SENSING_POLARITY: NORMAL
MDC_IDC_SET_LEADCHNL_RV_SENSING_SENSITIVITY: 0.3 MV
MDC_IDC_SET_ZONE_DETECTION_BEATS_DENOMINATOR: 16 {BEATS}
MDC_IDC_SET_ZONE_DETECTION_BEATS_DENOMINATOR: 32 {BEATS}
MDC_IDC_SET_ZONE_DETECTION_BEATS_DENOMINATOR: 40 {BEATS}
MDC_IDC_SET_ZONE_DETECTION_BEATS_NUMERATOR: 16 {BEATS}
MDC_IDC_SET_ZONE_DETECTION_BEATS_NUMERATOR: 30 {BEATS}
MDC_IDC_SET_ZONE_DETECTION_BEATS_NUMERATOR: 32 {BEATS}
MDC_IDC_SET_ZONE_DETECTION_INTERVAL: 300 MS
MDC_IDC_SET_ZONE_DETECTION_INTERVAL: 360 MS
MDC_IDC_SET_ZONE_DETECTION_INTERVAL: 400 MS
MDC_IDC_SET_ZONE_STATUS: NORMAL
MDC_IDC_SET_ZONE_TYPE: NORMAL
MDC_IDC_SET_ZONE_VENDOR_TYPE: NORMAL
MDC_IDC_STAT_AT_BURDEN_PERCENT: 0 %
MDC_IDC_STAT_AT_DTM_END: NORMAL
MDC_IDC_STAT_AT_DTM_START: NORMAL
MDC_IDC_STAT_BRADY_DTM_END: NORMAL
MDC_IDC_STAT_BRADY_DTM_START: NORMAL
MDC_IDC_STAT_BRADY_RV_PERCENT_PACED: 2.71 %
MDC_IDC_STAT_EPISODE_RECENT_COUNT: 0
MDC_IDC_STAT_EPISODE_RECENT_COUNT: 1
MDC_IDC_STAT_EPISODE_RECENT_COUNT: 31
MDC_IDC_STAT_EPISODE_RECENT_COUNT: 4
MDC_IDC_STAT_EPISODE_RECENT_COUNT_DTM_END: NORMAL
MDC_IDC_STAT_EPISODE_RECENT_COUNT_DTM_START: NORMAL
MDC_IDC_STAT_EPISODE_TOTAL_COUNT: 0
MDC_IDC_STAT_EPISODE_TOTAL_COUNT: 171
MDC_IDC_STAT_EPISODE_TOTAL_COUNT: 24
MDC_IDC_STAT_EPISODE_TOTAL_COUNT_DTM_END: NORMAL
MDC_IDC_STAT_EPISODE_TOTAL_COUNT_DTM_START: NORMAL
MDC_IDC_STAT_EPISODE_TYPE: NORMAL
MDC_IDC_STAT_TACHYTHERAPY_ATP_DELIVERED_RECENT: 0
MDC_IDC_STAT_TACHYTHERAPY_ATP_DELIVERED_TOTAL: 0
MDC_IDC_STAT_TACHYTHERAPY_RECENT_DTM_END: NORMAL
MDC_IDC_STAT_TACHYTHERAPY_RECENT_DTM_START: NORMAL
MDC_IDC_STAT_TACHYTHERAPY_SHOCKS_ABORTED_RECENT: 0
MDC_IDC_STAT_TACHYTHERAPY_SHOCKS_ABORTED_TOTAL: 0
MDC_IDC_STAT_TACHYTHERAPY_SHOCKS_DELIVERED_RECENT: 0
MDC_IDC_STAT_TACHYTHERAPY_SHOCKS_DELIVERED_TOTAL: 0
MDC_IDC_STAT_TACHYTHERAPY_TOTAL_DTM_END: NORMAL
MDC_IDC_STAT_TACHYTHERAPY_TOTAL_DTM_START: NORMAL
NICOTINE SERPL-MCNC: <5 NG/ML

## 2024-12-19 LAB — LABCORP INTERFACED MISCELLANEOUS TEST RESULT: NORMAL

## 2024-12-23 ENCOUNTER — MYC MEDICAL ADVICE (OUTPATIENT)
Dept: ANTICOAGULATION | Facility: CLINIC | Age: 54
End: 2024-12-23
Payer: COMMERCIAL

## 2024-12-30 DIAGNOSIS — I50.22 CHRONIC SYSTOLIC CONGESTIVE HEART FAILURE (H): Primary | ICD-10-CM

## 2024-12-30 DIAGNOSIS — Z95.811 LVAD (LEFT VENTRICULAR ASSIST DEVICE) PRESENT (H): ICD-10-CM

## 2024-12-30 LAB — INR HOME MONITORING: 3.7 RATIO (ref 2.5–3)

## 2025-01-07 ENCOUNTER — TELEPHONE (OUTPATIENT)
Dept: ANTICOAGULATION | Facility: CLINIC | Age: 55
End: 2025-01-07
Payer: COMMERCIAL

## 2025-01-07 NOTE — TELEPHONE ENCOUNTER
ANTICOAGULATION     Navin J Bolivar is overdue for an INR check.     Left message reminding patient to check INR with their home meter and call results to the home monitoring company as soon as possible.       *[facility] received an INR today that was dated for 12/30 (8 days ago). Requested Chris to redraw his INR and submit a new INR result to the home meter company*     Brandee Gaines RN  1/7/2025  Anticoagulation Clinic  [facility] for routing messages: yee FOREMAN LVAD  [facility] patient phone line: [phone]

## 2025-01-12 LAB
SA 1  COMMENTS: NORMAL
SA 1 CELL: NORMAL
SA 1 TEST METHOD: NORMAL
SA 2 CELL: NORMAL
SA 2 COMMENTS: NORMAL
SA 2 TEST METHOD: NORMAL
SA1 HI RISK ABY: NORMAL
SA1 MOD RISK ABY: NORMAL
SA2 HI RISK ABY: NORMAL
SA2 MOD RISK ABY: NORMAL
UNACCEPTABLE ANTIGENS: NORMAL
UNOS CPRA: 0

## 2025-01-16 ENCOUNTER — DOCUMENTATION ONLY (OUTPATIENT)
Dept: ANTICOAGULATION | Facility: CLINIC | Age: 55
End: 2025-01-16
Payer: COMMERCIAL

## 2025-01-16 NOTE — PROGRESS NOTES
ANTICOAGULATION     Navin Lutz is overdue for an INR check.     Reminder letter sent    JESSICA CALDERON RN  1/16/2025  Anticoagulation Clinic  [facility] for routing messages: yee FOREMAN LVAD  [facility] patient phone line: [phone]

## 2025-01-16 NOTE — LETTER
[facility] ANTICOAGULATION [facility]INIC  [address] [address]  Phone: [phone]  Fax: [phone]   January 16, 2025        Navin Lutz  [address] [address]            Dear Navin,    You are currently under the care of [facility] for your warfarin (Coumadin , Jantoven ) therapy.  We are contacting you because our records show you were due for an INR on 12/19/2024.    There are potentially serious risks when taking warfarin without careful monitoring and we want to make sure you are safely managed.  Routine lab monitoring is required for warfarin refills.     Please schedule an appointment at your local lab as soon as possible. If you recently tested, but have not yet heard from us, please reply or give us a call at [phone] so we can work with you and your local lab to obtain the results. If there has been a change in your care or other concerns, please let us know so we can help and/or update our records.         Sincerely,       [facility]

## 2025-01-20 ENCOUNTER — DOCUMENTATION ONLY (OUTPATIENT)
Dept: ANTICOAGULATION | Facility: CLINIC | Age: 55
End: 2025-01-20
Payer: COMMERCIAL

## 2025-01-20 NOTE — PROGRESS NOTES
ANTICOAGULATION CLINIC REFERRAL RENEWAL REQUEST       An annual renewal order is required for all patients referred to [facility].?  Please review and sign the pended referral order for Navin Lutz.       ANTICOAGULATION SUMMARY      Warfarin indication(s)   LVAD    Mechanical heart valve present?  NO       Current goal range   INR: 2.0-3.0     Goal appropriate for indication? Goal INR 2-3, standard for indication(s) above     Time in Therapeutic Range (TTR)  (Goal > 60%) 21.8%       Office visit with referring provider's group within last year yes on 12/13/24       Brandee Gaines RN  [facility]

## 2025-01-22 ENCOUNTER — CARE COORDINATION (OUTPATIENT)
Dept: CARDIOLOGY | Facility: CLINIC | Age: 55
End: 2025-01-22
Payer: COMMERCIAL

## 2025-01-22 NOTE — PROGRESS NOTES
D:  Pt called to report L sided chest pain.  States it feels like a muscle pull.  Pt denies N/V, lightheadedness or SOB.  Denies cough or respiratory symptoms.  Pain does not worsen w/ deep breath but he can feel it more when he moves his L arm around.  Weight today 207 and it is steady.       01/22/25 1100   Heartmate 3 LEFT VS   Flow (Lpm) 4.7 Lpm   Pulse Index (PI) 3.1 PI   Speed (rpm) 5400 rpm   Power (bassett) 4 bassett     I:  Explained to pt it does not sound cardiac in nature.  May take tylenol.  Use ice or heat and to follow up w/ PCP if continues or worsens.  A:  Pt verbalized understanding.  P:  VAD Coordinator available for questions or concerns.

## 2025-02-03 ENCOUNTER — ANTICOAGULATION THERAPY VISIT (OUTPATIENT)
Dept: ANTICOAGULATION | Facility: CLINIC | Age: 55
End: 2025-02-03
Payer: COMMERCIAL

## 2025-02-03 DIAGNOSIS — I50.22 CHRONIC SYSTOLIC CONGESTIVE HEART FAILURE (H): ICD-10-CM

## 2025-02-03 DIAGNOSIS — Z79.01 ANTICOAGULATED ON COUMADIN: ICD-10-CM

## 2025-02-03 DIAGNOSIS — Z95.811 LVAD (LEFT VENTRICULAR ASSIST DEVICE) PRESENT (H): Primary | ICD-10-CM

## 2025-02-03 DIAGNOSIS — I50.22 CHRONIC SYSTOLIC HEART FAILURE (H): ICD-10-CM

## 2025-02-03 LAB — INR HOME MONITORING: 3.9 RATIO (ref 2.5–3)

## 2025-02-03 NOTE — PROGRESS NOTES
"ANTICOAGULATION MANAGEMENT     Navin Lutz 54 year old male is on warfarin with supratherapeutic INR result. (Goal INR 2.5-3.0) *Per Dr. Silvestre 1/17/25 INR goal range 2-3 (will send Referral with this goal range)    Recent labs: (last 7 days)     02/03/25  1143   INR 3.9*     Navin says this 3.9 INR is actually from Thursday 1/30/25 - said he was having issues with mDINR reporting. Asked that he call [employer] to troubleshoot with them.    ASSESSMENT     Source(s): Chart Review and Patient/Caregiver Call     Warfarin doses taken: Warfarin taken as instructed  Diet:  Had more veggies than usual, not less. Denied alcohol intake.    Medication/supplement changes: None noted and not taking Tylenol.  New illness, injury, or hospitalization: 1/22/25 had notified VAD CC of Left chest pain and felt like he had pulled a muscle. Asked about this today and he said it felt like a muscle was \"catching on the pump,\" said it was a little better. Advised to let VAD team know he is still having discomfort.  Signs or symptoms of bleeding or clotting: Yes: some on tissue only, in AM when blows nose.  Previous result: Therapeutic last visit; previously outside of goal range  Additional findings:  Said he picked up Warfarin refill today. Did not feel comfortable dosing off INR from Thursday 1/30/25 - advised that he have serving of vegetables tonight and recheck INR tomorrow - he said he will check tomorrow morning.       PLAN     Recommended plan for temporary change(s) affecting INR     Dosing Instructions: Continue your current warfarin dose with next INR in 1 day       Summary  As of 2/3/2025      Full warfarin instructions:  5 mg every Mon; 7.5 mg all other days   Next INR check:  2/4/2025               Telephone call with Navin who verbalizes understanding and agrees to plan    Patient to recheck with home meter    Education provided: Taking warfarin: prescribed tablet strength and color  Goal range and lab monitoring: goal " range and significance of current result and Importance of following up at instructed interval  Dietary considerations: impact of vitamin K foods on INR and vitamin K content of foods  Symptom monitoring: monitoring for bleeding signs and symptoms  Information on home INR monitoring    Plan made with [facility] Pharmacist Roxana Chang and per LVAD protocol    Kajal Rosa RN  2/3/2025  Anticoagulation Clinic  [facility] for routing messages: p ANTICOAG LVAD  [facility] patient phone line: [phone]        _______________________________________________________________________     Anticoagulation Episode Summary       Current INR goal:  2.5-3.0   TTR:  20.8% (7.1 mo)   Target end date:  Indefinite   Send INR reminders to:  ANTICOAG LVAD    Indications    LVAD (left ventricular assist device) present (H) [Z95.811]  Chronic systolic heart failure (H) [I50.22]  Chronic systolic congestive heart failure (H) [I50.22]  Anticoagulated on Coumadin [Z79.01]             Comments:  GOAL: INR 2-3 per Dr. Silvestre 1/17/25  Follow VAD Anticoag protocol:Yes: HeartMate 3  Bridging: Enoxaparin  Date VAD placed: 6/14/24 8/23/23: internal jugular Thrombus; 6/21/24: lupus anticoagulant +  8/5/24: he does NOT have lupus               Anticoagulation Care Providers       Provider Role Specialty Phone number    Micaela Silvestre MD Referring Advanced Heart Failure and Transplant Cardiology [phone]    Chantal Dallas MD Referring Advanced Heart Failure and Transplant Cardiology [phone]

## 2025-02-04 ENCOUNTER — ANTICOAGULATION THERAPY VISIT (OUTPATIENT)
Dept: ANTICOAGULATION | Facility: CLINIC | Age: 55
End: 2025-02-04
Payer: COMMERCIAL

## 2025-02-04 DIAGNOSIS — Z95.811 LVAD (LEFT VENTRICULAR ASSIST DEVICE) PRESENT (H): Primary | ICD-10-CM

## 2025-02-04 DIAGNOSIS — I50.22 CHRONIC SYSTOLIC CONGESTIVE HEART FAILURE (H): ICD-10-CM

## 2025-02-04 DIAGNOSIS — I50.22 CHRONIC SYSTOLIC HEART FAILURE (H): ICD-10-CM

## 2025-02-04 DIAGNOSIS — Z79.01 ANTICOAGULATED ON COUMADIN: ICD-10-CM

## 2025-02-04 LAB — INR HOME MONITORING: 2.5 RATIO (ref 2.5–3)

## 2025-02-04 NOTE — PROGRESS NOTES
*Per Dr. Silvestre 1/17/25 INR goal range 2-3 (will send Referral with this goal range)       Updated referral received with goal range:    2.0-3.0     JESSICA CALDERON RN  Anticoagulation Clinic  [phone]

## 2025-02-04 NOTE — PROGRESS NOTES
"ANTICOAGULATION MANAGEMENT     Navin Lutz 54 year old male is on warfarin with therapeutic INR result. (Goal INR 2.0-3.0) - discussed new goal range (per Dr. Silvestre) with patient today.    Recent labs: (last 7 days)     02/04/25  1233   INR 2.5       ASSESSMENT     Source(s): Chart Review and Patient/Caregiver Call     Warfarin doses taken: Less warfarin taken than planned which may be affecting INR  Diet:  Did have some greens last night per [facility] recommendation.  Medication/supplement changes: None noted  New illness, injury, or hospitalization: No  Signs or symptoms of bleeding or clotting: No - said mucus in nose was clear today.  Previous result: Supratherapeutic - 3.9 INR result is from 1/30/25 (not 2/3/25) - pt had issues with mDINR reporting  Additional findings:  INR orders signed yesterday along with Referral Renewal with updated 2-3 goal range, faxed INR orders to [facility]  Per GISSEL Schmidt regarding INR result later this week: \"we expect his INR to decline, but was previously supratherapeutic so a booster dose is not advised\"       PLAN     Recommended plan for temporary change(s) affecting INR     Dosing Instructions: Continue your current warfarin dose with next INR in 1 week - Navin wants to check this Thurs or Fri since he said that day is easiest for him to remember.      Summary  As of 2/4/2025      Full warfarin instructions:  5 mg every Mon; 7.5 mg all other days   Next INR check:  2/7/2025               Telephone call with Navin who verbalizes understanding and agrees to plan    Patient to recheck with home meter    Education provided: Goal range and lab monitoring: goal range and significance of current result and Importance of following up at instructed interval  Dietary considerations: impact of vitamin K foods on INR  Symptom monitoring: monitoring for bleeding signs and symptoms  Information on home INR monitoring    Plan made with [facility] Pharmacist Roxana Chang and per CL " protocol    Kajal Rosa RN  2/4/2025  Anticoagulation Clinic  [facility] for routing messages: p ANTICOAG LVAD  [facility] patient phone line: [phone]        _______________________________________________________________________     Anticoagulation Episode Summary       Current INR goal:  2.0-3.0   TTR:  20.9% (7.1 mo)   Target end date:  Indefinite   Send INR reminders to:  ANTICOAG LVAD    Indications    LVAD (left ventricular assist device) present (H) [Z95.811]  Chronic systolic heart failure (H) [I50.22]  Chronic systolic congestive heart failure (H) [I50.22]  Anticoagulated on Coumadin [Z79.01]             Comments:  GOAL: INR 2-3 per Dr. Silvestre 1/17/25  Follow VAD Anticoag protocol:Yes: HeartMate 3  Bridging: Enoxaparin  Date VAD placed: 6/14/24 8/23/23: internal jugular Thrombus; 6/21/24: lupus anticoagulant +  8/5/24: he does NOT have lupus               Anticoagulation Care Providers       Provider Role Specialty Phone number    Micaela Silvestre MD Referring Advanced Heart Failure and Transplant Cardiology [phone]    Chantal Dallas MD Referring Advanced Heart Failure and Transplant Cardiology [phone]

## 2025-02-27 ENCOUNTER — CARE COORDINATION (OUTPATIENT)
Dept: CARDIOLOGY | Facility: CLINIC | Age: 55
End: 2025-02-27
Payer: COMMERCIAL

## 2025-02-27 VITALS — WEIGHT: 209 LBS | BODY MASS INDEX: 28.35 KG/M2

## 2025-02-27 DIAGNOSIS — Z95.811 LVAD (LEFT VENTRICULAR ASSIST DEVICE) PRESENT (H): ICD-10-CM

## 2025-02-27 RX ORDER — LISINOPRIL 20 MG/1
20 TABLET ORAL 2 TIMES DAILY
Qty: 180 TABLET | Refills: 3 | Status: SHIPPED | OUTPATIENT
Start: 2025-02-27

## 2025-02-27 NOTE — PROGRESS NOTES
Pt paged VAD team with correction of MAP's that were reported earlier today.   MAP's are running 76-78, rather than the 80's that were reported earlier today.  He is not sure if he should increase lisinopril as was directed earlier today.   Pt reports again that he is up 4 lbs over the last few weeks, current weight 209lb.   SOB only with activity.   No LE edema, scant swelling in belly.    Instructed pt to keep previous dosing of lisinopril tonight. Will check with Dr. Silvestre and call pt back tomorrow.   Pt verbalized understanding.

## 2025-02-27 NOTE — PROGRESS NOTES
Situation:   Writer spoke with Patient today to discuss Fluid status.     Background:  Weight today: 209.  Compared to recent values this weight has gone up 3-4 pounds in the last few weeks.      Assessment:  NIBP/MAP: 86-89s.  VAD parameters:     02/27/25 1300   Heartmate 3 LEFT VS   Flow (Lpm) 5 Lpm   Pulse Index (PI) 2.9 PI   Speed (rpm) 5400 rpm   Power (bassett) 4.1 bassett            Symptoms:   Gets SOB a little bit quicker than previously.  Continues to have left sided shoulder pain. (See note from 1/22)  States he has not been getting as much exercise as usual due to pain.  Difficult laying on left side.  Pump get's louder when laying on left side.  States he has not seen PCP regarding pain.  One pillow at night.  Does not wake up gasping for air.  No swelling to ankles or belly.       Applicable medication changes: None - Requesting to take a lasix for a few days.      Recommendation:  Recommended pt see PCP regarding shoulder pain.  Will discuss with Dr. Silvestre.  Patient notified to page on-call coordinator if symptoms worsen or with other concerns. Patient verbalized understanding.    ADDENDUM:  I:  Instructed pt to increase lisinopril to 20mg BID and BMP next week.  Labs fax[facility] to [facility].  A:  Pt verbalized understanding.

## 2025-02-27 NOTE — LETTER
[facility]  [address], [facility] [address] [place] [address]  [phone] Office Phone  [phone] Fax Number  Faxed to: [facility]    Fax Number:  [phone]    Patient Name: Navin Lutz   : 1970   Diagnosis/ICD-10: Heart Failure, unspecified I50.9; LVAD Z95.811   Requesting Physician: Dr. Micaela Silvestre   Date of Request: 25   Date to Draw 3/3/25 - 3/7/25      Please fax results to [phone] or email to [email or web address]   *Call [phone] with questions   Please call critical results to [phone], press option 4, ask to talk to the VAD coordinator on-call  ORDER TEST   X Complete Metabolic Panel    Complete Blood Count    Lactate Dehydrogenase    INR    N [place] Pro BNP       Micaela Steele MD   of Medicine  [facility], Cardiovascular Division

## 2025-03-08 ENCOUNTER — CARE COORDINATION (OUTPATIENT)
Dept: CARDIOLOGY | Facility: CLINIC | Age: 55
End: 2025-03-08
Payer: COMMERCIAL

## 2025-03-08 VITALS — BODY MASS INDEX: 28.21 KG/M2 | WEIGHT: 208 LBS

## 2025-03-08 NOTE — PROGRESS NOTES
D: Pt paged regarding low map & dizziness.     I/A:   Patient's MAP 59, PI jumping all over the place 4-7. Feels dizzy but does not feel like he is going to pass out, wife is with him.   Patient had taken lasix for the past week. Did not take lasix today.   Labs on 3/4 stable- in care everywhere.   Wt 208, last wk wt was 211 when felt fluid up.     Heartmate 3 LEFT VS  Flow (Lpm): 4.4 Lpm  Pulse Index (PI): 5.5 PI  Speed (rpm): 5350 rpm  Power (bassett): 4 bassett       P: Advised patient to drink some extra fluids, sounds dehydrated.  Patient, Family notified to page on-call coordinator if symptoms worsen or with other concerns. Patient, Family verbalized understanding. Asked patient to page back if not improving w/ fluids and would likely send to local ED.

## 2025-03-10 NOTE — PROGRESS NOTES
Feeling back to normal today/yesterday, Map was back to normal range yesterday.  Wt yesterday was 209.   Told him I would let him know if Dr. Silvestre wanted additional labs.   ---  Follow up with Chris, per Dr. Silvestre no additional labs. Reviewed upcoming clinic apts, March & June.   Pt will page with further concerns.

## 2025-03-17 DIAGNOSIS — I50.22 CHRONIC SYSTOLIC CONGESTIVE HEART FAILURE (H): Primary | ICD-10-CM

## 2025-03-17 DIAGNOSIS — I50.22 CHRONIC SYSTOLIC HEART FAILURE (H): ICD-10-CM

## 2025-03-17 DIAGNOSIS — Z79.899 ENCOUNTER FOR LONG-TERM (CURRENT) USE OF MEDICATIONS: ICD-10-CM

## 2025-03-17 DIAGNOSIS — Z79.899 LONG TERM USE OF DRUG: ICD-10-CM

## 2025-03-17 DIAGNOSIS — Z95.811 LVAD (LEFT VENTRICULAR ASSIST DEVICE) PRESENT (H): ICD-10-CM

## 2025-03-17 DIAGNOSIS — Z95.811 LEFT VENTRICULAR ASSIST DEVICE PRESENT (H): ICD-10-CM

## 2025-03-20 LAB — INR HOME MONITORING: 3.1 RATIO (ref 2.5–3)

## 2025-03-21 ENCOUNTER — ANCILLARY PROCEDURE (OUTPATIENT)
Dept: CARDIOLOGY | Facility: CLINIC | Age: 55
End: 2025-03-21
Payer: COMMERCIAL

## 2025-03-21 DIAGNOSIS — I50.22 CHRONIC SYSTOLIC HEART FAILURE (H): ICD-10-CM

## 2025-03-21 DIAGNOSIS — Z95.811 LVAD (LEFT VENTRICULAR ASSIST DEVICE) PRESENT (H): ICD-10-CM

## 2025-03-21 LAB
MDC_IDC_EPISODE_DTM: NORMAL
MDC_IDC_EPISODE_DURATION: 1 S
MDC_IDC_EPISODE_DURATION: 13 S
MDC_IDC_EPISODE_DURATION: 13 S
MDC_IDC_EPISODE_DURATION: 180 S
MDC_IDC_EPISODE_DURATION: 2 S
MDC_IDC_EPISODE_DURATION: 360 S
MDC_IDC_EPISODE_DURATION: 44 S
MDC_IDC_EPISODE_DURATION: 480 S
MDC_IDC_EPISODE_DURATION: 86 S
MDC_IDC_EPISODE_ID: 206
MDC_IDC_EPISODE_ID: 207
MDC_IDC_EPISODE_ID: 208
MDC_IDC_EPISODE_ID: 209
MDC_IDC_EPISODE_ID: 210
MDC_IDC_EPISODE_ID: 211
MDC_IDC_EPISODE_ID: 212
MDC_IDC_EPISODE_ID: 213
MDC_IDC_EPISODE_ID: 214
MDC_IDC_EPISODE_ID: 215
MDC_IDC_EPISODE_ID: 216
MDC_IDC_EPISODE_ID: 217
MDC_IDC_EPISODE_ID: 218
MDC_IDC_EPISODE_ID: 219
MDC_IDC_EPISODE_ID: 220
MDC_IDC_EPISODE_ID: 221
MDC_IDC_EPISODE_ID: 222
MDC_IDC_EPISODE_ID: 223
MDC_IDC_EPISODE_ID: 224
MDC_IDC_EPISODE_ID: 225
MDC_IDC_EPISODE_ID: 226
MDC_IDC_EPISODE_ID: 227
MDC_IDC_EPISODE_TYPE: NORMAL
MDC_IDC_LEAD_CONNECTION_STATUS: NORMAL
MDC_IDC_LEAD_IMPLANT_DT: NORMAL
MDC_IDC_LEAD_LOCATION: NORMAL
MDC_IDC_LEAD_LOCATION_DETAIL_1: NORMAL
MDC_IDC_LEAD_MFG: NORMAL
MDC_IDC_LEAD_MODEL: NORMAL
MDC_IDC_LEAD_POLARITY_TYPE: NORMAL
MDC_IDC_LEAD_SERIAL: NORMAL
MDC_IDC_MSMT_BATTERY_DTM: NORMAL
MDC_IDC_MSMT_BATTERY_REMAINING_LONGEVITY: 150 MO
MDC_IDC_MSMT_BATTERY_RRT_TRIGGER: NORMAL
MDC_IDC_MSMT_BATTERY_STATUS: NORMAL
MDC_IDC_MSMT_BATTERY_VOLTAGE: 3.02 V
MDC_IDC_MSMT_CAP_CHARGE_DTM: NORMAL
MDC_IDC_MSMT_CAP_CHARGE_ENERGY: 18 J
MDC_IDC_MSMT_CAP_CHARGE_TIME: 3.7 S
MDC_IDC_MSMT_CAP_CHARGE_TYPE: NORMAL
MDC_IDC_MSMT_LEADCHNL_RV_IMPEDANCE_VALUE: 418 OHM
MDC_IDC_MSMT_LEADCHNL_RV_IMPEDANCE_VALUE: 475 OHM
MDC_IDC_MSMT_LEADCHNL_RV_PACING_THRESHOLD_AMPLITUDE: 0.5 V
MDC_IDC_MSMT_LEADCHNL_RV_PACING_THRESHOLD_PULSEWIDTH: 0.4 MS
MDC_IDC_MSMT_LEADCHNL_RV_SENSING_INTR_AMPL: 8.4 MV
MDC_IDC_PG_IMPLANT_DTM: NORMAL
MDC_IDC_PG_MFG: NORMAL
MDC_IDC_PG_MODEL: NORMAL
MDC_IDC_PG_SERIAL: NORMAL
MDC_IDC_PG_TYPE: NORMAL
MDC_IDC_SESS_CLINIC_NAME: NORMAL
MDC_IDC_SESS_DTM: NORMAL
MDC_IDC_SESS_TYPE: NORMAL
MDC_IDC_SET_BRADY_LOWRATE: 60 {BEATS}/MIN
MDC_IDC_SET_BRADY_MAX_SENSOR_RATE: 130 {BEATS}/MIN
MDC_IDC_SET_BRADY_MODE: NORMAL
MDC_IDC_SET_LEADCHNL_RA_SENSING_SENSITIVITY: NORMAL
MDC_IDC_SET_LEADCHNL_RV_PACING_AMPLITUDE: 2 V
MDC_IDC_SET_LEADCHNL_RV_PACING_ANODE_ELECTRODE_1: NORMAL
MDC_IDC_SET_LEADCHNL_RV_PACING_ANODE_LOCATION_1: NORMAL
MDC_IDC_SET_LEADCHNL_RV_PACING_CAPTURE_MODE: NORMAL
MDC_IDC_SET_LEADCHNL_RV_PACING_CATHODE_ELECTRODE_1: NORMAL
MDC_IDC_SET_LEADCHNL_RV_PACING_CATHODE_LOCATION_1: NORMAL
MDC_IDC_SET_LEADCHNL_RV_PACING_POLARITY: NORMAL
MDC_IDC_SET_LEADCHNL_RV_PACING_PULSEWIDTH: 0.4 MS
MDC_IDC_SET_LEADCHNL_RV_SENSING_ANODE_ELECTRODE_1: NORMAL
MDC_IDC_SET_LEADCHNL_RV_SENSING_ANODE_LOCATION_1: NORMAL
MDC_IDC_SET_LEADCHNL_RV_SENSING_CATHODE_ELECTRODE_1: NORMAL
MDC_IDC_SET_LEADCHNL_RV_SENSING_CATHODE_LOCATION_1: NORMAL
MDC_IDC_SET_LEADCHNL_RV_SENSING_POLARITY: NORMAL
MDC_IDC_SET_LEADCHNL_RV_SENSING_SENSITIVITY: 0.3 MV
MDC_IDC_SET_ZONE_DETECTION_BEATS_DENOMINATOR: 16 {BEATS}
MDC_IDC_SET_ZONE_DETECTION_BEATS_DENOMINATOR: 32 {BEATS}
MDC_IDC_SET_ZONE_DETECTION_BEATS_DENOMINATOR: 40 {BEATS}
MDC_IDC_SET_ZONE_DETECTION_BEATS_NUMERATOR: 16 {BEATS}
MDC_IDC_SET_ZONE_DETECTION_BEATS_NUMERATOR: 30 {BEATS}
MDC_IDC_SET_ZONE_DETECTION_BEATS_NUMERATOR: 32 {BEATS}
MDC_IDC_SET_ZONE_DETECTION_INTERVAL: 300 MS
MDC_IDC_SET_ZONE_DETECTION_INTERVAL: 360 MS
MDC_IDC_SET_ZONE_DETECTION_INTERVAL: 400 MS
MDC_IDC_SET_ZONE_STATUS: NORMAL
MDC_IDC_SET_ZONE_TYPE: NORMAL
MDC_IDC_SET_ZONE_VENDOR_TYPE: NORMAL
MDC_IDC_STAT_AT_BURDEN_PERCENT: 0.01 %
MDC_IDC_STAT_AT_DTM_END: NORMAL
MDC_IDC_STAT_AT_DTM_START: NORMAL
MDC_IDC_STAT_BRADY_DTM_END: NORMAL
MDC_IDC_STAT_BRADY_DTM_START: NORMAL
MDC_IDC_STAT_BRADY_RA_PERCENT_PACED: NORMAL
MDC_IDC_STAT_BRADY_RV_PERCENT_PACED: 2.88 %
MDC_IDC_STAT_EPISODE_RECENT_COUNT: 0
MDC_IDC_STAT_EPISODE_RECENT_COUNT: 23
MDC_IDC_STAT_EPISODE_RECENT_COUNT: 5
MDC_IDC_STAT_EPISODE_RECENT_COUNT_DTM_END: NORMAL
MDC_IDC_STAT_EPISODE_RECENT_COUNT_DTM_START: NORMAL
MDC_IDC_STAT_EPISODE_TOTAL_COUNT: 0
MDC_IDC_STAT_EPISODE_TOTAL_COUNT: 194
MDC_IDC_STAT_EPISODE_TOTAL_COUNT: 29
MDC_IDC_STAT_EPISODE_TOTAL_COUNT_DTM_END: NORMAL
MDC_IDC_STAT_EPISODE_TOTAL_COUNT_DTM_START: NORMAL
MDC_IDC_STAT_EPISODE_TYPE: NORMAL
MDC_IDC_STAT_TACHYTHERAPY_ATP_DELIVERED_RECENT: 0
MDC_IDC_STAT_TACHYTHERAPY_ATP_DELIVERED_TOTAL: 0
MDC_IDC_STAT_TACHYTHERAPY_RECENT_DTM_END: NORMAL
MDC_IDC_STAT_TACHYTHERAPY_RECENT_DTM_START: NORMAL
MDC_IDC_STAT_TACHYTHERAPY_SHOCKS_ABORTED_RECENT: 0
MDC_IDC_STAT_TACHYTHERAPY_SHOCKS_ABORTED_TOTAL: 0
MDC_IDC_STAT_TACHYTHERAPY_SHOCKS_DELIVERED_RECENT: 0
MDC_IDC_STAT_TACHYTHERAPY_SHOCKS_DELIVERED_TOTAL: 0
MDC_IDC_STAT_TACHYTHERAPY_TOTAL_DTM_END: NORMAL
MDC_IDC_STAT_TACHYTHERAPY_TOTAL_DTM_START: NORMAL

## 2025-03-21 PROCEDURE — 93282 PRGRMG EVAL IMPLANTABLE DFB: CPT | Performed by: INTERNAL MEDICINE

## 2025-03-21 NOTE — PATIENT INSTRUCTIONS
It was a pleasure to see you in clinic today, please do not hesitate to call us for questions or concerns.  Your next automatic remote ICD check from home is scheduled for 6/16/2025.    Jessenia Rivera RN    Electrophysiology Nurse Clinician  [facility]    During Business Hours Please Call:  [phone]  After Hours Please Call:  [phone] - select option #4 and ask for job code [id number]

## 2025-03-31 NOTE — TELEPHONE ENCOUNTER
DIAGNOSIS: chronic L shoulder pain/Dr. Gallo/Dr. Silvestre/[employer]/ortho con    APPOINTMENT DATE: 6/16/25   NOTES STATUS DETAILS   OFFICE NOTE from referring provider Internal 3/21/25 - Micaela Silvestre MD    MEDICATION LIST Internal

## 2025-04-17 ENCOUNTER — ANTICOAGULATION THERAPY VISIT (OUTPATIENT)
Dept: ANTICOAGULATION | Facility: CLINIC | Age: 55
End: 2025-04-17
Payer: COMMERCIAL

## 2025-04-17 DIAGNOSIS — Z95.811 LVAD (LEFT VENTRICULAR ASSIST DEVICE) PRESENT (H): Primary | ICD-10-CM

## 2025-04-17 DIAGNOSIS — I50.22 CHRONIC SYSTOLIC HEART FAILURE (H): ICD-10-CM

## 2025-04-17 DIAGNOSIS — I50.22 CHRONIC SYSTOLIC CONGESTIVE HEART FAILURE (H): ICD-10-CM

## 2025-04-17 DIAGNOSIS — Z79.01 ANTICOAGULATED ON COUMADIN: ICD-10-CM

## 2025-04-17 LAB — INR HOME MONITORING: 3.4 RATIO (ref 2.5–3)

## 2025-04-17 NOTE — PROGRESS NOTES
ANTICOAGULATION MANAGEMENT     Navin Lutz 54 year old male is on warfarin with supratherapeutic INR result. (Goal INR 2.0-3.0)    Recent labs: (last 7 days)     04/17/25  1053   INR 3.4*       ASSESSMENT     Source(s): Chart Review and Patient/Caregiver Call     Warfarin doses taken: Warfarin taken as instructed  Diet: No new diet changes identified  Medication/supplement changes: None noted - missed 3 doses of jardiance and rosuvastatin last week, but has been back on since getting refills. Should not be increasing INR today.   New illness, injury, or hospitalization: No  Signs or symptoms of bleeding or clotting: No  Previous result: Supratherapeutic  Additional findings: None       PLAN     Recommended plan for no diet, medication or health factor changes affecting INR     Dosing Instructions: decrease your warfarin dose (5.6% change) with next INR in 1 week       Summary  As of 4/17/2025      Full warfarin instructions:  7.5 mg every Mon, Wed, Fri; 5 mg all other days   Next INR check:  4/24/2025               Telephone call with Navin who agrees to plan and repeated back plan correctly    Patient to recheck with home meter    Education provided: Goal range and lab monitoring: goal range and significance of current result    Plan made per [facility] anticoagulation protocol and per LVAD protocol    Belia Johnson RN  4/17/2025  Anticoagulation Clinic  [employer] for routing messages: yee ANTICOAG LVAD  [facility] patient phone line: [phone]        _______________________________________________________________________     Anticoagulation Episode Summary       Current INR goal:  2.0-3.0   TTR:  29.2% (9.5 mo)   Target end date:  Indefinite   Send INR reminders to:  AL[facility] LVAD    Indications    LVAD (left ventricular assist device) present (H) [Z95.811]  Chronic systolic heart failure (H) [I50.22]  Chronic systolic congestive heart failure (H) [I50.22]  Anticoagulated on Coumadin [Z79.01]             Comments:   GOAL: INR 2-3 per Dr. Silvestre 1/17/25  Follow VAD Anticoag protocol:Yes: HeartMate 3  Bridging: Enoxaparin  Date VAD placed: 6/14/24 8/23/23: internal jugular Thrombus; 6/21/24: lupus anticoagulant +  8/5/24: he does NOT have lupus               Anticoagulation Care Providers       Provider Role Specialty Phone number    Micaela Silvestre MD Referring Advanced Heart Failure and Transplant Cardiology [phone]    Chantal Dallas MD Referring Advanced Heart Failure and Transplant Cardiology [phone]

## 2025-04-28 ENCOUNTER — MYC MEDICAL ADVICE (OUTPATIENT)
Dept: ANTICOAGULATION | Facility: CLINIC | Age: 55
End: 2025-04-28
Payer: COMMERCIAL

## 2025-05-11 DIAGNOSIS — I50.22 CHRONIC SYSTOLIC HEART FAILURE (H): Primary | ICD-10-CM

## 2025-05-12 ENCOUNTER — MYC MEDICAL ADVICE (OUTPATIENT)
Dept: ANTICOAGULATION | Facility: CLINIC | Age: 55
End: 2025-05-12
Payer: COMMERCIAL

## 2025-05-14 NOTE — TELEPHONE ENCOUNTER
Last Written Prescription:     lisinopril (ZESTRIL) 10 MG tablet -- -- 2/28/2025 -- No   Sig - Route: Take 1.5 tablets (15 mg) by mouth 2 times daily. - Oral     ----------------------  Last Visit Date: 3/21/2025 [facility]  Future Visit Date: 6/18/25  ----------------------           Refill decision: Medication unable to be refilled by RN due to: Medication - Last script is Reported/Historical/Transitional   lisinopril (ZESTRIL) 10 MG tablet  LVAD  Request from pharmacy:  Requested Prescriptions   Pending Prescriptions Disp Refills    lisinopril (ZESTRIL) 10 MG tablet       Sig: Take 1.5 tablets (15 mg) by mouth 2 times daily.       ACE Inhibitors (Including Combos) Protocol Failed - 5/14/2025 10:20 AM        Failed - Medication indicated for associated diagnosis     Medication is associated with one or more of the following diagnoses:     Chronic Kidney Disease (CKD)   Coronary Artery Disease (CAD)   Diabetes   Heart Failure (HF)   Hypertension (HTN)   Nephropathy   History of myocarditis   Tachycardia induced cardiomyopathy   STEMI (ST elevation myocardial infarction)   Spontaneous dissection of coronary artery   Status post percutaneous transluminal coronary angioplasty   Cardiomyopathy            Passed - Most recent blood pressure under 140/90 in past 12 months- Clinicial or Patient Reported     BP Readings from Last 3 Encounters:   03/21/25 (!) 74/0   12/13/24 138/78   12/13/24 (!) 70/0       No data recorded            Passed - Medication is active on med list and the sig matches. RN to manually verify dose and sig if red X/fail.     If the protocol passes (green check), you do not need to verify med dose and sig.    A prescription matches if they are the same clinical intention.    For Example: once daily and every morning are the same.    The protocol can not identify upper and lower case letters as matching and will fail.     For Example: Take 1 tablet (50 mg) by mouth daily     TAKE 1 TABLET (50 MG) BY  MOUTH DAILY    For all fails (red x), verify dose and sig.    If the refill does match what is on file, the RN can still proceed to approve the refill request.       If they do not match, route to the appropriate provider.             Passed - Recent (12 mo) or future (90 days) visit within the authorizing provider's specialty     The patient must have completed an in-person or virtual visit within the past 12 months or has a future visit scheduled within the next 90 days with the authorizing provider s specialty.  Urgent care and e-visits do not qualify as an office visit for this protocol.          Passed - Most recent GFR on file in the past 12 months >30        Passed - Patient is age 18 or older        Passed - Normal serum potassium on file in past 12 months     Recent Labs   Lab Test 03/21/25  1307 03/04/25  0946   POTASSIUM 4.1  --    23842  --  4.7

## 2025-05-15 RX ORDER — LISINOPRIL 10 MG/1
15 TABLET ORAL 2 TIMES DAILY
Qty: 270 TABLET | Refills: 3 | Status: SHIPPED | OUTPATIENT
Start: 2025-05-15

## 2025-05-18 LAB — INR HOME MONITORING: 2.8 RATIO (ref 2.5–3)

## 2025-05-19 ENCOUNTER — ANTICOAGULATION THERAPY VISIT (OUTPATIENT)
Dept: ANTICOAGULATION | Facility: CLINIC | Age: 55
End: 2025-05-19
Payer: COMMERCIAL

## 2025-05-19 ENCOUNTER — RESULTS FOLLOW-UP (OUTPATIENT)
Dept: ANTICOAGULATION | Facility: CLINIC | Age: 55
End: 2025-05-19
Payer: COMMERCIAL

## 2025-05-19 DIAGNOSIS — Z95.811 LVAD (LEFT VENTRICULAR ASSIST DEVICE) PRESENT (H): Primary | ICD-10-CM

## 2025-05-19 DIAGNOSIS — I50.22 CHRONIC SYSTOLIC HEART FAILURE (H): ICD-10-CM

## 2025-05-19 DIAGNOSIS — I50.22 CHRONIC SYSTOLIC CONGESTIVE HEART FAILURE (H): ICD-10-CM

## 2025-05-19 DIAGNOSIS — Z79.01 ANTICOAGULATED ON COUMADIN: ICD-10-CM

## 2025-05-19 NOTE — PROGRESS NOTES
ANTICOAGULATION MANAGEMENT     Navin Lutz 54 year old male is on warfarin with therapeutic INR result. (Goal INR 2.0-3.0)    Recent labs: (last 7 days)     05/18/25  1420   INR 2.8       ASSESSMENT     Source(s): Chart Review and Patient/Caregiver Call     Warfarin doses taken: Warfarin taken as instructed  Diet: No new diet changes identified  Medication/supplement changes: None noted  New illness, injury, or hospitalization: No  Signs or symptoms of bleeding or clotting: No  Previous result: Therapeutic last visit; previously outside of goal range- patient reports that he did test last week but did not submit the result to his home meter company-but the result was 2.5 per patient.  Additional findings: None       PLAN     Recommended plan for no diet, medication or health factor changes affecting INR     Dosing Instructions: Continue your current warfarin dose with next INR in 1 week (working around the Holiday)      Summary  As of 5/19/2025      Full warfarin instructions:  7.5 mg every Mon, Wed, Fri; 5 mg all other days   Next INR check:  5/27/2025               Telephone call with Navin who verbalizes understanding and agrees to plan and who agrees to plan and repeated back plan correctly    Patient to recheck with home meter    Education provided: Contact [phone] with any changes, questions or concerns.     Plan made per [facility] anticoagulation protocol and per LVAD protocol    Dari Wallace RN  5/19/2025  Anticoagulation Clinic  [employer] for routing messages: yee ANTICOAG LVAD  [facility] patient phone line: [phone]        _______________________________________________________________________     Anticoagulation Episode Summary       Current INR goal:  2.0-3.0   TTR:  33.4% (10.6 mo)   Target end date:  Indefinite   Send INR reminders to:  ANTICOAG LVAD    Indications    LVAD (left ventricular assist device) present (H) [Z95.811]  Chronic systolic heart failure (H) [I50.22]  Chronic systolic  congestive heart failure (H) [I50.22]  Anticoagulated on Coumadin [Z79.01]             Comments:  GOAL: INR 2-3 per Dr. Silvestre 1/17/25  Follow VAD Anticoag protocol:Yes: HeartMate 3  Bridging: Enoxaparin  Date VAD placed: 6/14/24 8/23/23: internal jugular Thrombus; 6/21/24: lupus anticoagulant +  8/5/24: he does NOT have lupus               Anticoagulation Care Providers       Provider Role Specialty Phone number    Micaela Silvestre MD Referring Advanced Heart Failure and Transplant Cardiology [phone]    Chantal Dallas MD Referring Advanced Heart Failure and Transplant Cardiology [phone]

## 2025-05-27 ENCOUNTER — ANTICOAGULATION THERAPY VISIT (OUTPATIENT)
Dept: ANTICOAGULATION | Facility: CLINIC | Age: 55
End: 2025-05-27
Payer: COMMERCIAL

## 2025-05-27 ENCOUNTER — RESULTS FOLLOW-UP (OUTPATIENT)
Dept: ANTICOAGULATION | Facility: CLINIC | Age: 55
End: 2025-05-27

## 2025-05-27 DIAGNOSIS — Z95.811 LVAD (LEFT VENTRICULAR ASSIST DEVICE) PRESENT (H): Primary | ICD-10-CM

## 2025-05-27 DIAGNOSIS — I50.22 CHRONIC SYSTOLIC CONGESTIVE HEART FAILURE (H): ICD-10-CM

## 2025-05-27 DIAGNOSIS — I50.22 CHRONIC SYSTOLIC HEART FAILURE (H): ICD-10-CM

## 2025-05-27 DIAGNOSIS — Z79.01 ANTICOAGULATED ON COUMADIN: ICD-10-CM

## 2025-05-27 LAB — INR HOME MONITORING: 2.1 RATIO (ref 2.5–3)

## 2025-05-27 NOTE — PROGRESS NOTES
ANTICOAGULATION MANAGEMENT     Navin Lutz 54 year old male is on warfarin with therapeutic INR result. (Goal INR 2.0-3.0)    Recent labs: (last 7 days)     05/27/25  1754   INR 2.1*       ASSESSMENT     Source(s): Chart Review and Patient/Caregiver Call     Warfarin doses taken: Warfarin taken as instructed  Diet: No new diet changes identified  Medication/supplement changes: None noted  New illness, injury, or hospitalization: No  Signs or symptoms of bleeding or clotting: No  Previous result: Therapeutic last 2(+) visits  Additional findings: Upcoming surgery/procedure [facility] scheduled on 6/17/25       PLAN     Recommended plan for no diet, medication or health factor changes affecting INR     Dosing Instructions: Continue your current warfarin dose with next INR in 6-7 days       Summary  As of 5/27/2025      Full warfarin instructions:  7.5 mg every Mon, Wed, Fri; 5 mg all other days   Next INR check:  6/3/2025               Telephone call with Navin who verbalizes understanding and agrees to plan    Patient to recheck with home meter    Education provided: Goal range and lab monitoring: goal range and significance of current result, Importance of therapeutic range, and Importance of following up at instructed interval    Plan made per [facility] anticoagulation protocol and per LVAD protocol    Wiliam Giron RN  5/27/2025  Anticoagulation Clinic  [employer] for routing messages: yee ANTICOAG LVAD  [facility] patient phone line: [phone]        _______________________________________________________________________     Anticoagulation Episode Summary       Current INR goal:  2.0-3.0   TTR:  35.3% (10.9 mo)   Target end date:  Indefinite   Send INR reminders to:  ANTICOAG LVAD    Indications    LVAD (left ventricular assist device) present (H) [Z95.811]  Chronic systolic heart failure (H) [I50.22]  Chronic systolic congestive heart failure (H) [I50.22]  Anticoagulated on Coumadin [Z79.01]             Comments:  GOAL: INR 2-3  per Dr. Silvestre 1/17/25  Follow VAD Anticoag protocol:Yes: HeartMate 3  Bridging: Enoxaparin  Date VAD placed: 6/14/24 8/23/23: internal jugular Thrombus; 6/21/24: lupus anticoagulant +  8/5/24: he does NOT have lupus               Anticoagulation Care Providers       Provider Role Specialty Phone number    Micaela Silvestre MD Referring Advanced Heart Failure and Transplant Cardiology [phone]    Chantal Dallas MD Referring Advanced Heart Failure and Transplant Cardiology [phone]

## 2025-06-02 LAB — INR HOME MONITORING: 2.8 RATIO (ref 2.5–3)

## 2025-06-03 DIAGNOSIS — Z95.811 LVAD (LEFT VENTRICULAR ASSIST DEVICE) PRESENT (H): ICD-10-CM

## 2025-06-03 DIAGNOSIS — Z79.01 ANTICOAGULATED ON COUMADIN: ICD-10-CM

## 2025-06-03 DIAGNOSIS — I50.22 CHRONIC SYSTOLIC HEART FAILURE (H): ICD-10-CM

## 2025-06-03 DIAGNOSIS — I50.22 CHRONIC SYSTOLIC CONGESTIVE HEART FAILURE (H): Primary | ICD-10-CM

## 2025-06-03 LAB — INR (EXTERNAL): 2.8

## 2025-06-05 ENCOUNTER — MYC MEDICAL ADVICE (OUTPATIENT)
Dept: ANTICOAGULATION | Facility: CLINIC | Age: 55
End: 2025-06-05
Payer: COMMERCIAL

## 2025-06-09 LAB — INR HOME MONITORING: 2.8 RATIO (ref 2.5–3)

## 2025-06-10 LAB — INR (EXTERNAL): 2.8

## 2025-06-11 DIAGNOSIS — M25.512 LEFT SHOULDER PAIN, UNSPECIFIED CHRONICITY: Primary | ICD-10-CM

## 2025-06-12 ENCOUNTER — ANTICOAGULATION THERAPY VISIT (OUTPATIENT)
Dept: ANTICOAGULATION | Facility: CLINIC | Age: 55
End: 2025-06-12
Payer: COMMERCIAL

## 2025-06-12 ENCOUNTER — TELEPHONE (OUTPATIENT)
Dept: ANTICOAGULATION | Facility: CLINIC | Age: 55
End: 2025-06-12

## 2025-06-12 DIAGNOSIS — I50.22 CHRONIC SYSTOLIC HEART FAILURE (H): ICD-10-CM

## 2025-06-12 DIAGNOSIS — Z79.01 ANTICOAGULATED ON COUMADIN: ICD-10-CM

## 2025-06-12 DIAGNOSIS — Z95.811 LVAD (LEFT VENTRICULAR ASSIST DEVICE) PRESENT (H): Primary | ICD-10-CM

## 2025-06-12 DIAGNOSIS — I50.22 CHRONIC SYSTOLIC CONGESTIVE HEART FAILURE (H): ICD-10-CM

## 2025-06-12 NOTE — PROGRESS NOTES
ANTICOAGULATION MANAGEMENT     Navin Lutz 54 year old male is on warfarin with therapeutic INR result. (Goal INR 2.0-3.0)    Recent labs: (last 7 days)     06/10/25  0000   INR 2.8       ASSESSMENT     Source(s): Chart Review and Patient/Caregiver Call     Warfarin doses taken: Warfarin taken as instructed  Diet: No new diet changes identified  Medication/supplement changes: None noted  New illness, injury, or hospitalization: No  Signs or symptoms of bleeding or clotting: No  Previous result: Therapeutic last 2(+) visits  Additional findings: Writer inquired if patient called results to [facility] INR.  Patient reports they called it in the last 2 weeks but have had bad cell service so they were unsure if the results went through. Patient reminded [facility] should contact patient with each reading so if he doesn't hear to contact [facility].        PLAN     Recommended plan for no diet, medication or health factor changes affecting INR     Dosing Instructions: Continue your current warfarin dose with next INR in 1 week       Summary  As of 6/12/2025      Full warfarin instructions:  7.5 mg every Mon, Wed, Fri; 5 mg all other days   Next INR check:  6/17/2025               Telephone call with Navin who verbalizes understanding and agrees to plan and who agrees to plan and repeated back plan correctly    Patient to recheck with home meter    Education provided: Taking warfarin: Importance of taking warfarin as instructed  Goal range and lab monitoring: goal range and significance of current result, Importance of therapeutic range, and Importance of following up at instructed interval    Plan made per [facility] anticoagulation protocol and per LVAD protocol    Danica Baldwin RN  6/12/2025  Anticoagulation Clinic  [employer] for routing messages: yee FOREMAN LVAD  [facility] patient phone line: [phone]        _______________________________________________________________________     Anticoagulation Episode Summary       Current INR goal:   2.0-3.0   TTR:  37.8% (11.3 mo)   Target end date:  Indefinite   Send INR reminders to:  ANTICOAG LVAD    Indications    LVAD (left ventricular assist device) present (H) [Z95.811]  Chronic systolic heart failure (H) [I50.22]  Chronic systolic congestive heart failure (H) [I50.22]  Anticoagulated on Coumadin [Z79.01]             Comments:  GOAL: INR 2-3 per Dr. Silvestre 1/17/25  Follow VAD Anticoag protocol:Yes: HeartMate 3  Bridging: Enoxaparin  Date VAD placed: 6/14/24 8/23/23: internal jugular Thrombus; 6/21/24: lupus anticoagulant +  8/5/24: he does NOT have lupus               Anticoagulation Care Providers       Provider Role Specialty Phone number    Micaela Silvestre MD Referring Advanced Heart Failure and Transplant Cardiology [phone]    Chantal Dallas MD Referring Advanced Heart Failure and Transplant Cardiology [phone]

## 2025-06-15 SDOH — HEALTH STABILITY: PHYSICAL HEALTH: ON AVERAGE, HOW MANY DAYS PER WEEK DO YOU ENGAGE IN MODERATE TO STRENUOUS EXERCISE (LIKE A BRISK WALK)?: 6 DAYS

## 2025-06-15 SDOH — HEALTH STABILITY: PHYSICAL HEALTH: ON AVERAGE, HOW MANY MINUTES DO YOU ENGAGE IN EXERCISE AT THIS LEVEL?: 30 MIN

## 2025-06-16 ENCOUNTER — OFFICE VISIT (OUTPATIENT)
Dept: ORTHOPEDICS | Facility: CLINIC | Age: 55
End: 2025-06-16
Attending: STUDENT IN AN ORGANIZED HEALTH CARE EDUCATION/TRAINING PROGRAM
Payer: COMMERCIAL

## 2025-06-16 ENCOUNTER — PRE VISIT (OUTPATIENT)
Dept: ORTHOPEDICS | Facility: CLINIC | Age: 55
End: 2025-06-16

## 2025-06-16 ENCOUNTER — ANCILLARY PROCEDURE (OUTPATIENT)
Dept: CARDIOLOGY | Facility: CLINIC | Age: 55
End: 2025-06-16
Attending: INTERNAL MEDICINE
Payer: COMMERCIAL

## 2025-06-16 ENCOUNTER — ANCILLARY PROCEDURE (OUTPATIENT)
Dept: GENERAL RADIOLOGY | Facility: CLINIC | Age: 55
End: 2025-06-16
Attending: STUDENT IN AN ORGANIZED HEALTH CARE EDUCATION/TRAINING PROGRAM
Payer: COMMERCIAL

## 2025-06-16 DIAGNOSIS — R07.89 CHEST WALL PAIN FOLLOWING SURGERY: Primary | ICD-10-CM

## 2025-06-16 DIAGNOSIS — G89.18 CHEST WALL PAIN FOLLOWING SURGERY: Primary | ICD-10-CM

## 2025-06-16 DIAGNOSIS — M25.512 LEFT SHOULDER PAIN, UNSPECIFIED CHRONICITY: ICD-10-CM

## 2025-06-16 PROCEDURE — 99204 OFFICE O/P NEW MOD 45 MIN: CPT | Performed by: STUDENT IN AN ORGANIZED HEALTH CARE EDUCATION/TRAINING PROGRAM

## 2025-06-16 PROCEDURE — 93296 REM INTERROG EVL PM/IDS: CPT

## 2025-06-16 PROCEDURE — 73030 X-RAY EXAM OF SHOULDER: CPT | Mod: LT | Performed by: RADIOLOGY

## 2025-06-16 NOTE — LETTER
6/16/2025      RE: Navin Lutz  [address] [place] [address]     Dear Colleague,    Thank you for referring your patient, Navin Lutz, to the [facility] [place]. Please see a copy of my visit note below.    [facility]  Sports Medicine Clinic  Clinics and Surgery Center           SUBJECTIVE       Navin Lutz is a 54 year old male presenting to clinic today.    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   Hand Dominance (If pertinent): Left     Injury (Y/N): No injury   Work Comp (Y/N): No  Date of injury: NA  Mechanism of Injury: Notes left sided anterior chest popping and formerly pain after having the LVAD pump placed     Duration of symptoms: 7 months   Intensity (1-10): 0/10   Aggravating factors:  Popping occurs with reaching up and thoracic rotation    Relieving Factors: No treatment    Prior Evaluation: None   Previous Surgery on the area (Y/N): Yes - L Vad pump placed 1 year ago    Physical Therapy (Previous/Current/None): None    Physical Activity/Exercise (What, How Often): None    PMH, Medications and Allergies were reviewed and updated as needed.    ROS:  As noted above otherwise negative.    Patient Active Problem List   Diagnosis     Acute on chronic systolic CHF (congestive heart failure) (H)     Chest pain     ICD (implantable cardioverter-defibrillator) in place     Inguinal hernia     Multiple lung nodules on CT     Non-ischemic cardiomyopathy (H)     Pure hypercholesterolemia     RAD (reactive airway disease) with wheezing, mild intermittent, uncomplicated     Acute deep vein thrombosis (DVT) of other vein of left upper extremity (H)     Cardiogenic shock (H)     Acute decompensated heart failure (H)     Chronic systolic congestive heart failure (H)     Anticoagulated on warfarin     LVAD (left ventricular assist device) present (H)     Chronic systolic heart failure (H)     Anticoagulated on Coumadin     Diplopia      Stroke-like symptoms     TIA (transient ischemic attack)       Current Outpatient Medications   Medication Sig Dispense Refill     aspirin (ASA) 81 MG chewable tablet Take 1 tablet (81 mg) by mouth daily 90 tablet 3     carvedilol (COREG) 6.25 MG tablet Take 1 tablet (6.25 mg) by mouth 2 times daily (with meals) 180 tablet 3     empagliflozin (JARDIANCE) 10 MG TABS tablet Take 1 tablet (10 mg) by mouth daily. 90 tablet 3     furosemide (LASIX) 20 MG tablet Take 1 tablet (20 mg) by mouth daily. For 3 days, 2/28-3/2. Check in with the VAD team if your weight does not improve.       gabapentin (NEURONTIN) 100 MG capsule Take 200 mg by mouth. (Patient not taking: Reported on 3/21/2025)       lisinopril (ZESTRIL) 10 MG tablet Take 1.5 tablets (15 mg) by mouth 2 times daily. 270 tablet 3     rosuvastatin (CRESTOR) 10 MG tablet Take 1 tablet (10 mg) by mouth daily 90 tablet 3     spironolactone (ALDACTONE) 25 MG tablet Take 1 tablet (25 mg) by mouth every evening (Patient not taking: Reported on 3/21/2025) 90 tablet 3     warfarin ANTICOAGULANT (COUMADIN) 5 MG tablet Take two tablets (10 mg) daily. Next chromogenic factor X on 7/12 in clinic. Always follow the most recent instructions from your INR clinic.              OBJECTIVE:       Vitals: There were no vitals filed for this visit.  BMI: There is no height or weight on file to calculate BMI.    Gen:  Well nourished and in no acute distress  HEENT: Extraocular movement intact  Neck: Supple  Pulm:  Breathing Comfortably. No increased respiratory effort.  Psych: Euthymic. Appropriately answers questions    MSK:   Left shoulder with full range of motion.  No asymmetry.  No tenderness to palpation about the shoulder.  Strength testing of the rotator cuff is 5/5, as well as biceps and triceps.  Pain negative provocative testing including empty can, Hornblower, belly press off and reverse press off, speeds and Yergason's, impingement with Neer and Abernathy, Moody, and  Spurling.    Anterior chest wall without asymmetry.  Full and equal range of motion on inhalation and exhalation.  No palpable tenderness about the costal sternal junction on the left or right.  When the patient is lying on the left however and takes a deep breath, there is a palpable clunk in the anterior chest wall, that is nonpainful.    Study Result    Narrative & Impression   4 views left shoulder radiographs 6/16/2025 3:36 PM     History: Left shoulder pain, unspecified chronicity      Comparison: None     Findings:     AP, Grashey, transscapular Y, axillary  views of the left shoulder  were obtained. Glenohumeral joint is obscured by left chest wall  implanted device in the Grashey view.     No acute osseous abnormality. Glenohumeral and acromioclavicular  joints are congruent.     Mild degenerative changes of the acromioclavicular joint. At least  mild degenerative change of the glenohumeral joint.     Soft tissue is unremarkable. The visualized lung is clear. Left chest  wall implanted device. Sternotomy wires. LVAD.                                                                      Impression:  1. No acute osseous abnormality.  2. At least mild glenohumeral degenerative change.                ASSESSMENT and PLAN:     Navin was seen today for pain.    Diagnoses and all orders for this visit:    Chest wall pain following surgery  -     Physical Therapy  Referral; Future    Other orders  -     Orthopedic  Referral      Navin is a 54-year-old male presenting to clinic with left chest wall pain initially that started as sharp, and has now resolved.  Patient had an LVAD placed 1 year ago, subsequently 6 months after that he did develop some sensations of clicking within the anterior chest wall.  He is not having any chest pain or ACS like symptoms.  He is not having any radiating pain into the neck or jaw, or the back.  He is also not having any shoulder pain.  Based on his presentation,  given the LVAD placement with an open heart procedure, this patient likely has some intercostal ligamentous disruption, and he never did physical therapy for restrengthening.  However, given the fact that this was managed by cardiothoracic surgery, I would want him to check in with them to make sure there are no other potential issues with the LVAD in and of itself, given the operation.  However, given the surgery, I do think initiating physical therapy for the musculoskeletal chest wall symptoms would be pertinent.  The patient lives in [place] so the order has been printed, and he will have to do that there.  Again I have advised the patient to discuss with his surgeon as well as cardiology team, given the symptoms happening after that potential surgery.  All questions have been answered.  ER urgent care precautions have been counseled extensively.    It was a pleasure seeing Navin today.    Options for treatment and/or follow-up care were reviewed with the patient was actively involved in the decision making process. Patient verbalized understanding and was in agreement with the plan.      Disclaimer:  This note consists of symbols derived from keyboarding, dictation, and/or voice recognition software. As a result, although I do diligently check for accuracy, there may be errors in the script that have gone undetected. Please consider this when interpreting information found in this chart    Ellis Abarca DO  , Sports Medicine  Department of Family Medicine and [facility]      Again, thank you for allowing me to participate in the care of your patient.      Sincerely,    Ellis Abarca DO

## 2025-06-16 NOTE — PROGRESS NOTES
[facility]  Sports Medicine Clinic  Clinics and Surgery Center           SUBJECTIVE       Navin Lutz is a 54 year old male presenting to clinic today.    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   Hand Dominance (If pertinent): Left     Injury (Y/N): No injury   Work Comp (Y/N): No  Date of injury: NA  Mechanism of Injury: Notes left sided anterior chest popping and formerly pain after having the LVAD pump placed     Duration of symptoms: 7 months   Intensity (1-10): 0/10   Aggravating factors:  Popping occurs with reaching up and thoracic rotation    Relieving Factors: No treatment    Prior Evaluation: None   Previous Surgery on the area (Y/N): Yes - L Vad pump placed 1 year ago    Physical Therapy (Previous/Current/None): None    Physical Activity/Exercise (What, How Often): None    PMH, Medications and Allergies were reviewed and updated as needed.    ROS:  As noted above otherwise negative.    Patient Active Problem List   Diagnosis    Acute on chronic systolic CHF (congestive heart failure) (H)    Chest pain    ICD (implantable cardioverter-defibrillator) in place    Inguinal hernia    Multiple lung nodules on CT    Non-ischemic cardiomyopathy (H)    Pure hypercholesterolemia    RAD (reactive airway disease) with wheezing, mild intermittent, uncomplicated    Acute deep vein thrombosis (DVT) of other vein of left upper extremity (H)    Cardiogenic shock (H)    Acute decompensated heart failure (H)    Chronic systolic congestive heart failure (H)    Anticoagulated on warfarin    LVAD (left ventricular assist device) present (H)    Chronic systolic heart failure (H)    Anticoagulated on Coumadin    Diplopia    Stroke-like symptoms    TIA (transient ischemic attack)       Current Outpatient Medications   Medication Sig Dispense Refill    aspirin (ASA) 81 MG chewable tablet Take 1 tablet (81 mg) by mouth daily 90 tablet 3    carvedilol (COREG) 6.25 MG tablet Take 1 tablet (6.25  mg) by mouth 2 times daily (with meals) 180 tablet 3    empagliflozin (JARDIANCE) 10 MG TABS tablet Take 1 tablet (10 mg) by mouth daily. 90 tablet 3    furosemide (LASIX) 20 MG tablet Take 1 tablet (20 mg) by mouth daily. For 3 days, 2/28-3/2. Check in with the VAD team if your weight does not improve.      gabapentin (NEURONTIN) 100 MG capsule Take 200 mg by mouth. (Patient not taking: Reported on 3/21/2025)      lisinopril (ZESTRIL) 10 MG tablet Take 1.5 tablets (15 mg) by mouth 2 times daily. 270 tablet 3    rosuvastatin (CRESTOR) 10 MG tablet Take 1 tablet (10 mg) by mouth daily 90 tablet 3    spironolactone (ALDACTONE) 25 MG tablet Take 1 tablet (25 mg) by mouth every evening (Patient not taking: Reported on 3/21/2025) 90 tablet 3    warfarin ANTICOAGULANT (COUMADIN) 5 MG tablet Take two tablets (10 mg) daily. Next chromogenic factor X on 7/12 in clinic. Always follow the most recent instructions from your INR clinic.              OBJECTIVE:       Vitals: There were no vitals filed for this visit.  BMI: There is no height or weight on file to calculate BMI.    Gen:  Well nourished and in no acute distress  HEENT: Extraocular movement intact  Neck: Supple  Pulm:  Breathing Comfortably. No increased respiratory effort.  Psych: Euthymic. Appropriately answers questions    MSK:   Left shoulder with full range of motion.  No asymmetry.  No tenderness to palpation about the shoulder.  Strength testing of the rotator cuff is 5/5, as well as biceps and triceps.  Pain negative provocative testing including empty can, Hornblower, belly press off and reverse press off, speeds and Yergason's, impingement with Neer and Abernathy, Walsh, and Spurling.    Anterior chest wall without asymmetry.  Full and equal range of motion on inhalation and exhalation.  No palpable tenderness about the costal sternal junction on the left or right.  When the patient is lying on the left however and takes a deep breath, there is a palpable  clunk in the anterior chest wall, that is nonpainful.    Study Result    Narrative & Impression   4 views left shoulder radiographs 6/16/2025 3:36 PM     History: Left shoulder pain, unspecified chronicity      Comparison: None     Findings:     AP, Grashey, transscapular Y, axillary  views of the left shoulder  were obtained. Glenohumeral joint is obscured by left chest wall  implanted device in the Grashey view.     No acute osseous abnormality. Glenohumeral and acromioclavicular  joints are congruent.     Mild degenerative changes of the acromioclavicular joint. At least  mild degenerative change of the glenohumeral joint.     Soft tissue is unremarkable. The visualized lung is clear. Left chest  wall implanted device. Sternotomy wires. LVAD.                                                                      Impression:  1. No acute osseous abnormality.  2. At least mild glenohumeral degenerative change.                ASSESSMENT and PLAN:     Navin was seen today for pain.    Diagnoses and all orders for this visit:    Chest wall pain following surgery  -     Physical Therapy  Referral; Future    Other orders  -     Orthopedic  Referral      Navin is a 54-year-old male presenting to clinic with left chest wall pain initially that started as sharp, and has now resolved.  Patient had an LVAD placed 1 year ago, subsequently 6 months after that he did develop some sensations of clicking within the anterior chest wall.  He is not having any chest pain or ACS like symptoms.  He is not having any radiating pain into the neck or jaw, or the back.  He is also not having any shoulder pain.  Based on his presentation, given the LVAD placement with an open heart procedure, this patient likely has some intercostal ligamentous disruption, and he never did physical therapy for restrengthening.  However, given the fact that this was managed by cardiothoracic surgery, I would want him to check in with  them to make sure there are no other potential issues with the LVAD in and of itself, given the operation.  However, given the surgery, I do think initiating physical therapy for the musculoskeletal chest wall symptoms would be pertinent.  The patient lives in [place] so the order has been printed, and he will have to do that there.  Again I have advised the patient to discuss with his surgeon as well as cardiology team, given the symptoms happening after that potential surgery.  All questions have been answered.  ER urgent care precautions have been counseled extensively.    It was a pleasure seeing Navin today.    Options for treatment and/or follow-up care were reviewed with the patient was actively involved in the decision making process. Patient verbalized understanding and was in agreement with the plan.      Disclaimer:  This note consists of symbols derived from keyboarding, dictation, and/or voice recognition software. As a result, although I do diligently check for accuracy, there may be errors in the script that have gone undetected. Please consider this when interpreting information found in this chart    Ellis Abarca DO  , Sports Medicine  Department of Family Medicine and [facility]

## 2025-06-17 ENCOUNTER — ANTICOAGULATION THERAPY VISIT (OUTPATIENT)
Dept: ANTICOAGULATION | Facility: CLINIC | Age: 55
End: 2025-06-17

## 2025-06-17 ENCOUNTER — RESULTS FOLLOW-UP (OUTPATIENT)
Dept: CARDIOLOGY | Facility: CLINIC | Age: 55
End: 2025-06-17

## 2025-06-17 ENCOUNTER — HOSPITAL ENCOUNTER (OUTPATIENT)
Facility: CLINIC | Age: 55
Discharge: HOME | End: 2025-06-17
Attending: INTERNAL MEDICINE | Admitting: INTERNAL MEDICINE
Payer: COMMERCIAL

## 2025-06-17 ENCOUNTER — HOSPITAL ENCOUNTER (OUTPATIENT)
Dept: CARDIOLOGY | Facility: CLINIC | Age: 55
Discharge: HOME | End: 2025-06-17
Attending: STUDENT IN AN ORGANIZED HEALTH CARE EDUCATION/TRAINING PROGRAM | Admitting: INTERNAL MEDICINE
Payer: COMMERCIAL

## 2025-06-17 VITALS
DIASTOLIC BLOOD PRESSURE: 97 MMHG | BODY MASS INDEX: 29.2 KG/M2 | HEART RATE: 64 BPM | RESPIRATION RATE: 16 BRPM | TEMPERATURE: 98.3 F | SYSTOLIC BLOOD PRESSURE: 126 MMHG | OXYGEN SATURATION: 99 % | HEIGHT: 72 IN | WEIGHT: 215.61 LBS

## 2025-06-17 DIAGNOSIS — Z95.811 LVAD (LEFT VENTRICULAR ASSIST DEVICE) PRESENT (H): Primary | ICD-10-CM

## 2025-06-17 DIAGNOSIS — I50.22 CHRONIC SYSTOLIC CONGESTIVE HEART FAILURE (H): ICD-10-CM

## 2025-06-17 DIAGNOSIS — I50.22 CHRONIC SYSTOLIC HEART FAILURE (H): ICD-10-CM

## 2025-06-17 DIAGNOSIS — Z95.811 LEFT VENTRICULAR ASSIST DEVICE PRESENT (H): ICD-10-CM

## 2025-06-17 DIAGNOSIS — Z79.01 ANTICOAGULATED ON COUMADIN: ICD-10-CM

## 2025-06-17 DIAGNOSIS — Z95.811 LVAD (LEFT VENTRICULAR ASSIST DEVICE) PRESENT (H): ICD-10-CM

## 2025-06-17 DIAGNOSIS — Z79.899 ENCOUNTER FOR LONG-TERM (CURRENT) USE OF MEDICATIONS: ICD-10-CM

## 2025-06-17 DIAGNOSIS — Z79.899 LONG TERM USE OF DRUG: ICD-10-CM

## 2025-06-17 LAB
ALBUMIN SERPL BCG-MCNC: 3.9 G/DL (ref 3.5–5.2)
ALP SERPL-CCNC: 96 U/L (ref 40–150)
ALT SERPL W P-5'-P-CCNC: 37 U/L (ref 0–70)
ANION GAP SERPL CALCULATED.3IONS-SCNC: 10 MMOL/L (ref 7–15)
AST SERPL W P-5'-P-CCNC: 30 U/L (ref 0–45)
BILIRUB SERPL-MCNC: 0.6 MG/DL
BUN SERPL-MCNC: 16.6 MG/DL (ref 6–20)
CALCIUM SERPL-MCNC: 9.1 MG/DL (ref 8.8–10.4)
CHLORIDE SERPL-SCNC: 107 MMOL/L (ref 98–107)
CREAT SERPL-MCNC: 1.2 MG/DL (ref 0.67–1.17)
EGFRCR SERPLBLD CKD-EPI 2021: 72 ML/MIN/1.73M2
ERYTHROCYTE [DISTWIDTH] IN BLOOD BY AUTOMATED COUNT: 13.2 % (ref 10–15)
FERRITIN SERPL-MCNC: 85 NG/ML (ref 31–409)
GLUCOSE SERPL-MCNC: 95 MG/DL (ref 70–99)
HCO3 SERPL-SCNC: 20 MMOL/L (ref 22–29)
HCT VFR BLD AUTO: 52.8 % (ref 40–53)
HGB BLD-MCNC: 18.1 G/DL (ref 13.3–17.7)
HGB BLD-MCNC: 18.6 G/DL (ref 13.3–17.7)
INR PPP: 2.02 (ref 0.85–1.15)
IRON BINDING CAPACITY (ROCHE): 298 UG/DL (ref 240–430)
IRON SATN MFR SERPL: 36 % (ref 15–46)
IRON SERPL-MCNC: 106 UG/DL (ref 61–157)
LAB ORDER RESULT STATUS: NORMAL
LVEF ECHO: NORMAL
Lab: NORMAL
MCH RBC QN AUTO: 29.8 PG (ref 26.5–33)
MCHC RBC AUTO-ENTMCNC: 34.3 G/DL (ref 31.5–36.5)
MCV RBC AUTO: 87 FL (ref 78–100)
NT-PROBNP SERPL-MCNC: 68 PG/ML (ref 0–138)
OXYHGB MFR BLDV: 72 % (ref 70–75)
PERFORMING LABORATORY: NORMAL
PLATELET # BLD AUTO: 180 10E3/UL (ref 150–450)
POTASSIUM SERPL-SCNC: 4.5 MMOL/L (ref 3.4–5.3)
PROT SERPL-MCNC: 6.3 G/DL (ref 6.4–8.3)
PROTHROMBIN TIME: 22.5 SECONDS (ref 11.8–14.8)
RBC # BLD AUTO: 6.08 10E6/UL (ref 4.4–5.9)
SODIUM SERPL-SCNC: 137 MMOL/L (ref 135–145)
TEST NAME: NORMAL
TRANSFERRIN SERPL-MCNC: 260 MG/DL (ref 200–360)
WBC # BLD AUTO: 6.1 10E3/UL (ref 4–11)

## 2025-06-17 PROCEDURE — 36415 COLL VENOUS BLD VENIPUNCTURE: CPT | Performed by: STUDENT IN AN ORGANIZED HEALTH CARE EDUCATION/TRAINING PROGRAM

## 2025-06-17 PROCEDURE — 93451 RIGHT HEART CATH: CPT | Mod: 26 | Performed by: INTERNAL MEDICINE

## 2025-06-17 PROCEDURE — C1751 CATH, INF, PER/CENT/MIDLINE: HCPCS | Performed by: INTERNAL MEDICINE

## 2025-06-17 PROCEDURE — 93306 TTE W/DOPPLER COMPLETE: CPT

## 2025-06-17 PROCEDURE — 83550 IRON BINDING TEST: CPT | Performed by: STUDENT IN AN ORGANIZED HEALTH CARE EDUCATION/TRAINING PROGRAM

## 2025-06-17 PROCEDURE — 83880 ASSAY OF NATRIURETIC PEPTIDE: CPT | Performed by: STUDENT IN AN ORGANIZED HEALTH CARE EDUCATION/TRAINING PROGRAM

## 2025-06-17 PROCEDURE — 250N000009 HC RX 250: Performed by: STUDENT IN AN ORGANIZED HEALTH CARE EDUCATION/TRAINING PROGRAM

## 2025-06-17 PROCEDURE — 85018 HEMOGLOBIN: CPT | Performed by: STUDENT IN AN ORGANIZED HEALTH CARE EDUCATION/TRAINING PROGRAM

## 2025-06-17 PROCEDURE — 85610 PROTHROMBIN TIME: CPT | Performed by: STUDENT IN AN ORGANIZED HEALTH CARE EDUCATION/TRAINING PROGRAM

## 2025-06-17 PROCEDURE — 93306 TTE W/DOPPLER COMPLETE: CPT | Mod: 26 | Performed by: INTERNAL MEDICINE

## 2025-06-17 PROCEDURE — 84466 ASSAY OF TRANSFERRIN: CPT | Performed by: STUDENT IN AN ORGANIZED HEALTH CARE EDUCATION/TRAINING PROGRAM

## 2025-06-17 PROCEDURE — 272N000001 HC OR GENERAL SUPPLY STERILE: Performed by: INTERNAL MEDICINE

## 2025-06-17 PROCEDURE — 80053 COMPREHEN METABOLIC PANEL: CPT | Performed by: STUDENT IN AN ORGANIZED HEALTH CARE EDUCATION/TRAINING PROGRAM

## 2025-06-17 PROCEDURE — 93451 RIGHT HEART CATH: CPT | Performed by: INTERNAL MEDICINE

## 2025-06-17 PROCEDURE — 82728 ASSAY OF FERRITIN: CPT | Performed by: STUDENT IN AN ORGANIZED HEALTH CARE EDUCATION/TRAINING PROGRAM

## 2025-06-17 RX ORDER — LIDOCAINE 40 MG/G
CREAM TOPICAL
Status: COMPLETED | OUTPATIENT
Start: 2025-06-17 | End: 2025-06-17

## 2025-06-17 RX ADMIN — LIDOCAINE: 40 CREAM TOPICAL at 13:39

## 2025-06-17 ASSESSMENT — ACTIVITIES OF DAILY LIVING (ADL)
ADLS_ACUITY_SCORE: 57

## 2025-06-17 NOTE — DISCHARGE INSTRUCTIONS
[facility]                        Interventional Cardiology  Discharge Instructions   Post Right Heart Cath and/or Heart Biopsy      AFTER YOU GO HOME:  Take it easy for the rest of the day: Do not do strenuous exercise and do not lift, pull, or push anything heavy.  For at least 24 hours, keep the bandage over the spot where the catheter was inserted.   You may shower 24 hours after the procedure. Do not scrub the procedure site vigorously. Pat the incision dry. Do not submerge the site (ie bath, pool) for 48 hours  No lotion or powder to the puncture site for 3 days  You may put an ice or a cold pack on the area for 10 to 20 minutes at a time to help with soreness or swelling.   Resume your regular diet and medications unless otherwise instructed by your Primary Physician    CALL THE PHYSICIAN IF  If you have a fast-growing, painful lump at the catheter site.  If you have symptoms of infection, such as: Increased pain, swelling, warmth, redness, red streaks leading from the area, pus draining from the area, and/or a fever.    ADDITIONAL INSTRUCTIONS: Monitor neck site for bleeding, swelling, or voice changes. If you notice bleeding or swelling immediately apply pressure to the site for 15 minutes, and call number below to speak with Cardiology Fellow.  If you experience any changes in your breathing you should call your doctor immediately or come to the closest Emergency Department.  Do not drive yourself    MEDICATIONS: You are to resume all home medications including anticoagulation therapy unless otherwise advised by your primary cardiologist or nurse coordinator.    Follow Up: Per your primary cardiology team    If you have any questions or concerns regarding your procedure site please call [phone] at any time & press option 4 to speak to the .  Ask for the Cardiology Fellow on call.  They are available 24 hours a day.  You may also contact the Cardiology Clinic after hours  number at [phone].                                                       Telephone Numbers [phone] Monday-Friday 8:00 am to 4:30 pm    [phone] After 4:30 pm Monday-Friday, Weekends & Holidays  Ask for Interventional Cardiologist on call. Someone is on call 24 hours/day   [facility] toll free number [phone] Monday-Friday 8:00 am to 4:30 pm   [facility] Emergency Dept [phone]

## 2025-06-17 NOTE — PROGRESS NOTES
Pt back on 2A post RHC. Vitals stable. Doppler Map of 84. LVAD numbers wnl. Bilateral internal jugular sites covered with primapore soft, dry and intact. Minimal pain at the sites-declined intervention. Pt eating/drinking. Discharge instructions were reviewed with pt and pt verbalizes understanding. Copy of AVS given. Pt escorted to the [facility] ambulatory accompanied by wife. Pt has all his belongings including backup controller and battery.

## 2025-06-17 NOTE — PROGRESS NOTES
Consenting/Education for Cardiology Procedure: Right heart catheterization     Patient understands we would like to perform the listed procedure(s) due to HFrEF with LVAD in place.    The patient understands the following:     The procedure was described to the patient in detail.    No sedation is planned for this procedure. Patient understands risks and complications of the procedure which include but are not limited to bruising/swelling around the incision site, infection, bleeding, allergic reaction to local anesthetic, air embolism, arterial puncture, stroke, heart attack, need for emergency heart surgery, death.       Patient verbalized understanding of risks and benefits and has elected to proceed with the procedure or procedures listed above.    INR 2.02    Clinically Significant Risk Factors Present on Admission               # Drug Induced Coagulation Defect: home medication list includes an anticoagulant medication  # Drug Induced Platelet Defect: home medication list includes an antiplatelet medication   # Hypertension: Home medication list includes antihypertensive(s)  # End stage heart failure: Ventricular assist device (VAD) present          # Overweight: Estimated body mass index is 29.24 kg/m  as calculated from the following:    Height as of this encounter: 1.829 m (6').    Weight as of this encounter: 97.8 kg (215 lb 9.8 oz).       # Financial/Environmental Concerns:    # Asthma: noted on problem list  # ICD device present      Cardiovascular : Cardiomyopathy           Sahara Conley, CNP  Cardiology

## 2025-06-17 NOTE — PROGRESS NOTES
Virtual Visit Details    Type of service:  Video Visit     Originating Location (pt. Location): Home  Distant Location (provider location):  On-site  Platform used for Video Visit: [facility]    Advanced Heart Failure/Transplant Clinic Note    HPI  Dear colleagues,     I had the pleasure of seeing Mr. Navin Lutz in the Cardiology clinic.  As you know, Mr. Navin Lutz is a pleasant 54 year old male with a past medical history of chronic HFrEF 2/2 NICM s/p HM3 LVAD as BTT on 6/14/24 who presents for ongoing evaluation and management.    His postoperative course was relatively unremarkable. He was treated with antibiotics for Klebsiella pneumonia with a 10-day course.  He developed a left pleural effusion that required to be drained with a pigtail catheter. He also had abnormal elevated LFTs, which were thought to be iatrogenic due to multiple medications (statin, acetaminophen, azithromycin).  Acetaminophen and statin were held.  His LFTs have remained elevated. During workup for his LVAD he was found to have INRs that were discordant with Chromogenic Factor X and hematology was consulted. Found to be lupus anticoagulant positive, and Chromogenic factor X monitoring was recommended for warfarin dosing as INR not reliable. Goal CFX 20-40% which correlates with INR 2-3. Hematology recommended repeat outpatient lupus anticoagulant testing at the end of July, and if negative, INR monitoring may be an option. He presented back to ER soon after discharge with sudden onset focal neurologic deficit with diplopia. Symptoms resolved on hospital day 1. He has several risk factors for stroke including recent VAD and known lupus anticoagulant positivity. Note: CFx 10 had been borderline subtherapeutic at time of hospital discharge and after discharge. CTA without large vessel occlusion. EKG is NSR, no hx of afib or flutter. Neurology consulted. Transthoracic echo with bubble study was unremarkable. Head CT negative for ischemia  or bleed on 7/9 and 7/10/24. Optho consulted - Anti-Ach receptor and MUSK antibodies recommend to r/o myasthenia gravis. He was also started on aspirin 81 mg daily.     Patient reports shoulder pain is much improved and is very active at this time without any limitations. He denies chest pain, shortness of breath, PND/orthopnea, palpitations, syncope, leg swelling, LVAD alarms, dark urine, blood in stool, concerns with driveline or driveline site. He is still nervous about getting the updated COVID vaccine, but will consider getting it soon after our discussion today.    ROS:  A complete 12-point ROS was negative except as above.    PAST MEDICAL HISTORY:  Patient Active Problem List   Diagnosis    Acute on chronic systolic CHF (congestive heart failure) (H)    Chest pain    ICD (implantable cardioverter-defibrillator) in place    Inguinal hernia    Multiple lung nodules on CT    Non-ischemic cardiomyopathy (H)    Pure hypercholesterolemia    RAD (reactive airway disease) with wheezing, mild intermittent, uncomplicated    Acute deep vein thrombosis (DVT) of other vein of left upper extremity (H)    Cardiogenic shock (H)    Acute decompensated heart failure (H)    Chronic systolic congestive heart failure (H)    Anticoagulated on warfarin    LVAD (left ventricular assist device) present (H)    Chronic systolic heart failure (H)    Anticoagulated on Coumadin    Diplopia    Stroke-like symptoms    TIA (transient ischemic attack)        FAMILY HISTORY:  No family history on file.    SOCIAL HISTORY:  Social History     Tobacco Use    Smoking status: Never    Smokeless tobacco: Never   Vaping Use    Vaping status: Never Used   Substance Use Topics    Alcohol use: Never    Drug use: Never        ALLERGIES:  No Known Allergies    CURRENT MEDICATIONS:  Current Outpatient Medications   Medication Sig Dispense Refill    carvedilol (COREG) 6.25 MG tablet Take 1 tablet (6.25 mg) by mouth 2 times daily (with meals) 180 tablet 3     gabapentin (NEURONTIN) 100 MG capsule Take 200 mg by mouth.      lisinopril (ZESTRIL) 10 MG tablet Take 1.5 tablets (15 mg) by mouth 2 times daily. 270 tablet 3    rosuvastatin (CRESTOR) 10 MG tablet Take 1 tablet (10 mg) by mouth daily 90 tablet 3    spironolactone (ALDACTONE) 25 MG tablet Take 1 tablet (25 mg) by mouth every evening 90 tablet 3    aspirin (ASA) 81 MG chewable tablet Take 1 tablet (81 mg) by mouth daily 90 tablet 3    empagliflozin (JARDIANCE) 10 MG TABS tablet Take 1 tablet (10 mg) by mouth daily. 90 tablet 3    furosemide (LASIX) 20 MG tablet Take 1 tablet (20 mg) by mouth daily. For 3 days, 2/28-3/2. Check in with the VAD team if your weight does not improve.      warfarin ANTICOAGULANT (COUMADIN) 5 MG tablet Take two tablets (10 mg) daily. Next chromogenic factor X on 7/12 in clinic. Always follow the most recent instructions from your INR clinic.         EXAM:  Ht 1.829 m (6')   Wt 95.3 kg (210 lb)   BMI 28.48 kg/m   (virtual)    General: appears comfortable, alert and interactive, in no acute distress  Head: normocephalic, atraumatic  Eyes: anicteric sclera, EOMI  Mouth: MMM  Neurological: alert and oriented, no focal deficits  Psych: normal mood and affect  Derm: no rashes on exposed surfaces    Weight  Wt Readings from Last 10 Encounters:   06/18/25 95.3 kg (210 lb)   06/17/25 97.8 kg (215 lb 9.8 oz)   03/21/25 100.7 kg (222 lb)   03/08/25 94.3 kg (208 lb)   02/27/25 94.8 kg (209 lb)   12/13/24 96.3 kg (212 lb 4.9 oz)   12/13/24 98.2 kg (216 lb 6.4 oz)   09/04/24 94.8 kg (209 lb)   08/14/24 87.5 kg (193 lb)   07/31/24 89.8 kg (198 lb)       I personally reviewed recent labs and data as below and discussed the results with the patient in clinic today.  Labs:  CBC RESULTS:  Lab Results   Component Value Date    WBC 6.1 06/17/2025    RBC 6.08 (H) 06/17/2025    HGB 18.6 (H) 06/17/2025    HGB 18.1 (H) 06/17/2025    HCT 52.8 06/17/2025    MCV 87 06/17/2025    MCH 29.8 06/17/2025    MCHC 34.3  06/17/2025    RDW 13.2 06/17/2025     06/17/2025       CMP RESULTS:  Lab Results   Component Value Date     06/17/2025    POTASSIUM 4.5 06/17/2025    POTASSIUM 4.5 06/14/2024    CHLORIDE 107 06/17/2025    CHLORIDE 103 03/04/2025    CO2 20 (L) 06/17/2025    ANIONGAP 10 06/17/2025    GLC 95 06/17/2025     (H) 07/09/2024    BUN 16.6 06/17/2025    CR 1.20 (H) 06/17/2025    GFRESTIMATED 72 06/17/2025    GFRESTIMATED >60 07/09/2024    NAM 9.1 06/17/2025    BILITOTAL 0.6 06/17/2025    ALBUMIN 3.9 06/17/2025    ALKPHOS 96 06/17/2025    ALT 37 06/17/2025    AST 30 06/17/2025     Recent Labs   Lab Test 07/10/24  0646 11/18/23  0018   CHOL 195 161   HDL 36* 48   * 96   TRIG 117 85      Testing/Procedures:  I personally visualized and interpreted:  Echo:  7/9/24  s/p HeartMate 3 LVAD at 5400 rpm  LVIDD is 5.6 cm. Left ventricular function is decreased. The ejection fraction is 15-20% (severely reduced).  LVAD cannula was seen in the expected anatomic position in the LV apex.  Outflow graft is visualized. Interventricular septum is midline.  The right ventricle is normal size. Global right ventricular function is moderately reduced.  The aortic valve does not open during the cardiac cycle.  The inferior vena cava was normal in size with preserved respiratory variability.  No pericardial effusion is present.  This study was compared with the study from 6/28/2024. Left ventricular size is smaller.     Cardiac Catheterization:  6/12/24, prior to LVAD implant  BSA 2.14 m2  /73/85 mmHg  RA mean of 10 mmHg and V wave of 15 mmhg  PA 57/35/48 mmHg  PCWP --/--/30  Teressa CO/CI 3.9/1.8   PVR 4.6  PA sat 63%   Hgb 17 g/dL     Right sided filling pressures are moderately elevated.    Left sided filling pressures are moderately elevated.    Severely elevated pulmonary artery hypertension.    Reduced cardiac output level.    Hemodynamic data has been modified in Epic per physician review.     Elevated bilateral  filling pressures with severe pulmonary hypertension and reduced cardiac output  Uncomplicated R femoral radial access with uncomplicated IABP insertion. Position confirmed on fluoroscopy.   Leave in Vance-Hugh catheter via RIJ access.     TTE 9/3/24  Interpretation Summary  HM3 LVAD 5400 RPM.  Moderate left ventricular dilation is present.  IVS bowing towards R side.  Left ventricular function is decreased. The ejection fraction is <30%  (severely reduced).  Aortic remains closed  Global right ventricular function is mildly reduced.  The right ventricle is normal size.  No pericardial effusion is present.  No significant valvular abnormalities present.  Inflow velocity 40 cm/s, outflow cannot be asssesed.  This study was compared with the study from 7/31/2024 .Pericardial effusion resolved, otherwise no change.    TTE 12/13/24  Interpretation Summary  S/p Heartmate III LVAD at 5400 RPM.  Left ventricular function is decreased. The ejection fraction is 10-20%  (severely reduced).  LVAD cannula was seen in the expected anatomic position in the LV apex. Normal  inflow and outflow Doppler. Septum is midline.  The right ventricle is normal size. Global right ventricular function is  mildly reduced.  Aortic valve remains closed during the cardiac cycle. No significant aortic  regurgitation noted.  No pericardial effusion is present.    RHC 12/13/24  BP: 120/85/105 mmHg  RA: 1/1/1 mmHg  RV: 16/1 mmHg  PA: 16/7/10 mmHg  W: 1/3/1 mmHg  PA sat: 73.4 %  Teressa CO/CI: 6.2/2.8  Thermo CO/CI: 4.8/2.2  PVR: 1.45 BYRD  Right sided filling pressures are normal. Left sided filling pressures are normal. Normal PA pressures. Basal HM3 LVAD Settings:  Speed 5400 rpm    Device Interrogation 3/21/25  Device: Medtronic [id number] Stephenson XT VR MRI  Normal device function.  Mode: VVIR  bpm  : 3%  Intrinsic rhythm: VS 91 bpm  Thoracic Impedance:  Near reference line.   Short V-V intervals: 0  Lead Trends Appear Stable.  Estimated battery  longevity to RRT = 12.5 years. Battery voltage = 3.02 V.   Anticoagulant: Warfarin  Ventricular Arrhythmia: 23 NSVT episodes detected, 182-240 bpm lasting 1-2 sec each.  Setting Changes: None  Patient has an appointment to see Dr. Silvestre today.   Plan: Device follow-up every 3 months.    TTE 6/17/25  Interpretation Summary  S/p Heartmate III LVAD at 5400 RPM.  Left ventricular function is decreased. The ejection fraction is <30%  (severely reduced).  LVIDd: 5.8 cm Septum is midline.  Global right ventricular function is mildly reduced.  The inferior vena cava was normal in size with preserved respiratory  variability.  No significant valvular abnormalities present.  Flow velocities cannot be well assessed today.  This study was compared with the study from 12/2024 .  No significant changes noted.    [facility] 6/17/25    Pressures Phase: Baseline     Time Systolic (mmHg) Diastolic (mmHg) Mean (mmHg) A Wave (mmHg) V Wave (mmHg) EDP (mmHg) Max dp/dt (mmHg/sec) HR (bpm) Content (mL/dL) SAT (%)   RA Pressures  3:06 PM   5    6    6      67        RV Pressures  2:37 PM       240           3:06 PM 20        5     72        PA Pressures  3:07 PM 20    8    13        66        PCW Pressures  3:07 PM   5    6    7      29        AO Pressures  2:45     82    97        69          Blood Flow Results Phase: Baseline     Time Results Indexed Values (L/min/m2)   QP  2:37 PM 5.11 L/min    2.33      QS  2:37 PM 5.11 L/min    2.33        Blood Oximetry Phase: Baseline     Time Hb SAT(%) PO2 Content (mL/dL) PA Sat (%)   PA  2:37 PM  73.3 %      73.3      Art  2:37 PM  96 %     24.28         Cardiac Output Phase: Baseline     Time TDCO (L/min) TDCI (L/min/m2) Teressa C.O. (L/min) Teressa C.I. (L/min/m2) Teressa HR (bpm)   Cardiac Output Results  2:37 PM 4.03    1.84    5.11    2.33         3:09 PM 4.03            Resistance Results Phase: Baseline     Time PVR SVR TPR TVR PVR/SVR TPR/TVR   Resistance Results (Metric)  2:37 .22 dsc-5     1440.08 dsc-5    203.49 dsc-5    1518.35 dsc-5    0.09    0.13      Resistance Results (Wood)  2:37 PM 1.57 BYRD    18.01 BYRD    2.54 BYRD    18.98 BYRD    0.09    0.13        Stoke Volume Results Phase: Baseline     Time RVSW (gm*m) LVSW (gm*m) RVSW-I (gm*m/m2) LVSW-I (gm*m/m2)   Stroke Work Results  2:37 PM 8.42    92.65    3.84    42.27          Outside results of note:  Outside records were obtained and relevant results/notes have been incorporated into HPI.    Assessment and Plan:     In summary, 54 year old male with a past medical history of chronic HFrEF 2/2 NICM s/p HM3 LVAD as BTT on 6/14/24 who presents for ongoing evaluation and management.    # Chronic systolic heart failure secondary to NICM s/p HM3 LVAD as 6/14/24 on BTT  Stage D. NYHA Class II  Fluid status: euvolemic without diuretics  ACEi/ARB/ARNI: Continue lisinopril 15 mg BID  BB: Continue carvedilol 6.25 mg BID  Aldosterone antagonist: continue spironolactone 25 mg daily  SGLT2i: Continue empagliflozin 10 mg daily  SCD prophylaxis: s/p ICD  BP: MAP goal 65-85  LDH trends: Stable  Anticoagulation: warfarin with INR goal 2-3  Antiplatelet: On ASA 81 mg daily given prior TIA on LVAD support  Cardiac rehab: completed    VAD interrogation today: VAD interrogation completed virtually. No reported power spikes, speed drops, or other findings suspicious of pump malfunction noted.      # Acute onset diplopia, right esotropia  # Thought secondary to TIA  All symptoms are resolved.  - Will continue to monitor     # Altered taste and smell, resolved  This is longstanding and preceded his stroke. He had the symptoms after COVID in 2022 but had improved and now have more recently worsened with hospitalization during his LVAD implantation.  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 Does have positive lupus anticoagulant positivity detected incidentally on pre-LVAD workup and thus need to utilize chromogenic factor to guide INR at least until repeat testing.  - Warfarin as above  - Completed OT hand therapies for neuropathy  - Continue gabapentin     # Hepatocellular pattern of liver injury, stable  [place] to be related to drug effects during last admission in the context of statin / tylenol / azithromycin interaction.   - Will continue to monitor and consider further evaluation based on the trend     # Hyperlipidemia  - Continue rosuvastatin 20 mg daily    # Left Shoulder Pain, improved  - Will consider PT if reoccurs, saw Ortho recently    # CKD Stage II  - Will continue to monitor    Optimal Vascular Metrics    Blood Pressure   BP < 140/90 Yes    On Aspirin  Yes    On Statin  Yes    Tobacco use  No       To Do:  - No medication changes today  - Patient will get COVID shot and let us know once completed to list as status 4 on heart transplant list (was approved by committee once had 2nd COVID vaccine)  - Follow up with me in 3 months with next surveillance testing    The patient states understanding and is agreeable with plan.   Feel free to contact myself regarding questions or concerns. It was a pleasure to see this patient today.    A total of 43 minutes was spent on the day of the visit, which includes preparation for the visit (reviewing previous medical records, laboratories and investigations), in conjunction with the actual clinic visit with the patient, which includes obtaining a history and physical exam, creating and reviewing the care plan, patient education (and family if present), counseling, documenting clinical information in the electronic health record and care coordination.     The longitudinal plan of care for the diagnosis(es)/condition(s) as documented were addressed during this visit. Due to the added  complexity in care, I will continue to support Navin in the subsequent management and with ongoing continuity of care.     Micaela Silvestre MD   of Medicine, [facility]  Advanced Heart Failure and Transplant Cardiology     CC  Libertad Briceno

## 2025-06-17 NOTE — Clinical Note
dry, intact, no bleeding and no hematoma. The right internal jugular vein access site is dry with a Primapore DSG

## 2025-06-17 NOTE — PROGRESS NOTES
ANTICOAGULATION MANAGEMENT     Navin Lutz 54 year old male is on warfarin with therapeutic INR result. (Goal INR 2.0-3.0)    Recent labs: (last 7 days)     06/17/25  1200   INR 2.02*       ASSESSMENT     Source(s): Chart Review and Patient/Caregiver Call     Warfarin doses taken: Held for RHC  recently which may be affecting INR  Diet: No new diet changes identified  Medication/supplement changes: None noted  New illness, injury, or hospitalization: [facility] today  Signs or symptoms of bleeding or clotting: No  Previous result: Therapeutic last 2(+) visits  Additional findings: reminded Navin that he does NOT need to hold his warfarin prior to RHC's. He will take 10 mg one time today, then continue current plan.        PLAN     Recommended plan for temporary change(s) affecting INR     Dosing Instructions: booster dose then continue your current warfarin dose with next INR in 1 week       Summary  As of 6/17/2025      Full warfarin instructions:  6/17: 10 mg; Otherwise 7.5 mg every Mon, Wed, Fri; 5 mg all other days   Next INR check:  6/24/2025               Telephone call with Navin who agrees to plan and repeated back plan correctly    Patient to recheck with home meter    Education provided: Taking warfarin: Importance of taking warfarin as instructed  Goal range and lab monitoring: goal range and significance of current result and Importance of following up at instructed interval  Contact [phone] with any changes, questions or concerns.     Plan made per [facility] anticoagulation protocol and per LVAD protocol    JESSICA CALDERON RN  6/17/2025  Anticoagulation Clinic  Tugg for routing messages: p ANTICOAG LVAD  [facility] patient phone line: [phone]        _______________________________________________________________________     Anticoagulation Episode Summary       Current INR goal:  2.0-3.0   TTR:  39.1% (11.6 mo)   Target end date:  Indefinite   Send INR reminders to:  ANTICOAG LVAD    Indications     Chronic systolic congestive heart failure (H) [I50.22]  LVAD (left ventricular assist device) present (H) [Z95.811]  Anticoagulated on Coumadin [Z79.01]             Comments:  Follow VAD Anticoag protocol:Yes: HeartMate 3  Bridging: Enoxaparin  Date VAD placed: 6/14/24                 Anticoagulation Care Providers       Provider Role Specialty Phone number    Micaela Silvestre MD Referring Advanced Heart Failure and Transplant Cardiology [phone]

## 2025-06-17 NOTE — PROGRESS NOTES
Pt arrived on 2A, room 15 for RHC. Vitals stable. Denies pain. LVAD HM 3 numbers wnl. Back up controller and battery at bedside. Lidocaine applied to Right neck. Labs reviewed. Consent signed.

## 2025-06-17 NOTE — Clinical Note
dry, intact, no bleeding and no hematoma. The left internal jugular vein 7 FR sheath is removed to a manual hold and a Primapore DSG.

## 2025-06-17 NOTE — Clinical Note
Report is called to 2A Inga SMITH. The procedure and findings are reviewed. The pt is returned to 2A per AMB in stable condition.

## 2025-06-18 ENCOUNTER — VIRTUAL VISIT (OUTPATIENT)
Dept: CARDIOLOGY | Facility: CLINIC | Age: 55
End: 2025-06-18
Attending: STUDENT IN AN ORGANIZED HEALTH CARE EDUCATION/TRAINING PROGRAM
Payer: COMMERCIAL

## 2025-06-18 VITALS — BODY MASS INDEX: 28.44 KG/M2 | HEIGHT: 72 IN | WEIGHT: 210 LBS

## 2025-06-18 DIAGNOSIS — G45.9 TIA (TRANSIENT ISCHEMIC ATTACK): ICD-10-CM

## 2025-06-18 DIAGNOSIS — I10 PRIMARY HYPERTENSION: ICD-10-CM

## 2025-06-18 DIAGNOSIS — Z95.811 LVAD (LEFT VENTRICULAR ASSIST DEVICE) PRESENT (H): Primary | ICD-10-CM

## 2025-06-18 DIAGNOSIS — N18.2 CHRONIC KIDNEY DISEASE, STAGE II (MILD): ICD-10-CM

## 2025-06-18 DIAGNOSIS — I50.22 CHRONIC SYSTOLIC HEART FAILURE (H): ICD-10-CM

## 2025-06-18 DIAGNOSIS — I42.8 NONISCHEMIC CARDIOMYOPATHY (H): ICD-10-CM

## 2025-06-18 LAB — STFR SERPL-MCNC: 4.8 MG/L

## 2025-06-18 PROCEDURE — 93750 INTERROGATION VAD IN PERSON: CPT | Mod: GT,95 | Performed by: STUDENT IN AN ORGANIZED HEALTH CARE EDUCATION/TRAINING PROGRAM

## 2025-06-18 ASSESSMENT — PAIN SCALES - GENERAL: PAINLEVEL_OUTOF10: MODERATE PAIN (4)

## 2025-06-18 NOTE — NURSING NOTE
Current patient location: [address] [address]    Is the patient currently in the state of [place]? YES    Visit mode: VIDEO    If the visit is dropped, the patient can be reconnected by:VIDEO VISIT: Send to e-mail at: [email or web address]    Will anyone else be joining the visit? NO  (If patient encounters technical issues they should call [phone]    Are changes needed to the allergy or medication list? No    Are refills needed on medications prescribed by this physician? NO    Rooming Documentation:  Questionnaire(s) completed    Reason for visit: RECHECK    Gracie Bentley VVF  No vitals

## 2025-06-18 NOTE — PATIENT INSTRUCTIONS
" Ventricular Assist Device Clinic  Take your medications every day, as directed!  Medication changes made today:  No Changes Instructions:  Keep up the good work Monitor your heart failure, Page the VAD Coordinator on call:  If you gain more than 3 lb in a day or 5 in a week  If you feel worsening shortness of breath, palpitations, or swelling.   If you have VAD alarms or change in parameters  If you feel dizzy or lightheaded     Keep your VAD appointments!    Your next VAD appointment is on 9/3 with Dr. Silvestre    Please schedule an appointment with VAD ARIEL for 6 months from now.     See the clinic schedulers after your appointment to schedule follow-up.    If you do not have an appointment scheduled, you need to call the VAD  at [phone]. To Page the VAD Coordinator on call, dial [phone] option #[phone] and ask to speak to the VAD coordinator on call. Try to maintain a heart healthy lifestyle!  Limit salt & sodium to 2000mg/day   Eat a heart healthy diet, low in saturated fats  Stay active! Aim to move at least 150 minutes every week.    Use [employer] allows you to communicate directly with your heart team through secure messaging.  [employer] can be accessed any time on your phone, computer, or tablet.  If you need assistance, we'd be happy to help!      Equipment Reminders:   Charge your back-up controller at least every 6 months. To charge, connect it to either batteries or wall power. The screen will read \"Charging\". Keep connected until the screen reads \"charging complete\". Disconnect power once the controller battery is charged. Also do a self-test when the controller is connected to power.  Replace the AA batteries in your mobile power unit every 6 months.  Check your battery charger for when it is due for maintenance. It requires inspection in clinic once per year. There will be a sticker stating the month and year maintenance is due. When you bring your battery charger to clinic, tell " one of the schedulers you have LVAD equipment that needs maintenance. They will call [facility]. You can leave your  under the LVAD sign by the  at the far end of clinic. You must drop it off before 2pm.

## 2025-06-18 NOTE — LETTER
6/18/2025      RE: Navin Lutz  [address] [place] [address]       Dear Colleague,    Thank you for the opportunity to participate in the care of your patient, Navin Lutz, at the [facility] [place] [facility]. Please see a copy of my visit note below.    Virtual Visit Details    Type of service:  Video Visit     Originating Location (pt. Location): Home  Distant Location (provider location):  On-site  Platform used for Video Visit: [facility]    Advanced Heart Failure/Transplant Clinic Note    HPI  Dear colleagues,     I had the pleasure of seeing Mr. Navin Lutz in the Cardiology clinic.  As you know, Mr. Navin Lutz is a pleasant 54 year old male with a past medical history of chronic HFrEF 2/2 NICM s/p HM3 LVAD as BTT on 6/14/24 who presents for ongoing evaluation and management.    His postoperative course was relatively unremarkable. He was treated with antibiotics for Klebsiella pneumonia with a 10-day course.  He developed a left pleural effusion that required to be drained with a pigtail catheter. He also had abnormal elevated LFTs, which were thought to be iatrogenic due to multiple medications (statin, acetaminophen, azithromycin).  Acetaminophen and statin were held.  His LFTs have remained elevated. During workup for his LVAD he was found to have INRs that were discordant with Chromogenic Factor X and hematology was consulted. Found to be lupus anticoagulant positive, and Chromogenic factor X monitoring was recommended for warfarin dosing as INR not reliable. Goal CFX 20-40% which correlates with INR 2-3. Hematology recommended repeat outpatient lupus anticoagulant testing at the end of July, and if negative, INR monitoring may be an option. He presented back to ER soon after discharge with sudden onset focal neurologic deficit with diplopia. Symptoms resolved on hospital day 1. He has several risk factors for stroke including  recent VAD and known lupus anticoagulant positivity. Note: CFx 10 had been borderline subtherapeutic at time of hospital discharge and after discharge. CTA without large vessel occlusion. EKG is NSR, no hx of afib or flutter. Neurology consulted. Transthoracic echo with bubble study was unremarkable. Head CT negative for ischemia or bleed on 7/9 and 7/10/24. Optho consulted - Anti-Ach receptor and MUSK antibodies recommend to r/o myasthenia gravis. He was also started on aspirin 81 mg daily.     Patient reports shoulder pain is much improved and is very active at this time without any limitations. He denies chest pain, shortness of breath, PND/orthopnea, palpitations, syncope, leg swelling, LVAD alarms, dark urine, blood in stool, concerns with driveline or driveline site. He is still nervous about getting the updated COVID vaccine, but will consider getting it soon after our discussion today.    ROS:  A complete 12-point ROS was negative except as above.    PAST MEDICAL HISTORY:  Patient Active Problem List   Diagnosis     Acute on chronic systolic CHF (congestive heart failure) (H)     Chest pain     ICD (implantable cardioverter-defibrillator) in place     Inguinal hernia     Multiple lung nodules on CT     Non-ischemic cardiomyopathy (H)     Pure hypercholesterolemia     RAD (reactive airway disease) with wheezing, mild intermittent, uncomplicated     Acute deep vein thrombosis (DVT) of other vein of left upper extremity (H)     Cardiogenic shock (H)     Acute decompensated heart failure (H)     Chronic systolic congestive heart failure (H)     Anticoagulated on warfarin     LVAD (left ventricular assist device) present (H)     Chronic systolic heart failure (H)     Anticoagulated on Coumadin     Diplopia     Stroke-like symptoms     TIA (transient ischemic attack)        FAMILY HISTORY:  No family history on file.    SOCIAL HISTORY:  Social History     Tobacco Use     Smoking status: Never     Smokeless  tobacco: Never   Vaping Use     Vaping status: Never Used   Substance Use Topics     Alcohol use: Never     Drug use: Never        ALLERGIES:  No Known Allergies    CURRENT MEDICATIONS:  Current Outpatient Medications   Medication Sig Dispense Refill     carvedilol (COREG) 6.25 MG tablet Take 1 tablet (6.25 mg) by mouth 2 times daily (with meals) 180 tablet 3     gabapentin (NEURONTIN) 100 MG capsule Take 200 mg by mouth.       lisinopril (ZESTRIL) 10 MG tablet Take 1.5 tablets (15 mg) by mouth 2 times daily. 270 tablet 3     rosuvastatin (CRESTOR) 10 MG tablet Take 1 tablet (10 mg) by mouth daily 90 tablet 3     spironolactone (ALDACTONE) 25 MG tablet Take 1 tablet (25 mg) by mouth every evening 90 tablet 3     aspirin (ASA) 81 MG chewable tablet Take 1 tablet (81 mg) by mouth daily 90 tablet 3     empagliflozin (JARDIANCE) 10 MG TABS tablet Take 1 tablet (10 mg) by mouth daily. 90 tablet 3     furosemide (LASIX) 20 MG tablet Take 1 tablet (20 mg) by mouth daily. For 3 days, 2/28-3/2. Check in with the VAD team if your weight does not improve.       warfarin ANTICOAGULANT (COUMADIN) 5 MG tablet Take two tablets (10 mg) daily. Next chromogenic factor X on 7/12 in clinic. Always follow the most recent instructions from your INR clinic.         EXAM:  Ht 1.829 m (6')   Wt 95.3 kg (210 lb)   BMI 28.48 kg/m   (virtual)    General: appears comfortable, alert and interactive, in no acute distress  Head: normocephalic, atraumatic  Eyes: anicteric sclera, EOMI  Mouth: MMM  Neurological: alert and oriented, no focal deficits  Psych: normal mood and affect  Derm: no rashes on exposed surfaces    Weight  Wt Readings from Last 10 Encounters:   06/18/25 95.3 kg (210 lb)   06/17/25 97.8 kg (215 lb 9.8 oz)   03/21/25 100.7 kg (222 lb)   03/08/25 94.3 kg (208 lb)   02/27/25 94.8 kg (209 lb)   12/13/24 96.3 kg (212 lb 4.9 oz)   12/13/24 98.2 kg (216 lb 6.4 oz)   09/04/24 94.8 kg (209 lb)   08/14/24 87.5 kg (193 lb)   07/31/24 89.8  kg (198 lb)       I personally reviewed recent labs and data as below and discussed the results with the patient in clinic today.  Labs:  CBC RESULTS:  Lab Results   Component Value Date    WBC 6.1 06/17/2025    RBC 6.08 (H) 06/17/2025    HGB 18.6 (H) 06/17/2025    HGB 18.1 (H) 06/17/2025    HCT 52.8 06/17/2025    MCV 87 06/17/2025    MCH 29.8 06/17/2025    MCHC 34.3 06/17/2025    RDW 13.2 06/17/2025     06/17/2025       CMP RESULTS:  Lab Results   Component Value Date     06/17/2025    POTASSIUM 4.5 06/17/2025    POTASSIUM 4.5 06/14/2024    CHLORIDE 107 06/17/2025    CHLORIDE 103 03/04/2025    CO2 20 (L) 06/17/2025    ANIONGAP 10 06/17/2025    GLC 95 06/17/2025     (H) 07/09/2024    BUN 16.6 06/17/2025    CR 1.20 (H) 06/17/2025    GFRESTIMATED 72 06/17/2025    GFRESTIMATED >60 07/09/2024    NAM 9.1 06/17/2025    BILITOTAL 0.6 06/17/2025    ALBUMIN 3.9 06/17/2025    ALKPHOS 96 06/17/2025    ALT 37 06/17/2025    AST 30 06/17/2025     Recent Labs   Lab Test 07/10/24  0646 11/18/23  0018   CHOL 195 161   HDL 36* 48   * 96   TRIG 117 85      Testing/Procedures:  I personally visualized and interpreted:  Echo:  7/9/24  s/p HeartMate 3 LVAD at 5400 rpm  LVIDD is 5.6 cm. Left ventricular function is decreased. The ejection fraction is 15-20% (severely reduced).  LVAD cannula was seen in the expected anatomic position in the LV apex.  Outflow graft is visualized. Interventricular septum is midline.  The right ventricle is normal size. Global right ventricular function is moderately reduced.  The aortic valve does not open during the cardiac cycle.  The inferior vena cava was normal in size with preserved respiratory variability.  No pericardial effusion is present.  This study was compared with the study from 6/28/2024. Left ventricular size is smaller.     Cardiac Catheterization:  6/12/24, prior to LVAD implant  BSA 2.14 m2  /73/85 mmHg  RA mean of 10 mmHg and V wave of 15 mmhg  PA  57/35/48 mmHg  PCWP --/--/30  Teressa CO/CI 3.9/1.8   PVR 4.6  PA sat 63%   Hgb 17 g/dL      Right sided filling pressures are moderately elevated.     Left sided filling pressures are moderately elevated.     Severely elevated pulmonary artery hypertension.     Reduced cardiac output level.     Hemodynamic data has been modified in Epic per physician review.     Elevated bilateral filling pressures with severe pulmonary hypertension and reduced cardiac output  Uncomplicated R femoral radial access with uncomplicated IABP insertion. Position confirmed on fluoroscopy.   Leave in [place]Hugh catheter via RIJ access.     TTE 9/3/24  Interpretation Summary  HM3 LVAD 5400 RPM.  Moderate left ventricular dilation is present.  IVS bowing towards R side.  Left ventricular function is decreased. The ejection fraction is <30%  (severely reduced).  Aortic remains closed  Global right ventricular function is mildly reduced.  The right ventricle is normal size.  No pericardial effusion is present.  No significant valvular abnormalities present.  Inflow velocity 40 cm/s, outflow cannot be asssesed.  This study was compared with the study from 7/31/2024 .Pericardial effusion resolved, otherwise no change.    TTE 12/13/24  Interpretation Summary  S/p Heartmate III LVAD at 5400 RPM.  Left ventricular function is decreased. The ejection fraction is 10-20%  (severely reduced).  LVAD cannula was seen in the expected anatomic position in the LV apex. Normal  inflow and outflow Doppler. Septum is midline.  The right ventricle is normal size. Global right ventricular function is  mildly reduced.  Aortic valve remains closed during the cardiac cycle. No significant aortic  regurgitation noted.  No pericardial effusion is present.    RHC 12/13/24  BP: 120/85/105 mmHg  RA: 1/1/1 mmHg  RV: 16/1 mmHg  PA: 16/7/10 mmHg  W: 1/3/1 mmHg  PA sat: 73.4 %  Teressa CO/CI: 6.2/2.8  Thermo CO/CI: 4.8/2.2  PVR: 1.45 BYRD  Right sided filling pressures are normal.  Left sided filling pressures are normal. Normal PA pressures. Basal HM3 LVAD Settings:  Speed 5400 rpm    Device Interrogation 3/21/25  Device: Medtronic [id number] Zwingle XT VR MRI  Normal device function.  Mode: VVIR  bpm  : 3%  Intrinsic rhythm: VS 91 bpm  Thoracic Impedance:  Near reference line.   Short V-V intervals: 0  Lead Trends Appear Stable.  Estimated battery longevity to RRT = 12.5 years. Battery voltage = 3.02 V.   Anticoagulant: Warfarin  Ventricular Arrhythmia: 23 NSVT episodes detected, 182-240 bpm lasting 1-2 sec each.  Setting Changes: None  Patient has an appointment to see Dr. Silvestre today.   Plan: Device follow-up every 3 months.    TTE 6/17/25  Interpretation Summary  S/p Heartmate III LVAD at 5400 RPM.  Left ventricular function is decreased. The ejection fraction is <30%  (severely reduced).  LVIDd: 5.8 cm Septum is midline.  Global right ventricular function is mildly reduced.  The inferior vena cava was normal in size with preserved respiratory  variability.  No significant valvular abnormalities present.  Flow velocities cannot be well assessed today.  This study was compared with the study from 12/2024 .  No significant changes noted.    [facility] 6/17/25    Pressures Phase: Baseline     Time Systolic (mmHg) Diastolic (mmHg) Mean (mmHg) A Wave (mmHg) V Wave (mmHg) EDP (mmHg) Max dp/dt (mmHg/sec) HR (bpm) Content (mL/dL) SAT (%)   RA Pressures  3:06 PM   5    6    6      67        RV Pressures  2:37 PM       240           3:06 PM 20        5     72        PA Pressures  3:07 PM 20    8    13        66        PCW Pressures  3:07 PM   5    6    7      29        AO Pressures  2:45     82    97        69          Blood Flow Results Phase: Baseline     Time Results Indexed Values (L/min/m2)   QP  2:37 PM 5.11 L/min    2.33      QS  2:37 PM 5.11 L/min    2.33        Blood Oximetry Phase: Baseline     Time Hb SAT(%) PO2 Content (mL/dL) PA Sat (%)   PA  2:37 PM  73.3 %      73.3      Art   2:37 PM  96 %     24.28         Cardiac Output Phase: Baseline     Time TDCO (L/min) TDCI (L/min/m2) Teressa C.O. (L/min) Teressa C.I. (L/min/m2) Teressa HR (bpm)   Cardiac Output Results  2:37 PM 4.03    1.84    5.11    2.33         3:09 PM 4.03            Resistance Results Phase: Baseline     Time PVR SVR TPR TVR PVR/SVR TPR/TVR   Resistance Results (Metric)  2:37 .22 dsc-5    1440.08 dsc-5    203.49 dsc-5    1518.35 dsc-5    0.09    0.13      Resistance Results (Wood)  2:37 PM 1.57 BYRD    18.01 BYRD    2.54 BYRD    18.98 BYRD    0.09    0.13        Stoke Volume Results Phase: Baseline     Time RVSW (gm*m) LVSW (gm*m) RVSW-I (gm*m/m2) LVSW-I (gm*m/m2)   Stroke Work Results  2:37 PM 8.42    92.65    3.84    42.27          Outside results of note:  Outside records were obtained and relevant results/notes have been incorporated into HPI.    Assessment and Plan:     In summary, 54 year old male with a past medical history of chronic HFrEF 2/2 NICM s/p HM3 LVAD as BTT on 6/14/24 who presents for ongoing evaluation and management.    # Chronic systolic heart failure secondary to NICM s/p HM3 LVAD as 6/14/24 on BTT  Stage D. NYHA Class II  Fluid status: euvolemic without diuretics  ACEi/ARB/ARNI: Continue lisinopril 15 mg BID  BB: Continue carvedilol 6.25 mg BID  Aldosterone antagonist: continue spironolactone 25 mg daily  SGLT2i: Continue empagliflozin 10 mg daily  SCD prophylaxis: s/p ICD  BP: MAP goal 65-85  LDH trends: Stable  Anticoagulation: warfarin with INR goal 2-3  Antiplatelet: On ASA 81 mg daily given prior TIA on LVAD support  Cardiac rehab: completed    VAD interrogation today: VAD interrogation completed virtually. No reported power spikes, speed drops, or other findings suspicious of pump malfunction noted.      # Acute onset diplopia, right esotropia  # Thought secondary to TIA  All symptoms are resolved.  - Will continue to monitor     # Altered taste and smell, resolved  This is longstanding and preceded his  stroke. He had the symptoms after COVID in 2022 but had improved and now have more recently worsened with hospitalization during his LVAD implantation.  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 Does have positive lupus anticoagulant positivity detected incidentally on pre-LVAD workup and thus need to utilize chromogenic factor to guide INR at least until repeat testing.  - Warfarin as above  - Completed OT hand therapies for neuropathy  - Continue gabapentin     # Hepatocellular pattern of liver injury, stable  [place] to be related to drug effects during last admission in the context of statin / tylenol / azithromycin interaction.   - Will continue to monitor and consider further evaluation based on the trend     # Hyperlipidemia  - Continue rosuvastatin 20 mg daily    # Left Shoulder Pain, improved  - Will consider PT if reoccurs, saw Ortho recently    # CKD Stage II  - Will continue to monitor    Optimal Vascular Metrics    Blood Pressure   BP < 140/90 Yes    On Aspirin  Yes    On Statin  Yes    Tobacco use  No       To Do:  - No medication changes today  - Patient will get COVID shot and let us know once completed to list as status 4 on heart transplant list (was approved by committee once had 2nd COVID vaccine)  - Follow up with me in 3 months with next surveillance testing    The patient states understanding and is agreeable with plan.   Feel free to contact myself regarding questions or concerns. It was a pleasure to see this patient today.    A total of 43 minutes was spent on the day of the visit, which includes preparation for the visit (reviewing previous medical records, laboratories and investigations), in conjunction with the actual clinic visit with the  patient, which includes obtaining a history and physical exam, creating and reviewing the care plan, patient education (and family if present), counseling, documenting clinical information in the electronic health record and care coordination.     The longitudinal plan of care for the diagnosis(es)/condition(s) as documented were addressed during this visit. Due to the added complexity in care, I will continue to support Navin in the subsequent management and with ongoing continuity of care.     Micaela Silvestre MD   of Medicine, [facility]  Advanced Heart Failure and Transplant Cardiology     CC  Libertad Briceno       Please do not hesitate to contact me if you have any questions/concerns.     Sincerely,     Micaela Silvestre MD

## 2025-06-19 LAB
COTININE SERPL-MCNC: <5 NG/ML
LABORATORY REPORT: NORMAL
NICOTINE SERPL-MCNC: <5 NG/ML
PETH INTERPRETATION: NORMAL
PLPETH BLD-MCNC: <10 NG/ML
POPETH BLD-MCNC: <10 NG/ML

## 2025-06-20 ENCOUNTER — RESULTS FOLLOW-UP (OUTPATIENT)
Dept: TRANSPLANT | Facility: CLINIC | Age: 55
End: 2025-06-20

## 2025-06-23 ENCOUNTER — ANTICOAGULATION THERAPY VISIT (OUTPATIENT)
Dept: ANTICOAGULATION | Facility: CLINIC | Age: 55
End: 2025-06-23
Payer: COMMERCIAL

## 2025-06-23 ENCOUNTER — RESULTS FOLLOW-UP (OUTPATIENT)
Dept: ANTICOAGULATION | Facility: CLINIC | Age: 55
End: 2025-06-23

## 2025-06-23 DIAGNOSIS — I50.22 CHRONIC SYSTOLIC CONGESTIVE HEART FAILURE (H): Primary | ICD-10-CM

## 2025-06-23 DIAGNOSIS — Z95.811 LVAD (LEFT VENTRICULAR ASSIST DEVICE) PRESENT (H): ICD-10-CM

## 2025-06-23 DIAGNOSIS — Z79.01 ANTICOAGULATED ON COUMADIN: ICD-10-CM

## 2025-06-23 LAB — INR HOME MONITORING: 2.6 RATIO (ref 2.5–3)

## 2025-06-23 NOTE — PROGRESS NOTES
ANTICOAGULATION MANAGEMENT     Navin LOPEZ Bolivar 54 year old male is on warfarin with therapeutic INR result. (Goal INR 2.0-3.0)    Recent labs: (last 7 days)     06/23/25  1147   INR 2.6       ASSESSMENT     Source(s): Chart Review and Patient/Caregiver Call     Warfarin doses taken: Warfarin taken as instructed  Diet: No new diet changes identified  Medication/supplement changes: None noted  New illness, injury, or hospitalization: No  Signs or symptoms of bleeding or clotting: No  Previous result: Therapeutic last 2(+) visits  Additional findings: None       PLAN     Recommended plan for no diet, medication or health factor changes affecting INR     Dosing Instructions: Continue your current warfarin dose with next INR in 1 week       Summary  As of 6/23/2025      Full warfarin instructions:  7.5 mg every Mon, Wed, Fri; 5 mg all other days   Next INR check:  6/30/2025               Telephone call with Navin who verbalizes understanding and agrees to plan    Patient to recheck with home meter    Education provided: Contact [phone] with any changes, questions or concerns.     Plan made per [facility] anticoagulation protocol and per LVAD protocol    Lori Luo RN  6/23/2025  Anticoagulation Clinic  [employer] for routing messages: p ANTICOAG LVAD  [facility] patient phone line: [phone]        _______________________________________________________________________     Anticoagulation Episode Summary       Current INR goal:  2.0-3.0   TTR:  40.2% (11.8 mo)   Target end date:  Indefinite   Send INR reminders to:  ANTICOAG LVAD    Indications    Chronic systolic congestive heart failure (H) [I50.22]  LVAD (left ventricular assist device) present (H) [Z95.811]  Anticoagulated on Coumadin [Z79.01]             Comments:  Follow VAD Anticoag protocol:Yes: HeartMate 3  Bridging: Enoxaparin  Date VAD placed: 6/14/24                 Anticoagulation Care Providers       Provider Role Specialty Phone number    Micaela Silvestre  MD ALEIDA Referring Advanced Heart Failure and Transplant Cardiology [phone]

## 2025-07-02 ENCOUNTER — MYC MEDICAL ADVICE (OUTPATIENT)
Dept: ANTICOAGULATION | Facility: CLINIC | Age: 55
End: 2025-07-02
Payer: COMMERCIAL

## 2025-07-07 ENCOUNTER — ANTICOAGULATION THERAPY VISIT (OUTPATIENT)
Dept: ANTICOAGULATION | Facility: CLINIC | Age: 55
End: 2025-07-07
Payer: COMMERCIAL

## 2025-07-07 DIAGNOSIS — I50.22 CHRONIC SYSTOLIC CONGESTIVE HEART FAILURE (H): Primary | ICD-10-CM

## 2025-07-07 DIAGNOSIS — Z95.811 LVAD (LEFT VENTRICULAR ASSIST DEVICE) PRESENT (H): ICD-10-CM

## 2025-07-07 DIAGNOSIS — Z79.01 ANTICOAGULATED ON COUMADIN: ICD-10-CM

## 2025-07-07 LAB — INR HOME MONITORING: 2.8 RATIO (ref 2.5–3)

## 2025-07-07 NOTE — PROGRESS NOTES
ANTICOAGULATION MANAGEMENT     Navin LOPEZ Bolivar 54 year old male is on warfarin with therapeutic INR result. (Goal INR 2.0-3.0)    Recent labs: (last 7 days)     07/07/25  1108   INR 2.8       ASSESSMENT     Source(s): Chart Review and Patient/Caregiver Call     Warfarin doses taken: Warfarin taken as instructed  Diet: No new diet changes identified  Medication/supplement changes: None noted  New illness, injury, or hospitalization: No  Signs or symptoms of bleeding or clotting: No  Previous result: Therapeutic last 2(+) visits  Additional findings: Navin updates that he did try to call in an INR of 3.0 last week to [facility] INR but was having difficulty with the phone connection so he wasn't sure the result was received. Informed him that we did not receive it, did let him know that it may be easier to report results via the rupert if he continues to have difficulty       PLAN     Recommended plan for no diet, medication or health factor changes affecting INR     Dosing Instructions: Continue your current warfarin dose with next INR in 1 week       Summary  As of 7/7/2025      Full warfarin instructions:  7.5 mg every Mon, Wed, Fri; 5 mg all other days   Next INR check:  7/14/2025               Telephone call with Navin who verbalizes understanding and agrees to plan    Patient to recheck with home meter    Education provided: Contact [phone] with any changes, questions or concerns.     Plan made per [facility] anticoagulation protocol and per LVAD protocol    Lori Luo RN  7/7/2025  Anticoagulation Clinic  [employer] for routing messages: p ANTICOAG LVAD  [facility] patient phone line: [phone]        _______________________________________________________________________     Anticoagulation Episode Summary       Current INR goal:  2.0-3.0   TTR:  42.2% (1 y)   Target end date:  Indefinite   Send INR reminders to:  ANTICOAG LVAD    Indications    Chronic systolic congestive heart failure (H) [I50.22]  LVAD (left  ventricular assist device) present (H) [Z95.811]  Anticoagulated on Coumadin [Z79.01]             Comments:  Follow VAD Anticoag protocol:Yes: HeartMate 3  Bridging: Enoxaparin  Date VAD placed: 6/14/24                 Anticoagulation Care Providers       Provider Role Specialty Phone number    Micaela Silvestre MD Referring Advanced Heart Failure and Transplant Cardiology [phone]

## 2025-07-14 ENCOUNTER — ANTICOAGULATION THERAPY VISIT (OUTPATIENT)
Dept: ANTICOAGULATION | Facility: CLINIC | Age: 55
End: 2025-07-14
Payer: COMMERCIAL

## 2025-07-14 LAB — INR HOME MONITORING: 2.9 RATIO (ref 2.5–3)

## 2025-07-14 NOTE — PROGRESS NOTES
ANTICOAGULATION MANAGEMENT     Navin LOPEZ Bolivar 54 year old male is on warfarin with therapeutic INR result. (Goal INR 2.0-3.0)    Recent labs: (last 7 days)     07/14/25  1118   INR 2.9       ASSESSMENT     Source(s): Chart Review and Patient/Caregiver Call     Warfarin doses taken: Warfarin taken as instructed  Diet: No new diet changes identified  Medication/supplement changes: None noted  New illness, injury, or hospitalization: No  Signs or symptoms of bleeding or clotting: No  Previous result: Therapeutic last 2(+) visits  Additional findings: None       PLAN     Dosing Instructions: Continue your current warfarin dose with next INR in 1 week       Summary  As of 7/14/2025      Full warfarin instructions:  7.5 mg every Mon, Wed, Fri; 5 mg all other days   Next INR check:  7/21/2025               Telephone call with Navin who verbalizes understanding and agrees to plan    Patient to recheck with home meter    Education provided: Contact [phone] with any changes, questions or concerns.     Plan made per [facility] anticoagulation protocol and per LVAD protocol    Gracie Blair RN  7/14/2025  Anticoagulation Clinic  [employer] App for routing messages: yee ANTICOAG LVAD  [facility] patient phone line: [phone]        _______________________________________________________________________     Anticoagulation Episode Summary       Current INR goal:  2.0-3.0   TTR:  42.8% (1 y)   Target end date:  Indefinite   Send INR reminders to:  ANTICOAG LVAD    Indications    Chronic systolic congestive heart failure (H) [I50.22]  LVAD (left ventricular assist device) present (H) [Z95.811]  Anticoagulated on Coumadin [Z79.01]             Comments:  Follow VAD Anticoag protocol:Yes: HeartMate 3  Bridging: Enoxaparin  Date VAD placed: 6/14/24                 Anticoagulation Care Providers       Provider Role Specialty Phone number    Micaela Silvestre MD Referring Advanced Heart Failure and Transplant Cardiology [phone]

## 2025-07-21 DIAGNOSIS — I10 PRIMARY HYPERTENSION: ICD-10-CM

## 2025-07-21 DIAGNOSIS — G45.9 TIA (TRANSIENT ISCHEMIC ATTACK): ICD-10-CM

## 2025-07-22 ENCOUNTER — RESULTS FOLLOW-UP (OUTPATIENT)
Dept: ANTICOAGULATION | Facility: CLINIC | Age: 55
End: 2025-07-22
Payer: COMMERCIAL

## 2025-07-22 ENCOUNTER — ANTICOAGULATION THERAPY VISIT (OUTPATIENT)
Dept: ANTICOAGULATION | Facility: CLINIC | Age: 55
End: 2025-07-22
Payer: COMMERCIAL

## 2025-07-22 DIAGNOSIS — Z95.811 LVAD (LEFT VENTRICULAR ASSIST DEVICE) PRESENT (H): ICD-10-CM

## 2025-07-22 DIAGNOSIS — Z79.01 ANTICOAGULATED ON COUMADIN: ICD-10-CM

## 2025-07-22 DIAGNOSIS — I50.22 CHRONIC SYSTOLIC CONGESTIVE HEART FAILURE (H): Primary | ICD-10-CM

## 2025-07-22 LAB — INR HOME MONITORING: 2.6 RATIO (ref 2.5–3)

## 2025-07-22 NOTE — PROGRESS NOTES
ANTICOAGULATION MANAGEMENT     Navin Lutz 54 year old male is on warfarin with therapeutic INR result. (Goal INR 2.0-3.0)    Recent labs: (last 7 days)     07/22/25  1150   INR 2.6       ASSESSMENT     Source(s): Chart Review  Previous INR was Therapeutic last 2(+) visits  Medication, diet, health changes since last INR: chart reviewed; none identified         PLAN     Recommended plan for no diet, medication or health factor changes affecting INR     Dosing Instructions: Continue your current warfarin dose with next INR in 1 week       Summary  As of 7/22/2025      Full warfarin instructions:  7.5 mg every Mon, Wed, Fri; 5 mg all other days   Next INR check:  7/29/2025               Sent Loaded Pocket message with dosing and follow up instructions    Patient to recheck with home meter    Education provided: Please call back if any changes to your diet, medications or how you've been taking warfarin    Plan made per [facility] anticoagulation protocol    Buck LYNNE RN  7/22/2025  Anticoagulation Clinic  [employer] for routing messages: p ANTICOAG LVAD  [facility] patient phone line: [phone]        _______________________________________________________________________     Anticoagulation Episode Summary       Current INR goal:  2.0-3.0   TTR:  45.0% (1 y)   Target end date:  Indefinite   Send INR reminders to:  ANTICOAG LVAD    Indications    Chronic systolic congestive heart failure (H) [I50.22]  LVAD (left ventricular assist device) present (H) [Z95.811]  Anticoagulated on Coumadin [Z79.01]             Comments:  Follow VAD Anticoag protocol:Yes: HeartMate 3  Bridging: Enoxaparin  Date VAD placed: 6/14/24                 Anticoagulation Care Providers       Provider Role Specialty Phone number    Micaela Silvestre MD Referring Advanced Heart Failure and Transplant Cardiology [phone]

## 2025-07-23 NOTE — TELEPHONE ENCOUNTER
Last Written Prescription:  rosuvastatin (CRESTOR) 10 MG tablet   90 tablet 3 8/14/2024   ----------------------  Last Visit Date: 6-  Future Visit Date: 9-3-2025  ----------------------  Refill decision:   [] Medication refilled per  Medication Refill in Ambulatory Care  policy.  [x] Medication unable to be refilled by RN due to: Overdue labs/test:  and abnormal labs    Request from pharmacy:  Requested Prescriptions   Pending Prescriptions Disp Refills    rosuvastatin (CRESTOR) 10 MG tablet 90 tablet 3     Sig: Take 1 tablet (10 mg) by mouth daily.       Antihyperlipidemic agents Failed - 7/23/2025 11:44 AM        Failed - LDL on file in the past 12 months     Recent Labs   Lab Test 07/10/24  0646   *     Lab Results   Component Value Date    ALT 37 06/17/2025               Passed - Medication is active on med list and the sig matches. RN to manually verify dose and sig if red X/fail.             Passed - Recent (12 month) or future (90 days) visit with authorizing provider's specialty (provided they have been seen in the past 15 months)     The patient must have completed an in-person or virtual visit within the past 12 months or has a future visit scheduled within the next 90 days with the authorizing provider s specialty.  Urgent care and e-visits do not qualify as an office visit for this protocol.          Passed - Patient is age 18 years or older

## 2025-07-24 ENCOUNTER — CARE COORDINATION (OUTPATIENT)
Dept: TRANSPLANT | Facility: CLINIC | Age: 55
End: 2025-07-24
Payer: COMMERCIAL

## 2025-07-24 RX ORDER — ROSUVASTATIN CALCIUM 10 MG/1
10 TABLET, COATED ORAL DAILY
Qty: 90 TABLET | Refills: 0 | Status: SHIPPED | OUTPATIENT
Start: 2025-07-24

## 2025-07-24 NOTE — PROGRESS NOTES
Status Change-OS 7/24/2025    Changed patient's listing status in [facility] from status 7 to status 4 for the following reasons:  Status 4 reactivation s/p LVAD implant     Patient aware of change, primary cardiology team aware.      Status Extension due 10/23/2025

## 2025-07-24 NOTE — LETTER
Navin Lutz  [address] [place] [address]          Attn:  [email or web address]  Fax:  [phone]  Phone:  [phone]      [facility]  Heart Transplant Program  [facility] Form    The following patient is a potential Heart Transplant recipient at the [facility].  This form will serve as a request to set up a flight plan for this patient.  Please contact the patient with a plan and please forward the flight plan information to:    [facility] Transplant Office  Heart Transplant Coordinator (Bernadine Bell)  Fax:  [phone]  Phone:  [phone]    Demographic Information on Navin Lutz:    Navin Lutz  Gender: male  : 1970   [address] [place] [address] [place] [address]  [phone] (home)   Medical Record: [id number]  Insurance: Payor: BCBS / Plan: BCBS OUT OF STATE / Product Type: Indemnity /     Diagnosis:  Dilated myopathy: nonischemic  Organ:  Heart  ABO:  O POS  Number of Sternotomies:  1    Feel free to call the Transplant Office at anytime with any questions or concerns.    Thank You,    The Heart Transplant Team  Heart Transplant Coordinator:  Bernadine Cote  [facility]

## 2025-07-31 LAB — INR (EXTERNAL): 2.5

## 2025-08-05 DIAGNOSIS — G45.9 TIA (TRANSIENT ISCHEMIC ATTACK): ICD-10-CM

## 2025-08-05 DIAGNOSIS — Z95.811 LVAD (LEFT VENTRICULAR ASSIST DEVICE) PRESENT (H): ICD-10-CM

## 2025-08-05 DIAGNOSIS — I42.8 NONISCHEMIC CARDIOMYOPATHY (H): ICD-10-CM

## 2025-08-05 DIAGNOSIS — I10 PRIMARY HYPERTENSION: ICD-10-CM

## 2025-08-06 ENCOUNTER — ANTICOAGULATION THERAPY VISIT (OUTPATIENT)
Dept: ANTICOAGULATION | Facility: CLINIC | Age: 55
End: 2025-08-06
Payer: COMMERCIAL

## 2025-08-06 DIAGNOSIS — I50.22 CHRONIC SYSTOLIC CONGESTIVE HEART FAILURE (H): Primary | ICD-10-CM

## 2025-08-06 DIAGNOSIS — Z95.811 LVAD (LEFT VENTRICULAR ASSIST DEVICE) PRESENT (H): ICD-10-CM

## 2025-08-06 DIAGNOSIS — Z79.01 ANTICOAGULATED ON COUMADIN: ICD-10-CM

## 2025-08-06 LAB — INR HOME MONITORING: 3.4 RATIO (ref 2.5–3)

## 2025-08-06 RX ORDER — ASPIRIN 81 MG/1
81 TABLET, CHEWABLE ORAL DAILY
Qty: 90 TABLET | Refills: 2 | Status: SHIPPED | OUTPATIENT
Start: 2025-08-06

## 2025-08-06 RX ORDER — CARVEDILOL 6.25 MG/1
6.25 TABLET ORAL 2 TIMES DAILY WITH MEALS
Qty: 180 TABLET | Refills: 2 | Status: SHIPPED | OUTPATIENT
Start: 2025-08-06

## 2025-08-06 RX ORDER — SPIRONOLACTONE 25 MG/1
25 TABLET ORAL EVERY EVENING
Qty: 90 TABLET | Refills: 2 | Status: SHIPPED | OUTPATIENT
Start: 2025-08-06

## 2025-08-11 LAB — INR (EXTERNAL): 3.5 (ref 0.9–1.1)

## 2025-08-18 ENCOUNTER — MYC MEDICAL ADVICE (OUTPATIENT)
Dept: ANTICOAGULATION | Facility: CLINIC | Age: 55
End: 2025-08-18
Payer: COMMERCIAL

## 2025-08-18 LAB — INR HOME MONITORING: 3.5 RATIO (ref 2.5–3)

## 2025-08-19 ENCOUNTER — ANTICOAGULATION THERAPY VISIT (OUTPATIENT)
Dept: ANTICOAGULATION | Facility: CLINIC | Age: 55
End: 2025-08-19
Payer: COMMERCIAL

## 2025-08-19 ENCOUNTER — TELEPHONE (OUTPATIENT)
Dept: TRANSPLANT | Facility: CLINIC | Age: 55
End: 2025-08-19
Payer: COMMERCIAL

## 2025-08-19 DIAGNOSIS — Z95.811 LVAD (LEFT VENTRICULAR ASSIST DEVICE) PRESENT (H): ICD-10-CM

## 2025-08-19 DIAGNOSIS — Z79.01 ANTICOAGULATED ON COUMADIN: ICD-10-CM

## 2025-08-19 DIAGNOSIS — I50.22 CHRONIC SYSTOLIC CONGESTIVE HEART FAILURE (H): Primary | ICD-10-CM

## 2025-08-28 LAB — INR HOME MONITORING: 3.8 RATIO (ref 2.5–3)

## 2025-09-02 ENCOUNTER — TELEPHONE (OUTPATIENT)
Dept: CARDIOLOGY | Facility: CLINIC | Age: 55
End: 2025-09-02
Payer: COMMERCIAL

## 2025-09-03 ENCOUNTER — ANTICOAGULATION THERAPY VISIT (OUTPATIENT)
Dept: ANTICOAGULATION | Facility: CLINIC | Age: 55
End: 2025-09-03

## 2025-09-03 ENCOUNTER — OFFICE VISIT (OUTPATIENT)
Dept: CARDIOLOGY | Facility: CLINIC | Age: 55
End: 2025-09-03
Attending: STUDENT IN AN ORGANIZED HEALTH CARE EDUCATION/TRAINING PROGRAM
Payer: COMMERCIAL

## 2025-09-03 VITALS
HEART RATE: 59 BPM | SYSTOLIC BLOOD PRESSURE: 82 MMHG | OXYGEN SATURATION: 96 % | BODY MASS INDEX: 28.54 KG/M2 | WEIGHT: 210.4 LBS

## 2025-09-03 DIAGNOSIS — I10 ESSENTIAL HYPERTENSION: ICD-10-CM

## 2025-09-03 DIAGNOSIS — Z95.811 LVAD (LEFT VENTRICULAR ASSIST DEVICE) PRESENT (H): Primary | ICD-10-CM

## 2025-09-03 DIAGNOSIS — Z79.01 ANTICOAGULATED ON COUMADIN: ICD-10-CM

## 2025-09-03 DIAGNOSIS — I50.22 CHRONIC SYSTOLIC CONGESTIVE HEART FAILURE (H): ICD-10-CM

## 2025-09-03 DIAGNOSIS — E78.5 HYPERLIPIDEMIA LDL GOAL <100: ICD-10-CM

## 2025-09-03 DIAGNOSIS — N18.2 CHRONIC KIDNEY DISEASE, STAGE II (MILD): ICD-10-CM

## 2025-09-03 DIAGNOSIS — I42.8 NONISCHEMIC CARDIOMYOPATHY (H): ICD-10-CM

## 2025-09-03 DIAGNOSIS — I50.22 CHRONIC SYSTOLIC CONGESTIVE HEART FAILURE (H): Primary | ICD-10-CM

## 2025-09-03 DIAGNOSIS — R74.8 ELEVATED LIVER ENZYMES: ICD-10-CM

## 2025-09-03 DIAGNOSIS — Z95.811 LVAD (LEFT VENTRICULAR ASSIST DEVICE) PRESENT (H): ICD-10-CM

## 2025-09-03 PROCEDURE — 99214 OFFICE O/P EST MOD 30 MIN: CPT | Performed by: STUDENT IN AN ORGANIZED HEALTH CARE EDUCATION/TRAINING PROGRAM

## 2025-09-03 PROCEDURE — 93750 INTERROGATION VAD IN PERSON: CPT | Performed by: STUDENT IN AN ORGANIZED HEALTH CARE EDUCATION/TRAINING PROGRAM

## 2025-09-03 ASSESSMENT — PAIN SCALES - GENERAL: PAINLEVEL_OUTOF10: NO PAIN (0)

## (undated) DEVICE — KIT MICROINTRODUCER VASCULAR  4FRX21GAX4CM

## (undated) DEVICE — PROBE COVER INTRAOPERATIVE 5"X96" PC1308

## (undated) DEVICE — SLEEVE REPOSITIONING W/CATH LOCK 60CM 406503

## (undated) DEVICE — INTRO SHEATH 7FRX10CM PINNACLE RSS702

## (undated) DEVICE — NDL COUNTER 20CT 31142493

## (undated) DEVICE — KIT RIGHT HEART CATH 60130719

## (undated) DEVICE — PUNCH AORTIC 4.0MMX8" RCB40

## (undated) DEVICE — SU DERMABOND ADVANCED .7ML DNX12

## (undated) DEVICE — CATH SWAN CCO/SV02/CEDV 8FR 110CM W/HEP 777F8

## (undated) DEVICE — LINEN TOWEL PACK X6 WHITE 5487

## (undated) DEVICE — TUBING INSUFFLATION PNEUMOCLEAR 0620050100

## (undated) DEVICE — Device

## (undated) DEVICE — KIT MANIFOLD NAMIC STANDARD 72IN RIGHT HEART 2 PT 613000213

## (undated) DEVICE — TAPE MEDIPORE 4"X2YD 2864

## (undated) DEVICE — BLADE CLIPPER SGL USE 9680

## (undated) DEVICE — SU DEVICE COR-KNOT MINI 4X14MM 031350

## (undated) DEVICE — CAP LUER LOCK MALE/FEMALE DUAL 2C6250

## (undated) DEVICE — DRSG ABDOMINAL 07 1/2X8" 7197D

## (undated) DEVICE — SU PLEDGET SOFT TFE 13MMX7MMX1.5MM D7044

## (undated) DEVICE — KIT MICROINTRODUCER VASCULAR VSI 4FRX0.018INX40CM 7195X

## (undated) DEVICE — THORATEC HEARTMATE 3 VAD MODULAR CABLE

## (undated) DEVICE — SU ETHIBOND 3-0 BBDA 36" X588H

## (undated) DEVICE — SUCTION MANIFOLD NEPTUNE 2 SYS 4 PORT 0702-020-000

## (undated) DEVICE — PACK HEART RIGHT CUSTOM SAN32RHF18

## (undated) DEVICE — INTRODUCER SHEATH FAST-CATH CATH-LOCK 7FRX12CM 406702

## (undated) DEVICE — WIRE GUIDE 0.035"X150CM EMERALD J TIP 502521

## (undated) DEVICE — BLADE SAW STRK STERNAL 6207-97-101

## (undated) DEVICE — SU VICRYL 0 CTX 36" J370H

## (undated) DEVICE — GLOVE BIOGEL PI MICRO INDICATOR UNDERGLOVE SZ 8.0 48980

## (undated) DEVICE — SOL NACL 0.9% IRRIG 3000ML BAG 2B7477

## (undated) DEVICE — GOWN XLG DISP 9545

## (undated) DEVICE — SU PROLENE 5-0 RB-2DA 30" 8710H

## (undated) DEVICE — TUBING SUCTION DRAINAGE PLEURAL DUAL 8884714200

## (undated) DEVICE — KIT INTRODUCER FLUENT MICRO 5FRX10CM ECHO TIP KIT-038-04

## (undated) DEVICE — WIPES FOLEY CARE SURESTEP PROVON DFC100

## (undated) DEVICE — PREP CHLORAPREP 26ML TINTED HI-LITE ORANGE 930815

## (undated) DEVICE — SU ETHIBOND 2-0 SHDA 30" X563H

## (undated) DEVICE — SU ETHIBOND 2-0 CT-1 8X18" CX22D

## (undated) DEVICE — DRSG TEGADERM IV ADVANCED 3.5X4.5" 1685

## (undated) DEVICE — RX SURGIFLO HEMOSTATIC MATRIX W/THROMBIN 8ML 2994

## (undated) DEVICE — GLOVE BIOGEL PI SZ 7.0 40870

## (undated) DEVICE — PACK HEART LEFT CUSTOM

## (undated) DEVICE — BASIN SET SINGLE STERILE 13752-624

## (undated) DEVICE — SU VICRYL+ 3-0 FS1 27IN UND VCP442H

## (undated) DEVICE — ESU HOLSTER PLASTIC DISP E2400

## (undated) DEVICE — DRAIN CHEST TUBE 36FR STR 8036

## (undated) DEVICE — DEFIB PRO-PADZ LVP LQD GEL ADULT 8900-2105-01

## (undated) DEVICE — DRAPE FLUID WARMING 52"X66" ORS-301

## (undated) DEVICE — SU STEEL MYO/WIRE II STERNOTOMY 8 BE-1 3X14" 048-217

## (undated) DEVICE — PITCHER STERILE 1000ML  SSK9004A

## (undated) DEVICE — CONNECTOR BLAKE DRAIN SGL BCC1

## (undated) DEVICE — SUCTION DRY CHEST DRAIN OASIS 3600-100

## (undated) DEVICE — INTRO SHEATH 9FRX10CM PINNACLE RSS902

## (undated) DEVICE — SU ETHIBOND 2-0 MHDA 36" X843H

## (undated) DEVICE — DRSG TEGADERM 8X12" 1629

## (undated) DEVICE — SOL NACL 0.9% 10ML VIAL 0409-4888-02

## (undated) DEVICE — DRAPE IOBAN INCISE 23X17" 6650EZ

## (undated) DEVICE — GLOVE PROTEXIS POWDER FREE SMT 7.5  2D72PT75X

## (undated) DEVICE — PROTECTOR ARM ONE-STEP TRENDELENBURG 40418

## (undated) DEVICE — DRSG BIOPATCH GERMICIDAL SPLIT SPONGE 4MM MED 4150

## (undated) DEVICE — DRAIN CHEST TUBE RIGHT ANGLED 28FR 8128

## (undated) DEVICE — SU PROLENE 3-0 SHDA 36" 8522H

## (undated) DEVICE — SU PROLENE 4-0 RB-1DA 36" 8557H

## (undated) DEVICE — LINEN TOWEL PACK X30 5481

## (undated) DEVICE — DRSG DRAIN 4X4" 7086

## (undated) DEVICE — SU STEEL 6 CCS 4X18" M654G

## (undated) DEVICE — GLOVE BIOGEL PI SZ 8.0 40880

## (undated) DEVICE — CAST STOCKINETTE 3"

## (undated) DEVICE — COVER EASY EQUIP BAG W/BAND LATEX FREE EZ-28

## (undated) DEVICE — KIT INTRODUCER DL 9FR 11.5CM J-TIP 0.35"X45CM CDC-21242-1A

## (undated) DEVICE — SUCTION CATH AIRLIFE TRI-FLO W/CONTROL PORT 14FR  T60C

## (undated) DEVICE — LINEN TOWEL PACK X5 5464

## (undated) DEVICE — INTRO SHEATH MICRO PLATINUM TIP 4FRX40CM 7274

## (undated) DEVICE — LINEN GOWN XLG 5407

## (undated) DEVICE — SU VICRYL 2-0 CT-1 27" UND J259H

## (undated) DEVICE — TIES BANDING T50R

## (undated) DEVICE — SU ETHIBOND 0 CT-1 CR 8X18" CX21D

## (undated) DEVICE — NDL ANGIOCATH 14GA 1.25" 4048

## (undated) DEVICE — GUIDEWIRE L80CM OD.035IN 3 CM J-TIP V

## (undated) DEVICE — ESU ELEC BLADE E-SEP INSULATED NEPTUNE 70MM 0703-070-002

## (undated) DEVICE — SU SILK 0 TIE 6X30" A306H

## (undated) DEVICE — SPONGE RAY-TEC 4X8" 7318

## (undated) DEVICE — SURGICEL HEMOSTAT 4X8" 1952

## (undated) DEVICE — TIES CABLE STERILE 8" 17133/30

## (undated) DEVICE — UMMC CONVENIENCE KIT FORMALLY H9656021017160 NEW# 602101716

## (undated) DEVICE — SOL NACL 0.9% IRRIG 1000ML BOTTLE 2F7124

## (undated) DEVICE — SU DEVICE ENDO COR KNOT QUICK LOAD RELOAD 030874

## (undated) DEVICE — SU PROLENE 4-0 SHDA 36" 8521H

## (undated) DEVICE — SU PLEDGET SOFT TFE 3/8"X3/26"X1/16" PCP40

## (undated) DEVICE — PACK ADULT HEART UMMC PV15CG92D

## (undated) DEVICE — DRAPE BACK TABLE  44X90" 8377

## (undated) DEVICE — DECANTER BAG 2002S

## (undated) DEVICE — DRSG TELFA 3X8" 1238

## (undated) RX ORDER — LIDOCAINE 40 MG/G
CREAM TOPICAL
Status: DISPENSED
Start: 2023-09-20

## (undated) RX ORDER — LIDOCAINE 40 MG/G
CREAM TOPICAL
Status: DISPENSED
Start: 2024-12-13

## (undated) RX ORDER — CALCIUM CHLORIDE 100 MG/ML
INJECTION INTRAVENOUS; INTRAVENTRICULAR
Status: DISPENSED
Start: 2024-06-14

## (undated) RX ORDER — FENTANYL CITRATE 50 UG/ML
INJECTION, SOLUTION INTRAMUSCULAR; INTRAVENOUS
Status: DISPENSED
Start: 2024-06-14

## (undated) RX ORDER — ETOMIDATE 2 MG/ML
INJECTION INTRAVENOUS
Status: DISPENSED
Start: 2024-06-14

## (undated) RX ORDER — LIDOCAINE 40 MG/G
CREAM TOPICAL
Status: DISPENSED
Start: 2024-01-19

## (undated) RX ORDER — HEPARIN SODIUM 1000 [USP'U]/ML
INJECTION, SOLUTION INTRAVENOUS; SUBCUTANEOUS
Status: DISPENSED
Start: 2024-06-14

## (undated) RX ORDER — LIDOCAINE HYDROCHLORIDE 10 MG/ML
INJECTION, SOLUTION EPIDURAL; INFILTRATION; INTRACAUDAL; PERINEURAL
Status: DISPENSED
Start: 2023-09-20

## (undated) RX ORDER — CEFAZOLIN SODIUM 1 G/3ML
INJECTION, POWDER, FOR SOLUTION INTRAMUSCULAR; INTRAVENOUS
Status: DISPENSED
Start: 2024-06-14

## (undated) RX ORDER — LIDOCAINE HYDROCHLORIDE 10 MG/ML
INJECTION, SOLUTION EPIDURAL; INFILTRATION; INTRACAUDAL; PERINEURAL
Status: DISPENSED
Start: 2024-06-25

## (undated) RX ORDER — PROPOFOL 10 MG/ML
INJECTION, EMULSION INTRAVENOUS
Status: DISPENSED
Start: 2024-06-14

## (undated) RX ORDER — LIDOCAINE 40 MG/G
CREAM TOPICAL
Status: DISPENSED
Start: 2024-05-03

## (undated) RX ORDER — LIDOCAINE 40 MG/G
CREAM TOPICAL
Status: DISPENSED
Start: 2025-06-17

## (undated) RX ORDER — LIDOCAINE HYDROCHLORIDE 10 MG/ML
INJECTION, SOLUTION EPIDURAL; INFILTRATION; INTRACAUDAL; PERINEURAL
Status: DISPENSED
Start: 2024-06-28

## (undated) RX ORDER — LIDOCAINE 40 MG/G
CREAM TOPICAL
Status: DISPENSED
Start: 2023-08-22

## (undated) RX ORDER — FENTANYL CITRATE 50 UG/ML
INJECTION, SOLUTION INTRAMUSCULAR; INTRAVENOUS
Status: DISPENSED
Start: 2023-08-25

## (undated) RX ORDER — LIDOCAINE HYDROCHLORIDE 10 MG/ML
INJECTION, SOLUTION EPIDURAL; INFILTRATION; INTRACAUDAL; PERINEURAL
Status: DISPENSED
Start: 2024-01-19

## (undated) RX ORDER — LIDOCAINE HYDROCHLORIDE 10 MG/ML
INJECTION, SOLUTION EPIDURAL; INFILTRATION; INTRACAUDAL; PERINEURAL
Status: DISPENSED
Start: 2023-08-22

## (undated) RX ORDER — FENTANYL CITRATE 50 UG/ML
INJECTION, SOLUTION INTRAMUSCULAR; INTRAVENOUS
Status: DISPENSED
Start: 2024-06-12